# Patient Record
Sex: FEMALE | Race: WHITE | NOT HISPANIC OR LATINO | Employment: OTHER | URBAN - METROPOLITAN AREA
[De-identification: names, ages, dates, MRNs, and addresses within clinical notes are randomized per-mention and may not be internally consistent; named-entity substitution may affect disease eponyms.]

---

## 2017-07-29 ENCOUNTER — APPOINTMENT (OUTPATIENT)
Dept: RADIOLOGY | Facility: MEDICAL CENTER | Age: 77
DRG: 544 | End: 2017-07-29
Attending: EMERGENCY MEDICINE
Payer: MEDICARE

## 2017-07-29 ENCOUNTER — HOSPITAL ENCOUNTER (INPATIENT)
Facility: MEDICAL CENTER | Age: 77
LOS: 2 days | DRG: 544 | End: 2017-08-01
Attending: EMERGENCY MEDICINE | Admitting: HOSPITALIST
Payer: MEDICARE

## 2017-07-29 ENCOUNTER — RESOLUTE PROFESSIONAL BILLING HOSPITAL PROF FEE (OUTPATIENT)
Dept: HOSPITALIST | Facility: MEDICAL CENTER | Age: 77
End: 2017-07-29
Payer: MEDICARE

## 2017-07-29 DIAGNOSIS — Z86.73 HISTORY OF CVA (CEREBROVASCULAR ACCIDENT): ICD-10-CM

## 2017-07-29 DIAGNOSIS — M54.9 INTRACTABLE BACK PAIN: ICD-10-CM

## 2017-07-29 PROBLEM — R19.5 HEME POSITIVE STOOL: Status: ACTIVE | Noted: 2017-07-29

## 2017-07-29 PROBLEM — F03.90 DEMENTIA (HCC): Status: ACTIVE | Noted: 2017-07-29

## 2017-07-29 PROBLEM — D50.9 MICROCYTIC ANEMIA: Status: ACTIVE | Noted: 2017-07-29

## 2017-07-29 LAB
ALBUMIN SERPL BCP-MCNC: 3.7 G/DL (ref 3.2–4.9)
ALBUMIN/GLOB SERPL: 1.4 G/DL
ALP SERPL-CCNC: 85 U/L (ref 30–99)
ALT SERPL-CCNC: 12 U/L (ref 2–50)
ANION GAP SERPL CALC-SCNC: 10 MMOL/L (ref 0–11.9)
ANISOCYTOSIS BLD QL SMEAR: ABNORMAL
ANISOCYTOSIS BLD QL SMEAR: NORMAL
APPEARANCE UR: CLEAR
APTT PPP: 24 SEC (ref 24.7–36)
AST SERPL-CCNC: 15 U/L (ref 12–45)
BASOPHILS # BLD AUTO: 0.9 % (ref 0–1.8)
BASOPHILS # BLD: 0.05 K/UL (ref 0–0.12)
BILIRUB SERPL-MCNC: 0.4 MG/DL (ref 0.1–1.5)
BILIRUB UR QL STRIP.AUTO: NEGATIVE
BUN SERPL-MCNC: 17 MG/DL (ref 8–22)
CALCIUM SERPL-MCNC: 9 MG/DL (ref 8.5–10.5)
CHLORIDE SERPL-SCNC: 112 MMOL/L (ref 96–112)
CO2 SERPL-SCNC: 21 MMOL/L (ref 20–33)
COLOR UR: YELLOW
CREAT SERPL-MCNC: 0.72 MG/DL (ref 0.5–1.4)
CULTURE IF INDICATED INDCX: NO UA CULTURE
DACRYOCYTES BLD QL SMEAR: NORMAL
EOSINOPHIL # BLD AUTO: 0.19 K/UL (ref 0–0.51)
EOSINOPHIL NFR BLD: 3.5 % (ref 0–6.9)
ERYTHROCYTE [DISTWIDTH] IN BLOOD BY AUTOMATED COUNT: 53 FL (ref 35.9–50)
ERYTHROCYTE [DISTWIDTH] IN BLOOD BY AUTOMATED COUNT: 53.1 FL (ref 35.9–50)
GFR SERPL CREATININE-BSD FRML MDRD: >60 ML/MIN/1.73 M 2
GIANT PLATELETS BLD QL SMEAR: NORMAL
GLOBULIN SER CALC-MCNC: 2.6 G/DL (ref 1.9–3.5)
GLUCOSE SERPL-MCNC: 94 MG/DL (ref 65–99)
GLUCOSE UR STRIP.AUTO-MCNC: NEGATIVE MG/DL
HCT VFR BLD AUTO: 29.8 % (ref 37–47)
HCT VFR BLD AUTO: 31.3 % (ref 37–47)
HGB BLD-MCNC: 8.2 G/DL (ref 12–16)
HGB BLD-MCNC: 8.4 G/DL (ref 12–16)
HGB RETIC QN AUTO: 17 PG/CELL (ref 29–35)
IMM RETICS NFR: 22.2 % (ref 9.3–17.4)
INR PPP: 1.01 (ref 0.87–1.13)
IRON SATN MFR SERPL: 3 % (ref 15–55)
IRON SERPL-MCNC: 12 UG/DL (ref 40–170)
KETONES UR STRIP.AUTO-MCNC: NEGATIVE MG/DL
LEUKOCYTE ESTERASE UR QL STRIP.AUTO: NEGATIVE
LIPASE SERPL-CCNC: 6 U/L (ref 11–82)
LYMPHOCYTES # BLD AUTO: 0.78 K/UL (ref 1–4.8)
LYMPHOCYTES NFR BLD: 14.8 % (ref 22–41)
MAGNESIUM SERPL-MCNC: 2 MG/DL (ref 1.5–2.5)
MANUAL DIFF BLD: NORMAL
MCH RBC QN AUTO: 17.7 PG (ref 27–33)
MCH RBC QN AUTO: 18.2 PG (ref 27–33)
MCHC RBC AUTO-ENTMCNC: 26.8 G/DL (ref 33.6–35)
MCHC RBC AUTO-ENTMCNC: 27.5 G/DL (ref 33.6–35)
MCV RBC AUTO: 65.9 FL (ref 81.4–97.8)
MCV RBC AUTO: 66.1 FL (ref 81.4–97.8)
MICRO URNS: NORMAL
MICROCYTES BLD QL SMEAR: ABNORMAL
MICROCYTES BLD QL SMEAR: NORMAL
MONOCYTES # BLD AUTO: 0.23 K/UL (ref 0–0.85)
MONOCYTES NFR BLD AUTO: 4.3 % (ref 0–13.4)
MORPHOLOGY BLD-IMP: NORMAL
MORPHOLOGY BLD-IMP: NORMAL
NEUTROPHILS # BLD AUTO: 4.01 K/UL (ref 2–7.15)
NEUTROPHILS NFR BLD: 75.6 % (ref 44–72)
NITRITE UR QL STRIP.AUTO: NEGATIVE
NRBC # BLD AUTO: 0 K/UL
NRBC BLD AUTO-RTO: 0 /100 WBC
OVALOCYTES BLD QL SMEAR: NORMAL
OVALOCYTES BLD QL SMEAR: NORMAL
PH UR STRIP.AUTO: 5.5 [PH]
PLATELET # BLD AUTO: 314 K/UL (ref 164–446)
PLATELET # BLD AUTO: 342 K/UL (ref 164–446)
PLATELET BLD QL SMEAR: NORMAL
PLATELET BLD QL SMEAR: NORMAL
PMV BLD AUTO: 10.3 FL (ref 9–12.9)
POIKILOCYTOSIS BLD QL SMEAR: NORMAL
POIKILOCYTOSIS BLD QL SMEAR: NORMAL
POLYCHROMASIA BLD QL SMEAR: NORMAL
POLYCHROMASIA BLD QL SMEAR: NORMAL
POTASSIUM SERPL-SCNC: 3.5 MMOL/L (ref 3.6–5.5)
PROMYELOCYTES NFR BLD MANUAL: 0.9 %
PROT SERPL-MCNC: 6.3 G/DL (ref 6–8.2)
PROT UR QL STRIP: NEGATIVE MG/DL
PROTHROMBIN TIME: 13.6 SEC (ref 12–14.6)
RBC # BLD AUTO: 4.51 M/UL (ref 4.2–5.4)
RBC # BLD AUTO: 4.75 M/UL (ref 4.2–5.4)
RBC BLD AUTO: PRESENT
RBC BLD AUTO: PRESENT
RBC UR QL AUTO: NEGATIVE
RETICS # AUTO: 0.09 M/UL (ref 0.04–0.06)
RETICS/RBC NFR: 2.1 % (ref 0.8–2.1)
SODIUM SERPL-SCNC: 143 MMOL/L (ref 135–145)
SP GR UR STRIP.AUTO: 1.02
SPHEROCYTES BLD QL SMEAR: NORMAL
TIBC SERPL-MCNC: 458 UG/DL (ref 250–450)
TSH SERPL DL<=0.005 MIU/L-ACNC: 3.14 UIU/ML (ref 0.3–3.7)
UROBILINOGEN UR STRIP.AUTO-MCNC: 1 MG/DL
WBC # BLD AUTO: 5.3 K/UL (ref 4.8–10.8)
WBC # BLD AUTO: 5.7 K/UL (ref 4.8–10.8)

## 2017-07-29 PROCEDURE — 90471 IMMUNIZATION ADMIN: CPT

## 2017-07-29 PROCEDURE — 99220 PR INITIAL OBSERVATION CARE,LEVL III: CPT | Performed by: HOSPITALIST

## 2017-07-29 PROCEDURE — 71010 DX-CHEST-PORTABLE (1 VIEW): CPT

## 2017-07-29 PROCEDURE — 96375 TX/PRO/DX INJ NEW DRUG ADDON: CPT

## 2017-07-29 PROCEDURE — 72192 CT PELVIS W/O DYE: CPT

## 2017-07-29 PROCEDURE — G0378 HOSPITAL OBSERVATION PER HR: HCPCS

## 2017-07-29 PROCEDURE — 81003 URINALYSIS AUTO W/O SCOPE: CPT

## 2017-07-29 PROCEDURE — 85027 COMPLETE CBC AUTOMATED: CPT

## 2017-07-29 PROCEDURE — 85007 BL SMEAR W/DIFF WBC COUNT: CPT

## 2017-07-29 PROCEDURE — 83540 ASSAY OF IRON: CPT

## 2017-07-29 PROCEDURE — 700105 HCHG RX REV CODE 258: Performed by: EMERGENCY MEDICINE

## 2017-07-29 PROCEDURE — 85610 PROTHROMBIN TIME: CPT

## 2017-07-29 PROCEDURE — 3E0234Z INTRODUCTION OF SERUM, TOXOID AND VACCINE INTO MUSCLE, PERCUTANEOUS APPROACH: ICD-10-PCS | Performed by: HOSPITALIST

## 2017-07-29 PROCEDURE — 36415 COLL VENOUS BLD VENIPUNCTURE: CPT

## 2017-07-29 PROCEDURE — 93005 ELECTROCARDIOGRAM TRACING: CPT | Performed by: EMERGENCY MEDICINE

## 2017-07-29 PROCEDURE — 85730 THROMBOPLASTIN TIME PARTIAL: CPT

## 2017-07-29 PROCEDURE — 72131 CT LUMBAR SPINE W/O DYE: CPT

## 2017-07-29 PROCEDURE — 700111 HCHG RX REV CODE 636 W/ 250 OVERRIDE (IP): Performed by: HOSPITALIST

## 2017-07-29 PROCEDURE — 84443 ASSAY THYROID STIM HORMONE: CPT

## 2017-07-29 PROCEDURE — 80053 COMPREHEN METABOLIC PANEL: CPT

## 2017-07-29 PROCEDURE — 83690 ASSAY OF LIPASE: CPT

## 2017-07-29 PROCEDURE — 85046 RETICYTE/HGB CONCENTRATE: CPT

## 2017-07-29 PROCEDURE — 700102 HCHG RX REV CODE 250 W/ 637 OVERRIDE(OP): Performed by: HOSPITALIST

## 2017-07-29 PROCEDURE — 700111 HCHG RX REV CODE 636 W/ 250 OVERRIDE (IP): Performed by: EMERGENCY MEDICINE

## 2017-07-29 PROCEDURE — 83550 IRON BINDING TEST: CPT

## 2017-07-29 PROCEDURE — 51701 INSERT BLADDER CATHETER: CPT

## 2017-07-29 PROCEDURE — A9270 NON-COVERED ITEM OR SERVICE: HCPCS | Performed by: HOSPITALIST

## 2017-07-29 PROCEDURE — 99285 EMERGENCY DEPT VISIT HI MDM: CPT

## 2017-07-29 PROCEDURE — 83735 ASSAY OF MAGNESIUM: CPT

## 2017-07-29 PROCEDURE — 90670 PCV13 VACCINE IM: CPT | Performed by: HOSPITALIST

## 2017-07-29 PROCEDURE — 96374 THER/PROPH/DIAG INJ IV PUSH: CPT

## 2017-07-29 RX ORDER — AMOXICILLIN 250 MG
2 CAPSULE ORAL 2 TIMES DAILY
Status: DISCONTINUED | OUTPATIENT
Start: 2017-07-29 | End: 2017-08-01

## 2017-07-29 RX ORDER — MORPHINE SULFATE 4 MG/ML
4 INJECTION, SOLUTION INTRAMUSCULAR; INTRAVENOUS ONCE
Status: COMPLETED | OUTPATIENT
Start: 2017-07-29 | End: 2017-07-29

## 2017-07-29 RX ORDER — DULOXETIN HYDROCHLORIDE 20 MG/1
20 CAPSULE, DELAYED RELEASE ORAL DAILY
Status: ON HOLD | COMMUNITY
End: 2017-08-15

## 2017-07-29 RX ORDER — FUROSEMIDE 40 MG/1
40 TABLET ORAL DAILY
Status: ON HOLD | COMMUNITY
End: 2017-08-15

## 2017-07-29 RX ORDER — OXYCODONE HYDROCHLORIDE 5 MG/1
5 TABLET ORAL
Status: DISCONTINUED | OUTPATIENT
Start: 2017-07-29 | End: 2017-08-01 | Stop reason: HOSPADM

## 2017-07-29 RX ORDER — SODIUM CHLORIDE 9 MG/ML
INJECTION, SOLUTION INTRAVENOUS CONTINUOUS
Status: DISCONTINUED | OUTPATIENT
Start: 2017-07-29 | End: 2017-07-29

## 2017-07-29 RX ORDER — BUSPIRONE HYDROCHLORIDE 10 MG/1
5-10 TABLET ORAL 2 TIMES DAILY PRN
Status: ON HOLD | COMMUNITY
End: 2017-08-15

## 2017-07-29 RX ORDER — POLYETHYLENE GLYCOL 3350 17 G/17G
1 POWDER, FOR SOLUTION ORAL
Status: DISCONTINUED | OUTPATIENT
Start: 2017-07-29 | End: 2017-08-01

## 2017-07-29 RX ORDER — ASPIRIN 325 MG
975 TABLET ORAL EVERY 6 HOURS PRN
Status: ON HOLD | COMMUNITY
End: 2017-08-15

## 2017-07-29 RX ORDER — NICOTINE 21 MG/24HR
14 PATCH, TRANSDERMAL 24 HOURS TRANSDERMAL
Status: DISCONTINUED | OUTPATIENT
Start: 2017-07-29 | End: 2017-08-01

## 2017-07-29 RX ORDER — ATORVASTATIN CALCIUM 80 MG/1
80 TABLET, FILM COATED ORAL
Status: ON HOLD | COMMUNITY
End: 2017-08-15

## 2017-07-29 RX ORDER — OXYCODONE HYDROCHLORIDE 5 MG/1
2.5 TABLET ORAL
Status: DISCONTINUED | OUTPATIENT
Start: 2017-07-29 | End: 2017-08-01 | Stop reason: HOSPADM

## 2017-07-29 RX ORDER — BISACODYL 10 MG
10 SUPPOSITORY, RECTAL RECTAL
Status: DISCONTINUED | OUTPATIENT
Start: 2017-07-29 | End: 2017-08-01

## 2017-07-29 RX ORDER — ONDANSETRON 2 MG/ML
4 INJECTION INTRAMUSCULAR; INTRAVENOUS EVERY 4 HOURS PRN
Status: DISCONTINUED | OUTPATIENT
Start: 2017-07-29 | End: 2017-08-01 | Stop reason: HOSPADM

## 2017-07-29 RX ORDER — ONDANSETRON 2 MG/ML
4 INJECTION INTRAMUSCULAR; INTRAVENOUS ONCE
Status: COMPLETED | OUTPATIENT
Start: 2017-07-29 | End: 2017-07-29

## 2017-07-29 RX ORDER — BENAZEPRIL HYDROCHLORIDE 40 MG/1
40 TABLET ORAL DAILY
Status: ON HOLD | COMMUNITY
End: 2017-08-15

## 2017-07-29 RX ORDER — ONDANSETRON 4 MG/1
4 TABLET, ORALLY DISINTEGRATING ORAL EVERY 4 HOURS PRN
Status: DISCONTINUED | OUTPATIENT
Start: 2017-07-29 | End: 2017-08-01

## 2017-07-29 RX ADMIN — OXYCODONE HYDROCHLORIDE 5 MG: 5 TABLET ORAL at 21:09

## 2017-07-29 RX ADMIN — SODIUM CHLORIDE: 9 INJECTION, SOLUTION INTRAVENOUS at 12:06

## 2017-07-29 RX ADMIN — NICOTINE 14 MG: 14 PATCH, EXTENDED RELEASE TRANSDERMAL at 18:37

## 2017-07-29 RX ADMIN — OXYCODONE HYDROCHLORIDE 5 MG: 5 TABLET ORAL at 17:32

## 2017-07-29 RX ADMIN — PNEUMOCOCCAL 13-VALENT CONJUGATE VACCINE 0.5 ML: 2.2; 2.2; 2.2; 2.2; 2.2; 4.4; 2.2; 2.2; 2.2; 2.2; 2.2; 2.2; 2.2 INJECTION, SUSPENSION INTRAMUSCULAR at 21:11

## 2017-07-29 RX ADMIN — ONDANSETRON 4 MG: 2 INJECTION INTRAMUSCULAR; INTRAVENOUS at 12:28

## 2017-07-29 RX ADMIN — STANDARDIZED SENNA CONCENTRATE AND DOCUSATE SODIUM 2 TABLET: 8.6; 5 TABLET, FILM COATED ORAL at 21:06

## 2017-07-29 RX ADMIN — MORPHINE SULFATE 4 MG: 4 INJECTION INTRAVENOUS at 12:28

## 2017-07-29 ASSESSMENT — PATIENT HEALTH QUESTIONNAIRE - PHQ9
SUM OF ALL RESPONSES TO PHQ QUESTIONS 1-9: 0
1. LITTLE INTEREST OR PLEASURE IN DOING THINGS: NOT AT ALL
2. FEELING DOWN, DEPRESSED, IRRITABLE, OR HOPELESS: NOT AT ALL
SUM OF ALL RESPONSES TO PHQ9 QUESTIONS 1 AND 2: 0

## 2017-07-29 ASSESSMENT — PAIN SCALES - GENERAL
PAINLEVEL_OUTOF10: 0
PAINLEVEL_OUTOF10: 0
PAINLEVEL_OUTOF10: 10

## 2017-07-29 ASSESSMENT — LIFESTYLE VARIABLES
EVER_SMOKED: YES
ALCOHOL_USE: NO

## 2017-07-29 NOTE — ED NOTES
Chief Complaint   Patient presents with   • Leg Pain     Pt reports chronic bilateral leg pain that has been increasing over the past week to the point where she cannot tolerate walking.    Pt bib [facility] for above chief complaint.  CMS intact, +2 pedal pulses on BLE, no edema.

## 2017-07-29 NOTE — IP AVS SNAPSHOT
" Home Care Instructions                                                                                                                  Name:Maria Esther Valencia  Medical Record Number:[id number]  CSN: [id number]    YOB: 1940   Age: 76 y.o.  Sex: female  HT:1.575 m (5' 2\") WT: 86.183 kg (190 lb)          Admit Date: 7/29/2017     Discharge Date:   Today's Date: 8/1/2017  Attending Doctor:  Alyssa Samayoa M.D.                  Allergies:  Pcn            Discharge Instructions       Discharge Instructions    Discharged to other by [facility] with escort. Discharged via wheelchair, hospital escort: Yes.  Special equipment needed: Oxygen    Be sure to schedule a follow-up appointment with your primary care doctor or any specialists as instructed.     Discharge Plan:   Smoking Cessation Offered: Patient Refused  Pneumococcal Vaccine Given - only chart on this line when given: Given (See MAR)  Influenza Vaccine Indication: Indicated: Not available from distributor/    I understand that a diet low in cholesterol, fat, and sodium is recommended for good health. Unless I have been given specific instructions below for another diet, I accept this instruction as my diet prescription.   Other diet: Regular texture/ cardiac diet    Special Instructions: None      · Is patient discharged on Warfarin / Coumadin?   No     · Is patient Post Blood Transfusion?  No    Depression / Suicide Risk    As you are discharged from this [facility] facility, it is important to learn how to keep safe from harming yourself.    Recognize the warning signs:  · Abrupt changes in personality, positive or negative- including increase in energy   · Giving away possessions  · Change in eating patterns- significant weight changes-  positive or negative  · Change in sleeping patterns- unable to sleep or sleeping all the time   · Unwillingness or inability to communicate  · Depression  · Unusual sadness, discouragement and " loneliness  · Talk of wanting to die  · Neglect of personal appearance   · Rebelliousness- reckless behavior  · Withdrawal from people/activities they love  · Confusion- inability to concentrate     If you or a loved one observes any of these behaviors or has concerns about self-harm, here's what you can do:  · Talk about it- your feelings and reasons for harming yourself  · Remove any means that you might use to hurt yourself (examples: pills, rope, extension cords, firearm)  · Get professional help from the community (Mental Health, Substance Abuse, psychological counseling)  · Do not be alone:Call your Safe Contact- someone whom you trust who will be there for you.  · Call your local CRISIS HOTLINE 030-3938 or 960-289-6877  · Call your local Children's Mobile Crisis Response Team [place] (252) 919-2713 or www.[employer]  · Call the toll free National Suicide Prevention Hotlines   · National Suicide Prevention Lifeline 474-547-HGQV (7796)  · [place] [facility] Line Network 800-SUICIDE (910-5970)           Discharge Medication Instructions:    Below are the medications your physician expects you to take upon discharge:    Review all your home medications and newly ordered medications with your doctor and/or pharmacist. Follow medication instructions as directed by your doctor and/or pharmacist.    Please keep your medication list with you and share with your physician.               Medication List      START taking these medications        Instructions    Morning Afternoon Evening Bedtime    bisacodyl 10 MG Supp   Commonly known as:  DULCOLAX        Insert 1 Suppository in rectum 1 time daily as needed (if magnesium hydroxide ineffective after 24 hours).   Dose:  10 mg                        nicotine 14 MG/24HR Pt24   Last time this was given:  14 mg on 8/1/2017  4:56 AM   Commonly known as:  NICODERM        Apply 1 Patch to skin as directed every 24 hours.   Dose:  1 Patch                        oxycodone  immediate-release 5 MG Tabs   Last time this was given:  5 mg on 7/31/2017  7:07 PM   Commonly known as:  ROXICODONE        Take 0.5 Tabs by mouth every 3 hours as needed (Moderate Pain (NRS Pain Scale 4-6; CPOT Pain Scale 3-5)).   Dose:  2.5 mg                        polyethylene glycol/lytes Pack   Last time this was given:  1 Packet on 8/1/2017  8:53 AM   Commonly known as:  MIRALAX        Take 1 Packet by mouth 1 time daily as needed (if sennosides and docusate ineffective after 24 hours).   Dose:  17 g                          CONTINUE taking these medications        Instructions    Morning Afternoon Evening Bedtime    * aspirin 81 MG tablet        Take 81 mg by mouth every day.   Dose:  81 mg                        * aspirin 325 MG Tabs   Commonly known as:  ASA        Take 975 mg by mouth every 6 hours as needed for Mild Pain.   Dose:  975 mg                        benazepril 40 MG tablet   Commonly known as:  LOTENSIN        Take 40 mg by mouth every day.   Dose:  40 mg                        busPIRone 10 MG Tabs   Commonly known as:  BUSPAR        Take 5-10 mg by mouth 2 times a day as needed (anxiety).   Dose:  5-10 mg                        duloxetine 20 MG Cpep   Commonly known as:  CYMBALTA        Take 20 mg by mouth every day. Indications: mood/nerve pain   Dose:  20 mg                        furosemide 40 MG Tabs   Commonly known as:  LASIX        Take 40 mg by mouth every day.   Dose:  40 mg                        LIPITOR 80 MG tablet   Generic drug:  atorvastatin        Take 80 mg by mouth every bedtime.   Dose:  80 mg                        * Notice:  This list has 2 medication(s) that are the same as other medications prescribed for you. Read the directions carefully, and ask your doctor or other care provider to review them with you.         Where to Get Your Medications      Information about where to get these medications is not yet available     ! Ask your nurse or doctor about these  medications    - bisacodyl 10 MG Supp  - nicotine 14 MG/24HR Pt24  - oxycodone immediate-release 5 MG Tabs  - polyethylene glycol/lytes Pack            Orders for after discharge     REFERRAL TO PHYSIATRY (PMR)    Complete by:  As directed    Rehab eval       REFERRAL TO SKILLED NURSING FACILITY    Complete by:  As directed              Instructions           Diet / Nutrition:    Follow any diet instructions given to you by your doctor or the dietician, including how much salt (sodium) you are allowed each day.    If you are overweight, talk to your doctor about a weight reduction plan.    Activity:    Remain physically active following your doctor's instructions about exercise and activity.    Rest often.     Any time you become even a little tired or short of breath, SIT DOWN and rest.    Worsening Symptoms:    Report any of the following signs and symptoms to the doctor's office immediately:    *Pain of jaw, arm, or neck  *Chest pain not relieved by medication                               *Dizziness or loss of consciousness  *Difficulty breathing even when at rest   *More tired than usual                                       *Bleeding drainage or swelling of surgical site  *Swelling of feet, ankles, legs or stomach                 *Fever (>100ºF)  *Pink or blood tinged sputum  *Weight gain (3lbs/day or 5lbs /week)           *Shock from internal defibrillator (if applicable)  *Palpitations or irregular heartbeats                *Cool and/or numb extremities    Stroke Awareness    Common Risk Factors for Stroke include:    Age  Atrial Fibrillation  Carotid Artery Stenosis  Diabetes Mellitus  Excessive alcohol consumption  High blood pressure  Overweight   Physical inactivity  Smoking    Warning signs and symptoms of a stroke include:    *Sudden numbness or weakness of the face, arm or leg (especially on one side of the body).  *Sudden confusion, trouble speaking or understanding.  *Sudden trouble seeing in one or  both eyes.  *Sudden trouble walking, dizziness, loss of balance or coordination.Sudden severe headache with no known cause.    It is very important to get treatment quickly when a stroke occurs. If you experience any of the above warning signs, call 911 immediately.                   Disclaimer         Quit Smoking / Tobacco Use:    I understand the use of any tobacco products increases my chance of suffering from future heart disease or stroke and could cause other illnesses which may shorten my life. Quitting the use of tobacco products is the single most important thing I can do to improve my health. For further information on smoking / tobacco cessation call a Toll Free Quit Line at 1-415.660.1852 (*National Cancer [place]) or 1-871.286.5097 (American Lung Association) or you can access the web based program at www.lungEPIOMED THERAPEUTICS.org.    [place] Tobacco Users Help Line:  (460) 777-8399       Toll Free: 1-475.496.5079  Quit Tobacco Program [facility] Management Services (365)914-5225    Crisis Hotline:    [place] Crisis Hotline:  3-263-EHLCSYY or 1-954.795.8705    [place] Crisis Hotline:    1-238.631.2801 or 371-488-6618    Discharge Survey:   Thank you for choosing [facility]. We hope we did everything we could to make your hospital stay a pleasant one. You may be receiving a phone survey and we would appreciate your time and participation in answering the questions. Your input is very valuable to us in our efforts to improve our service to our patients and their families.        My signature on this form indicates that:    1. I have reviewed and understand the above information.  2. My questions regarding this information have been answered to my satisfaction.  3. I have formulated a plan with my discharge nurse to obtain my prescribed medications for home.                  Disclaimer         __________________________________                     __________       ________                       Patient Signature                                                  Date                    Time

## 2017-07-29 NOTE — IP AVS SNAPSHOT
" <p align=\"LEFT\"><IMG SRC=\"//[email or web address]/blob$/[email or web address]\" alt=\"Image\" WIDTH=\"50%\" HEIGHT=\"200\" BORDER=\"\"></p>                   Name:aMria Esther Valencia  Medical Record Number:[id number]  CSN: [id number]    YOB: 1940   Age: 76 y.o.  Sex: female  HT:1.575 m (5' 2\") WT: 86.183 kg (190 lb)          Admit Date: 7/29/2017     Discharge Date:   Today's Date: 8/1/2017  Attending Doctor:  Alyssa Samayoa M.D.                  Allergies:  n             Medication List      Take these Medications        Instructions    * aspirin 81 MG tablet    Take 81 mg by mouth every day.   Dose:  81 mg       * aspirin 325 MG Tabs   Commonly known as:  ASA    Take 975 mg by mouth every 6 hours as needed for Mild Pain.   Dose:  975 mg       benazepril 40 MG tablet   Commonly known as:  LOTENSIN    Take 40 mg by mouth every day.   Dose:  40 mg       bisacodyl 10 MG Supp   Commonly known as:  DULCOLAX    Insert 1 Suppository in rectum 1 time daily as needed (if magnesium hydroxide ineffective after 24 hours).   Dose:  10 mg       busPIRone 10 MG Tabs   Commonly known as:  BUSPAR    Take 5-10 mg by mouth 2 times a day as needed (anxiety).   Dose:  5-10 mg       duloxetine 20 MG Cpep   Commonly known as:  CYMBALTA    Take 20 mg by mouth every day. Indications: mood/nerve pain   Dose:  20 mg       furosemide 40 MG Tabs   Commonly known as:  LASIX    Take 40 mg by mouth every day.   Dose:  40 mg       LIPITOR 80 MG tablet   Generic drug:  atorvastatin    Take 80 mg by mouth every bedtime.   Dose:  80 mg       nicotine 14 MG/24HR Pt24   Commonly known as:  NICODERM    Apply 1 Patch to skin as directed every 24 hours.   Dose:  1 Patch       oxycodone immediate-release 5 MG Tabs   Commonly known as:  ROXICODONE    Take 0.5 Tabs by mouth every 3 hours as needed (Moderate Pain (NRS Pain Scale 4-6; CPOT Pain Scale 3-5)).   Dose:  2.5 mg       polyethylene glycol/lytes Pack   Commonly known as:  MIRALAX    Take 1 Packet by mouth 1 time daily as " needed (if sennosides and docusate ineffective after 24 hours).   Dose:  17 g       * Notice:  This list has 2 medication(s) that are the same as other medications prescribed for you. Read the directions carefully, and ask your doctor or other care provider to review them with you.

## 2017-07-29 NOTE — IP AVS SNAPSHOT
8/1/2017    Maria Esther Valencia  [address]  [place] [address]    Dear Maria Esther:    [facility] wants to ensure your discharge home is safe and you or your loved ones have had all of your questions answered regarding your care after you leave the hospital.    Below is a list of resources and contact information should you have any questions regarding your hospital stay, follow-up instructions, or active medical symptoms.    Questions or Concerns Regarding… Contact   Medical Questions Related to Your Discharge  (7 days a week, 8am-5pm) Contact a Nurse Care Coordinator   [phone]   Medical Questions Not Related to Your Discharge  (24 hours a day / 7 days a week)  Contact the Nurse Health Line   [phone]    Medications or Discharge Instructions Refer to your discharge packet   or contact your [facility] Primary Care Provider   [phone]   Follow-up Appointment(s) Schedule your appointment via [facility]   or contact Scheduling [phone]   Billing Review your statement via [facility]  or contact Billing [phone]   Medical Records Review your records via [facility]   or contact Medical Records [phone]     You may receive a telephone call within two days of discharge. This call is to make certain you understand your discharge instructions and have the opportunity to have any questions answered. You can also easily access your medical information, test results and upcoming appointments via the [facility] free online health management tool. You can learn more and sign up at [email or web address]. For assistance setting up your [facility] account, please call [phone].    Once again, we want to ensure your discharge home is safe and that you have a clear understanding of any next steps in your care. If you have any questions or concerns, please do not hesitate to contact us, we are here for you. Thank you for choosing [facility] for your healthcare needs.    Sincerely,    Your [facility] Healthcare Team

## 2017-07-29 NOTE — IP AVS SNAPSHOT
Ceon Access Code: [email or web address]  Expires: 8/31/2017  2:49 PM    Your email address is not on file at [facility].  Email Addresses are required for you to sign up for Ceon, please contact [phone] to verify your personal information and to provide your email address prior to attempting to register for Ceon.    Maria Esther [place] [place] [address]    Ceon  A secure, online tool to manage your health information     [facility]’s Ceon® is a secure, online tool that connects you to your personalized health information from the privacy of your home -- day or night - making it very easy for you to manage your healthcare. Once the activation process is completed, you can even access your medical information using the Ceon vivian, which is available for free in the Apple Vivian store or Google Play store.     To learn more about Ceon, visit [email or web address]    There are two levels of access available (as shown below):   My Chart Features  [facility] Primary Care Doctor [facility]  Specialists [facility]  Urgent  Care Non-[facility] Primary Care Doctor   Email your healthcare team securely and privately 24/7 X X X    Manage appointments: schedule your next appointment; view details of past/upcoming appointments X      Request prescription refills. X      View recent personal medical records, including lab and immunizations X X X X   View health record, including health history, allergies, medications X X X X   Read reports about your outpatient visits, procedures, consult and ER notes X X X X   See your discharge summary, which is a recap of your hospital and/or ER visit that includes your diagnosis, lab results, and care plan X X  X     How to register for Ceon:  Once your e-mail address has been verified, follow the following steps to sign up for Ceon.     1. Go to  [email or web address]  2. Click on the Sign Up Now box, which takes you to the New Member Sign Up page. You will  need to provide the following information:  a. Enter your Berg Access Code exactly as it appears at the top of this page. (You will not need to use this code after you’ve completed the sign-up process. If you do not sign up before the expiration date, you must request a new code.)   b. Enter your date of birth.   c. Enter your home email address.   d. Click Submit, and follow the next screen’s instructions.  3. Create a Fanaticallt ID. This will be your Berg login ID and cannot be changed, so think of one that is secure and easy to remember.  4. Create a Berg password. You can change your password at any time.  5. Enter your Password Reset Question and Answer. This can be used at a later time if you forget your password.   6. Enter your e-mail address. This allows you to receive e-mail notifications when new information is available in Berg.  7. Click Sign Up. You can now view your health information.    For assistance activating your Berg account, call [phone]

## 2017-07-29 NOTE — ED NOTES
"Med rec complete per pt and [facility] pharmacy   Pt was not able to provide any information on what medications  She normally takes, or what pharmacy she fills at  Pt states she has not had her medications in >week, due  To \"there not being enough\"  Per pharmacy, pt's RX's read that she needs to make an appointment  For more refills  Pt has only been taking Aspirin PRN pain recently  Allergies reviewed  No ABX in last month  "

## 2017-07-29 NOTE — ED PROVIDER NOTES
ED Provider Note    Scribed for Segundo Valencia M.D. by Katheryn Malik. 7/29/2017  11:31 AM    Primary Care Provider: Bernabe Hopkins D.O.  Means of arrival: Ambulance  History obtained from: Patient  History limited by: None    CHIEF COMPLAINT  Chief Complaint   Patient presents with   • Leg Pain       HPI  Maria Esther Valencia is a 76 y.o. female who presents to the ED by ambulance for leg pain with an onset of 7 days. History of chronic bilateral lower extremity pain. She began to experience exacerbated pain seven days ago while laying down. No injury, trauma or falls.  She localizes her pain to her inner thighs with no pain below the knees. She was unable to get out of bed this morning. She reports bladder incontinence this morning. Negative back pain, leg swelling, chest pain, palpitations, focal numbness or weakness.      REVIEW OF SYSTEMS  CONSTITUTIONAL:  Denies fever, chills, weight gain/loss or weakness.  EYES:  Denies photophobia or discharge.   ENT:  Denies sore throat, nose or ear pain.  CARDIOVASCULAR:  Denies chest pain, palpitations or swelling.  RESPIRATORY:  Denies cough, shortness of breath, difficulty breathing.  GI:  Denies abdominal pain, nausea, vomiting or diarrhea.  : Positive bladder incontinence.   MUSCULOSKELETAL:  Positive for chronic bilateral lower extremity pain and low back pain. But this is much worse..  Denies injury, trauma, falls, weakness, leg swelling or back pain.  SKIN:  No rash or bruising.  NEUROLOGIC:  Denies headache, focal weakness or numbness.  PSYCHIATRIC:  Denies depression.    See HPI for further details. C.      PAST MEDICAL HISTORY  Past Medical History   Diagnosis Date   • Congestive heart failure (CMS-HCC)    • CAD (coronary artery disease)    • Stroke (CMS-[facility])    • Hypertension    • Psychiatric disorder        FAMILY HISTORY  No one else is sick at this time  Patient states she has no living family    SOCIAL HISTORY  Patient reports that she has been smoking.   "She reports that she drinks alcohol. She reports that she does not use illicit drugs.  She lives alone. Has no family she states    SURGICAL HISTORY  Past Surgical History   Procedure Laterality Date   • Other cardiac surgery         CURRENT MEDICATIONS  Home Medications     Reviewed by Alena Alvarez R.N. (Registered Nurse) on 07/29/17 at 1117  Med List Status: Partial    Medication Last Dose Status    ASPIRIN 81 MG PO TBEC Taking Active    LASIX 40 MG PO TABS 7/29/2017 Active    LOPRESSOR 50 MG PO TABS Taking Active    NORVASC 5 MG PO TABS Taking Active    PAXIL 20 MG PO TABS Taking Active    PLAVIX 75 MG PO TABS Taking Active    PRILOSEC 20 MG PO CPDR Unknown Active    SOMA 350 MG PO TABS Not Taking Active    XANAX PO Not Taking Active                ALLERGIES  Allergies   Allergen Reactions   • Pcn [Penicillins]        PHYSICAL EXAM  VITAL SIGNS: /76 mmHg  Pulse 77  Temp(Src) 36.5 °C (97.7 °F)  Resp 16  Ht 1.575 m (5' 2\")  Wt 86.183 kg (190 lb)  BMI 34.74 kg/m2  SpO2 94%      Constitutional: Patient is awake and alert. Elderly female looks ill. Clinically looks dehydrated.   HENT: Normocephalic, Atraumatic, Bilateral external ears normal, Oropharynx dry and pink with no exudates, Nose patent.  Eyes: PERRLA, EOMI, Sclera and conjunctiva clear, Under right eye, she has dry yellow discharge.  Neck:  Supple no nuchal rigidity, no thyromegaly or mass. Non-tender  Lymphatic: No supraclavicular lymph nodes.   Cardiovascular: Heart is regular rate and rhythm no murmur, rub or thrill.   Thorax & Lungs: Chest is symmetrical, with good breath sounds. No wheezing or crackles. No respiratory distress, No chest tenderness.   Abdomen: Soft, No tenderness no hepatosplenomegaly there is no guarding or rebound, No masses, No pulsatile masses. Bowel sounds are present.  Rectal: Brown guaiac heme positive stool.  Skin: Warm, Dry, no petechia, purpura, or rash. No rashes in the groin or medial thigh areas.  Back: " No CVA tenderness. Tender to sciatic notches bilaterally.  No midline tenderness.  Extremities: No edema. Non tender.   Musculoskeletal: Good range of motion to wrists, elbows, shoulders knees, and ankles. Pulses 2+ radially and femorally. Movement of her lower extremities and her hips is or pain in the pelvis. She has bilateral sciatic notch pain with palpation reproducing her pain.  Neurologic: Alert & oriented to person, time, and place.  Answering questions appropriately. Strength is 4 over 5 and symmetric in bilateral upper and lower extremities.  Sensory is intact to light touch to face, arms, and legs.  DTRs are symmetrical in biceps  patella   Psychiatric: Flat affect. Denies being suicidal.      EKG  12:12- 13 Lead EKG interpreted by me shows normal sinus rhythm at 68. . Axis QRS 27, P waves biphasic in V1, Q waves in II, III and aVF. No ST elevations. No change from EKG on 10/19/10.      LABS  Results for orders placed or performed during the hospital encounter of 07/29/17   CBC WITH DIFFERENTIAL   Result Value Ref Range    WBC 5.3 4.8 - 10.8 K/uL    RBC 4.75 4.20 - 5.40 M/uL    Hemoglobin 8.4 (L) 12.0 - 16.0 g/dL    Hematocrit 31.3 (L) 37.0 - 47.0 %    MCV 65.9 (L) 81.4 - 97.8 fL    MCH 17.7 (L) 27.0 - 33.0 pg    MCHC 26.8 (L) 33.6 - 35.0 g/dL    RDW 53.0 (H) 35.9 - 50.0 fL    Platelet Count 342 164 - 446 K/uL    MPV 10.3 9.0 - 12.9 fL    Nucleated RBC 0.00 /100 WBC    NRBC (Absolute) 0.00 K/uL    Neutrophils-Polys 75.60 (H) 44.00 - 72.00 %    Lymphocytes 14.80 (L) 22.00 - 41.00 %    Monocytes 4.30 0.00 - 13.40 %    Eosinophils 3.50 0.00 - 6.90 %    Basophils 0.90 0.00 - 1.80 %    Neutrophils (Absolute) 4.01 2.00 - 7.15 K/uL    Lymphs (Absolute) 0.78 (L) 1.00 - 4.80 K/uL    Monos (Absolute) 0.23 0.00 - 0.85 K/uL    Eos (Absolute) 0.19 0.00 - 0.51 K/uL    Baso (Absolute) 0.05 0.00 - 0.12 K/uL    Anisocytosis 2+     Microcytosis 3+    COMP METABOLIC PANEL   Result Value Ref Range    Sodium 143 135 -  145 mmol/L    Potassium 3.5 (L) 3.6 - 5.5 mmol/L    Chloride 112 96 - 112 mmol/L    Co2 21 20 - 33 mmol/L    Anion Gap 10.0 0.0 - 11.9    Glucose 94 65 - 99 mg/dL    Bun 17 8 - 22 mg/dL    Creatinine 0.72 0.50 - 1.40 mg/dL    Calcium 9.0 8.5 - 10.5 mg/dL    AST(SGOT) 15 12 - 45 U/L    ALT(SGPT) 12 2 - 50 U/L    Alkaline Phosphatase 85 30 - 99 U/L    Total Bilirubin 0.4 0.1 - 1.5 mg/dL    Albumin 3.7 3.2 - 4.9 g/dL    Total Protein 6.3 6.0 - 8.2 g/dL    Globulin 2.6 1.9 - 3.5 g/dL    A-G Ratio 1.4 g/dL   LIPASE   Result Value Ref Range    Lipase 6 (L) 11 - 82 U/L   PROTHROMBIN TIME   Result Value Ref Range    PT 13.6 12.0 - 14.6 sec    INR 1.01 0.87 - 1.13   APTT   Result Value Ref Range    APTT 24.0 (L) 24.7 - 36.0 sec   URINALYSIS CULTURE, IF INDICATED   Result Value Ref Range    Color Yellow     Character Clear     Specific Gravity 1.023 <1.035    Ph 5.5 5.0-8.0    Glucose Negative Negative mg/dL    Ketones Negative Negative mg/dL    Protein Negative Negative mg/dL    Bilirubin Negative Negative    Urobilinogen, Urine 1.0 Negative    Nitrite Negative Negative    Leukocyte Esterase Negative Negative    Occult Blood Negative Negative    Micro Urine Req see below     Culture Indicated No UA Culture   ESTIMATED GFR   Result Value Ref Range    GFR If African American >60 >60 mL/min/1.73 m 2    GFR If Non African American >60 >60 mL/min/1.73 m 2   DIFFERENTIAL MANUAL   Result Value Ref Range    Progranulocytes 0.90 %    Manual Diff Status PERFORMED    PERIPHERAL SMEAR REVIEW   Result Value Ref Range    Peripheral Smear Review see below    PLATELET ESTIMATE   Result Value Ref Range    Plt Estimation Normal    MORPHOLOGY   Result Value Ref Range    RBC Morphology Present     Polychromia 1+     Poikilocytosis 1+     Ovalocytes 1+     Spherocytes 1+       All labs reviewed by me.      RADIOLOGY/PROCEDURES  CT-LSPINE W/O PLUS RECONS   Final Result      Approximately 35 % loss of height of the superior endplate of T12 with  mild retropulsion which may be subacute.      Approximately 25% loss of height of the superior endplate of L1 centrally. This appears chronic but new compared to 2009.      Degenerative changes as above described.      Atherosclerotic plaque.      Nonobstructing left renal calculus.      CT-PELVIS W/O PLUS RECONS   Final Result      1.  There is no acute fracture involving the pelvis or either proximal femur.   2.  Hips are normally located with moderate degenerative change.   3.  There is no evidence of joint effusion.   4.  Simple appearing cystic structure left hemipelvis possibly related to the left ovary. Uterus is unremarkable.   5.  There is extensive atherosclerosis.      DX-CHEST-PORTABLE (1 VIEW)   Final Result      1.  There is a mildly enlarged cardiac silhouette but there is no acute failure.      MR-LUMBAR SPINE-W/O    (Results Pending)   MR-THORACIC SPINE-W/O    (Results Pending)     The radiologist's interpretations of all radiological studies have been reviewed by me.       COURSE & MEDICAL DECISION MAKING  Pertinent Labs & Imaging studies reviewed. (See chart for details)        11:35 AM Patient seen and examined at bedside. Patient presents for bilateral lower extremity pain.      Initial orders in the Emergency Department included CT pelvis, CT lumbar spine, XR chest, EKG and laboratory testing: lipase, prothrombin time, APTT, UA, CBC with differential, CMP and estimated GFR.  Initial treatment in the Emergency Department included 4 mg of Morphine IV for pain and 4 mg of Zofran IV.  Patient will be treated with 1 L of NS IV as the patient is clinically dehydrated and has not eaten or drank anything over the past two days.  Patient verbalized their understanding and agreement to this plan.    1:58 PM On repeat evaluation, patient is dong well. Lab results were discussed which indicate anemia.  Rectal exam was completed with the assistance of a female nurse.    1:59 PM Spoke with Dr. Fenton,  Hospitalist, regarding the patient's presenting symptoms and chronic pain.  Diagnostic results were discussed.  He will consult and admit the patient for further evaluation and care.        Decision Making:  Patient has chronic low back pain getting much worse. The last couple of days has not been able to get out of bed or walk. She did state she crawled to the bathroom today to have a bowel movement. The pain is in a saddle distribution of her inner thighs down to her knees and in her lower back. I can reproduce pain by pushing sciatic notches. X-rays show multilevel degenerative changes in her spine and compression fractures. Age is somewhat unclear. We've titrated with IV pain medications and IV fluids for her dehydration since she has not eaten or drank anything for 2 days. We will admit her under the care of around hospitals further care and evaluation and workup of her low back pain and weakness.  She is anemic with heme positive stools although looking back at her old labs she is always been anemic. Hard to tell how acute this anemia is although she does have brown stools guaiac positive. This will need to be worked up as well    DISPOSITION  Patient will be admitted to Dr. Fenton, Hospitalist, in stable condition.      DIAGNOSIS  Bilateral lower back pain  Compression fractures and degenerative joint changes to her spine   Weakness  Inability to care for self  Clinical dehydration  Anemia  GI bleed    PLAN  1. Admission [facility] Hospitalist  2. Continue workup and evaluation for low back pain anemia GI bleed        The note accurately reflects work and decisions made by me.  Segundo Valencia  7/29/2017  5:28 PM     IKatheryn (Dm), am scribing for, and in the presence of, Segundo Valencia M.D.    Electronically signed by: Katheryn Malik (Dm), 7/29/2017    Segundo JHAVERI M.D. personally performed the services described in this documentation, as scribed by Katheryn Malik in my presence, and it  is both accurate and complete.

## 2017-07-30 ENCOUNTER — APPOINTMENT (OUTPATIENT)
Dept: RADIOLOGY | Facility: MEDICAL CENTER | Age: 77
DRG: 544 | End: 2017-07-30
Attending: HOSPITALIST
Payer: MEDICARE

## 2017-07-30 LAB
ALBUMIN SERPL BCP-MCNC: 3.5 G/DL (ref 3.2–4.9)
ALBUMIN/GLOB SERPL: 1.3 G/DL
ALP SERPL-CCNC: 86 U/L (ref 30–99)
ALT SERPL-CCNC: 13 U/L (ref 2–50)
ANION GAP SERPL CALC-SCNC: 6 MMOL/L (ref 0–11.9)
AST SERPL-CCNC: 13 U/L (ref 12–45)
BASOPHILS # BLD AUTO: 0.5 % (ref 0–1.8)
BASOPHILS # BLD: 0.05 K/UL (ref 0–0.12)
BILIRUB SERPL-MCNC: 0.4 MG/DL (ref 0.1–1.5)
BUN SERPL-MCNC: 20 MG/DL (ref 8–22)
CALCIUM SERPL-MCNC: 9 MG/DL (ref 8.5–10.5)
CHLORIDE SERPL-SCNC: 110 MMOL/L (ref 96–112)
CO2 SERPL-SCNC: 24 MMOL/L (ref 20–33)
CREAT SERPL-MCNC: 1.08 MG/DL (ref 0.5–1.4)
CRP SERPL HS-MCNC: 0.12 MG/DL (ref 0–0.75)
EOSINOPHIL # BLD AUTO: 0.21 K/UL (ref 0–0.51)
EOSINOPHIL NFR BLD: 2.1 % (ref 0–6.9)
ERYTHROCYTE [DISTWIDTH] IN BLOOD BY AUTOMATED COUNT: 52.8 FL (ref 35.9–50)
ERYTHROCYTE [SEDIMENTATION RATE] IN BLOOD BY WESTERGREN METHOD: 22 MM/HOUR (ref 0–30)
FOLATE SERPL-MCNC: 13.7 NG/ML
GFR SERPL CREATININE-BSD FRML MDRD: 49 ML/MIN/1.73 M 2
GLOBULIN SER CALC-MCNC: 2.6 G/DL (ref 1.9–3.5)
GLUCOSE SERPL-MCNC: 100 MG/DL (ref 65–99)
HCT VFR BLD AUTO: 31.1 % (ref 37–47)
HGB BLD-MCNC: 8.5 G/DL (ref 12–16)
IMM GRANULOCYTES # BLD AUTO: 0.06 K/UL (ref 0–0.11)
IMM GRANULOCYTES NFR BLD AUTO: 0.6 % (ref 0–0.9)
LYMPHOCYTES # BLD AUTO: 1.47 K/UL (ref 1–4.8)
LYMPHOCYTES NFR BLD: 14.9 % (ref 22–41)
MCH RBC QN AUTO: 17.9 PG (ref 27–33)
MCHC RBC AUTO-ENTMCNC: 27.3 G/DL (ref 33.6–35)
MCV RBC AUTO: 65.6 FL (ref 81.4–97.8)
MONOCYTES # BLD AUTO: 1 K/UL (ref 0–0.85)
MONOCYTES NFR BLD AUTO: 10.1 % (ref 0–13.4)
NEUTROPHILS # BLD AUTO: 7.09 K/UL (ref 2–7.15)
NEUTROPHILS NFR BLD: 71.8 % (ref 44–72)
NRBC # BLD AUTO: 0 K/UL
NRBC BLD AUTO-RTO: 0 /100 WBC
PLATELET # BLD AUTO: 317 K/UL (ref 164–446)
PMV BLD AUTO: 10.1 FL (ref 9–12.9)
POTASSIUM SERPL-SCNC: 4.1 MMOL/L (ref 3.6–5.5)
PROT SERPL-MCNC: 6.1 G/DL (ref 6–8.2)
RBC # BLD AUTO: 4.74 M/UL (ref 4.2–5.4)
SODIUM SERPL-SCNC: 140 MMOL/L (ref 135–145)
VIT B12 SERPL-MCNC: 371 PG/ML (ref 211–911)
WBC # BLD AUTO: 9.9 K/UL (ref 4.8–10.8)

## 2017-07-30 PROCEDURE — 85025 COMPLETE CBC W/AUTO DIFF WBC: CPT

## 2017-07-30 PROCEDURE — 700102 HCHG RX REV CODE 250 W/ 637 OVERRIDE(OP): Performed by: HOSPITALIST

## 2017-07-30 PROCEDURE — 84207 ASSAY OF VITAMIN B-6: CPT

## 2017-07-30 PROCEDURE — 700102 HCHG RX REV CODE 250 W/ 637 OVERRIDE(OP)

## 2017-07-30 PROCEDURE — A9270 NON-COVERED ITEM OR SERVICE: HCPCS | Performed by: HOSPITALIST

## 2017-07-30 PROCEDURE — 72148 MRI LUMBAR SPINE W/O DYE: CPT

## 2017-07-30 PROCEDURE — 700111 HCHG RX REV CODE 636 W/ 250 OVERRIDE (IP): Performed by: HOSPITALIST

## 2017-07-30 PROCEDURE — 80053 COMPREHEN METABOLIC PANEL: CPT

## 2017-07-30 PROCEDURE — 82607 VITAMIN B-12: CPT

## 2017-07-30 PROCEDURE — A9270 NON-COVERED ITEM OR SERVICE: HCPCS

## 2017-07-30 PROCEDURE — 72146 MRI CHEST SPINE W/O DYE: CPT

## 2017-07-30 PROCEDURE — 700111 HCHG RX REV CODE 636 W/ 250 OVERRIDE (IP)

## 2017-07-30 PROCEDURE — 36415 COLL VENOUS BLD VENIPUNCTURE: CPT

## 2017-07-30 PROCEDURE — 82746 ASSAY OF FOLIC ACID SERUM: CPT

## 2017-07-30 PROCEDURE — 99232 SBSQ HOSP IP/OBS MODERATE 35: CPT | Performed by: HOSPITALIST

## 2017-07-30 PROCEDURE — 700105 HCHG RX REV CODE 258

## 2017-07-30 PROCEDURE — 86140 C-REACTIVE PROTEIN: CPT

## 2017-07-30 PROCEDURE — 85652 RBC SED RATE AUTOMATED: CPT

## 2017-07-30 PROCEDURE — 770006 HCHG ROOM/CARE - MED/SURG/GYN SEMI*

## 2017-07-30 RX ORDER — ACETAMINOPHEN 325 MG/1
650 TABLET ORAL ONCE
Status: COMPLETED | OUTPATIENT
Start: 2017-07-30 | End: 2017-07-30

## 2017-07-30 RX ORDER — DIPHENHYDRAMINE HCL 25 MG
25 TABLET ORAL ONCE
Status: COMPLETED | OUTPATIENT
Start: 2017-07-30 | End: 2017-07-30

## 2017-07-30 RX ADMIN — STANDARDIZED SENNA CONCENTRATE AND DOCUSATE SODIUM 2 TABLET: 8.6; 5 TABLET, FILM COATED ORAL at 07:55

## 2017-07-30 RX ADMIN — IRON DEXTRAN 1350 MG: 50 INJECTION INTRAMUSCULAR; INTRAVENOUS at 14:53

## 2017-07-30 RX ADMIN — NICOTINE 14 MG: 14 PATCH, EXTENDED RELEASE TRANSDERMAL at 14:53

## 2017-07-30 RX ADMIN — ONDANSETRON 4 MG: 4 TABLET, ORALLY DISINTEGRATING ORAL at 16:40

## 2017-07-30 RX ADMIN — STANDARDIZED SENNA CONCENTRATE AND DOCUSATE SODIUM 2 TABLET: 8.6; 5 TABLET, FILM COATED ORAL at 20:13

## 2017-07-30 RX ADMIN — OXYCODONE HYDROCHLORIDE 5 MG: 5 TABLET ORAL at 20:13

## 2017-07-30 RX ADMIN — DIPHENHYDRAMINE HCL 25 MG: 25 TABLET ORAL at 11:20

## 2017-07-30 RX ADMIN — ACETAMINOPHEN 650 MG: 325 TABLET, FILM COATED ORAL at 11:20

## 2017-07-30 ASSESSMENT — PAIN SCALES - GENERAL
PAINLEVEL_OUTOF10: 0
PAINLEVEL_OUTOF10: 0
PAINLEVEL_OUTOF10: 8
PAINLEVEL_OUTOF10: 0

## 2017-07-30 ASSESSMENT — ENCOUNTER SYMPTOMS
GASTROINTESTINAL NEGATIVE: 1
CONSTITUTIONAL NEGATIVE: 1
SENSORY CHANGE: 0
PSYCHIATRIC NEGATIVE: 1
CARDIOVASCULAR NEGATIVE: 1
RESPIRATORY NEGATIVE: 1
FOCAL WEAKNESS: 1
BACK PAIN: 1
EYES NEGATIVE: 1

## 2017-07-30 ASSESSMENT — PATIENT HEALTH QUESTIONNAIRE - PHQ9
SUM OF ALL RESPONSES TO PHQ QUESTIONS 1-9: 0
2. FEELING DOWN, DEPRESSED, IRRITABLE, OR HOPELESS: NOT AT ALL
SUM OF ALL RESPONSES TO PHQ9 QUESTIONS 1 AND 2: 0
1. LITTLE INTEREST OR PLEASURE IN DOING THINGS: NOT AT ALL

## 2017-07-30 ASSESSMENT — COGNITIVE AND FUNCTIONAL STATUS - GENERAL
MOVING TO AND FROM BED TO CHAIR: A LITTLE
TURNING FROM BACK TO SIDE WHILE IN FLAT BAD: A LITTLE

## 2017-07-30 NOTE — PROGRESS NOTES
Simona Knight Fall Risk Assessment:     Last Known Fall: No falls (no recent falls and pt is not impulsive)  Mobility: Use of assistive device/requires assist of two people  Medications: No meds  Mental Status/LOC/Awareness: Awake, alert, and oriented to date, place, and person  Toileting Needs: Use of assistive device (Bedside commode, bedpan, urinal)  Volume/Electrolyte Status: No problems  Communication/Sensory: Visual (Glasses)/hearing deficit  Behavior: Appropriate behavior  Simona Knight Fall Risk Total: 9  Fall Risk Level: LOW RISK    Universal Fall Precautions:  call light/belongings in reach, bed in low position and locked, wheelchairs and assistive devices out of sight, siderails up x 2    Fall Risk Level Interventions:   TRIAL (TELE 8, NEURO, MED JENNIE 5) Low Fall Risk Interventions  Place yellow fall risk ID band on patient: verified  Provide patient/family education based on risk assessment: verified  Educate patient/family to call staff for assistance when getting out of bed: verified  Place fall precaution signage outside patient door: verified      Patient Specific Interventions:     Medication: assess for medications that can be discontinued or dosage decreased and limit combination of prn medications  Mental Status/LOC/Awareness: reorient patient, reinforce falls education and check on patient hourly  Toileting: provide frquent toileting  Volume/Electrolyte Status: ensure patient remains hydrated  Communication/Sensory: update plan of care on whiteboard  Behavioral: encourage patient to voice feelings and engage patient in daily activities  Mobility: dangle prior to standing, utilize bed/chair fall alarm, ensure bed is locked and in lowest position and provide appropriate assistive device

## 2017-07-30 NOTE — PROGRESS NOTES
Pt denied pain at 2000, was disoriented to time and place. Pt was compliant during assessment and pleasant. She requested for the staff to allow her to sleep rather than administer pain medication during the night. Pt ate about 60% of dinner, watched TV, and fell asleep around 2100. Bed was in low position, locked, upper side rails up, HOB elevated 30 degrees.

## 2017-07-30 NOTE — CARE PLAN
Problem: Infection  Goal: Will remain free from infection  Outcome: MET Date Met:  07/30/17    Problem: Venous Thromboembolism (VTW)/Deep Vein Thrombosis (DVT) Prevention:  Goal: Patient will participate in Venous Thrombosis (VTE)/Deep Vein Thrombosis (DVT)Prevention Measures  Outcome: PROGRESSING AS EXPECTED

## 2017-07-30 NOTE — ASSESSMENT & PLAN NOTE
MRI with significant degenerative changes  The patient is not interested in surgical repair  Placed the patient on Depo-Medrol  Rehab consult was placed  Patient will likely requires skilled nursing facility

## 2017-07-30 NOTE — PROGRESS NOTES
"Pt's glasses are at home but, she says she can't see with them anyway.  They are new glasses but, the Rx does not work she says.  Pt also states, \"I have a lot of moles and they have grown a lot since I've been in the hospital.\"  She is scared because her sister was \"moley\" and  of skin cancer.  Tried to do skin assessment but, patient seized in pain so will do later.  Pt is AOx4 answers name/, city/[place], place/hospital, and / correctly but does not know month or name of hospital.  "

## 2017-07-30 NOTE — CARE PLAN
Problem: Safety  Goal: Will remain free from falls  Bed locked in lowest position. Call light within reach. Bed alarm activated.      Problem: Knowledge Deficit  Goal: Knowledge of disease process/condition, treatment plan, diagnostic tests, and medications will improve  Educated on call light and how to use

## 2017-07-30 NOTE — ASSESSMENT & PLAN NOTE
Will need outpatient follow-up with gastroenterology.  She states a colonoscopy within the last 2 years was negative

## 2017-07-30 NOTE — PROGRESS NOTES
IV Iron Per Pharmacy Note    Patient Lean Body Weight:  50 kg  Reason for Iron Replacement: Iron Deficiency Anemia      Lab Results   Component Value Date/Time    WBC 9.9 07/30/2017 03:30 AM    RBC 4.74 07/30/2017 03:30 AM    HEMOGLOBIN 8.5* 07/30/2017 03:30 AM    HEMATOCRIT 31.1* 07/30/2017 03:30 AM    MCV 65.6* 07/30/2017 03:30 AM    MCH 17.9* 07/30/2017 03:30 AM    MCHC 27.3* 07/30/2017 03:30 AM    MPV 10.1 07/30/2017 03:30 AM    Retic Count = 2.1   Retic Absolute = 0.09    Recent Labs      07/29/17   1840   IRON  12*    TIBC = 458    Fe Sat = 3%     Recent Labs      07/30/17   0330   CREATININE  1.08     Assessment/Plan:  1. IV Iron Indicated.   2. Give Iron Dextran 25 mg IV test dose following diphenhydramine/acetaminophen premeds over 30 minutes per protocol.  3. If no reaction (Anaphylaxis, Hypotension/Hypertension, N/V/D, Chest pain/Back Pain, Urticaria/Pruritis) in the next hour, proceed to full dose. Nursing to call the pharmacy IV room at ext. [phone] for full dose.  4. Full dose: Iron Dextran 1350 mg IV over 4 hours. Continue to monitor for delayed ADR including: Arthralgia/myalgia, Headache/backache, chills/dizziness/malaise, moderate to high fever and n/v.      Michael Lu, PharmD

## 2017-07-30 NOTE — H&P
"HOSPITAL MEDICINE HISTORY/ PHYSICAL    Date & Time note created:    7/29/2017   6:49 PM       Patient ID:   Name: Maria Esther Valencia  .  YOB: 1940. Age: 76 y.o. female.  MRN: [id number]    PCP : Bernabe Hopkins D.O.    CC:  bilateral leg pain    HPI:    Erik is a very pleasant 76 y.o. female with a past medical history of  coronary artery disease/hypertension/psychiatric disorder/history of CVA in the past, presented to the ER complaining of bilateral leg pain which are about 7 days ago she said she has had these discomforts on and off but says has been worsened over the past several days especially when lying down she is unable to localize her discomfort she denies having any back pain chest pain shortness of breath or palpitations. On admission CT of her L-spine and pelvis were done which did not show any evidence of acute fractures. Given that the patient is having difficulty with ambulation and with pain, we will admit the patient for pain control and further diagnostics with an MRI of her spine in addition to diagnostics for her heme positive occult blood and anemia.    Review of Systems:    Please see HPI, all other systems were reviewed and are negative (AMA/CMS criteria)              Allergies to Medications  Pcn      Past Medical History  1. Coronary artery disease   2. History of CVA in the past  3. Depression        Family History:  Reviewed and Non Contributory    Social History:  Patient denies using Alcohol, tobacco or Illicit drugs.    Outpatient Medications:   * aspirin 325 MG Tabs       benazepril 40 MG tablet       busPIRone 10 MG Tabs       duloxetine 20 MG Cpep       furosemide 40 MG Tabs       LIPITOR 80 MG tablet            Physical Exam:   Blood pressure 169/94, pulse 74, temperature 36.3 °C (97.3 °F), resp. rate 16, height 1.575 m (5' 2\"), weight 86.183 kg (190 lb), SpO2 98 %, not currently breastfeeding.  Constitutional:  well developed, well nourished, non-toxic, no acute " distress nontoxic appearance.  HENMT: Normocephalic, atraumatic, b/l ears normal, nose normal  Eyes:  EOMI, conjunctiva normal, no discharge  Neck: no tracheal deviation, supple, no stridor appreciated   Cardiovascular: normal heart rate, normal rhythm, no murmurs, no rubs or gallops; no cyanosis, clubbing or edema  Lungs: Respiratory effort is normal,  breath sounds clear to auscultation bilaterally, no wheezes,No rales no rhonchi appreciated  Abdomen: soft, non-tender, non-distended, no guarding or rebound tenderness, no pulsatile masses noted.   Skin: warm, dry, no erythema and no rash  Extremities:  Distal pulses intact +2.  No cyanosis or clubbing. No edema Noted  No joint deformities, Range of motion appears optimal   Neurologic:  Alert and oriented x3.  Cranial nerves II-XII grossly intact.  No   focal deficits.  Psychiatric: No anxiety or depression      Lab Data Review:    Recent Labs      07/29/17   1205   WBC  5.3   RBC  4.75   HEMOGLOBIN  8.4*   HEMATOCRIT  31.3*   MCV  65.9*   MCH  17.7*   RDW  53.0*   PLATELETCT  342   MPV  10.3   NEUTSPOLYS  75.60*   LYMPHOCYTES  14.80*   MONOCYTES  4.30   EOSINOPHILS  3.50   BASOPHILS  0.90   RBCMORPHOLO  Present         Recent Labs      07/29/17   1205   APTT  24.0*   INR  1.01     Recent Labs      07/29/17   1205   SODIUM  143   POTASSIUM  3.5*   CHLORIDE  112   CO2  21   BUN  17   CREATININE  0.72   CALCIUM  9.0   ALBUMIN  3.7     Recent Labs      07/29/17   1205   ASTSGOT  15   ALTSGPT  12   TBILIRUBIN  0.4   ALKPHOSPHAT  85   GLOBULIN  2.6   INR  1.01         Imaging/Procedures Review:    CT-LSPINE W/O PLUS RECONS   Final Result      Approximately 35 % loss of height of the superior endplate of T12 with mild retropulsion which may be subacute.      Approximately 25% loss of height of the superior endplate of L1 centrally. This appears chronic but new compared to 2009.      Degenerative changes as above described.      Atherosclerotic plaque.      Nonobstructing  left renal calculus.      CT-PELVIS W/O PLUS RECONS   Final Result      1.  There is no acute fracture involving the pelvis or either proximal femur.   2.  Hips are normally located with moderate degenerative change.   3.  There is no evidence of joint effusion.   4.  Simple appearing cystic structure left hemipelvis possibly related to the left ovary. Uterus is unremarkable.   5.  There is extensive atherosclerosis.      DX-CHEST-PORTABLE (1 VIEW)   Final Result      1.  There is a mildly enlarged cardiac silhouette but there is no acute failure.      MR-LUMBAR SPINE-W/O    (Results Pending)   MR-THORACIC SPINE-W/O    (Results Pending)     EKG:    Intervals                                Axis   Rate:       68                           P:          54   MN:         164                          QRS:        -27   QRSD:       92                           T:          31   QT:         404   QTc:        430     Interpretive Statements   SINUS RHYTHM   BORDERLINE LEFT AXIS DEVIATION     Assessment and Plan:      1. Bilateral leg pain  - CT L spine and hip negative for acute fractures  - MRI thoracic & lumbar pending.  - IV/PO pain medications, titrate     2. Microcytic anemia  - patient reports having had a normal colonoscopy 1 year ago, unable to determine GI physician. We will try to find out in the am, and consider reconsulting that group for possible EGD? Given anemia.  - Occult blood + positive.  - Iron panel ordered. May need IV iron in addition to PO if severe.    3. History of coronary artery disease  - We will continue Benzapril, Lipitor we will hold aspirin  - Patient denies having any chest pain    4. Mood disorder  - Continue Cymbalta and BuSpar    5. CVA in the past  - noted to have Old lacunar infarction in the left internal capsule and left corona radiata from previous imaging.   - no issues, will continue aspirin once anemia is worked up.        Prophylaxis: SCDs  Code: Full code  Dispo: I anticipate  hospital length of stay for medical management to be expected to take less than than 2 midnights    This dictation was created using voice recognition software. The accuracy of the dictation is limited to the abilities of the software. I expect there may be some errors of grammar and possibly content.

## 2017-07-30 NOTE — PROGRESS NOTES
[facility]ist Progress Note    Date of Service: 2017    Chief Complaint  76 y.o. female admitted 2017 with complaints of bilateral leg pain and weakness, the patient has a history of coronary artery disease, hypertension, depression, history of reported CVA. The patient is found to have significant anemia with iron deficiency. The CT of the L-spine show some compression fractures but no other acute findings.    Interval Problem Update  Patient seen and examined today.    Patient tolerating treatment and therapies.  All Data, Medication data reviewed.  Case discussed with nursing as available.  Plan of Care reviewed with patient and notified of changes.   the patient appears to be improved, still is unable to walk per her report. She has severe pain into her upper legs and back. She denies a fall or significant injury. An MRI is currently ordered but pending    Consultants/Specialty  Currently none    Disposition  To be determined        Review of Systems   Constitutional: Negative.    HENT: Negative.    Eyes: Negative.    Respiratory: Negative.    Cardiovascular: Negative.    Gastrointestinal: Negative.    Genitourinary: Negative.    Musculoskeletal: Positive for back pain.   Skin: Negative.    Neurological: Positive for focal weakness. Negative for sensory change.   Endo/Heme/Allergies: Negative.    Psychiatric/Behavioral: Negative.    All other systems reviewed and are negative.     Physical Exam  Laboratory/Imaging   Hemodynamics  Temp (24hrs), Av.2 °C (97.2 °F), Min:36.1 °C (97 °F), Max:36.4 °C (97.5 °F)   Temperature: 36.4 °C (97.5 °F)  Pulse  Av.9  Min: 72  Max: 85 Heart Rate (Monitored): 72  Blood Pressure : 123/75 mmHg, NIBP: (!) 165/54 mmHg      Respiratory      Respiration: 19, Pulse Oximetry: 99 %     Work Of Breathing / Effort: Mild  RUL Breath Sounds: Clear, RML Breath Sounds: Clear, RLL Breath Sounds: Clear, ARGELIA Breath Sounds: Clear, LLL Breath Sounds:  Clear    Fluids    Intake/Output Summary (Last 24 hours) at 07/30/17 1333  Last data filed at 07/29/17 1345   Gross per 24 hour   Intake      0 ml   Output     10 ml   Net    -10 ml       Nutrition  Orders Placed This Encounter   Procedures   • Diet Order     Standing Status: Standing      Number of Occurrences: 1      Standing Expiration Date:      Order Specific Question:  Diet:     Answer:  Cardiac [6]     Physical Exam   Musculoskeletal: She exhibits tenderness.   Neurological: She displays abnormal reflex. She exhibits abnormal muscle tone.   Skin: There is pallor.       Recent Labs      07/29/17   1205  07/29/17   1841  07/30/17   0330   WBC  5.3  5.7  9.9   RBC  4.75  4.51  4.74   HEMOGLOBIN  8.4*  8.2*  8.5*   HEMATOCRIT  31.3*  29.8*  31.1*   MCV  65.9*  66.1*  65.6*   MCH  17.7*  18.2*  17.9*   MCHC  26.8*  27.5*  27.3*   RDW  53.0*  53.1*  52.8*   PLATELETCT  342  314  317   MPV  10.3   --   10.1     Recent Labs      07/29/17   1205  07/30/17   0330   SODIUM  143  140   POTASSIUM  3.5*  4.1   CHLORIDE  112  110   CO2  21  24   GLUCOSE  94  100*   BUN  17  20   CREATININE  0.72  1.08   CALCIUM  9.0  9.0     Recent Labs      07/29/17   1205   APTT  24.0*   INR  1.01                  Assessment/Plan     Microcytic anemia  Assessment & Plan  Significantly low iron levels  We'll check vitamin B12 and folate as well as B6 level    Heme positive stool  Assessment & Plan  Will need outpatient follow-up with gastroenterology.  She states a colonoscopy within the last 2 years was negative    Dementia  Assessment & Plan  Unclear baseline  The patient seems improved today the    History of CVA (cerebrovascular accident)  Assessment & Plan  Per imaging  No acute findings at this time    Intractable back pain  Assessment & Plan  The patient continues to complain of pain primarily in her lower back as well as her lower extremities  She denies sensory loss  Awaiting MRI    Radiology images reviewed, Labs reviewed and  Medications reviewed  Pink catheter: No Pink      DVT Prophylaxis: Enoxaparin (Lovenox)        Assessed for rehab: Patient was assess for and/or received rehabilitation services during this hospitalization      Plan  Await MRI  PT OT eval  Replace iron IV, she has severe iron deficiency  Check on vitamin levels  Consider neurosurgical evaluation versus IR evaluation for compression fractures  Patient might need rehab  Pain control  See orders  Supportive care

## 2017-07-31 LAB
ANION GAP SERPL CALC-SCNC: 6 MMOL/L (ref 0–11.9)
BUN SERPL-MCNC: 17 MG/DL (ref 8–22)
CALCIUM SERPL-MCNC: 9 MG/DL (ref 8.5–10.5)
CHLORIDE SERPL-SCNC: 110 MMOL/L (ref 96–112)
CO2 SERPL-SCNC: 24 MMOL/L (ref 20–33)
CREAT SERPL-MCNC: 0.85 MG/DL (ref 0.5–1.4)
ERYTHROCYTE [DISTWIDTH] IN BLOOD BY AUTOMATED COUNT: 51.7 FL (ref 35.9–50)
GFR SERPL CREATININE-BSD FRML MDRD: >60 ML/MIN/1.73 M 2
GLUCOSE SERPL-MCNC: 86 MG/DL (ref 65–99)
HCT VFR BLD AUTO: 28 % (ref 37–47)
HGB BLD-MCNC: 7.8 G/DL (ref 12–16)
MAGNESIUM SERPL-MCNC: 2.1 MG/DL (ref 1.5–2.5)
MCH RBC QN AUTO: 18.4 PG (ref 27–33)
MCHC RBC AUTO-ENTMCNC: 27.9 G/DL (ref 33.6–35)
MCV RBC AUTO: 66.2 FL (ref 81.4–97.8)
PHOSPHATE SERPL-MCNC: 3.1 MG/DL (ref 2.5–4.5)
PLATELET # BLD AUTO: 289 K/UL (ref 164–446)
PMV BLD AUTO: 10.1 FL (ref 9–12.9)
POTASSIUM SERPL-SCNC: 3.6 MMOL/L (ref 3.6–5.5)
RBC # BLD AUTO: 4.23 M/UL (ref 4.2–5.4)
SODIUM SERPL-SCNC: 140 MMOL/L (ref 135–145)
WBC # BLD AUTO: 5.6 K/UL (ref 4.8–10.8)

## 2017-07-31 PROCEDURE — 99232 SBSQ HOSP IP/OBS MODERATE 35: CPT | Performed by: HOSPITALIST

## 2017-07-31 PROCEDURE — G8988 SELF CARE GOAL STATUS: HCPCS | Mod: CJ

## 2017-07-31 PROCEDURE — G8987 SELF CARE CURRENT STATUS: HCPCS | Mod: CK

## 2017-07-31 PROCEDURE — 84100 ASSAY OF PHOSPHORUS: CPT

## 2017-07-31 PROCEDURE — 700111 HCHG RX REV CODE 636 W/ 250 OVERRIDE (IP): Performed by: HOSPITALIST

## 2017-07-31 PROCEDURE — 700101 HCHG RX REV CODE 250: Performed by: NURSE PRACTITIONER

## 2017-07-31 PROCEDURE — 97166 OT EVAL MOD COMPLEX 45 MIN: CPT

## 2017-07-31 PROCEDURE — G8979 MOBILITY GOAL STATUS: HCPCS | Mod: CI

## 2017-07-31 PROCEDURE — G8978 MOBILITY CURRENT STATUS: HCPCS | Mod: CK

## 2017-07-31 PROCEDURE — 85027 COMPLETE CBC AUTOMATED: CPT

## 2017-07-31 PROCEDURE — 80048 BASIC METABOLIC PNL TOTAL CA: CPT

## 2017-07-31 PROCEDURE — 83735 ASSAY OF MAGNESIUM: CPT

## 2017-07-31 PROCEDURE — 700102 HCHG RX REV CODE 250 W/ 637 OVERRIDE(OP): Performed by: HOSPITALIST

## 2017-07-31 PROCEDURE — 770006 HCHG ROOM/CARE - MED/SURG/GYN SEMI*

## 2017-07-31 PROCEDURE — 36415 COLL VENOUS BLD VENIPUNCTURE: CPT

## 2017-07-31 PROCEDURE — A9270 NON-COVERED ITEM OR SERVICE: HCPCS | Performed by: HOSPITALIST

## 2017-07-31 PROCEDURE — 97162 PT EVAL MOD COMPLEX 30 MIN: CPT

## 2017-07-31 RX ORDER — METHYLPREDNISOLONE ACETATE 80 MG/ML
80 INJECTION, SUSPENSION INTRA-ARTICULAR; INTRALESIONAL; INTRAMUSCULAR; SOFT TISSUE ONCE
Status: COMPLETED | OUTPATIENT
Start: 2017-07-31 | End: 2017-07-31

## 2017-07-31 RX ORDER — METHYLPREDNISOLONE ACETATE 80 MG/ML
80 INJECTION, SUSPENSION INTRA-ARTICULAR; INTRALESIONAL; INTRAMUSCULAR; SOFT TISSUE ONCE
Status: DISCONTINUED | OUTPATIENT
Start: 2017-07-31 | End: 2017-07-31

## 2017-07-31 RX ORDER — LABETALOL HYDROCHLORIDE 5 MG/ML
10 INJECTION, SOLUTION INTRAVENOUS EVERY 4 HOURS PRN
Status: DISCONTINUED | OUTPATIENT
Start: 2017-07-31 | End: 2017-08-01 | Stop reason: HOSPADM

## 2017-07-31 RX ADMIN — OXYCODONE HYDROCHLORIDE 5 MG: 5 TABLET ORAL at 09:47

## 2017-07-31 RX ADMIN — OXYCODONE HYDROCHLORIDE 5 MG: 5 TABLET ORAL at 04:43

## 2017-07-31 RX ADMIN — METHYLPREDNISOLONE ACETATE 80 MG: 80 INJECTION, SUSPENSION INTRA-ARTICULAR; INTRALESIONAL; INTRAMUSCULAR; SOFT TISSUE at 11:51

## 2017-07-31 RX ADMIN — OXYCODONE HYDROCHLORIDE 5 MG: 5 TABLET ORAL at 19:07

## 2017-07-31 RX ADMIN — LABETALOL HYDROCHLORIDE 10 MG: 5 INJECTION, SOLUTION INTRAVENOUS at 20:08

## 2017-07-31 RX ADMIN — NICOTINE 14 MG: 14 PATCH, EXTENDED RELEASE TRANSDERMAL at 04:43

## 2017-07-31 RX ADMIN — STANDARDIZED SENNA CONCENTRATE AND DOCUSATE SODIUM 2 TABLET: 8.6; 5 TABLET, FILM COATED ORAL at 19:07

## 2017-07-31 RX ADMIN — STANDARDIZED SENNA CONCENTRATE AND DOCUSATE SODIUM 2 TABLET: 8.6; 5 TABLET, FILM COATED ORAL at 09:39

## 2017-07-31 ASSESSMENT — COGNITIVE AND FUNCTIONAL STATUS - GENERAL
DAILY ACTIVITIY SCORE: 18
HELP NEEDED FOR BATHING: A LITTLE
WALKING IN HOSPITAL ROOM: A LOT
DAILY ACTIVITIY SCORE: 19
CLIMB 3 TO 5 STEPS WITH RAILING: A LOT
TOILETING: A LITTLE
MOVING FROM LYING ON BACK TO SITTING ON SIDE OF FLAT BED: A LITTLE
TOILETING: A LITTLE
HELP NEEDED FOR BATHING: A LOT
MOBILITY SCORE: 16
SUGGESTED CMS G CODE MODIFIER DAILY ACTIVITY: CK
DRESSING REGULAR LOWER BODY CLOTHING: A LITTLE
PERSONAL GROOMING: A LITTLE
DRESSING REGULAR UPPER BODY CLOTHING: A LITTLE
SUGGESTED CMS G CODE MODIFIER DAILY ACTIVITY: CK
TURNING FROM BACK TO SIDE WHILE IN FLAT BAD: A LITTLE
DRESSING REGULAR UPPER BODY CLOTHING: A LITTLE
MOVING TO AND FROM BED TO CHAIR: A LITTLE
STANDING UP FROM CHAIR USING ARMS: A LITTLE
SUGGESTED CMS G CODE MODIFIER MOBILITY: CK
DRESSING REGULAR LOWER BODY CLOTHING: A LOT

## 2017-07-31 ASSESSMENT — ENCOUNTER SYMPTOMS
PSYCHIATRIC NEGATIVE: 1
RESPIRATORY NEGATIVE: 1
CARDIOVASCULAR NEGATIVE: 1
FOCAL WEAKNESS: 1
GASTROINTESTINAL NEGATIVE: 1
EYES NEGATIVE: 1
CONSTITUTIONAL NEGATIVE: 1
BACK PAIN: 1
SENSORY CHANGE: 0

## 2017-07-31 ASSESSMENT — PAIN SCALES - GENERAL
PAINLEVEL_OUTOF10: 1
PAINLEVEL_OUTOF10: 7
PAINLEVEL_OUTOF10: 10
PAINLEVEL_OUTOF10: 7

## 2017-07-31 ASSESSMENT — GAIT ASSESSMENTS
ASSISTIVE DEVICE: FRONT WHEEL WALKER
DISTANCE (FEET): 5
GAIT LEVEL OF ASSIST: MODERATE ASSIST
DEVIATION: BRADYKINETIC;SHUFFLED GAIT

## 2017-07-31 ASSESSMENT — ACTIVITIES OF DAILY LIVING (ADL): TOILETING: INDEPENDENT

## 2017-07-31 NOTE — PROGRESS NOTES
Received report from night nurse at the bedside. Assume care of Pt at 0700. Assessment completed Pt A&O X 3 disoriented to time. Pt denies numbness and tingling, Pt complains of pain in back, medicated per mar with oxycodone,  Assist of 1-2 for weakeness. Pt in bed, bed in lowest position, call light in place, bed alarm is on, SCDs are on,  is on, treaded slipper socks on.

## 2017-07-31 NOTE — PROGRESS NOTES
Simona Knight Fall Risk Assessment:     Last Known Fall: Within the last six months  Mobility: Use of assistive device/requires assist of two people  Medications: Cardiovascular or central nervous system meds  Mental Status/LOC/Awareness: Oriented to person and place  Toileting Needs: Use of assistive device (Bedside commode, bedpan, urinal)  Volume/Electrolyte Status: No problems  Communication/Sensory: Visual (Glasses)/hearing deficit  Behavior: Appropriate behavior  Simona Knight Fall Risk Total: 13  Fall Risk Level: MODERATE RISK    Universal Fall Precautions:  call light/belongings in reach, siderails up x 2, wheelchairs and assistive devices out of sight, bed in low position and locked, use non-slip footwear, adequate lighting, clutter free and spill free environment, educate on level of risk, educate to call for assistance    Fall Risk Level Interventions:   TRIAL (TELE 8, NEURO, MED JENNIE 5) Low Fall Risk Interventions  Place yellow fall risk ID band on patient: completed  Provide patient/family education based on risk assessment: completed  Educate patient/family to call staff for assistance when getting out of bed: completed  Place fall precaution signage outside patient door: completedTRIAL ([employer] 8, NEURO, MED JENNIE 5) Moderate Fall Risk Interventions  Place yellow fall risk ID band on patient: verified  Provide patient/family education based on risk assessment : completed  Educate patient/family to call staff for assistance when getting out of bed: completed  Place fall precaution signage outside patient door: verified  Utilize bed/chair fall alarm: verified     Patient Specific Interventions:     Medication: review medications with patient and family  Mental Status/LOC/Awareness: reorient patient, reinforce falls education, check on patient hourly, utilize bed/chair fall alarm and reinforce the use of call light  Toileting: provide frquent toileting, monitor intake and output/use of appropriate  interventions and instruct patient/family on the need to call for assistance when toileting  Volume/Electrolyte Status: ensure patient remains hydrated  Communication/Sensory: update plan of care on whiteboard and ensure patient has glasses/contacts and hearing aids/dentures  Behavioral: not applicable  Mobility: utilize bed/chair fall alarm and ensure bed is locked and in lowest position

## 2017-07-31 NOTE — DISCHARGE PLANNING
PMR referral from Dr. Morales    Admit from home with increased back pain x 7 days. Ongoing medical work up and anticipate therapy evaluations post MRI.   No physiatry consult forwarded at this time per protocol. I will follow for update on medical work up and therapy recommendation.

## 2017-07-31 NOTE — PROGRESS NOTES
[facility]    Date of Service: 2017    Chief Complaint  76 y.o. female admitted 2017 with complaints of bilateral leg pain and weakness, the patient has a history of coronary artery disease, hypertension, depression, history of reported CVA. The patient is found to have significant anemia with iron deficiency. The CT of the L-spine show some compression fractures but no other acute findings.    Interval Problem Update  Patient seen and examined today.    Patient tolerating treatment and therapies.  All Data, Medication data reviewed.  Case discussed with nursing as available.  Plan of Care reviewed with patient and notified of changes.   the patient appears to be improved, still is unable to walk per her report. She has severe pain into her upper legs and back. She denies a fall or significant injury. An MRI is currently ordered but pending   patient still significant amount of pain, when I related the MRI findings, the patient states that she was not interested in any type of back surgery. She does live alone. She attempted to get assistance through the [employer] services but was unable to do so. Doubt that the patient has functional at this time on her own. Placed rehab consult.  Consultants/Specialty  Currently none    Disposition  To be determined        Review of Systems   Constitutional: Negative.    HENT: Negative.    Eyes: Negative.    Respiratory: Negative.    Cardiovascular: Negative.    Gastrointestinal: Negative.    Genitourinary: Negative.    Musculoskeletal: Positive for back pain.   Skin: Negative.    Neurological: Positive for focal weakness. Negative for sensory change.   Endo/Heme/Allergies: Negative.    Psychiatric/Behavioral: Negative.    All other systems reviewed and are negative.     Physical Exam  Laboratory/Imaging   Hemodynamics  Temp (24hrs), Av.6 °C (97.9 °F), Min:36.3 °C (97.4 °F), Max:37 °C (98.6 °F)   Temperature: 37 °C (98.6 °F)  Pulse  Avg:  75.5  Min: 70  Max: 85    Blood Pressure : (!) 165/77 mmHg (R.N. notified)      Respiratory      Respiration: 14, Pulse Oximetry: 97 %     Work Of Breathing / Effort: Mild  RUL Breath Sounds: Clear, RML Breath Sounds: Clear, RLL Breath Sounds: Clear, ARGELIA Breath Sounds: Clear, LLL Breath Sounds: Clear    Fluids    Intake/Output Summary (Last 24 hours) at 07/31/17 1626  Last data filed at 07/31/17 0500   Gross per 24 hour   Intake    240 ml   Output      0 ml   Net    240 ml       Nutrition  Orders Placed This Encounter   Procedures   • Diet Order     Standing Status: Standing      Number of Occurrences: 1      Standing Expiration Date:      Order Specific Question:  Diet:     Answer:  Cardiac [6]     Physical Exam   Musculoskeletal: She exhibits tenderness.   Neurological: She displays abnormal reflex. She exhibits abnormal muscle tone.   Skin: There is pallor.       Recent Labs      07/29/17   1205  07/29/17   1841  07/30/17   0330  07/31/17   0329   WBC  5.3  5.7  9.9  5.6   RBC  4.75  4.51  4.74  4.23   HEMOGLOBIN  8.4*  8.2*  8.5*  7.8*   HEMATOCRIT  31.3*  29.8*  31.1*  28.0*   MCV  65.9*  66.1*  65.6*  66.2*   MCH  17.7*  18.2*  17.9*  18.4*   MCHC  26.8*  27.5*  27.3*  27.9*   RDW  53.0*  53.1*  52.8*  51.7*   PLATELETCT  342  314  317  289   MPV  10.3   --   10.1  10.1     Recent Labs      07/29/17   1205  07/30/17   0330  07/31/17   0329   SODIUM  143  140  140   POTASSIUM  3.5*  4.1  3.6   CHLORIDE  112  110  110   CO2  21  24  24   GLUCOSE  94  100*  86   BUN  17  20  17   CREATININE  0.72  1.08  0.85   CALCIUM  9.0  9.0  9.0     Recent Labs      07/29/17   1205   APTT  24.0*   INR  1.01                  Assessment/Plan     Microcytic anemia  Assessment & Plan  Significantly low iron levels  We'll check vitamin B12 and folate as well as B6 level    Heme positive stool  Assessment & Plan  Will need outpatient follow-up with gastroenterology.  She states a colonoscopy within the last 2 years was  negative    Dementia  Assessment & Plan  Unclear baseline  The patient seems improved today the    History of CVA (cerebrovascular accident)  Assessment & Plan  Per imaging  No acute findings at this time    Intractable back pain  Assessment & Plan  The patient continues to complain of pain primarily in her lower back as well as her lower extremities  She denies sensory loss  Awaiting MRI      Radiology images reviewed, Labs reviewed and Medications reviewed  Pink catheter: No Pink      DVT Prophylaxis: Enoxaparin (Lovenox)        Assessed for rehab: Patient was assess for and/or received rehabilitation services during this hospitalization      Plan  MRI results noted  PT OT eval completed  Iron is replaced  The patient declined surgical intervention  Given Depo-Medrol  Will need skilled nursing facility, the patient is unable to function on her own at this time  Pain control  See orders  Supportive care

## 2017-07-31 NOTE — CARE PLAN
Problem: Communication  Goal: The ability to communicate needs accurately and effectively will improve  Outcome: PROGRESSING AS EXPECTED  Pt using call light    Problem: Pain Management  Goal: Pain level will decrease to patient’s comfort goal  Outcome: PROGRESSING AS EXPECTED

## 2017-07-31 NOTE — CARE PLAN
Problem: Venous Thromboembolism (VTW)/Deep Vein Thrombosis (DVT) Prevention:  Goal: Patient will participate in Venous Thrombosis (VTE)/Deep Vein Thrombosis (DVT)Prevention Measures  Intervention: Ensure patient wears graduated elastic stockings (GRANT hose) and/or SCDs, if ordered, when in bed or chair (Remove at least once per shift for skin check)  SCDs in place and on.       Problem: Mobility  Goal: Risk for activity intolerance will decrease  Outcome: PROGRESSING AS EXPECTED  Pt is up 1 to 2 assist to bedside commode. Pt has intermittent back spasms that cause weakness in legs.     Problem: Pain Management  Goal: Pain level will decrease to patient’s comfort goal  Pt complains of pain in back and legs. Medicated per mar with oxycodone.   Intervention: Educate and implement non-pharmacologic comfort measures. Examples: relaxation, distration, play therapy, activity therapy, massage, etc.  Rest, repositioning.

## 2017-07-31 NOTE — PROGRESS NOTES
Simona Knight Fall Risk Assessment:     Last Known Fall: Within the last six months  Mobility: Use of assistive device/requires assist of two people  Medications: Cardiovascular or central nervous system meds  Mental Status/LOC/Awareness: Oriented to person and place  Toileting Needs: Use of assistive device (Bedside commode, bedpan, urinal)  Volume/Electrolyte Status: No problems  Communication/Sensory: Visual (Glasses)/hearing deficit  Behavior: Appropriate behavior  Simona Knight Fall Risk Total: 13  Fall Risk Level: MODERATE RISK    Universal Fall Precautions:  call light/belongings in reach, bed in low position and locked, wheelchairs and assistive devices out of sight, siderails up x 2, use non-slip footwear, adequate lighting, clutter free and spill free environment, educate on level of risk, educate to call for assistance    Fall Risk Level Interventions:   TRIAL ([employer] 8, NEURO, MED JENNIE 5) Low Fall Risk Interventions  Place yellow fall risk ID band on patient: completed  Provide patient/family education based on risk assessment: completed  Educate patient/family to call staff for assistance when getting out of bed: completed  Place fall precaution signage outside patient door: completedTRIAL ([employer] 8, NEURO, MED JENNIE 5) Moderate Fall Risk Interventions  Place yellow fall risk ID band on patient: verified  Provide patient/family education based on risk assessment : verified  Educate patient/family to call staff for assistance when getting out of bed: verified  Place fall precaution signage outside patient door: verified  Utilize bed/chair fall alarm: verified     Patient Specific Interventions:     Medication: review medications with patient and family, assess for medications that can be discontinued or dosage decreased and limit combination of prn medications  Mental Status/LOC/Awareness: reorient patient, reinforce falls education, check on patient hourly, utilize bed/chair fall alarm and reinforce the  use of call light  Toileting: consider obtaining elevated toilet seat or bedside commode (BSC)  Volume/Electrolyte Status: ensure patient remains hydrated and monitor abnormal lab values  Communication/Sensory: update plan of care on whiteboard  Behavioral: not applicable  Mobility: schedule physical activity throughout the day, provide comfort measures during transport, utilize bed/chair fall alarm, ensure bed is locked and in lowest position and provide appropriate assistive device

## 2017-07-31 NOTE — THERAPY
"Physical Therapy Evaluation completed.   Bed Mobility:  Supine to Sit: Stand by Assist  Transfers: Sit to Stand: Minimal Assist (requires UE support at FWW)  Gait: Level Of Assist: Moderate Assist with Front-Wheel Walker       Plan of Care: Will benefit from Physical Therapy 3 times per week  Discharge Recommendations: Equipment: Front-Wheel Walker. Post-acute therapy Discharge to a transitional care facility for continued skilled therapy services.    See \"Rehab Therapy-Acute\" Patient Summary Report for complete documentation.     77 y/o female admitted 7/29 with progressive LE pain/ weakness and declining function at home. Pt with PMH significnant for CAD, HTN, depression, anemia with iron deficiency and chronic L spine compression fx's. Pt currently presents with signifincant debility, impaired activity tolerance, impaired mobility/ transfers and mod A for gait with FWW x 5 feet only. Pt has mild LE weakness and high fear of falling. Pt should benefit from skilled PT services to maximize functional strength/ endurance and mobility, as well as assist with DME needs and safe d/c destination.   "

## 2017-07-31 NOTE — PROGRESS NOTES
Pt AOx4 all day but, unable to recall history of certain events like last fall (but knows it has been more than 3 months since her last fall).  Estranged from her family.  Doesn't know where her only son is.  Pleasant, not impulsive, in intermittent pain but, declines pain medication.  Started iron infusion trial without any issues.  [place] through her second iron IVPB she c/o feeling hot and dizzy.  Slowed rate per pharmacist recommendation to 45 ml/hr and then her only IV infiltrated.  New one placed after 3rd RN tried.  Still awaiting occult stool sample.  I let patient know we need a stool sample.  Pt on 2L sat'ing at 97%.

## 2017-07-31 NOTE — THERAPY
"Occupational Therapy Evaluation completed.   Functional Status:  Mod A with ADLs and txfs  Plan of Care: Will benefit from Occupational Therapy 3 times per week  Discharge Recommendations:  Equipment: Will Continue to Assess for Equipment Needs. Post-acute therapy Discharge to a transitional care facility for continued skilled therapy services.     See \"Rehab Therapy-Acute\" Patient Summary Report for complete documentation.    "

## 2017-07-31 NOTE — PROGRESS NOTES
PT's IV infiltrated in right hand and we stuck her 4 times without success.  Her veins blow as soon as you flush the new IV.  Charge RN is paging another floor to try with ultrasound.

## 2017-07-31 NOTE — DISCHARGE PLANNING
Care Transition Team Assessment    SW met with pt at bedside and completed assessment. Pt reports frequent falls at home and requested assistance. Per pt, she has a rep payee ([employer] [phone]), and previously received Meals on Wheels, but cancelled that service. Pt was unable to verify her monthly income but gave SW permission to contact [employer]. SW spoke with pt regarding [facility] and [facility], but pt advised she would rather live alone.     SW spoke with Carlo at [employer] who advised her monthly income is $1536. Carlo also states she has spoken with pt about Group Homes before and pt is not agreeable to a group home.     SW to make referrals for the Home and Community Based Waiver Program and the [facility] Geriatric Assessment Request if pt's dc plan is home.     Information Source  Orientation : Disoriented to Time  Information Given By: Patient  Who is responsible for making decisions for patient? : Patient    Elopement Risk  Legal Hold: No  Ambulatory or Self Mobile in Wheelchair: No-Not an Elopement Risk  Disoriented: Time-At Risk for Elopement  Psychiatric Symptoms: None  History of Wandering: No  Elopement this Admit: No  Vocalizing Wanting to Leave: No  Displays Behaviors, Body Language Wanting to Leave: No-Not at Risk for Elopement  Elopement Risk: Not at Risk for Elopement    Interdisciplinary Discharge Planning  Does Admitting Nurse Feel This Could be a Complex Discharge?: No  Primary Care Physician: [facility]  Lives with - Patient's Self Care Capacity: Alone and Able to Care For Self  Patient or legal guardian wants to designate a caregiver (see row info): No  Support Systems: Friends / Neighbors  Housing / Facility: [facility] And [facility]  Do You Take your Prescribed Medications Regularly: No  Reasons Why Not Taking Medications : Didn't Refill Prescriptions , Financial Reasons  Able to Return to Previous ADL's: Future Time w/Therapy  Mobility Issues:  Yes  Prior Services: Unable To Determine At This Time  Assistance Needed: Yes  Durable Medical Equipment: Other - Specify (Cane)    Discharge Preparedness  What is your plan after discharge?: Uncertain - pending medical team collaboration  What are your discharge supports?: Other (comment) (Upstairs Neighbor-Arsenio)  Prior Functional Level: Ambulatory, Needs Assist with Activities of Daily Living, Uses Cane  Difficulity with ADLs: Walking  Difficulty with ADLs Comment: Pt reports frequent falls  Difficulity with IADLs: Keeping track of finances  Difficulity with IADL Comments:  (Pt uses [employer] ([phone]))    Functional Assesment  Prior Functional Level: Ambulatory, Needs Assist with Activities of Daily Living, Uses Cane    Finances  Financial Barriers to Discharge: No  Prescription Coverage:  (Fills at [facility])    Vision / Hearing Impairment  Vision Impairment : No  Right Eye Vision: Impaired  Left Eye Vision: Impaired  Hearing Impairment : No    Values / Beliefs / Concerns  Values / Beliefs Concerns : No         Domestic Abuse  Have you ever been the victim of abuse or violence?: No  Physical Abuse or Sexual Abuse: No  Verbal Abuse or Emotional Abuse: No  Possible Abuse Reported to:: Not Applicable    Psychological Assessment  History of Substance Abuse: None  History of Psychiatric Problems: No    Discharge Risks or Barriers  Discharge risks or barriers?: Lives alone, no community support  Patient risk factors: Lack of outside supports, Lives alone and no community support, Vulnerable adult

## 2017-08-01 ENCOUNTER — HOSPITAL ENCOUNTER (INPATIENT)
Facility: REHABILITATION | Age: 77
LOS: 14 days | DRG: 560 | End: 2017-08-15
Attending: PHYSICAL MEDICINE & REHABILITATION | Admitting: PHYSICAL MEDICINE & REHABILITATION
Payer: MEDICARE

## 2017-08-01 ENCOUNTER — RESOLUTE PROFESSIONAL BILLING HOSPITAL PROF FEE (OUTPATIENT)
Dept: PHYSICAL MEDICINE AND REHAB | Facility: REHABILITATION | Age: 77
End: 2017-08-01
Payer: MEDICARE

## 2017-08-01 VITALS
WEIGHT: 190 LBS | OXYGEN SATURATION: 94 % | RESPIRATION RATE: 16 BRPM | DIASTOLIC BLOOD PRESSURE: 66 MMHG | BODY MASS INDEX: 34.96 KG/M2 | HEART RATE: 67 BPM | TEMPERATURE: 97.2 F | SYSTOLIC BLOOD PRESSURE: 188 MMHG | HEIGHT: 62 IN

## 2017-08-01 PROBLEM — Z86.73 HISTORY OF STROKE: Status: ACTIVE | Noted: 2017-08-01

## 2017-08-01 PROBLEM — S22.080A T12 COMPRESSION FRACTURE (HCC): Status: ACTIVE | Noted: 2017-08-01

## 2017-08-01 PROBLEM — I10 HTN (HYPERTENSION): Status: ACTIVE | Noted: 2017-08-01

## 2017-08-01 PROBLEM — E78.5 DYSLIPIDEMIA: Status: ACTIVE | Noted: 2017-08-01

## 2017-08-01 PROBLEM — F32.A DEPRESSION: Status: ACTIVE | Noted: 2017-08-01

## 2017-08-01 PROBLEM — M47.814 THORACIC SPONDYLOSIS: Status: ACTIVE | Noted: 2017-08-01

## 2017-08-01 PROBLEM — M54.9 BACK PAIN: Status: ACTIVE | Noted: 2017-08-01

## 2017-08-01 PROCEDURE — 700102 HCHG RX REV CODE 250 W/ 637 OVERRIDE(OP): Performed by: HOSPITALIST

## 2017-08-01 PROCEDURE — 94760 N-INVAS EAR/PLS OXIMETRY 1: CPT

## 2017-08-01 PROCEDURE — 700111 HCHG RX REV CODE 636 W/ 250 OVERRIDE (IP): Performed by: HOSPITALIST

## 2017-08-01 PROCEDURE — 99223 1ST HOSP IP/OBS HIGH 75: CPT | Mod: AI | Performed by: PHYSICAL MEDICINE & REHABILITATION

## 2017-08-01 PROCEDURE — 700111 HCHG RX REV CODE 636 W/ 250 OVERRIDE (IP): Performed by: PHYSICAL MEDICINE & REHABILITATION

## 2017-08-01 PROCEDURE — A9270 NON-COVERED ITEM OR SERVICE: HCPCS | Performed by: HOSPITALIST

## 2017-08-01 PROCEDURE — 770010 HCHG ROOM/CARE - REHAB SEMI PRIVAT*

## 2017-08-01 PROCEDURE — A9270 NON-COVERED ITEM OR SERVICE: HCPCS | Performed by: PHYSICAL MEDICINE & REHABILITATION

## 2017-08-01 PROCEDURE — 700102 HCHG RX REV CODE 250 W/ 637 OVERRIDE(OP): Performed by: PHYSICAL MEDICINE & REHABILITATION

## 2017-08-01 PROCEDURE — 99239 HOSP IP/OBS DSCHRG MGMT >30: CPT | Performed by: HOSPITALIST

## 2017-08-01 RX ORDER — POLYETHYLENE GLYCOL 3350 17 G/17G
1 POWDER, FOR SOLUTION ORAL
Status: CANCELLED | OUTPATIENT
Start: 2017-08-01

## 2017-08-01 RX ORDER — ONDANSETRON 4 MG/1
4 TABLET, ORALLY DISINTEGRATING ORAL EVERY 4 HOURS PRN
Status: CANCELLED | OUTPATIENT
Start: 2017-08-01

## 2017-08-01 RX ORDER — CLONIDINE HYDROCHLORIDE 0.1 MG/1
0.1 TABLET ORAL ONCE
Status: COMPLETED | OUTPATIENT
Start: 2017-08-01 | End: 2017-08-01

## 2017-08-01 RX ORDER — ONDANSETRON 4 MG/1
4 TABLET, ORALLY DISINTEGRATING ORAL EVERY 4 HOURS PRN
Status: DISCONTINUED | OUTPATIENT
Start: 2017-08-01 | End: 2017-08-15 | Stop reason: HOSPADM

## 2017-08-01 RX ORDER — OXYCODONE HYDROCHLORIDE 5 MG/1
2.5 TABLET ORAL
Qty: 30 TAB | Refills: 0 | Status: ON HOLD
Start: 2017-08-01 | End: 2017-08-15

## 2017-08-01 RX ORDER — POLYVINYL ALCOHOL 14 MG/ML
1 SOLUTION/ DROPS OPHTHALMIC PRN
Status: DISCONTINUED | OUTPATIENT
Start: 2017-08-01 | End: 2017-08-15 | Stop reason: HOSPADM

## 2017-08-01 RX ORDER — BISACODYL 10 MG
10 SUPPOSITORY, RECTAL RECTAL
Status: DISCONTINUED | OUTPATIENT
Start: 2017-08-01 | End: 2017-08-15 | Stop reason: HOSPADM

## 2017-08-01 RX ORDER — BUSPIRONE HYDROCHLORIDE 5 MG/1
5 TABLET ORAL 2 TIMES DAILY PRN
Status: DISCONTINUED | OUTPATIENT
Start: 2017-08-01 | End: 2017-08-07

## 2017-08-01 RX ORDER — NICOTINE 21 MG/24HR
14 PATCH, TRANSDERMAL 24 HOURS TRANSDERMAL
Status: CANCELLED | OUTPATIENT
Start: 2017-08-02

## 2017-08-01 RX ORDER — POLYETHYLENE GLYCOL 3350 17 G/17G
17 POWDER, FOR SOLUTION ORAL
Refills: 3 | Status: ON HOLD
Start: 2017-08-01 | End: 2017-08-15

## 2017-08-01 RX ORDER — ALUMINA, MAGNESIA, AND SIMETHICONE 2400; 2400; 240 MG/30ML; MG/30ML; MG/30ML
20 SUSPENSION ORAL
Status: DISCONTINUED | OUTPATIENT
Start: 2017-08-01 | End: 2017-08-15 | Stop reason: HOSPADM

## 2017-08-01 RX ORDER — AMOXICILLIN 250 MG
2 CAPSULE ORAL 2 TIMES DAILY
Status: CANCELLED | OUTPATIENT
Start: 2017-08-01

## 2017-08-01 RX ORDER — OXYCODONE HYDROCHLORIDE 5 MG/1
5 TABLET ORAL
Status: DISCONTINUED | OUTPATIENT
Start: 2017-08-01 | End: 2017-08-15 | Stop reason: HOSPADM

## 2017-08-01 RX ORDER — BISACODYL 10 MG
10 SUPPOSITORY, RECTAL RECTAL
Status: CANCELLED | OUTPATIENT
Start: 2017-08-01

## 2017-08-01 RX ORDER — LIDOCAINE 50 MG/G
1 PATCH TOPICAL EVERY 24 HOURS
Status: DISCONTINUED | OUTPATIENT
Start: 2017-08-02 | End: 2017-08-15 | Stop reason: HOSPADM

## 2017-08-01 RX ORDER — OXYCODONE HYDROCHLORIDE 5 MG/1
2.5 TABLET ORAL
Status: DISCONTINUED | OUTPATIENT
Start: 2017-08-01 | End: 2017-08-15 | Stop reason: HOSPADM

## 2017-08-01 RX ORDER — ACETAMINOPHEN 325 MG/1
650 TABLET ORAL EVERY 4 HOURS PRN
Status: DISCONTINUED | OUTPATIENT
Start: 2017-08-01 | End: 2017-08-03

## 2017-08-01 RX ORDER — FERROUS SULFATE 325(65) MG
325 TABLET ORAL 2 TIMES DAILY WITH MEALS
Status: DISCONTINUED | OUTPATIENT
Start: 2017-08-01 | End: 2017-08-02

## 2017-08-01 RX ORDER — FUROSEMIDE 40 MG/1
40 TABLET ORAL
Status: DISCONTINUED | OUTPATIENT
Start: 2017-08-02 | End: 2017-08-12

## 2017-08-01 RX ORDER — NICOTINE 21 MG/24HR
14 PATCH, TRANSDERMAL 24 HOURS TRANSDERMAL
Status: DISCONTINUED | OUTPATIENT
Start: 2017-08-02 | End: 2017-08-15 | Stop reason: HOSPADM

## 2017-08-01 RX ORDER — DULOXETIN HYDROCHLORIDE 20 MG/1
20 CAPSULE, DELAYED RELEASE ORAL DAILY
Status: DISCONTINUED | OUTPATIENT
Start: 2017-08-02 | End: 2017-08-15 | Stop reason: HOSPADM

## 2017-08-01 RX ORDER — OMEPRAZOLE 20 MG/1
20 CAPSULE, DELAYED RELEASE ORAL 2 TIMES DAILY
Status: DISCONTINUED | OUTPATIENT
Start: 2017-08-02 | End: 2017-08-15 | Stop reason: HOSPADM

## 2017-08-01 RX ORDER — TRAZODONE HYDROCHLORIDE 50 MG/1
50 TABLET ORAL
Status: DISCONTINUED | OUTPATIENT
Start: 2017-08-01 | End: 2017-08-15 | Stop reason: HOSPADM

## 2017-08-01 RX ORDER — BENAZEPRIL HYDROCHLORIDE 10 MG/1
40 TABLET ORAL
Status: DISCONTINUED | OUTPATIENT
Start: 2017-08-02 | End: 2017-08-15 | Stop reason: HOSPADM

## 2017-08-01 RX ORDER — HYDRALAZINE HYDROCHLORIDE 25 MG/1
25 TABLET, FILM COATED ORAL EVERY 8 HOURS PRN
Status: DISCONTINUED | OUTPATIENT
Start: 2017-08-01 | End: 2017-08-07

## 2017-08-01 RX ORDER — AMOXICILLIN 250 MG
2 CAPSULE ORAL 2 TIMES DAILY
Status: DISCONTINUED | OUTPATIENT
Start: 2017-08-01 | End: 2017-08-15 | Stop reason: HOSPADM

## 2017-08-01 RX ORDER — ECHINACEA PURPUREA EXTRACT 125 MG
2 TABLET ORAL PRN
Status: DISCONTINUED | OUTPATIENT
Start: 2017-08-01 | End: 2017-08-15 | Stop reason: HOSPADM

## 2017-08-01 RX ORDER — POLYETHYLENE GLYCOL 3350 17 G/17G
1 POWDER, FOR SOLUTION ORAL
Status: DISCONTINUED | OUTPATIENT
Start: 2017-08-01 | End: 2017-08-15 | Stop reason: HOSPADM

## 2017-08-01 RX ORDER — BISACODYL 10 MG
10 SUPPOSITORY, RECTAL RECTAL
Refills: 0 | Status: ON HOLD
Start: 2017-08-01 | End: 2017-08-15

## 2017-08-01 RX ORDER — NICOTINE 21 MG/24HR
1 PATCH, TRANSDERMAL 24 HOURS TRANSDERMAL EVERY 24 HOURS
Qty: 30 PATCH | Status: ON HOLD
Start: 2017-08-01 | End: 2017-08-15

## 2017-08-01 RX ORDER — ATORVASTATIN CALCIUM 40 MG/1
80 TABLET, FILM COATED ORAL
Status: DISCONTINUED | OUTPATIENT
Start: 2017-08-01 | End: 2017-08-15 | Stop reason: HOSPADM

## 2017-08-01 RX ADMIN — NICOTINE 14 MG: 14 PATCH, EXTENDED RELEASE TRANSDERMAL at 04:56

## 2017-08-01 RX ADMIN — ENOXAPARIN SODIUM 40 MG: 100 INJECTION SUBCUTANEOUS at 20:19

## 2017-08-01 RX ADMIN — TRAZODONE HYDROCHLORIDE 50 MG: 50 TABLET ORAL at 20:17

## 2017-08-01 RX ADMIN — HYDROMORPHONE HYDROCHLORIDE 0.25 MG: 1 INJECTION, SOLUTION INTRAMUSCULAR; INTRAVENOUS; SUBCUTANEOUS at 06:12

## 2017-08-01 RX ADMIN — CLONIDINE HYDROCHLORIDE 0.1 MG: 0.1 TABLET ORAL at 15:16

## 2017-08-01 RX ADMIN — HYDRALAZINE HYDROCHLORIDE 25 MG: 25 TABLET, FILM COATED ORAL at 16:30

## 2017-08-01 RX ADMIN — METOPROLOL TARTRATE 25 MG: 25 TABLET ORAL at 20:24

## 2017-08-01 RX ADMIN — OXYCODONE HYDROCHLORIDE 5 MG: 5 TABLET ORAL at 20:17

## 2017-08-01 RX ADMIN — Medication 2 TABLET: at 20:17

## 2017-08-01 RX ADMIN — POLYETHYLENE GLYCOL 3350 1 PACKET: 17 POWDER, FOR SOLUTION ORAL at 08:53

## 2017-08-01 RX ADMIN — FERROUS SULFATE TAB 325 MG (65 MG ELEMENTAL FE) 325 MG: 325 (65 FE) TAB at 18:41

## 2017-08-01 RX ADMIN — STANDARDIZED SENNA CONCENTRATE AND DOCUSATE SODIUM 2 TABLET: 8.6; 5 TABLET, FILM COATED ORAL at 08:53

## 2017-08-01 RX ADMIN — ATORVASTATIN CALCIUM 80 MG: 40 TABLET, FILM COATED ORAL at 20:17

## 2017-08-01 ASSESSMENT — PAIN SCALES - GENERAL
PAINLEVEL_OUTOF10: 5
PAINLEVEL_OUTOF10: 4
PAINLEVEL_OUTOF10: 0

## 2017-08-01 ASSESSMENT — ENCOUNTER SYMPTOMS
DIARRHEA: 0
NAUSEA: 0
BRUISES/BLEEDS EASILY: 0
CONSTIPATION: 0
FOCAL WEAKNESS: 1
BLURRED VISION: 1
DIZZINESS: 0
DEPRESSION: 0
PALPITATIONS: 0
HEADACHES: 0
NERVOUS/ANXIOUS: 0
VOMITING: 0
FEVER: 0
TINGLING: 0
HEARTBURN: 0
BACK PAIN: 1
COUGH: 0
SENSORY CHANGE: 1
SHORTNESS OF BREATH: 0
CHILLS: 0

## 2017-08-01 ASSESSMENT — LIFESTYLE VARIABLES
EVER_SMOKED: YES
ALCOHOL_USE: NO
EVER_SMOKED: YES

## 2017-08-01 ASSESSMENT — COPD QUESTIONNAIRES
DURING THE PAST 4 WEEKS HOW MUCH DID YOU FEEL SHORT OF BREATH: SOME OF THE TIME
COPD SCREENING SCORE: 6
DO YOU EVER COUGH UP ANY MUCUS OR PHLEGM?: NO/ONLY WITH OCCASIONAL COLDS OR INFECTIONS
HAVE YOU SMOKED AT LEAST 100 CIGARETTES IN YOUR ENTIRE LIFE: YES

## 2017-08-01 NOTE — DISCHARGE PLANNING
Maria Esther has been accepted to [facility].  Transport has been arranged for 1530.  Ms left for GLENYS Villarreal cell.

## 2017-08-01 NOTE — DISCHARGE SUMMARY
CHIEF COMPLAINT ON ADMISSION  Chief Complaint   Patient presents with   • Leg Pain     Pt reports chronic bilateral leg pain that has been increasing over the past week to the point where she cannot tolerate walking.        CODE STATUS  Full Code    HPI & HOSPITAL COURSE  This is a 76 y.o. female here with bilateral lower extremity pain. Patient was found to have lumbar spine compression fractures. Patient was evaluated by physical therapy and occupational therapy. She was placed on pain management. Patient at this point has been found to be appropriate for rehab. Rehab has evaluated her and finds her appropriate for admission. Patient this point will be able to transfer to rehab this afternoon for ongoing physical therapy occupational therapy. Patient was also found to have significant iron deficiency anemia she has been placed on iron supplementation. Patient does have chronic dementia which at this point is at her baseline. She has a previous stroke which is at her baseline. Patient was offered surgical correction however decline.    Therefore, she is discharged in good and stable condition with close outpatient follow-up.    SPECIFIC OUTPATIENT FOLLOW-UP  Physical therapy, occupational therapy, strengthening of her lower extremities. Continued pain management.    DISCHARGE PROBLEM LIST  Active Problems:    Intractable back pain POA: Unknown    Microcytic anemia POA: Unknown    Heme positive stool POA: Unknown    Dementia POA: Unknown    History of CVA (cerebrovascular accident) POA: Unknown  Resolved Problems:    * No resolved hospital problems. *      FOLLOW UP  No future appointments.  No follow-up provider specified.    MEDICATIONS ON DISCHARGE   Erik Maria Esther   Home Medication Instructions NOE:[id number]    Printed on:08/01/17 1435   Medication Information                      aspirin (ASA) 325 MG Tab  Take 975 mg by mouth every 6 hours as needed for Mild Pain.             aspirin 81 MG tablet  Take 81 mg by  mouth every day.             atorvastatin (LIPITOR) 80 MG tablet  Take 80 mg by mouth every bedtime.             benazepril (LOTENSIN) 40 MG tablet  Take 40 mg by mouth every day.             bisacodyl (DULCOLAX) 10 MG Suppos  Insert 1 Suppository in rectum 1 time daily as needed (if magnesium hydroxide ineffective after 24 hours).             busPIRone (BUSPAR) 10 MG Tab  Take 5-10 mg by mouth 2 times a day as needed (anxiety).             duloxetine (CYMBALTA) 20 MG Cap DR Particles  Take 20 mg by mouth every day. Indications: mood/nerve pain             furosemide (LASIX) 40 MG Tab  Take 40 mg by mouth every day.             nicotine (NICODERM) 14 MG/24HR PATCH 24 HR  Apply 1 Patch to skin as directed every 24 hours.             oxycodone immediate-release (ROXICODONE) 5 MG Tab  Take 0.5 Tabs by mouth every 3 hours as needed (Moderate Pain (NRS Pain Scale 4-6; CPOT Pain Scale 3-5)).             polyethylene glycol/lytes (MIRALAX) Pack  Take 1 Packet by mouth 1 time daily as needed (if sennosides and docusate ineffective after 24 hours).                 DIET  Orders Placed This Encounter   Procedures   • Diet Order     Standing Status: Standing      Number of Occurrences: 1      Standing Expiration Date:      Order Specific Question:  Diet:     Answer:  Cardiac [6]       ACTIVITY  As tolerated.  Weight bearing as tolerated      CONSULTATIONS  Physiatry    PROCEDURES  None    LABORATORY  Lab Results   Component Value Date/Time    SODIUM 140 07/31/2017 03:29 AM    POTASSIUM 3.6 07/31/2017 03:29 AM    CHLORIDE 110 07/31/2017 03:29 AM    CO2 24 07/31/2017 03:29 AM    GLUCOSE 86 07/31/2017 03:29 AM    BUN 17 07/31/2017 03:29 AM    CREATININE 0.85 07/31/2017 03:29 AM        Lab Results   Component Value Date/Time    WBC 5.6 07/31/2017 03:29 AM    HEMOGLOBIN 7.8* 07/31/2017 03:29 AM    HEMATOCRIT 28.0* 07/31/2017 03:29 AM    PLATELET COUNT 289 07/31/2017 03:29 AM        Total time of the discharge process exceeds 36  minutes

## 2017-08-01 NOTE — PROGRESS NOTES
Pt /86, HR 81. Pt verbalizing being anxious about transfer. Dr. Samayoa notified Clonidine ordered and given per mar.

## 2017-08-01 NOTE — PROGRESS NOTES
Patient admitted to facility at 1425 hours via wheelchair, accompanied by hospital transport.  Patient assisted to room positioned in bed for comfort and safety, call light within reach.  Patient assisted with stowing belongings and oriented to room and facility.  Admission assessment performed and documented in computer.  Admission paperwork completed, signed copies placed in chart.  Will continue to monitor.  /76 hydralazine administered per MAR; MD notified and charge nurse notified.

## 2017-08-01 NOTE — PREADMISSION SCREENING NOTE
Pre-Admission Screening Form    Patient Information:   Name: Maria Esther Valencia     MRN: [id number]       : 1940      Age: 76 y.o.   Gender: female      Race: White [7]       Marital Status:  [4]  Family Contact: Arsenio Valencia        Relationship: Friend [5]  Home Phone: [phone]           Cell Phone:   Advanced Directives: None  Code Status:  FULL  Current Attending Provider: Alyssa Samayoa M.D.  Referring Physician: Dr. Morales      Physiatrist Consult: Dr. Ravinder Valencia       Referral Date: 17  Primary Payor Source:  MEDICARE  Secondary Payor Source:      Medical Information:   Date of Admission to Acute Care [place]2017  Room Number: S186/02  Rehabilitation Diagnosis: 16 Debility (Non Cardiac, Non Pulmonary)  @IMM@  Allergies   Allergen Reactions   • Pcn [Penicillins] Unspecified     Per historical. Pt cannot verify what reaction she had     Past Medical History   Diagnosis Date   • Congestive heart failure (CMS-HCC)    • CAD (coronary artery disease)    • Stroke (CMS-[facility])    • Hypertension    • Psychiatric disorder      Past Surgical History   Procedure Laterality Date   • Other cardiac surgery         History Leading to Admission, Conditions that Caused the Need for Rehab (CMS):     Denia Fenton M.D. Physician Addendum [facility] Medicine H&P 2017  6:48 PM      Expand All Collapse All    HOSPITAL MEDICINE HISTORY/ PHYSICAL    Date & Time note created:    2017   6:49 PM       Patient ID:   Name: Maria Esther Valencia  .  YOB: 1940. Age: 76 y.o. female.  MRN: [id number]    PCP : Bernabe Hopkins D.O.    CC:  bilateral leg pain    HPI:    Erik is a very pleasant 76 y.o. female with a past medical history of  coronary artery disease/hypertension/psychiatric disorder/history of CVA in the past, presented to the ER complaining of bilateral leg pain which are about 7 days ago she said she has had these discomforts on and off but says has been worsened over the past  "several days especially when lying down she is unable to localize her discomfort she denies having any back pain chest pain shortness of breath or palpitations. On admission CT of her L-spine and pelvis were done which did not show any evidence of acute fractures. Given that the patient is having difficulty with ambulation and with pain, we will admit the patient for pain control and further diagnostics with an MRI of her spine in addition to diagnostics for her heme positive occult blood and anemia.    Review of Systems:     Please see HPI, all other systems were reviewed and are negative (AMA/CMS criteria)               Allergies to Medications  Pcn      Past Medical History  1. Coronary artery disease    2. History of CVA in the past  3. Depression        Family History:  Reviewed and Non Contributory    Social History:  Patient denies using Alcohol, tobacco or Illicit drugs.    Outpatient Medications:    * aspirin 325 MG Tabs          benazepril 40 MG tablet          busPIRone 10 MG Tabs          duloxetine 20 MG Cpep          furosemide 40 MG Tabs          LIPITOR 80 MG tablet              Physical Exam:    Blood pressure 169/94, pulse 74, temperature 36.3 °C (97.3 °F), resp. rate 16, height 1.575 m (5' 2\"), weight 86.183 kg (190 lb), SpO2 98 %, not currently breastfeeding.  Constitutional:  well developed, well nourished, non-toxic, no acute distress nontoxic appearance.  HENMT: Normocephalic, atraumatic, b/l ears normal, nose normal  Eyes:  EOMI, conjunctiva normal, no discharge  Neck: no tracheal deviation, supple, no stridor appreciated    Cardiovascular: normal heart rate, normal rhythm, no murmurs, no rubs or gallops; no cyanosis, clubbing or edema  Lungs: Respiratory effort is normal,  breath sounds clear to auscultation bilaterally, no wheezes,No rales no rhonchi appreciated  Abdomen: soft, non-tender, non-distended, no guarding or rebound tenderness, no pulsatile masses noted.    Skin: warm, dry, no " erythema and no rash  Extremities:  Distal pulses intact +2.  No cyanosis or clubbing. No edema Noted  No joint deformities, Range of motion appears optimal    Neurologic:  Alert and oriented x3.  Cranial nerves II-XII grossly intact.  No   focal deficits.  Psychiatric: No anxiety or depression      Lab Data Review:     Recent Labs       07/29/17   1205    WBC   5.3    RBC   4.75    HEMOGLOBIN   8.4*    HEMATOCRIT   31.3*    MCV   65.9*    MCH   17.7*    RDW   53.0*    PLATELETCT   342    MPV   10.3    NEUTSPOLYS   75.60*    LYMPHOCYTES   14.80*    MONOCYTES   4.30    EOSINOPHILS   3.50    BASOPHILS   0.90    RBCMORPHOLO   Present          Recent Labs       07/29/17   1205    APTT   24.0*    INR   1.01      Recent Labs       07/29/17   1205    SODIUM   143    POTASSIUM   3.5*    CHLORIDE   112    CO2   21    BUN   17    CREATININE   0.72    CALCIUM   9.0    ALBUMIN   3.7      Recent Labs       07/29/17   1205    ASTSGOT   15    ALTSGPT   12    TBILIRUBIN   0.4    ALKPHOSPHAT   85    GLOBULIN   2.6    INR   1.01          Imaging/Procedures Review:     CT-LSPINE W/O PLUS RECONS    Final Result        Approximately 35 % loss of height of the superior endplate of T12 with mild retropulsion which may be subacute.        Approximately 25% loss of height of the superior endplate of L1 centrally. This appears chronic but new compared to 2009.        Degenerative changes as above described.        Atherosclerotic plaque.        Nonobstructing left renal calculus.        CT-PELVIS W/O PLUS RECONS    Final Result        1.  There is no acute fracture involving the pelvis or either proximal femur.    2.  Hips are normally located with moderate degenerative change.    3.  There is no evidence of joint effusion.    4.  Simple appearing cystic structure left hemipelvis possibly related to the left ovary. Uterus is unremarkable.    5.  There is extensive atherosclerosis.        DX-CHEST-PORTABLE (1 VIEW)    Final Result        1.   There is a mildly enlarged cardiac silhouette but there is no acute failure.        MR-LUMBAR SPINE-W/O    (Results Pending)    MR-THORACIC SPINE-W/O    (Results Pending)      EKG:    Intervals                                Axis   Rate:       68                           P:          54   WV:         164                          QRS:        -27   QRSD:       92                           T:          31   QT:         404   QTc:        430     Interpretive Statements   SINUS RHYTHM   BORDERLINE LEFT AXIS DEVIATION     Assessment and Plan:      1. Bilateral leg pain  - CT L spine and hip negative for acute fractures  - MRI thoracic & lumbar pending.  - IV/PO pain medications, titrate     2. Microcytic anemia  - patient reports having had a normal colonoscopy 1 year ago, unable to determine GI physician. We will try to find out in the am, and consider reconsulting that group for possible EGD? Given anemia.  - Occult blood + positive.  - Iron panel ordered. May need IV iron in addition to PO if severe.    3. History of coronary artery disease  - We will continue Benzapril, Lipitor we will hold aspirin  - Patient denies having any chest pain    4. Mood disorder  - Continue Cymbalta and BuSpar    5. CVA in the past  - noted to have Old lacunar infarction in the left internal capsule and left corona radiata from previous imaging.    - no issues, will continue aspirin once anemia is worked up.        Prophylaxis: SCDs  Code: Full code  Dispo: I anticipate hospital length of stay for medical management to be expected to take less than than 2 midnights    This dictation was created using voice recognition software. The accuracy of the dictation is limited to the abilities of the software. I expect there may be some errors of grammar and possibly content.        Albaro Valencia M.D. Physician Unsigned Transcription Physical Medicine & Rehab Consults 8/1/2017 11:53 AM      Expand All Collapse All    DATE OF SERVICE:   08/01/2017     REASON FOR CONSULTATION:  To address functional deficits secondary to lower    extremity weakness and loss of ambulation.     HISTORY OF PRESENT ILLNESS:  This is a 76-year-old female initially admitted    to [facility] on 07/29/2017 complaining of progressive    lower extremity weakness.     The patient stated she had progressive bilateral weakness for approximately a    week prior to admission, causing discomfort and pain and loss of mobility.     She was then able to localize the pain, but states that this seemingly was    worse when she was lying down.     The patient underwent a neurological and orthopedic workup, CT of the lumbar    spine and pelvis were unremarkable.  There was no evidence of acute fracture.     Followup MRI confirmed a chronic pathology with spinal stenosis at multiple    levels, but no acute findings.     Therapies were initiated, at time of this consult, the patient was requiring    mod assist with her ADLs with occupational therapy.  With physical therapy,    she was tolerating bed mobility with standby assist.  Transfers were minimal    assistance.  Her gait was initiated with moderate assist with front wheel    walker.     Other medical issues addressed on acute include ongoing management for her    pain management, her bowel and bladder management, DVT prophylaxis, ongoing    medical stability for her microcytic anemia, heme positive stool, and her pain   management.     Rehab consulted to address her rehab needs, rehab options, and coordination    with further discharge planning.     ALLERGIES:  THE PATIENT IS ALLERGIC TO PENICILLIN.     CODE STATUS:  The patient is full code.     CURRENT MEDICATIONS:  As per MARs on acute, reviewed.     PAST MEDICAL HISTORY:  She is followed through the [facility] for medical    care.  She does have a history of hypertension, coronary artery disease,    history of previous cerebrovascular accident with reported  "minimal residual    deficits and history of depression.     SOCIAL HISTORY:  She lives in a boarding house on [address].  She states    she rents a room and has got own bath.  She was getting around previously with   a cane and a walker at home.  She does not have any oxygen at home.  She has    got a couple of steps to get up into the boarding house.  She does not drive    and plans are for her to return back to the boarding house \"if okay.\"     FAMILY HISTORY:  Noncontributory.     REVIEW OF SYSTEMS:  Without change from acute documentation reviewed.     PHYSICAL EXAMINATION:  GENERAL:  This is a 76-year-old female.  She is lying in bed.  She is alert,    responsive, in no apparent distress.  Her cognition is intact.  Her speech is    fluent.  VITAL SIGNS:  Temperature is 36.6, blood pressure 165/77, pulse of 76,    respiratory rate around 18.  HEENT:  She is normocephalic, atraumatic.  Pupils were equal, reactive to    light.  Extraocular muscles intact.  Visual fields full, visual acuity fair.     She has got nasal prongs on.  NECK:  Supple.  No JVD, thyromegaly or adenopathy appreciated.  HEART:  Regular rate and rhythm without murmur or gallop.  LUNGS:  Decreased, but no wheezing or crackles.  ABDOMEN:  Soft, nontender, nondistended.  EXTREMITIES:  Without any contractures.  She does have compression wraps on.     No appreciable calf tenderness or contractures appreciated.  NEUROLOGIC:  Her cognition is intact.  Her cranial nerves are intact.  She has   got good symmetrical motor tone, power and bulk.     LABORATORY DATA:  Her labs include a CBC on 07/31/2017 showed a white count of   5.6, hemoglobin of 7.8, hematocrit of 28.0, platelet count of 289.     Chemistries on 07/31/2017 showed sodium 140, potassium 3.6, chloride of 110,    CO2 of 24, glucose of 86, BUN of 17, creatinine 0.85.  INR on 07/29/2017 was    1.01.     DIAGNOSTIC DATA:  MRI of the lumbar spine on 017/30/2017 showed multilevel "    multifactorial degenerative changes, prominent posterior epidural fat from L2    through L5 contributing to the thecal sac narrowing at these levels, and also    chronic T12 vertebral compression fracture was noted.     IMPRESSION:  1.  Lower extremity weakness, unclear etiology, possibly secondary to    exacerbation of anemia.  The anemia is being treated for iron deficiency.  She   does have good antigravity strength of all muscle groups tested of the lower    extremities, anticipate continued improvement in her lower extremity weakness    as her anemia improves.  We will continue progressing therapies on acute.  2.  Pain management, currently stable.  Continue to address with increased    mobility.  3.  Heme positive stool, anticipate outpatient followup with gastroenterology.    Last colonoscopy was approximately 2 years ago and reported negative.  4.  Cognitive deficits, questionable dementia, continue to be monitored as far   as a safe discharge plan.  5.  Disposition, we will continue progressing therapies on acute.  She has    been given some Depo-Medrol for spinal stenosis and back pain.  Anticipate    continued progress.  We will follow up for appropriate level of rehab, i.e.,    acute versus skilled rehabilitation.  There is a question regarding the    patient is able to return back to his boarding home.  I spoke with case    management.  We will have them continue to follow up regarding a safe    discharge plan.  Will continue to be followed by rehabilitation services to    move her to the appropriate level of rehab care.        Pj Morales M.D. Physician Signed Hospital Medicine Progress Notes 7/31/2017  4:26 PM      Expand All Collapse All    [facility] Hospital[facility] Progress Note      Date of Service: 7/31/2017    Chief Complaint  76 y.o. female admitted 7/29/2017 with complaints of bilateral leg pain and weakness, the patient has a history of coronary artery disease, hypertension, depression,  history of reported CVA. The patient is found to have significant anemia with iron deficiency. The CT of the L-spine show some compression fractures but no other acute findings.    Interval Problem Update  Patient seen and examined today.    Patient tolerating treatment and therapies.  All Data, Medication data reviewed.  Case discussed with nursing as available.  Plan of Care reviewed with patient and notified of changes.   the patient appears to be improved, still is unable to walk per her report. She has severe pain into her upper legs and back. She denies a fall or significant injury. An MRI is currently ordered but pending   patient still significant amount of pain, when I related the MRI findings, the patient states that she was not interested in any type of back surgery. She does live alone. She attempted to get assistance through the [employer] services but was unable to do so. Doubt that the patient has functional at this time on her own. Placed rehab consult.  Consultants/Specialty  Currently none    Disposition  To be determined        Review of Systems   Constitutional: Negative.    HENT: Negative.    Eyes: Negative.    Respiratory: Negative.    Cardiovascular: Negative.    Gastrointestinal: Negative.    Genitourinary: Negative.    Musculoskeletal: Positive for back pain.   Skin: Negative.    Neurological: Positive for focal weakness. Negative for sensory change.   Endo/Heme/Allergies: Negative.    Psychiatric/Behavioral: Negative.    All other systems reviewed and are negative.      Physical Exam    Laboratory/Imaging    Hemodynamics  Temp (24hrs), Av.6 °C (97.9 °F), Min:36.3 °C (97.4 °F), Max:37 °C (98.6 °F)   Temperature: 37 °C (98.6 °F)  Pulse  Av.5  Min: 70  Max: 85    Blood Pressure : (!) 165/77 mmHg (R.N. notified)      Respiratory      Respiration: 14, Pulse Oximetry: 97 %     Work Of Breathing / Effort: Mild  RUL Breath Sounds: Clear, RML Breath Sounds: Clear, RLL Breath Sounds:  Clear, ARGELIA Breath Sounds: Clear, LLL Breath Sounds: Clear    Fluids    Intake/Output Summary (Last 24 hours) at 07/31/17 1626  Last data filed at 07/31/17 0500    Gross per 24 hour    Intake     240 ml    Output       0 ml    Net     240 ml        Nutrition  Orders Placed This Encounter    Procedures    •  Diet Order        Standing Status:  Standing          Number of Occurrences:  1          Standing Expiration Date:          Order Specific Question:   Diet:        Answer:   Cardiac [6]      Physical Exam   Musculoskeletal: She exhibits tenderness.   Neurological: She displays abnormal reflex. She exhibits abnormal muscle tone.   Skin: There is pallor.         Recent Labs       07/29/17   1205   07/29/17   1841   07/30/17   0330   07/31/17   0329    WBC   5.3   5.7   9.9   5.6    RBC   4.75   4.51   4.74   4.23    HEMOGLOBIN   8.4*   8.2*   8.5*   7.8*    HEMATOCRIT   31.3*   29.8*   31.1*   28.0*    MCV   65.9*   66.1*   65.6*   66.2*    MCH   17.7*   18.2*   17.9*   18.4*    MCHC   26.8*   27.5*   27.3*   27.9*    RDW   53.0*   53.1*   52.8*   51.7*    PLATELETCT   342   314   317   289    MPV   10.3    --    10.1   10.1      Recent Labs       07/29/17   1205   07/30/17   0330   07/31/17   0329    SODIUM   143   140   140    POTASSIUM   3.5*   4.1   3.6    CHLORIDE   112   110   110    CO2   21   24   24    GLUCOSE   94   100*   86    BUN   17   20   17    CREATININE   0.72   1.08   0.85    CALCIUM   9.0   9.0   9.0      Recent Labs       07/29/17   1205    APTT   24.0*    INR   1.01                    Assessment/Plan        Microcytic anemia  Assessment & Plan  Significantly low iron levels  We'll check vitamin B12 and folate as well as B6 level    Heme positive stool  Assessment & Plan  Will need outpatient follow-up with gastroenterology.  She states a colonoscopy within the last 2 years was negative    Dementia  Assessment & Plan  Unclear baseline  The patient seems improved today the    History of CVA  "(cerebrovascular accident)  Assessment & Plan  Per imaging  No acute findings at this time    Intractable back pain  Assessment & Plan  The patient continues to complain of pain primarily in her lower back as well as her lower extremities  She denies sensory loss  Awaiting MRI        Radiology images reviewed, Labs reviewed and Medications reviewed  Pink catheter: No Pink  DVT Prophylaxis: Enoxaparin (Lovenox)  Assessed for rehab: Patient was assess for and/or received rehabilitation services during this hospitalization       Plan  MRI results noted  PT OT eval completed  Iron is replaced  The patient declined surgical intervention  Given Depo-Medrol  Will need skilled nursing facility, the patient is unable to function on her own at this time  Pain control  See orders  Supportive care        Co-morbidities: See PMH  Potential Risk - Complications: Contractures, Deep Vein Thrombosis, Incontinence, Malnutrition, Pain, Pneumonia, Pressure Ulcer and Urinary Tract Infection  Level of Risk: High    Ongoing Medical Management Needed (Medical/Nursing Needs):   Patient Active Problem List    Diagnosis Date Noted   • Intractable back pain 07/29/2017     Priority: High   • Microcytic anemia 07/29/2017   • Heme positive stool 07/29/2017   • Dementia 07/29/2017   • History of CVA (cerebrovascular accident) 07/29/2017     A &O    Current Vital Signs:   Temperature: 36.2 °C (97.2 °F) Pulse: 67 Respiration: 16 Blood Pressure : (!) 188/66 mmHg (RN notified)  Weight: 86.183 kg (190 lb) Height: 157.5 cm (5' 2\")  Pulse Oximetry: 94 % O2 (LPM): 1.5      Completed Laboratory Reports:  Recent Labs      07/29/17   1841  07/30/17   0330  07/31/17   0329   WBC  5.7  9.9  5.6   HEMOGLOBIN  8.2*  8.5*  7.8*   HEMATOCRIT  29.8*  31.1*  28.0*   PLATELETCT  314  317  289   SODIUM   --   140  140   POTASSIUM   --   4.1  3.6   BUN   --   20  17   CREATININE   --   1.08  0.85   ALBUMIN   --   3.5   --    GLUCOSE   --   100*  86     Additional " Labs: Not Applicable    Prior Living Situation:   Housing / Facility: Board And Care  Steps Into Home: 0  Steps In Home: 0  Lives with - Patient's Self Care Capacity: Alone and Able to Care For Self  Equipment Owned: Single Point Cane    Prior Level of Function / Living Situation:   Physical Therapy: Prior Services: Home-Independent, Other (Comments) (intermittent assist from friends/ neighbors)  Housing / Facility: Board [facility] Care  Steps Into Home: 0  Steps In Home: 0  Equipment Owned: Single Point Cane  Lives with - Patient's Self Care Capacity: Alone and Able to Care For Self  Bed Mobility: Independent  Transfer Status: Independent  Ambulation: Independent  Distance Ambulation (Feet):  (limited household - declining function)  Assistive Devices Used: Single Point Cane  Current Level of Function:   Level Of Assist: Moderate Assist  Assistive Device: Front Wheel Walker  Distance (Feet): 5  Deviation: Bradykinetic, Shuffled Gait  Supine to Sit: Stand by Assist  Sit to Supine: Stand by Assist  Scooting: Stand by Assist  Sit to Stand: Minimal Assist (requires UE support at FWW)  Bed, Chair, Wheelchair Transfer: Minimal Assist (requires UE support at FWW)  Toilet Transfers: Moderate Assist  Sitting in Chair: 5min on BSC  Sitting Edge of Bed: 5min  Standinsec x2  Occupational Therapy:   Self Feeding: Independent  Grooming / Hygiene: Independent  Bathing: Independent  Dressing: Independent  Toileting: Independent  Medication Management: Independent  Laundry: Independent  Kitchen Mobility: Independent  Finances: Independent  Home Management: Independent  Shopping: Independent  Prior Services: Home-Independent, Other (Comments) (intermittent assist from friends/ neighbors)  Housing / Facility: Board [facility] Care  Current Level of Function:   Upper Body Dressing: Supervision  Lower Body Dressing: Moderate Assist  Speech Language Pathology:      Rehabilitation Prognosis/Potential: Good  Estimated Length of Stay: 10-14  days    Nursing:   Orientation : Disoriented to Time  Continent    Scope/Intensity of Services Recommended:  Physical Therapy: 1.5 hr / day  5 days / week. Therapeutic Interventions Required: Maximize Endurance, Mobility, Strength and Safety  Occupational Therapy: 1.5 hr / day 5 days / week. Therapeutic Interventions Required: Maximize Self Care, ADLs, IADLs and Energy Conservation  Speech & Language Pathology: . 5 days / week. Therapeutic Interventions Required: Maximize Cognition and Safety  Rehabilitation Nursin/7. Therapeutic Interventions Required: Monitor Pain, Skin, Vital Signs, Intake and Output, Labs and Safety  Rehabilitation Physician: 3 - 5 days / week. Therapeutic Interventions Required: Medical Management  Respiratory Care: Pulmonary Toileting. Therapeutic Interventions Required: Pulmonary Toileting and O2 Weaning    Rehabilitation Goals and Plan (Expected frequency & duration of treatment in the IRF):   Return to the Community, Modified Independent Level of Care and Outpatient Support  Anticipated Date of Rehabilitation Admission: 17  Patient/Family oriented IRF level of care/facility/plan: Yes  Patient/Family willing to participate in IRF care/facility/plan: Yes  Patient able to tolerate IRF level of care proposed: Yes  Patient has potential to benefit IRF level of care proposed: Yes  Comments: Not Applicable    Special Needs or Precautions - Medical Necessity:  Safety Concerns/Precautions:  Fall Risk / High Risk for Falls, Balance and Bed / Chair Alarm  Pain Management  IV Site: Peripheral  Requires Oxygen  Current Medications:    Current Facility-Administered Medications Ordered in Epic   Medication Dose Route Frequency Provider Last Rate Last Dose   • labetalol (NORMODYNE,TRANDATE) injection 10 mg  10 mg Intravenous Q4HRS PRN Brijesh Kim, A.P.R.N.   10 mg at 17   • senna-docusate (PERICOLACE or SENOKOT S) 8.6-50 MG per tablet 2 Tab  2 Tab Oral BID Denia Fenton M.D.    2 Tab at 08/01/17 0853    And   • polyethylene glycol/lytes (MIRALAX) PACKET 1 Packet  1 Packet Oral QDAY PRN Denia Fenton M.D.   1 Packet at 08/01/17 0853    And   • magnesium hydroxide (MILK OF MAGNESIA) suspension 30 mL  30 mL Oral QDAY PRN Denia Fenton M.D.        And   • bisacodyl (DULCOLAX) suppository 10 mg  10 mg Rectal QDAY PRN Denia Fenton M.D.       • ondansetron (ZOFRAN) syringe/vial injection 4 mg  4 mg Intravenous Q4HRS PRN Denia Fenton M.D.       • ondansetron (ZOFRAN ODT) dispertab 4 mg  4 mg Oral Q4HRS PRN Denia Fenton M.D.   4 mg at 07/30/17 1640   • Pharmacy Consult Request ...Pain Management Review   Other SARAH Fenton M.D.        And   • oxycodone immediate-release (ROXICODONE) tablet 2.5 mg  2.5 mg Oral Q3HRS PRLINNETTE Fenton M.D.   Stopped at 07/31/17 0440    And   • oxycodone immediate-release (ROXICODONE) tablet 5 mg  5 mg Oral Q3HRS PRN Denia Fenton M.D.   5 mg at 07/31/17 1907    And   • HYDROmorphone (DILAUDID) injection 0.25 mg  0.25 mg Intravenous Q3HRS PRLINNETTE Fenton M.D.   0.25 mg at 08/01/17 0612   • nicotine (NICODERM) 14 MG/24HR 14 mg  14 mg Transdermal Daily-0600 Denia Fenton M.D.   14 mg at 08/01/17 0456     No current [facility]-ordered outpatient prescriptions on file.     Diet:   DIET ORDERS (Through next 24h)    Start     Ordered    07/29/17 1408  Diet Order   ALL MEALS     Question:  Diet:  Answer:  Cardiac    07/29/17 1408          Anticipated Discharge Destination / Patient/Family Goal:  Destination: Home Alone Support System: Friends  Anticipated home health services: PT  Previously used HH service/ provider: Not Applicable  Anticipated DME Needs: Oxygen, Walker, Wheelchair and Life Line  Outpatient Services: PT  Alternative resources to address additional identified needs:     Pre-Screen Completed: 8/1/2017 2:24 PM Camilo Canales L.P.N.

## 2017-08-01 NOTE — CONSULTS
DATE OF SERVICE:  08/01/2017    REASON FOR CONSULTATION:  To address functional deficits secondary to lower   extremity weakness and loss of ambulation.    HISTORY OF PRESENT ILLNESS:  This is a 76-year-old female initially admitted   to [facility] on 07/29/2017 complaining of progressive   lower extremity weakness.    The patient stated she had progressive bilateral weakness for approximately a   week prior to admission, causing discomfort and pain and loss of mobility.    She was then able to localize the pain, but states that this seemingly was   worse when she was lying down.    The patient underwent a neurological and orthopedic workup, CT of the lumbar   spine and pelvis were unremarkable.  There was no evidence of acute fracture.    Followup MRI confirmed a chronic pathology with spinal stenosis at multiple   levels, but no acute findings.    Therapies were initiated, at time of this consult, the patient was requiring   mod assist with her ADLs with occupational therapy.  With physical therapy,   she was tolerating bed mobility with standby assist.  Transfers were minimal   assistance.  Her gait was initiated with moderate assist with front wheel   walker.    Other medical issues addressed on acute include ongoing management for her   pain management, her bowel and bladder management, DVT prophylaxis, ongoing   medical stability for her microcytic anemia, heme positive stool, and her pain   management.    Rehab consulted to address her rehab needs, rehab options, and coordination   with further discharge planning.    ALLERGIES:  THE PATIENT IS ALLERGIC TO PENICILLIN.    CODE STATUS:  The patient is full code.    CURRENT MEDICATIONS:  As per MARs on acute, reviewed.    PAST MEDICAL HISTORY:  She is followed through the [facility] for medical   care.  She does have a history of hypertension, coronary artery disease,   history of previous cerebrovascular accident with reported minimal  "residual   deficits and history of depression.    SOCIAL HISTORY:  She lives in a boarding house on [address].  She states   she rents a room and has got own bath.  She was getting around previously with   a cane and a walker at home.  She does not have any oxygen at home.  She has   got a couple of steps to get up into the boarding house.  She does not drive   and plans are for her to return back to the boarding house \"if okay.\"    FAMILY HISTORY:  Noncontributory.    REVIEW OF SYSTEMS:  Without change from acute documentation reviewed.    PHYSICAL EXAMINATION:  GENERAL:  This is a 76-year-old female.  She is lying in bed.  She is alert,   responsive, in no apparent distress.  Her cognition is intact.  Her speech is   fluent.  VITAL SIGNS:  Temperature is 36.6, blood pressure 165/77, pulse of 76,   respiratory rate around 18.  HEENT:  She is normocephalic, atraumatic.  Pupils were equal, reactive to   light.  Extraocular muscles intact.  Visual fields full, visual acuity fair.    She has got nasal prongs on.  NECK:  Supple.  No JVD, thyromegaly or adenopathy appreciated.  HEART:  Regular rate and rhythm without murmur or gallop.  LUNGS:  Decreased, but no wheezing or crackles.  ABDOMEN:  Soft, nontender, nondistended.  EXTREMITIES:  Without any contractures.  She does have compression wraps on.    No appreciable calf tenderness or contractures appreciated.  NEUROLOGIC:  Her cognition is intact.  Her cranial nerves are intact.  She has   got good symmetrical motor tone, power and bulk.    LABORATORY DATA:  Her labs include a CBC on 07/31/2017 showed a white count of   5.6, hemoglobin of 7.8, hematocrit of 28.0, platelet count of 289.    Chemistries on 07/31/2017 showed sodium 140, potassium 3.6, chloride of 110,   CO2 of 24, glucose of 86, BUN of 17, creatinine 0.85.  INR on 07/29/2017 was   1.01.    DIAGNOSTIC DATA:  MRI of the lumbar spine on 017/30/2017 showed multilevel   multifactorial degenerative changes, " prominent posterior epidural fat from L2   through L5 contributing to the thecal sac narrowing at these levels, and also   chronic T12 vertebral compression fracture was noted.    IMPRESSION:  1.  Lower extremity weakness, unclear etiology, possibly secondary to   exacerbation of anemia.  The anemia is being treated for iron deficiency.  She   does have good antigravity strength of all muscle groups tested of the lower   extremities, anticipate continued improvement in her lower extremity weakness   as her anemia improves.  We will continue progressing therapies on acute.  2.  Pain management, currently stable.  Continue to address with increased   mobility.  3.  Heme positive stool, anticipate outpatient followup with gastroenterology.    Last colonoscopy was approximately 2 years ago and reported negative.  4.  Cognitive deficits, questionable dementia, continue to be monitored as far   as a safe discharge plan.  5.  Disposition, we will continue progressing therapies on acute.  She has   been given some Depo-Medrol for spinal stenosis and back pain.  Anticipate   continued progress.  We will follow up for appropriate level of rehab, i.e.,   acute versus skilled rehabilitation.  There is a question regarding the   patient is able to return back to his boarding home.  I spoke with case   management.  We will have them continue to follow up regarding a safe   discharge plan.  Will continue to be followed by rehabilitation services to   move her to the appropriate level of rehab care.    Thank you for allowing us to participate in this patient's care.       ____________________________________     MD CABRERA MELENDEZ / YESSICA    DD:  08/01/2017 11:53:52  DT:  08/01/2017 13:14:44    D#:  [id number]  Job#:  [id number]

## 2017-08-01 NOTE — PROGRESS NOTES
Received report from night nurse at the bedside. Assume care of Pt at 0700. Assessment completed Pt A&O X 3 disoriented to time. Pt complains of numbness and tingling in bilateral feet, Pt denies complaints of pain,  Assist of one with FWW, pt on 2 L NC. Pt in bed, bed in lowest position, call light in place, bed alarm is on, treaded slipper socks on.

## 2017-08-01 NOTE — DISCHARGE PLANNING
Transitional Care Navigator:    Aware of SNF referral.  Per Camilo BAUTISTA from [facility], physiatry will consult for potential admission.  TCN will continue to follow.

## 2017-08-01 NOTE — PROGRESS NOTES
Simona Knight Fall Risk Assessment:     Last Known Fall: Within the last six months  Mobility: Immobilized/requires assist of one person  Medications: Cardiovascular or central nervous system meds  Mental Status/LOC/Awareness: Oriented to person and place  Toileting Needs: No needs  Volume/Electrolyte Status: No problems  Communication/Sensory: Visual (Glasses)/hearing deficit  Behavior: Appropriate behavior  Simona Knight Fall Risk Total: 10  Fall Risk Level: LOW RISK    Universal Fall Precautions:  call light/belongings in reach, bed in low position and locked, wheelchairs and assistive devices out of sight, siderails up x 2, use non-slip footwear, adequate lighting, clutter free and spill free environment, educate to call for assistance, educate on level of risk    Fall Risk Level Interventions:   TRIAL ([employer], NEURO, MED JENNIE 5) Low Fall Risk Interventions  Place yellow fall risk ID band on patient: verified  Provide patient/family education based on risk assessment: verified  Educate patient/family to call staff for assistance when getting out of bed: completed  Place fall precaution signage outside patient door: verifiedTRIAL ([employer] 8, NEURO, MED JENNIE 5) Moderate Fall Risk Interventions  Place yellow fall risk ID band on patient: verified  Provide patient/family education based on risk assessment : verified  Educate patient/family to call staff for assistance when getting out of bed: verified  Place fall precaution signage outside patient door: verified  Utilize bed/chair fall alarm: verified     Patient Specific Interventions:     Medication: review medications with patient and family  Mental Status/LOC/Awareness: reorient patient, reinforce falls education, utilize bed/chair fall alarm and reinforce the use of call light  Toileting: provide frquent toileting, monitor intake and output/use of appropriate interventions, instruct patient/family on the use of grab bars, instruct patient/family on the need to  call for assistance when toileting and do not leave patient unattended in bathroom/refer to toileting scripting  Volume/Electrolyte Status: ensure patient remains hydrated and advance diet as tolerated  Communication/Sensory: update plan of care on whiteboard  Behavioral: not applicable  Mobility: dangle prior to standing, utilize bed/chair fall alarm, ensure bed is locked and in lowest position and provide appropriate assistive device

## 2017-08-01 NOTE — DISCHARGE INSTRUCTIONS
Discharge Instructions    Discharged to other by [facility] with escort. Discharged via wheelchair, hospital escort: Yes.  Special equipment needed: Oxygen    Be sure to schedule a follow-up appointment with your primary care doctor or any specialists as instructed.     Discharge Plan:   Smoking Cessation Offered: Patient Refused  Pneumococcal Vaccine Given - only chart on this line when given: Given (See MAR)  Influenza Vaccine Indication: Indicated: Not available from distributor/    I understand that a diet low in cholesterol, fat, and sodium is recommended for good health. Unless I have been given specific instructions below for another diet, I accept this instruction as my diet prescription.   Other diet: Regular texture/ cardiac diet    Special Instructions: None      · Is patient discharged on Warfarin / Coumadin?   No     · Is patient Post Blood Transfusion?  No    Depression / Suicide Risk    As you are discharged from this [facility] facility, it is important to learn how to keep safe from harming yourself.    Recognize the warning signs:  · Abrupt changes in personality, positive or negative- including increase in energy   · Giving away possessions  · Change in eating patterns- significant weight changes-  positive or negative  · Change in sleeping patterns- unable to sleep or sleeping all the time   · Unwillingness or inability to communicate  · Depression  · Unusual sadness, discouragement and loneliness  · Talk of wanting to die  · Neglect of personal appearance   · Rebelliousness- reckless behavior  · Withdrawal from people/activities they love  · Confusion- inability to concentrate     If you or a loved one observes any of these behaviors or has concerns about self-harm, here's what you can do:  · Talk about it- your feelings and reasons for harming yourself  · Remove any means that you might use to hurt yourself (examples: pills, rope, extension cords, firearm)  · Get professional  help from the community (Mental Health, Substance Abuse, psychological counseling)  · Do not be alone:Call your Safe Contact- someone whom you trust who will be there for you.  · Call your local CRISIS HOTLINE 776-8881 or 971-778-1455  · Call your local Children's Mobile Crisis Response Team [place] (513) 238-8858 or www.[employer]  · Call the toll free National Suicide Prevention Hotlines   · National Suicide Prevention Lifeline 462-324-CEQE (5214)  · National Hope Line Network 800-SUICIDE (256-7438)

## 2017-08-01 NOTE — DISCHARGE PLANNING
Pt accepted to [facility]. MD updated, Dc summary up in [facility]. Transport arranged with [facility] for 15:30. Bedside RN Betty and Charge RN Ann updated on plan for DC. Cobra completed and handed to Betty FLORES.

## 2017-08-01 NOTE — IP AVS SNAPSHOT
" Home Care Instructions                                                                                                                  Name:Maria Esther Valencia  Medical Record Number:[id number]  CSN: [id number]    YOB: 1940   Age: 76 y.o.  Sex: female  HT:1.575 m (5' 2\") WT: 85 kg (187 lb 6.3 oz)          Admit Date: 8/1/2017     Discharge Date:   Today's Date: 8/15/2017  Attending Doctor:  Christina Miller M.D.                  Allergies:  Pcn            Discharge Instructions        [facility] NURSING DISCHARGE INSTRUCTIONS    Blood Pressure : 157/69 mmHg  Weight: 85 kg (187 lb 6.3 oz)  Nursing recommendations for Maria Esther Valencia at time of discharge are as follows:  Client verbalized understanding of all discharge instructions and prescriptions.     Review all your home medications and newly ordered medications with your doctor and/or pharmacist. Follow medication instructions as directed by your doctor and/or pharmacist.    Pain Management:   Discharge Pain Medication Instructions:  Comfort Goal: 0, Comfort at Rest  Notify your primary care provider if pain is unrelieved with these measures, if the pain is new, or increased in intensity.    Discharge Skin Characteristics: Warm, Dry  Discharge Skin Exam: Other (Comments)     Skin / Wound Care Instructions: Please contact your primary care physician for any change in skin integrity.    If You Have Surgical Incisions / Wounds:  Monitor surgical site(s) for signs of increased swelling, redness or symptoms of drainage from the site or fever as this could indicate signs and symptoms of infection. If these symptoms are noted, notifiy your primary care provider.      Discharge Safety Instructions: Should Have ADULT SUPERVISION     Discharge Safety Concerns: Impaired Judgement, Unsteady Gait, Weakness  The interdisciplinary team has made recommendation that you should have adult supervision in the house due to impaired judgment  Anti-embolic " stockings are required during the day and off at night to increase circulation to the lower extremities.    Discharge Diet: cardiac      Discharge Liquids: thin  Discharge Bowel Function: Continent  Please contact your primary care physician for any changes in bowel habits.  Discharge Bowel Program:    Discharge Bladder Function: Incontinent  Discharge Urinary Devices: Other (Comments) (diaper)      Nursing Discharge Plan:   Smoking Cessation Offered: Patient Refused  Influenza Vaccine Indication: Not indicated: Previously immunized this influenza season and > 8 years of age    Case Management Discharge Instructions:   Discharge Location: Skilled Nursing Facility  Agency Name/Address/Phone: [facility]  Home Health:    Outpatient Services:    DME Provider/Phone:    Medical Equipment Ordered:    Prescription Faxed to:        Discharge Medication Instructions:  Below are the medications your physician expects you to take upon discharge:    Hypertension  Hypertension is another name for high blood pressure. High blood pressure forces your heart to work harder to pump blood. A blood pressure reading has two numbers, which includes a higher number over a lower number (example: 110/72).  HOME CARE   · Have your blood pressure rechecked by your doctor.  · Only take medicine as told by your doctor. Follow the directions carefully. The medicine does not work as well if you skip doses. Skipping doses also puts you at risk for problems.  · Do not smoke.  · Monitor your blood pressure at home as told by your doctor.  GET HELP IF:  · You think you are having a reaction to the medicine you are taking.  · You have repeat headaches or feel dizzy.  · You have puffiness (swelling) in your ankles.  · You have trouble with your vision.  GET HELP RIGHT AWAY IF:   · You get a very bad headache and are confused.  · You feel weak, numb, or faint.  · You get chest or belly (abdominal) pain.  · You throw up (vomit).  · You cannot breathe very  well.  MAKE SURE YOU:   · Understand these instructions.  · Will watch your condition.  · Will get help right away if you are not doing well or get worse.     This information is not intended to replace advice given to you by your health care provider. Make sure you discuss any questions you have with your health care provider.       Fall Prevention in the Home   Falls can cause injuries and can affect people from all age groups. There are many simple things that you can do to make your home safe and to help prevent falls.  WHAT CAN I DO ON THE OUTSIDE OF MY HOME?  · Regularly repair the edges of walkways and driveways and fix any cracks.  · Remove high doorway thresholds.  · Trim any shrubbery on the main path into your home.  · Use bright outdoor lighting.  · Clear walkways of debris and clutter, including tools and rocks.  · Regularly check that handrails are securely fastened and in good repair. Both sides of any steps should have handrails.  · Install guardrails along the edges of any raised decks or porches.  · Have leaves, snow, and ice cleared regularly.  · Use sand or salt on walkways during winter months.  · In the garage, clean up any spills right away, including grease or oil spills.  WHAT CAN I DO IN THE BATHROOM?  · Use night lights.  · Install grab bars by the toilet and in the tub and shower. Do not use towel bars as grab bars.  · Use non-skid mats or decals on the floor of the tub or shower.  · If you need to sit down while you are in the shower, use a plastic, non-slip stool..  · Keep the floor dry. Immediately clean up any water that spills on the floor.  · Remove soap buildup in the tub or shower on a regular basis.  · Attach bath mats securely with double-sided non-slip rug tape.  · Remove throw rugs and other tripping hazards from the floor.  WHAT CAN I DO IN THE BEDROOM?  · Use night lights.  · Make sure that a bedside light is easy to reach.  · Do not use oversized bedding that drapes onto  the floor.  · Have a firm chair that has side arms to use for getting dressed.  · Remove throw rugs and other tripping hazards from the floor.  WHAT CAN I DO IN THE KITCHEN?   · Clean up any spills right away.  · Avoid walking on wet floors.  · Place frequently used items in easy-to-reach places.  · If you need to reach for something above you, use a sturdy step stool that has a grab bar.  · Keep electrical cables out of the way.  · Do not use floor polish or wax that makes floors slippery. If you have to use wax, make sure that it is non-skid floor wax.  · Remove throw rugs and other tripping hazards from the floor.  WHAT CAN I DO IN THE STAIRWAYS?  · Do not leave any items on the stairs.  · Make sure that there are handrails on both sides of the stairs. Fix handrails that are broken or loose. Make sure that handrails are as long as the stairways.  · Check any carpeting to make sure that it is firmly attached to the stairs. Fix any carpet that is loose or worn.  · Avoid having throw rugs at the top or bottom of stairways, or secure the rugs with carpet tape to prevent them from moving.  · Make sure that you have a light switch at the top of the stairs and the bottom of the stairs. If you do not have them, have them installed.  WHAT ARE SOME OTHER FALL PREVENTION TIPS?  · Wear closed-toe shoes that fit well and support your feet. Wear shoes that have rubber soles or low heels.  · When you use a stepladder, make sure that it is completely opened and that the sides are firmly locked. Have someone hold the ladder while you are using it. Do not climb a closed stepladder.  · Add color or contrast paint or tape to grab bars and handrails in your home. Place contrasting color strips on the first and last steps.  · Use mobility aids as needed, such as canes, walkers, scooters, and crutches.  · Turn on lights if it is dark. Replace any light bulbs that burn out.  · Set up furniture so that there are clear paths. Keep the  furniture in the same spot.  · Fix any uneven floor surfaces.  · Choose a carpet design that does not hide the edge of steps of a stairway.  · Be aware of any and all pets.  · Review your medicines with your healthcare provider. Some medicines can cause dizziness or changes in blood pressure, which increase your risk of falling.  Talk with your health care provider about other ways that you can decrease your risk of falls. This may include working with a physical therapist or  to improve your strength, balance, and endurance.     This information is not intended to replace advice given to you by your health care provider. Make sure you discuss any questions you have with your health care provider.     Document Released: 12/08/2003 Document Revised: 05/03/2016 Document Reviewed: 01/22/2016  [employer] Interactive Patient Education ©2016 [employer]  How to Take a Pulse  Your pulse is the increase in pressure inside the blood vessels that carry blood from your heart to the rest of your body (arteries). Every time your heart beats, you can feel your pulse in an artery near the surface of your skin.  You can easily feel your pulse in the artery in your wrist (radial artery) and in the artery in your neck (carotid artery). Learning to take your pulse can tell you how fast your heart is beating and whether it has a normal rhythm. You can also tell whether your heart is beating strongly or weakly.  WHAT YOU NEED TO KNOW ABOUT PULSE RATES   Your pulse is the same as your heart rate. Both are measured in beats per minute (bpm). A normal resting heart rate varies depending on a person's age.   Infants under 1 year of age: Normal heart rate of 100-160 bpm.  Children 1-2 years of age: Normal heart rate of  bpm.  Children 2-5 years of age: Normal heart rate of  bpm.  Children 6-12 years of age: Normal heart rate of  bpm.  Everyone over 12 years of age: Normal heart rate of  bpm.  There can be a lot  of variation in your pulse. It can be different depending on the time of day or the amount of exercise you get. It changes with your fitness level. Many things can change the speed and regularity of your pulse. These include:  Exercise.  Fever.  Stress.  Heart problems.  Poor circulation.  Medicines.  HOW TO TAKE YOUR PULSE  To take your pulse, all you need is a digital stopwatch or a clock or watch with a second hand. The best time to measure your resting pulse is in the morning before you start moving around. Take it as soon as you wake up or after resting for about 10 minutes. There are no firm rules about how often to check your pulse. In general, it is a good idea to check your pulse at least once a month. Measuring your pulse is a good way to check your heart health.  Checking your pulse before and after exercise can tell you if you are getting the right amount of exercise. This is called finding your target heart rate. Your target heart rate depends on your age, fitness, and health. Ask your health care provider what is a safe target heart rate for you during exercise.  Radial Pulse  To check the pulse in your radial artery:  Turn one hand palm up and relax your arm.  Place the first two fingers of your opposite hand gently over your wrist, just below the base of your thumb.  Place your fingertips just inside the bone that runs along the outside of your arm.  Slowly increase pressure until you feel a pulsing beneath your fingers. You may need to move your fingers slightly.  Do not press too hard. Too much pressure may cut off blood supply.  Count how many pulse beats you feel in 1 minute. You can also count for 30 seconds and double the number.  Pay attention to the rhythm of the pulse. It should be steady and even.  Carotid Pulse  To check the pulse in your carotid artery:  Place two fingers just to one side of your Albin's apple so that you feel a pulsing beneath your fingers.  Do not press too hard. Too  much pressure may cut off blood supply and can make you dizzy.  Count how many pulse beats you feel in 1 minute. You can also count for 30 seconds and double the number.  Pay attention to the rhythm of the pulse. It should be steady and even.  SEEK MEDICAL CARE IF:   Your pulse is too slow or too fast.  Your pulse is weak or hard to find.  You have skipped beats or extra beats.  Your pulse has an irregular rhythm.  You have an abnormal pulse along with dizziness, fatigue, or shortness of breath.     This information is not intended to replace advice given to you by your health care provider. Make sure you discuss any questions you have with your health care provider.     Document Released: 06/23/2004 Document Revised: 01/08/2016 Document Reviewed: 11/21/2014  [employer] Interactive Patient Education ©2016 [employer]  How to Take Your Blood Pressure  HOW DO I GET A BLOOD PRESSURE MACHINE?  · You can buy an electronic home blood pressure machine at your local pharmacy. Insurance will sometimes cover the cost if you have a prescription.  · Ask your doctor what type of machine is best for you. There are different machines for your arm and your wrist.  · If you decide to buy a machine to check your blood pressure on your arm, first check the size of your arm so you can buy the right size cuff. To check the size of your arm:    · Use a measuring tape that shows both inches and centimeters.    · Wrap the measuring tape around the upper-middle part of your arm. You may need someone to help you measure.    · Write down your arm measurement in both inches and centimeters.    · To measure your blood pressure correctly, it is important to have the right size cuff.    · If your arm is up to 13 inches (up to 34 centimeters), get an adult cuff size.  · If your arm is 13 to 17 inches (35 to 44 centimeters), get a large adult cuff size.    ·  If your arm is 17 to 20 inches (45 to 52 centimeters), get an adult thigh cuff.    WHAT DO  "THE NUMBERS MEAN?   · There are two numbers that make up your blood pressure. For example: 120/80.  · The first number (120 in our example) is called the \"systolic pressure.\" It is a measure of the pressure in your blood vessels when your heart is pumping blood.  · The second number (80 in our example) is called the \"diastolic pressure.\" It is a measure of the pressure in your blood vessels when your heart is resting between beats.  · Your doctor will tell you what your blood pressure should be.  WHAT SHOULD I DO BEFORE I CHECK MY BLOOD PRESSURE?   · Try to rest or relax for at least 30 minutes before you check your blood pressure.  · Do not smoke.  · Do not have any drinks with caffeine, such as:  · Soda.  · Coffee.  · Tea.  · Check your blood pressure in a quiet room.  · Sit down and stretch out your arm on a table. Keep your arm at about the level of your heart. Let your arm relax.  · Make sure that your legs are not crossed.  HOW DO I CHECK MY BLOOD PRESSURE?  · Follow the directions that came with your machine.  · Make sure you remove any tight-fitting clothing from your arm or wrist. Wrap the cuff around your upper arm or wrist. You should be able to fit a finger between the cuff and your arm. If you cannot fit a finger between the cuff and your arm, it is too tight and should be removed and rewrapped.  · Some units require you to manually pump up the arm cuff.  · Automatic units inflate the cuff when you press a button.  · Cuff deflation is automatic in both models.  · After the cuff is inflated, the unit measures your blood pressure and pulse. The readings are shown on a monitor. Hold still and breathe normally while the cuff is inflated.  · Getting a reading takes less than a minute.  · Some models store readings in a memory. Some provide a printout of readings. If your machine does not store your readings, keep a written record.  · Take readings with you to your next visit with your doctor.     This " information is not intended to replace advice given to you by your health care provider. Make sure you discuss any questions you have with your health care provider.     Document Released: 11/30/2009 Document Revised: 01/08/2016 Document Reviewed: 02/12/2015  [employer] Interactive Patient Education ©2016 [employer]    Stroke Prevention  Some health problems and behaviors may make it more likely for you to have a stroke. Below are ways to lessen your risk of having a stroke.   · Be active for at least 30 minutes on most or all days.  · Do not smoke. Try not to be around others who smoke.  · Do not drink too much alcohol.  · Do not have more than 2 drinks a day if you are a man.  · Do not have more than 1 drink a day if you are a woman and are not pregnant.  · Eat healthy foods, such as fruits and vegetables. If you were put on a specific diet, follow the diet as told.  · Keep your cholesterol levels under control through diet and medicines. Look for foods that are low in saturated fat, trans fat, cholesterol, and are high in fiber.  · If you have diabetes, follow all diet plans and take your medicine as told.  · Ask your doctor if you need treatment to lower your blood pressure. If you have high blood pressure (hypertension), follow all diet plans and take your medicine as told by your doctor.  · If you are 18-39 years old, have your blood pressure checked every 3-5 years. If you are age 40 or older, have your blood pressure checked every year.  · Keep a healthy weight. Eat foods that are low in calories, salt, saturated fat, trans fat, and cholesterol.  · Do not take drugs.  · Avoid birth control pills, if this applies. Talk to your doctor about the risks of taking birth control pills.  · Talk to your doctor if you have sleep problems (sleep apnea).  · Take all medicine as told by your doctor.  · You may be told to take aspirin or blood thinner medicine. Take this medicine as told by your doctor.  · Understand  your medicine instructions.  · Make sure any other conditions you have are being taken care of.  GET HELP RIGHT AWAY IF:  · You suddenly lose feeling (you feel numb) or have weakness in your face, arm, or leg.  · Your face or eyelid hangs down to one side.  · You suddenly feel confused.  · You have trouble talking (aphasia) or understanding what people are saying.  · You suddenly have trouble seeing in one or both eyes.  · You suddenly have trouble walking.  · You are dizzy.  · You lose your balance or your movements are clumsy (uncoordinated).  · You suddenly have a very bad headache and you do not know the cause.  · You have new chest pain.  · Your heart feels like it is fluttering or skipping a beat (irregular heartbeat).  Do not wait to see if the symptoms above go away. Get help right away. Call your local emergency services (911 in [place] Do not drive yourself to the hospital.     This information is not intended to replace advice given to you by your health care provider. Make sure you discuss any questions you have with your health care provider.     Smoking Cessation, Tips for Success  If you are ready to quit smoking, congratulations! You have chosen to help yourself be healthier. Cigarettes bring nicotine, tar, carbon monoxide, and other irritants into your body. Your lungs, heart, and blood vessels will be able to work better without these poisons. There are many different ways to quit smoking. Nicotine gum, nicotine patches, a nicotine inhaler, or nicotine nasal spray can help with physical craving. Hypnosis, support groups, and medicines help break the habit of smoking.  WHAT THINGS CAN I DO TO MAKE QUITTING EASIER?   Here are some tips to help you quit for good:  · Pick a date when you will quit smoking completely. Tell all of your friends and family about your plan to quit on that date.  · Do not try to slowly cut down on the number of cigarettes you are smoking. Pick a quit date and quit smoking  "completely starting on that day.  · Throw away all cigarettes.    · Clean and remove all ashtrays from your home, work, and car.  · On a card, write down your reasons for quitting. Carry the card with you and read it when you get the urge to smoke.  · Cleanse your body of nicotine. Drink enough water and fluids to keep your urine clear or pale yellow. Do this after quitting to flush the nicotine from your body.  · Learn to predict your moods. Do not let a bad situation be your excuse to have a cigarette. Some situations in your life might tempt you into wanting a cigarette.  · Never have \"just one\" cigarette. It leads to wanting another and another. Remind yourself of your decision to quit.  · Change habits associated with smoking. If you smoked while driving or when feeling stressed, try other activities to replace smoking. Stand up when drinking your coffee. Brush your teeth after eating. Sit in a different chair when you read the paper. Avoid alcohol while trying to quit, and try to drink fewer caffeinated beverages. Alcohol and caffeine may urge you to smoke.  · Avoid foods and drinks that can trigger a desire to smoke, such as sugary or spicy foods and alcohol.  · Ask people who smoke not to smoke around you.  · Have something planned to do right after eating or having a cup of coffee. For example, plan to take a walk or exercise.  · Try a relaxation exercise to calm you down and decrease your stress. Remember, you may be tense and nervous for the first 2 weeks after you quit, but this will pass.  · Find new activities to keep your hands busy. Play with a pen, coin, or rubber band. Doodle or draw things on paper.  · Brush your teeth right after eating. This will help cut down on the craving for the taste of tobacco after meals. You can also try mouthwash.    · Use oral substitutes in place of cigarettes. Try using lemon drops, carrots, cinnamon sticks, or chewing gum. Keep them handy so they are available when " "you have the urge to smoke.  · When you have the urge to smoke, try deep breathing.  · Designate your home as a nonsmoking area.  · If you are a heavy smoker, ask your health care provider about a prescription for nicotine chewing gum. It can ease your withdrawal from nicotine.  · Reward yourself. Set aside the cigarette money you save and buy yourself something nice.  · Look for support from others. Join a support group or smoking cessation program. Ask someone at home or at work to help you with your plan to quit smoking.  · Always ask yourself, \"Do I need this cigarette or is this just a reflex?\" Tell yourself, \"Today, I choose not to smoke,\" or \"I do not want to smoke.\" You are reminding yourself of your decision to quit.  · Do not replace cigarette smoking with electronic cigarettes (commonly called e-cigarettes). The safety of e-cigarettes is unknown, and some may contain harmful chemicals.  · If you relapse, do not give up! Plan ahead and think about what you will do the next time you get the urge to smoke.  HOW WILL I FEEL WHEN I QUIT SMOKING?  You may have symptoms of withdrawal because your body is used to nicotine (the addictive substance in cigarettes). You may crave cigarettes, be irritable, feel very hungry, cough often, get headaches, or have difficulty concentrating. The withdrawal symptoms are only temporary. They are strongest when you first quit but will go away within 10-14 days. When withdrawal symptoms occur, stay in control. Think about your reasons for quitting. Remind yourself that these are signs that your body is healing and getting used to being without cigarettes. Remember that withdrawal symptoms are easier to treat than the major diseases that smoking can cause.   Even after the withdrawal is over, expect periodic urges to smoke. However, these cravings are generally short lived and will go away whether you smoke or not. Do not smoke!  WHAT RESOURCES ARE AVAILABLE TO HELP ME QUIT " SMOKING?  Your health care provider can direct you to community resources or hospitals for support, which may include:  · Group support.  · Education.  · Hypnosis.  · Therapy.     This information is not intended to replace advice given to you by your health care provider. Make sure you discuss any questions you have with your health care provider.    Helping Someone Who is Suicidal  Suicide is when someone takes his or her own life.  Someone who is thinking about suicide needs immediate help. Although you might not know what to say or do to help, start by letting that person know you care. Listen to him or her. Then talk about how to get help. Help is available through therapy, medicine, and other treatments.  WHAT ARE SIGNS THAT SOMEONE IS SUICIDAL?  Common signs include:   · Signs of depression, such as:  · Rage.  · Irritability.  · Shame.  · Excessive worry.  · Loss of interest in things the person once enjoyed.  · Changes in social behaviors and relationships, including:  · Isolating oneself.  · Withdrawing from friends and family.  · Giving away possessions.  · Saying good-bye.  · Acting aggressively.  · Sleeping more or less than usual.  · Having trouble managing school or work.    · Talking about feeling hopeless or being a burden.  · Engaging in risky behaviors, such as drinking more alcohol or using more drugs.  WHAT ARE THE RISK FACTORS FOR SUICIDE?  Risk factors for suicide include:   · Other suicides in the family.  · A history of suicide attempts.  · Depression or other mental health issues.  · Being in [facility] or facing [facility] time.  · Having had close friends who have committed suicide.  · Alcohol or drug abuse, especially combined with a mental illness.    WHAT SHOULD I DO IF SOMEONE IS SUICIDAL?  If you believe a person is in immediate danger of committing suicide, call your local emergency services (911 in the [place] for help.  If a person says he or she wants to commit suicide, take the threat  seriously. Help the person get help right away by:   · Calling your local emergency services.  · Calling a suicide prevention hotline.  · Contacting a crisis center or a local suicide prevention center. These are often located at hospitals, clinics, community service organizations, social service providers, or health departments.  If a person confides in you that he or she is considering suicide:   · Listen to the person's thoughts and concerns with compassion.  · Let the person know you will stay with him or her.    · Ask if the person is having thoughts of hurting himself or herself.    · Offer to help the person get to a doctor or mental health professional.    · Remove all weapons and medicines from the person's living space.  · Do not promise to keep his or her thoughts of suicide a secret.     This information is not intended to replace advice given to you by your health care provider. Make sure you discuss any questions you have with your health care provider.     Spinal Compression Fracture  A spinal compression fracture is a collapse of the bones that form the spine (vertebrae). With this type of fracture, the vertebrae become squashed (compressed) into a wedge shape. Most compression fractures happen in the middle or lower part of the spine.  CAUSES  This condition may be caused by:  · Thinning and loss of density in the bones (osteoporosis). This is the most common cause.  · A fall.  · A car or motorcycle accident.  · Cancer.  · Trauma, such as a heavy, direct hit to the head.  RISK FACTORS  You may be at greater risk for a spinal compression fracture if you:  · Are 50 years old or older.  · Have osteoporosis.  · Have certain types of cancer, including:  · Multiple myeloma.  · Lymphoma.  · Prostate cancer.  · Lung cancer.  · Breast cancer.  SYMPTOMS  Symptoms of this condition include:  · Severe pain.  · Pain that gets worse over time.  · Pain that is worse when you stand, walk, sit, or bend.  · Sudden  pain that is so bad that it is hard for you to move.  · Bending or humping of the spine.  · Gradual loss of height.  · Numbness, tingling, or weakness in the back and legs.  · Trouble walking.  Your symptoms will depend on the cause of the fracture and how quickly it develops. For example, fractures that are caused by osteoporosis can cause few symptoms, no symptoms, or symptoms that develop slowly over time.  DIAGNOSIS  This condition may be diagnosed based on symptoms, medical history, and a physical exam. During the physical exam, your health care provider may tap along the length of your spine to check for tenderness. Tests may be done to confirm the diagnosis. They may include:  · A bone density test to check for osteoporosis.  · Imaging tests, such as a spine X-ray, a CT scan, or MRI.  TREATMENT  Treatment for this condition depends on the cause and severity of the condition. Some fractures, such as those that are caused by osteoporosis, may heal on their own with supportive care. This may include:  · Pain medicine.  · Rest.  · A back brace.  · Physical therapy exercises.  · Medicine that reduces bone pain.  · Calcium and vitamin D supplements.  Fractures that cause the back to become misshapen, cause nerve pain or weakness, or do not respond to other treatment may be treated with a surgical procedure, such as:  · Vertebroplasty. In this procedure, bone cement is injected into the collapsed vertebrae to stabilize them.  · Balloon kyphoplasty. In this procedure, the collapsed vertebrae are expanded with a balloon and then bone cement is injected into them.  · Spinal fusion. In this procedure, the collapsed vertebrae are connected (fused) to normal vertebrae.  HOME CARE INSTRUCTIONS  General Instructions  · Take medicines only as directed by your health care provider.  · Do not drive or operate heavy machinery while taking pain medicine.  · If directed, apply ice to the injured area:  ¨ Put ice in a plastic  bag.  ¨ Place a towel between your skin and the bag.  ¨ Leave the ice on for 30 minutes every two hours at first. Then apply the ice as needed.  · Wear your neck brace or back brace as directed by your health care provider.  · Do not drink alcohol. Alcohol can interfere with your treatment.  · Keep all follow-up visits as directed by your health care provider. This is important. It can help to prevent permanent injury, disability, and long-lasting (chronic) pain.  Activity  · Stay in bed (on bed rest) only as directed by your health care provider. Being on bed rest for too long can make your condition worse.  · Return to your normal activities as directed by your health care provider. Ask what activities are safe for you.  · Do exercises to improve motion and strength in your back (physical therapy), as recommended by your health care provider.  · Exercise regularly as directed by your health care provider.  SEEK MEDICAL CARE IF:  · You have a fever.  · You develop a cough that makes your pain worse.  · Your pain medicine is not helping.  · Your pain does not get better over time.  · You cannot return to your normal activities as planned or expected.  SEEK IMMEDIATE MEDICAL CARE IF:  · Your pain is very bad and it suddenly gets worse.  · You are unable to move any body part (paralysis) that is below the level of your injury.  · You have numbness, tingling, or weakness in any body part that is below the level of your injury.  · You cannot control your bladder or bowels.     This information is not intended to replace advice given to you by your health care provider. Make sure you discuss any questions you have with your health care provider.     Occupational Therapy Discharge Instructions for Maria Esther Valencia    8/13/2017    Level of Assist Required for Eating: Able to Complete Eating without Assist  Level of Assist Required for Grooming: Requires Supervision with Grooming  Level of Assist Required for Dressing: Requires  Physical Assist with Dressing  Equipment for Dressing: Sock Aid, Reacher, Long Handled Shoe Horn, Dressing Stick  Level of Assist Required for Toileting: Requires Supervision with Toileting  Level of Assist Required for Toilet Transfer: Requires Supervision with Toilet Transfer  Equipment for Toilet Transfer: Grab Bars by Toilet  Level of Assist Required for Bathing: Requires Physical Assist with Bathing  Equipment for Bathing: Shower Chair, Grab Bars in Tub / Shower, Hand Held Shower Head  Level of Assist Required for Shower Transfer: Requires Supervision with Shower Transfer  Equipment for Shower Transfer: Grab Bars in Tub / Shower, Tub Transfer Bench  Level of Assist Required for Home Mgmt: Requires Physical Assist with Home Management  Level of Assist Required for Meal Prep: Requires Physical Assist with Meal Preparation  Driving: May not Drive, Please Contact Physician for Further Information  Home Exercise Program: None Issued    It was a pleasure working with Maria Esther. Please continue to follow your spinal precautions by avoiding BENDING, LIFTING, and TWISTING.  Take care!  Yusra Carmen OTR/L    Physical Therapy Discharge Instructions for Maria Esther Valencia    8/14/2017    Weight Bearing Status - Patient Should:  (no weight bearing restrictions, Spinal precautions)  Level of Assist Required for Ambulation: Assist for Balance on Flat Surfaces, Physical Assist on Stairs, Should Not Attempt Curbs at This Time  Distance Patient May Ambulate:  (up to 400 ft as tolerated with FWW and CGA/SBA)  Device Recommended for Ambulation: Front-Wheeled Walker  Level of Assist Required to Propel Wheelchair: Supervision  Level of Assist Required for Transfers: Supervision (SBA to CGA)  Device Recommended for Transfers: Front-Wheeled Walker  Home Exercise Program: None Issued  Prosthesis / Orthosis Recommendation / Location: No Prosthesis  or Orthosis Recommended    Take care Maria Esther! Keep up the hard work.  Iliana, PT    Speech  Therapy Discharge Instructions for Maria Esther Valencia    8/15/2017    Diet: Regular - all foods allowed, Thin Liquids - any liquid like water, coffee, tea, juices, or clear fluids like Gatorade, etc.  Swallow Strategies: small bites and small sips. Wait until you have finished chewing and swallowing your food before taking a drink. Clear your mouth with you tongue or finger between bites and alternate food and liquid to clear any residue.   Other Diet Instructions: meds whole with water, juice, coffee etc.   Supervision: 24 hour supervision is recommended at this time.   Cognition / Communication: Maria Esther, it has been a pleasure working with you! Please have Arsenio or another friend bring your glasses to the skilled nursing facility so you are better able to see. Use a calendar to help orient you to day, date, time etc. and use your memory notebook to log daily events to help aid in recall of new training and education. Best of luck in your continued recovery! ~ Maty Bowen MS, CCC-SLP      Follow-up Information     1. Call Bernabe Hopkins D.O..    Specialty:  Family Medicine    Why:  for appointment after skilled rehab (records will be sent)    Contact information    [facility]  [address]  [facility] [address]  [phone]           Discharge Medication Instructions:    Below are the medications your physician expects you to take upon discharge:    Review all your home medications and newly ordered medications with your doctor and/or pharmacist. Follow medication instructions as directed by your doctor and/or pharmacist.    Please keep your medication list with you and share with your physician.               Medication List      START taking these medications        Instructions    Morning Afternoon Evening Bedtime    acetaminophen 325 MG Tabs   Last time this was given:  650 mg on 8/15/2017  4:55 AM   Commonly known as:  TYLENOL   Next Dose Due:  Up to every 4 hours as needed for pain        Take 2 Tabs by mouth  every four hours as needed.   Dose:  650 mg                        aspirin 81 MG EC tablet   Last time this was given:  81 mg on 8/15/2017  8:22 AM   Next Dose Due:  Tomorrow morning        Take 1 Tab by mouth every day.   Dose:  81 mg                        atorvastatin 80 MG tablet   Last time this was given:  80 mg on 8/14/2017  7:47 PM   Commonly known as:  LIPITOR   Next Dose Due:  Tonight at bedtime        Take 1 Tab by mouth every bedtime.   Dose:  80 mg                        benazepril 40 MG tablet   Last time this was given:  40 mg on 8/15/2017  4:56 AM   Commonly known as:  LOTENSIN   Next Dose Due:  Tomorrow morning   Notes to Patient:  Monitor your blood pressure and heart rate before taking this medication, if your systolic (top number) is less than 110 or if pulse is less than 60, call your physician before taking this medication        Take 1 Tab by mouth every day.   Dose:  40 mg                        duloxetine 20 MG Cpep   Last time this was given:  20 mg on 8/15/2017  8:22 AM   Commonly known as:  CYMBALTA   Next Dose Due:  Tomorrow morning        Take 1 Cap by mouth every day.   Dose:  20 mg                        furosemide 20 MG Tabs   Last time this was given:  20 mg on 8/15/2017  4:56 AM   Commonly known as:  LASIX   Next Dose Due:  Tomorrow morning        Take 1 Tab by mouth every day.   Dose:  20 mg                        gabapentin 300 MG Caps   Last time this was given:  300 mg on 8/14/2017  7:48 PM   Commonly known as:  NEURONTIN   Next Dose Due:  Tonight at bedtime        Take 1 Cap by mouth every evening.   Dose:  300 mg                        hydrALAZINE 50 MG Tabs   Last time this was given:  50 mg on 8/15/2017  4:55 AM   Commonly known as:  APRESOLINE   Next Dose Due:  Today at 2:00 and at 10:00   Notes to Patient:  Monitor your blood pressure and heart rate before taking this medication, if your systolic (top number) is less than 110 or if pulse is less than 60, call your  physician before taking this medication        Take 1 Tab by mouth every 8 hours.   Dose:  50 mg                        lidocaine 5 % Ptch   Last time this was given:  1 Patch on 8/15/2017  8:22 AM   Commonly known as:  LIDODERM   Next Dose Due:  Tomorrow morning   Notes to Patient:  Remove before bed        Apply 1 Patch to skin as directed every 24 hours.   Dose:  1 Patch                        mag hydrox-al hydrox-simeth 400-400-40 MG/5ML Susp   Commonly known as:  MAALOX PLUS ES or MYLANTA DS   Next Dose Due:  Up to every 2 hours as needed        Take 20 mL by mouth every 2 hours as needed (dyspepsia).   Dose:  20 mL                        metoprolol 25 MG Tabs   Last time this was given:  12.5 mg on 8/15/2017  4:56 AM   Commonly known as:  LOPRESSOR   Next Dose Due:  Tonight at dinner time   Notes to Patient:  Monitor your blood pressure and heart rate before taking this medication, if your systolic (top number) is less than 110 or if pulse is less than 60, call your physician before taking this medication        Take 0.5 Tabs by mouth 2 Times a Day.   Dose:  12.5 mg                        nicotine 14 MG/24HR Pt24   Last time this was given:  14 mg on 8/15/2017  4:57 AM   Commonly known as:  NICODERM   Next Dose Due:  Tomorrow morning   Notes to Patient:  Remove old patch before applying new one, and rotate sites        Apply 1 Patch to skin as directed every 24 hours.   Dose:  1 Patch                        omeprazole 20 MG delayed-release capsule   Last time this was given:  20 mg on 8/15/2017  8:22 AM   Commonly known as:  PRILOSEC   Next Dose Due:  Tonight at dinner time        Take 1 Cap by mouth 2 Times a Day.   Dose:  20 mg                        oxybutynin SR 5 MG Tb24   Last time this was given:  5 mg on 8/14/2017  7:47 PM   Commonly known as:  DITROPAN-XL   Next Dose Due:  Tonight at bedtime        Take 1 Tab by mouth every evening.   Dose:  5 mg                        potassium chloride SA 10 MEQ  Tbcr   Last time this was given:  10 mEq on 8/15/2017  8:20 AM   Commonly known as:  K-DUR   Next Dose Due:  Tomorrow morning        Take 1 Tab by mouth every day.   Dose:  10 mEq                        senna-docusate 8.6-50 MG Tabs   Last time this was given:  2 Tabs on 8/15/2017  8:19 AM   Commonly known as:  PERICOLACE or SENOKOT S   Next Dose Due:  Tonight at bedtime        Take 2 Tabs by mouth 2 Times a Day.   Dose:  2 Tab                        trazodone 50 MG Tabs   Last time this was given:  50 mg on 8/14/2017  7:48 PM   Commonly known as:  DESYREL   Next Dose Due:  Every bedtime as needed for insomnia        Take 1 Tab by mouth at bedtime as needed.   Dose:  50 mg                        vitamin D (Ergocalciferol) 51555 UNITS Caps capsule   Last time this was given:  50,000 Units on 8/9/2017  8:25 AM   Commonly known as:  DRISDOL   Next Dose Due:  Tomorrow morning   Notes to Patient:  Every 7 days         Take 1 Cap by mouth every 7 days.   Dose:  14146 Units                             Where to Get Your Medications      Information about where to get these medications is not yet available     ! Ask your nurse or doctor about these medications    - acetaminophen 325 MG Tabs  - aspirin 81 MG EC tablet  - atorvastatin 80 MG tablet  - benazepril 40 MG tablet  - duloxetine 20 MG Cpep  - furosemide 20 MG Tabs  - gabapentin 300 MG Caps  - hydrALAZINE 50 MG Tabs  - lidocaine 5 % Ptch  - mag hydrox-al hydrox-simeth 400-400-40 MG/5ML Susp  - metoprolol 25 MG Tabs  - nicotine 14 MG/24HR Pt24  - omeprazole 20 MG delayed-release capsule  - oxybutynin SR 5 MG Tb24  - potassium chloride SA 10 MEQ Tbcr  - senna-docusate 8.6-50 MG Tabs  - trazodone 50 MG Tabs  - vitamin D (Ergocalciferol) 44607 UNITS Caps capsule            Instructions           Diet / Nutrition:    Follow any diet instructions given to you by your doctor or the dietician, including how much salt (sodium) you are allowed each day.    If you are overweight,  talk to your doctor about a weight reduction plan.    Activity:    Remain physically active following your doctor's instructions about exercise and activity.    Rest often.     Any time you become even a little tired or short of breath, SIT DOWN and rest.    Worsening Symptoms:    Report any of the following signs and symptoms to the doctor's office immediately:    *Pain of jaw, arm, or neck  *Chest pain not relieved by medication                               *Dizziness or loss of consciousness  *Difficulty breathing even when at rest   *More tired than usual                                       *Bleeding drainage or swelling of surgical site  *Swelling of feet, ankles, legs or stomach                 *Fever (>100ºF)  *Pink or blood tinged sputum  *Weight gain (3lbs/day or 5lbs /week)           *Shock from internal defibrillator (if applicable)  *Palpitations or irregular heartbeats                *Cool and/or numb extremities    Stroke Awareness    Common Risk Factors for Stroke include:    Age  Atrial Fibrillation  Carotid Artery Stenosis  Diabetes Mellitus  Excessive alcohol consumption  High blood pressure  Overweight   Physical inactivity  Smoking    Warning signs and symptoms of a stroke include:    *Sudden numbness or weakness of the face, arm or leg (especially on one side of the body).  *Sudden confusion, trouble speaking or understanding.  *Sudden trouble seeing in one or both eyes.  *Sudden trouble walking, dizziness, loss of balance or coordination.Sudden severe headache with no known cause.    It is very important to get treatment quickly when a stroke occurs. If you experience any of the above warning signs, call 911 immediately.                   Disclaimer         Quit Smoking / Tobacco Use:    I understand the use of any tobacco products increases my chance of suffering from future heart disease or stroke and could cause other illnesses which may shorten my life. Quitting the use of tobacco  products is the single most important thing I can do to improve my health. For further information on smoking / tobacco cessation call a Toll Free Quit Line at 1-531.753.5330 (*[employer] [place]) or 1-181.318.6342 (American Lung Association) or you can access the web based program at www.lungusa.org.    [place] Tobacco Users Help Line:  (875) 625-3495       Toll Free: 1-947.763.8044  Quit Tobacco Program [facility] (832)967-8957    Crisis Hotline:    [place] Crisis Hotline:  6-328-EWBGBEQ or 1-578.114.6701    [place] Crisis Hotline:    1-963.625.2455 or 862-632-0971    Discharge Survey:   Thank you for choosing [facility]. We hope we did everything we could to make your hospital stay a pleasant one. You may be receiving a phone survey and we would appreciate your time and participation in answering the questions. Your input is very valuable to us in our efforts to improve our service to our patients and their families.        My signature on this form indicates that:    1. I have reviewed and understand the above information.  2. My questions regarding this information have been answered to my satisfaction.  3. I have formulated a plan with my discharge nurse to obtain my prescribed medications for home.                  Disclaimer         __________________________________                     __________       ________                       Patient Signature                                                 Date                    Time

## 2017-08-01 NOTE — CARE PLAN
Problem: Fluid Volume:  Goal: Will maintain balanced intake and output  Outcome: PROGRESSING AS EXPECTED    Problem: Pain Management  Goal: Pain level will decrease to patient’s comfort goal  Outcome: PROGRESSING AS EXPECTED

## 2017-08-01 NOTE — PROGRESS NOTES
Report given to Cole FLORES at [facility]. Update him on pt current condition. Updated about current BP being 186/86 and that she was given PO clonidine.

## 2017-08-01 NOTE — CARE PLAN
Problem: Venous Thromboembolism (VTW)/Deep Vein Thrombosis (DVT) Prevention:  Goal: Patient will participate in Venous Thrombosis (VTE)/Deep Vein Thrombosis (DVT)Prevention Measures  Outcome: PROGRESSING AS EXPECTED  Intervention: Ensure patient wears graduated elastic stockings (GRANT hose) and/or SCDs, if ordered, when in bed or chair (Remove at least once per shift for skin check)  SCDs      Problem: Mobility  Goal: Risk for activity intolerance will decrease  Intervention: Assess and monitor signs of activity intolerance  Pt is up with a fww and a steady gait.

## 2017-08-01 NOTE — PROGRESS NOTES
Simona Knight Fall Risk Assessment:     Last Known Fall: Within the last six months  Mobility: Immobilized/requires assist of one person  Medications: Cardiovascular or central nervous system meds  Mental Status/LOC/Awareness: Oriented to person and place  Toileting Needs: No needs  Volume/Electrolyte Status: No problems  Communication/Sensory: Visual (Glasses)/hearing deficit  Behavior: Appropriate behavior  Simona Knight Fall Risk Total: 10  Fall Risk Level: LOW RISK    Universal Fall Precautions:  call light/belongings in reach, siderails up x 2, bed in low position and locked, wheelchairs and assistive devices out of sight, use non-slip footwear, adequate lighting, educate on level of risk, educate to call for assistance, clutter free and spill free environment    Fall Risk Level Interventions:   TRIAL ([employer], NEURO, MED JENNIE 5) Low Fall Risk Interventions  Place yellow fall risk ID band on patient: verified  Provide patient/family education based on risk assessment: verified  Educate patient/family to call staff for assistance when getting out of bed: verified  Place fall precaution signage outside patient door: verifiedTRIAL ([employer] 8, NEURO, MED JENNIE 5) Moderate Fall Risk Interventions  Place yellow fall risk ID band on patient: verified  Provide patient/family education based on risk assessment : verified  Educate patient/family to call staff for assistance when getting out of bed: verified  Place fall precaution signage outside patient door: verified  Utilize bed/chair fall alarm: verified     Patient Specific Interventions:     Medication: review medications with patient and family, limit combination of prn medications and provide patients who received diuretics/laxatives frequent toileting  Mental Status/LOC/Awareness: reorient patient, reinforce falls education, encourage family to stay with patient, check on patient hourly, utilize bed/chair fall alarm, reinforce the use of call light and provide  activity  Toileting: provide frquent toileting  Volume/Electrolyte Status: ensure patient remains hydrated and monitor abnormal lab values  Communication/Sensory: update plan of care on whiteboard  Behavioral: collaborate with doctor for possible psych consult, encourage patient to voice feelings, initiate plan of care for alcohol withdrawal and instruct/reinforce fall program rationale  Mobility: schedule physical activity throughout the day, provide comfort measures during transport, utilize bed/chair fall alarm, ensure bed is locked and in lowest position and provide appropriate assistive device

## 2017-08-02 LAB
25(OH)D3 SERPL-MCNC: 10 NG/ML (ref 30–100)
ALBUMIN SERPL BCP-MCNC: 3.5 G/DL (ref 3.2–4.9)
ALBUMIN/GLOB SERPL: 1.4 G/DL
ALP SERPL-CCNC: 90 U/L (ref 30–99)
ALT SERPL-CCNC: 20 U/L (ref 2–50)
ANION GAP SERPL CALC-SCNC: 7 MMOL/L (ref 0–11.9)
APPEARANCE UR: CLEAR
AST SERPL-CCNC: 20 U/L (ref 12–45)
BASOPHILS # BLD AUTO: 0.4 % (ref 0–1.8)
BASOPHILS # BLD: 0.04 K/UL (ref 0–0.12)
BILIRUB SERPL-MCNC: 0.3 MG/DL (ref 0.1–1.5)
BILIRUB UR QL STRIP.AUTO: NEGATIVE
BNP SERPL-MCNC: 101 PG/ML (ref 0–100)
BUN SERPL-MCNC: 13 MG/DL (ref 8–22)
CALCIUM SERPL-MCNC: 9.4 MG/DL (ref 8.5–10.5)
CHLORIDE SERPL-SCNC: 110 MMOL/L (ref 96–112)
CHOLEST SERPL-MCNC: 109 MG/DL (ref 100–199)
CO2 SERPL-SCNC: 27 MMOL/L (ref 20–33)
COLOR UR: YELLOW
CREAT SERPL-MCNC: 0.78 MG/DL (ref 0.5–1.4)
EOSINOPHIL # BLD AUTO: 0.14 K/UL (ref 0–0.51)
EOSINOPHIL NFR BLD: 1.5 % (ref 0–6.9)
ERYTHROCYTE [DISTWIDTH] IN BLOOD BY AUTOMATED COUNT: 51.4 FL (ref 35.9–50)
GFR SERPL CREATININE-BSD FRML MDRD: >60 ML/MIN/1.73 M 2
GLOBULIN SER CALC-MCNC: 2.5 G/DL (ref 1.9–3.5)
GLUCOSE SERPL-MCNC: 99 MG/DL (ref 65–99)
GLUCOSE UR STRIP.AUTO-MCNC: NEGATIVE MG/DL
HCT VFR BLD AUTO: 29.9 % (ref 37–47)
HDLC SERPL-MCNC: 41 MG/DL
HGB BLD-MCNC: 8.1 G/DL (ref 12–16)
IMM GRANULOCYTES # BLD AUTO: 0.39 K/UL (ref 0–0.11)
IMM GRANULOCYTES NFR BLD AUTO: 4.2 % (ref 0–0.9)
KETONES UR STRIP.AUTO-MCNC: NEGATIVE MG/DL
LDLC SERPL CALC-MCNC: 52 MG/DL
LEUKOCYTE ESTERASE UR QL STRIP.AUTO: NEGATIVE
LYMPHOCYTES # BLD AUTO: 1.04 K/UL (ref 1–4.8)
LYMPHOCYTES NFR BLD: 11.2 % (ref 22–41)
MAGNESIUM SERPL-MCNC: 2.2 MG/DL (ref 1.5–2.5)
MCH RBC QN AUTO: 18 PG (ref 27–33)
MCHC RBC AUTO-ENTMCNC: 27.1 G/DL (ref 33.6–35)
MCV RBC AUTO: 66.6 FL (ref 81.4–97.8)
MICRO URNS: NORMAL
MONOCYTES # BLD AUTO: 0.79 K/UL (ref 0–0.85)
MONOCYTES NFR BLD AUTO: 8.5 % (ref 0–13.4)
NEUTROPHILS # BLD AUTO: 6.88 K/UL (ref 2–7.15)
NEUTROPHILS NFR BLD: 74.2 % (ref 44–72)
NITRITE UR QL STRIP.AUTO: NEGATIVE
NRBC # BLD AUTO: 0.12 K/UL
NRBC BLD AUTO-RTO: 1.3 /100 WBC
PH UR STRIP.AUTO: 6 [PH]
PLATELET # BLD AUTO: 315 K/UL (ref 164–446)
PMV BLD AUTO: 10.9 FL (ref 9–12.9)
POTASSIUM SERPL-SCNC: 4.1 MMOL/L (ref 3.6–5.5)
PROT SERPL-MCNC: 6 G/DL (ref 6–8.2)
PROT UR QL STRIP: NEGATIVE MG/DL
RBC # BLD AUTO: 4.49 M/UL (ref 4.2–5.4)
RBC UR QL AUTO: NEGATIVE
SODIUM SERPL-SCNC: 144 MMOL/L (ref 135–145)
SP GR UR STRIP.AUTO: 1
TRIGL SERPL-MCNC: 81 MG/DL (ref 0–149)
WBC # BLD AUTO: 9.3 K/UL (ref 4.8–10.8)

## 2017-08-02 PROCEDURE — A9270 NON-COVERED ITEM OR SERVICE: HCPCS | Performed by: PHYSICAL MEDICINE & REHABILITATION

## 2017-08-02 PROCEDURE — 700111 HCHG RX REV CODE 636 W/ 250 OVERRIDE (IP): Performed by: HOSPITALIST

## 2017-08-02 PROCEDURE — 85025 COMPLETE CBC W/AUTO DIFF WBC: CPT

## 2017-08-02 PROCEDURE — 99233 SBSQ HOSP IP/OBS HIGH 50: CPT | Performed by: PHYSICAL MEDICINE & REHABILITATION

## 2017-08-02 PROCEDURE — 81003 URINALYSIS AUTO W/O SCOPE: CPT

## 2017-08-02 PROCEDURE — 92610 EVALUATE SWALLOWING FUNCTION: CPT

## 2017-08-02 PROCEDURE — 700102 HCHG RX REV CODE 250 W/ 637 OVERRIDE(OP): Performed by: PHYSICAL MEDICINE & REHABILITATION

## 2017-08-02 PROCEDURE — 80061 LIPID PANEL: CPT

## 2017-08-02 PROCEDURE — 80053 COMPREHEN METABOLIC PANEL: CPT

## 2017-08-02 PROCEDURE — 700101 HCHG RX REV CODE 250: Performed by: PHYSICAL MEDICINE & REHABILITATION

## 2017-08-02 PROCEDURE — 700111 HCHG RX REV CODE 636 W/ 250 OVERRIDE (IP): Performed by: PHYSICAL MEDICINE & REHABILITATION

## 2017-08-02 PROCEDURE — 700105 HCHG RX REV CODE 258: Performed by: HOSPITALIST

## 2017-08-02 PROCEDURE — 97162 PT EVAL MOD COMPLEX 30 MIN: CPT

## 2017-08-02 PROCEDURE — 94760 N-INVAS EAR/PLS OXIMETRY 1: CPT

## 2017-08-02 PROCEDURE — 97530 THERAPEUTIC ACTIVITIES: CPT

## 2017-08-02 PROCEDURE — 36415 COLL VENOUS BLD VENIPUNCTURE: CPT

## 2017-08-02 PROCEDURE — 97535 SELF CARE MNGMENT TRAINING: CPT

## 2017-08-02 PROCEDURE — 82306 VITAMIN D 25 HYDROXY: CPT

## 2017-08-02 PROCEDURE — 99223 1ST HOSP IP/OBS HIGH 75: CPT | Performed by: HOSPITALIST

## 2017-08-02 PROCEDURE — 770010 HCHG ROOM/CARE - REHAB SEMI PRIVAT*

## 2017-08-02 PROCEDURE — 92523 SPEECH SOUND LANG COMPREHEN: CPT

## 2017-08-02 PROCEDURE — 700102 HCHG RX REV CODE 250 W/ 637 OVERRIDE(OP): Performed by: HOSPITALIST

## 2017-08-02 PROCEDURE — A9270 NON-COVERED ITEM OR SERVICE: HCPCS | Performed by: HOSPITALIST

## 2017-08-02 PROCEDURE — 83880 ASSAY OF NATRIURETIC PEPTIDE: CPT

## 2017-08-02 PROCEDURE — 83735 ASSAY OF MAGNESIUM: CPT

## 2017-08-02 PROCEDURE — 97166 OT EVAL MOD COMPLEX 45 MIN: CPT

## 2017-08-02 RX ORDER — POTASSIUM CHLORIDE 20 MEQ/1
40 TABLET, EXTENDED RELEASE ORAL ONCE
Status: COMPLETED | OUTPATIENT
Start: 2017-08-02 | End: 2017-08-02

## 2017-08-02 RX ORDER — ERGOCALCIFEROL 1.25 MG/1
50000 CAPSULE ORAL
Status: DISCONTINUED | OUTPATIENT
Start: 2017-08-02 | End: 2017-08-15 | Stop reason: HOSPADM

## 2017-08-02 RX ORDER — GABAPENTIN 100 MG/1
100 CAPSULE ORAL EVERY EVENING
Status: DISCONTINUED | OUTPATIENT
Start: 2017-08-02 | End: 2017-08-03

## 2017-08-02 RX ADMIN — METOPROLOL TARTRATE 25 MG: 25 TABLET ORAL at 17:19

## 2017-08-02 RX ADMIN — IRON SUCROSE 200 MG: 20 INJECTION, SOLUTION INTRAVENOUS at 16:12

## 2017-08-02 RX ADMIN — ASPIRIN 81 MG: 81 TABLET, COATED ORAL at 08:38

## 2017-08-02 RX ADMIN — OMEPRAZOLE 20 MG: 20 CAPSULE, DELAYED RELEASE ORAL at 21:10

## 2017-08-02 RX ADMIN — OXYCODONE HYDROCHLORIDE 5 MG: 5 TABLET ORAL at 12:12

## 2017-08-02 RX ADMIN — METOPROLOL TARTRATE 25 MG: 25 TABLET ORAL at 05:09

## 2017-08-02 RX ADMIN — ENOXAPARIN SODIUM 40 MG: 100 INJECTION SUBCUTANEOUS at 21:09

## 2017-08-02 RX ADMIN — Medication 2 TABLET: at 21:10

## 2017-08-02 RX ADMIN — BENAZEPRIL HYDROCHLORIDE 40 MG: 10 TABLET, FILM COATED ORAL at 05:09

## 2017-08-02 RX ADMIN — LIDOCAINE 1 PATCH: 50 PATCH TOPICAL at 08:38

## 2017-08-02 RX ADMIN — Medication 2 TABLET: at 08:39

## 2017-08-02 RX ADMIN — POTASSIUM CHLORIDE 40 MEQ: 1500 TABLET, EXTENDED RELEASE ORAL at 12:12

## 2017-08-02 RX ADMIN — IRON SUCROSE 200 MG: 20 INJECTION, SOLUTION INTRAVENOUS at 16:42

## 2017-08-02 RX ADMIN — FERROUS SULFATE TAB 325 MG (65 MG ELEMENTAL FE) 325 MG: 325 (65 FE) TAB at 08:38

## 2017-08-02 RX ADMIN — ATORVASTATIN CALCIUM 80 MG: 40 TABLET, FILM COATED ORAL at 21:09

## 2017-08-02 RX ADMIN — GABAPENTIN 100 MG: 100 CAPSULE ORAL at 21:10

## 2017-08-02 RX ADMIN — OMEPRAZOLE 20 MG: 20 CAPSULE, DELAYED RELEASE ORAL at 08:39

## 2017-08-02 RX ADMIN — FUROSEMIDE 40 MG: 40 TABLET ORAL at 05:09

## 2017-08-02 RX ADMIN — NICOTINE 14 MG: 14 PATCH, EXTENDED RELEASE TRANSDERMAL at 05:15

## 2017-08-02 RX ADMIN — ERGOCALCIFEROL 50000 UNITS: 1.25 CAPSULE ORAL at 10:56

## 2017-08-02 RX ADMIN — DULOXETINE HYDROCHLORIDE 20 MG: 20 CAPSULE, DELAYED RELEASE ORAL at 08:38

## 2017-08-02 ASSESSMENT — PAIN SCALES - GENERAL
PAINLEVEL_OUTOF10: 0

## 2017-08-02 ASSESSMENT — GAIT ASSESSMENTS
GAIT LEVEL OF ASSIST: MINIMAL ASSIST
DEVIATION: STEP TO;DECREASED BASE OF SUPPORT;BRADYKINETIC;SHUFFLED GAIT
ASSISTIVE DEVICE: PARALLEL BARS
DISTANCE (FEET): 10

## 2017-08-02 ASSESSMENT — BRIEF INTERVIEW FOR MENTAL STATUS (BIMS)
BIMS SUMMARY SCORE: 8
ASKED TO RECALL SOCK: NO, COULD NOT RECALL
WHAT MONTH IS IT: MISSED BY MORE THAN 1 MONTH
INITIAL REPETITION OF BED BLUE SOCK - FIRST ATTEMPT: 3
ASKED TO RECALL BED: NO, COULD NOT RECALL
WHAT DAY OF THE WEEK IS IT: INCORRECT
WHAT YEAR IS IT: CORRECT
ASKED TO RECALL BLUE: YES, NO CUE REQUIRED

## 2017-08-02 ASSESSMENT — ACTIVITIES OF DAILY LIVING (ADL): TOILETING: INDEPENDENT

## 2017-08-02 NOTE — FLOWSHEET NOTE
08/01/17 7732   Type of Assessment   Assessment Yes   Patient History   Pulmonary Diagnosis CHF, CAD, Hypertension   Surgical Procedures no   Home O2 No   Home Treatments/Frequency No   COPD Risk Screening   Do you have a history of COPD? No   COPD Population Screener   During the past 4 weeks, how much did you feel short of breath? 1   Do you ever cough up any mucus or phlegm? 0   In the past 12 months, you do less than you used to because of your breathing problems 1   Have you smoked at least 100 cigarettes in your entire life? 2   How old are you? 1   COPD Screening Score 6   Smoking History   Have you Ever Smoked Yes   Have you Smoked in the Last 12 Mos Yes   Have you Quit Smoking No    Smoking Cessation Offered Patient Refused   Level Of Consciousness   Level of Consciousness Alert   Respiratory WDL   Respiratory (WDL) X   Chest Exam   Work Of Breathing / Effort Mild   Respiration 18   Pulse 76   Breath Sounds   RUL Breath Sounds Clear   RML Breath Sounds Clear;Diminished   RLL Breath Sounds Diminished   ARGELIA Breath Sounds Clear   LLL Breath Sounds Diminished   Secretions   Cough Non Productive   Oximetry   #Pulse Oximetry (Single Determination) Yes   Oxygen   Home O2 Use Prior To Admission? No   Pulse Oximetry 91 %   O2 (LPM) 2   O2 Daily Delivery Respiratory  Silicone Nasal Cannula   Protocol Pathways   Protocol Pathways Yes   O2 Protocol   O2 Protocol Indications Room Air SpO2 Less Than 90%   O2 Protocol Goals/Outcome Normoxemia on Oxygen, SpO2 greater than 90%

## 2017-08-02 NOTE — PROGRESS NOTES
Received report from RN, assumed patient care.  Patient resting comfortably in bed.  Discussed plan of care with patient.  Call light within reach, bed in low position.  VSS.  Pain reported 0/10.

## 2017-08-02 NOTE — H&P
REHABILITATION HISTORY AND PHYSICAL/POST ADMISSION EVALUATION    8/1/2017  7:08 PM  Maria Esther Valencia  RH05/02    Reason for admission: debility, T12/L1 compression fractures, thoracic spondylosis and dextroconvex scoliosis    HPI:  The patient is a 76 y.o. female with a past medical history of coronary artery disease, hypertension, psychiatric disorder, stroke, and chronic back pain; now admitted for acute inpatient rehabilitation with severe functional debility due to her history of stroke, and chronic compression fractures/scoliosis.     Patient initially admitted on July 29, 2017 with complaints of chronic back and bilateral leg pain that had been increasing over the past week to the point where she was unable to tolerate walking. In addition she had bladder incontinence. MRI with T12/L1 chronic compression fractures, as well as thoracic spondylosis and dextroconvex scoliosis, and bilateral lumbar neural foraminal narrowing most severe at left L5-S1. Patient apparently was offered surgical correction which she declined. Acute stay complications include significant hypertension.    Patient reports that she has had chronic back pain for quite some time but has not had any surgical procedures. She reports her previous  had multiple back surgeries and she is very clear she never wants even 1 back surgery. She denies any numbness and tingling in her distal extremities, but does complain of some abnormal sensation in the saddle area. She also reports a history of a GI bleed for which she was scoped several years ago. She has a history of a myocardial infarction as well as a stroke about 10 years ago when she lived in [place]. Reports she had stents placed for the coronary artery disease and did not have any residual deficits from her stroke. MRI from 2014 show old lacunar infarctions in the left internal capsule and left corona radiata, pontine gliosis, old lacunar infarcts in the cerebellum, and chronic small  "vessel ischemic disease. Patient has a chart doses of dementia, and does report difficulty with her thinking stating she is a \"nut.\"    Patient was evaluated by Rehab Medicine physician and Physical Therapy and Occupational Therapy and determined to be appropriate for acute inpatient rehab and was transferred to [facility] on 8/1/2017.    Pre-mobidly, the patient lived in a single level home in [place] alone and able to care for herself. With this acute therapeutic intervention, this patient hopes to improve her functional status, and return to independent living with the supportive care of community resources.    REVIEW OF SYSTEMS:     Review of Systems   Constitutional: Negative for fever and chills.   Eyes: Positive for blurred vision.        Just got brand-new glasses for nearsightedness but they are now lost   Respiratory: Negative for cough and shortness of breath.    Cardiovascular: Negative for chest pain and palpitations.   Gastrointestinal: Negative for heartburn, nausea, vomiting, diarrhea and constipation.   Genitourinary: Positive for urgency and frequency. Negative for dysuria.   Musculoskeletal: Positive for back pain.   Skin: Negative for rash.   Neurological: Positive for sensory change and focal weakness. Negative for dizziness, tingling and headaches.   Endo/Heme/Allergies: Does not bruise/bleed easily.   Psychiatric/Behavioral: Negative for depression. The patient is not nervous/anxious.        PMH:  Past Medical History   Diagnosis Date   • Congestive heart failure (CMS-[facility])    • CAD (coronary artery disease)    • Stroke (CMS-Former[facility])    • Hypertension    • Psychiatric disorder    • GI bleed        PSH:  Past Surgical History   Procedure Laterality Date   • Other cardiac surgery       stents       FAMILY HISTORY:  Family History   Problem Relation Age of Onset   • Heart Attack Mother    • Heart Attack Father          MEDICATIONS:  Current Facility-Administered Medications "   Medication Dose   • Respiratory Care per Protocol     • Pharmacy Consult Request ...Pain Management Review 1 Each  1 Each   • oxycodone immediate-release (ROXICODONE) tablet 2.5 mg  2.5 mg   • oxycodone immediate-release (ROXICODONE) tablet 5 mg  5 mg   • [START ON 8/2/2017] lidocaine (LIDODERM) 5 % 1 Patch  1 Patch   • enoxaparin (LOVENOX) inj 40 mg  40 mg   • [START ON 8/2/2017] aspirin EC (ECOTRIN) tablet 81 mg  81 mg   • acetaminophen (TYLENOL) tablet 650 mg  650 mg   • artificial tears 1.4 % ophthalmic solution 1 Drop  1 Drop   • hydrALAZINE (APRESOLINE) tablet 25 mg  25 mg   • mag hydrox-al hydrox-simeth (MAALOX PLUS ES or MYLANTA DS) suspension 20 mL  20 mL   • trazodone (DESYREL) tablet 50 mg  50 mg   • sodium chloride (OCEAN) 0.65 % nasal spray 2 Spray  2 Spray   • ferrous sulfate tablet 325 mg  325 mg   • atorvastatin (LIPITOR) tablet 80 mg  80 mg   • [START ON 8/2/2017] benazepril (LOTENSIN) tablet 40 mg  40 mg   • busPIRone (BUSPAR) tablet 5 mg  5 mg   • [START ON 8/2/2017] duloxetine (CYMBALTA) capsule 20 mg  20 mg   • [START ON 8/2/2017] furosemide (LASIX) tablet 40 mg  40 mg   • ondansetron (ZOFRAN ODT) dispertab 4 mg  4 mg   • senna-docusate (PERICOLACE or SENOKOT S) 8.6-50 MG per tablet 2 Tab  2 Tab    And   • polyethylene glycol/lytes (MIRALAX) PACKET 1 Packet  1 Packet    And   • magnesium hydroxide (MILK OF MAGNESIA) suspension 30 mL  30 mL    And   • bisacodyl (DULCOLAX) suppository 10 mg  10 mg   • [START ON 8/2/2017] nicotine (NICODERM) 14 MG/24HR 14 mg  14 mg       ALLERGIES:  Pcn    PSYCHOSOCIAL HISTORY:  Living Site:  Board and care  Living With:  self  Caregiver's availability:  friend Arsenio may be able to assist, though she has been helping him  Number of stairs:  no steps to enter single level home  High school education. Has worked as a  as well as has worked at [employer]. 10 years ago moved to the [place] area as her son was living here and he was concerned about her given her  "stroke and her heart attack. Unfortunately then her son was arrested for drugs was jailed for about a year and she has not had any contact with him since he got out of [facility] however she reports he went back to [place]. She was  for 12 years is now . Has no other family including no siblings, no other children, no cousins or anyone else in her life. She reports that she is \"alone\" but then reports she does have a friend in her boarding house name Arsenio who lives upstairs that she \"tolerates\".       LEVEL OF FUNCTION PRIOR TO DISABILTY:  Independent, walked with cane, took cab to get groceries, didn't cook - ate salads and pre-cooked chicken.    LEVEL OF FUNCTION PRIOR TO ADMISSION to [facility]:    Prior Living Situation:    Housing / Facility: [facility]  Steps Into Home: 0  Steps In Home: 0  Lives with - Patient's Self Care Capacity: Alone and Able to Care For Self  Equipment Owned: Single Point Cane    Prior Level of Function / Living Situation:    Physical Therapy: Prior Services: Home-Independent, Other (Comments) (intermittent assist from friends/ neighbors)  Housing / Facility: [facility] Care  Steps Into Home: 0  Steps In Home: 0  Equipment Owned: Single Point Cane  Lives with - Patient's Self Care Capacity: Alone and Able to Care For Self  Bed Mobility: Independent  Transfer Status: Independent  Ambulation: Independent  Distance Ambulation (Feet):  (limited household - declining function)  Assistive Devices Used: Single Point Cane  Current Level of Function:   Level Of Assist: Moderate Assist  Assistive Device: Front Wheel Walker  Distance (Feet): 5  Deviation: Bradykinetic, Shuffled Gait  Supine to Sit: Stand by Assist  Sit to Supine: Stand by Assist  Scooting: Stand by Assist  Sit to Stand: Minimal Assist (requires UE support at FWW)  Bed, Chair, Wheelchair Transfer: Minimal Assist (requires UE support at FWW)  Toilet Transfers: Moderate Assist  Sitting in Chair: 5min " "on BS  Sitting Edge of Bed: 5min  Standinsec x2  Occupational Therapy:    Self Feeding: Independent  Grooming / Hygiene: Independent  Bathing: Independent  Dressing: Independent  Toileting: Independent  Medication Management: Independent  Laundry: Independent  Kitchen Mobility: Independent  Finances: Independent  Home Management: Independent  Shopping: Independent  Prior Services: Home-Independent, Other (Comments) (intermittent assist from friends/ neighbors)  Housing / Facility: [facility]  Current Level of Function:   Upper Body Dressing: Supervision  Lower Body Dressing: Moderate Assist    CURRENT LEVEL OF FUNCTION:   Same as level of function prior to admission to [facility]    PHYSICAL EXAM:     VITAL SIGNS:   height is 1.575 m (5' 2\") and weight is 88.451 kg (195 lb). Her temperature is 36.6 °C (97.8 °F). Her blood pressure is 147/82 and her pulse is 76. Her respiration is 18 and oxygen saturation is 91%.     GENERAL: No apparent distress  HEENT: Normocephalic/atraumatic and PERRL  CARDIAC: Regular rate and rhythm, normal S1, S2   LUNGS: Clear to auscultation with normal respiratory effort, on 2L O2  ABDOMINAL: bowel sounds present, soft, nontender and nondistended    EXTREMITIES: no spasticity, no edema or no calf tenderness bilaterally    NEURO:    Mental status:  A&Ox4 (person, place, date, situation) answers questions appropriately follows commands  Speech: fluent, no aphasia or dysarthria    CRANIAL NERVES: grossly intact    Motor:  5/5 MMT throughout, except for bilateral 4/5 EHL    Sensory: intact to light touch through out, decreased to pinprick in bilateral L4-S1 dermatomes, decreased vibration in bilateral great toes    DTRs: 2+ in bilateral biceps, triceps, brachioradialis, 3+ in bilateral patella, 2+ at bilateral achilles  Negative babinski b/l  Negative Albarran b/l     RADIOLOGY:    2017 10:37 AM    HISTORY/REASON FOR EXAM:  Pain/Paresis Following " Trauma.      TECHNIQUE/EXAM DESCRIPTION:  MRI of the thoracic spine without contrast.    The study was performed on a Moleculin 1.5 Radha MRI scanner. T1 coronal, T1 sagittal, T2 sagittal, and T2 axial images were obtained of the thoracic spine.    COMPARISON: CT of the lumbar spine 7/29/2017    FINDINGS:    There is approximately 10 degrees of dextroconvex scoliosis as measured from the superior endplate of T4 through the inferior endplate of T11 on these supine nonweightbearing images.    There is no acute fracture or gross malalignment.  There is a moderate chronic-appearing vertebral compression fracture at T12 there is no associated axially oriented edema to suggest recent injury.  No fracture or suspicious osseous lesion is seen.  There is loss of intervertebral disc height throughout the thoracic spine, most pronounced in the mid thoracic spine.  There are Modic type II degenerative endplate changes adjacent to the T7-T8 intervertebral disc.  The thoracic cord has a normal caliber course and appearance.  There is no evidence of disk extrusion causing central canal narrowing or cord compression, or neural foraminal stenosis.    There is an completely imaged lower cervical spondylosis causing at least mild central canal narrowing at C6-C7.           Impression        1.  Chronic T12 vertebral compression fracture  2.  Thoracic spondylosis and dextroconvex scoliosis without evidence of impingement on the neural axis         Additional  impression: Cyst or cystic lesion in the LEFT pelvis, incompletely imaged but unusual for age. Recommend further assessment with pelvic ultrasound.      Signed by Anurag Orozco M.D. on 7/30/2017 12:52 PM       Narrative        7/30/2017 10:37 AM    HISTORY/REASON FOR EXAM:  76-year-old woman with worsening BILATERAL lower extremity pain and difficulty walking.      TECHNIQUE/EXAM DESCRIPTION:  MRI of the lumbar spine without contrast.    The study was performed on a [employer]  Signa 1.5 Radha MRI scanner.  T1 sagittal, T2 sagittal, T2 sagittal fat suppressed and T2 axial images were obtained of the lumbar spine.    COMPARISON: None.    FINDINGS:The lowest formed intervertebral disc will be designated L5-S1 for the purposes of this report and vertebral levels numbered accordingly.    There is 2 mm of degenerative anterolisthesis of L2 on L3.  There is 2 mm of degenerative retrolisthesis of L5 on S1.  There is prominent posterior epidural fat extending from L2 through L5.  There is mild to moderate chronic compression fracture of T12.  The lumbar vertebral bodies have unremarkable height and no gross malalignment.  No acute or suspicious osseous lesion is seen.  There is mild loss of intervertebral disc height at L2-L3, L3-L4 and L4-L5 with adjacent Modic type I endplate changes.  There is moderate loss of intervertebral disc at L5-S1 with adjacent Modic type I/II endplate changes.  There are Schmorl nodes in the superior endplates of L1, inferior endplate of L2, superior of L4 and superior endplate of L5.  The conus medullaris has a normal caliber course and signal intensity.    Level specific findings as follows:    L1-2: Unremarkable  L2-3: Diffuse disc bulge. Thickening of the ligamentum flavum. Prominent posterior epidural fat. Moderate narrowing of the thecal sac. Moderate RIGHT and mild LEFT neural foraminal narrowing.  L3-4: Diffuse disc bulge. Thickening of the ligamentum flavum. Mild BILATERAL facet arthropathy. Prominent posterior epidural fat. Moderate narrowing of the thecal sac. Moderate RIGHT and mild LEFT neural foraminal narrowing.  L4-5: Diffuse disc bulge extending asymmetrically into the LEFT lateral recess and foraminal zone. Mild RIGHT and severe LEFT facet arthropathy. Thickening of the ligamentum flavum. Prominent posterior epidural fat. Mild to moderate narrowing of the   thecal sac. Mild RIGHT and moderate LEFT neural foraminal narrowing.  L5-S1: Posterior disc  protrusion extending to the LEFT more than the RIGHT foraminal zone. Mild RIGHT and moderate LEFT facet arthropathy. Mild RIGHT and severe LEFT neural foraminal narrowing.    Soft tissues: There is an incompletely imaged T2 hyperintense RIGHT lesion in the LEFT pelvis, likely in the adnexa which measures up to 2.9 cm. There is no abdominal aortic aneurysm.         Impression        1.  Multilevel multifactorial degenerative changes  2.  Prominent posterior epidural fat from L2 through L5 contributes to thecal sac narrowing at these levels  3.  Thecal sac narrowing worst at L2-L3 and L3-L4  4.  Areas of neural foraminal narrowing as described above  5.  Chronic T12 vertebral compression fracture     2/20/2014 9:01 AM    HISTORY/REASON FOR EXAM:  Dementia.  Dementia    TECHNIQUE/EXAM DESCRIPTION:  MRI of the brain without contrast with attention to the temporal lobes.    T1 sagittal, T2 fast spin-echo axial, T1 coronal, FLAIR coronal, Diffusion weighted and Apparent Diffusion Coefficient (ADC map) axial images were obtained of the whole brain. Additional T2 thin section oblique coronal images were obtained of the temporal lobes and hippocampal formations. .    The study was performed on a [employer] Signa 1.5 Radha MRI scanner.    COMPARISON:  None.    FINDINGS:  The calvariae are unremarkable.  There are no extra-axial fluid collections.    There is pattern of moderate cerebral atrophy manifest as enlargement of sulcal markings and subarachnoid spaces as well as moderate ventriculomegaly without temporal horn enlargement. Therefore, the ventriculomegaly is consistent with long loss and not thought to represent hydrocephalus. There is no disproportionate hippocampal or temporal lobe atrophy.    There is a pattern of advanced supratentorial white matter disease with extensive patchy, confluent, and focal areas of bright T2 and FLAIR signal in the subcortical and deep white matter of both hemispheres consistent with small  vessel ischemic change versus demyelination or gliosis.    There are no mass effects or shift of midline structures.  There are no hemorrhagic lesions.  The diffusion weighted axial images show no evidence of acute cerebral infarction. There is also advanced microvascular ischemic change in the thalami with hazy and streaky T2 hyperintensity.    There is an old lacunar infarct in the left genu internal capsule and corona radiata (T2 axial images 22-24).    The brainstem shows advanced pontine ischemic gliosis. There are a few tiny punctate foci of T2 hyperintensity in the cerebellar hemispheres consistent with old lacunar infarcts.    Vascular flow voids in the vertebrobasilar and carotid arteries, [place], and dural venous sinuses are intact. There is a normal variant dominant left anterior cervical artery A1 segment.    The paranasal sinuses show a tiny focus of polypoid mucosal thickening in the anterior aspect of the left maxillary sinus. Paranasal sinuses are otherwise clear. There are diffuse reticular opacities in the left mastoid which may represent active inflammatory mastoid disease.         Impression        1.  Moderate cerebral atrophy associated with prominent ventriculomegaly without hydrocephalus. No evidence of disproportionate atrophy of the temporal lobes or hippocampi.  2.  Old lacunar infarction in the left internal capsule and left corona radiata.  3.  Advanced supratentorial white matter disease consistent with microvascular ischemic change. There is also advanced microvascular ischemic change in the thalami.  4.  Advanced pontine ischemic gliosis.  5.  Several tiny old lacunar infarcts in the cerebellar hemispheres.  6.  Left mastoid opacities which may represent active inflammatory mastoid disease.  7.  No evidence of acute cerebral infarction, acute hemorrhage, or mass lesion.             LABS:  Recent Labs      07/30/17   0330  07/31/17   0329   SODIUM  140  140   POTASSIUM  4.1   3.6   CHLORIDE  110  110   CO2  24  24   GLUCOSE  100*  86   BUN  20  17   CREATININE  1.08  0.85   CALCIUM  9.0  9.0     Recent Labs      07/30/17   0330  07/31/17   0329   WBC  9.9  5.6   RBC  4.74  4.23   HEMOGLOBIN  8.5*  7.8*   HEMATOCRIT  31.1*  28.0*   MCV  65.6*  66.2*   MCH  17.9*  18.4*   MCHC  27.3*  27.9*   RDW  52.8*  51.7*   PLATELETCT  317  289   MPV  10.1  10.1             PRIMARY REHAB DIAGNOSIS:    This patient is a 76 y.o. female admitted for acute inpatient rehabilitation with debility secondary to chronic T 12 and L1 compression fractures, thoracic spondylosis, scoliosis, cognitive deficits, and iron deficiency anemia of unknown etiology.    IMPAIRMENTS:   Cognitive  ADLs/IADLs  Mobility  Swallow    SECONDARY DIAGNOSIS/MEDICAL CO-MORBIDITIES AFFECTING FUNCTION:    Hypertension  Coronary artery disease status post stenting  Dyslipidemia  Tobacco use  Acute on chronic pain  Bladder urgency  Dysphagia?  History of GI bleed  Depression/anxiety    RELEVANT CHANGES SINCE PREADMISSION EVALUATION:    Status unchanged    The patient's rehabilitation potential is fair  The patient's medical prognosis is good    PLAN:   Discussion and Recommendations:   1. The patient requires an acute inpatient rehabilitation program with a coordinated program of care at an intensity and frequency not available at a lower level of care. This recommendation is substantiated by the patient's medical physicians who recommend that the patient's intervention and assessment of medical issues needs to be done at an acute level of care for patient's safety and maximum outcome.   2. A coordinated program of care will be supplied by an interdisciplinary team of physical therapy, occupational therapy, rehab physician, rehab nursing, and, if needed, speech therapy and rehab psychology. Rehab team presents a patient-specific rehabilitation and education program concentrating on prevention of future problems related to accessibility,  mobility, skin, bowel, bladder, sexuality, and psychosocial and medical/surgical problems.   3. Need for Rehabilitation Physician: The rehab physician will be evaluating the patient on a multi-weekly basis to help coordinate the program of care. The rehab physician communicates between medical physicians, therapists, and nurses to maximize the patient's potential outcome. Specific areas in which the rehab physician will be providing daily assessment include the following:   A. Assessing the patient's heart rate and blood pressure response (vitals monitoring) to activity and making adjustments in medications or conservative measures as needed.   B. The rehab physician will be assessing the frequency at which the program can be increased to allow the patient to reach optimal functional outcome.   C. The rehab physician will also provide assessments in daily skin care, especially in light of patient's impairments in mobility.   D. The rehab physician will provide special expertise in understanding how to work with functional impairment and recommend appropriate interventions, compensatory techniques, and education that will facilitate the patient's outcome.   4. Rehab R.N.   The rehab RN will be working with patient to carry over in room mobility and activities of daily living when the patient is not in 3 hours of skilled therapy. Rehab nursing will be working in conjunction with rehab physician to address all the medical issues above and continue to assess laboratory work and discuss abnormalities with the treating physicians, assess vitals, and response to activity, and discuss and report abnormalities with the rehab physician. Rehab RN will also continue daily skin care, supervise bladder/bowel program, instruct in medication administration, and ensure patient safety.       REHABILITATION ISSUES/ADVERSE POTENTIAL:  1.  Debility, history of CVA (Cerebrovascular Accident): Cont aspirin for secondary prophylaxis as  well as lipid and blood pressure management. Patient demonstrates functional deficits in strength, balance, coordination, and ADL's. Patient is admitted to [facility] for comprehensive rehabilitation therapy as described below.   Rehabilitation nursing monitors bowel and bladder control, educates on medication administration, co-morbidities and monitors patient safety.    Therapies to treat at intensity and frequency of (may change after completion of evaluation by all therapeutic disciplines):       PT:  Physical therapy to address mobility, transfer, gait training and evaluation for adaptive equipment needs 1hour/day at least 5 days/week for the duration of the ELOS (see below)       OT:  Occupational therapy to address ADLs, self-care, home management training, functional mobility/transfers and assistive device evaluation, and community re-integration 1hour/day at least 5 days/week for the duration of the ELOS (see below).        ST/Dysphagia:  Speech therapy to address speech, language, and cognitive deficits as well as swallowing difficulties with retraining/dysphagia management and community re-integration with comprehension, expression, cognitive training 1hour/day at least 5 days/week for the duration of the ELOS (see below).     2.  Neurostimulants: None at this time but continue to assess daily for need to initiate should status change.    3.  DVT prophylaxis:  Patient is on Lovenox for anticoagulation upon transfer. Encourage OOB. Monitor daily for signs and symptoms of DVT including but not limited to swelling and pain to prevent the development of DVT that may interfere with therapies.    4.  GI prophylaxis:  On prilosec to prevent gastritis/dyspepsia which may interfere with therapies.    5.  Pain: Controlled with as needed oral analgesics.    6.  Nutrition/Dysphagia: Dietician monitors nutrient intake, recommend supplements prn and provide nutrition education to pt/family to  promote optimal nutrition for wound healing/recovery.     7.  Bladder/bowel:  Start bowel and bladder program as described below, to prevent constipation, urinary retention (which may lead to UTI), and urinary incontinence (which will impact upon pt's functional independence).   - TV Q3h while awake with post void bladder scans, I&O cath for PVRs >400  - up to commode after meal     8.  Skin/dermal ulcer prophylaxis: Monitor for new skin conditions with q.2 h. turns as required to prevent the development of skin breakdown.     9.  Cognition/Behavior:  Psychologist Dr. Arceo provides adjustment counseling to illness and psychosocial barriers that may be potential barriers to rehabilitation.     10. Respiratory therapy: RT performs O2 management prn, breathing retraining, pulmonary hygiene and bronchospasm management prn to optimize participation in therapies.     MEDICAL CO-MORBIDITIES/ADVERSE POTENTIAL AFFECTING FUNCTION:    Hypertension - uncontrolled. Continue Benzapril, furosemide. Start metoprolol. Continue hydralazine PRN. Consult hospitalist.    Coronary artery disease status post stenting - continue aspirin and statin.    Dyslipidemia - continue statin,    Tobacco use - nicotine patch.    Acute on chronic pain - pain meds as needed. lidoderm patch for compression fractures.    Bladder urgency - check UA.    Dysphagia? - choked on food tonight during dinner. Diet downgraded. Speech consult.    History of GI bleed - positive stool guaiac. Ferrous sulfate and prilosec BID.    History of depression/anxiety - continue Cymbalta and as needed Buspar.    Note: Patient's code status changed to DNR/DNI. Had long discussion with the patient and her wishes. I feel she has the competance to make this decision as she understands the risks/benefits of DNR/DNI.    Pt was seen today for >70 min, and entire time spent in face-to-face contact was >50% in counseling and coordination of care as detailed in A/P above.         GOALS/EXPECTED LEVEL OF FUNCTION BASED ON CURRENT MEDICAL AND FUNCTIONAL STATUS (may change based on patient's medical status and rate of impairment recovery):  Transfers:   Modified Independent  Mobility/Gait:   Modified Independent  ADL's:   Modified Independent  Cognition:  Modified Independent  Swallowing:  Regular with thins    DISPOSITION: Discharge to pre-morbid independent living setting with the supportive care of patient's community resources.      ELOS: 2 weeks

## 2017-08-02 NOTE — CARE PLAN
Problem: Safety  Goal: Will remain free from injury  Outcome: PROGRESSING AS EXPECTED  Pt. Encouraged to use call light within reach, 3 side rails up and bed alarm on for safety. Continue hourly rounding.    Problem: Venous Thromboembolism (VTW)/Deep Vein Thrombosis (DVT) Prevention:  Goal: Patient will participate in Venous Thrombosis (VTE)/Deep Vein Thrombosis (DVT)Prevention Measures  Outcome: PROGRESSING AS EXPECTED  Pt. Is on Lovenox 40 mg. Injection at HS, educate pt. Regarding prevention of DVT and the prophylaxis, pt. Verbalizes understanding.     Problem: Pain Management  Goal: Pain level will decrease to patient’s comfort goal  Outcome: PROGRESSING AS EXPECTED  Pt. Resting comfortably, had pain medication with good relief, repositioned in bed for comfort. Continue monitor condition. Pt. Is calm and cooperative/pleasant.

## 2017-08-02 NOTE — CONSULTS
DATE OF SERVICE:  08/02/2017    REQUESTING PHYSICIAN:  Christina Miller MD    INDICATIONS:  Hypertension.    HISTORY OF PRESENT ILLNESS:  This is a very pleasant 76-year-old white female   with past medical history significant for severe tobacco abuse along with   chronic back discomfort.  The patient presented to [facility]   initially on 07/29/2017 with bilateral leg pain, difficulty ambulating.  CT of   her lumbar spine and pelvis were done in the ER, which did not show any   evidence of acute fracture.  She was also short of breath upon admission.  The   patient smoked about a pack and a half a day for 50 years.  Apparently, she   does have a history of coronary disease and stroke in the past, though I do   not have direct records of this available to me.  Looks like she is originally   from [place] though she has been in [place] for several years.  It appears   that they did a MRI of the brain back in 2014, which did show evidence of old   stroke, but no obvious acute insult to my eye.  There was some prominent   ventriculomegaly without hydrocephalus, old lacunar infarcts in the left   internal capsule and left corona radiata, advanced white matter ischemic   disease.  Once again, no acute infarct that was back in 2014.  She did have an   imaging of her lumbar spine.  On my exam, she was unable to dorsiflex both   Great toes,  which suggests that L5 nerve root issue.  The lumbar spine imaging did   demonstrate diffuse disk bulge extending symmetrically into the left lateral   recess and foraminal zone, mild right and severe left facet arthropathy, so   basically, she had multilevel disease, prominent posterior epidural fat from   L2 through L5 contributing to the thecal narrowing at these levels.  Thecal   sac narrowing was worse at L2-L3 and L3-L4.  Once again, severe left-sided and   right-sided facet arthropathy in the L5 region.  Patient really was not   interested in any surgical procedures.   The patient was, therefore, released   to the [facility] on 08/01/2017 for further physical therapy.  She   has been having trouble walking, which has been a progressive problem, perhaps   it is related to the back problem itself versus deconditioning.  Medicine was   consulted due to the difficulty dealing with her blood pressure.  The patient   had pressures that were modestly elevated yesterday well over 140, though I   believe it was significantly higher than this.  I believe this was post   medication pressures.  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    PAST MEDICAL HISTORY:  Tobacco abuse, COPD, hypertension, probable old CVA,   preserved left ventricular systolic function on echo.  They were not able to   estimate pulmonary arterial pressure from that echo.  Apparently history of   depression is in the past and coronary artery disease, but I cannot confirm   any of this data as we have very little information to go on and once again   the patient is a very poor historian.    OUTPATIENT MEDICATIONS:  Lipitor 80, Lasix 40, duloxetine 20 mg daily, BuSpar   10 mg, benazepril 40 and aspirin 325.    INPATIENT MEDICATIONS:  At this time include aspirin 81, Lipitor 80,   benazepril 40, Cymbalta 20, Lovenox 40, iron 325 b.i.d., Lasix 40 daily,   lidocaine patch to affected area daily, Lopressor 25 b.i.d., nicotine patch 14   mg an hour, omeprazole 20 mg p.o. b.i.d., vitamin D 50,000 units weekly.    SOCIAL HISTORY:  The patient once again states that she smokes a pack and a   half a day for 50 years.  No significant alcohol use.  The patient is   .  Apparently, she has one son in his late 30s.  The patient is   originally from [facility].  The patient lives alone.  She does seem to   be somewhat cognitively impaired at times.  She worked as a  for   many years.    PAST SURGICAL  HISTORY:  Only significant for tonsillectomy.    FAMILY HISTORY:  Mother  in her 50s of MI.  Father  in his 60s as well   as coronary artery disease.  One sister had a history of alcoholism, I   believe she is .    REVIEW OF SYSTEMS:  Complete 14-point review of systems was checked and found   to be negative per AMA/CMS guidelines.    PHYSICAL EXAMINATION:  VITAL SIGNS:  Currently, the blood pressure is 143/80, respiratory rate was   18, pulse 61, temperature 98.1.  GENERAL:  This is a pleasant elderly white female, currently in no apparent   distress.  HEENT:  Pupils were equal, round and reactive.  Extraocular movements were   intact.  Oral mucosa was somewhat dry.  NECK:  Supple, no obvious JVD was present.  Carotid upstrokes were little bit   diminished.  No thyromegaly of any significant note.  HEART:  Regular rate and rhythm.  No obvious rubs, gallops or murmurs.  LUNGS:  Poor bilateral air movement, bibasilar crackles, which partially   cleared with cough.  ABDOMEN:  Obese, soft, nontender, nondistended.  EXTREMITIES:  Trace edema.  No cyanosis or clubbing.  Good capillary refill.  NEUROLOGIC:  Patient was awake and alert.  She was not able to tell me the   year, though the occupational therapy student says she was able to give the   year earlier.  She had just got here from [place] though it looks like she   has been in [place] for several years.  The patient was able to hold on a   reasonable conversation; however, despite her disorientation, she was unable   to dorsiflex either toe, brisk patellar reflexes bilaterally.  Gait was not   tested.    ASSESSMENT:  1.  Hypertension with systolic pressures as high as 191, question coronary   artery disease.  2.  Severe chronic obstructive pulmonary disease and tobacco abuse.  3.  Question cerebrovascular disease with advanced white matter disease noted   on MRI of the brain 3 years ago.  4.  Preserved left ventricular systolic function on echo.  5.   Probable lumbar myelopathy with the patient refusing surgery.    PLAN:  I think we have the blood pressure under significantly better control   with beta blockade added to the patient's outpatient regimen, which included   just Lasix and an ACE inhibitor.  The patient does have bibasilar rhonchi.    Iron studies done in the hospital did show significant iron deficiency   component.  She is probably overdue for a colonoscopy.  Her CBC is diminished   and the MCV is very low at 66, which leaves me to believe that either she has   been chronically losing blood or has some type of occult malignancy versus a   hemoglobinopathy; 66 is pretty low, so I may do a hemoglobin electrophoresis as the MCV has been  Low for 8 years with no indication of colon cancer thus far.    She does have some teardrop cells on her peripheral smear.  I am sure whether she is   overdue for colonoscopy.  We will go ahead and get some stool guaiacs.  We   will continue the Lopressor 25 b.i.d.  She seems to be quite sensitive to that   as her heart rate drops into the 60s.  Back in 2011, patient also had a MCV   of 63.5, so this is probably a hereditary condition rather than an iron   deficiency.  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  I will actually try to set up a pharmacy to give iv iron  For total body iron replacement as well and get rid of the po iron.       ____________________________________     MD KISHAN HURD / YESSICA    DD:  08/02/2017 10:53:57  DT:  08/02/2017 11:56:13    D#:  [id number]  Job#:  [id number]

## 2017-08-02 NOTE — REHAB-PHARMACY IDT TEAM NOTE
Pharmacy  Pharmacy  Antibiotics/Antifungals/Antivirals:  Medication:      Active Orders     None        Route:         None  Stop Date:  N/A  Reason:   Antihypertensives/Cardiac:  Medication:    Orders (72h ago through future)    Start     Ordered    08/02/17 0600  benazepril (LOTENSIN) tablet 40 mg   Q DAY      08/01/17 1535    08/02/17 0600  furosemide (LASIX) tablet 40 mg   Q DAY     Comments:  Hold for SBP <100    08/01/17 1535    08/02/17 0424  METOPROLOL TARTRATE 25 MG PO TABS     Comments:  JAVAN SCANLON: cabinet override    08/02/17 0424    08/01/17 2100  atorvastatin (LIPITOR) tablet 80 mg   EVERY BEDTIME      08/01/17 1535    08/01/17 2030  metoprolol (LOPRESSOR) tablet 25 mg   TWICE DAILY     Comments:  Hold for SBP <100 or pulse <55    08/01/17 2014 08/01/17 2023  METOPROLOL TARTRATE 25 MG PO TABS     Comments:  JAVAN SCANLON: cabinet override    08/01/17 2023    08/01/17 1531  hydrALAZINE (APRESOLINE) tablet 25 mg   EVERY 8 HOURS PRN      08/01/17 1535        Patient Vitals for the past 24 hrs:   BP Pulse   08/02/17 1510 153/93 mmHg 71   08/02/17 0931 128/68 mmHg -   08/02/17 0855 - 67   08/02/17 0715 143/80 mmHg 61   08/02/17 0509 (!) 170/94 mmHg -   08/01/17 1815 144/74 mmHg 84   08/01/17 1745 - 76   08/01/17 1656 147/82 mmHg 71       Anticoagulation:  Medication:  lovenox  INR:      Other key medications:      A review of the medication list reveals no issues at this time. Patient is currently on antihypertensive(s). Recommend home blood pressure monitoring/recording if antihypertensive(s) regimen(s) continue.    Section completed by:  Erik Kennedy [facility]

## 2017-08-02 NOTE — THERAPY
Physical Therapy Initial Plan of Care Note    1) Assessment: Patient is 76 y.o. female with a diagnosis of debility d/t progressive LBP/ LE weakness, multi-level degenerative changes L2-S1 and T12-L1 compression fxs (subacute).  Additional factors influencing patient status / progress: include recent decline in function, questionable memory/ cognition and limited support system.  Long term and short term goals have been discussed with patient and they are in agreement.  2) Plan: Recommend Physical Therapy  minutes per day 5-6 days per week for 2  weeks for the following treatments:  PT Gait Training, PT Self Care/Home Eval, PT Therapeutic Exercises, PT TENS Application, PT Therapeutic Activity, PT Manual Therapy and PT Evaluation.  3) Goals:  Please refer to care plan for goals.

## 2017-08-02 NOTE — CARE PLAN
Problem: Bathing  Goal: STG-Within one week, patient will bathe  1) Individualized Goal: with supervision and AE/DME prn  2) Interventions:  OT Orthotics Training, OT E Stim Attended, OT Self Care/ADL, OT Cognitive Skill Dev, OT Community Reintegration, OT Manual Ther Technique, OT Neuro Re-Ed/Balance, OT Sensory Int Techniques, OT Therapeutic Activity, OT Evaluation and OT Therapeutic Exercise    Problem: Dressing  Goal: STG-Within one week, patient will dress LB  1) Individualized Goal: with supervision and AE/DME prn  2) Interventions:  OT Orthotics Training, OT E Stim Attended, OT Self Care/ADL, OT Cognitive Skill Dev, OT Community Reintegration, OT Manual Ther Technique, OT Neuro Re-Ed/Balance, OT Sensory Int Techniques, OT Therapeutic Activity, OT Evaluation and OT Therapeutic Exercise    Problem: Toileting  Goal: STG-Within one week, patient will complete toileting tasks  1) Individualized Goal: with supervision and AE/DME prn  2) Interventions:  OT Orthotics Training, OT E Stim Attended, OT Self Care/ADL, OT Cognitive Skill Dev, OT Community Reintegration, OT Manual Ther Technique, OT Neuro Re-Ed/Balance, OT Sensory Int Techniques, OT Therapeutic Activity, OT Evaluation and OT Therapeutic Exercise    Problem: IADL’s  Goal: STG-Within one week, patient will utilize washer and dryer  1) Individualized Goal: with min A and v/c prn  2) Interventions:  OT Orthotics Training, OT E Stim Attended, OT Self Care/ADL, OT Cognitive Skill Dev, OT Community Reintegration, OT Manual Ther Technique, OT Neuro Re-Ed/Balance, OT Sensory Int Techniques, OT Therapeutic Activity, OT Evaluation and OT Therapeutic Exercise    Problem: Functional Cognition  Goal: STG-Within one week, patient will demonstrate safety awareness  1) Individualized Goal: during completion of ADLs as demonstrated by proper use of DME   2) Interventions:  OT Orthotics Training, OT E Stim Attended, OT Self Care/ADL, OT Cognitive Skill Dev, OT Community  Reintegration, OT Manual Ther Technique, OT Neuro Re-Ed/Balance, OT Sensory Int Techniques, OT Therapeutic Activity, OT Evaluation and OT Therapeutic Exercise    Problem: OT Long Term Goals  Goal: LTG-By discharge, patient will complete basic self care tasks  1) Individualized Goal: with mod I  2) Interventions:  OT Orthotics Training, OT E Stim Attended, OT Self Care/ADL, OT Cognitive Skill Dev, OT Community Reintegration, OT Manual Ther Technique, OT Neuro Re-Ed/Balance, OT Sensory Int Techniques, OT Therapeutic Activity, OT Evaluation and OT Therapeutic Exercise  Goal: LTG-By discharge, patient will complete basic home management  1) Individualized Goal: with supervision and min v/c  2) Interventions:  OT Orthotics Training, OT E Stim Attended, OT Self Care/ADL, OT Cognitive Skill Dev, OT Community Reintegration, OT Manual Ther Technique, OT Neuro Re-Ed/Balance, OT Sensory Int Techniques, OT Therapeutic Activity, OT Evaluation and OT Therapeutic Exercise

## 2017-08-02 NOTE — PROGRESS NOTES
"Dr Miller notified of follow up blood pressure after administration of PRN hydralazine. Also notified of HGB and patient claims of \"walking around over 200 over there\".   "

## 2017-08-02 NOTE — PROGRESS NOTES
"Rehab Progress Note     Chief complaint: poor sleep, follow up thoracic spondylosis  Date of Service: 8/2/2017    Interval Events (Subjective)  Patient seen and examined. No acute events overnight. She tells me that she didn't sleep very well however last night due to worrying about where she is going to go after discharge. No further episodes of choking although her diet did get downgraded. She is complaining of numbness and a burning sensation on the right middle anterior thigh.    Objective:  VITAL SIGNS: /93 mmHg  Pulse 71  Temp(Src) 36.4 °C (97.6 °F)  Resp 20  Ht 1.575 m (5' 2\")  Wt 88.451 kg (195 lb)  BMI 35.66 kg/m2  SpO2 93%  Breastfeeding? No  Gen: alert, no apparent distress  CV: regular rate and rhythm, no murmurs, no peripheral edema  Resp: clear to ascultation bilaterally, normal respiratory effort  GI: soft, non-tender abdomen, bowel sounds present  Neuro: Motor: 5/5 MMT throughout, except for bilateral 4/5 EHL                   Sensory:decreased to light touch in right L1/L2 dermatomes, decreased to pinprick in bilateral L4-S1 dermatomes, decreased vibration in bilateral great toes  DTRs: 2+ in bilateral biceps, triceps, brachioradialis, 3+ in bilateral patella, 2+ at bilateral achilles    Recent Results (from the past 72 hour(s))   CBC WITHOUT DIFFERENTIAL    Collection Time: 07/31/17  3:29 AM   Result Value Ref Range    WBC 5.6 4.8 - 10.8 K/uL    RBC 4.23 4.20 - 5.40 M/uL    Hemoglobin 7.8 (L) 12.0 - 16.0 g/dL    Hematocrit 28.0 (L) 37.0 - 47.0 %    MCV 66.2 (L) 81.4 - 97.8 fL    MCH 18.4 (L) 27.0 - 33.0 pg    MCHC 27.9 (L) 33.6 - 35.0 g/dL    RDW 51.7 (H) 35.9 - 50.0 fL    Platelet Count 289 164 - 446 K/uL    MPV 10.1 9.0 - 12.9 fL   BASIC METABOLIC PANEL    Collection Time: 07/31/17  3:29 AM   Result Value Ref Range    Sodium 140 135 - 145 mmol/L    Potassium 3.6 3.6 - 5.5 mmol/L    Chloride 110 96 - 112 mmol/L    Co2 24 20 - 33 mmol/L    Glucose 86 65 - 99 mg/dL    Bun 17 8 - 22 " mg/dL    Creatinine 0.85 0.50 - 1.40 mg/dL    Calcium 9.0 8.5 - 10.5 mg/dL    Anion Gap 6.0 0.0 - 11.9   MAGNESIUM    Collection Time: 07/31/17  3:29 AM   Result Value Ref Range    Magnesium 2.1 1.5 - 2.5 mg/dL   PHOSPHORUS    Collection Time: 07/31/17  3:29 AM   Result Value Ref Range    Phosphorus 3.1 2.5 - 4.5 mg/dL   ESTIMATED GFR    Collection Time: 07/31/17  3:29 AM   Result Value Ref Range    GFR If African American >60 >60 mL/min/1.73 m 2    GFR If Non African American >60 >60 mL/min/1.73 m 2   Comp Metabolic Panel (CMP)    Collection Time: 08/02/17  5:20 AM   Result Value Ref Range    Sodium 144 135 - 145 mmol/L    Potassium 4.1 3.6 - 5.5 mmol/L    Chloride 110 96 - 112 mmol/L    Co2 27 20 - 33 mmol/L    Anion Gap 7.0 0.0 - 11.9    Glucose 99 65 - 99 mg/dL    Bun 13 8 - 22 mg/dL    Creatinine 0.78 0.50 - 1.40 mg/dL    Calcium 9.4 8.5 - 10.5 mg/dL    AST(SGOT) 20 12 - 45 U/L    ALT(SGPT) 20 2 - 50 U/L    Alkaline Phosphatase 90 30 - 99 U/L    Total Bilirubin 0.3 0.1 - 1.5 mg/dL    Albumin 3.5 3.2 - 4.9 g/dL    Total Protein 6.0 6.0 - 8.2 g/dL    Globulin 2.5 1.9 - 3.5 g/dL    A-G Ratio 1.4 g/dL   CBC with Differential    Collection Time: 08/02/17  5:20 AM   Result Value Ref Range    WBC 9.3 4.8 - 10.8 K/uL    RBC 4.49 4.20 - 5.40 M/uL    Hemoglobin 8.1 (L) 12.0 - 16.0 g/dL    Hematocrit 29.9 (L) 37.0 - 47.0 %    MCV 66.6 (L) 81.4 - 97.8 fL    MCH 18.0 (L) 27.0 - 33.0 pg    MCHC 27.1 (L) 33.6 - 35.0 g/dL    RDW 51.4 (H) 35.9 - 50.0 fL    Platelet Count 315 164 - 446 K/uL    MPV 10.9 9.0 - 12.9 fL    Neutrophils-Polys 74.20 (H) 44.00 - 72.00 %    Lymphocytes 11.20 (L) 22.00 - 41.00 %    Monocytes 8.50 0.00 - 13.40 %    Eosinophils 1.50 0.00 - 6.90 %    Basophils 0.40 0.00 - 1.80 %    Immature Granulocytes 4.20 (H) 0.00 - 0.90 %    Nucleated RBC 1.30 /100 WBC    Neutrophils (Absolute) 6.88 2.00 - 7.15 K/uL    Lymphs (Absolute) 1.04 1.00 - 4.80 K/uL    Monos (Absolute) 0.79 0.00 - 0.85 K/uL    Eos (Absolute)  0.14 0.00 - 0.51 K/uL    Baso (Absolute) 0.04 0.00 - 0.12 K/uL    Immature Granulocytes (abs) 0.39 (H) 0.00 - 0.11 K/uL    NRBC (Absolute) 0.12 K/uL   BType Natriuretic Peptide (BNP)    Collection Time: 08/02/17  5:20 AM   Result Value Ref Range    B Natriuretic Peptide 101 (H) 0 - 100 pg/mL   Magnesium    Collection Time: 08/02/17  5:20 AM   Result Value Ref Range    Magnesium 2.2 1.5 - 2.5 mg/dL   Vitamin D, 25-hydroxy (blood)    Collection Time: 08/02/17  5:20 AM   Result Value Ref Range    25-Hydroxy   Vitamin D 25 10 (L) 30 - 100 ng/mL   Lipid Profile (Lipid Panel)    Collection Time: 08/02/17  5:20 AM   Result Value Ref Range    Cholesterol,Tot 109 100 - 199 mg/dL    Triglycerides 81 0 - 149 mg/dL    HDL 41 >=40 mg/dL    LDL 52 <100 mg/dL   ESTIMATED GFR    Collection Time: 08/02/17  5:20 AM   Result Value Ref Range    GFR If African American >60 >60 mL/min/1.73 m 2    GFR If Non African American >60 >60 mL/min/1.73 m 2   URINALYSIS    Collection Time: 08/02/17 12:00 PM   Result Value Ref Range    Micro Urine Req see below     Color Yellow     Character Clear     Specific Gravity 1.000 <1.035    Ph 6.0 5.0-8.0    Glucose Negative Negative mg/dL    Ketones Negative Negative mg/dL    Protein Negative Negative mg/dL    Bilirubin Negative Negative    Nitrite Negative Negative    Leukocyte Esterase Negative Negative    Occult Blood Negative Negative       Current Facility-Administered Medications   Medication Frequency   • vitamin D (Ergocalciferol) (DRISDOL) capsule 50,000 Units Q7 DAYS   • IRON REPLACEMENT per pharmacy protocol PRN   • iron sucrose (VENOFER) 200 mg in  mL IVPB DAILY   • Respiratory Care per Protocol Continuous RT   • Pharmacy Consult Request ...Pain Management Review 1 Each PRN   • oxycodone immediate-release (ROXICODONE) tablet 2.5 mg Q3HRS PRN   • oxycodone immediate-release (ROXICODONE) tablet 5 mg Q3HRS PRN   • lidocaine (LIDODERM) 5 % 1 Patch Q24HR   • enoxaparin (LOVENOX) inj 40 mg  QHS   • aspirin EC (ECOTRIN) tablet 81 mg DAILY   • acetaminophen (TYLENOL) tablet 650 mg Q4HRS PRN   • artificial tears 1.4 % ophthalmic solution 1 Drop PRN   • hydrALAZINE (APRESOLINE) tablet 25 mg Q8HRS PRN   • mag hydrox-al hydrox-simeth (MAALOX PLUS ES or MYLANTA DS) suspension 20 mL Q2HRS PRN   • trazodone (DESYREL) tablet 50 mg QHS PRN   • sodium chloride (OCEAN) 0.65 % nasal spray 2 Spray PRN   • atorvastatin (LIPITOR) tablet 80 mg QHS   • benazepril (LOTENSIN) tablet 40 mg Q DAY   • busPIRone (BUSPAR) tablet 5 mg BID PRN   • duloxetine (CYMBALTA) capsule 20 mg DAILY   • furosemide (LASIX) tablet 40 mg Q DAY   • ondansetron (ZOFRAN ODT) dispertab 4 mg Q4HRS PRN   • senna-docusate (PERICOLACE or SENOKOT S) 8.6-50 MG per tablet 2 Tab BID    And   • polyethylene glycol/lytes (MIRALAX) PACKET 1 Packet QDAY PRN    And   • magnesium hydroxide (MILK OF MAGNESIA) suspension 30 mL QDAY PRN    And   • bisacodyl (DULCOLAX) suppository 10 mg QDAY PRN   • nicotine (NICODERM) 14 MG/24HR 14 mg Daily-0600   • metoprolol (LOPRESSOR) tablet 25 mg TWICE DAILY   • omeprazole (PRILOSEC) capsule 20 mg BID       Orders Placed This Encounter   Procedures   • DIET ORDER     Standing Status: Standing      Number of Occurrences: 1      Standing Expiration Date:      Order Specific Question:  Diet:     Answer:  Cardiac [6]     Order Specific Question:  Texture/Fiber modifications:     Answer:  Dysphagia 2(Pureed/Chopped)specify fluid consistency(question 6) [2]     Order Specific Question:  Consistency/Fluid modifications:     Answer:  Thin Liquids [3]       Assessment:  Active Hospital Problems    Diagnosis   • Back pain   • Depression   • Thoracic spondylosis   • HTN (hypertension)   • Dyslipidemia   • History of stroke   • T12 compression fracture (CMS-HCC)   • Microcytic anemia   • Heme positive stool   • Dementia       Medical Decision Making and Plan:    Rehab diagnosis  - other neuro.    Labs reviewed. Medications reviewed.  Appreciate the assistance of the hospitalist.    T12/L1 chronic compression fractures - new pain management with Lidoderm patch and oral pain meds as needed.    Thoracic spondylosis with scoliosis, thecal sac narrowing from L2-L5 worst at L2-L3 and L3-L4, L2-L5 multilevel bilateral neural foraminal narrowing - with evidence of radiculopathy on exam with weakness at the bilateral L4 myotomes as well as abnormal sensation on the right L1-L2 dermatome, and L4-S1 dermatomes. Patient does not want surgery. Continue conservative treatment with therapy. Gabapentin for complaints of nerve pain in the right thigh.    Hypertension - uncontrolled. Continue Benzapril, furosemide, metoprolol,  hydralazine PRN. Hospitalist to make adjustments.    Coronary artery disease status post stenting - continue aspirin and statin.    Dyslipidemia - continue statin.    Tobacco use - nicotine patch.    Acute on chronic pain - pain meds as needed. lidoderm patch for compression fractures. Start Gabapentin for nerve pain.    Bladder urgency - check UA. Unimpressive UA so we'll check culture.    Dysphagia? - choked on food during dinner. Diet downgraded. Speech consult.    History of GI bleed -with microcytic anemia and positive stool guaiac on acute. IV iron.    History of depression/anxiety - continue Cymbalta and as needed Buspar.    Dementia? - likely vascular dementia based on previous imaging from years ago. Speech consult.    Low Vit D - 10, start high dose repletion.    DVT prophylaxis - Lovenox.    Total time:  >35 minutes.  I spent greater than 50% of the time for patient care and coordination on this date, including unit/floor time, and face-to-face time with the patient as per assessment and plan above.    Christina Miller M.D.

## 2017-08-02 NOTE — CARE PLAN
Problem: Communication  Goal: The ability to communicate needs accurately and effectively will improve  Outcome: PROGRESSING AS EXPECTED  CLIP.  Patient oriented to call light system and is calling appropriately.  Patient oriented to bathroom location, TV, bed controls, clock.  POC discussed with patient and patient oriented to white board.  Schedule for therapy delivered to patient.  Patient verbalized understanding.      Problem: Safety  Goal: Will remain free from falls  Outcome: PROGRESSING AS EXPECTED  CLIP.  Fall risk assessed.  Walkways clear.  Area well lit.  Non-slip foot wear in place.  Patient using assistive devices.  Cueing to grab bars and railings.  Pain assessed, medication administered per MAR if applicable, and interventions in place.  Patient oriented to call light system and is calling appropriately.  Bed in low position.  Bed rails up times three.  Personal items within reach.

## 2017-08-02 NOTE — FLOWSHEET NOTE
08/02/17 0855   Events/Summary/Plan   Events/Summary/Plan Pt is on 2Lpm NC wean decreased to 1Lpm sats 97% will monitor.   Non-Invasive Resp Device Site Inspection Completed Intact   Location NC b/l ears   Interdisciplinary Plan of Care-Goals (Indications)   Obstructive Ventilatory Defect or Pulmonary Disease without Obvious Obstruction Strong Subjective / Objective Improvement;History / Diagnosis   Interdisciplinary Plan of Care-Outcomes    Bronchodilator Outcome Patient at Stable Baseline   Education   Education Yes - Pt. / Family has been Instructed in use of Respiratory Equipment   Respiratory WDL   Respiratory (WDL) X   Chest Exam   Work Of Breathing / Effort Mild   Respiration 17   Pulse 67   Breath Sounds   RUL Breath Sounds Clear   RML Breath Sounds Clear;Diminished   RLL Breath Sounds Diminished   ARGELIA Breath Sounds Clear   LLL Breath Sounds Diminished   Secretions   Cough Non Productive   Oximetry   #Pulse Oximetry (Single Determination) Yes   Oxygen   Home O2 Use Prior To Admission? No   Pulse Oximetry 97 %   O2 (LPM) 1   O2 Daily Delivery Respiratory  Silicone Nasal Cannula

## 2017-08-02 NOTE — THERAPY
Occupational Therapy Initial Plan of Care Note    1) Assessment:  Patient is 76 y.o. female with a diagnosis of debility, T12/L1 compression fractures, thoracic spondylosis and dextroconvex scoliosis.  Additional factors influencing patient status / progress (ie: cognitive factors, co-morbidities, social support, etc): Impulsivity, impaired safety awareness, lack of social/family support, disoriented to time/place/reason for hospitalization, independent PLOF.  Long term and short term goals have been discussed with patient and they are in agreement.  2) Plan:  Recommend Occupational Therapy  minutes per day 5-7 days per week for 2-3 weeks for the following treatments:  OT Orthotics Training, OT E Stim Attended, OT Self Care/ADL, OT Cognitive Skill Dev, OT Community Reintegration, OT Manual Ther Technique, OT Neuro Re-Ed/Balance, OT Sensory Int Techniques, OT Therapeutic Activity, OT Evaluation and OT Therapeutic Exercise.  3) Goals:  Please refer to care plan for goals.

## 2017-08-02 NOTE — PROGRESS NOTES
22 gauge PIV catheter inserted on left hand using sterile technique and patient tolerated poorly.  IV flushed and saline locked.  Transparent dressing secured over catheter and secured with tape.   IV insertion was successful on second try; first try was on right hand.  PIV inserted for orders for IV iron.

## 2017-08-02 NOTE — PROGRESS NOTES
Rapid response called for patient choking on meat.  Heimlich maneuver initiated and patient spit out meat.  Vital signs taken: /88, HR 88, O2 sat 94 % 2 L NC.  Lung fields at baseline.  Patient's diet will be downgraded to dysphagia 2.

## 2017-08-02 NOTE — PROGRESS NOTES
Pt. Took metoprolol which is ordered by Dr. Miller at 1900, BP checked manually and went down to 140/72, continue monitor condition. Resting comfortably at this time.

## 2017-08-02 NOTE — PROGRESS NOTES
Patient examined.  Full medical consult dictated.  SOME IMPROVEMENT IN BP WITH BETA BLOCKERS.  MCV IS IN THE LOW 60'S BACK AS FAR AS 2011.   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  CHECKING AN LDH AND HAPTOGLOBIN.  HEMOGLOBIN ELECTROPHORESIS AS WELL.  MAY HAVE SOME DEGREE OF IRON DEFICIENCY.  STOOL HEME +.  PROBABLY OVERDUE FOR COLONOSCOPY.

## 2017-08-02 NOTE — DISCHARGE PLANNING
CASE MANAGEMENT INITIAL ASSESSMENT    Admit Date:  8/1/2017      Patient is a  76 y.o. female transferred from [facility].  She has been unavailable in her room.  I have reviewed medical records and will follow to confirm demographic information.  Patient's admitting physician for rehab is Dr. Miller.    Diagnosis: Debility (Non cardiac, non pulmonary)  History of stroke  Back pain  Microcytic anemia  Heme positive stool  Dementia  Dyslipidemia  HTN (hypertension)  Depression  Thoracic spondylosis  T12 compression fracture (CMS-HCC)    Co-morbidities:   Patient Active Problem List    Diagnosis Date Noted   • Intractable back pain 07/29/2017     Priority: High   • Back pain 08/01/2017   • Depression 08/01/2017   • Thoracic spondylosis 08/01/2017   • HTN (hypertension) 08/01/2017   • Dyslipidemia 08/01/2017   • History of stroke 08/01/2017   • T12 compression fracture (CMS-HCC) 08/01/2017   • Microcytic anemia 07/29/2017   • Heme positive stool 07/29/2017   • Dementia 07/29/2017   • History of CVA (cerebrovascular accident) 07/29/2017     Prior Living Situation:  Housing / Facility: Unable To Determine At This Time (pt states she lives in a shed)  Lives with - Patient's Self Care Capacity: Alone and Able to Care For Self    Prior Level of Function:  Medication Management: Requires Assist (pt reports assistance and missing meds frequently)  Finances: Independent  Home Management: Independent  Shopping: Independent  Prior Level Of Mobility: Independent Without Device in Community  Driving / Transportation: Utilizes Taxi Service for Transportation    Support Systems:  Primary : Arsenio Valencia at [phone] Friend  Other support systems:      Previous Services Utilized:   Equipment Owned: Tub / Shower Seat  Prior Services: Unable To Determine At This Time    Other Information:  Occupation (Pre-Hospital Vocational): Retired Due To Age  Pharmacy: [facility]  Primary Payor Source: Medicare A, Medicare  B  Primary Care Practitioner : Nilda Hopkins    Additional Case Management Questions:  Have you ever received case management services for yourself or a family member? Unable to determine    Do you feel you have an an understanding of of what services  provide? Unable to determine    Do you have any additional questions regarding case management? Unable to determine        Patient / Family Goal:  Patient / Family Goal: Patient lives alone and has no family support.  She has a neighbor who may be able to assist.  She will likely need a referral to division of aging for some home services and certainly home health.  Patient does have a payee for assistance with her bill pay.  I will follow to assist.    Plan:  1. Continue to follow patient through hospitalization and provide discharge planning in collaboration with patient, family, physicians and ancillary services.     2. Utilize community resources to ensure a safe discharge.

## 2017-08-03 LAB
ALBUMIN SERPL BCP-MCNC: 3.5 G/DL (ref 3.2–4.9)
ALBUMIN/GLOB SERPL: 1.3 G/DL
ALP SERPL-CCNC: 88 U/L (ref 30–99)
ALT SERPL-CCNC: 22 U/L (ref 2–50)
ANION GAP SERPL CALC-SCNC: 9 MMOL/L (ref 0–11.9)
AST SERPL-CCNC: 23 U/L (ref 12–45)
BASOPHILS # BLD AUTO: 0.6 % (ref 0–1.8)
BASOPHILS # BLD: 0.05 K/UL (ref 0–0.12)
BILIRUB SERPL-MCNC: 0.5 MG/DL (ref 0.1–1.5)
BUN SERPL-MCNC: 12 MG/DL (ref 8–22)
CALCIUM SERPL-MCNC: 9.5 MG/DL (ref 8.5–10.5)
CHLORIDE SERPL-SCNC: 107 MMOL/L (ref 96–112)
CO2 SERPL-SCNC: 25 MMOL/L (ref 20–33)
CREAT SERPL-MCNC: 0.85 MG/DL (ref 0.5–1.4)
EOSINOPHIL # BLD AUTO: 0.19 K/UL (ref 0–0.51)
EOSINOPHIL NFR BLD: 2.2 % (ref 0–6.9)
ERYTHROCYTE [DISTWIDTH] IN BLOOD BY AUTOMATED COUNT: 51.8 FL (ref 35.9–50)
GFR SERPL CREATININE-BSD FRML MDRD: >60 ML/MIN/1.73 M 2
GLOBULIN SER CALC-MCNC: 2.7 G/DL (ref 1.9–3.5)
GLUCOSE SERPL-MCNC: 94 MG/DL (ref 65–99)
HCT VFR BLD AUTO: 31.4 % (ref 37–47)
HGB BLD-MCNC: 8.7 G/DL (ref 12–16)
IMM GRANULOCYTES # BLD AUTO: 0.26 K/UL (ref 0–0.11)
IMM GRANULOCYTES NFR BLD AUTO: 3.1 % (ref 0–0.9)
LDH SERPL-CCNC: 216 U/L (ref 107–266)
LYMPHOCYTES # BLD AUTO: 1.11 K/UL (ref 1–4.8)
LYMPHOCYTES NFR BLD: 13.1 % (ref 22–41)
MCH RBC QN AUTO: 18.7 PG (ref 27–33)
MCHC RBC AUTO-ENTMCNC: 27.7 G/DL (ref 33.6–35)
MCV RBC AUTO: 67.5 FL (ref 81.4–97.8)
MONOCYTES # BLD AUTO: 0.63 K/UL (ref 0–0.85)
MONOCYTES NFR BLD AUTO: 7.4 % (ref 0–13.4)
NEUTROPHILS # BLD AUTO: 6.23 K/UL (ref 2–7.15)
NEUTROPHILS NFR BLD: 73.6 % (ref 44–72)
NRBC # BLD AUTO: 0.12 K/UL
NRBC BLD AUTO-RTO: 1.4 /100 WBC
PLATELET # BLD AUTO: 315 K/UL (ref 164–446)
PMV BLD AUTO: 10.4 FL (ref 9–12.9)
POTASSIUM SERPL-SCNC: 3.8 MMOL/L (ref 3.6–5.5)
PROT SERPL-MCNC: 6.2 G/DL (ref 6–8.2)
RBC # BLD AUTO: 4.65 M/UL (ref 4.2–5.4)
SODIUM SERPL-SCNC: 141 MMOL/L (ref 135–145)
WBC # BLD AUTO: 8.5 K/UL (ref 4.8–10.8)

## 2017-08-03 PROCEDURE — 93970 EXTREMITY STUDY: CPT | Mod: 26 | Performed by: SURGERY

## 2017-08-03 PROCEDURE — 700102 HCHG RX REV CODE 250 W/ 637 OVERRIDE(OP): Performed by: PHYSICAL MEDICINE & REHABILITATION

## 2017-08-03 PROCEDURE — 97530 THERAPEUTIC ACTIVITIES: CPT

## 2017-08-03 PROCEDURE — 93880 EXTRACRANIAL BILAT STUDY: CPT

## 2017-08-03 PROCEDURE — 93926 LOWER EXTREMITY STUDY: CPT

## 2017-08-03 PROCEDURE — 97535 SELF CARE MNGMENT TRAINING: CPT

## 2017-08-03 PROCEDURE — 99233 SBSQ HOSP IP/OBS HIGH 50: CPT | Performed by: PHYSICAL MEDICINE & REHABILITATION

## 2017-08-03 PROCEDURE — 700105 HCHG RX REV CODE 258: Performed by: HOSPITALIST

## 2017-08-03 PROCEDURE — 93970 EXTREMITY STUDY: CPT

## 2017-08-03 PROCEDURE — 700111 HCHG RX REV CODE 636 W/ 250 OVERRIDE (IP): Performed by: PHYSICAL MEDICINE & REHABILITATION

## 2017-08-03 PROCEDURE — 700111 HCHG RX REV CODE 636 W/ 250 OVERRIDE (IP): Performed by: HOSPITALIST

## 2017-08-03 PROCEDURE — 770010 HCHG ROOM/CARE - REHAB SEMI PRIVAT*

## 2017-08-03 PROCEDURE — 83021 HEMOGLOBIN CHROMOTOGRAPHY: CPT

## 2017-08-03 PROCEDURE — 83010 ASSAY OF HAPTOGLOBIN QUANT: CPT

## 2017-08-03 PROCEDURE — 36415 COLL VENOUS BLD VENIPUNCTURE: CPT

## 2017-08-03 PROCEDURE — 83615 LACTATE (LD) (LDH) ENZYME: CPT

## 2017-08-03 PROCEDURE — 99233 SBSQ HOSP IP/OBS HIGH 50: CPT | Performed by: HOSPITALIST

## 2017-08-03 PROCEDURE — 93880 EXTRACRANIAL BILAT STUDY: CPT | Mod: 26 | Performed by: SURGERY

## 2017-08-03 PROCEDURE — 97110 THERAPEUTIC EXERCISES: CPT

## 2017-08-03 PROCEDURE — 80053 COMPREHEN METABOLIC PANEL: CPT

## 2017-08-03 PROCEDURE — 94760 N-INVAS EAR/PLS OXIMETRY 1: CPT

## 2017-08-03 PROCEDURE — A9270 NON-COVERED ITEM OR SERVICE: HCPCS | Performed by: PHYSICAL MEDICINE & REHABILITATION

## 2017-08-03 PROCEDURE — 85025 COMPLETE CBC W/AUTO DIFF WBC: CPT

## 2017-08-03 PROCEDURE — 700101 HCHG RX REV CODE 250: Performed by: PHYSICAL MEDICINE & REHABILITATION

## 2017-08-03 RX ORDER — ACETAMINOPHEN 325 MG/1
650 TABLET ORAL EVERY 4 HOURS PRN
Status: DISCONTINUED | OUTPATIENT
Start: 2017-08-03 | End: 2017-08-15 | Stop reason: HOSPADM

## 2017-08-03 RX ORDER — GABAPENTIN 300 MG/1
300 CAPSULE ORAL EVERY EVENING
Status: DISCONTINUED | OUTPATIENT
Start: 2017-08-03 | End: 2017-08-15 | Stop reason: HOSPADM

## 2017-08-03 RX ADMIN — OXYCODONE HYDROCHLORIDE 2.5 MG: 5 TABLET ORAL at 13:53

## 2017-08-03 RX ADMIN — ACETAMINOPHEN 650 MG: 325 TABLET, FILM COATED ORAL at 13:55

## 2017-08-03 RX ADMIN — METOPROLOL TARTRATE 25 MG: 25 TABLET ORAL at 18:28

## 2017-08-03 RX ADMIN — ATORVASTATIN CALCIUM 80 MG: 40 TABLET, FILM COATED ORAL at 19:50

## 2017-08-03 RX ADMIN — GABAPENTIN 300 MG: 300 CAPSULE ORAL at 19:51

## 2017-08-03 RX ADMIN — HYDRALAZINE HYDROCHLORIDE 25 MG: 25 TABLET, FILM COATED ORAL at 19:51

## 2017-08-03 RX ADMIN — ACETAMINOPHEN 650 MG: 325 TABLET, FILM COATED ORAL at 19:51

## 2017-08-03 RX ADMIN — OMEPRAZOLE 20 MG: 20 CAPSULE, DELAYED RELEASE ORAL at 10:09

## 2017-08-03 RX ADMIN — ENOXAPARIN SODIUM 40 MG: 100 INJECTION SUBCUTANEOUS at 19:50

## 2017-08-03 RX ADMIN — NICOTINE 14 MG: 14 PATCH, EXTENDED RELEASE TRANSDERMAL at 05:21

## 2017-08-03 RX ADMIN — LIDOCAINE 1 PATCH: 50 PATCH TOPICAL at 10:09

## 2017-08-03 RX ADMIN — OMEPRAZOLE 20 MG: 20 CAPSULE, DELAYED RELEASE ORAL at 19:50

## 2017-08-03 RX ADMIN — BENAZEPRIL HYDROCHLORIDE 40 MG: 10 TABLET, FILM COATED ORAL at 05:20

## 2017-08-03 RX ADMIN — ACETAMINOPHEN 650 MG: 325 TABLET, FILM COATED ORAL at 06:48

## 2017-08-03 RX ADMIN — ASPIRIN 81 MG: 81 TABLET, COATED ORAL at 10:10

## 2017-08-03 RX ADMIN — DULOXETINE HYDROCHLORIDE 20 MG: 20 CAPSULE, DELAYED RELEASE ORAL at 10:09

## 2017-08-03 RX ADMIN — METOPROLOL TARTRATE 25 MG: 25 TABLET ORAL at 05:21

## 2017-08-03 RX ADMIN — IRON SUCROSE 200 MG: 20 INJECTION, SOLUTION INTRAVENOUS at 15:50

## 2017-08-03 RX ADMIN — FUROSEMIDE 40 MG: 40 TABLET ORAL at 05:21

## 2017-08-03 ASSESSMENT — GAIT ASSESSMENTS
DISTANCE (FEET): 20
DEVIATION: BRADYKINETIC
ASSISTIVE DEVICE: FRONT WHEEL WALKER
GAIT LEVEL OF ASSIST: CONTACT GUARD ASSIST

## 2017-08-03 ASSESSMENT — ENCOUNTER SYMPTOMS
VOMITING: 0
NAUSEA: 0
DIZZINESS: 0
CHILLS: 0
FEVER: 0
NECK PAIN: 0
WEIGHT LOSS: 0
MYALGIAS: 0
HEADACHES: 0
DEPRESSION: 0
DOUBLE VISION: 0
HEARTBURN: 0

## 2017-08-03 ASSESSMENT — PAIN SCALES - GENERAL
PAINLEVEL_OUTOF10: 5
PAINLEVEL_OUTOF10: 5

## 2017-08-03 ASSESSMENT — PAIN SCALES - WONG BAKER: WONGBAKER_NUMERICALRESPONSE: HURTS JUST A LITTLE BIT

## 2017-08-03 NOTE — REHAB-PT IDT TEAM NOTE
"Physical Therapy  Mobility  Bed mobility:   Min A to Mod A  Bed /Chair/Wheelchair Transfer Initial:  4 - Minimal Assistance  Bed /Chair/Wheelchair Transfer Current:  3 - Moderate Assistance   Bed/Chair/Wheelchair Transfer Description:  Requires lift  Walk Initial:  1 - Total Assistance  Walk Current:  1 - Total Assistance   Walk Description:   (wc follow for safety x 10 ft with // bars x 2 trials)  Wheelchair Initial:  2 - Max Assistance  Wheelchair Current:  2 - Max Assistance   Wheelchair Description:     Stairs Initial:  0 - Not tested,unsafe activity  Stairs Current: 0 - Not tested,unsafe activity   Stairs Description:    Patient/Family Training/Education:  PT POC/goals, schedule  DME/DC Recommendations:  TBD. Eval completed 8/2.  Strengths:  Other: unknown  Barriers:   Confused, Decreased endurance, Generalized weakness and Poor activity tolerance  # of short term goals set= eval 8/2  # of short term goals met=eval 8/2        Physical Therapy Problems           Problem: Mobility     Dates: Start: 08/02/17       Goal: STG-Within one week, patient will propel wheelchair household distances     Dates: Start: 08/02/17      Description: SBA 50 ft x 2        Goal: STG-Within one week, patient will ambulate household distance     Dates: Start: 08/02/17      Description: Min A with FWW 25 ft x 2        Goal: STG-Within one week, patient will ambulate up/down a curb     Dates: Start: 08/02/17      Description: Min A with // bars for 6\" rise x 3          Problem: Mobility Transfers     Dates: Start: 08/02/17       Goal: STG-Within one week, patient will sit to stand     Dates: Start: 08/02/17      Description: SBA with FWW        Goal: STG-Within one week, patient will transfer bed to chair     Dates: Start: 08/02/17      Description: SBA with FWW          Problem: PT-Long Term Goals     Dates: Start: 08/02/17       Goal: LTG-By discharge, patient will propel wheelchair     Dates: Start: 08/02/17      Description: " Independent on unit x 150 ft        Goal: LTG-By discharge, patient will ambulate     Dates: Start: 08/02/17      Description: Modified independent with FWW 50 ft x 2         Goal: LTG-By discharge, patient will transfer one surface to another     Dates: Start: 08/02/17      Description: Modified independent with FWW        Goal: LTG-By discharge, patient will ambulate up/down 4-6 stairs     Dates: Start: 08/02/17      Description: CGA with hand rails        Goal: LTG-By discharge, patient will transfer in/out of a car     Dates: Start: 08/02/17      Description: SPV with FWW for safety from ambulatory level                Section completed by:  ANNE RandallT

## 2017-08-03 NOTE — REHAB-DIETARY IDT TEAM NOTE
Dietary  Nutrition       Dietary Problems           Problem: Other Problem (see comments)     Description: Diagnosis: No nutrition diagnosis.           Goal: Other Goal (Resolved)     Description: Monitor/Evaluation: Monitor PO intake, weight, labs, medication adjustments, skin integrity, GI function, vitals, I/Os, and overall hydration status.  Adjust nutritional POC pending clinical outcomes.      RD following PRN. PO adequate.     Goal: Maintain adequate oral nutrient/fluid intake to promote nutrition optimization/healing.             Nutrition Care/ Consult For Supplements     Assessment:    Admitting Diagnosis: T12/L1 compression fractures, thoracic spondylosis and dextroconvex scoliosis  Pertinent PMH: coronary artery disease, hypertension, psychiatric disorder, stroke, and chronic back pain, CHF, GI Bleed, COPD, tobacco use    Additional Information: Patient seen in room today after lunch.  She was pretending to be sleeping and ignored me despite multiple times of me calling her name.  She did open her eyes and ask me to come back later.  She appears adequately nourished.     Her living situation prior to admission is noted.  Meals on wheels would be beneficial along with application for Care Chest.     I discussed her with SLP- they note no PO issues.  BSE done 8/2.  Apparently patient had choking episode at [facility] on piece of highly seasoned meat. D3 trials done today.     Dentition is poor but tolerates regular textured foods at home.  No GI issues per review of chart aside for heme + stool to which Dr. Gutiérrez is working patient up.  BM today.  Patient has no self feeding issues.    Appetite: Good    Diet: D2/ Cardiac/ Thin Liquids    Average PO intake x 3 days: 50-95% of meals since admission (adequate)     Labs: Hgb 8.7/Hct 31.4, Vitamin D 10    Medications: reviewed and noted    PRN Medications: reviewed and noted  IVF: n/a     Height: 157.5cm  Weight:  88.451kg    BMI: 35.67 (obese  Class 2)        Skin:  intact  GI: BM today    Vitals: /66., HR 54 , 1 L NC PRN    I/Os: +1620mL no output recorded (note Lasix)       Diagnosis: No nutrition diagnosis.     Intervention/ Recommendations/POC:  1. Continue current diet.  Upgrades per SLP. Snacks per patient request.    2.Encourage adequate PO/fluid intake.  3. Nutrition rep to see regarding food prefs/ honor within dietary restrictions (if indicated).        Monitor/Evaluation: Monitor PO intake, weight, labs, medication adjustments, skin integrity, GI function, vitals, I/Os, and overall hydration status.  Adjust nutritional POC pending clinical outcomes.     RD following PRN. PO adequate.    Goal: Maintain adequate oral nutrient/fluid intake to promote nutrition optimization/healing.                                 Section completed by:  Harini Kim RD

## 2017-08-03 NOTE — DIETARY
Nutrition Care/ Consult For Supplements     Assessment:    Admitting Diagnosis: T12/L1 compression fractures, thoracic spondylosis and dextroconvex scoliosis  Pertinent PMH: coronary artery disease, hypertension, psychiatric disorder, stroke, and chronic back pain, CHF, GI Bleed, COPD, tobacco use    Additional Information: Patient seen in room today after lunch.  She was pretending to be sleeping and ignored me despite multiple times of me calling her name.  She did open her eyes and ask me to come back later.  She appears adequately nourished.     Her living situation prior to admission is noted.  Meals on wheels would be beneficial along with application for [facility].     I discussed her with SLP- they note no PO issues.  BSE done 8/2.  Apparently patient had choking episode at [facility] on piece of highly seasoned meat. D3 trials done today.     Dentition is poor but tolerates regular textured foods at home.  No GI issues per review of chart aside for heme + stool to which Dr. Gutiérrez is working patient up.  BM today.  Patient has no self feeding issues.    Appetite: Good   Diet: D2/ Cardiac/ Thin Liquids   Average PO intake x 3 days: 50-95% of meals since admission (adequate)     Labs: Hgb 8.7/Hct 31.4, Vitamin D 10   Medications: reviewed and noted   PRN Medications: reviewed and noted  IVF: n/a     Height: 157.5cm  Weight:  88.451kg   BMI: 35.67 (obese  Class 2)      Skin: intact  GI: BM today   Vitals: /66., HR 54 , 1 L NC PRN   I/Os: +1620mL no output recorded (note Lasix)       Diagnosis: No nutrition diagnosis.     Intervention/ Recommendations/POC:  1. Continue current diet.  Upgrades per SLP. Snacks per patient request.   2.Encourage adequate PO/fluid intake.  3. Nutrition rep to see regarding food prefs/ honor within dietary restrictions (if indicated).      Monitor/Evaluation: Monitor PO intake, weight, labs, medication adjustments, skin integrity, GI function, vitals, I/Os, and overall hydration  status.  Adjust nutritional POC pending clinical outcomes.    RD following PRN. PO adequate.   Goal: Maintain adequate oral nutrient/fluid intake to promote nutrition optimization/healing.

## 2017-08-03 NOTE — PROGRESS NOTES
Received bedside shift report from Cole DO RN regarding patient and assumed care. Patient awake, calm and stable, currently positioned in bed for comfort and safety; call light within reach. Denies pain or discomfort at this time. Will continue to monitor.

## 2017-08-03 NOTE — THERAPY
Speech Therapy Initial Plan of Care Note    1) Assessment:  Patient is 76 y.o. female with a diagnosis of debility.  Additional factors influencing patient status / progress (ie: cognitive factors, co-morbidities, social support, etc): hx of CVA, TIAs, no family support, lives alone. Possible hx of dementia, impaired cognition.  Long term and short term goals have been discussed with patient and they are in agreement.  2) Plan:  Recommend Speech Therapy  minutes per day 5-7 days per week for 2  weeks for the following treatments:  SLP Swallowing Dysfunction Treatment, SLP Oral Pharyngeal Evaluation, SLP Self Care / ADL Training  and SLP Cognitive Skill Development.  3) Goals:  Please refer to care plan for goals.

## 2017-08-03 NOTE — REHAB-COLLABORATIVE ONGOING IDT TEAM NOTE
Weekly Interdisciplinary Team Conference Note    Weekly Interdisciplinary Team Conference # 1  Date:  8/3/2017    Clinicians present and reporting at team conference include the following:   MD: Christina Miller MD   RN:  Maral Newberry RN  PT:   Iliana Calle, PT, DPT  OT:  Meredith Early BS, OTR/L   ST:  Jyotsna Smith MS, CCC-SLP  CM:  Cydney Pascual RN, CCM  REC:  None  RT:  None  RPh:  Jaden Hercules [facility]  Other:   None  All reporting clinicians have a working knowledge of this patient's plan of care.    Targeted DC Date:  8/24/17     Medical    Patient Active Problem List    Diagnosis Date Noted   • Intractable back pain 07/29/2017     Priority: High   • Back pain 08/01/2017   • Depression 08/01/2017   • Thoracic spondylosis 08/01/2017   • HTN (hypertension) 08/01/2017   • Dyslipidemia 08/01/2017   • History of stroke 08/01/2017   • T12 compression fracture (CMS-HCC) 08/01/2017   • Microcytic anemia 07/29/2017   • Heme positive stool 07/29/2017   • Dementia 07/29/2017   • History of CVA (cerebrovascular accident) 07/29/2017     Results     Procedure Component Value Ref Range Date/Time    CBC WITH DIFFERENTIAL [[id number]]  (Abnormal) Collected:  08/03/17 0524    Order Status:  Completed Lab Status:  Final result Updated:  08/03/17 0652    Specimen Information:  Blood      WBC 8.5 4.8 - 10.8 K/uL      RBC 4.65 4.20 - 5.40 M/uL      Hemoglobin 8.7 (L) 12.0 - 16.0 g/dL      Hematocrit 31.4 (L) 37.0 - 47.0 %      MCV 67.5 (L) 81.4 - 97.8 fL      MCH 18.7 (L) 27.0 - 33.0 pg      MCHC 27.7 (L) 33.6 - 35.0 g/dL      RDW 51.8 (H) 35.9 - 50.0 fL      Platelet Count 315 164 - 446 K/uL      MPV 10.4 9.0 - 12.9 fL      Neutrophils-Polys 73.60 (H) 44.00 - 72.00 %      Lymphocytes 13.10 (L) 22.00 - 41.00 %      Monocytes 7.40 0.00 - 13.40 %      Eosinophils 2.20 0.00 - 6.90 %      Basophils 0.60 0.00 - 1.80 %      Immature Granulocytes 3.10 (H) 0.00 - 0.90 %      Nucleated RBC 1.40 /100 WBC      Neutrophils (Absolute) 6.23 2.00 -  7.15 K/uL      Comment: Includes immature neutrophils, if present.        Lymphs (Absolute) 1.11 1.00 - 4.80 K/uL      Monos (Absolute) 0.63 0.00 - 0.85 K/uL      Eos (Absolute) 0.19 0.00 - 0.51 K/uL      Baso (Absolute) 0.05 0.00 - 0.12 K/uL      Immature Granulocytes (abs) 0.26 (H) 0.00 - 0.11 K/uL      NRBC (Absolute) 0.12 K/uL     LDH [[id number]] Collected:  08/03/17 0524    Order Status:  Completed Lab Status:  Final result Updated:  08/03/17 0624    Specimen Information:  Blood      LDH Total 216 107 - 266 U/L     COMP METABOLIC PANEL [[id number]] Collected:  08/03/17 0524    Order Status:  Completed Lab Status:  Final result Updated:  08/03/17 0624    Specimen Information:  Blood      Sodium 141 135 - 145 mmol/L      Potassium 3.8 3.6 - 5.5 mmol/L      Chloride 107 96 - 112 mmol/L      Co2 25 20 - 33 mmol/L      Anion Gap 9.0 0.0 - 11.9       Glucose 94 65 - 99 mg/dL      Bun 12 8 - 22 mg/dL      Creatinine 0.85 0.50 - 1.40 mg/dL      Calcium 9.5 8.5 - 10.5 mg/dL      AST(SGOT) 23 12 - 45 U/L      ALT(SGPT) 22 2 - 50 U/L      Alkaline Phosphatase 88 30 - 99 U/L      Total Bilirubin 0.5 0.1 - 1.5 mg/dL      Albumin 3.5 3.2 - 4.9 g/dL      Total Protein 6.2 6.0 - 8.2 g/dL      Globulin 2.7 1.9 - 3.5 g/dL      A-G Ratio 1.3 g/dL     ESTIMATED GFR [[id number]] Collected:  08/03/17 0524    Order Status:  Completed Lab Status:  Final result Updated:  08/03/17 0624     GFR If African American >60 >60 mL/min/1.73 m 2      GFR If Non African American >60 >60 mL/min/1.73 m 2     HAPTOGLOBIN [[id number]] Collected:  08/03/17 0524    Order Status:  Completed Lab Status:  In process Updated:  08/03/17 0553    Specimen Information:  Blood     HEMOGLOBINOPATHY PROFILE [[id number]] Collected:  08/03/17 0524    Order Status:  Completed Lab Status:  In process Updated:  08/03/17 0552        Nursing  Diet/Nutrition:  Cardiac, Dysphagia II and Thin Liquids  Medication Administration:  Whole with Liquid Wash  % consumed at meals in  last 24 hours:  Consumed 75-95% of meals per documentation.  Breakfast:  75%   Lunch:  95%  Dinner:   (meal ticket not found )%   Snack schedule:  None  Supplement:  N/A  Appetite:  Good  Fluid Intake/Output in past 24 hours: In: 960 [P.O.:960]  Out: 450   Admit Weight:  Weight: 88.451 kg (195 lb)  Weight Last 7 Days   [88.451 kg (195 lb)] 88.451 kg (195 lb) (08/01 1630)    Pain Issues:    Location:  Groin (08/02 1301)  Right;Left (08/02 1301)         Severity:  Moderate   Scheduled pain medications:  gabapentin (NEURONTIN)  Lidoderm Patch    PRN pain medications used in last 24 hours:  oxycodone immediate release (ROXICODONE)    Non Pharmacologic Interventions:  rest  Effectiveness of pain management plan:  good=patient states acceptable comfort after interventions    Bowel:    Bowel Assist Initial Score:  3 - Moderate Assistance  Bowel Assist Current Score:  2 - Max Assistance  Bowl Accidents in last 7 days:     Last bowel movement: 08/02/17  Stool: Medium     Usual bowel pattern:  daily  Scheduled bowel medications:  senna-docusate (PERICOLACE or SENOKOT S)   PRN bowel medications used in last 24 hours:  None  Nursing Interventions:  Increased time, Positioning on commode/toilet, Scheduled medication  Effectiveness of bowel program:   good=regular, predictable, controlled emptying of bowel  Bladder:    Bladder Assist Initial Score:  1 - Total Assistance  Bladder Assist Current Score:  4 - Minimal Assistance  Bladder Accidents in last 7 days:     PVR range for past 24-48 hours:  [12-94]   Intermittent Catheterization:  N/A  Medications affecting bladder:  None    Time void schedule/voiding pattern:  Voiding every 2-3 hours  Interventions:  Increased time, Brief  Effectiveness of bladder training:  Good=regular, predictable, emptying of bladder, patient initiates time voiding    Sleep/wake cycle:   Average hours slept:  Sleeps 2-3 hours without waking.  Sleep medication usage:  None    Patient/Family  Training/Education:  Bladder Management/Training, General Self Care and Safe Transfers  Discharge Supply Recommendations:  Blood Pressure Monitor  Strengths: Alert and oriented, Pleasant and cooperative and Effective communication skills   Barriers:   Bladder incontinence, Generalized weakness, Hypertension and Limited mobility            Nursing Problems           Problem: Bowel/Gastric:     Goal: Normal bowel function is maintained or improved         Goal: Will not experience complications related to bowel motility           Problem: Communication     Goal: The ability to communicate needs accurately and effectively will improve           Problem: Discharge Barriers/Planning     Goal: Patient's continuum of care needs will be met           Problem: Infection     Goal: Will remain free from infection           Problem: Knowledge Deficit     Goal: Knowledge of disease process/condition, treatment plan, diagnostic tests, and medications will improve         Goal: Knowledge of the prescribed therapeutic regimen will improve           Problem: Mobility     Goal: Risk for activity intolerance will decrease           Problem: Pain Management     Goal: Pain level will decrease to patient's comfort goal           Problem: Respiratory:     Goal: Respiratory status will improve           Problem: Safety     Goal: Will remain free from injury         Goal: Will remain free from falls           Problem: Venous Thromboembolism (VTW)/Deep Vein Thrombosis (DVT) Prevention:     Goal: Patient will participate in Venous Thrombosis (VTE)/Deep Vein Thrombosis (DVT)Prevention Measures                Long Term Goals:   At discharge patient will be able to function safely at home and in the community with support.    Section completed by:  Jessica Ariza L.P.N.2           Mobility  Bed mobility:   Min A to Mod A  Bed /Chair/Wheelchair Transfer Initial:  4 - Minimal Assistance  Bed /Chair/Wheelchair Transfer Current:  3 - Moderate  "Assistance   Bed/Chair/Wheelchair Transfer Description:  Requires lift  Walk Initial:  1 - Total Assistance  Walk Current:  1 - Total Assistance   Walk Description:   (wc follow for safety x 10 ft with // bars x 2 trials)  Wheelchair Initial:  2 - Max Assistance  Wheelchair Current:  2 - Max Assistance   Wheelchair Description:     Stairs Initial:  0 - Not tested,unsafe activity  Stairs Current: 0 - Not tested,unsafe activity   Stairs Description:    Patient/Family Training/Education:  PT POC/goals, schedule  DME/DC Recommendations:  ROSANNA Reis completed 8/2.  Strengths:  Other: unknown  Barriers:   Confused, Decreased endurance, Generalized weakness and Poor activity tolerance  # of short term goals set= eval 8/2  # of short term goals met=eval 8/2        Physical Therapy Problems           Problem: Mobility     Dates: Start: 08/02/17       Goal: STG-Within one week, patient will propel wheelchair household distances     Dates: Start: 08/02/17      Description: SBA 50 ft x 2        Goal: STG-Within one week, patient will ambulate household distance     Dates: Start: 08/02/17      Description: Min A with FWW 25 ft x 2        Goal: STG-Within one week, patient will ambulate up/down a curb     Dates: Start: 08/02/17      Description: Min A with // bars for 6\" rise x 3          Problem: Mobility Transfers     Dates: Start: 08/02/17       Goal: STG-Within one week, patient will sit to stand     Dates: Start: 08/02/17      Description: SBA with FWW        Goal: STG-Within one week, patient will transfer bed to chair     Dates: Start: 08/02/17      Description: SBA with FWW          Problem: PT-Long Term Goals     Dates: Start: 08/02/17       Goal: LTG-By discharge, patient will propel wheelchair     Dates: Start: 08/02/17      Description: Independent on unit x 150 ft        Goal: LTG-By discharge, patient will ambulate     Dates: Start: 08/02/17      Description: Modified independent with FWW 50 ft x 2         Goal: " LTG-By discharge, patient will transfer one surface to another     Dates: Start: 08/02/17      Description: Modified independent with FWW        Goal: LTG-By discharge, patient will ambulate up/down 4-6 stairs     Dates: Start: 08/02/17      Description: CGA with hand rails        Goal: LTG-By discharge, patient will transfer in/out of a car     Dates: Start: 08/02/17      Description: SPV with FWW for safety from ambulatory level                Section completed by:  Iliana Calle DPT    Activities of Daily Living  Eating Initial:  7 - Independent  Eating Current:  5 - Standby Prompting/Supervision or Set-up   Eating Description:  Increased time, Modified diet, Supervision for safety, Verbal cueing  Grooming Initial:  5 - Standby Prompting/Supervision or Set-up  Grooming Current:  5 - Standby Prompting/Supervision or Set-up   Grooming Description:     Bathing Initial:  4 - Minimal Assistance  Bathing Current:  4 - Minimal Assistance   Bathing Description:  Grab bar, Tub bench, Increased time, Supervision for safety, Verbal cueing  Upper Body Dressing Initial:  5 - Standby Prompting/Supervision or Set-up  Upper Body Dressing Current:  5 - Standby Prompting/Supervision or Set-up   Upper Body Dressing Description:  Supervision for safety, Verbal cueing  Lower Body Dressing Initial:  4 - Minimal Assistance  Lower Body Dressing Current:  3 - Moderate Assistance   Lower Body Dressing Description:  3 - Moderate Assistance  Toileting Initial:  2 - Max Assistance  Toileting Current:  3 - Moderate Assistance   Toileting Description:  Grab bar (assisted with clothing management)  Toilet Transfer Initial:  5 - Standby Prompting/Supervision or Set-up  Toilet Transfer Current:  3 - Moderate Assistance   Toilet Transfer Description:  3 - Moderate Assistance  Tub / Shower Transfer Initial:  5 - Standby Prompting/Supervision or Set-up  Tub / Shower Transfer Current:  5 - Standby Prompting/Supervision or Set-up   Tub / Shower  Transfer Description:  Increased time, Supervision for safety, Verbal cueing, Set-up of equipment  IADL:  TBA   Family Training/Education:  Ongoing with patient   DME/DC Recommendations:  TBD     Strengths:  Able to follow instructions and Motivated for self care and independence  Barriers:  Decreased endurance, Generalized weakness, Impulsive, Limited mobility and Pain poorly managed, fall risk, pain rt LE, decrease motivation, decrease knowledge of spine precautions.     # of short term goals set= 5    # of short term goals met= 0, due to recent admit          Occupational Therapy Goals           Problem: Bathing     Dates: Start: 08/02/17       Goal: STG-Within one week, patient will bathe     Dates: Start: 08/02/17      Description: Goal: STG-Within one week, patient will bathe    1) Individualized Goal: with supervision and AE/DME prn    2) Interventions:  OT Orthotics Training, OT E Stim Attended, OT Self Care/ADL, OT Cognitive Skill Dev, OT Community Reintegration, OT Manual Ther Technique, OT Neuro Re-Ed/Balance, OT Sensory Int Techniques, OT Therapeutic Activity, OT Evaluation and OT Therapeutic Exercise        Note: Goal Note filed on 08/02/17 1209 by Verena Addison    Goal: STG-Within one week, patient will bathe  1) Individualized Goal: with supervision and AE/DME prn  2) Interventions:  OT Orthotics Training, OT E Stim Attended, OT Self   Care/ADL, OT Cognitive Skill Dev, OT Community Reintegration, OT Manual   Ther Technique, OT Neuro Re-Ed/Balance, OT Sensory Int Techniques, OT   Therapeutic Activity, OT Evaluation and OT Therapeutic Exercise            Problem: Dressing     Dates: Start: 08/02/17       Goal: STG-Within one week, patient will dress LB     Dates: Start: 08/02/17      Description: Goal: STG-Within one week, patient will dress LB    1) Individualized Goal: with supervision and AE/DME prn    2) Interventions:  OT Orthotics Training, OT E Stim Attended, OT Self Care/ADL, OT Cognitive  Skill Dev, OT Community Reintegration, OT Manual Ther Technique, OT Neuro Re-Ed/Balance, OT Sensory Int Techniques, OT Therapeutic Activity, OT Evaluation and OT Therapeutic Exercise        Note: Goal Note filed on 08/02/17 1209 by Verena Addison    Goal: STG-Within one week, patient will dress LB  1) Individualized Goal: with supervision and AE/DME prn  2) Interventions:  OT Orthotics Training, OT E Stim Attended, OT Self   Care/ADL, OT Cognitive Skill Dev, OT Community Reintegration, OT Manual   Ther Technique, OT Neuro Re-Ed/Balance, OT Sensory Int Techniques, OT   Therapeutic Activity, OT Evaluation and OT Therapeutic Exercise            Problem: Functional Cognition     Dates: Start: 08/02/17       Goal: STG-Within one week, patient will demonstrate safety awareness     Dates: Start: 08/02/17      Description: Goal: STG-Within one week, patient will demonstrate safety awareness    1) Individualized Goal: with fair knowledge by demonstrating locking w/c consistently during therapy session.    2) Interventions:  OT Orthotics Training, OT E Stim Attended, OT Self Care/ADL, OT Cognitive Skill Dev, OT Community Reintegration, OT Manual Ther Technique, OT Neuro Re-Ed/Balance, OT Sensory Int Techniques, OT Therapeutic Activity, OT Evaluation and OT Therapeutic Exercise         Note: Goal Note filed on 08/02/17 1209 by Verena Addison    Goal: STG-Within one week, patient will demonstrate safety awareness  1) Individualized Goal: during completion of ADLs as demonstrated by   proper use of DME   2) Interventions:  OT Orthotics Training, OT E Stim Attended, OT Self   Care/ADL, OT Cognitive Skill Dev, OT Community Reintegration, OT Manual   Ther Technique, OT Neuro Re-Ed/Balance, OT Sensory Int Techniques, OT   Therapeutic Activity, OT Evaluation and OT Therapeutic Exercise            Problem: IADL's     Dates: Start: 08/02/17       Goal: STG-Within one week, patient will utilize washer and dryer     Dates: Start:  08/02/17      Description: Goal: STG-Within one week, patient will utilize washer and dryer    1) Individualized Goal: with min A and v/c prn    2) Interventions:  OT Orthotics Training, OT E Stim Attended, OT Self Care/ADL, OT Cognitive Skill Dev, OT Community Reintegration, OT Manual Ther Technique, OT Neuro Re-Ed/Balance, OT Sensory Int Techniques, OT Therapeutic Activity, OT Evaluation and OT Therapeutic Exercise    Note: Goal Note filed on 08/02/17 1209 by Verena Addison    Goal: STG-Within one week, patient will utilize washer and dryer  1) Individualized Goal: with min A and v/c prn  2) Interventions:  OT Orthotics Training, OT E Stim Attended, OT Self   Care/ADL, OT Cognitive Skill Dev, OT Community Reintegration, OT Manual   Ther Technique, OT Neuro Re-Ed/Balance, OT Sensory Int Techniques, OT   Therapeutic Activity, OT Evaluation and OT Therapeutic Exercise            Problem: OT Long Term Goals     Dates: Start: 08/02/17       Goal: LTG-By discharge, patient will complete basic self care tasks     Dates: Start: 08/02/17      Description: Goal: LTG-By discharge, patient will complete basic self care tasks    1) Individualized Goal: with mod I    2) Interventions:  OT Orthotics Training, OT E Stim Attended, OT Self Care/ADL, OT Cognitive Skill Dev, OT Community Reintegration, OT Manual Ther Technique, OT Neuro Re-Ed/Balance, OT Sensory Int Techniques, OT Therapeutic Activity, OT Evaluation and OT Therapeutic Exercise    Note: Goal Note filed on 08/02/17 1209 by Verena Addison    Goal: LTG-By discharge, patient will complete basic self care tasks  1) Individualized Goal: with mod I  2) Interventions:  OT Orthotics Training, OT E Stim Attended, OT Self   Care/ADL, OT Cognitive Skill Dev, OT Community Reintegration, OT Manual   Ther Technique, OT Neuro Re-Ed/Balance, OT Sensory Int Techniques, OT   Therapeutic Activity, OT Evaluation and OT Therapeutic Exercise          Goal: LTG-By discharge, patient will  complete basic home management     Dates: Start: 08/02/17      Description: Goal: LTG-By discharge, patient will complete basic home management    1) Individualized Goal: with supervision and min v/c    2) Interventions:  OT Orthotics Training, OT E Stim Attended, OT Self Care/ADL, OT Cognitive Skill Dev, OT Community Reintegration, OT Manual Ther Technique, OT Neuro Re-Ed/Balance, OT Sensory Int Techniques, OT Therapeutic Activity, OT Evaluation and OT Therapeutic Exercise        Note: Goal Note filed on 08/02/17 1209 by Verena Addison    Goal: LTG-By discharge, patient will complete basic home management  1) Individualized Goal: with supervision and min v/c  2) Interventions:  OT Orthotics Training, OT E Stim Attended, OT Self   Care/ADL, OT Cognitive Skill Dev, OT Community Reintegration, OT Manual   Ther Technique, OT Neuro Re-Ed/Balance, OT Sensory Int Techniques, OT   Therapeutic Activity, OT Evaluation and OT Therapeutic Exercise                Problem: Toileting     Dates: Start: 08/02/17       Goal: STG-Within one week, patient will complete toileting tasks     Dates: Start: 08/02/17      Description: Goal: STG-Within one week, patient will complete toileting tasks    1) Individualized Goal: with supervision and AE/DME prn    2) Interventions:  OT Orthotics Training, OT E Stim Attended, OT Self Care/ADL, OT Cognitive Skill Dev, OT Community Reintegration, OT Manual Ther Technique, OT Neuro Re-Ed/Balance, OT Sensory Int Techniques, OT Therapeutic Activity, OT Evaluation and OT Therapeutic Exercise        Note: Goal Note filed on 08/02/17 1209 by Verena Addison    Goal: STG-Within one week, patient will complete toileting tasks  1) Individualized Goal: with supervision and AE/DME prn  2) Interventions:  OT Orthotics Training, OT E Stim Attended, OT Self   Care/ADL, OT Cognitive Skill Dev, OT Community Reintegration, OT Manual   Ther Technique, OT Neuro Re-Ed/Balance, OT Sensory Int Techniques, OT    Therapeutic Activity, OT Evaluation and OT Therapeutic Exercise                Section completed by:  Meredith Early, MS,OTR/L    Cognitive Linquistic Functions  Comprehension Initial:  5 - Stand-by Prompting/Supervision or Set-up  Comprehension Current:  5 - Stand-by Prompting/Supervision or Set-up   Comprehension Description:  Verbal cues  Expression Initial:  5 - Stand-by Prompting/Supervision or Set-up  Expression Current:  5 - Stand-by Prompting/Supervision or Set-up   Expression Description:  Verbal cueing  Social Interaction Initial:  6 - Modified Independent  Social Interaction Current:  4 - Minimal Assistance   Social Interaction Description:  Increased time, Verbal cues  Problem Solving Initial:  5 - Standby Prompting/Supervision or Set-up  Problem Solving Current:  2 - Max Assistance   Problem Solving Description:  Verbal cueing, Bed/chair alarm, Therapy schedule  Memory Initial:  5 - Standby Prompting/Supervision or Set-up  Memory Current:  1 - Total Assistance   Memory Description:  Verbal cueing, Therapy schedule, Bed/chair alarm  Executive Functioning / Organization Initial:  [facility] (1)  Executive Functioning / Organization Current:  [facility] (1)   Executive Functioning Desciption:  Verbal cues   Swallowing  Swallowing Status:  Bedside swallow evaluation completed 8/2/17.  See report for full details.   Orders Placed This Encounter   Procedures   • DIET ORDER     Standing Status: Standing      Number of Occurrences: 1      Standing Expiration Date:      Order Specific Question:  Diet:     Answer:  Cardiac [6]     Order Specific Question:  Texture/Fiber modifications:     Answer:  Dysphagia 2(Pureed/Chopped)specify fluid consistency(question 6) [2]     Order Specific Question:  Consistency/Fluid modifications:     Answer:  Thin Liquids [3]     Behavior Modification Plan  Keep instructions simple/concrete, Give clear feedback, Set clear goals and Analyze tasks (break down into smaller  steps)  Assistive Technology  Low tech: Calendar, planner, schedule, alarms/timers, pill organizer, post-it notes, lists  Family Training/Education:  To be scheduled.  DC Recommendations:   Anticipate patient will benefit from additional ST services upon discharge from this facility.  Strengths:  Pleasant and cooperative and Willingly participates in therapeutic activities  Barriers:  Poor carryover of learning and Poor insight/denial of deficits  # of short term goals set= 3  # of short term goals met= 0        Speech Therapy Problems           Problem: Memory STGs     Dates: Start: 08/02/17       Goal: STG-Within one week, patient will     Dates: Start: 08/02/17      Description: 1) Individualized goal:  use written memory strategies and simple memory book following educ and with supervision/ cues from staff, therapists with MOD A.    2) Interventions:  SLP Cognitive Skill Development        Note: Goal Note filed on 08/03/17 1148 by Jyotsna Smith MS,CCC-SLP    Goal: STG-Within one week, patient will  Outcome: NOT MET  Patient was evaluated 8/2/17 with first day of tx 8/3/17            Problem: Problem Solving STGs     Dates: Start: 08/02/17       Goal: STG-Within one week, patient will     Dates: Start: 08/02/17      Description: 1) Individualized goal:  Answer temporal orientation questions with use of environmental aids with 70% accuracy provided MOD A.     2) Interventions:  SLP Cognitive Skill Development        Note: Goal Note filed on 08/03/17 1148 by Jyotsna Smith MS,CCC-SLP    Goal: STG-Within one week, patient will  Outcome: NOT MET  Patient was evaluated 8/2/17 with first day of tx today 8/3/17          Goal: STG-Within one week, patient will     Dates: Start: 08/02/17      Description: 1) Individualized goal:  Complete standardized assessment using SVEN to further assess language related functional activities.     2) Interventions:  SLP Cognitive Skill Development        Note: Goal Note filed on  08/03/17 1148 by Jyotsna Smith MS,CCC-SLP    Goal: STG-Within one week, patient will  Outcome: NOT MET  Patient was evaluated 8/2/17 with first day of tx 8/3/17.              Problem: Speech/Swallowing LTGs     Dates: Start: 08/02/17       Goal: LTG-By discharge, patient will safely swallow     Dates: Start: 08/02/17      Description: 1) Individualized goal:  Regular textures/thin liquids to return to PLOF.     2) Interventions:  SLP Swallowing Dysfunction Treatment            Goal: LTG-By discharge, patient will solve basic problems     Dates: Start: 08/02/17      Description: 1) Individualized goal:  To safely complete ADL related tasks with SPV.     2) Interventions:  SLP Self Care / ADL Training  and SLP Cognitive Skill Development              Problem: Swallowing STGs     Dates: Start: 08/02/17       Goal: STG-Within one week, patient will     Dates: Start: 08/02/17      Description: 1) Individualized goal:  Tolerate trials of Dys 3 textures with advancement as appropriate. MIN cues for use of compensatory strategies.     2) Interventions:  SLP Swallowing Dysfunction Treatment        Note: Goal Note filed on 08/03/17 1148 by Jyotsna Smith MS,CCC-SLP    Goal: STG-Within one week, patient will  Outcome: NOT MET  Patient was evaluated yesterday 8/2/17 with first day of tx today 8/3/17                 Section completed by:  Jyotsna Smith MS,CCC-SLP       Nutrition       Dietary Problems           Problem: Other Problem (see comments)     Description: Diagnosis: No nutrition diagnosis.           Goal: Other Goal (Resolved)     Description: Monitor/Evaluation: Monitor PO intake, weight, labs, medication adjustments, skin integrity, GI function, vitals, I/Os, and overall hydration status.  Adjust nutritional POC pending clinical outcomes.      RD following PRN. PO adequate.     Goal: Maintain adequate oral nutrient/fluid intake to promote nutrition optimization/healing.             Nutrition Care/ Consult For  Supplements     Assessment:    Admitting Diagnosis: T12/L1 compression fractures, thoracic spondylosis and dextroconvex scoliosis  Pertinent PMH: coronary artery disease, hypertension, psychiatric disorder, stroke, and chronic back pain, CHF, GI Bleed, COPD, tobacco use    Additional Information: Patient seen in room today after lunch.  She was pretending to be sleeping and ignored me despite multiple times of me calling her name.  She did open her eyes and ask me to come back later.  She appears adequately nourished.     Her living situation prior to admission is noted.  Meals on wheels would be beneficial along with application for Care Chest.     I discussed her with SLP- they note no PO issues.  BSE done 8/2.  Apparently patient had choking episode at [facility] on piece of highly seasoned meat. D3 trials done today.     Dentition is poor but tolerates regular textured foods at home.  No GI issues per review of chart aside for heme + stool to which Dr. Gutiérrez is working patient up.  BM today.  Patient has no self feeding issues.    Appetite: Good    Diet: D2/ Cardiac/ Thin Liquids    Average PO intake x 3 days: 50-95% of meals since admission (adequate)     Labs: Hgb 8.7/Hct 31.4, Vitamin D 10    Medications: reviewed and noted    PRN Medications: reviewed and noted  IVF: n/a     Height: 157.5cm  Weight:  88.451kg    BMI: 35.67 (obese  Class 2)        Skin: intact  GI: BM today    Vitals: /66., HR 54 , 1 L NC PRN    I/Os: +1620mL no output recorded (note Lasix)       Diagnosis: No nutrition diagnosis.     Intervention/ Recommendations/POC:  1. Continue current diet.  Upgrades per SLP. Snacks per patient request.    2.Encourage adequate PO/fluid intake.  3. Nutrition rep to see regarding food prefs/ honor within dietary restrictions (if indicated).        Monitor/Evaluation: Monitor PO intake, weight, labs, medication adjustments, skin integrity, GI function, vitals, I/Os, and overall hydration status.  Adjust  nutritional POC pending clinical outcomes.     RD following PRN. PO adequate.    Goal: Maintain adequate oral nutrient/fluid intake to promote nutrition optimization/healing.                                 Section completed by:  Harini Kim RD    REHAB-Pharmacy IDT Team Note by Erik Kennedy RPH at 8/2/2017  4:36 PM  Version 1 of 1    Author:  Eirk Kennedy RPH Service:  (none) Author Type:  Pharmacist    Filed:  8/2/2017  4:37 PM Note Time:  8/2/2017  4:36 PM Status:  Signed    :  Erik Kennedy RPH (Pharmacist)           Pharmacy  Pharmacy  Antibiotics/Antifungals/Antivirals:  Medication:      Active Orders     None        Route:         None  Stop Date:  N/A  Reason:   Antihypertensives/Cardiac:  Medication:    Orders (72h ago through future)    Start     Ordered    08/02/17 0600  benazepril (LOTENSIN) tablet 40 mg   Q DAY      08/01/17 1535    08/02/17 0600  furosemide (LASIX) tablet 40 mg   Q DAY     Comments:  Hold for SBP <100    08/01/17 1535    08/02/17 0424  METOPROLOL TARTRATE 25 MG PO TABS     Comments:  JAVAN SCANLON: cabinet override    08/02/17 0424    08/01/17 2100  atorvastatin (LIPITOR) tablet 80 mg   EVERY BEDTIME      08/01/17 1535    08/01/17 2030  metoprolol (LOPRESSOR) tablet 25 mg   TWICE DAILY     Comments:  Hold for SBP <100 or pulse <55    08/01/17 2014 08/01/17 2023  METOPROLOL TARTRATE 25 MG PO TABS     Comments:  JAVAN SCANLON: cabinet override    08/01/17 2023    08/01/17 1531  hydrALAZINE (APRESOLINE) tablet 25 mg   EVERY 8 HOURS PRN      08/01/17 1535        Patient Vitals for the past 24 hrs:   BP Pulse   08/02/17 1510 153/93 mmHg 71   08/02/17 0931 128/68 mmHg -   08/02/17 0855 - 67   08/02/17 0715 143/80 mmHg 61   08/02/17 0509 (!) 170/94 mmHg -   08/01/17 1815 144/74 mmHg 84   08/01/17 1745 - 76   08/01/17 1656 147/82 mmHg 71       Anticoagulation:  Medication:  lovenox  INR:      Other key medications:      A review of the medication list reveals no  issues at this time. Patient is currently on antihypertensive(s). Recommend home blood pressure monitoring/recording if antihypertensive(s) regimen(s) continue.    Section completed by:  Erik Kennedy [facility]           DC Planning  DC destination/dispostion:  Lives in Board and Care    Referrals: Meals on Wheels, Chips    DC Needs: She will need home health if she is agreeable with this.  She has a 4ww, w/c and fww.  I will follow for additional dme needs.    Barriers to discharge: lives alone      Strengths:     Section completed by:  Cydney Pascual R.N.      Physician Summary  Christina Miller MD participated and led team conference discussion.

## 2017-08-03 NOTE — PROGRESS NOTES
Received bedside report @0700 and assumed care of pt.  Pt A/O x 2-3 and appropriate, calm, and stable. Pt denies  pain or discomfort. Call light in reach.

## 2017-08-03 NOTE — FLOWSHEET NOTE
08/03/17 0840   Events/Summary/Plan   Events/Summary/Plan Pt is on 1Lpm NC sats 95% pt doing well can start RA trials with activity to see how pt does, wean as tolerated will monitor.   Non-Invasive Resp Device Site Inspection Completed Intact   Location NC b/l ears   Interdisciplinary Plan of Care-Goals (Indications)   Obstructive Ventilatory Defect or Pulmonary Disease without Obvious Obstruction Strong Subjective / Objective Improvement;History / Diagnosis   Interdisciplinary Plan of Care-Outcomes    Bronchodilator Outcome Patient at Stable Baseline   Education   Education Yes - Pt. / Family has been Instructed in use of Respiratory Equipment   Respiratory WDL   Respiratory (WDL) X   Chest Exam   Work Of Breathing / Effort Mild   Respiration 17   Pulse 60   Breath Sounds   RUL Breath Sounds Clear   RML Breath Sounds Clear   RLL Breath Sounds Clear;Diminished   ARGELIA Breath Sounds Clear   LLL Breath Sounds Clear;Diminished   Secretions   Cough Non Productive   Oximetry   #Pulse Oximetry (Single Determination) Yes   Oxygen   Home O2 Use Prior To Admission? No   Pulse Oximetry 95 %   O2 (LPM) 1   O2 Daily Delivery Respiratory  Silicone Nasal Cannula

## 2017-08-03 NOTE — CARE PLAN
Problem: Communication  Goal: The ability to communicate needs accurately and effectively will improve  Outcome: PROGRESSING AS EXPECTED  CLIP.  Patient oriented to call light systema dn is calling appropriately.  Patient oriented to bathroom location, TV, bed controls, clock.  POC discussed with patient and patient oriented to white board.  Schedule for therapy delivered to patient.  Patient verbalized understanding.      Problem: Safety  Goal: Will remain free from falls  Outcome: PROGRESSING AS EXPECTED  CLIP.  Fall risk assessed.  Walkways clear.  Area well lit.  Non-slip foot wear in place.  Patient using assistive devices.  Cueing to grab bars and railings.  Pain assessed, medication administered per MAR if applicable, and interventions in place.  Patient oriented to call light system and is calling appropriately.  Bed in low position.  Bed rails up times three.  Personal items within reach.

## 2017-08-03 NOTE — CARE PLAN
Problem: Swallowing STGs  Goal: STG-Within one week, patient will  1) Individualized goal: Tolerate trials of Dys 3 textures with advancement as appropriate. MIN cues for use of compensatory strategies.   2) Interventions: SLP Swallowing Dysfunction Treatment    Outcome: NOT MET  Patient was evaluated yesterday 8/2/17 with first day of tx today 8/3/17    Problem: Problem Solving STGs  Goal: STG-Within one week, patient will  1) Individualized goal: Answer temporal orientation questions with use of environmental aids with 70% accuracy provided MOD A.   2) Interventions: SLP Cognitive Skill Development    Outcome: NOT MET  Patient was evaluated 8/2/17 with first day of tx today 8/3/17  Goal: STG-Within one week, patient will  1) Individualized goal: Complete standardized assessment using SVEN to further assess language related functional activities.   2) Interventions: SLP Cognitive Skill Development    Outcome: NOT MET  Patient was evaluated 8/2/17 with first day of tx 8/3/17.      Problem: Memory STGs  Goal: STG-Within one week, patient will  1) Individualized goal: use written memory strategies and simple memory book following educ and with supervision/ cues from staff, therapists with MOD A.  2) Interventions: SLP Cognitive Skill Development    Outcome: NOT MET  Patient was evaluated 8/2/17 with first day of tx 8/3/17

## 2017-08-03 NOTE — REHAB-CM IDT TEAM NOTE
Case Management   DC Planning  DC destination/dispostion:  Lives in Board and Care    Referrals: Meals on Wheels, Chips    DC Needs: She will need home health if she is agreeable with this.  She has a 4ww, w/c and fww.  I will follow for additional dme needs.    Barriers to discharge: lives alone      Strengths:     Section completed by:  Cydney Pascual R.N.

## 2017-08-03 NOTE — PROGRESS NOTES
[facility]    Date of Service: 8/3/2017    Chief Complaint  This is a very pleasant 76-year-old white female    with past medical history significant for severe tobacco abuse along with    chronic back discomfort.  The patient presented to [facility]    initially on 07/29/2017 with bilateral leg pain, difficulty ambulating.  CT of   her lumbar spine and pelvis were done in the ER, which did not show any    evidence of acute fracture.  She was also short of breath upon admission.  The   patient smoked about a pack and a half a day for 50 years.  Apparently, she    does have a history of coronary disease and stroke in the past, though I do    not have direct records of this available to me.  Looks like she is originally   from [place] though she has been in [place] for several years.  It appears    that they did a MRI of the brain back in 2014, which did show evidence of old    stroke, but no obvious acute insult to my eye.  There was some prominent    ventriculomegaly without hydrocephalus, old lacunar infarcts in the left    internal capsule and left corona radiata, advanced white matter ischemic    disease.  Once again, no acute infarct that was back in 2014.  She did have an   imaging of her lumbar spine.  On my exam, she was unable to dorsiflex both    Great toes,  which suggests that L5 nerve root issue.  The lumbar spine imaging did    demonstrate diffuse disk bulge extending symmetrically into the left lateral    recess and foraminal zone, mild right and severe left facet arthropathy, so    basically, she had multilevel disease, prominent posterior epidural fat from    L2 through L5 contributing to the thecal narrowing at these levels.  Thecal    sac narrowing was worse at L2-L3 and L3-L4.  Once again, severe left-sided and   right-sided facet arthropathy in the L5 region.  Patient really was not    interested in any surgical procedures.  The patient was, therefore, released  "   to the [facility] on 08/01/2017 for further physical therapy.  She    has been having trouble walking, which has been a progressive problem, perhaps   it is related to the back problem itself versus deconditioning.  Medicine was   consulted due to the difficulty dealing with her blood pressure.  The patient   had pressures that were modestly elevated yesterday well over 140, though I    believe it was significantly higher than this.  I believe this was post    medication pressures.  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    Interval Problem Update  NONE    Consultants/Specialty  INTERNAL MEDICINE    Disposition  PER PRIMARY REHAB TEAM.        Review of Systems   Constitutional: Negative for fever, chills, weight loss and malaise/fatigue.   Eyes: Negative for double vision.   Cardiovascular: Negative for chest pain.   Gastrointestinal: Negative for heartburn, nausea and vomiting.   Genitourinary: Negative for dysuria.   Musculoskeletal: Negative for myalgias and neck pain.   Skin: Negative for rash.   Neurological: Negative for dizziness and headaches.   Psychiatric/Behavioral: Negative for depression.   8/3/17--PATIENT AWAKE AND ALERT.  THERE IS SOME MODERATE B/L ILIAC DISEASE NOTED IN 2009.   SHE HAD AT LEAST 60-70% DISEASE BACK IN 2009,  8 YEARS AGO.  SHE ALSO HAS HAD A SEVERE MICROCYTIC ANEMIA SINCE 2009,  THOUGH THE MCV AT THAT TIME WAS 79 SUGGESTING THIS IS CHRONIC BLOOD LOSS AND IRON DEFICIENCY.  I AM REPLETING IRON STORES IV AND CHECKING WITH THE 2 GI GROUPS TO SEE IF SHE HAS HAD ENDOSCOPY OF ANY SORT.  PLAN:  CHECK FOR COLONOSCOPY IF DONE BY GI CONSULTANTS OR DIGESTIVE HEALTH.  TOTAL BODY IRON REPLACEMENT.  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  RE-CHECK LE ARTERIAL STUDIES.  THAT MAY BE CONTRIBUTING.  PRIOR STUDIES FROM NEARLY A DECADE AGO SHOWED, \"CT ANGIOGRAM " "PELVIS WITH AND WITHOUT CONTRAST AND POST PROCESSING   DEMONSTRATES EXTENSIVE CALCIFIED AND NONCALCIFIED PLAQUE IN THE COMMON   ILIAC ARTERIES BILATERALLY.  THERE IS AT LEAST 70% DIAMETER REDUCTION   STENOSIS AT THE ORIGIN OF THE RIGHT COMMON ILIAC ARTERY.  THERE IS 60%   DIAMETER REDUCTION STENOSIS AT THE UPPER PORTION OF THE LEFT COMMON ILIAC   ARTERY.  INTERNAL AND EXTERNAL ILIAC ARTERIES AS WELL AS THE COMMON   FEMORAL ARTERIES APPEAR TO BE PATENT BILATERALLY.\"  CHECKING LE ARTERIAL DOPPLERS.    ADDENDUM:  TECH CAME IN TO TELL ME DISTALLY THERE IS BIPHASIC FLOW.  I TOLD HIM THAT IS WHAT I EXPECTED.  CERTAINLY NO WORSE THAN WHAT I EXPECTED.  FURTHER INVESTIGATION FORMALLY WOULD BE REDUNDANT.   Physical Exam  Laboratory/Imaging   Hemodynamics  Temp (24hrs), Av.4 °C (97.5 °F), Min:36.4 °C (97.5 °F), Max:36.4 °C (97.6 °F)   Temperature: 36.4 °C (97.5 °F)  Pulse  Av.5  Min: 54  Max: 84    Blood Pressure : 160/66 mmHg      Respiratory      Respiration: 17, Pulse Oximetry: 95 %, O2 Daily Delivery Respiratory : Silicone Nasal Cannula     Work Of Breathing / Effort: Mild  RUL Breath Sounds: Clear, RML Breath Sounds: Clear, RLL Breath Sounds: Clear;Diminished, ARGELIA Breath Sounds: Clear, LLL Breath Sounds: Clear;Diminished    Fluids    Intake/Output Summary (Last 24 hours) at 17 1309  Last data filed at 17 0900   Gross per 24 hour   Intake   1260 ml   Output      0 ml   Net   1260 ml       Nutrition  Orders Placed This Encounter   Procedures   • DIET ORDER     Standing Status: Standing      Number of Occurrences: 1      Standing Expiration Date:      Order Specific Question:  Diet:     Answer:  Cardiac [6]     Order Specific Question:  Texture/Fiber modifications:     Answer:  Dysphagia 2(Pureed/Chopped)specify fluid consistency(question 6) [2]     Order Specific Question:  Consistency/Fluid modifications:     Answer:  Thin Liquids [3]     Physical Exam   Constitutional: She is oriented to person, " place, and time. She appears well-nourished. No distress.   HENT:   Head: Atraumatic.   Eyes: No scleral icterus.   Neck: No JVD present. No tracheal deviation present.   Cardiovascular: Normal rate, regular rhythm and normal heart sounds.    No murmur heard.  Pulmonary/Chest: No respiratory distress. She has no wheezes.   Abdominal: Soft. Bowel sounds are normal. She exhibits no distension.   Musculoskeletal: She exhibits no edema.   Lymphadenopathy:     She has no cervical adenopathy.   Neurological: She is alert and oriented to person, place, and time. No cranial nerve deficit.   Skin: Skin is warm and dry.   Psychiatric: She has a normal mood and affect.       Recent Labs      08/02/17 0520 08/03/17   0524   WBC  9.3  8.5   RBC  4.49  4.65   HEMOGLOBIN  8.1*  8.7*   HEMATOCRIT  29.9*  31.4*   MCV  66.6*  67.5*   MCH  18.0*  18.7*   MCHC  27.1*  27.7*   RDW  51.4*  51.8*   PLATELETCT  315  315   MPV  10.9  10.4     Recent Labs      08/02/17   0520  08/03/17   0524   SODIUM  144  141   POTASSIUM  4.1  3.8   CHLORIDE  110  107   CO2  27  25   GLUCOSE  99  94   BUN  13  12   CREATININE  0.78  0.85   CALCIUM  9.4  9.5         Recent Labs      08/02/17   0520   BNPBTYPENAT  101*     Recent Labs      08/02/17   0520   TRIGLYCERIDE  81   HDL  41   LDL  52          Assessment/Plan     No new assessment & plan notes have been filed under this hospital service since the last note was generated.  Service: Hospital Medicine  ASSESSMENT:  1.  Hypertension with systolic pressures as high as 191, question coronary    artery disease.  2.  Severe chronic obstructive pulmonary disease and tobacco abuse.  3.  Question cerebrovascular disease with advanced white matter disease noted    on MRI of the brain 3 years ago.  4.  Preserved left ventricular systolic function on echo.  5.  Probable lumbar myelopathy with the patient refusing surgery.  6.  KNOWN MODERATE PVD S/P MODERATE STENOSIS ILIACS IN 2009  PLAN:  I think we have the  blood pressure under significantly better control    with beta blockade added to the patient's outpatient regimen, which included    just Lasix and an ACE inhibitor.  The patient does have bibasilar rhonchi.     Iron studies done in the hospital did show significant iron deficiency    component.  She is probably overdue for a colonoscopy.  Her CBC is diminished    and the MCV is very low at 66, which leaves me to believe that either she has    been chronically losing blood or has some type of occult malignancy versus a    hemoglobinopathy; 66 is pretty low, so I may do a hemoglobin electrophoresis as the MCV has been  Low for 8 years with no indication of colon cancer thus far.     She does have some teardrop cells on her peripheral smear.  I am sure whether she is    overdue for colonoscopy.  We will go ahead and get some stool guaiacs.  We    will continue the Lopressor 25 b.i.d.  She seems to be quite sensitive to that   as her heart rate drops into the 60s.  Back in 2011, patient also had a MCV    of 63.5, so this is probably a hereditary condition rather than an iron    deficiency.  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  I will actually try to set up a pharmacy to give iv iron  For total body iron replacement as well and get rid of the po iron.  Labs reviewed, Medications reviewed and Radiology images reviewed  Pink catheter: No Pink

## 2017-08-03 NOTE — CARE PLAN
Problem: Other Problem (see comments)  Goal: Other Goal  Monitor/Evaluation: Monitor PO intake, weight, labs, medication adjustments, skin integrity, GI function, vitals, I/Os, and overall hydration status. Adjust nutritional POC pending clinical outcomes.   RD following PRN. PO adequate.   Goal: Maintain adequate oral nutrient/fluid intake to promote nutrition optimization/healing.   Outcome: MET Date Met:  08/03/17

## 2017-08-03 NOTE — PROGRESS NOTES
"Rehab Progress Note     Chief complaint: none, follow up thoracic spondylosis  Date of Service: 8/3/2017    Interval Events (Subjective)  Patient seen and examined. No acute events overnight. She's complaining of left shoulder pain, new since admission. Cannot describe the pain or tell me what aggravates it. Still has painful paresthesias in the saddle area.     Objective:  VITAL SIGNS: /66 mmHg  Pulse 60  Temp(Src) 36.4 °C (97.5 °F)  Resp 17  Ht 1.575 m (5' 2\")  Wt 88.451 kg (195 lb)  BMI 35.66 kg/m2  SpO2 95%  Breastfeeding? No  Gen: alert, no apparent distress  CV: regular rate and rhythm, no murmurs, no peripheral edema  Resp: clear to ascultation bilaterally, normal respiratory effort  GI: soft, non-tender abdomen, bowel sounds present  Neuro: Motor: 5/5 MMT throughout, except for bilateral 4/5 EHL                   Sensory:decreased to light touch in right L1/L2 dermatomes, decreased to pinprick in bilateral L4-S1 dermatomes, decreased vibration in bilateral great toes  DTRs: 2+ in bilateral biceps, triceps, brachioradialis, 3+ in bilateral patella, 2+ at bilateral achilles    Recent Results (from the past 72 hour(s))   Comp Metabolic Panel (CMP)    Collection Time: 08/02/17  5:20 AM   Result Value Ref Range    Sodium 144 135 - 145 mmol/L    Potassium 4.1 3.6 - 5.5 mmol/L    Chloride 110 96 - 112 mmol/L    Co2 27 20 - 33 mmol/L    Anion Gap 7.0 0.0 - 11.9    Glucose 99 65 - 99 mg/dL    Bun 13 8 - 22 mg/dL    Creatinine 0.78 0.50 - 1.40 mg/dL    Calcium 9.4 8.5 - 10.5 mg/dL    AST(SGOT) 20 12 - 45 U/L    ALT(SGPT) 20 2 - 50 U/L    Alkaline Phosphatase 90 30 - 99 U/L    Total Bilirubin 0.3 0.1 - 1.5 mg/dL    Albumin 3.5 3.2 - 4.9 g/dL    Total Protein 6.0 6.0 - 8.2 g/dL    Globulin 2.5 1.9 - 3.5 g/dL    A-G Ratio 1.4 g/dL   CBC with Differential    Collection Time: 08/02/17  5:20 AM   Result Value Ref Range    WBC 9.3 4.8 - 10.8 K/uL    RBC 4.49 4.20 - 5.40 M/uL    Hemoglobin 8.1 (L) 12.0 - 16.0 " g/dL    Hematocrit 29.9 (L) 37.0 - 47.0 %    MCV 66.6 (L) 81.4 - 97.8 fL    MCH 18.0 (L) 27.0 - 33.0 pg    MCHC 27.1 (L) 33.6 - 35.0 g/dL    RDW 51.4 (H) 35.9 - 50.0 fL    Platelet Count 315 164 - 446 K/uL    MPV 10.9 9.0 - 12.9 fL    Neutrophils-Polys 74.20 (H) 44.00 - 72.00 %    Lymphocytes 11.20 (L) 22.00 - 41.00 %    Monocytes 8.50 0.00 - 13.40 %    Eosinophils 1.50 0.00 - 6.90 %    Basophils 0.40 0.00 - 1.80 %    Immature Granulocytes 4.20 (H) 0.00 - 0.90 %    Nucleated RBC 1.30 /100 WBC    Neutrophils (Absolute) 6.88 2.00 - 7.15 K/uL    Lymphs (Absolute) 1.04 1.00 - 4.80 K/uL    Monos (Absolute) 0.79 0.00 - 0.85 K/uL    Eos (Absolute) 0.14 0.00 - 0.51 K/uL    Baso (Absolute) 0.04 0.00 - 0.12 K/uL    Immature Granulocytes (abs) 0.39 (H) 0.00 - 0.11 K/uL    NRBC (Absolute) 0.12 K/uL   BType Natriuretic Peptide (BNP)    Collection Time: 08/02/17  5:20 AM   Result Value Ref Range    B Natriuretic Peptide 101 (H) 0 - 100 pg/mL   Magnesium    Collection Time: 08/02/17  5:20 AM   Result Value Ref Range    Magnesium 2.2 1.5 - 2.5 mg/dL   Vitamin D, 25-hydroxy (blood)    Collection Time: 08/02/17  5:20 AM   Result Value Ref Range    25-Hydroxy   Vitamin D 25 10 (L) 30 - 100 ng/mL   Lipid Profile (Lipid Panel)    Collection Time: 08/02/17  5:20 AM   Result Value Ref Range    Cholesterol,Tot 109 100 - 199 mg/dL    Triglycerides 81 0 - 149 mg/dL    HDL 41 >=40 mg/dL    LDL 52 <100 mg/dL   ESTIMATED GFR    Collection Time: 08/02/17  5:20 AM   Result Value Ref Range    GFR If African American >60 >60 mL/min/1.73 m 2    GFR If Non African American >60 >60 mL/min/1.73 m 2   URINALYSIS    Collection Time: 08/02/17 12:00 PM   Result Value Ref Range    Micro Urine Req see below     Color Yellow     Character Clear     Specific Gravity 1.000 <1.035    Ph 6.0 5.0-8.0    Glucose Negative Negative mg/dL    Ketones Negative Negative mg/dL    Protein Negative Negative mg/dL    Bilirubin Negative Negative    Nitrite Negative Negative     Leukocyte Esterase Negative Negative    Occult Blood Negative Negative   LDH    Collection Time: 08/03/17  5:24 AM   Result Value Ref Range    LDH Total 216 107 - 266 U/L   COMP METABOLIC PANEL    Collection Time: 08/03/17  5:24 AM   Result Value Ref Range    Sodium 141 135 - 145 mmol/L    Potassium 3.8 3.6 - 5.5 mmol/L    Chloride 107 96 - 112 mmol/L    Co2 25 20 - 33 mmol/L    Anion Gap 9.0 0.0 - 11.9    Glucose 94 65 - 99 mg/dL    Bun 12 8 - 22 mg/dL    Creatinine 0.85 0.50 - 1.40 mg/dL    Calcium 9.5 8.5 - 10.5 mg/dL    AST(SGOT) 23 12 - 45 U/L    ALT(SGPT) 22 2 - 50 U/L    Alkaline Phosphatase 88 30 - 99 U/L    Total Bilirubin 0.5 0.1 - 1.5 mg/dL    Albumin 3.5 3.2 - 4.9 g/dL    Total Protein 6.2 6.0 - 8.2 g/dL    Globulin 2.7 1.9 - 3.5 g/dL    A-G Ratio 1.3 g/dL   CBC WITH DIFFERENTIAL    Collection Time: 08/03/17  5:24 AM   Result Value Ref Range    WBC 8.5 4.8 - 10.8 K/uL    RBC 4.65 4.20 - 5.40 M/uL    Hemoglobin 8.7 (L) 12.0 - 16.0 g/dL    Hematocrit 31.4 (L) 37.0 - 47.0 %    MCV 67.5 (L) 81.4 - 97.8 fL    MCH 18.7 (L) 27.0 - 33.0 pg    MCHC 27.7 (L) 33.6 - 35.0 g/dL    RDW 51.8 (H) 35.9 - 50.0 fL    Platelet Count 315 164 - 446 K/uL    MPV 10.4 9.0 - 12.9 fL    Neutrophils-Polys 73.60 (H) 44.00 - 72.00 %    Lymphocytes 13.10 (L) 22.00 - 41.00 %    Monocytes 7.40 0.00 - 13.40 %    Eosinophils 2.20 0.00 - 6.90 %    Basophils 0.60 0.00 - 1.80 %    Immature Granulocytes 3.10 (H) 0.00 - 0.90 %    Nucleated RBC 1.40 /100 WBC    Neutrophils (Absolute) 6.23 2.00 - 7.15 K/uL    Lymphs (Absolute) 1.11 1.00 - 4.80 K/uL    Monos (Absolute) 0.63 0.00 - 0.85 K/uL    Eos (Absolute) 0.19 0.00 - 0.51 K/uL    Baso (Absolute) 0.05 0.00 - 0.12 K/uL    Immature Granulocytes (abs) 0.26 (H) 0.00 - 0.11 K/uL    NRBC (Absolute) 0.12 K/uL   ESTIMATED GFR    Collection Time: 08/03/17  5:24 AM   Result Value Ref Range    GFR If African American >60 >60 mL/min/1.73 m 2    GFR If Non African American >60 >60 mL/min/1.73 m 2        Current Facility-Administered Medications   Medication Frequency   • vitamin D (Ergocalciferol) (DRISDOL) capsule 50,000 Units Q7 DAYS   • IRON REPLACEMENT per pharmacy protocol PRN   • iron sucrose (VENOFER) 200 mg in  mL IVPB DAILY   • gabapentin (NEURONTIN) capsule 100 mg Q EVENING   • Respiratory Care per Protocol Continuous RT   • Pharmacy Consult Request ...Pain Management Review 1 Each PRN   • oxycodone immediate-release (ROXICODONE) tablet 2.5 mg Q3HRS PRN   • oxycodone immediate-release (ROXICODONE) tablet 5 mg Q3HRS PRN   • lidocaine (LIDODERM) 5 % 1 Patch Q24HR   • enoxaparin (LOVENOX) inj 40 mg QHS   • aspirin EC (ECOTRIN) tablet 81 mg DAILY   • acetaminophen (TYLENOL) tablet 650 mg Q4HRS PRN   • artificial tears 1.4 % ophthalmic solution 1 Drop PRN   • hydrALAZINE (APRESOLINE) tablet 25 mg Q8HRS PRN   • mag hydrox-al hydrox-simeth (MAALOX PLUS ES or MYLANTA DS) suspension 20 mL Q2HRS PRN   • trazodone (DESYREL) tablet 50 mg QHS PRN   • sodium chloride (OCEAN) 0.65 % nasal spray 2 Spray PRN   • atorvastatin (LIPITOR) tablet 80 mg QHS   • benazepril (LOTENSIN) tablet 40 mg Q DAY   • busPIRone (BUSPAR) tablet 5 mg BID PRN   • duloxetine (CYMBALTA) capsule 20 mg DAILY   • furosemide (LASIX) tablet 40 mg Q DAY   • ondansetron (ZOFRAN ODT) dispertab 4 mg Q4HRS PRN   • senna-docusate (PERICOLACE or SENOKOT S) 8.6-50 MG per tablet 2 Tab BID    And   • polyethylene glycol/lytes (MIRALAX) PACKET 1 Packet QDAY PRN    And   • magnesium hydroxide (MILK OF MAGNESIA) suspension 30 mL QDAY PRN    And   • bisacodyl (DULCOLAX) suppository 10 mg QDAY PRN   • nicotine (NICODERM) 14 MG/24HR 14 mg Daily-0600   • metoprolol (LOPRESSOR) tablet 25 mg TWICE DAILY   • omeprazole (PRILOSEC) capsule 20 mg BID       Orders Placed This Encounter   Procedures   • DIET ORDER     Standing Status: Standing      Number of Occurrences: 1      Standing Expiration Date:      Order Specific Question:  Diet:     Answer:  Cardiac  [6]     Order Specific Question:  Texture/Fiber modifications:     Answer:  Dysphagia 2(Pureed/Chopped)specify fluid consistency(question 6) [2]     Order Specific Question:  Consistency/Fluid modifications:     Answer:  Thin Liquids [3]       Assessment:  Active Hospital Problems    Diagnosis   • Back pain   • Depression   • Thoracic spondylosis   • HTN (hypertension)   • Dyslipidemia   • History of stroke   • T12 compression fracture (CMS-HCC)   • Microcytic anemia   • Heme positive stool   • Dementia     I led and attended the weekly conference today, and agree with the IDT conference documentation and plan of care as noted below.    Date of conference: 8/3/2017    RN issues: Eating 75-95% of meals, on dysphagia 2 diet, moderate pain in the bilateral groins, max assist for bowel, min assist for bladder, Strengths: Alert and oriented, Pleasant and cooperative and Effective communication skills   Barriers:   Bladder incontinence, Generalized weakness, Hypertension and Limited mobility     PT: Bed mobility:   Min A to Mod A  Bed /Chair/Wheelchair Transfer Initial:  4 - Minimal Assistance  Bed /Chair/Wheelchair Transfer Current:  3 - Moderate Assistance              Bed/Chair/Wheelchair Transfer Description:  Requires lift  Walk Initial:  1 - Total Assistance  Walk Current:  1 - Total Assistance              Walk Description:   (wc follow for safety x 10 ft with // bars x 2 trials)  Wheelchair Initial:  2 - Max Assistance  Wheelchair Current:  2 - Max Assistance              Wheelchair Description:     Stairs Initial:  0 - Not tested,unsafe activity  Stairs Current: 0 - Not tested,unsafe activity              Stairs Description:    Patient/Family Training/Education:  PT POC/goals, schedule  DME/DC Recommendations:  TBD. Eval completed 8/2.  Strengths:  Other: unknown  Barriers:   Confused, Decreased endurance, Generalized weakness and Poor activity tolerance  # of short term goals set= eval 8/2  # of short term goals  met=tanvi 8/2     OT: Eating Initial:  7 - Independent  Eating Current:  5 - Standby Prompting/Supervision or Set-up              Eating Description:  Increased time, Modified diet, Supervision for safety, Verbal cueing  Grooming Initial:  5 - Standby Prompting/Supervision or Set-up  Grooming Current:  5 - Standby Prompting/Supervision or Set-up              Grooming Description:     Bathing Initial:  4 - Minimal Assistance  Bathing Current:  4 - Minimal Assistance              Bathing Description:  Grab bar, Tub bench, Increased time, Supervision for safety, Verbal cueing  Upper Body Dressing Initial:  5 - Standby Prompting/Supervision or Set-up  Upper Body Dressing Current:  5 - Standby Prompting/Supervision or Set-up              Upper Body Dressing Description:  Supervision for safety, Verbal cueing  Lower Body Dressing Initial:  4 - Minimal Assistance  Lower Body Dressing Current:  3 - Moderate Assistance              Lower Body Dressing Description:  3 - Moderate Assistance  Toileting Initial:  2 - Max Assistance  Toileting Current:  3 - Moderate Assistance              Toileting Description:  Grab bar (assisted with clothing management)  Toilet Transfer Initial:  5 - Standby Prompting/Supervision or Set-up  Toilet Transfer Current:  3 - Moderate Assistance              Toilet Transfer Description:  3 - Moderate Assistance  Tub / Shower Transfer Initial:  5 - Standby Prompting/Supervision or Set-up  Tub / Shower Transfer Current:  5 - Standby Prompting/Supervision or Set-up              Tub / Shower Transfer Description:  Increased time, Supervision for safety, Verbal cueing, Set-up of equipment  IADL:  TBA   Family Training/Education:  Ongoing with patient   DME/DC Recommendations:  TBD     Strengths:  Able to follow instructions and Motivated for self care and independence  Barriers:  Decreased endurance, Generalized weakness, Impulsive, Limited mobility and Pain poorly managed, fall risk, pain rt LE,  decrease motivation, decrease knowledge of spine precautions.     # of short term goals set= 5    # of short term goals met= 0, due to recent admit       SLP: Comprehension Initial:  5 - Stand-by Prompting/Supervision or Set-up  Comprehension Current:  5 - Stand-by Prompting/Supervision or Set-up              Comprehension Description:  Verbal cues  Expression Initial:  5 - Stand-by Prompting/Supervision or Set-up  Expression Current:  5 - Stand-by Prompting/Supervision or Set-up              Expression Description:  Verbal cueing  Social Interaction Initial:  6 - Modified Independent  Social Interaction Current:  4 - Minimal Assistance              Social Interaction Description:  Increased time, Verbal cues  Problem Solving Initial:  5 - Standby Prompting/Supervision or Set-up  Problem Solving Current:  2 - Max Assistance              Problem Solving Description:  Verbal cueing, Bed/chair alarm, Therapy schedule  Memory Initial:  5 - Standby Prompting/Supervision or Set-up  Memory Current:  1 - Total Assistance              Memory Description:  Verbal cueing, Therapy schedule, Bed/chair alarm  Executive Functioning / Organization Initial:  [facility] (1)  Executive Functioning / Organization Current:  [facility] (1)              Executive Functioning Desciption:  Verbal cues   Swallowing  Swallowing Status:  Bedside swallow evaluation completed 8/2/17.  See report for full details.  DC Recommendations:   Anticipate patient will benefit from additional ST services upon discharge from this facility.  Strengths:  Pleasant and cooperative and Willingly participates in therapeutic activities  Barriers:  Poor carryover of learning and Poor insight/denial of deficits  # of short term goals set=   3  # of short term goals met=  0     Admission FIM - 63    CM/social support: lives alone     Anticipated DC date: 8/24/2017    Home health: TBD    Equip: TBD    Follow up: TBD    Medical Decision Making and Plan:    Labs reviewed.  Medications reviewed. Appreciate the assistance of the hospitalist.    T12/L1 chronic compression fractures - new pain management with Lidoderm patch and oral pain meds as needed.    Thoracic spondylosis with scoliosis, thecal sac narrowing from L2-L5 worst at L2-L3 and L3-L4, L2-L5 multilevel bilateral neural foraminal narrowing - with evidence of radiculopathy on exam with weakness at the bilateral L4 myotomes as well as abnormal sensation on the right L1-L2 dermatome, and L4-S1 dermatomes. Patient does not want surgery. Continue conservative treatment with therapy. Gabapentin for complaints of nerve pain in the right thigh. Increase dose.    Hypertension - Continue Benzapril, furosemide, metoprolol, hydralazine PRN. Hospitalist to make adjustments. Increase metoprolol. Monitor for bradycardia. May need clonidine.    Coronary artery disease status post stenting - continue aspirin and statin.    Dyslipidemia - continue statin.    Tobacco use - nicotine patch.    Acute on chronic pain - pain meds as needed. lidoderm patch for compression fractures. Gabapentin for nerve pain.    Bladder urgency - UA negative. May consider trial of ditropan.    Dysphagia? - choked on food during dinner. Diet downgraded. Speech consult.    History of GI bleed - with microcytic anemia and positive stool guaiac on acute. IV iron. Needs outpatient colonoscopy.    History of depression/anxiety - continue Cymbalta and as needed Buspar.    Dementia? - likely vascular dementia based on previous imaging from years ago. Speech consult.    Low Vit D - 10, start high dose repletion.    DVT prophylaxis - Lovenox.    Total time:  >35 minutes.  I spent greater than 50% of the time for patient care and coordination on this date, including unit/floor time, and face-to-face time with the patient as per assessment and plan above.    Christina Miller M.D.

## 2017-08-03 NOTE — REHAB-OT IDT TEAM NOTE
Occupational Therapy  Activities of Daily Living  Eating Initial:  7 - Independent  Eating Current:  5 - Standby Prompting/Supervision or Set-up   Eating Description:  Increased time, Modified diet, Supervision for safety, Verbal cueing  Grooming Initial:  5 - Standby Prompting/Supervision or Set-up  Grooming Current:  5 - Standby Prompting/Supervision or Set-up   Grooming Description:     Bathing Initial:  4 - Minimal Assistance  Bathing Current:  4 - Minimal Assistance   Bathing Description:  Grab bar, Tub bench, Increased time, Supervision for safety, Verbal cueing  Upper Body Dressing Initial:  5 - Standby Prompting/Supervision or Set-up  Upper Body Dressing Current:  5 - Standby Prompting/Supervision or Set-up   Upper Body Dressing Description:  Supervision for safety, Verbal cueing  Lower Body Dressing Initial:  4 - Minimal Assistance  Lower Body Dressing Current:  3 - Moderate Assistance   Lower Body Dressing Description:  3 - Moderate Assistance  Toileting Initial:  2 - Max Assistance  Toileting Current:  3 - Moderate Assistance   Toileting Description:  Grab bar (assisted with clothing management)  Toilet Transfer Initial:  5 - Standby Prompting/Supervision or Set-up  Toilet Transfer Current:  3 - Moderate Assistance   Toilet Transfer Description:  3 - Moderate Assistance  Tub / Shower Transfer Initial:  5 - Standby Prompting/Supervision or Set-up  Tub / Shower Transfer Current:  5 - Standby Prompting/Supervision or Set-up   Tub / Shower Transfer Description:  Increased time, Supervision for safety, Verbal cueing, Set-up of equipment  IADL:  TBA   Family Training/Education:  Ongoing with patient   DME/DC Recommendations:  TBD     Strengths:  Able to follow instructions and Motivated for self care and independence  Barriers:  Decreased endurance, Generalized weakness, Impulsive, Limited mobility and Pain poorly managed, fall risk, pain rt LE, decrease motivation, decrease knowledge of spine precautions.      # of short term goals set= 5    # of short term goals met= 0, due to recent admit          Occupational Therapy Goals           Problem: Bathing     Dates: Start: 08/02/17       Goal: STG-Within one week, patient will bathe     Dates: Start: 08/02/17      Description: Goal: STG-Within one week, patient will bathe    1) Individualized Goal: with supervision and AE/DME prn    2) Interventions:  OT Orthotics Training, OT E Stim Attended, OT Self Care/ADL, OT Cognitive Skill Dev, OT Community Reintegration, OT Manual Ther Technique, OT Neuro Re-Ed/Balance, OT Sensory Int Techniques, OT Therapeutic Activity, OT Evaluation and OT Therapeutic Exercise        Note: Goal Note filed on 08/02/17 1209 by Verena Addison    Goal: STG-Within one week, patient will bathe  1) Individualized Goal: with supervision and AE/DME prn  2) Interventions:  OT Orthotics Training, OT E Stim Attended, OT Self   Care/ADL, OT Cognitive Skill Dev, OT Community Reintegration, OT Manual   Ther Technique, OT Neuro Re-Ed/Balance, OT Sensory Int Techniques, OT   Therapeutic Activity, OT Evaluation and OT Therapeutic Exercise            Problem: Dressing     Dates: Start: 08/02/17       Goal: STG-Within one week, patient will dress LB     Dates: Start: 08/02/17      Description: Goal: STG-Within one week, patient will dress LB    1) Individualized Goal: with supervision and AE/DME prn    2) Interventions:  OT Orthotics Training, OT E Stim Attended, OT Self Care/ADL, OT Cognitive Skill Dev, OT Community Reintegration, OT Manual Ther Technique, OT Neuro Re-Ed/Balance, OT Sensory Int Techniques, OT Therapeutic Activity, OT Evaluation and OT Therapeutic Exercise        Note: Goal Note filed on 08/02/17 1209 by Verena Addison    Goal: STG-Within one week, patient will dress LB  1) Individualized Goal: with supervision and AE/DME prn  2) Interventions:  OT Orthotics Training, OT E Stim Attended, OT Self   Care/ADL, OT Cognitive Skill Dev, OT Community  Reintegration, OT Manual   Ther Technique, OT Neuro Re-Ed/Balance, OT Sensory Int Techniques, OT   Therapeutic Activity, OT Evaluation and OT Therapeutic Exercise            Problem: Functional Cognition     Dates: Start: 08/02/17       Goal: STG-Within one week, patient will demonstrate safety awareness     Dates: Start: 08/02/17      Description: Goal: STG-Within one week, patient will demonstrate safety awareness    1) Individualized Goal: with fair knowledge by demonstrating locking w/c consistently during therapy session.    2) Interventions:  OT Orthotics Training, OT E Stim Attended, OT Self Care/ADL, OT Cognitive Skill Dev, OT Community Reintegration, OT Manual Ther Technique, OT Neuro Re-Ed/Balance, OT Sensory Int Techniques, OT Therapeutic Activity, OT Evaluation and OT Therapeutic Exercise         Note: Goal Note filed on 08/02/17 1206 by Verena Addison    Goal: STG-Within one week, patient will demonstrate safety awareness  1) Individualized Goal: during completion of ADLs as demonstrated by   proper use of DME   2) Interventions:  OT Orthotics Training, OT E Stim Attended, OT Self   Care/ADL, OT Cognitive Skill Dev, OT Community Reintegration, OT Manual   Ther Technique, OT Neuro Re-Ed/Balance, OT Sensory Int Techniques, OT   Therapeutic Activity, OT Evaluation and OT Therapeutic Exercise            Problem: IADL's     Dates: Start: 08/02/17       Goal: STG-Within one week, patient will utilize washer and dryer     Dates: Start: 08/02/17      Description: Goal: STG-Within one week, patient will utilize washer and dryer    1) Individualized Goal: with min A and v/c prn    2) Interventions:  OT Orthotics Training, OT E Stim Attended, OT Self Care/ADL, OT Cognitive Skill Dev, OT Community Reintegration, OT Manual Ther Technique, OT Neuro Re-Ed/Balance, OT Sensory Int Techniques, OT Therapeutic Activity, OT Evaluation and OT Therapeutic Exercise    Note: Goal Note filed on 08/02/17 120 by Verena  Cyn    Goal: STG-Within one week, patient will utilize washer and dryer  1) Individualized Goal: with min A and v/c prn  2) Interventions:  OT Orthotics Training, OT E Stim Attended, OT Self   Care/ADL, OT Cognitive Skill Dev, OT Community Reintegration, OT Manual   Ther Technique, OT Neuro Re-Ed/Balance, OT Sensory Int Techniques, OT   Therapeutic Activity, OT Evaluation and OT Therapeutic Exercise            Problem: OT Long Term Goals     Dates: Start: 08/02/17       Goal: LTG-By discharge, patient will complete basic self care tasks     Dates: Start: 08/02/17      Description: Goal: LTG-By discharge, patient will complete basic self care tasks    1) Individualized Goal: with mod I    2) Interventions:  OT Orthotics Training, OT E Stim Attended, OT Self Care/ADL, OT Cognitive Skill Dev, OT Community Reintegration, OT Manual Ther Technique, OT Neuro Re-Ed/Balance, OT Sensory Int Techniques, OT Therapeutic Activity, OT Evaluation and OT Therapeutic Exercise    Note: Goal Note filed on 08/02/17 1209 by Verena Addison    Goal: LTG-By discharge, patient will complete basic self care tasks  1) Individualized Goal: with mod I  2) Interventions:  OT Orthotics Training, OT E Stim Attended, OT Self   Care/ADL, OT Cognitive Skill Dev, OT Community Reintegration, OT Manual   Ther Technique, OT Neuro Re-Ed/Balance, OT Sensory Int Techniques, OT   Therapeutic Activity, OT Evaluation and OT Therapeutic Exercise          Goal: LTG-By discharge, patient will complete basic home management     Dates: Start: 08/02/17      Description: Goal: LTG-By discharge, patient will complete basic home management    1) Individualized Goal: with supervision and min v/c    2) Interventions:  OT Orthotics Training, OT E Stim Attended, OT Self Care/ADL, OT Cognitive Skill Dev, OT Community Reintegration, OT Manual Ther Technique, OT Neuro Re-Ed/Balance, OT Sensory Int Techniques, OT Therapeutic Activity, OT Evaluation and OT Therapeutic  Exercise        Note: Goal Note filed on 08/02/17 1209 by Verena Addison    Goal: LTG-By discharge, patient will complete basic home management  1) Individualized Goal: with supervision and min v/c  2) Interventions:  OT Orthotics Training, OT E Stim Attended, OT Self   Care/ADL, OT Cognitive Skill Dev, OT Community Reintegration, OT Manual   Ther Technique, OT Neuro Re-Ed/Balance, OT Sensory Int Techniques, OT   Therapeutic Activity, OT Evaluation and OT Therapeutic Exercise                Problem: Toileting     Dates: Start: 08/02/17       Goal: STG-Within one week, patient will complete toileting tasks     Dates: Start: 08/02/17      Description: Goal: STG-Within one week, patient will complete toileting tasks    1) Individualized Goal: with supervision and AE/DME prn    2) Interventions:  OT Orthotics Training, OT E Stim Attended, OT Self Care/ADL, OT Cognitive Skill Dev, OT Community Reintegration, OT Manual Ther Technique, OT Neuro Re-Ed/Balance, OT Sensory Int Techniques, OT Therapeutic Activity, OT Evaluation and OT Therapeutic Exercise        Note: Goal Note filed on 08/02/17 1209 by Verena Addison    Goal: STG-Within one week, patient will complete toileting tasks  1) Individualized Goal: with supervision and AE/DME prn  2) Interventions:  OT Orthotics Training, OT E Stim Attended, OT Self   Care/ADL, OT Cognitive Skill Dev, OT Community Reintegration, OT Manual   Ther Technique, OT Neuro Re-Ed/Balance, OT Sensory Int Techniques, OT   Therapeutic Activity, OT Evaluation and OT Therapeutic Exercise                Section completed by:  Meredith Early MS,OTR/L

## 2017-08-03 NOTE — CARE PLAN
Problem: Bathing  Goal: STG-Within one week, patient will bathe  Goal: STG-Within one week, patient will bathe  1) Individualized Goal: with supervision and AE/DME prn  2) Interventions: OT Orthotics Training, OT E Stim Attended, OT Self Care/ADL, OT Cognitive Skill Dev, OT Community Reintegration, OT Manual Ther Technique, OT Neuro Re-Ed/Balance, OT Sensory Int Techniques, OT Therapeutic Activity, OT Evaluation and OT Therapeutic Exercise  Outcome: NOT MET    Problem: Dressing  Goal: STG-Within one week, patient will dress LB  Goal: STG-Within one week, patient will dress LB  1) Individualized Goal: with supervision and AE/DME prn  2) Interventions: OT Orthotics Training, OT E Stim Attended, OT Self Care/ADL, OT Cognitive Skill Dev, OT Community Reintegration, OT Manual Ther Technique, OT Neuro Re-Ed/Balance, OT Sensory Int Techniques, OT Therapeutic Activity, OT Evaluation and OT Therapeutic Exercise  Outcome: NOT MET    Problem: Toileting  Goal: STG-Within one week, patient will complete toileting tasks  Goal: STG-Within one week, patient will complete toileting tasks  1) Individualized Goal: with supervision and AE/DME prn  2) Interventions: OT Orthotics Training, OT E Stim Attended, OT Self Care/ADL, OT Cognitive Skill Dev, OT Community Reintegration, OT Manual Ther Technique, OT Neuro Re-Ed/Balance, OT Sensory Int Techniques, OT Therapeutic Activity, OT Evaluation and OT Therapeutic Exercise  Outcome: NOT MET    Problem: IADL’s  Goal: STG-Within one week, patient will utilize washer and dryer  Goal: STG-Within one week, patient will utilize washer and dryer  1) Individualized Goal: with min A and v/c prn  2) Interventions: OT Orthotics Training, OT E Stim Attended, OT Self Care/ADL, OT Cognitive Skill Dev, OT Community Reintegration, OT Manual Ther Technique, OT Neuro Re-Ed/Balance, OT Sensory Int Techniques, OT Therapeutic Activity, OT Evaluation and OT Therapeutic Exercise   Outcome: NOT MET

## 2017-08-03 NOTE — REHAB-NURSING IDT TEAM NOTE
Nursing  Nursing  Diet/Nutrition:  Cardiac, Dysphagia II and Thin Liquids  Medication Administration:  Whole with Liquid Wash  % consumed at meals in last 24 hours:  Consumed 75-95% of meals per documentation.  Breakfast:  75%   Lunch:  95%  Dinner:   (meal ticket not found )%   Snack schedule:  None  Supplement:  N/A  Appetite:  Good  Fluid Intake/Output in past 24 hours: In: 960 [P.O.:960]  Out: 450   Admit Weight:  Weight: 88.451 kg (195 lb)  Weight Last 7 Days   [88.451 kg (195 lb)] 88.451 kg (195 lb) (08/01 1630)    Pain Issues:    Location:  Groin (08/02 1301)  Right;Left (08/02 1301)         Severity:  Moderate   Scheduled pain medications:  gabapentin (NEURONTIN)  Lidoderm Patch    PRN pain medications used in last 24 hours:  oxycodone immediate release (ROXICODONE)    Non Pharmacologic Interventions:  rest  Effectiveness of pain management plan:  good=patient states acceptable comfort after interventions    Bowel:    Bowel Assist Initial Score:  3 - Moderate Assistance  Bowel Assist Current Score:  2 - Max Assistance  Bowl Accidents in last 7 days:     Last bowel movement: 08/02/17  Stool: Medium     Usual bowel pattern:  daily  Scheduled bowel medications:  senna-docusate (PERICOLACE or SENOKOT S)   PRN bowel medications used in last 24 hours:  None  Nursing Interventions:  Increased time, Positioning on commode/toilet, Scheduled medication  Effectiveness of bowel program:   good=regular, predictable, controlled emptying of bowel  Bladder:    Bladder Assist Initial Score:  1 - Total Assistance  Bladder Assist Current Score:  4 - Minimal Assistance  Bladder Accidents in last 7 days:     PVR range for past 24-48 hours:  [12-94]   Intermittent Catheterization:  N/A  Medications affecting bladder:  None    Time void schedule/voiding pattern:  Voiding every 2-3 hours  Interventions:  Increased time, Brief  Effectiveness of bladder training:  Good=regular, predictable, emptying of bladder, patient initiates time  voiding    Sleep/wake cycle:   Average hours slept:  Sleeps 2-3 hours without waking.  Sleep medication usage:  None    Patient/Family Training/Education:  Bladder Management/Training, General Self Care and Safe Transfers  Discharge Supply Recommendations:  Blood Pressure Monitor  Strengths: Alert and oriented, Pleasant and cooperative and Effective communication skills   Barriers:   Bladder incontinence, Generalized weakness, Hypertension and Limited mobility            Nursing Problems           Problem: Bowel/Gastric:     Goal: Normal bowel function is maintained or improved         Goal: Will not experience complications related to bowel motility           Problem: Communication     Goal: The ability to communicate needs accurately and effectively will improve           Problem: Discharge Barriers/Planning     Goal: Patient's continuum of care needs will be met           Problem: Infection     Goal: Will remain free from infection           Problem: Knowledge Deficit     Goal: Knowledge of disease process/condition, treatment plan, diagnostic tests, and medications will improve         Goal: Knowledge of the prescribed therapeutic regimen will improve           Problem: Mobility     Goal: Risk for activity intolerance will decrease           Problem: Pain Management     Goal: Pain level will decrease to patient's comfort goal           Problem: Respiratory:     Goal: Respiratory status will improve           Problem: Safety     Goal: Will remain free from injury         Goal: Will remain free from falls           Problem: Venous Thromboembolism (VTW)/Deep Vein Thrombosis (DVT) Prevention:     Goal: Patient will participate in Venous Thrombosis (VTE)/Deep Vein Thrombosis (DVT)Prevention Measures                Long Term Goals:   At discharge patient will be able to function safely at home and in the community with support.    Section completed by:  Jessica Ariza L.P.N.2

## 2017-08-03 NOTE — REHAB-SLP IDT TEAM NOTE
Speech Therapy  Cognitive Linquistic Functions  Comprehension Initial:  5 - Stand-by Prompting/Supervision or Set-up  Comprehension Current:  5 - Stand-by Prompting/Supervision or Set-up   Comprehension Description:  Verbal cues  Expression Initial:  5 - Stand-by Prompting/Supervision or Set-up  Expression Current:  5 - Stand-by Prompting/Supervision or Set-up   Expression Description:  Verbal cueing  Social Interaction Initial:  6 - Modified Independent  Social Interaction Current:  4 - Minimal Assistance   Social Interaction Description:  Increased time, Verbal cues  Problem Solving Initial:  5 - Standby Prompting/Supervision or Set-up  Problem Solving Current:  2 - Max Assistance   Problem Solving Description:  Verbal cueing, Bed/chair alarm, Therapy schedule  Memory Initial:  5 - Standby Prompting/Supervision or Set-up  Memory Current:  1 - Total Assistance   Memory Description:  Verbal cueing, Therapy schedule, Bed/chair alarm  Executive Functioning / Organization Initial:  Profound (1)  Executive Functioning / Organization Current:  Profound (1)   Executive Functioning Desciption:  Verbal cues   Swallowing  Swallowing Status:  Bedside swallow evaluation completed 8/2/17.  See report for full details.   Orders Placed This Encounter   Procedures   • DIET ORDER     Standing Status: Standing      Number of Occurrences: 1      Standing Expiration Date:      Order Specific Question:  Diet:     Answer:  Cardiac [6]     Order Specific Question:  Texture/Fiber modifications:     Answer:  Dysphagia 2(Pureed/Chopped)specify fluid consistency(question 6) [2]     Order Specific Question:  Consistency/Fluid modifications:     Answer:  Thin Liquids [3]     Behavior Modification Plan  Keep instructions simple/concrete, Give clear feedback, Set clear goals and Analyze tasks (break down into smaller steps)  Assistive Technology  Low tech: Calendar, planner, schedule, alarms/timers, pill organizer, post-it notes, lists  Family  Training/Education:  To be scheduled.  DC Recommendations:   Anticipate patient will benefit from additional ST services upon discharge from this facility.  Strengths:  Pleasant and cooperative and Willingly participates in therapeutic activities  Barriers:  Poor carryover of learning and Poor insight/denial of deficits  # of short term goals set= 3  # of short term goals met= 0        Speech Therapy Problems           Problem: Memory STGs     Dates: Start: 08/02/17       Goal: STG-Within one week, patient will     Dates: Start: 08/02/17      Description: 1) Individualized goal:  use written memory strategies and simple memory book following educ and with supervision/ cues from staff, therapists with MOD A.    2) Interventions:  SLP Cognitive Skill Development        Note: Goal Note filed on 08/03/17 1148 by Jyotsna Smith MS,CCC-SLP    Goal: STG-Within one week, patient will  Outcome: NOT MET  Patient was evaluated 8/2/17 with first day of tx 8/3/17            Problem: Problem Solving STGs     Dates: Start: 08/02/17       Goal: STG-Within one week, patient will     Dates: Start: 08/02/17      Description: 1) Individualized goal:  Answer temporal orientation questions with use of environmental aids with 70% accuracy provided MOD A.     2) Interventions:  SLP Cognitive Skill Development        Note: Goal Note filed on 08/03/17 1148 by Jyotsna Smith MS,CCC-SLP    Goal: STG-Within one week, patient will  Outcome: NOT MET  Patient was evaluated 8/2/17 with first day of tx today 8/3/17          Goal: STG-Within one week, patient will     Dates: Start: 08/02/17      Description: 1) Individualized goal:  Complete standardized assessment using SVEN to further assess language related functional activities.     2) Interventions:  SLP Cognitive Skill Development        Note: Goal Note filed on 08/03/17 1148 by Jyotsna Smith MS,CCC-SLP    Goal: STG-Within one week, patient will  Outcome: NOT MET  Patient was evaluated  8/2/17 with first day of tx 8/3/17.              Problem: Speech/Swallowing LTGs     Dates: Start: 08/02/17       Goal: LTG-By discharge, patient will safely swallow     Dates: Start: 08/02/17      Description: 1) Individualized goal:  Regular textures/thin liquids to return to PLOF.     2) Interventions:  SLP Swallowing Dysfunction Treatment            Goal: LTG-By discharge, patient will solve basic problems     Dates: Start: 08/02/17      Description: 1) Individualized goal:  To safely complete ADL related tasks with SPV.     2) Interventions:  SLP Self Care / ADL Training  and SLP Cognitive Skill Development              Problem: Swallowing STGs     Dates: Start: 08/02/17       Goal: STG-Within one week, patient will     Dates: Start: 08/02/17      Description: 1) Individualized goal:  Tolerate trials of Dys 3 textures with advancement as appropriate. MIN cues for use of compensatory strategies.     2) Interventions:  SLP Swallowing Dysfunction Treatment        Note: Goal Note filed on 08/03/17 1148 by Jyotsna Smith MS,CCC-SLP    Goal: STG-Within one week, patient will  Outcome: NOT MET  Patient was evaluated yesterday 8/2/17 with first day of tx today 8/3/17                 Section completed by:  Jyotsna Smith MS,CCC-SLP

## 2017-08-04 LAB
DEPRECATED HGB OTHER BLD-IMP: 0 % (ref 0–0)
EST. AVERAGE GLUCOSE BLD GHB EST-MCNC: 91 MG/DL
HAPTOGLOB SERPL-MCNC: 217 MG/DL (ref 30–200)
HBA1C MFR BLD: 4.8 % (ref 0–5.6)
HGB A1 MFR BLD: 97.2 % (ref 95–97.9)
HGB A2 MFR BLD: 2.6 % (ref 2–3.5)
HGB C MFR BLD: 0 % (ref 0–0)
HGB E MFR BLD: 0 % (ref 0–0)
HGB F MFR BLD: 0.2 % (ref 0–2.1)
HGB FRACT BLD ELPH-IMP: NORMAL
HGB S BLD QL SOLY: NORMAL
HGB S MFR BLD: 0 % (ref 0–0)
PATH INTERP BLD-IMP: NORMAL

## 2017-08-04 PROCEDURE — 92526 ORAL FUNCTION THERAPY: CPT

## 2017-08-04 PROCEDURE — A9270 NON-COVERED ITEM OR SERVICE: HCPCS | Performed by: HOSPITALIST

## 2017-08-04 PROCEDURE — 97532 HCHG COGNITIVE SKILL DEV. 15 MIN: CPT

## 2017-08-04 PROCEDURE — 36415 COLL VENOUS BLD VENIPUNCTURE: CPT

## 2017-08-04 PROCEDURE — 700102 HCHG RX REV CODE 250 W/ 637 OVERRIDE(OP): Performed by: HOSPITALIST

## 2017-08-04 PROCEDURE — A9270 NON-COVERED ITEM OR SERVICE: HCPCS | Performed by: PHYSICAL MEDICINE & REHABILITATION

## 2017-08-04 PROCEDURE — 94760 N-INVAS EAR/PLS OXIMETRY 1: CPT

## 2017-08-04 PROCEDURE — 700111 HCHG RX REV CODE 636 W/ 250 OVERRIDE (IP): Performed by: HOSPITALIST

## 2017-08-04 PROCEDURE — 99233 SBSQ HOSP IP/OBS HIGH 50: CPT | Performed by: HOSPITALIST

## 2017-08-04 PROCEDURE — 700101 HCHG RX REV CODE 250: Performed by: PHYSICAL MEDICINE & REHABILITATION

## 2017-08-04 PROCEDURE — 97116 GAIT TRAINING THERAPY: CPT

## 2017-08-04 PROCEDURE — 700111 HCHG RX REV CODE 636 W/ 250 OVERRIDE (IP): Performed by: PHYSICAL MEDICINE & REHABILITATION

## 2017-08-04 PROCEDURE — 770010 HCHG ROOM/CARE - REHAB SEMI PRIVAT*

## 2017-08-04 PROCEDURE — 700102 HCHG RX REV CODE 250 W/ 637 OVERRIDE(OP): Performed by: PHYSICAL MEDICINE & REHABILITATION

## 2017-08-04 PROCEDURE — 97110 THERAPEUTIC EXERCISES: CPT

## 2017-08-04 PROCEDURE — 97530 THERAPEUTIC ACTIVITIES: CPT

## 2017-08-04 PROCEDURE — 700105 HCHG RX REV CODE 258: Performed by: HOSPITALIST

## 2017-08-04 PROCEDURE — 83036 HEMOGLOBIN GLYCOSYLATED A1C: CPT

## 2017-08-04 PROCEDURE — 97535 SELF CARE MNGMENT TRAINING: CPT

## 2017-08-04 PROCEDURE — 99232 SBSQ HOSP IP/OBS MODERATE 35: CPT | Performed by: PHYSICAL MEDICINE & REHABILITATION

## 2017-08-04 RX ORDER — HYDRALAZINE HYDROCHLORIDE 25 MG/1
25 TABLET, FILM COATED ORAL EVERY 8 HOURS
Status: DISCONTINUED | OUTPATIENT
Start: 2017-08-04 | End: 2017-08-09

## 2017-08-04 RX ADMIN — FUROSEMIDE 40 MG: 40 TABLET ORAL at 04:36

## 2017-08-04 RX ADMIN — DULOXETINE HYDROCHLORIDE 20 MG: 20 CAPSULE, DELAYED RELEASE ORAL at 08:13

## 2017-08-04 RX ADMIN — OMEPRAZOLE 20 MG: 20 CAPSULE, DELAYED RELEASE ORAL at 20:39

## 2017-08-04 RX ADMIN — NICOTINE 14 MG: 14 PATCH, EXTENDED RELEASE TRANSDERMAL at 04:43

## 2017-08-04 RX ADMIN — Medication 2 TABLET: at 20:39

## 2017-08-04 RX ADMIN — METOPROLOL TARTRATE 25 MG: 25 TABLET ORAL at 17:49

## 2017-08-04 RX ADMIN — GABAPENTIN 300 MG: 300 CAPSULE ORAL at 20:42

## 2017-08-04 RX ADMIN — ATORVASTATIN CALCIUM 80 MG: 40 TABLET, FILM COATED ORAL at 20:39

## 2017-08-04 RX ADMIN — OXYCODONE HYDROCHLORIDE 2.5 MG: 5 TABLET ORAL at 12:22

## 2017-08-04 RX ADMIN — ACETAMINOPHEN 650 MG: 325 TABLET, FILM COATED ORAL at 20:42

## 2017-08-04 RX ADMIN — BENAZEPRIL HYDROCHLORIDE 40 MG: 10 TABLET, FILM COATED ORAL at 04:36

## 2017-08-04 RX ADMIN — HYDRALAZINE HYDROCHLORIDE 25 MG: 25 TABLET, FILM COATED ORAL at 20:52

## 2017-08-04 RX ADMIN — ACETAMINOPHEN 650 MG: 325 TABLET, FILM COATED ORAL at 04:35

## 2017-08-04 RX ADMIN — IRON SUCROSE 200 MG: 20 INJECTION, SOLUTION INTRAVENOUS at 15:48

## 2017-08-04 RX ADMIN — METOPROLOL TARTRATE 50 MG: 25 TABLET ORAL at 04:36

## 2017-08-04 RX ADMIN — OMEPRAZOLE 20 MG: 20 CAPSULE, DELAYED RELEASE ORAL at 08:13

## 2017-08-04 RX ADMIN — ASPIRIN 81 MG: 81 TABLET, COATED ORAL at 08:13

## 2017-08-04 RX ADMIN — HYDRALAZINE HYDROCHLORIDE 25 MG: 25 TABLET, FILM COATED ORAL at 14:15

## 2017-08-04 RX ADMIN — ACETAMINOPHEN 650 MG: 325 TABLET, FILM COATED ORAL at 09:56

## 2017-08-04 RX ADMIN — ENOXAPARIN SODIUM 40 MG: 100 INJECTION SUBCUTANEOUS at 20:42

## 2017-08-04 ASSESSMENT — ENCOUNTER SYMPTOMS
FEVER: 0
NECK PAIN: 0
DOUBLE VISION: 0
VOMITING: 0
CHILLS: 0
DIZZINESS: 0
MYALGIAS: 0
DEPRESSION: 0
HEADACHES: 0
NAUSEA: 0
HEARTBURN: 0
WEIGHT LOSS: 0

## 2017-08-04 ASSESSMENT — PAIN SCALES - GENERAL
PAINLEVEL_OUTOF10: 5
PAINLEVEL_OUTOF10: 0
PAINLEVEL_OUTOF10: 5

## 2017-08-04 ASSESSMENT — PAIN SCALES - WONG BAKER
WONGBAKER_NUMERICALRESPONSE: HURTS JUST A LITTLE BIT
WONGBAKER_NUMERICALRESPONSE: HURTS JUST A LITTLE BIT

## 2017-08-04 ASSESSMENT — GAIT ASSESSMENTS
DISTANCE (FEET): 100
DEVIATION: BRADYKINETIC
GAIT LEVEL OF ASSIST: CONTACT GUARD ASSIST
ASSISTIVE DEVICE: FRONT WHEEL WALKER

## 2017-08-04 NOTE — PROGRESS NOTES
Received bedside shift report from Rabia JI/Maral DO RN regarding patient and assumed care. Patient awake, calm and stable, currently positioned in bed for comfort and safety; call light within reach. Denies pain or discomfort at this time. Will continue to monitor.

## 2017-08-04 NOTE — THERAPY
Recreational Therapy Initial Plan of Care Note   1) Assessment: Patient is 76 y.o. female with a diagnosis of T12/L1 compression fxs, thoracic spondylosis and dextroconvex scoliosis. Additional factors influencing patient status / progress (ie: cognitive factors, co-morbidities, social support, etc): CAD, stroke, and chronic back pain, pyschiatric disorder. Long term and short term goals have been discussed with patient and they are in agreement.   2) Plan: Recommend Recreational Therapy 30-60 minutes per day 3 to 4x per week for 3 weeks for the following treatments: Fine and Gross motor leisure functioning, Cognitive leisure functioning, Communication/Social skills, Leisure Education, Community Re-integration, Emotional and Behavioral functioning   3) Goals: Please refer to care plan for goals.

## 2017-08-04 NOTE — DISCHARGE PLANNING
Case Management;  Attempted to call patient's friend Arsenio who lives in the building per patient request.  His phone is temporarily out of order.  Will continue to try.

## 2017-08-04 NOTE — CARE PLAN
Problem: Safety  Goal: Will remain free from injury  Outcome: PROGRESSING AS EXPECTED  Call within reach, bed at lowest position, side rails up x2. Will continue with hourly rounding.     Problem: Pain Management  Goal: Pain level will decrease to patient’s comfort goal  Pt c/o right upper leg pain. Administered prn tylenol 650mg at 1951 with good effect. Will continue to monitor.

## 2017-08-04 NOTE — PROGRESS NOTES
Patient AOx4 complains of dizziness that started when she got out of bed this am. Patient states her eyes are also blurry. Patient states she does wear glasses but lost them since she has been at rehab.   Patient denies other complaints such as headache, nausea. Patient BP checked 160/70, HR 54, O2 sat 94% on x 1 L, RR 18.  Patient states she still wants to eat breakfast.  Patient back in her room at 0908 and states the dizziness has went away and she has no complaints at this time. Will continue to monitor and assess.

## 2017-08-04 NOTE — PROGRESS NOTES
[facility]    Date of Service: 8/4/2017    Chief Complaint  This is a very pleasant 76-year-old white female    with past medical history significant for severe tobacco abuse along with    chronic back discomfort.  The patient presented to [facility] [place]    initially on 07/29/2017 with bilateral leg pain, difficulty ambulating.  CT of   her lumbar spine and pelvis were done in the ER, which did not show any    evidence of acute fracture.  She was also short of breath upon admission.  The   patient smoked about a pack and a half a day for 50 years.  Apparently, she    does have a history of coronary disease and stroke in the past, though I do    not have direct records of this available to me.  Looks like she is originally   from [place] though she has been in [place] for several years.  It appears    that they did a MRI of the brain back in 2014, which did show evidence of old    stroke, but no obvious acute insult to my eye.  There was some prominent    ventriculomegaly without hydrocephalus, old lacunar infarcts in the left    internal capsule and left corona radiata, advanced white matter ischemic    disease.  Once again, no acute infarct that was back in 2014.  She did have an   imaging of her lumbar spine.  On my exam, she was unable to dorsiflex both    Great toes,  which suggests that L5 nerve root issue.  The lumbar spine imaging did    demonstrate diffuse disk bulge extending symmetrically into the left lateral    recess and foraminal zone, mild right and severe left facet arthropathy, so    basically, she had multilevel disease, prominent posterior epidural fat from    L2 through L5 contributing to the thecal narrowing at these levels.  Thecal    sac narrowing was worse at L2-L3 and L3-L4.  Once again, severe left-sided and   right-sided facet arthropathy in the L5 region.  Patient really was not    interested in any surgical procedures.  The patient was, therefore, released  "   to the [facility] on 08/01/2017 for further physical therapy.  She    has been having trouble walking, which has been a progressive problem, perhaps   it is related to the back problem itself versus deconditioning.  Medicine was   consulted due to the difficulty dealing with her blood pressure.  The patient   had pressures that were modestly elevated yesterday well over 140, though I    believe it was significantly higher than this.  I believe this was post    medication pressures.  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    Interval Problem Update  NONE    Consultants/Specialty  INTERNAL MEDICINE    Disposition  PER PRIMARY REHAB TEAM.        Review of Systems   Constitutional: Negative for fever, chills, weight loss and malaise/fatigue.   Eyes: Negative for double vision.   Cardiovascular: Negative for chest pain.   Gastrointestinal: Negative for heartburn, nausea and vomiting.   Genitourinary: Negative for dysuria.   Musculoskeletal: Negative for myalgias and neck pain.   Skin: Negative for rash.   Neurological: Negative for dizziness and headaches.   Psychiatric/Behavioral: Negative for depression.   8/3/17--PATIENT AWAKE AND ALERT.  THERE IS SOME MODERATE B/L ILIAC DISEASE NOTED IN 2009.   SHE HAD AT LEAST 60-70% DISEASE BACK IN 2009,  8 YEARS AGO.  SHE ALSO HAS HAD A SEVERE MICROCYTIC ANEMIA SINCE 2009,  THOUGH THE MCV AT THAT TIME WAS 79 SUGGESTING THIS IS CHRONIC BLOOD LOSS AND IRON DEFICIENCY.  I AM REPLETING IRON STORES IV AND CHECKING WITH THE 2 GI GROUPS TO SEE IF SHE HAS HAD ENDOSCOPY OF ANY SORT.  PLAN:  CHECK FOR COLONOSCOPY IF DONE BY GI CONSULTANTS OR [facility].  TOTAL BODY IRON REPLACEMENT.  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  RE-CHECK LE ARTERIAL STUDIES.  THAT MAY BE CONTRIBUTING.  PRIOR STUDIES FROM NEARLY A DECADE AGO SHOWED, \"CT ANGIOGRAM " "PELVIS WITH AND WITHOUT CONTRAST AND POST PROCESSING   DEMONSTRATES EXTENSIVE CALCIFIED AND NONCALCIFIED PLAQUE IN THE COMMON   ILIAC ARTERIES BILATERALLY.  THERE IS AT LEAST 70% DIAMETER REDUCTION   STENOSIS AT THE ORIGIN OF THE RIGHT COMMON ILIAC ARTERY.  THERE IS 60%   DIAMETER REDUCTION STENOSIS AT THE UPPER PORTION OF THE LEFT COMMON ILIAC   ARTERY.  INTERNAL AND EXTERNAL ILIAC ARTERIES AS WELL AS THE COMMON   FEMORAL ARTERIES APPEAR TO BE PATENT BILATERALLY.\"  CHECKING LE ARTERIAL DOPPLERS.    ADDENDUM:  TECH CAME IN TO TELL ME DISTALLY THERE IS BIPHASIC FLOW.  I TOLD HIM THAT IS WHAT I EXPECTED.  CERTAINLY NO WORSE THAN WHAT I EXPECTED.  FURTHER INVESTIGATION FORMALLY WOULD BE REDUNDANT.    8/4/17--PATIENT'S PRESSURE CREEPING UP AGAIN.  I THINK WE HAVE REACHED OUR LIMIT ON BETA BLOCKADE AS HR IS IN LOW TO MID 50'S.   GOING BACK TO ORIGINAL 25 BID.   ADDING SOME STANDING DOSE HYDRALAZINE.    MENTATION SEEMED A BIT BETTER.   PUZZLING THAT SHE IS ALERT AND ORIENTED WITH THERAPY YET STRUGGLES WITH SIMPLE QUESTIONS WITH THE DOCTORS.   NO CHANGE IN HER PHYSICAL EXAM.   PATIENT SAW DR MER GAN WITH GI CONSULTANTS IN 2011.   GETTING RECORDS.   OF NOTE THE PATIENT KNEW SHE HAD HER CARD IN HER PURSE.  SHE SHOULD START RETICULOCYTOSIS SOON NOW THAT WE HAVE LOADED HER UP ON IRON.    ARTERIAL STUDIES:  NO CHANGE.  BIPHASIC FLOW,  VENOUS DOPPLERS NEGATIVE,  CAROTID DOPPLERS NEGATIVE.     PLAN:  GO BACK DOWN TO 25 BID ON LOPRESSOR DUE TO BRADYCARDIA.  ADDING A STANDING DOSE OF HYDRALAZINE 25 MG TID.   LABS IN AM.    ADDENDUM:  RECORDS FROM COLONOSCOPY AND EGD SHOW ESSENTIALLY NO SIGNIFICANT FINDINGS.   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  WE HAVE HAD HER ON BID PROTONIX.    SHE MOST LIKELY PERIODICALLY BLEEDS FROM THIS AVM OR DIVERTICULAR OCCULT BLEEDS.    THE EGD X2 WERE IN 2010 AND 2011, (removal of benign tubular " adenoma in upper gi tract).   FIRST EGD/ COLONOSCOPY 11.   I DONT THINK SHE NEEDS FURTHER W/U AT THIS POINT ON THE COLON.  THE PATIENT SHOULD HAVE EGD AS OUTPATIENT AS BARRETS ESOPHAGUS SUGGESTED.   Physical Exam  Laboratory/Imaging   Hemodynamics  Temp (24hrs), Av.6 °C (97.8 °F), Min:36.3 °C (97.4 °F), Max:36.9 °C (98.5 °F)   Temperature: 36.3 °C (97.4 °F)  Pulse  Av.7  Min: 52  Max: 84    Blood Pressure : 156/68 mmHg      Respiratory      Respiration: 18, Pulse Oximetry: 94 %, O2 Daily Delivery Respiratory : Silicone Nasal Cannula     Work Of Breathing / Effort: Mild  RUL Breath Sounds: Clear, RML Breath Sounds: Clear, RLL Breath Sounds: Clear;Diminished, ARGELIA Breath Sounds: Clear, LLL Breath Sounds: Clear;Diminished    Fluids    Intake/Output Summary (Last 24 hours) at 17 1108  Last data filed at 17 0400   Gross per 24 hour   Intake    540 ml   Output      0 ml   Net    540 ml       Nutrition  Orders Placed This Encounter   Procedures   • DIET ORDER     Standing Status: Standing      Number of Occurrences: 1      Standing Expiration Date:      Order Specific Question:  Diet:     Answer:  Cardiac [6]     Order Specific Question:  Texture/Fiber modifications:     Answer:  Dysphagia 2(Pureed/Chopped)specify fluid consistency(question 6) [2]     Order Specific Question:  Consistency/Fluid modifications:     Answer:  Thin Liquids [3]     Physical Exam   Constitutional: She is oriented to person, place, and time. She appears well-nourished. No distress.   HENT:   Head: Atraumatic.   Eyes: No scleral icterus.   Neck: No JVD present. No tracheal deviation present.   Cardiovascular: Normal rate, regular rhythm and normal heart sounds.    No murmur heard.  Pulmonary/Chest: No respiratory distress. She has no wheezes.   Abdominal: Soft. Bowel sounds are normal. She exhibits no distension.   Musculoskeletal: She exhibits no edema.   Lymphadenopathy:     She has no cervical adenopathy.   Neurological:  She is alert and oriented to person, place, and time. No cranial nerve deficit.   Skin: Skin is warm and dry.   Psychiatric: She has a normal mood and affect.       Recent Labs      08/02/17 0520 08/03/17   0524   WBC  9.3  8.5   RBC  4.49  4.65   HEMOGLOBIN  8.1*  8.7*   HEMATOCRIT  29.9*  31.4*   MCV  66.6*  67.5*   MCH  18.0*  18.7*   MCHC  27.1*  27.7*   RDW  51.4*  51.8*   PLATELETCT  315  315   MPV  10.9  10.4     Recent Labs      08/02/17 0520 08/03/17   0524   SODIUM  144  141   POTASSIUM  4.1  3.8   CHLORIDE  110  107   CO2  27  25   GLUCOSE  99  94   BUN  13  12   CREATININE  0.78  0.85   CALCIUM  9.4  9.5         Recent Labs      08/02/17   0520   BNPBTYPENAT  101*     Recent Labs      08/02/17   0520   TRIGLYCERIDE  81   HDL  41   LDL  52          Assessment/Plan     No new assessment & plan notes have been filed under this hospital service since the last note was generated.  Service: Hospital Medicine  ASSESSMENT:  1.  Hypertension with systolic pressures as high as 191, question coronary    artery disease.  2.  Severe chronic obstructive pulmonary disease and tobacco abuse.  3.  Question cerebrovascular disease with advanced white matter disease noted    on MRI of the brain 3 years ago.  4.  Preserved left ventricular systolic function on echo.  5.  Probable lumbar myelopathy with the patient refusing surgery.  6.  KNOWN MODERATE PVD S/P MODERATE STENOSIS ILIACS IN 2009  PLAN:  I think we have the blood pressure under significantly better control    with beta blockade added to the patient's outpatient regimen, which included    just Lasix and an ACE inhibitor.  The patient does have bibasilar rhonchi.     Iron studies done in the hospital did show significant iron deficiency    component.  She is probably overdue for a colonoscopy.  Her CBC is diminished    and the MCV is very low at 66, which leaves me to believe that either she has    been chronically losing blood or has some type of occult  malignancy versus a    hemoglobinopathy; 66 is pretty low, so I may do a hemoglobin electrophoresis as the MCV has been  Low for 8 years with no indication of colon cancer thus far.     She does have some teardrop cells on her peripheral smear.  I am sure whether she is    overdue for colonoscopy.  We will go ahead and get some stool guaiacs.  We    will continue the Lopressor 25 b.i.d.  She seems to be quite sensitive to that   as her heart rate drops into the 60s.  Back in 2011, patient also had a MCV    of 63.5, so this is probably a hereditary condition rather than an iron    deficiency.  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  I will actually try to set up a pharmacy to give iv iron  For total body iron replacement as well and get rid of the po iron.  Labs reviewed, Medications reviewed and Radiology images reviewed  Pink catheter: No Pink

## 2017-08-04 NOTE — FLOWSHEET NOTE
Patient in [place] at this time. Complained of dizziness and blurred vision. Remains on O2 at 1L, sat=94%. HR-52. Has HTN. RN to check BP. Also patient has lost her glasses. No confusion good cough on request.      08/04/17 0805   Events/Summary/Plan   Non-Invasive Resp Device Site Inspection Completed Intact   Location nasal cannula   Respiratory WDL   Respiratory (WDL) X   Chest Exam   Respiration 18   Pulse (!) 52   Breath Sounds   RUL Breath Sounds Clear   RML Breath Sounds Clear   RLL Breath Sounds Clear;Diminished   ARGELIA Breath Sounds Clear   LLL Breath Sounds Clear;Diminished   Secretions   Cough Non Productive   How Sputum Obtained Cough on Request   Oximetry   #Pulse Oximetry (Single Determination) Yes   Oxygen   Home O2 Use Prior To Admission? No   Pulse Oximetry 94 %   O2 (LPM) 1   O2 Daily Delivery Respiratory  Silicone Nasal Cannula

## 2017-08-04 NOTE — CARE PLAN
Problem: Safety  Goal: Will remain free from falls  Intervention: Assess risk factors for falls  Patient AOx3 can be confused at times. Patient does well with frequent reminders to use the call light when assistance is needed. Patient has call light close by, bed in low position, shoes on when out of bed, alarms are set on the bed and wheel chair.      Problem: Pain Management  Goal: Pain level will decrease to patient’s comfort goal  Intervention: Follow pain managment plan developed in collaboration with patient and Interdisciplinary Team  Patient given tylenol this am for back pain with good results after patient refused Lidocaine patch. This RN educated patient regarding the benefits for the patch and back pain relief along with additional measures if necessary.

## 2017-08-04 NOTE — PROGRESS NOTES
Administered prn hyraalazine 25mg at 1951 for /90, pulse 70. Rechecked at 2120, /85, pulse 63. Will continue to monitor.

## 2017-08-04 NOTE — PROGRESS NOTES
Patient's  IV iron infiltrated, was running approximately 15 minutes.  stopped immediately when patient complained of pain at the IV site. Patient IV was discontinued and ice applied to site along with elevation. Dr Miller was notified and Agustín pharmacist and Charge Nurse Lisa. Area marked with a sharpie and picture taken. Will continue to monitor and assess.   Per Dr Angela lerner DC'd until Dr Gutiérrez evaluates tomorrow.

## 2017-08-05 LAB
ALBUMIN SERPL BCP-MCNC: 3.3 G/DL (ref 3.2–4.9)
ALBUMIN/GLOB SERPL: 1.4 G/DL
ALP SERPL-CCNC: 80 U/L (ref 30–99)
ALT SERPL-CCNC: 36 U/L (ref 2–50)
ANION GAP SERPL CALC-SCNC: 9 MMOL/L (ref 0–11.9)
ANISOCYTOSIS BLD QL SMEAR: ABNORMAL
AST SERPL-CCNC: 35 U/L (ref 12–45)
BASOPHILS # BLD AUTO: 0 % (ref 0–1.8)
BASOPHILS # BLD: 0 K/UL (ref 0–0.12)
BILIRUB SERPL-MCNC: 0.4 MG/DL (ref 0.1–1.5)
BUN SERPL-MCNC: 21 MG/DL (ref 8–22)
CALCIUM SERPL-MCNC: 9.1 MG/DL (ref 8.5–10.5)
CHLORIDE SERPL-SCNC: 110 MMOL/L (ref 96–112)
CO2 SERPL-SCNC: 24 MMOL/L (ref 20–33)
CREAT SERPL-MCNC: 0.74 MG/DL (ref 0.5–1.4)
EOSINOPHIL # BLD AUTO: 0.2 K/UL (ref 0–0.51)
EOSINOPHIL NFR BLD: 2.8 % (ref 0–6.9)
ERYTHROCYTE [DISTWIDTH] IN BLOOD BY AUTOMATED COUNT: 51.3 FL (ref 35.9–50)
GFR SERPL CREATININE-BSD FRML MDRD: >60 ML/MIN/1.73 M 2
GIANT PLATELETS BLD QL SMEAR: NORMAL
GLOBULIN SER CALC-MCNC: 2.4 G/DL (ref 1.9–3.5)
GLUCOSE SERPL-MCNC: 84 MG/DL (ref 65–99)
HCT VFR BLD AUTO: 31.5 % (ref 37–47)
HGB BLD-MCNC: 8.6 G/DL (ref 12–16)
HYPOCHROMIA BLD QL SMEAR: ABNORMAL
LYMPHOCYTES # BLD AUTO: 0.53 K/UL (ref 1–4.8)
LYMPHOCYTES NFR BLD: 7.5 % (ref 22–41)
MANUAL DIFF BLD: NORMAL
MCH RBC QN AUTO: 19 PG (ref 27–33)
MCHC RBC AUTO-ENTMCNC: 27.3 G/DL (ref 33.6–35)
MCV RBC AUTO: 69.7 FL (ref 81.4–97.8)
MICROCYTES BLD QL SMEAR: ABNORMAL
MONOCYTES # BLD AUTO: 0.33 K/UL (ref 0–0.85)
MONOCYTES NFR BLD AUTO: 4.7 % (ref 0–13.4)
MORPHOLOGY BLD-IMP: NORMAL
MYELOCYTES NFR BLD MANUAL: 0.9 %
NEUTROPHILS # BLD AUTO: 5.89 K/UL (ref 2–7.15)
NEUTROPHILS NFR BLD: 84.1 % (ref 44–72)
NRBC # BLD AUTO: 0.05 K/UL
NRBC BLD AUTO-RTO: 0.7 /100 WBC
OVALOCYTES BLD QL SMEAR: NORMAL
PLATELET # BLD AUTO: 223 K/UL (ref 164–446)
POIKILOCYTOSIS BLD QL SMEAR: NORMAL
POLYCHROMASIA BLD QL SMEAR: NORMAL
POTASSIUM SERPL-SCNC: 3.8 MMOL/L (ref 3.6–5.5)
PROT SERPL-MCNC: 5.7 G/DL (ref 6–8.2)
RBC # BLD AUTO: 4.52 M/UL (ref 4.2–5.4)
RBC BLD AUTO: PRESENT
SCHISTOCYTES BLD QL SMEAR: NORMAL
SODIUM SERPL-SCNC: 143 MMOL/L (ref 135–145)
TSH SERPL DL<=0.005 MIU/L-ACNC: 1.71 UIU/ML (ref 0.3–3.7)
WBC # BLD AUTO: 7 K/UL (ref 4.8–10.8)

## 2017-08-05 PROCEDURE — 97530 THERAPEUTIC ACTIVITIES: CPT

## 2017-08-05 PROCEDURE — A9270 NON-COVERED ITEM OR SERVICE: HCPCS | Performed by: PHYSICAL MEDICINE & REHABILITATION

## 2017-08-05 PROCEDURE — 700101 HCHG RX REV CODE 250: Performed by: PHYSICAL MEDICINE & REHABILITATION

## 2017-08-05 PROCEDURE — 94760 N-INVAS EAR/PLS OXIMETRY 1: CPT

## 2017-08-05 PROCEDURE — 36415 COLL VENOUS BLD VENIPUNCTURE: CPT

## 2017-08-05 PROCEDURE — 99233 SBSQ HOSP IP/OBS HIGH 50: CPT | Performed by: HOSPITALIST

## 2017-08-05 PROCEDURE — A9270 NON-COVERED ITEM OR SERVICE: HCPCS | Performed by: HOSPITALIST

## 2017-08-05 PROCEDURE — 700111 HCHG RX REV CODE 636 W/ 250 OVERRIDE (IP): Performed by: PHYSICAL MEDICINE & REHABILITATION

## 2017-08-05 PROCEDURE — 99232 SBSQ HOSP IP/OBS MODERATE 35: CPT | Performed by: PHYSICAL MEDICINE & REHABILITATION

## 2017-08-05 PROCEDURE — 80053 COMPREHEN METABOLIC PANEL: CPT

## 2017-08-05 PROCEDURE — 700102 HCHG RX REV CODE 250 W/ 637 OVERRIDE(OP): Performed by: HOSPITALIST

## 2017-08-05 PROCEDURE — 92526 ORAL FUNCTION THERAPY: CPT

## 2017-08-05 PROCEDURE — 700102 HCHG RX REV CODE 250 W/ 637 OVERRIDE(OP): Performed by: PHYSICAL MEDICINE & REHABILITATION

## 2017-08-05 PROCEDURE — 97535 SELF CARE MNGMENT TRAINING: CPT

## 2017-08-05 PROCEDURE — 97532 HCHG COGNITIVE SKILL DEV. 15 MIN: CPT

## 2017-08-05 PROCEDURE — 85027 COMPLETE CBC AUTOMATED: CPT

## 2017-08-05 PROCEDURE — 770010 HCHG ROOM/CARE - REHAB SEMI PRIVAT*

## 2017-08-05 PROCEDURE — 85007 BL SMEAR W/DIFF WBC COUNT: CPT

## 2017-08-05 PROCEDURE — 97110 THERAPEUTIC EXERCISES: CPT

## 2017-08-05 PROCEDURE — 97116 GAIT TRAINING THERAPY: CPT

## 2017-08-05 PROCEDURE — 84443 ASSAY THYROID STIM HORMONE: CPT

## 2017-08-05 RX ADMIN — ATORVASTATIN CALCIUM 80 MG: 40 TABLET, FILM COATED ORAL at 21:03

## 2017-08-05 RX ADMIN — Medication 2 TABLET: at 21:03

## 2017-08-05 RX ADMIN — LIDOCAINE 1 PATCH: 50 PATCH TOPICAL at 08:50

## 2017-08-05 RX ADMIN — FUROSEMIDE 40 MG: 40 TABLET ORAL at 05:59

## 2017-08-05 RX ADMIN — OXYCODONE HYDROCHLORIDE 2.5 MG: 5 TABLET ORAL at 14:24

## 2017-08-05 RX ADMIN — ACETAMINOPHEN 650 MG: 325 TABLET, FILM COATED ORAL at 00:42

## 2017-08-05 RX ADMIN — ENOXAPARIN SODIUM 40 MG: 100 INJECTION SUBCUTANEOUS at 21:03

## 2017-08-05 RX ADMIN — BENAZEPRIL HYDROCHLORIDE 40 MG: 10 TABLET, FILM COATED ORAL at 05:59

## 2017-08-05 RX ADMIN — TRAZODONE HYDROCHLORIDE 50 MG: 50 TABLET ORAL at 21:03

## 2017-08-05 RX ADMIN — HYDRALAZINE HYDROCHLORIDE 25 MG: 25 TABLET, FILM COATED ORAL at 21:04

## 2017-08-05 RX ADMIN — HYDRALAZINE HYDROCHLORIDE 25 MG: 25 TABLET, FILM COATED ORAL at 14:24

## 2017-08-05 RX ADMIN — DULOXETINE HYDROCHLORIDE 20 MG: 20 CAPSULE, DELAYED RELEASE ORAL at 08:13

## 2017-08-05 RX ADMIN — METOPROLOL TARTRATE 25 MG: 25 TABLET ORAL at 05:59

## 2017-08-05 RX ADMIN — ASPIRIN 81 MG: 81 TABLET, COATED ORAL at 08:13

## 2017-08-05 RX ADMIN — HYDRALAZINE HYDROCHLORIDE 25 MG: 25 TABLET, FILM COATED ORAL at 05:59

## 2017-08-05 RX ADMIN — GABAPENTIN 300 MG: 300 CAPSULE ORAL at 21:03

## 2017-08-05 RX ADMIN — NICOTINE 14 MG: 14 PATCH, EXTENDED RELEASE TRANSDERMAL at 06:00

## 2017-08-05 RX ADMIN — OMEPRAZOLE 20 MG: 20 CAPSULE, DELAYED RELEASE ORAL at 21:03

## 2017-08-05 RX ADMIN — OXYCODONE HYDROCHLORIDE 2.5 MG: 5 TABLET ORAL at 09:28

## 2017-08-05 RX ADMIN — METOPROLOL TARTRATE 25 MG: 25 TABLET ORAL at 17:22

## 2017-08-05 RX ADMIN — ACETAMINOPHEN 650 MG: 325 TABLET, FILM COATED ORAL at 21:03

## 2017-08-05 RX ADMIN — TRAZODONE HYDROCHLORIDE 50 MG: 50 TABLET ORAL at 00:42

## 2017-08-05 RX ADMIN — ACETAMINOPHEN 650 MG: 325 TABLET, FILM COATED ORAL at 06:09

## 2017-08-05 RX ADMIN — OMEPRAZOLE 20 MG: 20 CAPSULE, DELAYED RELEASE ORAL at 08:13

## 2017-08-05 ASSESSMENT — ENCOUNTER SYMPTOMS
DEPRESSION: 0
HEARTBURN: 0
MYALGIAS: 0
NECK PAIN: 0
WEIGHT LOSS: 0
DIZZINESS: 0
HEADACHES: 0
DOUBLE VISION: 0
CHILLS: 0
VOMITING: 0
NAUSEA: 0
FEVER: 0

## 2017-08-05 ASSESSMENT — PAIN SCALES - GENERAL
PAINLEVEL_OUTOF10: 4
PAINLEVEL_OUTOF10: 1
PAINLEVEL_OUTOF10: 4
PAINLEVEL_OUTOF10: 6
PAINLEVEL_OUTOF10: 0
PAINLEVEL_OUTOF10: 5
PAINLEVEL_OUTOF10: 0
PAINLEVEL_OUTOF10: 3
PAINLEVEL_OUTOF10: 1

## 2017-08-05 ASSESSMENT — GAIT ASSESSMENTS
DISTANCE (FEET): 125
DEVIATION: BRADYKINETIC
GAIT LEVEL OF ASSIST: CONTACT GUARD ASSIST
ASSISTIVE DEVICE: FRONT WHEEL WALKER

## 2017-08-05 NOTE — CARE PLAN
Problem: Safety  Goal: Will remain free from falls  Intervention: Assess risk factors for falls  Patient AOx3 can be confused at times. Patient can be impulsive, alarms are set on the bed and wheel chair. Frequent rounding in place to ensure patient safety. Call light is close by, shoes on when out of bed, bed in low positon.      Problem: Infection  Goal: Will remain free from infection  Intervention: Assess signs and symptoms of infection  Patient IV iron infiltrated yesterday. Patient has swelling and bruising to lateral right wrist. Ice pack and elevation when patient not in therapy sessions. Patient denies complaints of pain. Area is cool to the touch and swelling/bruising remains inside area marked with sharpie.

## 2017-08-05 NOTE — PROGRESS NOTES
"Rehab Progress Note     Chief complaint: none, follow up thoracic spondylosis  Date of Service: 8/4/2017    Interval Events (Subjective)  Patient seen and examined. No acute events overnight. Patient seen while doing a fine motor task with occupational therapy in the gym. When I told patient she be going home in a couple weeks she responded \"I don't have a place to live\"  Later in the day there is an infiltration of her IV iron site.    Objective:  VITAL SIGNS: /61 mmHg  Pulse 57  Temp(Src) 36.9 °C (98.5 °F)  Resp 16  Ht 1.575 m (5' 2\")  Wt 88.451 kg (195 lb)  BMI 35.66 kg/m2  SpO2 95%  Breastfeeding? No  Gen: alert, no apparent distress  CV: regular rate and rhythm, no murmurs, no peripheral edema  Resp: clear to ascultation bilaterally, normal respiratory effort  GI: soft, non-tender abdomen, bowel sounds present  Neuro: Motor: 5/5 MMT throughout, except for bilateral 4/5 EHL                   Sensory:decreased to light touch in right L1/L2 dermatomes, decreased to pinprick in bilateral L4-S1 dermatomes, decreased vibration in bilateral great toes  DTRs: 2+ in bilateral biceps, triceps, brachioradialis, 3+ in bilateral patella, 2+ at bilateral achilles    Recent Results (from the past 72 hour(s))   URINALYSIS    Collection Time: 08/02/17 12:00 PM   Result Value Ref Range    Micro Urine Req see below     Color Yellow     Character Clear     Specific Gravity 1.000 <1.035    Ph 6.0 5.0-8.0    Glucose Negative Negative mg/dL    Ketones Negative Negative mg/dL    Protein Negative Negative mg/dL    Bilirubin Negative Negative    Nitrite Negative Negative    Leukocyte Esterase Negative Negative    Occult Blood Negative Negative   HEMOGLOBINOPATHY PROFILE    Collection Time: 08/03/17  5:24 AM   Result Value Ref Range    Hemoglobin A1 97.2 95.0 - 97.9 %    Hemoglobin A2 2.6 2.0 - 3.5 %    Hemoglobin F 0.2 0.0 - 2.1 %    Hemoglobin S 0.0 0.0 - 0.0 %    Hemaglobin C 0.0 0.0 - 0.0 %    Hemoglobin E 0.0 0.0 - 0.0 " %    Hemoglobin Other 0.0 0.0 - 0.0 %    Hemoglobin Eval See Note     Hemoglobin,Capillary Electrophoresis Not Performed     Hgb Solubility Not Performed    HAPTOGLOBIN    Collection Time: 08/03/17  5:24 AM   Result Value Ref Range    Haptoglobin 217 (H) 30 - 200 mg/dL   LDH    Collection Time: 08/03/17  5:24 AM   Result Value Ref Range    LDH Total 216 107 - 266 U/L   COMP METABOLIC PANEL    Collection Time: 08/03/17  5:24 AM   Result Value Ref Range    Sodium 141 135 - 145 mmol/L    Potassium 3.8 3.6 - 5.5 mmol/L    Chloride 107 96 - 112 mmol/L    Co2 25 20 - 33 mmol/L    Anion Gap 9.0 0.0 - 11.9    Glucose 94 65 - 99 mg/dL    Bun 12 8 - 22 mg/dL    Creatinine 0.85 0.50 - 1.40 mg/dL    Calcium 9.5 8.5 - 10.5 mg/dL    AST(SGOT) 23 12 - 45 U/L    ALT(SGPT) 22 2 - 50 U/L    Alkaline Phosphatase 88 30 - 99 U/L    Total Bilirubin 0.5 0.1 - 1.5 mg/dL    Albumin 3.5 3.2 - 4.9 g/dL    Total Protein 6.2 6.0 - 8.2 g/dL    Globulin 2.7 1.9 - 3.5 g/dL    A-G Ratio 1.3 g/dL   CBC WITH DIFFERENTIAL    Collection Time: 08/03/17  5:24 AM   Result Value Ref Range    WBC 8.5 4.8 - 10.8 K/uL    RBC 4.65 4.20 - 5.40 M/uL    Hemoglobin 8.7 (L) 12.0 - 16.0 g/dL    Hematocrit 31.4 (L) 37.0 - 47.0 %    MCV 67.5 (L) 81.4 - 97.8 fL    MCH 18.7 (L) 27.0 - 33.0 pg    MCHC 27.7 (L) 33.6 - 35.0 g/dL    RDW 51.8 (H) 35.9 - 50.0 fL    Platelet Count 315 164 - 446 K/uL    MPV 10.4 9.0 - 12.9 fL    Neutrophils-Polys 73.60 (H) 44.00 - 72.00 %    Lymphocytes 13.10 (L) 22.00 - 41.00 %    Monocytes 7.40 0.00 - 13.40 %    Eosinophils 2.20 0.00 - 6.90 %    Basophils 0.60 0.00 - 1.80 %    Immature Granulocytes 3.10 (H) 0.00 - 0.90 %    Nucleated RBC 1.40 /100 WBC    Neutrophils (Absolute) 6.23 2.00 - 7.15 K/uL    Lymphs (Absolute) 1.11 1.00 - 4.80 K/uL    Monos (Absolute) 0.63 0.00 - 0.85 K/uL    Eos (Absolute) 0.19 0.00 - 0.51 K/uL    Baso (Absolute) 0.05 0.00 - 0.12 K/uL    Immature Granulocytes (abs) 0.26 (H) 0.00 - 0.11 K/uL    NRBC (Absolute) 0.12  K/uL   ESTIMATED GFR    Collection Time: 08/03/17  5:24 AM   Result Value Ref Range    GFR If African American >60 >60 mL/min/1.73 m 2    GFR If Non African American >60 >60 mL/min/1.73 m 2   HEMOGLOBIN A1C    Collection Time: 08/04/17  5:30 AM   Result Value Ref Range    Glycohemoglobin 4.8 0.0 - 5.6 %    Est Avg Glucose 91 mg/dL   TSH    Collection Time: 08/05/17  5:37 AM   Result Value Ref Range    TSH 1.710 0.300 - 3.700 uIU/mL   COMP METABOLIC PANEL    Collection Time: 08/05/17  5:37 AM   Result Value Ref Range    Sodium 143 135 - 145 mmol/L    Potassium 3.8 3.6 - 5.5 mmol/L    Chloride 110 96 - 112 mmol/L    Co2 24 20 - 33 mmol/L    Anion Gap 9.0 0.0 - 11.9    Glucose 84 65 - 99 mg/dL    Bun 21 8 - 22 mg/dL    Creatinine 0.74 0.50 - 1.40 mg/dL    Calcium 9.1 8.5 - 10.5 mg/dL    AST(SGOT) 35 12 - 45 U/L    ALT(SGPT) 36 2 - 50 U/L    Alkaline Phosphatase 80 30 - 99 U/L    Total Bilirubin 0.4 0.1 - 1.5 mg/dL    Albumin 3.3 3.2 - 4.9 g/dL    Total Protein 5.7 (L) 6.0 - 8.2 g/dL    Globulin 2.4 1.9 - 3.5 g/dL    A-G Ratio 1.4 g/dL   CBC WITH DIFFERENTIAL    Collection Time: 08/05/17  5:37 AM   Result Value Ref Range    WBC 7.0 4.8 - 10.8 K/uL    RBC 4.52 4.20 - 5.40 M/uL    Hemoglobin 8.6 (L) 12.0 - 16.0 g/dL    Hematocrit 31.5 (L) 37.0 - 47.0 %    MCV 69.7 (L) 81.4 - 97.8 fL    MCH 19.0 (L) 27.0 - 33.0 pg    MCHC 27.3 (L) 33.6 - 35.0 g/dL    RDW 51.3 (H) 35.9 - 50.0 fL    Platelet Count 223 164 - 446 K/uL    Nucleated RBC 0.70 /100 WBC    NRBC (Absolute) 0.05 K/uL    Neutrophils-Polys 84.10 (H) 44.00 - 72.00 %    Lymphocytes 7.50 (L) 22.00 - 41.00 %    Monocytes 4.70 0.00 - 13.40 %    Eosinophils 2.80 0.00 - 6.90 %    Basophils 0.00 0.00 - 1.80 %    Neutrophils (Absolute) 5.89 2.00 - 7.15 K/uL    Lymphs (Absolute) 0.53 (L) 1.00 - 4.80 K/uL    Monos (Absolute) 0.33 0.00 - 0.85 K/uL    Eos (Absolute) 0.20 0.00 - 0.51 K/uL    Baso (Absolute) 0.00 0.00 - 0.12 K/uL    Hypochromia 1+     Anisocytosis 2+     Microcytosis  2+    ESTIMATED GFR    Collection Time: 08/05/17  5:37 AM   Result Value Ref Range    GFR If African American >60 >60 mL/min/1.73 m 2    GFR If Non African American >60 >60 mL/min/1.73 m 2   DIFFERENTIAL MANUAL    Collection Time: 08/05/17  5:37 AM   Result Value Ref Range    Myelocytes 0.90 %    Manual Diff Status PERFORMED    PERIPHERAL SMEAR REVIEW    Collection Time: 08/05/17  5:37 AM   Result Value Ref Range    Peripheral Smear Review see below    MORPHOLOGY    Collection Time: 08/05/17  5:37 AM   Result Value Ref Range    RBC Morphology Present     Giant Platelets 2+     Polychromia 1+     Poikilocytosis 1+     Ovalocytes 1+     Schistocytes 1+        Current Facility-Administered Medications   Medication Frequency   • metoprolol (LOPRESSOR) tablet 25 mg TWICE DAILY   • hydrALAZINE (APRESOLINE) tablet 25 mg Q8HRS   • acetaminophen (TYLENOL) tablet 650 mg Q4HRS PRN   • gabapentin (NEURONTIN) capsule 300 mg Q EVENING   • vitamin D (Ergocalciferol) (DRISDOL) capsule 50,000 Units Q7 DAYS   • IRON REPLACEMENT per pharmacy protocol PRN   • Respiratory Care per Protocol Continuous RT   • Pharmacy Consult Request ...Pain Management Review 1 Each PRN   • oxycodone immediate-release (ROXICODONE) tablet 2.5 mg Q3HRS PRN   • oxycodone immediate-release (ROXICODONE) tablet 5 mg Q3HRS PRN   • lidocaine (LIDODERM) 5 % 1 Patch Q24HR   • enoxaparin (LOVENOX) inj 40 mg QHS   • aspirin EC (ECOTRIN) tablet 81 mg DAILY   • artificial tears 1.4 % ophthalmic solution 1 Drop PRN   • hydrALAZINE (APRESOLINE) tablet 25 mg Q8HRS PRN   • mag hydrox-al hydrox-simeth (MAALOX PLUS ES or MYLANTA DS) suspension 20 mL Q2HRS PRN   • trazodone (DESYREL) tablet 50 mg QHS PRN   • sodium chloride (OCEAN) 0.65 % nasal spray 2 Spray PRN   • atorvastatin (LIPITOR) tablet 80 mg QHS   • benazepril (LOTENSIN) tablet 40 mg Q DAY   • busPIRone (BUSPAR) tablet 5 mg BID PRN   • duloxetine (CYMBALTA) capsule 20 mg DAILY   • furosemide (LASIX) tablet 40 mg Q  DAY   • ondansetron (ZOFRAN ODT) dispertab 4 mg Q4HRS PRN   • senna-docusate (PERICOLACE or SENOKOT S) 8.6-50 MG per tablet 2 Tab BID    And   • polyethylene glycol/lytes (MIRALAX) PACKET 1 Packet QDAY PRN    And   • magnesium hydroxide (MILK OF MAGNESIA) suspension 30 mL QDAY PRN    And   • bisacodyl (DULCOLAX) suppository 10 mg QDAY PRN   • nicotine (NICODERM) 14 MG/24HR 14 mg Daily-0600   • omeprazole (PRILOSEC) capsule 20 mg BID       Orders Placed This Encounter   Procedures   • DIET ORDER     Standing Status: Standing      Number of Occurrences: 1      Standing Expiration Date:      Order Specific Question:  Diet:     Answer:  Cardiac [6]     Order Specific Question:  Texture/Fiber modifications:     Answer:  Dysphagia 2(Pureed/Chopped)specify fluid consistency(question 6) [2]     Order Specific Question:  Consistency/Fluid modifications:     Answer:  Thin Liquids [3]       Assessment:  Active Hospital Problems    Diagnosis   • Back pain   • Depression   • Thoracic spondylosis   • HTN (hypertension)   • Dyslipidemia   • History of stroke   • T12 compression fracture (CMS-HCC)   • Microcytic anemia   • Heme positive stool   • Dementia     I led and attended the weekly conference, and agree with the IDT conference documentation and plan of care as noted below.    Date of conference: 8/3/2017    Admission FIM - 62    CM/social support: lives alone     Anticipated DC date: 8/24/2017    Home health: TBD    Equip: TBD    Follow up: TBD    Medical Decision Making and Plan:    Labs reviewed. Medications reviewed. Appreciate the assistance of the hospitalist.    T12/L1 chronic compression fractures - Continue pain management with Lidoderm patch and oral pain meds as needed.    Thoracic spondylosis with scoliosis, thecal sac narrowing from L2-L5 worst at L2-L3 and L3-L4, L2-L5 multilevel bilateral neural foraminal narrowing - with evidence of radiculopathy on exam with weakness at the bilateral L4 myotomes as well as  abnormal sensation on the right L1-L2 dermatome, and L4-S1 dermatomes. Patient does not want surgery. Continue conservative treatment with therapy. Gabapentin for complaints of nerve pain in the right thigh. Increase dose.    Hypertension - Continue Benzapril, furosemide, metoprolol, hydralazine PRN. Hospitalist to make adjustments. Increase metoprolol. Monitor for bradycardia. May need clonidine.    Coronary artery disease status post stenting - continue aspirin and statin.    Dyslipidemia - continue statin.    Tobacco use - nicotine patch.    Acute on chronic pain - pain meds as needed. lidoderm patch for compression fractures. Gabapentin for nerve pain.    Bladder urgency - UA negative. May consider trial of ditropan.    Dysphagia? - choked on food during dinner. Diet downgraded. Speech consult.    History of GI bleed - with microcytic anemia and positive stool guaiac on acute. IV iron. Apparently last colonoscopy normal. Per hospitalist, needs EGD due to history of Haywood's esophagus.    IV iron infiltration - 8/4. Hold off IV iron for now. Monitor wound. Ice and elevate.    History of depression/anxiety - continue Cymbalta and as needed Buspar.    Dementia? - likely vascular dementia based on previous imaging from years ago. Speech consult.    Low Vit D - 10, start high dose repletion.    DVT prophylaxis - Lovenox.    Total time:  >25 minutes.  I spent greater than 50% of the time for patient care and coordination on this date, including unit/floor time, and face-to-face time with the patient as per assessment and plan above.    Christina Miller M.D.

## 2017-08-05 NOTE — PROGRESS NOTES
Reassessed right hand infiltration this am, no swelling noted, non tender to touch, bruising discoloration. Pt with no complaints of pain to right hand. Will continue to monitor.

## 2017-08-05 NOTE — PROGRESS NOTES
Patient AOx3 can be confused at times. Patient does well with frequent reminders to use the call light when assistance is needed. Patient has right upper leg pain and medicated with liliana (see MAR) with good results. Patient is up participating in therapy sessions, will continue to monitor and assess throughout the day.

## 2017-08-05 NOTE — CARE PLAN
Problem: Safety  Goal: Will remain free from injury  Outcome: PROGRESSING AS EXPECTED  Pt 1x out of bed without calling for assistance. Reminded pt to use call light for help, verbalized understanding. Pt back in bed with 1 person assist, side rails up, bed at lowest position, and call light within reach. Bed alarms activated. Will continue to monitor.     Problem: Pain Management  Goal: Pain level will decrease to patient’s comfort goal  Outcome: PROGRESSING AS EXPECTED  Pt c/o lower back pain 5/10 on pain scale. Administered prn tylenol with good effect. Pt is resting in bed with no complaints of pain. Will continue to monitor.

## 2017-08-05 NOTE — PROGRESS NOTES
Bedside report received by Maryann FLORES at 1852. Pt is in bed with no signs of distress. Call light within reach. Will continue to monitor.

## 2017-08-05 NOTE — PROGRESS NOTES
"Rehab Progress Note     Chief complaint: none, follow up thoracic spondylosis  Date of Service: 8/5/2017    Interval Events (Subjective)  Patient seen and examined. No acute events overnight. She has some mild tenderness to her IV infiltration site but is better today. She reports that she did much better in therapy today and was able to walk on the treadmill a longer distance than usual. Tells me she thinks she walked 2 miles. Denies any body weight support system. Denies any pain. Is currently in her room resting and watching TV.    Objective:  VITAL SIGNS: /70 mmHg  Pulse 60  Temp(Src) 36.9 °C (98.5 °F)  Resp 16  Ht 1.575 m (5' 2\")  Wt 88.451 kg (195 lb)  BMI 35.66 kg/m2  SpO2 93%  Breastfeeding? No  Gen: alert, no apparent distress  CV: regular rate and rhythm, no murmurs, no peripheral edema  Resp: clear to ascultation bilaterally, normal respiratory effort  GI: soft, non-tender abdomen, bowel sounds present  Neuro: Motor: 5/5 MMT throughout, except for bilateral 4/5 EHL                   Sensory:decreased to light touch in right L1/L2 dermatomes, decreased to pinprick in bilateral L4-S1 dermatomes, decreased vibration in bilateral great toes  DTRs: 2+ in bilateral biceps, triceps, brachioradialis, 3+ in bilateral patella, 2+ at bilateral achilles    Recent Results (from the past 72 hour(s))   HEMOGLOBINOPATHY PROFILE    Collection Time: 08/03/17  5:24 AM   Result Value Ref Range    Hemoglobin A1 97.2 95.0 - 97.9 %    Hemoglobin A2 2.6 2.0 - 3.5 %    Hemoglobin F 0.2 0.0 - 2.1 %    Hemoglobin S 0.0 0.0 - 0.0 %    Hemaglobin C 0.0 0.0 - 0.0 %    Hemoglobin E 0.0 0.0 - 0.0 %    Hemoglobin Other 0.0 0.0 - 0.0 %    Hemoglobin Eval See Note     Hemoglobin,Capillary Electrophoresis Not Performed     Hgb Solubility Not Performed    HAPTOGLOBIN    Collection Time: 08/03/17  5:24 AM   Result Value Ref Range    Haptoglobin 217 (H) 30 - 200 mg/dL   LDH    Collection Time: 08/03/17  5:24 AM   Result Value Ref " Range    LDH Total 216 107 - 266 U/L   COMP METABOLIC PANEL    Collection Time: 08/03/17  5:24 AM   Result Value Ref Range    Sodium 141 135 - 145 mmol/L    Potassium 3.8 3.6 - 5.5 mmol/L    Chloride 107 96 - 112 mmol/L    Co2 25 20 - 33 mmol/L    Anion Gap 9.0 0.0 - 11.9    Glucose 94 65 - 99 mg/dL    Bun 12 8 - 22 mg/dL    Creatinine 0.85 0.50 - 1.40 mg/dL    Calcium 9.5 8.5 - 10.5 mg/dL    AST(SGOT) 23 12 - 45 U/L    ALT(SGPT) 22 2 - 50 U/L    Alkaline Phosphatase 88 30 - 99 U/L    Total Bilirubin 0.5 0.1 - 1.5 mg/dL    Albumin 3.5 3.2 - 4.9 g/dL    Total Protein 6.2 6.0 - 8.2 g/dL    Globulin 2.7 1.9 - 3.5 g/dL    A-G Ratio 1.3 g/dL   CBC WITH DIFFERENTIAL    Collection Time: 08/03/17  5:24 AM   Result Value Ref Range    WBC 8.5 4.8 - 10.8 K/uL    RBC 4.65 4.20 - 5.40 M/uL    Hemoglobin 8.7 (L) 12.0 - 16.0 g/dL    Hematocrit 31.4 (L) 37.0 - 47.0 %    MCV 67.5 (L) 81.4 - 97.8 fL    MCH 18.7 (L) 27.0 - 33.0 pg    MCHC 27.7 (L) 33.6 - 35.0 g/dL    RDW 51.8 (H) 35.9 - 50.0 fL    Platelet Count 315 164 - 446 K/uL    MPV 10.4 9.0 - 12.9 fL    Neutrophils-Polys 73.60 (H) 44.00 - 72.00 %    Lymphocytes 13.10 (L) 22.00 - 41.00 %    Monocytes 7.40 0.00 - 13.40 %    Eosinophils 2.20 0.00 - 6.90 %    Basophils 0.60 0.00 - 1.80 %    Immature Granulocytes 3.10 (H) 0.00 - 0.90 %    Nucleated RBC 1.40 /100 WBC    Neutrophils (Absolute) 6.23 2.00 - 7.15 K/uL    Lymphs (Absolute) 1.11 1.00 - 4.80 K/uL    Monos (Absolute) 0.63 0.00 - 0.85 K/uL    Eos (Absolute) 0.19 0.00 - 0.51 K/uL    Baso (Absolute) 0.05 0.00 - 0.12 K/uL    Immature Granulocytes (abs) 0.26 (H) 0.00 - 0.11 K/uL    NRBC (Absolute) 0.12 K/uL   ESTIMATED GFR    Collection Time: 08/03/17  5:24 AM   Result Value Ref Range    GFR If African American >60 >60 mL/min/1.73 m 2    GFR If Non African American >60 >60 mL/min/1.73 m 2   HEMOGLOBIN A1C    Collection Time: 08/04/17  5:30 AM   Result Value Ref Range    Glycohemoglobin 4.8 0.0 - 5.6 %    Est Avg Glucose 91 mg/dL    TSH    Collection Time: 08/05/17  5:37 AM   Result Value Ref Range    TSH 1.710 0.300 - 3.700 uIU/mL   COMP METABOLIC PANEL    Collection Time: 08/05/17  5:37 AM   Result Value Ref Range    Sodium 143 135 - 145 mmol/L    Potassium 3.8 3.6 - 5.5 mmol/L    Chloride 110 96 - 112 mmol/L    Co2 24 20 - 33 mmol/L    Anion Gap 9.0 0.0 - 11.9    Glucose 84 65 - 99 mg/dL    Bun 21 8 - 22 mg/dL    Creatinine 0.74 0.50 - 1.40 mg/dL    Calcium 9.1 8.5 - 10.5 mg/dL    AST(SGOT) 35 12 - 45 U/L    ALT(SGPT) 36 2 - 50 U/L    Alkaline Phosphatase 80 30 - 99 U/L    Total Bilirubin 0.4 0.1 - 1.5 mg/dL    Albumin 3.3 3.2 - 4.9 g/dL    Total Protein 5.7 (L) 6.0 - 8.2 g/dL    Globulin 2.4 1.9 - 3.5 g/dL    A-G Ratio 1.4 g/dL   CBC WITH DIFFERENTIAL    Collection Time: 08/05/17  5:37 AM   Result Value Ref Range    WBC 7.0 4.8 - 10.8 K/uL    RBC 4.52 4.20 - 5.40 M/uL    Hemoglobin 8.6 (L) 12.0 - 16.0 g/dL    Hematocrit 31.5 (L) 37.0 - 47.0 %    MCV 69.7 (L) 81.4 - 97.8 fL    MCH 19.0 (L) 27.0 - 33.0 pg    MCHC 27.3 (L) 33.6 - 35.0 g/dL    RDW 51.3 (H) 35.9 - 50.0 fL    Platelet Count 223 164 - 446 K/uL    Nucleated RBC 0.70 /100 WBC    NRBC (Absolute) 0.05 K/uL    Neutrophils-Polys 84.10 (H) 44.00 - 72.00 %    Lymphocytes 7.50 (L) 22.00 - 41.00 %    Monocytes 4.70 0.00 - 13.40 %    Eosinophils 2.80 0.00 - 6.90 %    Basophils 0.00 0.00 - 1.80 %    Neutrophils (Absolute) 5.89 2.00 - 7.15 K/uL    Lymphs (Absolute) 0.53 (L) 1.00 - 4.80 K/uL    Monos (Absolute) 0.33 0.00 - 0.85 K/uL    Eos (Absolute) 0.20 0.00 - 0.51 K/uL    Baso (Absolute) 0.00 0.00 - 0.12 K/uL    Hypochromia 1+     Anisocytosis 2+     Microcytosis 2+    ESTIMATED GFR    Collection Time: 08/05/17  5:37 AM   Result Value Ref Range    GFR If African American >60 >60 mL/min/1.73 m 2    GFR If Non African American >60 >60 mL/min/1.73 m 2   DIFFERENTIAL MANUAL    Collection Time: 08/05/17  5:37 AM   Result Value Ref Range    Myelocytes 0.90 %    Manual Diff Status PERFORMED     PERIPHERAL SMEAR REVIEW    Collection Time: 08/05/17  5:37 AM   Result Value Ref Range    Peripheral Smear Review see below    MORPHOLOGY    Collection Time: 08/05/17  5:37 AM   Result Value Ref Range    RBC Morphology Present     Giant Platelets 2+     Polychromia 1+     Poikilocytosis 1+     Ovalocytes 1+     Schistocytes 1+        Current Facility-Administered Medications   Medication Frequency   • metoprolol (LOPRESSOR) tablet 25 mg TWICE DAILY   • hydrALAZINE (APRESOLINE) tablet 25 mg Q8HRS   • acetaminophen (TYLENOL) tablet 650 mg Q4HRS PRN   • gabapentin (NEURONTIN) capsule 300 mg Q EVENING   • vitamin D (Ergocalciferol) (DRISDOL) capsule 50,000 Units Q7 DAYS   • IRON REPLACEMENT per pharmacy protocol PRN   • Respiratory Care per Protocol Continuous RT   • Pharmacy Consult Request ...Pain Management Review 1 Each PRN   • oxycodone immediate-release (ROXICODONE) tablet 2.5 mg Q3HRS PRN   • oxycodone immediate-release (ROXICODONE) tablet 5 mg Q3HRS PRN   • lidocaine (LIDODERM) 5 % 1 Patch Q24HR   • enoxaparin (LOVENOX) inj 40 mg QHS   • aspirin EC (ECOTRIN) tablet 81 mg DAILY   • artificial tears 1.4 % ophthalmic solution 1 Drop PRN   • hydrALAZINE (APRESOLINE) tablet 25 mg Q8HRS PRN   • mag hydrox-al hydrox-simeth (MAALOX PLUS ES or MYLANTA DS) suspension 20 mL Q2HRS PRN   • trazodone (DESYREL) tablet 50 mg QHS PRN   • sodium chloride (OCEAN) 0.65 % nasal spray 2 Spray PRN   • atorvastatin (LIPITOR) tablet 80 mg QHS   • benazepril (LOTENSIN) tablet 40 mg Q DAY   • busPIRone (BUSPAR) tablet 5 mg BID PRN   • duloxetine (CYMBALTA) capsule 20 mg DAILY   • furosemide (LASIX) tablet 40 mg Q DAY   • ondansetron (ZOFRAN ODT) dispertab 4 mg Q4HRS PRN   • senna-docusate (PERICOLACE or SENOKOT S) 8.6-50 MG per tablet 2 Tab BID    And   • polyethylene glycol/lytes (MIRALAX) PACKET 1 Packet QDAY PRN    And   • magnesium hydroxide (MILK OF MAGNESIA) suspension 30 mL QDAY PRN    And   • bisacodyl (DULCOLAX) suppository 10  mg QDAY PRN   • nicotine (NICODERM) 14 MG/24HR 14 mg Daily-0600   • omeprazole (PRILOSEC) capsule 20 mg BID       Orders Placed This Encounter   Procedures   • DIET ORDER     Standing Status: Standing      Number of Occurrences: 1      Standing Expiration Date:      Order Specific Question:  Diet:     Answer:  Cardiac [6]     Order Specific Question:  Texture/Fiber modifications:     Answer:  Dysphagia 2(Pureed/Chopped)specify fluid consistency(question 6) [2]     Order Specific Question:  Consistency/Fluid modifications:     Answer:  Thin Liquids [3]       Assessment:  Active Hospital Problems    Diagnosis   • Back pain   • Depression   • Thoracic spondylosis   • HTN (hypertension)   • Dyslipidemia   • History of stroke   • T12 compression fracture (CMS-HCC)   • Microcytic anemia   • Heme positive stool   • Dementia     I led and attended the weekly conference, and agree with the IDT conference documentation and plan of care as noted below.    Date of conference: 8/3/2017    Admission FIM - 63    CM/social support: lives alone     Anticipated DC date: 8/24/2017    Home health: TBD    Equip: TBD    Follow up: TBD    Medical Decision Making and Plan:    Labs reviewed. Medications reviewed. Appreciate the assistance of the hospitalist.    T12/L1 chronic compression fractures - Continue pain management with Lidoderm patch and oral pain meds as needed.    Thoracic spondylosis with scoliosis, thecal sac narrowing from L2-L5 worst at L2-L3 and L3-L4, L2-L5 multilevel bilateral neural foraminal narrowing - with evidence of radiculopathy on exam with weakness at the bilateral L4 myotomes as well as abnormal sensation on the right L1-L2 dermatome, and L4-S1 dermatomes. Patient does not want surgery. Continue conservative treatment with therapy. Gabapentin for complaints of nerve pain in the right thigh. Increased dose. Monitor.    Hypertension - Continue Benzapril, furosemide, metoprolol, hydralazine PRN. Hospitalist to make  adjustments. Increase metoprolol. Monitor for bradycardia. May need clonidine.    Coronary artery disease status post stenting - continue aspirin and statin.    Dyslipidemia - continue statin.    Tobacco use - nicotine patch.    Acute on chronic pain - pain meds as needed. lidoderm patch for compression fractures. Gabapentin for nerve pain.    Bladder urgency - UA negative. May consider trial of ditropan.    Dysphagia? - choked on food during dinner. Diet downgraded. Speech consult.    History of GI bleed - with microcytic anemia and positive stool guaiac on acute. IV iron. Apparently last colonoscopy normal. Per hospitalist, needs EGD due to history of Haywood's esophagus.    IV iron infiltration - 8/4. Discontinue infusions - got 2/5. Monitor wound. Ice and elevate.    History of depression/anxiety - continue Cymbalta and as needed Buspar.    Dementia? - likely vascular dementia based on previous imaging from years ago. Speech consult.    Low Vit D - 10, started high dose repletion.    DVT prophylaxis - Lovenox.    Total time:  >25 minutes.  I spent greater than 50% of the time for patient care and coordination on this date, including unit/floor time, and face-to-face time with the patient as per assessment and plan above.    Christina Miller M.D.

## 2017-08-06 LAB — EKG IMPRESSION: NORMAL

## 2017-08-06 PROCEDURE — 700111 HCHG RX REV CODE 636 W/ 250 OVERRIDE (IP): Performed by: PHYSICAL MEDICINE & REHABILITATION

## 2017-08-06 PROCEDURE — 700102 HCHG RX REV CODE 250 W/ 637 OVERRIDE(OP): Performed by: PHYSICAL MEDICINE & REHABILITATION

## 2017-08-06 PROCEDURE — 700101 HCHG RX REV CODE 250: Performed by: PHYSICAL MEDICINE & REHABILITATION

## 2017-08-06 PROCEDURE — A9270 NON-COVERED ITEM OR SERVICE: HCPCS | Performed by: HOSPITALIST

## 2017-08-06 PROCEDURE — 94760 N-INVAS EAR/PLS OXIMETRY 1: CPT

## 2017-08-06 PROCEDURE — 700102 HCHG RX REV CODE 250 W/ 637 OVERRIDE(OP): Performed by: HOSPITALIST

## 2017-08-06 PROCEDURE — 770010 HCHG ROOM/CARE - REHAB SEMI PRIVAT*

## 2017-08-06 PROCEDURE — A9270 NON-COVERED ITEM OR SERVICE: HCPCS | Performed by: PHYSICAL MEDICINE & REHABILITATION

## 2017-08-06 PROCEDURE — 99233 SBSQ HOSP IP/OBS HIGH 50: CPT | Performed by: HOSPITALIST

## 2017-08-06 PROCEDURE — 82272 OCCULT BLD FECES 1-3 TESTS: CPT

## 2017-08-06 RX ADMIN — ENOXAPARIN SODIUM 40 MG: 100 INJECTION SUBCUTANEOUS at 20:46

## 2017-08-06 RX ADMIN — Medication 2 TABLET: at 08:34

## 2017-08-06 RX ADMIN — Medication 2 TABLET: at 20:44

## 2017-08-06 RX ADMIN — DULOXETINE HYDROCHLORIDE 20 MG: 20 CAPSULE, DELAYED RELEASE ORAL at 08:34

## 2017-08-06 RX ADMIN — FUROSEMIDE 40 MG: 40 TABLET ORAL at 05:16

## 2017-08-06 RX ADMIN — BENAZEPRIL HYDROCHLORIDE 40 MG: 10 TABLET, FILM COATED ORAL at 05:15

## 2017-08-06 RX ADMIN — ATORVASTATIN CALCIUM 80 MG: 40 TABLET, FILM COATED ORAL at 20:43

## 2017-08-06 RX ADMIN — HYDRALAZINE HYDROCHLORIDE 25 MG: 25 TABLET, FILM COATED ORAL at 20:44

## 2017-08-06 RX ADMIN — HYDRALAZINE HYDROCHLORIDE 25 MG: 25 TABLET, FILM COATED ORAL at 05:15

## 2017-08-06 RX ADMIN — NICOTINE 14 MG: 14 PATCH, EXTENDED RELEASE TRANSDERMAL at 05:24

## 2017-08-06 RX ADMIN — METOPROLOL TARTRATE 25 MG: 25 TABLET ORAL at 05:15

## 2017-08-06 RX ADMIN — ACETAMINOPHEN 650 MG: 325 TABLET, FILM COATED ORAL at 05:15

## 2017-08-06 RX ADMIN — ASPIRIN 81 MG: 81 TABLET, COATED ORAL at 08:34

## 2017-08-06 RX ADMIN — TRAZODONE HYDROCHLORIDE 50 MG: 50 TABLET ORAL at 20:45

## 2017-08-06 RX ADMIN — LIDOCAINE 1 PATCH: 50 PATCH TOPICAL at 08:35

## 2017-08-06 RX ADMIN — OXYCODONE HYDROCHLORIDE 5 MG: 5 TABLET ORAL at 11:30

## 2017-08-06 RX ADMIN — HYDRALAZINE HYDROCHLORIDE 25 MG: 25 TABLET, FILM COATED ORAL at 14:22

## 2017-08-06 RX ADMIN — OMEPRAZOLE 20 MG: 20 CAPSULE, DELAYED RELEASE ORAL at 08:34

## 2017-08-06 RX ADMIN — GABAPENTIN 300 MG: 300 CAPSULE ORAL at 20:44

## 2017-08-06 RX ADMIN — OMEPRAZOLE 20 MG: 20 CAPSULE, DELAYED RELEASE ORAL at 20:44

## 2017-08-06 ASSESSMENT — PAIN SCALES - GENERAL
PAINLEVEL_OUTOF10: 0
PAINLEVEL_OUTOF10: 1
PAINLEVEL_OUTOF10: 6
PAINLEVEL_OUTOF10: 5

## 2017-08-06 ASSESSMENT — ENCOUNTER SYMPTOMS
DEPRESSION: 0
WEIGHT LOSS: 0
NAUSEA: 0
VOMITING: 0
CHILLS: 0
HEADACHES: 0
MYALGIAS: 0
FEVER: 0
DOUBLE VISION: 0
HEARTBURN: 0
NECK PAIN: 0
DIZZINESS: 0

## 2017-08-06 NOTE — CARE PLAN
Problem: Safety  Goal: Will remain free from injury  Outcome: PROGRESSING AS EXPECTED  Call within reach, bed at lowest position, side rails up x2. Bed alarm is in place.  Will continue with hourly rounding.     Problem: Pain Management  Goal: Pain level will decrease to patient’s comfort goal  Medicated with Tylenol tonight for complains of right leg pain.  See doc flow sheet.  Will continue to monitor.

## 2017-08-06 NOTE — PROGRESS NOTES
Received bedside shift report from Maryann LUKE RN regarding patient and assumed care. Patient awake, calm and stable, currently positioned in bed for comfort and safety; call light within reach. Denies pain or discomfort at this time. Will continue to monitor.

## 2017-08-06 NOTE — CARE PLAN
Problem: Safety  Goal: Will remain free from injury  Intervention: Provide assistance with mobility  Patient weak & unsteady, assisted with transfers to prevent falls.      Problem: Pain Management  Goal: Pain level will decrease to patient’s comfort goal  Intervention: Follow pain managment plan developed in collaboration with patient and Interdisciplinary Team  Patient complained of left upper tooth pain, medicated with Roxicodone 5 mg po with good relief.

## 2017-08-06 NOTE — PROGRESS NOTES
[facility]    Date of Service: 8/5/2017    Chief Complaint  This is a very pleasant 76-year-old white female    with past medical history significant for severe tobacco abuse along with    chronic back discomfort.  The patient presented to [facility]    initially on 07/29/2017 with bilateral leg pain, difficulty ambulating.  CT of   her lumbar spine and pelvis were done in the ER, which did not show any    evidence of acute fracture.  She was also short of breath upon admission.  The   patient smoked about a pack and a half a day for 50 years.  Apparently, she    does have a history of coronary disease and stroke in the past, though I do    not have direct records of this available to me.  Looks like she is originally   from [place] though she has been in [place] for several years.  It appears    that they did a MRI of the brain back in 2014, which did show evidence of old    stroke, but no obvious acute insult to my eye.  There was some prominent    ventriculomegaly without hydrocephalus, old lacunar infarcts in the left    internal capsule and left corona radiata, advanced white matter ischemic    disease.  Once again, no acute infarct that was back in 2014.  She did have an   imaging of her lumbar spine.  On my exam, she was unable to dorsiflex both    Great toes,  which suggests that L5 nerve root issue.  The lumbar spine imaging did    demonstrate diffuse disk bulge extending symmetrically into the left lateral    recess and foraminal zone, mild right and severe left facet arthropathy, so    basically, she had multilevel disease, prominent posterior epidural fat from    L2 through L5 contributing to the thecal narrowing at these levels.  Thecal    sac narrowing was worse at L2-L3 and L3-L4.  Once again, severe left-sided and   right-sided facet arthropathy in the L5 region.  Patient really was not    interested in any surgical procedures.  The patient was, therefore, released  "   to the [facility] on 08/01/2017 for further physical therapy.  She    has been having trouble walking, which has been a progressive problem, perhaps   it is related to the back problem itself versus deconditioning.  Medicine was   consulted due to the difficulty dealing with her blood pressure.  The patient   had pressures that were modestly elevated yesterday well over 140, though I    believe it was significantly higher than this.  I believe this was post    medication pressures.  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    Interval Problem Update  NONE    Consultants/Specialty  INTERNAL MEDICINE    Disposition  PER PRIMARY REHAB TEAM.        Review of Systems   Constitutional: Negative for fever, chills, weight loss and malaise/fatigue.   Eyes: Negative for double vision.   Cardiovascular: Negative for chest pain.   Gastrointestinal: Negative for heartburn, nausea and vomiting.   Genitourinary: Negative for dysuria.   Musculoskeletal: Negative for myalgias and neck pain.   Skin: Negative for rash.   Neurological: Negative for dizziness and headaches.   Psychiatric/Behavioral: Negative for depression.   8/3/17--PATIENT AWAKE AND ALERT.  THERE IS SOME MODERATE B/L ILIAC DISEASE NOTED IN 2009.   SHE HAD AT LEAST 60-70% DISEASE BACK IN 2009,  8 YEARS AGO.  SHE ALSO HAS HAD A SEVERE MICROCYTIC ANEMIA SINCE 2009,  THOUGH THE MCV AT THAT TIME WAS 79 SUGGESTING THIS IS CHRONIC BLOOD LOSS AND IRON DEFICIENCY.  I AM REPLETING IRON STORES IV AND CHECKING WITH THE 2 GI GROUPS TO SEE IF SHE HAS HAD ENDOSCOPY OF ANY SORT.  PLAN:  CHECK FOR COLONOSCOPY IF DONE BY GI CONSULTANTS OR [facility].  TOTAL BODY IRON REPLACEMENT.  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  RE-CHECK LE ARTERIAL STUDIES.  THAT MAY BE CONTRIBUTING.  PRIOR STUDIES FROM NEARLY A DECADE AGO SHOWED, \"CT ANGIOGRAM " "PELVIS WITH AND WITHOUT CONTRAST AND POST PROCESSING   DEMONSTRATES EXTENSIVE CALCIFIED AND NONCALCIFIED PLAQUE IN THE COMMON   ILIAC ARTERIES BILATERALLY.  THERE IS AT LEAST 70% DIAMETER REDUCTION   STENOSIS AT THE ORIGIN OF THE RIGHT COMMON ILIAC ARTERY.  THERE IS 60%   DIAMETER REDUCTION STENOSIS AT THE UPPER PORTION OF THE LEFT COMMON ILIAC   ARTERY.  INTERNAL AND EXTERNAL ILIAC ARTERIES AS WELL AS THE COMMON   FEMORAL ARTERIES APPEAR TO BE PATENT BILATERALLY.\"  CHECKING LE ARTERIAL DOPPLERS.    ADDENDUM:  TECH CAME IN TO TELL ME DISTALLY THERE IS BIPHASIC FLOW.  I TOLD HIM THAT IS WHAT I EXPECTED.  CERTAINLY NO WORSE THAN WHAT I EXPECTED.  FURTHER INVESTIGATION FORMALLY WOULD BE REDUNDANT.    8/4/17--PATIENT'S PRESSURE CREEPING UP AGAIN.  I THINK WE HAVE REACHED OUR LIMIT ON BETA BLOCKADE AS HR IS IN LOW TO MID 50'S.   GOING BACK TO ORIGINAL 25 BID.   ADDING SOME STANDING DOSE HYDRALAZINE.    MENTATION SEEMED A BIT BETTER.   PUZZLING THAT SHE IS ALERT AND ORIENTED WITH THERAPY YET STRUGGLES WITH SIMPLE QUESTIONS WITH THE DOCTORS.   NO CHANGE IN HER PHYSICAL EXAM.   PATIENT SAW DR MER GAN WITH GI CONSULTANTS IN 2011.   GETTING RECORDS.   OF NOTE THE PATIENT KNEW SHE HAD HER CARD IN HER PURSE.  SHE SHOULD START RETICULOCYTOSIS SOON NOW THAT WE HAVE LOADED HER UP ON IRON.    ARTERIAL STUDIES:  NO CHANGE.  BIPHASIC FLOW,  VENOUS DOPPLERS NEGATIVE,  CAROTID DOPPLERS NEGATIVE.     PLAN:  GO BACK DOWN TO 25 BID ON LOPRESSOR DUE TO BRADYCARDIA.  ADDING A STANDING DOSE OF HYDRALAZINE 25 MG TID.   LABS IN AM.    ADDENDUM:  RECORDS FROM COLONOSCOPY AND EGD SHOW ESSENTIALLY NO SIGNIFICANT FINDINGS.   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  WE HAVE HAD HER ON BID PROTONIX.    SHE MOST LIKELY PERIODICALLY BLEEDS FROM THIS AVM OR DIVERTICULAR OCCULT BLEEDS.    THE EGD X2 WERE IN 2010 AND 2011, (removal of benign tubular " adenoma in upper gi tract).   FIRST EGD/ COLONOSCOPY 11.   I DONT THINK SHE NEEDS FURTHER W/U AT THIS POINT ON THE COLON.  THE PATIENT SHOULD HAVE EGD AS OUTPATIENT AS AUGUST ESOPHAGUS SUGGESTED.      17--DOING A LITTLE BIT BETTER IN MY OPINION.  I AM NOT SURE IF SHE HAS VASCULAR DEMENTIA OR PSEUDODEMENTIA.  I FAVOR PSEUDODEMENTIA.   SHE IS COMPLETELY LUCID AT TIMES.   OTHER TIMES SHE DOES NOT KNOW THE YEAR.   SHE IS DOING BETTER WITH THE IV IRON.   CHECK RETIC COUNT.  HG ELECTROPHORESIS WAS NORMAL.  I SHOULD HAVE LOOKED BACK [id number].  SHE SHOULD SEE DR JOHNSON  FOR SURVAILANCE OF POSIBLE BARRETS ESOPHAGUS.  VITALS AND LABS OK.    PLAN:  OUTPATIENT F/U WITH DR JOHNSON IN ORDER WITH GI CONSULTANTS.  LAST COLONOSCOPY WAS CLEAN EXCEPT FOR TINY AVM.   SHE DOES HAVE HEME + STOOL.  IT IS PROBABLY FROM UPPER GI TRACT.  I DONT KNOW IF THIS PATIENT IS FIT TO LIVE ALONE.   SHE SEEMS LIKE A GROUP HOME TYPE OF PATIENT.   CHECK CBC AND RETIC COUNT IN A COUPLE OF DAYS.       Physical Exam  Laboratory/Imaging   Hemodynamics  Temp (24hrs), Av.8 °C (98.3 °F), Min:36.7 °C (98 °F), Max:36.9 °C (98.5 °F)   Temperature: 36.7 °C (98 °F)  Pulse  Av.3  Min: 52  Max: 84    Blood Pressure : 124/70 mmHg      Respiratory      Respiration: 16, Pulse Oximetry: 92 %, O2 Daily Delivery Respiratory : Nasal Cannula     Work Of Breathing / Effort: Mild  RUL Breath Sounds: Clear, RML Breath Sounds: Clear, RLL Breath Sounds: Clear;Diminished, ARGELIA Breath Sounds: Clear, LLL Breath Sounds: Clear;Diminished    Fluids    Intake/Output Summary (Last 24 hours) at 17 1858  Last data filed at 17 1224   Gross per 24 hour   Intake    900 ml   Output      0 ml   Net    900 ml       Nutrition  Orders Placed This Encounter   Procedures   • DIET ORDER     Standing Status: Standing      Number of Occurrences: 1      Standing Expiration Date:      Order Specific Question:  Diet:     Answer:  Cardiac [6]     Order Specific Question:   Texture/Fiber modifications:     Answer:  Dysphagia 2(Pureed/Chopped)specify fluid consistency(question 6) [2]     Order Specific Question:  Consistency/Fluid modifications:     Answer:  Thin Liquids [3]     Physical Exam   Constitutional: She is oriented to person, place, and time. She appears well-nourished. No distress.   HENT:   Head: Atraumatic.   Eyes: No scleral icterus.   Neck: No JVD present. No tracheal deviation present.   Cardiovascular: Normal rate, regular rhythm and normal heart sounds.    No murmur heard.  Pulmonary/Chest: No respiratory distress. She has no wheezes.   Abdominal: Soft. Bowel sounds are normal. She exhibits no distension.   Musculoskeletal: She exhibits no edema.   Lymphadenopathy:     She has no cervical adenopathy.   Neurological: She is alert and oriented to person, place, and time. No cranial nerve deficit.   Skin: Skin is warm and dry.   Psychiatric: She has a normal mood and affect.       Recent Labs      08/03/17   0524  08/05/17   0537   WBC  8.5  7.0   RBC  4.65  4.52   HEMOGLOBIN  8.7*  8.6*   HEMATOCRIT  31.4*  31.5*   MCV  67.5*  69.7*   MCH  18.7*  19.0*   MCHC  27.7*  27.3*   RDW  51.8*  51.3*   PLATELETCT  315  223   MPV  10.4   --      Recent Labs      08/03/17 0524  08/05/17   0537   SODIUM  141  143   POTASSIUM  3.8  3.8   CHLORIDE  107  110   CO2  25  24   GLUCOSE  94  84   BUN  12  21   CREATININE  0.85  0.74   CALCIUM  9.5  9.1                      Assessment/Plan     No new assessment & plan notes have been filed under this hospital service since the last note was generated.  Service: Hospital Medicine  ASSESSMENT:  1.  Hypertension with systolic pressures as high as 191, question coronary    artery disease.  2.  Severe chronic obstructive pulmonary disease and tobacco abuse.  3.  Question cerebrovascular disease with advanced white matter disease noted    on MRI of the brain 3 years ago.  4.  Preserved left ventricular systolic function on echo.  5.  Probable  lumbar myelopathy with the patient refusing surgery.  6.  KNOWN MODERATE PVD S/P MODERATE STENOSIS ILIACS IN 2009  PLAN:  I think we have the blood pressure under significantly better control    with beta blockade added to the patient's outpatient regimen, which included    just Lasix and an ACE inhibitor.  The patient does have bibasilar rhonchi.     Iron studies done in the hospital did show significant iron deficiency    component.  She is probably overdue for a colonoscopy.  Her CBC is diminished    and the MCV is very low at 66, which leaves me to believe that either she has    been chronically losing blood or has some type of occult malignancy versus a    hemoglobinopathy; 66 is pretty low, so I may do a hemoglobin electrophoresis as the MCV has been  Low for 8 years with no indication of colon cancer thus far.     She does have some teardrop cells on her peripheral smear.  I am sure whether she is    overdue for colonoscopy.  We will go ahead and get some stool guaiacs.  We    will continue the Lopressor 25 b.i.d.  She seems to be quite sensitive to that   as her heart rate drops into the 60s.  Back in 2011, patient also had a MCV    of 63.5, so this is probably a hereditary condition rather than an iron    deficiency.  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  I will actually try to set up a pharmacy to give iv iron  For total body iron replacement as well and get rid of the po iron.  Labs reviewed, Medications reviewed and Radiology images reviewed  Pink catheter: No Pink

## 2017-08-06 NOTE — FLOWSHEET NOTE
08/05/17 1445   Events/Summary/Plan   Non-Invasive Resp Device Site Inspection Completed Intact   Location nasalcannula   Respiratory WDL   Respiratory (WDL) X   Chest Exam   Respiration 16   Pulse (!) 53   Oximetry   #Pulse Oximetry (Single Determination) Yes   Oxygen   Home O2 Use Prior To Admission? No   Pulse Oximetry 92 %   O2 (LPM) 1   O2 Daily Delivery Respiratory  Nasal Cannula

## 2017-08-06 NOTE — FLOWSHEET NOTE
Stable on 1L. Begin R/A trials during therapy and document. Encourage cough.     08/06/17 0910   Events/Summary/Plan   Non-Invasive Resp Device Site Inspection Completed Intact   Location Nasal cannula   Respiratory WDL   Respiratory (WDL) X   Chest Exam   Respiration 18   Pulse (!) 53   Breath Sounds   RUL Breath Sounds Clear   RML Breath Sounds Clear   RLL Breath Sounds Clear;Diminished   ARGELIA Breath Sounds Clear   LLL Breath Sounds Clear;Diminished   Secretions   Cough Non Productive   How Sputum Obtained Cough on Request   Oximetry   #Pulse Oximetry (Single Determination) Yes   Oxygen   Home O2 Use Prior To Admission? No   Pulse Oximetry 95 %   O2 (LPM) 1   O2 Daily Delivery Respiratory  Silicone Nasal Cannula

## 2017-08-06 NOTE — PROGRESS NOTES
[facility]    Date of Service: 8/6/2017    Chief Complaint  This is a very pleasant 76-year-old white female    with past medical history significant for severe tobacco abuse along with    chronic back discomfort.  The patient presented to [facility]    initially on 07/29/2017 with bilateral leg pain, difficulty ambulating.  CT of   her lumbar spine and pelvis were done in the ER, which did not show any    evidence of acute fracture.  She was also short of breath upon admission.  The   patient smoked about a pack and a half a day for 50 years.  Apparently, she    does have a history of coronary disease and stroke in the past, though I do    not have direct records of this available to me.  Looks like she is originally   from [place] though she has been in [place] for several years.  It appears    that they did a MRI of the brain back in 2014, which did show evidence of old    stroke, but no obvious acute insult to my eye.  There was some prominent    ventriculomegaly without hydrocephalus, old lacunar infarcts in the left    internal capsule and left corona radiata, advanced white matter ischemic    disease.  Once again, no acute infarct that was back in 2014.  She did have an   imaging of her lumbar spine.  On my exam, she was unable to dorsiflex both    Great toes,  which suggests that L5 nerve root issue.  The lumbar spine imaging did    demonstrate diffuse disk bulge extending symmetrically into the left lateral    recess and foraminal zone, mild right and severe left facet arthropathy, so    basically, she had multilevel disease, prominent posterior epidural fat from    L2 through L5 contributing to the thecal narrowing at these levels.  Thecal    sac narrowing was worse at L2-L3 and L3-L4.  Once again, severe left-sided and   right-sided facet arthropathy in the L5 region.  Patient really was not    interested in any surgical procedures.  The patient was, therefore, released  "   to the [facility] on 08/01/2017 for further physical therapy.  She    has been having trouble walking, which has been a progressive problem, perhaps   it is related to the back problem itself versus deconditioning.  Medicine was   consulted due to the difficulty dealing with her blood pressure.  The patient   had pressures that were modestly elevated yesterday well over 140, though I    believe it was significantly higher than this.  I believe this was post    medication pressures.  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    Interval Problem Update  NONE    Consultants/Specialty  INTERNAL MEDICINE    Disposition  PER PRIMARY REHAB TEAM.        Review of Systems   Constitutional: Negative for fever, chills, weight loss and malaise/fatigue.   Eyes: Negative for double vision.   Cardiovascular: Negative for chest pain.   Gastrointestinal: Negative for heartburn, nausea and vomiting.   Genitourinary: Negative for dysuria.   Musculoskeletal: Negative for myalgias and neck pain.   Skin: Negative for rash.   Neurological: Negative for dizziness and headaches.   Psychiatric/Behavioral: Negative for depression.   8/3/17--PATIENT AWAKE AND ALERT.  THERE IS SOME MODERATE B/L ILIAC DISEASE NOTED IN 2009.   SHE HAD AT LEAST 60-70% DISEASE BACK IN 2009,  8 YEARS AGO.  SHE ALSO HAS HAD A SEVERE MICROCYTIC ANEMIA SINCE 2009,  THOUGH THE MCV AT THAT TIME WAS 79 SUGGESTING THIS IS CHRONIC BLOOD LOSS AND IRON DEFICIENCY.  I AM REPLETING IRON STORES IV AND CHECKING WITH THE 2 GI GROUPS TO SEE IF SHE HAS HAD ENDOSCOPY OF ANY SORT.  PLAN:  CHECK FOR COLONOSCOPY IF DONE BY GI CONSULTANTS OR [facility].  TOTAL BODY IRON REPLACEMENT.  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  RE-CHECK LE ARTERIAL STUDIES.  THAT MAY BE CONTRIBUTING.  PRIOR STUDIES FROM NEARLY A DECADE AGO SHOWED, \"CT ANGIOGRAM " "PELVIS WITH AND WITHOUT CONTRAST AND POST PROCESSING   DEMONSTRATES EXTENSIVE CALCIFIED AND NONCALCIFIED PLAQUE IN THE COMMON   ILIAC ARTERIES BILATERALLY.  THERE IS AT LEAST 70% DIAMETER REDUCTION   STENOSIS AT THE ORIGIN OF THE RIGHT COMMON ILIAC ARTERY.  THERE IS 60%   DIAMETER REDUCTION STENOSIS AT THE UPPER PORTION OF THE LEFT COMMON ILIAC   ARTERY.  INTERNAL AND EXTERNAL ILIAC ARTERIES AS WELL AS THE COMMON   FEMORAL ARTERIES APPEAR TO BE PATENT BILATERALLY.\"  CHECKING LE ARTERIAL DOPPLERS.    ADDENDUM:  TECH CAME IN TO TELL ME DISTALLY THERE IS BIPHASIC FLOW.  I TOLD HIM THAT IS WHAT I EXPECTED.  CERTAINLY NO WORSE THAN WHAT I EXPECTED.  FURTHER INVESTIGATION FORMALLY WOULD BE REDUNDANT.    8/4/17--PATIENT'S PRESSURE CREEPING UP AGAIN.  I THINK WE HAVE REACHED OUR LIMIT ON BETA BLOCKADE AS HR IS IN LOW TO MID 50'S.   GOING BACK TO ORIGINAL 25 BID.   ADDING SOME STANDING DOSE HYDRALAZINE.    MENTATION SEEMED A BIT BETTER.   PUZZLING THAT SHE IS ALERT AND ORIENTED WITH THERAPY YET STRUGGLES WITH SIMPLE QUESTIONS WITH THE DOCTORS.   NO CHANGE IN HER PHYSICAL EXAM.   PATIENT SAW DR MER GAN WITH GI CONSULTANTS IN 2011.   GETTING RECORDS.   OF NOTE THE PATIENT KNEW SHE HAD HER CARD IN HER PURSE.  SHE SHOULD START RETICULOCYTOSIS SOON NOW THAT WE HAVE LOADED HER UP ON IRON.    ARTERIAL STUDIES:  NO CHANGE.  BIPHASIC FLOW,  VENOUS DOPPLERS NEGATIVE,  CAROTID DOPPLERS NEGATIVE.     PLAN:  GO BACK DOWN TO 25 BID ON LOPRESSOR DUE TO BRADYCARDIA.  ADDING A STANDING DOSE OF HYDRALAZINE 25 MG TID.   LABS IN AM.    ADDENDUM:  RECORDS FROM COLONOSCOPY AND EGD SHOW ESSENTIALLY NO SIGNIFICANT FINDINGS.   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  WE HAVE HAD HER ON BID PROTONIX.    SHE MOST LIKELY PERIODICALLY BLEEDS FROM THIS AVM OR DIVERTICULAR OCCULT BLEEDS.    THE EGD X2 WERE IN 2010 AND 2011, (removal of benign tubular " adenoma in upper gi tract).   FIRST EGD/ COLONOSCOPY 2/22/11.   I DONT THINK SHE NEEDS FURTHER W/U AT THIS POINT ON THE COLON.  THE PATIENT SHOULD HAVE EGD AS OUTPATIENT AS AUGUST ESOPHAGUS SUGGESTED.      8/5/17--DOING A LITTLE BIT BETTER IN MY OPINION.  I AM NOT SURE IF SHE HAS VASCULAR DEMENTIA OR PSEUDODEMENTIA.  I FAVOR PSEUDODEMENTIA.   SHE IS COMPLETELY LUCID AT TIMES.   OTHER TIMES SHE DOES NOT KNOW THE YEAR.   SHE IS DOING BETTER WITH THE IV IRON.   CHECK RETIC COUNT.  HG ELECTROPHORESIS WAS NORMAL.  I SHOULD HAVE LOOKED BACK [id number].  SHE SHOULD SEE DR JOHNSON  FOR SURVAILANCE OF POSIBLE BARRETS ESOPHAGUS.  VITALS AND LABS OK.    PLAN:  OUTPATIENT F/U WITH DR JOHNSON IN ORDER WITH GI CONSULTANTS.  LAST COLONOSCOPY WAS CLEAN EXCEPT FOR TINY AVM.   SHE DOES HAVE HEME + STOOL.  IT IS PROBABLY FROM UPPER GI TRACT.  I DONT KNOW IF THIS PATIENT IS FIT TO LIVE ALONE.   SHE SEEMS LIKE A GROUP HOME TYPE OF PATIENT.   CHECK CBC AND RETIC COUNT IN A COUPLE OF DAYS.    8/6/17--NOT QUITE SURE IF PATIENT IS DEMENTED OR JUST DEPRESSED.  SHE IS ABLE TO ANSWER MOST MENTAL STATUS QUESTIONS.  SPELLED A WORD BACKWARD.  ?  NOT QUITE SURE?  PROBABLY A LOW LEVEL CHRONIC DEMENTIA.  I CHECKED SOME COMMON SENSE JUDGEMENT QUESTIONS AND SHE DID WELL.  WE DID GIVE IV IRON TO PATIENT AND HOPEFULLY THAT WILL RESOLVE ANEMIA.  SHE HAD COLONOSCOPY WITHIN THIS DECADE.  NO OBVIOUS SOURCE OF BLEEDING AT THAT TIME WITH EXACT SAME PRESENTATION.  BACK PAIN IS TOLERABLE..  SHE IS UNABLE TO DORSIFLEX TOES.  THAT IS LIKELY L5 NERVE  ROOT OUTLET OBSTRUCTION.  PATIENT DOES NOT WANT SURGERY.   I THINK THIS MAY VERY WELL BE A PASTOR DECISION.    PLAN: CONTINUE PT/OT.   INCHING FORWARD.   I DO NOT WANT TO PUSH BP ANY LOWER.  WE OVER TREAT  HYPERTENSION IN THE ELDERLY WHICH LEADS TO FALLS.   THERE IS ONE THING PATIENT TOLD ME THAT WAS TROUBLING.  I AM NOT SURE WHAT HER LIVING SITUATION IS.  APPARENTLY SHE LIVES IN AN APARTMENT AND ONE OR 2, 40 CATHERINE YEAR  OLDS ARE TAKING HER SOCIAL  SECURITY CHECKS.   KEVIN MOONEY, AND ? FOUZIA LOPEZ?  WE NEED CASE COORDINATION TO LOOK INTO THIS.   SOUNDS SHADY.          Physical Exam  Laboratory/Imaging   Hemodynamics  Temp (24hrs), Av.4 °C (97.6 °F), Min:36.4 °C (97.5 °F), Max:36.4 °C (97.6 °F)   Temperature: 36.4 °C (97.6 °F)  Pulse  Av.3  Min: 52  Max: 84    Blood Pressure : 160/80 mmHg      Respiratory      Respiration: 18, Pulse Oximetry: 95 %, O2 Daily Delivery Respiratory : Silicone Nasal Cannula     Work Of Breathing / Effort: Mild  RUL Breath Sounds: Clear, RML Breath Sounds: Clear, RLL Breath Sounds: Clear;Diminished, ARGELIA Breath Sounds: Clear, LLL Breath Sounds: Clear;Diminished    Fluids    Intake/Output Summary (Last 24 hours) at 17 1636  Last data filed at 17 0900   Gross per 24 hour   Intake    480 ml   Output      0 ml   Net    480 ml       Nutrition  Orders Placed This Encounter   Procedures   • DIET ORDER     Standing Status: Standing      Number of Occurrences: 1      Standing Expiration Date:      Order Specific Question:  Diet:     Answer:  Cardiac [6]     Order Specific Question:  Texture/Fiber modifications:     Answer:  Dysphagia 2(Pureed/Chopped)specify fluid consistency(question 6) [2]     Order Specific Question:  Consistency/Fluid modifications:     Answer:  Thin Liquids [3]     Physical Exam   Constitutional: She is oriented to person, place, and time. She appears well-nourished. No distress.   HENT:   Head: Atraumatic.   Eyes: No scleral icterus.   Neck: No JVD present. No tracheal deviation present.   Cardiovascular: Normal rate, regular rhythm and normal heart sounds.    No murmur heard.  Pulmonary/Chest: No respiratory distress. She has no wheezes.   Abdominal: Soft. Bowel sounds are normal. She exhibits no distension.   Musculoskeletal: She exhibits no edema.   Lymphadenopathy:     She has no cervical adenopathy.   Neurological: She is alert and oriented to person, place, and  time. No cranial nerve deficit.   Skin: Skin is warm and dry.   Psychiatric: She has a normal mood and affect.       Recent Labs      08/05/17   0537   WBC  7.0   RBC  4.52   HEMOGLOBIN  8.6*   HEMATOCRIT  31.5*   MCV  69.7*   MCH  19.0*   MCHC  27.3*   RDW  51.3*   PLATELETCT  223     Recent Labs      08/05/17   0537   SODIUM  143   POTASSIUM  3.8   CHLORIDE  110   CO2  24   GLUCOSE  84   BUN  21   CREATININE  0.74   CALCIUM  9.1                      Assessment/Plan     No new assessment & plan notes have been filed under this hospital service since the last note was generated.  Service: Hospital Medicine  ASSESSMENT:  1.  Hypertension with systolic pressures as high as 191, question coronary    artery disease.  2.  Severe chronic obstructive pulmonary disease and tobacco abuse.  3.  Question cerebrovascular disease with advanced white matter disease noted    on MRI of the brain 3 years ago.  4.  Preserved left ventricular systolic function on echo.  5.  Probable lumbar myelopathy with the patient refusing surgery.  6.  KNOWN MODERATE PVD S/P MODERATE STENOSIS ILIACS IN 2009  PLAN:  I think we have the blood pressure under significantly better control    with beta blockade added to the patient's outpatient regimen, which included    just Lasix and an ACE inhibitor.  The patient does have bibasilar rhonchi.     Iron studies done in the hospital did show significant iron deficiency    component.  She is probably overdue for a colonoscopy.  Her CBC is diminished    and the MCV is very low at 66, which leaves me to believe that either she has    been chronically losing blood or has some type of occult malignancy versus a    hemoglobinopathy; 66 is pretty low, so I may do a hemoglobin electrophoresis as the MCV has been  Low for 8 years with no indication of colon cancer thus far.     She does have some teardrop cells on her peripheral smear.  I am sure whether she is    overdue for colonoscopy.  We will go ahead and  get some stool guaiacs.  We    will continue the Lopressor 25 b.i.d.  She seems to be quite sensitive to that   as her heart rate drops into the 60s.  Back in 2011, patient also had a MCV    of 63.5, so this is probably a hereditary condition rather than an iron    deficiency.  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  I will actually try to set up a pharmacy to give iv iron  For total body iron replacement as well and get rid of the po iron.  Labs reviewed, Medications reviewed and Radiology images reviewed  Pink catheter: No Pink

## 2017-08-07 LAB
ALBUMIN SERPL BCP-MCNC: 3.4 G/DL (ref 3.2–4.9)
ALBUMIN/GLOB SERPL: 1.3 G/DL
ALP SERPL-CCNC: 83 U/L (ref 30–99)
ALT SERPL-CCNC: 47 U/L (ref 2–50)
ANION GAP SERPL CALC-SCNC: 8 MMOL/L (ref 0–11.9)
ANISOCYTOSIS BLD QL SMEAR: ABNORMAL
AST SERPL-CCNC: 36 U/L (ref 12–45)
BASOPHILS # BLD AUTO: 0 % (ref 0–1.8)
BASOPHILS # BLD: 0 K/UL (ref 0–0.12)
BILIRUB SERPL-MCNC: 0.5 MG/DL (ref 0.1–1.5)
BUN SERPL-MCNC: 20 MG/DL (ref 8–22)
CALCIUM SERPL-MCNC: 9.3 MG/DL (ref 8.5–10.5)
CHLORIDE SERPL-SCNC: 107 MMOL/L (ref 96–112)
CO2 SERPL-SCNC: 25 MMOL/L (ref 20–33)
CREAT SERPL-MCNC: 0.78 MG/DL (ref 0.5–1.4)
EOSINOPHIL # BLD AUTO: 0.07 K/UL (ref 0–0.51)
EOSINOPHIL NFR BLD: 0.9 % (ref 0–6.9)
ERYTHROCYTE [DISTWIDTH] IN BLOOD BY AUTOMATED COUNT: 52.8 FL (ref 35.9–50)
GFR SERPL CREATININE-BSD FRML MDRD: >60 ML/MIN/1.73 M 2
GLOBULIN SER CALC-MCNC: 2.6 G/DL (ref 1.9–3.5)
GLUCOSE SERPL-MCNC: 93 MG/DL (ref 65–99)
HCT VFR BLD AUTO: 34.7 % (ref 37–47)
HEMOCCULT STL QL: NEGATIVE
HGB BLD-MCNC: 9.5 G/DL (ref 12–16)
HGB RETIC QN AUTO: 32.4 PG/CELL (ref 29–35)
HYPOCHROMIA BLD QL SMEAR: ABNORMAL
IMM RETICS NFR: 28.5 % (ref 9.3–17.4)
IRON SATN MFR SERPL: 21 % (ref 15–55)
IRON SERPL-MCNC: 73 UG/DL (ref 40–170)
LYMPHOCYTES # BLD AUTO: 0.51 K/UL (ref 1–4.8)
LYMPHOCYTES NFR BLD: 7 % (ref 22–41)
MACROCYTES BLD QL SMEAR: ABNORMAL
MAGNESIUM SERPL-MCNC: 2.1 MG/DL (ref 1.5–2.5)
MANUAL DIFF BLD: NORMAL
MCH RBC QN AUTO: 19.8 PG (ref 27–33)
MCHC RBC AUTO-ENTMCNC: 27.4 G/DL (ref 33.6–35)
MCV RBC AUTO: 72.3 FL (ref 81.4–97.8)
MICROCYTES BLD QL SMEAR: ABNORMAL
MONOCYTES # BLD AUTO: 0.26 K/UL (ref 0–0.85)
MONOCYTES NFR BLD AUTO: 3.5 % (ref 0–13.4)
MORPHOLOGY BLD-IMP: NORMAL
NEUTROPHILS # BLD AUTO: 6.47 K/UL (ref 2–7.15)
NEUTROPHILS NFR BLD: 88.6 % (ref 44–72)
NRBC # BLD AUTO: 0 K/UL
NRBC BLD AUTO-RTO: 0 /100 WBC
OVALOCYTES BLD QL SMEAR: NORMAL
PLATELET # BLD AUTO: 230 K/UL (ref 164–446)
PLATELET BLD QL SMEAR: NORMAL
POIKILOCYTOSIS BLD QL SMEAR: NORMAL
POLYCHROMASIA BLD QL SMEAR: NORMAL
POTASSIUM SERPL-SCNC: 3.5 MMOL/L (ref 3.6–5.5)
PROT SERPL-MCNC: 6 G/DL (ref 6–8.2)
RBC # BLD AUTO: 4.8 M/UL (ref 4.2–5.4)
RBC BLD AUTO: PRESENT
RETICS # AUTO: 0.33 M/UL (ref 0.04–0.06)
RETICS/RBC NFR: 6.8 % (ref 0.8–2.1)
SCHISTOCYTES BLD QL SMEAR: NORMAL
SODIUM SERPL-SCNC: 140 MMOL/L (ref 135–145)
STOMATOCYTES BLD QL SMEAR: NORMAL
TIBC SERPL-MCNC: 343 UG/DL (ref 250–450)
WBC # BLD AUTO: 7.3 K/UL (ref 4.8–10.8)

## 2017-08-07 PROCEDURE — 36415 COLL VENOUS BLD VENIPUNCTURE: CPT

## 2017-08-07 PROCEDURE — 83540 ASSAY OF IRON: CPT

## 2017-08-07 PROCEDURE — 92526 ORAL FUNCTION THERAPY: CPT

## 2017-08-07 PROCEDURE — 97530 THERAPEUTIC ACTIVITIES: CPT

## 2017-08-07 PROCEDURE — 97116 GAIT TRAINING THERAPY: CPT

## 2017-08-07 PROCEDURE — 97110 THERAPEUTIC EXERCISES: CPT

## 2017-08-07 PROCEDURE — 83735 ASSAY OF MAGNESIUM: CPT

## 2017-08-07 PROCEDURE — 99232 SBSQ HOSP IP/OBS MODERATE 35: CPT | Performed by: PHYSICAL MEDICINE & REHABILITATION

## 2017-08-07 PROCEDURE — 700101 HCHG RX REV CODE 250: Performed by: PHYSICAL MEDICINE & REHABILITATION

## 2017-08-07 PROCEDURE — 97532 HCHG COGNITIVE SKILL DEV. 15 MIN: CPT

## 2017-08-07 PROCEDURE — 85046 RETICYTE/HGB CONCENTRATE: CPT

## 2017-08-07 PROCEDURE — 85027 COMPLETE CBC AUTOMATED: CPT

## 2017-08-07 PROCEDURE — 700102 HCHG RX REV CODE 250 W/ 637 OVERRIDE(OP): Performed by: PHYSICAL MEDICINE & REHABILITATION

## 2017-08-07 PROCEDURE — 85007 BL SMEAR W/DIFF WBC COUNT: CPT

## 2017-08-07 PROCEDURE — 80053 COMPREHEN METABOLIC PANEL: CPT

## 2017-08-07 PROCEDURE — 99233 SBSQ HOSP IP/OBS HIGH 50: CPT | Performed by: HOSPITALIST

## 2017-08-07 PROCEDURE — 97535 SELF CARE MNGMENT TRAINING: CPT

## 2017-08-07 PROCEDURE — 700102 HCHG RX REV CODE 250 W/ 637 OVERRIDE(OP): Performed by: HOSPITALIST

## 2017-08-07 PROCEDURE — 83550 IRON BINDING TEST: CPT

## 2017-08-07 PROCEDURE — A9270 NON-COVERED ITEM OR SERVICE: HCPCS | Performed by: PHYSICAL MEDICINE & REHABILITATION

## 2017-08-07 PROCEDURE — 94760 N-INVAS EAR/PLS OXIMETRY 1: CPT

## 2017-08-07 PROCEDURE — A9270 NON-COVERED ITEM OR SERVICE: HCPCS | Performed by: HOSPITALIST

## 2017-08-07 PROCEDURE — 770010 HCHG ROOM/CARE - REHAB SEMI PRIVAT*

## 2017-08-07 RX ORDER — POTASSIUM CHLORIDE 20 MEQ/1
20 TABLET, EXTENDED RELEASE ORAL DAILY
Status: DISCONTINUED | OUTPATIENT
Start: 2017-08-07 | End: 2017-08-12

## 2017-08-07 RX ADMIN — POTASSIUM CHLORIDE 20 MEQ: 1500 TABLET, EXTENDED RELEASE ORAL at 13:38

## 2017-08-07 RX ADMIN — HYDRALAZINE HYDROCHLORIDE 25 MG: 25 TABLET, FILM COATED ORAL at 20:05

## 2017-08-07 RX ADMIN — Medication 2 TABLET: at 20:05

## 2017-08-07 RX ADMIN — LIDOCAINE 1 PATCH: 50 PATCH TOPICAL at 08:21

## 2017-08-07 RX ADMIN — ASPIRIN 81 MG: 81 TABLET, COATED ORAL at 08:20

## 2017-08-07 RX ADMIN — Medication 2 TABLET: at 08:20

## 2017-08-07 RX ADMIN — HYDRALAZINE HYDROCHLORIDE 25 MG: 25 TABLET, FILM COATED ORAL at 05:31

## 2017-08-07 RX ADMIN — FUROSEMIDE 40 MG: 40 TABLET ORAL at 05:31

## 2017-08-07 RX ADMIN — OMEPRAZOLE 20 MG: 20 CAPSULE, DELAYED RELEASE ORAL at 20:05

## 2017-08-07 RX ADMIN — DULOXETINE HYDROCHLORIDE 20 MG: 20 CAPSULE, DELAYED RELEASE ORAL at 08:21

## 2017-08-07 RX ADMIN — NICOTINE 14 MG: 14 PATCH, EXTENDED RELEASE TRANSDERMAL at 05:32

## 2017-08-07 RX ADMIN — GABAPENTIN 300 MG: 300 CAPSULE ORAL at 20:05

## 2017-08-07 RX ADMIN — METOPROLOL TARTRATE 25 MG: 25 TABLET ORAL at 05:31

## 2017-08-07 RX ADMIN — OMEPRAZOLE 20 MG: 20 CAPSULE, DELAYED RELEASE ORAL at 08:20

## 2017-08-07 RX ADMIN — HYDRALAZINE HYDROCHLORIDE 25 MG: 25 TABLET, FILM COATED ORAL at 13:38

## 2017-08-07 RX ADMIN — METOPROLOL TARTRATE 25 MG: 25 TABLET ORAL at 17:13

## 2017-08-07 RX ADMIN — ATORVASTATIN CALCIUM 80 MG: 40 TABLET, FILM COATED ORAL at 20:04

## 2017-08-07 RX ADMIN — BENAZEPRIL HYDROCHLORIDE 40 MG: 10 TABLET, FILM COATED ORAL at 05:31

## 2017-08-07 ASSESSMENT — ENCOUNTER SYMPTOMS
FEVER: 0
WEIGHT LOSS: 0
HEARTBURN: 0
NECK PAIN: 0
HEADACHES: 0
NAUSEA: 0
MYALGIAS: 0
DEPRESSION: 0
DOUBLE VISION: 0
VOMITING: 0
DIZZINESS: 0
CHILLS: 0

## 2017-08-07 ASSESSMENT — GAIT ASSESSMENTS
DISTANCE (FEET): 250
ASSISTIVE DEVICE: FRONT WHEEL WALKER
GAIT LEVEL OF ASSIST: CONTACT GUARD ASSIST
DEVIATION: BRADYKINETIC;SHUFFLED GAIT

## 2017-08-07 ASSESSMENT — PAIN SCALES - WONG BAKER: WONGBAKER_NUMERICALRESPONSE: HURTS JUST A LITTLE BIT

## 2017-08-07 ASSESSMENT — PAIN SCALES - GENERAL
PAINLEVEL_OUTOF10: 1
PAINLEVEL_OUTOF10: 0

## 2017-08-07 NOTE — CARE PLAN
Problem: Safety  Goal: Will remain free from falls  Intervention: Assess risk factors for falls  Patient is AOx3 can be confused at times. Patient does well with frequent reminders to use the call light when assistance is needed. Patient has call light close by, non-skid socks on , bed in low position, alarms are set on bed and wheel chair.      Problem: Bowel/Gastric:  Goal: Will not experience complications related to bowel motility  Intervention: Assess baseline bowel pattern  Patient encouraged to drink plenty of fluids to promote regular bowel movements. Patient educated that movement and exercise also help promote gastric motility.

## 2017-08-07 NOTE — PROGRESS NOTES
"Rehab Progress Note     Chief complaint: none, follow up thoracic spondylosis  Date of Service: 8/7/2017    Interval Events (Subjective)  Patient seen and examined. No acute events overnight. Patient reports therapy is going quite well. She tells me she didn't sleep well last night, however per review of eye really rounding of looks like she slept 8 hours. Patient reports her pain has improved in her legs and she tells me she is walking more with therapy. No issues with her bladder. Last bowel movement today.    Objective:  VITAL SIGNS: /58 mmHg  Pulse 65  Temp(Src) 36.6 °C (97.9 °F)  Resp 18  Ht 1.575 m (5' 2\")  Wt 86.1 kg (189 lb 13.1 oz)  BMI 34.71 kg/m2  SpO2 90%  Breastfeeding? No  Gen: alert, no apparent distress  CV: regular rate and rhythm, no murmurs, no peripheral edema  Resp: clear to ascultation bilaterally, normal respiratory effort  GI: soft, non-tender abdomen, bowel sounds present  Neuro: Motor: 5/5 MMT throughout, except for bilateral 4/5 EHL                   Sensory:decreased to light touch in right L1/L2 dermatomes, decreased to pinprick in bilateral L4-S1 dermatomes, decreased vibration in bilateral great toes  DTRs: 2+ in bilateral biceps, triceps, brachioradialis, 3+ in bilateral patella, 2+ at bilateral achilles    Recent Results (from the past 72 hour(s))   TSH    Collection Time: 08/05/17  5:37 AM   Result Value Ref Range    TSH 1.710 0.300 - 3.700 uIU/mL   COMP METABOLIC PANEL    Collection Time: 08/05/17  5:37 AM   Result Value Ref Range    Sodium 143 135 - 145 mmol/L    Potassium 3.8 3.6 - 5.5 mmol/L    Chloride 110 96 - 112 mmol/L    Co2 24 20 - 33 mmol/L    Anion Gap 9.0 0.0 - 11.9    Glucose 84 65 - 99 mg/dL    Bun 21 8 - 22 mg/dL    Creatinine 0.74 0.50 - 1.40 mg/dL    Calcium 9.1 8.5 - 10.5 mg/dL    AST(SGOT) 35 12 - 45 U/L    ALT(SGPT) 36 2 - 50 U/L    Alkaline Phosphatase 80 30 - 99 U/L    Total Bilirubin 0.4 0.1 - 1.5 mg/dL    Albumin 3.3 3.2 - 4.9 g/dL    Total " Protein 5.7 (L) 6.0 - 8.2 g/dL    Globulin 2.4 1.9 - 3.5 g/dL    A-G Ratio 1.4 g/dL   CBC WITH DIFFERENTIAL    Collection Time: 08/05/17  5:37 AM   Result Value Ref Range    WBC 7.0 4.8 - 10.8 K/uL    RBC 4.52 4.20 - 5.40 M/uL    Hemoglobin 8.6 (L) 12.0 - 16.0 g/dL    Hematocrit 31.5 (L) 37.0 - 47.0 %    MCV 69.7 (L) 81.4 - 97.8 fL    MCH 19.0 (L) 27.0 - 33.0 pg    MCHC 27.3 (L) 33.6 - 35.0 g/dL    RDW 51.3 (H) 35.9 - 50.0 fL    Platelet Count 223 164 - 446 K/uL    Nucleated RBC 0.70 /100 WBC    NRBC (Absolute) 0.05 K/uL    Neutrophils-Polys 84.10 (H) 44.00 - 72.00 %    Lymphocytes 7.50 (L) 22.00 - 41.00 %    Monocytes 4.70 0.00 - 13.40 %    Eosinophils 2.80 0.00 - 6.90 %    Basophils 0.00 0.00 - 1.80 %    Neutrophils (Absolute) 5.89 2.00 - 7.15 K/uL    Lymphs (Absolute) 0.53 (L) 1.00 - 4.80 K/uL    Monos (Absolute) 0.33 0.00 - 0.85 K/uL    Eos (Absolute) 0.20 0.00 - 0.51 K/uL    Baso (Absolute) 0.00 0.00 - 0.12 K/uL    Hypochromia 1+     Anisocytosis 2+     Microcytosis 2+    ESTIMATED GFR    Collection Time: 08/05/17  5:37 AM   Result Value Ref Range    GFR If African American >60 >60 mL/min/1.73 m 2    GFR If Non African American >60 >60 mL/min/1.73 m 2   DIFFERENTIAL MANUAL    Collection Time: 08/05/17  5:37 AM   Result Value Ref Range    Myelocytes 0.90 %    Manual Diff Status PERFORMED    PERIPHERAL SMEAR REVIEW    Collection Time: 08/05/17  5:37 AM   Result Value Ref Range    Peripheral Smear Review see below    MORPHOLOGY    Collection Time: 08/05/17  5:37 AM   Result Value Ref Range    RBC Morphology Present     Giant Platelets 2+     Polychromia 1+     Poikilocytosis 1+     Ovalocytes 1+     Schistocytes 1+    OCCULT BLOOD STOOL    Collection Time: 08/06/17  9:10 PM   Result Value Ref Range    Occult Blood Feces Negative Negative   COMP METABOLIC PANEL    Collection Time: 08/07/17  6:22 AM   Result Value Ref Range    Sodium 140 135 - 145 mmol/L    Potassium 3.5 (L) 3.6 - 5.5 mmol/L    Chloride 107 96 - 112  mmol/L    Co2 25 20 - 33 mmol/L    Anion Gap 8.0 0.0 - 11.9    Glucose 93 65 - 99 mg/dL    Bun 20 8 - 22 mg/dL    Creatinine 0.78 0.50 - 1.40 mg/dL    Calcium 9.3 8.5 - 10.5 mg/dL    AST(SGOT) 36 12 - 45 U/L    ALT(SGPT) 47 2 - 50 U/L    Alkaline Phosphatase 83 30 - 99 U/L    Total Bilirubin 0.5 0.1 - 1.5 mg/dL    Albumin 3.4 3.2 - 4.9 g/dL    Total Protein 6.0 6.0 - 8.2 g/dL    Globulin 2.6 1.9 - 3.5 g/dL    A-G Ratio 1.3 g/dL   CBC WITH DIFFERENTIAL    Collection Time: 08/07/17  6:22 AM   Result Value Ref Range    Nucleated RBC 0.00 /100 WBC    NRBC (Absolute) 0.00 K/uL    WBC 7.3 4.8 - 10.8 K/uL    RBC 4.80 4.20 - 5.40 M/uL    Hemoglobin 9.5 (L) 12.0 - 16.0 g/dL    Hematocrit 34.7 (L) 37.0 - 47.0 %    MCV 72.3 (L) 81.4 - 97.8 fL    MCH 19.8 (L) 27.0 - 33.0 pg    MCHC 27.4 (L) 33.6 - 35.0 g/dL    RDW 52.8 (H) 35.9 - 50.0 fL    Platelet Count 230 164 - 446 K/uL    Neutrophils-Polys 88.60 (H) 44.00 - 72.00 %    Lymphocytes 7.00 (L) 22.00 - 41.00 %    Monocytes 3.50 0.00 - 13.40 %    Eosinophils 0.90 0.00 - 6.90 %    Basophils 0.00 0.00 - 1.80 %    Neutrophils (Absolute) 6.47 2.00 - 7.15 K/uL    Lymphs (Absolute) 0.51 (L) 1.00 - 4.80 K/uL    Monos (Absolute) 0.26 0.00 - 0.85 K/uL    Eos (Absolute) 0.07 0.00 - 0.51 K/uL    Baso (Absolute) 0.00 0.00 - 0.12 K/uL    Hypochromia 1+     Anisocytosis 2+     Macrocytosis 1+     Microcytosis 2+    MAGNESIUM    Collection Time: 08/07/17  6:22 AM   Result Value Ref Range    Magnesium 2.1 1.5 - 2.5 mg/dL   IRON/TOTAL IRON BIND    Collection Time: 08/07/17  6:22 AM   Result Value Ref Range    Iron 73 40 - 170 ug/dL    Total Iron Binding 343 250 - 450 ug/dL    % Saturation 21 15 - 55 %   RETICULOCYTES COUNT    Collection Time: 08/07/17  6:22 AM   Result Value Ref Range    Reticulocyte Count 6.8 (H) 0.8 - 2.1 %    Retic, Absolute 0.33 (H) 0.04 - 0.06 M/uL    Imm. Reticulocyte Fraction 28.5 (H) 9.3 - 17.4 %    Retic Hgb Equivalent 32.4 29.0 - 35.0 pg/cell   ESTIMATED GFR     Collection Time: 08/07/17  6:22 AM   Result Value Ref Range    GFR If African American >60 >60 mL/min/1.73 m 2    GFR If Non African American >60 >60 mL/min/1.73 m 2   DIFFERENTIAL MANUAL    Collection Time: 08/07/17  6:22 AM   Result Value Ref Range    Manual Diff Status PERFORMED    PERIPHERAL SMEAR REVIEW    Collection Time: 08/07/17  6:22 AM   Result Value Ref Range    Peripheral Smear Review see below    PLATELET ESTIMATE    Collection Time: 08/07/17  6:22 AM   Result Value Ref Range    Plt Estimation Normal    MORPHOLOGY    Collection Time: 08/07/17  6:22 AM   Result Value Ref Range    RBC Morphology Present     Polychromia 1+     Poikilocytosis 2+     Ovalocytes 1+     Schistocytes 1+     Stomatocytes 1+        Current Facility-Administered Medications   Medication Frequency   • potassium chloride SA (Kdur) tablet 20 mEq DAILY   • metoprolol (LOPRESSOR) tablet 25 mg TWICE DAILY   • hydrALAZINE (APRESOLINE) tablet 25 mg Q8HRS   • acetaminophen (TYLENOL) tablet 650 mg Q4HRS PRN   • gabapentin (NEURONTIN) capsule 300 mg Q EVENING   • vitamin D (Ergocalciferol) (DRISDOL) capsule 50,000 Units Q7 DAYS   • IRON REPLACEMENT per pharmacy protocol PRN   • Respiratory Care per Protocol Continuous RT   • Pharmacy Consult Request ...Pain Management Review 1 Each PRN   • oxycodone immediate-release (ROXICODONE) tablet 2.5 mg Q3HRS PRN   • oxycodone immediate-release (ROXICODONE) tablet 5 mg Q3HRS PRN   • lidocaine (LIDODERM) 5 % 1 Patch Q24HR   • enoxaparin (LOVENOX) inj 40 mg QHS   • aspirin EC (ECOTRIN) tablet 81 mg DAILY   • artificial tears 1.4 % ophthalmic solution 1 Drop PRN   • hydrALAZINE (APRESOLINE) tablet 25 mg Q8HRS PRN   • mag hydrox-al hydrox-simeth (MAALOX PLUS ES or MYLANTA DS) suspension 20 mL Q2HRS PRN   • trazodone (DESYREL) tablet 50 mg QHS PRN   • sodium chloride (OCEAN) 0.65 % nasal spray 2 Spray PRN   • atorvastatin (LIPITOR) tablet 80 mg QHS   • benazepril (LOTENSIN) tablet 40 mg Q DAY   • busPIRone  (BUSPAR) tablet 5 mg BID PRN   • duloxetine (CYMBALTA) capsule 20 mg DAILY   • furosemide (LASIX) tablet 40 mg Q DAY   • ondansetron (ZOFRAN ODT) dispertab 4 mg Q4HRS PRN   • senna-docusate (PERICOLACE or SENOKOT S) 8.6-50 MG per tablet 2 Tab BID    And   • polyethylene glycol/lytes (MIRALAX) PACKET 1 Packet QDAY PRN    And   • magnesium hydroxide (MILK OF MAGNESIA) suspension 30 mL QDAY PRN    And   • bisacodyl (DULCOLAX) suppository 10 mg QDAY PRN   • nicotine (NICODERM) 14 MG/24HR 14 mg Daily-0600   • omeprazole (PRILOSEC) capsule 20 mg BID       Orders Placed This Encounter   Procedures   • DIET ORDER     Standing Status: Standing      Number of Occurrences: 1      Standing Expiration Date:      Order Specific Question:  Diet:     Answer:  Cardiac [6]     Order Specific Question:  Texture/Fiber modifications:     Answer:  Dysphagia 2(Pureed/Chopped)specify fluid consistency(question 6) [2]     Order Specific Question:  Consistency/Fluid modifications:     Answer:  Thin Liquids [3]       Assessment:  Active Hospital Problems    Diagnosis   • Back pain   • Depression   • Thoracic spondylosis   • HTN (hypertension)   • Dyslipidemia   • History of stroke   • T12 compression fracture (CMS-HCC)   • Microcytic anemia   • Heme positive stool   • Dementia     I led and attended the weekly conference, and agree with the IDT conference documentation and plan of care as noted below.    Date of conference: 8/3/2017    Admission FIM - 63    CM/social support: lives alone     Anticipated DC date: 8/24/2017    Home health: TBD    Equip: TBD    Follow up: TBD    Therapy update 8/7/2017  OT - Pt required CGA while standing at EOB during LB dressing and washing perineal area. Pt required VC's to maintain spinal precautions but consistently was  bending. Pt declined to use reacher for LB dressing.  PT - ambulating 250 ft at contact guard assist with the FWW. Contact guard assist for sit to stand transfers.  SLP - Pt oriented to  day, month and year with use of external aid (calendar). Pt  required min-mod cues to orient to date and time. Memory book introduced this  day. Pt required mod-maxA to recall breakfast items, and therapy tasks. Pt  verbalized understanding of memory book, and use of daily schedule to recall  date and day.     Medical Decision Making and Plan:    Labs reviewed. Medications reviewed. Appreciate the assistance of the hospitalist.    T12/L1 chronic compression fractures - Continue pain management with Lidoderm patch and oral pain meds as needed. Pain currently controlled.    Thoracic spondylosis with scoliosis, thecal sac narrowing from L2-L5 worst at L2-L3 and L3-L4, L2-L5 multilevel bilateral neural foraminal narrowing - with evidence of radiculopathy on exam with weakness at the bilateral L4 myotomes as well as abnormal sensation on the right L1-L2 dermatome, and L4-S1 dermatomes. Patient does not want surgery. Continue conservative treatment with therapy. Gabapentin for complaints of nerve pain in the right thigh. Increased dose with improved pain control.    Hypertension - Continue Benzapril, furosemide, metoprolol, hydralazine.    Coronary artery disease status post stenting - continue aspirin and statin.    Dyslipidemia - continue statin.    Tobacco use - nicotine patch.    Acute on chronic pain - pain meds as needed. lidoderm patch for compression fractures. Gabapentin for nerve pain.    Bladder urgency - UA negative. May consider trial of ditropan.    Dysphagia? - choked on food during dinner. Diet downgraded. Speech consult.    History of GI bleed - with microcytic anemia and positive stool guaiac on acute. IV iron. Apparently last colonoscopy normal. Per hospitalist, needs EGD due to history of Haywood's esophagus.    IV iron infiltration - 8/4. Discontinue infusions - got 2/5. Monitor wound. Ice and elevate. Improved.    History of depression/anxiety - continue Cymbalta and as needed Buspar.    Dementia? -  likely vascular dementia based on previous imaging from years ago. Speech consult.    Low Vit D - 10, started high dose repletion.    DVT prophylaxis - Discontinued Lovenox as she's ambulating longer distances.    Total time:  >25 minutes.  I spent greater than 50% of the time for patient care and coordination on this date, including unit/floor time, and face-to-face time with the patient as per assessment and plan above.    Christina Miller M.D.

## 2017-08-07 NOTE — FLOWSHEET NOTE
08/07/17 0915   Events/Summary/Plan   Events/Summary/Plan Pt is on 1Lpm NC sats 97%, removed oxygen will do RA trials and monitor.   Non-Invasive Resp Device Site Inspection Completed Intact   Location NC b/l ears   Interdisciplinary Plan of Care-Goals (Indications)   Obstructive Ventilatory Defect or Pulmonary Disease without Obvious Obstruction Strong Subjective / Objective Improvement   Education   Education Yes - Pt. / Family has been Instructed in use of Respiratory Equipment   Respiratory WDL   Respiratory (WDL) X   Chest Exam   Work Of Breathing / Effort Mild   Respiration 17   Pulse 60   Breath Sounds   RUL Breath Sounds Clear   RML Breath Sounds Clear   RLL Breath Sounds Clear;Diminished   ARGELIA Breath Sounds Clear   LLL Breath Sounds Clear;Diminished   Secretions   Cough Non Productive   Oximetry   #Pulse Oximetry (Single Determination) Yes   Oxygen   Home O2 Use Prior To Admission? No   Pulse Oximetry 97 %   O2 (LPM) 0   O2 (FiO2) 21   O2 Daily Delivery Respiratory  Room Air with O2 Available

## 2017-08-07 NOTE — PROGRESS NOTES
Patient AOx3 has confusion at times. Patient does well with frequent reminders to use the call light when assistance is needed. Patient has declined pain management so far today. Will continue to assess, patient has been up participating in therapies.

## 2017-08-07 NOTE — CARE PLAN
Problem: Safety  Goal: Will remain free from injury  Patient uses call light  appropriately this shift.  Waits for assistance when needed and does not attempt self transfer.  Able to verbalize needs.  Will continue to monitor.    Problem: Pain Management  Goal: Pain level will decrease to patient’s comfort goal  Denies pain or discomfort tonight. Sleeps good. Will continue to monitor.

## 2017-08-08 LAB
BASOPHILS # BLD AUTO: 0.4 % (ref 0–1.8)
BASOPHILS # BLD: 0.02 K/UL (ref 0–0.12)
EOSINOPHIL # BLD AUTO: 0.09 K/UL (ref 0–0.51)
EOSINOPHIL NFR BLD: 1.6 % (ref 0–6.9)
ERYTHROCYTE [DISTWIDTH] IN BLOOD BY AUTOMATED COUNT: 53.4 FL (ref 35.9–50)
HCT VFR BLD AUTO: 35.8 % (ref 37–47)
HGB BLD-MCNC: 9.9 G/DL (ref 12–16)
IMM GRANULOCYTES # BLD AUTO: 0.03 K/UL (ref 0–0.11)
IMM GRANULOCYTES NFR BLD AUTO: 0.5 % (ref 0–0.9)
LYMPHOCYTES # BLD AUTO: 0.98 K/UL (ref 1–4.8)
LYMPHOCYTES NFR BLD: 17.9 % (ref 22–41)
MCH RBC QN AUTO: 19.8 PG (ref 27–33)
MCHC RBC AUTO-ENTMCNC: 27.7 G/DL (ref 33.6–35)
MCV RBC AUTO: 71.7 FL (ref 81.4–97.8)
MONOCYTES # BLD AUTO: 0.59 K/UL (ref 0–0.85)
MONOCYTES NFR BLD AUTO: 10.8 % (ref 0–13.4)
NEUTROPHILS # BLD AUTO: 3.77 K/UL (ref 2–7.15)
NEUTROPHILS NFR BLD: 68.8 % (ref 44–72)
NRBC # BLD AUTO: 0 K/UL
NRBC BLD AUTO-RTO: 0 /100 WBC
PLATELET # BLD AUTO: 236 K/UL (ref 164–446)
RBC # BLD AUTO: 4.99 M/UL (ref 4.2–5.4)
WBC # BLD AUTO: 5.5 K/UL (ref 4.8–10.8)

## 2017-08-08 PROCEDURE — 85025 COMPLETE CBC W/AUTO DIFF WBC: CPT

## 2017-08-08 PROCEDURE — 97532 HCHG COGNITIVE SKILL DEV. 15 MIN: CPT

## 2017-08-08 PROCEDURE — 97110 THERAPEUTIC EXERCISES: CPT

## 2017-08-08 PROCEDURE — 770010 HCHG ROOM/CARE - REHAB SEMI PRIVAT*

## 2017-08-08 PROCEDURE — 700102 HCHG RX REV CODE 250 W/ 637 OVERRIDE(OP): Performed by: HOSPITALIST

## 2017-08-08 PROCEDURE — 94760 N-INVAS EAR/PLS OXIMETRY 1: CPT

## 2017-08-08 PROCEDURE — 99232 SBSQ HOSP IP/OBS MODERATE 35: CPT | Performed by: HOSPITALIST

## 2017-08-08 PROCEDURE — 97530 THERAPEUTIC ACTIVITIES: CPT

## 2017-08-08 PROCEDURE — A9270 NON-COVERED ITEM OR SERVICE: HCPCS | Performed by: HOSPITALIST

## 2017-08-08 PROCEDURE — 36415 COLL VENOUS BLD VENIPUNCTURE: CPT

## 2017-08-08 PROCEDURE — 92526 ORAL FUNCTION THERAPY: CPT

## 2017-08-08 PROCEDURE — 700101 HCHG RX REV CODE 250: Performed by: PHYSICAL MEDICINE & REHABILITATION

## 2017-08-08 PROCEDURE — 97535 SELF CARE MNGMENT TRAINING: CPT

## 2017-08-08 PROCEDURE — A9270 NON-COVERED ITEM OR SERVICE: HCPCS | Performed by: PHYSICAL MEDICINE & REHABILITATION

## 2017-08-08 PROCEDURE — 700102 HCHG RX REV CODE 250 W/ 637 OVERRIDE(OP): Performed by: PHYSICAL MEDICINE & REHABILITATION

## 2017-08-08 PROCEDURE — 97116 GAIT TRAINING THERAPY: CPT

## 2017-08-08 PROCEDURE — 99232 SBSQ HOSP IP/OBS MODERATE 35: CPT | Performed by: PHYSICAL MEDICINE & REHABILITATION

## 2017-08-08 RX ORDER — CLONIDINE HYDROCHLORIDE 0.1 MG/1
0.1 TABLET ORAL EVERY 6 HOURS PRN
Status: DISCONTINUED | OUTPATIENT
Start: 2017-08-08 | End: 2017-08-15 | Stop reason: HOSPADM

## 2017-08-08 RX ORDER — POTASSIUM CHLORIDE 20 MEQ/1
40 TABLET, EXTENDED RELEASE ORAL ONCE
Status: COMPLETED | OUTPATIENT
Start: 2017-08-08 | End: 2017-08-08

## 2017-08-08 RX ADMIN — METOPROLOL TARTRATE 25 MG: 25 TABLET ORAL at 06:02

## 2017-08-08 RX ADMIN — OXYCODONE HYDROCHLORIDE 5 MG: 5 TABLET ORAL at 20:21

## 2017-08-08 RX ADMIN — METOPROLOL TARTRATE 25 MG: 25 TABLET ORAL at 17:12

## 2017-08-08 RX ADMIN — BENAZEPRIL HYDROCHLORIDE 40 MG: 10 TABLET, FILM COATED ORAL at 06:01

## 2017-08-08 RX ADMIN — LIDOCAINE 1 PATCH: 50 PATCH TOPICAL at 08:31

## 2017-08-08 RX ADMIN — GABAPENTIN 300 MG: 300 CAPSULE ORAL at 20:21

## 2017-08-08 RX ADMIN — ATORVASTATIN CALCIUM 80 MG: 40 TABLET, FILM COATED ORAL at 20:22

## 2017-08-08 RX ADMIN — OMEPRAZOLE 20 MG: 20 CAPSULE, DELAYED RELEASE ORAL at 20:21

## 2017-08-08 RX ADMIN — DULOXETINE HYDROCHLORIDE 20 MG: 20 CAPSULE, DELAYED RELEASE ORAL at 08:32

## 2017-08-08 RX ADMIN — Medication 2 TABLET: at 20:21

## 2017-08-08 RX ADMIN — HYDRALAZINE HYDROCHLORIDE 25 MG: 25 TABLET, FILM COATED ORAL at 14:14

## 2017-08-08 RX ADMIN — NICOTINE 14 MG: 14 PATCH, EXTENDED RELEASE TRANSDERMAL at 06:08

## 2017-08-08 RX ADMIN — Medication 2 TABLET: at 08:32

## 2017-08-08 RX ADMIN — POTASSIUM CHLORIDE 20 MEQ: 1500 TABLET, EXTENDED RELEASE ORAL at 08:32

## 2017-08-08 RX ADMIN — TRAZODONE HYDROCHLORIDE 50 MG: 50 TABLET ORAL at 00:47

## 2017-08-08 RX ADMIN — POTASSIUM CHLORIDE 40 MEQ: 1500 TABLET, EXTENDED RELEASE ORAL at 14:14

## 2017-08-08 RX ADMIN — ACETAMINOPHEN 650 MG: 325 TABLET, FILM COATED ORAL at 00:47

## 2017-08-08 RX ADMIN — FUROSEMIDE 40 MG: 40 TABLET ORAL at 06:01

## 2017-08-08 RX ADMIN — ASPIRIN 81 MG: 81 TABLET, COATED ORAL at 08:32

## 2017-08-08 RX ADMIN — CLONIDINE HYDROCHLORIDE 0.1 MG: 0.1 TABLET ORAL at 18:55

## 2017-08-08 RX ADMIN — OMEPRAZOLE 20 MG: 20 CAPSULE, DELAYED RELEASE ORAL at 08:32

## 2017-08-08 RX ADMIN — HYDRALAZINE HYDROCHLORIDE 25 MG: 25 TABLET, FILM COATED ORAL at 20:21

## 2017-08-08 RX ADMIN — HYDRALAZINE HYDROCHLORIDE 25 MG: 25 TABLET, FILM COATED ORAL at 06:07

## 2017-08-08 ASSESSMENT — ENCOUNTER SYMPTOMS
SHORTNESS OF BREATH: 0
FEVER: 0
VOMITING: 0
ABDOMINAL PAIN: 0
NAUSEA: 0
CHILLS: 0
DIARRHEA: 0
NERVOUS/ANXIOUS: 0

## 2017-08-08 ASSESSMENT — PAIN SCALES - GENERAL
PAINLEVEL_OUTOF10: 0
PAINLEVEL_OUTOF10: 4
PAINLEVEL_OUTOF10: 1

## 2017-08-08 ASSESSMENT — GAIT ASSESSMENTS
GAIT LEVEL OF ASSIST: STAND BY ASSIST
DISTANCE (FEET): 250
ASSISTIVE DEVICE: FRONT WHEEL WALKER
DEVIATION: BRADYKINETIC;SHUFFLED GAIT

## 2017-08-08 NOTE — DIETARY
Received consult per [facility].  Note RD already following patient with assessment completed and care plan met.  RD screening per protocol.

## 2017-08-08 NOTE — PROGRESS NOTES
Pt hypertensive this AM. 1st /74 with HR 57. Pt received scheduled cardiac medications: Lotensin 40mg, Lasix 40mg, Hydralazine 25mg, and Metoprolol 25mg. BP rechecked q30min later manually at 228/82 with HR 56. On call MD (Dr. Galindo) paged and updated regarding current hypertensive state. Dr. Galindo asked for BP to be checked now. BP and HR rechecked. Pt is 198/68 with HR 54 and is asymptomatic otherwise. MD gave orders to keep watch with more frequent vitals and to report event and any changes to hospitalist Dr. Gutiérrez when he arrives. Pt updated with doctors orders. Will endorse to day shift RN.

## 2017-08-08 NOTE — PROGRESS NOTES
[facility] Hospitalist Progress Note    Date of Service: 8/7/2017    Chief Complaint  This is a very pleasant 76-year-old white female    with past medical history significant for severe tobacco abuse along with    chronic back discomfort.  The patient presented to [facility]    initially on 07/29/2017 with bilateral leg pain, difficulty ambulating.  CT of   her lumbar spine and pelvis were done in the ER, which did not show any    evidence of acute fracture.  She was also short of breath upon admission.  The   patient smoked about a pack and a half a day for 50 years.  Apparently, she    does have a history of coronary disease and stroke in the past, though I do    not have direct records of this available to me.  Looks like she is originally   from [place] though she has been in [place] for several years.  It appears    that they did a MRI of the brain back in 2014, which did show evidence of old    stroke, but no obvious acute insult to my eye.  There was some prominent    ventriculomegaly without hydrocephalus, old lacunar infarcts in the left    internal capsule and left corona radiata, advanced white matter ischemic    disease.  Once again, no acute infarct that was back in 2014.  She did have an   imaging of her lumbar spine.  On my exam, she was unable to dorsiflex both    Great toes,  which suggests that L5 nerve root issue.  The lumbar spine imaging did    demonstrate diffuse disk bulge extending symmetrically into the left lateral    recess and foraminal zone, mild right and severe left facet arthropathy, so    basically, she had multilevel disease, prominent posterior epidural fat from    L2 through L5 contributing to the thecal narrowing at these levels.  Thecal    sac narrowing was worse at L2-L3 and L3-L4.  Once again, severe left-sided and   right-sided facet arthropathy in the L5 region.  Patient really was not    interested in any surgical procedures.  The patient was, therefore, released  "   to the [facility] on 08/01/2017 for further physical therapy.  She    has been having trouble walking, which has been a progressive problem, perhaps   it is related to the back problem itself versus deconditioning.  Medicine was   consulted due to the difficulty dealing with her blood pressure.  The patient   had pressures that were modestly elevated yesterday well over 140, though I    believe it was significantly higher than this.  I believe this was post    medication pressures.  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    Interval Problem Update  NONE    Consultants/Specialty  INTERNAL MEDICINE    Disposition  PER PRIMARY REHAB TEAM.        Review of Systems   Constitutional: Negative for fever, chills, weight loss and malaise/fatigue.   Eyes: Negative for double vision.   Cardiovascular: Negative for chest pain.   Gastrointestinal: Negative for heartburn, nausea and vomiting.   Genitourinary: Negative for dysuria.   Musculoskeletal: Negative for myalgias and neck pain.   Skin: Negative for rash.   Neurological: Negative for dizziness and headaches.   Psychiatric/Behavioral: Negative for depression.   8/3/17--PATIENT AWAKE AND ALERT.  THERE IS SOME MODERATE B/L ILIAC DISEASE NOTED IN 2009.   SHE HAD AT LEAST 60-70% DISEASE BACK IN 2009,  8 YEARS AGO.  SHE ALSO HAS HAD A SEVERE MICROCYTIC ANEMIA SINCE 2009,  THOUGH THE MCV AT THAT TIME WAS 79 SUGGESTING THIS IS CHRONIC BLOOD LOSS AND IRON DEFICIENCY.  I AM REPLETING IRON STORES IV AND CHECKING WITH THE 2 GI GROUPS TO SEE IF SHE HAS HAD ENDOSCOPY OF ANY SORT.  PLAN:  CHECK FOR COLONOSCOPY IF DONE BY GI CONSULTANTS OR [facility].  TOTAL BODY IRON REPLACEMENT.  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  RE-CHECK LE ARTERIAL STUDIES.  THAT MAY BE CONTRIBUTING.  PRIOR STUDIES FROM NEARLY A DECADE AGO SHOWED, \"CT ANGIOGRAM " "PELVIS WITH AND WITHOUT CONTRAST AND POST PROCESSING   DEMONSTRATES EXTENSIVE CALCIFIED AND NONCALCIFIED PLAQUE IN THE COMMON   ILIAC ARTERIES BILATERALLY.  THERE IS AT LEAST 70% DIAMETER REDUCTION   STENOSIS AT THE ORIGIN OF THE RIGHT COMMON ILIAC ARTERY.  THERE IS 60%   DIAMETER REDUCTION STENOSIS AT THE UPPER PORTION OF THE LEFT COMMON ILIAC   ARTERY.  INTERNAL AND EXTERNAL ILIAC ARTERIES AS WELL AS THE COMMON   FEMORAL ARTERIES APPEAR TO BE PATENT BILATERALLY.\"  CHECKING LE ARTERIAL DOPPLERS.    ADDENDUM:  TECH CAME IN TO TELL ME DISTALLY THERE IS BIPHASIC FLOW.  I TOLD HIM THAT IS WHAT I EXPECTED.  CERTAINLY NO WORSE THAN WHAT I EXPECTED.  FURTHER INVESTIGATION FORMALLY WOULD BE REDUNDANT.    8/4/17--PATIENT'S PRESSURE CREEPING UP AGAIN.  I THINK WE HAVE REACHED OUR LIMIT ON BETA BLOCKADE AS HR IS IN LOW TO MID 50'S.   GOING BACK TO ORIGINAL 25 BID.   ADDING SOME STANDING DOSE HYDRALAZINE.    MENTATION SEEMED A BIT BETTER.   PUZZLING THAT SHE IS ALERT AND ORIENTED WITH THERAPY YET STRUGGLES WITH SIMPLE QUESTIONS WITH THE DOCTORS.   NO CHANGE IN HER PHYSICAL EXAM.   PATIENT SAW DR MER GAN WITH GI CONSULTANTS IN 2011.   GETTING RECORDS.   OF NOTE THE PATIENT KNEW SHE HAD HER CARD IN HER PURSE.  SHE SHOULD START RETICULOCYTOSIS SOON NOW THAT WE HAVE LOADED HER UP ON IRON.    ARTERIAL STUDIES:  NO CHANGE.  BIPHASIC FLOW,  VENOUS DOPPLERS NEGATIVE,  CAROTID DOPPLERS NEGATIVE.     PLAN:  GO BACK DOWN TO 25 BID ON LOPRESSOR DUE TO BRADYCARDIA.  ADDING A STANDING DOSE OF HYDRALAZINE 25 MG TID.   LABS IN AM.    ADDENDUM:  RECORDS FROM COLONOSCOPY AND EGD SHOW ESSENTIALLY NO SIGNIFICANT FINDINGS.   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  WE HAVE HAD HER ON BID PROTONIX.    SHE MOST LIKELY PERIODICALLY BLEEDS FROM THIS AVM OR DIVERTICULAR OCCULT BLEEDS.    THE EGD X2 WERE IN 2010 AND 2011, (removal of benign tubular " adenoma in upper gi tract).   FIRST EGD/ COLONOSCOPY 2/22/11.   I DONT THINK SHE NEEDS FURTHER W/U AT THIS POINT ON THE COLON.  THE PATIENT SHOULD HAVE EGD AS OUTPATIENT AS AUGUST ESOPHAGUS SUGGESTED.      8/5/17--DOING A LITTLE BIT BETTER IN MY OPINION.  I AM NOT SURE IF SHE HAS VASCULAR DEMENTIA OR PSEUDODEMENTIA.  I FAVOR PSEUDODEMENTIA.   SHE IS COMPLETELY LUCID AT TIMES.   OTHER TIMES SHE DOES NOT KNOW THE YEAR.   SHE IS DOING BETTER WITH THE IV IRON.   CHECK RETIC COUNT.  HG ELECTROPHORESIS WAS NORMAL.  I SHOULD HAVE LOOKED BACK [id number].  SHE SHOULD SEE DR JOHNSON  FOR SURVAILANCE OF POSIBLE BARRETS ESOPHAGUS.  VITALS AND LABS OK.    PLAN:  OUTPATIENT F/U WITH DR JOHNSON IN ORDER WITH GI CONSULTANTS.  LAST COLONOSCOPY WAS CLEAN EXCEPT FOR TINY AVM.   SHE DOES HAVE HEME + STOOL.  IT IS PROBABLY FROM UPPER GI TRACT.  I DONT KNOW IF THIS PATIENT IS FIT TO LIVE ALONE.   SHE SEEMS LIKE A GROUP HOME TYPE OF PATIENT.   CHECK CBC AND RETIC COUNT IN A COUPLE OF DAYS.    8/6/17--NOT QUITE SURE IF PATIENT IS DEMENTED OR JUST DEPRESSED.  SHE IS ABLE TO ANSWER MOST MENTAL STATUS QUESTIONS.  SPELLED A WORD BACKWARD.  ?  NOT QUITE SURE?  PROBABLY A LOW LEVEL CHRONIC DEMENTIA.  I CHECKED SOME COMMON SENSE JUDGEMENT QUESTIONS AND SHE DID WELL.  WE DID GIVE IV IRON TO PATIENT AND HOPEFULLY THAT WILL RESOLVE ANEMIA.  SHE HAD COLONOSCOPY WITHIN THIS DECADE.  NO OBVIOUS SOURCE OF BLEEDING AT THAT TIME WITH EXACT SAME PRESENTATION.  BACK PAIN IS TOLERABLE..  SHE IS UNABLE TO DORSIFLEX TOES.  THAT IS LIKELY L5 NERVE  ROOT OUTLET OBSTRUCTION.  PATIENT DOES NOT WANT SURGERY.   I THINK THIS MAY VERY WELL BE A PASTOR DECISION.    PLAN: CONTINUE PT/OT.   INCHING FORWARD.   I DO NOT WANT TO PUSH BP ANY LOWER.  WE OVER TREAT  HYPERTENSION IN THE ELDERLY WHICH LEADS TO FALLS.   THERE IS ONE THING PATIENT TOLD ME THAT WAS TROUBLING.  I AM NOT SURE WHAT HER LIVING SITUATION IS.  APPARENTLY SHE LIVES IN AN APARTMENT AND ONE OR 2, 40 CATHERINE YEAR  OLDS ARE TAKING HER SOCIAL  SECURITY CHECKS.   KEVIN MOONEY, AND ? FOUZIA LOPEZ?  WE NEED CASE COORDINATION TO LOOK INTO THIS.   SOUNDS SHADY.     17- PATIENT IS BASICALLY UNCHANGED.  SOME DAYS SHE GIVES CORRECT MENTAL STATUS QUESTIONS.   SOME DAYS NOT.   IMPROVING AMBULATION WITH THERAPY.   ANEMIA IS IMPROVING RAPIDLY S/P IV IRON.    THE PATIENT IS RETICING NICELY.   SHOULD HAVE NORMAL HEMOGLOBIN IN A FEW WEEKS.  NOT SURE WHERE THE SOURCE IS BUT THIS HAS BEEN LOOKED INTO EXTENSIVELY IN 2O11 WITHOUT SERIOUS PATHOLOGY NOTED.       PLAN:   NO CHANGED TO CURRENT REGIMEN.   HAVE CASE COORDINATION LOOK INTO WHO IS MANAGING HER FINANCES.   SHE MAY NEED ASSISTED LIVING OR GROUP HOME.   I JUST AM UNCLEAR WHETHER SHE IS TRULY DEMENTED OR NOT.   SHE IS NOT DELIRIOUS.  SHE NEEDS F/U EGD GIVEN THE POSSIBLE BARRETS ESOPHAGUS.  CASE COORDINATION SHOULD SET UP  APPOINT MENT WITH BERRY PARKS.  CHRONIC GI BLOOD LOSS HAS A BROAD DIFFERENTIAL BUT IS NOT AN EMERGENCY UNLESS BLEEDING BRISKLY OR WE SUSPECT COLON CA  WHICH IS DO NOT.     Physical Exam  Laboratory/Imaging   Hemodynamics  Temp (24hrs), Av.6 °C (97.8 °F), Min:36.4 °C (97.5 °F), Max:36.6 °C (97.9 °F)   Temperature: 36.4 °C (97.5 °F)  Pulse  Av.2  Min: 52  Max: 84    Blood Pressure : 140/70 mmHg (manually)      Respiratory      Respiration: 16, Pulse Oximetry: 95 %, O2 Daily Delivery Respiratory : Room Air with O2 Available     Work Of Breathing / Effort: Mild  RUL Breath Sounds: Clear, RML Breath Sounds: Clear, RLL Breath Sounds: Clear;Diminished, ARGELIA Breath Sounds: Clear, LLL Breath Sounds: Clear;Diminished    Fluids    Intake/Output Summary (Last 24 hours) at 17  Last data filed at 17 1700   Gross per 24 hour   Intake   1300 ml   Output      0 ml   Net   1300 ml       Nutrition  Orders Placed This Encounter   Procedures   • DIET ORDER     Standing Status: Standing      Number of Occurrences: 1      Standing Expiration Date:       Order Specific Question:  Diet:     Answer:  Cardiac [6]     Order Specific Question:  Texture/Fiber modifications:     Answer:  Dysphagia 2(Pureed/Chopped)specify fluid consistency(question 6) [2]     Order Specific Question:  Consistency/Fluid modifications:     Answer:  Thin Liquids [3]     Physical Exam   Constitutional: She is oriented to person, place, and time. She appears well-nourished. No distress.   HENT:   Head: Atraumatic.   Eyes: No scleral icterus.   Neck: No JVD present. No tracheal deviation present.   Cardiovascular: Normal rate, regular rhythm and normal heart sounds.    No murmur heard.  Pulmonary/Chest: No respiratory distress. She has no wheezes.   Abdominal: Soft. Bowel sounds are normal. She exhibits no distension.   Musculoskeletal: She exhibits no edema.   Lymphadenopathy:     She has no cervical adenopathy.   Neurological: She is alert and oriented to person, place, and time. No cranial nerve deficit.   Skin: Skin is warm and dry.   Psychiatric: She has a normal mood and affect.       Recent Labs      08/05/17   0537  08/07/17   0622   WBC  7.0  7.3   RBC  4.52  4.80   HEMOGLOBIN  8.6*  9.5*   HEMATOCRIT  31.5*  34.7*   MCV  69.7*  72.3*   MCH  19.0*  19.8*   MCHC  27.3*  27.4*   RDW  51.3*  52.8*   PLATELETCT  223  230     Recent Labs      08/05/17   0537  08/07/17   0622   SODIUM  143  140   POTASSIUM  3.8  3.5*   CHLORIDE  110  107   CO2  24  25   GLUCOSE  84  93   BUN  21  20   CREATININE  0.74  0.78   CALCIUM  9.1  9.3                      Assessment/Plan     No new assessment & plan notes have been filed under this hospital service since the last note was generated.  Service: Hospital Medicine  ASSESSMENT:  1.  Hypertension with systolic pressures as high as 191, question coronary    artery disease.  2.  Severe chronic obstructive pulmonary disease and tobacco abuse.  3.  Question cerebrovascular disease with advanced white matter disease noted    on MRI of the brain 3 years  ago.  4.  Preserved left ventricular systolic function on echo.  5.  Probable lumbar myelopathy with the patient refusing surgery.  6.  KNOWN MODERATE PVD S/P MODERATE STENOSIS ILIACS IN 2009  PLAN:  I think we have the blood pressure under significantly better control    with beta blockade added to the patient's outpatient regimen, which included    just Lasix and an ACE inhibitor.  The patient does have bibasilar rhonchi.     Iron studies done in the hospital did show significant iron deficiency    component.  She is probably overdue for a colonoscopy.  Her CBC is diminished    and the MCV is very low at 66, which leaves me to believe that either she has    been chronically losing blood or has some type of occult malignancy versus a    hemoglobinopathy; 66 is pretty low, so I may do a hemoglobin electrophoresis as the MCV has been  Low for 8 years with no indication of colon cancer thus far.     She does have some teardrop cells on her peripheral smear.  I am sure whether she is    overdue for colonoscopy.  We will go ahead and get some stool guaiacs.  We    will continue the Lopressor 25 b.i.d.  She seems to be quite sensitive to that   as her heart rate drops into the 60s.  Back in 2011, patient also had a MCV    of 63.5, so this is probably a hereditary condition rather than an iron    deficiency.  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  I will actually try to set up a pharmacy to give iv iron  For total body iron replacement as well and get rid of the po iron.  Labs reviewed, Medications reviewed and Radiology images reviewed  Pink catheter: No Pink

## 2017-08-08 NOTE — PROGRESS NOTES
Hospital Medicine Progress Note, Adult, Complex               Author: Luc Osei Date & Time created: 8/8/2017  1:01 PM     Interval History:  No significant events or changes since last visit  Patient denies SOB, cough, or diarrhea  Patient slept ok last night    Chief Complaint:  Hypertension  Bradycardia      Review of Systems:  Review of Systems   Constitutional: Negative for fever and chills.   Respiratory: Negative for shortness of breath.    Cardiovascular: Negative for chest pain.   Gastrointestinal: Negative for nausea, vomiting, abdominal pain and diarrhea.   Psychiatric/Behavioral: The patient is not nervous/anxious.        Physical Exam:  Physical Exam   Constitutional: She is oriented to person, place, and time. She appears well-nourished.   HENT:   Head: Atraumatic.   Eyes: Conjunctivae are normal. Pupils are equal, round, and reactive to light.   Neck: Normal range of motion. Neck supple.   Cardiovascular: Normal rate, regular rhythm, S1 normal and S2 normal.    No murmur heard.  Pulmonary/Chest: Effort normal. She has no wheezes. She has no rales.   Abdominal: Soft. She exhibits no distension. There is no tenderness. Hernia confirmed negative in the right inguinal area and confirmed negative in the left inguinal area.   Musculoskeletal: She exhibits no edema.   Neurological: She is alert and oriented to person, place, and time. No sensory deficit.   Skin: Skin is warm and dry. No rash noted. No cyanosis.   Psychiatric: She has a normal mood and affect. Her behavior is normal.   Nursing note and vitals reviewed.      Labs:        Invalid input(s): [id number]      Recent Labs      08/07/17   0622   SODIUM  140   POTASSIUM  3.5*   CHLORIDE  107   CO2  25   BUN  20   CREATININE  0.78   MAGNESIUM  2.1   CALCIUM  9.3     Recent Labs      08/07/17   0622   ALTSGPT  47   ASTSGOT  36   ALKPHOSPHAT  83   TBILIRUBIN  0.5   GLUCOSE  93     Recent Labs      08/07/17   0622  08/08/17   0608   RBC  4.80  4.99    HEMOGLOBIN  9.5*  9.9*   HEMATOCRIT  34.7*  35.8*   PLATELETCT  230  236   IRON  73   --    TOTIRONBC  343   --      Recent Labs      17   0622  17   0608   WBC  7.3  5.5   NEUTSPOLYS  88.60*  68.80   LYMPHOCYTES  7.00*  17.90*   MONOCYTES  3.50  10.80   EOSINOPHILS  0.90  1.60   BASOPHILS  0.00  0.40   ASTSGOT  36   --    ALTSGPT  47   --    ALKPHOSPHAT  83   --    TBILIRUBIN  0.5   --            Hemodynamics:  Temp (24hrs), Av.6 °C (97.8 °F), Min:36.4 °C (97.5 °F), Max:36.7 °C (98 °F)  Temperature: 36.7 °C (98 °F)  Pulse  Av.7  Min: 52  Max: 84   Blood Pressure : 157/80 mmHg (manual)     Respiratory:    Respiration: 16, Pulse Oximetry: 95 %     Work Of Breathing / Effort: Mild  RUL Breath Sounds: Clear, RML Breath Sounds: Clear, RLL Breath Sounds: Clear;Diminished, ARGELIA Breath Sounds: Clear, LLL Breath Sounds: Clear;Diminished  Fluids:    Intake/Output Summary (Last 24 hours) at 17 1301  Last data filed at 17 0851   Gross per 24 hour   Intake   1190 ml   Output      0 ml   Net   1190 ml        GI/Nutrition:  Orders Placed This Encounter   Procedures   • DIET ORDER     Standing Status: Standing      Number of Occurrences: 1      Standing Expiration Date:      Order Specific Question:  Diet:     Answer:  Cardiac [6]     Order Specific Question:  Texture/Fiber modifications:     Answer:  Dysphagia 2(Pureed/Chopped)specify fluid consistency(question 6) [2]     Order Specific Question:  Consistency/Fluid modifications:     Answer:  Thin Liquids [3]     Medical Decision Making, by Problem:  Active Hospital Problems    Diagnosis   • Back pain [M54.9]   • Depression [F32.9]   • Thoracic spondylosis [M47.814]   • HTN (hypertension) [I10]   • Dyslipidemia [E78.5]   • History of stroke [Z86.73]   • T12 compression fracture (CMS-HCC) [M48.54XA]   • Microcytic anemia [D50.9]   • Heme positive stool [R19.5]   • Dementia [F03.90]     *  B/L LE Pain  MRI thoracic & lumbar: see below  LE arterial  dopplers: ok  Pt not interested in any surgeries at this time    *  Hypertension  BP has been ok but maria luisa up significantly this am with -228 --> came back down to 157 -- ? etiology (8/8)  On Lotensin: 40 mg daily  On Lopressor: 25 mg bid  On Hydralazine: 25 mg tid  Note: also on Lasix  Cont to monitor closely for now    *  Bradycardia  HR generally ok but occ dips lower: 54-65  On Lopressor: 50 mg bid --> 25 mg bid  Monitor for now    *  CHF  On Lasix: 40 mg daily  On KCL: 20 meq daily  Will give KCL 40 meq x 1 extra dose today (8/8)  Monitor K+: 3.8 --> 3.5 (8/7)    *  COPD: hx tobacco abuse  *  Hx CVA  *  Chronic Back Pain: has refused surgery in the past  *  PVD: with hx moderate iliac stenosis in 2009       MRI Thoracic Spine:  1.  Multilevel multifactorial degenerative changes  2.  Prominent posterior epidural fat from L2 through L5 contributes to thecal sac narrowing at these levels  3.  Thecal sac narrowing worst at L2-L3 and L3-L4  4.  Areas of neural foraminal narrowing as described above  5.  Chronic T12 vertebral compression fracture      Labs reviewed and Medications reviewed

## 2017-08-08 NOTE — CARE PLAN
Problem: Safety  Goal: Will remain free from injury  MD made aware of hypertensive episode this AM, instructions to continue monitoring for now.  Nursing and staff to only take manual BPs on this patient.   Goal: Will remain free from falls  Call light within reach, needs met.  Bed in low and locked position.  Patient educated on fall risk and verbalized agreement, patient does call appropriately for toileting, nutrition, and other needs.  Patient A&O x 3 but not impulsive.    Alarms on the bed and wheelchair.

## 2017-08-08 NOTE — CARE PLAN
Problem: Pain Management  Goal: Pain level will decrease to patient’s comfort goal  Intervention: Follow pain managment plan developed in collaboration with patient and Interdisciplinary Team  Pt complaints of tooth ache. Tylenol given per pt request.       Problem: Mobility Transfers  Goal: STG-Within one week, patient will sit to stand  SBA with FWW   Pt able to sit and stand with supervision.

## 2017-08-08 NOTE — PROGRESS NOTES
0715: Bedside report received, assumed care for this patient.  Patient is A&O x 3 - date.  VSS except BP still a little high (157 systolic) and HR 50s..  Communication board updated, call light and belongings are within reach.  Bed is in low position. Patient appears in no distress, and has no complaints of SOB and 0/10 of pain.  Will be medicated per MAR.  Plan of care discussed and agreed upon with patient.

## 2017-08-08 NOTE — FLOWSHEET NOTE
08/08/17 0920   Events/Summary/Plan   Events/Summary/Plan Pt is on RA stable pt using Oxygen at night to keep sats above 90% pt stable will monitor.   Non-Invasive Resp Device Site Inspection Completed Intact   Location NC b/l ears   Interdisciplinary Plan of Care-Goals (Indications)   Obstructive Ventilatory Defect or Pulmonary Disease without Obvious Obstruction Strong Subjective / Objective Improvement   Education   Education Yes - Pt. / Family has been Instructed in use of Respiratory Equipment   Respiratory WDL   Respiratory (WDL) X   Chest Exam   Work Of Breathing / Effort Mild   Respiration 17   Pulse (!) 57   Breath Sounds   RUL Breath Sounds Clear   RML Breath Sounds Clear   RLL Breath Sounds Clear;Diminished   ARGELIA Breath Sounds Clear   LLL Breath Sounds Clear;Diminished   Secretions   Cough Non Productive   Oximetry   #Pulse Oximetry (Single Determination) Yes   Oxygen   Home O2 Use Prior To Admission? No   Pulse Oximetry 94 %   O2 (LPM) 0   O2 (FiO2) 21   O2 Daily Delivery Respiratory  Room Air with O2 Available

## 2017-08-09 PROCEDURE — 99232 SBSQ HOSP IP/OBS MODERATE 35: CPT | Performed by: HOSPITALIST

## 2017-08-09 PROCEDURE — 97530 THERAPEUTIC ACTIVITIES: CPT

## 2017-08-09 PROCEDURE — 97112 NEUROMUSCULAR REEDUCATION: CPT

## 2017-08-09 PROCEDURE — 92526 ORAL FUNCTION THERAPY: CPT

## 2017-08-09 PROCEDURE — 97532 HCHG COGNITIVE SKILL DEV. 15 MIN: CPT

## 2017-08-09 PROCEDURE — 99232 SBSQ HOSP IP/OBS MODERATE 35: CPT | Performed by: PHYSICAL MEDICINE & REHABILITATION

## 2017-08-09 PROCEDURE — 700102 HCHG RX REV CODE 250 W/ 637 OVERRIDE(OP): Performed by: HOSPITALIST

## 2017-08-09 PROCEDURE — 97535 SELF CARE MNGMENT TRAINING: CPT

## 2017-08-09 PROCEDURE — 97116 GAIT TRAINING THERAPY: CPT

## 2017-08-09 PROCEDURE — A9270 NON-COVERED ITEM OR SERVICE: HCPCS | Performed by: PHYSICAL MEDICINE & REHABILITATION

## 2017-08-09 PROCEDURE — A9270 NON-COVERED ITEM OR SERVICE: HCPCS | Performed by: HOSPITALIST

## 2017-08-09 PROCEDURE — 700101 HCHG RX REV CODE 250: Performed by: PHYSICAL MEDICINE & REHABILITATION

## 2017-08-09 PROCEDURE — 770010 HCHG ROOM/CARE - REHAB SEMI PRIVAT*

## 2017-08-09 PROCEDURE — 700102 HCHG RX REV CODE 250 W/ 637 OVERRIDE(OP): Performed by: PHYSICAL MEDICINE & REHABILITATION

## 2017-08-09 PROCEDURE — 97110 THERAPEUTIC EXERCISES: CPT

## 2017-08-09 PROCEDURE — 94760 N-INVAS EAR/PLS OXIMETRY 1: CPT

## 2017-08-09 RX ORDER — HYDRALAZINE HYDROCHLORIDE 25 MG/1
50 TABLET, FILM COATED ORAL EVERY 8 HOURS
Status: DISCONTINUED | OUTPATIENT
Start: 2017-08-09 | End: 2017-08-15 | Stop reason: HOSPADM

## 2017-08-09 RX ADMIN — ASPIRIN 81 MG: 81 TABLET, COATED ORAL at 08:21

## 2017-08-09 RX ADMIN — FUROSEMIDE 40 MG: 40 TABLET ORAL at 05:15

## 2017-08-09 RX ADMIN — NICOTINE 14 MG: 14 PATCH, EXTENDED RELEASE TRANSDERMAL at 05:17

## 2017-08-09 RX ADMIN — HYDRALAZINE HYDROCHLORIDE 50 MG: 25 TABLET, FILM COATED ORAL at 20:19

## 2017-08-09 RX ADMIN — METOPROLOL TARTRATE 12.5 MG: 25 TABLET ORAL at 17:19

## 2017-08-09 RX ADMIN — Medication 2 TABLET: at 08:21

## 2017-08-09 RX ADMIN — METOPROLOL TARTRATE 25 MG: 25 TABLET ORAL at 05:15

## 2017-08-09 RX ADMIN — LIDOCAINE 1 PATCH: 50 PATCH TOPICAL at 08:21

## 2017-08-09 RX ADMIN — ATORVASTATIN CALCIUM 80 MG: 40 TABLET, FILM COATED ORAL at 20:19

## 2017-08-09 RX ADMIN — OMEPRAZOLE 20 MG: 20 CAPSULE, DELAYED RELEASE ORAL at 08:21

## 2017-08-09 RX ADMIN — POTASSIUM CHLORIDE 20 MEQ: 1500 TABLET, EXTENDED RELEASE ORAL at 08:21

## 2017-08-09 RX ADMIN — ERGOCALCIFEROL 50000 UNITS: 1.25 CAPSULE ORAL at 08:25

## 2017-08-09 RX ADMIN — HYDRALAZINE HYDROCHLORIDE 50 MG: 25 TABLET, FILM COATED ORAL at 14:25

## 2017-08-09 RX ADMIN — Medication 2 TABLET: at 20:19

## 2017-08-09 RX ADMIN — BENAZEPRIL HYDROCHLORIDE 40 MG: 10 TABLET, FILM COATED ORAL at 05:15

## 2017-08-09 RX ADMIN — OMEPRAZOLE 20 MG: 20 CAPSULE, DELAYED RELEASE ORAL at 20:19

## 2017-08-09 RX ADMIN — GABAPENTIN 300 MG: 300 CAPSULE ORAL at 20:19

## 2017-08-09 RX ADMIN — DULOXETINE HYDROCHLORIDE 20 MG: 20 CAPSULE, DELAYED RELEASE ORAL at 08:21

## 2017-08-09 RX ADMIN — HYDRALAZINE HYDROCHLORIDE 25 MG: 25 TABLET, FILM COATED ORAL at 05:15

## 2017-08-09 ASSESSMENT — PAIN SCALES - GENERAL
PAINLEVEL_OUTOF10: 0
PAINLEVEL_OUTOF10: 0

## 2017-08-09 ASSESSMENT — GAIT ASSESSMENTS
ASSISTIVE DEVICE: FRONT WHEEL WALKER
GAIT LEVEL OF ASSIST: MINIMAL ASSIST
DEVIATION: DECREASED HEEL STRIKE;DECREASED TOE OFF
DISTANCE (FEET): 50

## 2017-08-09 NOTE — PROGRESS NOTES
"Rehab Progress Note     Chief complaint: none, follow up thoracic spondylosis  Date of Service: 8/8/2017    Interval Events (Subjective)  Patient seen and examined. No acute events overnight. Patient continues to make gains with therapy. She tells me that her friend Arsenio won't be able to assist much at discharge. She's OK with discharge to SNF as a transition to group home. Currently denies any pain. Had very high blood pressure this am, but denies any headache/pain or other symptoms at the time. After her medications, pressure normalized.    Objective:  VITAL SIGNS: /80 mmHg  Pulse 56  Temp(Src) 36.8 °C (98.2 °F)  Resp 19  Ht 1.575 m (5' 2\")  Wt 86.1 kg (189 lb 13.1 oz)  BMI 34.71 kg/m2  SpO2 93%  Breastfeeding? No  Gen: alert, no apparent distress  CV: regular rate and rhythm, no murmurs, no peripheral edema  Resp: clear to ascultation bilaterally, normal respiratory effort  GI: soft, non-tender abdomen, bowel sounds present  Neuro: Motor: 5/5 MMT throughout, except for bilateral 4/5 EHL                   Sensory:decreased to light touch in right L1/L2 dermatomes, decreased to pinprick in bilateral L4-S1 dermatomes, decreased vibration in bilateral great toes  DTRs: 2+ in bilateral biceps, triceps, brachioradialis, 3+ in bilateral patella, 2+ at bilateral achilles    Recent Results (from the past 72 hour(s))   OCCULT BLOOD STOOL    Collection Time: 08/06/17  9:10 PM   Result Value Ref Range    Occult Blood Feces Negative Negative   COMP METABOLIC PANEL    Collection Time: 08/07/17  6:22 AM   Result Value Ref Range    Sodium 140 135 - 145 mmol/L    Potassium 3.5 (L) 3.6 - 5.5 mmol/L    Chloride 107 96 - 112 mmol/L    Co2 25 20 - 33 mmol/L    Anion Gap 8.0 0.0 - 11.9    Glucose 93 65 - 99 mg/dL    Bun 20 8 - 22 mg/dL    Creatinine 0.78 0.50 - 1.40 mg/dL    Calcium 9.3 8.5 - 10.5 mg/dL    AST(SGOT) 36 12 - 45 U/L    ALT(SGPT) 47 2 - 50 U/L    Alkaline Phosphatase 83 30 - 99 U/L    Total Bilirubin 0.5 " 0.1 - 1.5 mg/dL    Albumin 3.4 3.2 - 4.9 g/dL    Total Protein 6.0 6.0 - 8.2 g/dL    Globulin 2.6 1.9 - 3.5 g/dL    A-G Ratio 1.3 g/dL   CBC WITH DIFFERENTIAL    Collection Time: 08/07/17  6:22 AM   Result Value Ref Range    Nucleated RBC 0.00 /100 WBC    NRBC (Absolute) 0.00 K/uL    WBC 7.3 4.8 - 10.8 K/uL    RBC 4.80 4.20 - 5.40 M/uL    Hemoglobin 9.5 (L) 12.0 - 16.0 g/dL    Hematocrit 34.7 (L) 37.0 - 47.0 %    MCV 72.3 (L) 81.4 - 97.8 fL    MCH 19.8 (L) 27.0 - 33.0 pg    MCHC 27.4 (L) 33.6 - 35.0 g/dL    RDW 52.8 (H) 35.9 - 50.0 fL    Platelet Count 230 164 - 446 K/uL    Neutrophils-Polys 88.60 (H) 44.00 - 72.00 %    Lymphocytes 7.00 (L) 22.00 - 41.00 %    Monocytes 3.50 0.00 - 13.40 %    Eosinophils 0.90 0.00 - 6.90 %    Basophils 0.00 0.00 - 1.80 %    Neutrophils (Absolute) 6.47 2.00 - 7.15 K/uL    Lymphs (Absolute) 0.51 (L) 1.00 - 4.80 K/uL    Monos (Absolute) 0.26 0.00 - 0.85 K/uL    Eos (Absolute) 0.07 0.00 - 0.51 K/uL    Baso (Absolute) 0.00 0.00 - 0.12 K/uL    Hypochromia 1+     Anisocytosis 2+     Macrocytosis 1+     Microcytosis 2+    MAGNESIUM    Collection Time: 08/07/17  6:22 AM   Result Value Ref Range    Magnesium 2.1 1.5 - 2.5 mg/dL   IRON/TOTAL IRON BIND    Collection Time: 08/07/17  6:22 AM   Result Value Ref Range    Iron 73 40 - 170 ug/dL    Total Iron Binding 343 250 - 450 ug/dL    % Saturation 21 15 - 55 %   RETICULOCYTES COUNT    Collection Time: 08/07/17  6:22 AM   Result Value Ref Range    Reticulocyte Count 6.8 (H) 0.8 - 2.1 %    Retic, Absolute 0.33 (H) 0.04 - 0.06 M/uL    Imm. Reticulocyte Fraction 28.5 (H) 9.3 - 17.4 %    Retic Hgb Equivalent 32.4 29.0 - 35.0 pg/cell   ESTIMATED GFR    Collection Time: 08/07/17  6:22 AM   Result Value Ref Range    GFR If African American >60 >60 mL/min/1.73 m 2    GFR If Non African American >60 >60 mL/min/1.73 m 2   DIFFERENTIAL MANUAL    Collection Time: 08/07/17  6:22 AM   Result Value Ref Range    Manual Diff Status PERFORMED    PERIPHERAL SMEAR  REVIEW    Collection Time: 08/07/17  6:22 AM   Result Value Ref Range    Peripheral Smear Review see below    PLATELET ESTIMATE    Collection Time: 08/07/17  6:22 AM   Result Value Ref Range    Plt Estimation Normal    MORPHOLOGY    Collection Time: 08/07/17  6:22 AM   Result Value Ref Range    RBC Morphology Present     Polychromia 1+     Poikilocytosis 2+     Ovalocytes 1+     Schistocytes 1+     Stomatocytes 1+    CBC WITH DIFFERENTIAL    Collection Time: 08/08/17  6:08 AM   Result Value Ref Range    WBC 5.5 4.8 - 10.8 K/uL    RBC 4.99 4.20 - 5.40 M/uL    Hemoglobin 9.9 (L) 12.0 - 16.0 g/dL    Hematocrit 35.8 (L) 37.0 - 47.0 %    MCV 71.7 (L) 81.4 - 97.8 fL    MCH 19.8 (L) 27.0 - 33.0 pg    MCHC 27.7 (L) 33.6 - 35.0 g/dL    RDW 53.4 (H) 35.9 - 50.0 fL    Platelet Count 236 164 - 446 K/uL    Neutrophils-Polys 68.80 44.00 - 72.00 %    Lymphocytes 17.90 (L) 22.00 - 41.00 %    Monocytes 10.80 0.00 - 13.40 %    Eosinophils 1.60 0.00 - 6.90 %    Basophils 0.40 0.00 - 1.80 %    Immature Granulocytes 0.50 0.00 - 0.90 %    Nucleated RBC 0.00 /100 WBC    Neutrophils (Absolute) 3.77 2.00 - 7.15 K/uL    Lymphs (Absolute) 0.98 (L) 1.00 - 4.80 K/uL    Monos (Absolute) 0.59 0.00 - 0.85 K/uL    Eos (Absolute) 0.09 0.00 - 0.51 K/uL    Baso (Absolute) 0.02 0.00 - 0.12 K/uL    Immature Granulocytes (abs) 0.03 0.00 - 0.11 K/uL    NRBC (Absolute) 0.00 K/uL       Current Facility-Administered Medications   Medication Frequency   • potassium chloride SA (Kdur) tablet 20 mEq DAILY   • metoprolol (LOPRESSOR) tablet 25 mg TWICE DAILY   • hydrALAZINE (APRESOLINE) tablet 25 mg Q8HRS   • acetaminophen (TYLENOL) tablet 650 mg Q4HRS PRN   • gabapentin (NEURONTIN) capsule 300 mg Q EVENING   • vitamin D (Ergocalciferol) (DRISDOL) capsule 50,000 Units Q7 DAYS   • Respiratory Care per Protocol Continuous RT   • Pharmacy Consult Request ...Pain Management Review 1 Each PRN   • oxycodone immediate-release (ROXICODONE) tablet 2.5 mg Q3HRS PRN   •  oxycodone immediate-release (ROXICODONE) tablet 5 mg Q3HRS PRN   • lidocaine (LIDODERM) 5 % 1 Patch Q24HR   • aspirin EC (ECOTRIN) tablet 81 mg DAILY   • artificial tears 1.4 % ophthalmic solution 1 Drop PRN   • mag hydrox-al hydrox-simeth (MAALOX PLUS ES or MYLANTA DS) suspension 20 mL Q2HRS PRN   • trazodone (DESYREL) tablet 50 mg QHS PRN   • sodium chloride (OCEAN) 0.65 % nasal spray 2 Spray PRN   • atorvastatin (LIPITOR) tablet 80 mg QHS   • benazepril (LOTENSIN) tablet 40 mg Q DAY   • duloxetine (CYMBALTA) capsule 20 mg DAILY   • furosemide (LASIX) tablet 40 mg Q DAY   • ondansetron (ZOFRAN ODT) dispertab 4 mg Q4HRS PRN   • senna-docusate (PERICOLACE or SENOKOT S) 8.6-50 MG per tablet 2 Tab BID    And   • polyethylene glycol/lytes (MIRALAX) PACKET 1 Packet QDAY PRN    And   • magnesium hydroxide (MILK OF MAGNESIA) suspension 30 mL QDAY PRN    And   • bisacodyl (DULCOLAX) suppository 10 mg QDAY PRN   • nicotine (NICODERM) 14 MG/24HR 14 mg Daily-0600   • omeprazole (PRILOSEC) capsule 20 mg BID       Orders Placed This Encounter   Procedures   • DIET ORDER     Standing Status: Standing      Number of Occurrences: 1      Standing Expiration Date:      Order Specific Question:  Diet:     Answer:  Cardiac [6]     Order Specific Question:  Texture/Fiber modifications:     Answer:  Dysphagia 2(Pureed/Chopped)specify fluid consistency(question 6) [2]     Order Specific Question:  Consistency/Fluid modifications:     Answer:  Thin Liquids [3]       Assessment:  Active Hospital Problems    Diagnosis   • Back pain   • Depression   • Thoracic spondylosis   • HTN (hypertension)   • Dyslipidemia   • History of stroke   • T12 compression fracture (CMS-HCC)   • Microcytic anemia   • Heme positive stool   • Dementia     I led and attended the weekly conference, and agree with the IDT conference documentation and plan of care as noted below.    Date of conference: 8/3/2017    Admission FIM - 63    CM/social support: lives alone      Anticipated DC date: 8/24/2017    Home health: TBD    Equip: TBD    Follow up: TBD    Medical Decision Making and Plan:    Labs reviewed. Medications reviewed. Appreciate the assistance of the hospitalist.    T12/L1 chronic compression fractures - Continue pain management with Lidoderm patch and oral pain meds as needed. Pain currently controlled.    Thoracic spondylosis with scoliosis, thecal sac narrowing from L2-L5 worst at L2-L3 and L3-L4, L2-L5 multilevel bilateral neural foraminal narrowing - with evidence of radiculopathy on exam with weakness at the bilateral L4 myotomes as well as abnormal sensation on the right L1-L2 dermatome, and L4-S1 dermatomes. Patient does not want surgery. Continue conservative treatment with therapy. Gabapentin for complaints of nerve pain in the right thigh. Increased dose with improved pain control.    Hypertension - Continue Benzapril, furosemide, metoprolol, hydralazine. Very high BP today, then normalized. Monitor.    Coronary artery disease status post stenting - continue aspirin and statin.    Dyslipidemia - continue statin.    Tobacco use - nicotine patch.    Acute on chronic pain - pain meds as needed. lidoderm patch for compression fractures. Gabapentin for nerve pain.    Bladder urgency - UA negative. May consider trial of ditropan.    Dysphagia? - choked on food during dinner. Diet downgraded. Speech consult.    History of GI bleed - with microcytic anemia and positive stool guaiac on acute. IV iron. Apparently last colonoscopy normal. Per hospitalist, needs EGD due to history of Haywood's esophagus.    IV iron infiltration - 8/4. Discontinue infusions - got 2/5. Monitor wound. Ice and elevate. Improved.    History of depression/anxiety - continue Cymbalta and as needed Buspar.    Dementia? - likely vascular dementia based on previous imaging from years ago. Speech consult.    Low Vit D - 10, started high dose repletion.    DVT prophylaxis - Discontinued Lovenox as  she's ambulating longer distances.    Total time:  >25 minutes.  I spent greater than 50% of the time for patient care and coordination on this date, including unit/floor time, and face-to-face time with the patient as per assessment and plan above.    Christina Miller M.D.

## 2017-08-09 NOTE — REHAB-PHARMACY IDT TEAM NOTE
Pharmacy  Pharmacy  Antibiotics/Antifungals/Antivirals:  Medication:      Active Orders     None        Route:         None  Stop Date:  N/a  Reason:   Antihypertensives/Cardiac:  Medication:    Orders (72h ago through future)    Start     Ordered    08/09/17 1800  metoprolol (LOPRESSOR) tablet 12.5 mg   TWICE DAILY     Comments:  Hold for SBP <100 or pulse <55    08/09/17 0932    08/09/17 1400  hydrALAZINE (APRESOLINE) tablet 50 mg   EVERY 8 HOURS      08/09/17 0932    08/08/17 1848  clonidine (CATAPRES) tablet 0.1 mg   EVERY 6 HOURS PRN      08/08/17 1848    08/04/17 1800  metoprolol (LOPRESSOR) tablet 25 mg   TWICE DAILY,   Status:  Discontinued     Comments:  Hold for SBP <100 or pulse <55    08/04/17 1106    08/04/17 1400  hydrALAZINE (APRESOLINE) tablet 25 mg   EVERY 8 HOURS,   Status:  Discontinued      08/04/17 1106    08/02/17 0600  benazepril (LOTENSIN) tablet 40 mg   Q DAY      08/01/17 1535    08/02/17 0600  furosemide (LASIX) tablet 40 mg   Q DAY     Comments:  Hold for SBP <100    08/01/17 1535    08/01/17 2100  atorvastatin (LIPITOR) tablet 80 mg   EVERY BEDTIME      08/01/17 1535    08/01/17 1531  hydrALAZINE (APRESOLINE) tablet 25 mg   EVERY 8 HOURS PRN,   Status:  Discontinued      08/01/17 1535        Patient Vitals for the past 24 hrs:   BP Pulse   08/09/17 1450 116/63 mmHg (!) 57   08/09/17 1423 116/63 mmHg (!) 57   08/09/17 0901 - 62   08/09/17 0632 159/76 mmHg (!) 55   08/09/17 0450 148/82 mmHg -   08/09/17 0300 160/86 mmHg -   08/08/17 1837 (!) 186/94 mmHg 62   08/08/17 1708 (!) 165/80 mmHg (!) 56       Anticoagulation:  Medication:  Not applicable  INR:      Other key medications:        A review of the medication list reveals no issues at this time. Patient is currently on antihypertensive(s). Recommend home blood pressure monitoring/recording if antihypertensive(s) regimen(s) continue.    Section completed by:  Erik Kennedy, [facility]

## 2017-08-09 NOTE — CARE PLAN
Problem: Safety  Goal: Will remain free from injury  Outcome: PROGRESSING AS EXPECTED  Pt. Encouraged to use call light within reach, pt. With good safety awareness no impulsiveness noted so far. Hourly rounding continue for safety.    Problem: Pain Management  Goal: Pain level will decrease to patient’s comfort goal  Outcome: PROGRESSING AS EXPECTED  Pt. Resting comfortably at this time pain is controlled, repositioned self in bed for comfort. Continue monitor condition. No other complaints made. Monitor BP due to high result today.

## 2017-08-09 NOTE — FLOWSHEET NOTE
08/09/17 0901   Events/Summary/Plan   Events/Summary/Plan Pt is on RA stable using O2 at night to keep sats above 90% will need NOC study before discharge to determine pt oxygen needs at night.   Non-Invasive Resp Device Site Inspection Completed Intact   Location NC b/l ears   Interdisciplinary Plan of Care-Goals (Indications)   Obstructive Ventilatory Defect or Pulmonary Disease without Obvious Obstruction Strong Subjective / Objective Improvement   Education   Education Yes - Pt. / Family has been Instructed in use of Respiratory Equipment   Respiratory WDL   Respiratory (WDL) X   Chest Exam   Work Of Breathing / Effort Mild   Respiration 17   Pulse 62   Breath Sounds   RUL Breath Sounds Clear   RML Breath Sounds Clear   RLL Breath Sounds Clear;Diminished   ARGELIA Breath Sounds Clear   LLL Breath Sounds Clear;Diminished   Secretions   Cough Non Productive   Oximetry   #Pulse Oximetry (Single Determination) Yes   Oxygen   Home O2 Use Prior To Admission? No   Pulse Oximetry 93 %   O2 (LPM) 0   O2 (FiO2) 21   O2 Daily Delivery Respiratory  Room Air with O2 Available

## 2017-08-09 NOTE — PROGRESS NOTES
"Rehab Progress Note     Chief complaint: none, follow up thoracic spondylosis  Date of Service: 8/9/2017    Interval Events (Subjective)  Patient seen and examined. No acute events overnight. Patient unaware that she was incontinent of both bowel and bladder overnight. Was hypertensive this am, but less than yesterday. Seen during her OT session, working on using assistive devices for LE dressing. Has a hard time keeping spinal precautions.     Objective:  VITAL SIGNS: /63 mmHg  Pulse 57  Temp(Src) 36.6 °C (97.8 °F)  Resp 18  Ht 1.575 m (5' 2\")  Wt 86.1 kg (189 lb 13.1 oz)  BMI 34.71 kg/m2  SpO2 92%  Breastfeeding? No  Gen: alert, no apparent distress  CV: regular rate and rhythm, no murmurs, no peripheral edema  Resp: clear to ascultation bilaterally, normal respiratory effort  GI: soft, non-tender abdomen, bowel sounds present  Neuro: Motor: 5/5 MMT throughout, except for bilateral 4/5 EHL                   Sensory:decreased to light touch in right L1/L2 dermatomes, decreased to pinprick in bilateral L4-S1 dermatomes, decreased vibration in bilateral great toes  DTRs: 2+ in bilateral biceps, triceps, brachioradialis, 3+ in bilateral patella, 2+ at bilateral achilles    Recent Results (from the past 72 hour(s))   OCCULT BLOOD STOOL    Collection Time: 08/06/17  9:10 PM   Result Value Ref Range    Occult Blood Feces Negative Negative   COMP METABOLIC PANEL    Collection Time: 08/07/17  6:22 AM   Result Value Ref Range    Sodium 140 135 - 145 mmol/L    Potassium 3.5 (L) 3.6 - 5.5 mmol/L    Chloride 107 96 - 112 mmol/L    Co2 25 20 - 33 mmol/L    Anion Gap 8.0 0.0 - 11.9    Glucose 93 65 - 99 mg/dL    Bun 20 8 - 22 mg/dL    Creatinine 0.78 0.50 - 1.40 mg/dL    Calcium 9.3 8.5 - 10.5 mg/dL    AST(SGOT) 36 12 - 45 U/L    ALT(SGPT) 47 2 - 50 U/L    Alkaline Phosphatase 83 30 - 99 U/L    Total Bilirubin 0.5 0.1 - 1.5 mg/dL    Albumin 3.4 3.2 - 4.9 g/dL    Total Protein 6.0 6.0 - 8.2 g/dL    Globulin 2.6 1.9 " - 3.5 g/dL    A-G Ratio 1.3 g/dL   CBC WITH DIFFERENTIAL    Collection Time: 08/07/17  6:22 AM   Result Value Ref Range    Nucleated RBC 0.00 /100 WBC    NRBC (Absolute) 0.00 K/uL    WBC 7.3 4.8 - 10.8 K/uL    RBC 4.80 4.20 - 5.40 M/uL    Hemoglobin 9.5 (L) 12.0 - 16.0 g/dL    Hematocrit 34.7 (L) 37.0 - 47.0 %    MCV 72.3 (L) 81.4 - 97.8 fL    MCH 19.8 (L) 27.0 - 33.0 pg    MCHC 27.4 (L) 33.6 - 35.0 g/dL    RDW 52.8 (H) 35.9 - 50.0 fL    Platelet Count 230 164 - 446 K/uL    Neutrophils-Polys 88.60 (H) 44.00 - 72.00 %    Lymphocytes 7.00 (L) 22.00 - 41.00 %    Monocytes 3.50 0.00 - 13.40 %    Eosinophils 0.90 0.00 - 6.90 %    Basophils 0.00 0.00 - 1.80 %    Neutrophils (Absolute) 6.47 2.00 - 7.15 K/uL    Lymphs (Absolute) 0.51 (L) 1.00 - 4.80 K/uL    Monos (Absolute) 0.26 0.00 - 0.85 K/uL    Eos (Absolute) 0.07 0.00 - 0.51 K/uL    Baso (Absolute) 0.00 0.00 - 0.12 K/uL    Hypochromia 1+     Anisocytosis 2+     Macrocytosis 1+     Microcytosis 2+    MAGNESIUM    Collection Time: 08/07/17  6:22 AM   Result Value Ref Range    Magnesium 2.1 1.5 - 2.5 mg/dL   IRON/TOTAL IRON BIND    Collection Time: 08/07/17  6:22 AM   Result Value Ref Range    Iron 73 40 - 170 ug/dL    Total Iron Binding 343 250 - 450 ug/dL    % Saturation 21 15 - 55 %   RETICULOCYTES COUNT    Collection Time: 08/07/17  6:22 AM   Result Value Ref Range    Reticulocyte Count 6.8 (H) 0.8 - 2.1 %    Retic, Absolute 0.33 (H) 0.04 - 0.06 M/uL    Imm. Reticulocyte Fraction 28.5 (H) 9.3 - 17.4 %    Retic Hgb Equivalent 32.4 29.0 - 35.0 pg/cell   ESTIMATED GFR    Collection Time: 08/07/17  6:22 AM   Result Value Ref Range    GFR If African American >60 >60 mL/min/1.73 m 2    GFR If Non African American >60 >60 mL/min/1.73 m 2   DIFFERENTIAL MANUAL    Collection Time: 08/07/17  6:22 AM   Result Value Ref Range    Manual Diff Status PERFORMED    PERIPHERAL SMEAR REVIEW    Collection Time: 08/07/17  6:22 AM   Result Value Ref Range    Peripheral Smear Review see  below    PLATELET ESTIMATE    Collection Time: 08/07/17  6:22 AM   Result Value Ref Range    Plt Estimation Normal    MORPHOLOGY    Collection Time: 08/07/17  6:22 AM   Result Value Ref Range    RBC Morphology Present     Polychromia 1+     Poikilocytosis 2+     Ovalocytes 1+     Schistocytes 1+     Stomatocytes 1+    CBC WITH DIFFERENTIAL    Collection Time: 08/08/17  6:08 AM   Result Value Ref Range    WBC 5.5 4.8 - 10.8 K/uL    RBC 4.99 4.20 - 5.40 M/uL    Hemoglobin 9.9 (L) 12.0 - 16.0 g/dL    Hematocrit 35.8 (L) 37.0 - 47.0 %    MCV 71.7 (L) 81.4 - 97.8 fL    MCH 19.8 (L) 27.0 - 33.0 pg    MCHC 27.7 (L) 33.6 - 35.0 g/dL    RDW 53.4 (H) 35.9 - 50.0 fL    Platelet Count 236 164 - 446 K/uL    Neutrophils-Polys 68.80 44.00 - 72.00 %    Lymphocytes 17.90 (L) 22.00 - 41.00 %    Monocytes 10.80 0.00 - 13.40 %    Eosinophils 1.60 0.00 - 6.90 %    Basophils 0.40 0.00 - 1.80 %    Immature Granulocytes 0.50 0.00 - 0.90 %    Nucleated RBC 0.00 /100 WBC    Neutrophils (Absolute) 3.77 2.00 - 7.15 K/uL    Lymphs (Absolute) 0.98 (L) 1.00 - 4.80 K/uL    Monos (Absolute) 0.59 0.00 - 0.85 K/uL    Eos (Absolute) 0.09 0.00 - 0.51 K/uL    Baso (Absolute) 0.02 0.00 - 0.12 K/uL    Immature Granulocytes (abs) 0.03 0.00 - 0.11 K/uL    NRBC (Absolute) 0.00 K/uL       Current Facility-Administered Medications   Medication Frequency   • hydrALAZINE (APRESOLINE) tablet 50 mg Q8HRS   • metoprolol (LOPRESSOR) tablet 12.5 mg TWICE DAILY   • clonidine (CATAPRES) tablet 0.1 mg Q6HRS PRN   • potassium chloride SA (Kdur) tablet 20 mEq DAILY   • acetaminophen (TYLENOL) tablet 650 mg Q4HRS PRN   • gabapentin (NEURONTIN) capsule 300 mg Q EVENING   • vitamin D (Ergocalciferol) (DRISDOL) capsule 50,000 Units Q7 DAYS   • Respiratory Care per Protocol Continuous RT   • Pharmacy Consult Request ...Pain Management Review 1 Each PRN   • oxycodone immediate-release (ROXICODONE) tablet 2.5 mg Q3HRS PRN   • oxycodone immediate-release (ROXICODONE) tablet 5 mg  Q3HRS PRN   • lidocaine (LIDODERM) 5 % 1 Patch Q24HR   • aspirin EC (ECOTRIN) tablet 81 mg DAILY   • artificial tears 1.4 % ophthalmic solution 1 Drop PRN   • mag hydrox-al hydrox-simeth (MAALOX PLUS ES or MYLANTA DS) suspension 20 mL Q2HRS PRN   • trazodone (DESYREL) tablet 50 mg QHS PRN   • sodium chloride (OCEAN) 0.65 % nasal spray 2 Spray PRN   • atorvastatin (LIPITOR) tablet 80 mg QHS   • benazepril (LOTENSIN) tablet 40 mg Q DAY   • duloxetine (CYMBALTA) capsule 20 mg DAILY   • furosemide (LASIX) tablet 40 mg Q DAY   • ondansetron (ZOFRAN ODT) dispertab 4 mg Q4HRS PRN   • senna-docusate (PERICOLACE or SENOKOT S) 8.6-50 MG per tablet 2 Tab BID    And   • polyethylene glycol/lytes (MIRALAX) PACKET 1 Packet QDAY PRN    And   • magnesium hydroxide (MILK OF MAGNESIA) suspension 30 mL QDAY PRN    And   • bisacodyl (DULCOLAX) suppository 10 mg QDAY PRN   • nicotine (NICODERM) 14 MG/24HR 14 mg Daily-0600   • omeprazole (PRILOSEC) capsule 20 mg BID       Orders Placed This Encounter   Procedures   • DIET ORDER     Standing Status: Standing      Number of Occurrences: 1      Standing Expiration Date:      Order Specific Question:  Diet:     Answer:  Cardiac [6]     Order Specific Question:  Texture/Fiber modifications:     Answer:  Dysphagia 2(Pureed/Chopped)specify fluid consistency(question 6) [2]     Order Specific Question:  Consistency/Fluid modifications:     Answer:  Thin Liquids [3]       Assessment:  Active Hospital Problems    Diagnosis   • Back pain   • Depression   • Thoracic spondylosis   • HTN (hypertension)   • Dyslipidemia   • History of stroke   • T12 compression fracture (CMS-HCC)   • Microcytic anemia   • Heme positive stool   • Dementia     I led and attended the weekly conference, and agree with the IDT conference documentation and plan of care as noted below.    Date of conference: 8/3/2017    Admission FIM - 63    CM/social support: lives alone     Anticipated DC date: 8/24/2017    Home health:  "TBD    Equip: TBD    Follow up: TBD    Therapy update 8/9/2017  PT - Ambulating 250 ft with FWW at standby assist  Contact guard assist for sit to stand transfer  SLP - 22/30 on Orientation Log, continued daily log for fxl recall of daily events/  training. Pt able to recall items eaten for breakfast, swallow strategies with  100% accuracy. Vague details of therapy activities for PT (\"I pushed the  wheelchair twice and walked\"). Continued education on RWAVS, memory strategies  with pt able to recall 2/5 after 5 minute delay. Trials of regular textures   assessed during lunch as pt reports she only eats salads at home. Pt  demonstrated no overt s/sx of pen/asp, however required MIN-MOD cues to clear  mouth, alternate solids and liquids. Pt benefitted from lettuce cut into  smaller pieces for each of mastication. Recommend continue trials with Dys 3  textures prior to advancement.     Medical Decision Making and Plan:    Labs reviewed. Medications reviewed. Appreciate the assistance of the hospitalist.    T12/L1 chronic compression fractures - Continue pain management with Lidoderm patch and oral pain meds as needed. Pain currently controlled. Spinal precautions.    Thoracic spondylosis with scoliosis, thecal sac narrowing from L2-L5 worst at L2-L3 and L3-L4, L2-L5 multilevel bilateral neural foraminal narrowing - with evidence of radiculopathy on exam with weakness at the bilateral L4 myotomes as well as abnormal sensation on the right L1-L2 dermatome, and L4-S1 dermatomes. Patient does not want surgery. Continue conservative treatment with therapy. Gabapentin for complaints of nerve pain in the right thigh. Increased dose with improved pain control.    Hypertension - Continue Benzapril, furosemide, metoprolol, hydralazine, and clonidine. Hospitalist managing. Monitor.    Coronary artery disease status post stenting - continue aspirin and statin.    Dyslipidemia - continue statin.    Tobacco use - nicotine " patch.    Acute on chronic pain - pain meds as needed. lidoderm patch for compression fractures. Gabapentin for nerve pain.    Bladder urgency - UA negative. May consider trial of ditropan.    Dysphagia? - choked on food during dinner. Diet downgraded. Speech consult.    History of GI bleed - with microcytic anemia and positive stool guaiac on acute. IV iron. Apparently last colonoscopy normal. Per hospitalist, needs EGD due to history of Haywood's esophagus. Anemia slightly improved after IV iron.    IV iron infiltration - 8/4. Discontinue infusions - got 2/5. Monitor wound. Ice and elevate. Improved.    History of depression/anxiety - continue Cymbalta and as needed Buspar.    Dementia? - likely vascular dementia based on previous imaging from years ago. Speech consult.    Low Vit D - 10, started high dose repletion.    DVT prophylaxis - Discontinued Lovenox as she's ambulating longer distances.    Total time:  >25 minutes.  I spent greater than 50% of the time for patient care and coordination on this date, including unit/floor time, and face-to-face time with the patient as per assessment and plan above.    Christina Miller M.D.

## 2017-08-09 NOTE — PROGRESS NOTES
Hospital Medicine Progress Note, Adult, Complex               Author: Luc Osei Date & Time created: 8/9/2017  9:21 AM     Interval History:  No significant events or changes since last visit  Patient denies SOB, cough, or diarrhea  Patient slept ok last night    Chief Complaint:  Hypertension  Bradycardia      Review of Systems:  Review of Systems   Constitutional: Negative for fever.   Eyes: Negative for blurred vision.   Respiratory: Negative for shortness of breath.    Cardiovascular: Negative for palpitations.   Gastrointestinal: Negative for nausea and vomiting.   Neurological: Negative for dizziness and headaches.   Psychiatric/Behavioral: Negative for hallucinations.       Physical Exam:  Physical Exam   Constitutional: She is oriented to person, place, and time.   HENT:   Mouth/Throat: Oropharynx is clear and moist.   Eyes: No scleral icterus.   Cardiovascular: Normal rate, regular rhythm, S1 normal and S2 normal.    No murmur heard.  Pulmonary/Chest: Effort normal and breath sounds normal. No stridor.   Abdominal: Soft. She exhibits no distension. There is no tenderness. Hernia confirmed negative in the right inguinal area and confirmed negative in the left inguinal area.   Musculoskeletal: She exhibits no edema.   Neurological: She is alert and oriented to person, place, and time. No sensory deficit.   Skin: Skin is warm and dry. No rash noted. She is not diaphoretic. No cyanosis.   Psychiatric: She has a normal mood and affect. Her behavior is normal.   Nursing note and vitals reviewed.      Labs:        Invalid input(s): [id number]      Recent Labs      08/07/17 0622   SODIUM  140   POTASSIUM  3.5*   CHLORIDE  107   CO2  25   BUN  20   CREATININE  0.78   MAGNESIUM  2.1   CALCIUM  9.3     Recent Labs      08/07/17 0622   ALTSGPT  47   ASTSGOT  36   ALKPHOSPHAT  83   TBILIRUBIN  0.5   GLUCOSE  93     Recent Labs      08/07/17 0622 08/08/17   0608   RBC  4.80  4.99   HEMOGLOBIN  9.5*  9.9*    HEMATOCRIT  34.7*  35.8*   PLATELETCT  230  236   IRON  73   --    TOTIRONBC  343   --      Recent Labs      17   0622  17   0608   WBC  7.3  5.5   NEUTSPOLYS  88.60*  68.80   LYMPHOCYTES  7.00*  17.90*   MONOCYTES  3.50  10.80   EOSINOPHILS  0.90  1.60   BASOPHILS  0.00  0.40   ASTSGOT  36   --    ALTSGPT  47   --    ALKPHOSPHAT  83   --    TBILIRUBIN  0.5   --            Hemodynamics:  Temp (24hrs), Av.7 °C (98.1 °F), Min:36.7 °C (98 °F), Max:36.8 °C (98.2 °F)  Temperature: 36.7 °C (98 °F)  Pulse  Av.5  Min: 52  Max: 84   Blood Pressure : 148/82 mmHg     Respiratory:    Respiration: 18, Pulse Oximetry: 93 %     Work Of Breathing / Effort: Mild  RUL Breath Sounds: Clear, RML Breath Sounds: Clear, RLL Breath Sounds: Clear;Diminished, ARGELIA Breath Sounds: Clear;Diminished, LLL Breath Sounds: Clear;Diminished  Fluids:    Intake/Output Summary (Last 24 hours) at 17 0921  Last data filed at 17 0517   Gross per 24 hour   Intake    720 ml   Output      0 ml   Net    720 ml        GI/Nutrition:  Orders Placed This Encounter   Procedures   • DIET ORDER     Standing Status: Standing      Number of Occurrences: 1      Standing Expiration Date:      Order Specific Question:  Diet:     Answer:  Cardiac [6]     Order Specific Question:  Texture/Fiber modifications:     Answer:  Dysphagia 2(Pureed/Chopped)specify fluid consistency(question 6) [2]     Order Specific Question:  Consistency/Fluid modifications:     Answer:  Thin Liquids [3]     Medical Decision Making, by Problem:  Active Hospital Problems    Diagnosis   • Back pain [M54.9]   • Depression [F32.9]   • Thoracic spondylosis [M47.814]   • HTN (hypertension) [I10]   • Dyslipidemia [E78.5]   • History of stroke [Z86.73]   • T12 compression fracture (CMS-HCC) [M48.54XA]   • Microcytic anemia [D50.9]   • Heme positive stool [R19.5]   • Dementia [F03.90]     *  B/L LE Pain  MRI thoracic & lumbar: see below  LE arterial dopplers: ok  Pt not  interested in any surgeries at this time    *  Hypertension  BP elevated with -186  On Lotensin: 40 mg daily  On Lopressor: 50 mg bid --> 25 mg bid --> will decrease to 12.5 mg bid (8/9 at evening)  On Hydralazine: 25 mg tid --> will increase to 50 mg tid (8/9)  On Clonidine: 0.1 mg q6 hours prn SBP > 170  Note: also on Lasix  Cont to monitor    *  Bradycardia  HR generally ok but occ dips lower: 54-62  On Lopressor: 50 mg bid --> 25 mg bid --> will decrease to 12.5 mg bid (8/8 at evening)  Monitor for now    *  CHF  On Lasix: 40 mg daily  On KCL: 20 meq daily  S/P KCL 40 meq x 1 extra dose (8/8)  Note: was on Lasix at home  Monitor K+: 3.8 --> 3.5 (8/7)    *  COPD: hx tobacco abuse  *  Hx CVA  *  Chronic Back Pain: has refused surgery in the past  *  PVD: with hx moderate iliac stenosis in 2009       MRI Thoracic Spine:  1.  Multilevel multifactorial degenerative changes  2.  Prominent posterior epidural fat from L2 through L5 contributes to thecal sac narrowing at these levels  3.  Thecal sac narrowing worst at L2-L3 and L3-L4  4.  Areas of neural foraminal narrowing as described above  5.  Chronic T12 vertebral compression fracture      Labs reviewed and Medications reviewed

## 2017-08-09 NOTE — CONSULTS
DATE OF SERVICE:  08/02/2017    BRIEF HISTORY OF PRESENTING COMPLAINTS:  Patient is a 76-year-old white    female who is referred for a behavioral medicine evaluation by Dr. Miller.  Patient was transferred to rehab from acute where she was initially   admitted with complaints of chronic back and bilateral leg pain that had been   increasing significantly over previous week causing her to be unable to   tolerate walking.  Patient was offered surgical correction, which she   declined.  Patient was stabilized acutely and sent to rehab to address her   general progress.    PAST MEDICAL HISTORY:  Significant for congestive heart failure, coronary   artery disease, stroke, hypertension, and GI bleed.    PSYCHOLOGICAL STATUS:  MENTAL STATUS EXAMINATION:  The patient is a well-nourished, obese female of   short stature who appeared her stated age of 76.  At presentation, the patient   was alert.  She was sitting upright in her bed when approached.  The patient   oriented well to my presence.  The patient was kempt in appearance.  She was   dressed casually in street attire that was appropriate to her age and setting.    The patient's appearance was remarkable for poor dentition.  Patient's   manner of presentation was cooperative and friendly.    The patient showed significant cognitive problems.  She was unable to   correctly identify the year or month including the day of the month.  She also   did not know the day of the week.  She incorrectly stated the time of the   day.  Her speech was somewhat dysarthric and hesitant.  Her language was   logical for the most part.  The patient's concentration and memory functioning   appeared diminished.    Patient's affect was constricted, stable, and mildly intense.  She related   marginally well.  Her mood appeared somewhat dysphoric, but appropriate to the   context.    There was no evidence of delusional or perceptual disturbance.  Also, the   patient showed no  "unusual pain ___ during the interview.    SPECIFIC BEHAVIORAL COMPLAINTS:  Patient admitted to feelings of dysphoria and   a loss of interest for wanting to socialize and engage in certain activities.    She denied experiencing any signs of nervousness.    Patient admitted to significant back pain.  She rated a 7/10 in average   intensity.  She reports she will ask for medications to help control her pain.    Other problems mentioned by the patient included a diminished energy despite   \"my craving ___.\"  She also reported a slight diminishment of her energy level   and restless sleep.    The patient also report some social isolation.  She denied any family   disharmony.  She reported no problems managing her day-to-day stressors.    The patient also reported no ETOH or other drug abuse.  She denies any tobacco   dependence.    PSYCHIATRIC HISTORY:  The patient denied any history significant for   psychiatric               treatment including in or outpatient.    PSYCHOMETRIC TESTING:  Patient was administered 2 psychometric tests and 2   screening instruments.  PS/PC-R revealed no clinically significant symptoms of   a negative mood.  She did report some feelings of sadness, however.  Her CDR   survey showed no problems with level of consciousness.  However, she did show   significant deficits in attention, her thinking seemed impaired for reasoning.                   concrete and livable, impoverished, and she showed   difficulties with problem solving and making decisions.  Her insight was poor.    The patient was poorly oriented to place and date.  Her speech was   noticeable for a slight dysarthria and difficulty with word finding.    Moreover, she showed deficits in her memory functioning and behavioral   activation and basic self-care.  She also reported low levels of energy, sleep   disturbance, feelings of sadness, diminished appetite and severe back pain.    The patient was screened for any elder abuse or " risk of suicide.  The patient   showed no strong evidence for either problem.    SOCIAL HISTORY:  Patient is retired.  She is .  She last worked as a    and .  She lives alone in [place], [place].    IMPRESSION:  1.  Cognitive disorder, mild, not otherwise specified; adjustment disorder   with dysphoric mood.  2.  Tobacco dependence.    RECOMMENDATIONS:  Patient will be followed for status and supportive care.       ____________________________________     ARNAUD CARCAMO, PHD    BRANDI / YESSICA    DD:  08/08/2017 12:35:47  DT:  08/08/2017 19:13:59    D#:  [id number]  Job#:  [id number]

## 2017-08-09 NOTE — PROGRESS NOTES
0715: Bedside report received, assumed care for this patient.  Patient is A&O x 3.  VSS except BP which is high again, 150s systolic.  Will retake when sitting up in a bit, just given BP medications.  Communication board updated, call light and belongings are within reach.  Bed is in low position. Patient appears in no distress, and has no complaints of SOB and 0/10 of pain.  Will be medicated per MAR.  Plan of care discussed and agreed upon with patient.  Incontinent of bowel and bladder last night.  Discussed this with patient .  She is telling me she does not know she has to urinate until she is already urinating on herself.  Agreement between CNA and I that she will be TV today frequently, patient expresses skepticism in this regard but will try.

## 2017-08-09 NOTE — CARE PLAN
Problem: Safety  Goal: Will remain free from injury  BP was high at beginning of shift.  Has been given BP medications (scheduled) at beginning of shift and has been at safe BPs all day.  BPs monitored frequently.    Problem: IP BLADDER/VOIDING  Goal: LTG - patient will complete bladder elimination  Patient incontinent.  Initiated TVs today.  Some success, did urinate on commode.  1 episode of incontinence today.

## 2017-08-10 LAB — VIT B6 SERPL-MCNC: 6.8 NMOL/L (ref 20–125)

## 2017-08-10 PROCEDURE — A9270 NON-COVERED ITEM OR SERVICE: HCPCS | Performed by: HOSPITALIST

## 2017-08-10 PROCEDURE — 99232 SBSQ HOSP IP/OBS MODERATE 35: CPT | Performed by: HOSPITALIST

## 2017-08-10 PROCEDURE — 97110 THERAPEUTIC EXERCISES: CPT

## 2017-08-10 PROCEDURE — 97535 SELF CARE MNGMENT TRAINING: CPT

## 2017-08-10 PROCEDURE — 99233 SBSQ HOSP IP/OBS HIGH 50: CPT | Performed by: PHYSICAL MEDICINE & REHABILITATION

## 2017-08-10 PROCEDURE — 97116 GAIT TRAINING THERAPY: CPT

## 2017-08-10 PROCEDURE — 94760 N-INVAS EAR/PLS OXIMETRY 1: CPT

## 2017-08-10 PROCEDURE — 770010 HCHG ROOM/CARE - REHAB SEMI PRIVAT*

## 2017-08-10 PROCEDURE — 97530 THERAPEUTIC ACTIVITIES: CPT

## 2017-08-10 PROCEDURE — 92526 ORAL FUNCTION THERAPY: CPT

## 2017-08-10 PROCEDURE — A9270 NON-COVERED ITEM OR SERVICE: HCPCS | Performed by: PHYSICAL MEDICINE & REHABILITATION

## 2017-08-10 PROCEDURE — 700102 HCHG RX REV CODE 250 W/ 637 OVERRIDE(OP): Performed by: PHYSICAL MEDICINE & REHABILITATION

## 2017-08-10 PROCEDURE — 700101 HCHG RX REV CODE 250: Performed by: PHYSICAL MEDICINE & REHABILITATION

## 2017-08-10 PROCEDURE — 97532 HCHG COGNITIVE SKILL DEV. 15 MIN: CPT

## 2017-08-10 PROCEDURE — 700102 HCHG RX REV CODE 250 W/ 637 OVERRIDE(OP): Performed by: HOSPITALIST

## 2017-08-10 RX ORDER — OXYBUTYNIN CHLORIDE 5 MG/1
5 TABLET, EXTENDED RELEASE ORAL EVERY EVENING
Status: DISCONTINUED | OUTPATIENT
Start: 2017-08-10 | End: 2017-08-15 | Stop reason: HOSPADM

## 2017-08-10 RX ADMIN — DULOXETINE HYDROCHLORIDE 20 MG: 20 CAPSULE, DELAYED RELEASE ORAL at 08:11

## 2017-08-10 RX ADMIN — HYDRALAZINE HYDROCHLORIDE 50 MG: 25 TABLET, FILM COATED ORAL at 20:29

## 2017-08-10 RX ADMIN — ACETAMINOPHEN 650 MG: 325 TABLET, FILM COATED ORAL at 20:31

## 2017-08-10 RX ADMIN — METOPROLOL TARTRATE 12.5 MG: 25 TABLET ORAL at 17:23

## 2017-08-10 RX ADMIN — LIDOCAINE 1 PATCH: 50 PATCH TOPICAL at 08:11

## 2017-08-10 RX ADMIN — OXYBUTYNIN CHLORIDE 5 MG: 5 TABLET, EXTENDED RELEASE ORAL at 20:30

## 2017-08-10 RX ADMIN — POTASSIUM CHLORIDE 20 MEQ: 1500 TABLET, EXTENDED RELEASE ORAL at 08:11

## 2017-08-10 RX ADMIN — ATORVASTATIN CALCIUM 80 MG: 40 TABLET, FILM COATED ORAL at 20:31

## 2017-08-10 RX ADMIN — Medication 2 TABLET: at 08:11

## 2017-08-10 RX ADMIN — OMEPRAZOLE 20 MG: 20 CAPSULE, DELAYED RELEASE ORAL at 20:30

## 2017-08-10 RX ADMIN — ASPIRIN 81 MG: 81 TABLET, COATED ORAL at 08:11

## 2017-08-10 RX ADMIN — HYDRALAZINE HYDROCHLORIDE 50 MG: 25 TABLET, FILM COATED ORAL at 14:17

## 2017-08-10 RX ADMIN — HYDRALAZINE HYDROCHLORIDE 50 MG: 25 TABLET, FILM COATED ORAL at 05:42

## 2017-08-10 RX ADMIN — NICOTINE 14 MG: 14 PATCH, EXTENDED RELEASE TRANSDERMAL at 05:42

## 2017-08-10 RX ADMIN — METOPROLOL TARTRATE 12.5 MG: 25 TABLET ORAL at 05:42

## 2017-08-10 RX ADMIN — BENAZEPRIL HYDROCHLORIDE 40 MG: 10 TABLET, FILM COATED ORAL at 05:41

## 2017-08-10 RX ADMIN — OMEPRAZOLE 20 MG: 20 CAPSULE, DELAYED RELEASE ORAL at 08:11

## 2017-08-10 RX ADMIN — GABAPENTIN 300 MG: 300 CAPSULE ORAL at 20:30

## 2017-08-10 RX ADMIN — Medication 2 TABLET: at 20:30

## 2017-08-10 RX ADMIN — FUROSEMIDE 40 MG: 40 TABLET ORAL at 05:42

## 2017-08-10 ASSESSMENT — ENCOUNTER SYMPTOMS
VOMITING: 0
FEVER: 0
HEADACHES: 0
DIARRHEA: 0
SHORTNESS OF BREATH: 0
NAUSEA: 0
DIZZINESS: 0
BLURRED VISION: 0
NERVOUS/ANXIOUS: 0
COUGH: 0
HALLUCINATIONS: 0
PALPITATIONS: 0

## 2017-08-10 ASSESSMENT — GAIT ASSESSMENTS
ASSISTIVE DEVICE: FRONT WHEEL WALKER
DEVIATION: BRADYKINETIC;SHUFFLED GAIT
DISTANCE (FEET): 175
GAIT LEVEL OF ASSIST: CONTACT GUARD ASSIST

## 2017-08-10 ASSESSMENT — PAIN SCALES - GENERAL: PAINLEVEL_OUTOF10: 0

## 2017-08-10 ASSESSMENT — PAIN SCALES - WONG BAKER: WONGBAKER_NUMERICALRESPONSE: HURTS A LITTLE MORE

## 2017-08-10 NOTE — CARE PLAN
Problem: Bathing  Goal: STG-Within one week, patient will bathe  Goal: STG-Within one week, patient will bathe  1) Individualized Goal: with supervision and AE/DME prn  2) Interventions: OT Orthotics Training, OT E Stim Attended, OT Self Care/ADL, OT Cognitive Skill Dev, OT Community Reintegration, OT Manual Ther Technique, OT Neuro Re-Ed/Balance, OT Sensory Int Techniques, OT Therapeutic Activity, OT Evaluation and OT Therapeutic Exercise  Outcome: NOT MET    Problem: Dressing  Goal: STG-Within one week, patient will dress LB  Goal: STG-Within one week, patient will dress LB  1) Individualized Goal: with supervision and AE/DME prn  2) Interventions: OT Orthotics Training, OT E Stim Attended, OT Self Care/ADL, OT Cognitive Skill Dev, OT Community Reintegration, OT Manual Ther Technique, OT Neuro Re-Ed/Balance, OT Sensory Int Techniques, OT Therapeutic Activity, OT Evaluation and OT Therapeutic Exercise  Outcome: PROGRESSING AS EXPECTED    Problem: Toileting  Goal: STG-Within one week, patient will complete toileting tasks  Goal: STG-Within one week, patient will complete toileting tasks  1) Individualized Goal: with supervision and AE/DME prn  2) Interventions: OT Orthotics Training, OT E Stim Attended, OT Self Care/ADL, OT Cognitive Skill Dev, OT Community Reintegration, OT Manual Ther Technique, OT Neuro Re-Ed/Balance, OT Sensory Int Techniques, OT Therapeutic Activity, OT Evaluation and OT Therapeutic Exercise  Outcome: NOT MET    Problem: Functional Cognition  Goal: STG-Within one week, patient will demonstrate safety awareness  Goal: STG-Within one week, patient will demonstrate safety awareness  1) Individualized Goal: with fair knowledge by demonstrating locking w/c consistently during therapy session.  2) Interventions: OT Orthotics Training, OT E Stim Attended, OT Self Care/ADL, OT Cognitive Skill Dev, OT Community Reintegration, OT Manual Ther Technique, OT Neuro Re-Ed/Balance, OT Sensory Int Techniques,  OT Therapeutic Activity, OT Evaluation and OT Therapeutic Exercise  Outcome: PROGRESSING AS EXPECTED

## 2017-08-10 NOTE — REHAB-ACTIVITY IDT TEAM NOTE
ACT  Recreation/Community     Leisure Competence Measure  Leisure Awareness: Minimal Assist  Leisure Attitude: Supervision  Leisure Skills: Moderate Assist  Cultural / Social Behaviors: Minimal Assist  Interpersonal Skills: Minimal Assist  Community Integration Skills:  (not yet assessed)  Social Contact: Supervision         Recreation Therapy Problems           Problem: Recreation Therapy     Dates: Start: 08/04/17       Goal: STG-Within one week, patient will utilize     Dates: Start: 08/04/17      Description: bilateral UE's for leisure for 10 minutes with minimal verbal cuing        Goal: STG-Within one week, patient will attend to     Dates: Start: 08/04/17      Description: Leisure task for 15 minutes with moderatel verbal cuing            Goal: LTG-By discharge, patient will utilize     Dates: Start: 08/04/17      Description: bilateral UE's for leisure for 15 minutes with minimal verbal cuing            Goal: LTG-By discharge, patient will attend to     Dates: Start: 08/04/17      Description: Leisure task for 15 minutes with minimal verbal cuing              Strengths:   Cooperative with encouragement  Barriers:   Activity tolerance, pain, attention and motivation, memory    Section completed by:  Destiny Abreu, CTRS

## 2017-08-10 NOTE — REHAB-NURSING IDT TEAM NOTE
Nursing  Nursing  Diet/Nutrition:  Cardiac, Dysphagia II and Thin Liquids  Medication Administration:  Whole with Liquid Wash  % consumed at meals in last 24 hours:  Consumed % of meals per documentation.  Breakfast:  95%   Lunch:  100%  Dinner:  30%   Snack schedule:  None  Appetite:  Fair  Admit Weight:  Weight: 88.451 kg (195 lb)  Weight Last 7 Days   [86.1 kg (189 lb 13.1 oz)] 86.1 kg (189 lb 13.1 oz) (08/06 1000)    Pain Issues:    Location:  --  --         Severity:  Denies   Scheduled pain medications:  gabapentin (NEURONTIN)      PRN pain medications used in last 24 hours:  None   Non Pharmacologic Interventions:  distraction, emotional support, relaxation, repositioned and rest  Effectiveness of pain management plan:  good=patient states acceptable comfort after interventions    Bowel:    Bowel Assist Initial Score:  3 - Moderate Assistance  Bowel Assist Current Score:  5 - Standby Prompting/Supervision or Set-up  Bowl Accidents in last 7 days:  0  Last bowel movement: 08/09/17  Stool: Medium, Soft     Usual bowel pattern:  daily  Scheduled bowel medications:  senna-docusate (PERICOLACE or SENOKOT S)   PRN bowel medications used in last 24 hours:  None  Nursing Interventions:  Increased time, Scheduled medication, Verbal cueing, Set-up, Positioning on commode/toilet  Effectiveness of bowel program:   good=regular, predictable, controlled emptying of bowel     Bladder:    Bladder Assist Initial Score:  1 - Total Assistance  Bladder Assist Current Score:  4 - Minimal Assistance  Bladder Accidents in last 7 days:  4  PVR range for past 24-48 hours:  [64]   Medications affecting bladder:  None    Time void schedule/voiding pattern:  q4h during day  Interventions:  Increased time, Supervision, Verbal cueing, Time void staff initiated, Time void patient initiated  Effectiveness of bladder training:  Poor=Continues to have bladder accidents and Fair=occasional unpredictable, incontinent emptying of bladder  (< 1 time/day)    Sleep/wake cycle:   Average hours slept:  Sleeps 4-6 hours without waking  Sleep medication usage:  None    Patient/Family Training/Education:  Aspiration Precautions, Bladder Management/Training, Diet/Nutrition, Fall Prevention, Medication Management, Safe Transfers and Safety  Discharge Supply Recommendations:  Blood Pressure Monitor  Strengths: Alert and oriented, Able to follow instructions, Effective communication skills and Manages pain appropriately   Barriers:   Aspiration risk, Bladder incontinence, Generalized weakness and Hypertension            Nursing Problems           Problem: Bowel/Gastric:     Goal: Normal bowel function is maintained or improved         Goal: Will not experience complications related to bowel motility           Problem: Communication     Goal: The ability to communicate needs accurately and effectively will improve           Problem: Discharge Barriers/Planning     Goal: Patient's continuum of care needs will be met           Problem: IP BLADDER/VOIDING     Goal: LTG - patient will complete bladder elimination         Goal: LTG - Patient will utilize adaptive techniques/equipment to complete bladder elimination         Goal: STG - Patient demonstrates no accidents         Goal: STG - PATIENT WILL REMAIN CONTINENT.         Goal: STG - Patient will state signs and symptoms of UTI         Goal: STG - patient will be able to empty bladder         Goal: STG - patient participates in bladder program by expressing need to void         Goal: STG - Patient verbalizes understanding of catheter care indwelling/intermittent         Goal: STG - patient participates in bladder program by adhering to implemented toileting schedule           Problem: Infection     Goal: Will remain free from infection           Problem: Knowledge Deficit     Goal: Knowledge of disease process/condition, treatment plan, diagnostic tests, and medications will improve         Goal: Knowledge of the  prescribed therapeutic regimen will improve           Problem: Mobility     Goal: Risk for activity intolerance will decrease           Problem: Pain Management     Goal: Pain level will decrease to patient's comfort goal     Flowsheet: Taken at 08/04/17 2307    Nurse Pain Scale 0 - 10  5 by Elle Shoemaker R.N.    Forte-Orozco Scale   Hurts Just a Little Bit by Elle Shoemaker R.N.    Comfort Goal Comfort at Rest by Elle Shoemaker R.N.    Taken at 08/03/17 2153    Nurse Pain Scale 0 - 10  5 by CHARLIE ManjrarezPMARK    Comfort Goal Comfort at Rest by CHARLIE ManjarrezPMARK         Problem: Respiratory:     Goal: Respiratory status will improve           Problem: Safety     Goal: Will remain free from injury         Goal: Will remain free from falls           Problem: Urinary Elimination:     Goal: Ability to reestablish a normal urinary elimination pattern will improve           Problem: Venous Thromboembolism (VTW)/Deep Vein Thrombosis (DVT) Prevention:     Goal: Patient will participate in Venous Thrombosis (VTE)/Deep Vein Thrombosis (DVT)Prevention Measures                Long Term Goals:   At discharge patient will be able to function safely at home and in the community with support.    Section completed by:  Damian Cole R.N.

## 2017-08-10 NOTE — CARE PLAN
Problem: IADL’s  Goal: STG-Within one week, patient will utilize washer and dryer  Goal: STG-Within one week, patient will utilize washer and dryer  1) Individualized Goal: with min A and v/c prn  2) Interventions: OT Orthotics Training, OT E Stim Attended, OT Self Care/ADL, OT Cognitive Skill Dev, OT Community Reintegration, OT Manual Ther Technique, OT Neuro Re-Ed/Balance, OT Sensory Int Techniques, OT Therapeutic Activity, OT Evaluation and OT Therapeutic Exercise   Outcome: NOT MET

## 2017-08-10 NOTE — REHAB-RESPIRATORY IDT TEAM NOTE
Respiratory Therapy  Respiratory Therapy   Patient's respiratory plan of care for oxygen therapy is:  O2 Protocol Indications: Room Air SpO2 Less Than 90%  O2 Protocol Goals/Outcome: Normoxemia on Oxygen, SpO2 greater than 90%, pt on RA during the day using O2 at night to keep sats above 90%, pt should have NOC study to determine O2 needs during sleep.    Section completed by:  Tootie Westbrook, RRT

## 2017-08-10 NOTE — CARE PLAN
"Problem: Mobility  Goal: STG-Within one week, patient will propel wheelchair household distances  SBA 50 ft x 2   Outcome: MET Date Met:  08/10/17  Goal: STG-Within one week, patient will ambulate household distance  Min A with FWW 25 ft x 2   Outcome: MET Date Met:  08/10/17  Goal: STG-Within one week, patient will ambulate up/down a curb  Min A with // bars for 6\" rise x 3   Outcome: NOT MET  In progress    Problem: Mobility Transfers  Goal: STG-Within one week, patient will sit to stand  SBA with FWW   Outcome: NOT MET  CGA, VCs for safety  Goal: STG-Within one week, patient will transfer bed to chair  SBA with FWW   Outcome: NOT MET  CGA with FWW        "

## 2017-08-10 NOTE — DISCHARGE PLANNING
Case Management;  Attempted again to reach patient's friend Arsenio.  The phone message states that the phone is temporarily out of order.  I will check with patient again on this.

## 2017-08-10 NOTE — CARE PLAN
Problem: Safety  Goal: Will remain free from falls  Call light within reach, needs met.  Bed in low and locked position.  Patient educated on fall risk and verbalized agreement, patient does call appropriately for toileting, nutrition, and other needs.  Patient A&O x 3 and not impulsive.    Alarms on the bed and wheelchair.    Problem: IP BLADDER/VOIDING  Goal: LTG - patient will complete bladder elimination  New order for ditropan starting tonight.  PVRs will resume tonight.  Notified CNA to pass this on, will notify NOC shift nurse.

## 2017-08-10 NOTE — CARE PLAN
Problem: Communication  Goal: The ability to communicate needs accurately and effectively will improve  Outcome: PROGRESSING AS EXPECTED  Pt is able to express her needs to staff effectively    Problem: Safety  Goal: Will remain free from injury  Outcome: PROGRESSING AS EXPECTED  Pt uses call light consistently for staff assistance. Pt has good safety awareness, no impulsivity observed.    Problem: Infection  Goal: Will remain free from infection  Outcome: PROGRESSING AS EXPECTED  No s/s of infection present    Problem: Bowel/Gastric:  Goal: Normal bowel function is maintained or improved  Outcome: PROGRESSING AS EXPECTED  Pt is continent of bowel with last BM 8/9/17    Problem: Pain Management  Goal: Pain level will decrease to patient’s comfort goal  Outcome: PROGRESSING AS EXPECTED  No reports of pain at this time. Will continue to monitor through shift with hourly rounds.

## 2017-08-10 NOTE — REHAB-SLP IDT TEAM NOTE
Speech Therapy  Cognitive Linquistic Functions  Comprehension Initial:  5 - Stand-by Prompting/Supervision or Set-up  Comprehension Current:  5 - Stand-by Prompting/Supervision or Set-up   Comprehension Description:  Verbal cues, Glasses  Expression Initial:  5 - Stand-by Prompting/Supervision or Set-up  Expression Current:  5 - Stand-by Prompting/Supervision or Set-up   Expression Description:  Verbal cueing  Social Interaction Initial:  6 - Modified Independent  Social Interaction Current:  5 - Stand-by Prompting/Supervision or Set-up   Social Interaction Description:  Verbal cues, Schedule  Problem Solving Initial:  5 - Standby Prompting/Supervision or Set-up  Problem Solving Current:  2 - Max Assistance   Problem Solving Description:  Verbal cueing, Therapy schedule, Bed/chair alarm  Memory Initial:  5 - Standby Prompting/Supervision or Set-up  Memory Current:  2 - Max Assistance   Memory Description:  Verbal cueing, Therapy schedule, Bed/chair alarm  Executive Functioning / Organization Initial:  Profound (1)  Executive Functioning / Organization Current:  2 - Max Assistance    Executive Functioning Desciption: information broken into smaller units, max A for IADLs related to medication mngt.   Swallowing:  Swallowing Status: Advanced to Dys 3 as of 8/10/17, trials of regular initiated with advancement anticipated this week.   Orders Placed This Encounter   Procedures   • DIET ORDER     Standing Status: Standing      Number of Occurrences: 1      Standing Expiration Date:      Order Specific Question:  Diet:     Answer:  Cardiac [6]     Order Specific Question:  Texture/Fiber modifications:     Answer:  Dysphagia 3(Mechanical Soft)specify fluid consistency(question 6) [3]     Order Specific Question:  Consistency/Fluid modifications:     Answer:  Thin Liquids [3]      Comments:  meds whole with liquid wash     Behavior Modification Plan  Give clear feedback, Set clear goals, Redirect to task/topic, Provide  reasonable choices, Decrease the chance of failure by offering activities that are within the patient's abilities and Analyze tasks (break down into smaller steps)  Assistive Technology  Low/Impaired vision equipment and Low tech: Calendar, planner, schedule, alarms/timers, pill organizer, post-it notes, lists  Family Training/Education:  Pt does not have family support in the area.   DC Recommendations: Pt will require assistance with medication management upon d/c. Pt reports she has a payee to handle financial mngt tasks. Continued SLP services on a home health basis recommended.   Strengths:  Making steady progress towards goals, Motivated for self care and independence, Pleasant and cooperative and Willingly participates in therapeutic activities  Barriers:  Dementia, Impulsive and Poor family support  # of short term goals set=4  # of short term goals met=2       Speech Therapy Problems           Problem: Memory STGs     Dates: Start: 08/02/17       Goal: STG-Within one week, patient will     Dates: Start: 08/02/17      Description: 1) Individualized goal:  use written memory strategies and simple memory book following educ and with supervision/ cues from staff, therapists with MOD A.    2) Interventions:  SLP Cognitive Skill Development        Note: Goal Note filed on 08/10/17 1136 by Maty Bowen MS,CCC-SLP    Goal: STG-Within one week, patient will  Outcome: NOT MET  Will continue to target in ST.             Problem: Speech/Swallowing LTGs     Dates: Start: 08/02/17       Goal: LTG-By discharge, patient will safely swallow     Dates: Start: 08/02/17      Description: 1) Individualized goal:  Regular textures/thin liquids to return to PLOF.     2) Interventions:  SLP Swallowing Dysfunction Treatment            Goal: LTG-By discharge, patient will solve basic problems     Dates: Start: 08/02/17      Description: 1) Individualized goal:  To safely complete ADL related tasks with SPV.     2) Interventions:   SLP Self Care / ADL Training  and SLP Cognitive Skill Development              Problem: Swallowing STGs     Dates: Start: 08/02/17       Goal: STG-Within one week, patient will     Dates: Start: 08/10/17      Description: 1) Individualized goal:  Tolerate regular textures with no overt s/sx of pen/asp with MIN cues for use of compensatory strategies.      2) Interventions:  SLP Swallowing Dysfunction Treatment and SLP Cognitive Skill Development                   Section completed by:  Maty Bowen MS,CCC-SLP

## 2017-08-10 NOTE — PROGRESS NOTES
"Rehab Progress Note     Chief complaint: none, follow up thoracic spondylosis  Date of Service: 8/10/2017    Interval Events (Subjective)  Patient seen and examined. No acute events overnight. She's frustrated that she can't \"wipe my own butt\", but is otherwise progressing with therapy. She agrees it's time to live in a group home as she will need more support. Denies any pain.     Objective:  VITAL SIGNS: /71 mmHg  Pulse 61  Temp(Src) 36.6 °C (97.9 °F)  Resp 17  Ht 1.575 m (5' 2\")  Wt 86.1 kg (189 lb 13.1 oz)  BMI 34.71 kg/m2  SpO2 94%  Breastfeeding? No  Gen: alert, no apparent distress  CV: regular rate and rhythm, no murmurs, no peripheral edema  Resp: clear to ascultation bilaterally, normal respiratory effort  GI: soft, non-tender abdomen, bowel sounds present  Neuro: Motor: 5/5 MMT throughout, except for bilateral 4/5 EHL                   Sensory:decreased to light touch in right L1/L2 dermatomes, decreased to pinprick in bilateral L4-S1 dermatomes, decreased vibration in bilateral great toes  DTRs: 2+ in bilateral biceps, triceps, brachioradialis, 3+ in bilateral patella, 2+ at bilateral achilles    Recent Results (from the past 72 hour(s))   CBC WITH DIFFERENTIAL    Collection Time: 08/08/17  6:08 AM   Result Value Ref Range    WBC 5.5 4.8 - 10.8 K/uL    RBC 4.99 4.20 - 5.40 M/uL    Hemoglobin 9.9 (L) 12.0 - 16.0 g/dL    Hematocrit 35.8 (L) 37.0 - 47.0 %    MCV 71.7 (L) 81.4 - 97.8 fL    MCH 19.8 (L) 27.0 - 33.0 pg    MCHC 27.7 (L) 33.6 - 35.0 g/dL    RDW 53.4 (H) 35.9 - 50.0 fL    Platelet Count 236 164 - 446 K/uL    Neutrophils-Polys 68.80 44.00 - 72.00 %    Lymphocytes 17.90 (L) 22.00 - 41.00 %    Monocytes 10.80 0.00 - 13.40 %    Eosinophils 1.60 0.00 - 6.90 %    Basophils 0.40 0.00 - 1.80 %    Immature Granulocytes 0.50 0.00 - 0.90 %    Nucleated RBC 0.00 /100 WBC    Neutrophils (Absolute) 3.77 2.00 - 7.15 K/uL    Lymphs (Absolute) 0.98 (L) 1.00 - 4.80 K/uL    Monos (Absolute) 0.59 0.00 " - 0.85 K/uL    Eos (Absolute) 0.09 0.00 - 0.51 K/uL    Baso (Absolute) 0.02 0.00 - 0.12 K/uL    Immature Granulocytes (abs) 0.03 0.00 - 0.11 K/uL    NRBC (Absolute) 0.00 K/uL       Current Facility-Administered Medications   Medication Frequency   • hydrALAZINE (APRESOLINE) tablet 50 mg Q8HRS   • metoprolol (LOPRESSOR) tablet 12.5 mg TWICE DAILY   • clonidine (CATAPRES) tablet 0.1 mg Q6HRS PRN   • potassium chloride SA (Kdur) tablet 20 mEq DAILY   • acetaminophen (TYLENOL) tablet 650 mg Q4HRS PRN   • gabapentin (NEURONTIN) capsule 300 mg Q EVENING   • vitamin D (Ergocalciferol) (DRISDOL) capsule 50,000 Units Q7 DAYS   • Respiratory Care per Protocol Continuous RT   • Pharmacy Consult Request ...Pain Management Review 1 Each PRN   • oxycodone immediate-release (ROXICODONE) tablet 2.5 mg Q3HRS PRN   • oxycodone immediate-release (ROXICODONE) tablet 5 mg Q3HRS PRN   • lidocaine (LIDODERM) 5 % 1 Patch Q24HR   • aspirin EC (ECOTRIN) tablet 81 mg DAILY   • artificial tears 1.4 % ophthalmic solution 1 Drop PRN   • mag hydrox-al hydrox-simeth (MAALOX PLUS ES or MYLANTA DS) suspension 20 mL Q2HRS PRN   • trazodone (DESYREL) tablet 50 mg QHS PRN   • sodium chloride (OCEAN) 0.65 % nasal spray 2 Spray PRN   • atorvastatin (LIPITOR) tablet 80 mg QHS   • benazepril (LOTENSIN) tablet 40 mg Q DAY   • duloxetine (CYMBALTA) capsule 20 mg DAILY   • furosemide (LASIX) tablet 40 mg Q DAY   • ondansetron (ZOFRAN ODT) dispertab 4 mg Q4HRS PRN   • senna-docusate (PERICOLACE or SENOKOT S) 8.6-50 MG per tablet 2 Tab BID    And   • polyethylene glycol/lytes (MIRALAX) PACKET 1 Packet QDAY PRN    And   • magnesium hydroxide (MILK OF MAGNESIA) suspension 30 mL QDAY PRN    And   • bisacodyl (DULCOLAX) suppository 10 mg QDAY PRN   • nicotine (NICODERM) 14 MG/24HR 14 mg Daily-0600   • omeprazole (PRILOSEC) capsule 20 mg BID       Orders Placed This Encounter   Procedures   • DIET ORDER     Standing Status: Standing      Number of Occurrences: 1       Standing Expiration Date:      Order Specific Question:  Diet:     Answer:  Cardiac [6]     Order Specific Question:  Texture/Fiber modifications:     Answer:  Dysphagia 3(Mechanical Soft)specify fluid consistency(question 6) [3]     Order Specific Question:  Consistency/Fluid modifications:     Answer:  Thin Liquids [3]      Comments:  meds whole with liquid wash       Assessment:  Active Hospital Problems    Diagnosis   • Back pain   • Depression   • Thoracic spondylosis   • HTN (hypertension)   • Dyslipidemia   • History of stroke   • T12 compression fracture (CMS-HCC)   • Microcytic anemia   • Heme positive stool   • Dementia     I led and attended the weekly conference today, and agree with the IDT conference documentation and plan of care as noted below.    Date of conference: 8/10/2017    RN issues: Eating % of meals, on dysphagia 2 diet with thins, no pain issues, standby assist for bowel, min assist bladder, Strengths: Alert and oriented, Able to follow instructions, Effective communication skills and Manages pain appropriately   Barriers: Aspiration risk, Bladder incontinence, Generalized weakness and Hypertension       PT: min assist for transfers, max assist for ambulation - goes 65 ft at min assist with FWW, max assist for WC propulsion - goes 50 ft at min assist, total assist for stairs - goes less then 4 steps at min assist, DME/DC Recommendations:  HH PT. FWW. 24 hr supervision ?, previously had gone 220 ft at standby assist  Strengths:  Making steady progress towards goals and Willingly participates in therapeutic activities  Barriers:   Confused, Decreased endurance, Poor activity tolerance, Poor balance, Poor carryover of learning and Other: inconsistent with spinal prec  # of short term goals set= 2 new goals added  # of short term goals met=2     OT: standby assist for eating/grooming, min assist for bathing, standby assist UB dressing, min assist for LB dressing, mod assist for  toileting, standby assist for toilet transfer, min assist for shower transfer, DME/DC Recommendations:  Shower tub bench, hand held shower, grab bars in shower, grab bars by toilet, long handled sponge, sock aid, dressing stick, reacher     Strengths:  Motivated for self care and independence, Pleasant and cooperative and Willingly participates in therapeutic activities  Barriers:  Decreased endurance, Generalized weakness, Impulsive, Limited mobility, Poor activity tolerance and Poor insight/denial of deficits, poor memory, difficulty implementing spinal precautions     # of short term goals set= 5    # of short term goals met= 0       SLP: standby assist for comprehension/expression/social interaction, max assist for problem solving/memory/executive function, Swallowing Status: Advanced to Dys 3 as of 8/10/17, trials of regular initiated with advancement anticipated this week. DC Recommendations: Pt will require assistance with medication management upon d/c. Pt reports she has a payee to handle financial mngt tasks. Continued SLP services on a home health basis recommended.   Strengths:  Making steady progress towards goals, Motivated for self care and independence, Pleasant and cooperative and Willingly participates in therapeutic activities  Barriers:  Dementia, Impulsive and Poor family support  # of short term goals set=4  # of short term goals met=2    Recreational therapy: mod assist for leisure skills    Respiratory therapy: wears O2 at night.    Admission [facility]    CM/social support: lives alone     Anticipated DC date: 8/14/2017, SNF as transition to group home    Follow up: PCP    Medical Decision Making and Plan:    Labs reviewed. Medications reviewed. Appreciate the assistance of the hospitalist.    T12/L1 chronic compression fractures - Continue pain management with Lidoderm patch and oral pain meds as needed. Pain currently controlled. Spinal precautions.    Thoracic spondylosis with scoliosis,  thecal sac narrowing from L2-L5 worst at L2-L3 and L3-L4, L2-L5 multilevel bilateral neural foraminal narrowing - with evidence of radiculopathy on exam with weakness at the bilateral L4 myotomes as well as abnormal sensation on the right L1-L2 dermatome, and L4-S1 dermatomes. Patient does not want surgery. Continue conservative treatment with therapy. Gabapentin for complaints of nerve pain in the right thigh. Increased dose with improved pain control.    Hypertension - Continue Benzapril, furosemide, metoprolol, hydralazine, and clonidine. Hospitalist managing. Monitor.    Coronary artery disease status post stenting - continue aspirin and statin.    Dyslipidemia - continue statin.    Tobacco use - nicotine patch.    Acute on chronic pain - pain meds as needed. lidoderm patch for compression fractures. Gabapentin for nerve pain.    Bladder urgency - UA negative. Trial of ditropan XL. Monitor for retention.    Dysphagia? - choked on food during dinner. Diet downgraded. Speech consult.    History of GI bleed - with microcytic anemia and positive stool guaiac on acute. IV iron. Apparently last colonoscopy normal. Per hospitalist, needs EGD due to history of Haywood's esophagus. Anemia slightly improved after IV iron.    IV iron infiltration - 8/4. Discontinue infusions - got 2/5. Monitor wound. Ice and elevate. Improved.    History of depression/anxiety - continue Cymbalta and as needed Buspar.    Dementia? - likely vascular dementia based on previous imaging from years ago. Speech consult.    Low Vit D - 10, started high dose repletion.    DVT prophylaxis - Discontinued Lovenox as she's ambulating longer distances.    Total time:  >35 minutes.  I spent greater than 50% of the time for patient care and coordination on this date, including unit/floor time, and face-to-face time with the patient as per assessment and plan above.    Christina Miller M.D.

## 2017-08-10 NOTE — FLOWSHEET NOTE
08/10/17 0915   Events/Summary/Plan   Events/Summary/Plan Pt on RA stable using O2 at night to keep sats above 90% will need NOC study to determine pt O2 needs.   Non-Invasive Resp Device Site Inspection Completed Intact   Location NC b/l ears   Interdisciplinary Plan of Care-Goals (Indications)   Obstructive Ventilatory Defect or Pulmonary Disease without Obvious Obstruction Strong Subjective / Objective Improvement   Education   Education Yes - Pt. / Family has been Instructed in use of Respiratory Equipment   Respiratory WDL   Respiratory (WDL) X   Chest Exam   Work Of Breathing / Effort Mild   Respiration 17   Pulse 61   Breath Sounds   RUL Breath Sounds Clear   RML Breath Sounds Clear   RLL Breath Sounds Clear;Diminished   ARGELIA Breath Sounds Clear   LLL Breath Sounds Clear;Diminished   Secretions   Cough Non Productive   Oximetry   #Pulse Oximetry (Single Determination) Yes   Oxygen   Home O2 Use Prior To Admission? No   Pulse Oximetry 94 %   O2 (LPM) 0   O2 (FiO2) 21   O2 Daily Delivery Respiratory  Room Air with O2 Available

## 2017-08-10 NOTE — REHAB-COLLABORATIVE ONGOING IDT TEAM NOTE
Weekly Interdisciplinary Team Conference Note    Weekly Interdisciplinary Team Conference # 2  Date:  8/10/2017    Clinicians present and reporting at team conference include the following:   MD: Christina Miller MD   RN:  Alena Alvarez RN   PT:   Iliana Calle, PT, DPT  OT:  Meredith Early, BS, OTR/L   ST:  Maty Bowen, MS, CCC-SLP  CM:  Cydney Pascual RN, [facility]  REC:  Destiny Abreu, CTRS  RT:  Tootie Westbrook RRT  RPh:  Jaden Hercules [facility]  Other:   None  All reporting clinicians have a working knowledge of this patient's plan of care.    Targeted DC Date:  8/14/17     Medical    Patient Active Problem List    Diagnosis Date Noted   • Intractable back pain 07/29/2017     Priority: High   • Back pain 08/01/2017   • Depression 08/01/2017   • Thoracic spondylosis 08/01/2017   • HTN (hypertension) 08/01/2017   • Dyslipidemia 08/01/2017   • History of stroke 08/01/2017   • T12 compression fracture (CMS-HCC) 08/01/2017   • Microcytic anemia 07/29/2017   • Heme positive stool 07/29/2017   • Dementia 07/29/2017   • History of CVA (cerebrovascular accident) 07/29/2017     Results     ** No results found for the last 24 hours. **          NursingDiet/Nutrition:  Cardiac, Dysphagia II and Thin Liquids  Medication Administration:  Whole with Liquid Wash  % consumed at meals in last 24 hours:  Consumed % of meals per documentation.  Breakfast:  95%   Lunch:  100%  Dinner:  30%   Snack schedule:  None  Appetite:  Fair  Admit Weight:  Weight: 88.451 kg (195 lb)  Weight Last 7 Days   [86.1 kg (189 lb 13.1 oz)] 86.1 kg (189 lb 13.1 oz) (08/06 1000)    Pain Issues:    Location:  --  --         Severity:  Denies   Scheduled pain medications:  gabapentin (NEURONTIN)      PRN pain medications used in last 24 hours:  None   Non Pharmacologic Interventions:  distraction, emotional support, relaxation, repositioned and rest  Effectiveness of pain management plan:  good=patient states acceptable comfort after  interventions    Bowel:    Bowel Assist Initial Score:  3 - Moderate Assistance  Bowel Assist Current Score:  5 - Standby Prompting/Supervision or Set-up  Bowl Accidents in last 7 days:  0  Last bowel movement: 08/09/17  Stool: Medium, Soft     Usual bowel pattern:  daily  Scheduled bowel medications:  senna-docusate (PERICOLACE or SENOKOT S)   PRN bowel medications used in last 24 hours:  None  Nursing Interventions:  Increased time, Scheduled medication, Verbal cueing, Set-up, Positioning on commode/toilet  Effectiveness of bowel program:   good=regular, predictable, controlled emptying of bowel     Bladder:    Bladder Assist Initial Score:  1 - Total Assistance  Bladder Assist Current Score:  4 - Minimal Assistance  Bladder Accidents in last 7 days:  4  PVR range for past 24-48 hours:  [64]   Medications affecting bladder:  None    Time void schedule/voiding pattern:  q4h during day  Interventions:  Increased time, Supervision, Verbal cueing, Time void staff initiated, Time void patient initiated  Effectiveness of bladder training:  Poor=Continues to have bladder accidents and Fair=occasional unpredictable, incontinent emptying of bladder (< 1 time/day)    Sleep/wake cycle:   Average hours slept:  Sleeps 4-6 hours without waking  Sleep medication usage:  None    Patient/Family Training/Education:  Aspiration Precautions, Bladder Management/Training, Diet/Nutrition, Fall Prevention, Medication Management, Safe Transfers and Safety  Discharge Supply Recommendations:  Blood Pressure Monitor  Strengths: Alert and oriented, Able to follow instructions, Effective communication skills and Manages pain appropriately   Barriers:   Aspiration risk, Bladder incontinence, Generalized weakness and Hypertension            Nursing Problems           Problem: Bowel/Gastric:     Goal: Normal bowel function is maintained or improved         Goal: Will not experience complications related to bowel motility           Problem:  Communication     Goal: The ability to communicate needs accurately and effectively will improve           Problem: Discharge Barriers/Planning     Goal: Patient's continuum of care needs will be met           Problem: IP BLADDER/VOIDING     Goal: LTG - patient will complete bladder elimination         Goal: LTG - Patient will utilize adaptive techniques/equipment to complete bladder elimination         Goal: STG - Patient demonstrates no accidents         Goal: STG - PATIENT WILL REMAIN CONTINENT.         Goal: STG - Patient will state signs and symptoms of UTI         Goal: STG - patient will be able to empty bladder         Goal: STG - patient participates in bladder program by expressing need to void         Goal: STG - Patient verbalizes understanding of catheter care indwelling/intermittent         Goal: STG - patient participates in bladder program by adhering to implemented toileting schedule           Problem: Infection     Goal: Will remain free from infection           Problem: Knowledge Deficit     Goal: Knowledge of disease process/condition, treatment plan, diagnostic tests, and medications will improve         Goal: Knowledge of the prescribed therapeutic regimen will improve           Problem: Mobility     Goal: Risk for activity intolerance will decrease           Problem: Pain Management     Goal: Pain level will decrease to patient's comfort goal     Flowsheet: Taken at 08/04/17 2307    Nurse Pain Scale 0 - 10  5 by Elle Shoemaker R.N.    Forte-Orozco Scale   Hurts Just a Little Bit by Elle Shoemaker R.N.    Comfort Goal Comfort at Rest by Elle Shoemaker R.N.    Taken at 08/03/17 2153    Nurse Pain Scale 0 - 10  5 by ROMULO Manjarrez.P.N.    Comfort Goal Comfort at Rest by CHARLIE ManjarrezPEugeniaNEugenia         Problem: Respiratory:     Goal: Respiratory status will improve           Problem: Safety     Goal: Will remain free from injury         Goal: Will remain free from falls           Problem:  "Urinary Elimination:     Goal: Ability to reestablish a normal urinary elimination pattern will improve           Problem: Venous Thromboembolism (VTW)/Deep Vein Thrombosis (DVT) Prevention:     Goal: Patient will participate in Venous Thrombosis (VTE)/Deep Vein Thrombosis (DVT)Prevention Measures                Long Term Goals:   At discharge patient will be able to function safely at home and in the community with support.    Section completed by:  Damian Cole R.N.        Respiratory Therapy   Patient's respiratory plan of care for oxygen therapy is:  O2 Protocol Indications: Room Air SpO2 Less Than 90%  O2 Protocol Goals/Outcome: Normoxemia on Oxygen, SpO2 greater than 90%, pt on RA during the day using O2 at night to keep sats above 90%, pt should have NOC study to determine O2 needs during sleep.    Section completed by:  Tootie Westbrook, RRT    Mobility  Bed mobility:   SPV, inconsistent with maintaining spinal prec  Bed /Chair/Wheelchair Transfer Initial:  4 - Minimal Assistance  Bed /Chair/Wheelchair Transfer Current:  4 - Minimal Assistance   Bed/Chair/Wheelchair Transfer Description:  Adaptive equipment (CGA-SBA SPT with FWW, SBA log roll/ bed mob)  Walk Initial:  1 - Total Assistance  Walk Current:  2 - Max Assistance   Walk Description:  Walker, Requires incidental assist (Min A 65ft + 50 ft with FWW)  Wheelchair Initial:  2 - Max Assistance  Wheelchair Current:  2 - Max Assistance   Wheelchair Description:  Adaptive equipment, Requires incidental assist (Min A-SPV wc mobility 50 ft x 2 indoors)  Stairs Initial:  0 - Not tested,unsafe activity  Stairs Current: 1 - Total Assistance   Stairs Description: Ascends/descends less than 4 steps (Up/down 2 x 2\" step in // bars min A)  Patient/Family Training/Education:  Ongoing to include spinal prec, safety with transfers, posture.  DME/DC Recommendations:  HH PT. FWW. 24 hr supervision ?  Strengths:  Making steady progress towards goals and Willingly " "participates in therapeutic activities  Barriers:   Confused, Decreased endurance, Poor activity tolerance, Poor balance, Poor carryover of learning and Other: inconsistent with spinal prec  # of short term goals set= 2 new goals added  # of short term goals met=2        Physical Therapy Problems           Problem: Balance     Dates: Start: 08/10/17       Goal: STG-Within one week, patient will     Dates: Start: 08/10/17   Expected End: 08/17/17      Description: 1) Individualized goal:  Score >40/56 on the [place]    2) Interventions:   PT E Stim Attended, PT Orthotics Training, PT Gait Training, PT Self Care/Home Eval, PT Therapeutic Exercises, PT Neuro Re-Ed/Balance, PT Therapeutic Activity, PT Manual Therapy and PT Evaluation              Problem: Mobility     Dates: Start: 08/02/17       Goal: STG-Within one week, patient will ambulate up/down a curb     Dates: Start: 08/02/17      Description: Min A with // bars for 6\" rise x 3    Note: Goal Note filed on 08/10/17 0819 by Iliana Calle DPT    Goal: STG-Within one week, patient will ambulate up/down a curb  Outcome: NOT MET  In progress          Goal: STG-Within one week, patient will ambulate household distance     Dates: Start: 08/10/17   Expected End: 08/17/17      Description: 1) Individualized goal:  >150 ft with FWW and SBA consistently on inside surfaces    2) Interventions:  PT E Stim Attended, PT Orthotics Training, PT Gait Training, PT Self Care/Home Eval, PT Therapeutic Exercises, PT Neuro Re-Ed/Balance, PT Therapeutic Activity, PT Manual Therapy and PT Evaluation              Problem: Mobility Transfers     Dates: Start: 08/02/17       Goal: STG-Within one week, patient will sit to stand     Dates: Start: 08/02/17      Description: SBA with FWW    Note: Goal Note filed on 08/10/17 0819 by Iliana Calle DPT    Goal: STG-Within one week, patient will sit to stand  Outcome: NOT MET  CGA, VCs for safety          Goal: STG-Within one week, patient " will transfer bed to chair     Dates: Start: 08/02/17      Description: SBA with FWW    Note: Goal Note filed on 08/10/17 0819 by Iliana Calle DPT    Goal: STG-Within one week, patient will transfer bed to chair  Outcome: NOT MET  CGA with FWW            Problem: PT-Long Term Goals     Dates: Start: 08/02/17       Goal: LTG-By discharge, patient will propel wheelchair     Dates: Start: 08/02/17      Description: Independent on unit x 150 ft        Goal: LTG-By discharge, patient will ambulate     Dates: Start: 08/02/17      Description: Modified independent with FWW 50 ft x 2         Goal: LTG-By discharge, patient will transfer one surface to another     Dates: Start: 08/02/17      Description: Modified independent with FWW        Goal: LTG-By discharge, patient will ambulate up/down 4-6 stairs     Dates: Start: 08/02/17      Description: CGA with hand rails        Goal: LTG-By discharge, patient will transfer in/out of a car     Dates: Start: 08/02/17      Description: SPV with FWW for safety from ambulatory level                Section completed by:  Iliana Calle DPT    Activities of Daily Living  Eating Initial:  7 - Independent  Eating Current:  5 - Standby Prompting/Supervision or Set-up   Eating Description:  Modified diet, Supervision for safety, Verbal cueing, Checking for pocketing of food, Set-up of equipment or meal/tube feeding  Grooming Initial:  5 - Standby Prompting/Supervision or Set-up  Grooming Current:  5 - Standby Prompting/Supervision or Set-up   Grooming Description:   (wheelchair level)  Bathing Initial:  4 - Minimal Assistance  Bathing Current:  4 - Minimal Assistance   Bathing Description:  Grab bar, Tub bench, Hand held shower, Verbal cueing, assistance washing/drying LEs due to spinal precautions  Upper Body Dressing Initial:  5 - Standby Prompting/Supervision or Set-up  Upper Body Dressing Current:  5 - Standby Prompting/Supervision or Set-up   Upper Body Dressing Description:    (setup)  Lower Body Dressing Initial:  4 - Minimal Assistance  Lower Body Dressing Current:  4 - Minimal Assistance   Lower Body Dressing Description:  4 - Minimal Assistance with use of dressing stick and sock aid  Toileting Initial:  2 - Max Assistance  Toileting Current:  3 - Moderate Assistance   Toileting Description:  Grab bar, pt difficulty with wiping, pulling up brief/pants  Toilet Transfer Initial:  5 - Standby Prompting/Supervision or Set-up  Toilet Transfer Current:  5 - Standby Prompting/Supervision or Set-up   Toilet Transfer Description:  5 - Standby Prompting/Supervision or Set-up  Tub / Shower Transfer Initial:  5 - Standby Prompting/Supervision or Set-up  Tub / Shower Transfer Current:  4 - Minimal Assistance   Tub / Shower Transfer Description:  Grab bar (cga)  IADL:  ongoing   Family Training/Education:  ongoing   DME/DC Recommendations:  Shower tub bench, hand held shower, grab bars in shower, grab bars by toilet, long handled sponge, sock aid, dressing stick, reacher     Strengths:  Motivated for self care and independence, Pleasant and cooperative and Willingly participates in therapeutic activities  Barriers:  Decreased endurance, Generalized weakness, Impulsive, Limited mobility, Poor activity tolerance and Poor insight/denial of deficits, poor memory, difficulty implementing spinal precautions     # of short term goals set= 5    # of short term goals met= 0          Occupational Therapy Goals           Problem: Bathing     Dates: Start: 08/02/17       Goal: STG-Within one week, patient will bathe     Dates: Start: 08/02/17      Description: Goal: STG-Within one week, patient will bathe    1) Individualized Goal: with supervision and AE/DME prn    2) Interventions:  OT Orthotics Training, OT E Stim Attended, OT Self Care/ADL, OT Cognitive Skill Dev, OT Community Reintegration, OT Manual Ther Technique, OT Neuro Re-Ed/Balance, OT Sensory Int Techniques, OT Therapeutic Activity, OT Evaluation  and OT Therapeutic Exercise        Note: Goal Note filed on 08/02/17 1209 by Verena Addison    Goal: STG-Within one week, patient will bathe  1) Individualized Goal: with supervision and AE/DME prn  2) Interventions:  OT Orthotics Training, OT E Stim Attended, OT Self   Care/ADL, OT Cognitive Skill Dev, OT Community Reintegration, OT Manual   Ther Technique, OT Neuro Re-Ed/Balance, OT Sensory Int Techniques, OT   Therapeutic Activity, OT Evaluation and OT Therapeutic Exercise            Problem: Dressing     Dates: Start: 08/02/17       Goal: STG-Within one week, patient will dress LB     Dates: Start: 08/02/17      Description: Goal: STG-Within one week, patient will dress LB    1) Individualized Goal: with supervision and AE/DME prn    2) Interventions:  OT Orthotics Training, OT E Stim Attended, OT Self Care/ADL, OT Cognitive Skill Dev, OT Community Reintegration, OT Manual Ther Technique, OT Neuro Re-Ed/Balance, OT Sensory Int Techniques, OT Therapeutic Activity, OT Evaluation and OT Therapeutic Exercise        Note: Goal Note filed on 08/02/17 1209 by Verena Addison    Goal: STG-Within one week, patient will dress LB  1) Individualized Goal: with supervision and AE/DME prn  2) Interventions:  OT Orthotics Training, OT E Stim Attended, OT Self   Care/ADL, OT Cognitive Skill Dev, OT Community Reintegration, OT Manual   Ther Technique, OT Neuro Re-Ed/Balance, OT Sensory Int Techniques, OT   Therapeutic Activity, OT Evaluation and OT Therapeutic Exercise            Problem: Functional Cognition     Dates: Start: 08/02/17       Goal: STG-Within one week, patient will demonstrate safety awareness     Dates: Start: 08/02/17      Description: Goal: STG-Within one week, patient will demonstrate safety awareness    1) Individualized Goal: with fair knowledge by demonstrating locking w/c consistently during therapy session.    2) Interventions:  OT Orthotics Training, OT E Stim Attended, OT Self Care/ADL, OT Cognitive  Skill Dev, OT Community Reintegration, OT Manual Ther Technique, OT Neuro Re-Ed/Balance, OT Sensory Int Techniques, OT Therapeutic Activity, OT Evaluation and OT Therapeutic Exercise         Note: Goal Note filed on 08/02/17 1209 by Verena Addison    Goal: STG-Within one week, patient will demonstrate safety awareness  1) Individualized Goal: during completion of ADLs as demonstrated by   proper use of DME   2) Interventions:  OT Orthotics Training, OT E Stim Attended, OT Self   Care/ADL, OT Cognitive Skill Dev, OT Community Reintegration, OT Manual   Ther Technique, OT Neuro Re-Ed/Balance, OT Sensory Int Techniques, OT   Therapeutic Activity, OT Evaluation and OT Therapeutic Exercise            Problem: IADL's     Dates: Start: 08/02/17       Goal: STG-Within one week, patient will utilize washer and dryer     Dates: Start: 08/02/17      Description: Goal: STG-Within one week, patient will utilize washer and dryer    1) Individualized Goal: with min A and v/c prn    2) Interventions:  OT Orthotics Training, OT E Stim Attended, OT Self Care/ADL, OT Cognitive Skill Dev, OT Community Reintegration, OT Manual Ther Technique, OT Neuro Re-Ed/Balance, OT Sensory Int Techniques, OT Therapeutic Activity, OT Evaluation and OT Therapeutic Exercise    Note: Goal Note filed on 08/02/17 1209 by Verena Addison    Goal: STG-Within one week, patient will utilize washer and dryer  1) Individualized Goal: with min A and v/c prn  2) Interventions:  OT Orthotics Training, OT E Stim Attended, OT Self   Care/ADL, OT Cognitive Skill Dev, OT Community Reintegration, OT Manual   Ther Technique, OT Neuro Re-Ed/Balance, OT Sensory Int Techniques, OT   Therapeutic Activity, OT Evaluation and OT Therapeutic Exercise            Problem: OT Long Term Goals     Dates: Start: 08/02/17       Goal: LTG-By discharge, patient will complete basic self care tasks     Dates: Start: 08/02/17      Description: Goal: LTG-By discharge, patient will  complete basic self care tasks    1) Individualized Goal: with mod I    2) Interventions:  OT Orthotics Training, OT E Stim Attended, OT Self Care/ADL, OT Cognitive Skill Dev, OT Community Reintegration, OT Manual Ther Technique, OT Neuro Re-Ed/Balance, OT Sensory Int Techniques, OT Therapeutic Activity, OT Evaluation and OT Therapeutic Exercise    Note: Goal Note filed on 08/02/17 1209 by Verena Addison    Goal: LTG-By discharge, patient will complete basic self care tasks  1) Individualized Goal: with mod I  2) Interventions:  OT Orthotics Training, OT E Stim Attended, OT Self   Care/ADL, OT Cognitive Skill Dev, OT Community Reintegration, OT Manual   Ther Technique, OT Neuro Re-Ed/Balance, OT Sensory Int Techniques, OT   Therapeutic Activity, OT Evaluation and OT Therapeutic Exercise          Goal: LTG-By discharge, patient will complete basic home management     Dates: Start: 08/02/17      Description: Goal: LTG-By discharge, patient will complete basic home management    1) Individualized Goal: with supervision and min v/c    2) Interventions:  OT Orthotics Training, OT E Stim Attended, OT Self Care/ADL, OT Cognitive Skill Dev, OT Community Reintegration, OT Manual Ther Technique, OT Neuro Re-Ed/Balance, OT Sensory Int Techniques, OT Therapeutic Activity, OT Evaluation and OT Therapeutic Exercise        Note: Goal Note filed on 08/02/17 1209 by Verena Addison    Goal: LTG-By discharge, patient will complete basic home management  1) Individualized Goal: with supervision and min v/c  2) Interventions:  OT Orthotics Training, OT E Stim Attended, OT Self   Care/ADL, OT Cognitive Skill Dev, OT Community Reintegration, OT Manual   Ther Technique, OT Neuro Re-Ed/Balance, OT Sensory Int Techniques, OT   Therapeutic Activity, OT Evaluation and OT Therapeutic Exercise                Problem: Toileting     Dates: Start: 08/02/17       Goal: STG-Within one week, patient will complete toileting tasks     Dates: Start:  08/02/17      Description: Goal: STG-Within one week, patient will complete toileting tasks    1) Individualized Goal: with supervision and AE/DME prn    2) Interventions:  OT Orthotics Training, OT E Stim Attended, OT Self Care/ADL, OT Cognitive Skill Dev, OT Community Reintegration, OT Manual Ther Technique, OT Neuro Re-Ed/Balance, OT Sensory Int Techniques, OT Therapeutic Activity, OT Evaluation and OT Therapeutic Exercise        Note: Goal Note filed on 08/02/17 1209 by Verena Addison    Goal: STG-Within one week, patient will complete toileting tasks  1) Individualized Goal: with supervision and AE/DME prn  2) Interventions:  OT Orthotics Training, OT E Stim Attended, OT Self   Care/ADL, OT Cognitive Skill Dev, OT Community Reintegration, OT Manual   Ther Technique, OT Neuro Re-Ed/Balance, OT Sensory Int Techniques, OT   Therapeutic Activity, OT Evaluation and OT Therapeutic Exercise                Section completed by:  Elijah Ramsay, HALEY/Meredith Everett, OTR/L    Cognitive Linquistic Functions  Comprehension Initial:  5 - Stand-by Prompting/Supervision or Set-up  Comprehension Current:  5 - Stand-by Prompting/Supervision or Set-up   Comprehension Description:  Verbal cues, Glasses  Expression Initial:  5 - Stand-by Prompting/Supervision or Set-up  Expression Current:  5 - Stand-by Prompting/Supervision or Set-up   Expression Description:  Verbal cueing  Social Interaction Initial:  6 - Modified Independent  Social Interaction Current:  5 - Stand-by Prompting/Supervision or Set-up   Social Interaction Description:  Verbal cues, Schedule  Problem Solving Initial:  5 - Standby Prompting/Supervision or Set-up  Problem Solving Current:  2 - Max Assistance   Problem Solving Description:  Verbal cueing, Therapy schedule, Bed/chair alarm  Memory Initial:  5 - Standby Prompting/Supervision or Set-up  Memory Current:  2 - Max Assistance   Memory Description:  Verbal cueing, Therapy schedule, Bed/chair  alarm  Executive Functioning / Organization Initial:  Profound (1)  Executive Functioning / Organization Current:  2 - Max Assistance    Executive Functioning Desciption: information broken into smaller units, max A for IADLs related to medication mngt.   Swallowing:  Swallowing Status: Advanced to Dys 3 as of 8/10/17, trials of regular initiated with advancement anticipated this week.   Orders Placed This Encounter   Procedures   • DIET ORDER     Standing Status: Standing      Number of Occurrences: 1      Standing Expiration Date:      Order Specific Question:  Diet:     Answer:  Cardiac [6]     Order Specific Question:  Texture/Fiber modifications:     Answer:  Dysphagia 3(Mechanical Soft)specify fluid consistency(question 6) [3]     Order Specific Question:  Consistency/Fluid modifications:     Answer:  Thin Liquids [3]      Comments:  meds whole with liquid wash     Behavior Modification Plan  Give clear feedback, Set clear goals, Redirect to task/topic, Provide reasonable choices, Decrease the chance of failure by offering activities that are within the patient's abilities and Analyze tasks (break down into smaller steps)  Assistive Technology  Low/Impaired vision equipment and Low tech: Calendar, planner, schedule, alarms/timers, pill organizer, post-it notes, lists  Family Training/Education:  Pt does not have family support in the area.   DC Recommendations: Pt will require assistance with medication management upon d/c. Pt reports she has a payee to handle financial mngt tasks. Continued SLP services on a home health basis recommended.   Strengths:  Making steady progress towards goals, Motivated for self care and independence, Pleasant and cooperative and Willingly participates in therapeutic activities  Barriers:  Dementia, Impulsive and Poor family support  # of short term goals set=4  # of short term goals met=2       Speech Therapy Problems           Problem: Memory STGs     Dates: Start: 08/02/17        Goal: STG-Within one week, patient will     Dates: Start: 08/02/17      Description: 1) Individualized goal:  use written memory strategies and simple memory book following educ and with supervision/ cues from staff, therapists with MOD A.    2) Interventions:  SLP Cognitive Skill Development        Note: Goal Note filed on 08/10/17 1136 by Maty Bowen MS,CCC-SLP    Goal: STG-Within one week, patient will  Outcome: NOT MET  Will continue to target in ST.             Problem: Speech/Swallowing LTGs     Dates: Start: 08/02/17       Goal: LTG-By discharge, patient will safely swallow     Dates: Start: 08/02/17      Description: 1) Individualized goal:  Regular textures/thin liquids to return to PLOF.     2) Interventions:  SLP Swallowing Dysfunction Treatment            Goal: LTG-By discharge, patient will solve basic problems     Dates: Start: 08/02/17      Description: 1) Individualized goal:  To safely complete ADL related tasks with SPV.     2) Interventions:  SLP Self Care / ADL Training  and SLP Cognitive Skill Development              Problem: Swallowing STGs     Dates: Start: 08/02/17       Goal: STG-Within one week, patient will     Dates: Start: 08/10/17      Description: 1) Individualized goal:  Tolerate regular textures with no overt s/sx of pen/asp with MIN cues for use of compensatory strategies.      2) Interventions:  SLP Swallowing Dysfunction Treatment and SLP Cognitive Skill Development                   Section completed by:  Maty Bowen MS,[facility]-SLP    Recreation/Community     Leisure Competence Measure  Leisure Awareness: Minimal Assist  Leisure Attitude: Supervision  Leisure Skills: Moderate Assist  Cultural / Social Behaviors: Minimal Assist  Interpersonal Skills: Minimal Assist  Community Integration Skills:  (not yet assessed)  Social Contact: Supervision         Recreation Therapy Problems           Problem: Recreation Therapy     Dates: Start: 08/04/17       Goal: STG-Within one  week, patient will utilize     Dates: Start: 08/04/17      Description: bilateral UE's for leisure for 10 minutes with minimal verbal cuing        Goal: STG-Within one week, patient will attend to     Dates: Start: 08/04/17      Description: Leisure task for 15 minutes with moderatel verbal cuing            Goal: LTG-By discharge, patient will utilize     Dates: Start: 08/04/17      Description: bilateral UE's for leisure for 15 minutes with minimal verbal cuing            Goal: LTG-By discharge, patient will attend to     Dates: Start: 08/04/17      Description: Leisure task for 15 minutes with minimal verbal cuing              Strengths:   Cooperative with encouragement  Barriers:   Activity tolerance, pain, attention and motivation, memory    Section completed by:  ANTHONY Villegas    Nutrition       Dietary Problems           Problem: Other Problem (see comments)     Description: Diagnosis: No nutrition diagnosis.           Goal: Other Goal (Resolved)     Description: Monitor/Evaluation: Monitor PO intake, weight, labs, medication adjustments, skin integrity, GI function, vitals, I/Os, and overall hydration status.  Adjust nutritional POC pending clinical outcomes.      RD following PRN. PO adequate.     Goal: Maintain adequate oral nutrient/fluid intake to promote nutrition optimization/healing.             Nutrition Care/ Consult For Supplements     Assessment:    Admitting Diagnosis: T12/L1 compression fractures, thoracic spondylosis and dextroconvex scoliosis  Pertinent PMH: coronary artery disease, hypertension, psychiatric disorder, stroke, and chronic back pain, CHF, GI Bleed, COPD, tobacco use    Additional Information: Patient seen in room today after lunch.  She was pretending to be sleeping and ignored me despite multiple times of me calling her name.  She did open her eyes and ask me to come back later.  She appears adequately nourished.     Her living situation prior to admission is noted.   Meals on wheels would be beneficial along with application for [facility].     I discussed her with SLP- they note no PO issues.  BSE done 8/2.  Apparently patient had choking episode at [facility] on piece of highly seasoned meat. D3 trials done today.     Dentition is poor but tolerates regular textured foods at home.  No GI issues per review of chart aside for heme + stool to which Dr. Gutiérrez is working patient up.  BM today.  Patient has no self feeding issues.    Appetite: Good    Diet: D2/ Cardiac/ Thin Liquids    Average PO intake x 3 days: 50-95% of meals since admission (adequate)     Labs: Hgb 8.7/Hct 31.4, Vitamin D 10    Medications: reviewed and noted    PRN Medications: reviewed and noted  IVF: n/a     Height: 157.5cm  Weight:  88.451kg    BMI: 35.67 (obese  Class 2)        Skin: intact  GI: BM today    Vitals: /66., HR 54 , 1 L NC PRN    I/Os: +1620mL no output recorded (note Lasix)       Diagnosis: No nutrition diagnosis.     Intervention/ Recommendations/POC:  1. Continue current diet.  Upgrades per SLP. Snacks per patient request.    2.Encourage adequate PO/fluid intake.  3. Nutrition rep to see regarding food prefs/ honor within dietary restrictions (if indicated).        Monitor/Evaluation: Monitor PO intake, weight, labs, medication adjustments, skin integrity, GI function, vitals, I/Os, and overall hydration status.  Adjust nutritional POC pending clinical outcomes.     RD following PRN. PO adequate.    Goal: Maintain adequate oral nutrient/fluid intake to promote nutrition optimization/healing.                                 Section completed by:  Harini Kim RD    REHAB-Pharmacy IDT Team Note by Erik Kennedy RPH at 8/9/2017  4:29 PM  Version 1 of 1    Author:  Erik Kennedy RPH Service:  (none) Author Type:  Pharmacist    Filed:  8/9/2017  4:30 PM Note Time:  8/9/2017  4:29 PM Status:  Signed    :  Erik Kennedy RPH (Pharmacist)            Pharmacy  Pharmacy  Antibiotics/Antifungals/Antivirals:  Medication:      Active Orders     None        Route:         None  Stop Date:  N/a  Reason:   Antihypertensives/Cardiac:  Medication:    Orders (72h ago through future)    Start     Ordered    08/09/17 1800  metoprolol (LOPRESSOR) tablet 12.5 mg   TWICE DAILY     Comments:  Hold for SBP <100 or pulse <55    08/09/17 0932    08/09/17 1400  hydrALAZINE (APRESOLINE) tablet 50 mg   EVERY 8 HOURS      08/09/17 0932    08/08/17 1848  clonidine (CATAPRES) tablet 0.1 mg   EVERY 6 HOURS PRN      08/08/17 1848    08/04/17 1800  metoprolol (LOPRESSOR) tablet 25 mg   TWICE DAILY,   Status:  Discontinued     Comments:  Hold for SBP <100 or pulse <55    08/04/17 1106    08/04/17 1400  hydrALAZINE (APRESOLINE) tablet 25 mg   EVERY 8 HOURS,   Status:  Discontinued      08/04/17 1106    08/02/17 0600  benazepril (LOTENSIN) tablet 40 mg   Q DAY      08/01/17 1535    08/02/17 0600  furosemide (LASIX) tablet 40 mg   Q DAY     Comments:  Hold for SBP <100    08/01/17 1535    08/01/17 2100  atorvastatin (LIPITOR) tablet 80 mg   EVERY BEDTIME      08/01/17 1535    08/01/17 1531  hydrALAZINE (APRESOLINE) tablet 25 mg   EVERY 8 HOURS PRN,   Status:  Discontinued      08/01/17 1535        Patient Vitals for the past 24 hrs:   BP Pulse   08/09/17 1450 116/63 mmHg (!) 57   08/09/17 1423 116/63 mmHg (!) 57   08/09/17 0901 - 62   08/09/17 0632 159/76 mmHg (!) 55   08/09/17 0450 148/82 mmHg -   08/09/17 0300 160/86 mmHg -   08/08/17 1837 (!) 186/94 mmHg 62   08/08/17 1708 (!) 165/80 mmHg (!) 56       Anticoagulation:  Medication:  Not applicable  INR:      Other key medications:        A review of the medication list reveals no issues at this time. Patient is currently on antihypertensive(s). Recommend home blood pressure monitoring/recording if antihypertensive(s) regimen(s) continue.    Section completed by:  Erik Kennedy RP           [facility] Planning  DC destination/dispostion:   Lives in [facility] and Care    Referrals: Meals on Wheels, [facility]    DC Needs: She will need home health if she is agreeable with this.  She has a 4ww, w/c and fww.  I will follow for additional dme needs.    Barriers to discharge: lives alone      Strengths:     Section completed by:  Cydney Pascual R.N.      Physician Summary  Christina Miller MD participated and led team conference discussion.

## 2017-08-10 NOTE — REHAB-PT IDT TEAM NOTE
"Physical Therapy  Mobility  Bed mobility:   SPV, inconsistent with maintaining spinal prec  Bed /Chair/Wheelchair Transfer Initial:  4 - Minimal Assistance  Bed /Chair/Wheelchair Transfer Current:  4 - Minimal Assistance   Bed/Chair/Wheelchair Transfer Description:  Adaptive equipment (CGA-SBA SPT with FWW, SBA log roll/ bed mob)  Walk Initial:  1 - Total Assistance  Walk Current:  2 - Max Assistance   Walk Description:  Walker, Requires incidental assist (Min A 65ft + 50 ft with FWW)  Wheelchair Initial:  2 - Max Assistance  Wheelchair Current:  2 - Max Assistance   Wheelchair Description:  Adaptive equipment, Requires incidental assist (Min A-SPV wc mobility 50 ft x 2 indoors)  Stairs Initial:  0 - Not tested,unsafe activity  Stairs Current: 1 - Total Assistance   Stairs Description: Ascends/descends less than 4 steps (Up/down 2 x 2\" step in // bars min A)  Patient/Family Training/Education:  Ongoing to include spinal prec, safety with transfers, posture.  DME/DC Recommendations:  HH PT. FWW. 24 hr supervision ?  Strengths:  Making steady progress towards goals and Willingly participates in therapeutic activities  Barriers:   Confused, Decreased endurance, Poor activity tolerance, Poor balance, Poor carryover of learning and Other: inconsistent with spinal prec  # of short term goals set= 2 new goals added  # of short term goals met=2        Physical Therapy Problems           Problem: Balance     Dates: Start: 08/10/17       Goal: STG-Within one week, patient will     Dates: Start: 08/10/17   Expected End: 08/17/17      Description: 1) Individualized goal:  Score >40/56 on the [place]    2) Interventions:   PT E Stim Attended, PT Orthotics Training, PT Gait Training, PT Self Care/Home Eval, PT Therapeutic Exercises, PT Neuro Re-Ed/Balance, PT Therapeutic Activity, PT Manual Therapy and PT Evaluation              Problem: Mobility     Dates: Start: 08/02/17       Goal: STG-Within one week, patient will ambulate " "up/down a curb     Dates: Start: 08/02/17      Description: Min A with // bars for 6\" rise x 3    Note: Goal Note filed on 08/10/17 0819 by Iliana Calle DPT    Goal: STG-Within one week, patient will ambulate up/down a curb  Outcome: NOT MET  In progress          Goal: STG-Within one week, patient will ambulate household distance     Dates: Start: 08/10/17   Expected End: 08/17/17      Description: 1) Individualized goal:  >150 ft with FWW and SBA consistently on inside surfaces    2) Interventions:  PT E Stim Attended, PT Orthotics Training, PT Gait Training, PT Self Care/Home Eval, PT Therapeutic Exercises, PT Neuro Re-Ed/Balance, PT Therapeutic Activity, PT Manual Therapy and PT Evaluation              Problem: Mobility Transfers     Dates: Start: 08/02/17       Goal: STG-Within one week, patient will sit to stand     Dates: Start: 08/02/17      Description: SBA with FWW    Note: Goal Note filed on 08/10/17 0819 by Iliana Calle DPT    Goal: STG-Within one week, patient will sit to stand  Outcome: NOT MET  CGA, VCs for safety          Goal: STG-Within one week, patient will transfer bed to chair     Dates: Start: 08/02/17      Description: SBA with FWW    Note: Goal Note filed on 08/10/17 0819 by Iliana Calle DPT    Goal: STG-Within one week, patient will transfer bed to chair  Outcome: NOT MET  CGA with FWW            Problem: PT-Long Term Goals     Dates: Start: 08/02/17       Goal: LTG-By discharge, patient will propel wheelchair     Dates: Start: 08/02/17      Description: Independent on unit x 150 ft        Goal: LTG-By discharge, patient will ambulate     Dates: Start: 08/02/17      Description: Modified independent with FWW 50 ft x 2         Goal: LTG-By discharge, patient will transfer one surface to another     Dates: Start: 08/02/17      Description: Modified independent with FWW        Goal: LTG-By discharge, patient will ambulate up/down 4-6 stairs     Dates: Start: 08/02/17    "   Description: CGA with hand rails        Goal: LTG-By discharge, patient will transfer in/out of a car     Dates: Start: 08/02/17      Description: SPV with FWW for safety from ambulatory level                Section completed by:  Iliana Calle DPT

## 2017-08-10 NOTE — PROGRESS NOTES
Hospital Medicine Progress Note, Adult, Complex               Author: Luc Osei Date & Time created: 8/10/2017  8:16 AM     Interval History:  No significant events or changes since last visit  Patient denies SOB, cough, or diarrhea  Patient slept ok last night    Chief Complaint:  Hypertension  Bradycardia      Review of Systems:  Review of Systems   Constitutional: Negative for fever.   Eyes: Negative for blurred vision.   Respiratory: Negative for cough.    Cardiovascular: Negative for chest pain.   Gastrointestinal: Negative for diarrhea.   Musculoskeletal: Negative for joint pain.   Neurological: Negative for dizziness.   Psychiatric/Behavioral: The patient is not nervous/anxious.        Physical Exam:  Physical Exam   Constitutional: She is oriented to person, place, and time. No distress.   HENT:   Mouth/Throat: No oropharyngeal exudate.   Eyes: EOM are normal.   Neck: No JVD present. No tracheal deviation present.   Cardiovascular: Normal rate, regular rhythm, S1 normal and S2 normal.    No murmur heard.  Pulmonary/Chest: Effort normal and breath sounds normal.   Abdominal: Soft. Bowel sounds are normal. Hernia confirmed negative in the right inguinal area and confirmed negative in the left inguinal area.   Musculoskeletal: She exhibits no edema.   Neurological: She is alert and oriented to person, place, and time. No sensory deficit.   Skin: Skin is warm and dry. No rash noted. No cyanosis.   Psychiatric: She has a normal mood and affect. Her behavior is normal.   Nursing note and vitals reviewed.      Labs:        Invalid input(s): [id number]      No results for input(s): SODIUM, POTASSIUM, CHLORIDE, CO2, BUN, CREATININE, MAGNESIUM, PHOSPHORUS, CALCIUM in the last 72 hours.  No results for input(s): ALTSGPT, ASTSGOT, ALKPHOSPHAT, TBILIRUBIN, DBILIRUBIN, GAMMAGT, AMYLASE, LIPASE, ALB, PREALBUMIN, GLUCOSE in the last 72 hours.  Recent Labs      08/08/17   0608   RBC  4.99   HEMOGLOBIN  9.9*    HEMATOCRIT  35.8*   PLATELETCT  236     Recent Labs      17   0608   WBC  5.5   NEUTSPOLYS  68.80   LYMPHOCYTES  17.90*   MONOCYTES  10.80   EOSINOPHILS  1.60   BASOPHILS  0.40           Hemodynamics:  Temp (24hrs), Av.7 °C (98 °F), Min:36.6 °C (97.8 °F), Max:36.8 °C (98.2 °F)  Temperature: 36.6 °C (97.9 °F)  Pulse  Av  Min: 52  Max: 84   Blood Pressure : 147/70 mmHg     Respiratory:    Respiration: 17, Pulse Oximetry: 90 %, O2 Daily Delivery Respiratory : Room Air with O2 Available     Work Of Breathing / Effort: Mild  RUL Breath Sounds: Clear, RML Breath Sounds: Clear, RLL Breath Sounds: Diminished, ARGELIA Breath Sounds: Clear, LLL Breath Sounds: Diminished  Fluids:    Intake/Output Summary (Last 24 hours) at 08/10/17 0816  Last data filed at 08/10/17 0541   Gross per 24 hour   Intake    660 ml   Output      0 ml   Net    660 ml        GI/Nutrition:  Orders Placed This Encounter   Procedures   • DIET ORDER     Standing Status: Standing      Number of Occurrences: 1      Standing Expiration Date:      Order Specific Question:  Diet:     Answer:  Cardiac [6]     Order Specific Question:  Texture/Fiber modifications:     Answer:  Dysphagia 2(Pureed/Chopped)specify fluid consistency(question 6) [2]     Order Specific Question:  Consistency/Fluid modifications:     Answer:  Thin Liquids [3]     Medical Decision Making, by Problem:  Active Hospital Problems    Diagnosis   • Back pain [M54.9]   • Depression [F32.9]   • Thoracic spondylosis [M47.814]   • HTN (hypertension) [I10]   • Dyslipidemia [E78.5]   • History of stroke [Z86.73]   • T12 compression fracture (CMS-HCC) [M48.54XA]   • Microcytic anemia [D50.9]   • Heme positive stool [R19.5]   • Dementia [F03.90]     *  B/L LE Pain  MRI thoracic & lumbar: see below  LE arterial dopplers: ok  Pt not interested in any surgeries at this time    *  Hypertension  BP a little labile but ok (8/10)  On Lotensin: 40 mg daily  On Lopressor: 50 mg bid --> 25 mg bid  --> 12.5 mg bid (8/9 at evening)  On Hydralazine: 25 mg tid --> 50 mg tid (8/9)  On Clonidine: 0.1 mg q6 hours prn SBP > 170  Note: also on Lasix  Cont to monitor    *  Bradycardia  HR in the 50's  On Lopressor: 50 mg bid --> 25 mg bid --> 12.5 mg bid (8/9 at evening)  Monitor another day since meds were recently adjusted (8/10)    *  CHF  On Lasix: 40 mg daily  On KCL: 20 meq daily  S/P KCL 40 meq x 1 extra dose (8/8)  Note: was on Lasix at home  Monitor K+: 3.8 --> 3.5 (8/7)    *  COPD: hx tobacco abuse  *  Hx CVA  *  Chronic Back Pain: has refused surgery in the past  *  PVD: with hx moderate iliac stenosis in 2009       MRI Thoracic Spine:  1.  Multilevel multifactorial degenerative changes  2.  Prominent posterior epidural fat from L2 through L5 contributes to thecal sac narrowing at these levels  3.  Thecal sac narrowing worst at L2-L3 and L3-L4  4.  Areas of neural foraminal narrowing as described above  5.  Chronic T12 vertebral compression fracture      Labs reviewed and Medications reviewed

## 2017-08-10 NOTE — REHAB-OT IDT TEAM NOTE
Occupational Therapy  Activities of Daily Living  Eating Initial:  7 - Independent  Eating Current:  5 - Standby Prompting/Supervision or Set-up   Eating Description:  Modified diet, Supervision for safety, Verbal cueing, Checking for pocketing of food, Set-up of equipment or meal/tube feeding  Grooming Initial:  5 - Standby Prompting/Supervision or Set-up  Grooming Current:  5 - Standby Prompting/Supervision or Set-up   Grooming Description:   (wheelchair level)  Bathing Initial:  4 - Minimal Assistance  Bathing Current:  4 - Minimal Assistance   Bathing Description:  Grab bar, Tub bench, Hand held shower, Verbal cueing, assistance washing/drying LEs due to spinal precautions  Upper Body Dressing Initial:  5 - Standby Prompting/Supervision or Set-up  Upper Body Dressing Current:  5 - Standby Prompting/Supervision or Set-up   Upper Body Dressing Description:   (setup)  Lower Body Dressing Initial:  4 - Minimal Assistance  Lower Body Dressing Current:  4 - Minimal Assistance   Lower Body Dressing Description:  4 - Minimal Assistance with use of dressing stick and sock aid  Toileting Initial:  2 - Max Assistance  Toileting Current:  3 - Moderate Assistance   Toileting Description:  Grab bar, pt difficulty with wiping, pulling up brief/pants  Toilet Transfer Initial:  5 - Standby Prompting/Supervision or Set-up  Toilet Transfer Current:  5 - Standby Prompting/Supervision or Set-up   Toilet Transfer Description:  5 - Standby Prompting/Supervision or Set-up  Tub / Shower Transfer Initial:  5 - Standby Prompting/Supervision or Set-up  Tub / Shower Transfer Current:  4 - Minimal Assistance   Tub / Shower Transfer Description:  Grab bar (cga)  IADL:  ongoing   Family Training/Education:  ongoing   DME/DC Recommendations:  Shower tub bench, hand held shower, grab bars in shower, grab bars by toilet, long handled sponge, sock aid, dressing stick, reacher     Strengths:  Motivated for self care and independence, Pleasant and  cooperative and Willingly participates in therapeutic activities  Barriers:  Decreased endurance, Generalized weakness, Impulsive, Limited mobility, Poor activity tolerance and Poor insight/denial of deficits, poor memory, difficulty implementing spinal precautions     # of short term goals set= 5    # of short term goals met= 0          Occupational Therapy Goals           Problem: Bathing     Dates: Start: 08/02/17       Goal: STG-Within one week, patient will bathe     Dates: Start: 08/02/17      Description: Goal: STG-Within one week, patient will bathe    1) Individualized Goal: with supervision and AE/DME prn    2) Interventions:  OT Orthotics Training, OT E Stim Attended, OT Self Care/ADL, OT Cognitive Skill Dev, OT Community Reintegration, OT Manual Ther Technique, OT Neuro Re-Ed/Balance, OT Sensory Int Techniques, OT Therapeutic Activity, OT Evaluation and OT Therapeutic Exercise        Note: Goal Note filed on 08/02/17 1209 by Verena Addison    Goal: STG-Within one week, patient will bathe  1) Individualized Goal: with supervision and AE/DME prn  2) Interventions:  OT Orthotics Training, OT E Stim Attended, OT Self   Care/ADL, OT Cognitive Skill Dev, OT Community Reintegration, OT Manual   Ther Technique, OT Neuro Re-Ed/Balance, OT Sensory Int Techniques, OT   Therapeutic Activity, OT Evaluation and OT Therapeutic Exercise            Problem: Dressing     Dates: Start: 08/02/17       Goal: STG-Within one week, patient will dress LB     Dates: Start: 08/02/17      Description: Goal: STG-Within one week, patient will dress LB    1) Individualized Goal: with supervision and AE/DME prn    2) Interventions:  OT Orthotics Training, OT E Stim Attended, OT Self Care/ADL, OT Cognitive Skill Dev, OT Community Reintegration, OT Manual Ther Technique, OT Neuro Re-Ed/Balance, OT Sensory Int Techniques, OT Therapeutic Activity, OT Evaluation and OT Therapeutic Exercise        Note: Goal Note filed on 08/02/17 1209 by  Verena Addison    Goal: STG-Within one week, patient will dress LB  1) Individualized Goal: with supervision and AE/DME prn  2) Interventions:  OT Orthotics Training, OT E Stim Attended, OT Self   Care/ADL, OT Cognitive Skill Dev, OT Community Reintegration, OT Manual   Ther Technique, OT Neuro Re-Ed/Balance, OT Sensory Int Techniques, OT   Therapeutic Activity, OT Evaluation and OT Therapeutic Exercise            Problem: Functional Cognition     Dates: Start: 08/02/17       Goal: STG-Within one week, patient will demonstrate safety awareness     Dates: Start: 08/02/17      Description: Goal: STG-Within one week, patient will demonstrate safety awareness    1) Individualized Goal: with fair knowledge by demonstrating locking w/c consistently during therapy session.    2) Interventions:  OT Orthotics Training, OT E Stim Attended, OT Self Care/ADL, OT Cognitive Skill Dev, OT Community Reintegration, OT Manual Ther Technique, OT Neuro Re-Ed/Balance, OT Sensory Int Techniques, OT Therapeutic Activity, OT Evaluation and OT Therapeutic Exercise         Note: Goal Note filed on 08/02/17 1209 by Verena Addison    Goal: STG-Within one week, patient will demonstrate safety awareness  1) Individualized Goal: during completion of ADLs as demonstrated by   proper use of DME   2) Interventions:  OT Orthotics Training, OT E Stim Attended, OT Self   Care/ADL, OT Cognitive Skill Dev, OT Community Reintegration, OT Manual   Ther Technique, OT Neuro Re-Ed/Balance, OT Sensory Int Techniques, OT   Therapeutic Activity, OT Evaluation and OT Therapeutic Exercise            Problem: IADL's     Dates: Start: 08/02/17       Goal: STG-Within one week, patient will utilize washer and dryer     Dates: Start: 08/02/17      Description: Goal: STG-Within one week, patient will utilize washer and dryer    1) Individualized Goal: with min A and v/c prn    2) Interventions:  OT Orthotics Training, OT E Stim Attended, OT Self Care/ADL, OT  Cognitive Skill Dev, OT Community Reintegration, OT Manual Ther Technique, OT Neuro Re-Ed/Balance, OT Sensory Int Techniques, OT Therapeutic Activity, OT Evaluation and OT Therapeutic Exercise    Note: Goal Note filed on 08/02/17 1209 by Verena Addison    Goal: STG-Within one week, patient will utilize washer and dryer  1) Individualized Goal: with min A and v/c prn  2) Interventions:  OT Orthotics Training, OT E Stim Attended, OT Self   Care/ADL, OT Cognitive Skill Dev, OT Community Reintegration, OT Manual   Ther Technique, OT Neuro Re-Ed/Balance, OT Sensory Int Techniques, OT   Therapeutic Activity, OT Evaluation and OT Therapeutic Exercise            Problem: OT Long Term Goals     Dates: Start: 08/02/17       Goal: LTG-By discharge, patient will complete basic self care tasks     Dates: Start: 08/02/17      Description: Goal: LTG-By discharge, patient will complete basic self care tasks    1) Individualized Goal: with mod I    2) Interventions:  OT Orthotics Training, OT E Stim Attended, OT Self Care/ADL, OT Cognitive Skill Dev, OT Community Reintegration, OT Manual Ther Technique, OT Neuro Re-Ed/Balance, OT Sensory Int Techniques, OT Therapeutic Activity, OT Evaluation and OT Therapeutic Exercise    Note: Goal Note filed on 08/02/17 1209 by Verena Addison    Goal: LTG-By discharge, patient will complete basic self care tasks  1) Individualized Goal: with mod I  2) Interventions:  OT Orthotics Training, OT E Stim Attended, OT Self   Care/ADL, OT Cognitive Skill Dev, OT Community Reintegration, OT Manual   Ther Technique, OT Neuro Re-Ed/Balance, OT Sensory Int Techniques, OT   Therapeutic Activity, OT Evaluation and OT Therapeutic Exercise          Goal: LTG-By discharge, patient will complete basic home management     Dates: Start: 08/02/17      Description: Goal: LTG-By discharge, patient will complete basic home management    1) Individualized Goal: with supervision and min v/c    2) Interventions:  OT  Orthotics Training, OT E Stim Attended, OT Self Care/ADL, OT Cognitive Skill Dev, OT Community Reintegration, OT Manual Ther Technique, OT Neuro Re-Ed/Balance, OT Sensory Int Techniques, OT Therapeutic Activity, OT Evaluation and OT Therapeutic Exercise        Note: Goal Note filed on 08/02/17 1209 by Verena Addison    Goal: LTG-By discharge, patient will complete basic home management  1) Individualized Goal: with supervision and min v/c  2) Interventions:  OT Orthotics Training, OT E Stim Attended, OT Self   Care/ADL, OT Cognitive Skill Dev, OT Community Reintegration, OT Manual   Ther Technique, OT Neuro Re-Ed/Balance, OT Sensory Int Techniques, OT   Therapeutic Activity, OT Evaluation and OT Therapeutic Exercise                Problem: Toileting     Dates: Start: 08/02/17       Goal: STG-Within one week, patient will complete toileting tasks     Dates: Start: 08/02/17      Description: Goal: STG-Within one week, patient will complete toileting tasks    1) Individualized Goal: with supervision and AE/DME prn    2) Interventions:  OT Orthotics Training, OT E Stim Attended, OT Self Care/ADL, OT Cognitive Skill Dev, OT Community Reintegration, OT Manual Ther Technique, OT Neuro Re-Ed/Balance, OT Sensory Int Techniques, OT Therapeutic Activity, OT Evaluation and OT Therapeutic Exercise        Note: Goal Note filed on 08/02/17 1209 by Verena Addison    Goal: STG-Within one week, patient will complete toileting tasks  1) Individualized Goal: with supervision and AE/DME prn  2) Interventions:  OT Orthotics Training, OT E Stim Attended, OT Self   Care/ADL, OT Cognitive Skill Dev, OT Community Reintegration, OT Manual   Ther Technique, OT Neuro Re-Ed/Balance, OT Sensory Int Techniques, OT   Therapeutic Activity, OT Evaluation and OT Therapeutic Exercise                Section completed by:  HALEY Beltran/SUSY Simeon/ROMULO

## 2017-08-10 NOTE — CARE PLAN
Problem: Swallowing STGs  Goal: STG-Within one week, patient will  1) Individualized goal: Tolerate trials of Dys 3 textures with advancement as appropriate. MIN cues for use of compensatory strategies.   2) Interventions: SLP Swallowing Dysfunction Treatment    Outcome: MET Date Met:  08/10/17  Pt advanced to Dys 3 textures as of today 8/10/17. MIN cues for use of strategies. No overt s/sx of pen/asp.     Problem: Problem Solving STGs  Goal: STG-Within one week, patient will  1) Individualized goal: Answer temporal orientation questions with use of environmental aids with 70% accuracy provided MOD A.   2) Interventions: SLP Cognitive Skill Development    Outcome: MET Date Met:  08/10/17  MIN A, 73%   Goal: STG-Within one week, patient will  1) Individualized goal: Complete standardized assessment using SVEN to further assess language related functional activities.   2) Interventions: SLP Cognitive Skill Development    Outcome: MET Date Met:  08/10/17  Appropriate subtests given as it relates to medication management. Pt is MAX A at this time.     Problem: Memory STGs  Goal: STG-Within one week, patient will  1) Individualized goal: use written memory strategies and simple memory book following educ and with supervision/ cues from staff, therapists with MOD A.  2) Interventions: SLP Cognitive Skill Development    Outcome: NOT MET  Will continue to target in ST.

## 2017-08-11 PROCEDURE — A9270 NON-COVERED ITEM OR SERVICE: HCPCS | Performed by: HOSPITALIST

## 2017-08-11 PROCEDURE — 99232 SBSQ HOSP IP/OBS MODERATE 35: CPT | Performed by: PHYSICAL MEDICINE & REHABILITATION

## 2017-08-11 PROCEDURE — 97530 THERAPEUTIC ACTIVITIES: CPT

## 2017-08-11 PROCEDURE — 94760 N-INVAS EAR/PLS OXIMETRY 1: CPT

## 2017-08-11 PROCEDURE — 700102 HCHG RX REV CODE 250 W/ 637 OVERRIDE(OP): Performed by: HOSPITALIST

## 2017-08-11 PROCEDURE — 99232 SBSQ HOSP IP/OBS MODERATE 35: CPT | Performed by: HOSPITALIST

## 2017-08-11 PROCEDURE — 92526 ORAL FUNCTION THERAPY: CPT

## 2017-08-11 PROCEDURE — 97532 HCHG COGNITIVE SKILL DEV. 15 MIN: CPT

## 2017-08-11 PROCEDURE — 97110 THERAPEUTIC EXERCISES: CPT

## 2017-08-11 PROCEDURE — 97535 SELF CARE MNGMENT TRAINING: CPT

## 2017-08-11 PROCEDURE — 97116 GAIT TRAINING THERAPY: CPT

## 2017-08-11 PROCEDURE — A9270 NON-COVERED ITEM OR SERVICE: HCPCS | Performed by: PHYSICAL MEDICINE & REHABILITATION

## 2017-08-11 PROCEDURE — 700102 HCHG RX REV CODE 250 W/ 637 OVERRIDE(OP): Performed by: PHYSICAL MEDICINE & REHABILITATION

## 2017-08-11 PROCEDURE — 770010 HCHG ROOM/CARE - REHAB SEMI PRIVAT*

## 2017-08-11 RX ADMIN — FUROSEMIDE 40 MG: 40 TABLET ORAL at 05:00

## 2017-08-11 RX ADMIN — HYDRALAZINE HYDROCHLORIDE 50 MG: 25 TABLET, FILM COATED ORAL at 05:00

## 2017-08-11 RX ADMIN — ATORVASTATIN CALCIUM 80 MG: 40 TABLET, FILM COATED ORAL at 20:18

## 2017-08-11 RX ADMIN — DULOXETINE HYDROCHLORIDE 20 MG: 20 CAPSULE, DELAYED RELEASE ORAL at 09:48

## 2017-08-11 RX ADMIN — Medication 2 TABLET: at 20:17

## 2017-08-11 RX ADMIN — GABAPENTIN 300 MG: 300 CAPSULE ORAL at 20:18

## 2017-08-11 RX ADMIN — NICOTINE 14 MG: 14 PATCH, EXTENDED RELEASE TRANSDERMAL at 05:00

## 2017-08-11 RX ADMIN — METOPROLOL TARTRATE 12.5 MG: 25 TABLET ORAL at 18:00

## 2017-08-11 RX ADMIN — OMEPRAZOLE 20 MG: 20 CAPSULE, DELAYED RELEASE ORAL at 20:17

## 2017-08-11 RX ADMIN — ASPIRIN 81 MG: 81 TABLET, COATED ORAL at 09:49

## 2017-08-11 RX ADMIN — OMEPRAZOLE 20 MG: 20 CAPSULE, DELAYED RELEASE ORAL at 09:48

## 2017-08-11 RX ADMIN — HYDRALAZINE HYDROCHLORIDE 50 MG: 25 TABLET, FILM COATED ORAL at 14:04

## 2017-08-11 RX ADMIN — HYDRALAZINE HYDROCHLORIDE 50 MG: 25 TABLET, FILM COATED ORAL at 21:30

## 2017-08-11 RX ADMIN — OXYBUTYNIN CHLORIDE 5 MG: 5 TABLET, EXTENDED RELEASE ORAL at 20:18

## 2017-08-11 RX ADMIN — BENAZEPRIL HYDROCHLORIDE 40 MG: 10 TABLET, FILM COATED ORAL at 05:00

## 2017-08-11 RX ADMIN — POTASSIUM CHLORIDE 20 MEQ: 1500 TABLET, EXTENDED RELEASE ORAL at 09:48

## 2017-08-11 RX ADMIN — METOPROLOL TARTRATE 12.5 MG: 25 TABLET ORAL at 05:01

## 2017-08-11 ASSESSMENT — ENCOUNTER SYMPTOMS
FEVER: 0
CHILLS: 0
NERVOUS/ANXIOUS: 0
VOMITING: 0
DIARRHEA: 0
SHORTNESS OF BREATH: 0
ABDOMINAL PAIN: 0
NAUSEA: 0

## 2017-08-11 ASSESSMENT — GAIT ASSESSMENTS
GAIT LEVEL OF ASSIST: CONTACT GUARD ASSIST
ASSISTIVE DEVICE: FRONT WHEEL WALKER
DEVIATION: BRADYKINETIC;SHUFFLED GAIT
DISTANCE (FEET): 125

## 2017-08-11 ASSESSMENT — PAIN SCALES - GENERAL
PAINLEVEL_OUTOF10: 0

## 2017-08-11 NOTE — PROGRESS NOTES
Received shift report and assumed care of patient.  Patient awake, calm and stable, currently positioned in wheelchair for comfort and safety; call light within reach.  Denies pain or discomfort at this time.  Will continue to monitor with hourly and PRN rounding.

## 2017-08-11 NOTE — DISCHARGE PLANNING
Received word that patient has been accepted at [facility] for Monday, 8/14/2017 pending bed availability on Monday. PASRR screen completed. CM/SW met with patient to discuss transfer and patient is in agreement with plan.

## 2017-08-11 NOTE — CARE PLAN
Problem: Venous Thromboembolism (VTW)/Deep Vein Thrombosis (DVT) Prevention:  Goal: Patient will participate in Venous Thrombosis (VTE)/Deep Vein Thrombosis (DVT)Prevention Measures  Outcome: PROGRESSING AS EXPECTED  Pt has been out of bed for meals and participating in therapies, pt wearing knee high GRANT hose, Bilat calves are soft, non tender, no redness or swelling present, 1+ bilat pedal pulses present, taking ASA daily, see MAR, will assess Q shift and PRN and monitor for changes.    Problem: Bowel/Gastric:  Goal: Will not experience complications related to bowel motility  Outcome: PROGRESSING AS EXPECTED  Pt has had 2 large bowel movements today, pt refused bowel meds this am, abdomen is soft, non tender with normoactive bowel sounds x 4 quads, pt eating well at meals, will monitor.

## 2017-08-11 NOTE — CARE PLAN
Problem: Recreation Therapy  Goal: STG-Within one week, patient will attend to  Leisure task for 15 minutes with moderatel verbal cuing  Outcome: PROGRESSING AS EXPECTED

## 2017-08-11 NOTE — WEEKLY
Recreational Therapy Weekly Plan of Care Note   1) Assessment: Patient has attended all Recreational Therapy sessions in the past week and is making slow progress toward goals.  2) Plan: Recommend Recreational Therapy 30-60 minutes per day 3 to 4x per week for 1 weeks for the following treatments: Fine and Gross motor leisure functioning, Cognitive leisure functioning, Communication/Social skills, Leisure Education, Community Re-integration, Emotional and Behavioral functioning.   3) Goals: Please refer to care plan for goals.

## 2017-08-11 NOTE — CARE PLAN
Problem: Communication  Goal: The ability to communicate needs accurately and effectively will improve  Makes needs known to staff.  Encouraged to use call light for staff assist.    Problem: Safety  Goal: Will remain free from falls  Call light kept within reach and encouraged to use for assistance and with toileting needs. Encouraged to call staff and to wait for staff before transfers.  Bed alarm is in place.    Problem: Pain Management  Goal: Pain level will decrease to patient’s comfort goal  Complains of generalized pain, medicated with Tylenol tonight.  See doc flow sheet.  Will continue to monitor patient.

## 2017-08-11 NOTE — CARE PLAN
Problem: Recreation Therapy  Goal: STG-Within one week, patient will utilize  bilateral UE’s for leisure for 10 minutes with minimal verbal cuing   Outcome: NOT MET

## 2017-08-11 NOTE — CARE PLAN
Problem: Recreation Therapy  Goal: STG-Within one week, patient will utilize  bilateral UE’s for leisure for 10 minutes with minimal verbal cuing   Outcome: PROGRESSING AS EXPECTED

## 2017-08-11 NOTE — FLOWSHEET NOTE
R/A during the day, using 1L with sleep. No home O2. Spot checks in R/A with sleep prior to discharge.      08/11/17 1210   Events/Summary/Plan   Non-Invasive Resp Device Site Inspection Completed Intact   Location Nasal cannula   Respiratory WDL   Respiratory (WDL) X   Chest Exam   Respiration 18   Pulse 67   Breath Sounds   RUL Breath Sounds Clear   RML Breath Sounds Clear;Diminished   RLL Breath Sounds Diminished   ARGELIA Breath Sounds Clear   LLL Breath Sounds Diminished   Secretions   Cough Non Productive   How Sputum Obtained Cough on Request   Oximetry   #Pulse Oximetry (Single Determination) Yes   Oxygen   Home O2 Use Prior To Admission? No   Pulse Oximetry 92 %   O2 (LPM) 0  (Wearing 1L with sleep)   O2 Daily Delivery Respiratory  Room Air with O2 Available

## 2017-08-11 NOTE — PROGRESS NOTES
Hospital Medicine Progress Note, Adult, Complex               Author: Luc Osei Date & Time created: 8/11/2017  9:36 AM     Interval History:  No significant events or changes since last visit  Patient denies SOB, cough, or diarrhea  Patient slept ok last night    Chief Complaint:  Hypertension  Bradycardia      Review of Systems:  Review of Systems   Constitutional: Negative for fever and chills.   Respiratory: Negative for shortness of breath.    Cardiovascular: Negative for chest pain.   Gastrointestinal: Negative for nausea, vomiting, abdominal pain and diarrhea.   Psychiatric/Behavioral: The patient is not nervous/anxious.        Physical Exam:  Physical Exam   Constitutional: She is oriented to person, place, and time. She appears well-nourished.   HENT:   Head: Atraumatic.   Eyes: Conjunctivae are normal. Pupils are equal, round, and reactive to light.   Neck: Normal range of motion. Neck supple.   Cardiovascular: Normal rate, regular rhythm, S1 normal and S2 normal.    Pulmonary/Chest: Effort normal and breath sounds normal.   Abdominal: Soft. Bowel sounds are normal. Hernia confirmed negative in the right inguinal area and confirmed negative in the left inguinal area.   Neurological: She is alert and oriented to person, place, and time. No sensory deficit.   Skin: Skin is warm and dry. No cyanosis.   Psychiatric: She has a normal mood and affect. Her behavior is normal.   Nursing note and vitals reviewed.      Labs:        Invalid input(s): [id number]      No results for input(s): SODIUM, POTASSIUM, CHLORIDE, CO2, BUN, CREATININE, MAGNESIUM, PHOSPHORUS, CALCIUM in the last 72 hours.  No results for input(s): ALTSGPT, ASTSGOT, ALKPHOSPHAT, TBILIRUBIN, DBILIRUBIN, GAMMAGT, AMYLASE, LIPASE, ALB, PREALBUMIN, GLUCOSE in the last 72 hours.  No results for input(s): RBC, HEMOGLOBIN, HEMATOCRIT, PLATELETCT, PROTHROMBTM, APTT, INR, IRON, FERRITIN, TOTIRONBC in the last 72 hours.            Hemodynamics:  Temp  (24hrs), Av.3 °C (97.3 °F), Min:36.1 °C (97 °F), Max:36.4 °C (97.5 °F)  Temperature: 36.1 °C (97 °F)  Pulse  Av.1  Min: 52  Max: 84   Blood Pressure : 133/73 mmHg     Respiratory:    Respiration: 18, Pulse Oximetry: 93 %        RUL Breath Sounds: Clear, RML Breath Sounds: Clear, RLL Breath Sounds: Clear;Diminished, ARGELIA Breath Sounds: Clear, LLL Breath Sounds: Clear;Diminished  Fluids:    Intake/Output Summary (Last 24 hours) at 17 0936  Last data filed at 17 0900   Gross per 24 hour   Intake    740 ml   Output      0 ml   Net    740 ml        GI/Nutrition:  Orders Placed This Encounter   Procedures   • DIET ORDER     Standing Status: Standing      Number of Occurrences: 1      Standing Expiration Date:      Order Specific Question:  Diet:     Answer:  Cardiac [6]     Order Specific Question:  Texture/Fiber modifications:     Answer:  Dysphagia 3(Mechanical Soft)specify fluid consistency(question 6) [3]     Order Specific Question:  Consistency/Fluid modifications:     Answer:  Thin Liquids [3]      Comments:  meds whole with liquid wash     Medical Decision Making, by Problem:  Active Hospital Problems    Diagnosis   • Back pain [M54.9]   • Depression [F32.9]   • Thoracic spondylosis [M47.814]   • HTN (hypertension) [I10]   • Dyslipidemia [E78.5]   • History of stroke [Z86.73]   • T12 compression fracture (CMS-HCC) [M48.54XA]   • Microcytic anemia [D50.9]   • Heme positive stool [R19.5]   • Dementia [F03.90]     Note ():  Will get am labs for further evaluation    *  B/L LE Pain  MRI thoracic & lumbar: see below  LE arterial dopplers: ok  Pt not interested in any surgeries at this time    *  Hypertension  BP ok ()  On Lotensin: 40 mg daily  On Lopressor: 50 mg bid --> 25 mg bid --> 12.5 mg bid ( at evening)  On Hydralazine: 25 mg tid --> 50 mg tid ()  On Clonidine: 0.1 mg q6 hours prn SBP > 170  Note: also on Lasix  Cont to monitor    *  Bradycardia  HR recently ok in the 60's  On  Lopressor: 50 mg bid --> 25 mg bid --> 12.5 mg bid (8/9 at evening)  Cont to monitor    *  CHF  On Lasix: 40 mg daily  On KCL: 20 meq daily  S/P KCL 40 meq x 1 extra dose (8/8)  Note: was on Lasix at home  Monitor K+: 3.8 --> 3.5 (8/7)    *  COPD: hx tobacco abuse  *  Hx CVA  *  Chronic Back Pain: has refused surgery in the past  *  PVD: with hx moderate iliac stenosis in 2009       MRI Thoracic Spine:  1.  Multilevel multifactorial degenerative changes  2.  Prominent posterior epidural fat from L2 through L5 contributes to thecal sac narrowing at these levels  3.  Thecal sac narrowing worst at L2-L3 and L3-L4  4.  Areas of neural foraminal narrowing as described above  5.  Chronic T12 vertebral compression fracture      Labs reviewed and Medications reviewed

## 2017-08-11 NOTE — DISCHARGE PLANNING
Case Management;  Met with patient.  She is not yet able to be by herself and manage her care at home.  Skilled transition is recommended.  I have provided a list of facilities and she is agreeable to a referral to [facility].  I will proceed with this referral and have asked her for a second choice also.  Will follow.

## 2017-08-11 NOTE — PROGRESS NOTES
Received bedside shift report from Alena [facility] RN regarding patient and assumed care. Patient awake, calm and stable, currently positioned in bed for comfort and safety; call light within reach. Denies pain or discomfort at this time. Will continue to monitor.

## 2017-08-11 NOTE — PROGRESS NOTES
"Rehab Progress Note     Chief complaint: none, follow up thoracic spondylosis/compression fractures  Date of Service: 8/10/2017    Interval Events (Subjective)  Patient seen and examined. No acute events overnight. Initially can't remember who I am. Not too happy about planned discharge next week to SNF, but understands she cannot live safely by her own any longer. Participating well with therapy.     Objective:  VITAL SIGNS: /66 mmHg  Pulse 60  Temp(Src) 36.3 °C (97.4 °F)  Resp 18  Ht 1.575 m (5' 2\")  Wt 86.1 kg (189 lb 13.1 oz)  BMI 34.71 kg/m2  SpO2 95%  Breastfeeding? No  Gen: alert, no apparent distress  CV: regular rate and rhythm, no murmurs, no peripheral edema  Resp: clear to ascultation bilaterally, normal respiratory effort  GI: soft, non-tender abdomen, bowel sounds present  Neuro: Motor: 5/5 MMT throughout, except for bilateral 4/5 EHL                   Sensory:decreased to light touch in right L1/L2 dermatomes, decreased to pinprick in bilateral L4-S1 dermatomes, decreased vibration in bilateral great toes  DTRs: 2+ in bilateral biceps, triceps, brachioradialis, 3+ in bilateral patella, 2+ at bilateral achilles    No results found for this or any previous visit (from the past 72 hour(s)).    Current Facility-Administered Medications   Medication Frequency   • oxybutynin SR (DITROPAN-XL) tablet 5 mg Q EVENING   • hydrALAZINE (APRESOLINE) tablet 50 mg Q8HRS   • metoprolol (LOPRESSOR) tablet 12.5 mg TWICE DAILY   • clonidine (CATAPRES) tablet 0.1 mg Q6HRS PRN   • potassium chloride SA (Kdur) tablet 20 mEq DAILY   • acetaminophen (TYLENOL) tablet 650 mg Q4HRS PRN   • gabapentin (NEURONTIN) capsule 300 mg Q EVENING   • vitamin D (Ergocalciferol) (DRISDOL) capsule 50,000 Units Q7 DAYS   • Respiratory Care per Protocol Continuous RT   • Pharmacy Consult Request ...Pain Management Review 1 Each PRN   • oxycodone immediate-release (ROXICODONE) tablet 2.5 mg Q3HRS PRN   • oxycodone " immediate-release (ROXICODONE) tablet 5 mg Q3HRS PRN   • lidocaine (LIDODERM) 5 % 1 Patch Q24HR   • aspirin EC (ECOTRIN) tablet 81 mg DAILY   • artificial tears 1.4 % ophthalmic solution 1 Drop PRN   • mag hydrox-al hydrox-simeth (MAALOX PLUS ES or MYLANTA DS) suspension 20 mL Q2HRS PRN   • trazodone (DESYREL) tablet 50 mg QHS PRN   • sodium chloride (OCEAN) 0.65 % nasal spray 2 Spray PRN   • atorvastatin (LIPITOR) tablet 80 mg QHS   • benazepril (LOTENSIN) tablet 40 mg Q DAY   • duloxetine (CYMBALTA) capsule 20 mg DAILY   • furosemide (LASIX) tablet 40 mg Q DAY   • ondansetron (ZOFRAN ODT) dispertab 4 mg Q4HRS PRN   • senna-docusate (PERICOLACE or SENOKOT S) 8.6-50 MG per tablet 2 Tab BID    And   • polyethylene glycol/lytes (MIRALAX) PACKET 1 Packet QDAY PRN    And   • magnesium hydroxide (MILK OF MAGNESIA) suspension 30 mL QDAY PRN    And   • bisacodyl (DULCOLAX) suppository 10 mg QDAY PRN   • nicotine (NICODERM) 14 MG/24HR 14 mg Daily-0600   • omeprazole (PRILOSEC) capsule 20 mg BID       Orders Placed This Encounter   Procedures   • DIET ORDER     Standing Status: Standing      Number of Occurrences: 1      Standing Expiration Date:      Order Specific Question:  Diet:     Answer:  Cardiac [6]     Order Specific Question:  Consistency/Fluid modifications:     Answer:  Thin Liquids [3]      Comments:  meds whole with liquid wash       Assessment:  Active Hospital Problems    Diagnosis   • Back pain   • Depression   • Thoracic spondylosis   • HTN (hypertension)   • Dyslipidemia   • History of stroke   • T12 compression fracture (CMS-HCC)   • Microcytic anemia   • Heme positive stool   • Dementia     I led and attended the weekly conference, and agree with the IDT conference documentation and plan of care as noted below.    Date of conference: 8/10/2017    CM/social support: lives alone     Anticipated DC date: 8/14/2017, SNF as transition to group home    Follow up: PCP    Medical Decision Making and  Plan:    Labs reviewed. Medications reviewed. Appreciate the assistance of the hospitalist.    T12/L1 chronic compression fractures - Continue pain management with Lidoderm patch and oral pain meds as needed. Pain currently controlled. Spinal precautions.    Thoracic spondylosis with scoliosis, thecal sac narrowing from L2-L5 worst at L2-L3 and L3-L4, L2-L5 multilevel bilateral neural foraminal narrowing - with evidence of radiculopathy on exam with weakness at the bilateral L4 myotomes as well as abnormal sensation on the right L1-L2 dermatome, and L4-S1 dermatomes. Patient does not want surgery. Continue conservative treatment with therapy. Gabapentin for complaints of nerve pain in the right thigh. Increased dose with improved pain control.    Hypertension - Continue Benzapril, furosemide, metoprolol, hydralazine, and clonidine. Hospitalist managing. Monitor.    Coronary artery disease status post stenting - continue aspirin and statin.    Dyslipidemia - continue statin.    Tobacco use - nicotine patch.    Acute on chronic pain - pain meds as needed. lidoderm patch for compression fractures. Gabapentin for nerve pain.    Bladder urgency - UA negative. Trial of ditropan XL 8/10. Monitor for retention.    Dysphagia? - choked on food during dinner. Diet downgraded. Speech consult.    History of GI bleed - with microcytic anemia and positive stool guaiac on acute. IV iron. Apparently last colonoscopy normal. Per hospitalist, needs EGD due to history of Haywood's esophagus. Anemia slightly improved after IV iron.    IV iron infiltration - 8/4. Discontinue infusions - got 2/5. Monitor wound. Ice and elevate. Improved.    History of depression/anxiety - continue Cymbalta and as needed Buspar.    Dementia? - likely vascular dementia based on previous imaging from years ago. Speech consult.    Low Vit D - 10, started high dose repletion.    DVT prophylaxis - Discontinued Lovenox as she's ambulating longer  distances.    Total time:  >25 minutes.  I spent greater than 50% of the time for patient care and coordination on this date, including unit/floor time, and face-to-face time with the patient as per assessment and plan above.    Christina Miller M.D.

## 2017-08-12 LAB
ANION GAP SERPL CALC-SCNC: 9 MMOL/L (ref 0–11.9)
BUN SERPL-MCNC: 30 MG/DL (ref 8–22)
CALCIUM SERPL-MCNC: 9.3 MG/DL (ref 8.5–10.5)
CHLORIDE SERPL-SCNC: 109 MMOL/L (ref 96–112)
CO2 SERPL-SCNC: 24 MMOL/L (ref 20–33)
CREAT SERPL-MCNC: 1 MG/DL (ref 0.5–1.4)
GFR SERPL CREATININE-BSD FRML MDRD: 54 ML/MIN/1.73 M 2
GLUCOSE SERPL-MCNC: 95 MG/DL (ref 65–99)
MAGNESIUM SERPL-MCNC: 2.1 MG/DL (ref 1.5–2.5)
PHOSPHATE SERPL-MCNC: 3.1 MG/DL (ref 2.5–4.5)
POTASSIUM SERPL-SCNC: 4 MMOL/L (ref 3.6–5.5)
SODIUM SERPL-SCNC: 142 MMOL/L (ref 135–145)

## 2017-08-12 PROCEDURE — 700102 HCHG RX REV CODE 250 W/ 637 OVERRIDE(OP): Performed by: HOSPITALIST

## 2017-08-12 PROCEDURE — 84100 ASSAY OF PHOSPHORUS: CPT

## 2017-08-12 PROCEDURE — 94760 N-INVAS EAR/PLS OXIMETRY 1: CPT

## 2017-08-12 PROCEDURE — 80048 BASIC METABOLIC PNL TOTAL CA: CPT

## 2017-08-12 PROCEDURE — 83735 ASSAY OF MAGNESIUM: CPT

## 2017-08-12 PROCEDURE — 36415 COLL VENOUS BLD VENIPUNCTURE: CPT

## 2017-08-12 PROCEDURE — A9270 NON-COVERED ITEM OR SERVICE: HCPCS | Performed by: HOSPITALIST

## 2017-08-12 PROCEDURE — 99232 SBSQ HOSP IP/OBS MODERATE 35: CPT | Performed by: HOSPITALIST

## 2017-08-12 PROCEDURE — 92526 ORAL FUNCTION THERAPY: CPT

## 2017-08-12 PROCEDURE — 700102 HCHG RX REV CODE 250 W/ 637 OVERRIDE(OP): Performed by: PHYSICAL MEDICINE & REHABILITATION

## 2017-08-12 PROCEDURE — 770010 HCHG ROOM/CARE - REHAB SEMI PRIVAT*

## 2017-08-12 PROCEDURE — A9270 NON-COVERED ITEM OR SERVICE: HCPCS | Performed by: PHYSICAL MEDICINE & REHABILITATION

## 2017-08-12 PROCEDURE — 700101 HCHG RX REV CODE 250: Performed by: PHYSICAL MEDICINE & REHABILITATION

## 2017-08-12 RX ORDER — FUROSEMIDE 20 MG/1
20 TABLET ORAL
Status: DISCONTINUED | OUTPATIENT
Start: 2017-08-13 | End: 2017-08-15 | Stop reason: HOSPADM

## 2017-08-12 RX ORDER — POTASSIUM CHLORIDE 20 MEQ/1
10 TABLET, EXTENDED RELEASE ORAL DAILY
Status: DISCONTINUED | OUTPATIENT
Start: 2017-08-13 | End: 2017-08-15 | Stop reason: HOSPADM

## 2017-08-12 RX ADMIN — OMEPRAZOLE 20 MG: 20 CAPSULE, DELAYED RELEASE ORAL at 08:07

## 2017-08-12 RX ADMIN — OXYBUTYNIN CHLORIDE 5 MG: 5 TABLET, EXTENDED RELEASE ORAL at 20:24

## 2017-08-12 RX ADMIN — HYDRALAZINE HYDROCHLORIDE 50 MG: 25 TABLET, FILM COATED ORAL at 20:24

## 2017-08-12 RX ADMIN — ATORVASTATIN CALCIUM 80 MG: 40 TABLET, FILM COATED ORAL at 20:24

## 2017-08-12 RX ADMIN — LIDOCAINE 1 PATCH: 50 PATCH TOPICAL at 08:54

## 2017-08-12 RX ADMIN — HYDRALAZINE HYDROCHLORIDE 50 MG: 25 TABLET, FILM COATED ORAL at 05:15

## 2017-08-12 RX ADMIN — HYDRALAZINE HYDROCHLORIDE 50 MG: 25 TABLET, FILM COATED ORAL at 14:18

## 2017-08-12 RX ADMIN — Medication 2 TABLET: at 20:24

## 2017-08-12 RX ADMIN — DULOXETINE HYDROCHLORIDE 20 MG: 20 CAPSULE, DELAYED RELEASE ORAL at 08:07

## 2017-08-12 RX ADMIN — FUROSEMIDE 40 MG: 40 TABLET ORAL at 05:16

## 2017-08-12 RX ADMIN — POTASSIUM CHLORIDE 20 MEQ: 1500 TABLET, EXTENDED RELEASE ORAL at 08:07

## 2017-08-12 RX ADMIN — NICOTINE 14 MG: 14 PATCH, EXTENDED RELEASE TRANSDERMAL at 05:16

## 2017-08-12 RX ADMIN — OMEPRAZOLE 20 MG: 20 CAPSULE, DELAYED RELEASE ORAL at 20:24

## 2017-08-12 RX ADMIN — BENAZEPRIL HYDROCHLORIDE 40 MG: 10 TABLET, FILM COATED ORAL at 05:16

## 2017-08-12 RX ADMIN — METOPROLOL TARTRATE 12.5 MG: 25 TABLET ORAL at 17:47

## 2017-08-12 RX ADMIN — ASPIRIN 81 MG: 81 TABLET, COATED ORAL at 08:07

## 2017-08-12 RX ADMIN — METOPROLOL TARTRATE 12.5 MG: 25 TABLET ORAL at 05:16

## 2017-08-12 RX ADMIN — GABAPENTIN 300 MG: 300 CAPSULE ORAL at 20:25

## 2017-08-12 ASSESSMENT — PAIN SCALES - GENERAL
PAINLEVEL_OUTOF10: 0
PAINLEVEL_OUTOF10: 0

## 2017-08-12 ASSESSMENT — ENCOUNTER SYMPTOMS
HALLUCINATIONS: 0
HEADACHES: 0
FEVER: 0
SHORTNESS OF BREATH: 0
VOMITING: 0
PALPITATIONS: 0
DIZZINESS: 0
NAUSEA: 0
BLURRED VISION: 0

## 2017-08-12 ASSESSMENT — PAIN SCALES - WONG BAKER
WONGBAKER_NUMERICALRESPONSE: DOESN'T HURT AT ALL
WONGBAKER_NUMERICALRESPONSE: DOESN'T HURT AT ALL

## 2017-08-12 NOTE — PROGRESS NOTES
Hospital Medicine Progress Note, Adult, Complex               Author: Luc Osei Date & Time created: 2017  9:41 AM     Interval History:  No significant events or changes since last visit  Patient denies SOB, cough, or diarrhea  Patient slept ok last night    Chief Complaint:  Hypertension  Bradycardia      Review of Systems:  Review of Systems   Constitutional: Negative for fever.   Eyes: Negative for blurred vision.   Respiratory: Negative for shortness of breath.    Cardiovascular: Negative for palpitations.   Gastrointestinal: Negative for nausea and vomiting.   Neurological: Negative for dizziness and headaches.   Psychiatric/Behavioral: Negative for hallucinations.       Physical Exam:  Physical Exam   Constitutional: She is oriented to person, place, and time.   HENT:   Mouth/Throat: Oropharynx is clear and moist.   Eyes: No scleral icterus.   Cardiovascular: Normal rate, regular rhythm, S1 normal and S2 normal.    Pulmonary/Chest: Effort normal. No stridor. She has no wheezes. She has no rales.   Abdominal: Soft. Bowel sounds are normal. Hernia confirmed negative in the right inguinal area and confirmed negative in the left inguinal area.   Neurological: She is alert and oriented to person, place, and time. No sensory deficit.   Skin: Skin is warm and dry. She is not diaphoretic. No cyanosis.   Psychiatric: She has a normal mood and affect. Her behavior is normal.   Nursing note and vitals reviewed.      Labs:        Invalid input(s): [id number]      Recent Labs      17   0508   SODIUM  142   POTASSIUM  4.0   CHLORIDE  109   CO2  24   BUN  30*   CREATININE  1.00   MAGNESIUM  2.1   PHOSPHORUS  3.1   CALCIUM  9.3     Recent Labs      17   0508   GLUCOSE  95     No results for input(s): RBC, HEMOGLOBIN, HEMATOCRIT, PLATELETCT, PROTHROMBTM, APTT, INR, IRON, FERRITIN, TOTIRONBC in the last 72 hours.            Hemodynamics:  Temp (24hrs), Av.6 °C (97.8 °F), Min:36.3 °C (97.4 °F),  Max:36.8 °C (98.2 °F)  Temperature: 36.8 °C (98.2 °F)  Pulse  Av.4  Min: 52  Max: 84   Blood Pressure : 131/72 mmHg     Respiratory:    Respiration: 18, Pulse Oximetry: 96 %, O2 Daily Delivery Respiratory : Room Air with O2 Available     Work Of Breathing / Effort: Mild  RUL Breath Sounds: Clear, RML Breath Sounds: Clear;Diminished, RLL Breath Sounds: Clear;Diminished, ARGELIA Breath Sounds: Clear, LLL Breath Sounds: Diminished  Fluids:    Intake/Output Summary (Last 24 hours) at 17 0941  Last data filed at 17 0518   Gross per 24 hour   Intake    840 ml   Output      0 ml   Net    840 ml        GI/Nutrition:  Orders Placed This Encounter   Procedures   • DIET ORDER     Standing Status: Standing      Number of Occurrences: 1      Standing Expiration Date:      Order Specific Question:  Diet:     Answer:  Cardiac [6]     Order Specific Question:  Consistency/Fluid modifications:     Answer:  Thin Liquids [3]      Comments:  meds whole with liquid wash     Medical Decision Making, by Problem:  Active Hospital Problems    Diagnosis   • Back pain [M54.9]   • Depression [F32.9]   • Thoracic spondylosis [M47.814]   • HTN (hypertension) [I10]   • Dyslipidemia [E78.5]   • History of stroke [Z86.73]   • T12 compression fracture (CMS-HCC) [M48.54XA]   • Microcytic anemia [D50.9]   • Heme positive stool [R19.5]   • Dementia [F03.90]     *  B/L LE Pain  MRI thoracic & lumbar: see below  LE arterial dopplers: ok  Pt not interested in any surgeries at this time    *  Hypertension  BP ok  On Lotensin: 40 mg daily  On Lopressor: 50 mg bid --> 25 mg bid --> 12.5 mg bid ( at evening)  On Hydralazine: 25 mg tid --> 50 mg tid ()  On Clonidine: 0.1 mg SBP > 170  Note: also on Lasix  Cont to monitor    *  S/P Bradycardia  HR recently good ()   On Lopressor: 50 mg bid --> 25 mg bid --> 12.5 mg bid ( at evening)  Cont to monitor    *  CHF  BNP: 101 ()  On Lasix: 40 mg daily --> will decrease to 20 mg daily  (starting 8/13)  On KCL: 20 meq daily --> will decrease to 10 meq daily (starting 8/13)  S/P KCL 40 meq x 1 extra dose (8/8)  Note: was on Lasix at home  Monitor K+: 3.8 --> 3.5 (8/7) --> 4.0 (8/12)    *  Azotemia  Bun: 20 --> 30 (8/12)  Likely 2nd to diuretics --> will decrease dose (8/12)  Will encourage fluid (water/juice) intake  Monitor    *  COPD: hx tobacco abuse  *  Hx CVA  *  Chronic Back Pain: has refused surgery in the past  *  PVD: with hx moderate iliac stenosis in 2009       MRI Thoracic Spine:  1.  Multilevel multifactorial degenerative changes  2.  Prominent posterior epidural fat from L2 through L5 contributes to thecal sac narrowing at these levels  3.  Thecal sac narrowing worst at L2-L3 and L3-L4  4.  Areas of neural foraminal narrowing as described above  5.  Chronic T12 vertebral compression fracture      Labs reviewed and Medications reviewed

## 2017-08-12 NOTE — PROGRESS NOTES
Patient is AOx3 can be confused at times. Patient has good safety awareness and uses the call light when assistance is needed. Patient denies complaints of pain and has been up participating in therapies, will continue to monitor and assess throughout the day.

## 2017-08-12 NOTE — CARE PLAN
Problem: Safety  Goal: Will remain free from injury  Outcome: PROGRESSING AS EXPECTED  Pt. Encouraged to use call light within reach, waits for assistance. 3 side rails up and hourly rounding continue for safety. No impulsiveness noted so far refused bed alarm.    Problem: Pain Management  Goal: Pain level will decrease to patient’s comfort goal  Outcome: PROGRESSING AS EXPECTED  Pt. Resting comfortably and sleeping well, denies pain when assessed earlier, on scheduled gabapentin and Lidocaine patch. No complaints made and pt. Verbalizes about being discharge on Monday.    Problem: Urinary Elimination:  Goal: Ability to reestablish a normal urinary elimination pattern will improve  Outcome: PROGRESSING AS EXPECTED  Pt. Continue on Ditropan, time void when awake, bladder scan low. Monitor for incontinence.

## 2017-08-12 NOTE — FLOWSHEET NOTE
08/12/17 1410   Events/Summary/Plan   Non-Invasive Resp Device Site Inspection Completed Intact   Location NC   Respiratory WDL   Respiratory (WDL) X   Chest Exam   Respiration 16   Pulse 68   Oximetry   #Pulse Oximetry (Single Determination) Yes   Oxygen   Home O2 Use Prior To Admission? No   Pulse Oximetry 95 %   O2 (LPM) 2   O2 Daily Delivery Respiratory  Nasal Cannula

## 2017-08-12 NOTE — FLOWSHEET NOTE
08/12/17 1410   Events/Summary/Plan   Non-Invasive Resp Device Site Inspection Completed Intact   Location NC   Respiratory WDL   Respiratory (WDL) X   Chest Exam   Respiration 16   Pulse 68   Oximetry   #Pulse Oximetry (Single Determination) Yes   Oxygen   Home O2 Use Prior To Admission? No   Pulse Oximetry 91 %   O2 Daily Delivery Respiratory  Room Air with O2 Available

## 2017-08-12 NOTE — PROGRESS NOTES
DATE OF SERVICE:  08/04/2017    The patient reports she is participating actively in her rehabilitation.  She   was unable to identify any specifics.  The patient continues to demonstrate   significant cognitive problems.  She said she believes she is improving in her   strength and endurance.  The patient said her pain complaints have not been   overly severe in the past few days.    In this session, talked in general terms about what the patient hopes to   accomplish to help her return home and resume her normal activities.       ____________________________________     ARNAUD CARCAMO, PHD    BRANDI / YESSICA    DD:  08/11/2017 11:43:33  DT:  08/11/2017 17:22:16    D#:  [id number]  Job#:  [id number]

## 2017-08-12 NOTE — CARE PLAN
Problem: Safety  Goal: Will remain free from falls  Intervention: Assess risk factors for falls  Patient is AOx3 can be confused at times. Patient has good safety awareness and uses the call light when assistance is needed. Patient has call light close by, bed in low position, non-skid socks on, alarms are set on the bed and wheel chair.      Problem: Mobility  Goal: Risk for activity intolerance will decrease  Intervention: Assess and monitor signs of activity intolerance  Patient has been up participating in therapies, educated to take rest periods in between to avoid fatigue.

## 2017-08-12 NOTE — PROGRESS NOTES
DATE OF SERVICE:  08/08/2017    The patient was doing fairly well on followup.  She reported no significant   symptoms of depression or anxiety, but she did admit to some feelings of   frustration and tension.  The patient continues to show significant cognitive   problems.  Her memory is very poor.  She seemed nondistressed.    The session was devoted to the patient's understanding of her various   therapies and what the team is planning to do upon her discharge from the   hospital.       ____________________________________     ARNAUD CARCAMO, PHD    BRANDI / YESSICA    DD:  08/12/2017 14:10:26  DT:  08/12/2017 16:10:41    D#:  [id number]  Job#:  [id number]

## 2017-08-13 PROCEDURE — 94760 N-INVAS EAR/PLS OXIMETRY 1: CPT

## 2017-08-13 PROCEDURE — 700102 HCHG RX REV CODE 250 W/ 637 OVERRIDE(OP): Performed by: HOSPITALIST

## 2017-08-13 PROCEDURE — 770010 HCHG ROOM/CARE - REHAB SEMI PRIVAT*

## 2017-08-13 PROCEDURE — 92526 ORAL FUNCTION THERAPY: CPT

## 2017-08-13 PROCEDURE — 97535 SELF CARE MNGMENT TRAINING: CPT

## 2017-08-13 PROCEDURE — 99232 SBSQ HOSP IP/OBS MODERATE 35: CPT | Performed by: HOSPITALIST

## 2017-08-13 PROCEDURE — A9270 NON-COVERED ITEM OR SERVICE: HCPCS | Performed by: HOSPITALIST

## 2017-08-13 PROCEDURE — 97110 THERAPEUTIC EXERCISES: CPT

## 2017-08-13 PROCEDURE — A9270 NON-COVERED ITEM OR SERVICE: HCPCS | Performed by: PHYSICAL MEDICINE & REHABILITATION

## 2017-08-13 PROCEDURE — 97532 HCHG COGNITIVE SKILL DEV. 15 MIN: CPT

## 2017-08-13 PROCEDURE — 700102 HCHG RX REV CODE 250 W/ 637 OVERRIDE(OP): Performed by: PHYSICAL MEDICINE & REHABILITATION

## 2017-08-13 PROCEDURE — 97530 THERAPEUTIC ACTIVITIES: CPT

## 2017-08-13 PROCEDURE — 700101 HCHG RX REV CODE 250: Performed by: PHYSICAL MEDICINE & REHABILITATION

## 2017-08-13 RX ADMIN — GABAPENTIN 300 MG: 300 CAPSULE ORAL at 20:15

## 2017-08-13 RX ADMIN — OMEPRAZOLE 20 MG: 20 CAPSULE, DELAYED RELEASE ORAL at 20:15

## 2017-08-13 RX ADMIN — POTASSIUM CHLORIDE 10 MEQ: 1500 TABLET, EXTENDED RELEASE ORAL at 08:57

## 2017-08-13 RX ADMIN — DULOXETINE HYDROCHLORIDE 20 MG: 20 CAPSULE, DELAYED RELEASE ORAL at 08:56

## 2017-08-13 RX ADMIN — HYDRALAZINE HYDROCHLORIDE 50 MG: 25 TABLET, FILM COATED ORAL at 20:15

## 2017-08-13 RX ADMIN — OMEPRAZOLE 20 MG: 20 CAPSULE, DELAYED RELEASE ORAL at 08:56

## 2017-08-13 RX ADMIN — Medication 2 TABLET: at 20:15

## 2017-08-13 RX ADMIN — Medication 2 TABLET: at 08:57

## 2017-08-13 RX ADMIN — METOPROLOL TARTRATE 12.5 MG: 25 TABLET ORAL at 17:32

## 2017-08-13 RX ADMIN — NICOTINE 14 MG: 14 PATCH, EXTENDED RELEASE TRANSDERMAL at 05:27

## 2017-08-13 RX ADMIN — BENAZEPRIL HYDROCHLORIDE 40 MG: 10 TABLET, FILM COATED ORAL at 05:25

## 2017-08-13 RX ADMIN — HYDRALAZINE HYDROCHLORIDE 50 MG: 25 TABLET, FILM COATED ORAL at 05:27

## 2017-08-13 RX ADMIN — HYDRALAZINE HYDROCHLORIDE 50 MG: 25 TABLET, FILM COATED ORAL at 14:04

## 2017-08-13 RX ADMIN — OXYBUTYNIN CHLORIDE 5 MG: 5 TABLET, EXTENDED RELEASE ORAL at 20:15

## 2017-08-13 RX ADMIN — ATORVASTATIN CALCIUM 80 MG: 40 TABLET, FILM COATED ORAL at 20:15

## 2017-08-13 RX ADMIN — FUROSEMIDE 20 MG: 20 TABLET ORAL at 05:26

## 2017-08-13 RX ADMIN — ASPIRIN 81 MG: 81 TABLET, COATED ORAL at 08:56

## 2017-08-13 RX ADMIN — LIDOCAINE 1 PATCH: 50 PATCH TOPICAL at 08:57

## 2017-08-13 ASSESSMENT — ENCOUNTER SYMPTOMS
BLURRED VISION: 0
DIARRHEA: 0
COUGH: 0
FEVER: 0
DIZZINESS: 0
NERVOUS/ANXIOUS: 0

## 2017-08-13 ASSESSMENT — PAIN SCALES - WONG BAKER
WONGBAKER_NUMERICALRESPONSE: DOESN'T HURT AT ALL
WONGBAKER_NUMERICALRESPONSE: DOESN'T HURT AT ALL

## 2017-08-13 ASSESSMENT — ACTIVITIES OF DAILY LIVING (ADL)
TOILET_TRANSFER_LEVEL_OF_ASSIST: REQUIRES SUPERVISION WITH TOILET TRANSFER
TOILETING_LEVEL_OF_ASSIST: REQUIRES SUPERVISION WITH TOILETING
SHOWER_TRANSFER_LEVEL_OF_ASSIST: REQUIRES SUPERVISION WITH SHOWER TRANSFER

## 2017-08-13 ASSESSMENT — PAIN SCALES - GENERAL
PAINLEVEL_OUTOF10: 0
PAINLEVEL_OUTOF10: 2

## 2017-08-13 NOTE — PROGRESS NOTES
Received shift change report from Maryann FLORES. Will resume pt's plan of care. Pt is resting in bed, calm, and cooperative. Pt denies any pain or discomfort at this time. Call light within reach. Will continue to monitor.

## 2017-08-13 NOTE — PROGRESS NOTES
Patient is AOx3 is confused at times. Patient has good safety awareness and uses the call light when assistance is needed. Patient denies complaints of pain and has been up participating in therapies. Will continue to monitor and assess throughout the day.

## 2017-08-13 NOTE — FLOWSHEET NOTE
R/A during the day. Using 1L with sleep. Recommend spot checks in R/A during sleep prior to discharge.      08/13/17 0864   Events/Summary/Plan   Non-Invasive Resp Device Site Inspection Completed Intact   Location Nasal cannula   Respiratory WDL   Respiratory (WDL) X   Chest Exam   Respiration 18   Pulse 67   Breath Sounds   RUL Breath Sounds Clear   RML Breath Sounds Clear   RLL Breath Sounds Clear;Diminished   ARGELIA Breath Sounds Clear   LLL Breath Sounds Clear;Diminished   Secretions   Cough Non Productive   How Sputum Obtained Cough on Request   Oximetry   #Pulse Oximetry (Single Determination) Yes   Oxygen   Home O2 Use Prior To Admission? No   Pulse Oximetry 95 %   O2 (LPM) 0  (wearing 1L with sleep)   O2 Daily Delivery Respiratory  Room Air with O2 Available

## 2017-08-13 NOTE — CARE PLAN
Problem: Speech/Swallowing LTGs  Goal: LTG-By discharge, patient will solve basic problems  1) Individualized goal: To safely complete ADL related tasks with SPV.   2) Interventions: SLP Self Care / ADL Training and SLP Cognitive Skill Development    Outcome: NOT MET    Problem: Memory STGs  Goal: STG-Within one week, patient will  1) Individualized goal: use written memory strategies and simple memory book following educ and with supervision/ cues from staff, therapists with MOD A.  2) Interventions: SLP Cognitive Skill Development    Outcome: NOT MET

## 2017-08-13 NOTE — CARE PLAN
Problem: Speech/Swallowing LTGs  Goal: LTG-By discharge, patient will safely swallow  1) Individualized goal: Regular textures/thin liquids to return to PLOF.   2) Interventions: SLP Swallowing Dysfunction Treatment    Outcome: MET Date Met:  08/13/17    Problem: Swallowing STGs  Goal: STG-Within one week, patient will  1) Individualized goal: Tolerate regular textures with no overt s/sx of pen/asp with MIN cues for use of compensatory strategies.   2) Interventions: SLP Swallowing Dysfunction Treatment and SLP Cognitive Skill Development    Outcome: MET Date Met:  08/13/17

## 2017-08-13 NOTE — CARE PLAN
Problem: Bathing  Goal: STG-Within one week, patient will bathe  Goal: STG-Within one week, patient will bathe  1) Individualized Goal: with supervision and AE/DME prn  2) Interventions: OT Orthotics Training, OT E Stim Attended, OT Self Care/ADL, OT Cognitive Skill Dev, OT Community Reintegration, OT Manual Ther Technique, OT Neuro Re-Ed/Balance, OT Sensory Int Techniques, OT Therapeutic Activity, OT Evaluation and OT Therapeutic Exercise  Outcome: DISCHARGED-GOAL NOT MET Date Met:  08/13/17  Pt requires Mod A for bathing of BLE knee to toe and buttocks to maintain precautions    Problem: Dressing  Goal: STG-Within one week, patient will dress LB  Goal: STG-Within one week, patient will dress LB  1) Individualized Goal: with supervision and AE/DME prn  2) Interventions: OT Orthotics Training, OT E Stim Attended, OT Self Care/ADL, OT Cognitive Skill Dev, OT Community Reintegration, OT Manual Ther Technique, OT Neuro Re-Ed/Balance, OT Sensory Int Techniques, OT Therapeutic Activity, OT Evaluation and OT Therapeutic Exercise  Outcome: DISCHARGED-GOAL NOT MET Date Met:  08/13/17  Pt required Min A with LB dressing using AE PRN with cues for precautions    Problem: Toileting  Goal: STG-Within one week, patient will complete toileting tasks  Goal: STG-Within one week, patient will complete toileting tasks  1) Individualized Goal: with supervision and AE/DME prn  2) Interventions: OT Orthotics Training, OT E Stim Attended, OT Self Care/ADL, OT Cognitive Skill Dev, OT Community Reintegration, OT Manual Ther Technique, OT Neuro Re-Ed/Balance, OT Sensory Int Techniques, OT Therapeutic Activity, OT Evaluation and OT Therapeutic Exercise  Outcome: MET Date Met:  08/13/17    Problem: IADL’s  Goal: STG-Within one week, patient will utilize washer and dryer  Goal: STG-Within one week, patient will utilize washer and dryer  1) Individualized Goal: with min A and v/c prn  2) Interventions: OT Orthotics Training, OT E Stim Attended,  OT Self Care/ADL, OT Cognitive Skill Dev, OT Community Reintegration, OT Manual Ther Technique, OT Neuro Re-Ed/Balance, OT Sensory Int Techniques, OT Therapeutic Activity, OT Evaluation and OT Therapeutic Exercise   Outcome: MET Date Met:  08/13/17    Problem: Functional Cognition  Goal: STG-Within one week, patient will demonstrate safety awareness  Goal: STG-Within one week, patient will demonstrate safety awareness  1) Individualized Goal: with fair knowledge by demonstrating locking w/c consistently during therapy session.  2) Interventions: OT Orthotics Training, OT E Stim Attended, OT Self Care/ADL, OT Cognitive Skill Dev, OT Community Reintegration, OT Manual Ther Technique, OT Neuro Re-Ed/Balance, OT Sensory Int Techniques, OT Therapeutic Activity, OT Evaluation and OT Therapeutic Exercise  Outcome: DISCHARGED-GOAL NOT MET Date Met:  08/13/17  Pt continues to require cues for locking w/c breaks    Problem: OT Long Term Goals  Goal: LTG-By discharge, patient will complete basic self care tasks  Goal: LTG-By discharge, patient will complete basic self care tasks  1) Individualized Goal: with mod I  2) Interventions: OT Orthotics Training, OT E Stim Attended, OT Self Care/ADL, OT Cognitive Skill Dev, OT Community Reintegration, OT Manual Ther Technique, OT Neuro Re-Ed/Balance, OT Sensory Int Techniques, OT Therapeutic Activity, OT Evaluation and OT Therapeutic Exercise   Outcome: DISCHARGED-GOAL NOT MET Date Met:  08/13/17  Pt to d/c to [facility] for continued OT therapy to increase ind w/ADLs  Goal: LTG-By discharge, patient will complete basic home management  Goal: LTG-By discharge, patient will complete basic home management  1) Individualized Goal: with supervision and min v/c  2) Interventions: OT Orthotics Training, OT E Stim Attended, OT Self Care/ADL, OT Cognitive Skill Dev, OT Community Reintegration, OT Manual Ther Technique, OT Neuro Re-Ed/Balance, OT Sensory Int Techniques, OT Therapeutic  Activity, OT Evaluation and OT Therapeutic Exercise  Outcome: DISCHARGED-GOAL NOT MET Date Met:  08/13/17  Pt to d/c to [facility] for continued OT therapy to increase ind w/ADLs

## 2017-08-13 NOTE — CARE PLAN
Problem: Speech/Swallowing LTGs  Goal: LTG-By discharge, patient will solve basic problems  1) Individualized goal: To safely complete ADL related tasks with SPV.   2) Interventions: SLP Self Care / ADL Training and SLP Cognitive Skill Development    Outcome: DISCHARGED-GOAL NOT MET Date Met:  08/13/17    Problem: Memory STGs  Goal: STG-Within one week, patient will  1) Individualized goal: use written memory strategies and simple memory book following educ and with supervision/ cues from staff, therapists with MOD A.  2) Interventions: SLP Cognitive Skill Development    Outcome: DISCHARGED-GOAL NOT MET Date Met:  08/13/17

## 2017-08-13 NOTE — CARE PLAN
Problem: Communication  Goal: The ability to communicate needs accurately and effectively will improve  Pt is alert and oriented x4. Pleasant and cooperative with care. Pt able to make needs known, uses call light for assistance.     Problem: Safety  Goal: Will remain free from injury  Educate and reminded pt to use call light for assistance when getting out of bed, pt verbalized understanding. Bed at lowest position, 2x side rails up, bed alarm in place and activated, call light within reach. Will continue to monitor.

## 2017-08-13 NOTE — PROGRESS NOTES
Hospital Medicine Progress Note, Adult, Complex               Author: Luc Osei Date & Time created: 2017  9:18 AM     Interval History:  No significant events or changes since last visit  Patient denies SOB, cough, or diarrhea  Patient slept ok last night    Chief Complaint:  Hypertension  Bradycardia      Review of Systems:  Review of Systems   Constitutional: Negative for fever.   Eyes: Negative for blurred vision.   Respiratory: Negative for cough.    Cardiovascular: Negative for chest pain.   Gastrointestinal: Negative for diarrhea.   Musculoskeletal: Negative for joint pain.   Neurological: Negative for dizziness.   Psychiatric/Behavioral: The patient is not nervous/anxious.        Physical Exam:  Physical Exam   Constitutional: She is oriented to person, place, and time. No distress.   HENT:   Mouth/Throat: No oropharyngeal exudate.   Eyes: EOM are normal.   Neck: No JVD present.   Cardiovascular: Normal rate, regular rhythm, S1 normal and S2 normal.    Pulmonary/Chest: Effort normal. She has no wheezes. She has no rales.   Abdominal: Soft. She exhibits no distension. There is no tenderness. Hernia confirmed negative in the right inguinal area and confirmed negative in the left inguinal area.   Neurological: She is alert and oriented to person, place, and time. No sensory deficit.   Skin: Skin is warm and dry. No cyanosis.   Psychiatric: She has a normal mood and affect. Her behavior is normal.   Nursing note and vitals reviewed.      Labs:        Invalid input(s): [id number]      Recent Labs      17   0508   SODIUM  142   POTASSIUM  4.0   CHLORIDE  109   CO2  24   BUN  30*   CREATININE  1.00   MAGNESIUM  2.1   PHOSPHORUS  3.1   CALCIUM  9.3     Recent Labs      17   0508   GLUCOSE  95     No results for input(s): RBC, HEMOGLOBIN, HEMATOCRIT, PLATELETCT, PROTHROMBTM, APTT, INR, IRON, FERRITIN, TOTIRONBC in the last 72 hours.            Hemodynamics:  Temp (24hrs), Av.5 °C (97.7  °F), Min:36.3 °C (97.3 °F), Max:36.7 °C (98.1 °F)  Temperature: 36.3 °C (97.3 °F)  Pulse  Av.3  Min: 52  Max: 84   Blood Pressure : 144/77 mmHg     Respiratory:    Respiration: 18, Pulse Oximetry: 95 %, O2 Daily Delivery Respiratory : Room Air with O2 Available     Work Of Breathing / Effort: Mild  RUL Breath Sounds: Clear, RML Breath Sounds: Clear, RLL Breath Sounds: Clear;Diminished, ARGELIA Breath Sounds: Clear, LLL Breath Sounds: Clear;Diminished  Fluids:    Intake/Output Summary (Last 24 hours) at 17 0918  Last data filed at 17 0900   Gross per 24 hour   Intake   1780 ml   Output      1 ml   Net   1779 ml     Weight: 85 kg (187 lb 6.3 oz)  GI/Nutrition:  Orders Placed This Encounter   Procedures   • DIET ORDER     Standing Status: Standing      Number of Occurrences: 1      Standing Expiration Date:      Order Specific Question:  Diet:     Answer:  Cardiac [6]     Order Specific Question:  Consistency/Fluid modifications:     Answer:  Thin Liquids [3]      Comments:  meds whole with liquid wash     Medical Decision Making, by Problem:  Active Hospital Problems    Diagnosis   • Back pain [M54.9]   • Depression [F32.9]   • Thoracic spondylosis [M47.814]   • HTN (hypertension) [I10]   • Dyslipidemia [E78.5]   • History of stroke [Z86.73]   • T12 compression fracture (CMS-HCC) [M48.54XA]   • Microcytic anemia [D50.9]   • Heme positive stool [R19.5]   • Dementia [F03.90]     *  B/L LE Pain  MRI thoracic & lumbar: see below  LE arterial dopplers: ok  Pt not interested in any surgeries at this time    *  Hypertension  BP ok  On Lotensin: 40 mg daily  On Lopressor: 50 mg bid --> 25 mg bid --> 12.5 mg bid ()  On Hydralazine: 25 mg tid --> 50 mg tid ()  On Clonidine prn  Note: also on Lasix  Cont to monitor    *  Bradycardia  HR 54-68   Asymptomatic  On Lopressor: 50 mg bid --> 25 mg bid --> 12.5 mg bid ()  Cont to monitor    *  CHF  BNP: 101 (8)  On Lasix: 40 mg daily --> 20 mg daily ()  On  KCL: 20 meq daily --> 10 meq daily (8/13)  S/P KCL 40 meq x 1 extra dose (8/8)  Note: was on Lasix at home  Monitor K+: 3.8 --> 3.5 (8/7) --> 4.0 (8/12)    *  Azotemia  Bun: 20 --> 30 (8/12)  Likely 2nd to diuretics --> decreased dose (8/12)  Encouraging fluid (water/juice) intake  Monitor    *  COPD: hx tobacco abuse  *  Hx CVA  *  Chronic Back Pain: has refused surgery in the past  *  PVD: with hx moderate iliac stenosis in 2009       MRI Thoracic Spine:  1.  Multilevel multifactorial degenerative changes  2.  Prominent posterior epidural fat from L2 through L5 contributes to thecal sac narrowing at these levels  3.  Thecal sac narrowing worst at L2-L3 and L3-L4  4.  Areas of neural foraminal narrowing as described above  5.  Chronic T12 vertebral compression fracture      Labs reviewed and Medications reviewed

## 2017-08-13 NOTE — CARE PLAN
Problem: Safety  Goal: Will remain free from falls  Intervention: Assess risk factors for falls  Patient is AOx3 is confused at times, has good safety awareness and uses the call light when assistance is needed. Patient has call light close by, bed in low position, non-skid socks on.      Problem: Bowel/Gastric:  Goal: Will not experience complications related to bowel motility  Intervention: Assess baseline bowel pattern  Patient encouraged to drink plenty of fluids throughout the day to promote regular bowel movements. Patient educated that movement and exercise also help promote gastric motility.

## 2017-08-13 NOTE — PROGRESS NOTES
DATE OF SERVICE:  08/12/2017    The patient was seen for followup visit.  The patient reported no change since   she was last seen.  Patient has resigned herself to skilled nursing.  She   said she understands the status of continued rehabilitation.  The patient   continues to show significant memory deficits.    This session was devoted again to talking about skilled nursing and the   patient needs to consider she transitions to a less structured program.       ____________________________________     ARNAUD CARCAMO, PHD    BRANDI / YESSICA    DD:  08/12/2017 14:41:24  DT:  08/12/2017 18:38:10    D#:  [id number]  Job#:  [id number]

## 2017-08-14 PROCEDURE — 97110 THERAPEUTIC EXERCISES: CPT

## 2017-08-14 PROCEDURE — 99232 SBSQ HOSP IP/OBS MODERATE 35: CPT | Performed by: HOSPITALIST

## 2017-08-14 PROCEDURE — 700102 HCHG RX REV CODE 250 W/ 637 OVERRIDE(OP): Performed by: PHYSICAL MEDICINE & REHABILITATION

## 2017-08-14 PROCEDURE — 97116 GAIT TRAINING THERAPY: CPT

## 2017-08-14 PROCEDURE — A9270 NON-COVERED ITEM OR SERVICE: HCPCS | Performed by: HOSPITALIST

## 2017-08-14 PROCEDURE — 700101 HCHG RX REV CODE 250: Performed by: PHYSICAL MEDICINE & REHABILITATION

## 2017-08-14 PROCEDURE — A9270 NON-COVERED ITEM OR SERVICE: HCPCS | Performed by: PHYSICAL MEDICINE & REHABILITATION

## 2017-08-14 PROCEDURE — 700102 HCHG RX REV CODE 250 W/ 637 OVERRIDE(OP): Performed by: HOSPITALIST

## 2017-08-14 PROCEDURE — 97535 SELF CARE MNGMENT TRAINING: CPT

## 2017-08-14 PROCEDURE — 94760 N-INVAS EAR/PLS OXIMETRY 1: CPT

## 2017-08-14 PROCEDURE — 99232 SBSQ HOSP IP/OBS MODERATE 35: CPT | Performed by: PHYSICAL MEDICINE & REHABILITATION

## 2017-08-14 PROCEDURE — 770010 HCHG ROOM/CARE - REHAB SEMI PRIVAT*

## 2017-08-14 RX ADMIN — ATORVASTATIN CALCIUM 80 MG: 40 TABLET, FILM COATED ORAL at 19:47

## 2017-08-14 RX ADMIN — FUROSEMIDE 20 MG: 20 TABLET ORAL at 05:22

## 2017-08-14 RX ADMIN — POTASSIUM CHLORIDE 10 MEQ: 1500 TABLET, EXTENDED RELEASE ORAL at 08:18

## 2017-08-14 RX ADMIN — DULOXETINE HYDROCHLORIDE 20 MG: 20 CAPSULE, DELAYED RELEASE ORAL at 08:18

## 2017-08-14 RX ADMIN — GABAPENTIN 300 MG: 300 CAPSULE ORAL at 19:48

## 2017-08-14 RX ADMIN — Medication 2 TABLET: at 19:48

## 2017-08-14 RX ADMIN — METOPROLOL TARTRATE 12.5 MG: 25 TABLET ORAL at 05:22

## 2017-08-14 RX ADMIN — OMEPRAZOLE 20 MG: 20 CAPSULE, DELAYED RELEASE ORAL at 19:48

## 2017-08-14 RX ADMIN — OXYBUTYNIN CHLORIDE 5 MG: 5 TABLET, EXTENDED RELEASE ORAL at 19:47

## 2017-08-14 RX ADMIN — HYDRALAZINE HYDROCHLORIDE 50 MG: 25 TABLET, FILM COATED ORAL at 20:02

## 2017-08-14 RX ADMIN — TRAZODONE HYDROCHLORIDE 50 MG: 50 TABLET ORAL at 19:48

## 2017-08-14 RX ADMIN — NICOTINE 14 MG: 14 PATCH, EXTENDED RELEASE TRANSDERMAL at 05:22

## 2017-08-14 RX ADMIN — OMEPRAZOLE 20 MG: 20 CAPSULE, DELAYED RELEASE ORAL at 08:18

## 2017-08-14 RX ADMIN — LIDOCAINE 1 PATCH: 50 PATCH TOPICAL at 08:18

## 2017-08-14 RX ADMIN — METOPROLOL TARTRATE 12.5 MG: 25 TABLET ORAL at 17:42

## 2017-08-14 RX ADMIN — HYDRALAZINE HYDROCHLORIDE 50 MG: 25 TABLET, FILM COATED ORAL at 14:04

## 2017-08-14 RX ADMIN — ASPIRIN 81 MG: 81 TABLET, COATED ORAL at 08:18

## 2017-08-14 RX ADMIN — HYDRALAZINE HYDROCHLORIDE 50 MG: 25 TABLET, FILM COATED ORAL at 05:22

## 2017-08-14 RX ADMIN — BENAZEPRIL HYDROCHLORIDE 40 MG: 10 TABLET, FILM COATED ORAL at 05:23

## 2017-08-14 ASSESSMENT — ENCOUNTER SYMPTOMS
NAUSEA: 0
FEVER: 0
CHILLS: 0
VOMITING: 0
SHORTNESS OF BREATH: 0
DIARRHEA: 0
ABDOMINAL PAIN: 0
NERVOUS/ANXIOUS: 0

## 2017-08-14 ASSESSMENT — GAIT ASSESSMENTS
DISTANCE (FEET): 125
GAIT LEVEL OF ASSIST: CONTACT GUARD ASSIST
ASSISTIVE DEVICE: FRONT WHEEL WALKER
DEVIATION: BRADYKINETIC;ANTALGIC;SHUFFLED GAIT

## 2017-08-14 ASSESSMENT — PAIN SCALES - GENERAL: PAINLEVEL_OUTOF10: 2

## 2017-08-14 NOTE — CARE PLAN
Problem: Recreation Therapy  Goal: LTG-By discharge, patient will utilize  bilateral UE’s for leisure for 15 minutes with minimal verbal cuing  Outcome: NOT MET

## 2017-08-14 NOTE — CARE PLAN
Problem: PT-Long Term Goals  Goal: LTG-By discharge, patient will propel wheelchair  Independent on unit x 150 ft   Outcome: NOT MET  Distance met, however pt requires SPV for pathfinding and safety  Goal: LTG-By discharge, patient will ambulate  Modified independent with FWW 50 ft x 2   Outcome: NOT MET  CGA to SBA with FWW  Goal: LTG-By discharge, patient will transfer one surface to another  Modified independent with FWW   Outcome: NOT MET  SBA for safety  Goal: LTG-By discharge, patient will transfer in/out of a car  SPV with FWW for safety from ambulatory level   Outcome: MET Date Met:  08/14/17

## 2017-08-14 NOTE — PROGRESS NOTES
"Rehab Progress Note     Chief complaint: none, follow up thoracic spondylosis/compression fractures  Date of Service: 8/14/2017    Interval Events (Subjective)  Patient seen and examined. No acute events overnight. Patient was to be discharged today but [facility] didn't have a bed available. No new complaints  Objective:  VITAL SIGNS: /50 mmHg  Pulse 60  Temp(Src) 36.4 °C (97.5 °F)  Resp 18  Ht 1.575 m (5' 2\")  Wt 85 kg (187 lb 6.3 oz)  BMI 34.27 kg/m2  SpO2 96%  Breastfeeding? No  Gen: alert, no apparent distress  CV: regular rate and rhythm, no murmurs, no peripheral edema  Resp: clear to ascultation bilaterally, normal respiratory effort  GI: soft, non-tender abdomen, bowel sounds present  Neuro: Unchanged  Recent Results (from the past 72 hour(s))   BASIC METABOLIC PANEL    Collection Time: 08/12/17  5:08 AM   Result Value Ref Range    Sodium 142 135 - 145 mmol/L    Potassium 4.0 3.6 - 5.5 mmol/L    Chloride 109 96 - 112 mmol/L    Co2 24 20 - 33 mmol/L    Glucose 95 65 - 99 mg/dL    Bun 30 (H) 8 - 22 mg/dL    Creatinine 1.00 0.50 - 1.40 mg/dL    Calcium 9.3 8.5 - 10.5 mg/dL    Anion Gap 9.0 0.0 - 11.9   MAGNESIUM    Collection Time: 08/12/17  5:08 AM   Result Value Ref Range    Magnesium 2.1 1.5 - 2.5 mg/dL   PHOSPHORUS    Collection Time: 08/12/17  5:08 AM   Result Value Ref Range    Phosphorus 3.1 2.5 - 4.5 mg/dL   ESTIMATED GFR    Collection Time: 08/12/17  5:08 AM   Result Value Ref Range    GFR If African American >60 >60 mL/min/1.73 m 2    GFR If Non African American 54 (A) >60 mL/min/1.73 m 2       Current Facility-Administered Medications   Medication Frequency   • furosemide (LASIX) tablet 20 mg Q DAY   • potassium chloride SA (Kdur) tablet 10 mEq DAILY   • oxybutynin SR (DITROPAN-XL) tablet 5 mg Q EVENING   • hydrALAZINE (APRESOLINE) tablet 50 mg Q8HRS   • metoprolol (LOPRESSOR) tablet 12.5 mg TWICE DAILY   • clonidine (CATAPRES) tablet 0.1 mg Q6HRS PRN   • acetaminophen (TYLENOL) " tablet 650 mg Q4HRS PRN   • gabapentin (NEURONTIN) capsule 300 mg Q EVENING   • vitamin D (Ergocalciferol) (DRISDOL) capsule 50,000 Units Q7 DAYS   • Respiratory Care per Protocol Continuous RT   • Pharmacy Consult Request ...Pain Management Review 1 Each PRN   • oxycodone immediate-release (ROXICODONE) tablet 2.5 mg Q3HRS PRN   • oxycodone immediate-release (ROXICODONE) tablet 5 mg Q3HRS PRN   • lidocaine (LIDODERM) 5 % 1 Patch Q24HR   • aspirin EC (ECOTRIN) tablet 81 mg DAILY   • artificial tears 1.4 % ophthalmic solution 1 Drop PRN   • mag hydrox-al hydrox-simeth (MAALOX PLUS ES or MYLANTA DS) suspension 20 mL Q2HRS PRN   • trazodone (DESYREL) tablet 50 mg QHS PRN   • sodium chloride (OCEAN) 0.65 % nasal spray 2 Spray PRN   • atorvastatin (LIPITOR) tablet 80 mg QHS   • benazepril (LOTENSIN) tablet 40 mg Q DAY   • duloxetine (CYMBALTA) capsule 20 mg DAILY   • ondansetron (ZOFRAN ODT) dispertab 4 mg Q4HRS PRN   • senna-docusate (PERICOLACE or SENOKOT S) 8.6-50 MG per tablet 2 Tab BID    And   • polyethylene glycol/lytes (MIRALAX) PACKET 1 Packet QDAY PRN    And   • magnesium hydroxide (MILK OF MAGNESIA) suspension 30 mL QDAY PRN    And   • bisacodyl (DULCOLAX) suppository 10 mg QDAY PRN   • nicotine (NICODERM) 14 MG/24HR 14 mg Daily-0600   • omeprazole (PRILOSEC) capsule 20 mg BID       Orders Placed This Encounter   Procedures   • DIET ORDER     Standing Status: Standing      Number of Occurrences: 1      Standing Expiration Date:      Order Specific Question:  Diet:     Answer:  Cardiac [6]     Order Specific Question:  Consistency/Fluid modifications:     Answer:  Thin Liquids [3]      Comments:  meds whole with liquid wash       Assessment:  Active Hospital Problems    Diagnosis   • Back pain   • Depression   • Thoracic spondylosis   • HTN (hypertension)   • Dyslipidemia   • History of stroke   • T12 compression fracture (CMS-HCC)   • Microcytic anemia   • Heme positive stool   • Dementia     Dr Miller led and  attended the weekly conference, and agree with the IDT conference documentation and plan of care as noted below.    Date of conference: 8/10/2017    CM/social support: lives alone     Anticipated DC date: 8/14/2017, SNF as transition to group home    Follow up: PCP    Medical Decision Making and Plan:    Labs reviewed. Medications reviewed. Appreciate the assistance of the hospitalist.    T12/L1 chronic compression fractures - Continue pain management with Lidoderm patch and oral pain meds as needed. Pain currently controlled. Spinal precautions.    Thoracic spondylosis with scoliosis, thecal sac narrowing from L2-L5 worst at L2-L3 and L3-L4, L2-L5 multilevel bilateral neural foraminal narrowing - with evidence of radiculopathy on exam with weakness at the bilateral L4 myotomes as well as abnormal sensation on the right L1-L2 dermatome, and L4-S1 dermatomes. Patient does not want surgery. Continue conservative treatment with therapy. Gabapentin for complaints of nerve pain in the right thigh. Increased dose with improved pain control.    Hypertension - Continue Benzapril, furosemide, metoprolol, hydralazine, and clonidine. Hospitalist managing. Monitor.    Coronary artery disease status post stenting - continue aspirin and statin.    Dyslipidemia - continue statin.    Tobacco use - nicotine patch.    Acute on chronic pain - pain meds as needed. lidoderm patch for compression fractures. Gabapentin for nerve pain.    Bladder urgency - UA negative. Trial of ditropan XL 8/10. Monitor for retention.    Dysphagia? - choked on food during dinner. Diet downgraded. Speech consult.    History of GI bleed - with microcytic anemia and positive stool guaiac on acute. IV iron. Apparently last colonoscopy normal. Per hospitalist, needs EGD due to history of Haywood's esophagus. Anemia slightly improved after IV iron.    IV iron infiltration - 8/4. Discontinue infusions - got 2/5. Monitor wound. Ice and elevate.  "Improved.    History of depression/anxiety - continue Cymbalta and as needed Buspar.    Dementia? - likely vascular dementia based on previous imaging from years ago. Speech consult.    Low Vit D - 10, started high dose repletion.    DVT prophylaxis - Discontinued Lovenox as she's ambulating longer distances.    For probable discharge to skilled facility tomorrow    Total time:  >25 minutes.  I spent greater than 50% of the time for patient care and coordination on this date, including unit/floor time, and face-to-face time with the patient as per assessment and plan above.    Jesus Staton M.D.                Rehab Progress Note     Chief complaint: none, follow up thoracic spondylosis/compression fractures  Date of Service: 8/10/2017    Interval Events (Subjective)  Patient seen and examined. No acute events overnight. Initially can't remember who I am. Not too happy about planned discharge next week to SNF, but understands she cannot live safely by her own any longer. Participating well with therapy.     Objective:  VITAL SIGNS: /50 mmHg  Pulse 60  Temp(Src) 36.4 °C (97.5 °F)  Resp 18  Ht 1.575 m (5' 2\")  Wt 85 kg (187 lb 6.3 oz)  BMI 34.27 kg/m2  SpO2 96%  Breastfeeding? No  Gen: alert, no apparent distress  CV: regular rate and rhythm, no murmurs, no peripheral edema  Resp: clear to ascultation bilaterally, normal respiratory effort  GI: soft, non-tender abdomen, bowel sounds present  Neuro: Motor: 5/5 MMT throughout, except for bilateral 4/5 EHL                   Sensory:decreased to light touch in right L1/L2 dermatomes, decreased to pinprick in bilateral L4-S1 dermatomes, decreased vibration in bilateral great toes  DTRs: 2+ in bilateral biceps, triceps, brachioradialis, 3+ in bilateral patella, 2+ at bilateral achilles    Recent Results (from the past 72 hour(s))   BASIC METABOLIC PANEL    Collection Time: 08/12/17  5:08 AM   Result Value Ref Range    Sodium 142 135 - 145 mmol/L    Potassium " 4.0 3.6 - 5.5 mmol/L    Chloride 109 96 - 112 mmol/L    Co2 24 20 - 33 mmol/L    Glucose 95 65 - 99 mg/dL    Bun 30 (H) 8 - 22 mg/dL    Creatinine 1.00 0.50 - 1.40 mg/dL    Calcium 9.3 8.5 - 10.5 mg/dL    Anion Gap 9.0 0.0 - 11.9   MAGNESIUM    Collection Time: 08/12/17  5:08 AM   Result Value Ref Range    Magnesium 2.1 1.5 - 2.5 mg/dL   PHOSPHORUS    Collection Time: 08/12/17  5:08 AM   Result Value Ref Range    Phosphorus 3.1 2.5 - 4.5 mg/dL   ESTIMATED GFR    Collection Time: 08/12/17  5:08 AM   Result Value Ref Range    GFR If African American >60 >60 mL/min/1.73 m 2    GFR If Non African American 54 (A) >60 mL/min/1.73 m 2       Current Facility-Administered Medications   Medication Frequency   • furosemide (LASIX) tablet 20 mg Q DAY   • potassium chloride SA (Kdur) tablet 10 mEq DAILY   • oxybutynin SR (DITROPAN-XL) tablet 5 mg Q EVENING   • hydrALAZINE (APRESOLINE) tablet 50 mg Q8HRS   • metoprolol (LOPRESSOR) tablet 12.5 mg TWICE DAILY   • clonidine (CATAPRES) tablet 0.1 mg Q6HRS PRN   • acetaminophen (TYLENOL) tablet 650 mg Q4HRS PRN   • gabapentin (NEURONTIN) capsule 300 mg Q EVENING   • vitamin D (Ergocalciferol) (DRISDOL) capsule 50,000 Units Q7 DAYS   • Respiratory Care per Protocol Continuous RT   • Pharmacy Consult Request ...Pain Management Review 1 Each PRN   • oxycodone immediate-release (ROXICODONE) tablet 2.5 mg Q3HRS PRN   • oxycodone immediate-release (ROXICODONE) tablet 5 mg Q3HRS PRN   • lidocaine (LIDODERM) 5 % 1 Patch Q24HR   • aspirin EC (ECOTRIN) tablet 81 mg DAILY   • artificial tears 1.4 % ophthalmic solution 1 Drop PRN   • mag hydrox-al hydrox-simeth (MAALOX PLUS ES or MYLANTA DS) suspension 20 mL Q2HRS PRN   • trazodone (DESYREL) tablet 50 mg QHS PRN   • sodium chloride (OCEAN) 0.65 % nasal spray 2 Spray PRN   • atorvastatin (LIPITOR) tablet 80 mg QHS   • benazepril (LOTENSIN) tablet 40 mg Q DAY   • duloxetine (CYMBALTA) capsule 20 mg DAILY   • ondansetron (ZOFRAN ODT) dispertab 4 mg  Q4HRS PRN   • senna-docusate (PERICOLACE or SENOKOT S) 8.6-50 MG per tablet 2 Tab BID    And   • polyethylene glycol/lytes (MIRALAX) PACKET 1 Packet QDAY PRN    And   • magnesium hydroxide (MILK OF MAGNESIA) suspension 30 mL QDAY PRN    And   • bisacodyl (DULCOLAX) suppository 10 mg QDAY PRN   • nicotine (NICODERM) 14 MG/24HR 14 mg Daily-0600   • omeprazole (PRILOSEC) capsule 20 mg BID       Orders Placed This Encounter   Procedures   • DIET ORDER     Standing Status: Standing      Number of Occurrences: 1      Standing Expiration Date:      Order Specific Question:  Diet:     Answer:  Cardiac [6]     Order Specific Question:  Consistency/Fluid modifications:     Answer:  Thin Liquids [3]      Comments:  meds whole with liquid wash       Assessment:  Active Hospital Problems    Diagnosis   • Back pain   • Depression   • Thoracic spondylosis   • HTN (hypertension)   • Dyslipidemia   • History of stroke   • T12 compression fracture (CMS-HCC)   • Microcytic anemia   • Heme positive stool   • Dementia     I led and attended the weekly conference, and agree with the IDT conference documentation and plan of care as noted below.    Date of conference: 8/10/2017    CM/social support: lives alone     Anticipated DC date: 8/14/2017, SNF as transition to group home    Follow up: PCP    Medical Decision Making and Plan:    Labs reviewed. Medications reviewed. Appreciate the assistance of the hospitalist.    T12/L1 chronic compression fractures - Continue pain management with Lidoderm patch and oral pain meds as needed. Pain currently controlled. Spinal precautions.    Thoracic spondylosis with scoliosis, thecal sac narrowing from L2-L5 worst at L2-L3 and L3-L4, L2-L5 multilevel bilateral neural foraminal narrowing - with evidence of radiculopathy on exam with weakness at the bilateral L4 myotomes as well as abnormal sensation on the right L1-L2 dermatome, and L4-S1 dermatomes. Patient does not want surgery. Continue  conservative treatment with therapy. Gabapentin for complaints of nerve pain in the right thigh. Increased dose with improved pain control.    Hypertension - Continue Benzapril, furosemide, metoprolol, hydralazine, and clonidine. Hospitalist managing. Monitor.    Coronary artery disease status post stenting - continue aspirin and statin.    Dyslipidemia - continue statin.    Tobacco use - nicotine patch.    Acute on chronic pain - pain meds as needed. lidoderm patch for compression fractures. Gabapentin for nerve pain.    Bladder urgency - UA negative. Trial of ditropan XL 8/10. Monitor for retention.    Dysphagia? - choked on food during dinner. Diet downgraded. Speech consult.    History of GI bleed - with microcytic anemia and positive stool guaiac on acute. IV iron. Apparently last colonoscopy normal. Per hospitalist, needs EGD due to history of Haywood's esophagus. Anemia slightly improved after IV iron.    IV iron infiltration - 8/4. Discontinue infusions - got 2/5. Monitor wound. Ice and elevate. Improved.    History of depression/anxiety - continue Cymbalta and as needed Buspar.    Dementia? - likely vascular dementia based on previous imaging from years ago. Speech consult.    Low Vit D - 10, started high dose repletion.    DVT prophylaxis - Discontinued Lovenox as she's ambulating longer distances.    Total time:  >25 minutes.  I spent greater than 50% of the time for patient care and coordination on this date, including unit/floor time, and face-to-face time with the patient as per assessment and plan above.    Jesus Staton M.D.

## 2017-08-14 NOTE — CARE PLAN
Problem: PT-Long Term Goals  Goal: LTG-By discharge, patient will ambulate up/down 4-6 stairs  CGA with hand rails   Outcome: MET Date Met:  08/14/17  2x6 adaptive steps with B HRs, CGA, step-to pattern

## 2017-08-14 NOTE — CARE PLAN
Problem: Safety  Goal: Will remain free from falls  Intervention: Assess risk factors for falls  Patient is AOx3 can be confused at times. Patient has good safety awareness and uses the call light when assistance is needed. Patient has the call light close by, bed in low position, alarms are set on bed and wheel chair. Patient has on non-skid socks.      Problem: Bowel/Gastric:  Goal: Will not experience complications related to bowel motility  Intervention: Assess baseline bowel pattern  Patient encouraged to drink plenty of fluids to promote regular bowel movements. Patient educated that movement and exercise also help promote gastric motility

## 2017-08-14 NOTE — DISCHARGE PLANNING
Per Mami at [facility], they have no bed available today.  Will possibly have one tomorrow.  CM notified.

## 2017-08-14 NOTE — PROGRESS NOTES
Hospital Medicine Progress Note, Adult, Complex               Author: Luc Osei Date & Time created: 8/14/2017  8:40 AM     Interval History:  No significant events or changes since last visit  Patient denies SOB, cough, or diarrhea  Patient slept ok last night    Chief Complaint:  Hypertension  Bradycardia      Review of Systems:  Review of Systems   Constitutional: Negative for fever and chills.   Respiratory: Negative for shortness of breath.    Cardiovascular: Negative for chest pain.   Gastrointestinal: Negative for nausea, vomiting, abdominal pain and diarrhea.   Psychiatric/Behavioral: The patient is not nervous/anxious.        Physical Exam:  Physical Exam   Constitutional: She is oriented to person, place, and time. She appears well-nourished.   HENT:   Head: Atraumatic.   Eyes: Conjunctivae are normal. Pupils are equal, round, and reactive to light.   Neck: Normal range of motion. Neck supple.   Cardiovascular: Normal rate, regular rhythm, S1 normal and S2 normal.    No murmur heard.  Pulmonary/Chest: Effort normal. She has no wheezes. She has no rales.   Abdominal: Soft. She exhibits no distension. There is no tenderness. Hernia confirmed negative in the right inguinal area and confirmed negative in the left inguinal area.   Musculoskeletal: She exhibits no edema.   Neurological: She is alert and oriented to person, place, and time. No sensory deficit.   Skin: Skin is warm and dry. No rash noted. No cyanosis.   Psychiatric: She has a normal mood and affect. Her behavior is normal.   Nursing note and vitals reviewed.      Labs:        Invalid input(s): [id number]      Recent Labs      08/12/17   0508   SODIUM  142   POTASSIUM  4.0   CHLORIDE  109   CO2  24   BUN  30*   CREATININE  1.00   MAGNESIUM  2.1   PHOSPHORUS  3.1   CALCIUM  9.3     Recent Labs      08/12/17   0508   GLUCOSE  95     No results for input(s): RBC, HEMOGLOBIN, HEMATOCRIT, PLATELETCT, PROTHROMBTM, APTT, INR, IRON, FERRITIN,  [facility] in the last 72 hours.            Hemodynamics:  Temp (24hrs), Av.4 °C (97.5 °F), Min:36.3 °C (97.4 °F), Max:36.5 °C (97.7 °F)  Temperature: 36.4 °C (97.5 °F)  Pulse  Av.4  Min: 52  Max: 84   Blood Pressure : 153/65 mmHg     Respiratory:    Respiration: 17, Pulse Oximetry: 94 %, O2 Daily Delivery Respiratory : Room Air with O2 Available     Work Of Breathing / Effort: Mild  RUL Breath Sounds: Clear, RML Breath Sounds: Clear;Diminished, RLL Breath Sounds: Clear;Diminished, ARGELIA Breath Sounds: Clear;Diminished, LLL Breath Sounds: Diminished;Clear  Fluids:    Intake/Output Summary (Last 24 hours) at 17 0840  Last data filed at 17 0500   Gross per 24 hour   Intake   1440 ml   Output      0 ml   Net   1440 ml        GI/Nutrition:  Orders Placed This Encounter   Procedures   • DIET ORDER     Standing Status: Standing      Number of Occurrences: 1      Standing Expiration Date:      Order Specific Question:  Diet:     Answer:  Cardiac [6]     Order Specific Question:  Consistency/Fluid modifications:     Answer:  Thin Liquids [3]      Comments:  meds whole with liquid wash     Medical Decision Making, by Problem:  Active Hospital Problems    Diagnosis   • Back pain [M54.9]   • Depression [F32.9]   • Thoracic spondylosis [M47.814]   • HTN (hypertension) [I10]   • Dyslipidemia [E78.5]   • History of stroke [Z86.73]   • T12 compression fracture (CMS-HCC) [M48.54XA]   • Microcytic anemia [D50.9]   • Heme positive stool [R19.5]   • Dementia [F03.90]     *  B/L LE Pain  MRI thoracic & lumbar: see below  LE arterial dopplers: ok  Pt not interested in any surgeries at this time    *  Hypertension  BP ok  On Lotensin: 40 mg daily  On Lopressor: 50 mg bid --> 25 mg bid --> 12.5 mg bid ()  On Hydralazine: 25 mg tid --> 50 mg tid ()  On Clonidine prn  Note: also on Lasix  Cont to monitor    *  Bradycardia  HR better recently: 58-70   Asymptomatic  On Lopressor: 50 mg bid --> 25 mg bid --> 12.5 mg bid  (8/9)  Cont to monitor    *  CHF  BNP: 101 (8/2)  On Lasix: 40 mg daily --> 20 mg daily (8/13)  On KCL: 20 meq daily --> 10 meq daily (8/13)  S/P KCL 40 meq x 1 extra dose (8/8)  Note: was on Lasix at home  Monitor K+: 3.8 --> 3.5 (8/7) --> 4.0 (8/12)    *  Azotemia  Bun: 20 --> 30 (8/12)  Likely 2nd to diuretics --> decreased dose (8/12)  Encouraging fluid (water/juice) intake  Monitor    *  COPD: hx tobacco abuse  *  Hx CVA  *  Chronic Back Pain: has refused surgery in the past  *  PVD: with hx moderate iliac stenosis in 2009       MRI Thoracic Spine:  1.  Multilevel multifactorial degenerative changes  2.  Prominent posterior epidural fat from L2 through L5 contributes to thecal sac narrowing at these levels  3.  Thecal sac narrowing worst at L2-L3 and L3-L4  4.  Areas of neural foraminal narrowing as described above  5.  Chronic T12 vertebral compression fracture      Labs reviewed and Medications reviewed

## 2017-08-14 NOTE — CARE PLAN
Problem: Safety  Goal: Will remain free from injury  Outcome: PROGRESSING AS EXPECTED  Pt. Uses call light within reach and waits for assistance, no impulsiveness noted so far. 3 side rails up and hourly rounding continue for safety.    Problem: Pain Management  Goal: Pain level will decrease to patient’s comfort goal  Outcome: PROGRESSING AS EXPECTED  Pt. Resting comfortably at this time, denies pain when assessed but request sleeping aid tonight. Pt. Is kind anxious due to being discharge julianna but nurse assured pt. Repositioned self in bed for comfort.

## 2017-08-14 NOTE — CARE PLAN
Problem: Recreation Therapy  Goal: LTG-By discharge, patient will attend to  Leisure task for 15 minutes with minimal verbal cuing   Outcome: NOT MET

## 2017-08-15 VITALS
HEIGHT: 62 IN | WEIGHT: 187.39 LBS | BODY MASS INDEX: 34.48 KG/M2 | RESPIRATION RATE: 18 BRPM | SYSTOLIC BLOOD PRESSURE: 118 MMHG | TEMPERATURE: 98.4 F | DIASTOLIC BLOOD PRESSURE: 76 MMHG | HEART RATE: 62 BPM | OXYGEN SATURATION: 99 %

## 2017-08-15 PROCEDURE — 700102 HCHG RX REV CODE 250 W/ 637 OVERRIDE(OP): Performed by: HOSPITALIST

## 2017-08-15 PROCEDURE — 94760 N-INVAS EAR/PLS OXIMETRY 1: CPT

## 2017-08-15 PROCEDURE — 97530 THERAPEUTIC ACTIVITIES: CPT

## 2017-08-15 PROCEDURE — 99231 SBSQ HOSP IP/OBS SF/LOW 25: CPT | Performed by: HOSPITALIST

## 2017-08-15 PROCEDURE — 97116 GAIT TRAINING THERAPY: CPT

## 2017-08-15 PROCEDURE — A9270 NON-COVERED ITEM OR SERVICE: HCPCS | Performed by: PHYSICAL MEDICINE & REHABILITATION

## 2017-08-15 PROCEDURE — 97110 THERAPEUTIC EXERCISES: CPT

## 2017-08-15 PROCEDURE — 700102 HCHG RX REV CODE 250 W/ 637 OVERRIDE(OP): Performed by: PHYSICAL MEDICINE & REHABILITATION

## 2017-08-15 PROCEDURE — 99239 HOSP IP/OBS DSCHRG MGMT >30: CPT | Performed by: PHYSICAL MEDICINE & REHABILITATION

## 2017-08-15 PROCEDURE — A9270 NON-COVERED ITEM OR SERVICE: HCPCS | Performed by: HOSPITALIST

## 2017-08-15 PROCEDURE — 700101 HCHG RX REV CODE 250: Performed by: PHYSICAL MEDICINE & REHABILITATION

## 2017-08-15 RX ORDER — DULOXETIN HYDROCHLORIDE 20 MG/1
20 CAPSULE, DELAYED RELEASE ORAL DAILY
Qty: 30 CAP | Status: ON HOLD
Start: 2017-08-15 | End: 2018-01-02

## 2017-08-15 RX ORDER — OXYBUTYNIN CHLORIDE 5 MG/1
5 TABLET, EXTENDED RELEASE ORAL EVERY EVENING
Qty: 30 TAB | Status: ON HOLD
Start: 2017-08-15 | End: 2018-01-02

## 2017-08-15 RX ORDER — AMOXICILLIN 250 MG
2 CAPSULE ORAL 2 TIMES DAILY
Qty: 30 TAB | Refills: 0 | Status: ON HOLD
Start: 2017-08-15 | End: 2018-01-02

## 2017-08-15 RX ORDER — LIDOCAINE 50 MG/G
1 PATCH TOPICAL EVERY 24 HOURS
Qty: 10 PATCH | Status: ON HOLD
Start: 2017-08-15 | End: 2018-01-02

## 2017-08-15 RX ORDER — ACETAMINOPHEN 325 MG/1
650 TABLET ORAL EVERY 4 HOURS PRN
Qty: 30 TAB | Refills: 0 | Status: ON HOLD
Start: 2017-08-15 | End: 2018-01-02

## 2017-08-15 RX ORDER — TRAZODONE HYDROCHLORIDE 50 MG/1
50 TABLET ORAL
Qty: 30 TAB | Refills: 0 | Status: ON HOLD
Start: 2017-08-15 | End: 2018-01-02

## 2017-08-15 RX ORDER — GABAPENTIN 300 MG/1
300 CAPSULE ORAL EVERY EVENING
Qty: 90 CAP | Status: ON HOLD
Start: 2017-08-15 | End: 2018-01-02

## 2017-08-15 RX ORDER — POTASSIUM CHLORIDE 750 MG/1
10 TABLET, EXTENDED RELEASE ORAL DAILY
Qty: 60 TAB | Refills: 0 | Status: ON HOLD
Start: 2017-08-15 | End: 2018-01-02

## 2017-08-15 RX ORDER — BENAZEPRIL HYDROCHLORIDE 40 MG/1
40 TABLET ORAL DAILY
Qty: 30 TAB | Status: ON HOLD
Start: 2017-08-15 | End: 2018-01-02

## 2017-08-15 RX ORDER — ASPIRIN 81 MG/1
81 TABLET ORAL DAILY
Qty: 30 TAB | Status: ON HOLD
Start: 2017-08-15 | End: 2018-01-02

## 2017-08-15 RX ORDER — FUROSEMIDE 20 MG/1
20 TABLET ORAL DAILY
Qty: 60 TAB | Status: ON HOLD
Start: 2017-08-15 | End: 2018-01-02

## 2017-08-15 RX ORDER — NICOTINE 21 MG/24HR
1 PATCH, TRANSDERMAL 24 HOURS TRANSDERMAL EVERY 24 HOURS
Qty: 30 PATCH | Status: ON HOLD
Start: 2017-08-15 | End: 2018-01-02

## 2017-08-15 RX ORDER — ATORVASTATIN CALCIUM 80 MG/1
80 TABLET, FILM COATED ORAL
Qty: 30 TAB | Status: ON HOLD
Start: 2017-08-15 | End: 2018-01-02

## 2017-08-15 RX ORDER — HYDRALAZINE HYDROCHLORIDE 50 MG/1
50 TABLET, FILM COATED ORAL EVERY 8 HOURS
Qty: 90 TAB | Status: ON HOLD
Start: 2017-08-15 | End: 2018-01-02

## 2017-08-15 RX ORDER — ALUMINA, MAGNESIA, AND SIMETHICONE 2400; 2400; 240 MG/30ML; MG/30ML; MG/30ML
20 SUSPENSION ORAL
Qty: 560 ML | Status: ON HOLD
Start: 2017-08-15 | End: 2018-01-02

## 2017-08-15 RX ORDER — OMEPRAZOLE 20 MG/1
20 CAPSULE, DELAYED RELEASE ORAL 2 TIMES DAILY
Qty: 30 CAP | Status: ON HOLD
Start: 2017-08-15 | End: 2018-01-02

## 2017-08-15 RX ORDER — ERGOCALCIFEROL 1.25 MG/1
50000 CAPSULE ORAL
Qty: 30 CAP | Status: ON HOLD
Start: 2017-08-15 | End: 2018-01-02

## 2017-08-15 RX ADMIN — POTASSIUM CHLORIDE 10 MEQ: 1500 TABLET, EXTENDED RELEASE ORAL at 08:20

## 2017-08-15 RX ADMIN — ACETAMINOPHEN 650 MG: 325 TABLET, FILM COATED ORAL at 04:55

## 2017-08-15 RX ADMIN — DULOXETINE HYDROCHLORIDE 20 MG: 20 CAPSULE, DELAYED RELEASE ORAL at 08:22

## 2017-08-15 RX ADMIN — OMEPRAZOLE 20 MG: 20 CAPSULE, DELAYED RELEASE ORAL at 08:22

## 2017-08-15 RX ADMIN — LIDOCAINE 1 PATCH: 50 PATCH TOPICAL at 08:22

## 2017-08-15 RX ADMIN — Medication 2 TABLET: at 08:19

## 2017-08-15 RX ADMIN — FUROSEMIDE 20 MG: 20 TABLET ORAL at 04:56

## 2017-08-15 RX ADMIN — METOPROLOL TARTRATE 12.5 MG: 25 TABLET ORAL at 04:56

## 2017-08-15 RX ADMIN — BENAZEPRIL HYDROCHLORIDE 40 MG: 10 TABLET, FILM COATED ORAL at 04:56

## 2017-08-15 RX ADMIN — HYDRALAZINE HYDROCHLORIDE 50 MG: 25 TABLET, FILM COATED ORAL at 14:18

## 2017-08-15 RX ADMIN — ASPIRIN 81 MG: 81 TABLET, COATED ORAL at 08:22

## 2017-08-15 RX ADMIN — HYDRALAZINE HYDROCHLORIDE 50 MG: 25 TABLET, FILM COATED ORAL at 04:55

## 2017-08-15 RX ADMIN — NICOTINE 14 MG: 14 PATCH, EXTENDED RELEASE TRANSDERMAL at 04:57

## 2017-08-15 ASSESSMENT — ENCOUNTER SYMPTOMS
VOMITING: 0
WEIGHT LOSS: 0
HEARTBURN: 0
MYALGIAS: 0
DEPRESSION: 0
NECK PAIN: 0
HEADACHES: 0
NAUSEA: 0
FEVER: 0
DOUBLE VISION: 0
CHILLS: 0
DIZZINESS: 0

## 2017-08-15 ASSESSMENT — PAIN SCALES - GENERAL
PAINLEVEL_OUTOF10: 0
PAINLEVEL_OUTOF10: 5

## 2017-08-15 NOTE — DISCHARGE PLANNING
08/13/17  1600   Discharge Instructions - Completed by Case Mgmt     Discharge Location   Skilled Nursing Facility     Agency Name / Address / Phone   [facility] [place] at [phone], [address] [place] [address]       Medical Provider / Phone   No additional equipment ordered.         Follow-up With Details Why Contact Info   Bernabe Hopkins D.O. (Primary Care) Call for appointment after skilled rehab (records will be sent) [address]  [address]  [place] [address]  [phone]

## 2017-08-15 NOTE — DISCHARGE INSTRUCTIONS
[facility] NURSING DISCHARGE INSTRUCTIONS    Blood Pressure : 157/69 mmHg  Weight: 85 kg (187 lb 6.3 oz)  Nursing recommendations for Maria Esther Valencia at time of discharge are as follows:  Client verbalized understanding of all discharge instructions and prescriptions.     Review all your home medications and newly ordered medications with your doctor and/or pharmacist. Follow medication instructions as directed by your doctor and/or pharmacist.    Pain Management:   Discharge Pain Medication Instructions:  Comfort Goal: 0, Comfort at Rest  Notify your primary care provider if pain is unrelieved with these measures, if the pain is new, or increased in intensity.    Discharge Skin Characteristics: Warm, Dry  Discharge Skin Exam: Other (Comments)     Skin / Wound Care Instructions: Please contact your primary care physician for any change in skin integrity.    If You Have Surgical Incisions / Wounds:  Monitor surgical site(s) for signs of increased swelling, redness or symptoms of drainage from the site or fever as this could indicate signs and symptoms of infection. If these symptoms are noted, notifiy your primary care provider.      Discharge Safety Instructions: Should Have ADULT SUPERVISION     Discharge Safety Concerns: Impaired Judgement, Unsteady Gait, Weakness  The interdisciplinary team has made recommendation that you should have adult supervision in the house due to impaired judgment  Anti-embolic stockings are required during the day and off at night to increase circulation to the lower extremities.    Discharge Diet: cardiac      Discharge Liquids: thin  Discharge Bowel Function: Continent  Please contact your primary care physician for any changes in bowel habits.  Discharge Bowel Program:    Discharge Bladder Function: Incontinent  Discharge Urinary Devices: Other (Comments) (diaper)      Nursing Discharge Plan:   Smoking Cessation Offered: Patient Refused  Influenza Vaccine  Indication: Not indicated: Previously immunized this influenza season and > 8 years of age    Case Management Discharge Instructions:   Discharge Location: Skilled Nursing Facility  Agency Name/Address/Phone: [facility]  Home Health:    Outpatient Services:    DME Provider/Phone:    Medical Equipment Ordered:    Prescription Faxed to:        Discharge Medication Instructions:  Below are the medications your physician expects you to take upon discharge:    Hypertension  Hypertension is another name for high blood pressure. High blood pressure forces your heart to work harder to pump blood. A blood pressure reading has two numbers, which includes a higher number over a lower number (example: 110/72).  HOME CARE   · Have your blood pressure rechecked by your doctor.  · Only take medicine as told by your doctor. Follow the directions carefully. The medicine does not work as well if you skip doses. Skipping doses also puts you at risk for problems.  · Do not smoke.  · Monitor your blood pressure at home as told by your doctor.  GET HELP IF:  · You think you are having a reaction to the medicine you are taking.  · You have repeat headaches or feel dizzy.  · You have puffiness (swelling) in your ankles.  · You have trouble with your vision.  GET HELP RIGHT AWAY IF:   · You get a very bad headache and are confused.  · You feel weak, numb, or faint.  · You get chest or belly (abdominal) pain.  · You throw up (vomit).  · You cannot breathe very well.  MAKE SURE YOU:   · Understand these instructions.  · Will watch your condition.  · Will get help right away if you are not doing well or get worse.     This information is not intended to replace advice given to you by your health care provider. Make sure you discuss any questions you have with your health care provider.       Fall Prevention in the Home   Falls can cause injuries and can affect people from all age groups. There are many simple things that you can do to make  your home safe and to help prevent falls.  WHAT CAN I DO ON THE OUTSIDE OF MY HOME?  · Regularly repair the edges of walkways and driveways and fix any cracks.  · Remove high doorway thresholds.  · Trim any shrubbery on the main path into your home.  · Use bright outdoor lighting.  · Clear walkways of debris and clutter, including tools and rocks.  · Regularly check that handrails are securely fastened and in good repair. Both sides of any steps should have handrails.  · Install guardrails along the edges of any raised decks or porches.  · Have leaves, snow, and ice cleared regularly.  · Use sand or salt on walkways during winter months.  · In the garage, clean up any spills right away, including grease or oil spills.  WHAT CAN I DO IN THE BATHROOM?  · Use night lights.  · Install grab bars by the toilet and in the tub and shower. Do not use towel bars as grab bars.  · Use non-skid mats or decals on the floor of the tub or shower.  · If you need to sit down while you are in the shower, use a plastic, non-slip stool..  · Keep the floor dry. Immediately clean up any water that spills on the floor.  · Remove soap buildup in the tub or shower on a regular basis.  · Attach bath mats securely with double-sided non-slip rug tape.  · Remove throw rugs and other tripping hazards from the floor.  WHAT CAN I DO IN THE BEDROOM?  · Use night lights.  · Make sure that a bedside light is easy to reach.  · Do not use oversized bedding that drapes onto the floor.  · Have a firm chair that has side arms to use for getting dressed.  · Remove throw rugs and other tripping hazards from the floor.  WHAT CAN I DO IN THE KITCHEN?   · Clean up any spills right away.  · Avoid walking on wet floors.  · Place frequently used items in easy-to-reach places.  · If you need to reach for something above you, use a sturdy step stool that has a grab bar.  · Keep electrical cables out of the way.  · Do not use floor polish or wax that makes floors  slippery. If you have to use wax, make sure that it is non-skid floor wax.  · Remove throw rugs and other tripping hazards from the floor.  WHAT CAN I DO IN THE STAIRWAYS?  · Do not leave any items on the stairs.  · Make sure that there are handrails on both sides of the stairs. Fix handrails that are broken or loose. Make sure that handrails are as long as the stairways.  · Check any carpeting to make sure that it is firmly attached to the stairs. Fix any carpet that is loose or worn.  · Avoid having throw rugs at the top or bottom of stairways, or secure the rugs with carpet tape to prevent them from moving.  · Make sure that you have a light switch at the top of the stairs and the bottom of the stairs. If you do not have them, have them installed.  WHAT ARE SOME OTHER FALL PREVENTION TIPS?  · Wear closed-toe shoes that fit well and support your feet. Wear shoes that have rubber soles or low heels.  · When you use a stepladder, make sure that it is completely opened and that the sides are firmly locked. Have someone hold the ladder while you are using it. Do not climb a closed stepladder.  · Add color or contrast paint or tape to grab bars and handrails in your home. Place contrasting color strips on the first and last steps.  · Use mobility aids as needed, such as canes, walkers, scooters, and crutches.  · Turn on lights if it is dark. Replace any light bulbs that burn out.  · Set up furniture so that there are clear paths. Keep the furniture in the same spot.  · Fix any uneven floor surfaces.  · Choose a carpet design that does not hide the edge of steps of a stairway.  · Be aware of any and all pets.  · Review your medicines with your healthcare provider. Some medicines can cause dizziness or changes in blood pressure, which increase your risk of falling.  Talk with your health care provider about other ways that you can decrease your risk of falls. This may include working with a physical therapist or   to improve your strength, balance, and endurance.     This information is not intended to replace advice given to you by your health care provider. Make sure you discuss any questions you have with your health care provider.     Document Released: 12/08/2003 Document Revised: 05/03/2016 Document Reviewed: 01/22/2016  [employer] Interactive Patient Education ©2016 [employer]  How to Take a Pulse  Your pulse is the increase in pressure inside the blood vessels that carry blood from your heart to the rest of your body (arteries). Every time your heart beats, you can feel your pulse in an artery near the surface of your skin.  You can easily feel your pulse in the artery in your wrist (radial artery) and in the artery in your neck (carotid artery). Learning to take your pulse can tell you how fast your heart is beating and whether it has a normal rhythm. You can also tell whether your heart is beating strongly or weakly.  WHAT YOU NEED TO KNOW ABOUT PULSE RATES   Your pulse is the same as your heart rate. Both are measured in beats per minute (bpm). A normal resting heart rate varies depending on a person's age.   Infants under 1 year of age: Normal heart rate of 100-160 bpm.  Children 1-2 years of age: Normal heart rate of  bpm.  Children 2-5 years of age: Normal heart rate of  bpm.  Children 6-12 years of age: Normal heart rate of  bpm.  Everyone over 12 years of age: Normal heart rate of  bpm.  There can be a lot of variation in your pulse. It can be different depending on the time of day or the amount of exercise you get. It changes with your fitness level. Many things can change the speed and regularity of your pulse. These include:  Exercise.  Fever.  Stress.  Heart problems.  Poor circulation.  Medicines.  HOW TO TAKE YOUR PULSE  To take your pulse, all you need is a digital stopwatch or a clock or watch with a second hand. The best time to measure your resting pulse is in the morning  before you start moving around. Take it as soon as you wake up or after resting for about 10 minutes. There are no firm rules about how often to check your pulse. In general, it is a good idea to check your pulse at least once a month. Measuring your pulse is a good way to check your heart health.  Checking your pulse before and after exercise can tell you if you are getting the right amount of exercise. This is called finding your target heart rate. Your target heart rate depends on your age, fitness, and health. Ask your health care provider what is a safe target heart rate for you during exercise.  Radial Pulse  To check the pulse in your radial artery:  Turn one hand palm up and relax your arm.  Place the first two fingers of your opposite hand gently over your wrist, just below the base of your thumb.  Place your fingertips just inside the bone that runs along the outside of your arm.  Slowly increase pressure until you feel a pulsing beneath your fingers. You may need to move your fingers slightly.  Do not press too hard. Too much pressure may cut off blood supply.  Count how many pulse beats you feel in 1 minute. You can also count for 30 seconds and double the number.  Pay attention to the rhythm of the pulse. It should be steady and even.  Carotid Pulse  To check the pulse in your carotid artery:  Place two fingers just to one side of your Albin's apple so that you feel a pulsing beneath your fingers.  Do not press too hard. Too much pressure may cut off blood supply and can make you dizzy.  Count how many pulse beats you feel in 1 minute. You can also count for 30 seconds and double the number.  Pay attention to the rhythm of the pulse. It should be steady and even.  SEEK MEDICAL CARE IF:   Your pulse is too slow or too fast.  Your pulse is weak or hard to find.  You have skipped beats or extra beats.  Your pulse has an irregular rhythm.  You have an abnormal pulse along with dizziness, fatigue, or  "shortness of breath.     This information is not intended to replace advice given to you by your health care provider. Make sure you discuss any questions you have with your health care provider.     Document Released: 06/23/2004 Document Revised: 01/08/2016 Document Reviewed: 11/21/2014  [employer] Interactive Patient Education ©2016 [employer]  How to Take Your Blood Pressure  HOW DO I GET A BLOOD PRESSURE MACHINE?  · You can buy an electronic home blood pressure machine at your local pharmacy. Insurance will sometimes cover the cost if you have a prescription.  · Ask your doctor what type of machine is best for you. There are different machines for your arm and your wrist.  · If you decide to buy a machine to check your blood pressure on your arm, first check the size of your arm so you can buy the right size cuff. To check the size of your arm:    · Use a measuring tape that shows both inches and centimeters.    · Wrap the measuring tape around the upper-middle part of your arm. You may need someone to help you measure.    · Write down your arm measurement in both inches and centimeters.    · To measure your blood pressure correctly, it is important to have the right size cuff.    · If your arm is up to 13 inches (up to 34 centimeters), get an adult cuff size.  · If your arm is 13 to 17 inches (35 to 44 centimeters), get a large adult cuff size.    ·  If your arm is 17 to 20 inches (45 to 52 centimeters), get an adult thigh cuff.    WHAT DO THE NUMBERS MEAN?   · There are two numbers that make up your blood pressure. For example: 120/80.  · The first number (120 in our example) is called the \"systolic pressure.\" It is a measure of the pressure in your blood vessels when your heart is pumping blood.  · The second number (80 in our example) is called the \"diastolic pressure.\" It is a measure of the pressure in your blood vessels when your heart is resting between beats.  · Your doctor will tell you what your " blood pressure should be.  WHAT SHOULD I DO BEFORE I CHECK MY BLOOD PRESSURE?   · Try to rest or relax for at least 30 minutes before you check your blood pressure.  · Do not smoke.  · Do not have any drinks with caffeine, such as:  · Soda.  · Coffee.  · Tea.  · Check your blood pressure in a quiet room.  · Sit down and stretch out your arm on a table. Keep your arm at about the level of your heart. Let your arm relax.  · Make sure that your legs are not crossed.  HOW DO I CHECK MY BLOOD PRESSURE?  · Follow the directions that came with your machine.  · Make sure you remove any tight-fitting clothing from your arm or wrist. Wrap the cuff around your upper arm or wrist. You should be able to fit a finger between the cuff and your arm. If you cannot fit a finger between the cuff and your arm, it is too tight and should be removed and rewrapped.  · Some units require you to manually pump up the arm cuff.  · Automatic units inflate the cuff when you press a button.  · Cuff deflation is automatic in both models.  · After the cuff is inflated, the unit measures your blood pressure and pulse. The readings are shown on a monitor. Hold still and breathe normally while the cuff is inflated.  · Getting a reading takes less than a minute.  · Some models store readings in a memory. Some provide a printout of readings. If your machine does not store your readings, keep a written record.  · Take readings with you to your next visit with your doctor.     This information is not intended to replace advice given to you by your health care provider. Make sure you discuss any questions you have with your health care provider.     Document Released: 11/30/2009 Document Revised: 01/08/2016 Document Reviewed: 02/12/2015  [employer] Interactive Patient Education ©2016 [employer]    Stroke Prevention  Some health problems and behaviors may make it more likely for you to have a stroke. Below are ways to lessen your risk of having a stroke.    · Be active for at least 30 minutes on most or all days.  · Do not smoke. Try not to be around others who smoke.  · Do not drink too much alcohol.  · Do not have more than 2 drinks a day if you are a man.  · Do not have more than 1 drink a day if you are a woman and are not pregnant.  · Eat healthy foods, such as fruits and vegetables. If you were put on a specific diet, follow the diet as told.  · Keep your cholesterol levels under control through diet and medicines. Look for foods that are low in saturated fat, trans fat, cholesterol, and are high in fiber.  · If you have diabetes, follow all diet plans and take your medicine as told.  · Ask your doctor if you need treatment to lower your blood pressure. If you have high blood pressure (hypertension), follow all diet plans and take your medicine as told by your doctor.  · If you are 18-39 years old, have your blood pressure checked every 3-5 years. If you are age 40 or older, have your blood pressure checked every year.  · Keep a healthy weight. Eat foods that are low in calories, salt, saturated fat, trans fat, and cholesterol.  · Do not take drugs.  · Avoid birth control pills, if this applies. Talk to your doctor about the risks of taking birth control pills.  · Talk to your doctor if you have sleep problems (sleep apnea).  · Take all medicine as told by your doctor.  · You may be told to take aspirin or blood thinner medicine. Take this medicine as told by your doctor.  · Understand your medicine instructions.  · Make sure any other conditions you have are being taken care of.  GET HELP RIGHT AWAY IF:  · You suddenly lose feeling (you feel numb) or have weakness in your face, arm, or leg.  · Your face or eyelid hangs down to one side.  · You suddenly feel confused.  · You have trouble talking (aphasia) or understanding what people are saying.  · You suddenly have trouble seeing in one or both eyes.  · You suddenly have trouble walking.  · You are  dizzy.  · You lose your balance or your movements are clumsy (uncoordinated).  · You suddenly have a very bad headache and you do not know the cause.  · You have new chest pain.  · Your heart feels like it is fluttering or skipping a beat (irregular heartbeat).  Do not wait to see if the symptoms above go away. Get help right away. Call your local emergency services (911 in [place] Do not drive yourself to the hospital.     This information is not intended to replace advice given to you by your health care provider. Make sure you discuss any questions you have with your health care provider.     Smoking Cessation, Tips for Success  If you are ready to quit smoking, congratulations! You have chosen to help yourself be healthier. Cigarettes bring nicotine, tar, carbon monoxide, and other irritants into your body. Your lungs, heart, and blood vessels will be able to work better without these poisons. There are many different ways to quit smoking. Nicotine gum, nicotine patches, a nicotine inhaler, or nicotine nasal spray can help with physical craving. Hypnosis, support groups, and medicines help break the habit of smoking.  WHAT THINGS CAN I DO TO MAKE QUITTING EASIER?   Here are some tips to help you quit for good:  · Pick a date when you will quit smoking completely. Tell all of your friends and family about your plan to quit on that date.  · Do not try to slowly cut down on the number of cigarettes you are smoking. Pick a quit date and quit smoking completely starting on that day.  · Throw away all cigarettes.    · Clean and remove all ashtrays from your home, work, and car.  · On a card, write down your reasons for quitting. Carry the card with you and read it when you get the urge to smoke.  · Cleanse your body of nicotine. Drink enough water and fluids to keep your urine clear or pale yellow. Do this after quitting to flush the nicotine from your body.  · Learn to predict your moods. Do not let a bad situation be  "your excuse to have a cigarette. Some situations in your life might tempt you into wanting a cigarette.  · Never have \"just one\" cigarette. It leads to wanting another and another. Remind yourself of your decision to quit.  · Change habits associated with smoking. If you smoked while driving or when feeling stressed, try other activities to replace smoking. Stand up when drinking your coffee. Brush your teeth after eating. Sit in a different chair when you read the paper. Avoid alcohol while trying to quit, and try to drink fewer caffeinated beverages. Alcohol and caffeine may urge you to smoke.  · Avoid foods and drinks that can trigger a desire to smoke, such as sugary or spicy foods and alcohol.  · Ask people who smoke not to smoke around you.  · Have something planned to do right after eating or having a cup of coffee. For example, plan to take a walk or exercise.  · Try a relaxation exercise to calm you down and decrease your stress. Remember, you may be tense and nervous for the first 2 weeks after you quit, but this will pass.  · Find new activities to keep your hands busy. Play with a pen, coin, or rubber band. Doodle or draw things on paper.  · Brush your teeth right after eating. This will help cut down on the craving for the taste of tobacco after meals. You can also try mouthwash.    · Use oral substitutes in place of cigarettes. Try using lemon drops, carrots, cinnamon sticks, or chewing gum. Keep them handy so they are available when you have the urge to smoke.  · When you have the urge to smoke, try deep breathing.  · Designate your home as a nonsmoking area.  · If you are a heavy smoker, ask your health care provider about a prescription for nicotine chewing gum. It can ease your withdrawal from nicotine.  · Reward yourself. Set aside the cigarette money you save and buy yourself something nice.  · Look for support from others. Join a support group or smoking cessation program. Ask someone at home or " "at work to help you with your plan to quit smoking.  · Always ask yourself, \"Do I need this cigarette or is this just a reflex?\" Tell yourself, \"Today, I choose not to smoke,\" or \"I do not want to smoke.\" You are reminding yourself of your decision to quit.  · Do not replace cigarette smoking with electronic cigarettes (commonly called e-cigarettes). The safety of e-cigarettes is unknown, and some may contain harmful chemicals.  · If you relapse, do not give up! Plan ahead and think about what you will do the next time you get the urge to smoke.  HOW WILL I FEEL WHEN I QUIT SMOKING?  You may have symptoms of withdrawal because your body is used to nicotine (the addictive substance in cigarettes). You may crave cigarettes, be irritable, feel very hungry, cough often, get headaches, or have difficulty concentrating. The withdrawal symptoms are only temporary. They are strongest when you first quit but will go away within 10-14 days. When withdrawal symptoms occur, stay in control. Think about your reasons for quitting. Remind yourself that these are signs that your body is healing and getting used to being without cigarettes. Remember that withdrawal symptoms are easier to treat than the major diseases that smoking can cause.   Even after the withdrawal is over, expect periodic urges to smoke. However, these cravings are generally short lived and will go away whether you smoke or not. Do not smoke!  WHAT RESOURCES ARE AVAILABLE TO HELP ME QUIT SMOKING?  Your health care provider can direct you to community resources or hospitals for support, which may include:  · Group support.  · Education.  · Hypnosis.  · Therapy.     This information is not intended to replace advice given to you by your health care provider. Make sure you discuss any questions you have with your health care provider.    Helping Someone Who is Suicidal  Suicide is when someone takes his or her own life.  Someone who is thinking about suicide needs " immediate help. Although you might not know what to say or do to help, start by letting that person know you care. Listen to him or her. Then talk about how to get help. Help is available through therapy, medicine, and other treatments.  WHAT ARE SIGNS THAT SOMEONE IS SUICIDAL?  Common signs include:   · Signs of depression, such as:  · Rage.  · Irritability.  · Shame.  · Excessive worry.  · Loss of interest in things the person once enjoyed.  · Changes in social behaviors and relationships, including:  · Isolating oneself.  · Withdrawing from friends and family.  · Giving away possessions.  · Saying good-bye.  · Acting aggressively.  · Sleeping more or less than usual.  · Having trouble managing school or work.    · Talking about feeling hopeless or being a burden.  · Engaging in risky behaviors, such as drinking more alcohol or using more drugs.  WHAT ARE THE RISK FACTORS FOR SUICIDE?  Risk factors for suicide include:   · Other suicides in the family.  · A history of suicide attempts.  · Depression or other mental health issues.  · Being in care home or facing care home time.  · Having had close friends who have committed suicide.  · Alcohol or drug abuse, especially combined with a mental illness.    WHAT SHOULD I DO IF SOMEONE IS SUICIDAL?  If you believe a person is in immediate danger of committing suicide, call your local emergency services (911 in the [place] for help.  If a person says he or she wants to commit suicide, take the threat seriously. Help the person get help right away by:   · Calling your local emergency services.  · Calling a suicide prevention hotline.  · Contacting a crisis center or a local suicide prevention center. These are often located at hospitals, clinics, community service organizations, social service providers, or health departments.  If a person confides in you that he or she is considering suicide:   · Listen to the person's thoughts and concerns with compassion.  · Let the person know  you will stay with him or her.    · Ask if the person is having thoughts of hurting himself or herself.    · Offer to help the person get to a doctor or mental health professional.    · Remove all weapons and medicines from the person's living space.  · Do not promise to keep his or her thoughts of suicide a secret.     This information is not intended to replace advice given to you by your health care provider. Make sure you discuss any questions you have with your health care provider.     Spinal Compression Fracture  A spinal compression fracture is a collapse of the bones that form the spine (vertebrae). With this type of fracture, the vertebrae become squashed (compressed) into a wedge shape. Most compression fractures happen in the middle or lower part of the spine.  CAUSES  This condition may be caused by:  · Thinning and loss of density in the bones (osteoporosis). This is the most common cause.  · A fall.  · A car or motorcycle accident.  · Cancer.  · Trauma, such as a heavy, direct hit to the head.  RISK FACTORS  You may be at greater risk for a spinal compression fracture if you:  · Are 50 years old or older.  · Have osteoporosis.  · Have certain types of cancer, including:  · Multiple myeloma.  · Lymphoma.  · Prostate cancer.  · Lung cancer.  · Breast cancer.  SYMPTOMS  Symptoms of this condition include:  · Severe pain.  · Pain that gets worse over time.  · Pain that is worse when you stand, walk, sit, or bend.  · Sudden pain that is so bad that it is hard for you to move.  · Bending or humping of the spine.  · Gradual loss of height.  · Numbness, tingling, or weakness in the back and legs.  · Trouble walking.  Your symptoms will depend on the cause of the fracture and how quickly it develops. For example, fractures that are caused by osteoporosis can cause few symptoms, no symptoms, or symptoms that develop slowly over time.  DIAGNOSIS  This condition may be diagnosed based on symptoms, medical  history, and a physical exam. During the physical exam, your health care provider may tap along the length of your spine to check for tenderness. Tests may be done to confirm the diagnosis. They may include:  · A bone density test to check for osteoporosis.  · Imaging tests, such as a spine X-ray, a CT scan, or MRI.  TREATMENT  Treatment for this condition depends on the cause and severity of the condition. Some fractures, such as those that are caused by osteoporosis, may heal on their own with supportive care. This may include:  · Pain medicine.  · Rest.  · A back brace.  · Physical therapy exercises.  · Medicine that reduces bone pain.  · Calcium and vitamin D supplements.  Fractures that cause the back to become misshapen, cause nerve pain or weakness, or do not respond to other treatment may be treated with a surgical procedure, such as:  · Vertebroplasty. In this procedure, bone cement is injected into the collapsed vertebrae to stabilize them.  · Balloon kyphoplasty. In this procedure, the collapsed vertebrae are expanded with a balloon and then bone cement is injected into them.  · Spinal fusion. In this procedure, the collapsed vertebrae are connected (fused) to normal vertebrae.  HOME CARE INSTRUCTIONS  General Instructions  · Take medicines only as directed by your health care provider.  · Do not drive or operate heavy machinery while taking pain medicine.  · If directed, apply ice to the injured area:  ¨ Put ice in a plastic bag.  ¨ Place a towel between your skin and the bag.  ¨ Leave the ice on for 30 minutes every two hours at first. Then apply the ice as needed.  · Wear your neck brace or back brace as directed by your health care provider.  · Do not drink alcohol. Alcohol can interfere with your treatment.  · Keep all follow-up visits as directed by your health care provider. This is important. It can help to prevent permanent injury, disability, and long-lasting (chronic) pain.  Activity  · Stay in  bed (on bed rest) only as directed by your health care provider. Being on bed rest for too long can make your condition worse.  · Return to your normal activities as directed by your health care provider. Ask what activities are safe for you.  · Do exercises to improve motion and strength in your back (physical therapy), as recommended by your health care provider.  · Exercise regularly as directed by your health care provider.  SEEK MEDICAL CARE IF:  · You have a fever.  · You develop a cough that makes your pain worse.  · Your pain medicine is not helping.  · Your pain does not get better over time.  · You cannot return to your normal activities as planned or expected.  SEEK IMMEDIATE MEDICAL CARE IF:  · Your pain is very bad and it suddenly gets worse.  · You are unable to move any body part (paralysis) that is below the level of your injury.  · You have numbness, tingling, or weakness in any body part that is below the level of your injury.  · You cannot control your bladder or bowels.     This information is not intended to replace advice given to you by your health care provider. Make sure you discuss any questions you have with your health care provider.     Occupational Therapy Discharge Instructions for Maria Esther Valencia    8/13/2017    Level of Assist Required for Eating: Able to Complete Eating without Assist  Level of Assist Required for Grooming: Requires Supervision with Grooming  Level of Assist Required for Dressing: Requires Physical Assist with Dressing  Equipment for Dressing: Sock Aid, Reacher, Long Handled Shoe Horn, Dressing Stick  Level of Assist Required for Toileting: Requires Supervision with Toileting  Level of Assist Required for Toilet Transfer: Requires Supervision with Toilet Transfer  Equipment for Toilet Transfer: Grab Bars by Toilet  Level of Assist Required for Bathing: Requires Physical Assist with Bathing  Equipment for Bathing: Shower Chair, Grab Bars in Tub / Shower, Hand Held  Shower Head  Level of Assist Required for Shower Transfer: Requires Supervision with Shower Transfer  Equipment for Shower Transfer: Grab Bars in Tub / Shower, Tub Transfer Bench  Level of Assist Required for Home Mgmt: Requires Physical Assist with Home Management  Level of Assist Required for Meal Prep: Requires Physical Assist with Meal Preparation  Driving: May not Drive, Please Contact Physician for Further Information  Home Exercise Program: None Issued    It was a pleasure working with Maria Esther. Please continue to follow your spinal precautions by avoiding BENDING, LIFTING, and TWISTING.  Take care!  Yusra Carmen OTR/L    Physical Therapy Discharge Instructions for Maria Esther Valencia    8/14/2017    Weight Bearing Status - Patient Should:  (no weight bearing restrictions, Spinal precautions)  Level of Assist Required for Ambulation: Assist for Balance on Flat Surfaces, Physical Assist on Stairs, Should Not Attempt Curbs at This Time  Distance Patient May Ambulate:  (up to 400 ft as tolerated with FWW and CGA/SBA)  Device Recommended for Ambulation: Front-Wheeled Walker  Level of Assist Required to Propel Wheelchair: Supervision  Level of Assist Required for Transfers: Supervision (SBA to CGA)  Device Recommended for Transfers: Front-Wheeled Walker  Home Exercise Program: None Issued  Prosthesis / Orthosis Recommendation / Location: No Prosthesis  or Orthosis Recommended    Take care Maria Esther! Keep up the hard work.  NAM Barrientos    Speech Therapy Discharge Instructions for Maria Esther Valencia    8/15/2017    Diet: Regular - all foods allowed, Thin Liquids - any liquid like water, coffee, tea, juices, or clear fluids like Gatorade, etc.  Swallow Strategies: small bites and small sips. Wait until you have finished chewing and swallowing your food before taking a drink. Clear your mouth with you tongue or finger between bites and alternate food and liquid to clear any residue.   Other Diet Instructions: meds whole with water,  juice, coffee etc.   Supervision: 24 hour supervision is recommended at this time.   Cognition / Communication: Maria Esther, it has been a pleasure working with you! Please have Arsenio or another friend bring your glasses to the skilled nursing facility so you are better able to see. Use a calendar to help orient you to day, date, time etc. and use your memory notebook to log daily events to help aid in recall of new training and education. Best of luck in your continued recovery! ~ Maty Bowen MS, CCC-SLP

## 2017-08-15 NOTE — CARE PLAN
Problem: Speech/Swallowing LTGs  Goal: LTG-By discharge, patient will safely swallow  1) Individualized goal: Regular textures/thin liquids to return to PLOF.   2) Interventions: SLP Swallowing Dysfunction Treatment    Outcome: MET Date Met:  08/13/17

## 2017-08-15 NOTE — PROGRESS NOTES
Patient discharged to [facility]. VSS, no reports of pain, or appearance of shortness of breath or distress. Discharge instructions about medication schedule, risk factors, and hospital diagnoses given, including those on how to take a blood pressure, how to take a pulse, hypertension, stroke prevention, smoking cessation, fall prevention, suicide prevention, and spinal compression fracture given. Escorted out by [facility] staff using a wheelchair.

## 2017-08-15 NOTE — FLOWSHEET NOTE
08/15/17 0901   Events/Summary/Plan   Events/Summary/Plan Pt is on RA stable using O2 at night to keep sats above 90% will continue to monitor.   Non-Invasive Resp Device Site Inspection Completed Intact   Location Nc b/l ears   Interdisciplinary Plan of Care-Goals (Indications)   Obstructive Ventilatory Defect or Pulmonary Disease without Obvious Obstruction Strong Subjective / Objective Improvement   Education   Education Yes - Pt. / Family has been Instructed in use of Respiratory Equipment   Respiratory WDL   Respiratory (WDL) X   Chest Exam   Work Of Breathing / Effort Mild   Respiration 18   Pulse 86   Breath Sounds   RUL Breath Sounds Clear   RML Breath Sounds Clear;Diminished   RLL Breath Sounds Clear;Diminished   ARGELIA Breath Sounds Clear   LLL Breath Sounds Clear;Diminished   Secretions   Cough Non Productive   Oximetry   #Pulse Oximetry (Single Determination) Yes   Oxygen   Home O2 Use Prior To Admission? No   Pulse Oximetry 95 %   O2 (LPM) 0   O2 (FiO2) 21   O2 Daily Delivery Respiratory  Room Air with O2 Available

## 2017-08-15 NOTE — CARE PLAN
Problem: Safety  Goal: Will remain free from injury  Intervention: Provide assistance with mobility  Transfers with 1 person SBA to the BR. Encouraged pt to use call light before getting OOB. Verbalized understanding. Will continue to monitor.      Problem: Bowel/Gastric:  Goal: Normal bowel function is maintained or improved  Patient continent of bowel. On bowel meds. LBM 8/14/17.

## 2017-08-15 NOTE — CARE PLAN
Problem: Speech/Swallowing LTGs  Goal: LTG-By discharge, patient will solve basic problems  1) Individualized goal: To safely complete ADL related tasks with SPV.   2) Interventions: SLP Self Care / ADL Training and SLP Cognitive Skill Development    Outcome: DISCHARGED-GOAL NOT MET Date Met:  08/15/17    Problem: Memory STGs  Goal: STG-Within one week, patient will  1) Individualized goal: use written memory strategies and simple memory book following educ and with supervision/ cues from staff, therapists with MOD A.  2) Interventions: SLP Cognitive Skill Development    Outcome: DISCHARGED-GOAL NOT MET Date Met:  08/15/17

## 2017-08-15 NOTE — DISCHARGE PLANNING
Case Management;  No bed available at [facility] and no firm forecast of next bed.  I have discussed thoroughly with patient and we have referred patient to [facility] and [facility] which are near her home.  This will enable her friend to walk there.  [facility] has accepted and they will pick patient up at 3:00 pm.  Patient is agreeable with all plans.  I will let her payee Carlo know of plans.  Her friend Arsenio's phone is not working so I am unable to notify him.  Patient confirms that he has visited her since she has been in rehab.  I will see if I can make a Home Based Care appointment for homemaker services.

## 2017-08-15 NOTE — PROGRESS NOTES
"Patient's neighbors, Destiny Maid and Brittanyzuly Luis Alfredo, arrived to visit the patient. Neighbor's notified nurse that normal point of contact, Ishaan \"Arsenio\" Lindsey is not available due to illness. The patient's permission was obtained verbally by the RN to communicate with them about her care.  They were informed of the scheduled discharge to [facility] today.  They expressed concerns about the patient's financial and physical needs and safety when at home and  was notified.  "

## 2017-08-15 NOTE — DISCHARGE PLANNING
Referrals sent to [facility] and [facility] per 's request.  Per Maranda at [facility], referral accepted.  Maranda arranged for [facility] van to pick patient up today at 1500.  Dr. Salazar is accepting physician.

## 2017-08-15 NOTE — DISCHARGE PLANNING
Case Management;  Received call back from staff at [facility].  I completed referral per phone.  They have had patient on service in 2009.  They will send a  out to do an assessment.  I have provided contact information that we have and patient's current address.

## 2017-08-15 NOTE — DISCHARGE SUMMARY
Rehab Discharge Note    Admission Diagnosis:   Active Hospital Problems    Diagnosis   • Back pain   • Depression   • Thoracic spondylosis   • HTN (hypertension)   • Dyslipidemia   • History of stroke   • T12 compression fracture (CMS-HCC)   • Microcytic anemia   • Heme positive stool   • Dementia       Discharge Diagnosis:  Active Hospital Problems    Diagnosis   • Back pain   • Depression   • Thoracic spondylosis   • HTN (hypertension)   • Dyslipidemia   • History of stroke   • T12 compression fracture (CMS-HCC)   • Microcytic anemia   • Heme positive stool   • Dementia       Hospital Course    The patient is a 76 y.o. female with a past medical history of coronary artery disease, hypertension, psychiatric disorder, stroke, and chronic back pain; now admitted for acute inpatient rehabilitation with severe functional debility due to her history of stroke, and chronic compression fractures/scoliosis.     Patient initially admitted on July 29, 2017 with complaints of chronic back and bilateral leg pain that had been increasing over the past week to the point where she was unable to tolerate walking. In addition she had bladder incontinence. MRI with T12/L1 chronic compression fractures, as well as thoracic spondylosis and dextroconvex scoliosis, and bilateral lumbar neural foraminal narrowing most severe at left L5-S1. Patient apparently was offered surgical correction which she declined. Acute stay complications include significant hypertension.    Patient reports that she has had chronic back pain for quite some time but has not had any surgical procedures. She reports her previous  had multiple back surgeries and she is very clear she never wants even 1 back surgery. She denies any numbness and tingling in her distal extremities, but does complain of some abnormal sensation in the saddle area. She also reports a history of a GI bleed for which she was scoped several years ago. She has a history of a  "myocardial infarction as well as a stroke about 10 years ago when she lived in [place]. Reports she had stents placed for the coronary artery disease and did not have any residual deficits from her stroke. MRI from 2014 show old lacunar infarctions in the left internal capsule and left corona radiata, pontine gliosis, old lacunar infarcts in the cerebellum, and chronic small vessel ischemic disease. Patient has a chart doses of dementia, and does report difficulty with her thinking stating she is a \"nut.\"    Patient was evaluated by Rehab Medicine physician and Physical Therapy and Occupational Therapy and determined to be appropriate for acute inpatient rehab and was transferred to [facility] on 8/1/2017.      Patient received PT, OT and SLP while at our facility  The patient made significant progress she still had significant functional deficits and therefore cannot return home regardless of bowel she became continent of bowels and bladder she still required assistance having at least 1 accident bladder every day    Regards to mobility the patient required minimal assistance to ambulate 65 feet with a front wheel walker max assist for wheelchair mobility and total assistance for stairs    Regards to ADLs she still required minimum assistance for bathing and supervision for upper body dressing minimum assistance for lower body dressing and toileting required moderate assistance as well and clearly the patient cannot go home and decision was made to transfer the patient to a skilled facility for further her rehabilitative efforts    Regards to medical issues they were managed effectively with the assistance of a hospitalist's service    Hypertension blood pressure was maintained on Lotensin 40 mg daily Lopressor 50 twice a day which was low at 12.5 mg twice a day at time of discharge hydralazine discharge and 50 mg 3 times a day and clonidine 0.1 mg when necessary systolic blood pressure greater " than 170 patient was also on Lasix    Bradycardia heart rate is good recently Lopressor from 50 twice a day to 12.5 twice a day    CHF on Lasix 40 mg daily admission now on 20 mg daily patient's also potassium chloride was on 20 mg daily as been decreased to 10 mg daily last potassium on  was 4.0    Azotemia declined and therefore Lasix was lowered from 50 mg 20 mg    T12-L1 chronic compression fractures with continuous pain management with Lidoderm patch as well as oral pain meds with pain controlled maintaining spinal precautions    Coronary artery disease status post stenting continue aspirin and statin    Dyslipidemia continue statin    Tobacco use maintain a nicotine patch    Bladder urgency improved on Ditropan XL no retention noted      History of depression/anxiety continue Cymbalta 20 mg    Dementia patient was monitored and was trying to her baseline    Low vitamin D level attended was patient was placed on 50,000 units of vitamin D every week vitamin D level should be checked again in approximately 2 months            Functional Status at Discharge  Eatin - Independent  Eating Description:  Verbal cueing, Supervision for safety  Groomin - Standby Prompting/Supervision or Set-up  Grooming Description:  Increased time  Bathin - Not tested, patient refused  Bathing Description:   (Mod A for 3/10 parts during sit/stand bathing using GB for balance in stance. Assisted with BLE knee to toe and buttocks to maintain precautions with min v/c for spinal precautions)  Upper Body Dressin - Standby Prompting/Supervision or Set-up  Upper Body Dressing Description:   (SPV to don/doff pullover shirt in seated)  Lower Body Dressin - Minimal Assistance  Lower Body Dressing Description:  4 - Minimal Assistance  Discharge Location : Skilled Nursing Facility  Patient Discharging with Assist of: Other (See Comments) ([facility])  Level of Supervision Required: 24 Hour Supervision  Recommended  Equipment for Discharge: Front-Wheeled Walker;Manual Wheelchair;Grab Bars by Toilet;Grab Bars in Tub / Shower;Shower Chair;Sock Aid;Reacher;Long Handled Shoe Horn;Dressing Stick;Hand Held Shower Head  Recommended Services Upon Discharge: Other (See Comments) (Pt d/c to [facility])  Long Term Goals Met: 0  Long Term Goals Not Met: 2  Reason(s) for Goals Not Met: Pt continues to require assist with ADLs and 24/7 care  Criteria for Termination of Services: Maximum Function Achieved for Inpatient Rehabilitation  Walk:  5 - Standby Prompting/Supervision or Set-up  Distance Walked:  Walks a minimum of 150 feet  Walk Description:  Walker, Supervision for safety  Wheelchair:  5 - Standby Prompting/Supervision or Set-up  Distance Propelled:  Propels a minimum of 150 feet   Wheelchair Description:  Extra time, Safety concerns, Verbal cueing (very slow speed, requires brief rest breaks, VCs for pathfinding and brake management)  Stairs 4 - Minimal Assistance  Stairs Description (pt ascended/descended 2x6 adpative steps continuously with step-to pattern, CGA and B HRs.)  Discharge Location: Skilled Nursing Facility  Level of Supervision Required Upon Discharge: Intermittent Supervision  Recommended Equipment for Discharge: Front-Wheeled Walker  Recommeded Services Upon Discharge: Other (See Comments) (continue skilled therapy at SNF)  Long Term Goals Met: 2  Long Term Goals Not Met: 4  Reason(s) for Goals Not Met:  (pt unsafe, requires incidental touching and VCs, not MOd I)  Criteria for Termination of Services: Maximum Function Achieved for Inpatient Rehabilitation  Comments: transfer to skilled to continue therapy  Comprehension Mode:  Both  Comprehension:  5 - Stand-by Prompting/Supervision or Set-up  Comprehension Description:  Glasses, Verbal cues  Expression Mode:  Vocal  Expression:  5 - Stand-by Prompting/Supervision or Set-up  Expression Description:  Verbal cueing  Social Interaction:  5 - Stand-by  Prompting/Supervision or Set-up  Social Interaction Description:  Verbal cues  Problem Solving:  3 - Moderate Assistance  Problem Solving Description:  Verbal cueing, Increased time, Therapy schedule  Memory:  2 - Max Assistance  Memory Description:  Verbal cueing  Progress since Admit: Pt has made fair progress since admission. Skilled SLP services included training and education in compensatory strategies for dysphagia, pt was initially admitted on Dys 1/NTL, is currently tolerating regular textures/thin liquids, meds whole with liquid wash (this is her baseline diet). Pt is independent for use of compensatory strategies and remains in general supervised dining. No further dysphagia intervention is warranted at this time. Pt continues to demonstrate severe impairments in orientation and memory. Pt benefits from information broken into smaller units, repetitions as well as written cues in larger, bold print. Pt does not have her prescription glasses at our facility and would refuse reading tasks if print/text was deemed too small. Recommend pt d/c to SNF for continued intervention and support, continued SLP services recommended to target attention, memory and orientation. It is anticipated pt will require 24-7 supervision upon d/c and may be suited for a group home setting.   Discharge Location : Skilled Nursing Facility  Patient Discharging with Assist of: No one, Patient will be Alone  Level of Supervision Required: 24 Hour Supervision  Recommended Services Upon Discharge: Other (See Comments) (continued SLP services with SNF)  Long Term Goals Met: 1/2  Long Term Goals Not Met: 1/2  Reason(s) for Goals Not Met: Pt is currently MOD A for problem solving  Criteria for Termination of Services: Maximum Function Achieved for Inpatient Rehabilitation    Discharge Medication:     Medication List      START taking these medications       Instructions    acetaminophen 325 MG Tabs   Last time this was given:  650 mg on  8/15/2017  4:55 AM   Commonly known as:  TYLENOL   Next Dose Due:  Up to every 4 hours as needed for pain    Take 2 Tabs by mouth every four hours as needed.   Dose:  650 mg       aspirin 81 MG EC tablet   Last time this was given:  81 mg on 8/15/2017  8:22 AM   Next Dose Due:  Tomorrow morning    Take 1 Tab by mouth every day.   Dose:  81 mg       atorvastatin 80 MG tablet   Last time this was given:  80 mg on 8/14/2017  7:47 PM   Commonly known as:  LIPITOR   Next Dose Due:  Tonight at bedtime    Take 1 Tab by mouth every bedtime.   Dose:  80 mg       benazepril 40 MG tablet   Last time this was given:  40 mg on 8/15/2017  4:56 AM   Commonly known as:  LOTENSIN   Next Dose Due:  Tomorrow morning   Notes to Patient:  Monitor your blood pressure and heart rate before taking this medication, if your systolic (top number) is less than 110 or if pulse is less than 60, call your physician before taking this medication    Take 1 Tab by mouth every day.   Dose:  40 mg       duloxetine 20 MG Cpep   Last time this was given:  20 mg on 8/15/2017  8:22 AM   Commonly known as:  CYMBALTA   Next Dose Due:  Tomorrow morning    Take 1 Cap by mouth every day.   Dose:  20 mg       furosemide 20 MG Tabs   Last time this was given:  20 mg on 8/15/2017  4:56 AM   Commonly known as:  LASIX   Next Dose Due:  Tomorrow morning    Take 1 Tab by mouth every day.   Dose:  20 mg       gabapentin 300 MG Caps   Last time this was given:  300 mg on 8/14/2017  7:48 PM   Commonly known as:  NEURONTIN   Next Dose Due:  Tonight at bedtime    Take 1 Cap by mouth every evening.   Dose:  300 mg       hydrALAZINE 50 MG Tabs   Last time this was given:  50 mg on 8/15/2017  4:55 AM   Commonly known as:  APRESOLINE   Next Dose Due:  Today at 2:00 and at 10:00   Notes to Patient:  Monitor your blood pressure and heart rate before taking this medication, if your systolic (top number) is less than 110 or if pulse is less than 60, call your physician before  taking this medication    Take 1 Tab by mouth every 8 hours.   Dose:  50 mg       lidocaine 5 % Ptch   Last time this was given:  1 Patch on 8/15/2017  8:22 AM   Commonly known as:  LIDODERM   Next Dose Due:  Tomorrow morning   Notes to Patient:  Remove before bed    Apply 1 Patch to skin as directed every 24 hours.   Dose:  1 Patch       mag hydrox-al hydrox-simeth 400-400-40 MG/5ML Susp   Commonly known as:  MAALOX PLUS ES or MYLANTA DS   Next Dose Due:  Up to every 2 hours as needed    Take 20 mL by mouth every 2 hours as needed (dyspepsia).   Dose:  20 mL       metoprolol 25 MG Tabs   Last time this was given:  12.5 mg on 8/15/2017  4:56 AM   Commonly known as:  LOPRESSOR   Next Dose Due:  Tonight at dinner time   Notes to Patient:  Monitor your blood pressure and heart rate before taking this medication, if your systolic (top number) is less than 110 or if pulse is less than 60, call your physician before taking this medication    Take 0.5 Tabs by mouth 2 Times a Day.   Dose:  12.5 mg       nicotine 14 MG/24HR Pt24   Last time this was given:  14 mg on 8/15/2017  4:57 AM   Commonly known as:  NICODERM   Next Dose Due:  Tomorrow morning   Notes to Patient:  Remove old patch before applying new one, and rotate sites    Apply 1 Patch to skin as directed every 24 hours.   Dose:  1 Patch       omeprazole 20 MG delayed-release capsule   Last time this was given:  20 mg on 8/15/2017  8:22 AM   Commonly known as:  PRILOSEC   Next Dose Due:  Tonight at dinner time    Take 1 Cap by mouth 2 Times a Day.   Dose:  20 mg       oxybutynin SR 5 MG Tb24   Last time this was given:  5 mg on 8/14/2017  7:47 PM   Commonly known as:  DITROPAN-XL   Next Dose Due:  Tonight at bedtime    Take 1 Tab by mouth every evening.   Dose:  5 mg       potassium chloride SA 10 MEQ Tbcr   Last time this was given:  10 mEq on 8/15/2017  8:20 AM   Commonly known as:  K-DUR   Next Dose Due:  Tomorrow morning    Take 1 Tab by mouth every day.   Dose:  " 10 mEq       senna-docusate 8.6-50 MG Tabs   Last time this was given:  2 Tabs on 8/15/2017  8:19 AM   Commonly known as:  PERICOLACE or SENOKOT S   Next Dose Due:  Tonight at bedtime    Take 2 Tabs by mouth 2 Times a Day.   Dose:  2 Tab       trazodone 50 MG Tabs   Last time this was given:  50 mg on 8/14/2017  7:48 PM   Commonly known as:  DESYREL   Next Dose Due:  Every bedtime as needed for insomnia    Take 1 Tab by mouth at bedtime as needed.   Dose:  50 mg       vitamin D (Ergocalciferol) 71264 UNITS Caps capsule   Last time this was given:  50,000 Units on 8/9/2017  8:25 AM   Commonly known as:  DRISDOL   Next Dose Due:  Tomorrow morning   Notes to Patient:  Every 7 days     Take 1 Cap by mouth every 7 days.   Dose:  87419 Units             Equipment:  None    Follow-up:  Discharge Location    Skilled Nursing Facility      Agency Name / Address / Phone    [facility] at [phone], [address], [place]        Medical Provider / Phone    No additional equipment ordered.            Follow-up With  Details  Why  Contact Info    Bernabe Hopkins D.O. (Primary Care)  Call  for appointment after skilled rehab (records will be sent)  [address]  [address]  [facility] [id number] [phone]                          Condition on Discharge:  Good Condition    /69 mmHg  Pulse 86  Temp(Src) 36.6 °C (97.9 °F)  Resp 18  Ht 1.575 m (5' 2\")  Wt 85 kg (187 lb 6.3 oz)  BMI 34.27 kg/m2  SpO2 95%  Breastfeeding? No     Gen: alert, no apparent distress  CV: regular rate and rhythm, no murmurs, no peripheral edema  Resp: clear to ascultation bilaterally, normal respiratory effort  GI: soft, non-tender abdomen, bowel sounds present  Neuro: Unchanged    Greater than 40 minutes were spent in total in the preparation of the patient's discharge. This included clinical coordination. reconciliation of medications and preparation of this report      Jesus Staton M.D."

## 2017-08-15 NOTE — PROGRESS NOTES
Assumed care. VSS, except for BP of 157/69. MD aware of HTN. A&Ox4. Labs checked, BUN high and GFR low. MD aware, will encourage fluids. Pain level 0/10. Bed in low and locked position. Call light and belongings within reach.

## 2017-08-15 NOTE — DISCHARGE PLANNING
Per Mami at [facility], they have no bed today.  Possibly tomorrow or Thursday.  CM notified.

## 2017-08-15 NOTE — DISCHARGE PLANNING
Case Management;  Received update from patient's nurse that 2 of her neighbors visited this afternoon with information about patient's home status.  Nurse has provided contact information.  I received permission from patient to contact her.  I have called to Destiny Lino at [phone].  Her daughter is Kerri Lobato at [phone].   I let Destiny know that I had been trying to reach patient's neighbor Arsenio.  She confirms that he is currently in the hospital at [facility] for an illness.  She describes the property that they live in as a main house and 6 units in the building behind.  They each have their own room and bathroom and share a common kitchen in the main house.  Arsenio lives upstairs from the patient and Destiny lives around the corner from her.  She describes that patient has had increasing difficulty getting to the store and providing her self care.  She states that they all try to help getting things for her when they are going out and Arsenio looks out after her.  Destiny states that she has talked with patient about trying to find a contact for her son and plans to work on this.  I have contacted patient's payee with [employer], [place], at [phone] to let her know where patient will be.  Destiny is on her way to visit Arsenio and will let him know where patient will be also.  Patient's landlord is Bishnu Simmons who Destiny describes as very supportive and who brought them to visit patient today.  Will proceed with transfer to [facility] at 3:00 pm and I have placed all contact information above in the transfer packet.    With patient's permission, I have left a message with Division of Aging for home and community based services at [phone].  I will see if I can refer patient for an assessment by case management for possible homemaker and personal care services.  Patient has not been agreeable to group home placement in the past but this may be her best option.

## 2017-08-16 NOTE — PROGRESS NOTES
[facility]    Date of Service: 8/15/2017    Chief Complaint  This is a very pleasant 76-year-old white female    with past medical history significant for severe tobacco abuse along with    chronic back discomfort.  The patient presented to [facility]    initially on 07/29/2017 with bilateral leg pain, difficulty ambulating.  CT of   her lumbar spine and pelvis were done in the ER, which did not show any    evidence of acute fracture.  She was also short of breath upon admission.  The   patient smoked about a pack and a half a day for 50 years.  Apparently, she    does have a history of coronary disease and stroke in the past, though I do    not have direct records of this available to me.  Looks like she is originally   from [place] though she has been in [place] for several years.  It appears    that they did a MRI of the brain back in 2014, which did show evidence of old    stroke, but no obvious acute insult to my eye.  There was some prominent    ventriculomegaly without hydrocephalus, old lacunar infarcts in the left    internal capsule and left corona radiata, advanced white matter ischemic    disease.  Once again, no acute infarct that was back in 2014.  She did have an   imaging of her lumbar spine.  On my exam, she was unable to dorsiflex both    Great toes,  which suggests that L5 nerve root issue.  The lumbar spine imaging did    demonstrate diffuse disk bulge extending symmetrically into the left lateral    recess and foraminal zone, mild right and severe left facet arthropathy, so    basically, she had multilevel disease, prominent posterior epidural fat from    L2 through L5 contributing to the thecal narrowing at these levels.  Thecal    sac narrowing was worse at L2-L3 and L3-L4.  Once again, severe left-sided and   right-sided facet arthropathy in the L5 region.  Patient really was not    interested in any surgical procedures.  The patient was, therefore, released  "   to the [facility] on 08/01/2017 for further physical therapy.  She    has been having trouble walking, which has been a progressive problem, perhaps   it is related to the back problem itself versus deconditioning.  Medicine was   consulted due to the difficulty dealing with her blood pressure.  The patient   had pressures that were modestly elevated yesterday well over 140, though I    believe it was significantly higher than this.  I believe this was post    medication pressures.  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    Interval Problem Update  NONE    Consultants/Specialty  INTERNAL MEDICINE    Disposition  PER PRIMARY REHAB TEAM.        Review of Systems   Constitutional: Negative for fever, chills, weight loss and malaise/fatigue.   Eyes: Negative for double vision.   Cardiovascular: Negative for chest pain.   Gastrointestinal: Negative for heartburn, nausea and vomiting.   Genitourinary: Negative for dysuria.   Musculoskeletal: Negative for myalgias and neck pain.   Skin: Negative for rash.   Neurological: Negative for dizziness and headaches.   Psychiatric/Behavioral: Negative for depression.   8/3/17--PATIENT AWAKE AND ALERT.  THERE IS SOME MODERATE B/L ILIAC DISEASE NOTED IN 2009.   SHE HAD AT LEAST 60-70% DISEASE BACK IN 2009,  8 YEARS AGO.  SHE ALSO HAS HAD A SEVERE MICROCYTIC ANEMIA SINCE 2009,  THOUGH THE MCV AT THAT TIME WAS 79 SUGGESTING THIS IS CHRONIC BLOOD LOSS AND IRON DEFICIENCY.  I AM REPLETING IRON STORES IV AND CHECKING WITH THE 2 GI GROUPS TO SEE IF SHE HAS HAD ENDOSCOPY OF ANY SORT.  PLAN:  CHECK FOR COLONOSCOPY IF DONE BY GI CONSULTANTS OR [facility].  TOTAL BODY IRON REPLACEMENT.  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  RE-CHECK LE ARTERIAL STUDIES.  THAT MAY BE CONTRIBUTING.  PRIOR STUDIES FROM NEARLY A DECADE AGO SHOWED, \"CT ANGIOGRAM " "PELVIS WITH AND WITHOUT CONTRAST AND POST PROCESSING   DEMONSTRATES EXTENSIVE CALCIFIED AND NONCALCIFIED PLAQUE IN THE COMMON   ILIAC ARTERIES BILATERALLY.  THERE IS AT LEAST 70% DIAMETER REDUCTION   STENOSIS AT THE ORIGIN OF THE RIGHT COMMON ILIAC ARTERY.  THERE IS 60%   DIAMETER REDUCTION STENOSIS AT THE UPPER PORTION OF THE LEFT COMMON ILIAC   ARTERY.  INTERNAL AND EXTERNAL ILIAC ARTERIES AS WELL AS THE COMMON   FEMORAL ARTERIES APPEAR TO BE PATENT BILATERALLY.\"  CHECKING LE ARTERIAL DOPPLERS.    ADDENDUM:  TECH CAME IN TO TELL ME DISTALLY THERE IS BIPHASIC FLOW.  I TOLD HIM THAT IS WHAT I EXPECTED.  CERTAINLY NO WORSE THAN WHAT I EXPECTED.  FURTHER INVESTIGATION FORMALLY WOULD BE REDUNDANT.    8/4/17--PATIENT'S PRESSURE CREEPING UP AGAIN.  I THINK WE HAVE REACHED OUR LIMIT ON BETA BLOCKADE AS HR IS IN LOW TO MID 50'S.   GOING BACK TO ORIGINAL 25 BID.   ADDING SOME STANDING DOSE HYDRALAZINE.    MENTATION SEEMED A BIT BETTER.   PUZZLING THAT SHE IS ALERT AND ORIENTED WITH THERAPY YET STRUGGLES WITH SIMPLE QUESTIONS WITH THE DOCTORS.   NO CHANGE IN HER PHYSICAL EXAM.   PATIENT SAW DR MER GAN WITH GI CONSULTANTS IN 2011.   GETTING RECORDS.   OF NOTE THE PATIENT KNEW SHE HAD HER CARD IN HER PURSE.  SHE SHOULD START RETICULOCYTOSIS SOON NOW THAT WE HAVE LOADED HER UP ON IRON.    ARTERIAL STUDIES:  NO CHANGE.  BIPHASIC FLOW,  VENOUS DOPPLERS NEGATIVE,  CAROTID DOPPLERS NEGATIVE.     PLAN:  GO BACK DOWN TO 25 BID ON LOPRESSOR DUE TO BRADYCARDIA.  ADDING A STANDING DOSE OF HYDRALAZINE 25 MG TID.   LABS IN AM.    ADDENDUM:  RECORDS FROM COLONOSCOPY AND EGD SHOW ESSENTIALLY NO SIGNIFICANT FINDINGS.   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  WE HAVE HAD HER ON BID PROTONIX.    SHE MOST LIKELY PERIODICALLY BLEEDS FROM THIS AVM OR DIVERTICULAR OCCULT BLEEDS.    THE EGD X2 WERE IN 2010 AND 2011, (removal of benign tubular " adenoma in upper gi tract).   FIRST EGD/ COLONOSCOPY 2/22/11.   I DONT THINK SHE NEEDS FURTHER W/U AT THIS POINT ON THE COLON.  THE PATIENT SHOULD HAVE EGD AS OUTPATIENT AS AUGUST ESOPHAGUS SUGGESTED.      8/5/17--DOING A LITTLE BIT BETTER IN MY OPINION.  I AM NOT SURE IF SHE HAS VASCULAR DEMENTIA OR PSEUDODEMENTIA.  I FAVOR PSEUDODEMENTIA.   SHE IS COMPLETELY LUCID AT TIMES.   OTHER TIMES SHE DOES NOT KNOW THE YEAR.   SHE IS DOING BETTER WITH THE IV IRON.   CHECK RETIC COUNT.  HG ELECTROPHORESIS WAS NORMAL.  I SHOULD HAVE LOOKED BACK [id number].  SHE SHOULD SEE DR JOHNSON  FOR SURVAILANCE OF POSIBLE BARRETS ESOPHAGUS.  VITALS AND LABS OK.    PLAN:  OUTPATIENT F/U WITH DR JOHNSON IN ORDER WITH GI CONSULTANTS.  LAST COLONOSCOPY WAS CLEAN EXCEPT FOR TINY AVM.   SHE DOES HAVE HEME + STOOL.  IT IS PROBABLY FROM UPPER GI TRACT.  I DONT KNOW IF THIS PATIENT IS FIT TO LIVE ALONE.   SHE SEEMS LIKE A GROUP HOME TYPE OF PATIENT.   CHECK CBC AND RETIC COUNT IN A COUPLE OF DAYS.    8/6/17--NOT QUITE SURE IF PATIENT IS DEMENTED OR JUST DEPRESSED.  SHE IS ABLE TO ANSWER MOST MENTAL STATUS QUESTIONS.  SPELLED A WORD BACKWARD.  ?  NOT QUITE SURE?  PROBABLY A LOW LEVEL CHRONIC DEMENTIA.  I CHECKED SOME COMMON SENSE JUDGEMENT QUESTIONS AND SHE DID WELL.  WE DID GIVE IV IRON TO PATIENT AND HOPEFULLY THAT WILL RESOLVE ANEMIA.  SHE HAD COLONOSCOPY WITHIN THIS DECADE.  NO OBVIOUS SOURCE OF BLEEDING AT THAT TIME WITH EXACT SAME PRESENTATION.  BACK PAIN IS TOLERABLE..  SHE IS UNABLE TO DORSIFLEX TOES.  THAT IS LIKELY L5 NERVE  ROOT OUTLET OBSTRUCTION.  PATIENT DOES NOT WANT SURGERY.   I THINK THIS MAY VERY WELL BE A PASTOR DECISION.    PLAN: CONTINUE PT/OT.   INCHING FORWARD.   I DO NOT WANT TO PUSH BP ANY LOWER.  WE OVER TREAT  HYPERTENSION IN THE ELDERLY WHICH LEADS TO FALLS.   THERE IS ONE THING PATIENT TOLD ME THAT WAS TROUBLING.  I AM NOT SURE WHAT HER LIVING SITUATION IS.  APPARENTLY SHE LIVES IN AN APARTMENT AND ONE OR 2, 40 CATHERINE YEAR  OLDS ARE TAKING HER SOCIAL  SECURITY CHECKS.   KEVIN MOONEY, AND ? FOUZIA LOPEZ?  WE NEED CASE COORDINATION TO LOOK INTO THIS.   SOUNDS SHADY.     17- PATIENT IS BASICALLY UNCHANGED.  SOME DAYS SHE GIVES CORRECT MENTAL STATUS QUESTIONS.   SOME DAYS NOT.   IMPROVING AMBULATION WITH THERAPY.   ANEMIA IS IMPROVING RAPIDLY S/P IV IRON.    THE PATIENT IS RETICING NICELY.   SHOULD HAVE NORMAL HEMOGLOBIN IN A FEW WEEKS.  NOT SURE WHERE THE SOURCE IS BUT THIS HAS BEEN LOOKED INTO EXTENSIVELY IN 2O11 WITHOUT SERIOUS PATHOLOGY NOTED.       PLAN:   NO CHANGED TO CURRENT REGIMEN.   HAVE CASE COORDINATION LOOK INTO WHO IS MANAGING HER FINANCES.   SHE MAY NEED ASSISTED LIVING OR GROUP HOME.   I JUST AM UNCLEAR WHETHER SHE IS TRULY DEMENTED OR NOT.   SHE IS NOT DELIRIOUS.  SHE NEEDS F/U EGD GIVEN THE POSSIBLE BARRETS ESOPHAGUS.  CASE COORDINATION SHOULD SET UP  APPOINT MENT WITH BERRY PARKS.  CHRONIC GI BLOOD LOSS HAS A BROAD DIFFERENTIAL BUT IS NOT AN EMERGENCY UNLESS BLEEDING BRISKLY OR WE SUSPECT COLON CA  WHICH IS DO NOT.    8/15/17--PATIENT DOING ABOUT THE SAME.  LABS AND VITALS OK.  FIT FOR D/C TO HOME WITH SUPERVISION.  REMAINS SOMEWHAT DEMENTED.  IT FLUCTUATES.  I THINK SHE IS GOING TO [facility].     Physical Exam  Laboratory/Imaging   Hemodynamics  Temp (24hrs), Av.8 °C (98.2 °F), Min:36.6 °C (97.9 °F), Max:36.9 °C (98.4 °F)   Temperature: 36.9 °C (98.4 °F)  Pulse  Av.4  Min: 52  Max: 86    Blood Pressure : 118/76 mmHg      Respiratory      Respiration: 18, Pulse Oximetry: 99 %, O2 Daily Delivery Respiratory : Room Air with O2 Available     Work Of Breathing / Effort: Mild  RUL Breath Sounds: Clear, RML Breath Sounds: Clear;Diminished, RLL Breath Sounds: Clear;Diminished, ARGELIA Breath Sounds: Clear, LLL Breath Sounds: Clear;Diminished    Fluids    Intake/Output Summary (Last 24 hours) at 08/15/17 2108  Last data filed at 08/15/17 1313   Gross per 24 hour   Intake    360 ml   Output      0 ml   Net     360 ml       Nutrition  No orders of the defined types were placed in this encounter.     Physical Exam   Constitutional: She is oriented to person, place, and time. She appears well-nourished. No distress.   HENT:   Head: Atraumatic.   Eyes: No scleral icterus.   Neck: No JVD present. No tracheal deviation present.   Cardiovascular: Normal rate, regular rhythm and normal heart sounds.    No murmur heard.  Pulmonary/Chest: No respiratory distress. She has no wheezes.   Abdominal: Soft. Bowel sounds are normal. She exhibits no distension.   Musculoskeletal: She exhibits no edema.   Lymphadenopathy:     She has no cervical adenopathy.   Neurological: She is alert and oriented to person, place, and time. No cranial nerve deficit.   Skin: Skin is warm and dry.   Psychiatric: She has a normal mood and affect.                                Assessment/Plan     No new assessment & plan notes have been filed under this hospital service since the last note was generated.  Service: Hospital Medicine  ASSESSMENT:  1.  Hypertension with systolic pressures as high as 191, question coronary    artery disease.  2.  Severe chronic obstructive pulmonary disease and tobacco abuse.  3.  Question cerebrovascular disease with advanced white matter disease noted    on MRI of the brain 3 years ago.  4.  Preserved left ventricular systolic function on echo.  5.  Probable lumbar myelopathy with the patient refusing surgery.  6.  KNOWN MODERATE PVD S/P MODERATE STENOSIS ILIACS IN 2009  PLAN:  I think we have the blood pressure under significantly better control    with beta blockade added to the patient's outpatient regimen, which included    just Lasix and an ACE inhibitor.  The patient does have bibasilar rhonchi.     Iron studies done in the hospital did show significant iron deficiency    component.  She is probably overdue for a colonoscopy.  Her CBC is diminished    and the MCV is very low at 66, which leaves me to believe that either  she has    been chronically losing blood or has some type of occult malignancy versus a    hemoglobinopathy; 66 is pretty low, so I may do a hemoglobin electrophoresis as the MCV has been  Low for 8 years with no indication of colon cancer thus far.     She does have some teardrop cells on her peripheral smear.  I am sure whether she is    overdue for colonoscopy.  We will go ahead and get some stool guaiacs.  We    will continue the Lopressor 25 b.i.d.  She seems to be quite sensitive to that   as her heart rate drops into the 60s.  Back in 2011, patient also had a MCV    of 63.5, so this is probably a hereditary condition rather than an iron    deficiency.  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  I will actually try to set up a pharmacy to give iv iron  For total body iron replacement as well and get rid of the po iron.  Labs reviewed, Medications reviewed and Radiology images reviewed  Pink catheter: No Pink

## 2017-09-21 ENCOUNTER — RESOLUTE PROFESSIONAL BILLING HOSPITAL PROF FEE (OUTPATIENT)
Dept: HOSPITALIST | Facility: MEDICAL CENTER | Age: 77
End: 2017-09-21
Payer: MEDICARE

## 2017-09-21 ENCOUNTER — APPOINTMENT (OUTPATIENT)
Dept: RADIOLOGY | Facility: MEDICAL CENTER | Age: 77
DRG: 682 | End: 2017-09-21
Attending: FAMILY MEDICINE
Payer: MEDICARE

## 2017-09-21 ENCOUNTER — APPOINTMENT (OUTPATIENT)
Dept: RADIOLOGY | Facility: MEDICAL CENTER | Age: 77
DRG: 682 | End: 2017-09-21
Attending: EMERGENCY MEDICINE
Payer: MEDICARE

## 2017-09-21 ENCOUNTER — HOSPITAL ENCOUNTER (INPATIENT)
Facility: MEDICAL CENTER | Age: 77
LOS: 116 days | DRG: 682 | End: 2018-01-15
Attending: EMERGENCY MEDICINE | Admitting: FAMILY MEDICINE
Payer: MEDICARE

## 2017-09-21 DIAGNOSIS — E86.0 DEHYDRATION: ICD-10-CM

## 2017-09-21 DIAGNOSIS — I10 ESSENTIAL HYPERTENSION: ICD-10-CM

## 2017-09-21 DIAGNOSIS — R29.6 MULTIPLE FALLS: ICD-10-CM

## 2017-09-21 DIAGNOSIS — Z86.73 HISTORY OF CVA (CEREBROVASCULAR ACCIDENT): ICD-10-CM

## 2017-09-21 DIAGNOSIS — F03.90 DEMENTIA WITHOUT BEHAVIORAL DISTURBANCE, UNSPECIFIED DEMENTIA TYPE: ICD-10-CM

## 2017-09-21 PROBLEM — N28.9 ACUTE RENAL INSUFFICIENCY: Status: ACTIVE | Noted: 2017-09-21

## 2017-09-21 LAB
ABO GROUP BLD: NORMAL
ALBUMIN SERPL BCP-MCNC: 3.7 G/DL (ref 3.2–4.9)
ALBUMIN/GLOB SERPL: 1.5 G/DL
ALP SERPL-CCNC: 67 U/L (ref 30–99)
ALT SERPL-CCNC: 22 U/L (ref 2–50)
ANION GAP SERPL CALC-SCNC: 6 MMOL/L (ref 0–11.9)
ANISOCYTOSIS BLD QL SMEAR: ABNORMAL
APPEARANCE UR: ABNORMAL
APTT PPP: 30.3 SEC (ref 24.7–36)
AST SERPL-CCNC: 21 U/L (ref 12–45)
BACTERIA #/AREA URNS HPF: ABNORMAL /HPF
BASOPHILS # BLD AUTO: 0 % (ref 0–1.8)
BASOPHILS # BLD: 0 K/UL (ref 0–0.12)
BILIRUB SERPL-MCNC: 0.3 MG/DL (ref 0.1–1.5)
BILIRUB UR QL STRIP.AUTO: NEGATIVE
BLD GP AB SCN SERPL QL: NORMAL
BNP SERPL-MCNC: 114 PG/ML (ref 0–100)
BUN SERPL-MCNC: 21 MG/DL (ref 8–22)
CALCIUM SERPL-MCNC: 9.4 MG/DL (ref 8.5–10.5)
CHLORIDE SERPL-SCNC: 110 MMOL/L (ref 96–112)
CO2 SERPL-SCNC: 26 MMOL/L (ref 20–33)
COLOR UR: YELLOW
CREAT SERPL-MCNC: 0.97 MG/DL (ref 0.5–1.4)
CULTURE IF INDICATED INDCX: YES UA CULTURE
EKG IMPRESSION: NORMAL
EOSINOPHIL # BLD AUTO: 0.1 K/UL (ref 0–0.51)
EOSINOPHIL NFR BLD: 1.7 % (ref 0–6.9)
EPI CELLS #/AREA URNS HPF: ABNORMAL /HPF
ERYTHROCYTE [DISTWIDTH] IN BLOOD BY AUTOMATED COUNT: 77.6 FL (ref 35.9–50)
GFR SERPL CREATININE-BSD FRML MDRD: 56 ML/MIN/1.73 M 2
GLOBULIN SER CALC-MCNC: 2.4 G/DL (ref 1.9–3.5)
GLUCOSE SERPL-MCNC: 92 MG/DL (ref 65–99)
GLUCOSE UR STRIP.AUTO-MCNC: NEGATIVE MG/DL
HCT VFR BLD AUTO: 40.6 % (ref 37–47)
HGB BLD-MCNC: 12.7 G/DL (ref 12–16)
HYALINE CASTS #/AREA URNS LPF: ABNORMAL /LPF
INR PPP: 1.07 (ref 0.87–1.13)
KETONES UR STRIP.AUTO-MCNC: ABNORMAL MG/DL
LEUKOCYTE ESTERASE UR QL STRIP.AUTO: ABNORMAL
LYMPHOCYTES # BLD AUTO: 1.27 K/UL (ref 1–4.8)
LYMPHOCYTES NFR BLD: 20.9 % (ref 22–41)
MANUAL DIFF BLD: NORMAL
MCH RBC QN AUTO: 25.2 PG (ref 27–33)
MCHC RBC AUTO-ENTMCNC: 31.3 G/DL (ref 33.6–35)
MCV RBC AUTO: 80.6 FL (ref 81.4–97.8)
MICRO URNS: ABNORMAL
MICROCYTES BLD QL SMEAR: ABNORMAL
MONOCYTES # BLD AUTO: 0.32 K/UL (ref 0–0.85)
MONOCYTES NFR BLD AUTO: 5.2 % (ref 0–13.4)
MORPHOLOGY BLD-IMP: NORMAL
NEUTROPHILS # BLD AUTO: 4.4 K/UL (ref 2–7.15)
NEUTROPHILS NFR BLD: 72.2 % (ref 44–72)
NITRITE UR QL STRIP.AUTO: NEGATIVE
NRBC # BLD AUTO: 0 K/UL
NRBC BLD AUTO-RTO: 0 /100 WBC
OVALOCYTES BLD QL SMEAR: NORMAL
PH UR STRIP.AUTO: 5 [PH]
PLATELET # BLD AUTO: 184 K/UL (ref 164–446)
PLATELET BLD QL SMEAR: NORMAL
POIKILOCYTOSIS BLD QL SMEAR: NORMAL
POTASSIUM SERPL-SCNC: 3.8 MMOL/L (ref 3.6–5.5)
PROT SERPL-MCNC: 6.1 G/DL (ref 6–8.2)
PROT UR QL STRIP: NEGATIVE MG/DL
PROTHROMBIN TIME: 14.2 SEC (ref 12–14.6)
RBC # BLD AUTO: 5.04 M/UL (ref 4.2–5.4)
RBC # URNS HPF: ABNORMAL /HPF
RBC BLD AUTO: PRESENT
RBC UR QL AUTO: NEGATIVE
RH BLD: NORMAL
SODIUM SERPL-SCNC: 142 MMOL/L (ref 135–145)
SP GR UR STRIP.AUTO: 1.02
SPHEROCYTES BLD QL SMEAR: NORMAL
TROPONIN I SERPL-MCNC: 0.04 NG/ML (ref 0–0.04)
UROBILINOGEN UR STRIP.AUTO-MCNC: 1 MG/DL
WBC # BLD AUTO: 6.1 K/UL (ref 4.8–10.8)
WBC #/AREA URNS HPF: ABNORMAL /HPF

## 2017-09-21 PROCEDURE — 86900 BLOOD TYPING SEROLOGIC ABO: CPT

## 2017-09-21 PROCEDURE — 770006 HCHG ROOM/CARE - MED/SURG/GYN SEMI*

## 2017-09-21 PROCEDURE — 86850 RBC ANTIBODY SCREEN: CPT

## 2017-09-21 PROCEDURE — 84484 ASSAY OF TROPONIN QUANT: CPT

## 2017-09-21 PROCEDURE — 93005 ELECTROCARDIOGRAM TRACING: CPT | Performed by: EMERGENCY MEDICINE

## 2017-09-21 PROCEDURE — 85027 COMPLETE CBC AUTOMATED: CPT

## 2017-09-21 PROCEDURE — 80053 COMPREHEN METABOLIC PANEL: CPT

## 2017-09-21 PROCEDURE — 71010 DX-CHEST-PORTABLE (1 VIEW): CPT

## 2017-09-21 PROCEDURE — 83880 ASSAY OF NATRIURETIC PEPTIDE: CPT

## 2017-09-21 PROCEDURE — 85007 BL SMEAR W/DIFF WBC COUNT: CPT

## 2017-09-21 PROCEDURE — 85730 THROMBOPLASTIN TIME PARTIAL: CPT

## 2017-09-21 PROCEDURE — 85610 PROTHROMBIN TIME: CPT

## 2017-09-21 PROCEDURE — 87086 URINE CULTURE/COLONY COUNT: CPT

## 2017-09-21 PROCEDURE — 99223 1ST HOSP IP/OBS HIGH 75: CPT | Mod: AI | Performed by: FAMILY MEDICINE

## 2017-09-21 PROCEDURE — 99285 EMERGENCY DEPT VISIT HI MDM: CPT

## 2017-09-21 PROCEDURE — 86901 BLOOD TYPING SEROLOGIC RH(D): CPT

## 2017-09-21 PROCEDURE — 72131 CT LUMBAR SPINE W/O DYE: CPT

## 2017-09-21 PROCEDURE — 70450 CT HEAD/BRAIN W/O DYE: CPT

## 2017-09-21 PROCEDURE — 81001 URINALYSIS AUTO W/SCOPE: CPT

## 2017-09-21 PROCEDURE — 36415 COLL VENOUS BLD VENIPUNCTURE: CPT

## 2017-09-21 PROCEDURE — 72128 CT CHEST SPINE W/O DYE: CPT

## 2017-09-21 RX ORDER — LABETALOL HYDROCHLORIDE 5 MG/ML
10 INJECTION, SOLUTION INTRAVENOUS EVERY 4 HOURS PRN
Status: DISCONTINUED | OUTPATIENT
Start: 2017-09-21 | End: 2017-10-23

## 2017-09-21 RX ORDER — OXYBUTYNIN CHLORIDE 5 MG/1
5 TABLET, EXTENDED RELEASE ORAL EVERY EVENING
Status: DISCONTINUED | OUTPATIENT
Start: 2017-09-21 | End: 2018-01-15 | Stop reason: HOSPADM

## 2017-09-21 RX ORDER — ONDANSETRON 4 MG/1
4 TABLET, ORALLY DISINTEGRATING ORAL EVERY 4 HOURS PRN
Status: DISCONTINUED | OUTPATIENT
Start: 2017-09-21 | End: 2018-01-15 | Stop reason: HOSPADM

## 2017-09-21 RX ORDER — OXYCODONE HYDROCHLORIDE 5 MG/1
2.5 TABLET ORAL
Status: DISCONTINUED | OUTPATIENT
Start: 2017-09-21 | End: 2017-12-26

## 2017-09-21 RX ORDER — GABAPENTIN 300 MG/1
300 CAPSULE ORAL EVERY EVENING
Status: DISCONTINUED | OUTPATIENT
Start: 2017-09-21 | End: 2017-12-26

## 2017-09-21 RX ORDER — HYDRALAZINE HYDROCHLORIDE 20 MG/ML
10 INJECTION INTRAMUSCULAR; INTRAVENOUS EVERY 6 HOURS PRN
Status: DISCONTINUED | OUTPATIENT
Start: 2017-09-21 | End: 2017-10-23

## 2017-09-21 RX ORDER — LIDOCAINE 50 MG/G
1 PATCH TOPICAL EVERY 24 HOURS
Status: DISCONTINUED | OUTPATIENT
Start: 2017-09-22 | End: 2018-01-15 | Stop reason: HOSPADM

## 2017-09-21 RX ORDER — OMEPRAZOLE 20 MG/1
20 CAPSULE, DELAYED RELEASE ORAL 2 TIMES DAILY
Status: DISCONTINUED | OUTPATIENT
Start: 2017-09-21 | End: 2017-09-28

## 2017-09-21 RX ORDER — HYDRALAZINE HYDROCHLORIDE 50 MG/1
50 TABLET, FILM COATED ORAL EVERY 8 HOURS
Status: DISCONTINUED | OUTPATIENT
Start: 2017-09-21 | End: 2017-09-24

## 2017-09-21 RX ORDER — MORPHINE SULFATE 4 MG/ML
2 INJECTION, SOLUTION INTRAMUSCULAR; INTRAVENOUS
Status: DISCONTINUED | OUTPATIENT
Start: 2017-09-21 | End: 2017-10-23

## 2017-09-21 RX ORDER — OXYCODONE HYDROCHLORIDE 5 MG/1
5 TABLET ORAL
Status: DISCONTINUED | OUTPATIENT
Start: 2017-09-21 | End: 2017-12-26

## 2017-09-21 RX ORDER — ATORVASTATIN CALCIUM 40 MG/1
80 TABLET, FILM COATED ORAL
Status: DISCONTINUED | OUTPATIENT
Start: 2017-09-21 | End: 2018-01-15 | Stop reason: HOSPADM

## 2017-09-21 RX ORDER — HEPARIN SODIUM 5000 [USP'U]/ML
5000 INJECTION, SOLUTION INTRAVENOUS; SUBCUTANEOUS EVERY 8 HOURS
Status: DISCONTINUED | OUTPATIENT
Start: 2017-09-21 | End: 2017-10-25

## 2017-09-21 RX ORDER — SODIUM CHLORIDE 9 MG/ML
INJECTION, SOLUTION INTRAVENOUS CONTINUOUS
Status: DISCONTINUED | OUTPATIENT
Start: 2017-09-21 | End: 2017-09-25

## 2017-09-21 RX ORDER — ERGOCALCIFEROL 1.25 MG/1
50000 CAPSULE ORAL
Status: DISCONTINUED | OUTPATIENT
Start: 2017-09-27 | End: 2017-12-26

## 2017-09-21 RX ORDER — ACETAMINOPHEN 325 MG/1
650 TABLET ORAL EVERY 6 HOURS PRN
Status: DISCONTINUED | OUTPATIENT
Start: 2017-09-21 | End: 2017-11-21

## 2017-09-21 RX ORDER — AMOXICILLIN 250 MG
2 CAPSULE ORAL 2 TIMES DAILY
Status: DISCONTINUED | OUTPATIENT
Start: 2017-09-21 | End: 2017-11-23

## 2017-09-21 RX ORDER — POLYETHYLENE GLYCOL 3350 17 G/17G
1 POWDER, FOR SOLUTION ORAL
Status: DISCONTINUED | OUTPATIENT
Start: 2017-09-21 | End: 2018-01-15 | Stop reason: HOSPADM

## 2017-09-21 RX ORDER — BISACODYL 10 MG
10 SUPPOSITORY, RECTAL RECTAL
Status: DISCONTINUED | OUTPATIENT
Start: 2017-09-21 | End: 2018-01-15 | Stop reason: HOSPADM

## 2017-09-21 RX ORDER — ONDANSETRON 2 MG/ML
4 INJECTION INTRAMUSCULAR; INTRAVENOUS EVERY 4 HOURS PRN
Status: DISCONTINUED | OUTPATIENT
Start: 2017-09-21 | End: 2018-01-15 | Stop reason: HOSPADM

## 2017-09-21 RX ORDER — TRAZODONE HYDROCHLORIDE 50 MG/1
50 TABLET ORAL
Status: DISCONTINUED | OUTPATIENT
Start: 2017-09-21 | End: 2017-10-10

## 2017-09-21 RX ORDER — DULOXETIN HYDROCHLORIDE 20 MG/1
20 CAPSULE, DELAYED RELEASE ORAL DAILY
Status: DISCONTINUED | OUTPATIENT
Start: 2017-09-22 | End: 2018-01-15 | Stop reason: HOSPADM

## 2017-09-21 ASSESSMENT — ENCOUNTER SYMPTOMS
MEMORY LOSS: 1
SHORTNESS OF BREATH: 0
DIZZINESS: 1
VOMITING: 0
ABDOMINAL PAIN: 0
CHILLS: 0
HEADACHES: 0
FEVER: 0
FALLS: 1
NAUSEA: 0

## 2017-09-21 ASSESSMENT — LIFESTYLE VARIABLES
PACK_YEARS: 20
EVER_SMOKED: YES
ALCOHOL_USE: NO

## 2017-09-21 NOTE — ED PROVIDER NOTES
ED Provider Note    Scribed for Augie Daniel M.D. by Estella Hamilton. 9/21/2017, 2:32 PM.    Primary care provider: Bernabe Hopkins D.O.  Means of arrival: EMS  History obtained from: Patient  History limited by: None    CHIEF COMPLAINT  Chief Complaint   Patient presents with   • Failure to Thrive     HPI  Maria Esther Valencia is a 76 y.o. female who presents to the Emergency Department for evaluation of altered level of consciousness. Per nurse, patient's CNA reported she hit the left side of her head on her nighstand approximately 8 hours ago. Patient has history of dementia. CNA reported patient was altered this morning from her normal baseline behavior. Patient reports ground level fall was secondary to feeling light headed. She states she feels light headed at this time. Patient reports she does not remember the details of ground level fall this morning but does remember falling. She states chronic weakness to lower extremities and multiple ground level falls. Denies headache, shortness of breath, chest pain, heart palpitations, nausea, vomiting, dysuria, fever, or chills. Patient reports history of cigarette smoking, she quit smoking two years ago. Denies alcohol use. Patient reports history of dementia. Patient reports she was just discharged from [facility] in the past week. She does not feel like she can take care of herself at home.    REVIEW OF SYSTEMS  Review of Systems   Constitutional: Negative for chills and fever.   Respiratory: Negative for shortness of breath.    Cardiovascular: Negative for chest pain.   Gastrointestinal: Negative for abdominal pain, nausea and vomiting.   Genitourinary: Negative for dysuria.   Musculoskeletal: Positive for falls.   Neurological: Positive for dizziness. Negative for headaches.   Psychiatric/Behavioral: Positive for memory loss.   All other systems reviewed and are negative.  C    PAST MEDICAL HISTORY   has a past medical history of CAD (coronary artery  disease); Compression fracture of spine (CMS-HCC); Congestive heart failure (CMS-HCC); Dementia; GI bleed; Hypertension; Psychiatric disorder; and Stroke (CMS-HCC).    SURGICAL HISTORY   has a past surgical history that includes other cardiac surgery.    SOCIAL HISTORY  Social History   Substance Use Topics   • Smoking status: Current Every Day Smoker   • Smokeless tobacco: Never Used   • Alcohol use Yes      History   Drug Use No     FAMILY HISTORY  Family History   Problem Relation Age of Onset   • Heart Attack Mother    • Heart Attack Father        CURRENT MEDICATIONS  No current facility-administered medications on file prior to encounter.      Current Outpatient Prescriptions on File Prior to Encounter   Medication Sig Dispense Refill   • acetaminophen (TYLENOL) 325 MG Tab Take 2 Tabs by mouth every four hours as needed. 30 Tab 0   • aspirin EC 81 MG EC tablet Take 1 Tab by mouth every day. 30 Tab    • vitamin D, Ergocalciferol, (DRISDOL) 68738 UNITS Cap capsule Take 1 Cap by mouth every 7 days. 30 Cap    • trazodone (DESYREL) 50 MG Tab Take 1 Tab by mouth at bedtime as needed. 30 Tab 0   • senna-docusate (PERICOLACE OR SENOKOT S) 8.6-50 MG Tab Take 2 Tabs by mouth 2 Times a Day. 30 Tab 0   • atorvastatin (LIPITOR) 80 MG tablet Take 1 Tab by mouth every bedtime. 30 Tab    • benazepril (LOTENSIN) 40 MG tablet Take 1 Tab by mouth every day. 30 Tab    • duloxetine (CYMBALTA) 20 MG Cap DR Particles Take 1 Cap by mouth every day. 30 Cap    • furosemide (LASIX) 20 MG Tab Take 1 Tab by mouth every day. 60 Tab    • gabapentin (NEURONTIN) 300 MG Cap Take 1 Cap by mouth every evening. 90 Cap    • hydrALAZINE (APRESOLINE) 50 MG Tab Take 1 Tab by mouth every 8 hours. 90 Tab    • lidocaine (LIDODERM) 5 % Patch Apply 1 Patch to skin as directed every 24 hours. 10 Patch    • potassium chloride SA (K-DUR) 10 MEQ Tab CR Take 1 Tab by mouth every day. 60 Tab 0   • oxybutynin SR (DITROPAN-XL) 5 MG TABLET SR 24 HR Take 1 Tab by  "mouth every evening. 30 Tab    • omeprazole (PRILOSEC) 20 MG delayed-release capsule Take 1 Cap by mouth 2 Times a Day. 30 Cap    • mag hydrox-al hydrox-simeth (MAALOX PLUS ES OR MYLANTA DS) 400-400-40 MG/5ML Suspension Take 20 mL by mouth every 2 hours as needed (dyspepsia). 560 mL    • metoprolol (LOPRESSOR) 25 MG Tab Take 0.5 Tabs by mouth 2 Times a Day. 60 Tab    • nicotine (NICODERM) 14 MG/24HR PATCH 24 HR Apply 1 Patch to skin as directed every 24 hours. 30 Patch      ALLERGIES  Allergies   Allergen Reactions   • Pcn [Penicillins] Unspecified     Per historical. Pt cannot verify what reaction she had     PHYSICAL EXAM  VITAL SIGNS: BP (!) 169/69   Pulse 66   Temp 36 °C (96.8 °F)   Resp (!) 25   Ht 1.6 m (5' 3\")   Wt 86.2 kg (190 lb)   SpO2 93%   BMI 33.66 kg/m²     Constitutional: Well developed, Well nourished, No acute distress, Non-toxic appearance.   HENT: Normocephalic, Atraumatic, Bilateral external ears normal, oropharynx moist, No oral exudates, Nose normal.   Eyes: Pupils are equal round and react to light, extraocular motions are intact, conjunctiva is normal, there are no signs of exudate.   Neck: Supple, no meningeal signs.  Lymphatic: No lymphadenopathy noted.   Cardiovascular: Regular rate and rhythm without murmurs gallops or rubs.   Thorax & Lungs: No respiratory distress. Breathing comfortably. Lungs are clear to auscultation bilaterally, there are no wheezes no rales. Chest wall is nontender.  Abdomen: Soft, nontender, nondistended. Bowel sounds are present.   Skin: Warm, Dry, No erythema,   Back: No tenderness, No CVA tenderness.  Musculoskeletal: Good range of motion in all major joints. No tenderness to palpation or major deformities noted. Intact distal pulses, no clubbing, no cyanosis, no edema,   Neurologic: Alert & oriented person, place but not time or events, Moving all extremities. No gross abnormalities.    Psychiatric: Affect normal, Judgment normal, Mood normal. "     LABS  Results for orders placed or performed during the hospital encounter of 09/21/17   CBC WITH DIFFERENTIAL   Result Value Ref Range    WBC 6.1 4.8 - 10.8 K/uL    RBC 5.04 4.20 - 5.40 M/uL    Hemoglobin 12.7 12.0 - 16.0 g/dL    Hematocrit 40.6 37.0 - 47.0 %    MCV 80.6 (L) 81.4 - 97.8 fL    MCH 25.2 (L) 27.0 - 33.0 pg    MCHC 31.3 (L) 33.6 - 35.0 g/dL    RDW 77.6 (H) 35.9 - 50.0 fL    Platelet Count 184 164 - 446 K/uL    Neutrophils-Polys 72.20 (H) 44.00 - 72.00 %    Lymphocytes 20.90 (L) 22.00 - 41.00 %    Monocytes 5.20 0.00 - 13.40 %    Eosinophils 1.70 0.00 - 6.90 %    Basophils 0.00 0.00 - 1.80 %    Nucleated RBC 0.00 /100 WBC    Neutrophils (Absolute) 4.40 2.00 - 7.15 K/uL    Lymphs (Absolute) 1.27 1.00 - 4.80 K/uL    Monos (Absolute) 0.32 0.00 - 0.85 K/uL    Eos (Absolute) 0.10 0.00 - 0.51 K/uL    Baso (Absolute) 0.00 0.00 - 0.12 K/uL    NRBC (Absolute) 0.00 K/uL    Anisocytosis 1+     Microcytosis 1+    COMP METABOLIC PANEL   Result Value Ref Range    Sodium 142 135 - 145 mmol/L    Potassium 3.8 3.6 - 5.5 mmol/L    Chloride 110 96 - 112 mmol/L    Co2 26 20 - 33 mmol/L    Anion Gap 6.0 0.0 - 11.9    Glucose 92 65 - 99 mg/dL    Bun 21 8 - 22 mg/dL    Creatinine 0.97 0.50 - 1.40 mg/dL    Calcium 9.4 8.5 - 10.5 mg/dL    AST(SGOT) 21 12 - 45 U/L    ALT(SGPT) 22 2 - 50 U/L    Alkaline Phosphatase 67 30 - 99 U/L    Total Bilirubin 0.3 0.1 - 1.5 mg/dL    Albumin 3.7 3.2 - 4.9 g/dL    Total Protein 6.1 6.0 - 8.2 g/dL    Globulin 2.4 1.9 - 3.5 g/dL    A-G Ratio 1.5 g/dL   PROTHROMBIN TIME   Result Value Ref Range    PT 14.2 12.0 - 14.6 sec    INR 1.07 0.87 - 1.13   APTT   Result Value Ref Range    APTT 30.3 24.7 - 36.0 sec   COD (ADULT)   Result Value Ref Range    ABO Grouping Only B     Rh Grouping Only POS     Antibody Screen-Cod NEG    TROPONIN   Result Value Ref Range    Troponin I 0.04 0.00 - 0.04 ng/mL   URINALYSIS CULTURE, IF INDICATED   Result Value Ref Range    Color Yellow     Character Cloudy (A)      Specific Gravity 1.024 <1.035    Ph 5.0 5.0 - 8.0    Glucose Negative Negative mg/dL    Ketones Trace (A) Negative mg/dL    Protein Negative Negative mg/dL    Bilirubin Negative Negative    Urobilinogen, Urine 1.0 Negative    Nitrite Negative Negative    Leukocyte Esterase Trace (A) Negative    Occult Blood Negative Negative    Micro Urine Req Microscopic     Culture Indicated Yes UA Culture   BNP   Result Value Ref Range    B Natriuretic Peptide 114 (H) 0 - 100 pg/mL   DIFFERENTIAL MANUAL   Result Value Ref Range    Manual Diff Status PERFORMED    PERIPHERAL SMEAR REVIEW   Result Value Ref Range    Peripheral Smear Review see below    PLATELET ESTIMATE   Result Value Ref Range    Plt Estimation Normal    MORPHOLOGY   Result Value Ref Range    RBC Morphology Present     Poikilocytosis 1+     Ovalocytes 1+     Spherocytes 1+    ESTIMATED GFR   Result Value Ref Range    GFR If African American >60 >60 mL/min/1.73 m 2    GFR If Non  56 (A) >60 mL/min/1.73 m 2   URINE MICROSCOPIC (W/UA)   Result Value Ref Range    WBC 10-20 (A) /hpf    RBC 2-5 (A) /hpf    Bacteria Many (A) None /hpf    Epithelial Cells Moderate (A) /hpf    Hyaline Cast 0-2 /lpf   EKG (NOW)   Result Value Ref Range    Report       [facility] Emergency Dept.    Test Date:  2017  Pt Name:    KRUNAL LOPEZ                 Department: ER  MRN:        [id number]                      [place]       [facility] [place]  Gender:     F                            Technician: [id number]  :        [id number]                   Requested By:OBINNA WARD  Order #:    [id number]                    Reading MD: OBINNA WARD MD    Measurements  Intervals                                Axis  Rate:       63                           P:          54  OR:         176                          QRS:        -25  QRSD:       96                           T:          94  QT:         424  QTc:        435    Interpretive Statements  SINUS RHYTHM  INFERIOR  INFARCT, AGE INDETERMINATE  LATERAL INFARCT, AGE INDETERMINATE  CONSIDER POSTERIOR WALL INVOLVEMENT  BASELINE WANDER IN LEAD(S) V1,V3  Compared to ECG 07/29/2017 12:12:30  non specific twave flattening diffusly    Electronically Signed On 9- 20:54:44 PDT by OBINNA WARD MD       All labs reviewed by me.    EKG  Interpreted by me as above.     RADIOLOGY  CT-LSPINE W/O PLUS RECONS   Final Result      1.  No interval change in chronic appearing T12 and L1 compression deformities.      2.  No acute fractures identified.      3.  Levoconvex scoliosis with multilevel degenerative change.      CT-TSPINE W/O PLUS RECONS   Final Result         No acute thoracic compression fracture or subluxation.      CT-HEAD W/O   Final Result      1.  No evidence of acute intracranial process.      2.  Cerebral atrophy as well as periventricular chronic small vessel ischemic change.      DX-CHEST-PORTABLE (1 VIEW)   Final Result      Stable cardiomegaly.      Atherosclerotic plaque.      Minimal left midlung atelectasis versus scarring.        The radiologist's interpretation of all radiological studies have been reviewed by me.    COURSE & MEDICAL DECISION MAKING  Pertinent Labs & Imaging studies reviewed. (See chart for details)    2:32 PM - Patient seen and examined at bedside. Patient is here with altered level of consciousness. She is alert to person, place, but not time or events. No signs of head trauma. I discussed with patient her at home care, she feels uncomfortable with taking care of herself. Discussed admit to hospital and she feels comfortable with admit. Ordered DX-Chest Portable, CT-Head w/o, CBC with differential, CMP, PTT, APTT, COD, Troponin, Urinalysis, BNP, EKG to evaluate her symptoms. The differential diagnoses include but are not limited to: recurrent failure to thrive, orthostatic hypertension, altered mental state due to trauma or infection, cerebrovascular accident. Paged hospitalist.     6:02 PM I  discussed the patient's case and the above findings with Dr. Metcalf (hospitalist) who agrees to admit patient.     Decision Making:  Patient presents to ED for evaluation. Clinically, she is alert and oriented to person, place but not time or event. According to care providers, she is at baseline mental status is no signs of intracerebral bleed from CT scan, laboratory studies are essentially normal. The patient does have orthostatic changes, but is hypertensive here. At this point, I do feel she has mild dehydrated. She has a history of hypertension with dementia. We will admit the patient because at this point, the patient is living at home alone and with her multiple falls. I do filled, so risk for her. I spoke to hospitalist for this admission.    DISPOSITION:  Patient will be admitted to Dr. Metcalf in guarded condition.    FINAL IMPRESSION  1. Multiple falls    2. Essential hypertension    3. Dementia without behavioral disturbance, unspecified dementia type    4. Dehydration        Estella JHAVERI (Scribe), am scribing for, and in the presence of, Augie Daniel M.D..    Electronically signed by: Estella Hamilton (Scribe), 9/21/2017    IAugie M.D. personally performed the services described in this documentation, as scribed by Estella Hamilton in my presence, and it is both accurate and complete.    The note accurately reflects work and decisions made by me.  Augie Daniel  9/21/2017  8:59 PM

## 2017-09-21 NOTE — ED NOTES
PT BIB EMS. Pt was at home and per CNA with home health pt hit left head on nightstand at 6:30 this AM. Pt has a hx of dementia. Home health nurse arrived and called EMS at 1400 stating that pt was altered in comparison to baseline. When EMS arrived a friend of the pt told EMS that this was her baseline. Home health RN believes this pt to be failure to thrive. Pt denies and pain or tenderness on palpation of head. No would visualized.

## 2017-09-22 PROBLEM — N39.0 UTI (URINARY TRACT INFECTION): Status: ACTIVE | Noted: 2017-09-22

## 2017-09-22 LAB
ANION GAP SERPL CALC-SCNC: 8 MMOL/L (ref 0–11.9)
ANISOCYTOSIS BLD QL SMEAR: ABNORMAL
BASOPHILS # BLD AUTO: 0.9 % (ref 0–1.8)
BASOPHILS # BLD: 0.05 K/UL (ref 0–0.12)
BUN SERPL-MCNC: 17 MG/DL (ref 8–22)
CALCIUM SERPL-MCNC: 9.3 MG/DL (ref 8.5–10.5)
CHLORIDE SERPL-SCNC: 110 MMOL/L (ref 96–112)
CO2 SERPL-SCNC: 24 MMOL/L (ref 20–33)
CREAT SERPL-MCNC: 0.72 MG/DL (ref 0.5–1.4)
EOSINOPHIL # BLD AUTO: 0.37 K/UL (ref 0–0.51)
EOSINOPHIL NFR BLD: 7.2 % (ref 0–6.9)
ERYTHROCYTE [DISTWIDTH] IN BLOOD BY AUTOMATED COUNT: 75.6 FL (ref 35.9–50)
GFR SERPL CREATININE-BSD FRML MDRD: >60 ML/MIN/1.73 M 2
GLUCOSE SERPL-MCNC: 89 MG/DL (ref 65–99)
HCT VFR BLD AUTO: 38.2 % (ref 37–47)
HGB BLD-MCNC: 12.1 G/DL (ref 12–16)
LYMPHOCYTES # BLD AUTO: 1.07 K/UL (ref 1–4.8)
LYMPHOCYTES NFR BLD: 20.5 % (ref 22–41)
MANUAL DIFF BLD: NORMAL
MCH RBC QN AUTO: 25.4 PG (ref 27–33)
MCHC RBC AUTO-ENTMCNC: 31.7 G/DL (ref 33.6–35)
MCV RBC AUTO: 80.1 FL (ref 81.4–97.8)
MICROCYTES BLD QL SMEAR: ABNORMAL
MONOCYTES # BLD AUTO: 0.56 K/UL (ref 0–0.85)
MONOCYTES NFR BLD AUTO: 10.7 % (ref 0–13.4)
MORPHOLOGY BLD-IMP: NORMAL
NEUTROPHILS # BLD AUTO: 3.16 K/UL (ref 2–7.15)
NEUTROPHILS NFR BLD: 60.7 % (ref 44–72)
NRBC # BLD AUTO: 0 K/UL
NRBC BLD AUTO-RTO: 0 /100 WBC
OVALOCYTES BLD QL SMEAR: NORMAL
PLATELET # BLD AUTO: 174 K/UL (ref 164–446)
PLATELET BLD QL SMEAR: NORMAL
POIKILOCYTOSIS BLD QL SMEAR: NORMAL
POTASSIUM SERPL-SCNC: 3.4 MMOL/L (ref 3.6–5.5)
POTASSIUM SERPL-SCNC: 3.7 MMOL/L (ref 3.6–5.5)
RBC # BLD AUTO: 4.77 M/UL (ref 4.2–5.4)
RBC BLD AUTO: PRESENT
SODIUM SERPL-SCNC: 142 MMOL/L (ref 135–145)
WBC # BLD AUTO: 5.2 K/UL (ref 4.8–10.8)

## 2017-09-22 PROCEDURE — 700111 HCHG RX REV CODE 636 W/ 250 OVERRIDE (IP): Performed by: FAMILY MEDICINE

## 2017-09-22 PROCEDURE — 700111 HCHG RX REV CODE 636 W/ 250 OVERRIDE (IP): Performed by: HOSPITALIST

## 2017-09-22 PROCEDURE — 80048 BASIC METABOLIC PNL TOTAL CA: CPT

## 2017-09-22 PROCEDURE — 700105 HCHG RX REV CODE 258: Performed by: HOSPITALIST

## 2017-09-22 PROCEDURE — 700102 HCHG RX REV CODE 250 W/ 637 OVERRIDE(OP): Performed by: FAMILY MEDICINE

## 2017-09-22 PROCEDURE — A9270 NON-COVERED ITEM OR SERVICE: HCPCS | Performed by: FAMILY MEDICINE

## 2017-09-22 PROCEDURE — 84132 ASSAY OF SERUM POTASSIUM: CPT

## 2017-09-22 PROCEDURE — 770006 HCHG ROOM/CARE - MED/SURG/GYN SEMI*

## 2017-09-22 PROCEDURE — 36415 COLL VENOUS BLD VENIPUNCTURE: CPT

## 2017-09-22 PROCEDURE — 700102 HCHG RX REV CODE 250 W/ 637 OVERRIDE(OP): Performed by: HOSPITALIST

## 2017-09-22 PROCEDURE — A9270 NON-COVERED ITEM OR SERVICE: HCPCS | Performed by: HOSPITALIST

## 2017-09-22 PROCEDURE — 85007 BL SMEAR W/DIFF WBC COUNT: CPT

## 2017-09-22 PROCEDURE — 99232 SBSQ HOSP IP/OBS MODERATE 35: CPT | Performed by: HOSPITALIST

## 2017-09-22 PROCEDURE — 85027 COMPLETE CBC AUTOMATED: CPT

## 2017-09-22 PROCEDURE — 700105 HCHG RX REV CODE 258: Performed by: FAMILY MEDICINE

## 2017-09-22 PROCEDURE — 700101 HCHG RX REV CODE 250: Performed by: FAMILY MEDICINE

## 2017-09-22 RX ORDER — POTASSIUM CHLORIDE 20 MEQ/1
20 TABLET, EXTENDED RELEASE ORAL ONCE
Status: COMPLETED | OUTPATIENT
Start: 2017-09-22 | End: 2017-09-22

## 2017-09-22 RX ADMIN — HEPARIN SODIUM 5000 UNITS: 5000 INJECTION, SOLUTION INTRAVENOUS; SUBCUTANEOUS at 14:21

## 2017-09-22 RX ADMIN — METOPROLOL TARTRATE 12.5 MG: 25 TABLET, FILM COATED ORAL at 08:36

## 2017-09-22 RX ADMIN — HYDRALAZINE HYDROCHLORIDE 50 MG: 50 TABLET, FILM COATED ORAL at 14:21

## 2017-09-22 RX ADMIN — CEFTRIAXONE SODIUM 1 G: 1 INJECTION, POWDER, FOR SOLUTION INTRAMUSCULAR; INTRAVENOUS at 14:21

## 2017-09-22 RX ADMIN — OMEPRAZOLE 20 MG: 20 CAPSULE, DELAYED RELEASE ORAL at 20:38

## 2017-09-22 RX ADMIN — POTASSIUM CHLORIDE 20 MEQ: 1500 TABLET, EXTENDED RELEASE ORAL at 08:41

## 2017-09-22 RX ADMIN — HEPARIN SODIUM 5000 UNITS: 5000 INJECTION, SOLUTION INTRAVENOUS; SUBCUTANEOUS at 21:00

## 2017-09-22 RX ADMIN — GABAPENTIN 300 MG: 300 CAPSULE ORAL at 20:38

## 2017-09-22 RX ADMIN — HEPARIN SODIUM 5000 UNITS: 5000 INJECTION, SOLUTION INTRAVENOUS; SUBCUTANEOUS at 06:29

## 2017-09-22 RX ADMIN — STANDARDIZED SENNA CONCENTRATE AND DOCUSATE SODIUM 2 TABLET: 8.6; 5 TABLET, FILM COATED ORAL at 20:40

## 2017-09-22 RX ADMIN — ONDANSETRON 4 MG: 2 INJECTION INTRAMUSCULAR; INTRAVENOUS at 08:40

## 2017-09-22 RX ADMIN — DULOXETINE HYDROCHLORIDE 20 MG: 20 CAPSULE, DELAYED RELEASE ORAL at 08:36

## 2017-09-22 RX ADMIN — ONDANSETRON 4 MG: 2 INJECTION INTRAMUSCULAR; INTRAVENOUS at 17:01

## 2017-09-22 RX ADMIN — OMEPRAZOLE 20 MG: 20 CAPSULE, DELAYED RELEASE ORAL at 08:35

## 2017-09-22 RX ADMIN — METOPROLOL TARTRATE 12.5 MG: 25 TABLET, FILM COATED ORAL at 20:39

## 2017-09-22 RX ADMIN — ASPIRIN 81 MG: 81 TABLET, COATED ORAL at 08:35

## 2017-09-22 RX ADMIN — SODIUM CHLORIDE: 9 INJECTION, SOLUTION INTRAVENOUS at 20:41

## 2017-09-22 RX ADMIN — OXYBUTYNIN CHLORIDE 5 MG: 5 TABLET, FILM COATED, EXTENDED RELEASE ORAL at 20:46

## 2017-09-22 RX ADMIN — HYDRALAZINE HYDROCHLORIDE 50 MG: 50 TABLET, FILM COATED ORAL at 06:29

## 2017-09-22 RX ADMIN — HYDRALAZINE HYDROCHLORIDE 50 MG: 50 TABLET, FILM COATED ORAL at 20:39

## 2017-09-22 RX ADMIN — ATORVASTATIN CALCIUM 80 MG: 40 TABLET, FILM COATED ORAL at 20:39

## 2017-09-22 RX ADMIN — SODIUM CHLORIDE: 9 INJECTION, SOLUTION INTRAVENOUS at 08:35

## 2017-09-22 ASSESSMENT — PAIN SCALES - GENERAL
PAINLEVEL_OUTOF10: 0
PAINLEVEL_OUTOF10: 0

## 2017-09-22 NOTE — H&P
DOS: 9/21/2017    PATIENT ID:  NAME:  Maria Esther Valencia  MRN:               [id number]  YOB: 1940    PMD: Bernabe Hopkins D.O.    CC: Fall    HPI: Maria Esther Valencia is a 76 y.o. female who presents with fall, she is a poor historian secondary to dementia, prior the home health was visiting her and found her on the floor. It is uncertain how long she was there. Of note patient was admitted here about 6 weeks ago for compression fracture of the spine. She was discharged to the rehab and skilled nursing facility, it appears that she may have been discharged about a week ago. Patient states she has difficulty moving secondary to the pain, she is supposed to use a walker but not sure if she is using it. She denies any fever or chills, cough or congestion, chest pain or shortness of breath, abdominal pain nausea vomiting or diarrhea. She does admit that she is not drinking or eating well. She does appear dehydrated.                REVIEW OF SYSTEMS  A full review of systems was completed and all pertinent positives and negatives are included in the HPI above by AMA/CMS criteria.    PAST MEDICAL HISTORY  Past Medical History:   Diagnosis Date   • CAD (coronary artery disease)    • Compression fracture of spine (CMS-HCC)    • Congestive heart failure (CMS-HCC)    • Dementia    • GI bleed    • Hypertension    • Psychiatric disorder    • Stroke (CMS-HCC)        PAST SURGICAL HISTORY  Past Surgical History:   Procedure Laterality Date   • OTHER CARDIAC SURGERY      stents       FAMILY HISTORY  Family History   Problem Relation Age of Onset   • Heart Attack Mother    • Heart Attack Father        SOCIAL HISTORY  Social History   Substance Use Topics   • Smoking status: Current Every Day Smoker   • Smokeless tobacco: Never Used   • Alcohol use Yes       ALLERGIES  Allergies as of 09/21/2017 - Reviewed 09/21/2017   Allergen Reaction Noted   • Pcn [penicillins] Unspecified 07/29/2017       OUTPATIENT MEDICATIONS      Medication List      ASK your doctor about these medications      Instructions   acetaminophen 325 MG Tabs  Commonly known as:  TYLENOL   Take 2 Tabs by mouth every four hours as needed.  Dose:  650 mg     aspirin 81 MG EC tablet   Take 1 Tab by mouth every day.  Dose:  81 mg     atorvastatin 80 MG tablet  Commonly known as:  LIPITOR   Take 1 Tab by mouth every bedtime.  Dose:  80 mg     benazepril 40 MG tablet  Commonly known as:  LOTENSIN   Take 1 Tab by mouth every day.  Dose:  40 mg     duloxetine 20 MG Cpep  Commonly known as:  CYMBALTA   Take 1 Cap by mouth every day.  Dose:  20 mg     furosemide 20 MG Tabs  Commonly known as:  LASIX   Take 1 Tab by mouth every day.  Dose:  20 mg     gabapentin 300 MG Caps  Commonly known as:  NEURONTIN   Take 1 Cap by mouth every evening.  Dose:  300 mg     hydrALAZINE 50 MG Tabs  Commonly known as:  APRESOLINE   Take 1 Tab by mouth every 8 hours.  Dose:  50 mg     lidocaine 5 % Ptch  Commonly known as:  LIDODERM   Apply 1 Patch to skin as directed every 24 hours.  Dose:  1 Patch     mag hydrox-al hydrox-simeth 400-400-40 MG/5ML Susp  Commonly known as:  MAALOX PLUS ES or MYLANTA DS   Take 20 mL by mouth every 2 hours as needed (dyspepsia).  Dose:  20 mL     metoprolol 25 MG Tabs  Commonly known as:  LOPRESSOR   Take 0.5 Tabs by mouth 2 Times a Day.  Dose:  12.5 mg     nicotine 14 MG/24HR Pt24  Commonly known as:  NICODERM   Apply 1 Patch to skin as directed every 24 hours.  Dose:  1 Patch     omeprazole 20 MG delayed-release capsule  Commonly known as:  PRILOSEC   Take 1 Cap by mouth 2 Times a Day.  Dose:  20 mg     oxybutynin SR 5 MG Tb24  Commonly known as:  DITROPAN-XL   Take 1 Tab by mouth every evening.  Dose:  5 mg     potassium chloride SA 10 MEQ Tbcr  Commonly known as:  K-DUR   Take 1 Tab by mouth every day.  Dose:  10 mEq     senna-docusate 8.6-50 MG Tabs  Commonly known as:  PERICOLACE or SENOKOT S   Take 2 Tabs by mouth 2 Times a Day.  Dose:  2 Tab    "  trazodone 50 MG Tabs  Commonly known as:  DESYREL   Take 1 Tab by mouth at bedtime as needed.  Dose:  50 mg     vitamin D (Ergocalciferol) 99724 units Caps capsule  Commonly known as:  DRISDOL   Take 1 Cap by mouth every 7 days.  Dose:  03644 Units            PHYSICAL EXAM:  Blood pressure (!) 169/69, pulse 64, temperature 36 °C (96.8 °F), resp. rate 18, height 1.6 m (5' 3\"), weight 86.2 kg (190 lb), SpO2 93 %.  Oxygen: Pulse Oximetry: 93 %    Gen: NAD, comfortable  HEENT: NCAT, PERRL, EOMI, Oropharynx moist and clear, no LAD, no JVD, neck supple  Chest: no accessory muscle use, CTAB  CV: RRR, S1 and S2 distinct, no murmur  GI: +BS, soft, NT, ND, no rebound/guarding, no hepatosplenomegaly  Extremities: Warm, well-perfused, no cyanosis/clubbing, + edema, distal pulses are intact  Neuro: AO x 1, CN II-XII grossly intact, MMT 5/5, no sensory deficits    LAB TESTS and IMAGES:   Recent Labs      09/21/17   1512   WBC  6.1   RBC  5.04   HEMOGLOBIN  12.7   HEMATOCRIT  40.6   MCV  80.6*   MCH  25.2*   RDW  77.6*   PLATELETCT  184   NEUTSPOLYS  72.20*   LYMPHOCYTES  20.90*   MONOCYTES  5.20   EOSINOPHILS  1.70   BASOPHILS  0.00   RBCMORPHOLO  Present     Recent Labs      09/21/17   1512   TROPONINI  0.04   BNPBTYPENAT  114*     Recent Labs      09/21/17   1512   APTT  30.3   INR  1.07     Recent Labs      09/21/17   1512   SODIUM  142   POTASSIUM  3.8   CHLORIDE  110   CO2  26   BUN  21   CREATININE  0.97   CALCIUM  9.4   ALBUMIN  3.7     Estimated GFR/CRCL = Estimated Creatinine Clearance: 50.5 mL/min (by C-G formula based on SCr of 0.97 mg/dL).  Recent Labs      09/21/17   1512   GLUCOSE  92     Glycohemoglobin   Date/Time Value Ref Range Status   08/04/2017 05:30 AM 4.8 0.0 - 5.6 % Final     Comment:     Increased risk for diabetes:  5.7 -6.4%  Diabetes:  >6.4%  Glycemic control for adults with diabetes:  <7.0%  The above interpretations are per ADA guidelines.  Diagnosis  of diabetes mellitus on the basis of elevated " Hemoglobin A1c  should be confirmed by repeating the Hb A1c test.       Recent Labs      09/21/17   1512   ASTSGOT  21   ALTSGPT  22   TBILIRUBIN  0.3   ALKPHOSPHAT  67   GLOBULIN  2.4   INR  1.07           Invalid input(s): [id number]  Vitamin B12 -True Cobalamin   Date/Time Value Ref Range Status   07/30/2017 09:44  211 - 911 pg/mL Final     Folate -Folic Acid   Date/Time Value Ref Range Status   07/30/2017 09:44 AM 13.7 >4.0 ng/mL Final     Iron   Date/Time Value Ref Range Status   08/07/2017 06:22 AM 73 40 - 170 ug/dL Final   07/29/2017 06:40 PM 12 (L) 40 - 170 ug/dL Final     Total Iron Binding   Date/Time Value Ref Range Status   08/07/2017 06:22  250 - 450 ug/dL Final   07/29/2017 06:40  (H) 250 - 450 ug/dL Final     Ct-head W/o    Result Date: 9/21/2017 9/21/2017 3:48 PM HISTORY/REASON FOR EXAM: Altered mental status. Head injury. TECHNIQUE/EXAM DESCRIPTION AND NUMBER OF VIEWS: CT of the head without contrast. Up to date radiation dose reduction adjustments have been utilized to meet ALARA standards for radiation dose reduction. COMPARISON: Brain MRI 2/20/2014 FINDINGS: There is no evidence of mass or mass effect. CSF spaces are prominent. There is prominent periventricular chronic small vessel ischemic change present. There is no intracranial hemorrhage seen. The calvarium is intact. There is no scalp injury.  There is no evidence of sinus disease.     1.  No evidence of acute intracranial process. 2.  Cerebral atrophy as well as periventricular chronic small vessel ischemic change.    Ct-lspine W/o Plus Recons    Result Date: 9/21/2017 9/21/2017 6:56 PM HISTORY/REASON FOR EXAM:  Pain Following Trauma. TECHNIQUE/EXAM DESCRIPTION AND NUMBER OF VIEWS: CT lumbar spine without contrast, with reconstructions. The study was performed on a helical multidetector CT scanner. Thin-section helical scanning was performed from T12-L1 to the sacrum. Sagittal and coronal multiplanar reconstructions  were generated from the axial images. Low dose optimization technique was utilized for this CT exam including automated exposure control and adjustment of the mA and/or kV according to patient size. COMPARISON: July 29, 2017 FINDINGS: Chronic appearing fractures of T12 and L1 are once again noted. No acute fractures identified. There is again multilevel disc space narrowing with vacuum disc phenomena most significant at L4-5 and L5-S1. There is again slight retrolisthesis of L5 on S1. The thoracolumbar junction appears intact. The prevertebral and paraspinous soft tissues are unremarkable. Calcified gallstones identified. There is calcified plaque in the aorta. The sacrum and sacroiliac joints show no particular abnormality.     1.  No interval change in chronic appearing T12 and L1 compression deformities. 2.  No acute fractures identified. 3.  Levoconvex scoliosis with multilevel degenerative change.    Ct-tspine W/o Plus Recons    Result Date: 9/21/2017 9/21/2017 6:56 PM HISTORY/REASON FOR EXAM:  Back pain after falling out of bed onto bed stand. TECHNIQUE/EXAM DESCRIPTION AND NUMBER OF VIEWS:  CT thoracic spine without contrast, with reconstructions. Thin-section helical images were obtained of the thoracic spine in the axial plane.  Sagittal reconstructions were generated from the axial data. Low dose optimization technique was utilized for this CT exam including automated exposure control and adjustment of the mA and/or kV according to patient size. FINDINGS: The alignment of the vertebral bodies on the reconstructed sagittal images is normal. There are no fractures identified. Mild degenerative change is present in the mid and distal thoracic spine. The visualized lung parenchyma and mediastinum are normal in appearance.     No acute thoracic compression fracture or subluxation.    Dx-chest-portable (1 View)    Result Date: 9/21/2017 9/21/2017 3:00 PM HISTORY/REASON FOR EXAM:  Altered mental status.  TECHNIQUE/EXAM DESCRIPTION AND NUMBER OF VIEWS: Single portable view of the chest. COMPARISON: 7/29/2017 FINDINGS: The cardiomediastinal silhouette is stable. No focal consolidation, pleural effusion or pneumothorax is identified.  Costophrenic angles are sharp. There is minimal linear atelectasis versus scarring in the left midlung. Loose bodies are seen projecting over the left humeral head.     Stable cardiomegaly. Atherosclerotic plaque. Minimal left midlung atelectasis versus scarring.      ASSESSMENT/PLAN:     1.  Acute renal insufficiency. IV fluid hydration, hold her Lasix  2.  Fall. Physical and occupational therapy evaluations, check orthostatics  3.  Compression fracture of the spine. Pain control, continue vitamin D  4.  Coronary artery disease. Continue aspirin  5.  Hypertension Continue metoprolol and hydralazine, hold benazepril  6.  Lower extremity edema. hold Lasix  7.  History of stroke. Continue aspirin, Lipitor  8.  Dementia. Frequent orientation, avoid benzodiazepine  9.  Pyuria. Follow cultures        PPX: Heparin    CODE STATUS: Full    Anticipate that patient will need more than 2 midnights for management of the above discussed medical issues.    This dictation was created using voice recognition software. The accuracy of the dictation is limited to the abilities of the software. I expect there may be some errors of grammar and possibly content.

## 2017-09-22 NOTE — PROGRESS NOTES
Simona Knight Fall Risk Assessment:     Last Known Fall: Within the last month  Mobility: Use of assistive device/requires assist of two people  Medications: Cardiovascular or central nervous system meds  Mental Status/LOC/Awareness: Awake, alert, and oriented to date, place, and person  Toileting Needs: No needs  Volume/Electrolyte Status: Use of IV fluids/tube feeds  Communication/Sensory: No deficits  Behavior: Appropriate behavior  Simona Knight Fall Risk Total: 12  Fall Risk Level: MODERATE RISK    Universal Fall Precautions:  call light/belongings in reach, bed in low position and locked, wheelchairs and assistive devices out of sight, siderails up x 2, use non-slip footwear, adequate lighting, clutter free and spill free environment, educate to call for assistance, educate on level of risk    Fall Risk Level Interventions:    TRIAL (TELE 8, NEURO, MED JENNIE 5) Moderate Fall Risk Interventions  Place yellow fall risk ID band on patient: completed  Provide patient/family education based on risk assessment : completed  Educate patient/family to call staff for assistance when getting out of bed: completed  Place fall precaution signage outside patient door: completed  Utilize bed/chair fall alarm: completed     Patient Specific Interventions:     Medication: limit combination of prn medications  Mental Status/LOC/Awareness: reorient patient, reinforce falls education, check on patient hourly, utilize bed/chair fall alarm, reinforce the use of call light and provide activity  Toileting: provide frquent toileting, monitor intake and output/use of appropriate interventions, instruct patient/family on the use of grab bars, do not leave patient unattended in bathroom/refer to toileting scripting and toilet prior to giving pain medications  Volume/Electrolyte Status: ensure patient remains hydrated and administer medications as ordered for nausea and vomiting  Communication/Sensory: update plan of care on whiteboard,  provide communication alternatives/, ensure proper positioning when transferrng/ambulating, ensure patient has glasses/contacts and hearing aids/dentures and for visually impaired patients orient to their room surrounding and do not change their surroundings  Behavioral: encourage patient to voice feelings, engage patient in daily activities, administer medication as ordered and instruct/reinforce fall program rationale  Mobility: schedule physical activity throughout the day, provide comfort measures during transport, dangle prior to standing, utilize bed/chair fall alarm, ensure bed is locked and in lowest position, provide appropriate assistive device, instruct patient to exit bed on their strongest side and collaborate with doctor for possible PT/OT consult

## 2017-09-23 ENCOUNTER — APPOINTMENT (OUTPATIENT)
Dept: RADIOLOGY | Facility: MEDICAL CENTER | Age: 77
DRG: 682 | End: 2017-09-23
Attending: HOSPITALIST
Payer: MEDICARE

## 2017-09-23 LAB
BACTERIA UR CULT: NORMAL
SIGNIFICANT IND 70042: NORMAL
SITE SITE: NORMAL
SOURCE SOURCE: NORMAL

## 2017-09-23 PROCEDURE — A9270 NON-COVERED ITEM OR SERVICE: HCPCS | Performed by: FAMILY MEDICINE

## 2017-09-23 PROCEDURE — 770006 HCHG ROOM/CARE - MED/SURG/GYN SEMI*

## 2017-09-23 PROCEDURE — 700101 HCHG RX REV CODE 250: Performed by: FAMILY MEDICINE

## 2017-09-23 PROCEDURE — 70553 MRI BRAIN STEM W/O & W/DYE: CPT

## 2017-09-23 PROCEDURE — 700117 HCHG RX CONTRAST REV CODE 255: Performed by: HOSPITALIST

## 2017-09-23 PROCEDURE — 700102 HCHG RX REV CODE 250 W/ 637 OVERRIDE(OP): Performed by: FAMILY MEDICINE

## 2017-09-23 PROCEDURE — 700105 HCHG RX REV CODE 258: Performed by: FAMILY MEDICINE

## 2017-09-23 PROCEDURE — A9579 GAD-BASE MR CONTRAST NOS,1ML: HCPCS | Performed by: HOSPITALIST

## 2017-09-23 PROCEDURE — 99232 SBSQ HOSP IP/OBS MODERATE 35: CPT | Performed by: HOSPITALIST

## 2017-09-23 PROCEDURE — 700111 HCHG RX REV CODE 636 W/ 250 OVERRIDE (IP): Performed by: FAMILY MEDICINE

## 2017-09-23 PROCEDURE — 700105 HCHG RX REV CODE 258: Performed by: HOSPITALIST

## 2017-09-23 PROCEDURE — 700111 HCHG RX REV CODE 636 W/ 250 OVERRIDE (IP): Performed by: HOSPITALIST

## 2017-09-23 RX ADMIN — DULOXETINE HYDROCHLORIDE 20 MG: 20 CAPSULE, DELAYED RELEASE ORAL at 09:35

## 2017-09-23 RX ADMIN — HEPARIN SODIUM 5000 UNITS: 5000 INJECTION, SOLUTION INTRAVENOUS; SUBCUTANEOUS at 21:41

## 2017-09-23 RX ADMIN — HYDRALAZINE HYDROCHLORIDE 50 MG: 50 TABLET, FILM COATED ORAL at 05:56

## 2017-09-23 RX ADMIN — STANDARDIZED SENNA CONCENTRATE AND DOCUSATE SODIUM 2 TABLET: 8.6; 5 TABLET, FILM COATED ORAL at 20:40

## 2017-09-23 RX ADMIN — OMEPRAZOLE 20 MG: 20 CAPSULE, DELAYED RELEASE ORAL at 20:40

## 2017-09-23 RX ADMIN — GABAPENTIN 300 MG: 300 CAPSULE ORAL at 20:41

## 2017-09-23 RX ADMIN — STANDARDIZED SENNA CONCENTRATE AND DOCUSATE SODIUM 2 TABLET: 8.6; 5 TABLET, FILM COATED ORAL at 09:35

## 2017-09-23 RX ADMIN — OXYBUTYNIN CHLORIDE 5 MG: 5 TABLET, FILM COATED, EXTENDED RELEASE ORAL at 20:45

## 2017-09-23 RX ADMIN — METOPROLOL TARTRATE 12.5 MG: 25 TABLET, FILM COATED ORAL at 20:40

## 2017-09-23 RX ADMIN — ASPIRIN 81 MG: 81 TABLET, COATED ORAL at 09:35

## 2017-09-23 RX ADMIN — METOPROLOL TARTRATE 12.5 MG: 25 TABLET, FILM COATED ORAL at 09:35

## 2017-09-23 RX ADMIN — ATORVASTATIN CALCIUM 80 MG: 40 TABLET, FILM COATED ORAL at 20:40

## 2017-09-23 RX ADMIN — SODIUM CHLORIDE: 9 INJECTION, SOLUTION INTRAVENOUS at 05:56

## 2017-09-23 RX ADMIN — HYDRALAZINE HYDROCHLORIDE 50 MG: 50 TABLET, FILM COATED ORAL at 15:38

## 2017-09-23 RX ADMIN — OMEPRAZOLE 20 MG: 20 CAPSULE, DELAYED RELEASE ORAL at 09:34

## 2017-09-23 RX ADMIN — HEPARIN SODIUM 5000 UNITS: 5000 INJECTION, SOLUTION INTRAVENOUS; SUBCUTANEOUS at 05:53

## 2017-09-23 RX ADMIN — GADODIAMIDE 20 ML: 287 INJECTION INTRAVENOUS at 15:00

## 2017-09-23 RX ADMIN — CEFTRIAXONE SODIUM 1 G: 1 INJECTION, POWDER, FOR SOLUTION INTRAMUSCULAR; INTRAVENOUS at 09:36

## 2017-09-23 RX ADMIN — HEPARIN SODIUM 5000 UNITS: 5000 INJECTION, SOLUTION INTRAVENOUS; SUBCUTANEOUS at 15:37

## 2017-09-23 RX ADMIN — HYDRALAZINE HYDROCHLORIDE 50 MG: 50 TABLET, FILM COATED ORAL at 21:40

## 2017-09-23 RX ADMIN — LIDOCAINE 1 PATCH: 50 PATCH CUTANEOUS at 09:35

## 2017-09-23 RX ADMIN — SODIUM CHLORIDE: 9 INJECTION, SOLUTION INTRAVENOUS at 20:46

## 2017-09-23 ASSESSMENT — ENCOUNTER SYMPTOMS
DEPRESSION: 0
DOUBLE VISION: 0
COUGH: 0
HEADACHES: 0
ABDOMINAL PAIN: 0
FEVER: 0

## 2017-09-23 ASSESSMENT — PAIN SCALES - GENERAL
PAINLEVEL_OUTOF10: 0
PAINLEVEL_OUTOF10: 0

## 2017-09-23 NOTE — ASSESSMENT & PLAN NOTE
MRI on 9/23 showed no evidence of acute stroke. Prominent ventricles, as noted above.  - Continue ASA and statin  - neuro evaluated and signed off  - to chair with meals  -Continue PT OT and encourage ambulation patient

## 2017-09-23 NOTE — PROGRESS NOTES
Simona Knight Fall Risk Assessment:     Last Known Fall: Within the last six months  Mobility: Immobilized/requires assist of one person  Medications: Cardiovascular or central nervous system meds  Mental Status/LOC/Awareness: Memory loss/confusion and requires reorienting  Toileting Needs: No needs  Volume/Electrolyte Status: Use of IV fluids/tube feeds, Nausea/vomiting  Communication/Sensory: Visual (Glasses)/hearing deficit  Behavior: Appropriate behavior  Simona Knight Fall Risk Total: 17  Fall Risk Level: HIGH RISK    Universal Fall Precautions:  call light/belongings in reach, bed in low position and locked, wheelchairs and assistive devices out of sight, siderails up x 2, use non-slip footwear, adequate lighting, clutter free and spill free environment, educate on level of risk, educate to call for assistance    Fall Risk Level Interventions:    TRIAL ([employer] 8, NEURO, MED JENNIE 5) Moderate Fall Risk Interventions  Place yellow fall risk ID band on patient: completed  Provide patient/family education based on risk assessment : completed  Educate patient/family to call staff for assistance when getting out of bed: completed  Place fall precaution signage outside patient door: completed  Utilize bed/chair fall alarm: completedTRIAL ([employer] 8, NEURO, MED JENNIE 5) High Fall Risk Interventions  Place yellow fall risk ID band on patient: verified  Provide patient/family education based on risk assessment: verified  Educate patient/family to call staff for assistance when getting out of bed: verified  Place fall precaution signage outside patient door: verified  Place patient in room close to nursing station: currently not available/charge notified  Utilize bed/chair fall alarm: verified    Patient Specific Interventions:     Medication: provide patients who received diuretics/laxatives frequent toileting  Mental Status/LOC/Awareness: reorient patient, reinforce falls education, check on patient hourly, utilize  bed/chair fall alarm and reinforce the use of call light  Toileting: provide frquent toileting, instruct male patients prone to dizziness to void while sitting, instruct patient/family on the need to call for assistance when toileting and do not leave patient unattended in bathroom/refer to toileting scripting  Volume/Electrolyte Status: ensure patient remains hydrated, administer medications as ordered for nausea and vomiting, monitor abnormal lab values and ensure IV fluids are appropriate  Communication/Sensory: update plan of care on whiteboard, ensure proper positioning when transferrng/ambulating and for visually impaired patients orient to their room surrounding and do not change their surroundings  Behavioral: administer medication as ordered and instruct/reinforce fall program rationale  Mobility: utilize bed/chair fall alarm, ensure bed is locked and in lowest position, provide appropriate assistive device and instruct patient to exit bed on their strongest side

## 2017-09-23 NOTE — PROGRESS NOTES
Administered pt's morning meds, Pt resting in bed, call light within reach, bed in lowest position, no further needs at this time.

## 2017-09-23 NOTE — PROGRESS NOTES
Pt resting in bed with eyes closed, respirations even and unlabored, bed in lowest position, call light within reach, will continue to monitor.

## 2017-09-23 NOTE — PROGRESS NOTES
[facility] Hospitalist Progress Note    Date of Service: 2017    Chief Complaint  76 y.o. female admitted 2017 with AMS, UTI.     Interval Problem Update  Patient seems to be stable, not in acute distress, denies any pain, she does not know how she got to the hospital, alert and oriented x 1, no focal deficit. No fever or chills, no N/V/D/C    Consultants/Specialty  none    Disposition  Continue monitoring.         Review of Systems   All other systems reviewed and are negative.     Physical Exam  Laboratory/Imaging   Hemodynamics  Temp (24hrs), Av.6 °C (97.8 °F), Min:36.2 °C (97.1 °F), Max:36.8 °C (98.3 °F)   Temperature: 36.8 °C (98.3 °F)  Pulse  Av.5  Min: 60  Max: 72 Heart Rate (Monitored): 65  Blood Pressure : 125/55, NIBP: 134/74      Respiratory      Respiration: 17, Pulse Oximetry: 90 %             Fluids    Intake/Output Summary (Last 24 hours) at 17 1712  Last data filed at 17 1300   Gross per 24 hour   Intake              480 ml   Output                0 ml   Net              480 ml       Nutrition  Orders Placed This Encounter   Procedures   • Diet Order     Standing Status:   Standing     Number of Occurrences:   1     Order Specific Question:   Diet:     Answer:   Regular [1]     Physical Exam   Constitutional: She appears well-developed.   HENT:   Head: Normocephalic.   Mouth/Throat: No oropharyngeal exudate.   Eyes: Pupils are equal, round, and reactive to light. No scleral icterus.   Neck: Normal range of motion. Neck supple. No JVD present.   Cardiovascular: Normal rate, regular rhythm and normal heart sounds.    Pulmonary/Chest: Effort normal and breath sounds normal. No respiratory distress. She has no wheezes.   Abdominal: Soft. Bowel sounds are normal. She exhibits no distension. There is no tenderness. There is no rebound.   Musculoskeletal: Normal range of motion. She exhibits no tenderness.   Lymphadenopathy:     She has no cervical adenopathy.   Neurological: She  is alert. She has normal strength and normal reflexes. She is disoriented. No cranial nerve deficit or sensory deficit. GCS eye subscore is 4. GCS verbal subscore is 5. GCS motor subscore is 6.       Recent Labs      09/21/17 1512  09/22/17 0224   WBC  6.1  5.2   RBC  5.04  4.77   HEMOGLOBIN  12.7  12.1   HEMATOCRIT  40.6  38.2   MCV  80.6*  80.1*   MCH  25.2*  25.4*   MCHC  31.3*  31.7*   RDW  77.6*  75.6*   PLATELETCT  184  174     Recent Labs      09/21/17   1512  09/22/17 0224   SODIUM  142  142   POTASSIUM  3.8  3.4*   CHLORIDE  110  110   CO2  26  24   GLUCOSE  92  89   BUN  21 17   CREATININE  0.97  0.72   CALCIUM  9.4  9.3     Recent Labs      09/21/17 1512   APTT  30.3   INR  1.07     Recent Labs      09/21/17 1512   BNPBTYPENAT  114*              Assessment/Plan     UTI (urinary tract infection)- (present on admission)   Assessment & Plan    Started on ceftriaxone, patient is allergic to PCN,she does not remember what type of allergic she has since it was long time ago. Will keep close eye on her for any reaction.         Acute renal insufficiency- (present on admission)   Assessment & Plan    Mild probably due to dehydration. Resolved now.         T12 compression fracture (CMS-HCC)- (present on admission)   Assessment & Plan    Stable, ct spine did not show acute changes.         HTN (hypertension)- (present on admission)   Assessment & Plan    Stable on metoprolol, hydralazine.         History of CVA (cerebrovascular accident)- (present on admission)   Assessment & Plan    Continue ASA and statin.         Dementia- (present on admission)   Assessment & Plan    Unknown baseline, had h/o cva, ct head negative, will get MRI brain. PT/OT to see her.             Reviewed items::  EKG reviewed, Labs reviewed, Medications reviewed and Radiology images reviewed  Pink catheter::  No Pink  DVT prophylaxis pharmacological::  Heparin  Antibiotics:  Treating active infection/contamination beyond 24 hours  perioperative coverage

## 2017-09-23 NOTE — PROGRESS NOTES
Received alert and oriented x 2, c/o nausea and both eyes burning sensation and discharge noticed, cleansed with saline, feels better, ivf continued as ordered, due med given, encouraged to turn side to side, call light within reach, bed alarm in placed, needs attended, will continue to monitor.

## 2017-09-24 PROCEDURE — 700102 HCHG RX REV CODE 250 W/ 637 OVERRIDE(OP): Performed by: HOSPITALIST

## 2017-09-24 PROCEDURE — 700101 HCHG RX REV CODE 250: Performed by: FAMILY MEDICINE

## 2017-09-24 PROCEDURE — 700102 HCHG RX REV CODE 250 W/ 637 OVERRIDE(OP): Performed by: FAMILY MEDICINE

## 2017-09-24 PROCEDURE — A9270 NON-COVERED ITEM OR SERVICE: HCPCS | Performed by: FAMILY MEDICINE

## 2017-09-24 PROCEDURE — 700105 HCHG RX REV CODE 258: Performed by: FAMILY MEDICINE

## 2017-09-24 PROCEDURE — 700111 HCHG RX REV CODE 636 W/ 250 OVERRIDE (IP): Performed by: HOSPITALIST

## 2017-09-24 PROCEDURE — 700111 HCHG RX REV CODE 636 W/ 250 OVERRIDE (IP): Performed by: FAMILY MEDICINE

## 2017-09-24 PROCEDURE — 700105 HCHG RX REV CODE 258: Performed by: HOSPITALIST

## 2017-09-24 PROCEDURE — 770006 HCHG ROOM/CARE - MED/SURG/GYN SEMI*

## 2017-09-24 PROCEDURE — A9270 NON-COVERED ITEM OR SERVICE: HCPCS | Performed by: HOSPITALIST

## 2017-09-24 PROCEDURE — 99232 SBSQ HOSP IP/OBS MODERATE 35: CPT | Performed by: HOSPITALIST

## 2017-09-24 RX ORDER — HYDRALAZINE HYDROCHLORIDE 25 MG/1
75 TABLET, FILM COATED ORAL EVERY 8 HOURS
Status: DISCONTINUED | OUTPATIENT
Start: 2017-09-24 | End: 2017-09-28

## 2017-09-24 RX ADMIN — HYDRALAZINE HYDROCHLORIDE 75 MG: 25 TABLET, FILM COATED ORAL at 20:55

## 2017-09-24 RX ADMIN — GABAPENTIN 300 MG: 300 CAPSULE ORAL at 20:54

## 2017-09-24 RX ADMIN — OMEPRAZOLE 20 MG: 20 CAPSULE, DELAYED RELEASE ORAL at 08:59

## 2017-09-24 RX ADMIN — SODIUM CHLORIDE: 9 INJECTION, SOLUTION INTRAVENOUS at 21:05

## 2017-09-24 RX ADMIN — METOPROLOL TARTRATE 12.5 MG: 25 TABLET, FILM COATED ORAL at 20:54

## 2017-09-24 RX ADMIN — ACETAMINOPHEN 650 MG: 325 TABLET, FILM COATED ORAL at 21:04

## 2017-09-24 RX ADMIN — STANDARDIZED SENNA CONCENTRATE AND DOCUSATE SODIUM 2 TABLET: 8.6; 5 TABLET, FILM COATED ORAL at 08:59

## 2017-09-24 RX ADMIN — HYDRALAZINE HYDROCHLORIDE 10 MG: 20 INJECTION INTRAMUSCULAR; INTRAVENOUS at 16:39

## 2017-09-24 RX ADMIN — DULOXETINE HYDROCHLORIDE 20 MG: 20 CAPSULE, DELAYED RELEASE ORAL at 08:58

## 2017-09-24 RX ADMIN — HEPARIN SODIUM 5000 UNITS: 5000 INJECTION, SOLUTION INTRAVENOUS; SUBCUTANEOUS at 05:00

## 2017-09-24 RX ADMIN — METOPROLOL TARTRATE 12.5 MG: 25 TABLET, FILM COATED ORAL at 08:59

## 2017-09-24 RX ADMIN — SODIUM CHLORIDE: 9 INJECTION, SOLUTION INTRAVENOUS at 10:20

## 2017-09-24 RX ADMIN — HYDRALAZINE HYDROCHLORIDE 50 MG: 50 TABLET, FILM COATED ORAL at 14:02

## 2017-09-24 RX ADMIN — HYDRALAZINE HYDROCHLORIDE 50 MG: 50 TABLET, FILM COATED ORAL at 05:00

## 2017-09-24 RX ADMIN — OMEPRAZOLE 20 MG: 20 CAPSULE, DELAYED RELEASE ORAL at 20:56

## 2017-09-24 RX ADMIN — HEPARIN SODIUM 5000 UNITS: 5000 INJECTION, SOLUTION INTRAVENOUS; SUBCUTANEOUS at 14:02

## 2017-09-24 RX ADMIN — ASPIRIN 81 MG: 81 TABLET, COATED ORAL at 08:59

## 2017-09-24 RX ADMIN — CEFTRIAXONE SODIUM 1 G: 1 INJECTION, POWDER, FOR SOLUTION INTRAMUSCULAR; INTRAVENOUS at 10:20

## 2017-09-24 RX ADMIN — ATORVASTATIN CALCIUM 80 MG: 40 TABLET, FILM COATED ORAL at 20:54

## 2017-09-24 RX ADMIN — OXYBUTYNIN CHLORIDE 5 MG: 5 TABLET, FILM COATED, EXTENDED RELEASE ORAL at 20:58

## 2017-09-24 RX ADMIN — ONDANSETRON 4 MG: 2 INJECTION INTRAMUSCULAR; INTRAVENOUS at 20:53

## 2017-09-24 RX ADMIN — LIDOCAINE 1 PATCH: 50 PATCH CUTANEOUS at 08:58

## 2017-09-24 ASSESSMENT — ENCOUNTER SYMPTOMS
FEVER: 0
COUGH: 0
ABDOMINAL PAIN: 0
DEPRESSION: 0
HEADACHES: 0

## 2017-09-24 ASSESSMENT — PATIENT HEALTH QUESTIONNAIRE - PHQ9
1. LITTLE INTEREST OR PLEASURE IN DOING THINGS: NOT AT ALL
2. FEELING DOWN, DEPRESSED, IRRITABLE, OR HOPELESS: NOT AT ALL
SUM OF ALL RESPONSES TO PHQ9 QUESTIONS 1 AND 2: 0
SUM OF ALL RESPONSES TO PHQ QUESTIONS 1-9: 0

## 2017-09-24 ASSESSMENT — PAIN SCALES - GENERAL
PAINLEVEL_OUTOF10: 8
PAINLEVEL_OUTOF10: 0
PAINLEVEL_OUTOF10: 4
PAINLEVEL_OUTOF10: 0

## 2017-09-24 NOTE — PROGRESS NOTES
Simona Knight Fall Risk Assessment:     Last Known Fall: Within the last month  Mobility: Immobilized/requires assist of one person  Medications: Cardiovascular and central nervous system meds  Mental Status/LOC/Awareness: Memory loss/confusion and requires reorienting  Toileting Needs: Incontinence  Volume/Electrolyte Status: Use of IV fluids/tube feeds  Communication/Sensory: Visual (Glasses)/hearing deficit  Behavior: Appropriate behavior  Simona Knight Fall Risk Total: 19  Fall Risk Level: HIGH RISK    Universal Fall Precautions:  call light/belongings in reach, bed in low position and locked, wheelchairs and assistive devices out of sight, siderails up x 2, use non-slip footwear, adequate lighting, clutter free and spill free environment, educate on level of risk, educate to call for assistance    Fall Risk Level Interventions:    TRIAL ([employer], NEURO, MED JENNIE 5) Moderate Fall Risk Interventions  Place yellow fall risk ID band on patient: verified  Provide patient/family education based on risk assessment : verified  Educate patient/family to call staff for assistance when getting out of bed: verified  Place fall precaution signage outside patient door: verified  Utilize bed/chair fall alarm: verifiedTRIAL ([employer], NEURO, MED JENNIE 5) High Fall Risk Interventions  Place yellow fall risk ID band on patient: verified  Provide patient/family education based on risk assessment: completed  Educate patient/family to call staff for assistance when getting out of bed: completed  Place fall precaution signage outside patient door: verified  Place patient in room close to nursing station: completed  Utilize bed/chair fall alarm: completed  Notify charge of high risk for huddle: completed    Patient Specific Interventions:     Medication: review medications with patient and family  Mental Status/LOC/Awareness: reorient patient, reinforce falls education, check on patient hourly and utilize bed/chair fall  alarm  Toileting: provide frquent toileting and instruct patient/family on the need to call for assistance when toileting  Volume/Electrolyte Status: ensure patient remains hydrated and ensure IV fluids are appropriate  Communication/Sensory: update plan of care on whiteboard  Behavioral: engage patient in daily activities and administer medication as ordered  Mobility: utilize bed/chair fall alarm and ensure bed is locked and in lowest position

## 2017-09-24 NOTE — PROGRESS NOTES
Assumed pt care after report received from night Rn. Pt is resting comfortably at this time. No c/o pain or distress noted. Call light and belongings in reach. Bed in low position. IV not in place.  Will attempt to place new IV. Will continue to monitor.

## 2017-09-24 NOTE — PROGRESS NOTES
Assumed pt care after report received from night Rn. Pt is resting comfortably at this time. No c/o pain or distress noted. Call light and belongings in reach. Bed in low position. IVF infusing without difficulty. Will continue to monitor.

## 2017-09-24 NOTE — PROGRESS NOTES
Administered pt medications.  Bed in lowest position, call light within reach, no patient concerns at this time.

## 2017-09-24 NOTE — PROGRESS NOTES
[facility] Hospitalist Progress Note    Date of Service: 2017    Chief Complaint  76 y.o. female admitted 2017 with AMS, UTI.     Interval Problem Update  Feeling ok but still c/o memory impairment, no new focal weakness, no fever or neck pain.   bp is elevated will increase her hydralazine.   Consultants/Specialty  none    Disposition  Continue monitoring, probably will need snf.          Review of Systems   Constitutional: Negative for fever.   Respiratory: Negative for cough.    Cardiovascular: Negative for chest pain.   Gastrointestinal: Negative for abdominal pain.   Neurological: Negative for headaches.   Psychiatric/Behavioral: Negative for depression.      Physical Exam  Laboratory/Imaging   Hemodynamics  Temp (24hrs), Av.6 °C (97.9 °F), Min:36.4 °C (97.5 °F), Max:36.9 °C (98.4 °F)   Temperature: 36.4 °C (97.5 °F)  Pulse  Av.1  Min: 58  Max: 72    Blood Pressure : (!) 167/73 (RN notified)      Respiratory      Respiration: 16, Pulse Oximetry: 90 %        RUL Breath Sounds: Diminished, RML Breath Sounds: Diminished, RLL Breath Sounds: Fine Crackles, ARGELIA Breath Sounds: Diminished, LLL Breath Sounds: Fine Crackles    Fluids    Intake/Output Summary (Last 24 hours) at 17 1601  Last data filed at 17 0900   Gross per 24 hour   Intake             2254 ml   Output                0 ml   Net             2254 ml       Nutrition  Orders Placed This Encounter   Procedures   • Diet Order     Standing Status:   Standing     Number of Occurrences:   1     Order Specific Question:   Diet:     Answer:   Regular [1]     Physical Exam   Constitutional: She appears well-developed.   HENT:   Head: Normocephalic.   Mouth/Throat: No oropharyngeal exudate.   Eyes: Pupils are equal, round, and reactive to light.   Neck: Normal range of motion. Neck supple.   Cardiovascular: Normal rate, regular rhythm and normal heart sounds.    Pulmonary/Chest: Effort normal and breath sounds normal. No respiratory  distress. She has no wheezes.   Abdominal: Soft. Bowel sounds are normal. She exhibits no distension. There is no tenderness.   Musculoskeletal: Normal range of motion. She exhibits no tenderness.   Lymphadenopathy:     She has no cervical adenopathy.   Neurological: She is alert. She has normal strength and normal reflexes. She is not disoriented. No cranial nerve deficit or sensory deficit. She exhibits normal muscle tone. GCS eye subscore is 4. GCS verbal subscore is 5. GCS motor subscore is 6.   Nursing note and vitals reviewed.      Recent Labs      09/22/17 0224   WBC  5.2   RBC  4.77   HEMOGLOBIN  12.1   HEMATOCRIT  38.2   MCV  80.1*   MCH  25.4*   MCHC  31.7*   RDW  75.6*   PLATELETCT  174     Recent Labs      09/22/17 0224 09/22/17   1723   SODIUM  142   --    POTASSIUM  3.4*  3.7   CHLORIDE  110   --    CO2  24   --    GLUCOSE  89   --    BUN  17   --    CREATININE  0.72   --    CALCIUM  9.3   --                       Assessment/Plan     UTI (urinary tract infection)- (present on admission)   Assessment & Plan    Started on ceftriaxone, patient is allergic to PCN,she does not remember what type of allergic she has since it was long time ago. Will keep close eye on her for any reaction.   9/23 tolerating atb. F/u cx.   9/24 cx negative, will give 3 days of atb.         Acute renal insufficiency- (present on admission)   Assessment & Plan    Mild probably due to dehydration. Resolved now.         T12 compression fracture (CMS-HCC)- (present on admission)   Assessment & Plan    Stable, ct spine did not show acute changes.         HTN (hypertension)- (present on admission)   Assessment & Plan    Stable on metoprolol, hydralazine.   9/23 stable.   9/24 will increase her hydralazine from 50 tid to 75 tid.         History of CVA (cerebrovascular accident)- (present on admission)   Assessment & Plan    Continue ASA and statin.   9/23 MRI today show no new stroke, showed Prominent lateral and third ventricles  without visualized obstructing lesion which could indicate normal pressure hydrocephalus, patient does not have urinary incontinence neither balance problem, PT/OT to see today.   9/24 MRI no new stroke, pt/ot to see.         Dementia- (present on admission)   Assessment & Plan    Unknown baseline, had h/o cva, ct head negative, will get MRI brain. PT/OT to see her.   9/23 MRI to rule out stroke today. She is more alert and oriented today.   9/24 MRI showed enlarged ventricles, patient has memory problems, balance problems and urinary incontinence, called neuro for possible normal pressure hydrocephalus.             Reviewed items::  Labs reviewed, Medications reviewed and Radiology images reviewed  Pink catheter::  No Pink  DVT prophylaxis pharmacological::  Heparin  Antibiotics:  Treating active infection/contamination beyond 24 hours perioperative coverage

## 2017-09-24 NOTE — CARE PLAN
Problem: Infection  Goal: Will remain free from infection  Outcome: PROGRESSING AS EXPECTED  Pt is on iv antibiotics. WBC within normal limits.

## 2017-09-24 NOTE — PROGRESS NOTES
[facility]    Date of Service: 2017    Chief Complaint  76 y.o. female admitted 2017 with AMS, UTI.     Interval Problem Update  Feeling much better alert and oriented x 3, non focal neuro exam, no fever or chills  Consultants/Specialty  none    Disposition  Continue monitoring.         Review of Systems   Constitutional: Negative for fever.   Eyes: Negative for double vision.   Respiratory: Negative for cough.    Cardiovascular: Negative for chest pain.   Gastrointestinal: Negative for abdominal pain.   Neurological: Negative for headaches.   Psychiatric/Behavioral: Negative for depression.      Physical Exam  Laboratory/Imaging   Hemodynamics  Temp (24hrs), Av.6 °C (97.8 °F), Min:36.3 °C (97.4 °F), Max:36.9 °C (98.5 °F)   Temperature: 36.4 °C (97.6 °F)  Pulse  Av  Min: 60  Max: 72    Blood Pressure : 148/56      Respiratory      Respiration: 17, Pulse Oximetry: 91 %             Fluids    Intake/Output Summary (Last 24 hours) at 17 1737  Last data filed at 17 1300   Gross per 24 hour   Intake             1340 ml   Output                1 ml   Net             1339 ml       Nutrition  Orders Placed This Encounter   Procedures   • Diet Order     Standing Status:   Standing     Number of Occurrences:   1     Order Specific Question:   Diet:     Answer:   Regular [1]     Physical Exam   Constitutional: She is oriented to person, place, and time. She appears well-developed.   HENT:   Head: Normocephalic.   Mouth/Throat: No oropharyngeal exudate.   Eyes: Pupils are equal, round, and reactive to light. No scleral icterus.   Neck: Normal range of motion. Neck supple. No JVD present.   Cardiovascular: Normal rate, regular rhythm and normal heart sounds.    Pulmonary/Chest: Effort normal and breath sounds normal. No respiratory distress. She has no wheezes.   Abdominal: Soft. Bowel sounds are normal. She exhibits no distension. There is no tenderness. There is no rebound.    Musculoskeletal: Normal range of motion. She exhibits no tenderness.   Lymphadenopathy:     She has no cervical adenopathy.   Neurological: She is alert and oriented to person, place, and time. She has normal strength and normal reflexes. She is not disoriented. No cranial nerve deficit or sensory deficit. GCS eye subscore is 4. GCS verbal subscore is 5. GCS motor subscore is 6.   Nursing note and vitals reviewed.      Recent Labs      09/21/17 1512 09/22/17 0224   WBC  6.1  5.2   RBC  5.04  4.77   HEMOGLOBIN  12.7  12.1   HEMATOCRIT  40.6  38.2   MCV  80.6*  80.1*   MCH  25.2*  25.4*   MCHC  31.3*  31.7*   RDW  77.6*  75.6*   PLATELETCT  184  174     Recent Labs      09/21/17   1512  09/22/17 0224  09/22/17   1723   SODIUM  142  142   --    POTASSIUM  3.8  3.4*  3.7   CHLORIDE  110  110   --    CO2  26  24   --    GLUCOSE  92  89   --    BUN  21 17   --    CREATININE  0.97  0.72   --    CALCIUM  9.4  9.3   --      Recent Labs      09/21/17 1512   APTT  30.3   INR  1.07     Recent Labs      09/21/17   1512   BNPBTYPENAT  114*              Assessment/Plan     UTI (urinary tract infection)- (present on admission)   Assessment & Plan    Started on ceftriaxone, patient is allergic to PCN,she does not remember what type of allergic she has since it was long time ago. Will keep close eye on her for any reaction.   9/23 tolerating atb. F/u cx.         Acute renal insufficiency- (present on admission)   Assessment & Plan    Mild probably due to dehydration. Resolved now.         T12 compression fracture (CMS-HCC)- (present on admission)   Assessment & Plan    Stable, ct spine did not show acute changes.         HTN (hypertension)- (present on admission)   Assessment & Plan    Stable on metoprolol, hydralazine.   9/23 stable.         History of CVA (cerebrovascular accident)- (present on admission)   Assessment & Plan    Continue ASA and statin.   9/23 MRI today show no new stroke, showed Prominent lateral and  third ventricles without visualized obstructing lesion which could indicate normal pressure hydrocephalus, patient does not have urinary incontinence neither balance problem, PT/OT to see today.         Dementia- (present on admission)   Assessment & Plan    Unknown baseline, had h/o cva, ct head negative, will get MRI brain. PT/OT to see her.   9/23 MRI to rule out stroke today. She is more alert and oriented today.             Reviewed items::  Labs reviewed, Medications reviewed and Radiology images reviewed  Pink catheter::  No Pink  DVT prophylaxis pharmacological::  Heparin  Antibiotics:  Treating active infection/contamination beyond 24 hours perioperative coverage

## 2017-09-25 PROCEDURE — G8979 MOBILITY GOAL STATUS: HCPCS | Mod: CI

## 2017-09-25 PROCEDURE — 700105 HCHG RX REV CODE 258: Performed by: FAMILY MEDICINE

## 2017-09-25 PROCEDURE — 770006 HCHG ROOM/CARE - MED/SURG/GYN SEMI*

## 2017-09-25 PROCEDURE — 700101 HCHG RX REV CODE 250: Performed by: FAMILY MEDICINE

## 2017-09-25 PROCEDURE — G8988 SELF CARE GOAL STATUS: HCPCS | Mod: CI

## 2017-09-25 PROCEDURE — 700102 HCHG RX REV CODE 250 W/ 637 OVERRIDE(OP): Performed by: FAMILY MEDICINE

## 2017-09-25 PROCEDURE — 97166 OT EVAL MOD COMPLEX 45 MIN: CPT

## 2017-09-25 PROCEDURE — A9270 NON-COVERED ITEM OR SERVICE: HCPCS | Performed by: FAMILY MEDICINE

## 2017-09-25 PROCEDURE — G8978 MOBILITY CURRENT STATUS: HCPCS | Mod: CK

## 2017-09-25 PROCEDURE — 700105 HCHG RX REV CODE 258: Performed by: HOSPITALIST

## 2017-09-25 PROCEDURE — G8987 SELF CARE CURRENT STATUS: HCPCS | Mod: CK

## 2017-09-25 PROCEDURE — 97161 PT EVAL LOW COMPLEX 20 MIN: CPT

## 2017-09-25 PROCEDURE — 99232 SBSQ HOSP IP/OBS MODERATE 35: CPT | Performed by: HOSPITALIST

## 2017-09-25 PROCEDURE — A9270 NON-COVERED ITEM OR SERVICE: HCPCS | Performed by: HOSPITALIST

## 2017-09-25 PROCEDURE — 700102 HCHG RX REV CODE 250 W/ 637 OVERRIDE(OP): Performed by: HOSPITALIST

## 2017-09-25 PROCEDURE — 700111 HCHG RX REV CODE 636 W/ 250 OVERRIDE (IP): Performed by: HOSPITALIST

## 2017-09-25 PROCEDURE — 51798 US URINE CAPACITY MEASURE: CPT

## 2017-09-25 RX ADMIN — STANDARDIZED SENNA CONCENTRATE AND DOCUSATE SODIUM 2 TABLET: 8.6; 5 TABLET, FILM COATED ORAL at 07:48

## 2017-09-25 RX ADMIN — ACETAMINOPHEN 650 MG: 325 TABLET, FILM COATED ORAL at 22:17

## 2017-09-25 RX ADMIN — METOPROLOL TARTRATE 12.5 MG: 25 TABLET, FILM COATED ORAL at 19:18

## 2017-09-25 RX ADMIN — CEFTRIAXONE SODIUM 1 G: 1 INJECTION, POWDER, FOR SOLUTION INTRAMUSCULAR; INTRAVENOUS at 09:00

## 2017-09-25 RX ADMIN — ASPIRIN 81 MG: 81 TABLET, COATED ORAL at 07:48

## 2017-09-25 RX ADMIN — OMEPRAZOLE 20 MG: 20 CAPSULE, DELAYED RELEASE ORAL at 07:48

## 2017-09-25 RX ADMIN — DULOXETINE HYDROCHLORIDE 20 MG: 20 CAPSULE, DELAYED RELEASE ORAL at 07:48

## 2017-09-25 RX ADMIN — HYDRALAZINE HYDROCHLORIDE 75 MG: 25 TABLET, FILM COATED ORAL at 13:26

## 2017-09-25 RX ADMIN — LIDOCAINE 1 PATCH: 50 PATCH CUTANEOUS at 07:48

## 2017-09-25 RX ADMIN — HYDRALAZINE HYDROCHLORIDE 75 MG: 25 TABLET, FILM COATED ORAL at 20:18

## 2017-09-25 RX ADMIN — OMEPRAZOLE 20 MG: 20 CAPSULE, DELAYED RELEASE ORAL at 19:18

## 2017-09-25 RX ADMIN — OXYBUTYNIN CHLORIDE 5 MG: 5 TABLET, FILM COATED, EXTENDED RELEASE ORAL at 22:17

## 2017-09-25 RX ADMIN — GABAPENTIN 300 MG: 300 CAPSULE ORAL at 19:17

## 2017-09-25 RX ADMIN — ACETAMINOPHEN 650 MG: 325 TABLET, FILM COATED ORAL at 15:42

## 2017-09-25 RX ADMIN — ATORVASTATIN CALCIUM 80 MG: 40 TABLET, FILM COATED ORAL at 19:17

## 2017-09-25 RX ADMIN — HYDRALAZINE HYDROCHLORIDE 75 MG: 25 TABLET, FILM COATED ORAL at 05:43

## 2017-09-25 RX ADMIN — SODIUM CHLORIDE: 9 INJECTION, SOLUTION INTRAVENOUS at 05:45

## 2017-09-25 ASSESSMENT — COGNITIVE AND FUNCTIONAL STATUS - GENERAL
DRESSING REGULAR UPPER BODY CLOTHING: A LITTLE
DAILY ACTIVITIY SCORE: 19
PERSONAL GROOMING: A LITTLE
SUGGESTED CMS G CODE MODIFIER DAILY ACTIVITY: CK
STANDING UP FROM CHAIR USING ARMS: A LITTLE
TOILETING: A LITTLE
MOBILITY SCORE: 17
WALKING IN HOSPITAL ROOM: A LITTLE
HELP NEEDED FOR BATHING: A LITTLE
MOVING TO AND FROM BED TO CHAIR: A LITTLE
SUGGESTED CMS G CODE MODIFIER MOBILITY: CK
DRESSING REGULAR LOWER BODY CLOTHING: A LITTLE
CLIMB 3 TO 5 STEPS WITH RAILING: A LOT
TURNING FROM BACK TO SIDE WHILE IN FLAT BAD: A LITTLE
MOVING FROM LYING ON BACK TO SITTING ON SIDE OF FLAT BED: A LITTLE

## 2017-09-25 ASSESSMENT — ENCOUNTER SYMPTOMS
ABDOMINAL PAIN: 0
FEVER: 0
HEADACHES: 0
COUGH: 0
DEPRESSION: 0

## 2017-09-25 ASSESSMENT — GAIT ASSESSMENTS
DISTANCE (FEET): 200
GAIT LEVEL OF ASSIST: STAND BY ASSIST
DEVIATION: BRADYKINETIC;SHUFFLED GAIT
ASSISTIVE DEVICE: FRONT WHEEL WALKER

## 2017-09-25 ASSESSMENT — ACTIVITIES OF DAILY LIVING (ADL): TOILETING: INDEPENDENT

## 2017-09-25 ASSESSMENT — PAIN SCALES - GENERAL
PAINLEVEL_OUTOF10: 1
PAINLEVEL_OUTOF10: 4
PAINLEVEL_OUTOF10: 5
PAINLEVEL_OUTOF10: 0
PAINLEVEL_OUTOF10: 6
PAINLEVEL_OUTOF10: 0

## 2017-09-25 NOTE — PROGRESS NOTES
Pt a&o x3-4. Forgetful, reorients. Up to bathroom with one person sba. Complaints of back pain. Tylenol given with good result. Fair appetite. No n/v. Bed alarm active.

## 2017-09-25 NOTE — PROGRESS NOTES
Simona Knight Fall Risk Assessment:     Last Known Fall: Within the last month  Mobility: Immobilized/requires assist of one person  Medications: Cardiovascular and central nervous system meds  Mental Status/LOC/Awareness: Memory loss/confusion and requires reorienting  Toileting Needs: Use of assistive device (Bedside commode, bedpan, urinal)  Volume/Electrolyte Status: Use of IV fluids/tube feeds  Communication/Sensory: Visual (Glasses)/hearing deficit  Behavior: Appropriate behavior  Simona Knight Fall Risk Total: 18  Fall Risk Level: HIGH RISK    Universal Fall Precautions:  call light/belongings in reach, bed in low position and locked, use non-slip footwear, adequate lighting, clutter free and spill free environment, educate on level of risk, educate to call for assistance    Fall Risk Level Interventions:    TRIAL ([employer], NEURO, MED JENNIE 5) Moderate Fall Risk Interventions  Place yellow fall risk ID band on patient: verified  Provide patient/family education based on risk assessment : verified  Educate patient/family to call staff for assistance when getting out of bed: verified  Place fall precaution signage outside patient door: verified  Utilize bed/chair fall alarm: verifiedTRIAL ([employer], NEURO, MED JENNIE 5) High Fall Risk Interventions  Place yellow fall risk ID band on patient: verified  Provide patient/family education based on risk assessment: completed  Educate patient/family to call staff for assistance when getting out of bed: completed  Place fall precaution signage outside patient door: completed  Place patient in room close to nursing station: verified  Utilize bed/chair fall alarm: completed  Notify charge of high risk for huddle: completed    Patient Specific Interventions:     Medication: limit combination of prn medications  Mental Status/LOC/Awareness: reinforce falls education  Toileting: provide frquent toileting  Volume/Electrolyte Status: ensure patient remains  hydrated  Communication/Sensory: update plan of care on whiteboard  Behavioral: administer medication as ordered  Mobility: dangle prior to standing

## 2017-09-25 NOTE — THERAPY
"Physical Therapy Evaluation completed.   Bed Mobility:  Supine to Sit: Minimal Assist  Transfers: Sit to Stand: Contact Guard Assist  Gait: Level Of Assist: Stand by Assist with Front-Wheel Walker       Plan of Care: Will benefit from Physical Therapy 2 times per week  Discharge Recommendations: Equipment: Front-Wheel Walker. Post-acute therapy Discharge to a transitional care facility for continued skilled therapy services or Discharge to home with outpatient or home health for additional skilled therapy services, pending progress.     Patient is 76/F admitted s/p GLF on 9/21. Pt h/o GLF w/T12 comp fx ~6 wks ago, went to SNF, was home for one day prior to fall. Patient lives in 1st floor apt. Patient is confused with details of PLOF/living situation, will need to verify. Per pt, she was MOD INDEP with transfers and amb with FWW, upstairs neighbor assisted with IADLs as needed. Patient presents today at [facility] for transfers and SBA for gait with FWW x200 ft. Acute PT indicated to progress mobility and reinforce spinal precautions. D/c pending progress, unclear if pt is cognitively safe to live alone.     See \"Rehab Therapy-Acute\" Patient Summary Report for complete documentation.     "

## 2017-09-25 NOTE — CONSULTS
DATE OF SERVICE:  09/24/2017    REQUESTING PHYSICIAN:  Shemar Salamanca MD    REASON FOR CONSULT:  Possible NPH.    HISTORY OF PRESENT ILLNESS:  The patient is a 76-year-old right-handed female   with a past medical history significant for coronary artery disease, history   of congestive heart failure, history of dementia, hypertension, history of   previous stroke with no significant residual symptoms and history of recent   compression fracture of the spine for which she was recently discharged from a   skilled nursing facility, presented to hospital again for evaluation of   frequent falls.  The patient underwent a brain MRI yesterday, which revealed   moderate-to-severe atrophy.  There is prominent lateral and third ventricle   without visualized obstructing lesion, which could indicate normal pressure   hydrocephalus and there is advanced white matter changes.  I was asked by Dr. Zhou to see her with regard to possible NPH.  The patient states she has   been forgetful for many months, but recently has recurrent falls.  She is also   incontinent and as long as she remembers, her incontinence started also a   couple of months ago.    PAST MEDICAL HISTORY:  Significant for coronary artery disease, history of   recent compression fracture of the spine, history of congestive heart failure,   history of dementia, history of hypertension, history of GI bleed, history of   psychiatric disorder, history of stroke with no significant residual symptom.    SOCIAL HISTORY:  She is a current smoker.  She has no history of drug abuse.    She drinks socially.    MEDICATIONS:  Reviewed as per MAR.    ALLERGIES:  PENICILLIN.    REVIEW OF SYSTEMS:  As above.  All other systems are normal.  Please also see   Dr. Edward Metcalf's review of systems in H and P dated 09/21/2017.    PHYSICAL EXAMINATION:  Today,  VITAL SIGNS:  Heart rate 68, blood pressure 167/73, respirations 16, O2 sat   90% on room air, temperature  36.4.  NECK:  Supple.  No carotid bruit.  No neck rigidity or stiffness.  CARDIOVASCULAR:  Regular rate and rhythm.  LUNGS:  Clear.  ABDOMEN:  Soft.  EXTREMITIES:  No cyanosis or clubbing.  NEUROLOGIC:  She is awake, alert.  She knows it is September, but she did not   know the year.  I asked the name of current president and although she said   she knows who he is, but she cannot remember his name.  Pupils are equal,   round, reactive to light.  Extraocular movements are full.  Visual fields are   full to confrontation.  Funduscopic exam was difficult to perform.  Hearing is   intact to finger rub bilaterally.  Tongue midline and protrudes   symmetrically.  Palate elevates symmetrical.  Shoulder shrugs are normal.    Motor examination revealed normal strength to direct testing of both upper and   lower extremities, proximal and distal, although she has some degree of   generalized weakness.  Sensation is intact to light touch, temperature, and   pinprick.  Coordination is intact to finger-to-nose testing.  She was not able   to perform heel-to-shin test.  Deep tendon reflexes are diffusely diminished.    Plantar reflexes are equivocal.  I assessed her gait with the help of her   nurse.  She was able to walk with a walker.  She has very slow shuffling gait   and some degree of magnetic gait.    ASSESSMENT AND PLAN:  A 76-year-old female with frequent falls, urinary and   bowel incontinence and memory impairment with evidence of ventriculomegaly on   brain MRI, which I believe is slightly out of proportion to cortical atrophy,   which is highly concerning for a diagnosis of NPH (normal pressure   hydrocephalus).  I have offered her to do a large volume spinal tap and if she   has improvement, to proceed with a  shunt placement; however, she tells me   she is a 76-year-old and does not want anyone to operate on her brain.  I   discussed that with Dr. Zhou as well, so although she very well could have   NPH,  however, if she does not want to have any procedure done, I think it is   futile to pursue diagnostic measure to confirm diagnosis.  Another tool for   diagnosis is CT cisternogram.  She will continue with physical therapy.  I   have no further recommendations.  Please call me if there are any questions.       ____________________________________     MD SHELLI Torres / YESSICA    DD:  09/24/2017 17:52:00  DT:  09/24/2017 18:24:17    D#:  [id number]  Job#:  [id number]

## 2017-09-25 NOTE — CARE PLAN
Problem: Safety  Goal: Will remain free from injury    Intervention: Provide assistance with mobility  Call light within reach, treaded socks in place, bed in lowest position and locked.  Hourly rounding in progress.       Problem: Pain Management  Goal: Pain level will decrease to patient's comfort goal  Pt medicated PRN per MAR.

## 2017-09-25 NOTE — CARE PLAN
Problem: Respiratory:  Goal: Respiratory status will improve  Pt on ra. Respirations equal, unlabored, shallow. Denies sob.    Problem: Pain Management  Goal: Pain level will decrease to patient's comfort goal  Complaints of back and generalized pain. Tylenol given with good relief. Resting and calm now.

## 2017-09-25 NOTE — PROGRESS NOTES
Assumed pt care at 0715.  Received report from night RN.  Assessment completed.  Pt disoriented to time and situation.  Pt complains of back pain.  Medicated per MAR.  No other s/s of discomfort or distress. Pt ambulates with FWW and 1 assist.  Bed in lowest position, locked, and bed alarm is on.  Pt calls appropriately.  Treaded socks in place, call light within reach and staff numbers provided.  Pt needs met at this time.

## 2017-09-26 PROCEDURE — 700102 HCHG RX REV CODE 250 W/ 637 OVERRIDE(OP): Performed by: FAMILY MEDICINE

## 2017-09-26 PROCEDURE — 99232 SBSQ HOSP IP/OBS MODERATE 35: CPT | Performed by: HOSPITALIST

## 2017-09-26 PROCEDURE — 700111 HCHG RX REV CODE 636 W/ 250 OVERRIDE (IP): Performed by: FAMILY MEDICINE

## 2017-09-26 PROCEDURE — 700102 HCHG RX REV CODE 250 W/ 637 OVERRIDE(OP): Performed by: HOSPITALIST

## 2017-09-26 PROCEDURE — A9270 NON-COVERED ITEM OR SERVICE: HCPCS | Performed by: HOSPITALIST

## 2017-09-26 PROCEDURE — 770006 HCHG ROOM/CARE - MED/SURG/GYN SEMI*

## 2017-09-26 PROCEDURE — 700101 HCHG RX REV CODE 250: Performed by: FAMILY MEDICINE

## 2017-09-26 PROCEDURE — A9270 NON-COVERED ITEM OR SERVICE: HCPCS | Performed by: FAMILY MEDICINE

## 2017-09-26 RX ADMIN — METOPROLOL TARTRATE 12.5 MG: 25 TABLET, FILM COATED ORAL at 20:47

## 2017-09-26 RX ADMIN — LIDOCAINE 1 PATCH: 50 PATCH CUTANEOUS at 09:24

## 2017-09-26 RX ADMIN — OMEPRAZOLE 20 MG: 20 CAPSULE, DELAYED RELEASE ORAL at 09:24

## 2017-09-26 RX ADMIN — HYDRALAZINE HYDROCHLORIDE 75 MG: 25 TABLET, FILM COATED ORAL at 20:47

## 2017-09-26 RX ADMIN — GABAPENTIN 300 MG: 300 CAPSULE ORAL at 20:47

## 2017-09-26 RX ADMIN — OMEPRAZOLE 20 MG: 20 CAPSULE, DELAYED RELEASE ORAL at 20:47

## 2017-09-26 RX ADMIN — OXYBUTYNIN CHLORIDE 5 MG: 5 TABLET, FILM COATED, EXTENDED RELEASE ORAL at 20:47

## 2017-09-26 RX ADMIN — ATORVASTATIN CALCIUM 80 MG: 40 TABLET, FILM COATED ORAL at 20:47

## 2017-09-26 RX ADMIN — HEPARIN SODIUM 5000 UNITS: 5000 INJECTION, SOLUTION INTRAVENOUS; SUBCUTANEOUS at 20:50

## 2017-09-26 RX ADMIN — ACETAMINOPHEN 650 MG: 325 TABLET, FILM COATED ORAL at 20:47

## 2017-09-26 RX ADMIN — HYDRALAZINE HYDROCHLORIDE 75 MG: 25 TABLET, FILM COATED ORAL at 14:23

## 2017-09-26 RX ADMIN — ASPIRIN 81 MG: 81 TABLET, COATED ORAL at 09:24

## 2017-09-26 RX ADMIN — HYDRALAZINE HYDROCHLORIDE 75 MG: 25 TABLET, FILM COATED ORAL at 05:07

## 2017-09-26 RX ADMIN — DULOXETINE HYDROCHLORIDE 20 MG: 20 CAPSULE, DELAYED RELEASE ORAL at 09:24

## 2017-09-26 RX ADMIN — ONDANSETRON 4 MG: 2 INJECTION INTRAMUSCULAR; INTRAVENOUS at 20:47

## 2017-09-26 RX ADMIN — STANDARDIZED SENNA CONCENTRATE AND DOCUSATE SODIUM 2 TABLET: 8.6; 5 TABLET, FILM COATED ORAL at 09:24

## 2017-09-26 RX ADMIN — HEPARIN SODIUM 5000 UNITS: 5000 INJECTION, SOLUTION INTRAVENOUS; SUBCUTANEOUS at 14:24

## 2017-09-26 RX ADMIN — STANDARDIZED SENNA CONCENTRATE AND DOCUSATE SODIUM 2 TABLET: 8.6; 5 TABLET, FILM COATED ORAL at 20:47

## 2017-09-26 RX ADMIN — METOPROLOL TARTRATE 12.5 MG: 25 TABLET, FILM COATED ORAL at 09:25

## 2017-09-26 ASSESSMENT — ENCOUNTER SYMPTOMS
COUGH: 0
DEPRESSION: 0
FEVER: 0
HEADACHES: 0
ABDOMINAL PAIN: 0

## 2017-09-26 ASSESSMENT — PAIN SCALES - GENERAL
PAINLEVEL_OUTOF10: 0

## 2017-09-26 NOTE — PROGRESS NOTES
Assumed pt care after report received from night Rn. Pt is resting comfortably at this time. No c/o pain or distress noted. Call light and belongings in reach. Bed in low position.Will continue to monitor.

## 2017-09-26 NOTE — CARE PLAN
Problem: Communication  Goal: The ability to communicate needs accurately and effectively will improve  Outcome: PROGRESSING AS EXPECTED  Pt calls appropriately for assistance with call light, able to verbalize her needs.

## 2017-09-26 NOTE — PROGRESS NOTES
[facility] Hospitalist Progress Note    Date of Service: 2017    Chief Complaint  76 y.o. female admitted 2017 with AMS, UTI.     Interval Problem Update  Doing better, refused further workup for NPH, no new complains, pt/ot eval today.      Consultants/Specialty  Neuro.     Disposition  probably will need snf.          Review of Systems   Constitutional: Negative for fever.   Respiratory: Negative for cough.    Gastrointestinal: Negative for abdominal pain.   Neurological: Negative for headaches.   Psychiatric/Behavioral: Negative for depression.      Physical Exam  Laboratory/Imaging   Hemodynamics  Temp (24hrs), Av.4 °C (97.6 °F), Min:36.1 °C (97 °F), Max:36.7 °C (98 °F)   Temperature: 36.1 °C (97 °F)  Pulse  Av  Min: 52  Max: 88    Blood Pressure : 154/61      Respiratory      Respiration: 17, Pulse Oximetry: 91 %        RUL Breath Sounds: Clear, RML Breath Sounds: Diminished, RLL Breath Sounds: Diminished, ARGELIA Breath Sounds: Clear, LLL Breath Sounds: Diminished    Fluids    Intake/Output Summary (Last 24 hours) at 17 1743  Last data filed at 17 1300   Gross per 24 hour   Intake             2960 ml   Output                0 ml   Net             2960 ml       Nutrition  Orders Placed This Encounter   Procedures   • Diet Order     Standing Status:   Standing     Number of Occurrences:   1     Order Specific Question:   Diet:     Answer:   Regular [1]     Physical Exam   Constitutional: She appears well-developed.   HENT:   Head: Normocephalic.   Mouth/Throat: No oropharyngeal exudate.   Eyes: Pupils are equal, round, and reactive to light.   Neck: Normal range of motion.   Cardiovascular: Normal rate, regular rhythm and normal heart sounds.    Pulmonary/Chest: Effort normal and breath sounds normal. No respiratory distress. She has no wheezes.   Abdominal: Soft. Bowel sounds are normal. She exhibits no distension. There is no tenderness.   Musculoskeletal: Normal range of motion. She  exhibits no tenderness.   Lymphadenopathy:     She has no cervical adenopathy.   Neurological: She is alert. She has normal strength and normal reflexes. She is not disoriented. No cranial nerve deficit or sensory deficit. She exhibits normal muscle tone. GCS eye subscore is 4. GCS verbal subscore is 5. GCS motor subscore is 6.   Psychiatric: She has a normal mood and affect. Her behavior is normal.   Nursing note and vitals reviewed.                               Assessment/Plan     UTI (urinary tract infection)- (present on admission)   Assessment & Plan    Started on ceftriaxone, patient is allergic to PCN,she does not remember what type of allergic she has since it was long time ago. Will keep close eye on her for any reaction.   9/23 tolerating atb. F/u cx.   9/24 cx negative, will give 3 days of atb.   9/25 stable.        Acute renal insufficiency- (present on admission)   Assessment & Plan    Mild probably due to dehydration. Resolved now.         T12 compression fracture (CMS-HCC)- (present on admission)   Assessment & Plan    Stable, ct spine did not show acute changes.         HTN (hypertension)- (present on admission)   Assessment & Plan    Stable on metoprolol, hydralazine.   9/23 stable.   9/24 will increase her hydralazine from 50 tid to 75 tid.   9/25 pb stable will continue monitoring might need to increase bp meds.         History of CVA (cerebrovascular accident)- (present on admission)   Assessment & Plan    Continue ASA and statin.   9/23 MRI today show no new stroke, showed Prominent lateral and third ventricles without visualized obstructing lesion which could indicate normal pressure hydrocephalus, patient does not have urinary incontinence neither balance problem, PT/OT to see today.   9/24 MRI no new stroke, pt/ot to see.   9/25 pt/ot today.         Dementia- (present on admission)   Assessment & Plan    Unknown baseline, had h/o cva, ct head negative, will get MRI brain. PT/OT to see her.    9/23 MRI to rule out stroke today. She is more alert and oriented today.   9/24 MRI showed enlarged ventricles, patient has memory problems, balance problems and urinary incontinence, called neuro for possible normal pressure hydrocephalus.   9/25 patient refused workup for NPH she said it is too much risk for her age, told her that she can think about it and f/u with neuro as outpatient.             Reviewed items::  Labs reviewed and Medications reviewed  Pink catheter::  No Pink  DVT prophylaxis pharmacological::  Heparin  Antibiotics:  Treating active infection/contamination beyond 24 hours perioperative coverage

## 2017-09-26 NOTE — DISCHARGE PLANNING
"Met with pt at bedside, pt is quite confused but very pleasant. Pt reports she \"has lost her mind,\" reports she is confused but her confusion has increased recently. SW attempted full assessment, pt unable to answer majority of questions.    Per previous notes pt has a Rep Payee ([employer] - Carlo [phone]).    Chart review revealed pt had an admission to [facility] 7/29/17, pt was d/c to [facility] 8/1/17, pts was d/c to [facility] 8/15/17. Pt has a history of dementia, refused AL or group home placement last admission. Per PT pt was home from SNF one day before falling.     Pt receives $1536, will no qualify for Medicaid. However, income may cover AL or group home at pts low physical needs.     Discussed pts with Dr that pt is not likely competent to make medical decisions. Appears pt likely in need of state guardian.    Performed [employer] search, pt was able to identify the relatives found as her son Dom Vaelncia ([place] address no phone #, address Jun 2008), ex-dtr-in-law Geena Robles, and ex  Camilo Noguera. Pt states she has not spoke with her son in 20-yrs. Pt asked that we not contact her ex , states they have been  for several years, unsure # of years. Pt identified a past neighbor as Andrez Bond ([place]).     Pt has a neighbor here Arsenio Valencia ([phone]), listed on facesheet, states he sometimes shuts off his phone.     Per previous admission notes 8/15/17 pt had neighbors visit at [facility]. Neighbor, Destiny Lino ([phone]).  Destiny's daughter is Kerri Lobato ([phone]). Destiny described the property that they live in as a main house and 6 units in the building behind.  They each have their own room and bathroom and share a common kitchen in the main house. Pts friend Arsenio lives upstairs from the patient and Destiny lives around the corner from her.  Destiny describes that patient has had increasing difficulty " getting to the store and providing her self care.  Destiny states that they all try to help getting things for her when they are going out and Arsenio looks out after her.  Destiny states that she has talked with patient about trying to find a contact for her son and plans to work on this. Patient's landlord is Bishnu Simmons.    Below is last  assessment 7/31/17:    Laurie Orozco  Signed   Discharge Planning Date of Service: 7/31/2017 12:04 PM         Care Transition Team Assessment     SW met with pt at bedside and completed assessment. Pt reports frequent falls at home and requested assistance. Per pt, she has a rep payee ([employer] [phone]), and previously received Meals on Wheels, but cancelled that service. Pt was unable to verify her monthly income but gave SW permission to contact [employer]. SW spoke with pt regarding NATALIE and GH, but pt advised she would rather live alone.      SW spoke with Carlo at [employer] who advised her monthly income is $1536. Carlo also states she has spoken with pt about Group Homes before and pt is not agreeable to a group home.      SW to make referrals for the Home and Community Based Waiver Program and the [facility] Geriatric Assessment Request if pt's dc plan is home.      Information Source  Orientation : Disoriented to Time  Information Given By: Patient  Who is responsible for making decisions for patient? : Patient     Elopement Risk  Legal Hold: No  Ambulatory or Self Mobile in Wheelchair: No-Not an Elopement Risk  Disoriented: Time-At Risk for Elopement  Psychiatric Symptoms: None  History of Wandering: No  Elopement this Admit: No  Vocalizing Wanting to Leave: No  Displays Behaviors, Body Language Wanting to Leave: No-Not at Risk for Elopement  Elopement Risk: Not at Risk for Elopement     Interdisciplinary Discharge Planning  Does Admitting Nurse Feel This Could be a Complex Discharge?:  No  Primary Care Physician: [facility]  Lives with - Patient's Self Care Capacity: Alone and Able to Care For Self  Patient or legal guardian wants to designate a caregiver (see row info): No  Support Systems: Friends / Neighbors  Housing / Facility: [facility]  Do You Take your Prescribed Medications Regularly: No  Reasons Why Not Taking Medications : Didn't Refill Prescriptions , Financial Reasons  Able to Return to Previous ADL's: Future Time w/Therapy  Mobility Issues: Yes  Prior Services: Unable To Determine At This Time  Assistance Needed: Yes  Durable Medical Equipment: Other - Specify (Cane)     Discharge Preparedness  What is your plan after discharge?: Uncertain - pending medical team collaboration  What are your discharge supports?: Other (comment) (Upstairs Neighbor-Arsenio)  Prior Functional Level: Ambulatory, Needs Assist with Activities of Daily Living, Uses Cane  Difficulity with ADLs: Walking  Difficulty with ADLs Comment: Pt reports frequent falls  Difficulity with IADLs: Keeping track of finances  Difficulity with IADL Comments:  (Pt uses [employer] ([phone]))     Functional Assesment  Prior Functional Level: Ambulatory, Needs Assist with Activities of Daily Living, Uses Cane     Finances  Financial Barriers to Discharge: No  Prescription Coverage:  (Fills at [facility])     Vision / Hearing Impairment  Vision Impairment : No  Right Eye Vision: Impaired  Left Eye Vision: Impaired  Hearing Impairment : No     Values / Beliefs / Concerns  Values / Beliefs Concerns : No           Domestic Abuse  Have you ever been the victim of abuse or violence?: No  Physical Abuse or Sexual Abuse: No  Verbal Abuse or Emotional Abuse: No  Possible Abuse Reported to:: Not Applicable     Psychological Assessment  History of Substance Abuse: None  History of Psychiatric Problems: No     Discharge Risks or Barriers  Discharge risks or barriers?: Lives alone, no community support  Patient risk factors: Lack of  outside supports, Lives alone and no community support, Vulnerable adult

## 2017-09-26 NOTE — PROGRESS NOTES
Simona Knight Fall Risk Assessment:     Last Known Fall: Within the last month  Mobility: Immobilized/requires assist of one person  Medications: Cardiovascular and central nervous system meds  Mental Status/LOC/Awareness: Memory loss/confusion and requires reorienting  Toileting Needs: No needs  Volume/Electrolyte Status: No problems  Communication/Sensory: Visual (Glasses)/hearing deficit  Behavior: Appropriate behavior  Simona Knight Fall Risk Total: 14  Fall Risk Level: MODERATE RISK    Universal Fall Precautions:  call light/belongings in reach, bed in low position and locked, siderails up x 2, use non-slip footwear, adequate lighting, clutter free and spill free environment, educate on level of risk, educate to call for assistance    Fall Risk Level Interventions:    TRIAL (TELE 8, NEURO, MED JENNIE 5) Moderate Fall Risk Interventions  Place yellow fall risk ID band on patient: verified  Provide patient/family education based on risk assessment : completed  Educate patient/family to call staff for assistance when getting out of bed: completed  Place fall precaution signage outside patient door: completed  Utilize bed/chair fall alarm: verifiedTRIAL (TELE 8, NEURO, AFFiRiS JENNIE 5) High Fall Risk Interventions  Place yellow fall risk ID band on patient: verified  Provide patient/family education based on risk assessment: completed  Educate patient/family to call staff for assistance when getting out of bed: completed  Place fall precaution signage outside patient door: completed  Place patient in room close to nursing station: verified  Utilize bed/chair fall alarm: completed  Notify charge of high risk for huddle: completed    Patient Specific Interventions:     Medication: review medications with patient and family and limit combination of prn medications  Mental Status/LOC/Awareness: reorient patient, reinforce falls education, check on patient hourly and utilize bed/chair fall alarm  Toileting: provide frquent  toileting and monitor intake and output/use of appropriate interventions  Volume/Electrolyte Status: ensure patient remains hydrated and monitor abnormal lab values  Communication/Sensory: update plan of care on whiteboard  Behavioral: administer medication as ordered and instruct/reinforce fall program rationale  Mobility: utilize bed/chair fall alarm and ensure bed is locked and in lowest position

## 2017-09-26 NOTE — DISCHARGE PLANNING
SW met with pt at bedside regarding d/c planning. Pt is unable to provide basic information about her living situation and does not recall being at a SNF. Pt appears to have been d/c'ed on 8/18  And therefore would have been at the facility for about 1 month. Pt states she has no family. Pt states she has a close friend Arsenio.  At this point, a decision maker is unclear for this pt. Pt is disoriented and unable to make medical decisions. There does not appear to be an advanced directive on file. SW will do a [employer] search to determine NOK for this pt.     SW will discuss these barriers during rounds.

## 2017-09-26 NOTE — DISCHARGE PLANNING
Care Transition Team Assessment    Pt very pleasantly confused. Pt has dementia with reported increased confusion. Pt will likely need decision maker (guardian) and placement (AL, group home). Appears to have some financial means for placement. Pt lacks outside supports and is severely impaired mentally. Pt came from Rehab and SNF, d/c home one day prior to fall. Pt cannot recall any of the above. Pt gave SW permission to speak to pts neighbors.    Information Source  Orientation : Disoriented to Event, Disoriented to Place, Disoriented to Time  Information Given By:  (Records and pt)  Informant's Name:  (Primarily past records)  Who is responsible for making decisions for patient? :  (Pt has estranged son Dom Valencia unknown address and #)    Readmission Evaluation  Is this a readmission?: Yes - unplanned readmission  Why do you think you were readmitted?:  (decreasing mental status, living alone, lacks outside support)  Was an appointment arranged for you prior to discharge?: Yes, attended appointment  Were there new prescriptions you were supposed to fill after you were discharged?: Yes, prescriptions filled  Did you understand your discharge instructions?: Yes  Did you have enough support after your last discharge?: No  Please explain:  (limited resources)    Elopement Risk  Legal Hold: No  Ambulatory or Self Mobile in Wheelchair: Yes  Disoriented: No  Psychiatric Symptoms: None  History of Wandering: No  Elopement this Admit: No  Vocalizing Wanting to Leave: No  Displays Behaviors, Body Language Wanting to Leave: No-Not at Risk for Elopement  Elopement Risk: Not at Risk for Elopement  Wanderguard On: Unavailable    Interdisciplinary Discharge Planning  Does Admitting Nurse Feel This Could be a Complex Discharge?: Yes  Primary Care Physician:  (Dr. Bernabe Hopkins)  Lives with - Patient's Self Care Capacity: Alone and Unable to Care For Self, Unrelated Adult, Friends  Support Systems: Friends /  Neighbors  Housing / Facility: 1 Story Apartment / Condo  Do You Take your Prescribed Medications Regularly:  (Not likely)  Able to Return to Previous ADL's: No  Mobility Issues: No  Prior Services: Meals on Wheels, Aging Services, Other (Comments) (Rep Payee - Carlo [phone])  Patient Expects to be Discharged to::  (Unknown - likely need guardian and placement)  Assistance Needed: Yes    Discharge Preparedness  What is your plan after discharge?: Uncertain - pending medical team collaboration    Finances  Financial Barriers to Discharge:  ($1536 monthly reportedly, pt unsure, has rep payee)  Prescription Coverage: Yes    Vision / Hearing Impairment  Vision Impairment : Yes  Right Eye Vision: Wears Glasses  Left Eye Vision: Wears Glasses  Hearing Impairment : No    Values / Beliefs / Concerns  Values / Beliefs Concerns : No    Domestic Abuse  Have you ever been the victim of abuse or violence?: No  Physical Abuse or Sexual Abuse: No  Verbal Abuse or Emotional Abuse: No  Possible Abuse Reported to:: Not Applicable    Psychological Assessment  Newly Diagnosed Illness: No    Discharge Risks or Barriers  Discharge risks or barriers?: Lives alone, no community support  Patient risk factors: Cognitive / sensory / physical deficit, Lack of outside supports, Lives alone and no community support, Readmission, Vulnerable adult

## 2017-09-26 NOTE — PROGRESS NOTES
[facility] Hospitalist Progress Note    Date of Service: 2017    Chief Complaint  76 y.o. female admitted 2017 with AMS, UTI.     Interval Problem Update  No issues overnight  No new complaints  Remains confused     Consultants/Specialty  Neuro.     Disposition  We'll need to locate family that at that point discuss possible guardianship versus further plan; will benefit from skilled nursing facility        Review of Systems   Unable to perform ROS: Dementia   Constitutional: Negative for fever.   Respiratory: Negative for cough.    Gastrointestinal: Negative for abdominal pain.   Neurological: Negative for headaches.   Psychiatric/Behavioral: Negative for depression.      Physical Exam  Laboratory/Imaging   Hemodynamics  Temp (24hrs), Av.3 °C (97.3 °F), Min:36.1 °C (96.9 °F), Max:36.6 °C (97.8 °F)   Temperature: 36.6 °C (97.8 °F)  Pulse  Av.9  Min: 52  Max: 88    Blood Pressure : (!) 181/65      Respiratory      Respiration: 16, Pulse Oximetry: 90 %        RUL Breath Sounds: Clear, RML Breath Sounds: Diminished, RLL Breath Sounds: Diminished, ARGELIA Breath Sounds: Clear, LLL Breath Sounds: Diminished    Fluids    Intake/Output Summary (Last 24 hours) at 17 1406  Last data filed at 17 0920   Gross per 24 hour   Intake             1518 ml   Output                0 ml   Net             1518 ml       Nutrition  Orders Placed This Encounter   Procedures   • Diet Order     Standing Status:   Standing     Number of Occurrences:   1     Order Specific Question:   Diet:     Answer:   Regular [1]     Physical Exam   Constitutional: No distress.   HENT:   Head: Normocephalic and atraumatic.   Eyes: Conjunctivae are normal. Pupils are equal, round, and reactive to light.   Neck: Normal range of motion. Neck supple.   Cardiovascular: Normal rate and regular rhythm.    Pulmonary/Chest: Effort normal and breath sounds normal. No respiratory distress.   Abdominal: Soft. Bowel sounds are normal. She exhibits  no distension.   Musculoskeletal: Normal range of motion. She exhibits no edema or tenderness.   Neurological: She is alert. She has normal strength and normal reflexes. She is not disoriented. No cranial nerve deficit or sensory deficit. GCS eye subscore is 4. GCS verbal subscore is 5. GCS motor subscore is 6.   Skin: Skin is warm and dry. She is not diaphoretic.   Psychiatric: She has a normal mood and affect. Her behavior is normal.   Nursing note and vitals reviewed.                               Assessment/Plan     UTI (urinary tract infection)- (present on admission)   Assessment & Plan    Cultures negative  Completed antibiotics with IV Rocephin        Acute renal insufficiency- (present on admission)   Assessment & Plan    Mild probably due to dehydration. Resolved now.         T12 compression fracture (CMS-HCC)- (present on admission)   Assessment & Plan    Stable, ct spine did not show acute changes.         HTN (hypertension)- (present on admission)   Assessment & Plan    Stable on metoprolol, hydralazine.   9/23 stable.   9/24 will increase her hydralazine from 50 tid to 75 tid.   9/25 pb stable will continue monitoring might need to increase bp meds.         History of CVA (cerebrovascular accident)- (present on admission)   Assessment & Plan    Continue ASA and statin.   9/23 MRI today show no new stroke, showed Prominent lateral and third ventricles without visualized obstructing lesion which could indicate normal pressure hydrocephalus, patient does not have urinary incontinence neither balance problem, PT/OT to see today.   9/24 MRI no new stroke, pt/ot to see.   9/25 pt/ot today.         Dementia- (present on admission)   Assessment & Plan    MRI of her brain showed enlarged ventricles but no other acute abnormalities  Neurology has evaluated the patient  She may have normal pressure hydrocephalus in addition to her dementia  We'll need to have a 2 M.D. consent but before that we will attempt to  discuss this with family prior to doing any procedures            Reviewed items::  Labs reviewed, Medications reviewed and Radiology images reviewed  Pink catheter::  No Pink  DVT prophylaxis pharmacological::  Heparin  Antibiotics:  Treating active infection/contamination beyond 24 hours perioperative coverage

## 2017-09-26 NOTE — DOCUMENTATION QUERY
"DOCUMENTATION QUERY    PROVIDERS: Please select “Cosign w/ note”to reply to query.    To better represent the severity of illness of your patient, please review the following information and exercise your independent professional judgment in responding to this query.     \"AMS\" is documented in the Progress Notes from 9/22/17-9/24/17. Based upon the clinical findings, risk factors, and treatment, can this diagnosis be further specified?    • Acute encephalopathy (if so, please specify type [metabolic, hepatic, toxic, alcoholic, etc.])  • Psychosis (if so, please specify type [acute hysterical, alcoholic, etc.])  • Delirium (if so, please specify underlying cause [alcohol intoxication, alcohol withdrawal, multiple etiologies, unknown etiology])   • Other explanation of clinical findings  • Unable to determine    The medical record reflects the following:   Clinical Findings - documented \"Admitted with AMS and UTI\"  - documented \"Alert and Oriented x1\"  - documented \"She does not know how she got to the hospital\"   Treatment - CT Head  - MRI Brain  - PT/OT  - Rocephin  - CBC  - BMP   Risk Factors - Age  - dx UTI  - dx Dementia  - dx Acute Renal Insufficiency  - dx Dehydration   Location within medical record  History and Physical, Progress Notes and MAR.     Thank you,     Cody Tavarez  Clinical   P: [phone]        "

## 2017-09-26 NOTE — PROGRESS NOTES
Simona Knight Fall Risk Assessment:     Last Known Fall: Within the last month  Mobility: Immobilized/requires assist of one person  Medications: Cardiovascular or central nervous system meds  Mental Status/LOC/Awareness: Memory loss/confusion and requires reorienting  Toileting Needs: No needs  Volume/Electrolyte Status: No problems  Communication/Sensory: Visual (Glasses)/hearing deficit  Behavior: Appropriate behavior  Simona Knight Fall Risk Total: 13  Fall Risk Level: MODERATE RISK    Universal Fall Precautions:  call light/belongings in reach, bed in low position and locked, use non-slip footwear, adequate lighting, clutter free and spill free environment, educate on level of risk, educate to call for assistance    Fall Risk Level Interventions:    TRIAL (TELE 8, NEURO, MED JENNIE 5) Moderate Fall Risk Interventions  Place yellow fall risk ID band on patient: verified  Provide patient/family education based on risk assessment : verified  Educate patient/family to call staff for assistance when getting out of bed: verified  Place fall precaution signage outside patient door: verified  Utilize bed/chair fall alarm: verifiedTRIAL (TELE 8, NEURO, MED JENNIE 5) High Fall Risk Interventions  Place yellow fall risk ID band on patient: verified  Provide patient/family education based on risk assessment: completed  Educate patient/family to call staff for assistance when getting out of bed: completed  Place fall precaution signage outside patient door: completed  Place patient in room close to nursing station: verified  Utilize bed/chair fall alarm: completed  Notify charge of high risk for huddle: completed    Patient Specific Interventions:     Medication: review medications with patient and family  Mental Status/LOC/Awareness: reorient patient  Toileting: provide frquent toileting  Volume/Electrolyte Status: ensure patient remains hydrated  Communication/Sensory: update plan of care on whiteboard  Behavioral:  encourage patient to voice feelings  Mobility: dangle prior to standing

## 2017-09-27 PROCEDURE — 700102 HCHG RX REV CODE 250 W/ 637 OVERRIDE(OP): Performed by: HOSPITALIST

## 2017-09-27 PROCEDURE — 700102 HCHG RX REV CODE 250 W/ 637 OVERRIDE(OP): Performed by: FAMILY MEDICINE

## 2017-09-27 PROCEDURE — 770006 HCHG ROOM/CARE - MED/SURG/GYN SEMI*

## 2017-09-27 PROCEDURE — A9270 NON-COVERED ITEM OR SERVICE: HCPCS | Performed by: FAMILY MEDICINE

## 2017-09-27 PROCEDURE — 700111 HCHG RX REV CODE 636 W/ 250 OVERRIDE (IP): Performed by: FAMILY MEDICINE

## 2017-09-27 PROCEDURE — A9270 NON-COVERED ITEM OR SERVICE: HCPCS | Performed by: HOSPITALIST

## 2017-09-27 PROCEDURE — 700101 HCHG RX REV CODE 250: Performed by: FAMILY MEDICINE

## 2017-09-27 PROCEDURE — 99231 SBSQ HOSP IP/OBS SF/LOW 25: CPT | Performed by: HOSPITALIST

## 2017-09-27 RX ADMIN — OMEPRAZOLE 20 MG: 20 CAPSULE, DELAYED RELEASE ORAL at 09:00

## 2017-09-27 RX ADMIN — METOPROLOL TARTRATE 12.5 MG: 25 TABLET, FILM COATED ORAL at 09:00

## 2017-09-27 RX ADMIN — OMEPRAZOLE 20 MG: 20 CAPSULE, DELAYED RELEASE ORAL at 20:42

## 2017-09-27 RX ADMIN — HEPARIN SODIUM 5000 UNITS: 5000 INJECTION, SOLUTION INTRAVENOUS; SUBCUTANEOUS at 06:03

## 2017-09-27 RX ADMIN — ACETAMINOPHEN 650 MG: 325 TABLET, FILM COATED ORAL at 20:39

## 2017-09-27 RX ADMIN — HYDRALAZINE HYDROCHLORIDE 75 MG: 25 TABLET, FILM COATED ORAL at 06:03

## 2017-09-27 RX ADMIN — HYDRALAZINE HYDROCHLORIDE 75 MG: 25 TABLET, FILM COATED ORAL at 15:34

## 2017-09-27 RX ADMIN — ATORVASTATIN CALCIUM 80 MG: 40 TABLET, FILM COATED ORAL at 20:39

## 2017-09-27 RX ADMIN — HEPARIN SODIUM 5000 UNITS: 5000 INJECTION, SOLUTION INTRAVENOUS; SUBCUTANEOUS at 14:25

## 2017-09-27 RX ADMIN — OXYBUTYNIN CHLORIDE 5 MG: 5 TABLET, FILM COATED, EXTENDED RELEASE ORAL at 20:42

## 2017-09-27 RX ADMIN — GABAPENTIN 300 MG: 300 CAPSULE ORAL at 20:40

## 2017-09-27 RX ADMIN — METOPROLOL TARTRATE 12.5 MG: 25 TABLET, FILM COATED ORAL at 20:40

## 2017-09-27 RX ADMIN — HEPARIN SODIUM 5000 UNITS: 5000 INJECTION, SOLUTION INTRAVENOUS; SUBCUTANEOUS at 20:44

## 2017-09-27 RX ADMIN — ERGOCALCIFEROL 50000 UNITS: 1.25 CAPSULE, LIQUID FILLED ORAL at 09:46

## 2017-09-27 RX ADMIN — LIDOCAINE 1 PATCH: 50 PATCH CUTANEOUS at 09:00

## 2017-09-27 RX ADMIN — HYDRALAZINE HYDROCHLORIDE 75 MG: 25 TABLET, FILM COATED ORAL at 22:00

## 2017-09-27 RX ADMIN — DULOXETINE HYDROCHLORIDE 20 MG: 20 CAPSULE, DELAYED RELEASE ORAL at 08:39

## 2017-09-27 RX ADMIN — ONDANSETRON 4 MG: 4 TABLET, ORALLY DISINTEGRATING ORAL at 15:55

## 2017-09-27 RX ADMIN — ASPIRIN 81 MG: 81 TABLET, COATED ORAL at 08:39

## 2017-09-27 ASSESSMENT — ENCOUNTER SYMPTOMS
DEPRESSION: 0
HEADACHES: 0
COUGH: 0
FEVER: 0
ABDOMINAL PAIN: 0

## 2017-09-27 ASSESSMENT — PAIN SCALES - GENERAL
PAINLEVEL_OUTOF10: 0
PAINLEVEL_OUTOF10: 0
PAINLEVEL_OUTOF10: 4

## 2017-09-27 ASSESSMENT — LIFESTYLE VARIABLES: DO YOU DRINK ALCOHOL: NO

## 2017-09-27 NOTE — PROGRESS NOTES
"Assumed care of patient at 0700 . Received report from RN. Patient is AOX3 . Assessment complete. Labs reviewed.Patient and RN discussed plan of care. Patient questions answered. Patient needs are met at this time. Bed in lowest and locked position. Upper bed rails up.  Call light is within reach. Hourly rounding in place.  /53   Pulse 64   Temp 36.3 °C (97.4 °F)   Resp 17   Ht 1.6 m (5' 2.99\")   Wt 83.3 kg (183 lb 10.3 oz)   SpO2 92%   Breastfeeding? No   BMI 32.54 kg/m²     "

## 2017-09-27 NOTE — PROGRESS NOTES
Simona Knight Fall Risk Assessment:     Last Known Fall: Within the last month  Mobility: Immobilized/requires assist of one person  Medications: Cardiovascular and central nervous system meds  Mental Status/LOC/Awareness: Memory loss/confusion and requires reorienting  Toileting Needs: No needs  Volume/Electrolyte Status: No problems  Communication/Sensory: Visual (Glasses)/hearing deficit  Behavior: Appropriate behavior  Simona Knight Fall Risk Total: 14  Fall Risk Level: MODERATE RISK    Universal Fall Precautions:  call light/belongings in reach, bed in low position and locked, siderails up x 2, use non-slip footwear, adequate lighting, clutter free and spill free environment, educate on level of risk, educate to call for assistance    Fall Risk Level Interventions:    TRIAL (TELE 8, NEURO, MED JENNIE 5) Moderate Fall Risk Interventions  Place yellow fall risk ID band on patient: verified  Provide patient/family education based on risk assessment : completed  Educate patient/family to call staff for assistance when getting out of bed: completed  Place fall precaution signage outside patient door: completed  Utilize bed/chair fall alarm: verifiedTRIAL (TELE 8, NEURO, [employer] JENNIE 5) High Fall Risk Interventions  Place yellow fall risk ID band on patient: verified  Provide patient/family education based on risk assessment: completed  Educate patient/family to call staff for assistance when getting out of bed: completed  Place fall precaution signage outside patient door: completed  Place patient in room close to nursing station: verified  Utilize bed/chair fall alarm: completed  Notify charge of high risk for huddle: completed    Patient Specific Interventions:     Medication: review medications with patient and family  Mental Status/LOC/Awareness: reorient patient, reinforce falls education, utilize bed/chair fall alarm and reinforce the use of call light  Toileting: provide frquent toileting and toilet prior to  giving pain medications  Volume/Electrolyte Status: ensure patient remains hydrated and ensure IV fluids are appropriate  Communication/Sensory: update plan of care on whiteboard  Behavioral: engage patient in daily activities  Mobility: utilize bed/chair fall alarm

## 2017-09-27 NOTE — CARE PLAN
Problem: Communication  Goal: The ability to communicate needs accurately and effectively will improve    Intervention: Reorient patient to environment as needed  Pt educated on how to use call light and orient to where she is.

## 2017-09-27 NOTE — PROGRESS NOTES
Pt is alert to self and place, otherwise intermittent confusion present. Pain managed but some nausea no vomiting. Voiding fine, walking to rest room/

## 2017-09-27 NOTE — CARE PLAN
Problem: Safety  Goal: Will remain free from injury    Intervention: Provide assistance with mobility  Pt educated on using call light when need to use the restroom, always wearing non-skid socks. Using walker when ambulating to bathroom. Sit at the edge of the bed before getting up to gather self.

## 2017-09-27 NOTE — CARE PLAN
Problem: Communication  Goal: The ability to communicate needs accurately and effectively will improve    Intervention: [place] patient and significant other/support system to call light to alert staff of needs  Discussed POC, pt communicates questions and poc well. Pt and family understand poc.      Problem: Safety  Goal: Will remain free from injury  Educated on safety measures, using call light ect    Problem: Pain Management  Goal: Pain level will decrease to patient's comfort goal    Intervention: Follow pain managment plan developed in collaboration with patient and Interdisciplinary Team  Pt states pain management plan working for them, pain controlled

## 2017-09-27 NOTE — PROGRESS NOTES
Simona Knight Fall Risk Assessment:     Last Known Fall: Within the last month  Mobility: Dizziness/generalized weakness  Medications: Cardiovascular and central nervous system meds  Mental Status/LOC/Awareness: Oriented to person and place  Toileting Needs: Diarrhea/frequency/urgency  Volume/Electrolyte Status: No problems  Communication/Sensory: Visual (Glasses)/hearing deficit  Behavior: Appropriate behavior  Simona Knight Fall Risk Total: 15  Fall Risk Level: HIGH RISK    Universal Fall Precautions:  call light/belongings in reach, bed in low position and locked, wheelchairs and assistive devices out of sight, siderails up x 2, use non-slip footwear, adequate lighting, clutter free and spill free environment, educate to call for assistance    Fall Risk Level Interventions:    TRIAL (TELE 8, NEURO, MED JENNIE 5) Moderate Fall Risk Interventions  Place yellow fall risk ID band on patient: verified  Provide patient/family education based on risk assessment : completed  Educate patient/family to call staff for assistance when getting out of bed: completed  Place fall precaution signage outside patient door: completed  Utilize bed/chair fall alarm: verifiedTRIAL (TELE 8, NEURO, [employer] JENNIE 5) High Fall Risk Interventions  Place yellow fall risk ID band on patient: completed  Provide patient/family education based on risk assessment: completed  Educate patient/family to call staff for assistance when getting out of bed: completed  Place fall precaution signage outside patient door: completed  Place patient in room close to nursing station: currently not available/charge notified  Utilize bed/chair fall alarm: completed  Notify charge of high risk for huddle: verified    Patient Specific Interventions:     Medication: review medications with patient and family, assess for medications that can be discontinued or dosage decreased and limit combination of prn medications  Mental Status/LOC/Awareness: reorient patient,  reinforce falls education, check on patient hourly, utilize bed/chair fall alarm, reinforce the use of call light and provide activity  Toileting: consider obtaining elevated toilet seat or bedside commode (BSC), provide frquent toileting, instruct patient/family on the use of grab bars, instruct patient/family on the need to call for assistance when toileting, do not leave patient unattended in bathroom/refer to toileting scripting and toilet prior to giving pain medications  Volume/Electrolyte Status: ensure patient remains hydrated, monitor abnormal lab values and teach patients to dangle before rising if hypotensive  Communication/Sensory: update plan of care on whiteboard, ensure proper positioning when transferrng/ambulating and ensure patient has glasses/contacts and hearing aids/dentures  Behavioral: encourage patient to voice feelings, engage patient in daily activities and administer medication as ordered  Mobility: provide comfort measures during transport, dangle prior to standing, utilize bed/chair fall alarm, ensure bed is locked and in lowest position, provide appropriate assistive device and instruct patient to exit bed on their strongest side

## 2017-09-27 NOTE — PROGRESS NOTES
[facility] Hospitalist Progress Note    Date of Service: 2017    Chief Complaint  76 y.o. female admitted 2017 with AMS, UTI.     Interval Problem Update  No issues overnight  No new complaints  Remains confused     Consultants/Specialty  Neuro.   We'll consult psychiatry for capacity    Disposition  We'll need to locate family that at that point discuss possible guardianship versus further plan; will benefit from skilled nursing facility;        Review of Systems   Unable to perform ROS: Dementia   Constitutional: Negative for fever.   Respiratory: Negative for cough.    Gastrointestinal: Negative for abdominal pain.   Neurological: Negative for headaches.   Psychiatric/Behavioral: Negative for depression.      Physical Exam  Laboratory/Imaging   Hemodynamics  Temp (24hrs), Av.4 °C (97.6 °F), Min:36.2 °C (97.1 °F), Max:36.7 °C (98 °F)   Temperature: 36.3 °C (97.4 °F)  Pulse  Av.5  Min: 52  Max: 88    Blood Pressure : 146/53      Respiratory      Respiration: 17, Pulse Oximetry: 92 %        RUL Breath Sounds: Clear, RML Breath Sounds: Diminished, RLL Breath Sounds: Diminished, ARGELIA Breath Sounds: Clear, LLL Breath Sounds: Diminished    Fluids    Intake/Output Summary (Last 24 hours) at 17 1202  Last data filed at 17 1100   Gross per 24 hour   Intake              880 ml   Output                0 ml   Net              880 ml       Nutrition  Orders Placed This Encounter   Procedures   • Diet Order     Standing Status:   Standing     Number of Occurrences:   1     Order Specific Question:   Diet:     Answer:   Regular [1]     Physical Exam   Constitutional: She appears well-developed and well-nourished.   HENT:   Head: Normocephalic and atraumatic.   Mouth/Throat: No oropharyngeal exudate.   Eyes: EOM are normal. Pupils are equal, round, and reactive to light.   Neck: Normal range of motion. Neck supple.   Cardiovascular: Normal rate and regular rhythm.    Pulmonary/Chest: Effort normal and  breath sounds normal. No respiratory distress.   Abdominal: Soft. Bowel sounds are normal. She exhibits no distension.   Musculoskeletal: She exhibits no edema or tenderness.   Neurological: She is alert. She has normal strength. She is not disoriented. No cranial nerve deficit or sensory deficit. GCS eye subscore is 4. GCS verbal subscore is 5. GCS motor subscore is 6.   Skin: Skin is warm and dry. No erythema.   Psychiatric: She has a normal mood and affect. Her behavior is normal.   Nursing note and vitals reviewed.                               Assessment/Plan     UTI (urinary tract infection)- (present on admission)   Assessment & Plan    Cultures negative  Completed antibiotics with IV Rocephin        Acute renal insufficiency- (present on admission)   Assessment & Plan    Mild probably due to dehydration. Resolved now.         T12 compression fracture (CMS-HCC)- (present on admission)   Assessment & Plan    Stable, ct spine did not show acute changes.         HTN (hypertension)- (present on admission)   Assessment & Plan    Well-controlled  Continue metoprolol 12.5 mg by mouth twice a day and hydralazine 75 mg by mouth 3 times a day        History of CVA (cerebrovascular accident)- (present on admission)   Assessment & Plan    Continue ASA and statin.   9/23 MRI today show no new stroke, showed Prominent lateral and third ventricles without visualized obstructing lesion which could indicate normal pressure hydrocephalus, patient does not have urinary incontinence neither balance problem, PT/OT to see today.   9/24 MRI no new stroke, pt/ot to see.   9/25 pt/ot today.         Dementia- (present on admission)   Assessment & Plan    MRI of her brain showed enlarged ventricles but no other acute abnormalities  Neurology has evaluated the patient  Social work to assist with guardianship            Reviewed items::  Labs reviewed, Medications reviewed and Radiology images reviewed  Pink catheter::  No Pink  DVT  prophylaxis pharmacological::  Heparin  Antibiotics:  Treating active infection/contamination beyond 24 hours perioperative coverage

## 2017-09-28 PROCEDURE — 700111 HCHG RX REV CODE 636 W/ 250 OVERRIDE (IP): Performed by: FAMILY MEDICINE

## 2017-09-28 PROCEDURE — 700102 HCHG RX REV CODE 250 W/ 637 OVERRIDE(OP): Performed by: HOSPITALIST

## 2017-09-28 PROCEDURE — 700102 HCHG RX REV CODE 250 W/ 637 OVERRIDE(OP): Performed by: FAMILY MEDICINE

## 2017-09-28 PROCEDURE — A9270 NON-COVERED ITEM OR SERVICE: HCPCS | Performed by: FAMILY MEDICINE

## 2017-09-28 PROCEDURE — 700101 HCHG RX REV CODE 250: Performed by: FAMILY MEDICINE

## 2017-09-28 PROCEDURE — 770006 HCHG ROOM/CARE - MED/SURG/GYN SEMI*

## 2017-09-28 PROCEDURE — A9270 NON-COVERED ITEM OR SERVICE: HCPCS | Performed by: HOSPITALIST

## 2017-09-28 PROCEDURE — 99232 SBSQ HOSP IP/OBS MODERATE 35: CPT | Performed by: HOSPITALIST

## 2017-09-28 RX ORDER — OMEPRAZOLE 20 MG/1
20 CAPSULE, DELAYED RELEASE ORAL DAILY
Status: DISCONTINUED | OUTPATIENT
Start: 2017-09-29 | End: 2017-12-27

## 2017-09-28 RX ORDER — HYDRALAZINE HYDROCHLORIDE 50 MG/1
100 TABLET, FILM COATED ORAL EVERY 8 HOURS
Status: DISCONTINUED | OUTPATIENT
Start: 2017-09-28 | End: 2017-12-22

## 2017-09-28 RX ADMIN — HEPARIN SODIUM 5000 UNITS: 5000 INJECTION, SOLUTION INTRAVENOUS; SUBCUTANEOUS at 06:04

## 2017-09-28 RX ADMIN — METOPROLOL TARTRATE 12.5 MG: 25 TABLET, FILM COATED ORAL at 20:40

## 2017-09-28 RX ADMIN — GABAPENTIN 300 MG: 300 CAPSULE ORAL at 20:40

## 2017-09-28 RX ADMIN — HYDRALAZINE HYDROCHLORIDE 75 MG: 25 TABLET, FILM COATED ORAL at 06:05

## 2017-09-28 RX ADMIN — HEPARIN SODIUM 5000 UNITS: 5000 INJECTION, SOLUTION INTRAVENOUS; SUBCUTANEOUS at 20:41

## 2017-09-28 RX ADMIN — DULOXETINE HYDROCHLORIDE 20 MG: 20 CAPSULE, DELAYED RELEASE ORAL at 09:03

## 2017-09-28 RX ADMIN — ATORVASTATIN CALCIUM 80 MG: 40 TABLET, FILM COATED ORAL at 20:40

## 2017-09-28 RX ADMIN — LIDOCAINE 1 PATCH: 50 PATCH CUTANEOUS at 09:05

## 2017-09-28 RX ADMIN — HYDRALAZINE HYDROCHLORIDE 100 MG: 50 TABLET ORAL at 14:23

## 2017-09-28 RX ADMIN — ASPIRIN 81 MG: 81 TABLET, COATED ORAL at 09:04

## 2017-09-28 RX ADMIN — HEPARIN SODIUM 5000 UNITS: 5000 INJECTION, SOLUTION INTRAVENOUS; SUBCUTANEOUS at 14:23

## 2017-09-28 RX ADMIN — METOPROLOL TARTRATE 12.5 MG: 25 TABLET, FILM COATED ORAL at 09:03

## 2017-09-28 RX ADMIN — STANDARDIZED SENNA CONCENTRATE AND DOCUSATE SODIUM 1 TABLET: 8.6; 5 TABLET, FILM COATED ORAL at 09:04

## 2017-09-28 RX ADMIN — STANDARDIZED SENNA CONCENTRATE AND DOCUSATE SODIUM 2 TABLET: 8.6; 5 TABLET, FILM COATED ORAL at 20:41

## 2017-09-28 RX ADMIN — OMEPRAZOLE 20 MG: 20 CAPSULE, DELAYED RELEASE ORAL at 09:03

## 2017-09-28 RX ADMIN — ACETAMINOPHEN 650 MG: 325 TABLET, FILM COATED ORAL at 21:28

## 2017-09-28 RX ADMIN — HYDRALAZINE HYDROCHLORIDE 100 MG: 50 TABLET ORAL at 20:40

## 2017-09-28 RX ADMIN — OXYBUTYNIN CHLORIDE 5 MG: 5 TABLET, FILM COATED, EXTENDED RELEASE ORAL at 20:42

## 2017-09-28 ASSESSMENT — ENCOUNTER SYMPTOMS
ABDOMINAL PAIN: 0
FEVER: 0
DEPRESSION: 0
COUGH: 0
HEADACHES: 0

## 2017-09-28 ASSESSMENT — PAIN SCALES - GENERAL
PAINLEVEL_OUTOF10: 0
PAINLEVEL_OUTOF10: 0
PAINLEVEL_OUTOF10: 7

## 2017-09-28 ASSESSMENT — LIFESTYLE VARIABLES: DO YOU DRINK ALCOHOL: NO

## 2017-09-28 NOTE — CARE PLAN
Problem: Safety  Goal: Will remain free from injury    Intervention: Provide assistance with mobility  Bed in lowest position, call light within reach. Bed alarm on. Patient educated regarding safety precautions. Room free of clutter.       Problem: Mobility  Goal: Risk for activity intolerance will decrease  Patient encouraged to ambulate in halls. Patient encouraged to perform dorsi and plantar flexion every hour he or she is in bed. Frequent rest periods provided during ambulation.

## 2017-09-28 NOTE — PROGRESS NOTES
"Assumed care of patient at 0700 . Received report from RN. Patient is AOX3 . Assessment complete. Labs reviewed.Patient and RN discussed plan of care. Patient questions answered. Patient needs are met at this time. Bed in lowest and locked position. Upper bed rails up.  Call light is within reach. Hourly rounding in place.  BP (!) 167/68 Comment: RN notified  Pulse 61   Temp 36.5 °C (97.7 °F)   Resp 18   Ht 1.6 m (5' 2.99\")   Wt 83.3 kg (183 lb 10.3 oz)   SpO2 90%   Breastfeeding? No   BMI 32.54 kg/m²     "

## 2017-09-29 PROCEDURE — 99232 SBSQ HOSP IP/OBS MODERATE 35: CPT | Performed by: HOSPITALIST

## 2017-09-29 PROCEDURE — 700101 HCHG RX REV CODE 250: Performed by: FAMILY MEDICINE

## 2017-09-29 PROCEDURE — 770006 HCHG ROOM/CARE - MED/SURG/GYN SEMI*

## 2017-09-29 PROCEDURE — 700111 HCHG RX REV CODE 636 W/ 250 OVERRIDE (IP): Performed by: FAMILY MEDICINE

## 2017-09-29 PROCEDURE — 700102 HCHG RX REV CODE 250 W/ 637 OVERRIDE(OP): Performed by: FAMILY MEDICINE

## 2017-09-29 PROCEDURE — 700102 HCHG RX REV CODE 250 W/ 637 OVERRIDE(OP): Performed by: HOSPITALIST

## 2017-09-29 PROCEDURE — A9270 NON-COVERED ITEM OR SERVICE: HCPCS | Performed by: FAMILY MEDICINE

## 2017-09-29 PROCEDURE — A9270 NON-COVERED ITEM OR SERVICE: HCPCS | Performed by: HOSPITALIST

## 2017-09-29 RX ORDER — BENAZEPRIL HYDROCHLORIDE 20 MG/1
40 TABLET ORAL DAILY
Status: DISCONTINUED | OUTPATIENT
Start: 2017-09-29 | End: 2017-12-22

## 2017-09-29 RX ADMIN — METOPROLOL TARTRATE 12.5 MG: 25 TABLET, FILM COATED ORAL at 09:38

## 2017-09-29 RX ADMIN — OXYCODONE HYDROCHLORIDE 2.5 MG: 5 TABLET ORAL at 06:10

## 2017-09-29 RX ADMIN — HYDRALAZINE HYDROCHLORIDE 100 MG: 50 TABLET ORAL at 14:42

## 2017-09-29 RX ADMIN — ASPIRIN 81 MG: 81 TABLET, COATED ORAL at 09:37

## 2017-09-29 RX ADMIN — OXYCODONE HYDROCHLORIDE 5 MG: 5 TABLET ORAL at 09:37

## 2017-09-29 RX ADMIN — OMEPRAZOLE 20 MG: 20 CAPSULE, DELAYED RELEASE ORAL at 09:37

## 2017-09-29 RX ADMIN — HYDRALAZINE HYDROCHLORIDE 100 MG: 50 TABLET ORAL at 21:03

## 2017-09-29 RX ADMIN — ONDANSETRON 4 MG: 4 TABLET, ORALLY DISINTEGRATING ORAL at 17:58

## 2017-09-29 RX ADMIN — STANDARDIZED SENNA CONCENTRATE AND DOCUSATE SODIUM 2 TABLET: 8.6; 5 TABLET, FILM COATED ORAL at 21:03

## 2017-09-29 RX ADMIN — HYDRALAZINE HYDROCHLORIDE 10 MG: 20 INJECTION INTRAMUSCULAR; INTRAVENOUS at 06:38

## 2017-09-29 RX ADMIN — ATORVASTATIN CALCIUM 80 MG: 40 TABLET, FILM COATED ORAL at 21:03

## 2017-09-29 RX ADMIN — DULOXETINE HYDROCHLORIDE 20 MG: 20 CAPSULE, DELAYED RELEASE ORAL at 09:38

## 2017-09-29 RX ADMIN — METOPROLOL TARTRATE 12.5 MG: 25 TABLET, FILM COATED ORAL at 21:03

## 2017-09-29 RX ADMIN — HEPARIN SODIUM 5000 UNITS: 5000 INJECTION, SOLUTION INTRAVENOUS; SUBCUTANEOUS at 05:41

## 2017-09-29 RX ADMIN — HYDRALAZINE HYDROCHLORIDE 100 MG: 50 TABLET ORAL at 05:41

## 2017-09-29 RX ADMIN — GABAPENTIN 300 MG: 300 CAPSULE ORAL at 21:03

## 2017-09-29 RX ADMIN — HEPARIN SODIUM 5000 UNITS: 5000 INJECTION, SOLUTION INTRAVENOUS; SUBCUTANEOUS at 14:42

## 2017-09-29 RX ADMIN — BENAZEPRIL HYDROCHLORIDE 40 MG: 20 TABLET ORAL at 14:42

## 2017-09-29 RX ADMIN — LIDOCAINE 1 PATCH: 50 PATCH CUTANEOUS at 09:38

## 2017-09-29 RX ADMIN — OXYBUTYNIN CHLORIDE 5 MG: 5 TABLET, FILM COATED, EXTENDED RELEASE ORAL at 21:03

## 2017-09-29 RX ADMIN — HEPARIN SODIUM 5000 UNITS: 5000 INJECTION, SOLUTION INTRAVENOUS; SUBCUTANEOUS at 21:03

## 2017-09-29 RX ADMIN — STANDARDIZED SENNA CONCENTRATE AND DOCUSATE SODIUM 2 TABLET: 8.6; 5 TABLET, FILM COATED ORAL at 09:37

## 2017-09-29 ASSESSMENT — PAIN SCALES - GENERAL
PAINLEVEL_OUTOF10: 0
PAINLEVEL_OUTOF10: 7

## 2017-09-29 ASSESSMENT — ENCOUNTER SYMPTOMS
ABDOMINAL PAIN: 0
COUGH: 0
DEPRESSION: 0
HEADACHES: 0
FEVER: 0

## 2017-09-29 ASSESSMENT — LIFESTYLE VARIABLES: DO YOU DRINK ALCOHOL: NO

## 2017-09-29 NOTE — CARE PLAN
Problem: Safety  Goal: Will remain free from injury    Intervention: Collaborate with Interdisciplinary Team for safe transfer and mobilization techniques  Bed alarm, call light within reach

## 2017-09-29 NOTE — PROGRESS NOTES
Report received. Assumed care, assessment complete. Pt is A & O x 3.  Pt denies having any pain at this time. Fall precautions and appropriate signs in place. Pt oriented to unit routine, call light/phone system and RN extension number provided. Pt educated regarding fall precautions. Bed alarm in use. Pt denies any additional needs at this time. Call light within reach.

## 2017-09-29 NOTE — CARE PLAN
Problem: Pain Management  Goal: Pain level will decrease to patient's comfort goal    Intervention: Follow pain managment plan developed in collaboration with patient and Interdisciplinary Team  Back pain managed with scheduled lidocaine patch and prn tylenol       Problem: Mobility  Goal: Risk for activity intolerance will decrease    Intervention: Assess and monitor signs of activity intolerance  PT ambulates with 1 person assist and FWW

## 2017-09-29 NOTE — PROGRESS NOTES
[facility]    Date of Service: 2017    Chief Complaint  76 y.o. female admitted 2017 with AMS, UTI.     Interval Problem Update  No issues overnight  No new complaints  Remains confused  No changes     Consultants/Specialty  Neuro.     Disposition  Needs a guardian        Review of Systems   Unable to perform ROS: Dementia   Constitutional: Negative for fever.   Respiratory: Negative for cough.    Gastrointestinal: Negative for abdominal pain.   Neurological: Negative for headaches.   Psychiatric/Behavioral: Negative for depression.      Physical Exam  Laboratory/Imaging   Hemodynamics  Temp (24hrs), Av.3 °C (97.3 °F), Min:36.1 °C (96.9 °F), Max:36.5 °C (97.7 °F)   Temperature: 36.4 °C (97.6 °F)  Pulse  Av.3  Min: 52  Max: 88    Blood Pressure : (!) 163/68      Respiratory      Respiration: 17, Pulse Oximetry: 92 %        RUL Breath Sounds: Clear, RML Breath Sounds: Diminished, RLL Breath Sounds: Diminished, ARGELIA Breath Sounds: Clear, LLL Breath Sounds: Diminished    Fluids    Intake/Output Summary (Last 24 hours) at 17 1251  Last data filed at 17 1800   Gross per 24 hour   Intake              400 ml   Output                0 ml   Net              400 ml       Nutrition  Orders Placed This Encounter   Procedures   • Diet Order     Standing Status:   Standing     Number of Occurrences:   1     Order Specific Question:   Diet:     Answer:   Regular [1]     Physical Exam   Constitutional: No distress.   HENT:   Head: Normocephalic and atraumatic.   Mouth/Throat: No oropharyngeal exudate.   Eyes: EOM are normal. Pupils are equal, round, and reactive to light.   Neck: Normal range of motion. Neck supple.   Cardiovascular: Normal rate and regular rhythm.    No murmur heard.  Pulmonary/Chest: Effort normal and breath sounds normal. No respiratory distress. She has no wheezes.   Abdominal: Soft. Bowel sounds are normal. She exhibits no distension. There is no tenderness.    Musculoskeletal: She exhibits no edema or tenderness.   Neurological: She is alert. She has normal strength. She is not disoriented. No cranial nerve deficit or sensory deficit. GCS eye subscore is 4. GCS verbal subscore is 5. GCS motor subscore is 6.   Skin: Skin is warm and dry. She is not diaphoretic. No erythema.   Psychiatric: She has a normal mood and affect. Her behavior is normal.   Nursing note and vitals reviewed.                               Assessment/Plan     HTN (hypertension)- (present on admission)   Assessment & Plan    Has a quite hypertensive over the past couple of days  Continue hydralazine to 100 mg 3 times a day  Continue metoprolol 12.5 mg twice daily  I restart her home dose of Lotensin        UTI (urinary tract infection)- (present on admission)   Assessment & Plan    Cultures negative  Completed antibiotics with IV Rocephin        Acute renal insufficiency- (present on admission)   Assessment & Plan    Mild probably due to dehydration. Resolved now.         T12 compression fracture (CMS-HCC)- (present on admission)   Assessment & Plan    Stable, ct spine did not show acute changes.         History of CVA (cerebrovascular accident)- (present on admission)   Assessment & Plan    Continue ASA and statin.   9/23 MRI today show no new stroke, showed Prominent lateral and third ventricles without visualized obstructing lesion which could indicate normal pressure hydrocephalus, patient does not have urinary incontinence neither balance problem, PT/OT to see today.   9/24 MRI no new stroke, pt/ot to see.   9/25 pt/ot today.         Dementia- (present on admission)   Assessment & Plan    MRI of her brain showed enlarged ventricles but no other acute abnormalities  Neurology has evaluated the patient  Patient is incapacitated. I do think that guardianship is appropriate. She is unlikely to be restored to full capacity. She does appear to have moderate to severe dementia. Family is unavailable             Reviewed items::  Labs reviewed and Medications reviewed  Pink catheter::  No Pink  DVT prophylaxis pharmacological::  Heparin  Antibiotics:  Treating active infection/contamination beyond 24 hours perioperative coverage

## 2017-09-29 NOTE — CARE PLAN
Problem: Pain Management  Goal: Pain level will decrease to patient's comfort goal    Intervention: Follow pain managment plan developed in collaboration with patient and Interdisciplinary Team  Prn meds per MAR

## 2017-09-29 NOTE — PROGRESS NOTES
Simona Knight Fall Risk Assessment:     Last Known Fall: Within the last month  Mobility: Immobilized/requires assist of one person  Medications: Cardiovascular and central nervous system meds  Mental Status/LOC/Awareness: Oriented to person and place  Toileting Needs: Incontinence  Volume/Electrolyte Status: No problems  Communication/Sensory: Visual (Glasses)/hearing deficit  Behavior: Depression/anxiety  Simona Knight Fall Risk Total: 16  Fall Risk Level: HIGH RISK    Universal Fall Precautions:  call light/belongings in reach, bed in low position and locked, siderails up x 2, use non-slip footwear, adequate lighting, educate on level of risk, clutter free and spill free environment, educate to call for assistance    Fall Risk Level Interventions:    TRIAL (TELE 8, NEURO, MED JENNIE 5) Moderate Fall Risk Interventions  Place yellow fall risk ID band on patient: verified  Provide patient/family education based on risk assessment : completed  Educate patient/family to call staff for assistance when getting out of bed: completed  Place fall precaution signage outside patient door: completed  Utilize bed/chair fall alarm: verifiedTRIAL (TELE 8, NEURO, [employer] JENNIE 5) High Fall Risk Interventions  Place yellow fall risk ID band on patient: verified  Provide patient/family education based on risk assessment: completed  Educate patient/family to call staff for assistance when getting out of bed: completed  Place fall precaution signage outside patient door: verified  Place patient in room close to nursing station: currently not available/charge notified  Utilize bed/chair fall alarm: verified  Notify charge of high risk for huddle: verified    Patient Specific Interventions:     Medication: review medications with patient and family and limit combination of prn medications  Mental Status/LOC/Awareness: reorient patient, reinforce falls education, check on patient hourly, utilize bed/chair fall alarm, reinforce the use of  call light and provide activity  Toileting: provide frquent toileting  Volume/Electrolyte Status: ensure patient remains hydrated  Communication/Sensory: update plan of care on whiteboard  Behavioral: encourage patient to voice feelings  Mobility: utilize bed/chair fall alarm and ensure bed is locked and in lowest position

## 2017-09-29 NOTE — PROGRESS NOTES
[id number] this am, c/o back/hip pain, prn meds per MAR, up for meals, POC discussed, BP elevated, will monitor.

## 2017-09-30 PROCEDURE — 99231 SBSQ HOSP IP/OBS SF/LOW 25: CPT | Performed by: HOSPITALIST

## 2017-09-30 PROCEDURE — A9270 NON-COVERED ITEM OR SERVICE: HCPCS | Performed by: FAMILY MEDICINE

## 2017-09-30 PROCEDURE — 700102 HCHG RX REV CODE 250 W/ 637 OVERRIDE(OP): Performed by: HOSPITALIST

## 2017-09-30 PROCEDURE — 700102 HCHG RX REV CODE 250 W/ 637 OVERRIDE(OP): Performed by: FAMILY MEDICINE

## 2017-09-30 PROCEDURE — 770006 HCHG ROOM/CARE - MED/SURG/GYN SEMI*

## 2017-09-30 PROCEDURE — 700101 HCHG RX REV CODE 250: Performed by: FAMILY MEDICINE

## 2017-09-30 PROCEDURE — A9270 NON-COVERED ITEM OR SERVICE: HCPCS | Performed by: HOSPITALIST

## 2017-09-30 PROCEDURE — 700111 HCHG RX REV CODE 636 W/ 250 OVERRIDE (IP): Performed by: FAMILY MEDICINE

## 2017-09-30 RX ADMIN — HEPARIN SODIUM 5000 UNITS: 5000 INJECTION, SOLUTION INTRAVENOUS; SUBCUTANEOUS at 20:14

## 2017-09-30 RX ADMIN — HYDRALAZINE HYDROCHLORIDE 100 MG: 50 TABLET ORAL at 05:31

## 2017-09-30 RX ADMIN — HEPARIN SODIUM 5000 UNITS: 5000 INJECTION, SOLUTION INTRAVENOUS; SUBCUTANEOUS at 05:31

## 2017-09-30 RX ADMIN — METOPROLOL TARTRATE 12.5 MG: 25 TABLET, FILM COATED ORAL at 20:14

## 2017-09-30 RX ADMIN — OXYBUTYNIN CHLORIDE 5 MG: 5 TABLET, FILM COATED, EXTENDED RELEASE ORAL at 20:14

## 2017-09-30 RX ADMIN — OMEPRAZOLE 20 MG: 20 CAPSULE, DELAYED RELEASE ORAL at 10:06

## 2017-09-30 RX ADMIN — HYDRALAZINE HYDROCHLORIDE 100 MG: 50 TABLET ORAL at 14:41

## 2017-09-30 RX ADMIN — HEPARIN SODIUM 5000 UNITS: 5000 INJECTION, SOLUTION INTRAVENOUS; SUBCUTANEOUS at 14:41

## 2017-09-30 RX ADMIN — METOPROLOL TARTRATE 12.5 MG: 25 TABLET, FILM COATED ORAL at 10:06

## 2017-09-30 RX ADMIN — STANDARDIZED SENNA CONCENTRATE AND DOCUSATE SODIUM 2 TABLET: 8.6; 5 TABLET, FILM COATED ORAL at 20:14

## 2017-09-30 RX ADMIN — DULOXETINE HYDROCHLORIDE 20 MG: 20 CAPSULE, DELAYED RELEASE ORAL at 10:06

## 2017-09-30 RX ADMIN — LIDOCAINE 1 PATCH: 50 PATCH CUTANEOUS at 10:06

## 2017-09-30 RX ADMIN — HYDRALAZINE HYDROCHLORIDE 100 MG: 50 TABLET ORAL at 20:15

## 2017-09-30 RX ADMIN — BENAZEPRIL HYDROCHLORIDE 40 MG: 20 TABLET ORAL at 10:12

## 2017-09-30 RX ADMIN — ASPIRIN 81 MG: 81 TABLET, COATED ORAL at 10:06

## 2017-09-30 RX ADMIN — GABAPENTIN 300 MG: 300 CAPSULE ORAL at 20:14

## 2017-09-30 RX ADMIN — ATORVASTATIN CALCIUM 80 MG: 40 TABLET, FILM COATED ORAL at 20:14

## 2017-09-30 ASSESSMENT — LIFESTYLE VARIABLES: DO YOU DRINK ALCOHOL: NO

## 2017-09-30 ASSESSMENT — PAIN SCALES - GENERAL
PAINLEVEL_OUTOF10: 3
PAINLEVEL_OUTOF10: 0

## 2017-09-30 NOTE — CARE PLAN
Problem: Safety  Goal: Will remain free from injury  Outcome: PROGRESSING AS EXPECTED  Bed in lowest position, call light within reach. Bed alarm on. Patient educated regarding safety precautions. Room free of clutter.     Problem: Psychosocial Needs:  Goal: Level of anxiety will decrease  Outcome: PROGRESSING AS EXPECTED  Established therapeutic relationship with patient (validation, silence). Deep breathing used to decrease anxiety.

## 2017-09-30 NOTE — CARE PLAN
Problem: Discharge Barriers/Planning  Goal: Patient's continuum of care needs will be met    Intervention: Collaborate with Transitional Care Team and Interdisciplinary Team to meet discharge needs  Dc pending guardianship/placement       Problem: Pain Management  Goal: Pain level will decrease to patient's comfort goal    Intervention: Follow pain managment plan developed in collaboration with patient and Interdisciplinary Team  Back pain managed with prn oxycodone and scheduled lidocaine patch

## 2017-09-30 NOTE — PROGRESS NOTES
[facility]    Date of Service: 2017    Chief Complaint  76 y.o. female admitted 2017 with AMS, UTI.     Interval Problem Update  No issues overnight  No new complaints  Remains confused  No changes     Consultants/Specialty  Neuro.     Disposition  Needs a guardian        Review of Systems   Unable to perform ROS: Dementia      Physical Exam  Laboratory/Imaging   Hemodynamics  Temp (24hrs), Av.4 °C (97.6 °F), Min:36.2 °C (97.2 °F), Max:36.8 °C (98.3 °F)   Temperature: 36.4 °C (97.5 °F)  Pulse  Av.9  Min: 52  Max: 88    Blood Pressure : 158/58      Respiratory      Respiration: 18, Pulse Oximetry: 91 %             Fluids    Intake/Output Summary (Last 24 hours) at 17 1047  Last data filed at 17 0900   Gross per 24 hour   Intake              480 ml   Output                0 ml   Net              480 ml       Nutrition  Orders Placed This Encounter   Procedures   • Diet Order     Standing Status:   Standing     Number of Occurrences:   1     Order Specific Question:   Diet:     Answer:   Regular [1]     Physical Exam   Constitutional: She appears well-developed and well-nourished.   HENT:   Head: Normocephalic and atraumatic.   Mouth/Throat: No oropharyngeal exudate.   Eyes: Conjunctivae are normal. Pupils are equal, round, and reactive to light.   Neck: Normal range of motion. Neck supple.   Cardiovascular: Normal rate and regular rhythm.    No murmur heard.  Pulmonary/Chest: No respiratory distress. She has no wheezes. She has no rales. She exhibits no tenderness.   Abdominal: Soft. Bowel sounds are normal. She exhibits no distension.   Musculoskeletal: Normal range of motion. She exhibits no edema.   Neurological: She is alert. She has normal strength. She is not disoriented. No cranial nerve deficit or sensory deficit. GCS eye subscore is 4. GCS verbal subscore is 5. GCS motor subscore is 6.   Skin: Skin is warm and dry. No erythema.   Psychiatric: She has a  normal mood and affect. Her behavior is normal.   Nursing note and vitals reviewed.                               Assessment/Plan     HTN (hypertension)- (present on admission)   Assessment & Plan    Has a quite hypertensive over the past couple of days  Continue hydralazine to 100 mg 3 times a day  Increase metoprolol to 25 mg by mouth twice a day  Continue her home dose of Lotensin        UTI (urinary tract infection)- (present on admission)   Assessment & Plan    Cultures negative  Completed antibiotics with IV Rocephin        Acute renal insufficiency- (present on admission)   Assessment & Plan    Mild probably due to dehydration. Resolved now.         T12 compression fracture (CMS-HCC)- (present on admission)   Assessment & Plan    Stable, ct spine did not show acute changes.         History of CVA (cerebrovascular accident)- (present on admission)   Assessment & Plan    Continue ASA and statin.   9/23 MRI today show no new stroke, showed Prominent lateral and third ventricles without visualized obstructing lesion which could indicate normal pressure hydrocephalus, patient does not have urinary incontinence neither balance problem, PT/OT to see today.   9/24 MRI no new stroke, pt/ot to see.   9/25 pt/ot today.         Dementia- (present on admission)   Assessment & Plan    MRI of her brain showed enlarged ventricles but no other acute abnormalities  Neurology has evaluated the patient  Patient is incapacitated. I do think that guardianship is appropriate. She is unlikely to be restored to full capacity. She does appear to have moderate to severe dementia. Family is unavailable. I do not feel psychiatry as an appropriate consultation at this point as the patient is obviously incapacitated. But case management is insisting on psychiatric consultation. At this point I will fill out all the paperwork for patient deeming her incapacitated. We'll consult psychiatry if case management insists            Reviewed  items::  Labs reviewed and Medications reviewed  Pink catheter::  No Pink  DVT prophylaxis pharmacological::  Heparin  Antibiotics:  Treating active infection/contamination beyond 24 hours perioperative coverage

## 2017-09-30 NOTE — PROGRESS NOTES
Simona Knight Fall Risk Assessment:     Last Known Fall: Within the last month  Mobility: Immobilized/requires assist of one person  Medications: Cardiovascular and central nervous system meds  Mental Status/LOC/Awareness: Oriented to person and place  Toileting Needs: Incontinence  Volume/Electrolyte Status: No problems  Communication/Sensory: Visual (Glasses)/hearing deficit  Behavior: Depression/anxiety  Simona Knight Fall Risk Total: 16  Fall Risk Level: HIGH RISK     Universal Fall Precautions:  call light/belongings in reach, bed in low position and locked, siderails up x 2, use non-slip footwear, adequate lighting, educate on level of risk, clutter free and spill free environment, educate to call for assistance     Fall Risk Level Interventions:    TRIAL (TELE 8, NEURO, MED JENNIE 5) Moderate Fall Risk Interventions  Place yellow fall risk ID band on patient: verified  Provide patient/family education based on risk assessment : completed  Educate patient/family to call staff for assistance when getting out of bed: completed  Place fall precaution signage outside patient door: completed  Utilize bed/chair fall alarm: verifiedTRIAL (TELE 8, NEURO, [employer] JENNIE 5) High Fall Risk Interventions  Place yellow fall risk ID band on patient: verified  Provide patient/family education based on risk assessment: completed  Educate patient/family to call staff for assistance when getting out of bed: completed  Place fall precaution signage outside patient door: verified  Place patient in room close to nursing station: currently not available/charge notified  Utilize bed/chair fall alarm: verified  Notify charge of high risk for huddle: verified     Patient Specific Interventions:      Medication: review medications with patient and family and limit combination of prn medications  Mental Status/LOC/Awareness: reorient patient, reinforce falls education, check on patient hourly, utilize bed/chair fall alarm, reinforce the use  of call light and provide activity  Toileting: provide frquent toileting  Volume/Electrolyte Status: ensure patient remains hydrated  Communication/Sensory: update plan of care on whiteboard  Behavioral: encourage patient to voice feelings  Mobility: utilize bed/chair fall alarm and ensure bed is locked and in lowest position

## 2017-09-30 NOTE — PROGRESS NOTES
Report received. Assumed care, assessment complete. Pt is A & O x 3.  Pt denies having any pain at this time. Pt reports mild nausea.  Fall precautions and appropriate signs in place. Pt oriented to unit routine, call light/phone system and RN extension number provided. Pt educated regarding fall precautions. Bed alarm in use. Pt denies any additional needs at this time. Call light within reach.

## 2017-09-30 NOTE — PROGRESS NOTES
"Assumed care of patient at 0700 . Received report from RN. Patient is AOX3 . Assessment complete. Labs reviewed.Patient and RN discussed plan of care. Patient questions answered. Patient needs are met at this time. Bed in lowest and locked position. Upper bed rails up.  Call light is within reach. Hourly rounding in place.  /58   Pulse 60   Temp 36.4 °C (97.5 °F)   Resp 18   Ht 1.6 m (5' 2.99\")   Wt 90 kg (198 lb 6.6 oz)   SpO2 91%   Breastfeeding? No   BMI 35.16 kg/m²     "

## 2017-10-01 PROCEDURE — 770006 HCHG ROOM/CARE - MED/SURG/GYN SEMI*

## 2017-10-01 PROCEDURE — A9270 NON-COVERED ITEM OR SERVICE: HCPCS | Performed by: FAMILY MEDICINE

## 2017-10-01 PROCEDURE — 700111 HCHG RX REV CODE 636 W/ 250 OVERRIDE (IP): Performed by: FAMILY MEDICINE

## 2017-10-01 PROCEDURE — 700102 HCHG RX REV CODE 250 W/ 637 OVERRIDE(OP): Performed by: HOSPITALIST

## 2017-10-01 PROCEDURE — A9270 NON-COVERED ITEM OR SERVICE: HCPCS | Performed by: HOSPITALIST

## 2017-10-01 PROCEDURE — 700102 HCHG RX REV CODE 250 W/ 637 OVERRIDE(OP): Performed by: FAMILY MEDICINE

## 2017-10-01 PROCEDURE — 700101 HCHG RX REV CODE 250: Performed by: FAMILY MEDICINE

## 2017-10-01 PROCEDURE — 99231 SBSQ HOSP IP/OBS SF/LOW 25: CPT | Performed by: HOSPITALIST

## 2017-10-01 RX ADMIN — OMEPRAZOLE 20 MG: 20 CAPSULE, DELAYED RELEASE ORAL at 08:47

## 2017-10-01 RX ADMIN — HYDRALAZINE HYDROCHLORIDE 100 MG: 50 TABLET ORAL at 05:53

## 2017-10-01 RX ADMIN — HYDRALAZINE HYDROCHLORIDE 100 MG: 50 TABLET ORAL at 20:55

## 2017-10-01 RX ADMIN — METOPROLOL TARTRATE 12.5 MG: 25 TABLET, FILM COATED ORAL at 08:48

## 2017-10-01 RX ADMIN — HEPARIN SODIUM 5000 UNITS: 5000 INJECTION, SOLUTION INTRAVENOUS; SUBCUTANEOUS at 14:07

## 2017-10-01 RX ADMIN — LABETALOL HYDROCHLORIDE 10 MG: 5 INJECTION, SOLUTION INTRAVENOUS at 08:48

## 2017-10-01 RX ADMIN — LIDOCAINE 1 PATCH: 50 PATCH CUTANEOUS at 08:48

## 2017-10-01 RX ADMIN — OXYBUTYNIN CHLORIDE 5 MG: 5 TABLET, FILM COATED, EXTENDED RELEASE ORAL at 20:55

## 2017-10-01 RX ADMIN — GABAPENTIN 300 MG: 300 CAPSULE ORAL at 20:55

## 2017-10-01 RX ADMIN — HYDRALAZINE HYDROCHLORIDE 100 MG: 50 TABLET ORAL at 14:07

## 2017-10-01 RX ADMIN — BENAZEPRIL HYDROCHLORIDE 40 MG: 20 TABLET ORAL at 08:57

## 2017-10-01 RX ADMIN — HEPARIN SODIUM 5000 UNITS: 5000 INJECTION, SOLUTION INTRAVENOUS; SUBCUTANEOUS at 05:53

## 2017-10-01 RX ADMIN — ASPIRIN 81 MG: 81 TABLET, COATED ORAL at 08:47

## 2017-10-01 RX ADMIN — HEPARIN SODIUM 5000 UNITS: 5000 INJECTION, SOLUTION INTRAVENOUS; SUBCUTANEOUS at 20:55

## 2017-10-01 RX ADMIN — ATORVASTATIN CALCIUM 80 MG: 40 TABLET, FILM COATED ORAL at 20:55

## 2017-10-01 RX ADMIN — DULOXETINE HYDROCHLORIDE 20 MG: 20 CAPSULE, DELAYED RELEASE ORAL at 08:47

## 2017-10-01 ASSESSMENT — PAIN SCALES - GENERAL
PAINLEVEL_OUTOF10: 0
PAINLEVEL_OUTOF10: 2

## 2017-10-01 ASSESSMENT — LIFESTYLE VARIABLES: DO YOU DRINK ALCOHOL: NO

## 2017-10-01 NOTE — CARE PLAN
Problem: Mobility  Goal: Risk for activity intolerance will decrease    Intervention: Assess and monitor signs of activity intolerance  Pt ambulating in halls with walker and standby assist

## 2017-10-01 NOTE — PROGRESS NOTES
Report received. Assumed care, assessment complete. Pt is A & O x 3.  Pt denies having any pain at this time. Fall precautions and appropriate signs in place.  RN extension number provided. Pt educated regarding fall precautions. Bed alarm in use. Pt denies any additional needs at this time. Call light within reach.

## 2017-10-01 NOTE — PROGRESS NOTES
Simona Knight Fall Risk Assessment:     Last Known Fall: Within the last month  Mobility: Dizziness/generalized weakness  Medications: Cardiovascular or central nervous system meds  Mental Status/LOC/Awareness: Oriented to person and place  Toileting Needs: Incontinence  Volume/Electrolyte Status: No problems  Communication/Sensory: Visual (Glasses)/hearing deficit  Behavior: Appropriate behavior  [place] Knight Fall Risk Total: 13  Fall Risk Level: MODERATE RISK    Universal Fall Precautions:  call light/belongings in reach, bed in low position and locked, siderails up x 2    Fall Risk Level Interventions:    TRIAL (TELE 8, NEURO, MED JENNIE 5) Moderate Fall Risk Interventions  Place yellow fall risk ID band on patient: verified  Provide patient/family education based on risk assessment : completed  Educate patient/family to call staff for assistance when getting out of bed: completed  Place fall precaution signage outside patient door: completed  Utilize bed/chair fall alarm: verifiedTRIAL (TELE 8, NEURO, [facility] JENNIE 5) High Fall Risk Interventions  Place yellow fall risk ID band on patient: verified  Provide patient/family education based on risk assessment: completed  Educate patient/family to call staff for assistance when getting out of bed: completed  Place fall precaution signage outside patient door: verified  Place patient in room close to nursing station: currently not available/charge notified  Utilize bed/chair fall alarm: verified  Notify charge of high risk for huddle: verified    Patient Specific Interventions:     Medication: assess need for orthostatic hypotension evaluation  Mental Status/LOC/Awareness: check on patient hourly, utilize bed/chair fall alarm and reinforce the use of call light  Toileting: provide frquent toileting and toilet prior to giving pain medications  Volume/Electrolyte Status: monitor abnormal lab values and teach patients to dangle before rising if  hypotensive  Communication/Sensory: update plan of care on whiteboard and ensure patient has glasses/contacts and hearing aids/dentures  Behavioral: encourage patient to voice feelings, engage patient in daily activities and administer medication as ordered  Mobility: ensure bed is locked and in lowest position

## 2017-10-01 NOTE — PROGRESS NOTES
Simona Knight Fall Risk Assessment:     Last Known Fall: Within the last month  Mobility: Immobilized/requires assist of one person  Medications: Cardiovascular and central nervous system meds  Mental Status/LOC/Awareness: Oriented to person and place  Toileting Needs: Incontinence  Volume/Electrolyte Status: No problems  Communication/Sensory: Visual (Glasses)/hearing deficit  Behavior: Depression/anxiety  Simona Knight Fall Risk Total: 16  Fall Risk Level: HIGH RISK     Universal Fall Precautions:  call light/belongings in reach, bed in low position and locked, siderails up x 2, use non-slip footwear, adequate lighting, educate on level of risk, clutter free and spill free environment, educate to call for assistance     Fall Risk Level Interventions:    TRIAL (TELE 8, NEURO, MED JENNIE 5) Moderate Fall Risk Interventions  Place yellow fall risk ID band on patient: verified  Provide patient/family education based on risk assessment : completed  Educate patient/family to call staff for assistance when getting out of bed: completed  Place fall precaution signage outside patient door: completed  Utilize bed/chair fall alarm: verifiedTRIAL (TELE 8, NEURO, [employer] JENNIE 5) High Fall Risk Interventions  Place yellow fall risk ID band on patient: verified  Provide patient/family education based on risk assessment: completed  Educate patient/family to call staff for assistance when getting out of bed: completed  Place fall precaution signage outside patient door: verified  Place patient in room close to nursing station: currently not available/charge notified  Utilize bed/chair fall alarm: verified  Notify charge of high risk for huddle: verified     Patient Specific Interventions:      Medication: review medications with patient and family and limit combination of prn medications  Mental Status/LOC/Awareness: reorient patient, reinforce falls education, check on patient hourly, utilize bed/chair fall alarm, reinforce the use  of call light and provide activity  Toileting: provide frquent toileting  Volume/Electrolyte Status: ensure patient remains hydrated  Communication/Sensory: update plan of care on whiteboard  Behavioral: encourage patient to voice feelings  Mobility: utilize bed/chair fall alarm and ensure bed is locked and in lowest position

## 2017-10-01 NOTE — PROGRESS NOTES
[facility] Progress Note    Date of Service: 10/1/2017    Chief Complaint  76 y.o. female admitted 2017 with AMS, UTI.     Interval Problem Update  No issues overnight  No new complaints  Remains confused  No changes     Consultants/Specialty  Neuro.     Disposition  Needs a guardian        Review of Systems   Unable to perform ROS: Dementia      Physical Exam  Laboratory/Imaging   Hemodynamics  Temp (24hrs), Av.9 °C (98.4 °F), Min:36.4 °C (97.6 °F), Max:37.3 °C (99.2 °F)   Temperature: 36.6 °C (97.8 °F)  Pulse  Av.9  Min: 52  Max: 88    Blood Pressure : (!) 165/64 (RN notified)      Respiratory      Respiration: 18, Pulse Oximetry: 91 %        RUL Breath Sounds: Clear, RML Breath Sounds: Diminished, RLL Breath Sounds: Diminished, ARGELIA Breath Sounds: Clear, LLL Breath Sounds: Diminished    Fluids    Intake/Output Summary (Last 24 hours) at 10/01/17 1121  Last data filed at 10/01/17 0837   Gross per 24 hour   Intake              240 ml   Output                0 ml   Net              240 ml       Nutrition  Orders Placed This Encounter   Procedures   • Diet Order     Standing Status:   Standing     Number of Occurrences:   1     Order Specific Question:   Diet:     Answer:   Regular [1]     Physical Exam   Constitutional: No distress.   HENT:   Head: Normocephalic and atraumatic.   Mouth/Throat: No oropharyngeal exudate.   Eyes: EOM are normal. Pupils are equal, round, and reactive to light.   Neck: Normal range of motion. Neck supple. No JVD present.   Cardiovascular: Normal rate and regular rhythm.    No murmur heard.  Pulmonary/Chest: Effort normal and breath sounds normal. No respiratory distress.   Abdominal: Soft. Bowel sounds are normal. She exhibits no distension.   Musculoskeletal: Normal range of motion. She exhibits no edema.   Neurological: She is alert. She has normal strength. She is not disoriented. No cranial nerve deficit or sensory deficit. GCS eye subscore is 4. GCS verbal  subscore is 5. GCS motor subscore is 6.   Skin: Skin is warm and dry. She is not diaphoretic. No erythema.   Psychiatric: She has a normal mood and affect. Her behavior is normal.   Nursing note and vitals reviewed.                               Assessment/Plan     HTN (hypertension)- (present on admission)   Assessment & Plan    Has a quite hypertensive over the past couple of days  Continue hydralazine to 100 mg 3 times a day  Increase metoprolol to 25 mg by mouth twice a day  Continue her home dose of Lotensin        UTI (urinary tract infection)- (present on admission)   Assessment & Plan    Cultures negative  Completed antibiotics with IV Rocephin        Acute renal insufficiency- (present on admission)   Assessment & Plan    Mild probably due to dehydration. Resolved now.         T12 compression fracture (CMS-HCC)- (present on admission)   Assessment & Plan    Stable, ct spine did not show acute changes.         History of CVA (cerebrovascular accident)- (present on admission)   Assessment & Plan    Continue ASA and statin.   9/23 MRI today show no new stroke, showed Prominent lateral and third ventricles without visualized obstructing lesion which could indicate normal pressure hydrocephalus, patient does not have urinary incontinence neither balance problem, PT/OT to see today.   9/24 MRI no new stroke, pt/ot to see.   9/25 pt/ot today.         Dementia- (present on admission)   Assessment & Plan    MRI of her brain showed enlarged ventricles but no other acute abnormalities  Neurology has evaluated the patient  Patient is incapacitated. I do think that guardianship is appropriate. She is unlikely to be restored to full capacity. She does appear to have moderate to severe dementia. Family is unavailable. I do not feel psychiatry as an appropriate consultation at this point as the patient is obviously incapacitated. But case management is insisting on psychiatric consultation. At this point I will fill out  all the paperwork for patient deeming her incapacitated. We'll consult psychiatry if case management insists            Reviewed items::  Labs reviewed and Medications reviewed  Pink catheter::  No Pink  DVT prophylaxis pharmacological::  Heparin  Antibiotics:  Treating active infection/contamination beyond 24 hours perioperative coverage

## 2017-10-01 NOTE — PROGRESS NOTES
"Assumed care of patient at 0700 . Received report from RN. Patient is AOX3 . Assessment complete. Labs reviewed.Patient and RN discussed plan of care. Patient questions answered. Patient needs are met at this time. Bed in lowest and locked position. Upper bed rails up.  Call light is within reach. Hourly rounding in place.  BP (!) 165/64 Comment: RN notified  Pulse 60   Temp 36.6 °C (97.8 °F)   Resp 18   Ht 1.6 m (5' 2.99\")   Wt 90 kg (198 lb 6.6 oz)   SpO2 91%   Breastfeeding? No   BMI 35.16 kg/m²     "

## 2017-10-01 NOTE — CARE PLAN
Problem: Safety  Goal: Will remain free from injury    Intervention: Provide assistance with mobility  Bed in lowest position, call light within reach. Bed alarm on. Patient educated regarding safety precautions. Room free of clutter.       Problem: Infection  Goal: Will remain free from infection    Intervention: Assess signs and symptoms of infection  Educated patient on proper handwashing techniques with soap and water. Explained the importance of washing hands after using the restroom, before and after eating. Hand  is available on the walls of the rooms for use when hands are not visibly soiled.

## 2017-10-02 PROCEDURE — 700111 HCHG RX REV CODE 636 W/ 250 OVERRIDE (IP): Performed by: FAMILY MEDICINE

## 2017-10-02 PROCEDURE — A9270 NON-COVERED ITEM OR SERVICE: HCPCS | Performed by: HOSPITALIST

## 2017-10-02 PROCEDURE — A9270 NON-COVERED ITEM OR SERVICE: HCPCS | Performed by: FAMILY MEDICINE

## 2017-10-02 PROCEDURE — 700102 HCHG RX REV CODE 250 W/ 637 OVERRIDE(OP): Performed by: FAMILY MEDICINE

## 2017-10-02 PROCEDURE — 770006 HCHG ROOM/CARE - MED/SURG/GYN SEMI*

## 2017-10-02 PROCEDURE — 700102 HCHG RX REV CODE 250 W/ 637 OVERRIDE(OP): Performed by: HOSPITALIST

## 2017-10-02 PROCEDURE — 99231 SBSQ HOSP IP/OBS SF/LOW 25: CPT | Performed by: HOSPITALIST

## 2017-10-02 PROCEDURE — 700101 HCHG RX REV CODE 250: Performed by: FAMILY MEDICINE

## 2017-10-02 RX ORDER — HYDRALAZINE HYDROCHLORIDE 50 MG/1
50 TABLET, FILM COATED ORAL ONCE
Status: DISPENSED | OUTPATIENT
Start: 2017-10-02 | End: 2017-10-03

## 2017-10-02 RX ORDER — AMLODIPINE BESYLATE 5 MG/1
5 TABLET ORAL
Status: DISCONTINUED | OUTPATIENT
Start: 2017-10-02 | End: 2017-10-03

## 2017-10-02 RX ADMIN — ASPIRIN 81 MG: 81 TABLET, COATED ORAL at 08:27

## 2017-10-02 RX ADMIN — LIDOCAINE 1 PATCH: 50 PATCH CUTANEOUS at 08:28

## 2017-10-02 RX ADMIN — OXYBUTYNIN CHLORIDE 5 MG: 5 TABLET, FILM COATED, EXTENDED RELEASE ORAL at 20:35

## 2017-10-02 RX ADMIN — AMLODIPINE BESYLATE 5 MG: 5 TABLET ORAL at 14:03

## 2017-10-02 RX ADMIN — LABETALOL HYDROCHLORIDE 10 MG: 5 INJECTION, SOLUTION INTRAVENOUS at 04:38

## 2017-10-02 RX ADMIN — HYDRALAZINE HYDROCHLORIDE 100 MG: 50 TABLET ORAL at 05:42

## 2017-10-02 RX ADMIN — HYDRALAZINE HYDROCHLORIDE 100 MG: 50 TABLET ORAL at 20:53

## 2017-10-02 RX ADMIN — METOPROLOL TARTRATE 12.5 MG: 25 TABLET, FILM COATED ORAL at 08:28

## 2017-10-02 RX ADMIN — GABAPENTIN 300 MG: 300 CAPSULE ORAL at 20:35

## 2017-10-02 RX ADMIN — HEPARIN SODIUM 5000 UNITS: 5000 INJECTION, SOLUTION INTRAVENOUS; SUBCUTANEOUS at 20:35

## 2017-10-02 RX ADMIN — ATORVASTATIN CALCIUM 80 MG: 40 TABLET, FILM COATED ORAL at 20:35

## 2017-10-02 RX ADMIN — HEPARIN SODIUM 5000 UNITS: 5000 INJECTION, SOLUTION INTRAVENOUS; SUBCUTANEOUS at 14:03

## 2017-10-02 RX ADMIN — BENAZEPRIL HYDROCHLORIDE 40 MG: 20 TABLET ORAL at 08:28

## 2017-10-02 RX ADMIN — METOPROLOL TARTRATE 12.5 MG: 25 TABLET, FILM COATED ORAL at 20:35

## 2017-10-02 RX ADMIN — HYDRALAZINE HYDROCHLORIDE 100 MG: 50 TABLET ORAL at 14:03

## 2017-10-02 RX ADMIN — OMEPRAZOLE 20 MG: 20 CAPSULE, DELAYED RELEASE ORAL at 08:27

## 2017-10-02 RX ADMIN — DULOXETINE HYDROCHLORIDE 20 MG: 20 CAPSULE, DELAYED RELEASE ORAL at 08:28

## 2017-10-02 RX ADMIN — HEPARIN SODIUM 5000 UNITS: 5000 INJECTION, SOLUTION INTRAVENOUS; SUBCUTANEOUS at 05:42

## 2017-10-02 ASSESSMENT — PAIN SCALES - GENERAL
PAINLEVEL_OUTOF10: 5
PAINLEVEL_OUTOF10: 0
PAINLEVEL_OUTOF10: 7
PAINLEVEL_OUTOF10: 0
PAINLEVEL_OUTOF10: 0
PAINLEVEL_OUTOF10: 2
PAINLEVEL_OUTOF10: 0

## 2017-10-02 NOTE — CARE PLAN
Problem: Safety  Goal: Will remain free from injury  Safety protocol in place per hospital policy    Problem: Infection  Goal: Will remain free from infection  Monitor s/sx of infection

## 2017-10-02 NOTE — CARE PLAN
Problem: Safety  Goal: Will remain free from injury    Intervention: Provide assistance with mobility  Pt instructed to call for help when getting out of bed, verbalized understanding.

## 2017-10-02 NOTE — CARE PLAN
Problem: Infection  Goal: Will remain free from infection    Intervention: Assess signs and symptoms of infection  No s/s of infection noted

## 2017-10-02 NOTE — PROGRESS NOTES
Patient is A0x1-3, is confused pleasantly. Patient is assessed, bed alarm on. Safety protocol in place, conitnue to monitor patient. Awaiting placement, no family present.

## 2017-10-02 NOTE — PROGRESS NOTES
[facility] Progress Note    Date of Service: 10/2/2017    Chief Complaint  76 y.o. female admitted 2017 with AMS, UTI.     Interval Problem Update  No issues overnight  Arrange hypertensive  Confused     Consultants/Specialty  Neuro.     Disposition  Needs a guardian        Review of Systems   Unable to perform ROS: Dementia      Physical Exam  Laboratory/Imaging   Hemodynamics  Temp (24hrs), Av.6 °C (97.8 °F), Min:36.1 °C (96.9 °F), Max:36.9 °C (98.5 °F)   Temperature: 36.9 °C (98.5 °F)  Pulse  Av.8  Min: 52  Max: 88    Blood Pressure : (!) 169/67      Respiratory      Respiration: 17, Pulse Oximetry: 92 %        RUL Breath Sounds: Clear, RML Breath Sounds: Diminished, RLL Breath Sounds: Diminished, ARGELIA Breath Sounds: Clear, LLL Breath Sounds: Diminished    Fluids    Intake/Output Summary (Last 24 hours) at 10/02/17 1052  Last data filed at 10/01/17 1300   Gross per 24 hour   Intake              240 ml   Output                0 ml   Net              240 ml       Nutrition  Orders Placed This Encounter   Procedures   • Diet Order     Standing Status:   Standing     Number of Occurrences:   1     Order Specific Question:   Diet:     Answer:   Regular [1]     Physical Exam   Constitutional: She appears well-developed and well-nourished.   HENT:   Head: Normocephalic and atraumatic.   Mouth/Throat: No oropharyngeal exudate.   Eyes: Conjunctivae are normal. Pupils are equal, round, and reactive to light.   Neck: Normal range of motion. Neck supple. No JVD present.   Cardiovascular: Normal rate and regular rhythm.    No murmur heard.  Pulmonary/Chest: Effort normal and breath sounds normal. No respiratory distress.   Abdominal: Soft. Bowel sounds are normal. She exhibits no distension.   Musculoskeletal: She exhibits no edema or tenderness.   Neurological: She is alert. She has normal strength. She is not disoriented. No cranial nerve deficit or sensory deficit. GCS eye subscore is 4. GCS verbal  subscore is 5. GCS motor subscore is 6.   Skin: Skin is warm and dry. No erythema.   Psychiatric: She has a normal mood and affect. Her behavior is normal.   Nursing note and vitals reviewed.                               Assessment/Plan     HTN (hypertension)- (present on admission)   Assessment & Plan    Has a quite hypertensive over the past couple of days  Continue hydralazine to 100 mg 3 times a day  Continue metoprolol 12.5 mg twice a day  Continue her home dose of Lotensin  Norvasc 5 mg daily added today        UTI (urinary tract infection)- (present on admission)   Assessment & Plan    Cultures negative  Completed antibiotics with IV Rocephin        Acute renal insufficiency- (present on admission)   Assessment & Plan    Mild probably due to dehydration. Resolved now.         T12 compression fracture (CMS-HCC)- (present on admission)   Assessment & Plan    Stable, ct spine did not show acute changes.         History of CVA (cerebrovascular accident)- (present on admission)   Assessment & Plan    Continue ASA and statin.   9/23 MRI today show no new stroke, showed Prominent lateral and third ventricles without visualized obstructing lesion which could indicate normal pressure hydrocephalus, patient does not have urinary incontinence neither balance problem, PT/OT to see today.   9/24 MRI no new stroke, pt/ot to see.   9/25 pt/ot today.         Dementia- (present on admission)   Assessment & Plan    MRI of her brain showed enlarged ventricles but no other acute abnormalities  Neurology has evaluated the patient  Patient is incapacitated. I do think that guardianship is appropriate. She is unlikely to be restored to full capacity. She does appear to have moderate to severe dementia. Family is unavailable. I do not feel psychiatry as an appropriate consultation at this point as the patient is obviously incapacitated. But case management is insisting on psychiatric consultation. At this point I will fill out all  the paperwork for patient deeming her incapacitated. Psychiatry consulted            Reviewed items::  Labs reviewed and Medications reviewed  Pink catheter::  No Pink  DVT prophylaxis pharmacological::  Heparin  Antibiotics:  Treating active infection/contamination beyond 24 hours perioperative coverage

## 2017-10-02 NOTE — PSYCHIATRY
Pt not seen. An evaluation for incapacity does not need to be done by psychiatry, nor does guardianship paperwork.      Dr. Valentine may certainly fill out guardianship papers.     Consult tara.

## 2017-10-03 PROCEDURE — 700101 HCHG RX REV CODE 250: Performed by: FAMILY MEDICINE

## 2017-10-03 PROCEDURE — 700102 HCHG RX REV CODE 250 W/ 637 OVERRIDE(OP): Performed by: HOSPITALIST

## 2017-10-03 PROCEDURE — 99233 SBSQ HOSP IP/OBS HIGH 50: CPT | Performed by: INTERNAL MEDICINE

## 2017-10-03 PROCEDURE — 700102 HCHG RX REV CODE 250 W/ 637 OVERRIDE(OP): Performed by: FAMILY MEDICINE

## 2017-10-03 PROCEDURE — 700111 HCHG RX REV CODE 636 W/ 250 OVERRIDE (IP): Performed by: FAMILY MEDICINE

## 2017-10-03 PROCEDURE — A9270 NON-COVERED ITEM OR SERVICE: HCPCS | Performed by: HOSPITALIST

## 2017-10-03 PROCEDURE — 770006 HCHG ROOM/CARE - MED/SURG/GYN SEMI*

## 2017-10-03 PROCEDURE — A9270 NON-COVERED ITEM OR SERVICE: HCPCS | Performed by: FAMILY MEDICINE

## 2017-10-03 RX ORDER — AMLODIPINE BESYLATE 10 MG/1
10 TABLET ORAL
Status: DISCONTINUED | OUTPATIENT
Start: 2017-10-04 | End: 2017-10-28

## 2017-10-03 RX ADMIN — OMEPRAZOLE 20 MG: 20 CAPSULE, DELAYED RELEASE ORAL at 07:26

## 2017-10-03 RX ADMIN — METOPROLOL TARTRATE 12.5 MG: 25 TABLET, FILM COATED ORAL at 20:38

## 2017-10-03 RX ADMIN — DULOXETINE HYDROCHLORIDE 20 MG: 20 CAPSULE, DELAYED RELEASE ORAL at 07:25

## 2017-10-03 RX ADMIN — HYDRALAZINE HYDROCHLORIDE 100 MG: 50 TABLET ORAL at 20:38

## 2017-10-03 RX ADMIN — BENAZEPRIL HYDROCHLORIDE 40 MG: 20 TABLET ORAL at 07:25

## 2017-10-03 RX ADMIN — OXYBUTYNIN CHLORIDE 5 MG: 5 TABLET, FILM COATED, EXTENDED RELEASE ORAL at 20:38

## 2017-10-03 RX ADMIN — ATORVASTATIN CALCIUM 80 MG: 40 TABLET, FILM COATED ORAL at 20:37

## 2017-10-03 RX ADMIN — HYDRALAZINE HYDROCHLORIDE 100 MG: 50 TABLET ORAL at 06:10

## 2017-10-03 RX ADMIN — LABETALOL HYDROCHLORIDE 10 MG: 5 INJECTION, SOLUTION INTRAVENOUS at 04:13

## 2017-10-03 RX ADMIN — HEPARIN SODIUM 5000 UNITS: 5000 INJECTION, SOLUTION INTRAVENOUS; SUBCUTANEOUS at 20:38

## 2017-10-03 RX ADMIN — AMLODIPINE BESYLATE 5 MG: 5 TABLET ORAL at 07:25

## 2017-10-03 RX ADMIN — GABAPENTIN 300 MG: 300 CAPSULE ORAL at 20:37

## 2017-10-03 RX ADMIN — HYDRALAZINE HYDROCHLORIDE 100 MG: 50 TABLET ORAL at 13:20

## 2017-10-03 RX ADMIN — STANDARDIZED SENNA CONCENTRATE AND DOCUSATE SODIUM 2 TABLET: 8.6; 5 TABLET, FILM COATED ORAL at 20:37

## 2017-10-03 RX ADMIN — LIDOCAINE 1 PATCH: 50 PATCH CUTANEOUS at 07:26

## 2017-10-03 RX ADMIN — METOPROLOL TARTRATE 12.5 MG: 25 TABLET, FILM COATED ORAL at 07:25

## 2017-10-03 RX ADMIN — ASPIRIN 81 MG: 81 TABLET, COATED ORAL at 07:25

## 2017-10-03 RX ADMIN — STANDARDIZED SENNA CONCENTRATE AND DOCUSATE SODIUM 2 TABLET: 8.6; 5 TABLET, FILM COATED ORAL at 07:25

## 2017-10-03 ASSESSMENT — ENCOUNTER SYMPTOMS
MEMORY LOSS: 1
HALLUCINATIONS: 1
WEAKNESS: 1

## 2017-10-03 ASSESSMENT — PAIN SCALES - GENERAL
PAINLEVEL_OUTOF10: 5
PAINLEVEL_OUTOF10: 7
PAINLEVEL_OUTOF10: 2

## 2017-10-03 NOTE — PROGRESS NOTES
Received bedside report from day RN and assumed care of patient at 1900. Labs and orders noted. Assessment performed. Patient is alert and oriented x2 Self/Place with no signs of labored breathing or distress. Safety precautions in place including patient call light within reach, bed alarm on, personal possessions nearby, bed in low position and locked, hourly rounding in practice, and non-skid socks in place.

## 2017-10-03 NOTE — CARE PLAN
Problem: Urinary Elimination:  Goal: Ability to reestablish a normal urinary elimination pattern will improve  Outcome: PROGRESSING SLOWER THAN EXPECTED  Pt incont of urine. Pt offered to ambulate to restroom during 2qhr rounding.     Problem: Skin Integrity  Goal: Risk for impaired skin integrity will decrease  Outcome: PROGRESSING AS EXPECTED  Pt incontx2 and checked during rounding for moisture. Disposable pads used. Pt turns side by side.

## 2017-10-03 NOTE — PROGRESS NOTES
Assumed pt care at 0715.  Received report from night RN.  Assessment completed.  Pt AAOx4 this morning.  Pt has no comlaints of pain at this time.  No other s/s of discomfort or distress. Pt ambulates with standby assist.  Bed in lowest position, locked, and bed alarm is on.  Pt calls appropriately.  Treaded socks in place, call light within reach and staff numbers provided.  Pt needs met at this time.

## 2017-10-03 NOTE — PROGRESS NOTES
Simona Knight Fall Risk Assessment:     Last Known Fall: Within the last month  Mobility: Dizziness/generalized weakness  Medications: Cardiovascular or central nervous system meds  Mental Status/LOC/Awareness: Oriented to person and place  Toileting Needs: Incontinence  Volume/Electrolyte Status: No problems  Communication/Sensory: Visual (Glasses)/hearing deficit  Behavior: Appropriate behavior  Simona Knight Fall Risk Total: 13  Fall Risk Level: MODERATE RISK    Universal Fall Precautions:  call light/belongings in reach, bed in low position and locked, use non-slip footwear, siderails up x 2, wheelchairs and assistive devices out of sight, educate on level of risk, adequate lighting, clutter free and spill free environment, educate to call for assistance    Fall Risk Level Interventions:    TRIAL (TELE 8, NEURO, MED JENNIE 5) Moderate Fall Risk Interventions  Place yellow fall risk ID band on patient: verified  Provide patient/family education based on risk assessment : completed  Educate patient/family to call staff for assistance when getting out of bed: completed  Place fall precaution signage outside patient door: completed  Utilize bed/chair fall alarm: verifiedTRIAL (TELE 8, NEURO, [employer] JENNIE 5) High Fall Risk Interventions  Place yellow fall risk ID band on patient: verified  Provide patient/family education based on risk assessment: completed  Educate patient/family to call staff for assistance when getting out of bed: completed  Place fall precaution signage outside patient door: verified  Place patient in room close to nursing station: currently not available/charge notified  Utilize bed/chair fall alarm: verified  Notify charge of high risk for huddle: verified    Patient Specific Interventions:     Medication: review medications with patient and family, assess for medications that can be discontinued or dosage decreased and assess need for orthostatic hypotension evaluation  Mental  Status/LOC/Awareness: reorient patient, reinforce falls education, check on patient hourly, utilize bed/chair fall alarm and reinforce the use of call light  Toileting: consider obtaining elevated toilet seat or bedside commode (BSC), provide frquent toileting, monitor intake and output/use of appropriate interventions, instruct patient/family on the use of grab bars, instruct patient/family on the need to call for assistance when toileting and do not leave patient unattended in bathroom/refer to toileting scripting  Volume/Electrolyte Status: ensure patient remains hydrated, advance diet as tolerated, monitor abnormal lab values and teach patients to dangle before rising if hypotensive  Communication/Sensory: update plan of care on whiteboard, ensure proper positioning when transferrng/ambulating, ensure patient has glasses/contacts and hearing aids/dentures and for visually impaired patients orient to their room surrounding and do not change their surroundings  Behavioral: encourage patient to voice feelings and engage patient in daily activities  Mobility: provide comfort measures during transport, dangle prior to standing, utilize bed/chair fall alarm and instruct patient to exit bed on their strongest side

## 2017-10-03 NOTE — PROGRESS NOTES
Simona Knight Fall Risk Assessment:     Last Known Fall: Within the last month  Mobility: Dizziness/generalized weakness  Medications: Cardiovascular or central nervous system meds  Mental Status/LOC/Awareness: Oriented to person and place  Toileting Needs: Incontinence  Volume/Electrolyte Status: No problems  Communication/Sensory: Visual (Glasses)/hearing deficit  Behavior: Appropriate behavior  Jarvismark Knight Fall Risk Total: 13  Fall Risk Level: MODERATE RISK    Universal Fall Precautions:  call light/belongings in reach, bed in low position and locked, siderails up x 2, use non-slip footwear, adequate lighting, clutter free and spill free environment, educate on level of risk, educate to call for assistance    Fall Risk Level Interventions:    TRIAL (TELE 8, NEURO, MED JENNIE 5) Moderate Fall Risk Interventions  Place yellow fall risk ID band on patient: verified  Provide patient/family education based on risk assessment : completed  Educate patient/family to call staff for assistance when getting out of bed: completed  Place fall precaution signage outside patient door: verified  Utilize bed/chair fall alarm: verifiedTRIAL (TELE 8, NEURO, [employer] JENNIE 5) High Fall Risk Interventions  Place yellow fall risk ID band on patient: verified  Provide patient/family education based on risk assessment: completed  Educate patient/family to call staff for assistance when getting out of bed: completed  Place fall precaution signage outside patient door: verified  Place patient in room close to nursing station: currently not available/charge notified  Utilize bed/chair fall alarm: verified  Notify charge of high risk for huddle: verified    Patient Specific Interventions:     Medication: review medications with patient and family  Mental Status/LOC/Awareness: reorient patient, reinforce falls education, utilize bed/chair fall alarm and reinforce the use of call light  Toileting: instruct patient/family on the use of grab bars,  instruct patient/family on the need to call for assistance when toileting and do not leave patient unattended in bathroom/refer to toileting scripting  Volume/Electrolyte Status: ensure patient remains hydrated  Communication/Sensory: update plan of care on whiteboard  Behavioral: encourage patient to voice feelings  Mobility: utilize bed/chair fall alarm, ensure bed is locked and in lowest position and instruct patient to exit bed on their strongest side

## 2017-10-03 NOTE — PROGRESS NOTES
Simona Knight Fall Risk Assessment:     Last Known Fall: Within the last month  Mobility: Dizziness/generalized weakness  Medications: Cardiovascular or central nervous system meds  Mental Status/LOC/Awareness: Oriented to person and place  Toileting Needs: Incontinence  Volume/Electrolyte Status: No problems  Communication/Sensory: Visual (Glasses)/hearing deficit  Behavior: Appropriate behavior  Jarvismark Knight Fall Risk Total: 13  Fall Risk Level: MODERATE RISK    Universal Fall Precautions:  call light/belongings in reach, bed in low position and locked, siderails up x 2, use non-slip footwear, adequate lighting, clutter free and spill free environment, educate on level of risk, educate to call for assistance    Fall Risk Level Interventions:    TRIAL (TELE 8, NEURO, MED JENNIE 5) Moderate Fall Risk Interventions  Place yellow fall risk ID band on patient: verified  Provide patient/family education based on risk assessment : completed  Educate patient/family to call staff for assistance when getting out of bed: completed  Place fall precaution signage outside patient door: verified  Utilize bed/chair fall alarm: verifiedTRIAL (TELE 8, NEURO, [employer] JENNIE 5) High Fall Risk Interventions  Place yellow fall risk ID band on patient: verified  Provide patient/family education based on risk assessment: completed  Educate patient/family to call staff for assistance when getting out of bed: completed  Place fall precaution signage outside patient door: verified  Place patient in room close to nursing station: currently not available/charge notified  Utilize bed/chair fall alarm: verified  Notify charge of high risk for huddle: verified    Patient Specific Interventions:     Medication: review medications with patient and family  Mental Status/LOC/Awareness: reorient patient  Toileting: provide frquent toileting  Volume/Electrolyte Status: ensure patient remains hydrated  Communication/Sensory: update plan of care on  whiteboard  Behavioral: not applicable  Mobility: schedule physical activity throughout the day

## 2017-10-03 NOTE — CARE PLAN
Problem: Safety  Goal: Will remain free from injury    Intervention: Provide assistance with mobility  Call light within reach, treaded socks in place, bed in lowest position and locked.  Hourly rounding in progress.       Problem: Pain Management  Goal: Pain level will decrease to patient's comfort goal  Medicated per MAR

## 2017-10-03 NOTE — DISCHARGE PLANNING
SW is pursuing guardianship at this time. Needs Assessment and Competency assessment requested from MD at this time to complete guardianship documents.

## 2017-10-03 NOTE — PROGRESS NOTES
[facility] Hospitalist Progress Note    Date of Service: 10/4/2017    Chief Complaint  76 y.o. female admitted 2017 with AMS, UTI.     Interval Problem Update  Reports minimal nausea  Did not feel like eating breakfast  Denies vomiting  Intermittent hallucination/confusion, easily reoriented     Consultants/Specialty  Neuro.     Disposition  Needs a guardian  Unable to make her own medical decisions, incapacitated   to assist        Review of Systems   Unable to perform ROS: Dementia   Neurological: Positive for weakness.   Psychiatric/Behavioral: Positive for hallucinations and memory loss.      Physical Exam  Laboratory/Imaging   Hemodynamics  Temp (24hrs), Av.6 °C (97.9 °F), Min:36.4 °C (97.6 °F), Max:36.7 °C (98 °F)   Temperature: 36.7 °C (98 °F)  Pulse  Av.2  Min: 52  Max: 88    Blood Pressure : (!) 161/60      Respiratory      Respiration: 16, Pulse Oximetry: 93 %             Fluids    Intake/Output Summary (Last 24 hours) at 10/04/17 0853  Last data filed at 10/03/17 1545   Gross per 24 hour   Intake              360 ml   Output                0 ml   Net              360 ml       Nutrition  Orders Placed This Encounter   Procedures   • Diet Order     Standing Status:   Standing     Number of Occurrences:   1     Order Specific Question:   Diet:     Answer:   Regular [1]     Physical Exam   Constitutional: She appears well-developed.   HENT:   Head: Normocephalic and atraumatic.   Eyes: EOM are normal. Pupils are equal, round, and reactive to light.   Neck: Normal range of motion. Neck supple.   Cardiovascular: Normal rate, regular rhythm and intact distal pulses.    No murmur heard.  Pulmonary/Chest: Effort normal. No respiratory distress.   Abdominal: Soft. Bowel sounds are normal. She exhibits no distension. There is no tenderness.   Musculoskeletal: Normal range of motion. She exhibits no edema or tenderness.   Neurological: She is alert. She has normal strength. She is not  disoriented. No cranial nerve deficit or sensory deficit. Coordination normal. GCS eye subscore is 4. GCS verbal subscore is 5. GCS motor subscore is 6.   Skin: Skin is warm and dry. She is not diaphoretic. No erythema. No pallor.   Psychiatric: She has a normal mood and affect. Her behavior is normal. Judgment normal.   Nursing note and vitals reviewed.                               Assessment/Plan     HTN (hypertension)- (present on admission)   Assessment & Plan    Uncontrolled  Essential  Continue hydralazine to 100 mg 3 times a day  Continue metoprolol 12.5 mg twice a day  Continue her home dose of Lotensin  Increase Norvasc 5 mg daily         UTI (urinary tract infection)- (present on admission)   Assessment & Plan    Cultures negative  Completed antibiotics with IV Rocephin        Acute renal insufficiency- (present on admission)   Assessment & Plan    Mild probably due to dehydration. Resolved now.         T12 compression fracture (CMS-HCC)- (present on admission)   Assessment & Plan    Stable, ct spine did not show acute changes.         History of CVA (cerebrovascular accident)- (present on admission)   Assessment & Plan    Continue ASA and statin.   9/23 MRI today show no new stroke, showed Prominent lateral and third ventricles without visualized obstructing lesion which could indicate normal pressure hydrocephalus, patient does not have urinary incontinence neither balance problem, PT/OT to see today.   9/24 MRI no new stroke, pt/ot to see.   9/25 pt/ot today.         Dementia- (present on admission)   Assessment & Plan    MRI of her brain showed enlarged ventricles but no other acute abnormalities  Neurology has evaluated the patient  Patient is incapacitated. I do think that guardianship is appropriate. She is unlikely to be restored to full capacity. She does appear to have moderate to severe dementia. Family is unavailable. I do not feel psychiatry as an appropriate consultation at this point as the  patient is obviously incapacitated. But case management is insisting on psychiatric consultation. At this point I will fill out all the paperwork for patient deeming her incapacitated. Psychiatry consulted                Reviewed items::  Labs reviewed and Medications reviewed  Pink catheter::  No Pink  DVT prophylaxis pharmacological::  Heparin  Antibiotics:  Treating active infection/contamination beyond 24 hours perioperative coverage

## 2017-10-04 PROCEDURE — A9270 NON-COVERED ITEM OR SERVICE: HCPCS | Performed by: FAMILY MEDICINE

## 2017-10-04 PROCEDURE — 90662 IIV NO PRSV INCREASED AG IM: CPT | Performed by: INTERNAL MEDICINE

## 2017-10-04 PROCEDURE — A9270 NON-COVERED ITEM OR SERVICE: HCPCS | Performed by: INTERNAL MEDICINE

## 2017-10-04 PROCEDURE — 700111 HCHG RX REV CODE 636 W/ 250 OVERRIDE (IP): Performed by: FAMILY MEDICINE

## 2017-10-04 PROCEDURE — 700102 HCHG RX REV CODE 250 W/ 637 OVERRIDE(OP): Performed by: HOSPITALIST

## 2017-10-04 PROCEDURE — 700111 HCHG RX REV CODE 636 W/ 250 OVERRIDE (IP): Performed by: INTERNAL MEDICINE

## 2017-10-04 PROCEDURE — 3E0234Z INTRODUCTION OF SERUM, TOXOID AND VACCINE INTO MUSCLE, PERCUTANEOUS APPROACH: ICD-10-PCS | Performed by: FAMILY MEDICINE

## 2017-10-04 PROCEDURE — 700102 HCHG RX REV CODE 250 W/ 637 OVERRIDE(OP): Performed by: INTERNAL MEDICINE

## 2017-10-04 PROCEDURE — 770006 HCHG ROOM/CARE - MED/SURG/GYN SEMI*

## 2017-10-04 PROCEDURE — 90471 IMMUNIZATION ADMIN: CPT

## 2017-10-04 PROCEDURE — 99231 SBSQ HOSP IP/OBS SF/LOW 25: CPT | Performed by: INTERNAL MEDICINE

## 2017-10-04 PROCEDURE — 700102 HCHG RX REV CODE 250 W/ 637 OVERRIDE(OP): Performed by: FAMILY MEDICINE

## 2017-10-04 PROCEDURE — A9270 NON-COVERED ITEM OR SERVICE: HCPCS | Performed by: HOSPITALIST

## 2017-10-04 RX ADMIN — STANDARDIZED SENNA CONCENTRATE AND DOCUSATE SODIUM 2 TABLET: 8.6; 5 TABLET, FILM COATED ORAL at 08:00

## 2017-10-04 RX ADMIN — ONDANSETRON 4 MG: 4 TABLET, ORALLY DISINTEGRATING ORAL at 09:15

## 2017-10-04 RX ADMIN — METOPROLOL TARTRATE 12.5 MG: 25 TABLET, FILM COATED ORAL at 20:44

## 2017-10-04 RX ADMIN — OXYBUTYNIN CHLORIDE 5 MG: 5 TABLET, FILM COATED, EXTENDED RELEASE ORAL at 20:43

## 2017-10-04 RX ADMIN — METOPROLOL TARTRATE 12.5 MG: 25 TABLET, FILM COATED ORAL at 08:00

## 2017-10-04 RX ADMIN — DULOXETINE HYDROCHLORIDE 20 MG: 20 CAPSULE, DELAYED RELEASE ORAL at 07:59

## 2017-10-04 RX ADMIN — HYDRALAZINE HYDROCHLORIDE 100 MG: 50 TABLET ORAL at 05:50

## 2017-10-04 RX ADMIN — HEPARIN SODIUM 5000 UNITS: 5000 INJECTION, SOLUTION INTRAVENOUS; SUBCUTANEOUS at 20:44

## 2017-10-04 RX ADMIN — BENAZEPRIL HYDROCHLORIDE 40 MG: 20 TABLET ORAL at 08:00

## 2017-10-04 RX ADMIN — HYDRALAZINE HYDROCHLORIDE 100 MG: 50 TABLET ORAL at 20:43

## 2017-10-04 RX ADMIN — GABAPENTIN 300 MG: 300 CAPSULE ORAL at 20:44

## 2017-10-04 RX ADMIN — ERGOCALCIFEROL 50000 UNITS: 1.25 CAPSULE, LIQUID FILLED ORAL at 08:00

## 2017-10-04 RX ADMIN — INFLUENZA A VIRUSA/MICHIGAN/45/2015 X-275 (H1N1) ANTIGEN (FORMALDEHYDE INACTIVATED), INFLUENZA A VIRUS A/HONG KONG/4801/2014 X-263B (H3N2) ANTIGEN (FORMALDEHYDE INACTIVATED), AND INFLUENZA B VIRUS B/BRISBANE/60/2008 ANTIGEN (FORMALDEHYDE INACTIVATED) 0.5 ML: 60; 60; 60 INJECTION, SUSPENSION INTRAMUSCULAR at 08:00

## 2017-10-04 RX ADMIN — HEPARIN SODIUM 5000 UNITS: 5000 INJECTION, SOLUTION INTRAVENOUS; SUBCUTANEOUS at 05:50

## 2017-10-04 RX ADMIN — OMEPRAZOLE 20 MG: 20 CAPSULE, DELAYED RELEASE ORAL at 08:00

## 2017-10-04 RX ADMIN — HEPARIN SODIUM 5000 UNITS: 5000 INJECTION, SOLUTION INTRAVENOUS; SUBCUTANEOUS at 14:30

## 2017-10-04 RX ADMIN — ATORVASTATIN CALCIUM 80 MG: 40 TABLET, FILM COATED ORAL at 20:44

## 2017-10-04 RX ADMIN — AMLODIPINE BESYLATE 10 MG: 10 TABLET ORAL at 08:00

## 2017-10-04 RX ADMIN — ASPIRIN 81 MG: 81 TABLET, COATED ORAL at 08:00

## 2017-10-04 ASSESSMENT — PAIN SCALES - GENERAL
PAINLEVEL_OUTOF10: 4
PAINLEVEL_OUTOF10: 0
PAINLEVEL_OUTOF10: 2
PAINLEVEL_OUTOF10: 3
PAINLEVEL_OUTOF10: 2

## 2017-10-04 ASSESSMENT — ENCOUNTER SYMPTOMS
WEAKNESS: 1
NAUSEA: 1
HALLUCINATIONS: 1
VOMITING: 0
MEMORY LOSS: 1
ABDOMINAL PAIN: 0
FEVER: 0
PALPITATIONS: 0

## 2017-10-04 NOTE — PROGRESS NOTES
Assumed care of pt at change of shift. Pt is A&O x2- pt reoriented. Mild pain reported- declines intervention and refused lidocaine patch. Nausea reported- PRN medication given. Pt requesting PIV to be removed- will ask MD. Provided pt with RN and CNA extension numbers on white board and pt encouraged to call when needed. Bed in lowest & locked position. BA on. Pt is resting comfortably now. Call light within reach.

## 2017-10-04 NOTE — CARE PLAN
Problem: Discharge Barriers/Planning  Goal: Patient's continuum of care needs will be met  Outcome: PROGRESSING SLOWER THAN EXPECTED  Pt awaiting guardianship    Problem: Mobility  Goal: Risk for activity intolerance will decrease  Outcome: PROGRESSING AS EXPECTED  Pt is up SBA w FWW- tolerates well

## 2017-10-04 NOTE — PROGRESS NOTES
Simona Knight Fall Risk Assessment:     Last Known Fall: Within the last month  Mobility: Immobilized/requires assist of one person  Medications: Cardiovascular or central nervous system meds  Mental Status/LOC/Awareness: Oriented to person and place  Toileting Needs: Incontinence  Volume/Electrolyte Status: No problems  Communication/Sensory: Visual (Glasses)/hearing deficit  Behavior: Appropriate behavior  Simona Knight Fall Risk Total: 14  Fall Risk Level: MODERATE RISK    Universal Fall Precautions:  call light/belongings in reach, bed in low position and locked, wheelchairs and assistive devices out of sight, siderails up x 2, use non-slip footwear, adequate lighting, clutter free and spill free environment, educate on level of risk, educate to call for assistance    Fall Risk Level Interventions:    TRIAL (Rapid7 8, NEURO, MED JENNIE 5) Moderate Fall Risk Interventions  Place yellow fall risk ID band on patient: verified  Provide patient/family education based on risk assessment : completed  Educate patient/family to call staff for assistance when getting out of bed: completed  Place fall precaution signage outside patient door: verified  Utilize bed/chair fall alarm: verifiedTRIAL (Rapid7 8, NEURO, [employer] JENNIE 5) High Fall Risk Interventions  Place yellow fall risk ID band on patient: verified  Provide patient/family education based on risk assessment: completed  Educate patient/family to call staff for assistance when getting out of bed: completed  Place fall precaution signage outside patient door: verified  Place patient in room close to nursing station: currently not available/charge notified  Utilize bed/chair fall alarm: verified  Notify charge of high risk for huddle: verified    Patient Specific Interventions:     Medication: review medications with patient and family  Mental Status/LOC/Awareness: reorient patient, reinforce falls education, check on patient hourly, utilize bed/chair fall alarm, reinforce  the use of call light and provide activity  Toileting: provide frquent toileting and monitor intake and output/use of appropriate interventions  Volume/Electrolyte Status: ensure patient remains hydrated  Communication/Sensory: update plan of care on whiteboard, provide communication alternatives/ and ensure proper positioning when transferrng/ambulating  Behavioral: encourage patient to voice feelings  Mobility: schedule physical activity throughout the day, dangle prior to standing and utilize bed/chair fall alarm

## 2017-10-04 NOTE — PROGRESS NOTES
Simona Knight Fall Risk Assessment:     Last Known Fall: Within the last month  Mobility: Immobilized/requires assist of one person  Medications: Cardiovascular and central nervous system meds  Mental Status/LOC/Awareness: Oriented to person and place  Toileting Needs: Incontinence  Volume/Electrolyte Status: No problems  Communication/Sensory: Visual (Glasses)/hearing deficit  Behavior: Depression/anxiety  Simona Knight Fall Risk Total: 16  Fall Risk Level: HIGH RISK     Universal Fall Precautions:  call light/belongings in reach, bed in low position and locked, siderails up x 2, use non-slip footwear, adequate lighting, educate on level of risk, clutter free and spill free environment, educate to call for assistance     Fall Risk Level Interventions:    TRIAL (TELE 8, NEURO, MED JENNIE 5) Moderate Fall Risk Interventions  Place yellow fall risk ID band on patient: verified  Provide patient/family education based on risk assessment : completed  Educate patient/family to call staff for assistance when getting out of bed: completed  Place fall precaution signage outside patient door: completed  Utilize bed/chair fall alarm: verifiedTRIAL (TELE 8, NEURO, [employer] JENNIE 5) High Fall Risk Interventions  Place yellow fall risk ID band on patient: verified  Provide patient/family education based on risk assessment: completed  Educate patient/family to call staff for assistance when getting out of bed: completed  Place fall precaution signage outside patient door: verified  Place patient in room close to nursing station: currently not available/charge notified  Utilize bed/chair fall alarm: verified  Notify charge of high risk for huddle: verified     Patient Specific Interventions:      Medication: review medications with patient and family and limit combination of prn medications  Mental Status/LOC/Awareness: reorient patient, reinforce falls education, check on patient hourly, utilize bed/chair fall alarm, reinforce the use  of call light and provide activity  Toileting: provide frquent toileting  Volume/Electrolyte Status: ensure patient remains hydrated  Communication/Sensory: update plan of care on whiteboard  Behavioral: encourage patient to voice feelings  Mobility: utilize bed/chair fall alarm and ensure bed is locked and in lowest position

## 2017-10-04 NOTE — CARE PLAN
Problem: Discharge Barriers/Planning  Goal: Patient's continuum of care needs will be met    Intervention: Collaborate with Transitional Care Team and Interdisciplinary Team to meet discharge needs  Dc pending guardianship. Awaiting competency evaluation from MD       Problem: Pain Management  Goal: Pain level will decrease to patient's comfort goal    Intervention: Follow pain managment plan developed in collaboration with patient and Interdisciplinary Team  Back pain managed with lidocaine patch

## 2017-10-04 NOTE — PROGRESS NOTES
Report received. Assumed care, assessment complete. Pt is A & O x 3.  Pt reports mild hip and back pain. Fall precautions and appropriate signs in place.RN extension number provided. Pt educated regarding fall precautions. Bed alarm in use. Pt denies any additional needs at this time. Call light within reach.

## 2017-10-04 NOTE — PROGRESS NOTES
[facility] Hospitalist Progress Note    Date of Service: 10/4/2017    Chief Complaint  76 y.o. female admitted 2017 with AMS, UTI.     Interval Problem Update  Minimal nausea, improved with Zofran  No other complaints     Consultants/Specialty  Neuro.     Disposition  Needs a guardian  Unable to make her own medical decisions, incapacitated   to assist        Review of Systems   Unable to perform ROS: Dementia   Constitutional: Negative for fever.   Cardiovascular: Negative for palpitations.   Gastrointestinal: Positive for nausea. Negative for abdominal pain and vomiting.   Neurological: Positive for weakness.   Psychiatric/Behavioral: Positive for hallucinations and memory loss.      Physical Exam  Laboratory/Imaging   Hemodynamics  Temp (24hrs), Av.6 °C (97.9 °F), Min:36.4 °C (97.6 °F), Max:36.7 °C (98 °F)   Temperature: 36.7 °C (98 °F)  Pulse  Av.2  Min: 52  Max: 88    Blood Pressure : (!) 161/60      Respiratory      Respiration: 16, Pulse Oximetry: 93 %             Fluids    Intake/Output Summary (Last 24 hours) at 10/04/17 1439  Last data filed at 10/04/17 1400   Gross per 24 hour   Intake              720 ml   Output                0 ml   Net              720 ml       Nutrition  Orders Placed This Encounter   Procedures   • Diet Order     Standing Status:   Standing     Number of Occurrences:   1     Order Specific Question:   Diet:     Answer:   Regular [1]     Physical Exam   Constitutional: No distress.   HENT:   Head: Normocephalic and atraumatic.   Eyes: EOM are normal. Pupils are equal, round, and reactive to light.   Neck: Normal range of motion.   Cardiovascular: Normal rate, regular rhythm and intact distal pulses.    Pulmonary/Chest: No respiratory distress.   Abdominal: Soft. Bowel sounds are normal. She exhibits no distension.   Musculoskeletal: She exhibits no edema.   Neurological: She is alert. She has normal strength. She is not disoriented. No cranial nerve deficit or  sensory deficit. GCS eye subscore is 4. GCS verbal subscore is 5. GCS motor subscore is 6.   Skin: Skin is warm and dry. No erythema.   Psychiatric: She has a normal mood and affect. Her behavior is normal. Judgment and thought content normal.   Nursing note and vitals reviewed.                               Assessment/Plan     HTN (hypertension)- (present on admission)   Assessment & Plan    Improving, monitor  Essential  Continue hydralazine to 100 mg 3 times a day  Continue metoprolol 12.5 mg twice a day  Continue her home dose of Lotensin  Norvasc 5 mg daily         UTI (urinary tract infection)- (present on admission)   Assessment & Plan    Cultures negative  Completed antibiotics with IV Rocephin        Acute renal insufficiency- (present on admission)   Assessment & Plan    Mild probably due to dehydration. Resolved now.         T12 compression fracture (CMS-HCC)- (present on admission)   Assessment & Plan    Stable, ct spine did not show acute changes.         History of CVA (cerebrovascular accident)- (present on admission)   Assessment & Plan    Continue ASA and statin.   9/23 MRI today show no new stroke, showed Prominent lateral and third ventricles without visualized obstructing lesion which could indicate normal pressure hydrocephalus, patient does not have urinary incontinence neither balance problem, PT/OT to see today.   9/24 MRI no new stroke, pt/ot to see.   9/25 pt/ot today.   Out of bed for meals        Dementia- (present on admission)   Assessment & Plan    MRI of her brain showed enlarged ventricles but no other acute abnormalities  Neurology has evaluated the patient  Patient is incapacitated. I do think that guardianship is appropriate. She is unlikely to be restored to full capacity. She does appear to have moderate to severe dementia. Family is unavailable. I do not feel psychiatry as an appropriate consultation at this point as the patient is obviously incapacitated. But case management  is insisting on psychiatric consultation. At this point I will fill out all the paperwork for patient deeming her incapacitated. Psychiatry consulted                Reviewed items::  Labs reviewed and Medications reviewed  Pink catheter::  No Pink  DVT prophylaxis pharmacological::  Heparin  Antibiotics:  Treating active infection/contamination beyond 24 hours perioperative coverage

## 2017-10-05 PROCEDURE — A9270 NON-COVERED ITEM OR SERVICE: HCPCS | Performed by: FAMILY MEDICINE

## 2017-10-05 PROCEDURE — 700102 HCHG RX REV CODE 250 W/ 637 OVERRIDE(OP): Performed by: INTERNAL MEDICINE

## 2017-10-05 PROCEDURE — 99233 SBSQ HOSP IP/OBS HIGH 50: CPT | Performed by: INTERNAL MEDICINE

## 2017-10-05 PROCEDURE — 700102 HCHG RX REV CODE 250 W/ 637 OVERRIDE(OP): Performed by: FAMILY MEDICINE

## 2017-10-05 PROCEDURE — 97530 THERAPEUTIC ACTIVITIES: CPT

## 2017-10-05 PROCEDURE — 770006 HCHG ROOM/CARE - MED/SURG/GYN SEMI*

## 2017-10-05 PROCEDURE — 700102 HCHG RX REV CODE 250 W/ 637 OVERRIDE(OP): Performed by: HOSPITALIST

## 2017-10-05 PROCEDURE — 700111 HCHG RX REV CODE 636 W/ 250 OVERRIDE (IP): Performed by: FAMILY MEDICINE

## 2017-10-05 PROCEDURE — A9270 NON-COVERED ITEM OR SERVICE: HCPCS | Performed by: HOSPITALIST

## 2017-10-05 PROCEDURE — A9270 NON-COVERED ITEM OR SERVICE: HCPCS | Performed by: INTERNAL MEDICINE

## 2017-10-05 RX ADMIN — ATORVASTATIN CALCIUM 80 MG: 40 TABLET, FILM COATED ORAL at 20:41

## 2017-10-05 RX ADMIN — STANDARDIZED SENNA CONCENTRATE AND DOCUSATE SODIUM 2 TABLET: 8.6; 5 TABLET, FILM COATED ORAL at 20:41

## 2017-10-05 RX ADMIN — METOPROLOL TARTRATE 12.5 MG: 25 TABLET, FILM COATED ORAL at 20:41

## 2017-10-05 RX ADMIN — HEPARIN SODIUM 5000 UNITS: 5000 INJECTION, SOLUTION INTRAVENOUS; SUBCUTANEOUS at 15:23

## 2017-10-05 RX ADMIN — HYDRALAZINE HYDROCHLORIDE 100 MG: 50 TABLET ORAL at 20:41

## 2017-10-05 RX ADMIN — BENAZEPRIL HYDROCHLORIDE 40 MG: 20 TABLET ORAL at 10:29

## 2017-10-05 RX ADMIN — GABAPENTIN 300 MG: 300 CAPSULE ORAL at 20:41

## 2017-10-05 RX ADMIN — HYDRALAZINE HYDROCHLORIDE 100 MG: 50 TABLET ORAL at 06:05

## 2017-10-05 RX ADMIN — ASPIRIN 81 MG: 81 TABLET, COATED ORAL at 10:29

## 2017-10-05 RX ADMIN — OMEPRAZOLE 20 MG: 20 CAPSULE, DELAYED RELEASE ORAL at 10:29

## 2017-10-05 RX ADMIN — HEPARIN SODIUM 5000 UNITS: 5000 INJECTION, SOLUTION INTRAVENOUS; SUBCUTANEOUS at 06:06

## 2017-10-05 RX ADMIN — HEPARIN SODIUM 5000 UNITS: 5000 INJECTION, SOLUTION INTRAVENOUS; SUBCUTANEOUS at 20:41

## 2017-10-05 RX ADMIN — METOPROLOL TARTRATE 12.5 MG: 25 TABLET, FILM COATED ORAL at 10:29

## 2017-10-05 RX ADMIN — HYDRALAZINE HYDROCHLORIDE 100 MG: 50 TABLET ORAL at 15:23

## 2017-10-05 RX ADMIN — AMLODIPINE BESYLATE 10 MG: 10 TABLET ORAL at 10:29

## 2017-10-05 RX ADMIN — DULOXETINE HYDROCHLORIDE 20 MG: 20 CAPSULE, DELAYED RELEASE ORAL at 10:30

## 2017-10-05 RX ADMIN — OXYBUTYNIN CHLORIDE 5 MG: 5 TABLET, FILM COATED, EXTENDED RELEASE ORAL at 20:41

## 2017-10-05 ASSESSMENT — ENCOUNTER SYMPTOMS
MYALGIAS: 1
FOCAL WEAKNESS: 0
ABDOMINAL PAIN: 0
DIZZINESS: 0
DEPRESSION: 0
HALLUCINATIONS: 0
SHORTNESS OF BREATH: 0
PALPITATIONS: 0
VOMITING: 0
NAUSEA: 0
WEAKNESS: 1
MEMORY LOSS: 1

## 2017-10-05 ASSESSMENT — COGNITIVE AND FUNCTIONAL STATUS - GENERAL
CLIMB 3 TO 5 STEPS WITH RAILING: A LITTLE
MOBILITY SCORE: 18
MOVING FROM LYING ON BACK TO SITTING ON SIDE OF FLAT BED: A LITTLE
STANDING UP FROM CHAIR USING ARMS: A LITTLE
SUGGESTED CMS G CODE MODIFIER MOBILITY: CK
MOVING TO AND FROM BED TO CHAIR: A LITTLE
WALKING IN HOSPITAL ROOM: A LITTLE
TURNING FROM BACK TO SIDE WHILE IN FLAT BAD: A LITTLE

## 2017-10-05 ASSESSMENT — PAIN SCALES - GENERAL
PAINLEVEL_OUTOF10: 4
PAINLEVEL_OUTOF10: 2
PAINLEVEL_OUTOF10: 2

## 2017-10-05 ASSESSMENT — GAIT ASSESSMENTS
ASSISTIVE DEVICE: FRONT WHEEL WALKER
DISTANCE (FEET): 120
DEVIATION: OTHER (COMMENT)
GAIT LEVEL OF ASSIST: STAND BY ASSIST

## 2017-10-05 ASSESSMENT — LIFESTYLE VARIABLES: DO YOU DRINK ALCOHOL: NO

## 2017-10-05 NOTE — THERAPY
"Physical Therapy Treatment completed.   Bed Mobility:  Supine to Sit: Modified Independent  Transfers: Sit to Stand: Stand by Assist  Gait: Level Of Assist: Stand by Assist with Front-Wheel Walker       Plan of Care: Will benefit from Physical Therapy 2 times per week  Discharge Recommendations: Equipment: Will Continue to Assess for Equipment Needs. See below    Pt was able to demosntrate hallway ambulation with FWW with SBA. She appears primarily limited 2' to dec LE functional endurance/strength as well as cognitive deficits (though appears improved re: cognition as compared with prior visit). She reports that she no longer has a place to live (?). She will benefit from continued skilled acute PT while here though should be ambulating with staff as able as she appears generally deconditioned in part from recently limited mobility. Currently, appears pt would require SBA for OOB activity (and anticipate with IADLs) given memory/cog ->defer to OT recs as well re: IADLs/ADL management and cog concerns. Will continue to visit.  Final dispo highly dependent on level of assist pt has at d/c environment (needs assist more for cog than functional deficits currently)    See \"Rehab Therapy-Acute\" Patient Summary Report for complete documentation.       "

## 2017-10-05 NOTE — PROGRESS NOTES
Received bedside report from day RN and assumed care of patient at 1900. Patient is alert and oriented x2 Self/Place with no signs of labored breathing or distress. Safety precautions in place including patient call light within reach, bed alarm on, personal possessions nearby, bed in low position and locked, hourly rounding in practice, and non-skid socks in place. Anxious about what facility she will be d/c'd to; emotional support provided.

## 2017-10-05 NOTE — PROGRESS NOTES
Simona Knight Fall Risk Assessment:     Last Known Fall: Within the last month  Mobility: Dizziness/generalized weakness  Medications: Cardiovascular or central nervous system meds  Mental Status/LOC/Awareness: Oriented to person and place  Toileting Needs: Incontinence  Volume/Electrolyte Status: No problems  Communication/Sensory: Visual (Glasses)/hearing deficit  Behavior: Depression/anxiety  [place] Knight Fall Risk Total: 14  Fall Risk Level: MODERATE RISK    Universal Fall Precautions:  call light/belongings in reach, bed in low position and locked, siderails up x 2    Fall Risk Level Interventions:    TRIAL (TELE 8, NEURO, MED JENNIE 5) Moderate Fall Risk Interventions  Place yellow fall risk ID band on patient: verified  Provide patient/family education based on risk assessment : completed  Educate patient/family to call staff for assistance when getting out of bed: completed  Place fall precaution signage outside patient door: verified  Utilize bed/chair fall alarm: verifiedTRIAL (TELE 8, NEURO, [employer] JENNIE 5) High Fall Risk Interventions  Place yellow fall risk ID band on patient: verified  Provide patient/family education based on risk assessment: completed  Educate patient/family to call staff for assistance when getting out of bed: completed  Place fall precaution signage outside patient door: verified  Place patient in room close to nursing station: currently not available/charge notified  Utilize bed/chair fall alarm: verified  Notify charge of high risk for huddle: verified    Patient Specific Interventions:     Medication: review medications with patient and family and assess need for orthostatic hypotension evaluation  Mental Status/LOC/Awareness: reinforce falls education, encourage family to stay with patient, check on patient hourly, utilize bed/chair fall alarm and reinforce the use of call light  Toileting: instruct patient/family on the need to call for assistance when toileting and do not leave  patient unattended in bathroom/refer to toileting scripting  Volume/Electrolyte Status: monitor abnormal lab values and ensure IV fluids are appropriate  Communication/Sensory: update plan of care on whiteboard  Behavioral: engage patient in daily activities and administer medication as ordered  Mobility: dangle prior to standing, utilize bed/chair fall alarm and ensure bed is locked and in lowest position

## 2017-10-05 NOTE — CARE PLAN
Problem: Safety  Goal: Will remain free from falls  Outcome: PROGRESSING AS EXPECTED  Bed alarm on. Bed low and locked position. Call light w/in reach. Pt calls appropriately. Floor cleared of debris.     Problem: Knowledge Deficit  Goal: Knowledge of disease process/condition, treatment plan, diagnostic tests, and medications will improve  Outcome: PROGRESSING AS EXPECTED      Problem: Psychosocial Needs:  Goal: Level of anxiety will decrease  Outcome: PROGRESSING SLOWER THAN EXPECTED  Pt stated feeling anxious about facility she will be d/c to and when that will happen. Pt updated on POC. Emotional support provided about fears.

## 2017-10-05 NOTE — PROGRESS NOTES
"Assumed care of patient at 0700 . Received report from RN. Patient is AOX4 . Assessment complete. Labs reviewed.Patient and RN discussed plan of care. Patient questions answered. Patient needs are met at this time. Bed in lowest and locked position. Upper bed rails up.  Call light is within reach. Hourly rounding in place.  /66   Pulse 70   Temp 36.9 °C (98.5 °F)   Resp 16   Ht 1.6 m (5' 2.99\")   Wt 90 kg (198 lb 6.6 oz)   SpO2 91%   Breastfeeding? No   BMI 35.16 kg/m²     "

## 2017-10-05 NOTE — PROGRESS NOTES
Simona Knight Fall Risk Assessment:     Last Known Fall: Within the last month  Mobility: Immobilized/requires assist of one person, Use of assistive device/requires assist of two people (walker)  Medications: Cardiovascular or central nervous system meds  Mental Status/LOC/Awareness: Oriented to person and place  Toileting Needs: Incontinence  Volume/Electrolyte Status: No problems  Communication/Sensory: Visual (Glasses)/hearing deficit  Behavior: Depression/anxiety  Simona Knight Fall Risk Total: 18  Fall Risk Level: HIGH RISK    Universal Fall Precautions:  call light/belongings in reach, bed in low position and locked, wheelchairs and assistive devices out of sight, siderails up x 2, use non-slip footwear, adequate lighting, clutter free and spill free environment, educate on level of risk, educate to call for assistance    Fall Risk Level Interventions:    TRIAL ([employer] 8, NEURO, MED JENNIE 5) Moderate Fall Risk Interventions  Place yellow fall risk ID band on patient: verified  Provide patient/family education based on risk assessment : completed  Educate patient/family to call staff for assistance when getting out of bed: completed  Place fall precaution signage outside patient door: verified  Utilize bed/chair fall alarm: verifiedTRIAL ([employer] 8, NEURO, [employer] JENNIE 5) High Fall Risk Interventions  Place yellow fall risk ID band on patient: verified  Provide patient/family education based on risk assessment: completed  Educate patient/family to call staff for assistance when getting out of bed: completed  Place fall precaution signage outside patient door: verified  Place patient in room close to nursing station: currently not available/charge notified  Utilize bed/chair fall alarm: verified  Notify charge of high risk for huddle: verified    Patient Specific Interventions:     Medication: review medications with patient and family  Mental Status/LOC/Awareness: reorient patient, reinforce falls education, utilize  bed/chair fall alarm and reinforce the use of call light  Toileting: instruct patient/family on the use of grab bars, instruct patient/family on the need to call for assistance when toileting, do not leave patient unattended in bathroom/refer to toileting scripting and toilet prior to giving pain medications  Volume/Electrolyte Status: ensure patient remains hydrated  Communication/Sensory: update plan of care on whiteboard  Behavioral: encourage patient to voice feelings  Mobility: utilize bed/chair fall alarm, ensure bed is locked and in lowest position, provide appropriate assistive device and instruct patient to exit bed on their strongest side

## 2017-10-05 NOTE — CARE PLAN
Problem: Safety  Goal: Will remain free from injury    Intervention: Educate patient and significant other/support system about adaptive mobility strategies and safe transfers  Bed in lowest position, call light within reach. Bed alarm on. Patient educated regarding safety precautions. Room free of clutter.       Problem: Infection  Goal: Will remain free from infection    Intervention: Implement standard precautions and perform hand washing before and after patient contact  Educated patient on proper handwashing techniques with soap and water. Explained the importance of washing hands after using the restroom, before and after eating. Hand  is available on the walls of the rooms for use when hands are not visibly soiled.

## 2017-10-05 NOTE — PROGRESS NOTES
[facility] Hospitalist Progress Note    Date of Service: 10/5/2017    Chief Complaint  76 y.o. female admitted 2017 with AMS, UTI.     Interval Problem Update  Amputation is alert and oriented ×3 today. Patient does appear to be aware of her current condition. States that she is getting old and forgetful.  Denies any pain. Patient reports finishing over 75% of her meal. Denies any current nausea     Consultants/Specialty  Neuro.     Disposition  May need reevaluation for medical decision-making  Patient does have a friend who made to his service,  to assess for need for power of  versus guardianship        Review of Systems   Unable to perform ROS: Dementia   Constitutional: Negative for malaise/fatigue.   HENT: Negative for hearing loss.    Respiratory: Negative for shortness of breath.    Cardiovascular: Negative for palpitations.   Gastrointestinal: Negative for abdominal pain, nausea and vomiting.   Genitourinary: Negative for dysuria.   Musculoskeletal: Positive for myalgias. Negative for joint pain.   Neurological: Positive for weakness. Negative for dizziness and focal weakness.   Psychiatric/Behavioral: Positive for memory loss. Negative for depression and hallucinations.      Physical Exam  Laboratory/Imaging   Hemodynamics  Temp (24hrs), Av.7 °C (98 °F), Min:36.4 °C (97.6 °F), Max:36.9 °C (98.5 °F)   Temperature: 36.9 °C (98.5 °F)  Pulse  Av.4  Min: 52  Max: 88    Blood Pressure : 126/66      Respiratory      Respiration: 16, Pulse Oximetry: 91 %        RUL Breath Sounds: Clear, RML Breath Sounds: Clear, RLL Breath Sounds: Diminished, ARGELIA Breath Sounds: Clear, LLL Breath Sounds: Diminished    Fluids    Intake/Output Summary (Last 24 hours) at 10/05/17 1516  Last data filed at 10/04/17 2045   Gross per 24 hour   Intake              120 ml   Output                0 ml   Net              120 ml       Nutrition  Orders Placed This Encounter   Procedures   • Diet Order      Standing Status:   Standing     Number of Occurrences:   1     Order Specific Question:   Diet:     Answer:   Regular [1]     Physical Exam   Constitutional: No distress.   HENT:   Head: Normocephalic.   Mouth/Throat: No oropharyngeal exudate.   Eyes: EOM are normal. Pupils are equal, round, and reactive to light. No scleral icterus.   Neck: Normal range of motion.   Cardiovascular: Normal rate and regular rhythm.    Pulmonary/Chest: Effort normal. No respiratory distress.   Abdominal: Soft. She exhibits no distension. There is no tenderness.   Musculoskeletal: She exhibits no edema.   Neurological: She is alert. She has normal strength. She is not disoriented. No cranial nerve deficit or sensory deficit. Coordination normal. GCS eye subscore is 4. GCS verbal subscore is 5. GCS motor subscore is 6.   Skin: Skin is warm and dry. No rash noted. She is not diaphoretic. No erythema.   Psychiatric: She has a normal mood and affect. Her behavior is normal. Judgment and thought content normal.   Nursing note and vitals reviewed.                               Assessment/Plan     HTN (hypertension)- (present on admission)   Assessment & Plan    Improving, monitor  Essential  Continue hydralazine to 100 mg 3 times a day  Continue metoprolol 12.5 mg twice a day  Continue her home dose of Lotensin  Norvasc 10 mg daily         UTI (urinary tract infection)- (present on admission)   Assessment & Plan    Cultures negative  Completed antibiotics with IV Rocephin        Acute renal insufficiency- (present on admission)   Assessment & Plan    Mild probably due to dehydration. Resolved now.         T12 compression fracture (CMS-HCC)- (present on admission)   Assessment & Plan    Stable, ct spine did not show acute changes.         History of CVA (cerebrovascular accident)- (present on admission)   Assessment & Plan    Continue ASA and statin.   9/23 MRI today show no new stroke, showed Prominent lateral and third ventricles without  visualized obstructing lesion which could indicate normal pressure hydrocephalus, patient does not have urinary incontinence neither balance problem, PT/OT to see today.   9/24 MRI no new stroke, pt/ot to see.   9/25 pt/ot today.   Out of bed for meals        Dementia- (present on admission)   Assessment & Plan    MRI of her brain showed enlarged ventricles but no other acute abnormalities  Neurology has evaluated the patient  Upon review of patient's mentation, October 5, patient is alert and oriented ×3, patient states that she has a friend who will come to visit today Shaun as per prior review of records patient does have a close relationship with this friend and is requesting that he be a part of her medical decision-making. Patient does not appear to be significantly incapacitated at this time to make her own medical decisions. If there is warranted for any further evaluation of capacity to make her own medical decisions I will defer this to psychiatry. As for my opinion patient does not appear to be consistently incapacitated to make her own medical decisions.    I have discussed this with our  who will assist in obtaining power of  assistance/guardianship                Reviewed items::  Labs reviewed and Medications reviewed  Pink catheter::  No Pink  DVT prophylaxis pharmacological::  Heparin  Antibiotics:  Treating active infection/contamination beyond 24 hours perioperative coverage

## 2017-10-06 PROCEDURE — 700111 HCHG RX REV CODE 636 W/ 250 OVERRIDE (IP): Performed by: FAMILY MEDICINE

## 2017-10-06 PROCEDURE — 99233 SBSQ HOSP IP/OBS HIGH 50: CPT | Performed by: INTERNAL MEDICINE

## 2017-10-06 PROCEDURE — 700102 HCHG RX REV CODE 250 W/ 637 OVERRIDE(OP): Performed by: INTERNAL MEDICINE

## 2017-10-06 PROCEDURE — A9270 NON-COVERED ITEM OR SERVICE: HCPCS | Performed by: INTERNAL MEDICINE

## 2017-10-06 PROCEDURE — A9270 NON-COVERED ITEM OR SERVICE: HCPCS | Performed by: FAMILY MEDICINE

## 2017-10-06 PROCEDURE — 770006 HCHG ROOM/CARE - MED/SURG/GYN SEMI*

## 2017-10-06 PROCEDURE — 700102 HCHG RX REV CODE 250 W/ 637 OVERRIDE(OP): Performed by: HOSPITALIST

## 2017-10-06 PROCEDURE — A9270 NON-COVERED ITEM OR SERVICE: HCPCS | Performed by: HOSPITALIST

## 2017-10-06 PROCEDURE — 700102 HCHG RX REV CODE 250 W/ 637 OVERRIDE(OP): Performed by: FAMILY MEDICINE

## 2017-10-06 RX ADMIN — HEPARIN SODIUM 5000 UNITS: 5000 INJECTION, SOLUTION INTRAVENOUS; SUBCUTANEOUS at 05:40

## 2017-10-06 RX ADMIN — STANDARDIZED SENNA CONCENTRATE AND DOCUSATE SODIUM 1 TABLET: 8.6; 5 TABLET, FILM COATED ORAL at 21:32

## 2017-10-06 RX ADMIN — HEPARIN SODIUM 5000 UNITS: 5000 INJECTION, SOLUTION INTRAVENOUS; SUBCUTANEOUS at 21:33

## 2017-10-06 RX ADMIN — BENAZEPRIL HYDROCHLORIDE 40 MG: 20 TABLET ORAL at 09:14

## 2017-10-06 RX ADMIN — HYDRALAZINE HYDROCHLORIDE 100 MG: 50 TABLET ORAL at 21:34

## 2017-10-06 RX ADMIN — ACETAMINOPHEN 650 MG: 325 TABLET, FILM COATED ORAL at 09:06

## 2017-10-06 RX ADMIN — ASPIRIN 81 MG: 81 TABLET, COATED ORAL at 09:04

## 2017-10-06 RX ADMIN — METOPROLOL TARTRATE 12.5 MG: 25 TABLET, FILM COATED ORAL at 21:33

## 2017-10-06 RX ADMIN — HEPARIN SODIUM 5000 UNITS: 5000 INJECTION, SOLUTION INTRAVENOUS; SUBCUTANEOUS at 15:15

## 2017-10-06 RX ADMIN — GABAPENTIN 300 MG: 300 CAPSULE ORAL at 21:32

## 2017-10-06 RX ADMIN — ATORVASTATIN CALCIUM 80 MG: 40 TABLET, FILM COATED ORAL at 21:32

## 2017-10-06 RX ADMIN — OMEPRAZOLE 20 MG: 20 CAPSULE, DELAYED RELEASE ORAL at 09:04

## 2017-10-06 RX ADMIN — ONDANSETRON 4 MG: 4 TABLET, ORALLY DISINTEGRATING ORAL at 19:40

## 2017-10-06 RX ADMIN — DULOXETINE HYDROCHLORIDE 20 MG: 20 CAPSULE, DELAYED RELEASE ORAL at 09:05

## 2017-10-06 RX ADMIN — AMLODIPINE BESYLATE 10 MG: 10 TABLET ORAL at 09:05

## 2017-10-06 RX ADMIN — HYDRALAZINE HYDROCHLORIDE 100 MG: 50 TABLET ORAL at 05:40

## 2017-10-06 RX ADMIN — OXYBUTYNIN CHLORIDE 5 MG: 5 TABLET, FILM COATED, EXTENDED RELEASE ORAL at 21:33

## 2017-10-06 RX ADMIN — METOPROLOL TARTRATE 12.5 MG: 25 TABLET, FILM COATED ORAL at 09:04

## 2017-10-06 ASSESSMENT — ENCOUNTER SYMPTOMS
WEAKNESS: 1
BLURRED VISION: 0
PALPITATIONS: 0
HEADACHES: 0
CHILLS: 0
HALLUCINATIONS: 0
DOUBLE VISION: 0
NAUSEA: 0
MEMORY LOSS: 1
FOCAL WEAKNESS: 0
ABDOMINAL PAIN: 0
MYALGIAS: 1
SHORTNESS OF BREATH: 0
FEVER: 0

## 2017-10-06 ASSESSMENT — COPD QUESTIONNAIRES
DO YOU EVER COUGH UP ANY MUCUS OR PHLEGM?: NO/ONLY WITH OCCASIONAL COLDS OR INFECTIONS
COPD SCREENING SCORE: 3
HAVE YOU SMOKED AT LEAST 100 CIGARETTES IN YOUR ENTIRE LIFE: NO/DON'T KNOW
DURING THE PAST 4 WEEKS HOW MUCH DID YOU FEEL SHORT OF BREATH: SOME OF THE TIME

## 2017-10-06 ASSESSMENT — PAIN SCALES - GENERAL
PAINLEVEL_OUTOF10: 0
PAINLEVEL_OUTOF10: 9
PAINLEVEL_OUTOF10: 2

## 2017-10-06 NOTE — PROGRESS NOTES
Simona Knight Fall Risk Assessment:     Last Known Fall: Within the last month  Mobility: Immobilized/requires assist of one person  Medications: Cardiovascular and central nervous system meds  Mental Status/LOC/Awareness: Oriented to person and place  Toileting Needs: Incontinence  Volume/Electrolyte Status: No problems  Communication/Sensory: Visual (Glasses)/hearing deficit  Behavior: Depression/anxiety  Simona Knight Fall Risk Total: 16  Fall Risk Level: HIGH RISK     Universal Fall Precautions:  call light/belongings in reach, bed in low position and locked, siderails up x 2, use non-slip footwear, adequate lighting, educate on level of risk, clutter free and spill free environment, educate to call for assistance     Fall Risk Level Interventions:    TRIAL (TELE 8, NEURO, MED JENNIE 5) Moderate Fall Risk Interventions  Place yellow fall risk ID band on patient: verified  Provide patient/family education based on risk assessment : completed  Educate patient/family to call staff for assistance when getting out of bed: completed  Place fall precaution signage outside patient door: completed  Utilize bed/chair fall alarm: verifiedTRIAL (TELE 8, NEURO, [employer] JENNIE 5) High Fall Risk Interventions  Place yellow fall risk ID band on patient: verified  Provide patient/family education based on risk assessment: completed  Educate patient/family to call staff for assistance when getting out of bed: completed  Place fall precaution signage outside patient door: verified  Place patient in room close to nursing station: currently not available/charge notified  Utilize bed/chair fall alarm: verified  Notify charge of high risk for huddle: verified     Patient Specific Interventions:      Medication: review medications with patient and family and limit combination of prn medications  Mental Status/LOC/Awareness: reorient patient, reinforce falls education, check on patient hourly, utilize bed/chair fall alarm, reinforce the use  of call light and provide activity  Toileting: provide frquent toileting  Volume/Electrolyte Status: ensure patient remains hydrated  Communication/Sensory: update plan of care on whiteboard  Behavioral: encourage patient to voice feelings  Mobility: utilize bed/chair fall alarm and ensure bed is locked and in lowest position

## 2017-10-06 NOTE — PROGRESS NOTES
[facility] Hospitalist Progress Note    Date of Service: 10/6/2017    Chief Complaint  76 y.o. female admitted 2017 with AMS, UTI.     Interval Problem Update  Patient was seen by psychiatry today, for further evaluation  Is ambulatory with assistance with staff  Tolerating diet     Consultants/Specialty  Neuro.     Disposition  Pending  Psychiatry to follow up   need for power of  versus guardianship        Review of Systems   Unable to perform ROS: Dementia   Constitutional: Negative for chills, fever and malaise/fatigue.   HENT: Negative for congestion and hearing loss.    Eyes: Negative for blurred vision and double vision.   Respiratory: Negative for shortness of breath.    Cardiovascular: Negative for palpitations and leg swelling.   Gastrointestinal: Negative for abdominal pain and nausea.   Genitourinary: Negative for dysuria.   Musculoskeletal: Positive for myalgias. Negative for joint pain.   Neurological: Positive for weakness. Negative for focal weakness and headaches.   Psychiatric/Behavioral: Positive for memory loss. Negative for hallucinations.      Physical Exam  Laboratory/Imaging   Hemodynamics  Temp (24hrs), Av.7 °C (98.1 °F), Min:36.3 °C (97.3 °F), Max:37.1 °C (98.8 °F)   Temperature: 36.6 °C (97.8 °F)  Pulse  Av.4  Min: 52  Max: 88    Blood Pressure : 120/53      Respiratory      Respiration: 17, Pulse Oximetry: 92 %        RLL Breath Sounds: Diminished, LLL Breath Sounds: Diminished    Fluids    Intake/Output Summary (Last 24 hours) at 10/06/17 1552  Last data filed at 10/06/17 0900   Gross per 24 hour   Intake              300 ml   Output                0 ml   Net              300 ml       Nutrition  Orders Placed This Encounter   Procedures   • Diet Order     Standing Status:   Standing     Number of Occurrences:   1     Order Specific Question:   Diet:     Answer:   Regular [1]     Physical Exam   Constitutional: She appears well-nourished. No distress.   HENT:   Head:  Normocephalic.   Mouth/Throat: No oropharyngeal exudate.   Eyes: EOM are normal. Pupils are equal, round, and reactive to light.   Neck: Normal range of motion.   Cardiovascular: Normal rate, regular rhythm and intact distal pulses.    Pulmonary/Chest: Effort normal. No respiratory distress.   Abdominal: She exhibits no distension.   Musculoskeletal: She exhibits no edema or tenderness.   Neurological: She is alert. She has normal strength. She is not disoriented. No cranial nerve deficit or sensory deficit. She exhibits normal muscle tone. Coordination normal. GCS eye subscore is 4. GCS verbal subscore is 5. GCS motor subscore is 6.   Skin: Skin is warm and dry.   Psychiatric: She has a normal mood and affect. Her behavior is normal. Judgment and thought content normal.   Nursing note and vitals reviewed.                               Assessment/Plan     HTN (hypertension)- (present on admission)   Assessment & Plan    Improving, monitor  Essential  Continue hydralazine to 100 mg 3 times a day  Continue metoprolol 12.5 mg twice a day  Continue her home dose of Lotensin  Norvasc 10 mg daily         UTI (urinary tract infection)- (present on admission)   Assessment & Plan    Cultures negative  Completed antibiotics with IV Rocephin        Acute renal insufficiency- (present on admission)   Assessment & Plan    Mild probably due to dehydration. Resolved now.         T12 compression fracture (CMS-HCC)- (present on admission)   Assessment & Plan    Stable, ct spine did not show acute changes.         History of CVA (cerebrovascular accident)- (present on admission)   Assessment & Plan    Continue ASA and statin.   9/23 MRI today show no new stroke, showed Prominent lateral and third ventricles without visualized obstructing lesion which could indicate normal pressure hydrocephalus, patient does not have urinary incontinence neither balance problem, PT/OT to see today.   9/24 MRI no new stroke, pt/ot to see.   9/25 pt/ot  today.   Out of bed for meals        Dementia- (present on admission)   Assessment & Plan    MRI of her brain showed enlarged ventricles but no other acute abnormalities  Neurology has evaluated the patient  Upon review of patient's mentation, October 5, patient is alert and oriented ×3, patient states that she has a friend who will come to visit today Shaun as per prior review of records patient does have a close relationship with this friend and is requesting that he be a part of her medical decision-making. Patient does not appear to be significantly incapacitated at this time to make her own medical decisions. If there is warranted for any further evaluation of capacity to make her own medical decisions I will defer this to psychiatry. As for my opinion patient does not appear to be consistently incapacitated to make her own medical decisions.    I have discussed this with our  who will assist in obtaining power of  assistance/guardianship    10/6 patient seen by psychiatry for capacity evaluation, will follow up with psychiatry for further designation  Psychiatry to follow up on Monday, will need POA establishment versus guardianship, discussed with psychiatry                Reviewed items::  Labs reviewed and Medications reviewed  Pink catheter::  No Pink  DVT prophylaxis pharmacological::  Heparin  Antibiotics:  Treating active infection/contamination beyond 24 hours perioperative coverage

## 2017-10-06 NOTE — NON-PROVIDER
"PSYCHIATRIC CONSULTATION:  Reason for admission: Acute renal insufficiency  Reason for consult:   Requesting Physician: Amy Vieyra D.O.   Supervising Physician:  Mikala Sharp MD       Chief Complaint: assessment for capacity     HPI: Maria Esther Valencia is a 76 year old female with history of dementia who initially presented to the ED on 9/21 for evaluation of altered level of consciousness with head trauma. Upon my evaluation, patient states she knows she has had memory loss for a long time, but also thinks that she may get better. Patient is very suggestible during my interview and during subsequent interview with Dr. Sharp. Patient states she is not sure if she can care for herself at this time. She states she has a male friend named \"Arsenio\" who she has known x7 years. He apparently was her upstairs neighbor, and has visited her several times during her current hospitalization.  The remainder of history taking is limited secondary to the patient's clinical condition.    Review of systems: limited     Psychiatric Examination:  Vitals: Blood pressure 148/61, pulse 67, temperature 36.6 °C (97.8 °F), resp. rate 17, height 1.6 m (5' 2.99\"), weight 90 kg (198 lb 6.6 oz), SpO2 92 %, not currently breastfeeding.  Musculoskeletal: normal psychomotor activity, no tics or unusual mannerisms noted  Appearance: WDWN, appropriate dress and grooming.+ patient is agitated during the interview, saying at one point that she wants to \"hit me\"  Thoughts:  Linear thinking but obvious memory difficulties  Speech: Normal rate and dorota, no pressuring of speech noted         Affect: Mood congruent, normal range of affect           Attention/Alertness: Alert  Memory: Recent and remote memory grossly intact     Orientation: alert and oriented to situation and place     Fund of Knowledge: Fair  Insight/Judgement into symptoms: fair   Neurological Testing: Not formally tested    Other system(s) examined: (neurological, skin, GI, " etc)      Past Psychiatric Hx: denies     Family Psychiatric Hx: unknown     Social Hx: Patient is a high school graduate and states she was a  for many years before she became a . She was living on her own in an apartment with a recent stay at [facility] for unclear reasons.     MOCA:   Visuospataial/ executive: 1/5  Naming: 3/3  Attention: 2/2, 1/1, 0/3  Language: 2/2, 0/1  Abstraction: 1/2  Delayed Recall: 2/5  Orientation: 3/6  Total 15/30    Medical Hx: labs, MARS, medications, etc were reviewed. Only those findings of potential interest to psychiatry are noted below:  Past Medical History:   Diagnosis Date   • CAD (coronary artery disease)    • Compression fracture of spine (CMS-[facility])    • Congestive heart failure (CMS-HCC)    • Dementia    • GI bleed    • Hypertension    • Psychiatric disorder    • Stroke (CMS-HCC)      Medical Conditions:     Allergies: Pcn [penicillins]  Medications (currently prescribed at [facility]):    Current Facility-Administered Medications:   •  amlodipine (NORVASC) tablet 10 mg, 10 mg, Oral, Q DAY, Amy Vieyra D.ZULEIKA, 10 mg at 10/06/17 0905  •  benazepril (LOTENSIN) tablet 40 mg, 40 mg, Oral, DAILY, Precious Valentine M.D., 40 mg at 10/06/17 0914  •  hydrALAZINE (APRESOLINE) tablet 100 mg, 100 mg, Oral, Q8HRS, Precious Valentine M.D., 100 mg at 10/06/17 0540  •  omeprazole (PRILOSEC) capsule 20 mg, 20 mg, Oral, DAILY, Precious Valentine M.D., 20 mg at 10/06/17 0904  •  aspirin EC (ECOTRIN) tablet 81 mg, 81 mg, Oral, DAILY, Edward Metcalf M.D., 81 mg at 10/06/17 0904  •  atorvastatin (LIPITOR) tablet 80 mg, 80 mg, Oral, QHS, Edward Metcalf M.D., 80 mg at 10/05/17 2041  •  duloxetine (CYMBALTA) capsule 20 mg, 20 mg, Oral, DAILY, Edward Metcalf M.D., 20 mg at 10/06/17 0905  •  gabapentin (NEURONTIN) capsule 300 mg, 300 mg, Oral, Q EVENING, Edward Metcalf M.D., 300 mg at 10/05/17 2041  •  lidocaine (LIDODERM) 5 % 1 Patch, 1 Patch, Transdermal, Q24HRS, Edward  LORRAINE Metcalf, Stopped at 10/04/17 0804  •  metoprolol (LOPRESSOR) tablet 12.5 mg, 12.5 mg, Oral, BID, Edward Metcalf M.D., 12.5 mg at 10/06/17 0904  •  oxybutynin SR (DITROPAN-XL) tablet 5 mg, 5 mg, Oral, Q EVENING, Edward Metcalf M.D., 5 mg at 10/05/17 2041  •  trazodone (DESYREL) tablet 50 mg, 50 mg, Oral, QHS PRN, Edward Metcalf M.D.  •  vitamin D (Ergocalciferol) (DRISDOL) capsule 50,000 Units, 50,000 Units, Oral, Q7 DAYS, Edward Metcalf M.D., 50,000 Units at 10/04/17 0800  •  senna-docusate (PERICOLACE or SENOKOT S) 8.6-50 MG per tablet 2 Tab, 2 Tab, Oral, BID, 2 Tab at 10/05/17 2041 **AND** polyethylene glycol/lytes (MIRALAX) PACKET 1 Packet, 1 Packet, Oral, QDAY PRN **AND** magnesium hydroxide (MILK OF MAGNESIA) suspension 30 mL, 30 mL, Oral, QDAY PRN **AND** bisacodyl (DULCOLAX) suppository 10 mg, 10 mg, Rectal, QDAY PRN, Edward Metcalf M.D.  •  heparin injection 5,000 Units, 5,000 Units, Subcutaneous, Q8HRS, Edward Metcalf M.D., 5,000 Units at 10/06/17 0540  •  labetalol (NORMODYNE,TRANDATE) injection 10 mg, 10 mg, Intravenous, Q4HRS PRN, Edward Metcalf M.D., 10 mg at 10/03/17 0413  •  ondansetron (ZOFRAN) syringe/vial injection 4 mg, 4 mg, Intravenous, Q4HRS PRN, Edward Metcalf M.D., 4 mg at 09/26/17 2047  •  ondansetron (ZOFRAN ODT) dispertab 4 mg, 4 mg, Oral, Q4HRS PRN, Edward Metcalf M.D., 4 mg at 10/04/17 0915  •  acetaminophen (TYLENOL) tablet 650 mg, 650 mg, Oral, Q6HRS PRN, Edward Metcalf M.D., 650 mg at 10/06/17 0906  •  Notify provider if pain remains uncontrolled, , , CONTINUOUS **AND** Use the numeric rating scale (NRS-11) on regular floors and Critical-Care Pain Observation Tool (CPOT) on ICUs/Trauma to assess pain, , , CONTINUOUS **AND** Pulse Ox (Oximetry), , , CONTINUOUS **AND** [DISCONTINUED] Pharmacy Consult Request ...Pain Management Review, , Other, PRN **AND** If patient difficult to arouse and/or has respiratory depression, stop any opiates that are  "currently infusing and call a Rapid Response., , , CONTINUOUS **AND** oxycodone immediate-release (ROXICODONE) tablet 2.5 mg, 2.5 mg, Oral, Q3HRS PRN, 2.5 mg at 09/29/17 0610 **AND** oxycodone immediate-release (ROXICODONE) tablet 5 mg, 5 mg, Oral, Q3HRS PRN, 5 mg at 09/29/17 0937 **AND** morphine (pf) 4 mg/ml injection 2 mg, 2 mg, Intravenous, Q3HRS PRN, Edward Metcalf M.D.  •  hydrALAZINE (APRESOLINE) injection 10 mg, 10 mg, Intravenous, Q6HRS PRN, Edward Metcalf M.D., 10 mg at 09/29/17 0638  Labs:  No results found for this or any previous visit (from the past 24 hour(s)).   ECG: QTc 435    Cranial Imaging: reviewed  Brain MRI: 1.  Moderate to severe atrophy in a nonspecific pattern  2.  Prominent lateral and third ventricles without visualized obstructing lesion which could indicate normal pressure hydrocephalus in the appropriate clinical setting  3.  Advanced white matter changes  4.  Remote BILATERAL cerebellar cortical infarctions  5.  Remote LEFT internal capsule lacunar infarction  6.  LEFT mastoid effusion    ASSESSMENT:   1. Dementia, r/o NPH  2. Closed head injury        PLAN:  At this time, patient shows signs of severe cognitive impairment, scoring a 15/30 on the MOCA. She exhibits problems with executive functioning, attention, language, abstraction, recall, and orientation. Patient does have some insight into her condition, stating that she knows she has memory issues for a \"long time\" but she also then states that she thinks things may get better.     We were called to consult to see if the patient required guardianship as she does not know where her son is and she has no other family. There is some question whether or not her friend \"Arsenio\" would be able to be her guardian. She states they have been friends for 7 years and that she trusts him. She than states that he is very street smart and is unclear if she thinks he would make decisions that are in her best interest. However, she is very " suggestible on interview, and is unclear on the difference between an appointed guardian, a friend who makes decisions, and a doctor. Our plan is to re-assess on Monday and further explain what guardianship means and what her choices are.   Thank you.

## 2017-10-06 NOTE — CARE PLAN
Problem: Pain Management  Goal: Pain level will decrease to patient's comfort goal    Intervention: Educate and implement non-pharmacologic comfort measures. Examples: relaxation, distration, play therapy, activity therapy, massage, etc.  Back pain managed with positioning and activity       Problem: Mobility  Goal: Risk for activity intolerance will decrease    Intervention: Assess and monitor signs of activity intolerance  Pt sitting in chair for all meals and ambulating halls

## 2017-10-06 NOTE — PROGRESS NOTES
Simona Knight Fall Risk Assessment:     Last Known Fall: Within the last month  Mobility: Immobilized/requires assist of one person  Medications: Cardiovascular and central nervous system meds  Mental Status/LOC/Awareness: Oriented to person and place  Toileting Needs: Incontinence  Volume/Electrolyte Status: No problems  Communication/Sensory: Visual (Glasses)/hearing deficit  Behavior: Depression/anxiety  Smiona Knight Fall Risk Total: 16  Fall Risk Level: HIGH RISK     Universal Fall Precautions:  call light/belongings in reach, bed in low position and locked, siderails up x 2, use non-slip footwear, adequate lighting, educate on level of risk, clutter free and spill free environment, educate to call for assistance     Fall Risk Level Interventions:    TRIAL (TELE 8, NEURO, MED JENNIE 5) Moderate Fall Risk Interventions  Place yellow fall risk ID band on patient: verified  Provide patient/family education based on risk assessment : completed  Educate patient/family to call staff for assistance when getting out of bed: completed  Place fall precaution signage outside patient door: completed  Utilize bed/chair fall alarm: verifiedTRIAL (TELE 8, NEURO, [employer] JENNIE 5) High Fall Risk Interventions  Place yellow fall risk ID band on patient: verified  Provide patient/family education based on risk assessment: completed  Educate patient/family to call staff for assistance when getting out of bed: completed  Place fall precaution signage outside patient door: verified  Place patient in room close to nursing station: currently not available/charge notified  Utilize bed/chair fall alarm: verified  Notify charge of high risk for huddle: verified     Patient Specific Interventions:      Medication: review medications with patient and family and limit combination of prn medications  Mental Status/LOC/Awareness: reorient patient, reinforce falls education, check on patient hourly, utilize bed/chair fall alarm, reinforce the use  of call light and provide activity  Toileting: provide frquent toileting  Volume/Electrolyte Status: ensure patient remains hydrated  Communication/Sensory: update plan of care on whiteboard  Behavioral: encourage patient to voice feelings  Mobility: utilize bed/chair fall alarm and ensure bed is locked and in lowest position

## 2017-10-06 NOTE — CARE PLAN
Problem: Knowledge Deficit  Goal: Knowledge of disease process/condition, treatment plan, diagnostic tests, and medications will improve  Pt educated on poc, rounds, meds. Pt reinforced prn and with any changes.

## 2017-10-06 NOTE — PROGRESS NOTES
Report received. Assumed care, assessment complete. Pt is A & O x 3.  Pt reports mild hip and back pain, declines intervention. Fall precautions and appropriate signs in place.  RN extension number provided. Pt educated regarding fall precautions. Bed alarm in use. Pt denies any additional needs at this time. Call light within reach.

## 2017-10-06 NOTE — PROGRESS NOTES
Rounding completed on pt. Report given at bedside between night shift RN and day shift RN. Assumed care of pt. Pt sleeping quietly, no s/s distress. Call light in reach.

## 2017-10-06 NOTE — DISCHARGE PLANNING
Please have pt up for all meals, pt is pending guardianship, effort to keep pt ambulatory during long length of stay.    Plan: Once guardianship is established pt can go to group home or AL.

## 2017-10-07 PROCEDURE — 770006 HCHG ROOM/CARE - MED/SURG/GYN SEMI*

## 2017-10-07 PROCEDURE — 700102 HCHG RX REV CODE 250 W/ 637 OVERRIDE(OP): Performed by: FAMILY MEDICINE

## 2017-10-07 PROCEDURE — A9270 NON-COVERED ITEM OR SERVICE: HCPCS | Performed by: INTERNAL MEDICINE

## 2017-10-07 PROCEDURE — 700111 HCHG RX REV CODE 636 W/ 250 OVERRIDE (IP): Performed by: FAMILY MEDICINE

## 2017-10-07 PROCEDURE — 700102 HCHG RX REV CODE 250 W/ 637 OVERRIDE(OP): Performed by: INTERNAL MEDICINE

## 2017-10-07 PROCEDURE — A9270 NON-COVERED ITEM OR SERVICE: HCPCS | Performed by: FAMILY MEDICINE

## 2017-10-07 PROCEDURE — 700102 HCHG RX REV CODE 250 W/ 637 OVERRIDE(OP): Performed by: HOSPITALIST

## 2017-10-07 PROCEDURE — 99231 SBSQ HOSP IP/OBS SF/LOW 25: CPT | Performed by: INTERNAL MEDICINE

## 2017-10-07 PROCEDURE — A9270 NON-COVERED ITEM OR SERVICE: HCPCS | Performed by: HOSPITALIST

## 2017-10-07 RX ADMIN — HYDRALAZINE HYDROCHLORIDE 100 MG: 50 TABLET ORAL at 05:24

## 2017-10-07 RX ADMIN — HEPARIN SODIUM 5000 UNITS: 5000 INJECTION, SOLUTION INTRAVENOUS; SUBCUTANEOUS at 21:30

## 2017-10-07 RX ADMIN — HEPARIN SODIUM 5000 UNITS: 5000 INJECTION, SOLUTION INTRAVENOUS; SUBCUTANEOUS at 14:15

## 2017-10-07 RX ADMIN — DULOXETINE HYDROCHLORIDE 20 MG: 20 CAPSULE, DELAYED RELEASE ORAL at 08:13

## 2017-10-07 RX ADMIN — STANDARDIZED SENNA CONCENTRATE AND DOCUSATE SODIUM 2 TABLET: 8.6; 5 TABLET, FILM COATED ORAL at 08:12

## 2017-10-07 RX ADMIN — METOPROLOL TARTRATE 12.5 MG: 25 TABLET, FILM COATED ORAL at 20:27

## 2017-10-07 RX ADMIN — METOPROLOL TARTRATE 12.5 MG: 25 TABLET, FILM COATED ORAL at 08:12

## 2017-10-07 RX ADMIN — ASPIRIN 81 MG: 81 TABLET, COATED ORAL at 08:13

## 2017-10-07 RX ADMIN — OMEPRAZOLE 20 MG: 20 CAPSULE, DELAYED RELEASE ORAL at 08:13

## 2017-10-07 RX ADMIN — HYDRALAZINE HYDROCHLORIDE 100 MG: 50 TABLET ORAL at 21:29

## 2017-10-07 RX ADMIN — HEPARIN SODIUM 5000 UNITS: 5000 INJECTION, SOLUTION INTRAVENOUS; SUBCUTANEOUS at 05:24

## 2017-10-07 RX ADMIN — AMLODIPINE BESYLATE 10 MG: 10 TABLET ORAL at 08:13

## 2017-10-07 RX ADMIN — ATORVASTATIN CALCIUM 80 MG: 40 TABLET, FILM COATED ORAL at 20:26

## 2017-10-07 RX ADMIN — GABAPENTIN 300 MG: 300 CAPSULE ORAL at 20:26

## 2017-10-07 RX ADMIN — OXYBUTYNIN CHLORIDE 5 MG: 5 TABLET, FILM COATED, EXTENDED RELEASE ORAL at 20:26

## 2017-10-07 ASSESSMENT — ENCOUNTER SYMPTOMS
ABDOMINAL PAIN: 0
HALLUCINATIONS: 0
SHORTNESS OF BREATH: 0
PALPITATIONS: 0
MYALGIAS: 1
WEAKNESS: 1
MEMORY LOSS: 1

## 2017-10-07 ASSESSMENT — COPD QUESTIONNAIRES
COPD SCREENING SCORE: 3
DO YOU EVER COUGH UP ANY MUCUS OR PHLEGM?: NO/ONLY WITH OCCASIONAL COLDS OR INFECTIONS
DURING THE PAST 4 WEEKS HOW MUCH DID YOU FEEL SHORT OF BREATH: SOME OF THE TIME
HAVE YOU SMOKED AT LEAST 100 CIGARETTES IN YOUR ENTIRE LIFE: NO/DON'T KNOW

## 2017-10-07 ASSESSMENT — PAIN SCALES - GENERAL
PAINLEVEL_OUTOF10: 0

## 2017-10-07 ASSESSMENT — LIFESTYLE VARIABLES: DO YOU DRINK ALCOHOL: NO

## 2017-10-07 NOTE — PROGRESS NOTES
Simona Knight Fall Risk Assessment:     Last Known Fall: Within the last month  Mobility: Dizziness/generalized weakness  Medications: Cardiovascular or central nervous system meds  Mental Status/LOC/Awareness: Oriented to person and place  Toileting Needs: Incontinence  Volume/Electrolyte Status: No problems  Communication/Sensory: Visual (Glasses)/hearing deficit  Behavior: Appropriate behavior  Jarvis Eugene Fall Risk Total: 13  Fall Risk Level: MODERATE RISK    Universal Fall Precautions:  call light/belongings in reach, bed in low position and locked, siderails up x 2, use non-slip footwear, adequate lighting, clutter free and spill free environment, educate to call for assistance    Fall Risk Level Interventions:    TRIAL (TELE 8, NEURO, MED JENNIE 5) Moderate Fall Risk Interventions  Place yellow fall risk ID band on patient: verified  Provide patient/family education based on risk assessment : completed  Educate patient/family to call staff for assistance when getting out of bed: completed  Place fall precaution signage outside patient door: verified  Utilize bed/chair fall alarm: verifiedTRIAL (TELE 8, NEURO, [employer] JENNIE 5) High Fall Risk Interventions  Place yellow fall risk ID band on patient: verified  Provide patient/family education based on risk assessment: completed  Educate patient/family to call staff for assistance when getting out of bed: completed  Place fall precaution signage outside patient door: verified  Place patient in room close to nursing station: currently not available/charge notified  Utilize bed/chair fall alarm: verified  Notify charge of high risk for huddle: verified    Patient Specific Interventions:     Medication: review medications with patient and family  Mental Status/LOC/Awareness: reorient patient, check on patient hourly, utilize bed/chair fall alarm and reinforce the use of call light  Toileting: provide frquent toileting  Volume/Electrolyte Status: ensure patient remains  hydrated and monitor abnormal lab values  Communication/Sensory: update plan of care on whiteboard  Behavioral: encourage patient to voice feelings  Mobility: utilize bed/chair fall alarm and ensure bed is locked and in lowest position

## 2017-10-07 NOTE — PROGRESS NOTES
"Assumed care of patient at 0700 . Received report from RN. Patient is AOX3-4 . Assessment complete. Labs reviewed.Patient and RN discussed plan of care. Patient questions answered. Patient needs are met at this time. Bed in lowest and locked position. Upper bed rails up.  Call light is within reach. Hourly rounding in place.  /50   Pulse 65   Temp 36.9 °C (98.4 °F)   Resp 18   Ht 1.6 m (5' 2.99\")   Wt 83.4 kg (183 lb 13.8 oz)   SpO2 93%   Breastfeeding? No   BMI 32.58 kg/m²     "

## 2017-10-07 NOTE — PROGRESS NOTES
Report received from night RN. Assumed patient care at 0700. Patient is AAOx3. Labs and orders reviewed. Assessment completed. Patient denies any pain at this time. Patient provided with scheduled medication, refer to MAR. Patient needs have been met at this time. Patient encouraged to call when needing assistance. Call light within reach. Non-skid socks in place. Bed locked and in the lowest position. Hourly rounding in place.

## 2017-10-07 NOTE — PROGRESS NOTES
[facility] Hospitalist Progress Note    Date of Service: 10/7/2017    Chief Complaint  76 y.o. female admitted 2017 with AMS, UTI.     Interval Problem Update  No new complaints  Tolerating all of her     Consultants/Specialty  Neuro.     Disposition  Pending  Psychiatry to follow up on Monday   need for power of  versus guardianship        Review of Systems   Unable to perform ROS: Dementia   Constitutional: Negative for malaise/fatigue.   HENT: Negative for congestion and hearing loss.    Respiratory: Negative for shortness of breath.    Cardiovascular: Negative for palpitations and leg swelling.   Gastrointestinal: Negative for abdominal pain.   Musculoskeletal: Positive for myalgias.   Neurological: Positive for weakness.   Psychiatric/Behavioral: Positive for memory loss. Negative for hallucinations.      Physical Exam  Laboratory/Imaging   Hemodynamics  Temp (24hrs), Av.8 °C (98.2 °F), Min:36.3 °C (97.4 °F), Max:37.2 °C (98.9 °F)   Temperature: 36.3 °C (97.4 °F)  Pulse  Av.2  Min: 52  Max: 88    Blood Pressure : 128/50      Respiratory      Respiration: 16, Pulse Oximetry: 90 %        RUL Breath Sounds: Clear, RML Breath Sounds: Clear, RLL Breath Sounds: Diminished, ARGELIA Breath Sounds: Clear, LLL Breath Sounds: Diminished    Fluids    Intake/Output Summary (Last 24 hours) at 10/07/17 1312  Last data filed at 10/06/17 2000   Gross per 24 hour   Intake              120 ml   Output                0 ml   Net              120 ml       Nutrition  Orders Placed This Encounter   Procedures   • Diet Order     Standing Status:   Standing     Number of Occurrences:   1     Order Specific Question:   Diet:     Answer:   Regular [1]     Physical Exam   Constitutional: No distress.   HENT:   Head: Normocephalic.   Eyes: EOM are normal. Pupils are equal, round, and reactive to light.   Neck: Normal range of motion.   Cardiovascular: Normal rate and regular rhythm.    Pulmonary/Chest: Effort normal. No  respiratory distress.   Abdominal: Soft. She exhibits no distension.   Musculoskeletal: She exhibits no edema or deformity.   Neurological: She is alert. She has normal strength. She is not disoriented. No cranial nerve deficit or sensory deficit. GCS eye subscore is 4. GCS verbal subscore is 5. GCS motor subscore is 6.   Skin: Skin is warm and dry. She is not diaphoretic.   Psychiatric: She has a normal mood and affect. Her behavior is normal. Judgment and thought content normal.   Nursing note and vitals reviewed.                               Assessment/Plan     HTN (hypertension)- (present on admission)   Assessment & Plan    Improving, monitor  Essential  Continue hydralazine to 100 mg 3 times a day  Continue metoprolol 12.5 mg twice a day  Continue her home dose of Lotensin  Norvasc 10 mg daily         UTI (urinary tract infection)- (present on admission)   Assessment & Plan    Cultures negative  Completed antibiotics with IV Rocephin        Acute renal insufficiency- (present on admission)   Assessment & Plan    Mild probably due to dehydration. Resolved now.         T12 compression fracture (CMS-HCC)- (present on admission)   Assessment & Plan    Stable, ct spine did not show acute changes.         History of CVA (cerebrovascular accident)- (present on admission)   Assessment & Plan    Continue ASA and statin.   9/23 MRI today show no new stroke, showed Prominent lateral and third ventricles without visualized obstructing lesion which could indicate normal pressure hydrocephalus, patient does not have urinary incontinence neither balance problem, PT/OT to see today.   9/24 MRI no new stroke, pt/ot to see.   9/25 pt/ot today.   Out of bed for meals        Dementia- (present on admission)   Assessment & Plan    MRI of her brain showed enlarged ventricles but no other acute abnormalities  Neurology has evaluated the patient  Upon review of patient's mentation, October 5, patient is alert and oriented ×3,  patient states that she has a friend who will come to visit today Shaun as per prior review of records patient does have a close relationship with this friend and is requesting that he be a part of her medical decision-making. Patient does not appear to be significantly incapacitated at this time to make her own medical decisions. If there is warranted for any further evaluation of capacity to make her own medical decisions I will defer this to psychiatry. As for my opinion patient does not appear to be consistently incapacitated to make her own medical decisions.    I have discussed this with our  who will assist in obtaining power of  assistance/guardianship    10/6 patient seen by psychiatry for capacity evaluation, will follow up with psychiatry for further designation  Psychiatry to follow up on Monday, will need POA establishment versus guardianship, discussed with psychiatry                Reviewed items::  Labs reviewed and Medications reviewed  Pink catheter::  No Pink  DVT prophylaxis pharmacological::  Heparin  Antibiotics:  Treating active infection/contamination beyond 24 hours perioperative coverage

## 2017-10-07 NOTE — CARE PLAN
Problem: Communication  Goal: The ability to communicate needs accurately and effectively will improve  Outcome: PROGRESSING AS EXPECTED  Reoriented patient to time. Pt understands use of call light for needs and demonstrates effectively.     Problem: Safety  Goal: Will remain free from injury  Outcome: PROGRESSING AS EXPECTED  Bed on lowest position, call light within reach, patient educated about use of call light and safety precautions.

## 2017-10-07 NOTE — PROGRESS NOTES
"Assumed care at 1900. Received report from RN. Patient is AOx3. Assessment complete. Labs reviewed. Patient and RN discussed plan of care. Patient questions answered. Patient needs are met at this time. Bed in lowest and locked position. Call light is within reach. Hourly rounding in place. /53   Pulse 60   Temp 37.2 °C (98.9 °F)   Resp 17   Ht 1.6 m (5' 2.99\")   Wt 90 kg (198 lb 6.6 oz)   SpO2 90%   Breastfeeding? No   BMI 35.16 kg/m²       "

## 2017-10-07 NOTE — PROGRESS NOTES
Simona Knight Fall Risk Assessment:     Last Known Fall: Within the last month  Mobility: Dizziness/generalized weakness  Medications: Cardiovascular or central nervous system meds  Mental Status/LOC/Awareness: Oriented to person and place  Toileting Needs: Incontinence  Volume/Electrolyte Status: No problems  Communication/Sensory: Visual (Glasses)/hearing deficit  Behavior: Appropriate behavior  Simona Knight Fall Risk Total: 13  Fall Risk Level: MODERATE RISK    Universal Fall Precautions:  call light/belongings in reach, bed in low position and locked, siderails up x 2, wheelchairs and assistive devices out of sight, use non-slip footwear, adequate lighting, clutter free and spill free environment, educate to call for assistance    Fall Risk Level Interventions:    TRIAL (TELE 8, NEURO, MED JENNIE 5) Moderate Fall Risk Interventions  Place yellow fall risk ID band on patient: verified  Provide patient/family education based on risk assessment : completed  Educate patient/family to call staff for assistance when getting out of bed: completed  Place fall precaution signage outside patient door: verified  Utilize bed/chair fall alarm: verifiedTRIAL (TELE 8, NEURO, [employer] JENNIE 5) High Fall Risk Interventions  Place yellow fall risk ID band on patient: verified  Provide patient/family education based on risk assessment: completed  Educate patient/family to call staff for assistance when getting out of bed: completed  Place fall precaution signage outside patient door: verified  Place patient in room close to nursing station: currently not available/charge notified  Utilize bed/chair fall alarm: verified  Notify charge of high risk for huddle: verified    Patient Specific Interventions:     Medication: review medications with patient and family and assess for medications that can be discontinued or dosage decreased  Mental Status/LOC/Awareness: reorient patient, reinforce falls education, check on patient hourly, utilize  bed/chair fall alarm and reinforce the use of call light  Toileting: provide frquent toileting and instruct patient/family on the need to call for assistance when toileting  Volume/Electrolyte Status: ensure patient remains hydrated and monitor abnormal lab values  Communication/Sensory: update plan of care on whiteboard and ensure proper positioning when transferrng/ambulating  Behavioral: encourage patient to voice feelings, administer medication as ordered and instruct/reinforce fall program rationale  Mobility: provide comfort measures during transport, utilize bed/chair fall alarm, ensure bed is locked and in lowest position, provide appropriate assistive device and instruct patient to exit bed on their strongest side

## 2017-10-08 PROCEDURE — 700102 HCHG RX REV CODE 250 W/ 637 OVERRIDE(OP): Performed by: INTERNAL MEDICINE

## 2017-10-08 PROCEDURE — A9270 NON-COVERED ITEM OR SERVICE: HCPCS | Performed by: INTERNAL MEDICINE

## 2017-10-08 PROCEDURE — 700102 HCHG RX REV CODE 250 W/ 637 OVERRIDE(OP): Performed by: HOSPITALIST

## 2017-10-08 PROCEDURE — 99231 SBSQ HOSP IP/OBS SF/LOW 25: CPT | Performed by: INTERNAL MEDICINE

## 2017-10-08 PROCEDURE — 700111 HCHG RX REV CODE 636 W/ 250 OVERRIDE (IP): Performed by: FAMILY MEDICINE

## 2017-10-08 PROCEDURE — 700102 HCHG RX REV CODE 250 W/ 637 OVERRIDE(OP): Performed by: FAMILY MEDICINE

## 2017-10-08 PROCEDURE — A9270 NON-COVERED ITEM OR SERVICE: HCPCS | Performed by: HOSPITALIST

## 2017-10-08 PROCEDURE — 770006 HCHG ROOM/CARE - MED/SURG/GYN SEMI*

## 2017-10-08 PROCEDURE — A9270 NON-COVERED ITEM OR SERVICE: HCPCS | Performed by: FAMILY MEDICINE

## 2017-10-08 RX ADMIN — GABAPENTIN 300 MG: 300 CAPSULE ORAL at 21:42

## 2017-10-08 RX ADMIN — HEPARIN SODIUM 5000 UNITS: 5000 INJECTION, SOLUTION INTRAVENOUS; SUBCUTANEOUS at 21:43

## 2017-10-08 RX ADMIN — ATORVASTATIN CALCIUM 80 MG: 40 TABLET, FILM COATED ORAL at 21:40

## 2017-10-08 RX ADMIN — STANDARDIZED SENNA CONCENTRATE AND DOCUSATE SODIUM 2 TABLET: 8.6; 5 TABLET, FILM COATED ORAL at 21:00

## 2017-10-08 RX ADMIN — HYDRALAZINE HYDROCHLORIDE 100 MG: 50 TABLET ORAL at 05:14

## 2017-10-08 RX ADMIN — METOPROLOL TARTRATE 12.5 MG: 25 TABLET, FILM COATED ORAL at 07:59

## 2017-10-08 RX ADMIN — AMLODIPINE BESYLATE 10 MG: 10 TABLET ORAL at 07:59

## 2017-10-08 RX ADMIN — HYDRALAZINE HYDROCHLORIDE 100 MG: 50 TABLET ORAL at 21:41

## 2017-10-08 RX ADMIN — DULOXETINE HYDROCHLORIDE 20 MG: 20 CAPSULE, DELAYED RELEASE ORAL at 07:59

## 2017-10-08 RX ADMIN — OMEPRAZOLE 20 MG: 20 CAPSULE, DELAYED RELEASE ORAL at 07:59

## 2017-10-08 RX ADMIN — METOPROLOL TARTRATE 12.5 MG: 25 TABLET, FILM COATED ORAL at 21:41

## 2017-10-08 RX ADMIN — ASPIRIN 81 MG: 81 TABLET, COATED ORAL at 07:59

## 2017-10-08 RX ADMIN — HEPARIN SODIUM 5000 UNITS: 5000 INJECTION, SOLUTION INTRAVENOUS; SUBCUTANEOUS at 15:10

## 2017-10-08 RX ADMIN — BENAZEPRIL HYDROCHLORIDE 40 MG: 20 TABLET ORAL at 07:59

## 2017-10-08 RX ADMIN — HEPARIN SODIUM 5000 UNITS: 5000 INJECTION, SOLUTION INTRAVENOUS; SUBCUTANEOUS at 05:14

## 2017-10-08 RX ADMIN — OXYBUTYNIN CHLORIDE 5 MG: 5 TABLET, FILM COATED, EXTENDED RELEASE ORAL at 21:42

## 2017-10-08 ASSESSMENT — ENCOUNTER SYMPTOMS
MYALGIAS: 1
DEPRESSION: 0
CHILLS: 0
FEVER: 0
SHORTNESS OF BREATH: 0
WEAKNESS: 1
HALLUCINATIONS: 0
MEMORY LOSS: 1

## 2017-10-08 ASSESSMENT — PAIN SCALES - GENERAL
PAINLEVEL_OUTOF10: 0
PAINLEVEL_OUTOF10: 0

## 2017-10-08 NOTE — CARE PLAN
Problem: Communication  Goal: The ability to communicate needs accurately and effectively will improve  Outcome: PROGRESSING AS EXPECTED  Pt instructed to use call light for needs. Pt demonstrates use effectively.     Problem: Safety  Goal: Will remain free from injury  Outcome: PROGRESSING AS EXPECTED  Bed on lowest position, call light within reach, patient educated about use of call light and safety precautions.

## 2017-10-08 NOTE — PROGRESS NOTES
Simona Knight Fall Risk Assessment:     Last Known Fall: Within the last month  Mobility: Dizziness/generalized weakness  Medications: Cardiovascular or central nervous system meds  Mental Status/LOC/Awareness: Oriented to person and place  Toileting Needs: Incontinence  Volume/Electrolyte Status: No problems  Communication/Sensory: Visual (Glasses)/hearing deficit  Behavior: Appropriate behavior  Jarvismark Knight Fall Risk Total: 13  Fall Risk Level: MODERATE RISK    Universal Fall Precautions:  call light/belongings in reach, bed in low position and locked, wheelchairs and assistive devices out of sight, siderails up x 2, use non-slip footwear, adequate lighting, clutter free and spill free environment, educate to call for assistance    Fall Risk Level Interventions:    TRIAL (TELE 8, NEURO, MED JENNIE 5) Moderate Fall Risk Interventions  Place yellow fall risk ID band on patient: verified  Provide patient/family education based on risk assessment : completed  Educate patient/family to call staff for assistance when getting out of bed: completed  Place fall precaution signage outside patient door: verified  Utilize bed/chair fall alarm: verifiedTRIAL (TELE 8, NEURO, [employer] JENNIE 5) High Fall Risk Interventions  Place yellow fall risk ID band on patient: verified  Provide patient/family education based on risk assessment: completed  Educate patient/family to call staff for assistance when getting out of bed: completed  Place fall precaution signage outside patient door: verified  Place patient in room close to nursing station: currently not available/charge notified  Utilize bed/chair fall alarm: verified  Notify charge of high risk for huddle: verified    Patient Specific Interventions:     Medication: review medications with patient and family  Mental Status/LOC/Awareness: reorient patient, check on patient hourly, utilize bed/chair fall alarm and reinforce the use of call light  Toileting: provide frquent toileting and  instruct patient/family on the need to call for assistance when toileting  Volume/Electrolyte Status: ensure patient remains hydrated and monitor abnormal lab values  Communication/Sensory: update plan of care on whiteboard  Behavioral: encourage patient to voice feelings  Mobility: dangle prior to standing, utilize bed/chair fall alarm and ensure bed is locked and in lowest position

## 2017-10-08 NOTE — PROGRESS NOTES
Simona Knight Fall Risk Assessment:     Last Known Fall: Within the last month  Mobility: Dizziness/generalized weakness  Medications: Cardiovascular or central nervous system meds  Mental Status/LOC/Awareness: Oriented to person and place  Toileting Needs: Incontinence  Volume/Electrolyte Status: No problems  Communication/Sensory: Visual (Glasses)/hearing deficit  Behavior: Appropriate behavior  Jarvismark Knight Fall Risk Total: 13  Fall Risk Level: MODERATE RISK     Universal Fall Precautions:  call light/belongings in reach, bed in low position and locked, wheelchairs and assistive devices out of sight, siderails up x 2, use non-slip footwear, adequate lighting, clutter free and spill free environment, educate to call for assistance     Fall Risk Level Interventions:    TRIAL (TELE 8, NEURO, MED JENNIE 5) Moderate Fall Risk Interventions  Place yellow fall risk ID band on patient: verified  Provide patient/family education based on risk assessment : completed  Educate patient/family to call staff for assistance when getting out of bed: completed  Place fall precaution signage outside patient door: verified  Utilize bed/chair fall alarm: verifiedTRIAL (TELE 8, NEURO, [employer] JENNIE 5) High Fall Risk Interventions  Place yellow fall risk ID band on patient: verified  Provide patient/family education based on risk assessment: completed  Educate patient/family to call staff for assistance when getting out of bed: completed  Place fall precaution signage outside patient door: verified  Place patient in room close to nursing station: currently not available/charge notified  Utilize bed/chair fall alarm: verified  Notify charge of high risk for huddle: verified     Patient Specific Interventions:      Medication: review medications with patient and family  Mental Status/LOC/Awareness: reorient patient, check on patient hourly, utilize bed/chair fall alarm and reinforce the use of call light  Toileting: provide frquent toileting  and instruct patient/family on the need to call for assistance when toileting  Volume/Electrolyte Status: ensure patient remains hydrated and monitor abnormal lab values  Communication/Sensory: update plan of care on whiteboard  Behavioral: encourage patient to voice feelings  Mobility: dangle prior to standing, utilize bed/chair fall alarm and ensure bed is locked and in lowest position

## 2017-10-08 NOTE — PROGRESS NOTES
[facility] Hospitalist Progress Note    Date of Service: 10/8/2017    Chief Complaint  76 y.o. female admitted 2017 with AMS, UTI.     Interval Problem Update  Resting, arousable     Consultants/Specialty  Neuro.     Disposition  Pending  Psychiatry to follow up on Monday   need for power of  versus guardianship        Review of Systems   Unable to perform ROS: Dementia   Constitutional: Negative for chills and fever.   HENT: Negative for congestion and hearing loss.    Respiratory: Negative for shortness of breath.    Cardiovascular: Negative for chest pain and leg swelling.   Musculoskeletal: Positive for myalgias.   Neurological: Positive for weakness.   Psychiatric/Behavioral: Positive for memory loss. Negative for depression and hallucinations.      Physical Exam  Laboratory/Imaging   Hemodynamics  Temp (24hrs), Av.3 °C (97.4 °F), Min:36.1 °C (97 °F), Max:36.8 °C (98.3 °F)   Temperature: 36.3 °C (97.4 °F)  Pulse  Av  Min: 52  Max: 88    Blood Pressure : 140/66      Respiratory      Respiration: 17, Pulse Oximetry: 91 %        RUL Breath Sounds: Clear, RML Breath Sounds: Clear, RLL Breath Sounds: Diminished, ARGELIA Breath Sounds: Clear, LLL Breath Sounds: Diminished    Fluids  No intake or output data in the 24 hours ending 10/08/17 0927    Nutrition  Orders Placed This Encounter   Procedures   • Diet Order     Standing Status:   Standing     Number of Occurrences:   1     Order Specific Question:   Diet:     Answer:   Regular [1]     Physical Exam   Constitutional: No distress.   Obese   HENT:   Head: Normocephalic.   Neck: Normal range of motion.   Cardiovascular: Normal rate and regular rhythm.    Pulmonary/Chest: Effort normal.   Abdominal: She exhibits no distension.   Musculoskeletal: She exhibits no edema or deformity.   Neurological: She is alert. She has normal strength. She is not disoriented. No cranial nerve deficit or sensory deficit. Coordination normal. GCS eye subscore is 4. GCS  verbal subscore is 5. GCS motor subscore is 6.   Skin: Skin is warm and dry. No erythema.   Psychiatric: Judgment and thought content normal.   Nursing note and vitals reviewed.                               Assessment/Plan     HTN (hypertension)- (present on admission)   Assessment & Plan    Improving, monitor  Essential  Continue hydralazine to 100 mg 3 times a day  Continue metoprolol 12.5 mg twice a day  Continue her home dose of Lotensin  Norvasc 10 mg daily         UTI (urinary tract infection)- (present on admission)   Assessment & Plan    Cultures negative  Completed antibiotics with IV Rocephin        Acute renal insufficiency- (present on admission)   Assessment & Plan    Mild probably due to dehydration. Resolved now.         T12 compression fracture (CMS-HCC)- (present on admission)   Assessment & Plan    Stable, ct spine did not show acute changes.         History of CVA (cerebrovascular accident)- (present on admission)   Assessment & Plan    Continue ASA and statin.   9/23 MRI today show no new stroke, showed Prominent lateral and third ventricles without visualized obstructing lesion which could indicate normal pressure hydrocephalus, patient does not have urinary incontinence neither balance problem, PT/OT to see today.   9/24 MRI no new stroke, pt/ot to see.   9/25 pt/ot today.   Out of bed for meals        Dementia- (present on admission)   Assessment & Plan    MRI of her brain showed enlarged ventricles but no other acute abnormalities  Neurology has evaluated the patient  Upon review of patient's mentation, October 5, patient is alert and oriented ×3, patient states that she has a friend who will come to visit today Shaun as per prior review of records patient does have a close relationship with this friend and is requesting that he be a part of her medical decision-making. Patient does not appear to be significantly incapacitated at this time to make her own medical decisions. If there is  warranted for any further evaluation of capacity to make her own medical decisions I will defer this to psychiatry. As for my opinion patient does not appear to be consistently incapacitated to make her own medical decisions.    I have discussed this with our  who will assist in obtaining power of  assistance/guardianship    10/6 patient seen by psychiatry for capacity evaluation, will follow up with psychiatry for further designation  Psychiatry to follow up on Monday, will need POA establishment versus guardianship, discussed with psychiatry                Reviewed items::  Labs reviewed and Medications reviewed  Pink catheter::  No Pink  DVT prophylaxis pharmacological::  Heparin  Antibiotics:  Treating active infection/contamination beyond 24 hours perioperative coverage

## 2017-10-08 NOTE — CARE PLAN
Problem: Safety  Goal: Will remain free from injury    Intervention: Provide assistance with mobility  Patient providing with one-person assist during ambulation through hallway. Patient encouraged to call when needing assistance. Call light placed within reach.       Problem: Skin Integrity  Goal: Risk for impaired skin integrity will decrease    Intervention: Assess risk factors for impaired skin integrity and/or pressure ulcers  Assessing patient skin for any risk factors associated with impaired skin integrity. Providing patient with moisture to protect skin integrity.

## 2017-10-08 NOTE — CARE PLAN
Problem: Mobility  Goal: Risk for activity intolerance will decrease  Pt encouraged to ambulate, increase strength, oob for meals. Mobility reinforced prn.

## 2017-10-09 PROCEDURE — 770006 HCHG ROOM/CARE - MED/SURG/GYN SEMI*

## 2017-10-09 PROCEDURE — 99231 SBSQ HOSP IP/OBS SF/LOW 25: CPT | Performed by: INTERNAL MEDICINE

## 2017-10-09 PROCEDURE — 700111 HCHG RX REV CODE 636 W/ 250 OVERRIDE (IP): Performed by: FAMILY MEDICINE

## 2017-10-09 PROCEDURE — A9270 NON-COVERED ITEM OR SERVICE: HCPCS | Performed by: HOSPITALIST

## 2017-10-09 PROCEDURE — A9270 NON-COVERED ITEM OR SERVICE: HCPCS | Performed by: FAMILY MEDICINE

## 2017-10-09 PROCEDURE — 700102 HCHG RX REV CODE 250 W/ 637 OVERRIDE(OP): Performed by: INTERNAL MEDICINE

## 2017-10-09 PROCEDURE — A9270 NON-COVERED ITEM OR SERVICE: HCPCS | Performed by: INTERNAL MEDICINE

## 2017-10-09 PROCEDURE — 700102 HCHG RX REV CODE 250 W/ 637 OVERRIDE(OP): Performed by: FAMILY MEDICINE

## 2017-10-09 PROCEDURE — 700102 HCHG RX REV CODE 250 W/ 637 OVERRIDE(OP): Performed by: HOSPITALIST

## 2017-10-09 PROCEDURE — 302146: Performed by: INTERNAL MEDICINE

## 2017-10-09 RX ADMIN — BENAZEPRIL HYDROCHLORIDE 40 MG: 20 TABLET ORAL at 11:29

## 2017-10-09 RX ADMIN — OMEPRAZOLE 20 MG: 20 CAPSULE, DELAYED RELEASE ORAL at 11:29

## 2017-10-09 RX ADMIN — ATORVASTATIN CALCIUM 80 MG: 40 TABLET, FILM COATED ORAL at 20:43

## 2017-10-09 RX ADMIN — HYDRALAZINE HYDROCHLORIDE 100 MG: 50 TABLET ORAL at 05:20

## 2017-10-09 RX ADMIN — GABAPENTIN 300 MG: 300 CAPSULE ORAL at 20:44

## 2017-10-09 RX ADMIN — HEPARIN SODIUM 5000 UNITS: 5000 INJECTION, SOLUTION INTRAVENOUS; SUBCUTANEOUS at 05:22

## 2017-10-09 RX ADMIN — HYDRALAZINE HYDROCHLORIDE 100 MG: 50 TABLET ORAL at 15:05

## 2017-10-09 RX ADMIN — METOPROLOL TARTRATE 12.5 MG: 25 TABLET, FILM COATED ORAL at 20:44

## 2017-10-09 RX ADMIN — HEPARIN SODIUM 5000 UNITS: 5000 INJECTION, SOLUTION INTRAVENOUS; SUBCUTANEOUS at 20:44

## 2017-10-09 RX ADMIN — DULOXETINE HYDROCHLORIDE 20 MG: 20 CAPSULE, DELAYED RELEASE ORAL at 11:30

## 2017-10-09 RX ADMIN — HYDRALAZINE HYDROCHLORIDE 100 MG: 50 TABLET ORAL at 20:44

## 2017-10-09 RX ADMIN — AMLODIPINE BESYLATE 10 MG: 10 TABLET ORAL at 11:29

## 2017-10-09 RX ADMIN — STANDARDIZED SENNA CONCENTRATE AND DOCUSATE SODIUM 2 TABLET: 8.6; 5 TABLET, FILM COATED ORAL at 11:31

## 2017-10-09 RX ADMIN — OXYBUTYNIN CHLORIDE 5 MG: 5 TABLET, FILM COATED, EXTENDED RELEASE ORAL at 21:59

## 2017-10-09 RX ADMIN — HEPARIN SODIUM 5000 UNITS: 5000 INJECTION, SOLUTION INTRAVENOUS; SUBCUTANEOUS at 15:06

## 2017-10-09 RX ADMIN — METOPROLOL TARTRATE 12.5 MG: 25 TABLET, FILM COATED ORAL at 11:29

## 2017-10-09 RX ADMIN — ASPIRIN 81 MG: 81 TABLET, COATED ORAL at 11:30

## 2017-10-09 RX ADMIN — TRAZODONE HYDROCHLORIDE 50 MG: 50 TABLET ORAL at 20:43

## 2017-10-09 ASSESSMENT — PAIN SCALES - GENERAL
PAINLEVEL_OUTOF10: 0
PAINLEVEL_OUTOF10: 0

## 2017-10-09 ASSESSMENT — ENCOUNTER SYMPTOMS
MYALGIAS: 1
WEAKNESS: 1
DIAPHORESIS: 0
MEMORY LOSS: 1
SHORTNESS OF BREATH: 0
HALLUCINATIONS: 0

## 2017-10-09 ASSESSMENT — LIFESTYLE VARIABLES: DO YOU DRINK ALCOHOL: NO

## 2017-10-09 NOTE — CARE PLAN
Problem: Venous Thromboembolism (VTW)/Deep Vein Thrombosis (DVT) Prevention:  Goal: Patient will participate in Venous Thrombosis (VTE)/Deep Vein Thrombosis (DVT)Prevention Measures  Outcome: PROGRESSING AS EXPECTED  Pt receiving heparin injections

## 2017-10-09 NOTE — PROGRESS NOTES
Simona Knight Fall Risk Assessment:     Last Known Fall: Within the last month  Mobility: Dizziness/generalized weakness  Medications: Cardiovascular or central nervous system meds  Mental Status/LOC/Awareness: Oriented to person and place  Toileting Needs: Use of assistive device (Bedside commode, bedpan, urinal)  Volume/Electrolyte Status: No problems  Communication/Sensory: Visual (Glasses)/hearing deficit  Behavior: Appropriate behavior  Simona Knight Fall Risk Total: 12  Fall Risk Level: MODERATE RISK    Universal Fall Precautions:  call light/belongings in reach, bed in low position and locked, siderails up x 2    Fall Risk Level Interventions:    TRIAL ([employer] 8, NEURO, MED JENNIE 5) Moderate Fall Risk Interventions  Place yellow fall risk ID band on patient: verified  Provide patient/family education based on risk assessment : completed  Educate patient/family to call staff for assistance when getting out of bed: completed  Place fall precaution signage outside patient door: verified  Utilize bed/chair fall alarm: verifiedTRIAL (TELE 8, NEURO, [employer] JENNIE 5) High Fall Risk Interventions  Place yellow fall risk ID band on patient: verified  Provide patient/family education based on risk assessment: completed  Educate patient/family to call staff for assistance when getting out of bed: completed  Place fall precaution signage outside patient door: verified  Place patient in room close to nursing station: currently not available/charge notified  Utilize bed/chair fall alarm: verified  Notify charge of high risk for huddle: verified    Patient Specific Interventions:     Medication: review medications with patient and family and assess need for orthostatic hypotension evaluation  Mental Status/LOC/Awareness: reinforce falls education, encourage family to stay with patient, check on patient hourly and utilize bed/chair fall alarm  Toileting: provide frquent toileting and do not leave patient unattended in  bathroom/refer to toileting scripting  Volume/Electrolyte Status: administer medications as ordered for nausea and vomiting, monitor abnormal lab values and ensure IV fluids are appropriate  Communication/Sensory: update plan of care on whiteboard and for visually impaired patients orient to their room surrounding and do not change their surroundings  Behavioral: encourage patient to voice feelings, engage patient in daily activities and administer medication as ordered  Mobility: schedule physical activity throughout the day, dangle prior to standing, utilize bed/chair fall alarm and ensure bed is locked and in lowest position

## 2017-10-09 NOTE — PROGRESS NOTES
"Assumed care of patient at 0700 . Received report from RN. Patient is AOX3 . Assessment complete. Labs reviewed.Patient and RN discussed plan of care. Patient questions answered. Patient needs are met at this time. Bed in lowest and locked position. Upper bed rails up.  Call light is within reach. Hourly rounding in place.  /57   Pulse 60   Temp 36.2 °C (97.2 °F)   Resp 17   Ht 1.6 m (5' 2.99\")   Wt 83.4 kg (183 lb 13.8 oz)   SpO2 93%   Breastfeeding? No   BMI 32.58 kg/m²     "

## 2017-10-09 NOTE — CARE PLAN
Problem: Safety  Goal: Will remain free from injury  Outcome: PROGRESSING AS EXPECTED  Pt calls for assistance

## 2017-10-09 NOTE — NON-PROVIDER
"PSYCHIATRIC FOLLOW UP:    Reason for Admission: Acute renal insufficiency  Legal hold status:     Psychiatric Supervising Attending:   MD Cherise Kebede MD      HPI:  76 year old female with history of dementia. Follow up regarding guardianship appointment.          Psychiatric Examination: observed phenomenon:  Vitals: Blood pressure 152/86, pulse 70, temperature 36.1 °C (97 °F), resp. rate 18, height 1.6 m (5' 2.99\"), weight 83.4 kg (183 lb 13.8 oz), SpO2 93 %, not currently breastfeeding.  Musculoskeletal: normal psychomotor activity, no tics or unusual mannerisms noted  Appearance: WDWN, appropriate dress and grooming. Behavior is calm, cooperative,  appropriate eye contact  Thoughts:  Linear, obvious memory deficits  Speech: Normal rate and dorota, no pressuring of speech noted         Affect: Mood congruent, normal range of affect          Attention/Alertness: Alert  Memory: obvious memory deficits    Fund of Knowledge: Fair     Insight/Judgement into symptoms: knows she has memory issues, is unsure of her prognosis   Neurological Testing: Not formally tested    Assessment:  1. Dementia          Plan:  After a long discussion with MARK Lomas, it is unlikely that a person named Arsenio has visited her during her current hospitalization. Patient states that Arsenio visited last week although RN denies this and there are no records of the meeting. She is also unable to remember the exact content of the conversation with Arsenio from last week.   Chart review shows Arsenio as her contact, phone number [phone]    Currently assessing capacity for Maria Esther Valencia.         - Understanding: adequate/marginal. Patient knows that she has memory deficits. She states she can do some things for herself, but needs help with more \"complicated\" things such as shopping. She understands that there will come a point in time where she is unable to make decisions for herself. However, she is unsure of her prognosis and unable " "to state whether she will get better or worse.     - Choice: Marginal. Patient is unsure if she would like a court appointed guardian or would like \"Arsenio\" to take care of her.     - Appreciation of problem: patient knows she has a memory deficit, and can appreciate the fact that one day she may have great difficulty caring for herself.     - Appreciation of the options: adequate. After a discussion of the meaning of a legal guardian she is able to state pros and cons for this option. She also expresses that this may be her only option. She also is able to appreciate pros and cons of appointing Asrenio as a guardian, although she is unsure if he even would want to or be able to help her in this capacity. Again, patient is ambivalent about her choice but also is aware that this is a big decision to make in her life.     - Reliability: After evaluation on 10/6, patient was confused about what a court appointed guardian was. She was confused and thought this person may be a doctor. After a long discussion today about the role of a court appointed guardian, she states that she would be \"more protected\" by having a court appointed guardian. She thinks this type of person would be \"honest and fair\".        "

## 2017-10-09 NOTE — PROGRESS NOTES
[facility] Hospitalist Progress Note    Date of Service: 10/9/2017    Chief Complaint  76 y.o. female admitted 2017 with AMS, UTI.     Interval Problem Update  Drowsy, arousable  No new complaint     Consultants/Specialty  Neuro.     Disposition  Pending  Psychiatry to follow up on Monday   need for power of  versus guardianship        Review of Systems   Unable to perform ROS: Dementia   Constitutional: Negative for diaphoresis.   HENT: Negative for congestion and hearing loss.    Respiratory: Negative for shortness of breath.    Cardiovascular: Negative for leg swelling.   Musculoskeletal: Positive for myalgias.   Neurological: Positive for weakness.   Psychiatric/Behavioral: Positive for memory loss. Negative for hallucinations.      Physical Exam  Laboratory/Imaging   Hemodynamics  Temp (24hrs), Av.4 °C (97.5 °F), Min:36.2 °C (97.2 °F), Max:36.7 °C (98.1 °F)   Temperature: 36.2 °C (97.2 °F)  Pulse  Av.8  Min: 52  Max: 88    Blood Pressure : 149/57      Respiratory      Respiration: 17, Pulse Oximetry: 93 %        RUL Breath Sounds: Clear, RML Breath Sounds: Clear, RLL Breath Sounds: Diminished, ARGELIA Breath Sounds: Clear, LLL Breath Sounds: Diminished    Fluids    Intake/Output Summary (Last 24 hours) at 10/09/17 0801  Last data filed at 10/08/17 1300   Gross per 24 hour   Intake              480 ml   Output                0 ml   Net              480 ml       Nutrition  Orders Placed This Encounter   Procedures   • Diet Order     Standing Status:   Standing     Number of Occurrences:   1     Order Specific Question:   Diet:     Answer:   Regular [1]     Physical Exam   Constitutional: No distress.   Obese  Drowsy, arousable   HENT:   Head: Normocephalic.   Neck: Normal range of motion. Neck supple.   Cardiovascular: Normal rate and regular rhythm.    Pulmonary/Chest: Effort normal.   Abdominal: She exhibits no distension.   Musculoskeletal: She exhibits no edema or deformity.   Neurological: She  is alert. She has normal strength. She is not disoriented. No cranial nerve deficit or sensory deficit. She exhibits normal muscle tone. GCS eye subscore is 4. GCS verbal subscore is 5. GCS motor subscore is 6.   Skin: Skin is warm and dry. She is not diaphoretic.   Psychiatric: Her behavior is normal. Judgment and thought content normal.   Nursing note and vitals reviewed.                               Assessment/Plan     HTN (hypertension)- (present on admission)   Assessment & Plan    Improving, monitor  Essential  Continue hydralazine to 100 mg 3 times a day  Continue metoprolol 12.5 mg twice a day  Continue her home dose of Lotensin  Norvasc 10 mg daily         UTI (urinary tract infection)- (present on admission)   Assessment & Plan    Cultures negative  Completed antibiotics with IV Rocephin        Acute renal insufficiency- (present on admission)   Assessment & Plan    Mild probably due to dehydration. Resolved now.         T12 compression fracture (CMS-HCC)- (present on admission)   Assessment & Plan    Stable, ct spine did not show acute changes.         History of CVA (cerebrovascular accident)- (present on admission)   Assessment & Plan    Continue ASA and statin.   9/23 MRI today show no new stroke, showed Prominent lateral and third ventricles without visualized obstructing lesion which could indicate normal pressure hydrocephalus, patient does not have urinary incontinence neither balance problem, PT/OT to see today.   9/24 MRI no new stroke, pt/ot to see.   9/25 pt/ot today.   Out of bed for meals        Dementia- (present on admission)   Assessment & Plan    MRI of her brain showed enlarged ventricles but no other acute abnormalities  Neurology has evaluated the patient  Upon review of patient's mentation, October 5, patient is alert and oriented ×3, patient states that she has a friend who will come to visit today Shaun as per prior review of records patient does have a close relationship with this  friend and is requesting that he be a part of her medical decision-making. Patient does not appear to be significantly incapacitated at this time to make her own medical decisions. If there is warranted for any further evaluation of capacity to make her own medical decisions I will defer this to psychiatry. As for my opinion patient does not appear to be consistently incapacitated to make her own medical decisions.    I have discussed this with our  who will assist in obtaining power of  assistance/guardianship    10/6 patient seen by psychiatry for capacity evaluation, will follow up with psychiatry for further designation  Psychiatry to follow up on Monday, will need POA establishment versus guardianship, discussed with psychiatry                Reviewed items::  Labs reviewed and Medications reviewed  Pink catheter::  No Pink  DVT prophylaxis pharmacological::  Heparin  Antibiotics:  Treating active infection/contamination beyond 24 hours perioperative coverage

## 2017-10-10 PROBLEM — G47.00 INSOMNIA: Status: ACTIVE | Noted: 2017-10-10

## 2017-10-10 PROCEDURE — A9270 NON-COVERED ITEM OR SERVICE: HCPCS | Performed by: HOSPITALIST

## 2017-10-10 PROCEDURE — 770006 HCHG ROOM/CARE - MED/SURG/GYN SEMI*

## 2017-10-10 PROCEDURE — 99233 SBSQ HOSP IP/OBS HIGH 50: CPT | Performed by: INTERNAL MEDICINE

## 2017-10-10 PROCEDURE — 700102 HCHG RX REV CODE 250 W/ 637 OVERRIDE(OP): Performed by: HOSPITALIST

## 2017-10-10 PROCEDURE — A9270 NON-COVERED ITEM OR SERVICE: HCPCS | Performed by: FAMILY MEDICINE

## 2017-10-10 PROCEDURE — 700102 HCHG RX REV CODE 250 W/ 637 OVERRIDE(OP): Performed by: INTERNAL MEDICINE

## 2017-10-10 PROCEDURE — 700102 HCHG RX REV CODE 250 W/ 637 OVERRIDE(OP): Performed by: FAMILY MEDICINE

## 2017-10-10 PROCEDURE — A9270 NON-COVERED ITEM OR SERVICE: HCPCS | Performed by: INTERNAL MEDICINE

## 2017-10-10 PROCEDURE — 700111 HCHG RX REV CODE 636 W/ 250 OVERRIDE (IP): Performed by: FAMILY MEDICINE

## 2017-10-10 RX ORDER — TRAZODONE HYDROCHLORIDE 50 MG/1
100 TABLET ORAL
Status: DISCONTINUED | OUTPATIENT
Start: 2017-10-10 | End: 2017-12-26

## 2017-10-10 RX ADMIN — METOPROLOL TARTRATE 12.5 MG: 25 TABLET, FILM COATED ORAL at 08:32

## 2017-10-10 RX ADMIN — HYDRALAZINE HYDROCHLORIDE 100 MG: 50 TABLET ORAL at 20:06

## 2017-10-10 RX ADMIN — GABAPENTIN 300 MG: 300 CAPSULE ORAL at 19:59

## 2017-10-10 RX ADMIN — HYDRALAZINE HYDROCHLORIDE 100 MG: 50 TABLET ORAL at 15:11

## 2017-10-10 RX ADMIN — DULOXETINE HYDROCHLORIDE 20 MG: 20 CAPSULE, DELAYED RELEASE ORAL at 08:33

## 2017-10-10 RX ADMIN — OXYBUTYNIN CHLORIDE 5 MG: 5 TABLET, FILM COATED, EXTENDED RELEASE ORAL at 20:05

## 2017-10-10 RX ADMIN — HEPARIN SODIUM 5000 UNITS: 5000 INJECTION, SOLUTION INTRAVENOUS; SUBCUTANEOUS at 05:25

## 2017-10-10 RX ADMIN — METOPROLOL TARTRATE 12.5 MG: 25 TABLET, FILM COATED ORAL at 20:00

## 2017-10-10 RX ADMIN — ATORVASTATIN CALCIUM 80 MG: 40 TABLET, FILM COATED ORAL at 20:00

## 2017-10-10 RX ADMIN — BENAZEPRIL HYDROCHLORIDE 40 MG: 20 TABLET ORAL at 12:22

## 2017-10-10 RX ADMIN — ASPIRIN 81 MG: 81 TABLET, COATED ORAL at 08:33

## 2017-10-10 RX ADMIN — STANDARDIZED SENNA CONCENTRATE AND DOCUSATE SODIUM 1 TABLET: 8.6; 5 TABLET, FILM COATED ORAL at 19:59

## 2017-10-10 RX ADMIN — AMLODIPINE BESYLATE 10 MG: 10 TABLET ORAL at 12:22

## 2017-10-10 RX ADMIN — OMEPRAZOLE 20 MG: 20 CAPSULE, DELAYED RELEASE ORAL at 08:33

## 2017-10-10 RX ADMIN — HYDRALAZINE HYDROCHLORIDE 100 MG: 50 TABLET ORAL at 05:25

## 2017-10-10 RX ADMIN — HEPARIN SODIUM 5000 UNITS: 5000 INJECTION, SOLUTION INTRAVENOUS; SUBCUTANEOUS at 15:10

## 2017-10-10 ASSESSMENT — ENCOUNTER SYMPTOMS
VOMITING: 0
MEMORY LOSS: 1
PALPITATIONS: 0
SENSORY CHANGE: 0
WEAKNESS: 1
FEVER: 0
MYALGIAS: 1
FOCAL WEAKNESS: 0
NAUSEA: 0
CHILLS: 0
HEADACHES: 0
HALLUCINATIONS: 0
SHORTNESS OF BREATH: 0
DIAPHORESIS: 0

## 2017-10-10 ASSESSMENT — PAIN SCALES - GENERAL
PAINLEVEL_OUTOF10: 0

## 2017-10-10 NOTE — PROGRESS NOTES
Simona Knight Fall Risk Assessment:     Last Known Fall: Within the last month  Mobility: Immobilized/requires assist of one person  Medications: Cardiovascular and central nervous system meds  Mental Status/LOC/Awareness: Oriented to person and place  Toileting Needs: Incontinence  Volume/Electrolyte Status: No problems  Communication/Sensory: Visual (Glasses)/hearing deficit  Behavior: Appropriate behavior  Simona Knight Fall Risk Total: 15  Fall Risk Level: HIGH RISK     Universal Fall Precautions:  call light/belongings in reach, bed in low position and locked, wheelchairs and assistive devices out of sight, use non-slip footwear, siderails up x 2, adequate lighting, clutter free and spill free environment, educate on level of risk, educate to call for assistance     Fall Risk Level Interventions:    TRIAL ([employer] 8, NEURO, MED JENNIE 5) Moderate Fall Risk Interventions  Place yellow fall risk ID band on patient: verified  Provide patient/family education based on risk assessment : completed  Educate patient/family to call staff for assistance when getting out of bed: completed  Place fall precaution signage outside patient door: verified  Utilize bed/chair fall alarm: verifiedTRIAL (TELE 8, NEURO, [employer] JENNIE 5) High Fall Risk Interventions  Place yellow fall risk ID band on patient: verified  Provide patient/family education based on risk assessment: verified  Educate patient/family to call staff for assistance when getting out of bed: verified  Place fall precaution signage outside patient door: verified  Place patient in room close to nursing station: verified  Utilize bed/chair fall alarm: verified  Notify charge of high risk for huddle: verified     Patient Specific Interventions:      Medication: review medications with patient and family  Mental Status/LOC/Awareness: reorient patient, utilize bed/chair fall alarm and reinforce the use of call light  Toileting: instruct patient/family on the use of grab bars,  instruct patient/family on the need to call for assistance when toileting and do not leave patient unattended in bathroom/refer to toileting scripting  Volume/Electrolyte Status: ensure patient remains hydrated  Communication/Sensory: update plan of care on whiteboard  Behavioral: encourage patient to voice feelings  Mobility: utilize bed/chair fall alarm, ensure bed is locked and in lowest position, provide appropriate assistive device and instruct patient to exit bed on their strongest side

## 2017-10-10 NOTE — PROGRESS NOTES
Simona Knight Fall Risk Assessment:     Last Known Fall: Within the last month  Mobility: Immobilized/requires assist of one person  Medications: Cardiovascular and central nervous system meds  Mental Status/LOC/Awareness: Oriented to person and place  Toileting Needs: Incontinence  Volume/Electrolyte Status: No problems  Communication/Sensory: Visual (Glasses)/hearing deficit  Behavior: Appropriate behavior  Simona Knight Fall Risk Total: 15  Fall Risk Level: HIGH RISK    Universal Fall Precautions:  call light/belongings in reach, bed in low position and locked, wheelchairs and assistive devices out of sight, use non-slip footwear, siderails up x 2, adequate lighting, clutter free and spill free environment, educate on level of risk, educate to call for assistance    Fall Risk Level Interventions:    TRIAL ([employer] 8, NEURO, MED JENNIE 5) Moderate Fall Risk Interventions  Place yellow fall risk ID band on patient: verified  Provide patient/family education based on risk assessment : completed  Educate patient/family to call staff for assistance when getting out of bed: completed  Place fall precaution signage outside patient door: verified  Utilize bed/chair fall alarm: verifiedTRIAL (TELE 8, NEURO, [employer] JENNIE 5) High Fall Risk Interventions  Place yellow fall risk ID band on patient: verified  Provide patient/family education based on risk assessment: verified  Educate patient/family to call staff for assistance when getting out of bed: verified  Place fall precaution signage outside patient door: verified  Place patient in room close to nursing station: verified  Utilize bed/chair fall alarm: verified  Notify charge of high risk for huddle: verified    Patient Specific Interventions:     Medication: review medications with patient and family  Mental Status/LOC/Awareness: reorient patient, utilize bed/chair fall alarm and reinforce the use of call light  Toileting: instruct patient/family on the use of grab bars,  instruct patient/family on the need to call for assistance when toileting and do not leave patient unattended in bathroom/refer to toileting scripting  Volume/Electrolyte Status: ensure patient remains hydrated  Communication/Sensory: update plan of care on whiteboard  Behavioral: encourage patient to voice feelings  Mobility: utilize bed/chair fall alarm, ensure bed is locked and in lowest position, provide appropriate assistive device and instruct patient to exit bed on their strongest side

## 2017-10-10 NOTE — PROGRESS NOTES
[facility] Hospitalist Progress Note    Date of Service: 10/10/2017    Chief Complaint  76 y.o. female admitted 2017 with AMS, UTI.     Interval Problem Update  Drowsy, arousable  Dates that she did not sleep well last evening  Was given trazodone  Encouraged activity in a.m.     Consultants/Specialty  Neuro.     Disposition  Pending  Psychiatry to follow up on , RN to notify   need for power of  versus guardianship        Review of Systems   Unable to perform ROS: Dementia   Constitutional: Negative for chills, diaphoresis, fever and malaise/fatigue.   HENT: Negative for congestion and hearing loss.    Respiratory: Negative for shortness of breath.    Cardiovascular: Negative for palpitations and leg swelling.   Gastrointestinal: Negative for nausea and vomiting.   Musculoskeletal: Positive for myalgias.   Neurological: Positive for weakness. Negative for sensory change, focal weakness and headaches.   Psychiatric/Behavioral: Positive for memory loss. Negative for hallucinations.      Physical Exam  Laboratory/Imaging   Hemodynamics  Temp (24hrs), Av.4 °C (97.6 °F), Min:36.1 °C (97 °F), Max:37 °C (98.6 °F)   Temperature: 36.1 °C (97 °F)  Pulse  Av.8  Min: 52  Max: 88    Blood Pressure : 130/53      Respiratory      Respiration: 16, Pulse Oximetry: 91 %        RUL Breath Sounds: Clear, RML Breath Sounds: Clear, RLL Breath Sounds: Diminished, ARGELIA Breath Sounds: Clear, LLL Breath Sounds: Diminished    Fluids    Intake/Output Summary (Last 24 hours) at 10/10/17 1155  Last data filed at 10/09/17 2045   Gross per 24 hour   Intake              120 ml   Output                0 ml   Net              120 ml       Nutrition  Orders Placed This Encounter   Procedures   • Diet Order     Standing Status:   Standing     Number of Occurrences:   1     Order Specific Question:   Diet:     Answer:   Regular [1]     Physical Exam   Constitutional: She appears well-developed. No distress.   Obese  Drowsy, arousable    HENT:   Head: Normocephalic.   Mouth/Throat: No oropharyngeal exudate.   Eyes: EOM are normal. Pupils are equal, round, and reactive to light. No scleral icterus.   Neck: Normal range of motion. Neck supple.   Cardiovascular: Normal rate and regular rhythm.    Pulmonary/Chest: Effort normal. No respiratory distress.   Abdominal: Soft. She exhibits no distension.   Musculoskeletal: She exhibits no edema or tenderness.   Neurological: She is alert. She has normal strength. She is not disoriented. No cranial nerve deficit or sensory deficit. Coordination normal. GCS eye subscore is 4. GCS verbal subscore is 5. GCS motor subscore is 6.   Skin: Skin is warm and dry. She is not diaphoretic. No erythema.   Psychiatric: She has a normal mood and affect. Her behavior is normal. Judgment and thought content normal.   Nursing note and vitals reviewed.                               Assessment/Plan     HTN (hypertension)- (present on admission)   Assessment & Plan    Improving, monitor  Essential  Continue hydralazine to 100 mg 3 times a day  Continue metoprolol 12.5 mg twice a day  Continue her home dose of Lotensin  Norvasc 10 mg daily         Insomnia   Assessment & Plan    Increase trazodone nightly  Monitor for oversedation, respiratory distress        UTI (urinary tract infection)- (present on admission)   Assessment & Plan    Cultures negative  Completed antibiotics with IV Rocephin        Acute renal insufficiency- (present on admission)   Assessment & Plan    Mild probably due to dehydration. Resolved now.         T12 compression fracture (CMS-HCC)- (present on admission)   Assessment & Plan    Stable, ct spine did not show acute changes.         History of CVA (cerebrovascular accident)- (present on admission)   Assessment & Plan    Continue ASA and statin.   9/23 MRI today show no new stroke, showed Prominent lateral and third ventricles without visualized obstructing lesion which could indicate normal pressure  hydrocephalus, patient does not have urinary incontinence neither balance problem, PT/OT to see today.   9/24 MRI no new stroke, pt/ot to see.   9/25 pt/ot today.   Out of bed for meals        Dementia- (present on admission)   Assessment & Plan    MRI of her brain showed enlarged ventricles but no other acute abnormalities  Neurology has evaluated the patient  Upon review of patient's mentation, October 5, patient is alert and oriented ×3, patient states that she has a friend who will come to visit today Shaun as per prior review of records patient does have a close relationship with this friend and is requesting that he be a part of her medical decision-making. Patient does not appear to be significantly incapacitated at this time to make her own medical decisions. If there is warranted for any further evaluation of capacity to make her own medical decisions I will defer this to psychiatry. As for my opinion patient does not appear to be consistently incapacitated to make her own medical decisions.    I have discussed this with our  who will assist in obtaining power of  assistance/guardianship    10/6 patient seen by psychiatry for capacity evaluation, will follow up with psychiatry for further designation  Psychiatry to follow up on Monday, will need POA establishment versus guardianship, discussed with psychiatry    10/10-psychiatry to reevaluate this week, patient is oriented however lacks insight into her condition  Appreciate psychiatry input                Reviewed items::  Labs reviewed and Medications reviewed  Pink catheter::  No Pink  DVT prophylaxis pharmacological::  Heparin  Antibiotics:  Treating active infection/contamination beyond 24 hours perioperative coverage

## 2017-10-10 NOTE — CARE PLAN
Problem: Safety  Goal: Will remain free from injury    Intervention: Provide assistance with mobility  Bed in lowest position, call light within reach. Bed alarm on. Patient educated regarding safety precautions. Room free of clutter.       Problem: Psychosocial Needs:  Goal: Level of anxiety will decrease    Intervention: Identify and develop with patient strategies to cope with anxiety triggers  Established therapeutic relationship with patient (validation, silence). Deep breathing used to decrease anxiety.

## 2017-10-10 NOTE — CARE PLAN
"Problem: Bowel/Gastric:  Goal: Normal bowel function is maintained or improved  Outcome: PROGRESSING SLOWER THAN EXPECTED  Pt incontx2 at times. Does not \"feel when I need to go\". Last BM 10/9.     Problem: Knowledge Deficit  Goal: Knowledge of disease process/condition, treatment plan, diagnostic tests, and medications will improve  Outcome: PROGRESSING SLOWER THAN EXPECTED  Pt updated on POC and psych to evaluate POA vs guardianship. Pt requires reinforcement of POC.      "

## 2017-10-10 NOTE — PROGRESS NOTES
Received bedside report from day RN and assumed care of patient at 1900. Patient is alert and oriented x3 with no signs of labored breathing or distress. Safety precautions in place including patient call light within reach, bed alarm on, personal possessions nearby, bed in low position and locked, hourly rounding in practice, and non-skid socks in place.

## 2017-10-10 NOTE — PROGRESS NOTES
Psych paged to follow up on pt regarding POA/guardianship. Spoke with Dr. Mcginnis, requested MD to reassess pt and complete paperwork. RN asked psych if hospitalist needs to complete to please have psych notify hospitalist directly. Dr. Mcginnis states they will most likely have to follow up tomorrow 10/11/17.

## 2017-10-11 PROCEDURE — 770006 HCHG ROOM/CARE - MED/SURG/GYN SEMI*

## 2017-10-11 PROCEDURE — 700102 HCHG RX REV CODE 250 W/ 637 OVERRIDE(OP): Performed by: INTERNAL MEDICINE

## 2017-10-11 PROCEDURE — A9270 NON-COVERED ITEM OR SERVICE: HCPCS | Performed by: FAMILY MEDICINE

## 2017-10-11 PROCEDURE — A9270 NON-COVERED ITEM OR SERVICE: HCPCS | Performed by: HOSPITALIST

## 2017-10-11 PROCEDURE — A9270 NON-COVERED ITEM OR SERVICE: HCPCS | Performed by: INTERNAL MEDICINE

## 2017-10-11 PROCEDURE — 700111 HCHG RX REV CODE 636 W/ 250 OVERRIDE (IP): Performed by: FAMILY MEDICINE

## 2017-10-11 PROCEDURE — 99232 SBSQ HOSP IP/OBS MODERATE 35: CPT | Performed by: INTERNAL MEDICINE

## 2017-10-11 PROCEDURE — 700102 HCHG RX REV CODE 250 W/ 637 OVERRIDE(OP): Performed by: HOSPITALIST

## 2017-10-11 PROCEDURE — 700102 HCHG RX REV CODE 250 W/ 637 OVERRIDE(OP): Performed by: FAMILY MEDICINE

## 2017-10-11 RX ADMIN — OMEPRAZOLE 20 MG: 20 CAPSULE, DELAYED RELEASE ORAL at 09:50

## 2017-10-11 RX ADMIN — DULOXETINE HYDROCHLORIDE 20 MG: 20 CAPSULE, DELAYED RELEASE ORAL at 09:51

## 2017-10-11 RX ADMIN — METOPROLOL TARTRATE 12.5 MG: 25 TABLET, FILM COATED ORAL at 09:50

## 2017-10-11 RX ADMIN — ATORVASTATIN CALCIUM 80 MG: 40 TABLET, FILM COATED ORAL at 21:26

## 2017-10-11 RX ADMIN — METOPROLOL TARTRATE 12.5 MG: 25 TABLET, FILM COATED ORAL at 21:27

## 2017-10-11 RX ADMIN — HEPARIN SODIUM 5000 UNITS: 5000 INJECTION, SOLUTION INTRAVENOUS; SUBCUTANEOUS at 15:12

## 2017-10-11 RX ADMIN — GABAPENTIN 300 MG: 300 CAPSULE ORAL at 21:26

## 2017-10-11 RX ADMIN — HEPARIN SODIUM 5000 UNITS: 5000 INJECTION, SOLUTION INTRAVENOUS; SUBCUTANEOUS at 21:26

## 2017-10-11 RX ADMIN — ERGOCALCIFEROL 50000 UNITS: 1.25 CAPSULE, LIQUID FILLED ORAL at 11:38

## 2017-10-11 RX ADMIN — STANDARDIZED SENNA CONCENTRATE AND DOCUSATE SODIUM 2 TABLET: 8.6; 5 TABLET, FILM COATED ORAL at 09:51

## 2017-10-11 RX ADMIN — HEPARIN SODIUM 5000 UNITS: 5000 INJECTION, SOLUTION INTRAVENOUS; SUBCUTANEOUS at 05:50

## 2017-10-11 RX ADMIN — HYDRALAZINE HYDROCHLORIDE 100 MG: 50 TABLET ORAL at 05:50

## 2017-10-11 RX ADMIN — OXYBUTYNIN CHLORIDE 5 MG: 5 TABLET, FILM COATED, EXTENDED RELEASE ORAL at 21:27

## 2017-10-11 RX ADMIN — ASPIRIN 81 MG: 81 TABLET, COATED ORAL at 09:50

## 2017-10-11 RX ADMIN — AMLODIPINE BESYLATE 10 MG: 10 TABLET ORAL at 09:50

## 2017-10-11 ASSESSMENT — ENCOUNTER SYMPTOMS
FOCAL WEAKNESS: 0
DIAPHORESIS: 0
FEVER: 0
COUGH: 0
MEMORY LOSS: 1
MYALGIAS: 1
SHORTNESS OF BREATH: 0
ABDOMINAL PAIN: 0
VOMITING: 0
SENSORY CHANGE: 0
WEAKNESS: 1
NAUSEA: 0
HALLUCINATIONS: 0
DIZZINESS: 0
PALPITATIONS: 0

## 2017-10-11 ASSESSMENT — PAIN SCALES - GENERAL
PAINLEVEL_OUTOF10: 0

## 2017-10-11 NOTE — CARE PLAN
Problem: Venous Thromboembolism (VTW)/Deep Vein Thrombosis (DVT) Prevention:  Goal: Patient will participate in Venous Thrombosis (VTE)/Deep Vein Thrombosis (DVT)Prevention Measures  Outcome: PROGRESSING SLOWER THAN EXPECTED  Pt refused Heparin subq injection d/t bruising to abdomen despite education.     Problem: Discharge Barriers/Planning  Goal: Patient's continuum of care needs will be met  Outcome: PROGRESSING SLOWER THAN EXPECTED  Awaiting psych to re-evaluate pt for POA vs Guardianship. Per note, psych should be in today 10/11/17.

## 2017-10-11 NOTE — PROGRESS NOTES
Rounding completed on pt. Report given at bedside between night shift RN and day shift RN. Assumed care of pt. Pt sleeping quietly, per night shift, pt slept well last night without sleeping meds. Call light in reach, will continue to monitor.

## 2017-10-11 NOTE — PROGRESS NOTES
Simona Knight Fall Risk Assessment:     Last Known Fall: Within the last month  Mobility: Immobilized/requires assist of one person  Medications: Cardiovascular and central nervous system meds  Mental Status/LOC/Awareness: Oriented to person and place  Toileting Needs: Incontinence  Volume/Electrolyte Status: No problems  Communication/Sensory: Visual (Glasses)/hearing deficit  Behavior: Appropriate behavior  Simona Knight Fall Risk Total: 15  Fall Risk Level: HIGH RISK     Universal Fall Precautions:  call light/belongings in reach, bed in low position and locked, wheelchairs and assistive devices out of sight, use non-slip footwear, siderails up x 2, adequate lighting, clutter free and spill free environment, educate on level of risk, educate to call for assistance     Fall Risk Level Interventions:    TRIAL ([employer] 8, NEURO, MED JENNIE 5) Moderate Fall Risk Interventions  Place yellow fall risk ID band on patient: verified  Provide patient/family education based on risk assessment : completed  Educate patient/family to call staff for assistance when getting out of bed: completed  Place fall precaution signage outside patient door: verified  Utilize bed/chair fall alarm: verifiedTRIAL (TELE 8, NEURO, [employer] JENNIE 5) High Fall Risk Interventions  Place yellow fall risk ID band on patient: verified  Provide patient/family education based on risk assessment: verified  Educate patient/family to call staff for assistance when getting out of bed: verified  Place fall precaution signage outside patient door: verified  Place patient in room close to nursing station: verified  Utilize bed/chair fall alarm: verified  Notify charge of high risk for huddle: verified     Patient Specific Interventions:      Medication: review medications with patient and family  Mental Status/LOC/Awareness: reorient patient, utilize bed/chair fall alarm and reinforce the use of call light  Toileting: instruct patient/family on the use of grab bars,  instruct patient/family on the need to call for assistance when toileting and do not leave patient unattended in bathroom/refer to toileting scripting  Volume/Electrolyte Status: ensure patient remains hydrated  Communication/Sensory: update plan of care on whiteboard  Behavioral: encourage patient to voice feelings  Mobility: utilize bed/chair fall alarm, ensure bed is locked and in lowest position, provide appropriate assistive device and instruct patient to exit bed on their strongest side

## 2017-10-11 NOTE — DISCHARGE PLANNING
Psych has seen pt multiple times now and patient remains incapacitated to make medical decisions. Guardianship packet is completed.   Needs assessment requested from psych so guardianship process can begin.

## 2017-10-11 NOTE — PROGRESS NOTES
[facility] Hospitalist Progress Note    Date of Service: 10/11/2017    Chief Complaint  76 y.o. female admitted 2017 with AMS, UTI.     Interval Problem Update  Improved energy today  Slept well last evening  No new complaints     Consultants/Specialty  Neuro.     Disposition  Pending  Psychiatry to follow up on , RN to notify   need for power of  versus guardianship        Review of Systems   Unable to perform ROS: Dementia   Constitutional: Negative for diaphoresis and fever.   HENT: Negative for hearing loss.    Respiratory: Negative for cough and shortness of breath.    Cardiovascular: Negative for palpitations and leg swelling.   Gastrointestinal: Negative for abdominal pain, nausea and vomiting.   Genitourinary: Negative for dysuria and frequency.   Musculoskeletal: Positive for myalgias.   Neurological: Positive for weakness. Negative for dizziness, sensory change and focal weakness.   Psychiatric/Behavioral: Positive for memory loss. Negative for hallucinations.      Physical Exam  Laboratory/Imaging   Hemodynamics  Temp (24hrs), Av.6 °C (97.9 °F), Min:36.3 °C (97.4 °F), Max:36.7 °C (98.1 °F)   Temperature: 36.6 °C (97.8 °F)  Pulse  Av.8  Min: 52  Max: 88    Blood Pressure : 144/49      Respiratory      Respiration: 16, Pulse Oximetry: 95 %             Fluids    Intake/Output Summary (Last 24 hours) at 10/11/17 0921  Last data filed at 10/10/17 2000   Gross per 24 hour   Intake              120 ml   Output                0 ml   Net              120 ml       Nutrition  Orders Placed This Encounter   Procedures   • Diet Order     Standing Status:   Standing     Number of Occurrences:   1     Order Specific Question:   Diet:     Answer:   Regular [1]     Physical Exam   Constitutional: She appears well-developed. No distress.   Obese  Drowsy, arousable   HENT:   Head: Normocephalic and atraumatic.   Eyes: EOM are normal. Pupils are equal, round, and reactive to light.   Neck: Normal range of  motion. Neck supple. No JVD present.   Cardiovascular: Normal rate and regular rhythm.    Pulmonary/Chest: Effort normal and breath sounds normal. No respiratory distress.   Abdominal: Soft. She exhibits no distension. There is no tenderness.   Musculoskeletal: She exhibits no edema.   Neurological: She is alert. She has normal strength. She is not disoriented. No cranial nerve deficit or sensory deficit. She exhibits normal muscle tone. GCS eye subscore is 4. GCS verbal subscore is 5. GCS motor subscore is 6.   Skin: Skin is warm and dry.   Psychiatric: She has a normal mood and affect. Her behavior is normal. Judgment and thought content normal.   Nursing note and vitals reviewed.                               Assessment/Plan     HTN (hypertension)- (present on admission)   Assessment & Plan    Improving, monitor  Essential  Continue hydralazine to 100 mg 3 times a day  Continue metoprolol 12.5 mg twice a day  Continue her home dose of Lotensin  Norvasc 10 mg daily         Insomnia   Assessment & Plan    Increase trazodone nightly  Monitor for oversedation, respiratory distress        UTI (urinary tract infection)- (present on admission)   Assessment & Plan    Cultures negative  Completed antibiotics with IV Rocephin        Acute renal insufficiency- (present on admission)   Assessment & Plan    Mild probably due to dehydration. Resolved now.         T12 compression fracture (CMS-HCC)- (present on admission)   Assessment & Plan    Stable, ct spine did not show acute changes.         History of CVA (cerebrovascular accident)- (present on admission)   Assessment & Plan    Continue ASA and statin.   9/23 MRI today show no new stroke, showed Prominent lateral and third ventricles without visualized obstructing lesion which could indicate normal pressure hydrocephalus, patient does not have urinary incontinence neither balance problem, PT/OT to see today.   9/24 MRI no new stroke, pt/ot to see.   9/25 pt/ot today.    Out of bed for meals        Dementia- (present on admission)   Assessment & Plan    MRI of her brain showed enlarged ventricles but no other acute abnormalities  Neurology has evaluated the patient  Upon review of patient's mentation, October 5, patient is alert and oriented ×3, patient states that she has a friend who will come to visit today Shaun as per prior review of records patient does have a close relationship with this friend and is requesting that he be a part of her medical decision-making. Patient does not appear to be significantly incapacitated at this time to make her own medical decisions. If there is warranted for any further evaluation of capacity to make her own medical decisions I will defer this to psychiatry. As for my opinion patient does not appear to be consistently incapacitated to make her own medical decisions.    I have discussed this with our  who will assist in obtaining power of  assistance/guardianship    10/6 patient seen by psychiatry for capacity evaluation, will follow up with psychiatry for further designation  Psychiatry to follow up on Monday, will need POA establishment versus guardianship, discussed with psychiatry    10/10-psychiatry to reevaluate this week, patient is oriented however lacks insight into her condition  Appreciate psychiatry input                Reviewed items::  Labs reviewed and Medications reviewed  Pink catheter::  No Pink  DVT prophylaxis pharmacological::  Heparin  Antibiotics:  Treating active infection/contamination beyond 24 hours perioperative coverage

## 2017-10-11 NOTE — PROGRESS NOTES
Simona Knight Fall Risk Assessment:     Last Known Fall: Within the last month  Mobility: Immobilized/requires assist of one person  Medications: Cardiovascular and central nervous system meds  Mental Status/LOC/Awareness: Oriented to person and place  Toileting Needs: Incontinence  Volume/Electrolyte Status: No problems  Communication/Sensory: Visual (Glasses)/hearing deficit  Behavior: Appropriate behavior  Simona Knight Fall Risk Total: 15  Fall Risk Level: HIGH RISK     Universal Fall Precautions:  call light/belongings in reach, bed in low position and locked, wheelchairs and assistive devices out of sight, use non-slip footwear, siderails up x 2, adequate lighting, clutter free and spill free environment, educate on level of risk, educate to call for assistance     Fall Risk Level Interventions:    TRIAL ([employer] 8, NEURO, MED JENNIE 5) Moderate Fall Risk Interventions  Place yellow fall risk ID band on patient: verified  Provide patient/family education based on risk assessment : completed  Educate patient/family to call staff for assistance when getting out of bed: completed  Place fall precaution signage outside patient door: verified  Utilize bed/chair fall alarm: verifiedTRIAL (TELE 8, NEURO, [employer] JENNIE 5) High Fall Risk Interventions  Place yellow fall risk ID band on patient: verified  Provide patient/family education based on risk assessment: verified  Educate patient/family to call staff for assistance when getting out of bed: verified  Place fall precaution signage outside patient door: verified  Place patient in room close to nursing station: verified  Utilize bed/chair fall alarm: verified  Notify charge of high risk for huddle: verified     Patient Specific Interventions:      Medication: review medications with patient and family  Mental Status/LOC/Awareness: reorient patient, utilize bed/chair fall alarm and reinforce the use of call light  Toileting: instruct patient/family on the use of grab bars,  instruct patient/family on the need to call for assistance when toileting and do not leave patient unattended in bathroom/refer to toileting scripting  Volume/Electrolyte Status: ensure patient remains hydrated  Communication/Sensory: update plan of care on whiteboard  Behavioral: encourage patient to voice feelings  Mobility: utilize bed/chair fall alarm, ensure bed is locked and in lowest position, provide appropriate assistive device and instruct patient to exit bed on their strongest side

## 2017-10-11 NOTE — CARE PLAN
Problem: Communication  Goal: The ability to communicate needs accurately and effectively will improve  Pt encouraged to verbalize needs/questions/concerns about poc, treatment, meds, etc. Pt reinforced on poc daily and prn.

## 2017-10-11 NOTE — PSYCHIATRY
"PSYCHIATRIC FOLLOW UP:    Reason for Admission:   Legal hold status:   No hold required   Psychiatric Supervising Attending:     Khalif    HPI:       Pt re-evaluated, and unfortunately she is unable to understand the full ramifications of a guardian being assigned vs her friend helping her. This was attempted on two different dates. She does understand her need for more assistance. She understands that she cannot live alone. If she is willing, she would be able to pick which SNF she would like to go to.  She is not capacitated to make major decisions about her medical care because she is too cognitively impaired. She does understand her need for assistance and has insight into her memory loss so should be able to pick a SNF. If she has trouble picking a SNF on her own, or there are other questions of capacity that come up, please contact psych.     Psychiatric Examination: observed phenomenon:  Vitals:  Vitals:    10/10/17 1200 10/10/17 1509 10/10/17 1930 10/11/17 0500   BP: 159/64 141/83 127/50 149/63   Pulse: 69 68 66 62   Resp: 17 18 16 14   Temp: 36.7 °C (98.1 °F) 36.7 °C (98 °F) 36.7 °C (98 °F) 36.3 °C (97.4 °F)   SpO2: 94% 93% 91% 91%   Weight:       Height:           Musculoskeletal(abnormal movements, gait, etc):psychomotor retardation   Appearance: grooming wnl   Thoughts:Logical and sequential, goal-directed   Speech: Nl tone, rate, and volume. Not pressured. Understandable.   Mood:    \"okay'  Affect:    constricted  SI/HI:   Denies   Attention/Alertness:   Attention decreased  Memory:    Very impaired   Orientation:      Fund of Knowledge:      Insight/Judgement into symptoms decreased   Neurological Testing:( ie clock, cube drawing, MMSE, MOCA,etc.)    Medical systems reviewed: (pain, new complaint, etc)         Denies pain   No n/v  All other systems reviewed and are negative.       Assessment:(acuity level)          Dementia    Plan:    Mr Valencia is not capacitated in general to make medical " "decisions. She is unable to manage on her own at home. She is unable to make decisions with very much weight and has some insight into this.     She realizes she does not have the capability to live alone. She is unable to decide who should make decisions for her if need be. She doesn't truly understand \"POA\" and \"public guardian\" and \"guardian\". She does understand choosing to live in a place with more help, and should be able to consent to a SNF. She may or may not be able to choose between SNFs. This is a much simpler decision than some of the other ones she has been asked to make, and this team feels it is appropriate to allow her the chance to choose between the options available for living environments. Choices should be presented in short, clear, easy English. If presented in a way that is complicated she will not be able to participate.Choices should be presented as neutrally as possible.     Thank you for the consult.          "

## 2017-10-12 LAB
ANION GAP SERPL CALC-SCNC: 9 MMOL/L (ref 0–11.9)
ANISOCYTOSIS BLD QL SMEAR: ABNORMAL
BASOPHILS # BLD AUTO: 0.9 % (ref 0–1.8)
BASOPHILS # BLD: 0.05 K/UL (ref 0–0.12)
BUN SERPL-MCNC: 25 MG/DL (ref 8–22)
CALCIUM SERPL-MCNC: 9.3 MG/DL (ref 8.5–10.5)
CHLORIDE SERPL-SCNC: 111 MMOL/L (ref 96–112)
CO2 SERPL-SCNC: 23 MMOL/L (ref 20–33)
COMMENT 1642: NORMAL
CREAT SERPL-MCNC: 1.07 MG/DL (ref 0.5–1.4)
EOSINOPHIL # BLD AUTO: 0.38 K/UL (ref 0–0.51)
EOSINOPHIL NFR BLD: 6.7 % (ref 0–6.9)
ERYTHROCYTE [DISTWIDTH] IN BLOOD BY AUTOMATED COUNT: 74.3 FL (ref 35.9–50)
GFR SERPL CREATININE-BSD FRML MDRD: 50 ML/MIN/1.73 M 2
GLUCOSE SERPL-MCNC: 86 MG/DL (ref 65–99)
HCT VFR BLD AUTO: 42.4 % (ref 37–47)
HGB BLD-MCNC: 13.2 G/DL (ref 12–16)
IMM GRANULOCYTES # BLD AUTO: 0.03 K/UL (ref 0–0.11)
IMM GRANULOCYTES NFR BLD AUTO: 0.5 % (ref 0–0.9)
LYMPHOCYTES # BLD AUTO: 1.35 K/UL (ref 1–4.8)
LYMPHOCYTES NFR BLD: 23.7 % (ref 22–41)
MCH RBC QN AUTO: 26.6 PG (ref 27–33)
MCHC RBC AUTO-ENTMCNC: 31.1 G/DL (ref 33.6–35)
MCV RBC AUTO: 85.3 FL (ref 81.4–97.8)
MICROCYTES BLD QL SMEAR: ABNORMAL
MONOCYTES # BLD AUTO: 0.6 K/UL (ref 0–0.85)
MONOCYTES NFR BLD AUTO: 10.5 % (ref 0–13.4)
MORPHOLOGY BLD-IMP: NORMAL
NEUTROPHILS # BLD AUTO: 3.28 K/UL (ref 2–7.15)
NEUTROPHILS NFR BLD: 57.7 % (ref 44–72)
NRBC # BLD AUTO: 0 K/UL
NRBC BLD AUTO-RTO: 0 /100 WBC
OVALOCYTES BLD QL SMEAR: NORMAL
PLATELET # BLD AUTO: 189 K/UL (ref 164–446)
PLATELET BLD QL SMEAR: NORMAL
POIKILOCYTOSIS BLD QL SMEAR: NORMAL
POTASSIUM SERPL-SCNC: 3.9 MMOL/L (ref 3.6–5.5)
RBC # BLD AUTO: 4.97 M/UL (ref 4.2–5.4)
RBC BLD AUTO: PRESENT
SODIUM SERPL-SCNC: 143 MMOL/L (ref 135–145)
WBC # BLD AUTO: 5.7 K/UL (ref 4.8–10.8)

## 2017-10-12 PROCEDURE — 700101 HCHG RX REV CODE 250: Performed by: FAMILY MEDICINE

## 2017-10-12 PROCEDURE — 770006 HCHG ROOM/CARE - MED/SURG/GYN SEMI*

## 2017-10-12 PROCEDURE — 85025 COMPLETE CBC W/AUTO DIFF WBC: CPT

## 2017-10-12 PROCEDURE — 700102 HCHG RX REV CODE 250 W/ 637 OVERRIDE(OP): Performed by: INTERNAL MEDICINE

## 2017-10-12 PROCEDURE — 80048 BASIC METABOLIC PNL TOTAL CA: CPT

## 2017-10-12 PROCEDURE — 700102 HCHG RX REV CODE 250 W/ 637 OVERRIDE(OP): Performed by: HOSPITALIST

## 2017-10-12 PROCEDURE — A9270 NON-COVERED ITEM OR SERVICE: HCPCS | Performed by: FAMILY MEDICINE

## 2017-10-12 PROCEDURE — A9270 NON-COVERED ITEM OR SERVICE: HCPCS | Performed by: HOSPITALIST

## 2017-10-12 PROCEDURE — 36415 COLL VENOUS BLD VENIPUNCTURE: CPT

## 2017-10-12 PROCEDURE — 99233 SBSQ HOSP IP/OBS HIGH 50: CPT | Performed by: INTERNAL MEDICINE

## 2017-10-12 PROCEDURE — A9270 NON-COVERED ITEM OR SERVICE: HCPCS | Performed by: INTERNAL MEDICINE

## 2017-10-12 PROCEDURE — 700111 HCHG RX REV CODE 636 W/ 250 OVERRIDE (IP): Performed by: FAMILY MEDICINE

## 2017-10-12 PROCEDURE — 700102 HCHG RX REV CODE 250 W/ 637 OVERRIDE(OP): Performed by: FAMILY MEDICINE

## 2017-10-12 RX ADMIN — ASPIRIN 81 MG: 81 TABLET, COATED ORAL at 08:03

## 2017-10-12 RX ADMIN — METOPROLOL TARTRATE 12.5 MG: 25 TABLET, FILM COATED ORAL at 08:03

## 2017-10-12 RX ADMIN — HEPARIN SODIUM 5000 UNITS: 5000 INJECTION, SOLUTION INTRAVENOUS; SUBCUTANEOUS at 20:53

## 2017-10-12 RX ADMIN — DULOXETINE HYDROCHLORIDE 20 MG: 20 CAPSULE, DELAYED RELEASE ORAL at 08:03

## 2017-10-12 RX ADMIN — HYDRALAZINE HYDROCHLORIDE 100 MG: 50 TABLET ORAL at 20:51

## 2017-10-12 RX ADMIN — ATORVASTATIN CALCIUM 80 MG: 40 TABLET, FILM COATED ORAL at 20:49

## 2017-10-12 RX ADMIN — LIDOCAINE 1 PATCH: 50 PATCH CUTANEOUS at 08:03

## 2017-10-12 RX ADMIN — HYDRALAZINE HYDROCHLORIDE 100 MG: 50 TABLET ORAL at 05:58

## 2017-10-12 RX ADMIN — METOPROLOL TARTRATE 12.5 MG: 25 TABLET, FILM COATED ORAL at 20:52

## 2017-10-12 RX ADMIN — BENAZEPRIL HYDROCHLORIDE 40 MG: 20 TABLET ORAL at 08:03

## 2017-10-12 RX ADMIN — OXYBUTYNIN CHLORIDE 5 MG: 5 TABLET, FILM COATED, EXTENDED RELEASE ORAL at 20:53

## 2017-10-12 RX ADMIN — GABAPENTIN 300 MG: 300 CAPSULE ORAL at 20:50

## 2017-10-12 RX ADMIN — HEPARIN SODIUM 5000 UNITS: 5000 INJECTION, SOLUTION INTRAVENOUS; SUBCUTANEOUS at 05:58

## 2017-10-12 RX ADMIN — AMLODIPINE BESYLATE 10 MG: 10 TABLET ORAL at 08:03

## 2017-10-12 RX ADMIN — OMEPRAZOLE 20 MG: 20 CAPSULE, DELAYED RELEASE ORAL at 08:03

## 2017-10-12 ASSESSMENT — PAIN SCALES - GENERAL
PAINLEVEL_OUTOF10: 0

## 2017-10-12 ASSESSMENT — ENCOUNTER SYMPTOMS
SENSORY CHANGE: 0
NAUSEA: 0
WEAKNESS: 1
HEARTBURN: 0
DEPRESSION: 0
CHILLS: 0
ABDOMINAL PAIN: 0
MYALGIAS: 1
COUGH: 0
FEVER: 0
PALPITATIONS: 0
MEMORY LOSS: 1
TREMORS: 0
HALLUCINATIONS: 0
SHORTNESS OF BREATH: 0
HEADACHES: 0

## 2017-10-12 NOTE — PROGRESS NOTES
[facility] Hospitalist Progress Note    Date of Service: 10/12/2017    Chief Complaint  76 y.o. female admitted 2017 with AMS, UTI.     Interval Problem Update  No new complaints  Feeling tired but stronger than yesterday  Does not recall if she was walking yesterday     Consultants/Specialty  Neuro.     Disposition  Pending  Psychiatry to follow up on , RN to notify   need for power of  versus guardianship  Encourage activity      Review of Systems   Unable to perform ROS: Dementia   Constitutional: Negative for chills and fever.   HENT: Negative for congestion and hearing loss.    Respiratory: Negative for cough and shortness of breath.    Cardiovascular: Negative for chest pain, palpitations and leg swelling.   Gastrointestinal: Negative for abdominal pain, heartburn and nausea.   Genitourinary: Negative for frequency.   Musculoskeletal: Positive for myalgias.   Neurological: Positive for weakness. Negative for tremors, sensory change and headaches.   Psychiatric/Behavioral: Positive for memory loss. Negative for depression and hallucinations.      Physical Exam  Laboratory/Imaging   Hemodynamics  Temp (24hrs), Av.4 °C (97.6 °F), Min:36.1 °C (97 °F), Max:37 °C (98.6 °F)   Temperature: 37 °C (98.6 °F)  Pulse  Av  Min: 52  Max: 88    Blood Pressure : 119/84      Respiratory      Respiration: 18, Pulse Oximetry: 97 %        RLL Breath Sounds: Diminished, LLL Breath Sounds: Diminished    Fluids  No intake or output data in the 24 hours ending 10/12/17 1438    Nutrition  Orders Placed This Encounter   Procedures   • Diet Order     Standing Status:   Standing     Number of Occurrences:   1     Order Specific Question:   Diet:     Answer:   Regular [1]     Physical Exam   Constitutional: No distress.   Obese  Drowsy, arousable   HENT:   Head: Normocephalic and atraumatic.   Mouth/Throat: No oropharyngeal exudate.   Eyes: EOM are normal. Pupils are equal, round, and reactive to light. No scleral  icterus.   Neck: Normal range of motion. Neck supple.   Cardiovascular: Normal rate and regular rhythm.    Pulmonary/Chest: Effort normal. No respiratory distress.   Abdominal: Soft. Bowel sounds are normal. She exhibits no distension. There is no tenderness.   Musculoskeletal: She exhibits no edema.   Neurological: She is alert. She has normal strength. She is not disoriented. No cranial nerve deficit or sensory deficit. Coordination normal. GCS eye subscore is 4. GCS verbal subscore is 5. GCS motor subscore is 6.   Skin: Skin is warm and dry. She is not diaphoretic. No erythema.   Psychiatric: She has a normal mood and affect. Her behavior is normal. Judgment and thought content normal.   Nursing note and vitals reviewed.      Recent Labs      10/12/17   0213   WBC  5.7   RBC  4.97   HEMOGLOBIN  13.2   HEMATOCRIT  42.4   MCV  85.3   MCH  26.6*   MCHC  31.1*   RDW  74.3*   PLATELETCT  189     Recent Labs      10/12/17   0213   SODIUM  143   POTASSIUM  3.9   CHLORIDE  111   CO2  23   GLUCOSE  86   BUN  25*   CREATININE  1.07   CALCIUM  9.3                      Assessment/Plan     HTN (hypertension)- (present on admission)   Assessment & Plan    Improving, monitor  Essential  Continue hydralazine to 100 mg 3 times a day  Continue metoprolol 12.5 mg twice a day  Continue her home dose of Lotensin  Norvasc 10 mg daily         Insomnia   Assessment & Plan    Increase trazodone nightly  Monitor for oversedation, respiratory distress        UTI (urinary tract infection)- (present on admission)   Assessment & Plan    Cultures negative  Completed antibiotics with IV Rocephin        Acute renal insufficiency- (present on admission)   Assessment & Plan    Mild probably due to dehydration. Resolved now.         T12 compression fracture (CMS-HCC)- (present on admission)   Assessment & Plan    Stable, ct spine did not show acute changes.         History of CVA (cerebrovascular accident)- (present on admission)   Assessment &  Plan    Continue ASA and statin.   9/23 MRI today show no new stroke, showed Prominent lateral and third ventricles without visualized obstructing lesion which could indicate normal pressure hydrocephalus, patient does not have urinary incontinence neither balance problem, PT/OT to see today.   9/24 MRI no new stroke, pt/ot to see.   9/25 pt/ot today.   Out of bed for meals        Dementia- (present on admission)   Assessment & Plan    MRI of her brain showed enlarged ventricles but no other acute abnormalities  Neurology has evaluated the patient  Upon review of patient's mentation, October 5, patient is alert and oriented ×3, patient states that she has a friend who will come to visit today Shaun as per prior review of records patient does have a close relationship with this friend and is requesting that he be a part of her medical decision-making. Patient does not appear to be significantly incapacitated at this time to make her own medical decisions. If there is warranted for any further evaluation of capacity to make her own medical decisions I will defer this to psychiatry. As for my opinion patient does not appear to be consistently incapacitated to make her own medical decisions.    I have discussed this with our  who will assist in obtaining power of  assistance/guardianship    10/6 patient seen by psychiatry for capacity evaluation, will follow up with psychiatry for further designation  Psychiatry to follow up on Monday, will need POA establishment versus guardianship, discussed with psychiatry    10/10-psychiatry to reevaluate this week, patient is oriented however lacks insight into her condition  Appreciate psychiatry input                Reviewed items::  Labs reviewed and Medications reviewed  Pink catheter::  No Pink  DVT prophylaxis pharmacological::  Heparin  Antibiotics:  Treating active infection/contamination beyond 24 hours perioperative coverage

## 2017-10-12 NOTE — CARE PLAN
Problem: Discharge Barriers/Planning  Goal: Patient's continuum of care needs will be met  SW involved in patient discharge plans, guardianship pending    Problem: Mobility  Goal: Risk for activity intolerance will decrease  Patient transferred from bed to chair and back to bed with stand by to 1 assist. Turns self in bed.

## 2017-10-12 NOTE — PROGRESS NOTES
Patient is assessed, patient has safety protocol in place, patient is up to chair for lunch, encourage patient to ambulate, Patient is awaiting guardianship. Continue to monitor patient

## 2017-10-12 NOTE — PROGRESS NOTES
Simona Knight Fall Risk Assessment:     Last Known Fall: Within the last month  Mobility: Use of assistive device/requires assist of two people  Medications: Cardiovascular or central nervous system meds  Mental Status/LOC/Awareness: Oriented to person and place  Toileting Needs: Incontinence  Volume/Electrolyte Status: No problems  Communication/Sensory: Visual (Glasses)/hearing deficit  Behavior: Appropriate behavior  Simona Knight Fall Risk Total: 15  Fall Risk Level: HIGH RISK    Universal Fall Precautions:  call light/belongings in reach, bed in low position and locked, siderails up x 2, use non-slip footwear    Fall Risk Level Interventions:    TRIAL (TELE 8, NEURO, MED JENNIE 5) Moderate Fall Risk Interventions  Place yellow fall risk ID band on patient: verified  Provide patient/family education based on risk assessment : completed  Educate patient/family to call staff for assistance when getting out of bed: completed  Place fall precaution signage outside patient door: completed  Utilize bed/chair fall alarm: completedTRIAL (TELE 8, NEURO, [employer] JENNIE 5) High Fall Risk Interventions  Place yellow fall risk ID band on patient: verified  Provide patient/family education based on risk assessment: completed  Educate patient/family to call staff for assistance when getting out of bed: completed  Place fall precaution signage outside patient door: verified  Place patient in room close to nursing station: currently not available/charge notified  Utilize bed/chair fall alarm: verified  Notify charge of high risk for huddle: verified    Patient Specific Interventions:     Medication: assess for medications that can be discontinued or dosage decreased and limit combination of prn medications  Mental Status/LOC/Awareness: reorient patient, reinforce falls education, reinforce the use of call light and provide activity  Toileting: provide frquent toileting and do not leave patient unattended in bathroom/refer to toileting  scripting  Volume/Electrolyte Status: ensure patient remains hydrated, advance diet as tolerated, monitor abnormal lab values and ensure IV fluids are appropriate  Communication/Sensory: update plan of care on whiteboard, ensure proper positioning when transferrng/ambulating and ensure patient has glasses/contacts and hearing aids/dentures  Behavioral: encourage patient to voice feelings, engage patient in daily activities, administer medication as ordered and encourage family to stay with impulsive patients  Mobility: As tolerated

## 2017-10-12 NOTE — PROGRESS NOTES
Received report, assumed care of pt at 1900. Pt in bed, fall precautions in place, bed alarm on, call light within reach. Pt instructed to use call light when assistance is needed. RN and CNA numbers provided. VSS. A&O x3 with intermittent confusion. Hourly rounding in place, will continue to monitor pt closely.

## 2017-10-12 NOTE — PSYCHIATRY
PSYCHIATRIC FOLLOW UP:  Not seen. Have notified Dr MARTINEZ who will be able to see the patient in the next couple of days, to address the need for guardianship be it permanent or temporary for this patient: no family has been able to be located for this person.

## 2017-10-12 NOTE — DISCHARGE PLANNING
RONALDO spoke with Psych Dr. Burr. Dr. Sharp is following this case and will complete the packet by the end of the week.

## 2017-10-12 NOTE — PSYCHIATRY
"PSYCHIATRIC FOLLOW UP:      Legal hold status:   None required   Psychiatric Supervising Attending:     Khalif    HPI:       Pt continues to be unable the full ramifications of picking a decision maker or places to stay. Per SW, there is NOT a SNF placement for her. She would have to choose between different kinds of placements (group home, assisted living, etc). She is not able to understand the differences between these. She is also unable to discuss or understand any of the financial ramifications of these different placements.     She is pleasant and reports feeling relatively happy. She has no complaints.     Psychiatric Examination: observed phenomenon:  Vitals:   Vitals:    10/12/17 0351 10/12/17 0707 10/12/17 0837 10/12/17 1500   BP: (!) 165/42 151/57 119/84 120/47   Pulse: 62 60 84 (!) 56   Resp: 17 16 18 18   Temp: 36.4 °C (97.6 °F) 36.2 °C (97.1 °F) 37 °C (98.6 °F) 36.1 °C (97 °F)   SpO2: 92% 95% 97% 93%   Weight:       Height:         Musculoskeletal(abnormal movements, gait, etc):psychomotor retardation, mild   Appearance: Grooming wnl   Thoughts: confused but able to speak logically and goal-directed sometimes   Speech: Nl tone, rate, and volume. Not pressured. Understandable.   Mood:    \"good\"  Affect:    Constricted and congruent   SI/HI:   None   Attention/Alertness:   Attention poor   Memory:    Poor   Orientation:    Oriented to self and place   Fund of Knowledge:    decreased  Insight/Judgement into symptoms decreased  Neurological Testing:( ie clock, cube drawing, MMSE, MOCA,etc.)    Medical systems reviewed: (pain, new complaint, etc)       No pain   Confused  All other systems reviewed and are negative.       Lab results/tests:        Recent Results (from the past 48 hour(s))   CBC WITH DIFFERENTIAL    Collection Time: 10/12/17  2:13 AM   Result Value Ref Range    WBC 5.7 4.8 - 10.8 K/uL    RBC 4.97 4.20 - 5.40 M/uL    Hemoglobin 13.2 12.0 - 16.0 g/dL    Hematocrit 42.4 37.0 - 47.0 " %    MCV 85.3 81.4 - 97.8 fL    MCH 26.6 (L) 27.0 - 33.0 pg    MCHC 31.1 (L) 33.6 - 35.0 g/dL    RDW 74.3 (H) 35.9 - 50.0 fL    Platelet Count 189 164 - 446 K/uL    Nucleated RBC 0.00 /100 WBC    NRBC (Absolute) 0.00 K/uL    Neutrophils-Polys 57.70 44.00 - 72.00 %    Lymphocytes 23.70 22.00 - 41.00 %    Monocytes 10.50 0.00 - 13.40 %    Eosinophils 6.70 0.00 - 6.90 %    Basophils 0.90 0.00 - 1.80 %    Immature Granulocytes 0.50 0.00 - 0.90 %    Lymphs (Absolute) 1.35 1.00 - 4.80 K/uL    Monos (Absolute) 0.60 0.00 - 0.85 K/uL    Eos (Absolute) 0.38 0.00 - 0.51 K/uL    Baso (Absolute) 0.05 0.00 - 0.12 K/uL    Immature Granulocytes (abs) 0.03 0.00 - 0.11 K/uL    Neutrophils (Absolute) 3.28 2.00 - 7.15 K/uL    Anisocytosis 1+     Microcytosis 1+    BASIC METABOLIC PANEL    Collection Time: 10/12/17  2:13 AM   Result Value Ref Range    Sodium 143 135 - 145 mmol/L    Potassium 3.9 3.6 - 5.5 mmol/L    Chloride 111 96 - 112 mmol/L    Co2 23 20 - 33 mmol/L    Glucose 86 65 - 99 mg/dL    Bun 25 (H) 8 - 22 mg/dL    Creatinine 1.07 0.50 - 1.40 mg/dL    Calcium 9.3 8.5 - 10.5 mg/dL    Anion Gap 9.0 0.0 - 11.9   ESTIMATED GFR    Collection Time: 10/12/17  2:13 AM   Result Value Ref Range    GFR If African American >60 >60 mL/min/1.73 m 2    GFR If Non African American 50 (A) >60 mL/min/1.73 m 2   PERIPHERAL SMEAR REVIEW    Collection Time: 10/12/17  2:13 AM   Result Value Ref Range    Peripheral Smear Review see below    PLATELET ESTIMATE    Collection Time: 10/12/17  2:13 AM   Result Value Ref Range    Plt Estimation Normal    MORPHOLOGY    Collection Time: 10/12/17  2:13 AM   Result Value Ref Range    RBC Morphology Present     Poikilocytosis 1+     Ovalocytes 1+    DIFFERENTIAL COMMENT    Collection Time: 10/12/17  2:13 AM   Result Value Ref Range    Comments-Diff see below        Assessment:(acuity level)        Dementia      Plan:    Ms Valencia does not have the capacity to make medical decisions  Ms Valencia does not have the  capacity to make complicated decisions about placement.   Ms Valencia does not have capacity to manage finances.     Thank you for the consult.

## 2017-10-12 NOTE — PROGRESS NOTES
Simona Knight Fall Risk Assessment:     Last Known Fall: Within the last month  Mobility: Use of assistive device/requires assist of two people  Medications: Cardiovascular or central nervous system meds  Mental Status/LOC/Awareness: Oriented to person and place  Toileting Needs: Incontinence  Volume/Electrolyte Status: No problems  Communication/Sensory: Visual (Glasses)/hearing deficit  Behavior: Appropriate behavior  Simona Knight Fall Risk Total: 15  Fall Risk Level: HIGH RISK    Universal Fall Precautions:  call light/belongings in reach, bed in low position and locked, wheelchairs and assistive devices out of sight, siderails up x 2, use non-slip footwear, adequate lighting, clutter free and spill free environment, educate on level of risk, educate to call for assistance    Fall Risk Level Interventions:    TRIAL ([employer] [facility], NEURO, MED JENNIE 5) Moderate Fall Risk Interventions  Place yellow fall risk ID band on patient: verified  Provide patient/family education based on risk assessment : completed  Educate patient/family to call staff for assistance when getting out of bed: completed  Place fall precaution signage outside patient door: verified  Utilize bed/chair fall alarm: verifiedTRIAL ([employer] 8, NEURO, [place] JENNIE 5) High Fall Risk Interventions  Place yellow fall risk ID band on patient: verified  Provide patient/family education based on risk assessment: completed  Educate patient/family to call staff for assistance when getting out of bed: completed  Place fall precaution signage outside patient door: verified  Place patient in room close to nursing station: currently not available/charge notified  Utilize bed/chair fall alarm: verified  Notify charge of high risk for huddle: verified    Patient Specific Interventions:     Medication: review medications with patient and family  Mental Status/LOC/Awareness: reinforce falls education, check on patient hourly, utilize bed/chair fall alarm, reinforce the use of  call light and provide activity  Toileting: provide frquent toileting and do not leave patient unattended in bathroom/refer to toileting scripting  Volume/Electrolyte Status: ensure patient remains hydrated  Communication/Sensory: update plan of care on whiteboard, ensure proper positioning when transferrng/ambulating and ensure patient has glasses/contacts and hearing aids/dentures  Behavioral: encourage patient to voice feelings, engage patient in daily activities and administer medication as ordered  Mobility: schedule physical activity throughout the day, utilize bed/chair fall alarm, ensure bed is locked and in lowest position and provide appropriate assistive device

## 2017-10-12 NOTE — CARE PLAN
Problem: Safety  Goal: Will remain free from injury  Patient has safety protocol in place, continue to monitor    Problem: Infection  Goal: Will remain free from infection  Monitor for s/sx of infection

## 2017-10-13 PROCEDURE — A9270 NON-COVERED ITEM OR SERVICE: HCPCS | Performed by: FAMILY MEDICINE

## 2017-10-13 PROCEDURE — 700111 HCHG RX REV CODE 636 W/ 250 OVERRIDE (IP): Performed by: FAMILY MEDICINE

## 2017-10-13 PROCEDURE — 97535 SELF CARE MNGMENT TRAINING: CPT

## 2017-10-13 PROCEDURE — A9270 NON-COVERED ITEM OR SERVICE: HCPCS | Performed by: HOSPITALIST

## 2017-10-13 PROCEDURE — 700102 HCHG RX REV CODE 250 W/ 637 OVERRIDE(OP): Performed by: INTERNAL MEDICINE

## 2017-10-13 PROCEDURE — 97530 THERAPEUTIC ACTIVITIES: CPT

## 2017-10-13 PROCEDURE — 97110 THERAPEUTIC EXERCISES: CPT

## 2017-10-13 PROCEDURE — 770006 HCHG ROOM/CARE - MED/SURG/GYN SEMI*

## 2017-10-13 PROCEDURE — 700102 HCHG RX REV CODE 250 W/ 637 OVERRIDE(OP): Performed by: HOSPITALIST

## 2017-10-13 PROCEDURE — 700101 HCHG RX REV CODE 250: Performed by: FAMILY MEDICINE

## 2017-10-13 PROCEDURE — A9270 NON-COVERED ITEM OR SERVICE: HCPCS | Performed by: INTERNAL MEDICINE

## 2017-10-13 PROCEDURE — 700102 HCHG RX REV CODE 250 W/ 637 OVERRIDE(OP): Performed by: FAMILY MEDICINE

## 2017-10-13 PROCEDURE — 99231 SBSQ HOSP IP/OBS SF/LOW 25: CPT | Performed by: INTERNAL MEDICINE

## 2017-10-13 RX ADMIN — STANDARDIZED SENNA CONCENTRATE AND DOCUSATE SODIUM 2 TABLET: 8.6; 5 TABLET, FILM COATED ORAL at 09:27

## 2017-10-13 RX ADMIN — AMLODIPINE BESYLATE 10 MG: 10 TABLET ORAL at 09:26

## 2017-10-13 RX ADMIN — GABAPENTIN 300 MG: 300 CAPSULE ORAL at 21:18

## 2017-10-13 RX ADMIN — HEPARIN SODIUM 5000 UNITS: 5000 INJECTION, SOLUTION INTRAVENOUS; SUBCUTANEOUS at 06:37

## 2017-10-13 RX ADMIN — HEPARIN SODIUM 5000 UNITS: 5000 INJECTION, SOLUTION INTRAVENOUS; SUBCUTANEOUS at 14:34

## 2017-10-13 RX ADMIN — HYDRALAZINE HYDROCHLORIDE 100 MG: 50 TABLET ORAL at 14:34

## 2017-10-13 RX ADMIN — OXYBUTYNIN CHLORIDE 5 MG: 5 TABLET, FILM COATED, EXTENDED RELEASE ORAL at 21:18

## 2017-10-13 RX ADMIN — HYDRALAZINE HYDROCHLORIDE 100 MG: 50 TABLET ORAL at 06:28

## 2017-10-13 RX ADMIN — DULOXETINE HYDROCHLORIDE 20 MG: 20 CAPSULE, DELAYED RELEASE ORAL at 09:24

## 2017-10-13 RX ADMIN — LIDOCAINE 1 PATCH: 50 PATCH CUTANEOUS at 09:27

## 2017-10-13 RX ADMIN — HEPARIN SODIUM 5000 UNITS: 5000 INJECTION, SOLUTION INTRAVENOUS; SUBCUTANEOUS at 21:18

## 2017-10-13 RX ADMIN — OMEPRAZOLE 20 MG: 20 CAPSULE, DELAYED RELEASE ORAL at 09:24

## 2017-10-13 RX ADMIN — BENAZEPRIL HYDROCHLORIDE 40 MG: 20 TABLET ORAL at 09:23

## 2017-10-13 RX ADMIN — METOPROLOL TARTRATE 12.5 MG: 25 TABLET, FILM COATED ORAL at 09:25

## 2017-10-13 RX ADMIN — HYDRALAZINE HYDROCHLORIDE 100 MG: 50 TABLET ORAL at 21:18

## 2017-10-13 RX ADMIN — ATORVASTATIN CALCIUM 80 MG: 40 TABLET, FILM COATED ORAL at 21:18

## 2017-10-13 RX ADMIN — ASPIRIN 81 MG: 81 TABLET, COATED ORAL at 09:24

## 2017-10-13 ASSESSMENT — COGNITIVE AND FUNCTIONAL STATUS - GENERAL
WALKING IN HOSPITAL ROOM: A LITTLE
MOVING TO AND FROM BED TO CHAIR: A LITTLE
DRESSING REGULAR LOWER BODY CLOTHING: A LITTLE
HELP NEEDED FOR BATHING: A LITTLE
PERSONAL GROOMING: A LITTLE
DAILY ACTIVITIY SCORE: 19
SUGGESTED CMS G CODE MODIFIER MOBILITY: CK
TOILETING: A LITTLE
SUGGESTED CMS G CODE MODIFIER DAILY ACTIVITY: CK
DRESSING REGULAR UPPER BODY CLOTHING: A LITTLE
MOVING FROM LYING ON BACK TO SITTING ON SIDE OF FLAT BED: A LITTLE
STANDING UP FROM CHAIR USING ARMS: A LITTLE
CLIMB 3 TO 5 STEPS WITH RAILING: A LITTLE
TURNING FROM BACK TO SIDE WHILE IN FLAT BAD: A LITTLE
MOBILITY SCORE: 18

## 2017-10-13 ASSESSMENT — LIFESTYLE VARIABLES: DO YOU DRINK ALCOHOL: NO

## 2017-10-13 ASSESSMENT — ENCOUNTER SYMPTOMS
MEMORY LOSS: 1
NAUSEA: 0
PALPITATIONS: 0
SHORTNESS OF BREATH: 0
MYALGIAS: 1
DEPRESSION: 0
TREMORS: 0
ABDOMINAL PAIN: 0
DIZZINESS: 0
HALLUCINATIONS: 0
WEAKNESS: 0

## 2017-10-13 ASSESSMENT — GAIT ASSESSMENTS
GAIT LEVEL OF ASSIST: STAND BY ASSIST
DISTANCE (FEET): 125
DEVIATION: SHUFFLED GAIT;BRADYKINETIC
ASSISTIVE DEVICE: FRONT WHEEL WALKER

## 2017-10-13 ASSESSMENT — PAIN SCALES - GENERAL
PAINLEVEL_OUTOF10: 0
PAINLEVEL_OUTOF10: 0

## 2017-10-13 NOTE — PROGRESS NOTES
Simona Knight Fall Risk Assessment:     Last Known Fall: Within the last month  Mobility: Use of assistive device/requires assist of two people  Medications: Cardiovascular or central nervous system meds  Mental Status/LOC/Awareness: Oriented to person and place  Toileting Needs: Incontinence  Volume/Electrolyte Status: No problems  Communication/Sensory: Visual (Glasses)/hearing deficit  Behavior: Appropriate behavior  Simona Knight Fall Risk Total: 15  Fall Risk Level: HIGH RISK    Universal Fall Precautions:  call light/belongings in reach, bed in low position and locked, wheelchairs and assistive devices out of sight, siderails up x 2, use non-slip footwear, adequate lighting, clutter free and spill free environment, educate on level of risk, educate to call for assistance    Fall Risk Level Interventions:    TRIAL (TELE 8, NEURO, MED JENNIE 5) Moderate Fall Risk Interventions  Place yellow fall risk ID band on patient: verified  Provide patient/family education based on risk assessment : completed  Educate patient/family to call staff for assistance when getting out of bed: completed  Place fall precaution signage outside patient door: completed  Utilize bed/chair fall alarm: completedTRIAL (TELE 8, NEURO, MED JENNIE 5) High Fall Risk Interventions  Place yellow fall risk ID band on patient: verified  Provide patient/family education based on risk assessment: completed  Educate patient/family to call staff for assistance when getting out of bed: completed  Place fall precaution signage outside patient door: verified  Place patient in room close to nursing station: currently not available/charge notified  Utilize bed/chair fall alarm: completed  Notify charge of high risk for huddle: verified    Patient Specific Interventions:     Medication: review medications with patient and family, assess for medications that can be discontinued or dosage decreased and limit combination of prn medications  Mental  Status/LOC/Awareness: reorient patient, reinforce falls education, check on patient hourly, utilize bed/chair fall alarm and reinforce the use of call light  Toileting: monitor intake and output/use of appropriate interventions, instruct patient/family on the use of grab bars and instruct patient/family on the need to call for assistance when toileting  Volume/Electrolyte Status: advance diet as tolerated, administer medications as ordered for nausea and vomiting, monitor abnormal lab values and ensure IV fluids are appropriate  Communication/Sensory: update plan of care on whiteboard, ensure proper positioning when transferrng/ambulating, ensure patient has glasses/contacts and hearing aids/dentures and for visually impaired patients orient to their room surrounding and do not change their surroundings  Behavioral: encourage patient to voice feelings, engage patient in daily activities, administer medication as ordered, initiate plan of care for alcohol withdrawal and instruct/reinforce fall program rationale  Mobility: dangle prior to standing, utilize bed/chair fall alarm, ensure bed is locked and in lowest position, provide appropriate assistive device, instruct patient to exit bed on their strongest side and collaborate with doctor for possible PT/OT consult

## 2017-10-13 NOTE — THERAPY
"Physical Therapy Treatment completed.   Bed Mobility:  Supine to Sit: Modified Independent  Transfers: Sit to Stand: Stand by Assist  Gait: Level Of Assist: Stand by Assist with Front-Wheel Walker       Plan of Care: Will benefit from Physical Therapy 2 times per week and Plan to complete next treatment by Wednesday 10/18  Discharge Recommendations: Equipment: Will Continue to Assess for Equipment Needs. Post-acute therapy Discharge to home with outpatient or home health for additional skilled therapy services.    Pt continues to have decreased endurance, minor strength impairments and cognitive deficits. Pt would benefit from ongoing PT intervention to improve endurance. Pt should be up in bedside chair for all meals, ambulate hallways 3x/day and complete bed level therapeutic exercises as able.     See \"Rehab Therapy-Acute\" Patient Summary Report for complete documentation.       "

## 2017-10-13 NOTE — THERAPY
"Occupational Therapy Treatment completed with focus on ADLs, ADL transfers and patient education.  Functional Status:  Pt seen for OT tx. Pt required repeated education of log roll for supine to sit, pt was able to complete bed mobility w/ mod-I after education. Pt at this time is limited d/t cognitive deficits and decreased activity tolerance. Pt unable to complete LB dressing following precautions. Pt given education and physical demonstration of using AE as needed. SBA sit to stand, demonstrated ability to complete ADL transfers using FWW w/ SBA. Pt does require min cueing for balance and safety awareness. Pt pleasant and cooperative.   Plan of Care: Will benefit from Occupational Therapy 3 times per week  Discharge Recommendations:  Equipment Will Continue to Assess for Equipment Needs.     See \"Rehab Therapy-Acute\" Patient Summary Report for complete documentation.   "

## 2017-10-13 NOTE — PROGRESS NOTES
"Assumed care of patient at 0700 . Received report from RN. Patient is AOX3 . Assessment complete. Labs reviewed.Patient and RN discussed plan of care. Patient questions answered. Patient needs are met at this time. Bed in lowest and locked position. Upper bed rails up.  Call light is within reach. Hourly rounding in place.  /67   Pulse 62   Temp 36.4 °C (97.6 °F)   Resp 20   Ht 1.6 m (5' 2.99\")   Wt 83.4 kg (183 lb 13.8 oz)   SpO2 95%   Breastfeeding? No   BMI 32.58 kg/m²     "

## 2017-10-13 NOTE — PROGRESS NOTES
Received report from day RN. Assumed pt care. Discussed call light/phone system, communication board and POC. Pt is aao x 3, disoriented to time. Pt is confused and forgetful at times. Pt mobility assessed. Pt is a one assist with the use of a FWW per day shift. Pt declines mobility before bed. Q2 turns in place. Bed alarm on. Fall precautions in place. Bed is locked and in low position, call light within reach. Treaded slippers in place. Pt needs met at this time. Hourly rounding in place.

## 2017-10-13 NOTE — PROGRESS NOTES
Assumed care of pt at 0700. Received report from RN. Pt A&Ox3 (cant remember birthdate). Assessment complete. Labs reviewed. Pt and RN discussed plan of care. Pt questions answered. Pt needs are met at this time. Pt denies pain at this time. Pt sitting at bedside chair for breakfast. Bed in lowest and locked position. Call light within reach. Upper bed rails up. Hourly rounding in place.

## 2017-10-13 NOTE — CARE PLAN
Problem: Communication  Goal: The ability to communicate needs accurately and effectively will improve    Intervention: [place] patient and significant other/support system to call light to alert staff of needs  Pt educated and oriented to room. Pt verbalized understanding.       Problem: Mobility  Goal: Risk for activity intolerance will decrease    Intervention: Encourage patient to increase activity level in collaboration with Interdisciplinary Team  Pt walked with PT this morning with the use of front wheel walker. Pt sat at bedside chair for breakfast. Pt tolerated activity well.

## 2017-10-13 NOTE — PROGRESS NOTES
[facility] Hospitalist Progress Note    Date of Service: 10/13/2017    Chief Complaint  76 y.o. female admitted 2017 with AMS, UTI.     Interval Problem Update  Patient ambulating with walker and contact-guard assist  No new complaints       Consultants/Specialty  Neuro.     Disposition  Pending  [place] has completed paperwork for guardianship, incapacity    guardianship pending   Encourage activity      Review of Systems   Unable to perform ROS: Dementia   Constitutional: Negative for malaise/fatigue.   HENT: Negative for congestion and hearing loss.    Respiratory: Negative for shortness of breath.    Cardiovascular: Negative for palpitations.   Gastrointestinal: Negative for abdominal pain and nausea.   Genitourinary: Negative for dysuria and frequency.   Musculoskeletal: Positive for myalgias.   Neurological: Negative for dizziness, tremors and weakness.   Psychiatric/Behavioral: Positive for memory loss. Negative for depression and hallucinations.      Physical Exam  Laboratory/Imaging   Hemodynamics  Temp (24hrs), Av.3 °C (97.3 °F), Min:36.1 °C (97 °F), Max:36.4 °C (97.6 °F)   Temperature: 36.4 °C (97.5 °F)  Pulse  Av  Min: 52  Max: 88    Blood Pressure : 144/58      Respiratory      Respiration: 17, Pulse Oximetry: 94 %        RLL Breath Sounds: Diminished, LLL Breath Sounds: Diminished    Fluids  No intake or output data in the 24 hours ending 10/13/17 0903    Nutrition  Orders Placed This Encounter   Procedures   • Diet Order     Standing Status:   Standing     Number of Occurrences:   1     Order Specific Question:   Diet:     Answer:   Regular [1]     Physical Exam   Constitutional: No distress.   Obese  Drowsy, arousable   HENT:   Head: Normocephalic and atraumatic.   Mouth/Throat: No oropharyngeal exudate.   Eyes: EOM are normal. Pupils are equal, round, and reactive to light.   Neck: Normal range of motion. Neck supple. No thyromegaly present.   Cardiovascular: Normal rate, regular rhythm  and intact distal pulses.    Pulmonary/Chest: Effort normal and breath sounds normal. No respiratory distress.   Abdominal: Soft. Bowel sounds are normal. She exhibits no distension. There is no tenderness.   Musculoskeletal: She exhibits no edema or tenderness.   Neurological: She is alert. She has normal strength. She is not disoriented. No cranial nerve deficit or sensory deficit. GCS eye subscore is 4. GCS verbal subscore is 5. GCS motor subscore is 6.   Oriented ×2   Skin: Skin is warm and dry.   Psychiatric: She has a normal mood and affect. Her behavior is normal. Judgment and thought content normal.   Nursing note and vitals reviewed.      Recent Labs      10/12/17   0213   WBC  5.7   RBC  4.97   HEMOGLOBIN  13.2   HEMATOCRIT  42.4   MCV  85.3   MCH  26.6*   MCHC  31.1*   RDW  74.3*   PLATELETCT  189     Recent Labs      10/12/17   0213   SODIUM  143   POTASSIUM  3.9   CHLORIDE  111   CO2  23   GLUCOSE  86   BUN  25*   CREATININE  1.07   CALCIUM  9.3                      Assessment/Plan     HTN (hypertension)- (present on admission)   Assessment & Plan    Improving, monitor  Essential  Continue hydralazine to 100 mg 3 times a day  Continue metoprolol 12.5 mg twice a day  Continue her home dose of Lotensin  Norvasc 10 mg daily         Insomnia   Assessment & Plan    Increase trazodone nightly  Monitor for oversedation, respiratory distress        UTI (urinary tract infection)- (present on admission)   Assessment & Plan    Cultures negative  Completed antibiotics with IV Rocephin        Acute renal insufficiency- (present on admission)   Assessment & Plan    Mild probably due to dehydration. Resolved now.         T12 compression fracture (CMS-HCC)- (present on admission)   Assessment & Plan    Stable, ct spine did not show acute changes.         History of CVA (cerebrovascular accident)- (present on admission)   Assessment & Plan    Continue ASA and statin.   9/23 MRI today show no new stroke, showed Prominent  lateral and third ventricles without visualized obstructing lesion which could indicate normal pressure hydrocephalus, patient does not have urinary incontinence neither balance problem, PT/OT to see today.   9/24 MRI no new stroke, pt/ot to see.   9/25 pt/ot today.   Out of bed for meals        Dementia- (present on admission)   Assessment & Plan    MRI of her brain showed enlarged ventricles but no other acute abnormalities  Neurology has evaluated the patient  Upon review of patient's mentation, October 5, patient is alert and oriented ×3, patient states that she has a friend who will come to visit today Shaun as per prior review of records patient does have a close relationship with this friend and is requesting that he be a part of her medical decision-making. Patient does not appear to be significantly incapacitated at this time to make her own medical decisions. If there is warranted for any further evaluation of capacity to make her own medical decisions I will defer this to psychiatry. As for my opinion patient does not appear to be consistently incapacitated to make her own medical decisions.    I have discussed this with our  who will assist in obtaining power of  assistance/guardianship    10/6 patient seen by psychiatry for capacity evaluation, will follow up with psychiatry for further designation  Psychiatry to follow up on Monday, will need POA establishment versus guardianship, discussed with psychiatry    10/10-psychiatry to reevaluate this week, patient is oriented however lacks insight into her condition  Appreciate psychiatry input    10/13-amputation has been deemed to lack capacity for medical decision making by psychiatry, paperwork completed, guardianship process pending, social work to assist              Reviewed items::  Labs reviewed and Medications reviewed  Pink catheter::  No Pink  DVT prophylaxis pharmacological::  Heparin  Antibiotics:  Treating active  infection/contamination beyond 24 hours perioperative coverage

## 2017-10-13 NOTE — PROGRESS NOTES
Simona Knight Fall Risk Assessment:     Last Known Fall: Within the last month  Mobility: Use of assistive device/requires assist of two people  Medications: Cardiovascular or central nervous system meds  Mental Status/LOC/Awareness: Oriented to person and place  Toileting Needs: Incontinence  Volume/Electrolyte Status: No problems  Communication/Sensory: Visual (Glasses)/hearing deficit  Behavior: Appropriate behavior  Simona Knight Fall Risk Total: 15  Fall Risk Level: HIGH RISK     Universal Fall Precautions:  call light/belongings in reach, bed in low position and locked, siderails up x 2, use non-slip footwear     Fall Risk Level Interventions:    TRIAL (TELE 8, NEURO, MED JENNIE 5) Moderate Fall Risk Interventions  Place yellow fall risk ID band on patient: verified  Provide patient/family education based on risk assessment : completed  Educate patient/family to call staff for assistance when getting out of bed: completed  Place fall precaution signage outside patient door: completed  Utilize bed/chair fall alarm: completedTRIAL (TELE 8, NEURO, [place] JENNIE 5) High Fall Risk Interventions  Place yellow fall risk ID band on patient: verified  Provide patient/family education based on risk assessment: completed  Educate patient/family to call staff for assistance when getting out of bed: completed  Place fall precaution signage outside patient door: verified  Place patient in room close to nursing station: currently not available/charge notified  Utilize bed/chair fall alarm: verified  Notify charge of high risk for huddle: verified     Patient Specific Interventions:      Medication: assess for medications that can be discontinued or dosage decreased and limit combination of prn medications  Mental Status/LOC/Awareness: reorient patient, reinforce falls education, reinforce the use of call light and provide activity  Toileting: provide frquent toileting and do not leave patient unattended in bathroom/refer to  toileting scripting  Volume/Electrolyte Status: ensure patient remains hydrated, advance diet as tolerated, monitor abnormal lab values and ensure IV fluids are appropriate  Communication/Sensory: update plan of care on whiteboard, ensure proper positioning when transferrng/ambulating and ensure patient has glasses/contacts and hearing aids/dentures  Behavioral: encourage patient to voice feelings, engage patient in daily activities, administer medication as ordered and encourage family to stay with impulsive patients  Mobility: As tolerated

## 2017-10-14 PROCEDURE — 700102 HCHG RX REV CODE 250 W/ 637 OVERRIDE(OP): Performed by: FAMILY MEDICINE

## 2017-10-14 PROCEDURE — 700101 HCHG RX REV CODE 250: Performed by: FAMILY MEDICINE

## 2017-10-14 PROCEDURE — 700102 HCHG RX REV CODE 250 W/ 637 OVERRIDE(OP): Performed by: HOSPITALIST

## 2017-10-14 PROCEDURE — 99232 SBSQ HOSP IP/OBS MODERATE 35: CPT | Performed by: HOSPITALIST

## 2017-10-14 PROCEDURE — 770006 HCHG ROOM/CARE - MED/SURG/GYN SEMI*

## 2017-10-14 PROCEDURE — A9270 NON-COVERED ITEM OR SERVICE: HCPCS | Performed by: INTERNAL MEDICINE

## 2017-10-14 PROCEDURE — 700111 HCHG RX REV CODE 636 W/ 250 OVERRIDE (IP): Performed by: FAMILY MEDICINE

## 2017-10-14 PROCEDURE — A9270 NON-COVERED ITEM OR SERVICE: HCPCS | Performed by: HOSPITALIST

## 2017-10-14 PROCEDURE — A9270 NON-COVERED ITEM OR SERVICE: HCPCS | Performed by: FAMILY MEDICINE

## 2017-10-14 PROCEDURE — 700102 HCHG RX REV CODE 250 W/ 637 OVERRIDE(OP): Performed by: INTERNAL MEDICINE

## 2017-10-14 RX ADMIN — ASPIRIN 81 MG: 81 TABLET, COATED ORAL at 11:03

## 2017-10-14 RX ADMIN — AMLODIPINE BESYLATE 10 MG: 10 TABLET ORAL at 11:03

## 2017-10-14 RX ADMIN — HYDRALAZINE HYDROCHLORIDE 100 MG: 50 TABLET ORAL at 21:30

## 2017-10-14 RX ADMIN — DULOXETINE HYDROCHLORIDE 20 MG: 20 CAPSULE, DELAYED RELEASE ORAL at 11:03

## 2017-10-14 RX ADMIN — ATORVASTATIN CALCIUM 80 MG: 40 TABLET, FILM COATED ORAL at 21:30

## 2017-10-14 RX ADMIN — LIDOCAINE 1 PATCH: 50 PATCH CUTANEOUS at 11:04

## 2017-10-14 RX ADMIN — OXYBUTYNIN CHLORIDE 5 MG: 5 TABLET, FILM COATED, EXTENDED RELEASE ORAL at 21:30

## 2017-10-14 RX ADMIN — GABAPENTIN 300 MG: 300 CAPSULE ORAL at 21:30

## 2017-10-14 RX ADMIN — METOPROLOL TARTRATE 12.5 MG: 25 TABLET, FILM COATED ORAL at 11:05

## 2017-10-14 RX ADMIN — METOPROLOL TARTRATE 12.5 MG: 25 TABLET, FILM COATED ORAL at 21:30

## 2017-10-14 RX ADMIN — HEPARIN SODIUM 5000 UNITS: 5000 INJECTION, SOLUTION INTRAVENOUS; SUBCUTANEOUS at 22:48

## 2017-10-14 RX ADMIN — TRAZODONE HYDROCHLORIDE 100 MG: 50 TABLET ORAL at 22:46

## 2017-10-14 RX ADMIN — HYDRALAZINE HYDROCHLORIDE 100 MG: 50 TABLET ORAL at 14:36

## 2017-10-14 RX ADMIN — HYDRALAZINE HYDROCHLORIDE 100 MG: 50 TABLET ORAL at 06:25

## 2017-10-14 RX ADMIN — HEPARIN SODIUM 5000 UNITS: 5000 INJECTION, SOLUTION INTRAVENOUS; SUBCUTANEOUS at 14:37

## 2017-10-14 RX ADMIN — OMEPRAZOLE 20 MG: 20 CAPSULE, DELAYED RELEASE ORAL at 11:03

## 2017-10-14 RX ADMIN — BENAZEPRIL HYDROCHLORIDE 40 MG: 20 TABLET ORAL at 11:04

## 2017-10-14 RX ADMIN — HEPARIN SODIUM 5000 UNITS: 5000 INJECTION, SOLUTION INTRAVENOUS; SUBCUTANEOUS at 06:25

## 2017-10-14 ASSESSMENT — PAIN SCALES - GENERAL
PAINLEVEL_OUTOF10: 0

## 2017-10-14 NOTE — PROGRESS NOTES
[facility] Hospitalist Progress Note    Date of Service: 10/14/2017    Chief Complaint  76 y.o. female admitted 2017 with AMS, UTI.     Interval Problem Update  No acute issues overnight patient comfortable.      Consultants/Specialty  Neurology    Disposition  Pending guardianship due to mental incapacity         Review of Systems   Unable to perform ROS: Dementia      Physical Exam  Laboratory/Imaging   Hemodynamics  Temp (24hrs), Av.5 °C (97.7 °F), Min:36.3 °C (97.3 °F), Max:36.7 °C (98.1 °F)   Temperature: 36.7 °C (98.1 °F)  Pulse  Av.9  Min: 52  Max: 88    Blood Pressure : 148/62      Respiratory      Respiration: 18, Pulse Oximetry: 93 %        RUL Breath Sounds: Clear, RML Breath Sounds: Clear, RLL Breath Sounds: Diminished, ARGELIA Breath Sounds: Clear, LLL Breath Sounds: Diminished    Fluids    Intake/Output Summary (Last 24 hours) at 10/14/17 1130  Last data filed at 10/13/17 1400   Gross per 24 hour   Intake              360 ml   Output                0 ml   Net              360 ml       Nutrition  Orders Placed This Encounter   Procedures   • Diet Order     Standing Status:   Standing     Number of Occurrences:   1     Order Specific Question:   Diet:     Answer:   Regular [1]     Physical Exam   Constitutional: She appears well-developed. No distress.   Obese    HENT:   Head: Normocephalic and atraumatic.   Mouth/Throat: No oropharyngeal exudate.   Eyes: EOM are normal. Pupils are equal, round, and reactive to light.   Neck: Normal range of motion. Neck supple. No thyromegaly present.   Cardiovascular: Normal rate, regular rhythm and intact distal pulses.    Pulmonary/Chest: Effort normal and breath sounds normal. No respiratory distress.   Abdominal: Soft. Bowel sounds are normal. She exhibits no distension and no mass. There is no tenderness. There is no guarding.   Musculoskeletal: She exhibits no edema or tenderness.   Neurological: She is alert. She has normal strength. She is not  disoriented. No cranial nerve deficit or sensory deficit. GCS eye subscore is 4. GCS verbal subscore is 5. GCS motor subscore is 6.   Oriented ×2   Skin: Skin is warm and dry. She is not diaphoretic.   Psychiatric: Her behavior is normal. Thought content normal.   Nursing note and vitals reviewed.      Recent Labs      10/12/17   0213   WBC  5.7   RBC  4.97   HEMOGLOBIN  13.2   HEMATOCRIT  42.4   MCV  85.3   MCH  26.6*   MCHC  31.1*   RDW  74.3*   PLATELETCT  189     Recent Labs      10/12/17   0213   SODIUM  143   POTASSIUM  3.9   CHLORIDE  111   CO2  23   GLUCOSE  86   BUN  25*   CREATININE  1.07   CALCIUM  9.3                      Assessment/Plan     HTN (hypertension)- (present on admission)   Assessment & Plan    Controlled.  Continue Hydralazine,metoprolol, Lotensin and Norvasc.          Insomnia   Assessment & Plan    Continue trazodone nightly          UTI (urinary tract infection)- (present on admission)   Assessment & Plan    Completed antibiotics, no symptoms         Acute renal insufficiency- (present on admission)   Assessment & Plan    2/2 to dehydration  Resolved         T12 compression fracture (CMS-HCC)- (present on admission)   Assessment & Plan    CT spine, no acute fractures        History of CVA (cerebrovascular accident)- (present on admission)   Assessment & Plan    Continue ASA and statin.   9/23 MRI today show no new stroke, showed Prominent lateral and third ventricles without visualized obstructing lesion which could indicate normal pressure hydrocephalus, patient does not have urinary incontinence neither balance problem  Continue PT/OT in house         Dementia- (present on admission)   Assessment & Plan    MRI of her brain showed enlarged ventricles but no acute findings.  Psychiatry deemed patient incapacitated.   Pending Guardianship.               Reviewed items::  Labs reviewed and Medications reviewed  Pink catheter::  No Pink  DVT prophylaxis pharmacological::   Heparin  Antibiotics:  Treating active infection/contamination beyond 24 hours perioperative coverage

## 2017-10-14 NOTE — CARE PLAN
Problem: Skin Integrity  Goal: Risk for impaired skin integrity will decrease    Intervention: Assess and monitor skin integrity, appearance and/or temperature  Pt encouraged to turn frequently in bed.

## 2017-10-14 NOTE — PROGRESS NOTES
PIV appears infiltrated at start of shift. Notified MD at 0730. Attempted PIV placement x 2., charge RN attempted x 2. Orders received for US-guided PIV.

## 2017-10-14 NOTE — CARE PLAN
Problem: Discharge Barriers/Planning  Goal: Patient's continuum of care needs will be met    Intervention: Collaborate with Transitional Care Team and Interdisciplinary Team to meet discharge needs  Working with CM for placement.

## 2017-10-15 PROCEDURE — 700111 HCHG RX REV CODE 636 W/ 250 OVERRIDE (IP): Performed by: FAMILY MEDICINE

## 2017-10-15 PROCEDURE — A9270 NON-COVERED ITEM OR SERVICE: HCPCS | Performed by: INTERNAL MEDICINE

## 2017-10-15 PROCEDURE — 700101 HCHG RX REV CODE 250: Performed by: FAMILY MEDICINE

## 2017-10-15 PROCEDURE — 700102 HCHG RX REV CODE 250 W/ 637 OVERRIDE(OP): Performed by: INTERNAL MEDICINE

## 2017-10-15 PROCEDURE — 770006 HCHG ROOM/CARE - MED/SURG/GYN SEMI*

## 2017-10-15 PROCEDURE — 700102 HCHG RX REV CODE 250 W/ 637 OVERRIDE(OP): Performed by: FAMILY MEDICINE

## 2017-10-15 PROCEDURE — 99231 SBSQ HOSP IP/OBS SF/LOW 25: CPT | Performed by: HOSPITALIST

## 2017-10-15 PROCEDURE — 700102 HCHG RX REV CODE 250 W/ 637 OVERRIDE(OP): Performed by: HOSPITALIST

## 2017-10-15 PROCEDURE — A9270 NON-COVERED ITEM OR SERVICE: HCPCS | Performed by: HOSPITALIST

## 2017-10-15 PROCEDURE — A9270 NON-COVERED ITEM OR SERVICE: HCPCS | Performed by: FAMILY MEDICINE

## 2017-10-15 RX ADMIN — GABAPENTIN 300 MG: 300 CAPSULE ORAL at 20:39

## 2017-10-15 RX ADMIN — LIDOCAINE 1 PATCH: 50 PATCH CUTANEOUS at 09:39

## 2017-10-15 RX ADMIN — ATORVASTATIN CALCIUM 80 MG: 40 TABLET, FILM COATED ORAL at 20:40

## 2017-10-15 RX ADMIN — OXYBUTYNIN CHLORIDE 5 MG: 5 TABLET, FILM COATED, EXTENDED RELEASE ORAL at 20:46

## 2017-10-15 RX ADMIN — METOPROLOL TARTRATE 12.5 MG: 25 TABLET, FILM COATED ORAL at 20:41

## 2017-10-15 RX ADMIN — AMLODIPINE BESYLATE 10 MG: 10 TABLET ORAL at 09:37

## 2017-10-15 RX ADMIN — BENAZEPRIL HYDROCHLORIDE 40 MG: 20 TABLET ORAL at 09:35

## 2017-10-15 RX ADMIN — HYDRALAZINE HYDROCHLORIDE 100 MG: 50 TABLET ORAL at 14:32

## 2017-10-15 RX ADMIN — OMEPRAZOLE 20 MG: 20 CAPSULE, DELAYED RELEASE ORAL at 09:36

## 2017-10-15 RX ADMIN — DULOXETINE HYDROCHLORIDE 20 MG: 20 CAPSULE, DELAYED RELEASE ORAL at 09:37

## 2017-10-15 RX ADMIN — METOPROLOL TARTRATE 12.5 MG: 25 TABLET, FILM COATED ORAL at 09:37

## 2017-10-15 RX ADMIN — STANDARDIZED SENNA CONCENTRATE AND DOCUSATE SODIUM 2 TABLET: 8.6; 5 TABLET, FILM COATED ORAL at 09:36

## 2017-10-15 RX ADMIN — HYDRALAZINE HYDROCHLORIDE 100 MG: 50 TABLET ORAL at 20:39

## 2017-10-15 RX ADMIN — HEPARIN SODIUM 5000 UNITS: 5000 INJECTION, SOLUTION INTRAVENOUS; SUBCUTANEOUS at 06:29

## 2017-10-15 RX ADMIN — HYDRALAZINE HYDROCHLORIDE 100 MG: 50 TABLET ORAL at 06:28

## 2017-10-15 RX ADMIN — ASPIRIN 81 MG: 81 TABLET, COATED ORAL at 09:38

## 2017-10-15 RX ADMIN — HEPARIN SODIUM 5000 UNITS: 5000 INJECTION, SOLUTION INTRAVENOUS; SUBCUTANEOUS at 20:40

## 2017-10-15 RX ADMIN — HEPARIN SODIUM 5000 UNITS: 5000 INJECTION, SOLUTION INTRAVENOUS; SUBCUTANEOUS at 14:33

## 2017-10-15 ASSESSMENT — LIFESTYLE VARIABLES: DO YOU DRINK ALCOHOL: NO

## 2017-10-15 ASSESSMENT — PAIN SCALES - GENERAL
PAINLEVEL_OUTOF10: 1
PAINLEVEL_OUTOF10: 0

## 2017-10-15 NOTE — PROGRESS NOTES
"Assumed care of patient at 0700 . Received report from RN. Patient is AOX3 . Assessment complete. Labs reviewed.Patient and RN discussed plan of care. Patient questions answered. Patient needs are met at this time. Bed in lowest and locked position. Upper bed rails up.  Call light is within reach. Hourly rounding in place.  /45   Pulse 99   Temp 36.2 °C (97.2 °F)   Resp 16   Ht 1.6 m (5' 2.99\")   Wt 84.8 kg (186 lb 15.2 oz)   SpO2 92%   Breastfeeding? No   BMI 33.13 kg/m²     "

## 2017-10-15 NOTE — PROGRESS NOTES
Patients friend Arsenio came to visit. Arsenio states he does not have a phone and does not want to take guardianship of Maria Esther.

## 2017-10-15 NOTE — PROGRESS NOTES
Received report from day RN. Assumed pt care. Discussed call light/phone system, communication board and POC. Pt is aao x 2, disoriented to time and event. Pt is confused and forgetful at times. Pt awaiting guardianship for placement, medically stable. Pt is incontinent, RN and CNA perform bed bath and full bed change. Pt mobility assessed. Pt is a one assist with the use of a FWW OOB. Q2 turns in place. Bed alarm on. Fall precautions in place. Bed is locked and in low position, call light within reach. Treaded slippers in place. Pt needs met at this time. Hourly rounding in place.

## 2017-10-15 NOTE — PROGRESS NOTES
Simona Knight Fall Risk Assessment:     Last Known Fall: Within the last month  Mobility: Use of assistive device/requires assist of two people  Medications: Cardiovascular or central nervous system meds  Mental Status/LOC/Awareness: Oriented to person and place  Toileting Needs: Incontinence  Volume/Electrolyte Status: No problems  Communication/Sensory: Visual (Glasses)/hearing deficit  Behavior: Appropriate behavior  Simona Knight Fall Risk Total: 15  Fall Risk Level: HIGH RISK     Universal Fall Precautions:  call light/belongings in reach, bed in low position and locked, siderails up x 2, use non-slip footwear     Fall Risk Level Interventions:    TRIAL (TELE 8, NEURO, MED JENNIE 5) Moderate Fall Risk Interventions  Place yellow fall risk ID band on patient: verified  Provide patient/family education based on risk assessment : completed  Educate patient/family to call staff for assistance when getting out of bed: completed  Place fall precaution signage outside patient door: completed  Utilize bed/chair fall alarm: completedTRIAL (TELE 8, NEURO, "[employer] JENNIE 5) High Fall Risk Interventions  Place yellow fall risk ID band on patient: verified  Provide patient/family education based on risk assessment: completed  Educate patient/family to call staff for assistance when getting out of bed: completed  Place fall precaution signage outside patient door: verified  Place patient in room close to nursing station: currently not available/charge notified  Utilize bed/chair fall alarm: verified  Notify charge of high risk for huddle: verified     Patient Specific Interventions:      Medication: assess for medications that can be discontinued or dosage decreased and limit combination of prn medications  Mental Status/LOC/Awareness: reorient patient, reinforce falls education, reinforce the use of call light and provide activity  Toileting: provide frquent toileting and do not leave patient unattended in bathroom/refer to  toileting scripting  Volume/Electrolyte Status: ensure patient remains hydrated, advance diet as tolerated, monitor abnormal lab values and ensure IV fluids are appropriate  Communication/Sensory: update plan of care on whiteboard, ensure proper positioning when transferrng/ambulating and ensure patient has glasses/contacts and hearing aids/dentures  Behavioral: encourage patient to voice feelings, engage patient in daily activities, administer medication as ordered and encourage family to stay with impulsive patients  Mobility: As tolerated

## 2017-10-15 NOTE — PROGRESS NOTES
Assumed care of pt at 0700. Received report from RN. Pt A&Ox3. Assessment complete. Labs reviewed. Pt and RN discussed plan of care. Pt questions answered. Pt needs are met at this time. Pt denies pain at this time.  Bed in lowest and locked position. Call light within reach. Upper bed rails up. Hourly rounding in place.

## 2017-10-15 NOTE — CARE PLAN
Problem: Venous Thromboembolism (VTW)/Deep Vein Thrombosis (DVT) Prevention:  Goal: Patient will participate in Venous Thrombosis (VTE)/Deep Vein Thrombosis (DVT)Prevention Measures  Heparin given per MAR. Pt ambulatory, demonstrates range of motion exercises while lying in bed.     Problem: Pain Management  Goal: Pain level will decrease to patient's comfort goal  Outcome: PROGRESSING AS EXPECTED  No c/o pain.     Problem: Skin Integrity  Goal: Risk for impaired skin integrity will decrease  Outcome: PROGRESSING AS EXPECTED  Skin intact. Pt repositions self in bed frequenlty. Up to bathroom with FWW and standby assist. Up to chair for lunch and dinner today.

## 2017-10-15 NOTE — PROGRESS NOTES
[facility]    Date of Service: 10/15/2017    Chief Complaint  76 y.o. female admitted 2017 with AMS, UTI.     Interval Problem Update  No acute issues overnight patient comfortable. No changes.     Consultants/Specialty  Neurology    Disposition  Pending guardianship due to mental incapacity         Review of Systems   Unable to perform ROS: Dementia      Physical Exam  Laboratory/Imaging   Hemodynamics  Temp (24hrs), Av.3 °C (97.3 °F), Min:36.1 °C (97 °F), Max:36.6 °C (97.8 °F)   Temperature: 36.2 °C (97.2 °F)  Pulse  Av.2  Min: 52  Max: 99    Blood Pressure : 122/56      Respiratory      Respiration: 18, Pulse Oximetry: 93 %        RLL Breath Sounds: Diminished, LLL Breath Sounds: Diminished    Fluids  No intake or output data in the 24 hours ending 10/15/17 1040    Nutrition  Orders Placed This Encounter   Procedures   • Diet Order     Standing Status:   Standing     Number of Occurrences:   1     Order Specific Question:   Diet:     Answer:   Regular [1]   grossly unchanged  Physical Exam   Constitutional: She appears well-developed. No distress.   Obese    HENT:   Head: Normocephalic and atraumatic.   Mouth/Throat: No oropharyngeal exudate.   Eyes: EOM are normal. Pupils are equal, round, and reactive to light.   Neck: Normal range of motion. Neck supple. No thyromegaly present.   Cardiovascular: Normal rate, regular rhythm and intact distal pulses.    Pulmonary/Chest: Effort normal and breath sounds normal. No respiratory distress.   Abdominal: Soft. Bowel sounds are normal. She exhibits no distension and no mass. There is no tenderness. There is no guarding.   Musculoskeletal: She exhibits no edema or tenderness.   Neurological: She is alert. She has normal strength. She is not disoriented. No cranial nerve deficit or sensory deficit. GCS eye subscore is 4. GCS verbal subscore is 5. GCS motor subscore is 6.   Oriented ×2   Skin: Skin is warm and dry. She is not diaphoretic.    Psychiatric: Her behavior is normal. Thought content normal.   Nursing note and vitals reviewed.                               Assessment/Plan     HTN (hypertension)- (present on admission)   Assessment & Plan    Controlled.  Continue Hydralazine,metoprolol, Lotensin and Norvasc.          Insomnia   Assessment & Plan    Continue trazodone nightly          UTI (urinary tract infection)- (present on admission)   Assessment & Plan    Completed antibiotics, no symptoms         Acute renal insufficiency- (present on admission)   Assessment & Plan    2/2 to dehydration  Resolved         T12 compression fracture (CMS-HCC)- (present on admission)   Assessment & Plan    CT spine, no acute fractures        History of CVA (cerebrovascular accident)- (present on admission)   Assessment & Plan    Continue ASA and statin.   9/23 MRI today show no new stroke, showed Prominent lateral and third ventricles without visualized obstructing lesion which could indicate normal pressure hydrocephalus, patient does not have urinary incontinence neither balance problem  Continue PT/OT in house         Dementia- (present on admission)   Assessment & Plan    MRI of her brain showed enlarged ventricles but no acute findings.  Psychiatry deemed patient incapacitated.   Pending Guardianship.               Reviewed items::  Labs reviewed and Medications reviewed  Pink catheter::  No Pink  DVT prophylaxis pharmacological::  Heparin  Antibiotics:  Treating active infection/contamination beyond 24 hours perioperative coverage

## 2017-10-15 NOTE — CARE PLAN
Problem: Communication  Goal: The ability to communicate needs accurately and effectively will improve    Intervention: [place] patient and significant other/support system to call light to alert staff of needs  Pt oriented to room. Pt educated on use of call light. Pt verbalizes understanding.       Problem: Skin Integrity  Goal: Risk for impaired skin integrity will decrease    Intervention: Implement precautions to protect skin integrity in collaboration with the interdisciplinary team  Pt on Q2 turns. Pt up to chair for meals. Pt ambulating as necessary. Pt tolerates well.

## 2017-10-16 PROCEDURE — 700102 HCHG RX REV CODE 250 W/ 637 OVERRIDE(OP): Performed by: HOSPITALIST

## 2017-10-16 PROCEDURE — A9270 NON-COVERED ITEM OR SERVICE: HCPCS | Performed by: INTERNAL MEDICINE

## 2017-10-16 PROCEDURE — 770006 HCHG ROOM/CARE - MED/SURG/GYN SEMI*

## 2017-10-16 PROCEDURE — 700102 HCHG RX REV CODE 250 W/ 637 OVERRIDE(OP): Performed by: FAMILY MEDICINE

## 2017-10-16 PROCEDURE — A9270 NON-COVERED ITEM OR SERVICE: HCPCS | Performed by: HOSPITALIST

## 2017-10-16 PROCEDURE — 700101 HCHG RX REV CODE 250: Performed by: FAMILY MEDICINE

## 2017-10-16 PROCEDURE — 700102 HCHG RX REV CODE 250 W/ 637 OVERRIDE(OP): Performed by: INTERNAL MEDICINE

## 2017-10-16 PROCEDURE — 99232 SBSQ HOSP IP/OBS MODERATE 35: CPT | Performed by: HOSPITALIST

## 2017-10-16 PROCEDURE — 700111 HCHG RX REV CODE 636 W/ 250 OVERRIDE (IP): Performed by: FAMILY MEDICINE

## 2017-10-16 PROCEDURE — A9270 NON-COVERED ITEM OR SERVICE: HCPCS | Performed by: FAMILY MEDICINE

## 2017-10-16 RX ADMIN — METOPROLOL TARTRATE 12.5 MG: 25 TABLET, FILM COATED ORAL at 20:38

## 2017-10-16 RX ADMIN — HYDRALAZINE HYDROCHLORIDE 100 MG: 50 TABLET ORAL at 05:33

## 2017-10-16 RX ADMIN — HYDRALAZINE HYDROCHLORIDE 100 MG: 50 TABLET ORAL at 20:37

## 2017-10-16 RX ADMIN — OMEPRAZOLE 20 MG: 20 CAPSULE, DELAYED RELEASE ORAL at 09:10

## 2017-10-16 RX ADMIN — STANDARDIZED SENNA CONCENTRATE AND DOCUSATE SODIUM 2 TABLET: 8.6; 5 TABLET, FILM COATED ORAL at 20:39

## 2017-10-16 RX ADMIN — HYDRALAZINE HYDROCHLORIDE 100 MG: 50 TABLET ORAL at 14:31

## 2017-10-16 RX ADMIN — ATORVASTATIN CALCIUM 80 MG: 40 TABLET, FILM COATED ORAL at 20:37

## 2017-10-16 RX ADMIN — OXYBUTYNIN CHLORIDE 5 MG: 5 TABLET, FILM COATED, EXTENDED RELEASE ORAL at 20:37

## 2017-10-16 RX ADMIN — AMLODIPINE BESYLATE 10 MG: 10 TABLET ORAL at 09:10

## 2017-10-16 RX ADMIN — BENAZEPRIL HYDROCHLORIDE 40 MG: 20 TABLET ORAL at 09:08

## 2017-10-16 RX ADMIN — METOPROLOL TARTRATE 12.5 MG: 25 TABLET, FILM COATED ORAL at 09:09

## 2017-10-16 RX ADMIN — GABAPENTIN 300 MG: 300 CAPSULE ORAL at 20:37

## 2017-10-16 RX ADMIN — DULOXETINE HYDROCHLORIDE 20 MG: 20 CAPSULE, DELAYED RELEASE ORAL at 09:10

## 2017-10-16 RX ADMIN — HEPARIN SODIUM 5000 UNITS: 5000 INJECTION, SOLUTION INTRAVENOUS; SUBCUTANEOUS at 05:33

## 2017-10-16 RX ADMIN — LIDOCAINE 1 PATCH: 50 PATCH CUTANEOUS at 09:13

## 2017-10-16 RX ADMIN — HEPARIN SODIUM 5000 UNITS: 5000 INJECTION, SOLUTION INTRAVENOUS; SUBCUTANEOUS at 14:32

## 2017-10-16 RX ADMIN — ASPIRIN 81 MG: 81 TABLET, COATED ORAL at 09:10

## 2017-10-16 RX ADMIN — HEPARIN SODIUM 5000 UNITS: 5000 INJECTION, SOLUTION INTRAVENOUS; SUBCUTANEOUS at 20:38

## 2017-10-16 ASSESSMENT — PAIN SCALES - GENERAL
PAINLEVEL_OUTOF10: 0
PAINLEVEL_OUTOF10: 0

## 2017-10-16 NOTE — PROGRESS NOTES
Gave patient meds with no difficulty. Pt is sitting up eating breakfast. Pt appears alert, states she is comfortable at this time.

## 2017-10-16 NOTE — PROGRESS NOTES
Simona Knight Fall Risk Assessment:     Last Known Fall: Within the last month  Mobility: Immobilized/requires assist of one person  Medications: Cardiovascular or central nervous system meds  Mental Status/LOC/Awareness: Oriented to person and place  Toileting Needs: Incontinence  Volume/Electrolyte Status: No problems  Communication/Sensory: Visual (Glasses)/hearing deficit  Behavior: Appropriate behavior  Simona Knight Fall Risk Total: 14  Fall Risk Level: MODERATE RISK    Universal Fall Precautions:  call light/belongings in reach, bed in low position and locked    Fall Risk Level Interventions:    TRIAL ([employer] 8, NEURO, MED JENNIE 5) Moderate Fall Risk Interventions  Place yellow fall risk ID band on patient: verified  Provide patient/family education based on risk assessment : completed  Educate patient/family to call staff for assistance when getting out of bed: completed  Place fall precaution signage outside patient door: verified  Utilize bed/chair fall alarm: verifiedTRIAL (TELE 8, NEURO, [employer] JENNIE 5) High Fall Risk Interventions  Place yellow fall risk ID band on patient: verified  Provide patient/family education based on risk assessment: completed  Educate patient/family to call staff for assistance when getting out of bed: completed  Place fall precaution signage outside patient door: verified  Place patient in room close to nursing station: currently not available/charge notified  Utilize bed/chair fall alarm: completed  Notify charge of high risk for huddle: verified    Patient Specific Interventions:     Medication: review medications with patient and family  Mental Status/LOC/Awareness: encourage family to stay with patient  Toileting: toilet prior to giving pain medications  Volume/Electrolyte Status: ensure patient remains hydrated  Communication/Sensory: update plan of care on whiteboard  Behavioral: administer medication as ordered  Mobility: ensure bed is locked and in lowest position

## 2017-10-16 NOTE — PROGRESS NOTES
[facility] Hospitalist Progress Note    Date of Service: 10/16/2017    Chief Complaint  76 y.o. female admitted 2017 with AMS, UTI.     Interval Problem Update  No acute issues overnight patient comfortable.  No clinical changes.     Consultants/Specialty  Neurology    Disposition  Pending guardianship due to mental incapacity         Review of Systems   Unable to perform ROS: Dementia      Physical Exam  Laboratory/Imaging   Hemodynamics  Temp (24hrs), Av.6 °C (97.8 °F), Min:36.3 °C (97.3 °F), Max:36.8 °C (98.2 °F)   Temperature: 36.6 °C (97.8 °F)  Pulse  Av.3  Min: 52  Max: 99    Blood Pressure : 120/74      Respiratory      Respiration: 17, Pulse Oximetry: 94 %        RLL Breath Sounds: Diminished, LLL Breath Sounds: Diminished    Fluids    Intake/Output Summary (Last 24 hours) at 10/16/17 1341  Last data filed at 10/16/17 0900   Gross per 24 hour   Intake              240 ml   Output                0 ml   Net              240 ml       Nutrition  Orders Placed This Encounter   Procedures   • Diet Order     Standing Status:   Standing     Number of Occurrences:   1     Order Specific Question:   Diet:     Answer:   Regular [1]   grossly unchanged  Physical Exam   Constitutional: She appears well-developed. No distress.   Obese    HENT:   Head: Normocephalic and atraumatic.   Mouth/Throat: No oropharyngeal exudate.   Eyes: EOM are normal. Pupils are equal, round, and reactive to light.   Neck: Normal range of motion. Neck supple. No thyromegaly present.   Cardiovascular: Normal rate, regular rhythm and intact distal pulses.    Pulmonary/Chest: Effort normal and breath sounds normal. No respiratory distress.   Abdominal: Soft. Bowel sounds are normal. She exhibits no distension and no mass. There is no tenderness. There is no guarding.   Musculoskeletal: She exhibits no edema or tenderness.   Neurological: She is alert. She has normal strength. She is not disoriented. No cranial nerve deficit or sensory  deficit. GCS eye subscore is 4. GCS verbal subscore is 5. GCS motor subscore is 6.   Oriented ×2   Skin: Skin is warm and dry. She is not diaphoretic.   Psychiatric: Her behavior is normal. Thought content normal.   Nursing note and vitals reviewed.                               Assessment/Plan     HTN (hypertension)- (present on admission)   Assessment & Plan    Controlled.  Continue Hydralazine,metoprolol, Lotensin and Norvasc.          Insomnia   Assessment & Plan    Continue trazodone nightly          UTI (urinary tract infection)- (present on admission)   Assessment & Plan    Completed antibiotics, no symptoms         Acute renal insufficiency- (present on admission)   Assessment & Plan    2/2 to dehydration  Resolved         T12 compression fracture (CMS-HCC)- (present on admission)   Assessment & Plan    CT spine, no acute fractures        History of CVA (cerebrovascular accident)- (present on admission)   Assessment & Plan    Continue ASA and statin.   9/23 MRI today show no new stroke, showed Prominent lateral and third ventricles without visualized obstructing lesion which could indicate normal pressure hydrocephalus, patient does not have urinary incontinence neither balance problem  Continue PT/OT in house         Dementia- (present on admission)   Assessment & Plan    MRI of her brain showed enlarged ventricles but no acute findings.  Psychiatry deemed patient incapacitated.   Pending Guardianship.               Reviewed items::  Labs reviewed and Medications reviewed  Pink catheter::  No Pink  DVT prophylaxis pharmacological::  Heparin  Antibiotics:  Treating active infection/contamination beyond 24 hours perioperative coverage

## 2017-10-16 NOTE — PROGRESS NOTES
Simona Knight Fall Risk Assessment:     Last Known Fall: Within the last month  Mobility: Use of assistive device/requires assist of two people  Medications: Cardiovascular or central nervous system meds  Mental Status/LOC/Awareness: Oriented to person and place  Toileting Needs: Incontinence  Volume/Electrolyte Status: No problems  Communication/Sensory: Visual (Glasses)/hearing deficit  Behavior: Appropriate behavior  Simona Knight Fall Risk Total: 15  Fall Risk Level: HIGH RISK     Universal Fall Precautions:  call light/belongings in reach, bed in low position and locked, siderails up x 2, use non-slip footwear     Fall Risk Level Interventions:    TRIAL (TELE 8, NEURO, MED JENNIE 5) Moderate Fall Risk Interventions  Place yellow fall risk ID band on patient: verified  Provide patient/family education based on risk assessment : completed  Educate patient/family to call staff for assistance when getting out of bed: completed  Place fall precaution signage outside patient door: completed  Utilize bed/chair fall alarm: completedTRIAL (TELE 8, NEURO, [employer] JENNIE 5) High Fall Risk Interventions  Place yellow fall risk ID band on patient: verified  Provide patient/family education based on risk assessment: completed  Educate patient/family to call staff for assistance when getting out of bed: completed  Place fall precaution signage outside patient door: verified  Place patient in room close to nursing station: currently not available/charge notified  Utilize bed/chair fall alarm: verified  Notify charge of high risk for huddle: verified     Patient Specific Interventions:      Medication: assess for medications that can be discontinued or dosage decreased and limit combination of prn medications  Mental Status/LOC/Awareness: reorient patient, reinforce falls education, reinforce the use of call light and provide activity  Toileting: provide frquent toileting and do not leave patient unattended in bathroom/refer to  toileting scripting  Volume/Electrolyte Status: ensure patient remains hydrated, advance diet as tolerated, monitor abnormal lab values and ensure IV fluids are appropriate  Communication/Sensory: update plan of care on whiteboard, ensure proper positioning when transferrng/ambulating and ensure patient has glasses/contacts and hearing aids/dentures  Behavioral: encourage patient to voice feelings, engage patient in daily activities, administer medication as ordered and encourage family to stay with impulsive patients  Mobility: As tolerated

## 2017-10-17 PROCEDURE — 700102 HCHG RX REV CODE 250 W/ 637 OVERRIDE(OP): Performed by: FAMILY MEDICINE

## 2017-10-17 PROCEDURE — A9270 NON-COVERED ITEM OR SERVICE: HCPCS | Performed by: INTERNAL MEDICINE

## 2017-10-17 PROCEDURE — A9270 NON-COVERED ITEM OR SERVICE: HCPCS | Performed by: FAMILY MEDICINE

## 2017-10-17 PROCEDURE — 99231 SBSQ HOSP IP/OBS SF/LOW 25: CPT | Performed by: HOSPITALIST

## 2017-10-17 PROCEDURE — 700101 HCHG RX REV CODE 250: Performed by: FAMILY MEDICINE

## 2017-10-17 PROCEDURE — 700111 HCHG RX REV CODE 636 W/ 250 OVERRIDE (IP): Performed by: FAMILY MEDICINE

## 2017-10-17 PROCEDURE — 700102 HCHG RX REV CODE 250 W/ 637 OVERRIDE(OP): Performed by: INTERNAL MEDICINE

## 2017-10-17 PROCEDURE — 770006 HCHG ROOM/CARE - MED/SURG/GYN SEMI*

## 2017-10-17 PROCEDURE — A9270 NON-COVERED ITEM OR SERVICE: HCPCS | Performed by: HOSPITALIST

## 2017-10-17 PROCEDURE — 700102 HCHG RX REV CODE 250 W/ 637 OVERRIDE(OP): Performed by: HOSPITALIST

## 2017-10-17 RX ADMIN — HYDRALAZINE HYDROCHLORIDE 100 MG: 50 TABLET ORAL at 22:28

## 2017-10-17 RX ADMIN — ATORVASTATIN CALCIUM 80 MG: 40 TABLET, FILM COATED ORAL at 22:28

## 2017-10-17 RX ADMIN — ASPIRIN 81 MG: 81 TABLET, COATED ORAL at 08:13

## 2017-10-17 RX ADMIN — DULOXETINE HYDROCHLORIDE 20 MG: 20 CAPSULE, DELAYED RELEASE ORAL at 08:12

## 2017-10-17 RX ADMIN — TRAZODONE HYDROCHLORIDE 100 MG: 50 TABLET ORAL at 22:28

## 2017-10-17 RX ADMIN — HEPARIN SODIUM 5000 UNITS: 5000 INJECTION, SOLUTION INTRAVENOUS; SUBCUTANEOUS at 14:01

## 2017-10-17 RX ADMIN — HEPARIN SODIUM 5000 UNITS: 5000 INJECTION, SOLUTION INTRAVENOUS; SUBCUTANEOUS at 05:39

## 2017-10-17 RX ADMIN — METOPROLOL TARTRATE 12.5 MG: 25 TABLET, FILM COATED ORAL at 08:12

## 2017-10-17 RX ADMIN — HYDRALAZINE HYDROCHLORIDE 100 MG: 50 TABLET ORAL at 14:01

## 2017-10-17 RX ADMIN — OMEPRAZOLE 20 MG: 20 CAPSULE, DELAYED RELEASE ORAL at 08:12

## 2017-10-17 RX ADMIN — OXYBUTYNIN CHLORIDE 5 MG: 5 TABLET, FILM COATED, EXTENDED RELEASE ORAL at 22:28

## 2017-10-17 RX ADMIN — LIDOCAINE 1 PATCH: 50 PATCH CUTANEOUS at 08:13

## 2017-10-17 RX ADMIN — METOPROLOL TARTRATE 12.5 MG: 25 TABLET, FILM COATED ORAL at 22:27

## 2017-10-17 RX ADMIN — OXYCODONE HYDROCHLORIDE 5 MG: 5 TABLET ORAL at 22:29

## 2017-10-17 RX ADMIN — GABAPENTIN 300 MG: 300 CAPSULE ORAL at 22:28

## 2017-10-17 RX ADMIN — HYDRALAZINE HYDROCHLORIDE 100 MG: 50 TABLET ORAL at 05:39

## 2017-10-17 RX ADMIN — HEPARIN SODIUM 5000 UNITS: 5000 INJECTION, SOLUTION INTRAVENOUS; SUBCUTANEOUS at 22:29

## 2017-10-17 RX ADMIN — AMLODIPINE BESYLATE 10 MG: 10 TABLET ORAL at 08:12

## 2017-10-17 RX ADMIN — STANDARDIZED SENNA CONCENTRATE AND DOCUSATE SODIUM 2 TABLET: 8.6; 5 TABLET, FILM COATED ORAL at 08:12

## 2017-10-17 RX ADMIN — BENAZEPRIL HYDROCHLORIDE 40 MG: 20 TABLET ORAL at 08:13

## 2017-10-17 ASSESSMENT — PAIN SCALES - GENERAL
PAINLEVEL_OUTOF10: 7
PAINLEVEL_OUTOF10: 0
PAINLEVEL_OUTOF10: 0
PAINLEVEL_OUTOF10: 3

## 2017-10-17 NOTE — PROGRESS NOTES
Simona Knight Fall Risk Assessment:     Last Known Fall: Within the last month  Mobility: Immobilized/requires assist of one person  Medications: Cardiovascular or central nervous system meds  Mental Status/LOC/Awareness: Oriented to person and place  Toileting Needs: Incontinence  Volume/Electrolyte Status: No problems  Communication/Sensory: Visual (Glasses)/hearing deficit  Behavior: Appropriate behavior  Simona Knight Fall Risk Total: 14  Fall Risk Level: MODERATE RISK    Universal Fall Precautions:  call light/belongings in reach, bed in low position and locked, adequate lighting, clutter free and spill free environment    Fall Risk Level Interventions:    TRIAL (TELE 8, NEURO, MED JENNIE 5) Moderate Fall Risk Interventions  Place yellow fall risk ID band on patient: verified  Provide patient/family education based on risk assessment : completed  Educate patient/family to call staff for assistance when getting out of bed: completed  Place fall precaution signage outside patient door: verified  Utilize bed/chair fall alarm: verifiedTRIAL (TELE 8, NEURO, [place] JENNIE 5) High Fall Risk Interventions  Place yellow fall risk ID band on patient: verified  Provide patient/family education based on risk assessment: completed  Educate patient/family to call staff for assistance when getting out of bed: completed  Place fall precaution signage outside patient door: verified  Place patient in room close to nursing station: currently not available/charge notified  Utilize bed/chair fall alarm: completed  Notify charge of high risk for huddle: verified    Patient Specific Interventions:     Medication: review medications with patient and family  Mental Status/LOC/Awareness: check on patient hourly and utilize bed/chair fall alarm  Toileting: provide frquent toileting  Volume/Electrolyte Status: ensure patient remains hydrated and monitor abnormal lab values  Communication/Sensory: update plan of care on whiteboard  Behavioral:  administer medication as ordered  Mobility: ensure bed is locked and in lowest position

## 2017-10-17 NOTE — PROGRESS NOTES
[facility]    Date of Service: 10/17/2017    Chief Complaint  76 y.o. female admitted 2017 with AMS, UTI.     Interval Problem Update  No acute issues overnight, patient stable.  No acute needs. Confused.     Consultants/Specialty  Neurology    Disposition  Pending guardianship due to mental incapacity         Review of Systems   Unable to perform ROS: Dementia      Physical Exam  Laboratory/Imaging   Hemodynamics  Temp (24hrs), Av.8 °C (98.2 °F), Min:36.4 °C (97.5 °F), Max:37.1 °C (98.8 °F)   Temperature: 37.1 °C (98.8 °F)  Pulse  Av.4  Min: 52  Max: 99    Blood Pressure : 155/59      Respiratory      Respiration: 18, Pulse Oximetry: 93 %        RLL Breath Sounds: Diminished, LLL Breath Sounds: Diminished    Fluids    Intake/Output Summary (Last 24 hours) at 10/17/17 1457  Last data filed at 10/16/17 2200   Gross per 24 hour   Intake              100 ml   Output                0 ml   Net              100 ml       Nutrition  Orders Placed This Encounter   Procedures   • Diet Order     Standing Status:   Standing     Number of Occurrences:   1     Order Specific Question:   Diet:     Answer:   Regular [1]   grossly unchanged  Physical Exam   Constitutional: She appears well-developed. No distress.   Obese    HENT:   Head: Normocephalic and atraumatic.   Mouth/Throat: No oropharyngeal exudate.   Eyes: EOM are normal. Pupils are equal, round, and reactive to light.   Neck: Normal range of motion. Neck supple. No thyromegaly present.   Cardiovascular: Normal rate, regular rhythm and intact distal pulses.    Pulmonary/Chest: Effort normal and breath sounds normal. No respiratory distress.   Abdominal: Soft. Bowel sounds are normal. She exhibits no distension and no mass. There is no tenderness. There is no guarding.   Musculoskeletal: She exhibits no edema or tenderness.   Neurological: She is alert. She has normal strength. She is not disoriented. No cranial nerve deficit or sensory  deficit. GCS eye subscore is 4. GCS verbal subscore is 5. GCS motor subscore is 6.   Oriented ×2   Skin: Skin is warm and dry. She is not diaphoretic.   Psychiatric: Her behavior is normal. Thought content normal.   Nursing note and vitals reviewed.                               Assessment/Plan     HTN (hypertension)- (present on admission)   Assessment & Plan    Controlled.  Continue Hydralazine,metoprolol, Lotensin and Norvasc.          Insomnia   Assessment & Plan    Continue trazodone nightly          UTI (urinary tract infection)- (present on admission)   Assessment & Plan    Completed antibiotics, no symptoms         Acute renal insufficiency- (present on admission)   Assessment & Plan    2/2 to dehydration  Resolved         T12 compression fracture (CMS-HCC)- (present on admission)   Assessment & Plan    CT spine, no acute fractures        History of CVA (cerebrovascular accident)- (present on admission)   Assessment & Plan    Continue ASA and statin.   9/23 MRI today show no new stroke, showed Prominent lateral and third ventricles without visualized obstructing lesion which could indicate normal pressure hydrocephalus, patient does not have urinary incontinence neither balance problem  Continue PT/OT in house         Dementia- (present on admission)   Assessment & Plan    MRI of her brain showed enlarged ventricles but no acute findings.  Psychiatry deemed patient incapacitated.   Pending Guardianship.             Patient plan of care discussed at multidisplinary team rounds and with patient and R.N at beside.  No gross changes on assessment and plan      Reviewed items::  Labs reviewed and Medications reviewed  Pink catheter::  No Pink  DVT prophylaxis pharmacological::  Heparin  Antibiotics:  Treating active infection/contamination beyond 24 hours perioperative coverage

## 2017-10-17 NOTE — PROGRESS NOTES
Patient alert and oriented at baseline throughout shift. Vital signs remain stable. All safety precautions in place - bed in lowest position, call light within reach, lighting appropriate - .     Extraneous noise kept to a minimum. RN collaborates with patient, family members, MD, and axillary staff to potentialize patient care plan.     Continuing to await guardianship.     All needs met at this time. Will continue to monitor.

## 2017-10-18 PROCEDURE — A9270 NON-COVERED ITEM OR SERVICE: HCPCS | Performed by: FAMILY MEDICINE

## 2017-10-18 PROCEDURE — 700111 HCHG RX REV CODE 636 W/ 250 OVERRIDE (IP): Performed by: FAMILY MEDICINE

## 2017-10-18 PROCEDURE — 97530 THERAPEUTIC ACTIVITIES: CPT

## 2017-10-18 PROCEDURE — 700102 HCHG RX REV CODE 250 W/ 637 OVERRIDE(OP): Performed by: FAMILY MEDICINE

## 2017-10-18 PROCEDURE — 700102 HCHG RX REV CODE 250 W/ 637 OVERRIDE(OP): Performed by: HOSPITALIST

## 2017-10-18 PROCEDURE — 770006 HCHG ROOM/CARE - MED/SURG/GYN SEMI*

## 2017-10-18 PROCEDURE — A9270 NON-COVERED ITEM OR SERVICE: HCPCS | Performed by: HOSPITALIST

## 2017-10-18 PROCEDURE — 700101 HCHG RX REV CODE 250: Performed by: FAMILY MEDICINE

## 2017-10-18 PROCEDURE — 97110 THERAPEUTIC EXERCISES: CPT

## 2017-10-18 PROCEDURE — A9270 NON-COVERED ITEM OR SERVICE: HCPCS | Performed by: INTERNAL MEDICINE

## 2017-10-18 PROCEDURE — 700102 HCHG RX REV CODE 250 W/ 637 OVERRIDE(OP): Performed by: INTERNAL MEDICINE

## 2017-10-18 PROCEDURE — 99231 SBSQ HOSP IP/OBS SF/LOW 25: CPT | Performed by: HOSPITALIST

## 2017-10-18 RX ADMIN — HYDRALAZINE HYDROCHLORIDE 100 MG: 50 TABLET ORAL at 15:36

## 2017-10-18 RX ADMIN — OMEPRAZOLE 20 MG: 20 CAPSULE, DELAYED RELEASE ORAL at 09:52

## 2017-10-18 RX ADMIN — LIDOCAINE 1 PATCH: 50 PATCH CUTANEOUS at 09:52

## 2017-10-18 RX ADMIN — BENAZEPRIL HYDROCHLORIDE 40 MG: 20 TABLET ORAL at 09:52

## 2017-10-18 RX ADMIN — HYDRALAZINE HYDROCHLORIDE 100 MG: 50 TABLET ORAL at 20:17

## 2017-10-18 RX ADMIN — METOPROLOL TARTRATE 12.5 MG: 25 TABLET, FILM COATED ORAL at 09:52

## 2017-10-18 RX ADMIN — OXYBUTYNIN CHLORIDE 5 MG: 5 TABLET, FILM COATED, EXTENDED RELEASE ORAL at 20:17

## 2017-10-18 RX ADMIN — ASPIRIN 81 MG: 81 TABLET, COATED ORAL at 09:52

## 2017-10-18 RX ADMIN — HEPARIN SODIUM 5000 UNITS: 5000 INJECTION, SOLUTION INTRAVENOUS; SUBCUTANEOUS at 20:18

## 2017-10-18 RX ADMIN — ERGOCALCIFEROL 50000 UNITS: 1.25 CAPSULE, LIQUID FILLED ORAL at 09:53

## 2017-10-18 RX ADMIN — TRAZODONE HYDROCHLORIDE 100 MG: 50 TABLET ORAL at 20:18

## 2017-10-18 RX ADMIN — HEPARIN SODIUM 5000 UNITS: 5000 INJECTION, SOLUTION INTRAVENOUS; SUBCUTANEOUS at 15:37

## 2017-10-18 RX ADMIN — HYDRALAZINE HYDROCHLORIDE 100 MG: 50 TABLET ORAL at 06:15

## 2017-10-18 RX ADMIN — ATORVASTATIN CALCIUM 80 MG: 40 TABLET, FILM COATED ORAL at 20:18

## 2017-10-18 RX ADMIN — HEPARIN SODIUM 5000 UNITS: 5000 INJECTION, SOLUTION INTRAVENOUS; SUBCUTANEOUS at 06:15

## 2017-10-18 RX ADMIN — AMLODIPINE BESYLATE 10 MG: 10 TABLET ORAL at 09:53

## 2017-10-18 RX ADMIN — METOPROLOL TARTRATE 12.5 MG: 25 TABLET, FILM COATED ORAL at 20:18

## 2017-10-18 RX ADMIN — DULOXETINE HYDROCHLORIDE 20 MG: 20 CAPSULE, DELAYED RELEASE ORAL at 09:52

## 2017-10-18 RX ADMIN — GABAPENTIN 300 MG: 300 CAPSULE ORAL at 20:18

## 2017-10-18 ASSESSMENT — GAIT ASSESSMENTS
DISTANCE (FEET): 50
DEVIATION: SHUFFLED GAIT;BRADYKINETIC
ASSISTIVE DEVICE: FRONT WHEEL WALKER
GAIT LEVEL OF ASSIST: CONTACT GUARD ASSIST

## 2017-10-18 ASSESSMENT — COGNITIVE AND FUNCTIONAL STATUS - GENERAL
SUGGESTED CMS G CODE MODIFIER MOBILITY: CL
MOBILITY SCORE: 13
CLIMB 3 TO 5 STEPS WITH RAILING: A LOT
TURNING FROM BACK TO SIDE WHILE IN FLAT BAD: A LITTLE
MOVING FROM LYING ON BACK TO SITTING ON SIDE OF FLAT BED: UNABLE
MOVING TO AND FROM BED TO CHAIR: UNABLE
STANDING UP FROM CHAIR USING ARMS: A LITTLE
WALKING IN HOSPITAL ROOM: A LITTLE

## 2017-10-18 ASSESSMENT — PAIN SCALES - GENERAL
PAINLEVEL_OUTOF10: 3
PAINLEVEL_OUTOF10: 0
PAINLEVEL_OUTOF10: 0

## 2017-10-18 NOTE — PROGRESS NOTES
Simona Knight Fall Risk Assessment:     Last Known Fall: Within the last month  Mobility: Immobilized/requires assist of one person  Medications: Cardiovascular or central nervous system meds  Mental Status/LOC/Awareness: Oriented to person and place  Toileting Needs: Incontinence  Volume/Electrolyte Status: No problems  Communication/Sensory: Visual (Glasses)/hearing deficit  Behavior: Appropriate behavior  Simona Knight Fall Risk Total: 14  Fall Risk Level: MODERATE RISK     Universal Fall Precautions:  call light/belongings in reach, bed in low position and locked, wheelchairs and assistive devices out of sight, siderails up x 2, use non-slip footwear, adequate lighting, clutter free and spill free environment, educate on level of risk, educate to call for assistance     Fall Risk Level Interventions:    TRIAL (TELE 8, NEURO, MED JENNIE 5) Moderate Fall Risk Interventions  Place yellow fall risk ID band on patient: verified  Provide patient/family education based on risk assessment : completed  Educate patient/family to call staff for assistance when getting out of bed: completed  Place fall precaution signage outside patient door: completed  Utilize bed/chair fall alarm: completedTRIAL (TELE 8, NEURO, [email or web address] JENNIE 5) High Fall Risk Interventions  Place yellow fall risk ID band on patient: verified  Provide patient/family education based on risk assessment: completed  Educate patient/family to call staff for assistance when getting out of bed: completed  Place fall precaution signage outside patient door: verified  Place patient in room close to nursing station: currently not available/charge notified  Utilize bed/chair fall alarm: completed  Notify charge of high risk for huddle: verified     Patient Specific Interventions:      Medication: review medications with patient and family, assess for medications that can be discontinued or dosage decreased and limit combination of prn medications  Mental  Status/LOC/Awareness: reorient patient, reinforce falls education, check on patient hourly, utilize bed/chair fall alarm and reinforce the use of call light  Toileting: provide frquent toileting, monitor intake and output/use of appropriate interventions, instruct patient/family on the use of grab bars, instruct patient/family on the need to call for assistance when toileting and do not leave patient unattended in bathroom/refer to toileting scripting  Volume/Electrolyte Status: ensure patient remains hydrated, advance diet as tolerated, administer medications as ordered for nausea and vomiting, monitor abnormal lab values and ensure IV fluids are appropriate  Communication/Sensory: update plan of care on whiteboard, ensure proper positioning when transferrng/ambulating, ensure patient has glasses/contacts and hearing aids/dentures and for visually impaired patients orient to their room surrounding and do not change their surroundings  Behavioral: encourage patient to voice feelings, engage patient in daily activities and instruct/reinforce fall program rationale  Mobility: dangle prior to standing, utilize bed/chair fall alarm, ensure bed is locked and in lowest position, provide appropriate assistive device, instruct patient to exit bed on their strongest side and collaborate with doctor for possible PT/OT consult

## 2017-10-18 NOTE — THERAPY
Attempted to see pt for OT tx. Pt declined d/t fatigue from working w/ PT. Will try again later as able.

## 2017-10-18 NOTE — THERAPY
"Physical Therapy Treatment completed.   Bed Mobility:  Supine to Sit: Moderate Assist  Transfers: Sit to Stand: Minimal Assist  Gait: Level Of Assist: Contact Guard Assist with Front-Wheel Walker       Plan of Care: Will benefit from Physical Therapy 2 times per week and Plan to complete next treatment by Monday 10/23  Discharge Recommendations: Equipment: Will Continue to Assess for Equipment Needs. Post-acute therapy Discharge to a transitional care facility for continued skilled therapy services.       Pt has had a decline in mobility since last seen by PT. Today required moderate assist for supine to sit, previously required SBA. Once seated EOB, pt with significant R Lateral trunk lean. Pt was unaware of lean and unable to correct to midline. Minimal assist for sit to stand. Pt only able to ambulate 50 feet with FWW. Pt with increased gait deviation and decreased balance today compared to yesterday. Pt ambulating with step to gait pattern and dragging R LE. Ongoing R lateral trunk lean in standing. Once back to room and seated, assessed B UE/LE strength, presence of facial droop and coordination. No facial droop noted and B UE/LE strength equal and symmetrical. Pt did have mild decreased coordination of the R UE. In addition, ongoing R lateral lean. Will continues to see pt for skilled PT interventoin while in the acute care setting. Pt to be up in bedside chair at least 3x/daily and ambulate with RN in hallway at least 3x/daily    See \"Rehab Therapy-Acute\" Patient Summary Report for complete documentation.       "

## 2017-10-18 NOTE — PROGRESS NOTES
Assumed care of pt at 0730am. Report received and bedside rounding completed with noc RN. Pt in bed resting comfortably denies any pain at this time and is calm. No SOB, or in any acute distress. Fall precautions in place, . - Treaded non slip socks. Call light and pt belongings within reach - pt makes needs known - hourly rounding in place. See flowsheets for further assessment.

## 2017-10-18 NOTE — CARE PLAN
Problem: Venous Thromboembolism (VTW)/Deep Vein Thrombosis (DVT) Prevention:  Goal: Patient will participate in Venous Thrombosis (VTE)/Deep Vein Thrombosis (DVT)Prevention Measures  Pt on heparin for prophylaxis.     Problem: Mobility  Goal: Risk for activity intolerance will decrease    Intervention: Assess and monitor signs of activity intolerance  Pt on q2 turning schedule. OT/PT eval. Getting OOB to the chair.

## 2017-10-18 NOTE — PROGRESS NOTES
[facility]    Date of Service: 10/18/2017    Chief Complaint  76 y.o. female admitted 2017 with AMS, UTI.     Interval Problem Update  Patient pleasantly confused no acute overnight events otherwise     Consultants/Specialty  Neurology    Disposition  Pending guardianship due to mental incapacity         Review of Systems   Unable to perform ROS: Dementia      Physical Exam  Laboratory/Imaging   Hemodynamics  Temp (24hrs), Av.5 °C (97.7 °F), Min:36.1 °C (97 °F), Max:36.8 °C (98.3 °F)   Temperature: 36.8 °C (98.2 °F)  Pulse  Av.3  Min: 51  Max: 99    Blood Pressure : 141/66      Respiratory      Respiration: 17, Pulse Oximetry: 94 %        RLL Breath Sounds: Diminished, LLL Breath Sounds: Diminished    Fluids    Intake/Output Summary (Last 24 hours) at 10/18/17 1656  Last data filed at 10/18/17 1146   Gross per 24 hour   Intake              220 ml   Output                0 ml   Net              220 ml       Nutrition  Orders Placed This Encounter   Procedures   • Diet Order     Standing Status:   Standing     Number of Occurrences:   1     Order Specific Question:   Diet:     Answer:   Regular [1]   grossly unchanged  Physical Exam   Constitutional: She appears well-developed. No distress.   Obese    HENT:   Head: Normocephalic and atraumatic.   Mouth/Throat: No oropharyngeal exudate.   Eyes: EOM are normal. Pupils are equal, round, and reactive to light.   Neck: Normal range of motion. Neck supple. No thyromegaly present.   Cardiovascular: Normal rate, regular rhythm and intact distal pulses.    Pulmonary/Chest: Effort normal and breath sounds normal. No respiratory distress.   Abdominal: Soft. Bowel sounds are normal. She exhibits no distension and no mass. There is no tenderness. There is no guarding.   Musculoskeletal: She exhibits no edema or tenderness.   Neurological: She is alert. She has normal strength. She is not disoriented. No cranial nerve deficit or sensory deficit.  GCS eye subscore is 4. GCS verbal subscore is 5. GCS motor subscore is 6.   Oriented ×2   Skin: Skin is warm and dry. She is not diaphoretic.   Psychiatric: Her behavior is normal. Thought content normal.   Nursing note and vitals reviewed.                               Assessment/Plan     HTN (hypertension)- (present on admission)   Assessment & Plan    Controlled.  Continue Hydralazine,metoprolol, Lotensin and Norvasc.          Insomnia   Assessment & Plan    Continue trazodone nightly          UTI (urinary tract infection)- (present on admission)   Assessment & Plan    Completed antibiotics, no symptoms         Acute renal insufficiency- (present on admission)   Assessment & Plan    2/2 to dehydration  Resolved         T12 compression fracture (CMS-HCC)- (present on admission)   Assessment & Plan    CT spine, no acute fractures        History of CVA (cerebrovascular accident)- (present on admission)   Assessment & Plan    Continue ASA and statin.   9/23 MRI today show no new stroke, showed Prominent lateral and third ventricles without visualized obstructing lesion which could indicate normal pressure hydrocephalus, patient does not have urinary incontinence neither balance problem  Continue PT/OT in house         Dementia- (present on admission)   Assessment & Plan    MRI of her brain showed enlarged ventricles but no acute findings.  Psychiatry deemed patient incapacitated.   Pending Guardianship.             Patient plan of care discussed at multidisplinary team rounds and with patient and R.N at beside.    No  changes on assessment and plan      Reviewed items::  Labs reviewed and Medications reviewed  Pink catheter::  No Pink  DVT prophylaxis pharmacological::  Heparin  Antibiotics:  Treating active infection/contamination beyond 24 hours perioperative coverage

## 2017-10-19 PROCEDURE — 700111 HCHG RX REV CODE 636 W/ 250 OVERRIDE (IP): Performed by: FAMILY MEDICINE

## 2017-10-19 PROCEDURE — 700102 HCHG RX REV CODE 250 W/ 637 OVERRIDE(OP): Performed by: HOSPITALIST

## 2017-10-19 PROCEDURE — 770006 HCHG ROOM/CARE - MED/SURG/GYN SEMI*

## 2017-10-19 PROCEDURE — 700102 HCHG RX REV CODE 250 W/ 637 OVERRIDE(OP): Performed by: INTERNAL MEDICINE

## 2017-10-19 PROCEDURE — A9270 NON-COVERED ITEM OR SERVICE: HCPCS | Performed by: FAMILY MEDICINE

## 2017-10-19 PROCEDURE — 700101 HCHG RX REV CODE 250: Performed by: FAMILY MEDICINE

## 2017-10-19 PROCEDURE — 700102 HCHG RX REV CODE 250 W/ 637 OVERRIDE(OP): Performed by: FAMILY MEDICINE

## 2017-10-19 PROCEDURE — 99231 SBSQ HOSP IP/OBS SF/LOW 25: CPT | Performed by: HOSPITALIST

## 2017-10-19 PROCEDURE — A9270 NON-COVERED ITEM OR SERVICE: HCPCS | Performed by: HOSPITALIST

## 2017-10-19 PROCEDURE — A9270 NON-COVERED ITEM OR SERVICE: HCPCS | Performed by: INTERNAL MEDICINE

## 2017-10-19 RX ADMIN — ASPIRIN 81 MG: 81 TABLET, COATED ORAL at 09:27

## 2017-10-19 RX ADMIN — TRAZODONE HYDROCHLORIDE 100 MG: 50 TABLET ORAL at 20:24

## 2017-10-19 RX ADMIN — HEPARIN SODIUM 5000 UNITS: 5000 INJECTION, SOLUTION INTRAVENOUS; SUBCUTANEOUS at 20:23

## 2017-10-19 RX ADMIN — HEPARIN SODIUM 5000 UNITS: 5000 INJECTION, SOLUTION INTRAVENOUS; SUBCUTANEOUS at 06:17

## 2017-10-19 RX ADMIN — HYDRALAZINE HYDROCHLORIDE 100 MG: 50 TABLET ORAL at 20:23

## 2017-10-19 RX ADMIN — HYDRALAZINE HYDROCHLORIDE 100 MG: 50 TABLET ORAL at 14:41

## 2017-10-19 RX ADMIN — OXYBUTYNIN CHLORIDE 5 MG: 5 TABLET, FILM COATED, EXTENDED RELEASE ORAL at 20:25

## 2017-10-19 RX ADMIN — OMEPRAZOLE 20 MG: 20 CAPSULE, DELAYED RELEASE ORAL at 09:27

## 2017-10-19 RX ADMIN — METOPROLOL TARTRATE 12.5 MG: 25 TABLET, FILM COATED ORAL at 20:23

## 2017-10-19 RX ADMIN — GABAPENTIN 300 MG: 300 CAPSULE ORAL at 20:23

## 2017-10-19 RX ADMIN — AMLODIPINE BESYLATE 10 MG: 10 TABLET ORAL at 09:27

## 2017-10-19 RX ADMIN — ATORVASTATIN CALCIUM 80 MG: 40 TABLET, FILM COATED ORAL at 20:23

## 2017-10-19 RX ADMIN — DULOXETINE HYDROCHLORIDE 20 MG: 20 CAPSULE, DELAYED RELEASE ORAL at 09:27

## 2017-10-19 RX ADMIN — METOPROLOL TARTRATE 12.5 MG: 25 TABLET, FILM COATED ORAL at 09:27

## 2017-10-19 RX ADMIN — HEPARIN SODIUM 5000 UNITS: 5000 INJECTION, SOLUTION INTRAVENOUS; SUBCUTANEOUS at 14:41

## 2017-10-19 RX ADMIN — OXYCODONE HYDROCHLORIDE 5 MG: 5 TABLET ORAL at 20:24

## 2017-10-19 RX ADMIN — HYDRALAZINE HYDROCHLORIDE 100 MG: 50 TABLET ORAL at 06:17

## 2017-10-19 RX ADMIN — BENAZEPRIL HYDROCHLORIDE 40 MG: 20 TABLET ORAL at 09:27

## 2017-10-19 ASSESSMENT — PAIN SCALES - GENERAL
PAINLEVEL_OUTOF10: 7
PAINLEVEL_OUTOF10: 0
PAINLEVEL_OUTOF10: 0

## 2017-10-19 NOTE — CARE PLAN
Problem: Safety  Goal: Will remain free from injury  Outcome: PROGRESSING AS EXPECTED      Problem: Discharge Barriers/Planning  Goal: Patient's continuum of care needs will be met  Outcome: PROGRESSING SLOWER THAN EXPECTED

## 2017-10-19 NOTE — PROGRESS NOTES
[facility] Progress Note    Date of Service: 10/19/2017    Chief Complaint  76 y.o. female admitted 2017 with AMS, UTI.     Interval Problem Update  Patient pleasantly confused   No acute issues, no needs     Consultants/Specialty  Neurology    Disposition  Pending guardianship due to mental incapacity         Review of Systems   Unable to perform ROS: Dementia      Physical Exam  Laboratory/Imaging   Hemodynamics  Temp (24hrs), Av.5 °C (97.7 °F), Min:36.3 °C (97.3 °F), Max:36.8 °C (98.3 °F)   Temperature: 36.3 °C (97.3 °F)  Pulse  Av.3  Min: 51  Max: 99    Blood Pressure : 124/50      Respiratory      Respiration: 17, Pulse Oximetry: 92 %        RLL Breath Sounds: Diminished, LLL Breath Sounds: Diminished    Fluids    Intake/Output Summary (Last 24 hours) at 10/19/17 1114  Last data filed at 10/19/17 1021   Gross per 24 hour   Intake              560 ml   Output                0 ml   Net              560 ml       Nutrition  Orders Placed This Encounter   Procedures   • Diet Order     Standing Status:   Standing     Number of Occurrences:   1     Order Specific Question:   Diet:     Answer:   Regular [1]   grossly unchanged  Physical Exam   Constitutional: She appears well-developed. No distress.   Obese    HENT:   Head: Normocephalic and atraumatic.   Mouth/Throat: No oropharyngeal exudate.   Eyes: EOM are normal. Pupils are equal, round, and reactive to light.   Neck: Normal range of motion. Neck supple. No thyromegaly present.   Cardiovascular: Normal rate, regular rhythm and intact distal pulses.    Pulmonary/Chest: Effort normal and breath sounds normal. No respiratory distress.   Abdominal: Soft. Bowel sounds are normal. She exhibits no distension and no mass. There is no tenderness. There is no guarding.   Musculoskeletal: She exhibits no edema or tenderness.   Neurological: She is alert. She has normal strength. She is not disoriented. No cranial nerve deficit or sensory deficit. GCS  eye subscore is 4. GCS verbal subscore is 5. GCS motor subscore is 6.   Oriented ×2   Skin: Skin is warm and dry. She is not diaphoretic.   Psychiatric: Her behavior is normal. Thought content normal.   Nursing note and vitals reviewed.                               Assessment/Plan     HTN (hypertension)- (present on admission)   Assessment & Plan    Controlled.  Continue Hydralazine,metoprolol, Lotensin and Norvasc.          Insomnia   Assessment & Plan    Continue trazodone nightly          UTI (urinary tract infection)- (present on admission)   Assessment & Plan    Completed antibiotics, no symptoms         Acute renal insufficiency- (present on admission)   Assessment & Plan    2/2 to dehydration  Resolved         T12 compression fracture (CMS-HCC)- (present on admission)   Assessment & Plan    CT spine, no acute fractures        History of CVA (cerebrovascular accident)- (present on admission)   Assessment & Plan    Continue ASA and statin.   9/23 MRI today show no new stroke, showed Prominent lateral and third ventricles without visualized obstructing lesion which could indicate normal pressure hydrocephalus, patient does not have urinary incontinence neither balance problem  Continue PT/OT in house         Dementia- (present on admission)   Assessment & Plan    MRI of her brain showed enlarged ventricles but no acute findings.  Psychiatry deemed patient incapacitated.   Pending Guardianship.             Patient plan of care discussed at multidisplinary team rounds and with patient and R.N at beside.    Assessment and plan reviewed in great depth patient seen and examined at bedside no gross changes on assessment and plan overall.      Reviewed items::  Labs reviewed and Medications reviewed  Pink catheter::  No Pink  DVT prophylaxis pharmacological::  Heparin

## 2017-10-19 NOTE — PROGRESS NOTES
Simona Knight Fall Risk Assessment:     Last Known Fall: Within the last month  Mobility: Immobilized/requires assist of one person  Medications: Cardiovascular or central nervous system meds  Mental Status/LOC/Awareness: Oriented to person and place  Toileting Needs: Incontinence  Volume/Electrolyte Status: No problems  Communication/Sensory: Visual (Glasses)/hearing deficit  Behavior: Appropriate behavior  Simona Knight Fall Risk Total: 14  Fall Risk Level: MODERATE RISK     Universal Fall Precautions:  call light/belongings in reach, bed in low position and locked, adequate lighting, clutter free and spill free environment     Fall Risk Level Interventions:    TRIAL (TELE 8, NEURO, MED JENNIE 5) Moderate Fall Risk Interventions  Place yellow fall risk ID band on patient: verified  Provide patient/family education based on risk assessment : completed  Educate patient/family to call staff for assistance when getting out of bed: completed  Place fall precaution signage outside patient door: verified  Utilize bed/chair fall alarm: verifiedTRIAL (TELE 8, NEURO, [employer] JENNIE 5) High Fall Risk Interventions  Place yellow fall risk ID band on patient: verified  Provide patient/family education based on risk assessment: completed  Educate patient/family to call staff for assistance when getting out of bed: completed  Place fall precaution signage outside patient door: verified  Place patient in room close to nursing station: currently not available/charge notified  Utilize bed/chair fall alarm: completed  Notify charge of high risk for huddle: verified     Patient Specific Interventions:      Medication: review medications with patient and family  Mental Status/LOC/Awareness: check on patient hourly and utilize bed/chair fall alarm  Toileting: provide frquent toileting  Volume/Electrolyte Status: ensure patient remains hydrated and monitor abnormal lab values  Communication/Sensory: update plan of care on  whiteboard  Behavioral: administer medication as ordered  Mobility: ensure bed is locked and in lowest position

## 2017-10-19 NOTE — CARE PLAN
Problem: Safety  Goal: Will remain free from injury  Educated on safety measures, using call light ect    Problem: Infection  Goal: Will remain free from infection  Assessed for infx risk, no infx signs    Problem: Pain Management  Goal: Pain level will decrease to patient's comfort goal  Pt states pain management plan working for them, pain controlled

## 2017-10-19 NOTE — DIETARY
Nutrition Services: Consult received for poor PO intake.  Patient on day 28 of admit.  Awaiting guardianship.    Patient stated her appetite isn't great, but it's about the same as usual. She agreed to try Boost.  Patient does not eat much breakfast per nursing report but often eats 100% of lunch and then snacks for the rest of the day.  This has been her pattern.    No wt loss noted.  Patient is 2.9 kg above admit weight. BMI: 33.  Patient is on a regular diet  Labs, medications and past medical history reviewed.    Recommend:  Will add Boost to see if she will drink it at breakfast.   Continue to encourage PO intake and follow weight trends.  Nutrition Representative will continue to see her for ongoing meal and snack preferences.  RD following per department policy.

## 2017-10-20 PROCEDURE — 700102 HCHG RX REV CODE 250 W/ 637 OVERRIDE(OP): Performed by: INTERNAL MEDICINE

## 2017-10-20 PROCEDURE — 770006 HCHG ROOM/CARE - MED/SURG/GYN SEMI*

## 2017-10-20 PROCEDURE — 700101 HCHG RX REV CODE 250: Performed by: FAMILY MEDICINE

## 2017-10-20 PROCEDURE — 99231 SBSQ HOSP IP/OBS SF/LOW 25: CPT | Performed by: HOSPITALIST

## 2017-10-20 PROCEDURE — 700102 HCHG RX REV CODE 250 W/ 637 OVERRIDE(OP): Performed by: FAMILY MEDICINE

## 2017-10-20 PROCEDURE — A9270 NON-COVERED ITEM OR SERVICE: HCPCS | Performed by: FAMILY MEDICINE

## 2017-10-20 PROCEDURE — 700102 HCHG RX REV CODE 250 W/ 637 OVERRIDE(OP): Performed by: HOSPITALIST

## 2017-10-20 PROCEDURE — A9270 NON-COVERED ITEM OR SERVICE: HCPCS | Performed by: INTERNAL MEDICINE

## 2017-10-20 PROCEDURE — 700111 HCHG RX REV CODE 636 W/ 250 OVERRIDE (IP): Performed by: FAMILY MEDICINE

## 2017-10-20 PROCEDURE — A9270 NON-COVERED ITEM OR SERVICE: HCPCS | Performed by: HOSPITALIST

## 2017-10-20 RX ADMIN — HYDRALAZINE HYDROCHLORIDE 100 MG: 50 TABLET ORAL at 14:14

## 2017-10-20 RX ADMIN — HEPARIN SODIUM 5000 UNITS: 5000 INJECTION, SOLUTION INTRAVENOUS; SUBCUTANEOUS at 04:39

## 2017-10-20 RX ADMIN — OXYBUTYNIN CHLORIDE 5 MG: 5 TABLET, FILM COATED, EXTENDED RELEASE ORAL at 20:38

## 2017-10-20 RX ADMIN — BENAZEPRIL HYDROCHLORIDE 40 MG: 20 TABLET ORAL at 09:40

## 2017-10-20 RX ADMIN — METOPROLOL TARTRATE 12.5 MG: 25 TABLET, FILM COATED ORAL at 20:36

## 2017-10-20 RX ADMIN — ASPIRIN 81 MG: 81 TABLET, COATED ORAL at 09:36

## 2017-10-20 RX ADMIN — OMEPRAZOLE 20 MG: 20 CAPSULE, DELAYED RELEASE ORAL at 09:36

## 2017-10-20 RX ADMIN — DULOXETINE HYDROCHLORIDE 20 MG: 20 CAPSULE, DELAYED RELEASE ORAL at 09:36

## 2017-10-20 RX ADMIN — HEPARIN SODIUM 5000 UNITS: 5000 INJECTION, SOLUTION INTRAVENOUS; SUBCUTANEOUS at 21:00

## 2017-10-20 RX ADMIN — ATORVASTATIN CALCIUM 80 MG: 40 TABLET, FILM COATED ORAL at 20:35

## 2017-10-20 RX ADMIN — HEPARIN SODIUM 5000 UNITS: 5000 INJECTION, SOLUTION INTRAVENOUS; SUBCUTANEOUS at 14:12

## 2017-10-20 RX ADMIN — HYDRALAZINE HYDROCHLORIDE 100 MG: 50 TABLET ORAL at 04:39

## 2017-10-20 RX ADMIN — LIDOCAINE 1 PATCH: 50 PATCH CUTANEOUS at 09:36

## 2017-10-20 RX ADMIN — HYDRALAZINE HYDROCHLORIDE 100 MG: 50 TABLET ORAL at 20:35

## 2017-10-20 RX ADMIN — AMLODIPINE BESYLATE 10 MG: 10 TABLET ORAL at 09:41

## 2017-10-20 RX ADMIN — GABAPENTIN 300 MG: 300 CAPSULE ORAL at 20:36

## 2017-10-20 ASSESSMENT — PAIN SCALES - GENERAL
PAINLEVEL_OUTOF10: 0
PAINLEVEL_OUTOF10: 3

## 2017-10-20 NOTE — PROGRESS NOTES
Simona Knight Fall Risk Assessment:     Last Known Fall: Within the last month  Mobility: Immobilized/requires assist of one person  Medications: Cardiovascular or central nervous system meds  Mental Status/LOC/Awareness: Oriented to person and place  Toileting Needs: Incontinence  Volume/Electrolyte Status: No problems  Communication/Sensory: Visual (Glasses)/hearing deficit  Behavior: Appropriate behavior  Simona Knight Fall Risk Total: 14  Fall Risk Level: MODERATE RISK     [place] Fall Precautions:  call light/belongings in reach, bed in low position and locked, adequate lighting, clutter free and spill free environment     Fall Risk Level Interventions:    TRIAL (TELE 8, NEURO, MED JENNIE 5) Moderate Fall Risk Interventions  Place yellow fall risk ID band on patient: verified  Provide patient/family education based on risk assessment : completed  Educate patient/family to call staff for assistance when getting out of bed: completed  Place fall precaution signage outside patient door: verified  Utilize bed/chair fall alarm: verifiedTRIAL (TELE 8, NEURO, [employer] JENNIE 5) High Fall Risk Interventions  Place yellow fall risk ID band on patient: verified  Provide patient/family education based on risk assessment: completed  Educate patient/family to call staff for assistance when getting out of bed: completed  Place fall precaution signage outside patient door: verified  Place patient in room close to nursing station: currently not available/charge notified  Utilize bed/chair fall alarm: completed  Notify charge of high risk for huddle: verified     Patient Specific Interventions:      Medication: review medications with patient and family  Mental Status/LOC/Awareness: check on patient hourly and utilize bed/chair fall alarm  Toileting: provide frquent toileting  Volume/Electrolyte Status: ensure patient remains hydrated and monitor abnormal lab values  Communication/Sensory: update plan of care on  whiteboard  Behavioral: administer medication as ordered  Mobility: ensure bed is locked and in lowest position.

## 2017-10-20 NOTE — CARE PLAN
Problem: Safety  Goal: Will remain free from falls    Intervention: Assess risk factors for falls   10/20/17 0900   OTHER   Fall Risk High Risk to Fall - 2 or more points    Risk for Injury-Any positive answers results in the pt being at high risk for fall related injury Not Applicable   Mobility Status Assessment 1-1 Healthcare Provider Required for Assistance with Ambulation & Transfer   History of fall 2   Date of Last Fall 09/21/17   Pt Calls for Assistance Yes   Environment is clutter free. Personal possession and beside table near patient. Treaded socks in place. Bed alarm on. Bed locked and in the lowest position. Patient encouraged to call when needing assistance. Call light within reach.       Problem: Skin Integrity  Goal: Risk for impaired skin integrity will decrease    Intervention: Assess risk factors for impaired skin integrity and/or pressure ulcers  Assessing patient skin integrity. Patient provided with moisturizer and barrier cream.

## 2017-10-20 NOTE — PROGRESS NOTES
[facility]    Date of Service: 10/20/2017    Chief Complaint  76 y.o. female admitted 2017 with AMS, UTI.     Interval Problem Update  Patient pleasasnt, no acute needs,   No acute issues, no needs     Consultants/Specialty  Neurology    Disposition  Pending guardianship due to mental incapacity         Review of Systems   Unable to perform ROS: Dementia      Physical Exam  Laboratory/Imaging   Hemodynamics  Temp (24hrs), Av.7 °C (98 °F), Min:36.5 °C (97.7 °F), Max:36.9 °C (98.4 °F)   Temperature: 36.5 °C (97.7 °F)  Pulse  Av.3  Min: 51  Max: 99    Blood Pressure : 123/54      Respiratory      Respiration: 18, Pulse Oximetry: 94 %        RUL Breath Sounds: Clear, RML Breath Sounds: Clear, RLL Breath Sounds: Diminished, ARGELIA Breath Sounds: Clear, LLL Breath Sounds: Diminished    Fluids  No intake or output data in the 24 hours ending 10/20/17 1626    Nutrition  Orders Placed This Encounter   Procedures   • Diet Order     Standing Status:   Standing     Number of Occurrences:   1     Order Specific Question:   Diet:     Answer:   Regular [1]   grossly unchanged  Physical Exam   Constitutional: She appears well-developed. No distress.   Obese    HENT:   Head: Normocephalic and atraumatic.   Mouth/Throat: No oropharyngeal exudate.   Eyes: EOM are normal. Pupils are equal, round, and reactive to light.   Neck: Normal range of motion. Neck supple. No thyromegaly present.   Cardiovascular: Normal rate, regular rhythm and intact distal pulses.    Pulmonary/Chest: Effort normal and breath sounds normal. No respiratory distress.   Abdominal: Soft. Bowel sounds are normal. She exhibits no distension and no mass. There is no tenderness. There is no guarding.   Musculoskeletal: She exhibits no edema or tenderness.   Neurological: She is alert. She has normal strength. She is not disoriented. No cranial nerve deficit or sensory deficit. GCS eye subscore is 4. GCS verbal subscore is 5. GCS motor  subscore is 6.   Oriented ×2   Skin: Skin is warm and dry. She is not diaphoretic.   Psychiatric: Her behavior is normal. Thought content normal.   Nursing note and vitals reviewed.                               Assessment/Plan     HTN (hypertension)- (present on admission)   Assessment & Plan    Controlled.  Continue Hydralazine,metoprolol, Lotensin and Norvasc.          Insomnia   Assessment & Plan    Continue trazodone nightly          UTI (urinary tract infection)- (present on admission)   Assessment & Plan    Completed antibiotics, no symptoms         Acute renal insufficiency- (present on admission)   Assessment & Plan    2/2 to dehydration  Resolved         T12 compression fracture (CMS-HCC)- (present on admission)   Assessment & Plan    CT spine, no acute fractures        History of CVA (cerebrovascular accident)- (present on admission)   Assessment & Plan    Continue ASA and statin.   9/23 MRI today show no new stroke, showed Prominent lateral and third ventricles without visualized obstructing lesion which could indicate normal pressure hydrocephalus, patient does not have urinary incontinence neither balance problem  Continue PT/OT in house         Dementia- (present on admission)   Assessment & Plan    MRI of her brain showed enlarged ventricles but no acute findings.  Psychiatry deemed patient incapacitated.   Pending Guardianship.             Patient plan of care discussed at multidisplinary team rounds and with patient and R.N at beside.     Assessment and plan reviewed in great depth patient seen and examined at bedside no gross changes on assessment and plan overall. Reviews at bedside 10/20      Reviewed items::  Labs reviewed and Medications reviewed  Pink catheter::  No Pink  DVT prophylaxis pharmacological::  Heparin

## 2017-10-20 NOTE — PROGRESS NOTES
Simona Knight Fall Risk Assessment:     Last Known Fall: Within the last month  Mobility: Immobilized/requires assist of one person  Medications: Cardiovascular or central nervous system meds  Mental Status/LOC/Awareness: Oriented to person and place  Toileting Needs: Incontinence  Volume/Electrolyte Status: No problems  Communication/Sensory: Visual (Glasses)/hearing deficit  Behavior: Appropriate behavior  Simona Knight Fall Risk Total: 14  Fall Risk Level: MODERATE RISK     [place] Fall Precautions:  call light/belongings in reach, bed in low position and locked, adequate lighting, clutter free and spill free environment     Fall Risk Level Interventions:    TRIAL (TELE 8, NEURO, MED JENNIE 5) Moderate Fall Risk Interventions  Place yellow fall risk ID band on patient: verified  Provide patient/family education based on risk assessment : completed  Educate patient/family to call staff for assistance when getting out of bed: completed  Place fall precaution signage outside patient door: verified  Utilize bed/chair fall alarm: verifiedTRIAL (TELE 8, NEURO, [employer] JENNIE 5) High Fall Risk Interventions  Place yellow fall risk ID band on patient: verified  Provide patient/family education based on risk assessment: completed  Educate patient/family to call staff for assistance when getting out of bed: completed  Place fall precaution signage outside patient door: verified  Place patient in room close to nursing station: currently not available/charge notified  Utilize bed/chair fall alarm: completed  Notify charge of high risk for huddle: verified     Patient Specific Interventions:      Medication: review medications with patient and family  Mental Status/LOC/Awareness: check on patient hourly and utilize bed/chair fall alarm  Toileting: provide frquent toileting  Volume/Electrolyte Status: ensure patient remains hydrated and monitor abnormal lab values  Communication/Sensory: update plan of care on  whiteboard  Behavioral: administer medication as ordered  Mobility: ensure bed is locked and in lowest position

## 2017-10-20 NOTE — PROGRESS NOTES
Simona Knight Fall Risk Assessment:     Last Known Fall: Within the last month  Mobility: Immobilized/requires assist of one person  Medications: Cardiovascular or central nervous system meds  Mental Status/LOC/Awareness: Oriented to person and place  Toileting Needs: Incontinence, Use of assistive device (Bedside commode, bedpan, urinal)  Volume/Electrolyte Status: No problems  Communication/Sensory: Visual (Glasses)/hearing deficit  Behavior: Appropriate behavior  Simona Knight Fall Risk Total: 16  Fall Risk Level: HIGH RISK    [place] Fall Precautions:  call light/belongings in reach, bed in low position and locked, siderails up x 2, use non-slip footwear, adequate lighting, clutter free and spill free environment, educate on level of risk, educate to call for assistance, wheelchairs and assistive devices out of sight    Fall Risk Level Interventions:    TRIAL ([employer] 8, NEURO, MED JENNIE 5) Moderate Fall Risk Interventions  Place yellow fall risk ID band on patient: verified  Provide patient/family education based on risk assessment : verified  Educate patient/family to call staff for assistance when getting out of bed: verified  Place fall precaution signage outside patient door: verified  Utilize bed/chair fall alarm: verifiedTRIAL ([employer] 8, NEURO, [employer] JENNIE 5) High Fall Risk Interventions  Place yellow fall risk ID band on patient: verified  Provide patient/family education based on risk assessment: completed  Educate patient/family to call staff for assistance when getting out of bed: completed  Place fall precaution signage outside patient door: verified  Place patient in room close to nursing station: currently not available/charge notified  Utilize bed/chair fall alarm: verified  Notify charge of high risk for huddle: completed    Patient Specific Interventions:     Medication: review medications with patient and family and assess for medications that can be discontinued or dosage decreased  Mental  Status/LOC/Awareness: reorient patient, reinforce falls education, check on patient hourly, utilize bed/chair fall alarm and reinforce the use of call light  Toileting: provide frquent toileting and instruct patient/family on the need to call for assistance when toileting  Volume/Electrolyte Status: ensure patient remains hydrated and monitor abnormal lab values  Communication/Sensory: update plan of care on whiteboard, ensure proper positioning when transferrng/ambulating and ensure patient has glasses/contacts and hearing aids/dentures  Behavioral: encourage patient to voice feelings and instruct/reinforce fall program rationale  Mobility: provide comfort measures during transport, utilize bed/chair fall alarm, ensure bed is locked and in lowest position, provide appropriate assistive device and instruct patient to exit bed on their strongest side

## 2017-10-20 NOTE — PROGRESS NOTES
Report received from night RN. Assumed patient care at 0700. Patient is AAOx3 reoriented to time. Labs and orders reviewed. Assessment completed. Patient states having pain 3 out of 10 on right Knee, patient repositoned in bed, patient declines pain medication. Patient provided with scheduled medication, refer to MAR. Plan of care discussed with patient; questions have been answered at this time. Patient needs have been met at this time. Patient encouraged to call when needing assistance. Call light within reach. Treaded socks in place. Bed locked and in the lowest position. Hourly rounding in place.

## 2017-10-21 PROCEDURE — 700102 HCHG RX REV CODE 250 W/ 637 OVERRIDE(OP): Performed by: INTERNAL MEDICINE

## 2017-10-21 PROCEDURE — 700102 HCHG RX REV CODE 250 W/ 637 OVERRIDE(OP): Performed by: HOSPITALIST

## 2017-10-21 PROCEDURE — A9270 NON-COVERED ITEM OR SERVICE: HCPCS | Performed by: HOSPITALIST

## 2017-10-21 PROCEDURE — 770006 HCHG ROOM/CARE - MED/SURG/GYN SEMI*

## 2017-10-21 PROCEDURE — A9270 NON-COVERED ITEM OR SERVICE: HCPCS | Performed by: INTERNAL MEDICINE

## 2017-10-21 PROCEDURE — 99231 SBSQ HOSP IP/OBS SF/LOW 25: CPT | Performed by: HOSPITALIST

## 2017-10-21 PROCEDURE — 700101 HCHG RX REV CODE 250: Performed by: FAMILY MEDICINE

## 2017-10-21 PROCEDURE — 700111 HCHG RX REV CODE 636 W/ 250 OVERRIDE (IP): Performed by: FAMILY MEDICINE

## 2017-10-21 PROCEDURE — 700102 HCHG RX REV CODE 250 W/ 637 OVERRIDE(OP): Performed by: FAMILY MEDICINE

## 2017-10-21 PROCEDURE — A9270 NON-COVERED ITEM OR SERVICE: HCPCS | Performed by: FAMILY MEDICINE

## 2017-10-21 RX ADMIN — METOPROLOL TARTRATE 12.5 MG: 25 TABLET, FILM COATED ORAL at 08:59

## 2017-10-21 RX ADMIN — HYDRALAZINE HYDROCHLORIDE 100 MG: 50 TABLET ORAL at 21:46

## 2017-10-21 RX ADMIN — METOPROLOL TARTRATE 12.5 MG: 25 TABLET, FILM COATED ORAL at 21:46

## 2017-10-21 RX ADMIN — DULOXETINE HYDROCHLORIDE 20 MG: 20 CAPSULE, DELAYED RELEASE ORAL at 08:59

## 2017-10-21 RX ADMIN — ASPIRIN 81 MG: 81 TABLET, COATED ORAL at 08:59

## 2017-10-21 RX ADMIN — HYDRALAZINE HYDROCHLORIDE 100 MG: 50 TABLET ORAL at 14:49

## 2017-10-21 RX ADMIN — ATORVASTATIN CALCIUM 80 MG: 40 TABLET, FILM COATED ORAL at 21:46

## 2017-10-21 RX ADMIN — OMEPRAZOLE 20 MG: 20 CAPSULE, DELAYED RELEASE ORAL at 08:59

## 2017-10-21 RX ADMIN — HYDRALAZINE HYDROCHLORIDE 100 MG: 50 TABLET ORAL at 05:34

## 2017-10-21 RX ADMIN — GABAPENTIN 300 MG: 300 CAPSULE ORAL at 21:46

## 2017-10-21 RX ADMIN — HEPARIN SODIUM 5000 UNITS: 5000 INJECTION, SOLUTION INTRAVENOUS; SUBCUTANEOUS at 21:46

## 2017-10-21 RX ADMIN — OXYBUTYNIN CHLORIDE 5 MG: 5 TABLET, FILM COATED, EXTENDED RELEASE ORAL at 21:46

## 2017-10-21 RX ADMIN — LIDOCAINE 1 PATCH: 50 PATCH CUTANEOUS at 08:59

## 2017-10-21 RX ADMIN — BENAZEPRIL HYDROCHLORIDE 40 MG: 20 TABLET ORAL at 09:01

## 2017-10-21 RX ADMIN — HEPARIN SODIUM 5000 UNITS: 5000 INJECTION, SOLUTION INTRAVENOUS; SUBCUTANEOUS at 05:33

## 2017-10-21 RX ADMIN — HEPARIN SODIUM 5000 UNITS: 5000 INJECTION, SOLUTION INTRAVENOUS; SUBCUTANEOUS at 14:50

## 2017-10-21 RX ADMIN — AMLODIPINE BESYLATE 10 MG: 10 TABLET ORAL at 08:59

## 2017-10-21 ASSESSMENT — PAIN SCALES - GENERAL
PAINLEVEL_OUTOF10: 0
PAINLEVEL_OUTOF10: 5

## 2017-10-21 NOTE — PROGRESS NOTES
Simona Knight Fall Risk Assessment:     Last Known Fall: Within the last month  Mobility: Immobilized/requires assist of one person  Medications: Cardiovascular or central nervous system meds  Mental Status/LOC/Awareness: Oriented to person and place  Toileting Needs: Incontinence, Use of assistive device (Bedside commode, bedpan, urinal)  Volume/Electrolyte Status: No problems  Communication/Sensory: Visual (Glasses)/hearing deficit  Behavior: Appropriate behavior  Simona Knight Fall Risk Total: 16  Fall Risk Level: HIGH RISK    [place] Fall Precautions:  call light/belongings in reach, bed in low position and locked, siderails up x 2, use non-slip footwear, adequate lighting, clutter free and spill free environment, educate on level of risk, educate to call for assistance, wheelchairs and assistive devices out of sight    Fall Risk Level Interventions:    TRIAL ([place] 8, NEURO, MED JENNIE 5) Moderate Fall Risk Interventions  Place yellow fall risk ID band on patient: verified  Provide patient/family education based on risk assessment : verified  Educate patient/family to call staff for assistance when getting out of bed: verified  Place fall precaution signage outside patient door: verified  Utilize bed/chair fall alarm: verifiedTRIAL (Placed 8, NEURO, [employer] JENNIE 5) High Fall Risk Interventions  Place yellow fall risk ID band on patient: verified  Provide patient/family education based on risk assessment: completed  Educate patient/family to call staff for assistance when getting out of bed: completed  Place fall precaution signage outside patient door: verified  Place patient in room close to nursing station: currently not available/charge notified  Utilize bed/chair fall alarm: verified  Notify charge of high risk for huddle: completed    Patient Specific Interventions:     Medication: review medications with patient and family and assess for medications that can be discontinued or dosage decreased  Mental  Status/LOC/Awareness: reorient patient, reinforce falls education, check on patient hourly, utilize bed/chair fall alarm and reinforce the use of call light  Toileting: provide frquent toileting and instruct patient/family on the need to call for assistance when toileting  Volume/Electrolyte Status: ensure patient remains hydrated and monitor abnormal lab values  Communication/Sensory: update plan of care on whiteboard, ensure proper positioning when transferrng/ambulating and ensure patient has glasses/contacts and hearing aids/dentures  Behavioral: encourage patient to voice feelings and instruct/reinforce fall program rationale  Mobility: provide comfort measures during transport, utilize bed/chair fall alarm, ensure bed is locked and in lowest position, provide appropriate assistive device and instruct patient to exit bed on their strongest side

## 2017-10-21 NOTE — PROGRESS NOTES
[facility] Progress Note    Date of Service: 10/21/2017    Chief Complaint  76 y.o. female admitted 2017 with AMS, UTI.     Interval Problem Update  No acute issues, patient in bed watching TV. No acute needs, comfortable overall.       Consultants/Specialty  Neurology    Disposition  Pending guardianship due to mental incapacity         Review of Systems   Unable to perform ROS: Dementia      Physical Exam  Laboratory/Imaging   Hemodynamics  Temp (24hrs), Av.6 °C (97.9 °F), Min:36.4 °C (97.6 °F), Max:36.7 °C (98.1 °F)   Temperature: 36.4 °C (97.6 °F)  Pulse  Av.3  Min: 51  Max: 99    Blood Pressure : 140/54      Respiratory      Respiration: 17, Pulse Oximetry: 90 %        RUL Breath Sounds: Clear, RML Breath Sounds: Clear, RLL Breath Sounds: Diminished, ARGELIA Breath Sounds: Clear, LLL Breath Sounds: Diminished    Fluids  No intake or output data in the 24 hours ending 10/21/17 0811    Nutrition  Orders Placed This Encounter   Procedures   • Diet Order     Standing Status:   Standing     Number of Occurrences:   1     Order Specific Question:   Diet:     Answer:   Regular [1]   grossly unchanged  Physical Exam   Constitutional: She appears well-developed. No distress.   Obese    HENT:   Head: Normocephalic and atraumatic.   Mouth/Throat: No oropharyngeal exudate.   Eyes: EOM are normal. Pupils are equal, round, and reactive to light.   Neck: Normal range of motion. Neck supple. No thyromegaly present.   Cardiovascular: Normal rate, regular rhythm and intact distal pulses.    Pulmonary/Chest: Effort normal and breath sounds normal. No respiratory distress.   Abdominal: Soft. Bowel sounds are normal. She exhibits no distension and no mass. There is no tenderness. There is no guarding.   Musculoskeletal: She exhibits no edema or tenderness.   Neurological: She is alert. She has normal strength. She is not disoriented. No cranial nerve deficit or sensory deficit. GCS eye subscore is 4. GCS verbal  subscore is 5. GCS motor subscore is 6.   Oriented ×2   Skin: Skin is warm and dry. She is not diaphoretic.   Psychiatric: Her behavior is normal. Thought content normal.   Nursing note and vitals reviewed.                               Assessment/Plan     HTN (hypertension)- (present on admission)   Assessment & Plan    Controlled.  Continue Hydralazine,metoprolol, Lotensin and Norvasc.          Insomnia   Assessment & Plan    Continue trazodone nightly          UTI (urinary tract infection)- (present on admission)   Assessment & Plan    Completed antibiotics, no symptoms         Acute renal insufficiency- (present on admission)   Assessment & Plan    2/2 to dehydration  Resolved         T12 compression fracture (CMS-HCC)- (present on admission)   Assessment & Plan    CT spine, no acute fractures        History of CVA (cerebrovascular accident)- (present on admission)   Assessment & Plan    Continue ASA and statin.   9/23 MRI today show no new stroke, showed Prominent lateral and third ventricles without visualized obstructing lesion which could indicate normal pressure hydrocephalus, patient does not have urinary incontinence neither balance problem  Continue PT in  hospital        Dementia- (present on admission)   Assessment & Plan    MRI of her brain showed enlarged ventricles but no acute findings.  Psychiatry deemed patient incapacitated.   Pending Guardianship.             Patient plan of care discussed at multidisplinary team rounds and with patient and R.N at beside.     Assessment and plan reviewed in great depth patient seen and examined at bedside no gross changes on assessment and plan overall. Reviews at bedside 10/21      Reviewed items::  Labs reviewed and Medications reviewed  Pink catheter::  No Pink  DVT prophylaxis pharmacological::  Heparin

## 2017-10-21 NOTE — PROGRESS NOTES
Received alert and oriented x 3, denies any pain/discomfort, assisted by one person with fww, had bm, due med given as ordered, call light within reach, bed alarm in placed, needs attended, will continue to monitor.

## 2017-10-21 NOTE — PROGRESS NOTES
Simona Knight Fall Risk Assessment:     Last Known Fall: Within the last month  Mobility: Immobilized/requires assist of one person  Medications: Cardiovascular or central nervous system meds  Mental Status/LOC/Awareness: Oriented to person and place  Toileting Needs: Incontinence, Use of assistive device (Bedside commode, bedpan, urinal)  Volume/Electrolyte Status: No problems  Communication/Sensory: Visual (Glasses)/hearing deficit  Behavior: Appropriate behavior  Simona Knight Fall Risk Total: 16  Fall Risk Level: HIGH RISK     [place] Fall Precautions:  call light/belongings in reach, bed in low position and locked, siderails up x 2, use non-slip footwear, adequate lighting, clutter free and spill free environment, educate on level of risk, educate to call for assistance, wheelchairs and assistive devices out of sight     Fall Risk Level Interventions:    TRIAL ([employer] 8, NEURO, MED JENNIE 5) Moderate Fall Risk Interventions  Place yellow fall risk ID band on patient: verified  Provide patient/family education based on risk assessment : verified  Educate patient/family to call staff for assistance when getting out of bed: verified  Place fall precaution signage outside patient door: verified  Utilize bed/chair fall alarm: verifiedTRIAL ([place] 8, NEURO, [employer] JENNIE 5) High Fall Risk Interventions  Place yellow fall risk ID band on patient: verified  Provide patient/family education based on risk assessment: completed  Educate patient/family to call staff for assistance when getting out of bed: completed  Place fall precaution signage outside patient door: verified  Place patient in room close to nursing station: currently not available/charge notified  Utilize bed/chair fall alarm: verified  Notify charge of high risk for huddle: completed     Patient Specific Interventions:      Medication: review medications with patient and family and assess for medications that can be discontinued or dosage decreased  Mental  Status/LOC/Awareness: reorient patient, reinforce falls education, check on patient hourly, utilize bed/chair fall alarm and reinforce the use of call light  Toileting: provide frquent toileting and instruct patient/family on the need to call for assistance when toileting  Volume/Electrolyte Status: ensure patient remains hydrated and monitor abnormal lab values  Communication/Sensory: update plan of care on whiteboard, ensure proper positioning when transferrng/ambulating and ensure patient has glasses/contacts and hearing aids/dentures  Behavioral: encourage patient to voice feelings and instruct/reinforce fall program rationale  Mobility: provide comfort measures during transport, utilize bed/chair fall alarm, ensure bed is locked and in lowest position, provide appropriate assistive device and instruct patient to exit bed on their strongest side

## 2017-10-21 NOTE — CARE PLAN
Problem: Discharge Barriers/Planning  Goal: Patient's continuum of care needs will be met    Intervention: Collaborate with Transitional Care Team and Interdisciplinary Team to meet discharge needs  Awaiting for guardianship prior discharge to group home or assisted living.      Problem: Mobility  Goal: Risk for activity intolerance will decrease    Intervention: Assess and monitor signs of activity intolerance  Assisted one person with fww.

## 2017-10-22 PROBLEM — E87.6 HYPOKALEMIA: Status: ACTIVE | Noted: 2017-10-22

## 2017-10-22 LAB
ALBUMIN SERPL BCP-MCNC: 3.5 G/DL (ref 3.2–4.9)
ALBUMIN/GLOB SERPL: 1.5 G/DL
ALP SERPL-CCNC: 77 U/L (ref 30–99)
ALT SERPL-CCNC: 18 U/L (ref 2–50)
ANION GAP SERPL CALC-SCNC: 8 MMOL/L (ref 0–11.9)
AST SERPL-CCNC: 17 U/L (ref 12–45)
BASOPHILS # BLD AUTO: 1.2 % (ref 0–1.8)
BASOPHILS # BLD: 0.06 K/UL (ref 0–0.12)
BILIRUB SERPL-MCNC: 0.3 MG/DL (ref 0.1–1.5)
BUN SERPL-MCNC: 18 MG/DL (ref 8–22)
CALCIUM SERPL-MCNC: 9.4 MG/DL (ref 8.5–10.5)
CHLORIDE SERPL-SCNC: 109 MMOL/L (ref 96–112)
CO2 SERPL-SCNC: 23 MMOL/L (ref 20–33)
CREAT SERPL-MCNC: 0.85 MG/DL (ref 0.5–1.4)
EOSINOPHIL # BLD AUTO: 0.33 K/UL (ref 0–0.51)
EOSINOPHIL NFR BLD: 6.5 % (ref 0–6.9)
ERYTHROCYTE [DISTWIDTH] IN BLOOD BY AUTOMATED COUNT: 61.3 FL (ref 35.9–50)
GFR SERPL CREATININE-BSD FRML MDRD: >60 ML/MIN/1.73 M 2
GLOBULIN SER CALC-MCNC: 2.3 G/DL (ref 1.9–3.5)
GLUCOSE SERPL-MCNC: 101 MG/DL (ref 65–99)
HCT VFR BLD AUTO: 41.1 % (ref 37–47)
HGB BLD-MCNC: 13.4 G/DL (ref 12–16)
IMM GRANULOCYTES # BLD AUTO: 0.03 K/UL (ref 0–0.11)
IMM GRANULOCYTES NFR BLD AUTO: 0.6 % (ref 0–0.9)
LYMPHOCYTES # BLD AUTO: 1.13 K/UL (ref 1–4.8)
LYMPHOCYTES NFR BLD: 22.1 % (ref 22–41)
MAGNESIUM SERPL-MCNC: 2 MG/DL (ref 1.5–2.5)
MCH RBC QN AUTO: 28.2 PG (ref 27–33)
MCHC RBC AUTO-ENTMCNC: 32.6 G/DL (ref 33.6–35)
MCV RBC AUTO: 86.5 FL (ref 81.4–97.8)
MONOCYTES # BLD AUTO: 0.61 K/UL (ref 0–0.85)
MONOCYTES NFR BLD AUTO: 11.9 % (ref 0–13.4)
NEUTROPHILS # BLD AUTO: 2.95 K/UL (ref 2–7.15)
NEUTROPHILS NFR BLD: 57.7 % (ref 44–72)
NRBC # BLD AUTO: 0 K/UL
NRBC BLD AUTO-RTO: 0 /100 WBC
PLATELET # BLD AUTO: 181 K/UL (ref 164–446)
PMV BLD AUTO: 11.2 FL (ref 9–12.9)
POTASSIUM SERPL-SCNC: 3.5 MMOL/L (ref 3.6–5.5)
PROT SERPL-MCNC: 5.8 G/DL (ref 6–8.2)
RBC # BLD AUTO: 4.75 M/UL (ref 4.2–5.4)
SODIUM SERPL-SCNC: 140 MMOL/L (ref 135–145)
WBC # BLD AUTO: 5.1 K/UL (ref 4.8–10.8)

## 2017-10-22 PROCEDURE — 700101 HCHG RX REV CODE 250: Performed by: FAMILY MEDICINE

## 2017-10-22 PROCEDURE — 700102 HCHG RX REV CODE 250 W/ 637 OVERRIDE(OP): Performed by: FAMILY MEDICINE

## 2017-10-22 PROCEDURE — A9270 NON-COVERED ITEM OR SERVICE: HCPCS | Performed by: HOSPITALIST

## 2017-10-22 PROCEDURE — A9270 NON-COVERED ITEM OR SERVICE: HCPCS | Performed by: INTERNAL MEDICINE

## 2017-10-22 PROCEDURE — 80053 COMPREHEN METABOLIC PANEL: CPT

## 2017-10-22 PROCEDURE — 85025 COMPLETE CBC W/AUTO DIFF WBC: CPT

## 2017-10-22 PROCEDURE — 700102 HCHG RX REV CODE 250 W/ 637 OVERRIDE(OP): Performed by: INTERNAL MEDICINE

## 2017-10-22 PROCEDURE — 700102 HCHG RX REV CODE 250 W/ 637 OVERRIDE(OP): Performed by: HOSPITALIST

## 2017-10-22 PROCEDURE — 36415 COLL VENOUS BLD VENIPUNCTURE: CPT

## 2017-10-22 PROCEDURE — 700111 HCHG RX REV CODE 636 W/ 250 OVERRIDE (IP): Performed by: FAMILY MEDICINE

## 2017-10-22 PROCEDURE — 99231 SBSQ HOSP IP/OBS SF/LOW 25: CPT | Performed by: HOSPITALIST

## 2017-10-22 PROCEDURE — 770006 HCHG ROOM/CARE - MED/SURG/GYN SEMI*

## 2017-10-22 PROCEDURE — 83735 ASSAY OF MAGNESIUM: CPT

## 2017-10-22 PROCEDURE — A9270 NON-COVERED ITEM OR SERVICE: HCPCS | Performed by: FAMILY MEDICINE

## 2017-10-22 RX ADMIN — AMLODIPINE BESYLATE 10 MG: 10 TABLET ORAL at 08:49

## 2017-10-22 RX ADMIN — DULOXETINE HYDROCHLORIDE 20 MG: 20 CAPSULE, DELAYED RELEASE ORAL at 08:49

## 2017-10-22 RX ADMIN — OXYBUTYNIN CHLORIDE 5 MG: 5 TABLET, FILM COATED, EXTENDED RELEASE ORAL at 22:13

## 2017-10-22 RX ADMIN — HYDRALAZINE HYDROCHLORIDE 100 MG: 50 TABLET ORAL at 05:35

## 2017-10-22 RX ADMIN — OMEPRAZOLE 20 MG: 20 CAPSULE, DELAYED RELEASE ORAL at 08:49

## 2017-10-22 RX ADMIN — ATORVASTATIN CALCIUM 80 MG: 40 TABLET, FILM COATED ORAL at 22:13

## 2017-10-22 RX ADMIN — TRAZODONE HYDROCHLORIDE 100 MG: 50 TABLET ORAL at 22:21

## 2017-10-22 RX ADMIN — HEPARIN SODIUM 5000 UNITS: 5000 INJECTION, SOLUTION INTRAVENOUS; SUBCUTANEOUS at 05:34

## 2017-10-22 RX ADMIN — HEPARIN SODIUM 5000 UNITS: 5000 INJECTION, SOLUTION INTRAVENOUS; SUBCUTANEOUS at 14:26

## 2017-10-22 RX ADMIN — LIDOCAINE 1 PATCH: 50 PATCH CUTANEOUS at 08:49

## 2017-10-22 RX ADMIN — GABAPENTIN 300 MG: 300 CAPSULE ORAL at 22:12

## 2017-10-22 RX ADMIN — ASPIRIN 81 MG: 81 TABLET, COATED ORAL at 08:49

## 2017-10-22 RX ADMIN — HYDRALAZINE HYDROCHLORIDE 100 MG: 50 TABLET ORAL at 22:13

## 2017-10-22 RX ADMIN — STANDARDIZED SENNA CONCENTRATE AND DOCUSATE SODIUM 2 TABLET: 8.6; 5 TABLET, FILM COATED ORAL at 22:12

## 2017-10-22 RX ADMIN — METOPROLOL TARTRATE 12.5 MG: 25 TABLET, FILM COATED ORAL at 22:13

## 2017-10-22 RX ADMIN — HEPARIN SODIUM 5000 UNITS: 5000 INJECTION, SOLUTION INTRAVENOUS; SUBCUTANEOUS at 22:13

## 2017-10-22 RX ADMIN — METOPROLOL TARTRATE 12.5 MG: 25 TABLET, FILM COATED ORAL at 08:49

## 2017-10-22 RX ADMIN — HYDRALAZINE HYDROCHLORIDE 100 MG: 50 TABLET ORAL at 14:26

## 2017-10-22 RX ADMIN — BENAZEPRIL HYDROCHLORIDE 40 MG: 20 TABLET ORAL at 08:52

## 2017-10-22 ASSESSMENT — PAIN SCALES - GENERAL
PAINLEVEL_OUTOF10: 3
PAINLEVEL_OUTOF10: 0

## 2017-10-22 NOTE — PROGRESS NOTES
Simona Knight Fall Risk Assessment:     Last Known Fall: Within the last month  Mobility: Immobilized/requires assist of one person  Medications: Cardiovascular or central nervous system meds  Mental Status/LOC/Awareness: Oriented to person and place  Toileting Needs: Incontinence, Use of assistive device (Bedside commode, bedpan, urinal)  Volume/Electrolyte Status: No problems  Communication/Sensory: Visual (Glasses)/hearing deficit  Behavior: Appropriate behavior  Simona Knight Fall Risk Total: 16  Fall Risk Level: HIGH RISK     Universal Fall Precautions:  call light/belongings in reach, bed in low position and locked, siderails up x 2, use non-slip footwear, adequate lighting, clutter free and spill free environment, educate on level of risk, educate to call for assistance, wheelchairs and assistive devices out of sight     Fall Risk Level Interventions:    TRIAL ([employer] 8, NEURO, MED JENNIE 5) Moderate Fall Risk Interventions  Place yellow fall risk ID band on patient: verified  Provide patient/family education based on risk assessment : verified  Educate patient/family to call staff for assistance when getting out of bed: verified  Place fall precaution signage outside patient door: verified  Utilize bed/chair fall alarm: verifiedTRIAL ([employer] 8, NEURO, [employer] JENNIE 5) High Fall Risk Interventions  Place yellow fall risk ID band on patient: verified  Provide patient/family education based on risk assessment: completed  Educate patient/family to call staff for assistance when getting out of bed: completed  Place fall precaution signage outside patient door: verified  Place patient in room close to nursing station: currently not available/charge notified  Utilize bed/chair fall alarm: verified  Notify charge of high risk for huddle: completed     Patient Specific Interventions:      Medication: review medications with patient and family and assess for medications that can be discontinued or dosage decreased  Mental  Status/LOC/Awareness: reorient patient, reinforce falls education, check on patient hourly, utilize bed/chair fall alarm and reinforce the use of call light  Toileting: provide frquent toileting and instruct patient/family on the need to call for assistance when toileting  Volume/Electrolyte Status: ensure patient remains hydrated and monitor abnormal lab values  Communication/Sensory: update plan of care on whiteboard, ensure proper positioning when transferrng/ambulating and ensure patient has glasses/contacts and hearing aids/dentures  Behavioral: encourage patient to voice feelings and instruct/reinforce fall program rationale  Mobility: provide comfort measures during transport, utilize bed/chair fall alarm, ensure bed is locked and in lowest position, provide appropriate assistive device and instruct patient to exit bed on their strongest side

## 2017-10-22 NOTE — PROGRESS NOTES
Bedside report received.  Assessment complete.  A&O x 4. Patient calls appropriately.  Denies numbness and tingling. Moves extremities.   Patient has 5/10 pain. Declines pain medication.  Denies N&V. Tolerating diet.  + void incontinence  Patient denies SOB.  Review plan with of care with patient. Call light and personal belongings with in reach. Hourly rounding in place. All needs met at this time.

## 2017-10-22 NOTE — PROGRESS NOTES
[facility] Progress Note    Date of Service: 10/22/2017    Chief Complaint  76 y.o. female admitted 2017 with AMS, UTI.     Interval Problem Update  Patient comfortable sitting up eating no acute issues.    Consultants/Specialty  Neurology    Disposition  Pending guardianship due to mental incapacity         Review of Systems   Unable to perform ROS: Dementia      Physical Exam  Laboratory/Imaging   Hemodynamics  Temp (24hrs), Av.6 °C (97.9 °F), Min:36 °C (96.8 °F), Max:37 °C (98.6 °F)   Temperature: 36 °C (96.8 °F)  Pulse  Av.4  Min: 51  Max: 99    Blood Pressure : 138/73      Respiratory      Respiration: 16, Pulse Oximetry: 93 %        RUL Breath Sounds: Clear, RML Breath Sounds: Clear, RLL Breath Sounds: Diminished, ARGELIA Breath Sounds: Clear, LLL Breath Sounds: Diminished    Fluids    Intake/Output Summary (Last 24 hours) at 10/22/17 0740  Last data filed at 10/21/17 2153   Gross per 24 hour   Intake              120 ml   Output                0 ml   Net              120 ml       Nutrition  Orders Placed This Encounter   Procedures   • Diet Order     Standing Status:   Standing     Number of Occurrences:   1     Order Specific Question:   Diet:     Answer:   Regular [1]   grossly unchanged  Physical Exam   Constitutional: She appears well-developed. No distress.   Obese    HENT:   Head: Normocephalic and atraumatic.   Mouth/Throat: No oropharyngeal exudate.   Eyes: EOM are normal. Pupils are equal, round, and reactive to light.   Neck: Normal range of motion. Neck supple. No thyromegaly present.   Cardiovascular: Normal rate, regular rhythm and intact distal pulses.    Pulmonary/Chest: Effort normal and breath sounds normal. No respiratory distress.   Abdominal: Soft. Bowel sounds are normal. She exhibits no distension and no mass. There is no tenderness. There is no guarding.   Musculoskeletal: She exhibits no edema or tenderness.   Neurological: She is alert. She has normal strength. She  is not disoriented. No cranial nerve deficit or sensory deficit. GCS eye subscore is 4. GCS verbal subscore is 5. GCS motor subscore is 6.   Oriented ×2   Skin: Skin is warm and dry. She is not diaphoretic.   Psychiatric: Her behavior is normal. Thought content normal.   Nursing note and vitals reviewed.      Recent Labs      10/22/17   0313   WBC  5.1   RBC  4.75   HEMOGLOBIN  13.4   HEMATOCRIT  41.1   MCV  86.5   MCH  28.2   MCHC  32.6*   RDW  61.3*   PLATELETCT  181   MPV  11.2     Recent Labs      10/22/17   0313   SODIUM  140   POTASSIUM  3.5*   CHLORIDE  109   CO2  23   GLUCOSE  101*   BUN  18   CREATININE  0.85   CALCIUM  9.4                      Assessment/Plan     HTN (hypertension)- (present on admission)   Assessment & Plan    Controlled.  Continue Hydralazine,metoprolol, Lotensin and Norvasc.          Insomnia   Assessment & Plan    Continue trazodone nightly          UTI (urinary tract infection)- (present on admission)   Assessment & Plan    Completed antibiotics, no symptoms         Acute renal insufficiency- (present on admission)   Assessment & Plan    2/2 to dehydration  Resolved         T12 compression fracture (CMS-HCC)- (present on admission)   Assessment & Plan    CT spine, no acute fractures        History of CVA (cerebrovascular accident)- (present on admission)   Assessment & Plan    Continue ASA and statin.   9/23 MRI today show no new stroke, showed Prominent lateral and third ventricles without visualized obstructing lesion which could indicate normal pressure hydrocephalus, patient does not have urinary incontinence neither balance problem  Continue PT in  hospital        Dementia- (present on admission)   Assessment & Plan    MRI of her brain showed enlarged ventricles but no acute findings.  Psychiatry deemed patient incapacitated.   Pending Guardianship.             Hypokalemia  Replenish electrolytes recheck BMP in the morning    Patient plan of care discussed at multidisplinary team  rounds and with patient and R.N at beside.     Assessment and plan reviewed in great depth patient seen and examined at bedside no gross changes on assessment and plan overall. Reviews at bedside 10/22      Reviewed items::  Labs reviewed and Medications reviewed  Pink catheter::  No Pink  DVT prophylaxis pharmacological::  Heparin

## 2017-10-23 PROCEDURE — A9270 NON-COVERED ITEM OR SERVICE: HCPCS | Performed by: FAMILY MEDICINE

## 2017-10-23 PROCEDURE — 700101 HCHG RX REV CODE 250: Performed by: FAMILY MEDICINE

## 2017-10-23 PROCEDURE — 700102 HCHG RX REV CODE 250 W/ 637 OVERRIDE(OP): Performed by: INTERNAL MEDICINE

## 2017-10-23 PROCEDURE — 700102 HCHG RX REV CODE 250 W/ 637 OVERRIDE(OP): Performed by: FAMILY MEDICINE

## 2017-10-23 PROCEDURE — 700102 HCHG RX REV CODE 250 W/ 637 OVERRIDE(OP): Performed by: HOSPITALIST

## 2017-10-23 PROCEDURE — 99231 SBSQ HOSP IP/OBS SF/LOW 25: CPT | Performed by: HOSPITALIST

## 2017-10-23 PROCEDURE — 97530 THERAPEUTIC ACTIVITIES: CPT

## 2017-10-23 PROCEDURE — 97535 SELF CARE MNGMENT TRAINING: CPT

## 2017-10-23 PROCEDURE — 700111 HCHG RX REV CODE 636 W/ 250 OVERRIDE (IP): Performed by: FAMILY MEDICINE

## 2017-10-23 PROCEDURE — A9270 NON-COVERED ITEM OR SERVICE: HCPCS | Performed by: INTERNAL MEDICINE

## 2017-10-23 PROCEDURE — A9270 NON-COVERED ITEM OR SERVICE: HCPCS | Performed by: HOSPITALIST

## 2017-10-23 PROCEDURE — 770006 HCHG ROOM/CARE - MED/SURG/GYN SEMI*

## 2017-10-23 RX ADMIN — HEPARIN SODIUM 5000 UNITS: 5000 INJECTION, SOLUTION INTRAVENOUS; SUBCUTANEOUS at 15:19

## 2017-10-23 RX ADMIN — ASPIRIN 81 MG: 81 TABLET, COATED ORAL at 08:49

## 2017-10-23 RX ADMIN — GABAPENTIN 300 MG: 300 CAPSULE ORAL at 21:28

## 2017-10-23 RX ADMIN — OXYBUTYNIN CHLORIDE 5 MG: 5 TABLET, FILM COATED, EXTENDED RELEASE ORAL at 21:27

## 2017-10-23 RX ADMIN — HYDRALAZINE HYDROCHLORIDE 100 MG: 50 TABLET ORAL at 21:28

## 2017-10-23 RX ADMIN — BENAZEPRIL HYDROCHLORIDE 40 MG: 20 TABLET ORAL at 08:49

## 2017-10-23 RX ADMIN — AMLODIPINE BESYLATE 10 MG: 10 TABLET ORAL at 08:49

## 2017-10-23 RX ADMIN — HYDRALAZINE HYDROCHLORIDE 100 MG: 50 TABLET ORAL at 15:19

## 2017-10-23 RX ADMIN — ATORVASTATIN CALCIUM 80 MG: 40 TABLET, FILM COATED ORAL at 21:27

## 2017-10-23 RX ADMIN — METOPROLOL TARTRATE 12.5 MG: 25 TABLET, FILM COATED ORAL at 08:48

## 2017-10-23 RX ADMIN — HEPARIN SODIUM 5000 UNITS: 5000 INJECTION, SOLUTION INTRAVENOUS; SUBCUTANEOUS at 06:09

## 2017-10-23 RX ADMIN — DULOXETINE HYDROCHLORIDE 20 MG: 20 CAPSULE, DELAYED RELEASE ORAL at 08:49

## 2017-10-23 RX ADMIN — HEPARIN SODIUM 5000 UNITS: 5000 INJECTION, SOLUTION INTRAVENOUS; SUBCUTANEOUS at 21:27

## 2017-10-23 RX ADMIN — OMEPRAZOLE 20 MG: 20 CAPSULE, DELAYED RELEASE ORAL at 08:49

## 2017-10-23 RX ADMIN — STANDARDIZED SENNA CONCENTRATE AND DOCUSATE SODIUM 2 TABLET: 8.6; 5 TABLET, FILM COATED ORAL at 21:27

## 2017-10-23 RX ADMIN — HYDRALAZINE HYDROCHLORIDE 100 MG: 50 TABLET ORAL at 06:09

## 2017-10-23 RX ADMIN — STANDARDIZED SENNA CONCENTRATE AND DOCUSATE SODIUM 2 TABLET: 8.6; 5 TABLET, FILM COATED ORAL at 08:54

## 2017-10-23 ASSESSMENT — COGNITIVE AND FUNCTIONAL STATUS - GENERAL
TOILETING: A LITTLE
EATING MEALS: A LITTLE
PERSONAL GROOMING: A LITTLE
DRESSING REGULAR UPPER BODY CLOTHING: A LITTLE
SUGGESTED CMS G CODE MODIFIER DAILY ACTIVITY: CK
DAILY ACTIVITIY SCORE: 16
DRESSING REGULAR LOWER BODY CLOTHING: A LOT
HELP NEEDED FOR BATHING: A LOT

## 2017-10-23 ASSESSMENT — PAIN SCALES - GENERAL
PAINLEVEL_OUTOF10: 0

## 2017-10-23 NOTE — PROGRESS NOTES
Received bedside report from day RN and assumed care of patient at 1900. Patient is alert and oriented x4 with no signs of labored breathing or distress. Safety precautions in place including patient call light within reach, bed alarm on, personal possessions nearby, bed in low position and locked, hourly rounding in practice, and non-skid socks in place.

## 2017-10-23 NOTE — PROGRESS NOTES
Simona Knight Fall Risk Assessment:     Last Known Fall: Within the last month  Mobility: Immobilized/requires assist of one person  Medications: Cardiovascular or central nervous system meds  Mental Status/LOC/Awareness: Awake, alert, and oriented to date, place, and person  Toileting Needs: Incontinence  Volume/Electrolyte Status: No problems  Communication/Sensory: Visual (Glasses)/hearing deficit  Behavior: Appropriate behavior  Simona Knight Fall Risk Total: 13  Fall Risk Level: MODERATE RISK    Universal Fall Precautions:  call light/belongings in reach, bed in low position and locked, wheelchairs and assistive devices out of sight, siderails up x 2, use non-slip footwear, adequate lighting, clutter free and spill free environment, educate on level of risk, educate to call for assistance    Fall Risk Level Interventions:    TRIAL ([employer], NEURO, MED JENNIE 5) Moderate Fall Risk Interventions  Place yellow fall risk ID band on patient: verified  Provide patient/family education based on risk assessment : verified  Educate patient/family to call staff for assistance when getting out of bed: verified  Place fall precaution signage outside patient door: verified  Utilize bed/chair fall alarm: verifiedTRIAL ([employer] 8, NEURO, MED JENNIE 5) High Fall Risk Interventions  Place yellow fall risk ID band on patient: verified  Provide patient/family education based on risk assessment: completed  Educate patient/family to call staff for assistance when getting out of bed: completed  Place fall precaution signage outside patient door: verified  Place patient in room close to nursing station: currently not available/charge notified  Utilize bed/chair fall alarm: verified  Notify charge of high risk for huddle: completed    Patient Specific Interventions:     Medication: review medications with patient and family and limit combination of prn medications  Mental Status/LOC/Awareness: utilize bed/chair fall alarm  Toileting:  provide frquent toileting, instruct patient/family on the use of grab bars, instruct patient/family on the need to call for assistance when toileting and do not leave patient unattended in bathroom/refer to toileting scripting  Volume/Electrolyte Status: ensure patient remains hydrated  Communication/Sensory: update plan of care on whiteboard  Behavioral: encourage patient to voice feelings  Mobility: dangle prior to standing, utilize bed/chair fall alarm, ensure bed is locked and in lowest position, provide appropriate assistive device and instruct patient to exit bed on their strongest side

## 2017-10-23 NOTE — CARE PLAN
Problem: Safety  Goal: Will remain free from injury  Outcome: PROGRESSING AS EXPECTED      Problem: Knowledge Deficit  Goal: Knowledge of disease process/condition, treatment plan, diagnostic tests, and medications will improve  Outcome: PROGRESSING AS EXPECTED

## 2017-10-23 NOTE — PROGRESS NOTES
Simona Knight Fall Risk Assessment:     Last Known Fall: Within the last month  Mobility: Immobilized/requires assist of one person  Medications: Cardiovascular or central nervous system meds  Mental Status/LOC/Awareness: Oriented to person and place  Toileting Needs: Incontinence, Use of assistive device (Bedside commode, bedpan, urinal)  Volume/Electrolyte Status: No problems  Communication/Sensory: Visual (Glasses)/hearing deficit  Behavior: Appropriate behavior  Simona Knight Fall Risk Total: 16  Fall Risk Level: HIGH RISK     Universal Fall Precautions:  call light/belongings in reach, bed in low position and locked, siderails up x 2, use non-slip footwear, adequate lighting, clutter free and spill free environment, educate on level of risk, educate to call for assistance, wheelchairs and assistive devices out of sight     Fall Risk Level Interventions:    TRIAL ([employer] 8, NEURO, MED JENNIE 5) Moderate Fall Risk Interventions  Place yellow fall risk ID band on patient: verified  Provide patient/family education based on risk assessment : verified  Educate patient/family to call staff for assistance when getting out of bed: verified  Place fall precaution signage outside patient door: verified  Utilize bed/chair fall alarm: verifiedTRIAL ([employer] 8, NEURO, [employer] JENNIE 5) High Fall Risk Interventions  Place yellow fall risk ID band on patient: verified  Provide patient/family education based on risk assessment: completed  Educate patient/family to call staff for assistance when getting out of bed: completed  Place fall precaution signage outside patient door: verified  Place patient in room close to nursing station: currently not available/charge notified  Utilize bed/chair fall alarm: verified  Notify charge of high risk for huddle: completed     Patient Specific Interventions:      Medication: review medications with patient and family and assess for medications that can be discontinued or dosage decreased  Mental  Status/LOC/Awareness: reorient patient, reinforce falls education, check on patient hourly, utilize bed/chair fall alarm and reinforce the use of call light  Toileting: provide frquent toileting and instruct patient/family on the need to call for assistance when toileting  Volume/Electrolyte Status: ensure patient remains hydrated and monitor abnormal lab values  Communication/Sensory: update plan of care on whiteboard, ensure proper positioning when transferrng/ambulating and ensure patient has glasses/contacts and hearing aids/dentures  Behavioral: encourage patient to voice feelings and instruct/reinforce fall program rationale  Mobility: provide comfort measures during transport, utilize bed/chair fall alarm, ensure bed is locked and in lowest position, provide appropriate assistive device and instruct patient to exit bed on their strongest side

## 2017-10-23 NOTE — PROGRESS NOTES
[facility]    Date of Service: 10/23/2017    Chief Complaint  76 y.o. female admitted 2017 with AMS, UTI.     Interval Problem Update  Patient comfortable sitting up eating no acute issues.    Consultants/Specialty  Neurology    Disposition  Pending guardianship due to mental incapacity         Review of Systems   Unable to perform ROS: Dementia      Physical Exam  Laboratory/Imaging   Hemodynamics  Temp (24hrs), Av.2 °C (97.2 °F), Min:36 °C (96.8 °F), Max:36.4 °C (97.5 °F)   Temperature: 36.2 °C (97.2 °F)  Pulse  Av.3  Min: 51  Max: 99    Blood Pressure : 125/69      Respiratory      Respiration: 17, Pulse Oximetry: 91 %        RUL Breath Sounds: Clear, RML Breath Sounds: Diminished, RLL Breath Sounds: Diminished, ARGELIA Breath Sounds: Clear, LLL Breath Sounds: Diminished    Fluids    Intake/Output Summary (Last 24 hours) at 10/23/17 0741  Last data filed at 10/22/17 2200   Gross per 24 hour   Intake              238 ml   Output                0 ml   Net              238 ml       Nutrition  Orders Placed This Encounter   Procedures   • Diet Order     Standing Status:   Standing     Number of Occurrences:   1     Order Specific Question:   Diet:     Answer:   Regular [1]   grossly unchanged  Physical Exam   Constitutional: She appears well-developed. No distress.   Obese    HENT:   Head: Normocephalic and atraumatic.   Mouth/Throat: No oropharyngeal exudate.   Eyes: EOM are normal. Pupils are equal, round, and reactive to light.   Neck: Normal range of motion. Neck supple. No thyromegaly present.   Cardiovascular: Normal rate, regular rhythm and intact distal pulses.    Pulmonary/Chest: Effort normal and breath sounds normal. No respiratory distress.   Abdominal: Soft. Bowel sounds are normal. She exhibits no distension and no mass. There is no tenderness. There is no guarding.   Musculoskeletal: She exhibits no edema or tenderness.   Neurological: She is alert. She has normal  strength. She is not disoriented. No cranial nerve deficit or sensory deficit. GCS eye subscore is 4. GCS verbal subscore is 5. GCS motor subscore is 6.   Oriented ×2   Skin: Skin is warm and dry. She is not diaphoretic.   Psychiatric: Her behavior is normal. Thought content normal.   Nursing note and vitals reviewed.      Recent Labs      10/22/17   0313   WBC  5.1   RBC  4.75   HEMOGLOBIN  13.4   HEMATOCRIT  41.1   MCV  86.5   MCH  28.2   MCHC  32.6*   RDW  61.3*   PLATELETCT  181   MPV  11.2     Recent Labs      10/22/17   0313   SODIUM  140   POTASSIUM  3.5*   CHLORIDE  109   CO2  23   GLUCOSE  101*   BUN  18   CREATININE  0.85   CALCIUM  9.4                      Assessment/Plan     Acute renal insufficiency  2/2 to dehydration  Resolved     Dementia  MRI of her brain showed enlarged ventricles but no acute findings.  Psychiatry deemed patient incapacitated.   Pending Guardianship.       History of CVA (cerebrovascular accident)  Continue ASA and statin.   9/23 MRI today show no new stroke, showed Prominent lateral and third ventricles without visualized obstructing lesion which could indicate normal pressure hydrocephalus, patient does not have urinary incontinence neither balance problem  Continue PT/OT in house   No acute changes    HTN (hypertension)  Controlled.  Continue Hydralazine,metoprolol, Lotensin and Norvasc.      UTI (urinary tract infection)  Completed antibiotics, no symptoms     T12 compression fracture (CMS-HCC)  CT spine, no acute fractures    Insomnia  Continue trazodone nightly      Hypokalemia  Replenish electrolytes recheck BMP      Patient plan of care discussed at multidisplinary team rounds and with patient and R.N at beside.     Assessment and plan reviewed in great depth patient seen and examined at bedside no gross changes on assessment and plan overall. Reviews at bedside 10/23      Reviewed items::  Labs reviewed and Medications reviewed  Pink catheter::  No Pink  DVT prophylaxis  pharmacological::  Heparin

## 2017-10-24 PROCEDURE — 700102 HCHG RX REV CODE 250 W/ 637 OVERRIDE(OP): Performed by: HOSPITALIST

## 2017-10-24 PROCEDURE — A9270 NON-COVERED ITEM OR SERVICE: HCPCS | Performed by: HOSPITALIST

## 2017-10-24 PROCEDURE — 99232 SBSQ HOSP IP/OBS MODERATE 35: CPT | Performed by: HOSPITALIST

## 2017-10-24 PROCEDURE — 700111 HCHG RX REV CODE 636 W/ 250 OVERRIDE (IP): Performed by: FAMILY MEDICINE

## 2017-10-24 PROCEDURE — A9270 NON-COVERED ITEM OR SERVICE: HCPCS | Performed by: FAMILY MEDICINE

## 2017-10-24 PROCEDURE — 770006 HCHG ROOM/CARE - MED/SURG/GYN SEMI*

## 2017-10-24 PROCEDURE — 700102 HCHG RX REV CODE 250 W/ 637 OVERRIDE(OP): Performed by: FAMILY MEDICINE

## 2017-10-24 PROCEDURE — 700101 HCHG RX REV CODE 250: Performed by: FAMILY MEDICINE

## 2017-10-24 RX ADMIN — HYDRALAZINE HYDROCHLORIDE 100 MG: 50 TABLET ORAL at 13:45

## 2017-10-24 RX ADMIN — AMLODIPINE BESYLATE 10 MG: 10 TABLET ORAL at 09:19

## 2017-10-24 RX ADMIN — HEPARIN SODIUM 5000 UNITS: 5000 INJECTION, SOLUTION INTRAVENOUS; SUBCUTANEOUS at 13:46

## 2017-10-24 RX ADMIN — HEPARIN SODIUM 5000 UNITS: 5000 INJECTION, SOLUTION INTRAVENOUS; SUBCUTANEOUS at 05:34

## 2017-10-24 RX ADMIN — HYDRALAZINE HYDROCHLORIDE 100 MG: 50 TABLET ORAL at 21:36

## 2017-10-24 RX ADMIN — DULOXETINE HYDROCHLORIDE 20 MG: 20 CAPSULE, DELAYED RELEASE ORAL at 09:19

## 2017-10-24 RX ADMIN — ASPIRIN 81 MG: 81 TABLET, COATED ORAL at 09:19

## 2017-10-24 RX ADMIN — OMEPRAZOLE 20 MG: 20 CAPSULE, DELAYED RELEASE ORAL at 09:19

## 2017-10-24 RX ADMIN — HYDRALAZINE HYDROCHLORIDE 100 MG: 50 TABLET ORAL at 05:34

## 2017-10-24 RX ADMIN — ATORVASTATIN CALCIUM 80 MG: 40 TABLET, FILM COATED ORAL at 21:36

## 2017-10-24 RX ADMIN — HEPARIN SODIUM 5000 UNITS: 5000 INJECTION, SOLUTION INTRAVENOUS; SUBCUTANEOUS at 21:36

## 2017-10-24 RX ADMIN — OXYBUTYNIN CHLORIDE 5 MG: 5 TABLET, FILM COATED, EXTENDED RELEASE ORAL at 21:36

## 2017-10-24 RX ADMIN — LIDOCAINE 1 PATCH: 50 PATCH CUTANEOUS at 09:20

## 2017-10-24 RX ADMIN — GABAPENTIN 300 MG: 300 CAPSULE ORAL at 21:36

## 2017-10-24 RX ADMIN — METOPROLOL TARTRATE 12.5 MG: 25 TABLET, FILM COATED ORAL at 21:36

## 2017-10-24 RX ADMIN — METOPROLOL TARTRATE 12.5 MG: 25 TABLET, FILM COATED ORAL at 09:19

## 2017-10-24 RX ADMIN — BENAZEPRIL HYDROCHLORIDE 40 MG: 20 TABLET ORAL at 09:19

## 2017-10-24 ASSESSMENT — ENCOUNTER SYMPTOMS
ABDOMINAL PAIN: 0
HEADACHES: 0
LOSS OF CONSCIOUSNESS: 0
PALPITATIONS: 0
FALLS: 0
NAUSEA: 0
VOMITING: 0
CHILLS: 0
WEAKNESS: 1
MYALGIAS: 0
SHORTNESS OF BREATH: 0
FEVER: 0
PSYCHIATRIC NEGATIVE: 1

## 2017-10-24 ASSESSMENT — PAIN SCALES - GENERAL
PAINLEVEL_OUTOF10: 0

## 2017-10-24 NOTE — PROGRESS NOTES
Simona Knight Fall Risk Assessment:     Last Known Fall: Within the last six months  Mobility: Immobilized/requires assist of one person  Medications: Cardiovascular or central nervous system meds  Mental Status/LOC/Awareness: Oriented to person and place  Toileting Needs: Incontinence  Volume/Electrolyte Status: No problems  Communication/Sensory: Visual (Glasses)/hearing deficit  Behavior: Appropriate behavior  Simona Knight Fall Risk Total: 13  Fall Risk Level: MODERATE RISK    Universal Fall Precautions:  call light/belongings in reach, bed in low position and locked, wheelchairs and assistive devices out of sight, siderails up x 2, use non-slip footwear, adequate lighting, clutter free and spill free environment, educate on level of risk, educate to call for assistance    Fall Risk Level Interventions:    TRIAL ([employer] 8, NEURO, MED JENNIE 5) Moderate Fall Risk Interventions  Place yellow fall risk ID band on patient: verified  Provide patient/family education based on risk assessment : completed  Educate patient/family to call staff for assistance when getting out of bed: completed  Place fall precaution signage outside patient door: verified  Utilize bed/chair fall alarm: verifiedTRIAL ([employer] 8, NEURO, [employer] JENNIE 5) High Fall Risk Interventions  Place yellow fall risk ID band on patient: verified  Provide patient/family education based on risk assessment: completed  Educate patient/family to call staff for assistance when getting out of bed: completed  Place fall precaution signage outside patient door: verified  Place patient in room close to nursing station: currently not available/charge notified  Utilize bed/chair fall alarm: verified  Notify charge of high risk for huddle: completed    Patient Specific Interventions:     Medication: review medications with patient and family  Mental Status/LOC/Awareness: reorient patient, check on patient hourly, utilize bed/chair fall alarm and reinforce the use of call  light  Toileting: provide frquent toileting, instruct patient/family on the need to call for assistance when toileting and do not leave patient unattended in bathroom/refer to toileting scripting  Volume/Electrolyte Status: ensure patient remains hydrated  Communication/Sensory: update plan of care on whiteboard and ensure proper positioning when transferrng/ambulating  Behavioral: not applicable  Mobility: utilize bed/chair fall alarm and ensure bed is locked and in lowest position

## 2017-10-24 NOTE — PROGRESS NOTES
Assumed patient care at 1900. POC discussed w/ day nurse and pt, pt in agreement w/ goals. Pt AOx3, reoriented to time. Pt denies pain, nausea, SOB. Pt up one assist, FWW. Incontinent of urine. Repositioned for comfort. Patient educated on use of call light, hourly rounding, and pain scale. Personal possession and call light within reach. Bed alarm on.

## 2017-10-24 NOTE — CARE PLAN
Problem: Infection  Goal: Will remain free from infection  Outcome: PROGRESSING AS EXPECTED  WBC are within normal limits, pt is afebrile. No active s/s of infection at this time.

## 2017-10-24 NOTE — THERAPY
"Occupational Therapy Treatment completed with focus on ADLs and ADL transfers.  Functional Status:  CGA for transfers with FWW and close supervision for safe technique to/from toilet and bedside chair ; LE dressing with assist ; toileting and hygiene with supervision   Plan of Care: Will benefit from Occupational Therapy 3 times per week  Discharge Recommendations:  Equipment Will Continue to Assess for Equipment Needs. Post-acute therapy Discharge to a transitional care facility for continued skilled therapy services.    See \"Rehab Therapy-Acute\" Patient Summary Report for complete documentation.   "

## 2017-10-24 NOTE — PROGRESS NOTES
Assumed pt care after report received from night Rn. Pt is resting comfortably at this time. No c/o pain or distress noted. Call light and belongings in reach. Bed in low position. Bed alarm on. Treaded socks on. Will continue to monitor.

## 2017-10-24 NOTE — CARE PLAN
Problem: Safety  Goal: Will remain free from injury  Outcome: PROGRESSING AS EXPECTED  Bed in low position, treaded socks on, bed alarm on, hourly rounds performed. No fall since admission.  Call light at patient bedside, re-educated to call for assistance for her needs.

## 2017-10-24 NOTE — CARE PLAN
Problem: Safety  Goal: Will remain free from injury  Bed locked and in lowest position, call light and personal belongings within reach, pt educated to call for assistance. Bed alarm on. Hourly rounding in effect.       Problem: Pain Management  Goal: Pain level will decrease to patient's comfort goal  Patient denies pain. Repositioned for comfort as needed.

## 2017-10-25 LAB
ANION GAP SERPL CALC-SCNC: 8 MMOL/L (ref 0–11.9)
BUN SERPL-MCNC: 21 MG/DL (ref 8–22)
CALCIUM SERPL-MCNC: 9.6 MG/DL (ref 8.5–10.5)
CHLORIDE SERPL-SCNC: 106 MMOL/L (ref 96–112)
CO2 SERPL-SCNC: 25 MMOL/L (ref 20–33)
CREAT SERPL-MCNC: 1.01 MG/DL (ref 0.5–1.4)
GFR SERPL CREATININE-BSD FRML MDRD: 53 ML/MIN/1.73 M 2
GLUCOSE SERPL-MCNC: 110 MG/DL (ref 65–99)
MAGNESIUM SERPL-MCNC: 2 MG/DL (ref 1.5–2.5)
POTASSIUM SERPL-SCNC: 3.5 MMOL/L (ref 3.6–5.5)
SODIUM SERPL-SCNC: 139 MMOL/L (ref 135–145)

## 2017-10-25 PROCEDURE — A9270 NON-COVERED ITEM OR SERVICE: HCPCS | Performed by: FAMILY MEDICINE

## 2017-10-25 PROCEDURE — 700102 HCHG RX REV CODE 250 W/ 637 OVERRIDE(OP): Performed by: HOSPITALIST

## 2017-10-25 PROCEDURE — 99231 SBSQ HOSP IP/OBS SF/LOW 25: CPT | Performed by: HOSPITALIST

## 2017-10-25 PROCEDURE — A9270 NON-COVERED ITEM OR SERVICE: HCPCS | Performed by: HOSPITALIST

## 2017-10-25 PROCEDURE — 700111 HCHG RX REV CODE 636 W/ 250 OVERRIDE (IP): Performed by: FAMILY MEDICINE

## 2017-10-25 PROCEDURE — 700111 HCHG RX REV CODE 636 W/ 250 OVERRIDE (IP): Performed by: HOSPITALIST

## 2017-10-25 PROCEDURE — 80048 BASIC METABOLIC PNL TOTAL CA: CPT

## 2017-10-25 PROCEDURE — 83735 ASSAY OF MAGNESIUM: CPT

## 2017-10-25 PROCEDURE — 700101 HCHG RX REV CODE 250: Performed by: FAMILY MEDICINE

## 2017-10-25 PROCEDURE — 770006 HCHG ROOM/CARE - MED/SURG/GYN SEMI*

## 2017-10-25 PROCEDURE — 97116 GAIT TRAINING THERAPY: CPT

## 2017-10-25 PROCEDURE — 700102 HCHG RX REV CODE 250 W/ 637 OVERRIDE(OP): Performed by: FAMILY MEDICINE

## 2017-10-25 PROCEDURE — 36415 COLL VENOUS BLD VENIPUNCTURE: CPT

## 2017-10-25 RX ORDER — POTASSIUM CHLORIDE 20 MEQ/1
40 TABLET, EXTENDED RELEASE ORAL ONCE
Status: COMPLETED | OUTPATIENT
Start: 2017-10-25 | End: 2017-10-25

## 2017-10-25 RX ORDER — HEPARIN SODIUM 5000 [USP'U]/ML
5000 INJECTION, SOLUTION INTRAVENOUS; SUBCUTANEOUS EVERY 12 HOURS
Status: DISCONTINUED | OUTPATIENT
Start: 2017-10-25 | End: 2018-01-15 | Stop reason: HOSPADM

## 2017-10-25 RX ADMIN — ATORVASTATIN CALCIUM 80 MG: 40 TABLET, FILM COATED ORAL at 21:13

## 2017-10-25 RX ADMIN — HEPARIN SODIUM 5000 UNITS: 5000 INJECTION, SOLUTION INTRAVENOUS; SUBCUTANEOUS at 06:24

## 2017-10-25 RX ADMIN — DULOXETINE HYDROCHLORIDE 20 MG: 20 CAPSULE, DELAYED RELEASE ORAL at 09:04

## 2017-10-25 RX ADMIN — AMLODIPINE BESYLATE 10 MG: 10 TABLET ORAL at 09:04

## 2017-10-25 RX ADMIN — STANDARDIZED SENNA CONCENTRATE AND DOCUSATE SODIUM 2 TABLET: 8.6; 5 TABLET, FILM COATED ORAL at 21:13

## 2017-10-25 RX ADMIN — METOPROLOL TARTRATE 12.5 MG: 25 TABLET, FILM COATED ORAL at 09:04

## 2017-10-25 RX ADMIN — METOPROLOL TARTRATE 12.5 MG: 25 TABLET, FILM COATED ORAL at 21:13

## 2017-10-25 RX ADMIN — LIDOCAINE 1 PATCH: 50 PATCH CUTANEOUS at 09:03

## 2017-10-25 RX ADMIN — HYDRALAZINE HYDROCHLORIDE 100 MG: 50 TABLET ORAL at 21:13

## 2017-10-25 RX ADMIN — ERGOCALCIFEROL 50000 UNITS: 1.25 CAPSULE, LIQUID FILLED ORAL at 09:05

## 2017-10-25 RX ADMIN — HYDRALAZINE HYDROCHLORIDE 100 MG: 50 TABLET ORAL at 06:24

## 2017-10-25 RX ADMIN — OXYBUTYNIN CHLORIDE 5 MG: 5 TABLET, FILM COATED, EXTENDED RELEASE ORAL at 21:16

## 2017-10-25 RX ADMIN — BENAZEPRIL HYDROCHLORIDE 40 MG: 20 TABLET ORAL at 09:04

## 2017-10-25 RX ADMIN — POTASSIUM CHLORIDE 40 MEQ: 1500 TABLET, EXTENDED RELEASE ORAL at 09:04

## 2017-10-25 RX ADMIN — OMEPRAZOLE 20 MG: 20 CAPSULE, DELAYED RELEASE ORAL at 09:04

## 2017-10-25 RX ADMIN — HYDRALAZINE HYDROCHLORIDE 100 MG: 50 TABLET ORAL at 16:01

## 2017-10-25 RX ADMIN — GABAPENTIN 300 MG: 300 CAPSULE ORAL at 21:13

## 2017-10-25 RX ADMIN — HEPARIN SODIUM 5000 UNITS: 5000 INJECTION, SOLUTION INTRAVENOUS; SUBCUTANEOUS at 21:14

## 2017-10-25 RX ADMIN — ASPIRIN 81 MG: 81 TABLET, COATED ORAL at 09:04

## 2017-10-25 ASSESSMENT — COGNITIVE AND FUNCTIONAL STATUS - GENERAL
MOVING FROM LYING ON BACK TO SITTING ON SIDE OF FLAT BED: A LITTLE
STANDING UP FROM CHAIR USING ARMS: A LITTLE
SUGGESTED CMS G CODE MODIFIER MOBILITY: CK
WALKING IN HOSPITAL ROOM: A LITTLE
TURNING FROM BACK TO SIDE WHILE IN FLAT BAD: A LITTLE
MOBILITY SCORE: 17
CLIMB 3 TO 5 STEPS WITH RAILING: A LOT
MOVING TO AND FROM BED TO CHAIR: A LITTLE

## 2017-10-25 ASSESSMENT — ENCOUNTER SYMPTOMS
HEADACHES: 0
NAUSEA: 0
LOSS OF CONSCIOUSNESS: 0
VOMITING: 0
ABDOMINAL PAIN: 0
WEAKNESS: 1
PSYCHIATRIC NEGATIVE: 1
CONSTIPATION: 0
FALLS: 0
FEVER: 0
MYALGIAS: 0
SHORTNESS OF BREATH: 0
CHILLS: 0
PALPITATIONS: 0

## 2017-10-25 ASSESSMENT — GAIT ASSESSMENTS
ASSISTIVE DEVICE: FRONT WHEEL WALKER
GAIT LEVEL OF ASSIST: CONTACT GUARD ASSIST
DISTANCE (FEET): 125
DEVIATION: BRADYKINETIC;SHUFFLED GAIT

## 2017-10-25 ASSESSMENT — PAIN SCALES - GENERAL
PAINLEVEL_OUTOF10: 0
PAINLEVEL_OUTOF10: 0

## 2017-10-25 NOTE — PROGRESS NOTES
Assumed care of pt, received report from day shift RN, pt assessed.  Pt has no complaints of pain at this time.  Pt is A&O x1, disoriented to time, place, and events.  Pt moderate fall risk, wearing treaded socks, bed locked and in lowest position, bed alarm is on.  Pt instructed to call for assistance prior to getting OOB, pt verbalized understanding.  Call light, phone, and personal belongings within reach.

## 2017-10-25 NOTE — PROGRESS NOTES
[facility] Hospitalist Progress Note    Date of Service: 10/25/2017    Chief Complaint  76 y.o. female admitted 2017 with AMS and UTI.    Interval Problem Update  Didn't sleep well last night because of noise from other patients. Otherwise doing well. Shows me bruising on her abdomen from heparin injections. No acute overnight events.    Consultants/Specialty  Neurology    Disposition  Pending guardianship and placement.        Review of Systems   Constitutional: Positive for malaise/fatigue. Negative for chills and fever.   Respiratory: Negative for shortness of breath.    Cardiovascular: Negative for chest pain, palpitations and leg swelling.   Gastrointestinal: Negative for abdominal pain, constipation, nausea and vomiting.   Genitourinary: Negative for dysuria.   Musculoskeletal: Negative for falls and myalgias.   Neurological: Positive for weakness. Negative for loss of consciousness and headaches.   Psychiatric/Behavioral: Negative.    All other systems reviewed and are negative.     Physical Exam  Laboratory/Imaging   Hemodynamics  Temp (24hrs), Av.4 °C (97.6 °F), Min:36.2 °C (97.2 °F), Max:36.8 °C (98.3 °F)   Temperature: 36.3 °C (97.3 °F)  Pulse  Av.4  Min: 51  Max: 99    Blood Pressure : 140/57      Respiratory      Respiration: 16, Pulse Oximetry: 92 %        RUL Breath Sounds: Clear, RML Breath Sounds: Diminished, RLL Breath Sounds: Diminished, ARGELIA Breath Sounds: Clear, LLL Breath Sounds: Diminished    Fluids    Intake/Output Summary (Last 24 hours) at 10/25/17 1103  Last data filed at 10/24/17 1800   Gross per 24 hour   Intake              450 ml   Output                0 ml   Net              450 ml       Nutrition  Orders Placed This Encounter   Procedures   • Diet Order     Standing Status:   Standing     Number of Occurrences:   1     Order Specific Question:   Diet:     Answer:   Regular [1]     Physical Exam   Constitutional: She appears well-developed and well-nourished. No distress.    HENT:   Head: Normocephalic.   Mouth/Throat: Oropharynx is clear and moist. No oropharyngeal exudate.   Eyes: Pupils are equal, round, and reactive to light. No scleral icterus.   Neck: Neck supple.   Cardiovascular: Normal rate and regular rhythm.    Pulmonary/Chest: Breath sounds normal. No respiratory distress.   Abdominal: Soft. Bowel sounds are normal. She exhibits no distension. There is no tenderness.   Musculoskeletal: She exhibits no edema.   Lymphadenopathy:     She has no cervical adenopathy.   Neurological: She is alert.   AOx2-3   Skin: Skin is warm and dry. She is not diaphoretic.   Psychiatric: She has a normal mood and affect. Her behavior is normal.   Vitals reviewed.          Recent Labs      10/25/17   0035   SODIUM  139   POTASSIUM  3.5*   CHLORIDE  106   CO2  25   GLUCOSE  110*   BUN  21   CREATININE  1.01   CALCIUM  9.6                      Assessment/Plan     * Dementia- (present on admission)   Assessment & Plan    MRI of her brain showed enlarged ventricles but no acute infarct. Significant concern for NPH given her dementia, incontinence, and gait instability. Psychiatry deemed patient incapacitated.   - Neuro evaluated and signed off as she refused any intervention  - Pending Guardianship  - no court date as of yet          HTN (hypertension)- (present on admission)   Assessment & Plan    Currently normotensive on current regimen.  - Continue Hydralazine,metoprolol, Lotensin and Norvasc.        History of CVA (cerebrovascular accident)- (present on admission)   Assessment & Plan    MRI on 9/23 showed no evidence of acute stroke. Prominent ventricles, as noted above.  - Continue ASA and statin  - neuro evaluated and signed off  - PT/OT        Hypokalemia- (present on admission)   Assessment & Plan    Replete and monitor. K 3.5 on 10/25  - recheck BMP  - KDur 40meq x1        Insomnia- (present on admission)   Assessment & Plan    Continue trazodone nightly        UTI (urinary tract  infection)- (present on admission)   Assessment & Plan    Completed antibiotics, no symptoms         T12 compression fracture (CMS-HCC)- (present on admission)   Assessment & Plan    CT spine, no acute fractures            Reviewed items::  Radiology images reviewed, Labs reviewed and Medications reviewed  Pink catheter::  No Pink  DVT prophylaxis pharmacological::  Heparin  Ulcer Prophylaxis::  Yes

## 2017-10-25 NOTE — PROGRESS NOTES
Assumed care of pt at 0730am. Report received and bedside rounding completed with noc RN. Pt in bed resting comfortably denies any pain at this time and is calm. No SOB, or in any acute distress. Fall precautions in place, bed alarm. - Treaded non slip socks. Call light and pt belongings within reach - - hourly rounding in place. See flowsheets for further assessment.

## 2017-10-25 NOTE — CARE PLAN
Problem: Safety  Goal: Will remain free from falls  Outcome: PROGRESSING AS EXPECTED  Pt moderate fall risk, pt wearing treaded socks, bed locked and in lowest position, bed alarm is on.  Pt call light and phone within reach.

## 2017-10-25 NOTE — THERAPY
"Physical Therapy Treatment completed.   Bed Mobility:  Supine to Sit: Contact Guard Assist  Transfers: Sit to Stand: Contact Guard Assist  Gait: Level Of Assist: Contact Guard Assist with Front-Wheel Walker       Plan of Care: Will benefit from Physical Therapy 2 times per week and Plan to complete next treatment by Monday 10/30  Discharge Recommendations: Equipment: Will Continue to Assess for Equipment Needs. Post-acute therapy Discharge to a transitional care facility for continued skilled therapy services.     Pt doing much better today than when last seen by PT. Pt sitting EOB today without LOB. Completed all mobility with CGA. Pt does continue to have gait deviations including decreased step length with R LE, at times dragging R LE. This improved with verbal cues. Once back to room, pt too fatigued to participate in supine therapeutic exercises. Will continue to follow pt 2x/week for skilled PT intervention    See \"Rehab Therapy-Acute\" Patient Summary Report for complete documentation.       "

## 2017-10-25 NOTE — PROGRESS NOTES
Simona Knight Fall Risk Assessment:     Last Known Fall: Within the last six months  Mobility: Immobilized/requires assist of one person  Medications: Cardiovascular or central nervous system meds  Mental Status/LOC/Awareness: Oriented to person and place  Toileting Needs: Incontinence  Volume/Electrolyte Status: No problems  Communication/Sensory: Visual (Glasses)/hearing deficit  Behavior: Appropriate behavior  Simona Knight Fall Risk Total: 13  Fall Risk Level: MODERATE RISK    Universal Fall Precautions:  call light/belongings in reach, bed in low position and locked, wheelchairs and assistive devices out of sight, siderails up x 2, use non-slip footwear, adequate lighting, clutter free and spill free environment, educate on level of risk, educate to call for assistance    Fall Risk Level Interventions:    TRIAL ([employer] 8, NEURO, MED JENNIE 5) Moderate Fall Risk Interventions  Place yellow fall risk ID band on patient: verified  Provide patient/family education based on risk assessment : completed  Educate patient/family to call staff for assistance when getting out of bed: completed  Place fall precaution signage outside patient door: verified  Utilize bed/chair fall alarm: verifiedTRIAL (TELE 8, NEURO, [employer] JENNIE 5) High Fall Risk Interventions  Place yellow fall risk ID band on patient: verified  Provide patient/family education based on risk assessment: completed  Educate patient/family to call staff for assistance when getting out of bed: completed  Place fall precaution signage outside patient door: verified  Place patient in room close to nursing station: currently not available/charge notified  Utilize bed/chair fall alarm: verified  Notify charge of high risk for huddle: completed    Patient Specific Interventions:     Medication: review medications with patient and family  Mental Status/LOC/Awareness: reorient patient, reinforce falls education, check on patient hourly, utilize bed/chair fall alarm and  reinforce the use of call light  Toileting: monitor intake and output/use of appropriate interventions  Volume/Electrolyte Status: ensure patient remains hydrated  Communication/Sensory: update plan of care on whiteboard  Behavioral: administer medication as ordered  Mobility: utilize bed/chair fall alarm and ensure bed is locked and in lowest position

## 2017-10-25 NOTE — PROGRESS NOTES
Simona Knight Fall Risk Assessment:     Last Known Fall: Within the last six months  Mobility: Immobilized/requires assist of one person  Medications: Cardiovascular or central nervous system meds  Mental Status/LOC/Awareness: Oriented to person and place  Toileting Needs: Incontinence  Volume/Electrolyte Status: No problems  Communication/Sensory: Visual (Glasses)/hearing deficit  Behavior: Appropriate behavior  Simona Knight Fall Risk Total: 13  Fall Risk Level: MODERATE RISK    Universal Fall Precautions:  call light/belongings in reach, bed in low position and locked, wheelchairs and assistive devices out of sight, siderails up x 2, use non-slip footwear, adequate lighting, clutter free and spill free environment, educate on level of risk, educate to call for assistance    Fall Risk Level Interventions:    TRIAL ([employer] 8, NEURO, MED JENNIE 5) Moderate Fall Risk Interventions  Place yellow fall risk ID band on patient: verified  Provide patient/family education based on risk assessment : completed  Educate patient/family to call staff for assistance when getting out of bed: completed  Place fall precaution signage outside patient door: verified  Utilize bed/chair fall alarm: verifiedTRIAL (TELE 8, NEURO, [employer] JENNIE 5) High Fall Risk Interventions  Place yellow fall risk ID band on patient: verified  Provide patient/family education based on risk assessment: completed  Educate patient/family to call staff for assistance when getting out of bed: completed  Place fall precaution signage outside patient door: verified  Place patient in room close to nursing station: currently not available/charge notified  Utilize bed/chair fall alarm: verified  Notify charge of high risk for huddle: completed    Patient Specific Interventions:     Medication: review medications with patient and family  Mental Status/LOC/Awareness: reorient patient, reinforce falls education, check on patient hourly, utilize bed/chair fall alarm and  reinforce the use of call light  Toileting: monitor intake and output/use of appropriate interventions  Volume/Electrolyte Status: ensure patient remains hydrated  Communication/Sensory: update plan of care on whiteboard  Behavioral: administer medication as ordered  Mobility: utilize bed/chair fall alarm and ensure bed is locked and in lowest position

## 2017-10-25 NOTE — PROGRESS NOTES
[facility] Hospitalist Progress Note    Date of Service: 10/24/2017    Chief Complaint  76 y.o. female admitted 2017 with AMS and UTI.    Interval Problem Update  No complaints or concerns at this time. No acute overnight events.    Consultants/Specialty  Neurology    Disposition  Pending guardianship and placement.        Review of Systems   Constitutional: Positive for malaise/fatigue. Negative for chills and fever.   Respiratory: Negative for shortness of breath.    Cardiovascular: Negative for chest pain, palpitations and leg swelling.   Gastrointestinal: Negative for abdominal pain, nausea and vomiting.   Genitourinary: Negative for dysuria.   Musculoskeletal: Negative for falls and myalgias.   Neurological: Positive for weakness. Negative for loss of consciousness and headaches.   Psychiatric/Behavioral: Negative.    All other systems reviewed and are negative.     Physical Exam  Laboratory/Imaging   Hemodynamics  Temp (24hrs), Av.3 °C (97.4 °F), Min:36.1 °C (97 °F), Max:36.8 °C (98.3 °F)   Temperature: 36.8 °C (98.3 °F)  Pulse  Av.4  Min: 51  Max: 99    Blood Pressure : 145/63      Respiratory      Respiration: 17, Pulse Oximetry: 92 %        RUL Breath Sounds: Clear, RML Breath Sounds: Diminished, RLL Breath Sounds: Diminished, ARGELIA Breath Sounds: Clear, LLL Breath Sounds: Diminished    Fluids    Intake/Output Summary (Last 24 hours) at 10/24/17 2022  Last data filed at 10/24/17 1800   Gross per 24 hour   Intake              450 ml   Output                0 ml   Net              450 ml       Nutrition  Orders Placed This Encounter   Procedures   • Diet Order     Standing Status:   Standing     Number of Occurrences:   1     Order Specific Question:   Diet:     Answer:   Regular [1]     Physical Exam   Constitutional: She appears well-developed. No distress.   HENT:   Head: Normocephalic.   Mouth/Throat: Oropharynx is clear and moist. No oropharyngeal exudate.   Eyes: Pupils are equal, round, and  reactive to light. No scleral icterus.   Neck: Neck supple.   Cardiovascular: Normal rate and regular rhythm.    Pulmonary/Chest: Breath sounds normal. No respiratory distress.   Abdominal: Soft. Bowel sounds are normal. She exhibits no distension. There is no tenderness.   Musculoskeletal: She exhibits no edema.   Lymphadenopathy:     She has no cervical adenopathy.   Neurological: She is alert.   Skin: Skin is warm and dry.   Psychiatric: She has a normal mood and affect. Her behavior is normal.   Vitals reviewed.      Recent Labs      10/22/17   0313   WBC  5.1   RBC  4.75   HEMOGLOBIN  13.4   HEMATOCRIT  41.1   MCV  86.5   MCH  28.2   MCHC  32.6*   RDW  61.3*   PLATELETCT  181   MPV  11.2     Recent Labs      10/22/17   0313   SODIUM  140   POTASSIUM  3.5*   CHLORIDE  109   CO2  23   GLUCOSE  101*   BUN  18   CREATININE  0.85   CALCIUM  9.4                      Assessment/Plan     HTN (hypertension)- (present on admission)   Assessment & Plan    Currently normotensive on current regimen.  - Continue Hydralazine,metoprolol, Lotensin and Norvasc.          History of CVA (cerebrovascular accident)- (present on admission)   Assessment & Plan    MRI on 9/23 showed no evidence of acute stroke. Prominent ventricles but no incontinence or gait issues.  - Continue ASA and statin  - neuro evaluated and signed off  - PT/OT        Dementia- (present on admission)   Assessment & Plan    MRI of her brain showed enlarged ventricles but no acute findings.nPsychiatry deemed patient incapacitated.   - Pending Guardianship          Hypokalemia- (present on admission)   Assessment & Plan    Replete and monitor  - recheck BMP        Insomnia   Assessment & Plan    Continue trazodone nightly          UTI (urinary tract infection)- (present on admission)   Assessment & Plan    Completed antibiotics, no symptoms         T12 compression fracture (CMS-HCC)- (present on admission)   Assessment & Plan    CT spine, no acute fractures             Reviewed items::  Radiology images reviewed, Labs reviewed and Medications reviewed  Pink catheter::  No Pink  DVT prophylaxis pharmacological::  Heparin  Ulcer Prophylaxis::  Yes

## 2017-10-26 LAB
ANION GAP SERPL CALC-SCNC: 7 MMOL/L (ref 0–11.9)
BUN SERPL-MCNC: 19 MG/DL (ref 8–22)
CALCIUM SERPL-MCNC: 9.8 MG/DL (ref 8.5–10.5)
CHLORIDE SERPL-SCNC: 108 MMOL/L (ref 96–112)
CO2 SERPL-SCNC: 26 MMOL/L (ref 20–33)
CREAT SERPL-MCNC: 1.09 MG/DL (ref 0.5–1.4)
GFR SERPL CREATININE-BSD FRML MDRD: 49 ML/MIN/1.73 M 2
GLUCOSE SERPL-MCNC: 90 MG/DL (ref 65–99)
POTASSIUM SERPL-SCNC: 4.1 MMOL/L (ref 3.6–5.5)
SODIUM SERPL-SCNC: 141 MMOL/L (ref 135–145)

## 2017-10-26 PROCEDURE — A9270 NON-COVERED ITEM OR SERVICE: HCPCS | Performed by: FAMILY MEDICINE

## 2017-10-26 PROCEDURE — A9270 NON-COVERED ITEM OR SERVICE: HCPCS | Performed by: HOSPITALIST

## 2017-10-26 PROCEDURE — 700101 HCHG RX REV CODE 250: Performed by: FAMILY MEDICINE

## 2017-10-26 PROCEDURE — 700111 HCHG RX REV CODE 636 W/ 250 OVERRIDE (IP): Performed by: HOSPITALIST

## 2017-10-26 PROCEDURE — 99231 SBSQ HOSP IP/OBS SF/LOW 25: CPT | Performed by: HOSPITALIST

## 2017-10-26 PROCEDURE — 80048 BASIC METABOLIC PNL TOTAL CA: CPT

## 2017-10-26 PROCEDURE — 36415 COLL VENOUS BLD VENIPUNCTURE: CPT

## 2017-10-26 PROCEDURE — 700102 HCHG RX REV CODE 250 W/ 637 OVERRIDE(OP): Performed by: FAMILY MEDICINE

## 2017-10-26 PROCEDURE — 700102 HCHG RX REV CODE 250 W/ 637 OVERRIDE(OP): Performed by: HOSPITALIST

## 2017-10-26 PROCEDURE — 770006 HCHG ROOM/CARE - MED/SURG/GYN SEMI*

## 2017-10-26 RX ADMIN — BENAZEPRIL HYDROCHLORIDE 40 MG: 20 TABLET ORAL at 08:20

## 2017-10-26 RX ADMIN — HYDRALAZINE HYDROCHLORIDE 100 MG: 50 TABLET ORAL at 21:59

## 2017-10-26 RX ADMIN — HYDRALAZINE HYDROCHLORIDE 100 MG: 50 TABLET ORAL at 06:16

## 2017-10-26 RX ADMIN — HEPARIN SODIUM 5000 UNITS: 5000 INJECTION, SOLUTION INTRAVENOUS; SUBCUTANEOUS at 20:04

## 2017-10-26 RX ADMIN — ASPIRIN 81 MG: 81 TABLET, COATED ORAL at 08:20

## 2017-10-26 RX ADMIN — OMEPRAZOLE 20 MG: 20 CAPSULE, DELAYED RELEASE ORAL at 08:21

## 2017-10-26 RX ADMIN — GABAPENTIN 300 MG: 300 CAPSULE ORAL at 20:04

## 2017-10-26 RX ADMIN — METOPROLOL TARTRATE 12.5 MG: 25 TABLET, FILM COATED ORAL at 20:05

## 2017-10-26 RX ADMIN — DULOXETINE HYDROCHLORIDE 20 MG: 20 CAPSULE, DELAYED RELEASE ORAL at 08:20

## 2017-10-26 RX ADMIN — HYDRALAZINE HYDROCHLORIDE 100 MG: 50 TABLET ORAL at 14:25

## 2017-10-26 RX ADMIN — ATORVASTATIN CALCIUM 80 MG: 40 TABLET, FILM COATED ORAL at 20:05

## 2017-10-26 RX ADMIN — LIDOCAINE 1 PATCH: 50 PATCH CUTANEOUS at 08:20

## 2017-10-26 RX ADMIN — STANDARDIZED SENNA CONCENTRATE AND DOCUSATE SODIUM 2 TABLET: 8.6; 5 TABLET, FILM COATED ORAL at 08:20

## 2017-10-26 RX ADMIN — HEPARIN SODIUM 5000 UNITS: 5000 INJECTION, SOLUTION INTRAVENOUS; SUBCUTANEOUS at 08:20

## 2017-10-26 RX ADMIN — AMLODIPINE BESYLATE 10 MG: 10 TABLET ORAL at 08:20

## 2017-10-26 RX ADMIN — OXYBUTYNIN CHLORIDE 5 MG: 5 TABLET, FILM COATED, EXTENDED RELEASE ORAL at 20:05

## 2017-10-26 RX ADMIN — METOPROLOL TARTRATE 12.5 MG: 25 TABLET, FILM COATED ORAL at 08:20

## 2017-10-26 ASSESSMENT — ENCOUNTER SYMPTOMS
NAUSEA: 0
FALLS: 0
PSYCHIATRIC NEGATIVE: 1
CHILLS: 0
FEVER: 0
ABDOMINAL PAIN: 0
MYALGIAS: 0
WEAKNESS: 0
LOSS OF CONSCIOUSNESS: 0
HEADACHES: 0
PALPITATIONS: 0
CONSTIPATION: 0
VOMITING: 0
SHORTNESS OF BREATH: 0

## 2017-10-26 ASSESSMENT — PAIN SCALES - GENERAL
PAINLEVEL_OUTOF10: 0

## 2017-10-26 NOTE — PROGRESS NOTES
Assumed patient care at 1900. POC discussed w/ day nurse and pt, pt in agreement w/ goals. Pt AOx3, forgetful at times. Pt denies pain or nausea. Pt up one assist, PT working with patient. Incontinent of urine, linens frequently changed and kept dry. Poor po intake. Patient educated on use of call light, hourly rounding, and pain scale. Personal possession and call light within reach. Bed alarm on.

## 2017-10-26 NOTE — CARE PLAN
Problem: Safety  Goal: Will remain free from injury  Bed locked and in lowest position, call light and personal belongings within reach, pt educated to call for assistance. Bed alarm on, treaded slipper socks in place. Hourly rounding in effect.       Problem: Pain Management  Goal: Pain level will decrease to patient's comfort goal  Patient denies pain. Repositioned for comfort as needed.

## 2017-10-26 NOTE — PROGRESS NOTES
[facility]    Date of Service: 10/26/2017    Chief Complaint  76 y.o. female admitted 2017 with AMS and UTI.    Interval Problem Update  Doing well today. Eating breakfast, no acute distress. No questions or concerns at this time. No acute overnight events.    Consultants/Specialty  Neurology    Disposition  Pending guardianship and placement.        Review of Systems   Constitutional: Positive for malaise/fatigue. Negative for chills and fever.   Respiratory: Negative for shortness of breath.    Cardiovascular: Negative for chest pain, palpitations and leg swelling.   Gastrointestinal: Negative for abdominal pain, constipation, nausea and vomiting.   Genitourinary: Negative for dysuria.   Musculoskeletal: Negative for falls and myalgias.   Neurological: Negative for loss of consciousness, weakness and headaches.   Psychiatric/Behavioral: Negative.    All other systems reviewed and are negative.     Physical Exam  Laboratory/Imaging   Hemodynamics  Temp (24hrs), Av.3 °C (97.3 °F), Min:36.1 °C (97 °F), Max:36.4 °C (97.5 °F)   Temperature: 36.1 °C (97 °F)  Pulse  Av.4  Min: 51  Max: 99    Blood Pressure : 110/70      Respiratory      Respiration: 16, Pulse Oximetry: 91 %        RUL Breath Sounds: Clear, RML Breath Sounds: Diminished, RLL Breath Sounds: Diminished, ARGELIA Breath Sounds: Clear, LLL Breath Sounds: Diminished    Fluids  No intake or output data in the 24 hours ending 10/26/17 1212    Nutrition  Orders Placed This Encounter   Procedures   • Diet Order     Standing Status:   Standing     Number of Occurrences:   1     Order Specific Question:   Diet:     Answer:   Regular [1]     Physical Exam   Constitutional: She appears well-developed and well-nourished. No distress.   HENT:   Head: Normocephalic.   Mouth/Throat: Oropharynx is clear and moist. No oropharyngeal exudate.   Eyes: Pupils are equal, round, and reactive to light. No scleral icterus.   Neck: Neck supple.    Cardiovascular: Normal rate and regular rhythm.    Pulmonary/Chest: Breath sounds normal. No respiratory distress.   Abdominal: Soft. Bowel sounds are normal. She exhibits no distension. There is no tenderness.   Musculoskeletal: She exhibits no edema.   Lymphadenopathy:     She has no cervical adenopathy.   Neurological: She is alert.   AOx2-3   Skin: Skin is warm and dry. She is not diaphoretic.   Psychiatric: She has a normal mood and affect. Her behavior is normal.   Pleasant   Vitals reviewed.          Recent Labs      10/25/17   0035  10/26/17   0357   SODIUM  139  141   POTASSIUM  3.5*  4.1   CHLORIDE  106  108   CO2  25  26   GLUCOSE  110*  90   BUN  21  19   CREATININE  1.01  1.09   CALCIUM  9.6  9.8                      Assessment/Plan     * Dementia- (present on admission)   Assessment & Plan    MRI of her brain showed enlarged ventricles but no acute infarct. Significant concern for NPH given her dementia, incontinence, and gait instability. Psychiatry deemed patient incapacitated.   - Neuro evaluated and signed off as she refused any intervention  - Pending Guardianship  - no court date as of yet        HTN (hypertension)- (present on admission)   Assessment & Plan    Currently normotensive on current regimen.  - Continue Hydralazine,metoprolol, Lotensin and Norvasc.        History of CVA (cerebrovascular accident)- (present on admission)   Assessment & Plan    MRI on 9/23 showed no evidence of acute stroke. Prominent ventricles, as noted above.  - Continue ASA and statin  - neuro evaluated and signed off  - PT/OT        Hypokalemia- (present on admission)   Assessment & Plan    K 3.5 on 10/25 responded appropriately to KDur  - monitor and replete         Insomnia- (present on admission)   Assessment & Plan    Continue trazodone nightly        UTI (urinary tract infection)- (present on admission)   Assessment & Plan    Completed antibiotics, no symptoms         T12 compression fracture (CMS-[facility])-  (present on admission)   Assessment & Plan    CT spine, no acute fractures            Reviewed items::  Labs reviewed and Medications reviewed  Pink catheter::  No Pink  DVT prophylaxis pharmacological::  Heparin  Ulcer Prophylaxis::  Yes

## 2017-10-26 NOTE — DIETARY
Nutrition Services: Brief Update  Patient continues on a regular diet with variable intake.  I visited with patient at bedside this morning to discuss appetite and meal/snack/supplement preferences.  Patient reports a poor appetite but that she snacks regularly.  Per chart review intake often < 25% but sometimes as much as % consumed at meal times.  She does not like boost supplements but is agreeable to trying milkshakes and smoothies.  I encouraged PO with patient but she refused all other snack or food/supplement options.      Weight: 81.8 kg - stable with admit weight via bed scale.    Nutrition rep will continue to see patient daily for meal and snack preferences.   Encourage PO with patient.  RD monitoring PRN

## 2017-10-26 NOTE — PROGRESS NOTES
Simona Knight Fall Risk Assessment:     Last Known Fall: Within the last six months  Mobility: Immobilized/requires assist of one person  Medications: Cardiovascular or central nervous system meds  Mental Status/LOC/Awareness: Oriented to person and place  Toileting Needs: Incontinence  Volume/Electrolyte Status: No problems  Communication/Sensory: Visual (Glasses)/hearing deficit  Behavior: Appropriate behavior  Simona Knight Fall Risk Total: 13  Fall Risk Level: MODERATE RISK    Universal Fall Precautions:  call light/belongings in reach, bed in low position and locked, wheelchairs and assistive devices out of sight, siderails up x 2, use non-slip footwear, adequate lighting, educate on level of risk, clutter free and spill free environment, educate to call for assistance    Fall Risk Level Interventions:    TRIAL ([employer], NEURO, MED JENNIE 5) Moderate Fall Risk Interventions  Place yellow fall risk ID band on patient: verified  Provide patient/family education based on risk assessment : completed  Educate patient/family to call staff for assistance when getting out of bed: completed  Place fall precaution signage outside patient door: verified  Utilize bed/chair fall alarm: verifiedTRIAL ([employer] 8, NEURO, [employer] JENNIE 5) High Fall Risk Interventions  Place yellow fall risk ID band on patient: verified  Provide patient/family education based on risk assessment: completed  Educate patient/family to call staff for assistance when getting out of bed: completed  Place fall precaution signage outside patient door: verified  Place patient in room close to nursing station: currently not available/charge notified  Utilize bed/chair fall alarm: verified  Notify charge of high risk for huddle: completed    Patient Specific Interventions:     Medication: review medications with patient and family  Mental Status/LOC/Awareness: check on patient hourly, utilize bed/chair fall alarm, reinforce the use of call light and provide  activity  Toileting: provide frquent toileting  Volume/Electrolyte Status: advance diet as tolerated  Communication/Sensory: update plan of care on whiteboard  Behavioral: not applicable  Mobility: utilize bed/chair fall alarm and ensure bed is locked and in lowest position

## 2017-10-27 PROCEDURE — 700111 HCHG RX REV CODE 636 W/ 250 OVERRIDE (IP): Performed by: HOSPITALIST

## 2017-10-27 PROCEDURE — A9270 NON-COVERED ITEM OR SERVICE: HCPCS | Performed by: HOSPITALIST

## 2017-10-27 PROCEDURE — 700102 HCHG RX REV CODE 250 W/ 637 OVERRIDE(OP): Performed by: HOSPITALIST

## 2017-10-27 PROCEDURE — A9270 NON-COVERED ITEM OR SERVICE: HCPCS | Performed by: FAMILY MEDICINE

## 2017-10-27 PROCEDURE — 99231 SBSQ HOSP IP/OBS SF/LOW 25: CPT | Performed by: HOSPITALIST

## 2017-10-27 PROCEDURE — 770006 HCHG ROOM/CARE - MED/SURG/GYN SEMI*

## 2017-10-27 PROCEDURE — 700101 HCHG RX REV CODE 250: Performed by: FAMILY MEDICINE

## 2017-10-27 PROCEDURE — 700102 HCHG RX REV CODE 250 W/ 637 OVERRIDE(OP): Performed by: FAMILY MEDICINE

## 2017-10-27 RX ADMIN — HEPARIN SODIUM 5000 UNITS: 5000 INJECTION, SOLUTION INTRAVENOUS; SUBCUTANEOUS at 20:42

## 2017-10-27 RX ADMIN — GABAPENTIN 300 MG: 300 CAPSULE ORAL at 20:41

## 2017-10-27 RX ADMIN — DULOXETINE HYDROCHLORIDE 20 MG: 20 CAPSULE, DELAYED RELEASE ORAL at 07:53

## 2017-10-27 RX ADMIN — LIDOCAINE 1 PATCH: 50 PATCH CUTANEOUS at 07:55

## 2017-10-27 RX ADMIN — OMEPRAZOLE 20 MG: 20 CAPSULE, DELAYED RELEASE ORAL at 07:53

## 2017-10-27 RX ADMIN — OXYBUTYNIN CHLORIDE 5 MG: 5 TABLET, FILM COATED, EXTENDED RELEASE ORAL at 21:35

## 2017-10-27 RX ADMIN — ATORVASTATIN CALCIUM 80 MG: 40 TABLET, FILM COATED ORAL at 20:41

## 2017-10-27 RX ADMIN — HYDRALAZINE HYDROCHLORIDE 100 MG: 50 TABLET ORAL at 20:41

## 2017-10-27 RX ADMIN — HYDRALAZINE HYDROCHLORIDE 100 MG: 50 TABLET ORAL at 05:45

## 2017-10-27 RX ADMIN — BENAZEPRIL HYDROCHLORIDE 40 MG: 20 TABLET ORAL at 07:54

## 2017-10-27 RX ADMIN — STANDARDIZED SENNA CONCENTRATE AND DOCUSATE SODIUM 2 TABLET: 8.6; 5 TABLET, FILM COATED ORAL at 07:55

## 2017-10-27 RX ADMIN — METOPROLOL TARTRATE 12.5 MG: 25 TABLET, FILM COATED ORAL at 07:55

## 2017-10-27 RX ADMIN — STANDARDIZED SENNA CONCENTRATE AND DOCUSATE SODIUM 2 TABLET: 8.6; 5 TABLET, FILM COATED ORAL at 20:41

## 2017-10-27 RX ADMIN — AMLODIPINE BESYLATE 10 MG: 10 TABLET ORAL at 07:53

## 2017-10-27 RX ADMIN — METOPROLOL TARTRATE 12.5 MG: 25 TABLET, FILM COATED ORAL at 20:41

## 2017-10-27 RX ADMIN — HEPARIN SODIUM 5000 UNITS: 5000 INJECTION, SOLUTION INTRAVENOUS; SUBCUTANEOUS at 07:55

## 2017-10-27 RX ADMIN — HYDRALAZINE HYDROCHLORIDE 100 MG: 50 TABLET ORAL at 14:41

## 2017-10-27 RX ADMIN — ASPIRIN 81 MG: 81 TABLET, COATED ORAL at 07:54

## 2017-10-27 ASSESSMENT — ENCOUNTER SYMPTOMS
NAUSEA: 0
SHORTNESS OF BREATH: 0
MYALGIAS: 0
PSYCHIATRIC NEGATIVE: 1
PALPITATIONS: 0
LOSS OF CONSCIOUSNESS: 0
CHILLS: 0
VOMITING: 0
WEAKNESS: 0
FALLS: 0
HEADACHES: 0
FEVER: 0

## 2017-10-27 ASSESSMENT — PAIN SCALES - GENERAL
PAINLEVEL_OUTOF10: 0

## 2017-10-27 NOTE — PROGRESS NOTES
[facility]ist Progress Note    Date of Service: 10/27/2017    Chief Complaint  76 y.o. female admitted 2017 with AMS and UTI.    Interval Problem Update  Sleeping on her side, no acute distress. No complaints and asks to sleep more. No acute overnight events.    Consultants/Specialty  Neurology    Disposition  Pending guardianship and placement.        Review of Systems   Constitutional: Positive for malaise/fatigue. Negative for chills and fever.   Respiratory: Negative for shortness of breath.    Cardiovascular: Negative for chest pain, palpitations and leg swelling.   Gastrointestinal: Negative for nausea and vomiting.   Genitourinary: Negative for dysuria.   Musculoskeletal: Negative for falls and myalgias.   Neurological: Negative for loss of consciousness, weakness and headaches.   Psychiatric/Behavioral: Negative.    All other systems reviewed and are negative.     Physical Exam  Laboratory/Imaging   Hemodynamics  Temp (24hrs), Av.2 °C (97.2 °F), Min:36.1 °C (96.9 °F), Max:36.4 °C (97.6 °F)   Temperature: 36.2 °C (97.1 °F)  Pulse  Av.3  Min: 51  Max: 99    Blood Pressure : 109/63      Respiratory      Respiration: 18, Pulse Oximetry: 92 %        RUL Breath Sounds: Clear, RML Breath Sounds: Diminished, RLL Breath Sounds: Diminished, ARGELIA Breath Sounds: Clear, LLL Breath Sounds: Diminished    Fluids  No intake or output data in the 24 hours ending 10/27/17 1513    Nutrition  Orders Placed This Encounter   Procedures   • Diet Order     Standing Status:   Standing     Number of Occurrences:   1     Order Specific Question:   Diet:     Answer:   Regular [1]     Physical Exam   Constitutional: She appears well-developed and well-nourished. No distress.   HENT:   Head: Normocephalic.   Mouth/Throat: Oropharynx is clear and moist. No oropharyngeal exudate.   Eyes: Pupils are equal, round, and reactive to light. No scleral icterus.   Neck: Neck supple.   Cardiovascular: Normal rate and regular  rhythm.    Pulmonary/Chest: Breath sounds normal. No respiratory distress. She has no rales.   Abdominal: Soft. Bowel sounds are normal. She exhibits no distension. There is no tenderness.   Musculoskeletal: She exhibits no edema.   Lymphadenopathy:     She has no cervical adenopathy.   Neurological: She is alert.   AOx2-3   Skin: Skin is warm and dry. She is not diaphoretic.   Psychiatric: She has a normal mood and affect. Her behavior is normal.   Pleasant   Vitals reviewed.          Recent Labs      10/25/17   0035  10/26/17   0357   SODIUM  139  141   POTASSIUM  3.5*  4.1   CHLORIDE  106  108   CO2  25  26   GLUCOSE  110*  90   BUN  21  19   CREATININE  1.01  1.09   CALCIUM  9.6  9.8                      Assessment/Plan     * Dementia- (present on admission)   Assessment & Plan    MRI of her brain showed enlarged ventricles but no acute infarct. Significant concern for NPH given her dementia, incontinence, and gait instability. Psychiatry deemed patient incapacitated.   - Neuro evaluated and signed off as she refused any intervention  - Pending Guardianship  - no court date as of yet        HTN (hypertension)- (present on admission)   Assessment & Plan    Currently normotensive on current regimen.  - Continue Hydralazine,metoprolol, Lotensin and Norvasc        History of CVA (cerebrovascular accident)- (present on admission)   Assessment & Plan    MRI on 9/23 showed no evidence of acute stroke. Prominent ventricles, as noted above.  - Continue ASA and statin  - neuro evaluated and signed off  - PT/OT        Hypokalemia- (present on admission)   Assessment & Plan    K 3.5 on 10/25 responded appropriately to KDur  - monitor and replete         Insomnia- (present on admission)   Assessment & Plan    Continue trazodone nightly        UTI (urinary tract infection)- (present on admission)   Assessment & Plan    Completed antibiotics, no symptoms         T12 compression fracture (CMS-HCC)- (present on admission)    Assessment & Plan    CT spine, no acute fractures            Reviewed items::  Labs reviewed and Medications reviewed  Pink catheter::  No Pink  DVT prophylaxis pharmacological::  Heparin  Ulcer Prophylaxis::  Yes

## 2017-10-27 NOTE — CARE PLAN
Problem: Safety  Goal: Will remain free from injury  Outcome: PROGRESSING AS EXPECTED  Fall precautions in place; pt wearing treaded socks, bed alarm on and locked in lowest position, personal possessions and call light within reach.     Problem: Venous Thromboembolism (VTW)/Deep Vein Thrombosis (DVT) Prevention:  Goal: Patient will participate in Venous Thrombosis (VTE)/Deep Vein Thrombosis (DVT)Prevention Measures    Intervention: Assess and monitor for anticoagulation complications  Subq Heparin administered for prophylaxis. No s/s of bleeding. Will continue to assess.

## 2017-10-27 NOTE — PROGRESS NOTES
Simona Knight Fall Risk Assessment:     Last Known Fall: Within the last six months  Mobility: Immobilized/requires assist of one person  Medications: Cardiovascular or central nervous system meds  Mental Status/LOC/Awareness: Lethargic/oriented to person only  Toileting Needs: Incontinence  Volume/Electrolyte Status: No problems  Communication/Sensory: Visual (Glasses)/hearing deficit  Behavior: Appropriate behavior  Simona Knight Fall Risk Total: 14  Fall Risk Level: MODERATE RISK    Universal Fall Precautions:  call light/belongings in reach, bed in low position and locked, wheelchairs and assistive devices out of sight, siderails up x 2, use non-slip footwear, adequate lighting, clutter free and spill free environment, educate to call for assistance, educate on level of risk    Fall Risk Level Interventions:    TRIAL ([employer] 8, NEURO, MED JENNIE 5) Moderate Fall Risk Interventions  Place yellow fall risk ID band on patient: verified  Provide patient/family education based on risk assessment : completed  Educate patient/family to call staff for assistance when getting out of bed: completed  Place fall precaution signage outside patient door: verified  Utilize bed/chair fall alarm: verifiedTRIAL (TELE 8, NEURO, [employer] JENNIE 5) High Fall Risk Interventions  Place yellow fall risk ID band on patient: verified  Provide patient/family education based on risk assessment: completed  Educate patient/family to call staff for assistance when getting out of bed: completed  Place fall precaution signage outside patient door: verified  Place patient in room close to nursing station: currently not available/charge notified  Utilize bed/chair fall alarm: verified  Notify charge of high risk for huddle: completed    Patient Specific Interventions:     Medication: review medications with patient and family  Mental Status/LOC/Awareness: reorient patient, reinforce falls education and check on patient hourly  Toileting: provide frquent  toileting, instruct patient/family on the use of grab bars and instruct patient/family on the need to call for assistance when toileting  Volume/Electrolyte Status: ensure patient remains hydrated  Communication/Sensory: update plan of care on whiteboard and ensure proper positioning when transferrng/ambulating  Behavioral: collaborate with doctor for possible psych consult, encourage patient to voice feelings and instruct/reinforce fall program rationale  Mobility: schedule physical activity throughout the day, utilize bed/chair fall alarm and ensure bed is locked and in lowest position

## 2017-10-27 NOTE — CARE PLAN
Problem: Safety  Goal: Will remain free from falls    Intervention: Assess risk factors for falls   10/26/17 2000   OTHER   Fall Risk High Risk to Fall - 2 or more points    Risk for Injury-Any positive answers results in the pt being at high risk for fall related injury Not Applicable   Mobility Status Assessment 1-1 Healthcare Provider Required for Assistance with Ambulation & Transfer   History of fall 2   Date of Last Fall 09/21/17   Pt Calls for Assistance Yes   Personal possession and bedside table near patient. Bed locked and in the lowest position. Non-Skid socks in place. Call light within reach. Bed alarm on.       Problem: Skin Integrity  Goal: Risk for impaired skin integrity will decrease    Intervention: Assess risk factors for impaired skin integrity and/or pressure ulcers  Monitoring patient skin integrity. Moisturizer provided to patient. Draw sheets used to reposition patient.

## 2017-10-27 NOTE — PROGRESS NOTES
Report received from day RN. Assumed patient care at 1900. Patient is AAOx2, patient reoriented to time and place. Patient appears calm and in no acute distress. Labs and orders reviewed. Assessment completed. Patient denies any pain at this time. Patient provided with scheduled medication, refer to MAR. Plan of care discussed with patient; questions have been answered at this time. Patient needs have been met at this time. Patient encouraged to call when needing assistance. Call light within reach. Patient resting comfortably in bed at this time. Bed alarm on. Non-Skid socks in place. Bed locked and in the lowest position. Hourly rounding in place.

## 2017-10-27 NOTE — PROGRESS NOTES
Pharmacy Pharmacotherapy Consult for LOS >30 days    Admit Date: 9/21/2017      Medications were reviewed for appropriateness and ongoing need.     Current Facility-Administered Medications   Medication Dose Route Frequency Provider Last Rate Last Dose   • heparin injection 5,000 Units  5,000 Units Subcutaneous Q12HRS Nanette Waller M.D.   5,000 Units at 10/27/17 0755   • trazodone (DESYREL) tablet 100 mg  100 mg Oral QHS PRN Amy Vieyra D.[place]ug[place]   100 mg at 10/22/17 2221   • amlodipine (NORVASC) tablet 10 mg  10 mg Oral Q DAY Denia Fenton M.D.   10 mg at 10/27/17 0753   • benazepril (LOTENSIN) tablet 40 mg  40 mg Oral DAILY Denia Fenton M.D.   40 mg at 10/27/17 0754   • hydrALAZINE (APRESOLINE) tablet 100 mg  100 mg Oral Q8HRS Denia Fenton M.D.   100 mg at 10/27/17 1441   • omeprazole (PRILOSEC) capsule 20 mg  20 mg Oral DAILY Denia Fenton M.D.   20 mg at 10/27/17 0753   • aspirin EC (ECOTRIN) tablet 81 mg  81 mg Oral DAILY Edward Metclaf M.D.   81 mg at 10/27/17 0754   • atorvastatin (LIPITOR) tablet 80 mg  80 mg Oral QHS Edward Metcalf M.D.   80 mg at 10/26/17 2005   • duloxetine (CYMBALTA) capsule 20 mg  20 mg Oral DAILY Edward Metcalf M.D.   20 mg at 10/27/17 0753   • gabapentin (NEURONTIN) capsule 300 mg  300 mg Oral Q EVENING Edward Metcalf M.D.   300 mg at 10/26/17 2004   • lidocaine (LIDODERM) 5 % 1 Patch  1 Patch Transdermal Q24HRS Edward Metcalf M.D.   1 Patch at 10/27/17 0755   • metoprolol (LOPRESSOR) tablet 12.5 mg  12.5 mg Oral BID Edward Metcalf M.D.   12.5 mg at 10/27/17 0755   • oxybutynin SR (DITROPAN-XL) tablet 5 mg  5 mg Oral Q EVENING Edward Metcalf M.D.   5 mg at 10/26/17 2005   • vitamin D (Ergocalciferol) (DRISDOL) capsule 50,000 Units  50,000 Units Oral Q7 DAYS Edward Metcalf M.D.   50,000 Units at 10/25/17 0905   • senna-docusate (PERICOLACE or SENOKOT S) 8.6-50 MG per tablet 2 Tab  2 Tab Oral BID Edward Metcalf M.D.   2 Tab at 10/27/17  0755    And   • polyethylene glycol/lytes (MIRALAX) PACKET 1 Packet  1 Packet Oral QDAY PRLINNETTE Metcalf M.D.        And   • magnesium hydroxide (MILK OF MAGNESIA) suspension 30 mL  30 mL Oral QDAY PRLINNETTE Metcalf M.D.        And   • bisacodyl (DULCOLAX) suppository 10 mg  10 mg Rectal QDAY SARAH Metcalf M.D.       • ondansetron (ZOFRAN) syringe/vial injection 4 mg  4 mg Intravenous Q4HRS PRLINNETTE Metcalf M.D.   4 mg at 09/26/17 2047   • ondansetron (ZOFRAN ODT) dispertab 4 mg  4 mg Oral Q4HRS PRLINNETTE Metcalf M.D.   4 mg at 10/06/17 1940   • acetaminophen (TYLENOL) tablet 650 mg  650 mg Oral Q6HRS SARAH Metcalf M.D.   650 mg at 10/06/17 0906   • oxycodone immediate-release (ROXICODONE) tablet 2.5 mg  2.5 mg Oral Q3HRS PRLINNETTE Metcalf M.D.   2.5 mg at 09/29/17 0610    And   • oxycodone immediate-release (ROXICODONE) tablet 5 mg  5 mg Oral Q3HRS PRLINNETTE Metcalf M.D.   5 mg at 10/19/17 2024       Recommendations:    30 days medication review completed. No changes recommended at this time.     Stephanie Fritz  Pharm D. Candidate

## 2017-10-27 NOTE — PROGRESS NOTES
Assumed patient care at 0700. Received report from night shift. Assessment completed. A&Ox1, self only. Denies pain at this time. No SOB or in any acute distress.  Bed alarm on, pt wearing treaded socks. Personal possessions and call light placed within reach.

## 2017-10-27 NOTE — PROGRESS NOTES
Simona Knight Fall Risk Assessment:     Last Known Fall: Within the last six months  Mobility: Immobilized/requires assist of one person  Medications: Cardiovascular or central nervous system meds  Mental Status/LOC/Awareness: Lethargic/oriented to person only (oriented to person and event only)  Toileting Needs: Incontinence  Volume/Electrolyte Status: No problems  Communication/Sensory: Visual (Glasses)/hearing deficit  Behavior: Appropriate behavior  Simona Knight Fall Risk Total: 14  Fall Risk Level: MODERATE RISK    Universal Fall Precautions:  call light/belongings in reach, bed in low position and locked, siderails up x 2, use non-slip footwear, adequate lighting, clutter free and spill free environment, educate on level of risk, educate to call for assistance    Fall Risk Level Interventions:    TRIAL ([employer] [address], NEURO, MED JENNIE 5) Moderate Fall Risk Interventions  Place yellow fall risk ID band on patient: verified  Provide patient/family education based on risk assessment : completed  Educate patient/family to call staff for assistance when getting out of bed: completed  Place fall precaution signage outside patient door: verified  Utilize bed/chair fall alarm: verifiedTRIAL (TELE 8, NEURO, [employer] JENNIE 5) High Fall Risk Interventions  Place yellow fall risk ID band on patient: verified  Provide patient/family education based on risk assessment: completed  Educate patient/family to call staff for assistance when getting out of bed: completed  Place fall precaution signage outside patient door: verified  Place patient in room close to nursing station: currently not available/charge notified  Utilize bed/chair fall alarm: verified  Notify charge of high risk for huddle: completed    Patient Specific Interventions:     Medication: review medications with patient and family and assess for medications that can be discontinued or dosage decreased  Mental Status/LOC/Awareness: reorient patient, reinforce falls  education, check on patient hourly, utilize bed/chair fall alarm and reinforce the use of call light  Toileting: provide frquent toileting and instruct patient/family on the need to call for assistance when toileting  Volume/Electrolyte Status: ensure patient remains hydrated and monitor abnormal lab values  Communication/Sensory: update plan of care on whiteboard, ensure proper positioning when transferrng/ambulating and ensure patient has glasses/contacts and hearing aids/dentures  Behavioral: encourage patient to voice feelings, administer medication as ordered and instruct/reinforce fall program rationale  Mobility: utilize bed/chair fall alarm, ensure bed is locked and in lowest position, provide appropriate assistive device and instruct patient to exit bed on their strongest side

## 2017-10-28 PROCEDURE — A9270 NON-COVERED ITEM OR SERVICE: HCPCS | Performed by: FAMILY MEDICINE

## 2017-10-28 PROCEDURE — 700102 HCHG RX REV CODE 250 W/ 637 OVERRIDE(OP): Performed by: FAMILY MEDICINE

## 2017-10-28 PROCEDURE — 770006 HCHG ROOM/CARE - MED/SURG/GYN SEMI*

## 2017-10-28 PROCEDURE — 700111 HCHG RX REV CODE 636 W/ 250 OVERRIDE (IP): Performed by: HOSPITALIST

## 2017-10-28 PROCEDURE — 700102 HCHG RX REV CODE 250 W/ 637 OVERRIDE(OP): Performed by: HOSPITALIST

## 2017-10-28 PROCEDURE — A9270 NON-COVERED ITEM OR SERVICE: HCPCS | Performed by: HOSPITALIST

## 2017-10-28 PROCEDURE — 700101 HCHG RX REV CODE 250: Performed by: FAMILY MEDICINE

## 2017-10-28 PROCEDURE — 700102 HCHG RX REV CODE 250 W/ 637 OVERRIDE(OP): Performed by: INTERNAL MEDICINE

## 2017-10-28 PROCEDURE — A9270 NON-COVERED ITEM OR SERVICE: HCPCS | Performed by: INTERNAL MEDICINE

## 2017-10-28 PROCEDURE — 99231 SBSQ HOSP IP/OBS SF/LOW 25: CPT | Performed by: HOSPITALIST

## 2017-10-28 RX ADMIN — GABAPENTIN 300 MG: 300 CAPSULE ORAL at 22:02

## 2017-10-28 RX ADMIN — ATORVASTATIN CALCIUM 80 MG: 40 TABLET, FILM COATED ORAL at 22:02

## 2017-10-28 RX ADMIN — HYDRALAZINE HYDROCHLORIDE 100 MG: 50 TABLET ORAL at 22:02

## 2017-10-28 RX ADMIN — LIDOCAINE 1 PATCH: 50 PATCH CUTANEOUS at 08:04

## 2017-10-28 RX ADMIN — METOPROLOL TARTRATE 12.5 MG: 25 TABLET, FILM COATED ORAL at 22:03

## 2017-10-28 RX ADMIN — HEPARIN SODIUM 5000 UNITS: 5000 INJECTION, SOLUTION INTRAVENOUS; SUBCUTANEOUS at 08:04

## 2017-10-28 RX ADMIN — HYDRALAZINE HYDROCHLORIDE 100 MG: 50 TABLET ORAL at 14:52

## 2017-10-28 RX ADMIN — TRAZODONE HYDROCHLORIDE 100 MG: 50 TABLET ORAL at 22:03

## 2017-10-28 RX ADMIN — AMLODIPINE BESYLATE 10 MG: 10 TABLET ORAL at 08:04

## 2017-10-28 RX ADMIN — DULOXETINE HYDROCHLORIDE 20 MG: 20 CAPSULE, DELAYED RELEASE ORAL at 08:04

## 2017-10-28 RX ADMIN — HEPARIN SODIUM 5000 UNITS: 5000 INJECTION, SOLUTION INTRAVENOUS; SUBCUTANEOUS at 22:03

## 2017-10-28 RX ADMIN — HYDRALAZINE HYDROCHLORIDE 100 MG: 50 TABLET ORAL at 05:40

## 2017-10-28 RX ADMIN — STANDARDIZED SENNA CONCENTRATE AND DOCUSATE SODIUM 2 TABLET: 8.6; 5 TABLET, FILM COATED ORAL at 08:04

## 2017-10-28 RX ADMIN — ASPIRIN 81 MG: 81 TABLET, COATED ORAL at 08:04

## 2017-10-28 RX ADMIN — BENAZEPRIL HYDROCHLORIDE 40 MG: 20 TABLET ORAL at 08:04

## 2017-10-28 RX ADMIN — OXYBUTYNIN CHLORIDE 5 MG: 5 TABLET, FILM COATED, EXTENDED RELEASE ORAL at 22:03

## 2017-10-28 RX ADMIN — OMEPRAZOLE 20 MG: 20 CAPSULE, DELAYED RELEASE ORAL at 08:04

## 2017-10-28 RX ADMIN — METOPROLOL TARTRATE 12.5 MG: 25 TABLET, FILM COATED ORAL at 08:04

## 2017-10-28 ASSESSMENT — ENCOUNTER SYMPTOMS
FALLS: 0
LOSS OF CONSCIOUSNESS: 0
VOMITING: 0
SHORTNESS OF BREATH: 0
WEAKNESS: 1
HEADACHES: 0
FEVER: 0
PSYCHIATRIC NEGATIVE: 1
NAUSEA: 0
MYALGIAS: 0
CHILLS: 0
PALPITATIONS: 0

## 2017-10-28 ASSESSMENT — PAIN SCALES - GENERAL
PAINLEVEL_OUTOF10: 0

## 2017-10-28 NOTE — CARE PLAN
Problem: Safety  Goal: Will remain free from injury  Bed alarm on, bed in the lowest locked position. Non slip socks on. Call light within reach.     Problem: Discharge Barriers/Planning  Goal: Patient's continuum of care needs will be met  Waiting for guardianship

## 2017-10-28 NOTE — PROGRESS NOTES
Assumed care of patient at 1915, received report from day RN at that time. Communication board updated and reviewed with pt. Pt denies pain at this time. Bed alarm on and in good working order. Assessment complete. Pt is A&O to self only at this time. No other needs. Call light within reach. Bed in the lowest locked position.

## 2017-10-28 NOTE — PROGRESS NOTES
[facility]    Date of Service: 10/28/2017    Chief Complaint  76 y.o. female admitted 2017 with AMS and UTI.    Interval Problem Update  Sitting up in the chair, having breakfast. States that she feels well today. No acute overnight events.    Consultants/Specialty  Neurology    Disposition  Pending guardianship and placement.        Review of Systems   Constitutional: Negative for chills, fever and malaise/fatigue.   Respiratory: Negative for shortness of breath.    Cardiovascular: Negative for chest pain, palpitations and leg swelling.   Gastrointestinal: Negative for nausea and vomiting.   Genitourinary: Negative for dysuria.   Musculoskeletal: Negative for falls and myalgias.   Neurological: Positive for weakness. Negative for loss of consciousness and headaches.   Psychiatric/Behavioral: Negative.    All other systems reviewed and are negative.     Physical Exam  Laboratory/Imaging   Hemodynamics  Temp (24hrs), Av.5 °C (97.7 °F), Min:36.1 °C (97 °F), Max:36.9 °C (98.4 °F)   Temperature: 36.1 °C (97 °F)  Pulse  Av.3  Min: 50  Max: 99    Blood Pressure : (!) 97/64      Respiratory      Respiration: 18, Pulse Oximetry: 90 %        RML Breath Sounds: Diminished, RLL Breath Sounds: Diminished, LLL Breath Sounds: Diminished    Fluids  No intake or output data in the 24 hours ending 10/28/17 1037    Nutrition  Orders Placed This Encounter   Procedures   • Diet Order     Standing Status:   Standing     Number of Occurrences:   1     Order Specific Question:   Diet:     Answer:   Regular [1]     Physical Exam   Constitutional: She appears well-developed and well-nourished. No distress.   HENT:   Head: Normocephalic.   Mouth/Throat: Oropharynx is clear and moist. No oropharyngeal exudate.   Eyes: EOM are normal. No scleral icterus.   Neck: Neck supple.   Cardiovascular: Normal rate and regular rhythm.    Pulmonary/Chest: Breath sounds normal. No respiratory distress. She has no rales.    Abdominal: Soft. Bowel sounds are normal. She exhibits no distension. There is no tenderness.   Musculoskeletal: She exhibits no edema.   Lymphadenopathy:     She has no cervical adenopathy.   Neurological: She is alert.   AOx2   Skin: Skin is warm and dry. She is not diaphoretic.   Psychiatric: She has a normal mood and affect. Her behavior is normal. Cognition and memory are impaired.   Pleasant   Vitals reviewed.          Recent Labs      10/26/17   0357   SODIUM  141   POTASSIUM  4.1   CHLORIDE  108   CO2  26   GLUCOSE  90   BUN  19   CREATININE  1.09   CALCIUM  9.8                      Assessment/Plan     * Dementia- (present on admission)   Assessment & Plan    MRI of her brain showed enlarged ventricles but no acute infarct. Significant concern for NPH given her dementia, incontinence, and gait instability. Psychiatry deemed patient incapacitated.   - Neuro evaluated and signed off as she refused any intervention  - Pending Guardianship  - no court date as of yet        HTN (hypertension)- (present on admission)   Assessment & Plan    Previously difficult to control, now normotensive to hypotensive.   - Continue Hydralazine,metoprolol, Lotensin   - d/c Norvas        History of CVA (cerebrovascular accident)- (present on admission)   Assessment & Plan    MRI on 9/23 showed no evidence of acute stroke. Prominent ventricles, as noted above.  - Continue ASA and statin  - neuro evaluated and signed off  - PT/OT        Hypokalemia- (present on admission)   Assessment & Plan    K 3.5 on 10/25 responded appropriately to KDur  - monitor and replete         Insomnia- (present on admission)   Assessment & Plan    Intermittent.  - continue trazodone nightly        T12 compression fracture (CMS-HCC)- (present on admission)   Assessment & Plan    CT spine, no acute fractures            Reviewed items::  Labs reviewed and Medications reviewed  Pink catheter::  No Pink  DVT prophylaxis pharmacological::  Heparin  Ulcer  Prophylaxis::  Yes

## 2017-10-28 NOTE — PROGRESS NOTES
Simona Knight Fall Risk Assessment:     Last Known Fall: Within the last six months  Mobility: Immobilized/requires assist of one person  Medications: Cardiovascular or central nervous system meds  Mental Status/LOC/Awareness: Lethargic/oriented to person only  Toileting Needs: Incontinence  Volume/Electrolyte Status: No problems  Communication/Sensory: Visual (Glasses)/hearing deficit  Behavior: Appropriate behavior  Simona Knight Fall Risk Total: 14  Fall Risk Level: MODERATE RISK    Universal Fall Precautions:  call light/belongings in reach, wheelchairs and assistive devices out of sight, bed in low position and locked, siderails up x 2, use non-slip footwear, clutter free and spill free environment, educate on level of risk, educate to call for assistance, adequate lighting    Fall Risk Level Interventions:    TRIAL ([employer] 8, NEURO, MED JENNIE 5) Moderate Fall Risk Interventions  Place yellow fall risk ID band on patient: verified  Provide patient/family education based on risk assessment : completed  Educate patient/family to call staff for assistance when getting out of bed: completed  Place fall precaution signage outside patient door: verified  Utilize bed/chair fall alarm: verifiedTRIAL ([employer] 8, NEURO, [employer] JNENIE 5) High Fall Risk Interventions  Place yellow fall risk ID band on patient: verified  Provide patient/family education based on risk assessment: completed  Educate patient/family to call staff for assistance when getting out of bed: completed  Place fall precaution signage outside patient door: verified  Place patient in room close to nursing station: currently not available/charge notified  Utilize bed/chair fall alarm: verified  Notify charge of high risk for huddle: completed    Patient Specific Interventions:     Medication: review medications with patient and family, limit combination of prn medications and provide patients who received diuretics/laxatives frequent toileting  Mental  Status/LOC/Awareness: reorient patient, reinforce falls education, check on patient hourly, utilize bed/chair fall alarm and reinforce the use of call light  Toileting: provide frquent toileting, instruct patient/family on the use of grab bars, instruct patient/family on the need to call for assistance when toileting, do not leave patient unattended in bathroom/refer to toileting scripting and toilet prior to giving pain medications  Volume/Electrolyte Status: ensure patient remains hydrated and monitor abnormal lab values  Communication/Sensory: update plan of care on whiteboard, ensure proper positioning when transferrng/ambulating and for visually impaired patients orient to their room surrounding and do not change their surroundings  Behavioral: encourage patient to voice feelings, administer medication as ordered and instruct/reinforce fall program rationale  Mobility: dangle prior to standing, utilize bed/chair fall alarm, ensure bed is locked and in lowest position, provide appropriate assistive device and instruct patient to exit bed on their strongest side

## 2017-10-28 NOTE — PROGRESS NOTES
Simona Knight Fall Risk Assessment:     Last Known Fall: Within the last six months  Mobility: Immobilized/requires assist of one person  Medications: Cardiovascular or central nervous system meds  Mental Status/LOC/Awareness: Lethargic/oriented to person only  Toileting Needs: Incontinence  Volume/Electrolyte Status: No problems  Communication/Sensory: Visual (Glasses)/hearing deficit  Behavior: Appropriate behavior  Simona Knight Fall Risk Total: 14  Fall Risk Level: MODERATE RISK     Universal Fall Precautions:  call light/belongings in reach, bed in low position and locked, wheelchairs and assistive devices out of sight, siderails up x 2, use non-slip footwear, adequate lighting, clutter free and spill free environment, educate to call for assistance, educate on level of risk     Fall Risk Level Interventions:    TRIAL ([employer] 8, NEURO, MED JENNIE 5) Moderate Fall Risk Interventions  Place yellow fall risk ID band on patient: verified  Provide patient/family education based on risk assessment : completed  Educate patient/family to call staff for assistance when getting out of bed: completed  Place fall precaution signage outside patient door: verified  Utilize bed/chair fall alarm: verifiedTRIAL (uBiome 8, NEURO, mymission2 JENNIE 5) High Fall Risk Interventions  Place yellow fall risk ID band on patient: verified  Provide patient/family education based on risk assessment: completed  Educate patient/family to call staff for assistance when getting out of bed: completed  Place fall precaution signage outside patient door: verified  Place patient in room close to nursing station: currently not available/charge notified  Utilize bed/chair fall alarm: verified  Notify charge of high risk for huddle: completed     Patient Specific Interventions:      Medication: review medications with patient and family  Mental Status/LOC/Awareness: reorient patient, reinforce falls education and check on patient hourly  Toileting: provide  frquent toileting, instruct patient/family on the use of grab bars and instruct patient/family on the need to call for assistance when toileting  Volume/Electrolyte Status: ensure patient remains hydrated  Communication/Sensory: update plan of care on whiteboard and ensure proper positioning when transferrng/ambulating  Behavioral: collaborate with doctor for possible psych consult, encourage patient to voice feelings and instruct/reinforce fall program rationale  Mobility: schedule physical activity throughout the day, utilize bed/chair fall alarm and ensure bed is locked and in lowest position

## 2017-10-28 NOTE — CARE PLAN
Problem: Pain Management  Goal: Pain level will decrease to patient's comfort goal  Outcome: PROGRESSING AS EXPECTED  Pt denies pain at this time. At comfort goal. Will continue to assess.     Problem: Mobility  Goal: Risk for activity intolerance will decrease    Intervention: Encourage patient to increase activity level in collaboration with Interdisciplinary Team  Pt up to chair for meals. Tolerating well.

## 2017-10-28 NOTE — PROGRESS NOTES
Assumed patient care at 0700. Received report from night shift. Assessment completed. A&Ox1, self only. Denies pain at this time. No SOB or in any acute distress. Pt incontinent x2. Bed bath and linen change provided. Bed alarm on, pt wearing treaded socks. Personal possessions and call light placed within reach. Pt up to chair for breakfast.

## 2017-10-29 LAB
ANION GAP SERPL CALC-SCNC: 10 MMOL/L (ref 0–11.9)
BUN SERPL-MCNC: 16 MG/DL (ref 8–22)
CALCIUM SERPL-MCNC: 9.7 MG/DL (ref 8.5–10.5)
CHLORIDE SERPL-SCNC: 107 MMOL/L (ref 96–112)
CO2 SERPL-SCNC: 25 MMOL/L (ref 20–33)
CREAT SERPL-MCNC: 0.97 MG/DL (ref 0.5–1.4)
GFR SERPL CREATININE-BSD FRML MDRD: 56 ML/MIN/1.73 M 2
GLUCOSE SERPL-MCNC: 84 MG/DL (ref 65–99)
MAGNESIUM SERPL-MCNC: 1.9 MG/DL (ref 1.5–2.5)
POTASSIUM SERPL-SCNC: 3.5 MMOL/L (ref 3.6–5.5)
SODIUM SERPL-SCNC: 142 MMOL/L (ref 135–145)

## 2017-10-29 PROCEDURE — 770006 HCHG ROOM/CARE - MED/SURG/GYN SEMI*

## 2017-10-29 PROCEDURE — 700102 HCHG RX REV CODE 250 W/ 637 OVERRIDE(OP): Performed by: HOSPITALIST

## 2017-10-29 PROCEDURE — 700101 HCHG RX REV CODE 250: Performed by: FAMILY MEDICINE

## 2017-10-29 PROCEDURE — 700102 HCHG RX REV CODE 250 W/ 637 OVERRIDE(OP): Performed by: FAMILY MEDICINE

## 2017-10-29 PROCEDURE — A9270 NON-COVERED ITEM OR SERVICE: HCPCS | Performed by: HOSPITALIST

## 2017-10-29 PROCEDURE — 83735 ASSAY OF MAGNESIUM: CPT

## 2017-10-29 PROCEDURE — 99231 SBSQ HOSP IP/OBS SF/LOW 25: CPT | Performed by: HOSPITALIST

## 2017-10-29 PROCEDURE — A9270 NON-COVERED ITEM OR SERVICE: HCPCS | Performed by: FAMILY MEDICINE

## 2017-10-29 PROCEDURE — 700111 HCHG RX REV CODE 636 W/ 250 OVERRIDE (IP): Performed by: HOSPITALIST

## 2017-10-29 PROCEDURE — 80048 BASIC METABOLIC PNL TOTAL CA: CPT

## 2017-10-29 PROCEDURE — 36415 COLL VENOUS BLD VENIPUNCTURE: CPT

## 2017-10-29 RX ORDER — POTASSIUM CHLORIDE 20 MEQ/1
40 TABLET, EXTENDED RELEASE ORAL ONCE
Status: COMPLETED | OUTPATIENT
Start: 2017-10-29 | End: 2017-10-29

## 2017-10-29 RX ADMIN — POTASSIUM CHLORIDE 40 MEQ: 1500 TABLET, EXTENDED RELEASE ORAL at 11:22

## 2017-10-29 RX ADMIN — HEPARIN SODIUM 5000 UNITS: 5000 INJECTION, SOLUTION INTRAVENOUS; SUBCUTANEOUS at 21:39

## 2017-10-29 RX ADMIN — HYDRALAZINE HYDROCHLORIDE 100 MG: 50 TABLET ORAL at 21:23

## 2017-10-29 RX ADMIN — LIDOCAINE 1 PATCH: 50 PATCH CUTANEOUS at 09:42

## 2017-10-29 RX ADMIN — METOPROLOL TARTRATE 12.5 MG: 25 TABLET, FILM COATED ORAL at 21:24

## 2017-10-29 RX ADMIN — BENAZEPRIL HYDROCHLORIDE 40 MG: 20 TABLET ORAL at 09:38

## 2017-10-29 RX ADMIN — DULOXETINE HYDROCHLORIDE 20 MG: 20 CAPSULE, DELAYED RELEASE ORAL at 09:38

## 2017-10-29 RX ADMIN — OXYBUTYNIN CHLORIDE 5 MG: 5 TABLET, FILM COATED, EXTENDED RELEASE ORAL at 21:23

## 2017-10-29 RX ADMIN — OMEPRAZOLE 20 MG: 20 CAPSULE, DELAYED RELEASE ORAL at 09:38

## 2017-10-29 RX ADMIN — STANDARDIZED SENNA CONCENTRATE AND DOCUSATE SODIUM 2 TABLET: 8.6; 5 TABLET, FILM COATED ORAL at 21:23

## 2017-10-29 RX ADMIN — HYDRALAZINE HYDROCHLORIDE 100 MG: 50 TABLET ORAL at 06:30

## 2017-10-29 RX ADMIN — ATORVASTATIN CALCIUM 80 MG: 40 TABLET, FILM COATED ORAL at 21:23

## 2017-10-29 RX ADMIN — METOPROLOL TARTRATE 12.5 MG: 25 TABLET, FILM COATED ORAL at 09:39

## 2017-10-29 RX ADMIN — GABAPENTIN 300 MG: 300 CAPSULE ORAL at 21:23

## 2017-10-29 RX ADMIN — HYDRALAZINE HYDROCHLORIDE 100 MG: 50 TABLET ORAL at 15:10

## 2017-10-29 RX ADMIN — HEPARIN SODIUM 5000 UNITS: 5000 INJECTION, SOLUTION INTRAVENOUS; SUBCUTANEOUS at 09:40

## 2017-10-29 RX ADMIN — ASPIRIN 81 MG: 81 TABLET, COATED ORAL at 09:40

## 2017-10-29 ASSESSMENT — ENCOUNTER SYMPTOMS
MYALGIAS: 0
FEVER: 0
FALLS: 0
HEADACHES: 0
CHILLS: 0
PALPITATIONS: 0
NAUSEA: 0
PSYCHIATRIC NEGATIVE: 1
WEAKNESS: 1
SHORTNESS OF BREATH: 0
VOMITING: 0
LOSS OF CONSCIOUSNESS: 0

## 2017-10-29 ASSESSMENT — PAIN SCALES - GENERAL
PAINLEVEL_OUTOF10: 2
PAINLEVEL_OUTOF10: 0
PAINLEVEL_OUTOF10: 5
PAINLEVEL_OUTOF10: 2
PAINLEVEL_OUTOF10: 2

## 2017-10-29 NOTE — PROGRESS NOTES
Pt assessed, VSS, A & O x 2. Pt denies any CP, SOB, dizziness. POC explained, pt verbalizes an understanding. Educated pt regarding falls and fall safety, pt verbalizes an understanding. Socks on, call light in place, bed alarm on, bed lowered and locked, all comfort measures in place and needs met at this time.

## 2017-10-29 NOTE — PROGRESS NOTES
[facility] Hospitalist Progress Note    Date of Service: 10/29/2017    Chief Complaint  76 y.o. female admitted 2017 with AMS and UTI.    Interval Problem Update  Sitting up in the chair, having breakfast. Just had her hair washed. States that she feels well today but a little tired. No questions or concerns at this time.  No acute overnight events.    Consultants/Specialty  Neurology    Disposition  Pending guardianship and placement.        Review of Systems   Constitutional: Negative for chills, fever and malaise/fatigue.   Respiratory: Negative for shortness of breath.    Cardiovascular: Negative for chest pain, palpitations and leg swelling.   Gastrointestinal: Negative for nausea and vomiting.   Genitourinary: Negative for dysuria.   Musculoskeletal: Negative for falls and myalgias.   Neurological: Positive for weakness. Negative for loss of consciousness and headaches.   Psychiatric/Behavioral: Negative.    All other systems reviewed and are negative.     Physical Exam  Laboratory/Imaging   Hemodynamics  Temp (24hrs), Av.3 °C (97.4 °F), Min:36.1 °C (97 °F), Max:36.6 °C (97.9 °F)   Temperature: 36.6 °C (97.9 °F)  Pulse  Av.1  Min: 50  Max: 99    Blood Pressure : 151/59      Respiratory      Respiration: 17, Pulse Oximetry: 95 %        RUL Breath Sounds: Clear, RML Breath Sounds: Diminished, RLL Breath Sounds: Diminished, ARGELIA Breath Sounds: Clear, LLL Breath Sounds: Diminished    Fluids    Intake/Output Summary (Last 24 hours) at 10/29/17 1014  Last data filed at 10/29/17 0900   Gross per 24 hour   Intake              740 ml   Output                0 ml   Net              740 ml       Nutrition  Orders Placed This Encounter   Procedures   • Diet Order     Standing Status:   Standing     Number of Occurrences:   1     Order Specific Question:   Diet:     Answer:   Regular [1]     Physical Exam   Constitutional: She appears well-developed and well-nourished. No distress.   HENT:   Mouth/Throat:  Oropharynx is clear and moist. No oropharyngeal exudate.   Eyes: EOM are normal. No scleral icterus.   Neck: Neck supple.   Cardiovascular: Normal rate, regular rhythm and intact distal pulses.    Pulmonary/Chest: Breath sounds normal. No respiratory distress. She has no rales.   Abdominal: Soft. Bowel sounds are normal. She exhibits no distension. There is no tenderness.   Musculoskeletal: She exhibits no edema.   Neurological: She is alert.   AOx2   Skin: Skin is warm and dry. She is not diaphoretic.   Psychiatric: She has a normal mood and affect. Her behavior is normal. Cognition and memory are impaired.   Pleasant   Vitals reviewed.          Recent Labs      10/29/17   0245   SODIUM  142   POTASSIUM  3.5*   CHLORIDE  107   CO2  25   GLUCOSE  84   BUN  16   CREATININE  0.97   CALCIUM  9.7                      Assessment/Plan     * Dementia- (present on admission)   Assessment & Plan    MRI of her brain showed enlarged ventricles but no acute infarct. Significant concern for NPH given her dementia, incontinence, and gait instability. Psychiatry deemed patient incapacitated.   - Neuro evaluated and signed off as she refused any intervention  - Pending Guardianship  - no court date as of yet        HTN (hypertension)- (present on admission)   Assessment & Plan    Previously difficult to control, now normotensive to hypotensive.   - Continue Hydralazine,metoprolol, Lotensin   - d/c Norvas        History of CVA (cerebrovascular accident)- (present on admission)   Assessment & Plan    MRI on 9/23 showed no evidence of acute stroke. Prominent ventricles, as noted above.  - Continue ASA and statin  - neuro evaluated and signed off  - PT/OT        Hypokalemia- (present on admission)   Assessment & Plan    K 3.5 on 10/28 responds appropriately to KDur  - monitor and replete         Insomnia- (present on admission)   Assessment & Plan    Intermittent.  - continue trazodone nightly        T12 compression fracture  (CMS-HCC)- (present on admission)   Assessment & Plan    CT spine, no acute fractures            Reviewed items::  Labs reviewed and Medications reviewed  Pink catheter::  No Pink  DVT prophylaxis pharmacological::  Heparin  Ulcer Prophylaxis::  Yes

## 2017-10-29 NOTE — CARE PLAN
Problem: Safety  Goal: Will remain free from injury  Educated on safety measures, using call light ect    Problem: Pain Management  Goal: Pain level will decrease to patient's comfort goal  Pt states pain management plan working for them, pain controlled

## 2017-10-29 NOTE — CARE PLAN
Problem: Safety  Goal: Will remain free from falls    Intervention: Assess risk factors for falls  PT high risk for falls.  Fall precautions in place. Pt injury free.

## 2017-10-29 NOTE — CARE PLAN
Problem: Pain Management  Goal: Pain level will decrease to patient's comfort goal    Intervention: Follow pain managment plan developed in collaboration with patient and Interdisciplinary Team  Pain assessed per unit protocol and PRN.  Medication per MAR.

## 2017-10-30 LAB
ANION GAP SERPL CALC-SCNC: 8 MMOL/L (ref 0–11.9)
BUN SERPL-MCNC: 19 MG/DL (ref 8–22)
CALCIUM SERPL-MCNC: 9.4 MG/DL (ref 8.5–10.5)
CHLORIDE SERPL-SCNC: 109 MMOL/L (ref 96–112)
CO2 SERPL-SCNC: 24 MMOL/L (ref 20–33)
CREAT SERPL-MCNC: 0.89 MG/DL (ref 0.5–1.4)
GFR SERPL CREATININE-BSD FRML MDRD: >60 ML/MIN/1.73 M 2
GLUCOSE SERPL-MCNC: 109 MG/DL (ref 65–99)
POTASSIUM SERPL-SCNC: 3.7 MMOL/L (ref 3.6–5.5)
SODIUM SERPL-SCNC: 141 MMOL/L (ref 135–145)

## 2017-10-30 PROCEDURE — 97530 THERAPEUTIC ACTIVITIES: CPT

## 2017-10-30 PROCEDURE — 51798 US URINE CAPACITY MEASURE: CPT

## 2017-10-30 PROCEDURE — 97116 GAIT TRAINING THERAPY: CPT

## 2017-10-30 PROCEDURE — 99231 SBSQ HOSP IP/OBS SF/LOW 25: CPT | Performed by: HOSPITALIST

## 2017-10-30 PROCEDURE — 80048 BASIC METABOLIC PNL TOTAL CA: CPT

## 2017-10-30 PROCEDURE — 770006 HCHG ROOM/CARE - MED/SURG/GYN SEMI*

## 2017-10-30 PROCEDURE — 700111 HCHG RX REV CODE 636 W/ 250 OVERRIDE (IP): Performed by: HOSPITALIST

## 2017-10-30 PROCEDURE — 700102 HCHG RX REV CODE 250 W/ 637 OVERRIDE(OP): Performed by: HOSPITALIST

## 2017-10-30 PROCEDURE — 97535 SELF CARE MNGMENT TRAINING: CPT

## 2017-10-30 PROCEDURE — 700101 HCHG RX REV CODE 250: Performed by: FAMILY MEDICINE

## 2017-10-30 PROCEDURE — A9270 NON-COVERED ITEM OR SERVICE: HCPCS | Performed by: HOSPITALIST

## 2017-10-30 PROCEDURE — 36415 COLL VENOUS BLD VENIPUNCTURE: CPT

## 2017-10-30 PROCEDURE — A9270 NON-COVERED ITEM OR SERVICE: HCPCS | Performed by: FAMILY MEDICINE

## 2017-10-30 PROCEDURE — 700102 HCHG RX REV CODE 250 W/ 637 OVERRIDE(OP): Performed by: FAMILY MEDICINE

## 2017-10-30 RX ADMIN — METOPROLOL TARTRATE 12.5 MG: 25 TABLET, FILM COATED ORAL at 08:24

## 2017-10-30 RX ADMIN — HYDRALAZINE HYDROCHLORIDE 100 MG: 50 TABLET ORAL at 20:57

## 2017-10-30 RX ADMIN — DULOXETINE HYDROCHLORIDE 20 MG: 20 CAPSULE, DELAYED RELEASE ORAL at 08:25

## 2017-10-30 RX ADMIN — STANDARDIZED SENNA CONCENTRATE AND DOCUSATE SODIUM 2 TABLET: 8.6; 5 TABLET, FILM COATED ORAL at 08:26

## 2017-10-30 RX ADMIN — ATORVASTATIN CALCIUM 80 MG: 40 TABLET, FILM COATED ORAL at 20:53

## 2017-10-30 RX ADMIN — STANDARDIZED SENNA CONCENTRATE AND DOCUSATE SODIUM 2 TABLET: 8.6; 5 TABLET, FILM COATED ORAL at 20:54

## 2017-10-30 RX ADMIN — HYDRALAZINE HYDROCHLORIDE 100 MG: 50 TABLET ORAL at 06:19

## 2017-10-30 RX ADMIN — ASPIRIN 81 MG: 81 TABLET, COATED ORAL at 08:26

## 2017-10-30 RX ADMIN — HEPARIN SODIUM 5000 UNITS: 5000 INJECTION, SOLUTION INTRAVENOUS; SUBCUTANEOUS at 20:57

## 2017-10-30 RX ADMIN — OXYBUTYNIN CHLORIDE 5 MG: 5 TABLET, FILM COATED, EXTENDED RELEASE ORAL at 20:55

## 2017-10-30 RX ADMIN — OMEPRAZOLE 20 MG: 20 CAPSULE, DELAYED RELEASE ORAL at 08:25

## 2017-10-30 RX ADMIN — BENAZEPRIL HYDROCHLORIDE 40 MG: 20 TABLET ORAL at 08:26

## 2017-10-30 RX ADMIN — HEPARIN SODIUM 5000 UNITS: 5000 INJECTION, SOLUTION INTRAVENOUS; SUBCUTANEOUS at 08:26

## 2017-10-30 RX ADMIN — HYDRALAZINE HYDROCHLORIDE 100 MG: 50 TABLET ORAL at 14:40

## 2017-10-30 RX ADMIN — METOPROLOL TARTRATE 12.5 MG: 25 TABLET, FILM COATED ORAL at 20:53

## 2017-10-30 RX ADMIN — GABAPENTIN 300 MG: 300 CAPSULE ORAL at 20:53

## 2017-10-30 RX ADMIN — LIDOCAINE 1 PATCH: 50 PATCH CUTANEOUS at 08:26

## 2017-10-30 ASSESSMENT — COGNITIVE AND FUNCTIONAL STATUS - GENERAL
WALKING IN HOSPITAL ROOM: A LITTLE
PERSONAL GROOMING: A LITTLE
DRESSING REGULAR LOWER BODY CLOTHING: A LOT
TOILETING: A LITTLE
DRESSING REGULAR UPPER BODY CLOTHING: A LITTLE
MOBILITY SCORE: 17
STANDING UP FROM CHAIR USING ARMS: A LITTLE
SUGGESTED CMS G CODE MODIFIER DAILY ACTIVITY: CK
HELP NEEDED FOR BATHING: A LOT
DAILY ACTIVITIY SCORE: 16
EATING MEALS: A LITTLE
MOVING FROM LYING ON BACK TO SITTING ON SIDE OF FLAT BED: A LITTLE
TURNING FROM BACK TO SIDE WHILE IN FLAT BAD: A LITTLE
CLIMB 3 TO 5 STEPS WITH RAILING: A LOT
MOVING TO AND FROM BED TO CHAIR: A LITTLE
SUGGESTED CMS G CODE MODIFIER MOBILITY: CK

## 2017-10-30 ASSESSMENT — ENCOUNTER SYMPTOMS
LOSS OF CONSCIOUSNESS: 0
VOMITING: 0
MYALGIAS: 0
PALPITATIONS: 0
WEAKNESS: 1
PSYCHIATRIC NEGATIVE: 1
HEADACHES: 0
NAUSEA: 0
FEVER: 0
CHILLS: 0
FALLS: 0
SHORTNESS OF BREATH: 0

## 2017-10-30 ASSESSMENT — GAIT ASSESSMENTS
GAIT LEVEL OF ASSIST: CONTACT GUARD ASSIST
DISTANCE (FEET): 125
ASSISTIVE DEVICE: FRONT WHEEL WALKER

## 2017-10-30 ASSESSMENT — PAIN SCALES - GENERAL
PAINLEVEL_OUTOF10: 0

## 2017-10-30 NOTE — THERAPY
"Occupational Therapy Treatment completed with focus on ADLs, ADL transfers and patient education.  Functional Status:  Pt seen for OT tx. Min A supine to sit, CGA sit to stand, tolerated amb w/ FWW and CGA. Pt requires cues for safety using FWW. CGA toilet transfer. Pt was able to complete pericare w/o assistance. Pt completed standing grooming tasks w/ CGA for balance and safety. Pt pleasant and cooperative during session.   Plan of Care: Will benefit from Occupational Therapy 3 times per week  Discharge Recommendations:  Equipment Will Continue to Assess for Equipment Needs.     See \"Rehab Therapy-Acute\" Patient Summary Report for complete documentation.   "

## 2017-10-30 NOTE — PROGRESS NOTES
[facility] Hospitalist Progress Note    Date of Service: 10/30/2017    Chief Complaint  76 y.o. female admitted 2017 with AMS and UTI.    Interval Problem Update  Laying in bed, having breakfast. States that she feels well today but tired of her neighbor swearing and yelling all night. No acute overnight events.    Consultants/Specialty  Neurology    Disposition  Pending guardianship and placement.        Review of Systems   Constitutional: Negative for chills, fever and malaise/fatigue.   Respiratory: Negative for shortness of breath.    Cardiovascular: Negative for chest pain, palpitations and leg swelling.   Gastrointestinal: Negative for nausea and vomiting.   Genitourinary: Negative for dysuria.   Musculoskeletal: Negative for falls and myalgias.   Neurological: Positive for weakness. Negative for loss of consciousness and headaches.   Psychiatric/Behavioral: Negative.    All other systems reviewed and are negative.     Physical Exam  Laboratory/Imaging   Hemodynamics  Temp (24hrs), Av.3 °C (97.4 °F), Min:36 °C (96.8 °F), Max:36.6 °C (97.8 °F)   Temperature: 36.6 °C (97.8 °F)  Pulse  Av.1  Min: 50  Max: 99    Blood Pressure : 106/59      Respiratory      Respiration: 16, Pulse Oximetry: 91 %        RUL Breath Sounds: Clear, RML Breath Sounds: Diminished, RLL Breath Sounds: Diminished, ARGELIA Breath Sounds: Clear, LLL Breath Sounds: Diminished    Fluids    Intake/Output Summary (Last 24 hours) at 10/30/17 1028  Last data filed at 10/30/17 0900   Gross per 24 hour   Intake              360 ml   Output                0 ml   Net              360 ml       Nutrition  Orders Placed This Encounter   Procedures   • Diet Order     Standing Status:   Standing     Number of Occurrences:   1     Order Specific Question:   Diet:     Answer:   Regular [1]     Physical Exam   Constitutional: She appears well-developed and well-nourished. No distress.   HENT:   Mouth/Throat: Oropharynx is clear and moist. No  oropharyngeal exudate.   Eyes: EOM are normal. No scleral icterus.   Neck: Neck supple.   Cardiovascular: Normal rate, regular rhythm and intact distal pulses.    Pulmonary/Chest: Breath sounds normal. No respiratory distress. She has no rales.   Abdominal: Soft. Bowel sounds are normal. She exhibits no distension. There is no tenderness.   Musculoskeletal: She exhibits no edema.   Neurological: She is alert.   AOx2   Skin: Skin is warm and dry. She is not diaphoretic.   Psychiatric: She has a normal mood and affect. Her behavior is normal. Cognition and memory are impaired.   Pleasant   Vitals reviewed.          Recent Labs      10/29/17   0245  10/30/17   0351   SODIUM  142  141   POTASSIUM  3.5*  3.7   CHLORIDE  107  109   CO2  25  24   GLUCOSE  84  109*   BUN  16  19   CREATININE  0.97  0.89   CALCIUM  9.7  9.4                      Assessment/Plan     * Dementia- (present on admission)   Assessment & Plan    MRI of her brain showed enlarged ventricles but no acute infarct. Significant concern for NPH given her dementia, incontinence, and gait instability. Psychiatry deemed patient incapacitated.   - Neuro evaluated and signed off as she refused any intervention  - Pending Guardianship  - no court date as of yet        HTN (hypertension)- (present on admission)   Assessment & Plan    Previously difficult to control, now normotensive to hypotensive.   - Continue Hydralazine, metoprolol, Lotensin   - discontinued Norvasc        History of CVA (cerebrovascular accident)- (present on admission)   Assessment & Plan    MRI on 9/23 showed no evidence of acute stroke. Prominent ventricles, as noted above.  - Continue ASA and statin  - neuro evaluated and signed off  - to chair with meals  - PT/OT, last seen 10/25        Hypokalemia- (present on admission)   Assessment & Plan    K 3.5 on 10/28 responds appropriately to KDur  - monitor and replete         T12 compression fracture (CMS-HCC)- (present on admission)    Assessment & Plan    CT spine, no acute fractures. No complaints of pain.            Reviewed items::  Labs reviewed and Medications reviewed  Pink catheter::  No Pink  DVT prophylaxis pharmacological::  Heparin  Ulcer Prophylaxis::  Yes

## 2017-10-30 NOTE — PROGRESS NOTES
Assumed care of pt after receiving report from dayshift RN. Bedside rounding completed w/ dayshift RN. Pt resting in bed A&OX2 w/ no complaints of pain or discomfort. Fall measures in place, call light within reach, personal belongings nearby, bed in lowest position, and hourly rounding in place. Will continue to monitor pt.

## 2017-10-30 NOTE — PROGRESS NOTES
Simona Knight Fall Risk Assessment:     Last Known Fall: Within the last six months  Mobility: Immobilized/requires assist of one person  Medications: Cardiovascular or central nervous system meds  Mental Status/LOC/Awareness: Lethargic/oriented to person only  Toileting Needs: Incontinence  Volume/Electrolyte Status: No problems  Communication/Sensory: Visual (Glasses)/hearing deficit  Behavior: Appropriate behavior  Simona Knight Fall Risk Total: 14  Fall Risk Level: MODERATE RISK     Universal Fall Precautions:  call light/belongings in reach, bed in low position and locked, wheelchairs and assistive devices out of sight, siderails up x 2, use non-slip footwear, adequate lighting, clutter free and spill free environment, educate to call for assistance, educate on level of risk     Fall Risk Level Interventions:    TRIAL ([employer] 8, NEURO, MED JENNIE 5) Moderate Fall Risk Interventions  Place yellow fall risk ID band on patient: verified  Provide patient/family education based on risk assessment : completed  Educate patient/family to call staff for assistance when getting out of bed: completed  Place fall precaution signage outside patient door: verified  Utilize bed/chair fall alarm: verifiedTRIAL ([employer] 8, NEURO, [employer] JENNIE 5) High Fall Risk Interventions  Place yellow fall risk ID band on patient: verified  Provide patient/family education based on risk assessment: completed  Educate patient/family to call staff for assistance when getting out of bed: completed  Place fall precaution signage outside patient door: verified  Place patient in room close to nursing station: currently not available/charge notified  Utilize bed/chair fall alarm: verified  Notify charge of high risk for huddle: completed     Patient Specific Interventions:      Medication: review medications with patient and family  Mental Status/LOC/Awareness: reorient patient, reinforce falls education and check on patient hourly  Toileting: provide  frquent toileting, instruct patient/family on the use of grab bars and instruct patient/family on the need to call for assistance when toileting  Volume/Electrolyte Status: ensure patient remains hydrated  Communication/Sensory: update plan of care on whiteboard and ensure proper positioning when transferrng/ambulating  Behavioral: collaborate with doctor for possible psych consult, encourage patient to voice feelings and instruct/reinforce fall program rationale  Mobility: schedule physical activity throughout the day, utilize bed/chair fall alarm and ensure bed is locked and in lowest position

## 2017-10-30 NOTE — PROGRESS NOTES
Simona Knight Fall Risk Assessment:     Last Known Fall: Within the last six months  Mobility: Immobilized/requires assist of one person  Medications: Cardiovascular or central nervous system meds  Mental Status/LOC/Awareness: Lethargic/oriented to person only  Toileting Needs: Incontinence  Volume/Electrolyte Status: No problems  Communication/Sensory: Visual (Glasses)/hearing deficit  Behavior: Appropriate behavior  Simona Knight Fall Risk Total: 14  Fall Risk Level: MODERATE RISK    Universal Fall Precautions:  call light/belongings in reach, bed in low position and locked, siderails up x 2, use non-slip footwear, adequate lighting, clutter free and spill free environment, educate on level of risk, educate to call for assistance    Fall Risk Level Interventions:    TRIAL (TELE 8, NEURO, MED JENNIE 5) Moderate Fall Risk Interventions  Place yellow fall risk ID band on patient: verified  Provide patient/family education based on risk assessment : verified  Educate patient/family to call staff for assistance when getting out of bed: completed  Place fall precaution signage outside patient door: verified  Utilize bed/chair fall alarm: completedTRIAL (TELE 8, NEURO, [employer] JENNIE 5) High Fall Risk Interventions  Place yellow fall risk ID band on patient: verified  Provide patient/family education based on risk assessment: completed  Educate patient/family to call staff for assistance when getting out of bed: completed  Place fall precaution signage outside patient door: completed  Place patient in room close to nursing station: currently not available/charge notified  Utilize bed/chair fall alarm: verified  Notify charge of high risk for huddle: completed    Patient Specific Interventions:     Medication: review medications with patient and family  Mental Status/LOC/Awareness: check on patient hourly and reinforce the use of call light  Toileting: instruct male patients prone to dizziness to void while  sitting  Volume/Electrolyte Status: ensure patient remains hydrated and monitor abnormal lab values  Communication/Sensory: update plan of care on whiteboard  Behavioral: administer medication as ordered  Mobility: ensure bed is locked and in lowest position

## 2017-10-30 NOTE — THERAPY
"Physical Therapy Treatment completed.   Bed Mobility:  Supine to Sit: Contact Guard Assist  Transfers: Sit to Stand: Contact Guard Assist  Gait: Level Of Assist: Contact Guard Assist with Front-Wheel Walker       Plan of Care: Will benefit from Physical Therapy 2 times per week and Plan to complete next treatment by Thursday 11/2  Discharge Recommendations: Equipment: Will Continue to Assess for Equipment Needs. Post-acute therapy Discharge to a transitional care facility for continued skilled therapy services.    Pt doing fairly with all mobility. Pt continues to require close CGA for all mobility due to strength and balance deficits. Pt required frequent encouragement to continue with activity as she fatigues very quickly. Multiple gait deviations including dragging R LE with occasional step to gait pattern. Pt also maintains FWW too far away from body and R LE gets outside JESSICA of FWW which is a tripping hazard. Pt would benefit from ongoing PT intervention while in the acute care setting to address the listed deficits and improve mobility prior to DC. Pt will require ongoing PT intervention upon DC in a SNF setting given current deficits and cognitive status    See \"Rehab Therapy-Acute\" Patient Summary Report for complete documentation.       "

## 2017-10-30 NOTE — CARE PLAN
Problem: Safety  Goal: Will remain free from injury  Outcome: PROGRESSING AS EXPECTED  Non tread socks on, bed in low position, bed alarm on, hourly rounding done. Pt refuses mobilization. Will continue to monitor.

## 2017-10-30 NOTE — THERAPY
Attempted to see pt for OT tx. Pt currently sleeping and difficult to maintain arousal d/t lethargy/fatigue. Per RN, pt did not sleep last night. Will try again later as able.

## 2017-10-31 PROCEDURE — 700111 HCHG RX REV CODE 636 W/ 250 OVERRIDE (IP): Performed by: HOSPITALIST

## 2017-10-31 PROCEDURE — 99231 SBSQ HOSP IP/OBS SF/LOW 25: CPT | Performed by: INTERNAL MEDICINE

## 2017-10-31 PROCEDURE — 700102 HCHG RX REV CODE 250 W/ 637 OVERRIDE(OP): Performed by: HOSPITALIST

## 2017-10-31 PROCEDURE — 770006 HCHG ROOM/CARE - MED/SURG/GYN SEMI*

## 2017-10-31 PROCEDURE — 700102 HCHG RX REV CODE 250 W/ 637 OVERRIDE(OP): Performed by: FAMILY MEDICINE

## 2017-10-31 PROCEDURE — 700101 HCHG RX REV CODE 250: Performed by: FAMILY MEDICINE

## 2017-10-31 PROCEDURE — A9270 NON-COVERED ITEM OR SERVICE: HCPCS | Performed by: HOSPITALIST

## 2017-10-31 PROCEDURE — A9270 NON-COVERED ITEM OR SERVICE: HCPCS | Performed by: FAMILY MEDICINE

## 2017-10-31 RX ADMIN — STANDARDIZED SENNA CONCENTRATE AND DOCUSATE SODIUM 2 TABLET: 8.6; 5 TABLET, FILM COATED ORAL at 08:47

## 2017-10-31 RX ADMIN — DULOXETINE HYDROCHLORIDE 20 MG: 20 CAPSULE, DELAYED RELEASE ORAL at 08:47

## 2017-10-31 RX ADMIN — BENAZEPRIL HYDROCHLORIDE 40 MG: 20 TABLET ORAL at 08:47

## 2017-10-31 RX ADMIN — ASPIRIN 81 MG: 81 TABLET, COATED ORAL at 08:47

## 2017-10-31 RX ADMIN — ATORVASTATIN CALCIUM 80 MG: 40 TABLET, FILM COATED ORAL at 20:47

## 2017-10-31 RX ADMIN — LIDOCAINE 1 PATCH: 50 PATCH CUTANEOUS at 08:48

## 2017-10-31 RX ADMIN — HYDRALAZINE HYDROCHLORIDE 100 MG: 50 TABLET ORAL at 05:31

## 2017-10-31 RX ADMIN — METOPROLOL TARTRATE 12.5 MG: 25 TABLET, FILM COATED ORAL at 08:47

## 2017-10-31 RX ADMIN — HEPARIN SODIUM 5000 UNITS: 5000 INJECTION, SOLUTION INTRAVENOUS; SUBCUTANEOUS at 20:47

## 2017-10-31 RX ADMIN — GABAPENTIN 300 MG: 300 CAPSULE ORAL at 20:47

## 2017-10-31 RX ADMIN — OXYBUTYNIN CHLORIDE 5 MG: 5 TABLET, FILM COATED, EXTENDED RELEASE ORAL at 20:50

## 2017-10-31 RX ADMIN — HYDRALAZINE HYDROCHLORIDE 100 MG: 50 TABLET ORAL at 20:46

## 2017-10-31 RX ADMIN — HEPARIN SODIUM 5000 UNITS: 5000 INJECTION, SOLUTION INTRAVENOUS; SUBCUTANEOUS at 08:48

## 2017-10-31 RX ADMIN — HYDRALAZINE HYDROCHLORIDE 100 MG: 50 TABLET ORAL at 14:40

## 2017-10-31 RX ADMIN — OMEPRAZOLE 20 MG: 20 CAPSULE, DELAYED RELEASE ORAL at 08:47

## 2017-10-31 RX ADMIN — METOPROLOL TARTRATE 12.5 MG: 25 TABLET, FILM COATED ORAL at 20:44

## 2017-10-31 ASSESSMENT — PAIN SCALES - GENERAL
PAINLEVEL_OUTOF10: 0
PAINLEVEL_OUTOF10: 0

## 2017-10-31 ASSESSMENT — ENCOUNTER SYMPTOMS
WEAKNESS: 1
LOSS OF CONSCIOUSNESS: 0
CHILLS: 0
ABDOMINAL PAIN: 0
PALPITATIONS: 0
MYALGIAS: 0
DIZZINESS: 0
FLANK PAIN: 0
HEARTBURN: 0
FEVER: 0
SHORTNESS OF BREATH: 0
VOMITING: 0

## 2017-10-31 NOTE — PROGRESS NOTES
Simona Knight Fall Risk Assessment:     Last Known Fall: Within the last six months  Mobility: Immobilized/requires assist of one person  Medications: Cardiovascular or central nervous system meds  Mental Status/LOC/Awareness: Lethargic/oriented to person only  Toileting Needs: Incontinence  Volume/Electrolyte Status: No problems  Communication/Sensory: Visual (Glasses)/hearing deficit  Behavior: Appropriate behavior  Simona Knight Fall Risk Total: 14  Fall Risk Level: MODERATE RISK    Universal Fall Precautions:  call light/belongings in reach, siderails up x 2, use non-slip footwear, adequate lighting, clutter free and spill free environment, educate on level of risk, educate to call for assistance, wheelchairs and assistive devices out of sight, bed in low position and locked    Fall Risk Level Interventions:    TRIAL ([employer] 8, NEURO, MED JENNIE 5) Moderate Fall Risk Interventions  Place yellow fall risk ID band on patient: verified  Provide patient/family education based on risk assessment : completed  Educate patient/family to call staff for assistance when getting out of bed: completed  Place fall precaution signage outside patient door: verified  Utilize bed/chair fall alarm: verifiedTRIAL ([employer] 8, NEURO, [employer] JENNIE 5) High Fall Risk Interventions  Place yellow fall risk ID band on patient: verified  Provide patient/family education based on risk assessment: completed  Educate patient/family to call staff for assistance when getting out of bed: completed  Place fall precaution signage outside patient door: completed  Place patient in room close to nursing station: currently not available/charge notified  Utilize bed/chair fall alarm: verified  Notify charge of high risk for huddle: completed    Patient Specific Interventions:     Medication: review medications with patient and family  Mental Status/LOC/Awareness: reinforce falls education, check on patient hourly and utilize bed/chair fall alarm  Toileting:  provide frquent toileting  Volume/Electrolyte Status: ensure patient remains hydrated and monitor abnormal lab values  Communication/Sensory: update plan of care on whiteboard, ensure proper positioning when transferrng/ambulating and ensure patient has glasses/contacts and hearing aids/dentures  Behavioral: encourage patient to voice feelings  Mobility: utilize bed/chair fall alarm, ensure bed is locked and in lowest position and provide appropriate assistive device

## 2017-10-31 NOTE — PROGRESS NOTES
[facility]ist Progress Note    Date of Service: 10/31/2017    Chief Complaint  76 y.o. female admitted 2017 with AMS and UTI.    Interval Problem Update  No new events  Ambulated with PT/OT-contact-guard assist  Pleasant    Consultants/Specialty  Neurology    Disposition  Pending guardianship and placement.   assisting        Review of Systems   Constitutional: Negative for chills and fever.   Respiratory: Negative for shortness of breath.    Cardiovascular: Negative for chest pain, palpitations and leg swelling.   Gastrointestinal: Negative for abdominal pain, heartburn and vomiting.   Genitourinary: Negative for flank pain.   Musculoskeletal: Negative for myalgias.   Neurological: Positive for weakness. Negative for dizziness and loss of consciousness.      Physical Exam  Laboratory/Imaging   Hemodynamics  Temp (24hrs), Av.2 °C (97.1 °F), Min:36 °C (96.8 °F), Max:36.4 °C (97.6 °F)   Temperature: 36.4 °C (97.6 °F)  Pulse  Av.3  Min: 50  Max: 99    Blood Pressure : 127/49      Respiratory      Respiration: 17, Pulse Oximetry: 92 %        RML Breath Sounds: Diminished, RLL Breath Sounds: Diminished, LLL Breath Sounds: Diminished    Fluids    Intake/Output Summary (Last 24 hours) at 10/31/17 1515  Last data filed at 10/31/17 1300   Gross per 24 hour   Intake              450 ml   Output                0 ml   Net              450 ml       Nutrition  Orders Placed This Encounter   Procedures   • Diet Order     Standing Status:   Standing     Number of Occurrences:   1     Order Specific Question:   Diet:     Answer:   Regular [1]     Physical Exam   Constitutional: She appears well-developed and well-nourished. No distress.   HENT:   Head: Normocephalic and atraumatic.   Mouth/Throat: Oropharynx is clear and moist.   Eyes: EOM are normal. Pupils are equal, round, and reactive to light.   Cardiovascular: Normal rate, regular rhythm and intact distal pulses.    Pulmonary/Chest: Effort  normal. No respiratory distress.   Abdominal: Soft. She exhibits no distension.   Musculoskeletal: She exhibits no edema.   Neurological: She is alert. No cranial nerve deficit.   AOx2   Skin: Skin is warm and dry.   Psychiatric: She has a normal mood and affect. Her behavior is normal. Cognition and memory are impaired.   Pleasant   Vitals reviewed.          Recent Labs      10/29/17   0245  10/30/17   0351   SODIUM  142  141   POTASSIUM  3.5*  3.7   CHLORIDE  107  109   CO2  25  24   GLUCOSE  84  109*   BUN  16  19   CREATININE  0.97  0.89   CALCIUM  9.7  9.4                      Assessment/Plan     * Dementia- (present on admission)   Assessment & Plan    MRI of her brain showed enlarged ventricles but no acute infarct. Significant concern for NPH given her dementia, incontinence, and gait instability. Psychiatry deemed patient incapacitated.   - Neuro evaluated and signed off as she refused any intervention  - Pending Guardianship  - no court date as of yet        HTN (hypertension)- (present on admission)   Assessment & Plan    Previously difficult to control, now normotensive to hypotensive.   - Continue Hydralazine, metoprolol, Lotensin   - discontinued Norvasc        History of CVA (cerebrovascular accident)- (present on admission)   Assessment & Plan    MRI on 9/23 showed no evidence of acute stroke. Prominent ventricles, as noted above.  - Continue ASA and statin  - neuro evaluated and signed off  - to chair with meals  - PT/OT, last seen 10/25        Hypokalemia- (present on admission)   Assessment & Plan    K 3.5 on 10/28 responds appropriately to KDur  - monitor and replete         T12 compression fracture (CMS-HCC)- (present on admission)   Assessment & Plan    CT spine, no acute fractures. No complaints of pain.            Reviewed items::  Labs reviewed and Medications reviewed  Pink catheter::  No Pink  DVT prophylaxis pharmacological::  Heparin  Ulcer Prophylaxis::  Yes

## 2017-10-31 NOTE — PROGRESS NOTES
Simona Knight Fall Risk Assessment:     Last Known Fall: Within the last six months  Mobility: Immobilized/requires assist of one person  Medications: Cardiovascular or central nervous system meds  Mental Status/LOC/Awareness: Lethargic/oriented to person only  Toileting Needs: Incontinence  Volume/Electrolyte Status: No problems  Communication/Sensory: Visual (Glasses)/hearing deficit  Behavior: Appropriate behavior  Simona Knight Fall Risk Total: 14  Fall Risk Level: MODERATE RISK    Universal Fall Precautions:  call light/belongings in reach, siderails up x 2, use non-slip footwear, adequate lighting, clutter free and spill free environment, educate on level of risk, educate to call for assistance, wheelchairs and assistive devices out of sight, bed in low position and locked    Fall Risk Level Interventions:    TRIAL ([employer] 8, NEURO, MED JENNIE 5) Moderate Fall Risk Interventions  Place yellow fall risk ID band on patient: verified  Provide patient/family education based on risk assessment : completed  Educate patient/family to call staff for assistance when getting out of bed: completed  Place fall precaution signage outside patient door: verified  Utilize bed/chair fall alarm: verifiedTRIAL ([employer] 8, NEURO, [employer] JENNIE 5) High Fall Risk Interventions  Place yellow fall risk ID band on patient: verified  Provide patient/family education based on risk assessment: completed  Educate patient/family to call staff for assistance when getting out of bed: completed  Place fall precaution signage outside patient door: completed  Place patient in room close to nursing station: currently not available/charge notified  Utilize bed/chair fall alarm: verified  Notify charge of high risk for huddle: completed    Patient Specific Interventions:     Medication: review medications with patient and family  Mental Status/LOC/Awareness: reorient patient, reinforce falls education, check on patient hourly and utilize bed/chair fall  alarm  Toileting: monitor intake and output/use of appropriate interventions  Volume/Electrolyte Status: ensure patient remains hydrated  Communication/Sensory: update plan of care on whiteboard  Behavioral: engage patient in daily activities and administer medication as ordered  Mobility: utilize bed/chair fall alarm, ensure bed is locked and in lowest position and provide appropriate assistive device

## 2017-10-31 NOTE — PSYCHIATRY
"Reason for admission: Acute renal insufficiency   Reason for consult:   Requesting Physician: Amy Vieyra D.O.   Supervising Physician:  Mikala Sharp MD       Chief Complaint: assessment for capacity     HPI: Maria Esther Valencia is a 76 year old female with history of dementia who initially presented to the ED on 9/21 for evaluation of altered level of consciousness with head trauma. Upon my evaluation, patient states she knows she has had memory loss for a long time, but also thinks that she may get better. Patient is very suggestible during my interview and during subsequent interview with Dr. Sharp. Patient states she is not sure if she can care for herself at this time. She states she has a male friend named \"Arsenio\" who she has known x7 years. He apparently was her upstairs neighbor, and has visited her several times during her current hospitalization.  The remainder of history taking is limited secondary to the patient's clinical condition.       At this time, patient shows signs of severe cognitive impairment, scoring a 15/30 on the MOCA. She exhibits problems with executive functioning, attention, language, abstraction, recall, and orientation. Patient does have some insight into her condition, stating that she knows she has memory issues for a \"long time\" but she also then states that she thinks things may get better.     There is some question whether or not her friend \"Arsenio\" would be able to be her guardian. She states they have been friends for 7 years and that she trusts him. She than states that he is very street smart and is unclear if she thinks he would make decisions that are in her best interest. However, she is very suggestible on interview, and is unclear on the difference between an appointed guardian, a friend who makes decisions, and a doctor. Our plan is to re-assess on Monday and further explain what guardianship means and what her choices are.      Review of systems: limited     Psychiatric " "Examination:   Vitals: Blood pressure 148/61, pulse 67, temperature 36.6 °C (97.8 °F), resp. rate 17, height 1.6 m (5' 2.99\"), weight 90 kg (198 lb 6.6 oz), SpO2 92 %, not currently breastfeeding.   Musculoskeletal: normal psychomotor activity, no tics or unusual mannerisms noted   Appearance: WDWN, appropriate dress and grooming.+ patient is agitated during the interview, saying at one point that she wants to \"hit me\"   Thoughts:  Linear thinking but obvious memory difficulties   Speech: Normal rate and dorota, no pressuring of speech noted         Affect: Mood congruent, normal range of affect           Attention/Alertness: Alert   Memory: Recent and remote memory grossly intact     Orientation: alert and oriented to situation and place     Fund of Knowledge: Fair   Insight/Judgement into symptoms: fair   Neurological Testing: Not formally tested     Other system(s) examined: (neurological, skin, GI, etc)       Past Psychiatric Hx: denies     Family Psychiatric Hx: unknown     Social Hx: Patient is a high school graduate and states she was a  for many years before she became a . She was living on her own in an apartment with a recent stay at [facility] for unclear reasons.     MOCA:   Visuospataial/ executive: 1/5   Naming: 3/3   Attention: 2/2, 1/1, 0/3   Language: 2/2, 0/1   Abstraction: 1/2   Delayed Recall: 2/5   Orientation: 3/6   Total 15/30     Medical Hx: labs, MARS, medications, etc were reviewed. Only those findings of potential interest to psychiatry are noted below:   Past Medical History:   Diagnosis Date   • CAD (coronary artery disease)   • Compression fracture of spine (CMS-HCC)   • Congestive heart failure (CMS-HCC)   • Dementia   • GI bleed   • Hypertension   • Psychiatric disorder   • Stroke (CMS-HCC)     Medical Conditions:     Allergies: Pcn [penicillins]   Medications (currently prescribed at [facility]):     Current Facility-Administered Medications:   •  amlodipine " (NORVASC) tablet 10 mg, 10 mg, Oral, Q DAY, Amy Vieyra D.O., 10 mg at 10/06/17 0905   •  benazepril (LOTENSIN) tablet 40 mg, 40 mg, Oral, DAILY, Precious Valentnie M.D., 40 mg at 10/06/17 0914   •  hydrALAZINE (APRESOLINE) tablet 100 mg, 100 mg, Oral, Q8HRS, Precious Valentine M.D., 100 mg at 10/06/17 0540   •  omeprazole (PRILOSEC) capsule 20 mg, 20 mg, Oral, DAILY, Precious Valentine M.D., 20 mg at 10/06/17 0904   •  aspirin EC (ECOTRIN) tablet 81 mg, 81 mg, Oral, DAILY, Edward Metcalf M.D., 81 mg at 10/06/17 0904   •  atorvastatin (LIPITOR) tablet 80 mg, 80 mg, Oral, QHS, Edward Metcalf M.D., 80 mg at 10/05/17 2041   •  duloxetine (CYMBALTA) capsule 20 mg, 20 mg, Oral, DAILY, Edward Metcalf M.D., 20 mg at 10/06/17 0905   •  gabapentin (NEURONTIN) capsule 300 mg, 300 mg, Oral, Q EVENING, Edward Metcalf M.D., 300 mg at 10/05/17 2041   •  lidocaine (LIDODERM) 5 % 1 Patch, 1 Patch, Transdermal, Q24HRS, Edward Metcalf M.D., Stopped at 10/04/17 0804   •  metoprolol (LOPRESSOR) tablet 12.5 mg, 12.5 mg, Oral, BID, Edward Metcalf M.D., 12.5 mg at 10/06/17 0904   •  oxybutynin SR (DITROPAN-XL) tablet 5 mg, 5 mg, Oral, Q EVENING, Edward Metaclf M.D., 5 mg at 10/05/17 2041   •  trazodone (DESYREL) tablet 50 mg, 50 mg, Oral, QHS PRN, Leon Liwanag, M.D.   •  vitamin D (Ergocalciferol) (DRISDOL) capsule 50,000 Units, 50,000 Units, Oral, Q7 DAYS, Edward Metcalf M.D., 50,000 Units at 10/04/17 0800   •  senna-docusate (PERICOLACE or SENOKOT S) 8.6-50 MG per tablet 2 Tab, 2 Tab, Oral, BID, 2 Tab at 10/05/17 2041 **AND** polyethylene glycol/lytes (MIRALAX) PACKET 1 Packet, 1 Packet, Oral, QDAY PRN **AND** magnesium hydroxide (MILK OF MAGNESIA) suspension 30 mL, 30 mL, Oral, QDAY PRN **AND** bisacodyl (DULCOLAX) suppository 10 mg, 10 mg, Rectal, QDAY PRN, Edward Metcalf M.D.   •  heparin injection 5,000 Units, 5,000 Units, Subcutaneous, Q8HRS, Edward Metcalf M.D., 5,000 Units at 10/06/17 0540   •   labetalol (NORMODYNE,TRANDATE) injection 10 mg, 10 mg, Intravenous, Q4HRS PRN, Edward Metcalf M.D., 10 mg at 10/03/17 0413   •  ondansetron (ZOFRAN) syringe/vial injection 4 mg, 4 mg, Intravenous, Q4HRS PRN, ARIES Daniels.DEugenia, 4 mg at 09/26/17 2047   •  ondansetron (ZOFRAN ODT) dispertab 4 mg, 4 mg, Oral, Q4HRS PRN, SERGE DanielsDEugenia, 4 mg at 10/04/17 0915   •  acetaminophen (TYLENOL) tablet 650 mg, 650 mg, Oral, Q6HRS PRN, SERGE DanielsDEugenia, 650 mg at 10/06/17 0906   •  Notify provider if pain remains uncontrolled, , , CONTINUOUS **AND** Use the numeric rating scale (NRS-11) on regular floors and Critical-Care Pain Observation Tool (CPOT) on ICUs/Trauma to assess pain, , , CONTINUOUS **AND** Pulse Ox (Oximetry), , , CONTINUOUS **AND** [DISCONTINUED] Pharmacy Consult Request ...Pain Management Review, , Other, PRN **AND** If patient difficult to arouse and/or has respiratory depression, stop any opiates that are currently infusing and call a Rapid Response., , , CONTINUOUS **AND** oxycodone immediate-release (ROXICODONE) tablet 2.5 mg, 2.5 mg, Oral, Q3HRS PRN, 2.5 mg at 09/29/17 0610 **AND** oxycodone immediate-release (ROXICODONE) tablet 5 mg, 5 mg, Oral, Q3HRS PRN, 5 mg at 09/29/17 0937 **AND** morphine (pf) 4 mg/ml injection 2 mg, 2 mg, Intravenous, Q3HRS PRN, Edward Metcalf M.D.   •  hydrALAZINE (APRESOLINE) injection 10 mg, 10 mg, Intravenous, Q6HRS PRN, Edward Metcalf M.D., 10 mg at 09/29/17 0638   Labs:   No results found for this or any previous visit (from the past 24 hour(s)).   ECG: QTc 435     Cranial Imaging: reviewed   Brain MRI: 1.  Moderate to severe atrophy in a nonspecific pattern   2.  Prominent lateral and third ventricles without visualized obstructing lesion which could indicate normal pressure hydrocephalus in the appropriate clinical setting   3.  Advanced white matter changes   4.  Remote BILATERAL cerebellar cortical infarctions   5.  Remote LEFT internal capsule  lacunar infarction   6.  LEFT mastoid effusion     ASSESSMENT:   1. Dementia, r/o NPH   2. Closed head injury         PLAN:     She seems to have ability to participate in some decision making, but concerns as she is easily led. Will reevaluate on Monday.     Thank you for the consult.

## 2017-10-31 NOTE — CARE PLAN
Problem: Safety  Goal: Will remain free from injury  Outcome: PROGRESSING SLOWER THAN EXPECTED  Bed in low position, treaded socks on, bed alarm on, call bell in reach, hourly rounds done, no fall/injury since admission.

## 2017-10-31 NOTE — PROGRESS NOTES
Received report from day RN. Assumed pt care. Discussed call light/phone system, communication board and POC. Pt is aao x to self only. Pt is able to follow commands. Pt denies pain. Pt is pending placement/guardianship. Pt is incontinent, RN and CNA perform bed bath and full bed change. Pt mobility assessed. Pt is a one assist up to the bathroom. Pt declines mobility despite education regarding risks. Bed alarm on. Fall precautions in place. Bed is locked and in low position, call light within reach. Treaded slippers in place. Pt needs met at this time. Hourly rounding in place.

## 2017-11-01 PROCEDURE — 700111 HCHG RX REV CODE 636 W/ 250 OVERRIDE (IP): Performed by: HOSPITALIST

## 2017-11-01 PROCEDURE — A9270 NON-COVERED ITEM OR SERVICE: HCPCS | Performed by: HOSPITALIST

## 2017-11-01 PROCEDURE — 700102 HCHG RX REV CODE 250 W/ 637 OVERRIDE(OP): Performed by: FAMILY MEDICINE

## 2017-11-01 PROCEDURE — A9270 NON-COVERED ITEM OR SERVICE: HCPCS | Performed by: FAMILY MEDICINE

## 2017-11-01 PROCEDURE — 99231 SBSQ HOSP IP/OBS SF/LOW 25: CPT | Performed by: INTERNAL MEDICINE

## 2017-11-01 PROCEDURE — 700102 HCHG RX REV CODE 250 W/ 637 OVERRIDE(OP): Performed by: HOSPITALIST

## 2017-11-01 PROCEDURE — 700101 HCHG RX REV CODE 250: Performed by: FAMILY MEDICINE

## 2017-11-01 PROCEDURE — 770006 HCHG ROOM/CARE - MED/SURG/GYN SEMI*

## 2017-11-01 RX ADMIN — OMEPRAZOLE 20 MG: 20 CAPSULE, DELAYED RELEASE ORAL at 07:56

## 2017-11-01 RX ADMIN — HEPARIN SODIUM 5000 UNITS: 5000 INJECTION, SOLUTION INTRAVENOUS; SUBCUTANEOUS at 21:12

## 2017-11-01 RX ADMIN — OXYBUTYNIN CHLORIDE 5 MG: 5 TABLET, FILM COATED, EXTENDED RELEASE ORAL at 21:18

## 2017-11-01 RX ADMIN — GABAPENTIN 300 MG: 300 CAPSULE ORAL at 21:12

## 2017-11-01 RX ADMIN — ERGOCALCIFEROL 50000 UNITS: 1.25 CAPSULE, LIQUID FILLED ORAL at 08:00

## 2017-11-01 RX ADMIN — HEPARIN SODIUM 5000 UNITS: 5000 INJECTION, SOLUTION INTRAVENOUS; SUBCUTANEOUS at 07:56

## 2017-11-01 RX ADMIN — HYDRALAZINE HYDROCHLORIDE 100 MG: 50 TABLET ORAL at 06:32

## 2017-11-01 RX ADMIN — STANDARDIZED SENNA CONCENTRATE AND DOCUSATE SODIUM 2 TABLET: 8.6; 5 TABLET, FILM COATED ORAL at 21:12

## 2017-11-01 RX ADMIN — HYDRALAZINE HYDROCHLORIDE 100 MG: 50 TABLET ORAL at 13:48

## 2017-11-01 RX ADMIN — DULOXETINE HYDROCHLORIDE 20 MG: 20 CAPSULE, DELAYED RELEASE ORAL at 07:56

## 2017-11-01 RX ADMIN — LIDOCAINE 1 PATCH: 50 PATCH CUTANEOUS at 07:56

## 2017-11-01 RX ADMIN — ATORVASTATIN CALCIUM 80 MG: 40 TABLET, FILM COATED ORAL at 21:11

## 2017-11-01 RX ADMIN — ASPIRIN 81 MG: 81 TABLET, COATED ORAL at 07:55

## 2017-11-01 RX ADMIN — METOPROLOL TARTRATE 12.5 MG: 25 TABLET, FILM COATED ORAL at 21:12

## 2017-11-01 ASSESSMENT — PAIN SCALES - GENERAL
PAINLEVEL_OUTOF10: 0

## 2017-11-01 ASSESSMENT — ENCOUNTER SYMPTOMS
HEADACHES: 0
FOCAL WEAKNESS: 0
MYALGIAS: 0
WEAKNESS: 1
PALPITATIONS: 0
DEPRESSION: 0
SHORTNESS OF BREATH: 0
NERVOUS/ANXIOUS: 0
LOSS OF CONSCIOUSNESS: 0
ABDOMINAL PAIN: 0

## 2017-11-01 NOTE — CARE PLAN
Problem: Pain Management  Goal: Pain level will decrease to patient's comfort goal  Outcome: PROGRESSING AS EXPECTED  Pt denies any pain at this time, lidocaine patch per MAR with adequate pain control, hourly rounding in progress    Problem: Skin Integrity  Goal: Risk for impaired skin integrity will decrease  Outcome: PROGRESSING AS EXPECTED   savage risk assessment, applied barrier cream, pt turns self from side to side

## 2017-11-01 NOTE — PROGRESS NOTES
Simona Knight Fall Risk Assessment:     Last Known Fall: Within the last six months  Mobility: Immobilized/requires assist of one person  Medications: Cardiovascular or central nervous system meds  Mental Status/LOC/Awareness: Lethargic/oriented to person only  Toileting Needs: Incontinence  Volume/Electrolyte Status: No problems  Communication/Sensory: Visual (Glasses)/hearing deficit  Behavior: Appropriate behavior  Simona Knight Fall Risk Total: 14  Fall Risk Level: MODERATE RISK    Universal Fall Precautions:  call light/belongings in reach, siderails up x 2, use non-slip footwear, adequate lighting, clutter free and spill free environment, educate on level of risk, educate to call for assistance, wheelchairs and assistive devices out of sight, bed in low position and locked    Fall Risk Level Interventions:    TRIAL ([employer] 8, NEURO, MED JENNIE 5) Moderate Fall Risk Interventions  Place yellow fall risk ID band on patient: verified  Provide patient/family education based on risk assessment : completed  Educate patient/family to call staff for assistance when getting out of bed: completed  Place fall precaution signage outside patient door: verified  Utilize bed/chair fall alarm: verifiedTRIAL ([employer] 8, NEURO, [employer] JENNIE 5) High Fall Risk Interventions  Place yellow fall risk ID band on patient: verified  Provide patient/family education based on risk assessment: completed  Educate patient/family to call staff for assistance when getting out of bed: completed  Place fall precaution signage outside patient door: completed  Place patient in room close to nursing station: currently not available/charge notified  Utilize bed/chair fall alarm: verified  Notify charge of high risk for huddle: completed    Patient Specific Interventions:     Medication: review medications with patient and family  Mental Status/LOC/Awareness: reinforce falls education, check on patient hourly and utilize bed/chair fall alarm  Toileting:  provide frquent toileting  Volume/Electrolyte Status: ensure patient remains hydrated and monitor abnormal lab values  Communication/Sensory: update plan of care on whiteboard, ensure proper positioning when transferrng/ambulating and ensure patient has glasses/contacts and hearing aids/dentures  Behavioral: encourage patient to voice feelings  Mobility: utilize bed/chair fall alarm, ensure bed is locked and in lowest position and provide appropriate assistive device

## 2017-11-01 NOTE — CARE PLAN
Problem: Safety  Goal: Will remain free from injury  Outcome: PROGRESSING AS EXPECTED  Treaded socks in place, bed in the lowest position, bed alarm on, call light and belongings within reach, pt call for assistance appropriately    Problem: Venous Thromboembolism (VTW)/Deep Vein Thrombosis (DVT) Prevention:  Goal: Patient will participate in Venous Thrombosis (VTE)/Deep Vein Thrombosis (DVT)Prevention Measures  Outcome: PROGRESSING AS EXPECTED  Pt receiving unfractionated heparin per MAR

## 2017-11-01 NOTE — THERAPY
Attempted PT treatment session this am. Pt reports that she is too fatigued to participate this am. Pt educated on importance of OOB mobility she stays in bed majority of the day. Pt continues to decline treatment at this time

## 2017-11-01 NOTE — PROGRESS NOTES
Received report from day RN. Assumed pt care. Discussed call light/phone system, communication board and POC. Pt is aao x to 2, disoriented to time and event. Pt is able to follow commands. Pt has a better appetite today. Pt denies pain. Pt is pending placement/guardianship. Pt is incontinent, RN and CNA perform bed bath and full bed change. Pt mobility assessed. Pt is a one assist up to the chair. Bed alarm on. Fall precautions in place. Bed is locked and in low position, call light within reach. Treaded slippers in place. Pt needs met at this time. Hourly rounding in place.

## 2017-11-01 NOTE — PROGRESS NOTES
Report received. Assumed care. Pt in bed awake. A/O x3 VSS. Responds appropriately. Denies pain, SOB. Assessment complete. Discussed POC,pain control, mobility, safety, DC planning , pt verbalizes understanding. Explained importance of calling before getting OOB. Call light and belongings within reach. Bed alarm on. Bed in the lowest position. Treaded socks in place. Hourly rounding in progress. Will continue to monitor .

## 2017-11-01 NOTE — PROGRESS NOTES
[facility] Hospitalist Progress Note    Date of Service: 2017    Chief Complaint  76 y.o. female admitted 2017 with AMS and UTI.    Interval Problem Update  Reports being tired today otherwise has no other complaints    Consultants/Specialty  Neurology    Disposition  Pending guardianship and placement.   assisting        Review of Systems   HENT: Negative for hearing loss.    Respiratory: Negative for shortness of breath.    Cardiovascular: Negative for palpitations and leg swelling.   Gastrointestinal: Negative for abdominal pain.   Genitourinary: Negative for dysuria.   Musculoskeletal: Negative for myalgias.   Neurological: Positive for weakness. Negative for focal weakness, loss of consciousness and headaches.   Psychiatric/Behavioral: Negative for depression. The patient is not nervous/anxious.       Physical Exam  Laboratory/Imaging   Hemodynamics  Temp (24hrs), Av.3 °C (97.3 °F), Min:36.1 °C (97 °F), Max:36.3 °C (97.4 °F)   Temperature: 36.3 °C (97.4 °F)  Pulse  Av.1  Min: 50  Max: 99    Blood Pressure : 116/63      Respiratory      Respiration: 18, Pulse Oximetry: 95 %        RUL Breath Sounds: Clear, RML Breath Sounds: Diminished, RLL Breath Sounds: Diminished, ARGELIA Breath Sounds: Clear, LLL Breath Sounds: Diminished    Fluids    Intake/Output Summary (Last 24 hours) at 17 1412  Last data filed at 10/31/17 1800   Gross per 24 hour   Intake              200 ml   Output                0 ml   Net              200 ml       Nutrition  Orders Placed This Encounter   Procedures   • Diet Order     Standing Status:   Standing     Number of Occurrences:   1     Order Specific Question:   Diet:     Answer:   Regular [1]     Physical Exam   Constitutional: She appears well-developed.   HENT:   Head: Normocephalic and atraumatic.   Eyes: EOM are normal. Pupils are equal, round, and reactive to light. No scleral icterus.   Cardiovascular: Normal rate and regular rhythm.     Pulmonary/Chest: Effort normal.   Abdominal: She exhibits no distension.   Musculoskeletal: She exhibits no edema or tenderness.   Neurological: She is alert.   AOx2   Skin: Skin is warm and dry.   Psychiatric: She has a normal mood and affect. Her behavior is normal. Judgment normal. Cognition and memory are impaired.   Pleasant   Vitals reviewed.          Recent Labs      10/30/17   0351   SODIUM  141   POTASSIUM  3.7   CHLORIDE  109   CO2  24   GLUCOSE  109*   BUN  19   CREATININE  0.89   CALCIUM  9.4                      Assessment/Plan     * Dementia- (present on admission)   Assessment & Plan    MRI of her brain showed enlarged ventricles but no acute infarct. Significant concern for NPH given her dementia, incontinence, and gait instability. Psychiatry deemed patient incapacitated.   - Neuro evaluated and signed off as she refused any intervention  - Pending Guardianship  - no court date as of yet        HTN (hypertension)- (present on admission)   Assessment & Plan    Previously difficult to control, now normotensive to hypotensive.   - Continue Hydralazine, metoprolol, Lotensin   - discontinued Norvasc        History of CVA (cerebrovascular accident)- (present on admission)   Assessment & Plan    MRI on 9/23 showed no evidence of acute stroke. Prominent ventricles, as noted above.  - Continue ASA and statin  - neuro evaluated and signed off  - to chair with meals  - PT/OT, last seen 10/25        Hypokalemia- (present on admission)   Assessment & Plan    K 3.5 on 10/28 responds appropriately to KDur  - monitor and replete         T12 compression fracture (CMS-HCC)- (present on admission)   Assessment & Plan    CT spine, no acute fractures. No complaints of pain.            Reviewed items::  Labs reviewed and Medications reviewed  Pink catheter::  No Pink  DVT prophylaxis pharmacological::  Heparin  Ulcer Prophylaxis::  Yes

## 2017-11-02 PROCEDURE — 700101 HCHG RX REV CODE 250: Performed by: FAMILY MEDICINE

## 2017-11-02 PROCEDURE — 99231 SBSQ HOSP IP/OBS SF/LOW 25: CPT | Performed by: INTERNAL MEDICINE

## 2017-11-02 PROCEDURE — 700102 HCHG RX REV CODE 250 W/ 637 OVERRIDE(OP): Performed by: HOSPITALIST

## 2017-11-02 PROCEDURE — 700111 HCHG RX REV CODE 636 W/ 250 OVERRIDE (IP): Performed by: HOSPITALIST

## 2017-11-02 PROCEDURE — 700102 HCHG RX REV CODE 250 W/ 637 OVERRIDE(OP): Performed by: FAMILY MEDICINE

## 2017-11-02 PROCEDURE — A9270 NON-COVERED ITEM OR SERVICE: HCPCS | Performed by: FAMILY MEDICINE

## 2017-11-02 PROCEDURE — 770006 HCHG ROOM/CARE - MED/SURG/GYN SEMI*

## 2017-11-02 PROCEDURE — A9270 NON-COVERED ITEM OR SERVICE: HCPCS | Performed by: HOSPITALIST

## 2017-11-02 RX ADMIN — BENAZEPRIL HYDROCHLORIDE 40 MG: 20 TABLET ORAL at 08:49

## 2017-11-02 RX ADMIN — ATORVASTATIN CALCIUM 80 MG: 40 TABLET, FILM COATED ORAL at 21:00

## 2017-11-02 RX ADMIN — LIDOCAINE 1 PATCH: 50 PATCH CUTANEOUS at 08:50

## 2017-11-02 RX ADMIN — ASPIRIN 81 MG: 81 TABLET, COATED ORAL at 08:50

## 2017-11-02 RX ADMIN — HYDRALAZINE HYDROCHLORIDE 100 MG: 50 TABLET ORAL at 21:00

## 2017-11-02 RX ADMIN — OMEPRAZOLE 20 MG: 20 CAPSULE, DELAYED RELEASE ORAL at 08:49

## 2017-11-02 RX ADMIN — HEPARIN SODIUM 5000 UNITS: 5000 INJECTION, SOLUTION INTRAVENOUS; SUBCUTANEOUS at 21:01

## 2017-11-02 RX ADMIN — STANDARDIZED SENNA CONCENTRATE AND DOCUSATE SODIUM 2 TABLET: 8.6; 5 TABLET, FILM COATED ORAL at 08:49

## 2017-11-02 RX ADMIN — STANDARDIZED SENNA CONCENTRATE AND DOCUSATE SODIUM 2 TABLET: 8.6; 5 TABLET, FILM COATED ORAL at 21:00

## 2017-11-02 RX ADMIN — METOPROLOL TARTRATE 12.5 MG: 25 TABLET, FILM COATED ORAL at 08:50

## 2017-11-02 RX ADMIN — HYDRALAZINE HYDROCHLORIDE 100 MG: 50 TABLET ORAL at 15:03

## 2017-11-02 RX ADMIN — HEPARIN SODIUM 5000 UNITS: 5000 INJECTION, SOLUTION INTRAVENOUS; SUBCUTANEOUS at 08:49

## 2017-11-02 RX ADMIN — OXYBUTYNIN CHLORIDE 5 MG: 5 TABLET, FILM COATED, EXTENDED RELEASE ORAL at 21:00

## 2017-11-02 RX ADMIN — DULOXETINE HYDROCHLORIDE 20 MG: 20 CAPSULE, DELAYED RELEASE ORAL at 08:49

## 2017-11-02 RX ADMIN — GABAPENTIN 300 MG: 300 CAPSULE ORAL at 21:00

## 2017-11-02 RX ADMIN — METOPROLOL TARTRATE 12.5 MG: 25 TABLET, FILM COATED ORAL at 21:00

## 2017-11-02 RX ADMIN — HYDRALAZINE HYDROCHLORIDE 100 MG: 50 TABLET ORAL at 05:40

## 2017-11-02 ASSESSMENT — LIFESTYLE VARIABLES: DO YOU DRINK ALCOHOL: NO

## 2017-11-02 ASSESSMENT — ENCOUNTER SYMPTOMS
ABDOMINAL PAIN: 0
DIZZINESS: 0
MYALGIAS: 0
NERVOUS/ANXIOUS: 0
FEVER: 0
SHORTNESS OF BREATH: 0
PALPITATIONS: 0
COUGH: 0
WEAKNESS: 1
NAUSEA: 0
LOSS OF CONSCIOUSNESS: 0
CHILLS: 0

## 2017-11-02 ASSESSMENT — PAIN SCALES - GENERAL
PAINLEVEL_OUTOF10: 0

## 2017-11-02 NOTE — CARE PLAN
"Problem: Nutritional:  Goal: Achieve adequate nutritional intake  Patient will consume >50% of meals   Outcome: PROGRESSING AS EXPECTED  Patient continues to eat well intermittently and continues to skip some meals.  Weight stable.  Patient not that interested in food.  She had many snacks at bedside which she said she will not eat. She stated she might drink a milkshake occasionally but \"wasn't making any promises.\"  Nutrition Representative will continue to see her for ongoing meal and snack preferences.        "

## 2017-11-02 NOTE — CARE PLAN
"Problem: Mobility  Goal: Risk for activity intolerance will decrease  Outcome: PROGRESSING AS EXPECTED  Encouraged pt to balance rest with activity.  RN stated intention to get pt up at least 3x today to chair or walking.  Pt seemed copasetic but also stated she \"is lazy\"    Problem: Psychosocial Needs:  Goal: Level of anxiety will decrease  Outcome: PROGRESSING AS EXPECTED  Pt states she has s/s of anxiety but could not remember what it was called.   Will provide emotional support and interventions PRN for pt.        "

## 2017-11-02 NOTE — PROGRESS NOTES
Simona Knight Fall Risk Assessment:     Last Known Fall: Within the last six months  Mobility: Immobilized/requires assist of one person  Medications: Cardiovascular or central nervous system meds  Mental Status/LOC/Awareness: Oriented to person and place  Toileting Needs: Incontinence  Volume/Electrolyte Status: No problems  Communication/Sensory: Visual (Glasses)/hearing deficit  Behavior: Appropriate behavior  Simona Knight Fall Risk Total: 13  Fall Risk Level: MODERATE RISK    Universal Fall Precautions:  call light/belongings in reach, bed in low position and locked, wheelchairs and assistive devices out of sight, siderails up x 2, use non-slip footwear, adequate lighting, clutter free and spill free environment, educate on level of risk, educate to call for assistance    Fall Risk Level Interventions:    TRIAL ([facility] [facility] 8, NEURO, MED JENNIE 5) Moderate Fall Risk Interventions  Place yellow fall risk ID band on patient: verified  Provide patient/family education based on risk assessment : completed  Educate patient/family to call staff for assistance when getting out of bed: completed  Place fall precaution signage outside patient door: verified  Utilize bed/chair fall alarm: verifiedTRIAL ([facility] 8, NEURO, [employer] JENNIE 5) High Fall Risk Interventions  Place yellow fall risk ID band on patient: verified  Provide patient/family education based on risk assessment: completed  Educate patient/family to call staff for assistance when getting out of bed: completed  Place fall precaution signage outside patient door: completed  Place patient in room close to nursing station: currently not available/charge notified  Utilize bed/chair fall alarm: verified  Notify charge of high risk for huddle: completed    Patient Specific Interventions:     Medication: review medications with patient and family  Mental Status/LOC/Awareness: reorient patient, reinforce falls education, check on patient hourly, utilize bed/chair fall alarm and  reinforce the use of call light  Toileting: instruct male patients prone to dizziness to void while sitting  Volume/Electrolyte Status: ensure patient remains hydrated  Communication/Sensory: update plan of care on whiteboard and ensure proper positioning when transferrng/ambulating  Behavioral: encourage patient to voice feelings and administer medication as ordered  Mobility: utilize bed/chair fall alarm and ensure bed is locked and in lowest position

## 2017-11-02 NOTE — PROGRESS NOTES
Simona Knight Fall Risk Assessment:     Last Known Fall: Within the last six months  Mobility: Immobilized/requires assist of one person  Medications: Cardiovascular or central nervous system meds  Mental Status/LOC/Awareness: Oriented to person and place  Toileting Needs: Incontinence  Volume/Electrolyte Status: No problems  Communication/Sensory: Visual (Glasses)/hearing deficit  Behavior: Appropriate behavior  Simona Knight Fall Risk Total: 13  Fall Risk Level: MODERATE RISK    Universal Fall Precautions:  call light/belongings in reach, bed in low position and locked, wheelchairs and assistive devices out of sight, siderails up x 2, use non-slip footwear, adequate lighting, clutter free and spill free environment, educate on level of risk, educate to call for assistance    Fall Risk Level Interventions:    TRIAL ([facility] 8, NEURO, MED JENNIE 5) Moderate Fall Risk Interventions  Place yellow fall risk ID band on patient: verified  Provide patient/family education based on risk assessment : completed  Educate patient/family to call staff for assistance when getting out of bed: completed  Place fall precaution signage outside patient door: verified  Utilize bed/chair fall alarm: verifiedTRIAL ([employer] 8, NEURO, [employer] JENNIE 5) High Fall Risk Interventions  Place yellow fall risk ID band on patient: verified  Provide patient/family education based on risk assessment: completed  Educate patient/family to call staff for assistance when getting out of bed: completed  Place fall precaution signage outside patient door: completed  Place patient in room close to nursing station: currently not available/charge notified  Utilize bed/chair fall alarm: verified  Notify charge of high risk for huddle: completed    Patient Specific Interventions:     Medication: review medications with patient and family, assess for medications that can be discontinued or dosage decreased and limit combination of prn medications  Mental  Status/LOC/Awareness: reorient patient, reinforce falls education, check on patient hourly, utilize bed/chair fall alarm, reinforce the use of call light and provide activity  Toileting: consider obtaining elevated toilet seat or bedside commode (BSC), provide frquent toileting, monitor intake and output/use of appropriate interventions, instruct patient/family on the need to call for assistance when toileting and do not leave patient unattended in bathroom/refer to toileting scripting  Volume/Electrolyte Status: ensure patient remains hydrated and monitor abnormal lab values  Communication/Sensory: update plan of care on whiteboard, ensure proper positioning when transferrng/ambulating and ensure patient has glasses/contacts and hearing aids/dentures  Behavioral: encourage patient to voice feelings, engage patient in daily activities, administer medication as ordered and instruct/reinforce fall program rationale  Mobility: schedule physical activity throughout the day, utilize bed/chair fall alarm, ensure bed is locked and in lowest position, provide appropriate assistive device, instruct patient to exit bed on their strongest side and collaborate with doctor for possible PT/OT consult

## 2017-11-02 NOTE — CARE PLAN
Problem: Discharge Barriers/Planning  Goal: Patient's continuum of care needs will be met  Pending guardianship, court date not set.     Problem: Mobility  Goal: Risk for activity intolerance will decrease  Patient declining mobility. Patient educated on importance of getting out of bed and working with PT during day, and of potential risks of remaining immobile.     Problem: Skin Integrity  Goal: Risk for impaired skin integrity will decrease  Patients skin intact, turning self frequently.

## 2017-11-02 NOTE — PROGRESS NOTES
Received report, assumed care of pt at 1900. Pt in bed, fall precautions in place, bed alarm on, call light within reach. Pt instructed to use call light when assistance is needed. RN and CNA numbers provided. VSS. Hourly rounding in place, will continue to monitor pt closely.

## 2017-11-02 NOTE — PROGRESS NOTES
Assumed care of patient @ 0700, report received at bedside,  assessment done, labs and orders noted.  Pt A & Ox2-3, somewhat disoriented to time and situation, No PIV, ok per MD .  Pt is resting comfortably in bed, no signs or symptoms of distress.  Pt reports pain is a 0/10.  Pt has been updated on the plan of care.    Bed is in lowest position, fall risk socks in place, call light within reach. Pt verbalized all needs are met at this time.

## 2017-11-02 NOTE — PROGRESS NOTES
[facility] Progress Note    Date of Service: 2017    Chief Complaint  76 y.o. female admitted 2017 with AMS and UTI.    Interval Problem Update  Pleasant    Encouraged participation with   PEP therapy    Consultants/Specialty  Neurology    Disposition  Pending guardianship and placement.   assisting  No court date yet        Review of Systems   Constitutional: Negative for chills and fever.   HENT: Negative for hearing loss.    Respiratory: Negative for cough and shortness of breath.    Cardiovascular: Negative for chest pain and palpitations.   Gastrointestinal: Negative for abdominal pain and nausea.   Musculoskeletal: Negative for myalgias.   Neurological: Positive for weakness. Negative for dizziness and loss of consciousness.   Psychiatric/Behavioral: The patient is not nervous/anxious.       Physical Exam  Laboratory/Imaging   Hemodynamics  Temp (24hrs), Av.2 °C (97.1 °F), Min:35.9 °C (96.7 °F), Max:36.4 °C (97.5 °F)   Temperature: 36.1 °C (97 °F)  Pulse  Av.2  Min: 50  Max: 99    Blood Pressure : 126/74      Respiratory      Respiration: 16, Pulse Oximetry: 93 %        RUL Breath Sounds: Clear, RML Breath Sounds: Diminished, RLL Breath Sounds: Diminished, ARGELIA Breath Sounds: Clear, LLL Breath Sounds: Diminished    Fluids    Intake/Output Summary (Last 24 hours) at 17 1458  Last data filed at 17 1502   Gross per 24 hour   Intake              140 ml   Output                0 ml   Net              140 ml       Nutrition  Orders Placed This Encounter   Procedures   • Diet Order     Standing Status:   Standing     Number of Occurrences:   1     Order Specific Question:   Diet:     Answer:   Regular [1]     Physical Exam   Constitutional: She appears well-developed. No distress.   HENT:   Head: Normocephalic and atraumatic.   Mouth/Throat: No oropharyngeal exudate.   Eyes: EOM are normal. Pupils are equal, round, and reactive to light.   Neck: Normal range of  motion.   Cardiovascular: Normal rate and regular rhythm.    Pulmonary/Chest: Effort normal.   Abdominal: Soft. She exhibits no distension.   Musculoskeletal: She exhibits no edema.   Neurological: She is alert.   AOx2   Skin: Skin is warm.   Psychiatric: She has a normal mood and affect. Her behavior is normal. Cognition and memory are impaired.   Pleasant   Vitals reviewed.                               Assessment/Plan     * Dementia- (present on admission)   Assessment & Plan    MRI of her brain showed enlarged ventricles but no acute infarct. Significant concern for NPH given her dementia, incontinence, and gait instability. Psychiatry deemed patient incapacitated.   - Neuro evaluated and signed off as she refused any intervention  - Pending Guardianship  - no court date as of yet        HTN (hypertension)- (present on admission)   Assessment & Plan    Previously difficult to control, now normotensive to hypotensive.   - Continue Hydralazine, metoprolol, Lotensin   - discontinued Norvasc        History of CVA (cerebrovascular accident)- (present on admission)   Assessment & Plan    MRI on 9/23 showed no evidence of acute stroke. Prominent ventricles, as noted above.  - Continue ASA and statin  - neuro evaluated and signed off  - to chair with meals  - PT/OT, last seen 10/25        Hypokalemia- (present on admission)   Assessment & Plan    K 3.5 on 10/28 responds appropriately to KDur  cont        T12 compression fracture (CMS-HCC)- (present on admission)   Assessment & Plan    CT spine, no acute fractures. No complaints of pain.            Reviewed items::  Labs reviewed and Medications reviewed  Pink catheter::  No Pink  DVT prophylaxis pharmacological::  Heparin  Ulcer Prophylaxis::  Yes

## 2017-11-02 NOTE — PROGRESS NOTES
Assumed care of patient @ 0700, report received at bedside,  assessment done, labs and orders noted.  Pt A & Ox4, PIV saline locked and patent.  Pt is resting comfortably in bed, no signs or symptoms of distress.  Quite labile and hyperactive this morning.  A lot of singing, conversations tangental and somewhat inappropriate, but not overtly.  Pt reports pain is a 0/10.  Pt has been updated on the plan of care.    Bed is in lowest position, fall risk socks in place, call light within reach. Pt verbalized all needs are met at this time.

## 2017-11-03 PROCEDURE — 770006 HCHG ROOM/CARE - MED/SURG/GYN SEMI*

## 2017-11-03 PROCEDURE — 97110 THERAPEUTIC EXERCISES: CPT

## 2017-11-03 PROCEDURE — 700111 HCHG RX REV CODE 636 W/ 250 OVERRIDE (IP): Performed by: HOSPITALIST

## 2017-11-03 PROCEDURE — A9270 NON-COVERED ITEM OR SERVICE: HCPCS | Performed by: HOSPITALIST

## 2017-11-03 PROCEDURE — A9270 NON-COVERED ITEM OR SERVICE: HCPCS | Performed by: FAMILY MEDICINE

## 2017-11-03 PROCEDURE — 97116 GAIT TRAINING THERAPY: CPT

## 2017-11-03 PROCEDURE — 99231 SBSQ HOSP IP/OBS SF/LOW 25: CPT | Performed by: INTERNAL MEDICINE

## 2017-11-03 PROCEDURE — 700101 HCHG RX REV CODE 250: Performed by: FAMILY MEDICINE

## 2017-11-03 PROCEDURE — 700102 HCHG RX REV CODE 250 W/ 637 OVERRIDE(OP): Performed by: HOSPITALIST

## 2017-11-03 PROCEDURE — 700102 HCHG RX REV CODE 250 W/ 637 OVERRIDE(OP): Performed by: FAMILY MEDICINE

## 2017-11-03 RX ADMIN — HEPARIN SODIUM 5000 UNITS: 5000 INJECTION, SOLUTION INTRAVENOUS; SUBCUTANEOUS at 08:21

## 2017-11-03 RX ADMIN — HEPARIN SODIUM 5000 UNITS: 5000 INJECTION, SOLUTION INTRAVENOUS; SUBCUTANEOUS at 21:11

## 2017-11-03 RX ADMIN — OXYBUTYNIN CHLORIDE 5 MG: 5 TABLET, FILM COATED, EXTENDED RELEASE ORAL at 21:02

## 2017-11-03 RX ADMIN — HYDRALAZINE HYDROCHLORIDE 100 MG: 50 TABLET ORAL at 05:38

## 2017-11-03 RX ADMIN — ATORVASTATIN CALCIUM 80 MG: 40 TABLET, FILM COATED ORAL at 21:01

## 2017-11-03 RX ADMIN — GABAPENTIN 300 MG: 300 CAPSULE ORAL at 21:01

## 2017-11-03 RX ADMIN — DULOXETINE HYDROCHLORIDE 20 MG: 20 CAPSULE, DELAYED RELEASE ORAL at 08:21

## 2017-11-03 RX ADMIN — HYDRALAZINE HYDROCHLORIDE 100 MG: 50 TABLET ORAL at 21:01

## 2017-11-03 RX ADMIN — LIDOCAINE 1 PATCH: 50 PATCH CUTANEOUS at 08:27

## 2017-11-03 RX ADMIN — HYDRALAZINE HYDROCHLORIDE 100 MG: 50 TABLET ORAL at 15:05

## 2017-11-03 RX ADMIN — BENAZEPRIL HYDROCHLORIDE 40 MG: 20 TABLET ORAL at 08:21

## 2017-11-03 RX ADMIN — METOPROLOL TARTRATE 12.5 MG: 25 TABLET, FILM COATED ORAL at 08:21

## 2017-11-03 RX ADMIN — STANDARDIZED SENNA CONCENTRATE AND DOCUSATE SODIUM 2 TABLET: 8.6; 5 TABLET, FILM COATED ORAL at 21:01

## 2017-11-03 RX ADMIN — ASPIRIN 81 MG: 81 TABLET, COATED ORAL at 08:21

## 2017-11-03 RX ADMIN — OMEPRAZOLE 20 MG: 20 CAPSULE, DELAYED RELEASE ORAL at 08:21

## 2017-11-03 RX ADMIN — STANDARDIZED SENNA CONCENTRATE AND DOCUSATE SODIUM 2 TABLET: 8.6; 5 TABLET, FILM COATED ORAL at 08:21

## 2017-11-03 ASSESSMENT — LIFESTYLE VARIABLES: DO YOU DRINK ALCOHOL: NO

## 2017-11-03 ASSESSMENT — GAIT ASSESSMENTS
DEVIATION: BRADYKINETIC;SHUFFLED GAIT
GAIT LEVEL OF ASSIST: MINIMAL ASSIST
DISTANCE (FEET): 125

## 2017-11-03 ASSESSMENT — ENCOUNTER SYMPTOMS
SHORTNESS OF BREATH: 0
COUGH: 0
WEAKNESS: 1
HEADACHES: 0
ABDOMINAL PAIN: 0
MEMORY LOSS: 1
MYALGIAS: 0
PALPITATIONS: 0
LOSS OF CONSCIOUSNESS: 0

## 2017-11-03 ASSESSMENT — COGNITIVE AND FUNCTIONAL STATUS - GENERAL
CLIMB 3 TO 5 STEPS WITH RAILING: A LOT
STANDING UP FROM CHAIR USING ARMS: A LITTLE
TURNING FROM BACK TO SIDE WHILE IN FLAT BAD: A LITTLE
MOVING TO AND FROM BED TO CHAIR: A LITTLE
WALKING IN HOSPITAL ROOM: A LITTLE
SUGGESTED CMS G CODE MODIFIER MOBILITY: CK
MOVING FROM LYING ON BACK TO SITTING ON SIDE OF FLAT BED: A LITTLE
MOBILITY SCORE: 17

## 2017-11-03 ASSESSMENT — PAIN SCALES - GENERAL
PAINLEVEL_OUTOF10: 0

## 2017-11-03 NOTE — THERAPY
"Physical Therapy Treatment completed.   Bed Mobility:  Supine to Sit:  (Up with RN)  Transfers: Sit to Stand: Contact Guard Assist  Gait: Level Of Assist: Minimal Assist with Hand Held Assist       Plan of Care: Will benefit from Physical Therapy 2 times per week and Plan to complete next treatment by Wednesday 11/8  Discharge Recommendations: Equipment: Will Continue to Assess for Equipment Needs. Post-acute therapy Discharge to a transitional care facility for continued skilled therapy services.    Pt's mobility continues to be limited by generalized weakness, fatigue, decreased balance and gait deviations. Pt found up in hallway with RN, ambulating without use of AD. Pt required minimal assist with hand held assist to ambulate in hallway. Pt with increased instability without use of FWW. In addition to relying on HHA, reaching for rails in hallway to stabilize self. PT participated in seated and standing therapeutic exercises. Pt would benefit from ongoing PT intervention while in the acute care setting to address the listed deficits and improve mobility prior to DC. Pt will require 24/7 care/supervision upon DC given deficits with mobility and cognition    See \"Rehab Therapy-Acute\" Patient Summary Report for complete documentation.       "

## 2017-11-03 NOTE — PROGRESS NOTES
Assumed care of patient @ 0700, report received at bedside,  assessment done, labs and orders noted. Nothing new overnight. Pt A & Ox2-3, disoriented to event and disoriented to time, loosely, still knows year. No PIV, ok per MD.  Pt is resting comfortably in bed, no signs or symptoms of distress.  Pt reports pain is a 0/10.  Pt has been updated on the plan of care.    Bed alarm is on, bed is in lowest position, fall risk socks in place, call light within reach. Pt verbalized all needs are met at this time.

## 2017-11-03 NOTE — PROGRESS NOTES
Patient oriented to self and place. Reoriented to time and event by staff. Denies pain, tolerated medications by mouth with water. Patient incontinent of urine this evening, incontinence care provided by staff. Updated on POC, reinforcement as needed. Bed alarm on, call light in reach, rounding implemented.

## 2017-11-03 NOTE — CARE PLAN
Problem: Discharge Barriers/Planning  Goal: Patient's continuum of care needs will be met  Outcome: PROGRESSING AS EXPECTED  Awaiting guardianship. SW involved.    Problem: Skin Integrity  Goal: Risk for impaired skin integrity will decrease  Outcome: PROGRESSING AS EXPECTED  Skin assessed, pressure points intact/blanchanble. Patient reminded to turn and mobilize. Incontinence care by staff as needed to keep skin dry.

## 2017-11-03 NOTE — PROGRESS NOTES
Simona Knight Fall Risk Assessment:     Last Known Fall: Within the last six months  Mobility: Immobilized/requires assist of one person  Medications: Cardiovascular and central nervous system meds  Mental Status/LOC/Awareness: Oriented to person and place  Toileting Needs: Incontinence  Volume/Electrolyte Status: No problems  Communication/Sensory: Visual (Glasses)/hearing deficit  Behavior: Appropriate behavior  Simona Knight Fall Risk Total: 14  Fall Risk Level: MODERATE RISK    Universal Fall Precautions:  call light/belongings in reach, bed in low position and locked, wheelchairs and assistive devices out of sight, siderails up x 2, use non-slip footwear, adequate lighting, clutter free and spill free environment, educate on level of risk, educate to call for assistance    Fall Risk Level Interventions:    TRIAL ([email or web address] 8, NEURO, MED JENNIE 5) Moderate Fall Risk Interventions  Place yellow fall risk ID band on patient: verified  Provide patient/family education based on risk assessment : completed  Educate patient/family to call staff for assistance when getting out of bed: completed  Place fall precaution signage outside patient door: verified  Utilize bed/chair fall alarm: verifiedTRIAL ([email or web address] 8, NEURO, [employer] JENNIE 5) High Fall Risk Interventions  Place yellow fall risk ID band on patient: verified  Provide patient/family education based on risk assessment: completed  Educate patient/family to call staff for assistance when getting out of bed: completed  Place fall precaution signage outside patient door: completed  Place patient in room close to nursing station: currently not available/charge notified  Utilize bed/chair fall alarm: verified  Notify charge of high risk for huddle: completed    Patient Specific Interventions:     Medication: review medications with patient and family and limit combination of prn medications  Mental Status/LOC/Awareness: reorient patient, reinforce falls education, check on patient  hourly, utilize bed/chair fall alarm, reinforce the use of call light and provide activity  Toileting: provide frquent toileting, instruct patient/family on the use of grab bars, instruct patient/family on the need to call for assistance when toileting and do not leave patient unattended in bathroom/refer to toileting scripting  Volume/Electrolyte Status: not applicable  Communication/Sensory: update plan of care on whiteboard, ensure proper positioning when transferrng/ambulating, ensure patient has glasses/contacts and hearing aids/dentures and for visually impaired patients orient to their room surrounding and do not change their surroundings  Behavioral: encourage patient to voice feelings, engage patient in daily activities, administer medication as ordered and instruct/reinforce fall program rationale  Mobility: schedule physical activity throughout the day, utilize bed/chair fall alarm, ensure bed is locked and in lowest position and provide appropriate assistive device

## 2017-11-03 NOTE — DISCHARGE PLANNING
The guardianship papers were faxed on 10/13 and according to what Sarah Beth at [facility] office told me yesterday, the next available court date could be toward the end of December or early January.

## 2017-11-03 NOTE — PROGRESS NOTES
[facility]    Date of Service: 11/3/2017    Chief Complaint  76 y.o. female admitted 2017 with AMS and UTI.    Interval Problem Update  Resting, arousable    Patient not interested in participation with therapy    Consultants/Specialty  Neurology    Disposition  Pending guardianship and placement.    assisting  No court date yet, Not till late December or January        Review of Systems   HENT: Negative for hearing loss.    Respiratory: Negative for cough and shortness of breath.    Cardiovascular: Negative for palpitations.   Gastrointestinal: Negative for abdominal pain.   Musculoskeletal: Negative for myalgias.   Neurological: Positive for weakness. Negative for loss of consciousness and headaches.   Psychiatric/Behavioral: Positive for memory loss.      Physical Exam  Laboratory/Imaging   Hemodynamics  Temp (24hrs), Av.3 °C (97.3 °F), Min:36.1 °C (96.9 °F), Max:36.7 °C (98.1 °F)   Temperature: 36.2 °C (97.1 °F)  Pulse  Av.1  Min: 50  Max: 99    Blood Pressure : 137/70      Respiratory      Respiration: 18, Pulse Oximetry: 91 %        RUL Breath Sounds: Clear, RML Breath Sounds: Diminished, RLL Breath Sounds: Diminished, ARGELIA Breath Sounds: Clear, LLL Breath Sounds: Diminished    Fluids    Intake/Output Summary (Last 24 hours) at 17 1440  Last data filed at 17 0600   Gross per 24 hour   Intake               60 ml   Output                0 ml   Net               60 ml       Nutrition  Orders Placed This Encounter   Procedures   • Diet Order     Standing Status:   Standing     Number of Occurrences:   1     Order Specific Question:   Diet:     Answer:   Regular [1]     Physical Exam   Constitutional: No distress.   HENT:   Head: Normocephalic.   Mouth/Throat: No oropharyngeal exudate.   Eyes: EOM are normal. Pupils are equal, round, and reactive to light. No scleral icterus.   Neck: Normal range of motion. No JVD present.   Cardiovascular: Normal rate,  regular rhythm and intact distal pulses.    Pulmonary/Chest: Effort normal. No respiratory distress.   Abdominal: Soft. She exhibits no distension.   Musculoskeletal: She exhibits no tenderness.   Neurological: She is alert.   AOx2   Skin: Skin is warm. She is not diaphoretic.   Psychiatric: She has a normal mood and affect. Her behavior is normal. Thought content normal. Cognition and memory are impaired.   Pleasant   Vitals reviewed.                               Assessment/Plan     * Dementia- (present on admission)   Assessment & Plan    MRI of her brain showed enlarged ventricles but no acute infarct. Significant concern for NPH given her dementia, incontinence, and gait instability. Psychiatry deemed patient incapacitated.   - Neuro evaluated and signed off as she refused any intervention  - Pending Guardianship  - no court date as of yet, as per  court date will not be scheduled till late December early January        HTN (hypertension)- (present on admission)   Assessment & Plan    Previously difficult to control, now normotensive to hypotensive.   - Continue Hydralazine, metoprolol, Lotensin   - discontinued Norvasc        History of CVA (cerebrovascular accident)- (present on admission)   Assessment & Plan    MRI on 9/23 showed no evidence of acute stroke. Prominent ventricles, as noted above.  - Continue ASA and statin  - neuro evaluated and signed off  - to chair with meals  - PT/OT, encourage activity        Hypokalemia- (present on admission)   Assessment & Plan    K 3.5 on 10/28 responds appropriately to KDur  cont        T12 compression fracture (CMS-HCC)- (present on admission)   Assessment & Plan    CT spine, no acute fractures. No complaints of pain.            Reviewed items::  Labs reviewed and Medications reviewed  Pink catheter::  No Pink  DVT prophylaxis pharmacological::  Heparin  Ulcer Prophylaxis::  Yes

## 2017-11-03 NOTE — CARE PLAN
"Problem: Pain Management  Goal: Pain level will decrease to patient's comfort goal  Outcome: PROGRESSING AS EXPECTED  Pt has no complaints of pain.     Problem: Urinary Elimination:  Goal: Ability to reestablish a normal urinary elimination pattern will improve  Outcome: PROGRESSING AS EXPECTED  Hourly rounding in place, addressing toileting needs Q2, pt still incontinent at times, despite efforts to encourage her to use toilet.  Pt states \"she is lazy\" and sometimes prefers to just void in the bed.       "

## 2017-11-04 PROCEDURE — 700102 HCHG RX REV CODE 250 W/ 637 OVERRIDE(OP): Performed by: FAMILY MEDICINE

## 2017-11-04 PROCEDURE — 700102 HCHG RX REV CODE 250 W/ 637 OVERRIDE(OP): Performed by: HOSPITALIST

## 2017-11-04 PROCEDURE — 700101 HCHG RX REV CODE 250: Performed by: FAMILY MEDICINE

## 2017-11-04 PROCEDURE — A9270 NON-COVERED ITEM OR SERVICE: HCPCS | Performed by: HOSPITALIST

## 2017-11-04 PROCEDURE — 700111 HCHG RX REV CODE 636 W/ 250 OVERRIDE (IP): Performed by: HOSPITALIST

## 2017-11-04 PROCEDURE — A9270 NON-COVERED ITEM OR SERVICE: HCPCS | Performed by: FAMILY MEDICINE

## 2017-11-04 PROCEDURE — 770006 HCHG ROOM/CARE - MED/SURG/GYN SEMI*

## 2017-11-04 PROCEDURE — 99231 SBSQ HOSP IP/OBS SF/LOW 25: CPT | Performed by: INTERNAL MEDICINE

## 2017-11-04 RX ADMIN — OXYBUTYNIN CHLORIDE 5 MG: 5 TABLET, FILM COATED, EXTENDED RELEASE ORAL at 21:42

## 2017-11-04 RX ADMIN — HEPARIN SODIUM 5000 UNITS: 5000 INJECTION, SOLUTION INTRAVENOUS; SUBCUTANEOUS at 21:39

## 2017-11-04 RX ADMIN — ASPIRIN 81 MG: 81 TABLET, COATED ORAL at 10:18

## 2017-11-04 RX ADMIN — GABAPENTIN 300 MG: 300 CAPSULE ORAL at 21:38

## 2017-11-04 RX ADMIN — HYDRALAZINE HYDROCHLORIDE 100 MG: 50 TABLET ORAL at 21:38

## 2017-11-04 RX ADMIN — DULOXETINE HYDROCHLORIDE 20 MG: 20 CAPSULE, DELAYED RELEASE ORAL at 10:18

## 2017-11-04 RX ADMIN — HEPARIN SODIUM 5000 UNITS: 5000 INJECTION, SOLUTION INTRAVENOUS; SUBCUTANEOUS at 10:18

## 2017-11-04 RX ADMIN — STANDARDIZED SENNA CONCENTRATE AND DOCUSATE SODIUM 2 TABLET: 8.6; 5 TABLET, FILM COATED ORAL at 10:18

## 2017-11-04 RX ADMIN — BENAZEPRIL HYDROCHLORIDE 40 MG: 20 TABLET ORAL at 10:18

## 2017-11-04 RX ADMIN — METOPROLOL TARTRATE 12.5 MG: 25 TABLET, FILM COATED ORAL at 21:38

## 2017-11-04 RX ADMIN — HYDRALAZINE HYDROCHLORIDE 100 MG: 50 TABLET ORAL at 15:27

## 2017-11-04 RX ADMIN — ATORVASTATIN CALCIUM 80 MG: 40 TABLET, FILM COATED ORAL at 21:38

## 2017-11-04 RX ADMIN — OMEPRAZOLE 20 MG: 20 CAPSULE, DELAYED RELEASE ORAL at 10:18

## 2017-11-04 RX ADMIN — HYDRALAZINE HYDROCHLORIDE 100 MG: 50 TABLET ORAL at 05:26

## 2017-11-04 RX ADMIN — LIDOCAINE 1 PATCH: 50 PATCH CUTANEOUS at 10:17

## 2017-11-04 RX ADMIN — METOPROLOL TARTRATE 12.5 MG: 25 TABLET, FILM COATED ORAL at 10:18

## 2017-11-04 ASSESSMENT — ENCOUNTER SYMPTOMS
SHORTNESS OF BREATH: 0
MYALGIAS: 0
MEMORY LOSS: 1
NERVOUS/ANXIOUS: 0
DIZZINESS: 0
LOSS OF CONSCIOUSNESS: 0
NECK PAIN: 0
WEAKNESS: 1
PALPITATIONS: 0

## 2017-11-04 ASSESSMENT — LIFESTYLE VARIABLES: DO YOU DRINK ALCOHOL: NO

## 2017-11-04 ASSESSMENT — PAIN SCALES - GENERAL
PAINLEVEL_OUTOF10: 0

## 2017-11-04 NOTE — CARE PLAN
Problem: Pain Management  Goal: Pain level will decrease to patient's comfort goal    Intervention: Follow pain managment plan developed in collaboration with patient and Interdisciplinary Team  Back pain managed with scheduled lidocaine patch       Problem: Psychosocial Needs:  Goal: Level of anxiety will decrease    Intervention: Identify and develop with patient strategies to cope with anxiety triggers  PEP program discussed with patient, pt still refusing

## 2017-11-04 NOTE — PROGRESS NOTES
Assumed care of patient @ 0700, report received at bedside,  assessment done, labs and orders noted.  Pt A & Ox2, disoriented to time and event loosely, No PIV .  Pt is resting comfortably in bed, no signs or symptoms of distress.  Pt reports pain is a 0/10.  Pt has been updated on the plan of care.    Bed alarm is on, bed is in lowest position, fall risk socks in place, call light within reach. Pt verbalized all needs are met at this time.

## 2017-11-04 NOTE — PROGRESS NOTES
[facility] Hospitalist Progress Note    Date of Service: 2017    Chief Complaint  76 y.o. female admitted 2017 with AMS and UTI.    Interval Problem Update  No new events  Tolerating diet    Consultants/Specialty  Neurology    Disposition  Pending guardianship and placement.    assisting  No court date yet, Not till late December or January        Review of Systems   HENT: Negative for hearing loss.    Respiratory: Negative for shortness of breath.    Cardiovascular: Negative for chest pain and palpitations.   Musculoskeletal: Negative for myalgias and neck pain.   Neurological: Positive for weakness. Negative for dizziness and loss of consciousness.   Psychiatric/Behavioral: Positive for memory loss. The patient is not nervous/anxious.       Physical Exam  Laboratory/Imaging   Hemodynamics  Temp (24hrs), Av.4 °C (97.6 °F), Min:36.1 °C (97 °F), Max:36.7 °C (98 °F)   Temperature: 36.1 °C (97 °F)  Pulse  Av.1  Min: 50  Max: 99    Blood Pressure : 144/67      Respiratory      Respiration: 16, Pulse Oximetry: 91 %        RML Breath Sounds: Diminished, RLL Breath Sounds: Diminished, LLL Breath Sounds: Diminished    Fluids  No intake or output data in the 24 hours ending 17 0912    Nutrition  Orders Placed This Encounter   Procedures   • Diet Order     Standing Status:   Standing     Number of Occurrences:   1     Order Specific Question:   Diet:     Answer:   Regular [1]     Physical Exam   Constitutional: No distress.   HENT:   Head: Normocephalic and atraumatic.   Eyes: EOM are normal. Pupils are equal, round, and reactive to light.   Neck: Normal range of motion.   Cardiovascular: Normal rate, regular rhythm and intact distal pulses.    Pulmonary/Chest: Effort normal. No respiratory distress.   Abdominal: Soft. She exhibits no distension.   Musculoskeletal: She exhibits no tenderness.   Neurological: She is alert. No cranial nerve deficit.   AOx2   Skin: Skin is warm.   Psychiatric:  She has a normal mood and affect. Her behavior is normal. Judgment and thought content normal. Cognition and memory are impaired.   Pleasant   Vitals reviewed.                               Assessment/Plan     * Dementia- (present on admission)   Assessment & Plan    MRI of her brain showed enlarged ventricles but no acute infarct. Significant concern for NPH given her dementia, incontinence, and gait instability. Psychiatry deemed patient incapacitated.   - Neuro evaluated and signed off as she refused any intervention  - Pending Guardianship  - no court date as of yet, as per  court date will not be scheduled till late December early January        HTN (hypertension)- (present on admission)   Assessment & Plan    Previously difficult to control, now normotensive to hypotensive.   - Continue Hydralazine, metoprolol, Lotensin   - discontinued Norvasc        History of CVA (cerebrovascular accident)- (present on admission)   Assessment & Plan    MRI on 9/23 showed no evidence of acute stroke. Prominent ventricles, as noted above.  - Continue ASA and statin  - neuro evaluated and signed off  - to chair with meals  - PT/OT, encourage activity, uses frontwheel walker, and unstable without        Hypokalemia- (present on admission)   Assessment & Plan    K 3.5 on 10/28 responds appropriately to KDur  cont        T12 compression fracture (CMS-HCC)- (present on admission)   Assessment & Plan    CT spine, no acute fractures. No complaints of pain.            Reviewed items::  Labs reviewed and Medications reviewed  Pink catheter::  No Pink  DVT prophylaxis pharmacological::  Heparin  Ulcer Prophylaxis::  Yes

## 2017-11-04 NOTE — PROGRESS NOTES
Report received. Assumed care, assessment complete. Pt is A & O x 2.  Pt denies having any pain at this time. Fall precautions and appropriate signs in place. Pt oriented to unit routine, call light/phone system and RN extension number provided. Pt educated regarding fall precautions. Bed alarm in use. Pt denies any additional needs at this time. Call light within reach.

## 2017-11-04 NOTE — PROGRESS NOTES
Simona Knight Fall Risk Assessment:     Last Known Fall: Within the last six months  Mobility: Immobilized/requires assist of one person  Medications: Cardiovascular or central nervous system meds  Mental Status/LOC/Awareness: Oriented to person and place  Toileting Needs: Incontinence  Volume/Electrolyte Status: No problems  Communication/Sensory: Visual (Glasses)/hearing deficit  Behavior: Depression/anxiety  Simona Knight Fall Risk Total: 14  Fall Risk Level: MODERATE RISK    Universal Fall Precautions:  call light/belongings in reach, bed in low position and locked, siderails up x 2, use non-slip footwear, adequate lighting, clutter free and spill free environment, educate on level of risk, educate to call for assistance    Fall Risk Level Interventions:    TRIAL ([facility] 8, NEURO, MED JENNIE 5) Moderate Fall Risk Interventions  Place yellow fall risk ID band on patient: verified  Provide patient/family education based on risk assessment : completed  Educate patient/family to call staff for assistance when getting out of bed: completed  Place fall precaution signage outside patient door: verified  Utilize bed/chair fall alarm: verifiedTRIAL (TELE 8, NEURO, [employer] JENNIE 5) High Fall Risk Interventions  Place yellow fall risk ID band on patient: verified  Provide patient/family education based on risk assessment: completed  Educate patient/family to call staff for assistance when getting out of bed: completed  Place fall precaution signage outside patient door: completed  Place patient in room close to nursing station: currently not available/charge notified  Utilize bed/chair fall alarm: verified  Notify charge of high risk for huddle: completed    Patient Specific Interventions:     Medication: review medications with patient and family, assess for medications that can be discontinued or dosage decreased and limit combination of prn medications  Mental Status/LOC/Awareness: reorient patient, reinforce falls education,  check on patient hourly, utilize bed/chair fall alarm, reinforce the use of call light and provide activity  Toileting: provide frquent toileting, monitor intake and output/use of appropriate interventions, instruct patient/family on the use of grab bars, instruct patient/family on the need to call for assistance when toileting and do not leave patient unattended in bathroom/refer to toileting scripting  Volume/Electrolyte Status: ensure patient remains hydrated and ensure IV fluids are appropriate  Communication/Sensory: update plan of care on whiteboard and ensure proper positioning when transferrng/ambulating  Behavioral: engage patient in daily activities, administer medication as ordered and instruct/reinforce fall program rationale  Mobility: schedule physical activity throughout the day, utilize bed/chair fall alarm, ensure bed is locked and in lowest position, provide appropriate assistive device, instruct patient to exit bed on their strongest side and collaborate with doctor for possible PT/OT consult

## 2017-11-04 NOTE — PROGRESS NOTES
Simona Knight Fall Risk Assessment:     Last Known Fall: Within the last six months  Mobility: Immobilized/requires assist of one person  Medications: Cardiovascular or central nervous system meds  Mental Status/LOC/Awareness: Oriented to person and place  Toileting Needs: Incontinence  Volume/Electrolyte Status: No problems  Communication/Sensory: Visual (Glasses)/hearing deficit  Behavior: Depression/anxiety  Simona Knight Fall Risk Total: 14  Fall Risk Level: MODERATE RISK    Universal Fall Precautions:  call light/belongings in reach, bed in low position and locked, siderails up x 2, use non-slip footwear, adequate lighting, clutter free and spill free environment, educate on level of risk, educate to call for assistance    Fall Risk Level Interventions:    TRIAL ([employer] 8, NEURO, MED JENNIE 5) Moderate Fall Risk Interventions  Place yellow fall risk ID band on patient: verified  Provide patient/family education based on risk assessment : completed  Educate patient/family to call staff for assistance when getting out of bed: completed  Place fall precaution signage outside patient door: verified  Utilize bed/chair fall alarm: verifiedTRIAL (TELE 8, NEURO, [employer] JENNIE 5) High Fall Risk Interventions  Place yellow fall risk ID band on patient: verified  Provide patient/family education based on risk assessment: completed  Educate patient/family to call staff for assistance when getting out of bed: completed  Place fall precaution signage outside patient door: completed  Place patient in room close to nursing station: currently not available/charge notified  Utilize bed/chair fall alarm: verified  Notify charge of high risk for huddle: completed    Patient Specific Interventions:     Medication: review medications with patient and family  Mental Status/LOC/Awareness: reorient patient, reinforce falls education, check on patient hourly, utilize bed/chair fall alarm, reinforce the use of call light and provide  activity  Toileting: provide frquent toileting  Volume/Electrolyte Status: ensure patient remains hydrated  Communication/Sensory: update plan of care on whiteboard  Behavioral: encourage patient to voice feelings and engage patient in daily activities  Mobility: schedule physical activity throughout the day, utilize bed/chair fall alarm and ensure bed is locked and in lowest position

## 2017-11-04 NOTE — CARE PLAN
Problem: Venous Thromboembolism (VTW)/Deep Vein Thrombosis (DVT) Prevention:  Goal: Patient will participate in Venous Thrombosis (VTE)/Deep Vein Thrombosis (DVT)Prevention Measures  Outcome: PROGRESSING AS EXPECTED  Pt anticoagulated with heparin as per MAR    Problem: Bowel/Gastric:  Goal: Normal bowel function is maintained or improved  Outcome: PROGRESSING AS EXPECTED  Pt having loose stools, will pass on to NOC RN to hold stool softener.

## 2017-11-05 PROCEDURE — 700102 HCHG RX REV CODE 250 W/ 637 OVERRIDE(OP): Performed by: FAMILY MEDICINE

## 2017-11-05 PROCEDURE — 770006 HCHG ROOM/CARE - MED/SURG/GYN SEMI*

## 2017-11-05 PROCEDURE — A9270 NON-COVERED ITEM OR SERVICE: HCPCS | Performed by: HOSPITALIST

## 2017-11-05 PROCEDURE — 302146: Performed by: INTERNAL MEDICINE

## 2017-11-05 PROCEDURE — 99231 SBSQ HOSP IP/OBS SF/LOW 25: CPT | Performed by: INTERNAL MEDICINE

## 2017-11-05 PROCEDURE — 700111 HCHG RX REV CODE 636 W/ 250 OVERRIDE (IP): Performed by: HOSPITALIST

## 2017-11-05 PROCEDURE — 700102 HCHG RX REV CODE 250 W/ 637 OVERRIDE(OP): Performed by: HOSPITALIST

## 2017-11-05 PROCEDURE — 700101 HCHG RX REV CODE 250: Performed by: FAMILY MEDICINE

## 2017-11-05 PROCEDURE — A9270 NON-COVERED ITEM OR SERVICE: HCPCS | Performed by: FAMILY MEDICINE

## 2017-11-05 RX ADMIN — LIDOCAINE 1 PATCH: 50 PATCH CUTANEOUS at 08:40

## 2017-11-05 RX ADMIN — ASPIRIN 81 MG: 81 TABLET, COATED ORAL at 08:40

## 2017-11-05 RX ADMIN — ATORVASTATIN CALCIUM 80 MG: 40 TABLET, FILM COATED ORAL at 21:52

## 2017-11-05 RX ADMIN — HEPARIN SODIUM 5000 UNITS: 5000 INJECTION, SOLUTION INTRAVENOUS; SUBCUTANEOUS at 21:53

## 2017-11-05 RX ADMIN — BENAZEPRIL HYDROCHLORIDE 40 MG: 20 TABLET ORAL at 08:39

## 2017-11-05 RX ADMIN — HYDRALAZINE HYDROCHLORIDE 100 MG: 50 TABLET ORAL at 21:52

## 2017-11-05 RX ADMIN — METOPROLOL TARTRATE 12.5 MG: 25 TABLET, FILM COATED ORAL at 08:39

## 2017-11-05 RX ADMIN — OXYBUTYNIN CHLORIDE 5 MG: 5 TABLET, FILM COATED, EXTENDED RELEASE ORAL at 21:52

## 2017-11-05 RX ADMIN — HEPARIN SODIUM 5000 UNITS: 5000 INJECTION, SOLUTION INTRAVENOUS; SUBCUTANEOUS at 08:40

## 2017-11-05 RX ADMIN — OMEPRAZOLE 20 MG: 20 CAPSULE, DELAYED RELEASE ORAL at 08:40

## 2017-11-05 RX ADMIN — DULOXETINE HYDROCHLORIDE 20 MG: 20 CAPSULE, DELAYED RELEASE ORAL at 08:39

## 2017-11-05 RX ADMIN — METOPROLOL TARTRATE 12.5 MG: 25 TABLET, FILM COATED ORAL at 21:52

## 2017-11-05 RX ADMIN — GABAPENTIN 300 MG: 300 CAPSULE ORAL at 21:52

## 2017-11-05 RX ADMIN — HYDRALAZINE HYDROCHLORIDE 100 MG: 50 TABLET ORAL at 05:13

## 2017-11-05 RX ADMIN — HYDRALAZINE HYDROCHLORIDE 100 MG: 50 TABLET ORAL at 14:55

## 2017-11-05 ASSESSMENT — ENCOUNTER SYMPTOMS
HEADACHES: 0
LOSS OF CONSCIOUSNESS: 0
COUGH: 0
PALPITATIONS: 0
ABDOMINAL PAIN: 0
NAUSEA: 0
MEMORY LOSS: 1
WEAKNESS: 1
MYALGIAS: 0
NERVOUS/ANXIOUS: 0
CHILLS: 0
NECK PAIN: 0
SHORTNESS OF BREATH: 0
DEPRESSION: 0
FEVER: 0

## 2017-11-05 ASSESSMENT — PAIN SCALES - GENERAL
PAINLEVEL_OUTOF10: 0
PAINLEVEL_OUTOF10: 4
PAINLEVEL_OUTOF10: 2

## 2017-11-05 NOTE — PROGRESS NOTES
Received report from [facility] nurse. Assessment complete. Patient is A&Ox3, reoriented to time, states lower back pain 5/10, patch applied, no PIV ok per MD. Pt educated on POC and verbalized understanding. Patient is resting now. Call light within reach, bed in lowest position, treaded socks in place, and bed alarm on.

## 2017-11-05 NOTE — PROGRESS NOTES
Simona Knight Fall Risk Assessment:     Last Known Fall: Within the last six months  Mobility: Immobilized/requires assist of one person  Medications: Cardiovascular or central nervous system meds  Mental Status/LOC/Awareness: Oriented to person and place  Toileting Needs: Incontinence  Volume/Electrolyte Status: No problems  Communication/Sensory: Visual (Glasses)/hearing deficit  Behavior: Appropriate behavior  Simona Knight Fall Risk Total: 13  Fall Risk Level: MODERATE RISK    Universal Fall Precautions:  call light/belongings in reach, bed in low position and locked, siderails up x 2, use non-slip footwear, adequate lighting, clutter free and spill free environment, educate on level of risk, educate to call for assistance    Fall Risk Level Interventions:    TRIAL (TELE 8, NEURO, MED JENNIE 5) Moderate Fall Risk Interventions  Place yellow fall risk ID band on patient: verified  Provide patient/family education based on risk assessment : completed  Educate patient/family to call staff for assistance when getting out of bed: completed  Place fall precaution signage outside patient door: verified  Utilize bed/chair fall alarm: verifiedTRIAL (TELE 8, NEURO, [employer] JENNIE 5) High Fall Risk Interventions  Place yellow fall risk ID band on patient: verified  Provide patient/family education based on risk assessment: completed  Educate patient/family to call staff for assistance when getting out of bed: completed  Place fall precaution signage outside patient door: completed  Place patient in room close to nursing station: currently not available/charge notified  Utilize bed/chair fall alarm: verified  Notify charge of high risk for huddle: completed    Patient Specific Interventions:     Medication: review medications with patient and family  Mental Status/LOC/Awareness: reorient patient, reinforce falls education, check on patient hourly and utilize bed/chair fall alarm  Toileting: provide frquent  toileting  Volume/Electrolyte Status: ensure patient remains hydrated  Communication/Sensory: update plan of care on whiteboard and ensure proper positioning when transferrng/ambulating  Behavioral: engage patient in daily activities  Mobility: utilize bed/chair fall alarm and ensure bed is locked and in lowest position

## 2017-11-05 NOTE — PROGRESS NOTES
[facility] Hospitalist Progress Note    Date of Service: 2017    Chief Complaint  76 y.o. female admitted 2017 with AMS and UTI.    Interval Problem Update  No new events  Sleepy, arousable   Encouraged out of bed    Consultants/Specialty  Neurology    Disposition  Pending guardianship and placement.    assisting  No court date yet, Not till late December or January        Review of Systems   Constitutional: Negative for chills, fever and malaise/fatigue.   HENT: Negative for hearing loss.    Respiratory: Negative for cough and shortness of breath.    Cardiovascular: Negative for palpitations and leg swelling.   Gastrointestinal: Negative for abdominal pain and nausea.   Genitourinary: Negative for dysuria and urgency.   Musculoskeletal: Negative for joint pain, myalgias and neck pain.   Neurological: Positive for weakness. Negative for loss of consciousness and headaches.   Psychiatric/Behavioral: Positive for memory loss. Negative for depression. The patient is not nervous/anxious.       Physical Exam  Laboratory/Imaging   Hemodynamics  Temp (24hrs), Av.2 °C (97.2 °F), Min:36.1 °C (97 °F), Max:36.6 °C (97.8 °F)   Temperature: 36.1 °C (97 °F)  Pulse  Av.1  Min: 50  Max: 99    Blood Pressure : 131/72      Respiratory      Respiration: 18, Pulse Oximetry: 93 %        RUL Breath Sounds: Clear, RML Breath Sounds: Diminished, RLL Breath Sounds: Diminished, ARGELIA Breath Sounds: Clear, LLL Breath Sounds: Diminished    Fluids    Intake/Output Summary (Last 24 hours) at 17 1331  Last data filed at 17 0400   Gross per 24 hour   Intake              500 ml   Output                0 ml   Net              500 ml       Nutrition  Orders Placed This Encounter   Procedures   • Diet Order     Standing Status:   Standing     Number of Occurrences:   1     Order Specific Question:   Diet:     Answer:   Regular [1]     Physical Exam   Constitutional: No distress.   HENT:   Head: Normocephalic and  atraumatic.   Mouth/Throat: No oropharyngeal exudate.   Eyes: EOM are normal. Pupils are equal, round, and reactive to light. No scleral icterus.   Neck: Normal range of motion.   Cardiovascular: Normal rate, regular rhythm and intact distal pulses.    Pulmonary/Chest: Effort normal. No respiratory distress. She has no wheezes.   Abdominal: Soft. She exhibits no distension.   Musculoskeletal: She exhibits no edema or tenderness.   Neurological: She is alert. No cranial nerve deficit. Coordination normal.   AOx2   Skin: Skin is warm. She is not diaphoretic.   Psychiatric: She has a normal mood and affect. Her behavior is normal. Judgment and thought content normal. Cognition and memory are impaired.   Pleasant   Vitals reviewed.                               Assessment/Plan     * Dementia- (present on admission)   Assessment & Plan    MRI of her brain showed enlarged ventricles but no acute infarct. Significant concern for NPH given her dementia, incontinence, and gait instability. Psychiatry deemed patient incapacitated.   - Neuro evaluated and signed off as she refused any intervention  - Pending Guardianship  - no court date as of yet, as per  court date will not be scheduled till late December early January        HTN (hypertension)- (present on admission)   Assessment & Plan    Previously difficult to control, now normotensive to hypotensive.   - Continue Hydralazine, metoprolol, Lotensin   - discontinued Norvasc        History of CVA (cerebrovascular accident)- (present on admission)   Assessment & Plan    MRI on 9/23 showed no evidence of acute stroke. Prominent ventricles, as noted above.  - Continue ASA and statin  - neuro evaluated and signed off  - to chair with meals  - PT/OT, encourage activity, uses frontwheel walker, and unstable without  - encourage ambulation, oob        Hypokalemia- (present on admission)   Assessment & Plan    K 3.5 on 10/28 responds appropriately to KDur  cont         T12 compression fracture (CMS-HCC)- (present on admission)   Assessment & Plan    CT spine, no acute fractures. No complaints of pain.            Reviewed items::  Labs reviewed and Medications reviewed  Pink catheter::  No Pink  DVT prophylaxis pharmacological::  Heparin  Ulcer Prophylaxis::  Yes

## 2017-11-05 NOTE — CARE PLAN
Problem: Safety  Goal: Will remain free from injury  Outcome: PROGRESSING AS EXPECTED  Bed on lowest position, bed alarm on, call light within reach, patient educated about use of call light and safety precautions.     Problem: Urinary Elimination:  Goal: Ability to reestablish a normal urinary elimination pattern will improve  Outcome: PROGRESSING AS EXPECTED  Scheduled toileting offered. Pt is incontinent at times.

## 2017-11-05 NOTE — PROGRESS NOTES
"Assumed care at 1900. Received report from RN. Patient is AOx3. Assessment complete. Labs reviewed. Patient and RN discussed plan of care. Patient questions answered. Patient needs are met at this time. Bed in lowest and locked position. Call light is within reach. Hourly rounding in place. /53   Pulse 60   Temp 36.1 °C (97 °F)   Resp 18   Ht 1.6 m (5' 2.99\")   Wt 86.3 kg (190 lb 4.1 oz)   SpO2 93%   Breastfeeding? No   BMI 33.71 kg/m²       "

## 2017-11-05 NOTE — CARE PLAN
Problem: Discharge Barriers/Planning  Goal: Patient's continuum of care needs will be met    Intervention: Assess potential discharge barriers on admission and throughout hospital stay  Pt continues to wait for guardianship.      Problem: Mobility  Goal: Risk for activity intolerance will decrease    Intervention: Encourage patient to increase activity level in collaboration with Interdisciplinary Team  Pt encouraged to ambulate and get into chair throughout shift by staff.

## 2017-11-05 NOTE — PROGRESS NOTES
Simona Knight Fall Risk Assessment:     Last Known Fall: Within the last six months  Mobility: Immobilized/requires assist of one person  Medications: Cardiovascular or central nervous system meds  Mental Status/LOC/Awareness: Oriented to person and place  Toileting Needs: Incontinence  Volume/Electrolyte Status: No problems  Communication/Sensory: Visual (Glasses)/hearing deficit  Behavior: Appropriate behavior  Jarvismark Knight Fall Risk Total: 13  Fall Risk Level: MODERATE RISK    Universal Fall Precautions:  call light/belongings in reach, bed in low position and locked, siderails up x 2, use non-slip footwear, adequate lighting, clutter free and spill free environment, educate on level of risk, educate to call for assistance    Fall Risk Level Interventions:    TRIAL (TELE 8, NEURO, MED JENNIE 5) Moderate Fall Risk Interventions  Place yellow fall risk ID band on patient: verified  Provide patient/family education based on risk assessment : completed  Educate patient/family to call staff for assistance when getting out of bed: completed  Place fall precaution signage outside patient door: verified  Utilize bed/chair fall alarm: verifiedTRIAL (TELE 8, NEURO, [employer] JENNIE 5) High Fall Risk Interventions  Place yellow fall risk ID band on patient: verified  Provide patient/family education based on risk assessment: completed  Educate patient/family to call staff for assistance when getting out of bed: completed  Place fall precaution signage outside patient door: completed  Place patient in room close to nursing station: currently not available/charge notified  Utilize bed/chair fall alarm: verified  Notify charge of high risk for huddle: completed    Patient Specific Interventions:     Medication: review medications with patient and family  Mental Status/LOC/Awareness: reorient patient, reinforce falls education, check on patient hourly, utilize bed/chair fall alarm and reinforce the use of call light  Toileting:  provide frquent toileting  Volume/Electrolyte Status: ensure patient remains hydrated and monitor abnormal lab values  Communication/Sensory: update plan of care on whiteboard  Behavioral: encourage patient to voice feelings  Mobility: dangle prior to standing, utilize bed/chair fall alarm and ensure bed is locked and in lowest position

## 2017-11-06 PROCEDURE — 700102 HCHG RX REV CODE 250 W/ 637 OVERRIDE(OP): Performed by: FAMILY MEDICINE

## 2017-11-06 PROCEDURE — A9270 NON-COVERED ITEM OR SERVICE: HCPCS | Performed by: INTERNAL MEDICINE

## 2017-11-06 PROCEDURE — 700101 HCHG RX REV CODE 250: Performed by: FAMILY MEDICINE

## 2017-11-06 PROCEDURE — 700102 HCHG RX REV CODE 250 W/ 637 OVERRIDE(OP): Performed by: HOSPITALIST

## 2017-11-06 PROCEDURE — 700102 HCHG RX REV CODE 250 W/ 637 OVERRIDE(OP): Performed by: INTERNAL MEDICINE

## 2017-11-06 PROCEDURE — 99231 SBSQ HOSP IP/OBS SF/LOW 25: CPT | Performed by: INTERNAL MEDICINE

## 2017-11-06 PROCEDURE — 700111 HCHG RX REV CODE 636 W/ 250 OVERRIDE (IP): Performed by: HOSPITALIST

## 2017-11-06 PROCEDURE — 770006 HCHG ROOM/CARE - MED/SURG/GYN SEMI*

## 2017-11-06 PROCEDURE — A9270 NON-COVERED ITEM OR SERVICE: HCPCS | Performed by: HOSPITALIST

## 2017-11-06 PROCEDURE — A9270 NON-COVERED ITEM OR SERVICE: HCPCS | Performed by: FAMILY MEDICINE

## 2017-11-06 RX ADMIN — HEPARIN SODIUM 5000 UNITS: 5000 INJECTION, SOLUTION INTRAVENOUS; SUBCUTANEOUS at 20:50

## 2017-11-06 RX ADMIN — METOPROLOL TARTRATE 12.5 MG: 25 TABLET, FILM COATED ORAL at 09:38

## 2017-11-06 RX ADMIN — ATORVASTATIN CALCIUM 80 MG: 40 TABLET, FILM COATED ORAL at 20:48

## 2017-11-06 RX ADMIN — HYDRALAZINE HYDROCHLORIDE 100 MG: 50 TABLET ORAL at 06:29

## 2017-11-06 RX ADMIN — METOPROLOL TARTRATE 12.5 MG: 25 TABLET, FILM COATED ORAL at 20:49

## 2017-11-06 RX ADMIN — LIDOCAINE 1 PATCH: 50 PATCH CUTANEOUS at 09:38

## 2017-11-06 RX ADMIN — TRAZODONE HYDROCHLORIDE 100 MG: 50 TABLET ORAL at 20:49

## 2017-11-06 RX ADMIN — HYDRALAZINE HYDROCHLORIDE 100 MG: 50 TABLET ORAL at 20:49

## 2017-11-06 RX ADMIN — HEPARIN SODIUM 5000 UNITS: 5000 INJECTION, SOLUTION INTRAVENOUS; SUBCUTANEOUS at 09:38

## 2017-11-06 RX ADMIN — HYDRALAZINE HYDROCHLORIDE 100 MG: 50 TABLET ORAL at 14:00

## 2017-11-06 RX ADMIN — ASPIRIN 81 MG: 81 TABLET, COATED ORAL at 09:38

## 2017-11-06 RX ADMIN — DULOXETINE HYDROCHLORIDE 20 MG: 20 CAPSULE, DELAYED RELEASE ORAL at 09:37

## 2017-11-06 RX ADMIN — OMEPRAZOLE 20 MG: 20 CAPSULE, DELAYED RELEASE ORAL at 09:38

## 2017-11-06 RX ADMIN — BENAZEPRIL HYDROCHLORIDE 40 MG: 20 TABLET ORAL at 09:37

## 2017-11-06 RX ADMIN — GABAPENTIN 300 MG: 300 CAPSULE ORAL at 20:48

## 2017-11-06 RX ADMIN — OXYBUTYNIN CHLORIDE 5 MG: 5 TABLET, FILM COATED, EXTENDED RELEASE ORAL at 20:50

## 2017-11-06 ASSESSMENT — ENCOUNTER SYMPTOMS
NECK PAIN: 0
WEAKNESS: 1
NERVOUS/ANXIOUS: 0
ABDOMINAL PAIN: 0
MEMORY LOSS: 1
MYALGIAS: 0
COUGH: 0
SHORTNESS OF BREATH: 0
DIZZINESS: 0

## 2017-11-06 ASSESSMENT — PAIN SCALES - GENERAL
PAINLEVEL_OUTOF10: 4
PAINLEVEL_OUTOF10: 5
PAINLEVEL_OUTOF10: 3
PAINLEVEL_OUTOF10: 0

## 2017-11-06 ASSESSMENT — LIFESTYLE VARIABLES: DO YOU DRINK ALCOHOL: NO

## 2017-11-06 NOTE — CARE PLAN
Problem: Safety  Goal: Will remain free from injury  Bed locked and in lowest position, call light and personal belongings within reach, pt educated to call for assistance. Bed alarm on. Hourly rounding in effect.    Problem: Mobility  Goal: Risk for activity intolerance will decrease  Patient encouraged to increase ambulation. Pt refusing at this time.

## 2017-11-06 NOTE — PROGRESS NOTES
Assumed patient care at 1900. POC discussed w/ day nurse and pt, pt in agreement w/ goals. Pt AOx3, reoriented to time. Pt states pain in BLE, denies intervention. Pt denies nausea. Pt encouraged to get out of bed for the bathroom, refused at this time. Incontinent throughout the night, linen kept clean and dry. Patient educated on use of call light, hourly rounding, and pain scale. Personal possession and call light within reach. Bed alarm on. Patient calls appropriately.

## 2017-11-06 NOTE — PROGRESS NOTES
Simona Knight Fall Risk Assessment:     Last Known Fall: Within the last six months  Mobility: Immobilized/requires assist of one person  Medications: Cardiovascular or central nervous system meds  Mental Status/LOC/Awareness: Oriented to person and place  Toileting Needs: Incontinence  Volume/Electrolyte Status: No problems  Communication/Sensory: Visual (Glasses)/hearing deficit  Behavior: Appropriate behavior  Simona Knight Fall Risk Total: 13  Fall Risk Level: MODERATE RISK    Universal Fall Precautions:  call light/belongings in reach, bed in low position and locked, wheelchairs and assistive devices out of sight, siderails up x 2, use non-slip footwear, adequate lighting, clutter free and spill free environment, educate on level of risk, educate to call for assistance    Fall Risk Level Interventions:    TRIAL ([employer] 8, NEURO, MED JENNIE 5) Moderate Fall Risk Interventions  Place yellow fall risk ID band on patient: verified  Provide patient/family education based on risk assessment : completed  Educate patient/family to call staff for assistance when getting out of bed: completed  Place fall precaution signage outside patient door: verified  Utilize bed/chair fall alarm: verifiedTRIAL ([employer] 8, NEURO, [employer] JENNIE 5) High Fall Risk Interventions  Place yellow fall risk ID band on patient: verified  Provide patient/family education based on risk assessment: completed  Educate patient/family to call staff for assistance when getting out of bed: completed  Place fall precaution signage outside patient door: completed  Place patient in room close to nursing station: currently not available/charge notified  Utilize bed/chair fall alarm: verified  Notify charge of high risk for huddle: completed    Patient Specific Interventions:     Medication: review medications with patient and family  Mental Status/LOC/Awareness: utilize bed/chair fall alarm, reinforce the use of call light and provide activity  Toileting: provide  frquent toileting  Volume/Electrolyte Status: ensure patient remains hydrated and monitor abnormal lab values  Communication/Sensory: update plan of care on whiteboard  Behavioral: not applicable  Mobility: utilize bed/chair fall alarm and ensure bed is locked and in lowest position

## 2017-11-06 NOTE — PROGRESS NOTES
Simona Knight Fall Risk Assessment:     Last Known Fall: Within the last six months  Mobility: Immobilized/requires assist of one person  Medications: Cardiovascular or central nervous system meds  Mental Status/LOC/Awareness: Oriented to person and place  Toileting Needs: Incontinence  Volume/Electrolyte Status: No problems  Communication/Sensory: Visual (Glasses)/hearing deficit  Behavior: Appropriate behavior  Simona Knight Fall Risk Total: 13  Fall Risk Level: MODERATE RISK    Universal Fall Precautions:  call light/belongings in reach, bed in low position and locked, wheelchairs and assistive devices out of sight, use non-slip footwear, siderails up x 2, adequate lighting, clutter free and spill free environment, educate to call for assistance, educate on level of risk    Fall Risk Level Interventions:    TRIAL ([employer] 8, NEURO, MED JENNIE 5) Moderate Fall Risk Interventions  Place yellow fall risk ID band on patient: verified  Provide patient/family education based on risk assessment : completed  Educate patient/family to call staff for assistance when getting out of bed: completed  Place fall precaution signage outside patient door: verified  Utilize bed/chair fall alarm: verifiedTRIAL (TELE 8, NEURO, [employer] JENNIE 5) High Fall Risk Interventions  Place yellow fall risk ID band on patient: verified  Provide patient/family education based on risk assessment: completed  Educate patient/family to call staff for assistance when getting out of bed: completed  Place fall precaution signage outside patient door: completed  Place patient in room close to nursing station: currently not available/charge notified  Utilize bed/chair fall alarm: verified  Notify charge of high risk for huddle: completed    Patient Specific Interventions:     Medication: review medications with patient and family, assess for medications that can be discontinued or dosage decreased and limit combination of prn medications  Mental  Status/LOC/Awareness: reorient patient, reinforce falls education, check on patient hourly, utilize bed/chair fall alarm, reinforce the use of call light and provide activity  Toileting: provide frquent toileting, monitor intake and output/use of appropriate interventions, instruct patient/family on the use of grab bars, instruct patient/family on the need to call for assistance when toileting and do not leave patient unattended in bathroom/refer to toileting scripting  Volume/Electrolyte Status: ensure patient remains hydrated and monitor abnormal lab values  Communication/Sensory: update plan of care on whiteboard and ensure proper positioning when transferrng/ambulating  Behavioral: engage patient in daily activities, administer medication as ordered and instruct/reinforce fall program rationale  Mobility: schedule physical activity throughout the day, dangle prior to standing, utilize bed/chair fall alarm, ensure bed is locked and in lowest position, provide appropriate assistive device, instruct patient to exit bed on their strongest side and collaborate with doctor for possible PT/OT consult

## 2017-11-06 NOTE — PROGRESS NOTES
[facility]ist Progress Note    Date of Service: 2017    Chief Complaint  76 y.o. female admitted 2017 with AMS and UTI.    Interval Problem Update  No new events  Encourage participation  Appears comfortable    Consultants/Specialty  Neurology    Disposition  Pending guardianship and placement   assisting  No court date yet, Not till late December or January        Review of Systems   Constitutional: Negative for malaise/fatigue.   HENT: Negative for hearing loss.    Respiratory: Negative for cough and shortness of breath.    Cardiovascular: Negative for chest pain and leg swelling.   Gastrointestinal: Negative for abdominal pain.   Genitourinary: Negative for urgency.   Musculoskeletal: Negative for joint pain, myalgias and neck pain.   Neurological: Positive for weakness. Negative for dizziness.   Psychiatric/Behavioral: Positive for memory loss. The patient is not nervous/anxious.       Physical Exam  Laboratory/Imaging   Hemodynamics  Temp (24hrs), Av.3 °C (97.4 °F), Min:36.1 °C (96.9 °F), Max:36.8 °C (98.3 °F)   Temperature: 36.2 °C (97.1 °F)  Pulse  Av  Min: 50  Max: 99    Blood Pressure : 136/49      Respiratory      Respiration: 17, Pulse Oximetry: 93 %        RUL Breath Sounds: Clear, RML Breath Sounds: Diminished, RLL Breath Sounds: Diminished, ARGELIA Breath Sounds: Clear, LLL Breath Sounds: Diminished    Fluids    Intake/Output Summary (Last 24 hours) at 17 1538  Last data filed at 17 1434   Gross per 24 hour   Intake              480 ml   Output                0 ml   Net              480 ml       Nutrition  Orders Placed This Encounter   Procedures   • Diet Order     Standing Status:   Standing     Number of Occurrences:   1     Order Specific Question:   Diet:     Answer:   Regular [1]     Physical Exam   Constitutional: No distress.   HENT:   Head: Normocephalic and atraumatic.   Eyes: EOM are normal. Pupils are equal, round, and reactive to light. Right  eye exhibits no discharge. Left eye exhibits no discharge.   Neck: Normal range of motion.   Cardiovascular: Normal rate, regular rhythm and intact distal pulses.    Pulmonary/Chest: Effort normal. No respiratory distress.   Abdominal: Soft. She exhibits no distension.   Musculoskeletal: She exhibits no edema.   Neurological: She is alert. No cranial nerve deficit. She exhibits normal muscle tone.   AOx2   Skin: Skin is warm and dry.   Psychiatric: She has a normal mood and affect. Her behavior is normal. Judgment and thought content normal. Cognition and memory are impaired.   Pleasant   Vitals reviewed.                               Assessment/Plan     * Dementia- (present on admission)   Assessment & Plan    MRI of her brain showed enlarged ventricles but no acute infarct. Significant concern for NPH given her dementia, incontinence, and gait instability. Psychiatry deemed patient incapacitated.   - Neuro evaluated and signed off as she refused any intervention  - Pending Guardianship  - no court date as of yet, as per  court date will not be scheduled till late December early January        HTN (hypertension)- (present on admission)   Assessment & Plan    Previously difficult to control, now normotensive to hypotensive.   - Continue Hydralazine, metoprolol, Lotensin   - discontinued Norvasc        History of CVA (cerebrovascular accident)- (present on admission)   Assessment & Plan    MRI on 9/23 showed no evidence of acute stroke. Prominent ventricles, as noted above.  - Continue ASA and statin  - neuro evaluated and signed off  - to chair with meals  - PT/OT, encourage activity, uses frontwheel walker, and unstable without  - encourage ambulation        Hypokalemia- (present on admission)   Assessment & Plan    K 3.5 on 10/28 responds appropriately to KDur  cont        T12 compression fracture (CMS-HCC)- (present on admission)   Assessment & Plan    CT spine, no acute fractures. No complaints of  pain.            Reviewed items::  Labs reviewed and Medications reviewed  Pink catheter::  No Pink  DVT prophylaxis pharmacological::  Heparin  Ulcer Prophylaxis::  Yes

## 2017-11-06 NOTE — CARE PLAN
Problem: Discharge Barriers/Planning  Goal: Patient's continuum of care needs will be met  Outcome: PROGRESSING AS EXPECTED  STILL no guardianship hearing set.     Problem: Psychosocial Needs:  Goal: Level of anxiety will decrease  Outcome: PROGRESSING AS EXPECTED  Reorient PRN.  Provided emotional support PRN as well.

## 2017-11-06 NOTE — PROGRESS NOTES
Assumed care of patient @ 0700, report received at bedside,  assessment done, labs and orders noted.  Pt A & Ox1, disoriented to time, event and place, only oriented to self, does not know birthday this morning, No PIV ok per MD .  Pt is resting comfortably in bed, no signs or symptoms of distress. No overnight changes.  Pt reports pain is a 4/10, declines intervention.  Pt has been updated on the plan of care.    Bed alarm is on, bed is in lowest position, fall risk socks in place, call light within reach. Pt verbalized all needs are met at this time.

## 2017-11-07 PROCEDURE — 302196 LINEN, HYPOALLERGENIC: Performed by: FAMILY MEDICINE

## 2017-11-07 PROCEDURE — 99233 SBSQ HOSP IP/OBS HIGH 50: CPT | Performed by: INTERNAL MEDICINE

## 2017-11-07 PROCEDURE — A9270 NON-COVERED ITEM OR SERVICE: HCPCS | Performed by: FAMILY MEDICINE

## 2017-11-07 PROCEDURE — 700102 HCHG RX REV CODE 250 W/ 637 OVERRIDE(OP): Performed by: HOSPITALIST

## 2017-11-07 PROCEDURE — 700111 HCHG RX REV CODE 636 W/ 250 OVERRIDE (IP): Performed by: HOSPITALIST

## 2017-11-07 PROCEDURE — 770006 HCHG ROOM/CARE - MED/SURG/GYN SEMI*

## 2017-11-07 PROCEDURE — 700101 HCHG RX REV CODE 250: Performed by: FAMILY MEDICINE

## 2017-11-07 PROCEDURE — A9270 NON-COVERED ITEM OR SERVICE: HCPCS | Performed by: HOSPITALIST

## 2017-11-07 PROCEDURE — 700102 HCHG RX REV CODE 250 W/ 637 OVERRIDE(OP): Performed by: FAMILY MEDICINE

## 2017-11-07 RX ADMIN — OXYBUTYNIN CHLORIDE 5 MG: 5 TABLET, FILM COATED, EXTENDED RELEASE ORAL at 21:35

## 2017-11-07 RX ADMIN — ATORVASTATIN CALCIUM 80 MG: 40 TABLET, FILM COATED ORAL at 21:35

## 2017-11-07 RX ADMIN — GABAPENTIN 300 MG: 300 CAPSULE ORAL at 21:35

## 2017-11-07 RX ADMIN — DULOXETINE HYDROCHLORIDE 20 MG: 20 CAPSULE, DELAYED RELEASE ORAL at 09:51

## 2017-11-07 RX ADMIN — OMEPRAZOLE 20 MG: 20 CAPSULE, DELAYED RELEASE ORAL at 09:50

## 2017-11-07 RX ADMIN — HEPARIN SODIUM 5000 UNITS: 5000 INJECTION, SOLUTION INTRAVENOUS; SUBCUTANEOUS at 09:53

## 2017-11-07 RX ADMIN — LIDOCAINE 1 PATCH: 50 PATCH CUTANEOUS at 09:55

## 2017-11-07 RX ADMIN — BENAZEPRIL HYDROCHLORIDE 40 MG: 20 TABLET ORAL at 09:50

## 2017-11-07 RX ADMIN — ASPIRIN 81 MG: 81 TABLET, COATED ORAL at 09:51

## 2017-11-07 RX ADMIN — HYDRALAZINE HYDROCHLORIDE 100 MG: 50 TABLET ORAL at 13:22

## 2017-11-07 RX ADMIN — METOPROLOL TARTRATE 12.5 MG: 25 TABLET, FILM COATED ORAL at 09:51

## 2017-11-07 RX ADMIN — HYDRALAZINE HYDROCHLORIDE 100 MG: 50 TABLET ORAL at 06:00

## 2017-11-07 RX ADMIN — HEPARIN SODIUM 5000 UNITS: 5000 INJECTION, SOLUTION INTRAVENOUS; SUBCUTANEOUS at 21:35

## 2017-11-07 ASSESSMENT — ENCOUNTER SYMPTOMS
COUGH: 0
MEMORY LOSS: 1
MYALGIAS: 0
ABDOMINAL PAIN: 0
SHORTNESS OF BREATH: 0
WEAKNESS: 1
DIZZINESS: 0
NERVOUS/ANXIOUS: 0
NECK PAIN: 0

## 2017-11-07 ASSESSMENT — PAIN SCALES - GENERAL
PAINLEVEL_OUTOF10: 2

## 2017-11-07 NOTE — CARE PLAN
Problem: Discharge Barriers/Planning  Goal: Patient's continuum of care needs will be met  Outcome: PROGRESSING SLOWER THAN EXPECTED  Still waiting for guardianship to be finalized.     Problem: Pain Management  Goal: Pain level will decrease to patient's comfort goal  Outcome: PROGRESSING AS EXPECTED  Pt medicated per MAR for back pain. Pt repositioned for comfort. Trazodone PRN given d/t pt c/o not being able to sleep. Pt slept majority of night.

## 2017-11-07 NOTE — PROGRESS NOTES
Received report from night RN, assumed care at 0700. Pt oriented to self only. Pt resting in bed, no signs/symptoms of distress noted. Bed alarm in use, bed in lowest and locked position, non-skid socks in place. POC discussed, communication board updated. Call light in reach, hourly rounding in place.

## 2017-11-07 NOTE — PROGRESS NOTES
Simona Knight Fall Risk Assessment:     Last Known Fall: Within the last six months  Mobility: Immobilized/requires assist of one person  Medications: Cardiovascular and central nervous system meds  Mental Status/LOC/Awareness: Memory loss/confusion and requires reorienting  Toileting Needs: Incontinence  Volume/Electrolyte Status: No problems  Communication/Sensory: Visual (Glasses)/hearing deficit  Behavior: Appropriate behavior  Simona Knight Fall Risk Total: 16  Fall Risk Level: HIGH RISK    Universal Fall Precautions:  call light/belongings in reach, bed in low position and locked, wheelchairs and assistive devices out of sight, siderails up x 2, use non-slip footwear, adequate lighting, clutter free and spill free environment, educate on level of risk, educate to call for assistance    Fall Risk Level Interventions:    TRIAL ([employer] 8, NEURO, MED JENNIE 5) Moderate Fall Risk Interventions  Place yellow fall risk ID band on patient: verified  Provide patient/family education based on risk assessment : completed  Educate patient/family to call staff for assistance when getting out of bed: completed  Place fall precaution signage outside patient door: verified  Utilize bed/chair fall alarm: verifiedTRIAL (TELE 8, NEURO, [employer] JENNIE 5) High Fall Risk Interventions  Place yellow fall risk ID band on patient: verified  Provide patient/family education based on risk assessment: verified  Educate patient/family to call staff for assistance when getting out of bed: verified  Place fall precaution signage outside patient door: verified  Place patient in room close to nursing station: verified  Utilize bed/chair fall alarm: verified  Notify charge of high risk for huddle: verified    Patient Specific Interventions:     Medication: review medications with patient and family  Mental Status/LOC/Awareness: utilize bed/chair fall alarm and reinforce the use of call light  Toileting: instruct patient/family on the use of grab bars,  instruct patient/family on the need to call for assistance when toileting and do not leave patient unattended in bathroom/refer to toileting scripting  Volume/Electrolyte Status: ensure patient remains hydrated  Communication/Sensory: update plan of care on whiteboard  Behavioral: instruct/reinforce fall program rationale  Mobility: dangle prior to standing, utilize bed/chair fall alarm, ensure bed is locked and in lowest position and instruct patient to exit bed on their strongest side

## 2017-11-07 NOTE — CARE PLAN
Problem: Mobility  Goal: Risk for activity intolerance will decrease    Intervention: Encourage patient to increase activity level in collaboration with Interdisciplinary Team  Patient up 1 person assist, maintains steady gait.      Problem: Skin Integrity  Goal: Risk for impaired skin integrity will decrease    Intervention: Implement precautions to protect skin integrity in collaboration with the interdisciplinary team  Patient on waffle overlay, pt reminded to change positions at least every 2 hours to prevent skin breakdown.

## 2017-11-07 NOTE — PROGRESS NOTES
Received bedside report from day RN and assumed care of patient at 1900. Patient is alert and oriented xSELF with no signs of labored breathing or distress. Safety precautions in place including patient call light within reach, bed alarm on, personal possessions nearby, bed in low position and locked, hourly rounding in practice, and non-skid socks in place.

## 2017-11-08 PROCEDURE — 770006 HCHG ROOM/CARE - MED/SURG/GYN SEMI*

## 2017-11-08 PROCEDURE — 700102 HCHG RX REV CODE 250 W/ 637 OVERRIDE(OP): Performed by: FAMILY MEDICINE

## 2017-11-08 PROCEDURE — 97530 THERAPEUTIC ACTIVITIES: CPT

## 2017-11-08 PROCEDURE — 700101 HCHG RX REV CODE 250: Performed by: FAMILY MEDICINE

## 2017-11-08 PROCEDURE — 700102 HCHG RX REV CODE 250 W/ 637 OVERRIDE(OP): Performed by: HOSPITALIST

## 2017-11-08 PROCEDURE — 700111 HCHG RX REV CODE 636 W/ 250 OVERRIDE (IP): Performed by: HOSPITALIST

## 2017-11-08 PROCEDURE — A9270 NON-COVERED ITEM OR SERVICE: HCPCS | Performed by: FAMILY MEDICINE

## 2017-11-08 PROCEDURE — 99232 SBSQ HOSP IP/OBS MODERATE 35: CPT | Performed by: INTERNAL MEDICINE

## 2017-11-08 PROCEDURE — A9270 NON-COVERED ITEM OR SERVICE: HCPCS | Performed by: HOSPITALIST

## 2017-11-08 RX ADMIN — ERGOCALCIFEROL 50000 UNITS: 1.25 CAPSULE, LIQUID FILLED ORAL at 08:34

## 2017-11-08 RX ADMIN — GABAPENTIN 300 MG: 300 CAPSULE ORAL at 21:19

## 2017-11-08 RX ADMIN — HEPARIN SODIUM 5000 UNITS: 5000 INJECTION, SOLUTION INTRAVENOUS; SUBCUTANEOUS at 21:19

## 2017-11-08 RX ADMIN — LIDOCAINE 1 PATCH: 50 PATCH CUTANEOUS at 08:15

## 2017-11-08 RX ADMIN — HYDRALAZINE HYDROCHLORIDE 100 MG: 50 TABLET ORAL at 08:15

## 2017-11-08 RX ADMIN — METOPROLOL TARTRATE 12.5 MG: 25 TABLET, FILM COATED ORAL at 21:20

## 2017-11-08 RX ADMIN — OMEPRAZOLE 20 MG: 20 CAPSULE, DELAYED RELEASE ORAL at 08:15

## 2017-11-08 RX ADMIN — OXYBUTYNIN CHLORIDE 5 MG: 5 TABLET, FILM COATED, EXTENDED RELEASE ORAL at 21:20

## 2017-11-08 RX ADMIN — HYDRALAZINE HYDROCHLORIDE 100 MG: 50 TABLET ORAL at 21:21

## 2017-11-08 RX ADMIN — HEPARIN SODIUM 5000 UNITS: 5000 INJECTION, SOLUTION INTRAVENOUS; SUBCUTANEOUS at 08:18

## 2017-11-08 RX ADMIN — ATORVASTATIN CALCIUM 80 MG: 40 TABLET, FILM COATED ORAL at 21:18

## 2017-11-08 RX ADMIN — ASPIRIN 81 MG: 81 TABLET, COATED ORAL at 08:17

## 2017-11-08 RX ADMIN — METOPROLOL TARTRATE 12.5 MG: 25 TABLET, FILM COATED ORAL at 08:17

## 2017-11-08 RX ADMIN — DULOXETINE HYDROCHLORIDE 20 MG: 20 CAPSULE, DELAYED RELEASE ORAL at 08:17

## 2017-11-08 RX ADMIN — STANDARDIZED SENNA CONCENTRATE AND DOCUSATE SODIUM 2 TABLET: 8.6; 5 TABLET, FILM COATED ORAL at 08:19

## 2017-11-08 RX ADMIN — BENAZEPRIL HYDROCHLORIDE 40 MG: 20 TABLET ORAL at 08:16

## 2017-11-08 ASSESSMENT — ENCOUNTER SYMPTOMS
COUGH: 0
MEMORY LOSS: 1
MYALGIAS: 0
WEAKNESS: 1
ABDOMINAL PAIN: 0
NECK PAIN: 0
SHORTNESS OF BREATH: 0
DIZZINESS: 0
NERVOUS/ANXIOUS: 0

## 2017-11-08 ASSESSMENT — COGNITIVE AND FUNCTIONAL STATUS - GENERAL
CLIMB 3 TO 5 STEPS WITH RAILING: A LOT
MOVING FROM LYING ON BACK TO SITTING ON SIDE OF FLAT BED: A LITTLE
MOBILITY SCORE: 17
WALKING IN HOSPITAL ROOM: A LITTLE
STANDING UP FROM CHAIR USING ARMS: A LITTLE
SUGGESTED CMS G CODE MODIFIER MOBILITY: CK
MOVING TO AND FROM BED TO CHAIR: A LITTLE
TURNING FROM BACK TO SIDE WHILE IN FLAT BAD: A LITTLE

## 2017-11-08 ASSESSMENT — PATIENT HEALTH QUESTIONNAIRE - PHQ9
SUM OF ALL RESPONSES TO PHQ9 QUESTIONS 1 AND 2: 0
2. FEELING DOWN, DEPRESSED, IRRITABLE, OR HOPELESS: NOT AT ALL
1. LITTLE INTEREST OR PLEASURE IN DOING THINGS: NOT AT ALL
SUM OF ALL RESPONSES TO PHQ QUESTIONS 1-9: 0

## 2017-11-08 ASSESSMENT — COPD QUESTIONNAIRES: DURING THE PAST 4 WEEKS HOW MUCH DID YOU FEEL SHORT OF BREATH: SOME OF THE TIME

## 2017-11-08 ASSESSMENT — PAIN SCALES - GENERAL
PAINLEVEL_OUTOF10: 2
PAINLEVEL_OUTOF10: 2
PAINLEVEL_OUTOF10: 0
PAINLEVEL_OUTOF10: 2

## 2017-11-08 ASSESSMENT — GAIT ASSESSMENTS
ASSISTIVE DEVICE: FRONT WHEEL WALKER
DISTANCE (FEET): 125
GAIT LEVEL OF ASSIST: CONTACT GUARD ASSIST
DEVIATION: BRADYKINETIC;SHUFFLED GAIT

## 2017-11-08 NOTE — PROGRESS NOTES
Simona Knight Fall Risk Assessment:     Last Known Fall: Within the last six months  Mobility: Immobilized/requires assist of one person  Medications: Cardiovascular and central nervous system meds  Mental Status/LOC/Awareness: Memory loss/confusion and requires reorienting  Toileting Needs: Incontinence  Volume/Electrolyte Status: No problems  Communication/Sensory: Visual (Glasses)/hearing deficit  Behavior: Appropriate behavior  Simona Knight Fall Risk Total: 16  Fall Risk Level: HIGH RISK    Universal Fall Precautions:  call light/belongings in reach, bed in low position and locked, wheelchairs and assistive devices out of sight, siderails up x 2, use non-slip footwear, adequate lighting, clutter free and spill free environment, educate on level of risk, educate to call for assistance    Fall Risk Level Interventions:    TRIAL ([employer] 8, NEURO, MED JENNIE 5) Moderate Fall Risk Interventions  Place yellow fall risk ID band on patient: verified  Provide patient/family education based on risk assessment : completed  Educate patient/family to call staff for assistance when getting out of bed: completed  Place fall precaution signage outside patient door: verified  Utilize bed/chair fall alarm: verifiedTRIAL (TELE 8, NEURO, [employer] JENNIE 5) High Fall Risk Interventions  Place yellow fall risk ID band on patient: verified  Provide patient/family education based on risk assessment: completed  Educate patient/family to call staff for assistance when getting out of bed: completed  Place fall precaution signage outside patient door: verified  Place patient in room close to nursing station: currently not available/charge notified  Utilize bed/chair fall alarm: verified  Notify charge of high risk for huddle: verified    Patient Specific Interventions:     Medication: review medications with patient and family, assess for medications that can be discontinued or dosage decreased, limit combination of prn medications and provide  patients who received diuretics/laxatives frequent toileting  Mental Status/LOC/Awareness: reorient patient, reinforce falls education, check on patient hourly, utilize bed/chair fall alarm, reinforce the use of call light and provide activity  Toileting: provide frquent toileting, monitor intake and output/use of appropriate interventions, instruct patient/family on the use of grab bars, instruct patient/family on the need to call for assistance when toileting, do not leave patient unattended in bathroom/refer to toileting scripting and toilet prior to giving pain medications  Volume/Electrolyte Status: ensure patient remains hydrated, monitor abnormal lab values and teach patients to dangle before rising if hypotensive  Communication/Sensory: update plan of care on whiteboard, ensure proper positioning when transferrng/ambulating and ensure patient has glasses/contacts and hearing aids/dentures  Behavioral: encourage patient to voice feelings, engage patient in daily activities, administer medication as ordered and instruct/reinforce fall program rationale  Mobility: schedule physical activity throughout the day, dangle prior to standing, utilize bed/chair fall alarm, ensure bed is locked and in lowest position, instruct patient to exit bed on their strongest side and collaborate with doctor for possible PT/OT consult

## 2017-11-08 NOTE — PROGRESS NOTES
Assumed care of pt. Introduced and ID verified. Assessment complete and am meds given. Assisted with breakfast. Call light in reach, safety precautions in place. No further needs at this time.

## 2017-11-08 NOTE — PROGRESS NOTES
[facility]    Date of Service: 2017    Chief Complaint  76 y.o. female admitted 2017 with AMS and UTI.    Interval Problem Update  No new issues or concerns. Awaiting guardianship and placement.    Consultants/Specialty  Neurology    Disposition  Pending guardianship and placement   assisting  No court date yet, Not till late December or January        Review of Systems   Constitutional: Negative for malaise/fatigue.   HENT: Negative for hearing loss.    Respiratory: Negative for cough and shortness of breath.    Cardiovascular: Negative for chest pain and leg swelling.   Gastrointestinal: Negative for abdominal pain.   Genitourinary: Negative for urgency.   Musculoskeletal: Negative for joint pain, myalgias and neck pain.   Neurological: Positive for weakness. Negative for dizziness.   Psychiatric/Behavioral: Positive for memory loss. The patient is not nervous/anxious.       Physical Exam  Laboratory/Imaging   Hemodynamics  Temp (24hrs), Av.1 °C (97 °F), Min:35.9 °C (96.7 °F), Max:36.2 °C (97.2 °F)   Temperature: 36.2 °C (97.2 °F)  Pulse  Av  Min: 50  Max: 99    Blood Pressure : (!) 95/47 (notified RN)      Respiratory      Respiration: 16, Pulse Oximetry: 92 %        RUL Breath Sounds: Clear, RML Breath Sounds: Diminished, RLL Breath Sounds: Diminished, ARGELIA Breath Sounds: Clear, LLL Breath Sounds: Diminished    Fluids    Intake/Output Summary (Last 24 hours) at 17 1715  Last data filed at 17 2049   Gross per 24 hour   Intake              120 ml   Output                0 ml   Net              120 ml       Nutrition  Orders Placed This Encounter   Procedures   • Diet Order     Standing Status:   Standing     Number of Occurrences:   1     Order Specific Question:   Diet:     Answer:   Regular [1]     Physical Exam   Constitutional: No distress.   HENT:   Head: Normocephalic and atraumatic.   Eyes: EOM are normal. Pupils are equal, round, and reactive  to light. Right eye exhibits no discharge. Left eye exhibits no discharge.   Neck: Normal range of motion.   Cardiovascular: Normal rate, regular rhythm and intact distal pulses.    Pulmonary/Chest: Effort normal. No respiratory distress.   Abdominal: Soft. She exhibits no distension.   Musculoskeletal: She exhibits no edema.   Neurological: She is alert. No cranial nerve deficit. She exhibits normal muscle tone.   AOx2   Skin: Skin is warm and dry.   Psychiatric: She has a normal mood and affect. Her behavior is normal. Judgment and thought content normal. Cognition and memory are impaired.   Pleasant   Vitals reviewed.                               Assessment/Plan     * Dementia- (present on admission)   Assessment & Plan    Orignally presented with fall at home in the setting of severe dementia and urinary tract infection back in September 2017.  MRI of her brain showed enlarged ventricles but no acute infarct. Significant concern for NPH given her dementia, incontinence, and gait instability. Psychiatry deemed patient incapacitated. Neuro evaluated and signed off as she refused any intervention. Has been pending guardianship, currently no court date yet but planned to be scheduled til late Dec to early Jan        HTN (hypertension)- (present on admission)   Assessment & Plan    Previously difficult to control, now normotensive to hypotensive.   - Continue Hydralazine, metoprolol, Lotensin   - discontinued Norvasc        History of CVA (cerebrovascular accident)- (present on admission)   Assessment & Plan    MRI on 9/23 showed no evidence of acute stroke. Prominent ventricles, as noted above.  - Continue ASA and statin  - neuro evaluated and signed off  - to chair with meals  - PT/OT, encourage activity, uses frontwheel walker, and unstable without  - encourage ambulation        Hypokalemia- (present on admission)   Assessment & Plan    K 3.5 on 10/28 responds appropriately to KDur  cont        T12 compression  fracture (CMS-HCC)- (present on admission)   Assessment & Plan    CT spine, no acute fractures. No complaints of pain.            Reviewed items::  Labs reviewed and Medications reviewed  Pink catheter::  No Pink  DVT prophylaxis pharmacological::  Heparin  Ulcer Prophylaxis::  Yes

## 2017-11-08 NOTE — PROGRESS NOTES
Assumed care of pt after receiving report from dayshift RN. Bedside rounding completed w/ dayshift RN, pt assessed and medicated. Pt resting in bed A&OX2, disoriented to time and place. Fall measures in place, bed alarm is on, call light within reach, personal belongings nearby, bed in lowest position, and hourly rounding in place. Will continue to monitor pt.

## 2017-11-09 PROCEDURE — 99233 SBSQ HOSP IP/OBS HIGH 50: CPT | Performed by: INTERNAL MEDICINE

## 2017-11-09 PROCEDURE — 700101 HCHG RX REV CODE 250: Performed by: FAMILY MEDICINE

## 2017-11-09 PROCEDURE — 770006 HCHG ROOM/CARE - MED/SURG/GYN SEMI*

## 2017-11-09 PROCEDURE — A9270 NON-COVERED ITEM OR SERVICE: HCPCS | Performed by: HOSPITALIST

## 2017-11-09 PROCEDURE — 700102 HCHG RX REV CODE 250 W/ 637 OVERRIDE(OP): Performed by: HOSPITALIST

## 2017-11-09 PROCEDURE — 700111 HCHG RX REV CODE 636 W/ 250 OVERRIDE (IP): Performed by: HOSPITALIST

## 2017-11-09 PROCEDURE — 700102 HCHG RX REV CODE 250 W/ 637 OVERRIDE(OP): Performed by: FAMILY MEDICINE

## 2017-11-09 PROCEDURE — A9270 NON-COVERED ITEM OR SERVICE: HCPCS | Performed by: FAMILY MEDICINE

## 2017-11-09 RX ADMIN — GABAPENTIN 300 MG: 300 CAPSULE ORAL at 22:24

## 2017-11-09 RX ADMIN — HEPARIN SODIUM 5000 UNITS: 5000 INJECTION, SOLUTION INTRAVENOUS; SUBCUTANEOUS at 09:34

## 2017-11-09 RX ADMIN — HYDRALAZINE HYDROCHLORIDE 100 MG: 50 TABLET ORAL at 22:23

## 2017-11-09 RX ADMIN — METOPROLOL TARTRATE 12.5 MG: 25 TABLET, FILM COATED ORAL at 09:34

## 2017-11-09 RX ADMIN — BENAZEPRIL HYDROCHLORIDE 40 MG: 20 TABLET ORAL at 09:34

## 2017-11-09 RX ADMIN — DULOXETINE HYDROCHLORIDE 20 MG: 20 CAPSULE, DELAYED RELEASE ORAL at 09:35

## 2017-11-09 RX ADMIN — HYDRALAZINE HYDROCHLORIDE 100 MG: 50 TABLET ORAL at 14:40

## 2017-11-09 RX ADMIN — OMEPRAZOLE 20 MG: 20 CAPSULE, DELAYED RELEASE ORAL at 09:35

## 2017-11-09 RX ADMIN — OXYBUTYNIN CHLORIDE 5 MG: 5 TABLET, FILM COATED, EXTENDED RELEASE ORAL at 22:23

## 2017-11-09 RX ADMIN — HEPARIN SODIUM 5000 UNITS: 5000 INJECTION, SOLUTION INTRAVENOUS; SUBCUTANEOUS at 22:26

## 2017-11-09 RX ADMIN — STANDARDIZED SENNA CONCENTRATE AND DOCUSATE SODIUM 2 TABLET: 8.6; 5 TABLET, FILM COATED ORAL at 09:35

## 2017-11-09 RX ADMIN — HYDRALAZINE HYDROCHLORIDE 100 MG: 50 TABLET ORAL at 05:21

## 2017-11-09 RX ADMIN — LIDOCAINE 1 PATCH: 50 PATCH CUTANEOUS at 09:36

## 2017-11-09 RX ADMIN — METOPROLOL TARTRATE 12.5 MG: 25 TABLET, FILM COATED ORAL at 22:23

## 2017-11-09 RX ADMIN — ASPIRIN 81 MG: 81 TABLET, COATED ORAL at 09:36

## 2017-11-09 RX ADMIN — ATORVASTATIN CALCIUM 80 MG: 40 TABLET, FILM COATED ORAL at 21:00

## 2017-11-09 ASSESSMENT — PAIN SCALES - GENERAL
PAINLEVEL_OUTOF10: 0

## 2017-11-09 ASSESSMENT — ENCOUNTER SYMPTOMS
DIZZINESS: 0
NERVOUS/ANXIOUS: 0
WEAKNESS: 1
MYALGIAS: 0
ABDOMINAL PAIN: 0
NECK PAIN: 0
COUGH: 0
MEMORY LOSS: 1
SHORTNESS OF BREATH: 0

## 2017-11-09 ASSESSMENT — LIFESTYLE VARIABLES: DO YOU DRINK ALCOHOL: NO

## 2017-11-09 NOTE — PROGRESS NOTES
Simona Knight Fall Risk Assessment:     Last Known Fall: Within the last six months  Mobility: Immobilized/requires assist of one person  Medications: Cardiovascular and central nervous system meds  Mental Status/LOC/Awareness: Memory loss/confusion and requires reorienting  Toileting Needs: Incontinence  Volume/Electrolyte Status: No problems  Communication/Sensory: Visual (Glasses)/hearing deficit  Behavior: Appropriate behavior  Simona Knight Fall Risk Total: 16  Fall Risk Level: HIGH RISK    Universal Fall Precautions:  call light/belongings in reach, bed in low position and locked    Fall Risk Level Interventions:    TRIAL (TELE 8, NEURO, MED JENNIE 5) Moderate Fall Risk Interventions  Place yellow fall risk ID band on patient: verified  Provide patient/family education based on risk assessment : completed  Educate patient/family to call staff for assistance when getting out of bed: completed  Place fall precaution signage outside patient door: verified  Utilize bed/chair fall alarm: verifiedTRIAL (TELE 8, NEURO, MED JENNIE 5) High Fall Risk Interventions  Place yellow fall risk ID band on patient: verified  Provide patient/family education based on risk assessment: completed  Educate patient/family to call staff for assistance when getting out of bed: completed  Place fall precaution signage outside patient door: verified  Place patient in room close to nursing station: currently not available/charge notified  Utilize bed/chair fall alarm: completed  Notify charge of high risk for huddle: verified    Patient Specific Interventions:     Medication: limit combination of prn medications  Mental Status/LOC/Awareness: provide activity  Toileting: provide frquent toileting  Volume/Electrolyte Status: ensure patient remains hydrated  Communication/Sensory: update plan of care on whiteboard  Behavioral: engage patient in daily activities  Mobility: schedule physical activity throughout the day

## 2017-11-09 NOTE — PROGRESS NOTES
[facility]    Date of Service: 2017    Chief Complaint  76 y.o. female admitted 2017 with AMS and UTI.    Interval Problem Update  Awaiting guardianship. No new issues or concerns.    Consultants/Specialty  Neurology    Disposition  Pending guardianship and placement   assisting  No court date yet, Not till late December or January        Review of Systems   Constitutional: Negative for malaise/fatigue.   HENT: Negative for hearing loss.    Respiratory: Negative for cough and shortness of breath.    Cardiovascular: Negative for chest pain and leg swelling.   Gastrointestinal: Negative for abdominal pain.   Genitourinary: Negative for urgency.   Musculoskeletal: Negative for joint pain, myalgias and neck pain.   Neurological: Positive for weakness. Negative for dizziness.   Psychiatric/Behavioral: Positive for memory loss. The patient is not nervous/anxious.       Physical Exam  Laboratory/Imaging   Hemodynamics  Temp (24hrs), Av.3 °C (97.3 °F), Min:36.2 °C (97.2 °F), Max:36.3 °C (97.4 °F)   Temperature: 36.2 °C (97.2 °F)  Pulse  Av  Min: 50  Max: 99    Blood Pressure : 148/65      Respiratory      Respiration: 20, Pulse Oximetry: 94 %        RUL Breath Sounds: Clear, RML Breath Sounds: Diminished, RLL Breath Sounds: Diminished, ARGELIA Breath Sounds: Clear, LLL Breath Sounds: Diminished    Fluids  No intake or output data in the 24 hours ending 17 1645    Nutrition  Orders Placed This Encounter   Procedures   • Diet Order     Standing Status:   Standing     Number of Occurrences:   1     Order Specific Question:   Diet:     Answer:   Regular [1]     Physical Exam   Constitutional: No distress.   HENT:   Head: Normocephalic and atraumatic.   Eyes: EOM are normal. Pupils are equal, round, and reactive to light. Right eye exhibits no discharge. Left eye exhibits no discharge.   Neck: Normal range of motion.   Cardiovascular: Normal rate, regular rhythm and intact  distal pulses.    Pulmonary/Chest: Effort normal. No respiratory distress.   Abdominal: Soft. She exhibits no distension.   Musculoskeletal: She exhibits no edema.   Neurological: She is alert. No cranial nerve deficit. She exhibits normal muscle tone.   AOx2   Skin: Skin is warm and dry.   Psychiatric: She has a normal mood and affect. Her behavior is normal. Judgment and thought content normal. Cognition and memory are impaired.   Pleasant   Vitals reviewed.                               Assessment/Plan     * Dementia- (present on admission)   Assessment & Plan    Orignally presented with fall at home in the setting of severe dementia and urinary tract infection back in September 2017.  MRI of her brain showed enlarged ventricles but no acute infarct. Significant concern for NPH given her dementia, incontinence, and gait instability. Psychiatry deemed patient incapacitated. Neuro evaluated and signed off as she refused any intervention. Has been pending guardianship, currently no court date yet but planned to be scheduled til late Dec to early Jan        HTN (hypertension)- (present on admission)   Assessment & Plan    Previously difficult to control, now normotensive to hypotensive.   - Continue Hydralazine, metoprolol, Lotensin   - discontinued Norvasc        History of CVA (cerebrovascular accident)- (present on admission)   Assessment & Plan    MRI on 9/23 showed no evidence of acute stroke. Prominent ventricles, as noted above.  - Continue ASA and statin  - neuro evaluated and signed off  - to chair with meals  - PT/OT, encourage activity, uses frontwheel walker, and unstable without  - encourage ambulation        Hypokalemia- (present on admission)   Assessment & Plan    K 3.5 on 10/28 responds appropriately to KDur  cont        T12 compression fracture (CMS-HCC)- (present on admission)   Assessment & Plan    CT spine, no acute fractures. No complaints of pain.            Reviewed items::  Labs reviewed and  Medications reviewed  Pink catheter::  No Pink  DVT prophylaxis pharmacological::  Heparin  Ulcer Prophylaxis::  Yes

## 2017-11-09 NOTE — CARE PLAN
Problem: Nutritional:  Goal: Achieve adequate nutritional intake  Patient will consume >50% of meals   Outcome: MET Date Met: 11/09/17  Weight stable and PO intake in the last two days is between % of meals.   RD will continue to monitor per department policy and nutrition rep to see patient daily.

## 2017-11-09 NOTE — RESPIRATORY CARE
COPD EDUCATION by COPD CLINICAL EDUCATOR  11/9/2017 at 6:54 AM by Alena Gaines     Patient reviewed by COPD education team. Patient does not qualify for COPD program.

## 2017-11-09 NOTE — PROGRESS NOTES
Report received from day RN. Assumed patient care at 1900. Patient is AAOx2, patient reoriented to time and event. Patient appears calm and in no acute distress. Labs and orders reviewed. Assessment completed. Patient denies any pain at this time. Patient provided with scheduled medication, refer to MAR. Plan of care discussed with patient; questions have been answered at this time. Patient needs have been met at this time. Patient encouraged to call when needing assistance. Call light within reach. Bed alarm on. Non-Skid socks in place. Bed locked and in the lowest position. Hourly rounding in place.

## 2017-11-09 NOTE — CARE PLAN
Problem: Safety  Goal: Will remain free from falls    Intervention: Implement fall precautions   11/08/17 1835   OTHER   Environmental Precautions Treaded Slipper Socks on Patient;Personal Belongings, Wastebasket, Call Bell etc. in Easy Reach;Bed in Low Position;Transferred to Stronger Side;Communication Sign for Patients & Families;Report Given to Other Health Care Providers Regarding Fall Risk;Mobility Assessed & Appropriate Sign Placed   Bedrails Bedrails Closest to Bathroom Down   Chair/Bed Strip Alarm Yes - Alarm On         Problem: Mobility  Goal: Risk for activity intolerance will decrease    Intervention: Assess and monitor signs of activity intolerance   11/08/17 1835   OTHER   Assistance / Tolerance Assistance of One;Tolerates Well;General Weakness   Ambulated in [place] with walker. Up to chair for meals.

## 2017-11-09 NOTE — CARE PLAN
Problem: Safety  Goal: Will remain free from falls    Intervention: Assess risk factors for falls   11/08/17 2100   OTHER   Fall Risk High Risk to Fall - 2 or more points    Risk for Injury-Any positive answers results in the pt being at high risk for fall related injury Not Applicable   Mobility Status Assessment 1-1 Healthcare Provider Required for Assistance with Ambulation & Transfer   History of fall 2   Date of Last Fall 09/21/17   Pt Calls for Assistance Yes   Environment is clutter free. Bedside table and personal possession near patient. Bed locked and in the lowest position. Bed alarm on. Non-skid socks in place. Patient encouraged to call when needing assistance. Call light within reach.       Problem: Skin Integrity  Goal: Risk for impaired skin integrity will decrease    Intervention: Assess risk factors for impaired skin integrity and/or pressure ulcers  Assessing and monitoring patient skin integrity. Moisturizer provided.

## 2017-11-09 NOTE — CARE PLAN
Problem: Safety  Goal: Will remain free from injury    Intervention: Provide assistance with mobility  Pt is currently a one person assist. She needs to be motivated to get up and walk, but when she is motivated she complies most of the time.       Problem: Mobility  Goal: Risk for activity intolerance will decrease  Outcome: PROGRESSING AS EXPECTED  Pt continues to need encouragement.

## 2017-11-09 NOTE — PROGRESS NOTES
Assumed care of pt at 0700. Received report from RN. Pt A&Ox2 (oriented to time and event). Assessment complete. Labs reviewed. Pt and RN discussed plan of care. Pt questions answered. Pt needs are met at this time. Bed in lowest and locked position. Call light within reach. Upper bed rails up. Hourly rounding in place.

## 2017-11-09 NOTE — PROGRESS NOTES
Simona Knight Fall Risk Assessment:     Last Known Fall: Within the last six months  Mobility: Immobilized/requires assist of one person  Medications: Cardiovascular and central nervous system meds  Mental Status/LOC/Awareness: Memory loss/confusion and requires reorienting  Toileting Needs: Incontinence  Volume/Electrolyte Status: No problems  Communication/Sensory: Visual (Glasses)/hearing deficit  Behavior: Appropriate behavior  Simona Knight Fall Risk Total: 16  Fall Risk Level: HIGH RISK    Universal Fall Precautions:  call light/belongings in reach, bed in low position and locked, siderails up x 2, use non-slip footwear, adequate lighting, clutter free and spill free environment, educate on level of risk, educate to call for assistance    Fall Risk Level Interventions:    TRIAL (TELE 8, NEURO, MED JENNIE 5) Moderate Fall Risk Interventions  Place yellow fall risk ID band on patient: verified  Provide patient/family education based on risk assessment : completed  Educate patient/family to call staff for assistance when getting out of bed: completed  Place fall precaution signage outside patient door: verified  Utilize bed/chair fall alarm: verifiedTRIAL (TELE 8, NEURO, [employer] JENNIE 5) High Fall Risk Interventions  Place yellow fall risk ID band on patient: verified  Provide patient/family education based on risk assessment: completed  Educate patient/family to call staff for assistance when getting out of bed: completed  Place fall precaution signage outside patient door: verified  Place patient in room close to nursing station: currently not available/charge notified  Utilize bed/chair fall alarm: verified  Notify charge of high risk for huddle: verified    Patient Specific Interventions:     Medication: review medications with patient and family and assess for medications that can be discontinued or dosage decreased  Mental Status/LOC/Awareness: reorient patient, reinforce falls education, check on patient  hourly, utilize bed/chair fall alarm and reinforce the use of call light  Toileting: provide frquent toileting and instruct patient/family on the need to call for assistance when toileting  Volume/Electrolyte Status: ensure patient remains hydrated and monitor abnormal lab values  Communication/Sensory: update plan of care on whiteboard, ensure proper positioning when transferrng/ambulating and ensure patient has glasses/contacts and hearing aids/dentures  Behavioral: encourage patient to voice feelings and administer medication as ordered  Mobility: provide comfort measures during transport, utilize bed/chair fall alarm, ensure bed is locked and in lowest position, provide appropriate assistive device and instruct patient to exit bed on their strongest side

## 2017-11-09 NOTE — PROGRESS NOTES
Simona Knight Fall Risk Assessment:     Last Known Fall: Within the last six months  Mobility: Immobilized/requires assist of one person  Medications: Cardiovascular and central nervous system meds  Mental Status/LOC/Awareness: Memory loss/confusion and requires reorienting  Toileting Needs: Incontinence  Volume/Electrolyte Status: No problems  Communication/Sensory: Visual (Glasses)/hearing deficit  Behavior: Appropriate behavior  Simona Knight Fall Risk Total: 16  Fall Risk Level: HIGH RISK    Universal Fall Precautions:  call light/belongings in reach, bed in low position and locked, wheelchairs and assistive devices out of sight, siderails up x 2, use non-slip footwear, adequate lighting, clutter free and spill free environment, educate on level of risk, educate to call for assistance    Fall Risk Level Interventions:    TRIAL ([employer] 8, NEURO, MED JENNIE 5) Moderate Fall Risk Interventions  Place yellow fall risk ID band on patient: verified  Provide patient/family education based on risk assessment : completed  Educate patient/family to call staff for assistance when getting out of bed: completed  Place fall precaution signage outside patient door: verified  Utilize bed/chair fall alarm: verifiedTRIAL (TELE 8, NEURO, [employer] JENNIE 5) High Fall Risk Interventions  Place yellow fall risk ID band on patient: verified  Provide patient/family education based on risk assessment: completed  Educate patient/family to call staff for assistance when getting out of bed: completed  Place fall precaution signage outside patient door: verified  Place patient in room close to nursing station: currently not available/charge notified  Utilize bed/chair fall alarm: verified  Notify charge of high risk for huddle: verified    Patient Specific Interventions:     Medication: review medications with patient and family, assess for medications that can be discontinued or dosage decreased and limit combination of prn medications  Mental  Status/LOC/Awareness: reorient patient, reinforce falls education, encourage family to stay with patient, check on patient hourly, utilize bed/chair fall alarm, reinforce the use of call light and provide activity  Toileting: consider obtaining elevated toilet seat or bedside commode (BSC), provide frquent toileting, monitor intake and output/use of appropriate interventions, instruct patient/family on the use of grab bars and instruct patient/family on the need to call for assistance when toileting  Volume/Electrolyte Status: ensure patient remains hydrated, advance diet as tolerated, administer medications as ordered for nausea and vomiting, monitor abnormal lab values and ensure IV fluids are appropriate  Communication/Sensory: update plan of care on whiteboard, provide communication alternatives/, ensure proper positioning when transferrng/ambulating, ensure patient has glasses/contacts and hearing aids/dentures and for visually impaired patients orient to their room surrounding and do not change their surroundings  Behavioral: encourage patient to voice feelings, engage patient in daily activities, administer medication as ordered and instruct/reinforce fall program rationale  Mobility: schedule physical activity throughout the day, provide comfort measures during transport, dangle prior to standing, utilize bed/chair fall alarm, ensure bed is locked and in lowest position, provide appropriate assistive device, instruct patient to exit bed on their strongest side and collaborate with doctor for possible PT/OT consult

## 2017-11-10 LAB
ANION GAP SERPL CALC-SCNC: 6 MMOL/L (ref 0–11.9)
BUN SERPL-MCNC: 28 MG/DL (ref 8–22)
CALCIUM SERPL-MCNC: 9.7 MG/DL (ref 8.5–10.5)
CHLORIDE SERPL-SCNC: 109 MMOL/L (ref 96–112)
CO2 SERPL-SCNC: 26 MMOL/L (ref 20–33)
CREAT SERPL-MCNC: 1.01 MG/DL (ref 0.5–1.4)
ERYTHROCYTE [DISTWIDTH] IN BLOOD BY AUTOMATED COUNT: 50.1 FL (ref 35.9–50)
GFR SERPL CREATININE-BSD FRML MDRD: 53 ML/MIN/1.73 M 2
GLUCOSE SERPL-MCNC: 107 MG/DL (ref 65–99)
HCT VFR BLD AUTO: 39.1 % (ref 37–47)
HGB BLD-MCNC: 12.8 G/DL (ref 12–16)
MCH RBC QN AUTO: 28.6 PG (ref 27–33)
MCHC RBC AUTO-ENTMCNC: 32.7 G/DL (ref 33.6–35)
MCV RBC AUTO: 87.5 FL (ref 81.4–97.8)
PLATELET # BLD AUTO: 168 K/UL (ref 164–446)
PMV BLD AUTO: 11.3 FL (ref 9–12.9)
POTASSIUM SERPL-SCNC: 3.7 MMOL/L (ref 3.6–5.5)
RBC # BLD AUTO: 4.47 M/UL (ref 4.2–5.4)
SODIUM SERPL-SCNC: 141 MMOL/L (ref 135–145)
WBC # BLD AUTO: 5 K/UL (ref 4.8–10.8)

## 2017-11-10 PROCEDURE — 99231 SBSQ HOSP IP/OBS SF/LOW 25: CPT | Performed by: INTERNAL MEDICINE

## 2017-11-10 PROCEDURE — 700111 HCHG RX REV CODE 636 W/ 250 OVERRIDE (IP): Performed by: HOSPITALIST

## 2017-11-10 PROCEDURE — 85027 COMPLETE CBC AUTOMATED: CPT

## 2017-11-10 PROCEDURE — A9270 NON-COVERED ITEM OR SERVICE: HCPCS | Performed by: HOSPITALIST

## 2017-11-10 PROCEDURE — 36415 COLL VENOUS BLD VENIPUNCTURE: CPT

## 2017-11-10 PROCEDURE — 700102 HCHG RX REV CODE 250 W/ 637 OVERRIDE(OP): Performed by: HOSPITALIST

## 2017-11-10 PROCEDURE — 700102 HCHG RX REV CODE 250 W/ 637 OVERRIDE(OP): Performed by: FAMILY MEDICINE

## 2017-11-10 PROCEDURE — 770006 HCHG ROOM/CARE - MED/SURG/GYN SEMI*

## 2017-11-10 PROCEDURE — A9270 NON-COVERED ITEM OR SERVICE: HCPCS | Performed by: FAMILY MEDICINE

## 2017-11-10 PROCEDURE — 700101 HCHG RX REV CODE 250: Performed by: FAMILY MEDICINE

## 2017-11-10 PROCEDURE — 80048 BASIC METABOLIC PNL TOTAL CA: CPT

## 2017-11-10 RX ADMIN — HEPARIN SODIUM 5000 UNITS: 5000 INJECTION, SOLUTION INTRAVENOUS; SUBCUTANEOUS at 22:36

## 2017-11-10 RX ADMIN — BENAZEPRIL HYDROCHLORIDE 40 MG: 20 TABLET ORAL at 09:32

## 2017-11-10 RX ADMIN — HYDRALAZINE HYDROCHLORIDE 100 MG: 50 TABLET ORAL at 05:48

## 2017-11-10 RX ADMIN — DULOXETINE HYDROCHLORIDE 20 MG: 20 CAPSULE, DELAYED RELEASE ORAL at 09:32

## 2017-11-10 RX ADMIN — METOPROLOL TARTRATE 12.5 MG: 25 TABLET, FILM COATED ORAL at 09:33

## 2017-11-10 RX ADMIN — HYDRALAZINE HYDROCHLORIDE 100 MG: 50 TABLET ORAL at 22:42

## 2017-11-10 RX ADMIN — HEPARIN SODIUM 5000 UNITS: 5000 INJECTION, SOLUTION INTRAVENOUS; SUBCUTANEOUS at 09:34

## 2017-11-10 RX ADMIN — ASPIRIN 81 MG: 81 TABLET, COATED ORAL at 09:34

## 2017-11-10 RX ADMIN — METOPROLOL TARTRATE 12.5 MG: 25 TABLET, FILM COATED ORAL at 22:42

## 2017-11-10 RX ADMIN — OMEPRAZOLE 20 MG: 20 CAPSULE, DELAYED RELEASE ORAL at 09:32

## 2017-11-10 RX ADMIN — OXYBUTYNIN CHLORIDE 5 MG: 5 TABLET, FILM COATED, EXTENDED RELEASE ORAL at 22:37

## 2017-11-10 RX ADMIN — ATORVASTATIN CALCIUM 80 MG: 40 TABLET, FILM COATED ORAL at 23:49

## 2017-11-10 RX ADMIN — LIDOCAINE 1 PATCH: 50 PATCH CUTANEOUS at 09:32

## 2017-11-10 RX ADMIN — GABAPENTIN 300 MG: 300 CAPSULE ORAL at 22:36

## 2017-11-10 ASSESSMENT — ENCOUNTER SYMPTOMS
ABDOMINAL PAIN: 0
MEMORY LOSS: 1
SHORTNESS OF BREATH: 0
NECK PAIN: 0
DIZZINESS: 0
COUGH: 0
MYALGIAS: 0
NERVOUS/ANXIOUS: 0
WEAKNESS: 1

## 2017-11-10 ASSESSMENT — PAIN SCALES - GENERAL
PAINLEVEL_OUTOF10: 0
PAINLEVEL_OUTOF10: 0

## 2017-11-10 NOTE — CARE PLAN
Problem: Safety  Goal: Will remain free from injury  Outcome: PROGRESSING AS EXPECTED  Treaded socks in place, bed in the lowest position, bed alarm on, call light and belongings within reach, pt call for assistance appropriately    Problem: Venous Thromboembolism (VTW)/Deep Vein Thrombosis (DVT) Prevention:  Goal: Patient will participate in Venous Thrombosis (VTE)/Deep Vein Thrombosis (DVT)Prevention Measures  Outcome: PROGRESSING AS EXPECTED  Receiving unfractionated heparin per MAR, pt refusing scds despite education.     Problem: Pain Management  Goal: Pain level will decrease to patient's comfort goal  Outcome: PROGRESSING AS EXPECTED  lidocaine patch per MAR with adequate pain control, hourly rounding in progress    Problem: Skin Integrity  Goal: Risk for impaired skin integrity will decrease  Outcome: PROGRESSING AS EXPECTED  q2 turns savage risk assessment, applied barrier cream

## 2017-11-10 NOTE — PROGRESS NOTES
[facility]ist Progress Note    Date of Service: 11/10/2017    Chief Complaint  76 y.o. female admitted 2017 with AMS and UTI.    Interval Problem Update  Deconditioned. No new issues or concerns    Consultants/Specialty  Neurology    Disposition  Pending guardianship and placement   assisting  No court date yet, Not till late December or January        Review of Systems   Constitutional: Negative for malaise/fatigue.   HENT: Negative for hearing loss.    Respiratory: Negative for cough and shortness of breath.    Cardiovascular: Negative for chest pain and leg swelling.   Gastrointestinal: Negative for abdominal pain.   Genitourinary: Negative for urgency.   Musculoskeletal: Negative for joint pain, myalgias and neck pain.   Neurological: Positive for weakness. Negative for dizziness.   Psychiatric/Behavioral: Positive for memory loss. The patient is not nervous/anxious.       Physical Exam  Laboratory/Imaging   Hemodynamics  Temp (24hrs), Av.2 °C (97.2 °F), Min:36.1 °C (96.9 °F), Max:36.4 °C (97.6 °F)   Temperature: 36.4 °C (97.6 °F)  Pulse  Av.2  Min: 50  Max: 105    Blood Pressure : 117/56      Respiratory      Respiration: 17, Pulse Oximetry: 92 %        RUL Breath Sounds: Clear, RML Breath Sounds: Diminished, RLL Breath Sounds: Diminished, ARGELIA Breath Sounds: Clear, LLL Breath Sounds: Diminished    Fluids    Intake/Output Summary (Last 24 hours) at 11/10/17 1548  Last data filed at 11/10/17 1315   Gross per 24 hour   Intake              240 ml   Output                0 ml   Net              240 ml       Nutrition  Orders Placed This Encounter   Procedures   • Diet Order     Standing Status:   Standing     Number of Occurrences:   1     Order Specific Question:   Diet:     Answer:   Regular [1]     Physical Exam   Constitutional: No distress.   HENT:   Head: Normocephalic and atraumatic.   Eyes: EOM are normal. Pupils are equal, round, and reactive to light. Right eye exhibits no  discharge. Left eye exhibits no discharge.   Neck: Normal range of motion.   Cardiovascular: Normal rate, regular rhythm and intact distal pulses.    Pulmonary/Chest: Effort normal. No respiratory distress.   Abdominal: Soft. She exhibits no distension.   Musculoskeletal: She exhibits no edema.   Neurological: She is alert. No cranial nerve deficit. She exhibits normal muscle tone.   AOx2   Skin: Skin is warm and dry.   Psychiatric: She has a normal mood and affect. Her behavior is normal. Judgment and thought content normal. Cognition and memory are impaired.   Pleasant   Vitals reviewed.      Recent Labs      11/10/17   0315   WBC  5.0   RBC  4.47   HEMOGLOBIN  12.8   HEMATOCRIT  39.1   MCV  87.5   MCH  28.6   MCHC  32.7*   RDW  50.1*   PLATELETCT  168   MPV  11.3     Recent Labs      11/10/17   0315   SODIUM  141   POTASSIUM  3.7   CHLORIDE  109   CO2  26   GLUCOSE  107*   BUN  28*   CREATININE  1.01   CALCIUM  9.7                      Assessment/Plan     * Dementia- (present on admission)   Assessment & Plan    Orignally presented with fall at home in the setting of severe dementia and urinary tract infection back in September 2017.  MRI of her brain showed enlarged ventricles but no acute infarct. Significant concern for NPH given her dementia, incontinence, and gait instability. Psychiatry deemed patient incapacitated. Neuro evaluated and signed off as she refused any intervention. Has been pending guardianship, currently no court date yet but planned to be scheduled til late Dec to early Jan        HTN (hypertension)- (present on admission)   Assessment & Plan    Previously difficult to control, now normotensive to hypotensive.   - Continue Hydralazine, metoprolol, Lotensin   - discontinued Norvasc        History of CVA (cerebrovascular accident)- (present on admission)   Assessment & Plan    MRI on 9/23 showed no evidence of acute stroke. Prominent ventricles, as noted above.  - Continue ASA and statin  -  neuro evaluated and signed off  - to chair with meals  - PT/OT, encourage activity, uses frontwheel walker, and unstable without  - encourage ambulation        Hypokalemia- (present on admission)   Assessment & Plan    K 3.5 on 10/28 responds appropriately to KDur  cont        T12 compression fracture (CMS-HCC)- (present on admission)   Assessment & Plan    CT spine, no acute fractures. No complaints of pain.            Reviewed items::  Labs reviewed and Medications reviewed  Pink catheter::  No Pink  DVT prophylaxis pharmacological::  Heparin  Ulcer Prophylaxis::  Yes

## 2017-11-10 NOTE — PROGRESS NOTES
"Assumed care at 1900. Received report from RN. Patient is AOx3 disoriented to event. Assessment complete. Labs reviewed. Patient and RN discussed plan of care. Patient questions answered. Patient needs are met at this time. Bed in lowest and locked position. Call light is within reach. Hourly rounding in place. /63   Pulse 83   Temp 36.1 °C (97 °F)   Resp 16   Ht 1.6 m (5' 2.99\")   Wt 86.3 kg (190 lb 4.1 oz)   SpO2 95%   Breastfeeding? No   BMI 33.71 kg/m²       "

## 2017-11-10 NOTE — PROGRESS NOTES
Simona Knight Fall Risk Assessment:     Last Known Fall: Within the last six months  Mobility: Immobilized/requires assist of one person  Medications: Cardiovascular and central nervous system meds  Mental Status/LOC/Awareness: Memory loss/confusion and requires reorienting  Toileting Needs: Incontinence  Volume/Electrolyte Status: No problems  Communication/Sensory: Visual (Glasses)/hearing deficit  Behavior: Appropriate behavior  Simona Knight Fall Risk Total: 16  Fall Risk Level: HIGH RISK    Universal Fall Precautions:  call light/belongings in reach, bed in low position and locked, siderails up x 2, use non-slip footwear, adequate lighting, clutter free and spill free environment, educate on level of risk, educate to call for assistance    Fall Risk Level Interventions:    TRIAL (TELE 8, NEURO, MED JENNIE 5) Moderate Fall Risk Interventions  Place yellow fall risk ID band on patient: verified  Provide patient/family education based on risk assessment : completed  Educate patient/family to call staff for assistance when getting out of bed: completed  Place fall precaution signage outside patient door: verified  Utilize bed/chair fall alarm: verifiedTRIAL (TELE 8, NEURO, [employer] JENNIE 5) High Fall Risk Interventions  Place yellow fall risk ID band on patient: verified  Provide patient/family education based on risk assessment: completed  Educate patient/family to call staff for assistance when getting out of bed: completed  Place fall precaution signage outside patient door: verified  Place patient in room close to nursing station: currently not available/charge notified  Utilize bed/chair fall alarm: verified  Notify charge of high risk for huddle: verified    Patient Specific Interventions:     Medication: review medications with patient and family  Mental Status/LOC/Awareness: reorient patient, reinforce falls education, check on patient hourly, utilize bed/chair fall alarm and reinforce the use of call  light  Toileting: provide frquent toileting  Volume/Electrolyte Status: ensure patient remains hydrated and monitor abnormal lab values  Communication/Sensory: update plan of care on whiteboard  Behavioral: encourage patient to voice feelings  Mobility: provide comfort measures during transport, dangle prior to standing, utilize bed/chair fall alarm and ensure bed is locked and in lowest position

## 2017-11-10 NOTE — CARE PLAN
Problem: Safety  Goal: Will remain free from injury  Outcome: PROGRESSING AS EXPECTED  Bed on lowest position, call light within reach, patient educated about use of call light and safety precautions.     Problem: Infection  Goal: Will remain free from infection  Outcome: PROGRESSING AS EXPECTED  No signs or symptoms of infection noted.

## 2017-11-10 NOTE — PROGRESS NOTES
[facility] Hospitalist Progress Note    Date of Service: 2017    Chief Complaint  76 y.o. female admitted 2017 with AMS and UTI.    Interval Problem Update  Not agitated, Good mood. Deconditioned. Awaiting guardianship    Consultants/Specialty  Neurology    Disposition  Pending guardianship and placement   assisting  No court date yet, Not till late December or January        Review of Systems   Constitutional: Negative for malaise/fatigue.   HENT: Negative for hearing loss.    Respiratory: Negative for cough and shortness of breath.    Cardiovascular: Negative for chest pain and leg swelling.   Gastrointestinal: Negative for abdominal pain.   Genitourinary: Negative for urgency.   Musculoskeletal: Negative for joint pain, myalgias and neck pain.   Neurological: Positive for weakness. Negative for dizziness.   Psychiatric/Behavioral: Positive for memory loss. The patient is not nervous/anxious.       Physical Exam  Laboratory/Imaging   Hemodynamics  Temp (24hrs), Av.2 °C (97.1 °F), Min:35.9 °C (96.6 °F), Max:36.5 °C (97.7 °F)   Temperature: 36.1 °C (96.9 °F)  Pulse  Av.2  Min: 50  Max: 105    Blood Pressure : 120/53      Respiratory      Respiration: 18, Pulse Oximetry: 96 %        RUL Breath Sounds: Clear, RML Breath Sounds: Diminished, RLL Breath Sounds: Diminished, ARGELIA Breath Sounds: Clear, LLL Breath Sounds: Diminished    Fluids    Intake/Output Summary (Last 24 hours) at 17 1743  Last data filed at 17 0900   Gross per 24 hour   Intake              400 ml   Output                0 ml   Net              400 ml       Nutrition  Orders Placed This Encounter   Procedures   • Diet Order     Standing Status:   Standing     Number of Occurrences:   1     Order Specific Question:   Diet:     Answer:   Regular [1]     Physical Exam   Constitutional: No distress.   HENT:   Head: Normocephalic and atraumatic.   Eyes: EOM are normal. Pupils are equal, round, and reactive to light.  Right eye exhibits no discharge. Left eye exhibits no discharge.   Neck: Normal range of motion.   Cardiovascular: Normal rate, regular rhythm and intact distal pulses.    Pulmonary/Chest: Effort normal. No respiratory distress.   Abdominal: Soft. She exhibits no distension.   Musculoskeletal: She exhibits no edema.   Neurological: She is alert. No cranial nerve deficit. She exhibits normal muscle tone.   AOx2   Skin: Skin is warm and dry.   Psychiatric: She has a normal mood and affect. Her behavior is normal. Judgment and thought content normal. Cognition and memory are impaired.   Pleasant   Vitals reviewed.                               Assessment/Plan     * Dementia- (present on admission)   Assessment & Plan    Orignally presented with fall at home in the setting of severe dementia and urinary tract infection back in September 2017.  MRI of her brain showed enlarged ventricles but no acute infarct. Significant concern for NPH given her dementia, incontinence, and gait instability. Psychiatry deemed patient incapacitated. Neuro evaluated and signed off as she refused any intervention. Has been pending guardianship, currently no court date yet but planned to be scheduled til late Dec to early Jan        HTN (hypertension)- (present on admission)   Assessment & Plan    Previously difficult to control, now normotensive to hypotensive.   - Continue Hydralazine, metoprolol, Lotensin   - discontinued Norvasc        History of CVA (cerebrovascular accident)- (present on admission)   Assessment & Plan    MRI on 9/23 showed no evidence of acute stroke. Prominent ventricles, as noted above.  - Continue ASA and statin  - neuro evaluated and signed off  - to chair with meals  - PT/OT, encourage activity, uses frontwheel walker, and unstable without  - encourage ambulation        Hypokalemia- (present on admission)   Assessment & Plan    K 3.5 on 10/28 responds appropriately to KDur  cont        T12 compression fracture  (CMS-HCC)- (present on admission)   Assessment & Plan    CT spine, no acute fractures. No complaints of pain.            Reviewed items::  Labs reviewed and Medications reviewed  Pink catheter::  No Pink  DVT prophylaxis pharmacological::  Heparin  Ulcer Prophylaxis::  Yes

## 2017-11-11 LAB
ERYTHROCYTE [DISTWIDTH] IN BLOOD BY AUTOMATED COUNT: 50 FL (ref 35.9–50)
HCT VFR BLD AUTO: 39.2 % (ref 37–47)
HGB BLD-MCNC: 12.8 G/DL (ref 12–16)
MCH RBC QN AUTO: 28.7 PG (ref 27–33)
MCHC RBC AUTO-ENTMCNC: 32.7 G/DL (ref 33.6–35)
MCV RBC AUTO: 87.9 FL (ref 81.4–97.8)
PLATELET # BLD AUTO: 162 K/UL (ref 164–446)
PMV BLD AUTO: 11.6 FL (ref 9–12.9)
RBC # BLD AUTO: 4.46 M/UL (ref 4.2–5.4)
WBC # BLD AUTO: 4.9 K/UL (ref 4.8–10.8)

## 2017-11-11 PROCEDURE — A9270 NON-COVERED ITEM OR SERVICE: HCPCS | Performed by: HOSPITALIST

## 2017-11-11 PROCEDURE — 85027 COMPLETE CBC AUTOMATED: CPT

## 2017-11-11 PROCEDURE — 700102 HCHG RX REV CODE 250 W/ 637 OVERRIDE(OP): Performed by: FAMILY MEDICINE

## 2017-11-11 PROCEDURE — 700102 HCHG RX REV CODE 250 W/ 637 OVERRIDE(OP): Performed by: HOSPITALIST

## 2017-11-11 PROCEDURE — A9270 NON-COVERED ITEM OR SERVICE: HCPCS | Performed by: FAMILY MEDICINE

## 2017-11-11 PROCEDURE — 770006 HCHG ROOM/CARE - MED/SURG/GYN SEMI*

## 2017-11-11 PROCEDURE — 700111 HCHG RX REV CODE 636 W/ 250 OVERRIDE (IP): Performed by: HOSPITALIST

## 2017-11-11 PROCEDURE — 99231 SBSQ HOSP IP/OBS SF/LOW 25: CPT | Performed by: INTERNAL MEDICINE

## 2017-11-11 PROCEDURE — 36415 COLL VENOUS BLD VENIPUNCTURE: CPT

## 2017-11-11 PROCEDURE — 700101 HCHG RX REV CODE 250: Performed by: FAMILY MEDICINE

## 2017-11-11 RX ADMIN — HYDRALAZINE HYDROCHLORIDE 100 MG: 50 TABLET ORAL at 21:38

## 2017-11-11 RX ADMIN — METOPROLOL TARTRATE 12.5 MG: 25 TABLET, FILM COATED ORAL at 21:38

## 2017-11-11 RX ADMIN — STANDARDIZED SENNA CONCENTRATE AND DOCUSATE SODIUM 2 TABLET: 8.6; 5 TABLET, FILM COATED ORAL at 21:38

## 2017-11-11 RX ADMIN — LIDOCAINE 1 PATCH: 50 PATCH CUTANEOUS at 09:02

## 2017-11-11 RX ADMIN — OXYBUTYNIN CHLORIDE 5 MG: 5 TABLET, FILM COATED, EXTENDED RELEASE ORAL at 21:38

## 2017-11-11 RX ADMIN — HEPARIN SODIUM 5000 UNITS: 5000 INJECTION, SOLUTION INTRAVENOUS; SUBCUTANEOUS at 09:01

## 2017-11-11 RX ADMIN — ATORVASTATIN CALCIUM 80 MG: 40 TABLET, FILM COATED ORAL at 21:39

## 2017-11-11 RX ADMIN — HYDRALAZINE HYDROCHLORIDE 100 MG: 50 TABLET ORAL at 06:19

## 2017-11-11 RX ADMIN — METOPROLOL TARTRATE 12.5 MG: 25 TABLET, FILM COATED ORAL at 09:01

## 2017-11-11 RX ADMIN — HEPARIN SODIUM 5000 UNITS: 5000 INJECTION, SOLUTION INTRAVENOUS; SUBCUTANEOUS at 21:39

## 2017-11-11 RX ADMIN — ACETAMINOPHEN 650 MG: 325 TABLET, FILM COATED ORAL at 15:32

## 2017-11-11 RX ADMIN — DULOXETINE HYDROCHLORIDE 20 MG: 20 CAPSULE, DELAYED RELEASE ORAL at 09:02

## 2017-11-11 RX ADMIN — POLYETHYLENE GLYCOL (3350) 1 PACKET: 17 POWDER, FOR SOLUTION ORAL at 09:10

## 2017-11-11 RX ADMIN — BENAZEPRIL HYDROCHLORIDE 40 MG: 20 TABLET ORAL at 09:02

## 2017-11-11 RX ADMIN — GABAPENTIN 300 MG: 300 CAPSULE ORAL at 21:38

## 2017-11-11 RX ADMIN — ASPIRIN 81 MG: 81 TABLET, COATED ORAL at 09:02

## 2017-11-11 RX ADMIN — HYDRALAZINE HYDROCHLORIDE 100 MG: 50 TABLET ORAL at 15:29

## 2017-11-11 RX ADMIN — STANDARDIZED SENNA CONCENTRATE AND DOCUSATE SODIUM 2 TABLET: 8.6; 5 TABLET, FILM COATED ORAL at 09:02

## 2017-11-11 RX ADMIN — OMEPRAZOLE 20 MG: 20 CAPSULE, DELAYED RELEASE ORAL at 09:02

## 2017-11-11 ASSESSMENT — ENCOUNTER SYMPTOMS
COUGH: 0
WEAKNESS: 1
NERVOUS/ANXIOUS: 0
MEMORY LOSS: 1
SHORTNESS OF BREATH: 0
NECK PAIN: 0
ABDOMINAL PAIN: 0
MYALGIAS: 0
DIZZINESS: 0

## 2017-11-11 ASSESSMENT — PAIN SCALES - GENERAL
PAINLEVEL_OUTOF10: 4
PAINLEVEL_OUTOF10: 0
PAINLEVEL_OUTOF10: 0
PAINLEVEL_OUTOF10: 5

## 2017-11-11 NOTE — CARE PLAN
Problem: Infection  Goal: Will remain free from infection    Intervention: Assess signs and symptoms of infection  VSS, labs WDL. No new s/s of infection.       Problem: Mobility  Goal: Risk for activity intolerance will decrease    Intervention: Encourage patient to increase activity level in collaboration with Interdisciplinary Team  Pt encouraged to increase mobility, has been out of bed for meals today. Ambulated elaine with use of FWW.

## 2017-11-11 NOTE — PROGRESS NOTES
Simona Knight Fall Risk Assessment:     Last Known Fall: Within the last six months  Mobility: Immobilized/requires assist of one person  Medications: Cardiovascular and central nervous system meds  Mental Status/LOC/Awareness: Memory loss/confusion and requires reorienting  Toileting Needs: Incontinence  Volume/Electrolyte Status: No problems  Communication/Sensory: Visual (Glasses)/hearing deficit  Behavior: Appropriate behavior  Simona Knight Fall Risk Total: 16  Fall Risk Level: HIGH RISK     Universal Fall Precautions:  call light/belongings in reach, bed in low position and locked, wheelchairs and assistive devices out of sight, siderails up x 2, use non-slip footwear, adequate lighting, clutter free and spill free environment, educate on level of risk, educate to call for assistance     Fall Risk Level Interventions:    TRIAL (Scan 8, NEURO, MED JENNIE 5) Moderate Fall Risk Interventions  Place yellow fall risk ID band on patient: verified  Provide patient/family education based on risk assessment : completed  Educate patient/family to call staff for assistance when getting out of bed: completed  Place fall precaution signage outside patient door: verified  Utilize bed/chair fall alarm: verifiedTRIAL (TELE 8, NEURO, [employer] JENNIE 5) High Fall Risk Interventions  Place yellow fall risk ID band on patient: verified  Provide patient/family education based on risk assessment: completed  Educate patient/family to call staff for assistance when getting out of bed: completed  Place fall precaution signage outside patient door: verified  Place patient in room close to nursing station: currently not available/charge notified  Utilize bed/chair fall alarm: verified  Notify charge of high risk for huddle: verified     Patient Specific Interventions:      Medication: review medications with patient and family, assess for medications that can be discontinued or dosage decreased and limit combination of prn medications  Mental  Status/LOC/Awareness: reorient patient, reinforce falls education, encourage family to stay with patient, check on patient hourly, utilize bed/chair fall alarm, reinforce the use of call light and provide activity  Toileting: consider obtaining elevated toilet seat or bedside commode (BSC), provide frquent toileting, monitor intake and output/use of appropriate interventions, instruct patient/family on the use of grab bars and instruct patient/family on the need to call for assistance when toileting  Volume/Electrolyte Status: ensure patient remains hydrated, advance diet as tolerated, administer medications as ordered for nausea and vomiting, monitor abnormal lab values and ensure IV fluids are appropriate  Communication/Sensory: update plan of care on whiteboard, provide communication alternatives/, ensure proper positioning when transferrng/ambulating, ensure patient has glasses/contacts and hearing aids/dentures and for visually impaired patients orient to their room surrounding and do not change their surroundings  Behavioral: encourage patient to voice feelings, engage patient in daily activities, administer medication as ordered and instruct/reinforce fall program rationale  Mobility: schedule physical activity throughout the day, provide comfort measures during transport, dangle prior to standing, utilize bed/chair fall alarm, ensure bed is locked and in lowest position, provide appropriate assistive device, instruct patient to exit bed on their strongest side and collaborate with doctor for possible PT/OT consult

## 2017-11-11 NOTE — CARE PLAN
Problem: Safety  Goal: Will remain free from falls    Intervention: Assess risk factors for falls   11/10/17 2100   OTHER   Fall Risk High Risk to Fall - 2 or more points    Risk for Injury-Any positive answers results in the pt being at high risk for fall related injury Not Applicable   Mobility Status Assessment 1-1 Healthcare Provider Required for Assistance with Ambulation & Transfer   History of fall 2   Date of Last Fall 09/21/17   Pt Calls for Assistance Yes   Environment is clutter free. Personal possession and bedside table near patient. Non-Skid socks in place. Bed locked and in the lowest position. Call light within reach. Hourly rounding in place.       Problem: Knowledge Deficit  Goal: Knowledge of the prescribed therapeutic regimen will improve    Intervention: Discuss information regarding therpeutic regimen and document in education  Plan of care discussed with patient. Question have been answered at this time. Patient encouraged to voice feelings and concerns.

## 2017-11-11 NOTE — PROGRESS NOTES
Report received from day RN. Assumed patient care at 1900. Patient is AAOx3, patient reoriented to event. Patient appears calm and in no acute distress. Labs and orders reviewed. Assessment completed. Patient denies any pain at this time. Patient provided with scheduled medication, refer to MAR. Plan of care discussed with patient; questions have been answered at this time. Patient needs have been met at this time. Patient encouraged to call when needing assistance. Call light within reach. Bed alarm on. Non-Skid socks in place. Bed locked and in the lowest position. Hourly rounding in place.

## 2017-11-11 NOTE — PROGRESS NOTES
[facility] Hospitalist Progress Note    Date of Service: 2017    Chief Complaint  76 y.o. female admitted 2017 with AMS and UTI.    Interval Problem Update  Sleeping but arousable. Good mood.    Consultants/Specialty  Neurology    Disposition  Pending guardianship and placement   assisting  No court date yet, Not till late December or January        Review of Systems   Constitutional: Negative for malaise/fatigue.   HENT: Negative for hearing loss.    Respiratory: Negative for cough and shortness of breath.    Cardiovascular: Negative for chest pain and leg swelling.   Gastrointestinal: Negative for abdominal pain.   Genitourinary: Negative for urgency.   Musculoskeletal: Negative for joint pain, myalgias and neck pain.   Neurological: Positive for weakness. Negative for dizziness.   Psychiatric/Behavioral: Positive for memory loss. The patient is not nervous/anxious.       Physical Exam  Laboratory/Imaging   Hemodynamics  Temp (24hrs), Av.2 °C (97.2 °F), Min:36 °C (96.8 °F), Max:36.4 °C (97.6 °F)   Temperature: 36 °C (96.8 °F)  Pulse  Av.3  Min: 50  Max: 105    Blood Pressure : 124/56      Respiratory      Respiration: 18, Pulse Oximetry: 94 %        RUL Breath Sounds: Clear, RML Breath Sounds: Diminished, RLL Breath Sounds: Diminished, ARGELIA Breath Sounds: Clear, LLL Breath Sounds: Diminished    Fluids    Intake/Output Summary (Last 24 hours) at 17 1439  Last data filed at 17 1254   Gross per 24 hour   Intake              360 ml   Output                0 ml   Net              360 ml       Nutrition  Orders Placed This Encounter   Procedures   • Diet Order     Standing Status:   Standing     Number of Occurrences:   1     Order Specific Question:   Diet:     Answer:   Regular [1]     Physical Exam   Constitutional: No distress.   HENT:   Head: Normocephalic and atraumatic.   Eyes: EOM are normal. Pupils are equal, round, and reactive to light. Right eye exhibits no discharge.  Left eye exhibits no discharge.   Neck: Normal range of motion.   Cardiovascular: Normal rate, regular rhythm and intact distal pulses.    Pulmonary/Chest: Effort normal. No respiratory distress.   Abdominal: Soft. She exhibits no distension.   Musculoskeletal: She exhibits no edema.   Neurological: She is alert. No cranial nerve deficit. She exhibits normal muscle tone.   AOx2   Skin: Skin is warm and dry.   Psychiatric: She has a normal mood and affect. Her behavior is normal. Judgment and thought content normal. Cognition and memory are impaired.   Pleasant   Vitals reviewed.      Recent Labs      11/10/17   0315  11/11/17   0243   WBC  5.0  4.9   RBC  4.47  4.46   HEMOGLOBIN  12.8  12.8   HEMATOCRIT  39.1  39.2   MCV  87.5  87.9   MCH  28.6  28.7   MCHC  32.7*  32.7*   RDW  50.1*  50.0   PLATELETCT  168  162*   MPV  11.3  11.6     Recent Labs      11/10/17   0315   SODIUM  141   POTASSIUM  3.7   CHLORIDE  109   CO2  26   GLUCOSE  107*   BUN  28*   CREATININE  1.01   CALCIUM  9.7                      Assessment/Plan     * Dementia- (present on admission)   Assessment & Plan    Orignally presented with fall at home in the setting of severe dementia and urinary tract infection back in September 2017.  MRI of her brain showed enlarged ventricles but no acute infarct. Significant concern for NPH given her dementia, incontinence, and gait instability. Psychiatry deemed patient incapacitated. Neuro evaluated and signed off as she refused any intervention. Has been pending guardianship, currently no court date yet but planned to be scheduled til late Dec to early Jan        HTN (hypertension)- (present on admission)   Assessment & Plan    Previously difficult to control, now normotensive to hypotensive.   - Continue Hydralazine, metoprolol, Lotensin   - discontinued Norvasc        History of CVA (cerebrovascular accident)- (present on admission)   Assessment & Plan    MRI on 9/23 showed no evidence of acute stroke.  Prominent ventricles, as noted above.  - Continue ASA and statin  - neuro evaluated and signed off  - to chair with meals  - PT/OT, encourage activity, uses frontwheel walker, and unstable without  - encourage ambulation        Hypokalemia- (present on admission)   Assessment & Plan    K 3.5 on 10/28 responds appropriately to KDur  cont        T12 compression fracture (CMS-HCC)- (present on admission)   Assessment & Plan    CT spine, no acute fractures. No complaints of pain.            Reviewed items::  Labs reviewed and Medications reviewed  Pink catheter::  No Pink  DVT prophylaxis pharmacological::  Heparin  Ulcer Prophylaxis::  Yes

## 2017-11-11 NOTE — PROGRESS NOTES
Assumed patient care at 0700. Received report from night shift. Assessment completed. A&Ox2, disoriented to event and time. Denies pain at this time. No SOB or in any acute distress. Bed alarm on, pt wearing treaded socks. Personal possessions and call light placed within reach. Pt up to chair for breakfast.

## 2017-11-11 NOTE — PROGRESS NOTES
Simona Knight Fall Risk Assessment:     Last Known Fall: Within the last six months  Mobility: Immobilized/requires assist of one person  Medications: Cardiovascular and central nervous system meds  Mental Status/LOC/Awareness: Memory loss/confusion and requires reorienting, Oriented to person and place  Toileting Needs: Incontinence  Volume/Electrolyte Status: No problems  Communication/Sensory: Visual (Glasses)/hearing deficit  Behavior: Appropriate behavior  Simona Knight Fall Risk Total: 17  Fall Risk Level: HIGH RISK    Universal Fall Precautions:  call light/belongings in reach, bed in low position and locked, wheelchairs and assistive devices out of sight, siderails up x 2, use non-slip footwear, adequate lighting, clutter free and spill free environment, educate on level of risk, educate to call for assistance    Fall Risk Level Interventions:    TRIAL ([employer], NEURO, MED JENNIE 5) Moderate Fall Risk Interventions  Place yellow fall risk ID band on patient: verified  Provide patient/family education based on risk assessment : completed  Educate patient/family to call staff for assistance when getting out of bed: completed  Place fall precaution signage outside patient door: verified  Utilize bed/chair fall alarm: verifiedTRIAL ([employer] 8, NEURO, [employer] JENNIE 5) High Fall Risk Interventions  Place yellow fall risk ID band on patient: verified  Provide patient/family education based on risk assessment: completed  Educate patient/family to call staff for assistance when getting out of bed: completed  Place fall precaution signage outside patient door: verified  Place patient in room close to nursing station: currently not available/charge notified  Utilize bed/chair fall alarm: verified  Notify charge of high risk for huddle: verified    Patient Specific Interventions:     Medication: review medications with patient and family  Mental Status/LOC/Awareness: reorient patient, check on patient hourly, utilize bed/chair  fall alarm and reinforce the use of call light  Toileting: provide frquent toileting and instruct patient/family on the need to call for assistance when toileting  Volume/Electrolyte Status: ensure patient remains hydrated and monitor abnormal lab values  Communication/Sensory: update plan of care on whiteboard and ensure patient has glasses/contacts and hearing aids/dentures  Behavioral: encourage patient to voice feelings and administer medication as ordered  Mobility: provide comfort measures during transport, utilize bed/chair fall alarm, ensure bed is locked and in lowest position, provide appropriate assistive device and instruct patient to exit bed on their strongest side

## 2017-11-12 LAB
ERYTHROCYTE [DISTWIDTH] IN BLOOD BY AUTOMATED COUNT: 50 FL (ref 35.9–50)
HCT VFR BLD AUTO: 38.7 % (ref 37–47)
HGB BLD-MCNC: 12.7 G/DL (ref 12–16)
MCH RBC QN AUTO: 28.7 PG (ref 27–33)
MCHC RBC AUTO-ENTMCNC: 32.8 G/DL (ref 33.6–35)
MCV RBC AUTO: 87.6 FL (ref 81.4–97.8)
PLATELET # BLD AUTO: 171 K/UL (ref 164–446)
PMV BLD AUTO: 11.2 FL (ref 9–12.9)
RBC # BLD AUTO: 4.42 M/UL (ref 4.2–5.4)
WBC # BLD AUTO: 4.5 K/UL (ref 4.8–10.8)

## 2017-11-12 PROCEDURE — 700102 HCHG RX REV CODE 250 W/ 637 OVERRIDE(OP): Performed by: FAMILY MEDICINE

## 2017-11-12 PROCEDURE — 700102 HCHG RX REV CODE 250 W/ 637 OVERRIDE(OP): Performed by: HOSPITALIST

## 2017-11-12 PROCEDURE — 85027 COMPLETE CBC AUTOMATED: CPT

## 2017-11-12 PROCEDURE — A9270 NON-COVERED ITEM OR SERVICE: HCPCS | Performed by: FAMILY MEDICINE

## 2017-11-12 PROCEDURE — 36415 COLL VENOUS BLD VENIPUNCTURE: CPT

## 2017-11-12 PROCEDURE — 99231 SBSQ HOSP IP/OBS SF/LOW 25: CPT | Performed by: INTERNAL MEDICINE

## 2017-11-12 PROCEDURE — 700111 HCHG RX REV CODE 636 W/ 250 OVERRIDE (IP): Performed by: HOSPITALIST

## 2017-11-12 PROCEDURE — 700101 HCHG RX REV CODE 250: Performed by: FAMILY MEDICINE

## 2017-11-12 PROCEDURE — A9270 NON-COVERED ITEM OR SERVICE: HCPCS | Performed by: HOSPITALIST

## 2017-11-12 PROCEDURE — 770006 HCHG ROOM/CARE - MED/SURG/GYN SEMI*

## 2017-11-12 RX ADMIN — POLYETHYLENE GLYCOL (3350) 1 PACKET: 17 POWDER, FOR SOLUTION ORAL at 06:36

## 2017-11-12 RX ADMIN — ATORVASTATIN CALCIUM 80 MG: 40 TABLET, FILM COATED ORAL at 22:31

## 2017-11-12 RX ADMIN — HYDRALAZINE HYDROCHLORIDE 100 MG: 50 TABLET ORAL at 14:06

## 2017-11-12 RX ADMIN — OMEPRAZOLE 20 MG: 20 CAPSULE, DELAYED RELEASE ORAL at 07:51

## 2017-11-12 RX ADMIN — STANDARDIZED SENNA CONCENTRATE AND DOCUSATE SODIUM 2 TABLET: 8.6; 5 TABLET, FILM COATED ORAL at 07:51

## 2017-11-12 RX ADMIN — DULOXETINE HYDROCHLORIDE 20 MG: 20 CAPSULE, DELAYED RELEASE ORAL at 07:52

## 2017-11-12 RX ADMIN — STANDARDIZED SENNA CONCENTRATE AND DOCUSATE SODIUM 2 TABLET: 8.6; 5 TABLET, FILM COATED ORAL at 22:31

## 2017-11-12 RX ADMIN — GABAPENTIN 300 MG: 300 CAPSULE ORAL at 22:31

## 2017-11-12 RX ADMIN — ASPIRIN 81 MG: 81 TABLET, COATED ORAL at 07:52

## 2017-11-12 RX ADMIN — OXYBUTYNIN CHLORIDE 5 MG: 5 TABLET, FILM COATED, EXTENDED RELEASE ORAL at 22:31

## 2017-11-12 RX ADMIN — LIDOCAINE 1 PATCH: 50 PATCH CUTANEOUS at 07:52

## 2017-11-12 RX ADMIN — METOPROLOL TARTRATE 12.5 MG: 25 TABLET, FILM COATED ORAL at 07:51

## 2017-11-12 RX ADMIN — BENAZEPRIL HYDROCHLORIDE 40 MG: 20 TABLET ORAL at 07:52

## 2017-11-12 RX ADMIN — HEPARIN SODIUM 5000 UNITS: 5000 INJECTION, SOLUTION INTRAVENOUS; SUBCUTANEOUS at 07:52

## 2017-11-12 RX ADMIN — HYDRALAZINE HYDROCHLORIDE 100 MG: 50 TABLET ORAL at 06:31

## 2017-11-12 RX ADMIN — HEPARIN SODIUM 5000 UNITS: 5000 INJECTION, SOLUTION INTRAVENOUS; SUBCUTANEOUS at 22:31

## 2017-11-12 ASSESSMENT — ENCOUNTER SYMPTOMS
MYALGIAS: 0
DIZZINESS: 0
COUGH: 0
SHORTNESS OF BREATH: 0
NERVOUS/ANXIOUS: 0
WEAKNESS: 1
NECK PAIN: 0
ABDOMINAL PAIN: 0
MEMORY LOSS: 1

## 2017-11-12 ASSESSMENT — PAIN SCALES - GENERAL
PAINLEVEL_OUTOF10: 0
PAINLEVEL_OUTOF10: 0

## 2017-11-12 NOTE — CARE PLAN
Problem: Discharge Barriers/Planning  Goal: Patient's continuum of care needs will be met    Intervention: Assess potential discharge barriers on admission and throughout hospital stay  Pt awaiting guardianship.      Problem: Urinary Elimination:  Goal: Ability to reestablish a normal urinary elimination pattern will improve    Intervention: Encourage scheduled voiding  Pt incontinent of urine, frequent toileting offered.

## 2017-11-12 NOTE — PROGRESS NOTES
Simona Knight Fall Risk Assessment:     Last Known Fall: Within the last six months  Mobility: Immobilized/requires assist of one person  Medications: Cardiovascular and central nervous system meds  Mental Status/LOC/Awareness: Oriented to person and place, Memory loss/confusion and requires reorienting  Toileting Needs: Incontinence  Volume/Electrolyte Status: No problems  Communication/Sensory: Visual (Glasses)/hearing deficit  Behavior: Appropriate behavior  Simona Knight Fall Risk Total: 17  Fall Risk Level: HIGH RISK    Universal Fall Precautions:  call light/belongings in reach, wheelchairs and assistive devices out of sight, bed in low position and locked, siderails up x 2, use non-slip footwear, adequate lighting, clutter free and spill free environment, educate on level of risk, educate to call for assistance    Fall Risk Level Interventions:    TRIAL ([employer], NEURO, MED JENNIE 5) Moderate Fall Risk Interventions  Place yellow fall risk ID band on patient: verified  Provide patient/family education based on risk assessment : completed  Educate patient/family to call staff for assistance when getting out of bed: completed  Place fall precaution signage outside patient door: verified  Utilize bed/chair fall alarm: verifiedTRIAL ([employer] 8, NEURO, [employer] JENNIE 5) High Fall Risk Interventions  Place yellow fall risk ID band on patient: verified  Provide patient/family education based on risk assessment: completed  Educate patient/family to call staff for assistance when getting out of bed: completed  Place fall precaution signage outside patient door: verified  Place patient in room close to nursing station: currently not available/charge notified  Utilize bed/chair fall alarm: verified  Notify charge of high risk for huddle: completed    Patient Specific Interventions:     Medication: assess for medications that can be discontinued or dosage decreased  Mental Status/LOC/Awareness: reorient patient  Toileting: provide  frquent toileting  Volume/Electrolyte Status: ensure patient remains hydrated  Communication/Sensory: update plan of care on whiteboard  Behavioral: not applicable  Mobility: utilize bed/chair fall alarm, ensure bed is locked and in lowest position and provide appropriate assistive device

## 2017-11-12 NOTE — PROGRESS NOTES
Received report from [facility] nurse. Assessment complete. Patient is A&Ox3, reoriented to date, states back pain, lidocaine patch applied, no PIV ok per MD. Pt educated on POC and verbalized understanding. Patient is resting now. Call light within reach, bed in lowest position, treaded socks in place, and bed alarm on.

## 2017-11-12 NOTE — PROGRESS NOTES
Received report from day shift RN. Assumed care of patient. Pt shows no s/sx of distress. Discussed plan of care for day with patient and received verbal understanding. Call light within reach, strip alarm active, bed in low position.

## 2017-11-12 NOTE — PROGRESS NOTES
[facility] Hospitalist Progress Note    Date of Service: 2017    Chief Complaint  76 y.o. female admitted 2017 with AMS and UTI.    Interval Problem Update  Not agitated.    Consultants/Specialty  Neurology    Disposition  Pending guardianship and placement   assisting  No court date yet, Not till late December or January        Review of Systems   Constitutional: Negative for malaise/fatigue.   HENT: Negative for hearing loss.    Respiratory: Negative for cough and shortness of breath.    Cardiovascular: Negative for chest pain and leg swelling.   Gastrointestinal: Negative for abdominal pain.   Genitourinary: Negative for urgency.   Musculoskeletal: Negative for joint pain, myalgias and neck pain.   Neurological: Positive for weakness. Negative for dizziness.   Psychiatric/Behavioral: Positive for memory loss. The patient is not nervous/anxious.       Physical Exam  Laboratory/Imaging   Hemodynamics  Temp (24hrs), Av.4 °C (97.5 °F), Min:35.9 °C (96.7 °F), Max:36.8 °C (98.3 °F)   Temperature: 36.2 °C (97.2 °F)  Pulse  Av.3  Min: 50  Max: 105    Blood Pressure : 137/72      Respiratory      Respiration: 16, Pulse Oximetry: 91 %        RUL Breath Sounds: Clear, RML Breath Sounds: Diminished, RLL Breath Sounds: Diminished, ARGELIA Breath Sounds: Clear, LLL Breath Sounds: Diminished    Fluids    Intake/Output Summary (Last 24 hours) at 17 1529  Last data filed at 17 0400   Gross per 24 hour   Intake              360 ml   Output                0 ml   Net              360 ml       Nutrition  Orders Placed This Encounter   Procedures   • Diet Order     Standing Status:   Standing     Number of Occurrences:   1     Order Specific Question:   Diet:     Answer:   Regular [1]     Physical Exam   Constitutional: No distress.   HENT:   Head: Normocephalic and atraumatic.   Eyes: EOM are normal. Pupils are equal, round, and reactive to light. Right eye exhibits no discharge. Left eye exhibits  no discharge.   Neck: Normal range of motion.   Cardiovascular: Normal rate, regular rhythm and intact distal pulses.    Pulmonary/Chest: Effort normal. No respiratory distress.   Abdominal: Soft. She exhibits no distension.   Musculoskeletal: She exhibits no edema.   Neurological: She is alert. No cranial nerve deficit. She exhibits normal muscle tone.   AOx2   Skin: Skin is warm and dry.   Psychiatric: She has a normal mood and affect. Her behavior is normal. Judgment and thought content normal. Cognition and memory are impaired.   Pleasant   Vitals reviewed.      Recent Labs      11/10/17   0315  11/11/17   0243  11/12/17   0149   WBC  5.0  4.9  4.5*   RBC  4.47  4.46  4.42   HEMOGLOBIN  12.8  12.8  12.7   HEMATOCRIT  39.1  39.2  38.7   MCV  87.5  87.9  87.6   MCH  28.6  28.7  28.7   MCHC  32.7*  32.7*  32.8*   RDW  50.1*  50.0  50.0   PLATELETCT  168  162*  171   MPV  11.3  11.6  11.2     Recent Labs      11/10/17   0315   SODIUM  141   POTASSIUM  3.7   CHLORIDE  109   CO2  26   GLUCOSE  107*   BUN  28*   CREATININE  1.01   CALCIUM  9.7                      Assessment/Plan     * Dementia- (present on admission)   Assessment & Plan    Orignally presented with fall at home in the setting of severe dementia and urinary tract infection back in September 2017.  MRI of her brain showed enlarged ventricles but no acute infarct. Significant concern for NPH given her dementia, incontinence, and gait instability. Psychiatry deemed patient incapacitated. Neuro evaluated and signed off as she refused any intervention. Has been pending guardianship, currently no court date yet but planned to be scheduled til late Dec to early Jan        HTN (hypertension)- (present on admission)   Assessment & Plan    Previously difficult to control, now normotensive to hypotensive.   - Continue Hydralazine, metoprolol, Lotensin   - discontinued Norvasc        History of CVA (cerebrovascular accident)- (present on admission)   Assessment &  Plan    MRI on 9/23 showed no evidence of acute stroke. Prominent ventricles, as noted above.  - Continue ASA and statin  - neuro evaluated and signed off  - to chair with meals  - PT/OT, encourage activity, uses frontwheel walker, and unstable without  - encourage ambulation        Hypokalemia- (present on admission)   Assessment & Plan    K 3.5 on 10/28 responds appropriately to KDur  cont        T12 compression fracture (CMS-HCC)- (present on admission)   Assessment & Plan    CT spine, no acute fractures. No complaints of pain.            Reviewed items::  Labs reviewed and Medications reviewed  Pink catheter::  No Pink  DVT prophylaxis pharmacological::  Heparin  Ulcer Prophylaxis::  Yes

## 2017-11-13 PROCEDURE — 700102 HCHG RX REV CODE 250 W/ 637 OVERRIDE(OP): Performed by: FAMILY MEDICINE

## 2017-11-13 PROCEDURE — A9270 NON-COVERED ITEM OR SERVICE: HCPCS | Performed by: HOSPITALIST

## 2017-11-13 PROCEDURE — 700111 HCHG RX REV CODE 636 W/ 250 OVERRIDE (IP): Performed by: HOSPITALIST

## 2017-11-13 PROCEDURE — 700101 HCHG RX REV CODE 250: Performed by: FAMILY MEDICINE

## 2017-11-13 PROCEDURE — 99231 SBSQ HOSP IP/OBS SF/LOW 25: CPT | Performed by: INTERNAL MEDICINE

## 2017-11-13 PROCEDURE — 700102 HCHG RX REV CODE 250 W/ 637 OVERRIDE(OP): Performed by: HOSPITALIST

## 2017-11-13 PROCEDURE — A9270 NON-COVERED ITEM OR SERVICE: HCPCS | Performed by: FAMILY MEDICINE

## 2017-11-13 PROCEDURE — 770006 HCHG ROOM/CARE - MED/SURG/GYN SEMI*

## 2017-11-13 RX ADMIN — METOPROLOL TARTRATE 12.5 MG: 25 TABLET, FILM COATED ORAL at 21:18

## 2017-11-13 RX ADMIN — ASPIRIN 81 MG: 81 TABLET, COATED ORAL at 09:38

## 2017-11-13 RX ADMIN — BENAZEPRIL HYDROCHLORIDE 40 MG: 20 TABLET ORAL at 09:41

## 2017-11-13 RX ADMIN — METOPROLOL TARTRATE 12.5 MG: 25 TABLET, FILM COATED ORAL at 09:38

## 2017-11-13 RX ADMIN — GABAPENTIN 300 MG: 300 CAPSULE ORAL at 21:19

## 2017-11-13 RX ADMIN — ATORVASTATIN CALCIUM 80 MG: 40 TABLET, FILM COATED ORAL at 21:18

## 2017-11-13 RX ADMIN — HEPARIN SODIUM 5000 UNITS: 5000 INJECTION, SOLUTION INTRAVENOUS; SUBCUTANEOUS at 21:19

## 2017-11-13 RX ADMIN — STANDARDIZED SENNA CONCENTRATE AND DOCUSATE SODIUM 2 TABLET: 8.6; 5 TABLET, FILM COATED ORAL at 21:18

## 2017-11-13 RX ADMIN — DULOXETINE HYDROCHLORIDE 20 MG: 20 CAPSULE, DELAYED RELEASE ORAL at 09:38

## 2017-11-13 RX ADMIN — HYDRALAZINE HYDROCHLORIDE 100 MG: 50 TABLET ORAL at 21:18

## 2017-11-13 RX ADMIN — STANDARDIZED SENNA CONCENTRATE AND DOCUSATE SODIUM 2 TABLET: 8.6; 5 TABLET, FILM COATED ORAL at 09:38

## 2017-11-13 RX ADMIN — OMEPRAZOLE 20 MG: 20 CAPSULE, DELAYED RELEASE ORAL at 09:38

## 2017-11-13 RX ADMIN — HEPARIN SODIUM 5000 UNITS: 5000 INJECTION, SOLUTION INTRAVENOUS; SUBCUTANEOUS at 09:38

## 2017-11-13 RX ADMIN — LIDOCAINE 1 PATCH: 50 PATCH CUTANEOUS at 09:38

## 2017-11-13 RX ADMIN — OXYBUTYNIN CHLORIDE 5 MG: 5 TABLET, FILM COATED, EXTENDED RELEASE ORAL at 21:23

## 2017-11-13 RX ADMIN — HYDRALAZINE HYDROCHLORIDE 100 MG: 50 TABLET ORAL at 14:07

## 2017-11-13 ASSESSMENT — ENCOUNTER SYMPTOMS
NERVOUS/ANXIOUS: 0
MYALGIAS: 0
NECK PAIN: 0
SHORTNESS OF BREATH: 0
COUGH: 0
ABDOMINAL PAIN: 0
DIZZINESS: 0
WEAKNESS: 1
MEMORY LOSS: 1

## 2017-11-13 ASSESSMENT — PAIN SCALES - GENERAL
PAINLEVEL_OUTOF10: 0

## 2017-11-13 NOTE — CARE PLAN
Problem: Safety  Goal: Will remain free from falls  Outcome: PROGRESSING AS EXPECTED  Patient educated about the importance of calling for assistance. Patient verbalizes understanding. However, does not call for assistance but does not attempt to get OOB. Reinforcement teaching being implemented by staff throughout shift. Safety precautions in place including patient call light within reach, bed in low position and locked, personal possessions close by, patient rounding in practice, bed strip alarm on and working properly, and non-skid socks in place. Patient remains free of injury since start of shift.    Problem: Knowledge Deficit  Goal: Knowledge of disease process/condition, treatment plan, diagnostic tests, and medications will improve  Outcome: PROGRESSING AS EXPECTED  Patient informed on plan of care and educated on medications ordered per MAR. Patient verbalizes understanding and states all of her questions/concerns have been answered appropriately.

## 2017-11-13 NOTE — PROGRESS NOTES
Simona Knight Fall Risk Assessment:     Last Known Fall: Within the last six months  Mobility: Immobilized/requires assist of one person  Medications: Cardiovascular and central nervous system meds  Mental Status/LOC/Awareness: Memory loss/confusion and requires reorienting  Toileting Needs: Incontinence  Volume/Electrolyte Status: No problems  Communication/Sensory: Visual (Glasses)/hearing deficit  Behavior: Appropriate behavior  Simona Knight Fall Risk Total: 16  Fall Risk Level: HIGH RISK     Universal Fall Precautions:  call light/belongings in reach, bed in low position and locked, wheelchairs and assistive devices out of sight, siderails up x 2, use non-slip footwear, adequate lighting, clutter free and spill free environment, educate on level of risk, educate to call for assistance     Fall Risk Level Interventions:    TRIAL ([employer] 8, NEURO, MED JENNIE 5) Moderate Fall Risk Interventions  Place yellow fall risk ID band on patient: verified  Provide patient/family education based on risk assessment : completed  Educate patient/family to call staff for assistance when getting out of bed: completed  Place fall precaution signage outside patient door: verified  Utilize bed/chair fall alarm: verifiedTRIAL (TELE 8, NEURO, [employer] JENNIE 5) High Fall Risk Interventions  Place yellow fall risk ID band on patient: verified  Provide patient/family education based on risk assessment: completed  Educate patient/family to call staff for assistance when getting out of bed: completed  Place fall precaution signage outside patient door: verified  Place patient in room close to nursing station: currently not available/charge notified  Utilize bed/chair fall alarm: verified  Notify charge of high risk for huddle: verified     Patient Specific Interventions:      Medication: review medications with patient and family, assess for medications that can be discontinued or dosage decreased and limit combination of prn medications  Mental  Status/LOC/Awareness: reorient patient, reinforce falls education, encourage family to stay with patient, check on patient hourly, utilize bed/chair fall alarm, reinforce the use of call light and provide activity  Toileting: consider obtaining elevated toilet seat or bedside commode (BSC), provide frquent toileting, monitor intake and output/use of appropriate interventions, instruct patient/family on the use of grab bars and instruct patient/family on the need to call for assistance when toileting  Volume/Electrolyte Status: ensure patient remains hydrated, advance diet as tolerated, administer medications as ordered for nausea and vomiting, monitor abnormal lab values and ensure IV fluids are appropriate  Communication/Sensory: update plan of care on whiteboard, provide communication alternatives/, ensure proper positioning when transferrng/ambulating, ensure patient has glasses/contacts and hearing aids/dentures and for visually impaired patients orient to their room surrounding and do not change their surroundings  Behavioral: encourage patient to voice feelings, engage patient in daily activities, administer medication as ordered and instruct/reinforce fall program rationale  Mobility: schedule physical activity throughout the day, provide comfort measures during transport, dangle prior to standing, utilize bed/chair fall alarm, ensure bed is locked and in lowest position, provide appropriate assistive device, instruct patient to exit bed on their strongest side and collaborate with doctor for possible PT/OT consult

## 2017-11-13 NOTE — PROGRESS NOTES
Simona Knight Fall Risk Assessment:     Last Known Fall: Within the last six months  Mobility: Immobilized/requires assist of one person  Medications: Cardiovascular and central nervous system meds  Mental Status/LOC/Awareness: Memory loss/confusion and requires reorienting  Toileting Needs: Incontinence  Volume/Electrolyte Status: No problems  Communication/Sensory: Visual (Glasses)/hearing deficit  Behavior: Appropriate behavior  Simona Knight Fall Risk Total: 16  Fall Risk Level: HIGH RISK    Universal Fall Precautions:  call light/belongings in reach, bed in low position and locked, wheelchairs and assistive devices out of sight, siderails up x 2, use non-slip footwear, adequate lighting, clutter free and spill free environment, educate on level of risk, educate to call for assistance    Fall Risk Level Interventions:    TRIAL ([employer] 8, NEURO, MED JENNIE 5) Moderate Fall Risk Interventions  Place yellow fall risk ID band on patient: verified  Provide patient/family education based on risk assessment : completed  Educate patient/family to call staff for assistance when getting out of bed: completed  Place fall precaution signage outside patient door: verified  Utilize bed/chair fall alarm: verifiedTRIAL (TELE 8, NEURO, [employer] JENNIE 5) High Fall Risk Interventions  Place yellow fall risk ID band on patient: verified  Provide patient/family education based on risk assessment: completed  Educate patient/family to call staff for assistance when getting out of bed: completed  Place fall precaution signage outside patient door: verified  Place patient in room close to nursing station: currently not available/charge notified  Utilize bed/chair fall alarm: verified  Notify charge of high risk for huddle: completed    Patient Specific Interventions:     Medication: review medications with patient and family  Mental Status/LOC/Awareness: reinforce falls education  Toileting: provide frquent toileting  Volume/Electrolyte Status:  ensure patient remains hydrated  Communication/Sensory: update plan of care on whiteboard  Behavioral: encourage patient to voice feelings  Mobility: dangle prior to standing

## 2017-11-13 NOTE — PROGRESS NOTES
Received bedside report from day-shift RN and assumed care of patient. Labs and orders noted. Assessment performed. Patient is alert and oriented x4 with no signs of labored breathing or distress. Patient denies any dizziness, chest pain, nausea, numbness, or tingling at this time. Patient updated on plan of care; verbalizes understanding and states all of her questions/concerns have been addressed and answered appropriately. Patient states all of her needs are being met at this time. Safety precautions in place including patient call light within reach, personal possessions nearby, bed in low position and locked, patient rounding in practice, bed strip alarm on and working properly, and non-skid socks in place.

## 2017-11-13 NOTE — CARE PLAN
Problem: Venous Thromboembolism (VTW)/Deep Vein Thrombosis (DVT) Prevention:  Goal: Patient will participate in Venous Thrombosis (VTE)/Deep Vein Thrombosis (DVT)Prevention Measures    Intervention: Assess and monitor for anticoagulation complications  Heparin administered for DVT prophylaxis.       Problem: Pain Management  Goal: Pain level will decrease to patient's comfort goal    Intervention: Educate and implement non-pharmacologic comfort measures. Examples: relaxation, distration, play therapy, activity therapy, massage, etc.  Pt denies pain at this time. Warm blanket provided for additional comfort.

## 2017-11-14 PROBLEM — E87.6 HYPOKALEMIA: Status: RESOLVED | Noted: 2017-10-22 | Resolved: 2017-11-14

## 2017-11-14 PROCEDURE — A9270 NON-COVERED ITEM OR SERVICE: HCPCS | Performed by: FAMILY MEDICINE

## 2017-11-14 PROCEDURE — A9270 NON-COVERED ITEM OR SERVICE: HCPCS | Performed by: HOSPITALIST

## 2017-11-14 PROCEDURE — 700111 HCHG RX REV CODE 636 W/ 250 OVERRIDE (IP): Performed by: HOSPITALIST

## 2017-11-14 PROCEDURE — 99232 SBSQ HOSP IP/OBS MODERATE 35: CPT | Performed by: HOSPITALIST

## 2017-11-14 PROCEDURE — 700102 HCHG RX REV CODE 250 W/ 637 OVERRIDE(OP): Performed by: HOSPITALIST

## 2017-11-14 PROCEDURE — 302242 IV POLE: Performed by: HOSPITALIST

## 2017-11-14 PROCEDURE — 770006 HCHG ROOM/CARE - MED/SURG/GYN SEMI*

## 2017-11-14 PROCEDURE — 97535 SELF CARE MNGMENT TRAINING: CPT

## 2017-11-14 PROCEDURE — 700102 HCHG RX REV CODE 250 W/ 637 OVERRIDE(OP): Performed by: FAMILY MEDICINE

## 2017-11-14 PROCEDURE — 700101 HCHG RX REV CODE 250: Performed by: FAMILY MEDICINE

## 2017-11-14 RX ADMIN — HEPARIN SODIUM 5000 UNITS: 5000 INJECTION, SOLUTION INTRAVENOUS; SUBCUTANEOUS at 21:01

## 2017-11-14 RX ADMIN — ASPIRIN 81 MG: 81 TABLET, COATED ORAL at 08:13

## 2017-11-14 RX ADMIN — DULOXETINE HYDROCHLORIDE 20 MG: 20 CAPSULE, DELAYED RELEASE ORAL at 08:12

## 2017-11-14 RX ADMIN — STANDARDIZED SENNA CONCENTRATE AND DOCUSATE SODIUM 2 TABLET: 8.6; 5 TABLET, FILM COATED ORAL at 21:01

## 2017-11-14 RX ADMIN — METOPROLOL TARTRATE 12.5 MG: 25 TABLET, FILM COATED ORAL at 21:01

## 2017-11-14 RX ADMIN — HYDRALAZINE HYDROCHLORIDE 100 MG: 50 TABLET ORAL at 06:05

## 2017-11-14 RX ADMIN — OMEPRAZOLE 20 MG: 20 CAPSULE, DELAYED RELEASE ORAL at 08:12

## 2017-11-14 RX ADMIN — METOPROLOL TARTRATE 12.5 MG: 25 TABLET, FILM COATED ORAL at 08:13

## 2017-11-14 RX ADMIN — HYDRALAZINE HYDROCHLORIDE 100 MG: 50 TABLET ORAL at 21:01

## 2017-11-14 RX ADMIN — ATORVASTATIN CALCIUM 80 MG: 40 TABLET, FILM COATED ORAL at 21:00

## 2017-11-14 RX ADMIN — LIDOCAINE 1 PATCH: 50 PATCH CUTANEOUS at 08:13

## 2017-11-14 RX ADMIN — HYDRALAZINE HYDROCHLORIDE 100 MG: 50 TABLET ORAL at 14:37

## 2017-11-14 RX ADMIN — STANDARDIZED SENNA CONCENTRATE AND DOCUSATE SODIUM 2 TABLET: 8.6; 5 TABLET, FILM COATED ORAL at 08:12

## 2017-11-14 RX ADMIN — HEPARIN SODIUM 5000 UNITS: 5000 INJECTION, SOLUTION INTRAVENOUS; SUBCUTANEOUS at 08:12

## 2017-11-14 RX ADMIN — BENAZEPRIL HYDROCHLORIDE 40 MG: 20 TABLET ORAL at 08:12

## 2017-11-14 RX ADMIN — GABAPENTIN 300 MG: 300 CAPSULE ORAL at 21:00

## 2017-11-14 RX ADMIN — OXYBUTYNIN CHLORIDE 5 MG: 5 TABLET, FILM COATED, EXTENDED RELEASE ORAL at 21:01

## 2017-11-14 ASSESSMENT — PAIN SCALES - GENERAL
PAINLEVEL_OUTOF10: 0

## 2017-11-14 ASSESSMENT — ENCOUNTER SYMPTOMS
SHORTNESS OF BREATH: 0
DIZZINESS: 0
WEAKNESS: 1
COUGH: 0
NERVOUS/ANXIOUS: 0
ABDOMINAL PAIN: 0
MYALGIAS: 0
NECK PAIN: 0
MEMORY LOSS: 1

## 2017-11-14 ASSESSMENT — COGNITIVE AND FUNCTIONAL STATUS - GENERAL
HELP NEEDED FOR BATHING: A LOT
DRESSING REGULAR UPPER BODY CLOTHING: A LITTLE
SUGGESTED CMS G CODE MODIFIER DAILY ACTIVITY: CK
TOILETING: A LITTLE
EATING MEALS: A LITTLE
PERSONAL GROOMING: A LITTLE
DAILY ACTIVITIY SCORE: 16
DRESSING REGULAR LOWER BODY CLOTHING: A LOT

## 2017-11-14 NOTE — PROGRESS NOTES
Assumed patient care at 0700. Received report from night shift. Assessment completed. A&Ox3, disoriented to time. Denies pain at this time. No SOB or in any acute distress. Bed alarm on, pt wearing treaded socks. Personal possessions and call light placed within reach. Pt denies any additional needs at this time.

## 2017-11-14 NOTE — PROGRESS NOTES
[facility] Hospitalist Progress Note    Date of Service: 2017    Chief Complaint  76 y.o. female admitted 2017 with AMS and UTI.    Interval Problem Update  Not agitated. Awaiting SNF. Her strength is returning however.    Consultants/Specialty  Neurology    Disposition  Pending guardianship and placement   assisting  No court date yet, Not till late December or January  PT/OT may need to reevaluate        Review of Systems   Constitutional: Negative for malaise/fatigue.   HENT: Negative for hearing loss.    Respiratory: Negative for cough and shortness of breath.    Cardiovascular: Negative for chest pain and leg swelling.   Gastrointestinal: Negative for abdominal pain.   Genitourinary: Negative for urgency.   Musculoskeletal: Negative for joint pain, myalgias and neck pain.   Neurological: Positive for weakness. Negative for dizziness.   Psychiatric/Behavioral: Positive for memory loss. The patient is not nervous/anxious.       Physical Exam  Laboratory/Imaging   Hemodynamics  Temp (24hrs), Av.5 °C (97.7 °F), Min:36.2 °C (97.1 °F), Max:36.9 °C (98.4 °F)   Temperature: 36.4 °C (97.5 °F)  Pulse  Av.3  Min: 50  Max: 105    Blood Pressure : 128/48      Respiratory      Respiration: 18, Pulse Oximetry: 95 %        RUL Breath Sounds: Clear, RML Breath Sounds: Diminished, RLL Breath Sounds: Diminished, ARGELIA Breath Sounds: Clear, LLL Breath Sounds: Diminished    Fluids    Intake/Output Summary (Last 24 hours) at 17 1648  Last data filed at 17 1437   Gross per 24 hour   Intake              420 ml   Output                0 ml   Net              420 ml       Nutrition  Orders Placed This Encounter   Procedures   • Diet Order     Standing Status:   Standing     Number of Occurrences:   1     Order Specific Question:   Diet:     Answer:   Regular [1]     Physical Exam   Constitutional: No distress.   HENT:   Head: Normocephalic and atraumatic.   Eyes: EOM are normal. Pupils are equal,  round, and reactive to light. Right eye exhibits no discharge. Left eye exhibits no discharge.   Neck: Normal range of motion.   Cardiovascular: Normal rate, regular rhythm and intact distal pulses.    Pulmonary/Chest: Effort normal. No respiratory distress.   Abdominal: Soft. She exhibits no distension.   Musculoskeletal: She exhibits no edema.   Neurological: She is alert. No cranial nerve deficit. She exhibits normal muscle tone.   AOx2   Skin: Skin is warm and dry.   Psychiatric: She has a normal mood and affect. Her behavior is normal. Judgment and thought content normal. Cognition and memory are impaired.   Pleasant   Vitals reviewed.      Recent Labs      11/11/17   0243  11/12/17   0149   WBC  4.9  4.5*   RBC  4.46  4.42   HEMOGLOBIN  12.8  12.7   HEMATOCRIT  39.2  38.7   MCV  87.9  87.6   MCH  28.7  28.7   MCHC  32.7*  32.8*   RDW  50.0  50.0   PLATELETCT  162*  171   MPV  11.6  11.2                          Assessment/Plan     * Dementia- (present on admission)   Assessment & Plan    Orignally presented with fall at home in the setting of severe dementia and urinary tract infection back in September 2017.  MRI of her brain showed enlarged ventricles but no acute infarct. Significant concern for NPH given her dementia, incontinence, and gait instability. Psychiatry deemed patient incapacitated. Neuro evaluated and signed off as she refused any intervention. Has been pending guardianship, currently no court date yet but planned to be scheduled til late Dec to early Jan        HTN (hypertension)- (present on admission)   Assessment & Plan    Previously difficult to control, now normotensive to hypotensive.   - Continue Hydralazine, metoprolol, Lotensin   - discontinued Norvasc        History of CVA (cerebrovascular accident)- (present on admission)   Assessment & Plan    MRI on 9/23 showed no evidence of acute stroke. Prominent ventricles, as noted above.  - Continue ASA and statin  - neuro evaluated and  signed off  - to chair with meals  - PT/OT, encourage activity, uses frontwheel walker, and unstable without  - encourage ambulation        Hypokalemia- (present on admission)   Assessment & Plan    K 3.5 on 10/28 responds appropriately to KDur  cont        T12 compression fracture (CMS-HCC)- (present on admission)   Assessment & Plan    CT spine, no acute fractures. No complaints of pain.            Reviewed items::  Labs reviewed and Medications reviewed  Pink catheter::  No Pink  DVT prophylaxis pharmacological::  Heparin  Ulcer Prophylaxis::  Yes

## 2017-11-14 NOTE — CARE PLAN
Problem: Discharge Barriers/Planning  Goal: Patient's continuum of care needs will be met    Intervention: Collaborate with Transitional Care Team and Interdisciplinary Team to meet discharge needs  DC pending guardianship hearing       Problem: Mobility  Goal: Risk for activity intolerance will decrease    Intervention: Assess and monitor signs of activity intolerance  Pt encouraged to ambulate in the halls and sit up in the chair for all meals

## 2017-11-14 NOTE — PROGRESS NOTES
Simona Knight Fall Risk Assessment:     Last Known Fall: Within the last six months  Mobility: Immobilized/requires assist of one person  Medications: Cardiovascular or central nervous system meds  Mental Status/LOC/Awareness: Memory loss/confusion and requires reorienting  Toileting Needs: Incontinence  Volume/Electrolyte Status: No problems  Communication/Sensory: Visual (Glasses)/hearing deficit  Behavior: Appropriate behavior  Simona Knight Fall Risk Total: 15  Fall Risk Level: HIGH RISK    Universal Fall Precautions:  call light/belongings in reach, bed in low position and locked, siderails up x 2, use non-slip footwear, adequate lighting, clutter free and spill free environment, educate on level of risk    Fall Risk Level Interventions:    TRIAL (TELE 8, NEURO, MED JENNIE 5) Moderate Fall Risk Interventions  Place yellow fall risk ID band on patient: verified  Provide patient/family education based on risk assessment : completed  Educate patient/family to call staff for assistance when getting out of bed: completed  Place fall precaution signage outside patient door: verified  Utilize bed/chair fall alarm: verifiedTRIAL (TELE 8, NEURO, [employer] JENNIE 5) High Fall Risk Interventions  Place yellow fall risk ID band on patient: verified  Provide patient/family education based on risk assessment: completed  Educate patient/family to call staff for assistance when getting out of bed: completed  Place fall precaution signage outside patient door: verified  Place patient in room close to nursing station: currently not available/charge notified  Utilize bed/chair fall alarm: verified  Notify charge of high risk for huddle: verified    Patient Specific Interventions:     Medication: review medications with patient and family  Mental Status/LOC/Awareness: reorient patient, reinforce falls education, check on patient hourly, utilize bed/chair fall alarm, reinforce the use of call light and provide activity  Toileting: provide  frquent toileting, instruct patient/family on the use of grab bars and do not leave patient unattended in bathroom/refer to toileting scripting  Volume/Electrolyte Status: ensure patient remains hydrated  Communication/Sensory: update plan of care on whiteboard  Behavioral: engage patient in daily activities  Mobility: schedule physical activity throughout the day, utilize bed/chair fall alarm, ensure bed is locked and in lowest position and collaborate with doctor for possible PT/OT consult

## 2017-11-14 NOTE — CARE PLAN
Problem: Safety  Goal: Will remain free from injury    Intervention: Provide assistance with mobility  Pt ambulates with one assist. Tolerates well.       Problem: Urinary Elimination:  Goal: Ability to reestablish a normal urinary elimination pattern will improve    Intervention: Encourage scheduled voiding  Patient had incontinent episode this am, pt encouraged to get up throughout shift and use restroom.

## 2017-11-14 NOTE — PROGRESS NOTES
[facility] Progress Note    Date of Service: 2017    Chief Complaint  76 y.o. female admitted 2017 with AMS and UTI.    Interval Problem Update  She feels very sleepy and is reluctant to work with physical therapy today. She states she is able to get up and walk to the door but otherwise she feels she does not need physical therapy for improvement of her symptoms. She does feel weak in her extremities noimprovement    Consultants/Specialty  Neurology    Disposition  Pending guardianship and placement   assisting  PT/OT may need to reevaluate        Review of Systems   Constitutional: Negative for malaise/fatigue.   HENT: Negative for hearing loss.    Respiratory: Negative for cough and shortness of breath.    Cardiovascular: Negative for chest pain and leg swelling.   Gastrointestinal: Negative for abdominal pain.   Genitourinary: Negative for urgency.   Musculoskeletal: Negative for joint pain, myalgias and neck pain.   Neurological: Positive for weakness. Negative for dizziness.   Psychiatric/Behavioral: Positive for memory loss. The patient is not nervous/anxious.       Physical Exam  Laboratory/Imaging   Hemodynamics  Temp (24hrs), Av.7 °C (98 °F), Min:36.4 °C (97.5 °F), Max:37.1 °C (98.8 °F)   Temperature: 36.7 °C (98.1 °F)  Pulse  Av.3  Min: 50  Max: 105    Blood Pressure : 159/67      Respiratory      Respiration: 18, Pulse Oximetry: 94 %        RUL Breath Sounds: Clear, RML Breath Sounds: Diminished, RLL Breath Sounds: Diminished, ARGELIA Breath Sounds: Clear, LLL Breath Sounds: Diminished    Fluids    Intake/Output Summary (Last 24 hours) at 17 1110  Last data filed at 17 1437   Gross per 24 hour   Intake              420 ml   Output                0 ml   Net              420 ml       Nutrition  Orders Placed This Encounter   Procedures   • Diet Order     Standing Status:   Standing     Number of Occurrences:   1     Order Specific Question:   Diet:      Answer:   Regular [1]     Physical Exam   Constitutional: No distress.   HENT:   Head: Normocephalic and atraumatic.   Eyes: EOM are normal. Pupils are equal, round, and reactive to light. Right eye exhibits no discharge. Left eye exhibits no discharge.   Neck: Normal range of motion.   Cardiovascular: Normal rate, regular rhythm and intact distal pulses.    Pulmonary/Chest: Effort normal. No respiratory distress.   Abdominal: Soft. She exhibits no distension.   Musculoskeletal: She exhibits no edema.   Neurological: She is alert. No cranial nerve deficit. She exhibits normal muscle tone.   AOx2   Skin: Skin is warm and dry.   Psychiatric: She has a normal mood and affect. Her behavior is normal. Judgment and thought content normal. Cognition and memory are impaired.   Pleasant   Vitals reviewed.      Recent Labs      11/12/17   0149   WBC  4.5*   RBC  4.42   HEMOGLOBIN  12.7   HEMATOCRIT  38.7   MCV  87.6   MCH  28.7   MCHC  32.8*   RDW  50.0   PLATELETCT  171   MPV  11.2                          Assessment/Plan     * Dementia- (present on admission)   Assessment & Plan    Orignally presented with fall at home in the setting of severe dementia and urinary tract infection back in September 2017.  MRI of her brain showed enlarged ventricles but no acute infarct. Significant concern for NPH given her dementia, incontinence, and gait instability. Psychiatry deemed patient incapacitated. Neuro evaluated and signed off as she refused any intervention. Has been pending guardianship, currently no court date yet but planned to be scheduled til late Dec to early Jan        HTN (hypertension)- (present on admission)   Assessment & Plan    Previously difficult to control, now normotensive to hypotensive.   - Continue Hydralazine, metoprolol, Lotensin   - discontinued Norvasc        History of CVA (cerebrovascular accident)- (present on admission)   Assessment & Plan    MRI on 9/23 showed no evidence of acute stroke. Prominent  ventricles, as noted above.  - Continue ASA and statin  - neuro evaluated and signed off  - to chair with meals  - PT/OT, encourage activity, uses frontwheel walker, and unstable without  - encourage ambulation        T12 compression fracture (CMS-HCC)- (present on admission)   Assessment & Plan    CT spine, no acute fractures. No complaints of pain.            Reviewed items::  Labs reviewed and Medications reviewed  Pink catheter::  No Pink  DVT prophylaxis pharmacological::  Heparin  Ulcer Prophylaxis::  Yes

## 2017-11-15 PROCEDURE — 700102 HCHG RX REV CODE 250 W/ 637 OVERRIDE(OP): Performed by: HOSPITALIST

## 2017-11-15 PROCEDURE — A9270 NON-COVERED ITEM OR SERVICE: HCPCS | Performed by: HOSPITALIST

## 2017-11-15 PROCEDURE — 770006 HCHG ROOM/CARE - MED/SURG/GYN SEMI*

## 2017-11-15 PROCEDURE — 700102 HCHG RX REV CODE 250 W/ 637 OVERRIDE(OP): Performed by: FAMILY MEDICINE

## 2017-11-15 PROCEDURE — 700101 HCHG RX REV CODE 250: Performed by: FAMILY MEDICINE

## 2017-11-15 PROCEDURE — A9270 NON-COVERED ITEM OR SERVICE: HCPCS | Performed by: FAMILY MEDICINE

## 2017-11-15 PROCEDURE — 99231 SBSQ HOSP IP/OBS SF/LOW 25: CPT | Performed by: HOSPITALIST

## 2017-11-15 PROCEDURE — 700111 HCHG RX REV CODE 636 W/ 250 OVERRIDE (IP): Performed by: HOSPITALIST

## 2017-11-15 RX ADMIN — ASPIRIN 81 MG: 81 TABLET, COATED ORAL at 09:15

## 2017-11-15 RX ADMIN — OMEPRAZOLE 20 MG: 20 CAPSULE, DELAYED RELEASE ORAL at 09:14

## 2017-11-15 RX ADMIN — HYDRALAZINE HYDROCHLORIDE 100 MG: 50 TABLET ORAL at 06:16

## 2017-11-15 RX ADMIN — HEPARIN SODIUM 5000 UNITS: 5000 INJECTION, SOLUTION INTRAVENOUS; SUBCUTANEOUS at 19:33

## 2017-11-15 RX ADMIN — GABAPENTIN 300 MG: 300 CAPSULE ORAL at 19:32

## 2017-11-15 RX ADMIN — DULOXETINE HYDROCHLORIDE 20 MG: 20 CAPSULE, DELAYED RELEASE ORAL at 09:14

## 2017-11-15 RX ADMIN — METOPROLOL TARTRATE 12.5 MG: 25 TABLET, FILM COATED ORAL at 09:15

## 2017-11-15 RX ADMIN — OXYBUTYNIN CHLORIDE 5 MG: 5 TABLET, FILM COATED, EXTENDED RELEASE ORAL at 19:33

## 2017-11-15 RX ADMIN — ATORVASTATIN CALCIUM 80 MG: 40 TABLET, FILM COATED ORAL at 19:32

## 2017-11-15 RX ADMIN — BENAZEPRIL HYDROCHLORIDE 40 MG: 20 TABLET ORAL at 09:14

## 2017-11-15 RX ADMIN — HYDRALAZINE HYDROCHLORIDE 100 MG: 50 TABLET ORAL at 22:02

## 2017-11-15 RX ADMIN — ERGOCALCIFEROL 50000 UNITS: 1.25 CAPSULE, LIQUID FILLED ORAL at 09:15

## 2017-11-15 RX ADMIN — HEPARIN SODIUM 5000 UNITS: 5000 INJECTION, SOLUTION INTRAVENOUS; SUBCUTANEOUS at 09:14

## 2017-11-15 RX ADMIN — METOPROLOL TARTRATE 12.5 MG: 25 TABLET, FILM COATED ORAL at 19:32

## 2017-11-15 RX ADMIN — STANDARDIZED SENNA CONCENTRATE AND DOCUSATE SODIUM 2 TABLET: 8.6; 5 TABLET, FILM COATED ORAL at 19:31

## 2017-11-15 RX ADMIN — STANDARDIZED SENNA CONCENTRATE AND DOCUSATE SODIUM 2 TABLET: 8.6; 5 TABLET, FILM COATED ORAL at 09:14

## 2017-11-15 RX ADMIN — HYDRALAZINE HYDROCHLORIDE 100 MG: 50 TABLET ORAL at 15:10

## 2017-11-15 RX ADMIN — LIDOCAINE 1 PATCH: 50 PATCH CUTANEOUS at 09:14

## 2017-11-15 ASSESSMENT — ENCOUNTER SYMPTOMS
EYES NEGATIVE: 1
SHORTNESS OF BREATH: 0
DIZZINESS: 0
ABDOMINAL PAIN: 0
NERVOUS/ANXIOUS: 0
WEAKNESS: 1
MEMORY LOSS: 1
MYALGIAS: 0
NECK PAIN: 0
COUGH: 0

## 2017-11-15 ASSESSMENT — PAIN SCALES - GENERAL
PAINLEVEL_OUTOF10: 5
PAINLEVEL_OUTOF10: 0

## 2017-11-15 ASSESSMENT — PATIENT HEALTH QUESTIONNAIRE - PHQ9
1. LITTLE INTEREST OR PLEASURE IN DOING THINGS: NOT AT ALL
SUM OF ALL RESPONSES TO PHQ QUESTIONS 1-9: 0
SUM OF ALL RESPONSES TO PHQ9 QUESTIONS 1 AND 2: 0
2. FEELING DOWN, DEPRESSED, IRRITABLE, OR HOPELESS: NOT AT ALL

## 2017-11-15 ASSESSMENT — LIFESTYLE VARIABLES: DO YOU DRINK ALCOHOL: NO

## 2017-11-15 NOTE — THERAPY
"Occupational Therapy Treatment completed with focus on ADLs and ADL transfers.  Functional Status:  Pt initially hesitant to participate in OOB activity 2' to nausea and fatigue.  Pt was motivated to brush her teeth.  Min A supine to sit, CGA sit to stand.  Pt refusing use of FWW to walk to sink, pt required min A for balance to walk to sink.  Pt brushed her teeth with CGA, min A to brush/groom hair.  CGA to return to supine in bed.  Plan of Care: Will benefit from Occupational Therapy 3 times per week  Discharge Recommendations:  Equipment Will Continue to Assess for Equipment Needs. Post-acute therapy Discharge to a transitional care facility for continued skilled therapy services.    See \"Rehab Therapy-Acute\" Patient Summary Report for complete documentation.   "

## 2017-11-15 NOTE — PROGRESS NOTES
Simona Knight Fall Risk Assessment:     Last Known Fall: Within the last six months  Mobility: Immobilized/requires assist of one person  Medications: Cardiovascular or central nervous system meds  Mental Status/LOC/Awareness: Memory loss/confusion and requires reorienting  Toileting Needs: Incontinence  Volume/Electrolyte Status: No problems  Communication/Sensory: Visual (Glasses)/hearing deficit  Behavior: Appropriate behavior  Simona Knight Fall Risk Total: 15  Fall Risk Level: HIGH RISK     Universal Fall Precautions:  call light/belongings in reach, bed in low position and locked, siderails up x 2, use non-slip footwear, adequate lighting, clutter free and spill free environment, educate on level of risk     Fall Risk Level Interventions:    TRIAL (TELE 8, NEURO, MED JENNIE 5) Moderate Fall Risk Interventions  Place yellow fall risk ID band on patient: verified  Provide patient/family education based on risk assessment : completed  Educate patient/family to call staff for assistance when getting out of bed: completed  Place fall precaution signage outside patient door: verified  Utilize bed/chair fall alarm: verifiedTRIAL (TELE 8, NEURO, [employer] JENNIE 5) High Fall Risk Interventions  Place yellow fall risk ID band on patient: verified  Provide patient/family education based on risk assessment: completed  Educate patient/family to call staff for assistance when getting out of bed: completed  Place fall precaution signage outside patient door: verified  Place patient in room close to nursing station: currently not available/charge notified  Utilize bed/chair fall alarm: verified  Notify charge of high risk for huddle: verified     Patient Specific Interventions:      Medication: review medications with patient and family  Mental Status/LOC/Awareness: reorient patient, reinforce falls education, check on patient hourly, utilize bed/chair fall alarm, reinforce the use of call light and provide activity  Toileting:  provide frquent toileting, instruct patient/family on the use of grab bars and do not leave patient unattended in bathroom/refer to toileting scripting  Volume/Electrolyte Status: ensure patient remains hydrated  Communication/Sensory: update plan of care on whiteboard  Behavioral: engage patient in daily activities  Mobility: schedule physical activity throughout the day, utilize bed/chair fall alarm, ensure bed is locked and in lowest position and collaborate with doctor for possible PT/OT consult

## 2017-11-15 NOTE — PROGRESS NOTES
Assumed patient care at 0700. Received report from night shift. Assessment completed. A&Ox3, disoriented to time. C/o 5/10 back pain, medicated per MAR. No SOB or in any acute distress. Bed alarm on, pt wearing treaded socks. Personal possessions and call light placed within reach. Pt denies any additional needs at this time. Pt up to chair for breakfast.

## 2017-11-15 NOTE — CARE PLAN
Problem: Mobility  Goal: Risk for activity intolerance will decrease    Intervention: Encourage patient to increase activity level in collaboration with Interdisciplinary Team  Pt participating with PT/OT, not self motivated to increase mobility       Problem: Urinary Elimination:  Goal: Ability to reestablish a normal urinary elimination pattern will improve    Intervention: Encourage scheduled voiding  Pt encouraged to get up to the bathroom frequently to avoid episodes of incontinence

## 2017-11-15 NOTE — PROGRESS NOTES
[facility]ist Progress Note    Date of Service: 11/15/2017    Chief Complaint  76 y.o. female admitted 2017 with AMS and UTI.    Interval Problem Update  Patient is sitting up in chair feeling much better today not as sleepy otherwise no complaints    Consultants/Specialty  Neurology    Disposition  Pending guardianship and placement   assisting  PT/OT may need to reevaluate        Review of Systems   HENT: Negative for hearing loss.    Eyes: Negative.    Respiratory: Negative for cough and shortness of breath.    Cardiovascular: Negative for chest pain and leg swelling.   Gastrointestinal: Negative for abdominal pain.   Genitourinary: Negative for urgency.   Musculoskeletal: Negative for joint pain, myalgias and neck pain.   Skin: Negative.    Neurological: Positive for weakness. Negative for dizziness.   Endo/Heme/Allergies: Negative.    Psychiatric/Behavioral: Positive for memory loss. The patient is not nervous/anxious.       Physical Exam  Laboratory/Imaging   Hemodynamics  Temp (24hrs), Av.4 °C (97.5 °F), Min:36.2 °C (97.2 °F), Max:36.8 °C (98.2 °F)   Temperature: 36.8 °C (98.2 °F)  Pulse  Av.3  Min: 50  Max: 105    Blood Pressure : 146/66      Respiratory      Respiration: 18, Pulse Oximetry: 94 %        RUL Breath Sounds: Clear, RML Breath Sounds: Diminished, RLL Breath Sounds: Diminished, ARGELIA Breath Sounds: Clear, LLL Breath Sounds: Diminished    Fluids  No intake or output data in the 24 hours ending 11/15/17 1121    Nutrition  Orders Placed This Encounter   Procedures   • Diet Order     Standing Status:   Standing     Number of Occurrences:   1     Order Specific Question:   Diet:     Answer:   Regular [1]     Physical Exam   Constitutional: She appears well-developed.   HENT:   Head: Normocephalic and atraumatic.   Eyes: EOM are normal. Pupils are equal, round, and reactive to light. Right eye exhibits no discharge. Left eye exhibits no discharge.   Neck: Normal range of  motion.   Cardiovascular: Normal rate, regular rhythm and intact distal pulses.    Pulmonary/Chest: Effort normal. No respiratory distress.   Abdominal: Soft. She exhibits no distension.   Musculoskeletal: She exhibits no edema.   Neurological: She is alert. No cranial nerve deficit. She exhibits normal muscle tone.   AOx2   Skin: Skin is warm and dry.   Psychiatric: She has a normal mood and affect. Her behavior is normal. Judgment and thought content normal. Cognition and memory are impaired.   Pleasant   Vitals reviewed.                               Assessment/Plan     * Dementia- (present on admission)   Assessment & Plan    Orignally presented with fall at home in the setting of severe dementia and urinary tract infection back in September 2017.  MRI of her brain showed enlarged ventricles but no acute infarct. Significant concern for NPH given her dementia, incontinence, and gait instability. Psychiatry deemed patient incapacitated. Neuro evaluated and signed off as she refused any intervention. Has been pending guardianship, currently no court date yet but planned to be scheduled til late Dec to early Jan        HTN (hypertension)- (present on admission)   Assessment & Plan    Previously difficult to control, now normotensive to hypotensive.   - Continue Hydralazine, metoprolol, Lotensin   - discontinued Norvasc        History of CVA (cerebrovascular accident)- (present on admission)   Assessment & Plan    MRI on 9/23 showed no evidence of acute stroke. Prominent ventricles, as noted above.  - Continue ASA and statin  - neuro evaluated and signed off  - to chair with meals  - PT/OT, encourage activity, uses frontwheel walker, and unstable without  - encourage ambulation        T12 compression fracture (CMS-HCC)- (present on admission)   Assessment & Plan    CT spine, no acute fractures. No complaints of pain.            Reviewed items::  Labs reviewed and Medications reviewed  Pink catheter::  No Pink  DVT  prophylaxis pharmacological::  Heparin  Ulcer Prophylaxis::  Yes

## 2017-11-15 NOTE — THERAPY
Attempted PT treatment session X 2 today. Pt declined on both attempts stating first that she was too tired, later she declined stating her stomach was upset. Will complete PT treatment session as able

## 2017-11-15 NOTE — PROGRESS NOTES
Simona Knight Fall Risk Assessment:     Last Known Fall: Within the last six months  Mobility: Immobilized/requires assist of one person  Medications: Cardiovascular and central nervous system meds  Mental Status/LOC/Awareness: Memory loss/confusion and requires reorienting  Toileting Needs: Incontinence  Volume/Electrolyte Status: No problems  Communication/Sensory: Visual (Glasses)/hearing deficit  Behavior: Appropriate behavior  Simona Knight Fall Risk Total: 16  Fall Risk Level: HIGH RISK     Universal Fall Precautions:  call light/belongings in reach, bed in low position and locked, wheelchairs and assistive devices out of sight, siderails up x 2, use non-slip footwear, adequate lighting, clutter free and spill free environment, educate on level of risk, educate to call for assistance     Fall Risk Level Interventions:    TRIAL ([address] 8, NEURO, MED JENNIE 5) Moderate Fall Risk Interventions  Place yellow fall risk ID band on patient: verified  Provide patient/family education based on risk assessment : completed  Educate patient/family to call staff for assistance when getting out of bed: completed  Place fall precaution signage outside patient door: verified  Utilize bed/chair fall alarm: verifiedTRIAL (TELE 8, NEURO, [email or web address] JENNIE 5) High Fall Risk Interventions  Place yellow fall risk ID band on patient: verified  Provide patient/family education based on risk assessment: completed  Educate patient/family to call staff for assistance when getting out of bed: completed  Place fall precaution signage outside patient door: verified  Place patient in room close to nursing station: currently not available/charge notified  Utilize bed/chair fall alarm: verified  Notify charge of high risk for huddle: verified     Patient Specific Interventions:      Medication: review medications with patient and family, assess for medications that can be discontinued or dosage decreased and limit combination of prn medications  Mental  Status/LOC/Awareness: reorient patient, reinforce falls education, encourage family to stay with patient, check on patient hourly, utilize bed/chair fall alarm, reinforce the use of call light and provide activity  Toileting: consider obtaining elevated toilet seat or bedside commode (BSC), provide frquent toileting, monitor intake and output/use of appropriate interventions, instruct patient/family on the use of grab bars and instruct patient/family on the need to call for assistance when toileting  Volume/Electrolyte Status: ensure patient remains hydrated, advance diet as tolerated, administer medications as ordered for nausea and vomiting, monitor abnormal lab values and ensure IV fluids are appropriate  Communication/Sensory: update plan of care on whiteboard, provide communication alternatives/, ensure proper positioning when transferrng/ambulating, ensure patient has glasses/contacts and hearing aids/dentures and for visually impaired patients orient to their room surrounding and do not change their surroundings  Behavioral: encourage patient to voice feelings, engage patient in daily activities, administer medication as ordered and instruct/reinforce fall program rationale  Mobility: schedule physical activity throughout the day, provide comfort measures during transport, dangle prior to standing, utilize bed/chair fall alarm, ensure bed is locked and in lowest position, provide appropriate assistive device, instruct patient to exit bed on their strongest side and collaborate with doctor for possible PT/OT consult

## 2017-11-16 PROCEDURE — 770006 HCHG ROOM/CARE - MED/SURG/GYN SEMI*

## 2017-11-16 PROCEDURE — A9270 NON-COVERED ITEM OR SERVICE: HCPCS | Performed by: FAMILY MEDICINE

## 2017-11-16 PROCEDURE — 700102 HCHG RX REV CODE 250 W/ 637 OVERRIDE(OP): Performed by: FAMILY MEDICINE

## 2017-11-16 PROCEDURE — 700111 HCHG RX REV CODE 636 W/ 250 OVERRIDE (IP): Performed by: HOSPITALIST

## 2017-11-16 PROCEDURE — 700101 HCHG RX REV CODE 250: Performed by: FAMILY MEDICINE

## 2017-11-16 PROCEDURE — 99231 SBSQ HOSP IP/OBS SF/LOW 25: CPT | Performed by: HOSPITALIST

## 2017-11-16 PROCEDURE — 700102 HCHG RX REV CODE 250 W/ 637 OVERRIDE(OP): Performed by: HOSPITALIST

## 2017-11-16 PROCEDURE — A9270 NON-COVERED ITEM OR SERVICE: HCPCS | Performed by: HOSPITALIST

## 2017-11-16 RX ADMIN — ATORVASTATIN CALCIUM 80 MG: 40 TABLET, FILM COATED ORAL at 21:25

## 2017-11-16 RX ADMIN — METOPROLOL TARTRATE 12.5 MG: 25 TABLET, FILM COATED ORAL at 21:25

## 2017-11-16 RX ADMIN — HYDRALAZINE HYDROCHLORIDE 100 MG: 50 TABLET ORAL at 06:27

## 2017-11-16 RX ADMIN — HYDRALAZINE HYDROCHLORIDE 100 MG: 50 TABLET ORAL at 21:25

## 2017-11-16 RX ADMIN — ASPIRIN 81 MG: 81 TABLET, COATED ORAL at 10:18

## 2017-11-16 RX ADMIN — BENAZEPRIL HYDROCHLORIDE 40 MG: 20 TABLET ORAL at 10:18

## 2017-11-16 RX ADMIN — METOPROLOL TARTRATE 12.5 MG: 25 TABLET, FILM COATED ORAL at 10:19

## 2017-11-16 RX ADMIN — OXYBUTYNIN CHLORIDE 5 MG: 5 TABLET, FILM COATED, EXTENDED RELEASE ORAL at 21:31

## 2017-11-16 RX ADMIN — DULOXETINE HYDROCHLORIDE 20 MG: 20 CAPSULE, DELAYED RELEASE ORAL at 10:19

## 2017-11-16 RX ADMIN — OMEPRAZOLE 20 MG: 20 CAPSULE, DELAYED RELEASE ORAL at 10:18

## 2017-11-16 RX ADMIN — HYDRALAZINE HYDROCHLORIDE 100 MG: 50 TABLET ORAL at 17:33

## 2017-11-16 RX ADMIN — STANDARDIZED SENNA CONCENTRATE AND DOCUSATE SODIUM 2 TABLET: 8.6; 5 TABLET, FILM COATED ORAL at 21:26

## 2017-11-16 RX ADMIN — HEPARIN SODIUM 5000 UNITS: 5000 INJECTION, SOLUTION INTRAVENOUS; SUBCUTANEOUS at 10:18

## 2017-11-16 RX ADMIN — GABAPENTIN 300 MG: 300 CAPSULE ORAL at 21:25

## 2017-11-16 RX ADMIN — HEPARIN SODIUM 5000 UNITS: 5000 INJECTION, SOLUTION INTRAVENOUS; SUBCUTANEOUS at 21:25

## 2017-11-16 RX ADMIN — LIDOCAINE 1 PATCH: 50 PATCH CUTANEOUS at 10:18

## 2017-11-16 ASSESSMENT — COPD QUESTIONNAIRES
DO YOU EVER COUGH UP ANY MUCUS OR PHLEGM?: NO/ONLY WITH OCCASIONAL COLDS OR INFECTIONS
DURING THE PAST 4 WEEKS HOW MUCH DID YOU FEEL SHORT OF BREATH: SOME OF THE TIME
COPD SCREENING SCORE: 3
HAVE YOU SMOKED AT LEAST 100 CIGARETTES IN YOUR ENTIRE LIFE: NO/DON'T KNOW

## 2017-11-16 ASSESSMENT — ENCOUNTER SYMPTOMS
MEMORY LOSS: 1
COUGH: 0
DIZZINESS: 0
GASTROINTESTINAL NEGATIVE: 1
SHORTNESS OF BREATH: 0
NERVOUS/ANXIOUS: 0
MUSCULOSKELETAL NEGATIVE: 1
WEAKNESS: 1
EYES NEGATIVE: 1

## 2017-11-16 ASSESSMENT — PAIN SCALES - GENERAL: PAINLEVEL_OUTOF10: 0

## 2017-11-16 ASSESSMENT — LIFESTYLE VARIABLES: DO YOU DRINK ALCOHOL: NO

## 2017-11-16 ASSESSMENT — PATIENT HEALTH QUESTIONNAIRE - PHQ9
SUM OF ALL RESPONSES TO PHQ QUESTIONS 1-9: 0
SUM OF ALL RESPONSES TO PHQ9 QUESTIONS 1 AND 2: 0
2. FEELING DOWN, DEPRESSED, IRRITABLE, OR HOPELESS: NOT AT ALL
1. LITTLE INTEREST OR PLEASURE IN DOING THINGS: NOT AT ALL

## 2017-11-16 NOTE — CARE PLAN
Problem: Infection  Goal: Will remain free from infection    Intervention: Implement standard precautions and perform hand washing before and after patient contact  Pt. Educated on the importance of hand hygiene.

## 2017-11-16 NOTE — PROGRESS NOTES
Bedside report completed. Assumed patient care. Pt lying in bed comfortably. Denies pain. AOx1, oriented to self. Ambulated to bathroom with walker ad stand by assist, unsteady. Bed locked in lowest position, call light in reach, bed and chair strip alarms on. All patient needs met at this time.

## 2017-11-16 NOTE — PROGRESS NOTES
Simona Knight Fall Risk Assessment:     Last Known Fall: Within the last six months  Mobility: Immobilized/requires assist of one person  Medications: Cardiovascular or central nervous system meds  Mental Status/LOC/Awareness: Memory loss/confusion and requires reorienting  Toileting Needs: Incontinence  Volume/Electrolyte Status: No problems  Communication/Sensory: Visual (Glasses)/hearing deficit  Behavior: Appropriate behavior  Simona Knight Fall Risk Total: 15  Fall Risk Level: HIGH RISK    Universal Fall Precautions:  call light/belongings in reach, bed in low position and locked, siderails up x 2, use non-slip footwear, adequate lighting, clutter free and spill free environment, educate to call for assistance    Fall Risk Level Interventions:    TRIAL (TELE 8, NEURO, MED JENNIE 5) Moderate Fall Risk Interventions  Place yellow fall risk ID band on patient: verified  Provide patient/family education based on risk assessment : completed  Educate patient/family to call staff for assistance when getting out of bed: completed  Place fall precaution signage outside patient door: verified  Utilize bed/chair fall alarm: verifiedTRIAL (TELE 8, NEURO, [employer] JENNIE 5) High Fall Risk Interventions  Place yellow fall risk ID band on patient: verified  Provide patient/family education based on risk assessment: completed  Educate patient/family to call staff for assistance when getting out of bed: completed  Place fall precaution signage outside patient door: verified  Place patient in room close to nursing station: currently not available/charge notified  Utilize bed/chair fall alarm: verified  Notify charge of high risk for huddle: completed    Patient Specific Interventions:     Medication: review medications with patient and family and assess for medications that can be discontinued or dosage decreased  Mental Status/LOC/Awareness: reorient patient, reinforce falls education and encourage family to stay with  patient  Toileting: provide frquent toileting and monitor intake and output/use of appropriate interventions  Volume/Electrolyte Status: ensure patient remains hydrated and monitor blood sugars and maintain appropriate blood sugar levels if diabetic  Communication/Sensory: update plan of care on whiteboard and ensure proper positioning when transferrng/ambulating  Behavioral: encourage patient to voice feelings  Mobility: schedule physical activity throughout the day, dangle prior to standing, utilize bed/chair fall alarm and ensure bed is locked and in lowest position

## 2017-11-16 NOTE — CARE PLAN
Problem: Respiratory:  Goal: Respiratory status will improve    Intervention: Educate and encourage incentive spirometry usage  Pt educated on use/benefit of IS, needs consistent reminder and encouragement to use it.      Problem: Pain Management  Goal: Pain level will decrease to patient's comfort goal    Intervention: Educate and implement non-pharmacologic comfort measures. Examples: relaxation, distration, play therapy, activity therapy, massage, etc.  Pt c/o back pain, pt encouraged to get out of bed to relieve pressure on back.

## 2017-11-16 NOTE — PROGRESS NOTES
[facility]    Date of Service: 2017    Chief Complaint  76 y.o. female admitted 2017 with AMS and UTI.    Interval Problem Update  Pleasant woman sitting up in chair no complaints only that she has some memory loss    Consultants/Specialty  Neurology    Disposition  Pending guardianship and placement   assisting  PT/OT may need to reevaluate        Review of Systems   HENT: Negative for hearing loss.    Eyes: Negative.    Respiratory: Negative for cough and shortness of breath.    Cardiovascular: Negative for chest pain and leg swelling.   Gastrointestinal: Negative.    Genitourinary: Negative.    Musculoskeletal: Negative.    Skin: Negative.    Neurological: Positive for weakness. Negative for dizziness.   Endo/Heme/Allergies: Negative.    Psychiatric/Behavioral: Positive for memory loss. The patient is not nervous/anxious.       Physical Exam  Laboratory/Imaging   Hemodynamics  Temp (24hrs), Av.2 °C (97.2 °F), Min:36.1 °C (96.9 °F), Max:36.3 °C (97.4 °F)   Temperature: 36.3 °C (97.3 °F)  Pulse  Av.4  Min: 50  Max: 105    Blood Pressure : 110/67      Respiratory      Respiration: 16, Pulse Oximetry: 90 %        RUL Breath Sounds: Clear, RML Breath Sounds: Clear, RLL Breath Sounds: Clear, ARGELIA Breath Sounds: Clear, LLL Breath Sounds: Clear    Fluids    Intake/Output Summary (Last 24 hours) at 17 1130  Last data filed at 17 0900   Gross per 24 hour   Intake              240 ml   Output                0 ml   Net              240 ml       Nutrition  Orders Placed This Encounter   Procedures   • Diet Order     Standing Status:   Standing     Number of Occurrences:   1     Order Specific Question:   Diet:     Answer:   Regular [1]     Physical Exam   Constitutional: She appears well-developed.   HENT:   Head: Normocephalic and atraumatic.   Eyes: EOM are normal. Pupils are equal, round, and reactive to light. Right eye exhibits no discharge. Left eye  exhibits no discharge.   Neck: Normal range of motion.   Cardiovascular: Normal rate, regular rhythm and intact distal pulses.    Pulmonary/Chest: Effort normal. No respiratory distress.   Abdominal: Soft. She exhibits no distension.   Musculoskeletal: She exhibits no edema.   Neurological: She is alert. She exhibits normal muscle tone.   Skin: Skin is warm and dry.   Psychiatric: She has a normal mood and affect. Her behavior is normal. Judgment and thought content normal. Cognition and memory are impaired.   Pleasant   Vitals reviewed.                               Assessment/Plan     * Dementia- (present on admission)   Assessment & Plan    Orignally presented with fall at home in the setting of severe dementia and urinary tract infection back in September 2017.  MRI of her brain showed enlarged ventricles but no acute infarct. Significant concern for NPH given her dementia, incontinence, and gait instability. Psychiatry deemed patient incapacitated. Neuro evaluated and signed off as she refused any intervention. Has been pending guardianship, currently no court date yet but planned to be scheduled til late Dec to early Jan        HTN (hypertension)- (present on admission)   Assessment & Plan    Previously difficult to control, now normotensive to hypotensive.   - Continue Hydralazine, metoprolol, Lotensin   - discontinued Norvas        History of CVA (cerebrovascular accident)- (present on admission)   Assessment & Plan    MRI on 9/23 showed no evidence of acute stroke. Prominent ventricles, as noted above.  - Continue ASA and statin  - neuro evaluated and signed off  - to chair with meals  - PT/OT, encourage activity, uses frontwheel walker, and unstable without  - encourage ambulation        T12 compression fracture (CMS-HCC)- (present on admission)   Assessment & Plan    CT spine, no acute fractures. No complaints of pain.            Reviewed items::  Labs reviewed and Medications reviewed  Pink catheter::   No Pink  DVT prophylaxis pharmacological::  Heparin  Ulcer Prophylaxis::  Yes

## 2017-11-16 NOTE — CARE PLAN
Problem: Mobility  Goal: Risk for activity intolerance will decrease  Intervention: Encourage patient to increase activity level in collaboration with Interdisciplinary Team  Pt participating with PT/OT. Ambulated with both CNA and RN to bathroom twice. Sitting up in chair for each meal.        Problem: Urinary Elimination:  Goal: Ability to reestablish a normal urinary elimination pattern will improve  Intervention: Encourage scheduled voiding  Pt encouraged to get up to the bathroom frequently to avoid episodes of incontinence. Disposable pads in use to prevent skin breakdown.

## 2017-11-16 NOTE — CARE PLAN
Problem: Knowledge Deficit  Goal: Knowledge of disease process/condition, treatment plan, diagnostic tests, and medications will improve    Intervention: Explain information regarding disease process/condition, treatment plan, diagnostic tests, and medications and document in education  Pt. Encouraged to ask questions regarding the plan of care and why she is getting certain medications.

## 2017-11-17 PROCEDURE — 770006 HCHG ROOM/CARE - MED/SURG/GYN SEMI*

## 2017-11-17 PROCEDURE — 700111 HCHG RX REV CODE 636 W/ 250 OVERRIDE (IP): Performed by: HOSPITALIST

## 2017-11-17 PROCEDURE — A9270 NON-COVERED ITEM OR SERVICE: HCPCS | Performed by: INTERNAL MEDICINE

## 2017-11-17 PROCEDURE — 97116 GAIT TRAINING THERAPY: CPT

## 2017-11-17 PROCEDURE — 97530 THERAPEUTIC ACTIVITIES: CPT

## 2017-11-17 PROCEDURE — 97535 SELF CARE MNGMENT TRAINING: CPT

## 2017-11-17 PROCEDURE — A9270 NON-COVERED ITEM OR SERVICE: HCPCS | Performed by: FAMILY MEDICINE

## 2017-11-17 PROCEDURE — A9270 NON-COVERED ITEM OR SERVICE: HCPCS | Performed by: HOSPITALIST

## 2017-11-17 PROCEDURE — 700101 HCHG RX REV CODE 250: Performed by: FAMILY MEDICINE

## 2017-11-17 PROCEDURE — 700102 HCHG RX REV CODE 250 W/ 637 OVERRIDE(OP): Performed by: HOSPITALIST

## 2017-11-17 PROCEDURE — 99231 SBSQ HOSP IP/OBS SF/LOW 25: CPT | Performed by: HOSPITALIST

## 2017-11-17 PROCEDURE — 700102 HCHG RX REV CODE 250 W/ 637 OVERRIDE(OP): Performed by: FAMILY MEDICINE

## 2017-11-17 PROCEDURE — 700102 HCHG RX REV CODE 250 W/ 637 OVERRIDE(OP): Performed by: INTERNAL MEDICINE

## 2017-11-17 RX ADMIN — ATORVASTATIN CALCIUM 80 MG: 40 TABLET, FILM COATED ORAL at 22:30

## 2017-11-17 RX ADMIN — HYDRALAZINE HYDROCHLORIDE 100 MG: 50 TABLET ORAL at 06:29

## 2017-11-17 RX ADMIN — HEPARIN SODIUM 5000 UNITS: 5000 INJECTION, SOLUTION INTRAVENOUS; SUBCUTANEOUS at 09:13

## 2017-11-17 RX ADMIN — GABAPENTIN 300 MG: 300 CAPSULE ORAL at 22:30

## 2017-11-17 RX ADMIN — HEPARIN SODIUM 5000 UNITS: 5000 INJECTION, SOLUTION INTRAVENOUS; SUBCUTANEOUS at 22:30

## 2017-11-17 RX ADMIN — LIDOCAINE 1 PATCH: 50 PATCH CUTANEOUS at 09:11

## 2017-11-17 RX ADMIN — BENAZEPRIL HYDROCHLORIDE 40 MG: 20 TABLET ORAL at 09:14

## 2017-11-17 RX ADMIN — OXYBUTYNIN CHLORIDE 5 MG: 5 TABLET, FILM COATED, EXTENDED RELEASE ORAL at 22:30

## 2017-11-17 RX ADMIN — STANDARDIZED SENNA CONCENTRATE AND DOCUSATE SODIUM 2 TABLET: 8.6; 5 TABLET, FILM COATED ORAL at 22:30

## 2017-11-17 RX ADMIN — HYDRALAZINE HYDROCHLORIDE 100 MG: 50 TABLET ORAL at 22:30

## 2017-11-17 RX ADMIN — OMEPRAZOLE 20 MG: 20 CAPSULE, DELAYED RELEASE ORAL at 09:09

## 2017-11-17 RX ADMIN — HYDRALAZINE HYDROCHLORIDE 100 MG: 50 TABLET ORAL at 13:56

## 2017-11-17 RX ADMIN — TRAZODONE HYDROCHLORIDE 100 MG: 50 TABLET ORAL at 23:00

## 2017-11-17 RX ADMIN — METOPROLOL TARTRATE 12.5 MG: 25 TABLET, FILM COATED ORAL at 09:09

## 2017-11-17 RX ADMIN — DULOXETINE HYDROCHLORIDE 20 MG: 20 CAPSULE, DELAYED RELEASE ORAL at 09:09

## 2017-11-17 RX ADMIN — ASPIRIN 81 MG: 81 TABLET, COATED ORAL at 09:09

## 2017-11-17 RX ADMIN — METOPROLOL TARTRATE 12.5 MG: 25 TABLET, FILM COATED ORAL at 22:30

## 2017-11-17 ASSESSMENT — ENCOUNTER SYMPTOMS
NERVOUS/ANXIOUS: 0
GASTROINTESTINAL NEGATIVE: 1
COUGH: 0
WEAKNESS: 0
SHORTNESS OF BREATH: 0
MUSCULOSKELETAL NEGATIVE: 1
NEUROLOGICAL NEGATIVE: 1
EYES NEGATIVE: 1
CARDIOVASCULAR NEGATIVE: 1
MEMORY LOSS: 1

## 2017-11-17 ASSESSMENT — COGNITIVE AND FUNCTIONAL STATUS - GENERAL
DRESSING REGULAR LOWER BODY CLOTHING: A LOT
MOVING FROM LYING ON BACK TO SITTING ON SIDE OF FLAT BED: A LITTLE
STANDING UP FROM CHAIR USING ARMS: A LITTLE
TURNING FROM BACK TO SIDE WHILE IN FLAT BAD: A LITTLE
MOVING TO AND FROM BED TO CHAIR: A LITTLE
EATING MEALS: A LITTLE
TOILETING: A LITTLE
SUGGESTED CMS G CODE MODIFIER DAILY ACTIVITY: CK
HELP NEEDED FOR BATHING: A LITTLE
DAILY ACTIVITIY SCORE: 17
DRESSING REGULAR UPPER BODY CLOTHING: A LITTLE
SUGGESTED CMS G CODE MODIFIER MOBILITY: CK
PERSONAL GROOMING: A LITTLE
MOBILITY SCORE: 17
CLIMB 3 TO 5 STEPS WITH RAILING: A LOT
WALKING IN HOSPITAL ROOM: A LITTLE

## 2017-11-17 ASSESSMENT — GAIT ASSESSMENTS
DISTANCE (FEET): 125
GAIT LEVEL OF ASSIST: CONTACT GUARD ASSIST
ASSISTIVE DEVICE: FRONT WHEEL WALKER
DEVIATION: BRADYKINETIC;SHUFFLED GAIT

## 2017-11-17 ASSESSMENT — PAIN SCALES - GENERAL
PAINLEVEL_OUTOF10: 0

## 2017-11-17 NOTE — THERAPY
"Physical Therapy Treatment completed.   Bed Mobility:  Supine to Sit: Stand by Assist  Transfers: Sit to Stand: Stand by Assist  Gait: Level Of Assist: Contact Guard Assist with Front-Wheel Walker       Plan of Care: Will benefit from Physical Therapy 2 times per week and Plan to complete next treatment by Wednesday 11/22  Discharge Recommendations: Equipment: Will Continue to Assess for Equipment Needs. Post-acute therapy Discharge to a transitional care facility for continued skilled therapy services.     Pt much more confused today than prior sessions. This PT has worked with pt multiple times but upon entry, she states she had never seen PT before and was confused as to where she was and why she was here. Pt agreeable to get up to use restroom then go for a short walk. Pt performing majority of mobility tasks as SBA level, continues to require CG to ambulate due to poor environmental awareness and obstacle negotiation. Pt declined up in chair after session and also declined therapeutic exercises. Will continue to follow for skilled PT intervention while in the acute care setting    See \"Rehab Therapy-Acute\" Patient Summary Report for complete documentation.       "

## 2017-11-17 NOTE — PROGRESS NOTES
[facility]    Date of Service: 2017    Chief Complaint  76 y.o. female admitted 2017 with AMS and UTI.    Interval Problem Update  Woman lying in bed does not remember who I am slightly agitated today because she is having memory deficits    Consultants/Specialty  Neurology    Disposition  Pending guardianship and placement   assisting  PT/OT may need to reevaluate        Review of Systems   Eyes: Negative.    Respiratory: Negative for cough and shortness of breath.    Cardiovascular: Negative.    Gastrointestinal: Negative.    Genitourinary: Negative.    Musculoskeletal: Negative.    Skin: Negative.    Neurological: Negative.  Negative for weakness.   Endo/Heme/Allergies: Negative.    Psychiatric/Behavioral: Positive for memory loss. The patient is not nervous/anxious.       Physical Exam  Laboratory/Imaging   Hemodynamics  Temp (24hrs), Av.3 °C (97.3 °F), Min:36.2 °C (97.2 °F), Max:36.3 °C (97.4 °F)   Temperature: 36.3 °C (97.4 °F)  Pulse  Av.6  Min: 50  Max: 105    Blood Pressure : 133/62      Respiratory      Respiration: 18, Pulse Oximetry: 94 %        RUL Breath Sounds: Clear, RML Breath Sounds: Clear, RLL Breath Sounds: Diminished, ARGELIA Breath Sounds: Clear, LLL Breath Sounds: Diminished    Fluids  No intake or output data in the 24 hours ending 17 1516    Nutrition  Orders Placed This Encounter   Procedures   • Diet Order     Standing Status:   Standing     Number of Occurrences:   1     Order Specific Question:   Diet:     Answer:   Regular [1]     Physical Exam   Constitutional: She appears well-developed.   HENT:   Head: Normocephalic and atraumatic.   Eyes: EOM are normal. Pupils are equal, round, and reactive to light. Right eye exhibits no discharge. Left eye exhibits no discharge.   Neck: Normal range of motion.   Cardiovascular: Normal rate, regular rhythm and intact distal pulses.    Pulmonary/Chest: Effort normal and breath sounds normal.  She exhibits no tenderness.   Abdominal: Soft. Bowel sounds are normal. She exhibits no distension.   Musculoskeletal: She exhibits no edema.   Neurological: She is alert. She exhibits normal muscle tone.   Skin: Skin is warm and dry.   Psychiatric: She has a normal mood and affect. Her behavior is normal. Judgment and thought content normal. Cognition and memory are impaired.   Pleasant   Vitals reviewed.                               Assessment/Plan     * Dementia- (present on admission)   Assessment & Plan    Orignally presented with fall at home in the setting of severe dementia and urinary tract infection back in September 2017.  MRI of her brain showed enlarged ventricles but no acute infarct. Significant concern for NPH given her dementia, incontinence, and gait instability. Psychiatry deemed patient incapacitated. Neuro evaluated and signed off as she refused any intervention. Pending are injected        HTN (hypertension)- (present on admission)   Assessment & Plan    Previously difficult to control, now normotensive to hypotensive.   - Continue Hydralazine, metoprolol, Lotensin           History of CVA (cerebrovascular accident)- (present on admission)   Assessment & Plan    MRI on 9/23 showed no evidence of acute stroke. Prominent ventricles, as noted above.  - Continue ASA and statin  - neuro evaluated and signed off  - to chair with meals  -Continue PT OT and encourage ambulation patient          T12 compression fracture (CMS-HCC)- (present on admission)   Assessment & Plan    CT spine, no acute fractures. No complaints of pain.            Reviewed items::  Labs reviewed and Medications reviewed  Pink catheter::  No Pink  DVT prophylaxis pharmacological::  Heparin  Ulcer Prophylaxis::  Yes

## 2017-11-17 NOTE — PROGRESS NOTES
Report received, assumed pt care, assessment complete. VSS, A&O X3, not the date (hx of dementia, reorients easily), no complaints of pain. Turns self. Discussed POC, pt verbalizes understanding. No further concerns at this time. Bed in low position, bed alarm on, call light in reach.

## 2017-11-17 NOTE — PROGRESS NOTES
Simona Knight Fall Risk Assessment:     Last Known Fall: Within the last six months  Mobility: Immobilized/requires assist of one person  Medications: Cardiovascular or central nervous system meds  Mental Status/LOC/Awareness: Memory loss/confusion and requires reorienting  Toileting Needs: Incontinence  Volume/Electrolyte Status: No problems  Communication/Sensory: Visual (Glasses)/hearing deficit  Behavior: Appropriate behavior  Simona Knight Fall Risk Total: 15  Fall Risk Level: HIGH RISK    Universal Fall Precautions:  call light/belongings in reach    Fall Risk Level Interventions:    TRIAL (TELE 8, NEURO, MED JENNIE 5) Moderate Fall Risk Interventions  Place yellow fall risk ID band on patient: verified  Provide patient/family education based on risk assessment : completed  Educate patient/family to call staff for assistance when getting out of bed: completed  Place fall precaution signage outside patient door: verified  Utilize bed/chair fall alarm: verifiedTRIAL (TELE 8, NEURO, [employer] JENNIE 5) High Fall Risk Interventions  Place yellow fall risk ID band on patient: verified  Provide patient/family education based on risk assessment: completed  Educate patient/family to call staff for assistance when getting out of bed: completed  Place fall precaution signage outside patient door: verified  Place patient in room close to nursing station: completed  Utilize bed/chair fall alarm: verified  Notify charge of high risk for huddle: completed    Patient Specific Interventions:     Medication: review medications with patient and family  Mental Status/LOC/Awareness: reinforce falls education, check on patient hourly, utilize bed/chair fall alarm, reinforce the use of call light and provide activity  Toileting: provide frquent toileting, monitor intake and output/use of appropriate interventions and instruct patient/family on the need to call for assistance when toileting  Volume/Electrolyte Status: ensure patient remains  hydrated  Communication/Sensory: update plan of care on whiteboard, ensure proper positioning when transferrng/ambulating and ensure patient has glasses/contacts and hearing aids/dentures  Behavioral: engage patient in daily activities, administer medication as ordered and instruct/reinforce fall program rationale  Mobility: dangle prior to standing, utilize bed/chair fall alarm, ensure bed is locked and in lowest position, provide appropriate assistive device and instruct patient to exit bed on their strongest side

## 2017-11-17 NOTE — CARE PLAN
Problem: Venous Thromboembolism (VTW)/Deep Vein Thrombosis (DVT) Prevention:  Goal: Patient will participate in Venous Thrombosis (VTE)/Deep Vein Thrombosis (DVT)Prevention Measures    Intervention: Ensure patient wears graduated elastic stockings (GRANT hose) and/or SCDs, if ordered, when in bed or chair (Remove at least once per shift for skin check)  Pt. Encouraged to perform ankle flexion every hour

## 2017-11-17 NOTE — THERAPY
"Occupational Therapy Treatment completed with focus on ADLs, ADL transfers and patient education.  Functional Status:   Min A/S and SBA with mobility with FWW.  Plan of Care: Will benefit from Occupational Therapy 3 times per week  Discharge Recommendations:  Equipment Will Continue to Assess for Equipment Needs. Post-acute therapy Discharge to a transitional care facility for continued skilled therapy services.    Patient seen for OT treat focused on bathroom ADLs necessitating Min A/S and SBA with mobility with FWW. Patient demos gains. Patient would benefit from continued skilled OT in this setting followed by placement that can proide 24 hr s/a 2/2 cognitive deficits and physical needs as motivated, such as group home / AL.     See \"Rehab Therapy-Acute\" Patient Summary Report for complete documentation.   "

## 2017-11-17 NOTE — PROGRESS NOTES
[facility] assumed care of patient at 1845. No change in plan of care...medication administration, prevention of falls and skin breakdown, monitor VS

## 2017-11-18 PROBLEM — R00.1 BRADYCARDIA: Status: ACTIVE | Noted: 2017-11-18

## 2017-11-18 PROCEDURE — 700111 HCHG RX REV CODE 636 W/ 250 OVERRIDE (IP): Performed by: HOSPITALIST

## 2017-11-18 PROCEDURE — A9270 NON-COVERED ITEM OR SERVICE: HCPCS | Performed by: HOSPITALIST

## 2017-11-18 PROCEDURE — A9270 NON-COVERED ITEM OR SERVICE: HCPCS | Performed by: FAMILY MEDICINE

## 2017-11-18 PROCEDURE — 700102 HCHG RX REV CODE 250 W/ 637 OVERRIDE(OP): Performed by: HOSPITALIST

## 2017-11-18 PROCEDURE — 700102 HCHG RX REV CODE 250 W/ 637 OVERRIDE(OP): Performed by: INTERNAL MEDICINE

## 2017-11-18 PROCEDURE — 700102 HCHG RX REV CODE 250 W/ 637 OVERRIDE(OP): Performed by: FAMILY MEDICINE

## 2017-11-18 PROCEDURE — 99232 SBSQ HOSP IP/OBS MODERATE 35: CPT | Performed by: HOSPITALIST

## 2017-11-18 PROCEDURE — 770006 HCHG ROOM/CARE - MED/SURG/GYN SEMI*

## 2017-11-18 PROCEDURE — 700101 HCHG RX REV CODE 250: Performed by: FAMILY MEDICINE

## 2017-11-18 PROCEDURE — A9270 NON-COVERED ITEM OR SERVICE: HCPCS | Performed by: INTERNAL MEDICINE

## 2017-11-18 RX ORDER — CARVEDILOL 3.12 MG/1
3.12 TABLET ORAL 2 TIMES DAILY WITH MEALS
Status: DISCONTINUED | OUTPATIENT
Start: 2017-11-18 | End: 2017-11-18

## 2017-11-18 RX ADMIN — OXYBUTYNIN CHLORIDE 5 MG: 5 TABLET, FILM COATED, EXTENDED RELEASE ORAL at 21:36

## 2017-11-18 RX ADMIN — ATORVASTATIN CALCIUM 80 MG: 40 TABLET, FILM COATED ORAL at 21:34

## 2017-11-18 RX ADMIN — TRAZODONE HYDROCHLORIDE 100 MG: 50 TABLET ORAL at 21:35

## 2017-11-18 RX ADMIN — HYDRALAZINE HYDROCHLORIDE 100 MG: 50 TABLET ORAL at 14:52

## 2017-11-18 RX ADMIN — ASPIRIN 81 MG: 81 TABLET, COATED ORAL at 09:06

## 2017-11-18 RX ADMIN — GABAPENTIN 300 MG: 300 CAPSULE ORAL at 21:34

## 2017-11-18 RX ADMIN — LIDOCAINE 1 PATCH: 50 PATCH CUTANEOUS at 09:05

## 2017-11-18 RX ADMIN — STANDARDIZED SENNA CONCENTRATE AND DOCUSATE SODIUM 2 TABLET: 8.6; 5 TABLET, FILM COATED ORAL at 21:37

## 2017-11-18 RX ADMIN — OMEPRAZOLE 20 MG: 20 CAPSULE, DELAYED RELEASE ORAL at 09:06

## 2017-11-18 RX ADMIN — HYDRALAZINE HYDROCHLORIDE 100 MG: 50 TABLET ORAL at 05:56

## 2017-11-18 RX ADMIN — HEPARIN SODIUM 5000 UNITS: 5000 INJECTION, SOLUTION INTRAVENOUS; SUBCUTANEOUS at 09:07

## 2017-11-18 RX ADMIN — HYDRALAZINE HYDROCHLORIDE 100 MG: 50 TABLET ORAL at 21:35

## 2017-11-18 RX ADMIN — STANDARDIZED SENNA CONCENTRATE AND DOCUSATE SODIUM 2 TABLET: 8.6; 5 TABLET, FILM COATED ORAL at 09:06

## 2017-11-18 RX ADMIN — DULOXETINE HYDROCHLORIDE 20 MG: 20 CAPSULE, DELAYED RELEASE ORAL at 09:06

## 2017-11-18 RX ADMIN — BENAZEPRIL HYDROCHLORIDE 40 MG: 20 TABLET ORAL at 09:06

## 2017-11-18 RX ADMIN — HEPARIN SODIUM 5000 UNITS: 5000 INJECTION, SOLUTION INTRAVENOUS; SUBCUTANEOUS at 21:36

## 2017-11-18 ASSESSMENT — ENCOUNTER SYMPTOMS
SHORTNESS OF BREATH: 0
ORTHOPNEA: 0
PALPITATIONS: 0
MUSCULOSKELETAL NEGATIVE: 1
NEUROLOGICAL NEGATIVE: 1
MEMORY LOSS: 1
COUGH: 0
WEAKNESS: 0
GASTROINTESTINAL NEGATIVE: 1
EYES NEGATIVE: 1
NERVOUS/ANXIOUS: 0

## 2017-11-18 ASSESSMENT — PAIN SCALES - GENERAL
PAINLEVEL_OUTOF10: 0
PAINLEVEL_OUTOF10: 0

## 2017-11-18 NOTE — PROGRESS NOTES
[facility] Progress [facility]    Date of Service: 2017    Chief Complaint  76 y.o. female admitted 2017 with AMS and UTI.    Interval Problem Update  She is sitting up in bed pleasant, she does have bradycardia without any chest pain shortness of breath no palpitations no orthopnea.    Consultants/Specialty  Neurology    Disposition  Pending guardianship and placement   assisting          Review of Systems   Eyes: Negative.    Respiratory: Negative for cough and shortness of breath.    Cardiovascular: Negative for chest pain, palpitations and orthopnea.   Gastrointestinal: Negative.    Genitourinary: Negative.    Musculoskeletal: Negative.    Skin: Negative.    Neurological: Negative.  Negative for weakness.   Endo/Heme/Allergies: Negative.    Psychiatric/Behavioral: Positive for memory loss. The patient is not nervous/anxious.       Physical Exam  Laboratory/Imaging   Hemodynamics  Temp (24hrs), Av.4 °C (97.6 °F), Min:36.1 °C (96.9 °F), Max:36.9 °C (98.4 °F)   Temperature: 36.1 °C (96.9 °F)  Pulse  Av.5  Min: 50  Max: 105    Blood Pressure : 151/77      Respiratory      Respiration: 17, Pulse Oximetry: 96 %        RML Breath Sounds: Diminished, RLL Breath Sounds: Diminished, LLL Breath Sounds: Diminished    Fluids    Intake/Output Summary (Last 24 hours) at 17 1040  Last data filed at 17 0946   Gross per 24 hour   Intake              240 ml   Output                0 ml   Net              240 ml       Nutrition  Orders Placed This Encounter   Procedures   • Diet Order     Standing Status:   Standing     Number of Occurrences:   1     Order Specific Question:   Diet:     Answer:   Regular [1]     Physical Exam   Constitutional: She appears well-developed.   HENT:   Head: Normocephalic and atraumatic.   Eyes: EOM are normal. Pupils are equal, round, and reactive to light. Right eye exhibits no discharge. Left eye exhibits no discharge.   Neck: Normal range of motion.    Cardiovascular: Normal rate and intact distal pulses.    Pulmonary/Chest: Effort normal and breath sounds normal. She exhibits no tenderness.   Abdominal: Soft. Bowel sounds are normal. She exhibits no distension.   Musculoskeletal: She exhibits no edema.   Neurological: She is alert. She exhibits normal muscle tone.   Skin: Skin is warm and dry.   Psychiatric: She has a normal mood and affect. Her behavior is normal. Judgment and thought content normal. Cognition and memory are impaired.   Pleasant   Vitals reviewed.                               Assessment/Plan     * Dementia- (present on admission)   Assessment & Plan    Orignally presented with fall at home in the setting of severe dementia and urinary tract infection back in September 2017.  MRI of her brain showed enlarged ventricles but no acute infarct. Significant concern for NPH given her dementia, incontinence, and gait instability. Psychiatry deemed patient incapacitated. Neuro evaluated and signed off as she refused any intervention. Pending are injected        HTN (hypertension)- (present on admission)   Assessment & Plan      - Continue Hydralazine,  Lotensin   D/c metoprolol today          History of CVA (cerebrovascular accident)- (present on admission)   Assessment & Plan    MRI on 9/23 showed no evidence of acute stroke. Prominent ventricles, as noted above.  - Continue ASA and statin  - neuro evaluated and signed off  - to chair with meals  -Continue PT OT and encourage ambulation patient          Bradycardia   Assessment & Plan    This patient bradycardia, metoprolol will discontinue she does have a history of CHF I will trial very low dose of carvedilol        T12 compression fracture (CMS-HCC)- (present on admission)   Assessment & Plan    CT spine, no acute fractures. No complaints of pain.            Reviewed items::  Labs reviewed and Medications reviewed  Pink catheter::  No Pink  DVT prophylaxis pharmacological::  Heparin  Ulcer  Prophylaxis::  Yes      I spent a total of 26 minutes of time during this clinical encounter of which > 50% was devoted to counseling and coordinating care including review of records, pertinent lab data and studies, as well as discussing diagnostic evaluation and work up, planned therapeutic interventions and future disposition of care. Where indicated, the assessment and plan reflect discussion of patient with consultants, other healthcare providers, family members, and additional research needed to obtain further information in formulating the plan of care of this patient. This time includes no procedures or overlap with other providers.

## 2017-11-18 NOTE — PROGRESS NOTES
Received report from day RN. Assumed pt care. Discussed call light/phone system, communication board and POC. Pt is aao x to 2, disoriented to time and event. Pt is cooperative and able to follow commands. Pt denies pain. Pt is pending placement/guardianship. Pt is incontinent, RN and CNA perform bed bath and full bed change. Pt mobility assessed. Pt is a one assist up to the chair. Bed alarm on. Fall precautions in place. Bed is locked and in low position, call light within reach. Treaded slippers in place. Pt needs met at this time. Hourly rounding in place.

## 2017-11-19 PROCEDURE — 700101 HCHG RX REV CODE 250: Performed by: FAMILY MEDICINE

## 2017-11-19 PROCEDURE — 700102 HCHG RX REV CODE 250 W/ 637 OVERRIDE(OP): Performed by: HOSPITALIST

## 2017-11-19 PROCEDURE — 700102 HCHG RX REV CODE 250 W/ 637 OVERRIDE(OP): Performed by: INTERNAL MEDICINE

## 2017-11-19 PROCEDURE — A9270 NON-COVERED ITEM OR SERVICE: HCPCS | Performed by: HOSPITALIST

## 2017-11-19 PROCEDURE — 700111 HCHG RX REV CODE 636 W/ 250 OVERRIDE (IP): Performed by: HOSPITALIST

## 2017-11-19 PROCEDURE — 99231 SBSQ HOSP IP/OBS SF/LOW 25: CPT | Performed by: HOSPITALIST

## 2017-11-19 PROCEDURE — 700102 HCHG RX REV CODE 250 W/ 637 OVERRIDE(OP): Performed by: FAMILY MEDICINE

## 2017-11-19 PROCEDURE — A9270 NON-COVERED ITEM OR SERVICE: HCPCS | Performed by: FAMILY MEDICINE

## 2017-11-19 PROCEDURE — A9270 NON-COVERED ITEM OR SERVICE: HCPCS | Performed by: INTERNAL MEDICINE

## 2017-11-19 PROCEDURE — 770006 HCHG ROOM/CARE - MED/SURG/GYN SEMI*

## 2017-11-19 RX ADMIN — HEPARIN SODIUM 5000 UNITS: 5000 INJECTION, SOLUTION INTRAVENOUS; SUBCUTANEOUS at 09:18

## 2017-11-19 RX ADMIN — HYDRALAZINE HYDROCHLORIDE 100 MG: 50 TABLET ORAL at 05:50

## 2017-11-19 RX ADMIN — GABAPENTIN 300 MG: 300 CAPSULE ORAL at 21:24

## 2017-11-19 RX ADMIN — STANDARDIZED SENNA CONCENTRATE AND DOCUSATE SODIUM 2 TABLET: 8.6; 5 TABLET, FILM COATED ORAL at 21:25

## 2017-11-19 RX ADMIN — HYDRALAZINE HYDROCHLORIDE 100 MG: 50 TABLET ORAL at 16:10

## 2017-11-19 RX ADMIN — TRAZODONE HYDROCHLORIDE 100 MG: 50 TABLET ORAL at 21:23

## 2017-11-19 RX ADMIN — LIDOCAINE 1 PATCH: 50 PATCH CUTANEOUS at 09:17

## 2017-11-19 RX ADMIN — OMEPRAZOLE 20 MG: 20 CAPSULE, DELAYED RELEASE ORAL at 09:18

## 2017-11-19 RX ADMIN — ASPIRIN 81 MG: 81 TABLET, COATED ORAL at 09:18

## 2017-11-19 RX ADMIN — OXYBUTYNIN CHLORIDE 5 MG: 5 TABLET, FILM COATED, EXTENDED RELEASE ORAL at 21:21

## 2017-11-19 RX ADMIN — HEPARIN SODIUM 5000 UNITS: 5000 INJECTION, SOLUTION INTRAVENOUS; SUBCUTANEOUS at 21:24

## 2017-11-19 RX ADMIN — DULOXETINE HYDROCHLORIDE 20 MG: 20 CAPSULE, DELAYED RELEASE ORAL at 09:18

## 2017-11-19 RX ADMIN — ATORVASTATIN CALCIUM 80 MG: 40 TABLET, FILM COATED ORAL at 21:21

## 2017-11-19 RX ADMIN — HYDRALAZINE HYDROCHLORIDE 100 MG: 50 TABLET ORAL at 21:22

## 2017-11-19 RX ADMIN — BENAZEPRIL HYDROCHLORIDE 40 MG: 20 TABLET ORAL at 09:18

## 2017-11-19 ASSESSMENT — ENCOUNTER SYMPTOMS
NERVOUS/ANXIOUS: 0
COUGH: 0
EYES NEGATIVE: 1
PALPITATIONS: 0
GASTROINTESTINAL NEGATIVE: 1
MUSCULOSKELETAL NEGATIVE: 1
NEUROLOGICAL NEGATIVE: 1
WEAKNESS: 0
MEMORY LOSS: 1
ORTHOPNEA: 0
SHORTNESS OF BREATH: 0

## 2017-11-19 ASSESSMENT — PAIN SCALES - GENERAL
PAINLEVEL_OUTOF10: 0
PAINLEVEL_OUTOF10: 0

## 2017-11-19 NOTE — CARE PLAN
Problem: Discharge Barriers/Planning  Goal: Patient's continuum of care needs will be met  Outcome: PROGRESSING SLOWER THAN EXPECTED  Waiting on guardianship for discharge.    Problem: Pain Management  Goal: Pain level will decrease to patient's comfort goal  Outcome: PROGRESSING AS EXPECTED  Pt reported no pain this AM, and when asked if the lidocaine patch was helping with pain control pt stated it was helping.

## 2017-11-19 NOTE — PROGRESS NOTES
[facility] Progress Note    Date of Service: 2017    Chief Complaint  76 y.o. female admitted 2017 with AMS and UTI.    Interval Problem Update  Bradycardia improved. Resting in bed denies any shortness of breath cough does admit to some memory deficits    Consultants/Specialty  Neurology    Disposition  Pending guardianship and placement   assisting          Review of Systems   Eyes: Negative.    Respiratory: Negative for cough and shortness of breath.    Cardiovascular: Negative for chest pain, palpitations and orthopnea.   Gastrointestinal: Negative.    Genitourinary: Negative.    Musculoskeletal: Negative.    Skin: Negative.    Neurological: Negative.  Negative for weakness.   Endo/Heme/Allergies: Negative.    Psychiatric/Behavioral: Positive for memory loss. The patient is not nervous/anxious.       Physical Exam  Laboratory/Imaging   Hemodynamics  Temp (24hrs), Av.3 °C (97.4 °F), Min:36.1 °C (97 °F), Max:36.7 °C (98.1 °F)   Temperature: 36.7 °C (98.1 °F)  Pulse  Av.8  Min: 50  Max: 105    Blood Pressure : 122/78      Respiratory      Respiration: 17, Pulse Oximetry: 96 %        RML Breath Sounds: Diminished, RLL Breath Sounds: Diminished, LLL Breath Sounds: Diminished    Fluids    Intake/Output Summary (Last 24 hours) at 17 1011  Last data filed at 17 1500   Gross per 24 hour   Intake              240 ml   Output                1 ml   Net              239 ml       Nutrition  Orders Placed This Encounter   Procedures   • Diet Order     Standing Status:   Standing     Number of Occurrences:   1     Order Specific Question:   Diet:     Answer:   Regular [1]     Physical Exam   Constitutional: She appears well-developed.   HENT:   Head: Normocephalic and atraumatic.   Eyes: EOM are normal. Pupils are equal, round, and reactive to light. Right eye exhibits no discharge. Left eye exhibits no discharge.   Neck: Normal range of motion.   Cardiovascular: Normal rate  and intact distal pulses.    Pulmonary/Chest: Effort normal and breath sounds normal. She exhibits no tenderness.   Abdominal: Soft. Bowel sounds are normal. She exhibits no distension.   Musculoskeletal: She exhibits no edema.   Neurological: She is alert. She exhibits normal muscle tone.   Skin: Skin is warm and dry.   Psychiatric: She has a normal mood and affect. Her behavior is normal. Judgment and thought content normal. Cognition and memory are impaired.   Pleasant   Vitals reviewed.                               Assessment/Plan     * Dementia- (present on admission)   Assessment & Plan    Orignally presented with fall at home in the setting of severe dementia and urinary tract infection back in September 2017.  MRI of her brain showed enlarged ventricles but no acute infarct. Significant concern for NPH given her dementia, incontinence, and gait instability. Psychiatry deemed patient incapacitated. Neuro evaluated and signed off as she refused any intervention.         HTN (hypertension)- (present on admission)   Assessment & Plan      - Continue Hydralazine,  Lotensin             History of CVA (cerebrovascular accident)- (present on admission)   Assessment & Plan    MRI on 9/23 showed no evidence of acute stroke. Prominent ventricles, as noted above.  - Continue ASA and statin  - neuro evaluated and signed off  - to chair with meals  -Continue PT OT and encourage ambulation patient          Bradycardia   Assessment & Plan    Improved after removal of BB        T12 compression fracture (CMS-HCC)- (present on admission)   Assessment & Plan    CT spine, no acute fractures. No complaints of pain.            Reviewed items::  Labs reviewed and Medications reviewed  Pink catheter::  No Pink  DVT prophylaxis pharmacological::  Heparin  Ulcer Prophylaxis::  Yes

## 2017-11-19 NOTE — PROGRESS NOTES
Assumed care of patient this AM. Report received from night RN.  Assessment completed. Patient is awake, alert, and oriented x 2 (disoriented to time and event).  Denies pain.  Patient is a one person assist with FWW for ambulation and transfers.  All needs met. Bed in lowest, locked position; strip alarm and tread socks on. Call light within reach.

## 2017-11-20 PROCEDURE — A9270 NON-COVERED ITEM OR SERVICE: HCPCS | Performed by: FAMILY MEDICINE

## 2017-11-20 PROCEDURE — 770006 HCHG ROOM/CARE - MED/SURG/GYN SEMI*

## 2017-11-20 PROCEDURE — 700111 HCHG RX REV CODE 636 W/ 250 OVERRIDE (IP): Performed by: HOSPITALIST

## 2017-11-20 PROCEDURE — 99231 SBSQ HOSP IP/OBS SF/LOW 25: CPT | Performed by: HOSPITALIST

## 2017-11-20 PROCEDURE — 700102 HCHG RX REV CODE 250 W/ 637 OVERRIDE(OP): Performed by: HOSPITALIST

## 2017-11-20 PROCEDURE — 700102 HCHG RX REV CODE 250 W/ 637 OVERRIDE(OP): Performed by: FAMILY MEDICINE

## 2017-11-20 PROCEDURE — A9270 NON-COVERED ITEM OR SERVICE: HCPCS | Performed by: HOSPITALIST

## 2017-11-20 RX ADMIN — STANDARDIZED SENNA CONCENTRATE AND DOCUSATE SODIUM 2 TABLET: 8.6; 5 TABLET, FILM COATED ORAL at 08:09

## 2017-11-20 RX ADMIN — HYDRALAZINE HYDROCHLORIDE 100 MG: 50 TABLET ORAL at 06:17

## 2017-11-20 RX ADMIN — HEPARIN SODIUM 5000 UNITS: 5000 INJECTION, SOLUTION INTRAVENOUS; SUBCUTANEOUS at 08:09

## 2017-11-20 RX ADMIN — HYDRALAZINE HYDROCHLORIDE 100 MG: 50 TABLET ORAL at 22:12

## 2017-11-20 RX ADMIN — BENAZEPRIL HYDROCHLORIDE 40 MG: 20 TABLET ORAL at 08:09

## 2017-11-20 RX ADMIN — OMEPRAZOLE 20 MG: 20 CAPSULE, DELAYED RELEASE ORAL at 08:09

## 2017-11-20 RX ADMIN — OXYBUTYNIN CHLORIDE 5 MG: 5 TABLET, FILM COATED, EXTENDED RELEASE ORAL at 22:12

## 2017-11-20 RX ADMIN — STANDARDIZED SENNA CONCENTRATE AND DOCUSATE SODIUM 2 TABLET: 8.6; 5 TABLET, FILM COATED ORAL at 22:12

## 2017-11-20 RX ADMIN — HYDRALAZINE HYDROCHLORIDE 100 MG: 50 TABLET ORAL at 14:00

## 2017-11-20 RX ADMIN — OXYCODONE HYDROCHLORIDE 5 MG: 5 TABLET ORAL at 14:21

## 2017-11-20 RX ADMIN — DULOXETINE HYDROCHLORIDE 20 MG: 20 CAPSULE, DELAYED RELEASE ORAL at 08:09

## 2017-11-20 RX ADMIN — ATORVASTATIN CALCIUM 80 MG: 40 TABLET, FILM COATED ORAL at 22:12

## 2017-11-20 RX ADMIN — GABAPENTIN 300 MG: 300 CAPSULE ORAL at 22:12

## 2017-11-20 RX ADMIN — HEPARIN SODIUM 5000 UNITS: 5000 INJECTION, SOLUTION INTRAVENOUS; SUBCUTANEOUS at 22:12

## 2017-11-20 RX ADMIN — ASPIRIN 81 MG: 81 TABLET, COATED ORAL at 08:09

## 2017-11-20 ASSESSMENT — PAIN SCALES - GENERAL
PAINLEVEL_OUTOF10: 0
PAINLEVEL_OUTOF10: 6

## 2017-11-20 ASSESSMENT — ENCOUNTER SYMPTOMS
ORTHOPNEA: 0
GASTROINTESTINAL NEGATIVE: 1
NEUROLOGICAL NEGATIVE: 1
COUGH: 0
PALPITATIONS: 0
MUSCULOSKELETAL NEGATIVE: 1
MEMORY LOSS: 1
EYES NEGATIVE: 1
SHORTNESS OF BREATH: 0
NERVOUS/ANXIOUS: 0
WEAKNESS: 0

## 2017-11-20 NOTE — PROGRESS NOTES
Simona Knight Fall Risk Assessment:     Last Known Fall: Within the last six months  Mobility: Immobilized/requires assist of one person  Medications: Cardiovascular or central nervous system meds  Mental Status/LOC/Awareness: Oriented to person and place  Toileting Needs: Incontinence  Volume/Electrolyte Status: No problems  Communication/Sensory: Visual (Glasses)/hearing deficit  Behavior: Appropriate behavior  Simona Knight Fall Risk Total: 13  Fall Risk Level: MODERATE RISK    Universal Fall Precautions:  call light/belongings in reach, bed in low position and locked, wheelchairs and assistive devices out of sight, siderails up x 2, use non-slip footwear, adequate lighting, clutter free and spill free environment, educate on level of risk, educate to call for assistance    Fall Risk Level Interventions:    TRIAL ([employer] 8, NEURO, MED JENNIE 5) Moderate Fall Risk Interventions  Place yellow fall risk ID band on patient: verified  Provide patient/family education based on risk assessment : completed  Educate patient/family to call staff for assistance when getting out of bed: completed  Place fall precaution signage outside patient door: verified  Utilize bed/chair fall alarm: verifiedTRIAL (TELE 8, NEURO, [employer] JENNIE 5) High Fall Risk Interventions  Place yellow fall risk ID band on patient: verified  Provide patient/family education based on risk assessment: completed  Educate patient/family to call staff for assistance when getting out of bed: completed  Place fall precaution signage outside patient door: verified  Place patient in room close to nursing station: completed  Utilize bed/chair fall alarm: verified  Notify charge of high risk for huddle: completed    Patient Specific Interventions:     Medication: review medications with patient and family  Mental Status/LOC/Awareness: reorient patient, reinforce falls education, check on patient hourly, utilize bed/chair fall alarm and reinforce the use of call  light  Toileting: do not leave patient unattended in bathroom/refer to toileting scripting  Volume/Electrolyte Status: ensure patient remains hydrated  Communication/Sensory: update plan of care on whiteboard  Behavioral: not applicable  Mobility: utilize bed/chair fall alarm, ensure bed is locked and in lowest position and provide appropriate assistive device

## 2017-11-20 NOTE — CARE PLAN
Problem: Discharge Barriers/Planning  Goal: Patient's continuum of care needs will be met    Intervention: Assess potential discharge barriers on admission and throughout hospital stay  Patient is pending guardianship, 12/2017 tentative time.      Problem: Mobility  Goal: Risk for activity intolerance will decrease    Intervention: Assess and monitor signs of activity intolerance  Patient to ambulate QID throughout halls. Patient denies this morning d/t sleepiness. Patient ambulated per flowsheets. Patient also encouraged to get OOB for meals. Patient also refused d/t sleepiness.

## 2017-11-20 NOTE — PROGRESS NOTES
Report received from MARK Serrano. Pt is AAOx2, no family at bedside. Assessment completed. Lungs are diminished in the bases bilaterally, RA. Patient is sleepy this morning, went back to sleep immediately after AM med pass. Pt denies pain and denies lidocaine patch, wasted. Pt ambulates with assistance of 1 person and a FWW. Pt educated regarding plan of care, including OOB for meals, ambulation, and safety. Patient is pending guardianship 12/2017. All questions answered. Call light and personal belongings in reach. No additional needs at this time. Bed alarm in use.

## 2017-11-20 NOTE — PROGRESS NOTES
[facility]    Date of Service: 2017    Chief Complaint  76 y.o. female admitted 2017 with AMS and UTI.    Interval Problem Update  In bed resting, no complaints this morning.      Consultants/Specialty  Neurology    Disposition  Pending guardianship and placement   assisting          Review of Systems   Eyes: Negative.    Respiratory: Negative for cough and shortness of breath.    Cardiovascular: Negative for chest pain, palpitations and orthopnea.   Gastrointestinal: Negative.    Genitourinary: Negative.    Musculoskeletal: Negative.    Skin: Negative.    Neurological: Negative.  Negative for weakness.   Endo/Heme/Allergies: Negative.    Psychiatric/Behavioral: Positive for memory loss. The patient is not nervous/anxious.       Physical Exam  Laboratory/Imaging   Hemodynamics  Temp (24hrs), Av.2 °C (97.1 °F), Min:35.8 °C (96.5 °F), Max:36.6 °C (97.8 °F)   Temperature: 35.9 °C (96.7 °F)  Pulse  Av  Min: 50  Max: 105    Blood Pressure : 106/62      Respiratory      Respiration: 17, Pulse Oximetry: 91 %        RUL Breath Sounds: Clear, RML Breath Sounds: Diminished, RLL Breath Sounds: Diminished, ARGELIA Breath Sounds: Clear, LLL Breath Sounds: Diminished    Fluids    Intake/Output Summary (Last 24 hours) at 17 1257  Last data filed at 17 1500   Gross per 24 hour   Intake              240 ml   Output                0 ml   Net              240 ml       Nutrition  Orders Placed This Encounter   Procedures   • Diet Order     Standing Status:   Standing     Number of Occurrences:   1     Order Specific Question:   Diet:     Answer:   Regular [1]     Physical Exam   Constitutional: She appears well-developed.   HENT:   Head: Normocephalic and atraumatic.   Eyes: EOM are normal. Pupils are equal, round, and reactive to light. Right eye exhibits no discharge. Left eye exhibits no discharge.   Neck: Normal range of motion.   Cardiovascular: Normal rate and intact  distal pulses.    Pulmonary/Chest: Effort normal and breath sounds normal. She exhibits no tenderness.   Abdominal: Soft. Bowel sounds are normal. She exhibits no distension.   Musculoskeletal: She exhibits no edema.   Neurological: She is alert. She exhibits normal muscle tone.   Skin: Skin is warm and dry.   Psychiatric: She has a normal mood and affect. Her behavior is normal. Judgment and thought content normal. Cognition and memory are impaired.   Pleasant   Vitals reviewed.                               Assessment/Plan     * Dementia- (present on admission)   Assessment & Plan    Orignally presented with fall at home in the setting of severe dementia and urinary tract infection back in September 2017.  MRI of her brain showed enlarged ventricles but no acute infarct. Significant concern for NPH given her dementia, incontinence, and gait instability. Psychiatry deemed patient incapacitated. Neuro evaluated and signed off as she refused any intervention.         HTN (hypertension)- (present on admission)   Assessment & Plan      - Continue Hydralazine,  Lotensin             History of CVA (cerebrovascular accident)- (present on admission)   Assessment & Plan    MRI on 9/23 showed no evidence of acute stroke. Prominent ventricles, as noted above.  - Continue ASA and statin  - neuro evaluated and signed off  - to chair with meals  -Continue PT OT and encourage ambulation patient          Bradycardia   Assessment & Plan    Improved after removal of BB        T12 compression fracture (CMS-[facility])- (present on admission)   Assessment & Plan    CT spine, no acute fractures. No complaints of pain.            Reviewed items::  Labs reviewed and Medications reviewed  Pink catheter::  No Pink  DVT prophylaxis pharmacological::  Heparin  Ulcer Prophylaxis::  Yes

## 2017-11-21 ENCOUNTER — APPOINTMENT (OUTPATIENT)
Dept: RADIOLOGY | Facility: MEDICAL CENTER | Age: 77
DRG: 682 | End: 2017-11-21
Attending: HOSPITALIST
Payer: MEDICARE

## 2017-11-21 PROBLEM — G89.29 CHRONIC BACK PAIN: Status: ACTIVE | Noted: 2017-08-01

## 2017-11-21 PROBLEM — E66.9 OBESITY (BMI 30.0-34.9): Status: ACTIVE | Noted: 2017-11-21

## 2017-11-21 PROBLEM — R53.81 PHYSICAL DEBILITY: Status: ACTIVE | Noted: 2017-11-21

## 2017-11-21 PROBLEM — R79.89 AZOTEMIA: Status: ACTIVE | Noted: 2017-11-21

## 2017-11-21 PROBLEM — Z86.73 HISTORY OF CVA (CEREBROVASCULAR ACCIDENT): Status: RESOLVED | Noted: 2017-07-29 | Resolved: 2017-11-21

## 2017-11-21 PROBLEM — E66.811 OBESITY (BMI 30.0-34.9): Status: ACTIVE | Noted: 2017-11-21

## 2017-11-21 LAB
ANION GAP SERPL CALC-SCNC: 8 MMOL/L (ref 0–11.9)
BASOPHILS # BLD AUTO: 0.4 % (ref 0–1.8)
BASOPHILS # BLD: 0.03 K/UL (ref 0–0.12)
BUN SERPL-MCNC: 28 MG/DL (ref 8–22)
CALCIUM SERPL-MCNC: 9.6 MG/DL (ref 8.5–10.5)
CHLORIDE SERPL-SCNC: 109 MMOL/L (ref 96–112)
CO2 SERPL-SCNC: 24 MMOL/L (ref 20–33)
CREAT SERPL-MCNC: 1.13 MG/DL (ref 0.5–1.4)
EOSINOPHIL # BLD AUTO: 0.05 K/UL (ref 0–0.51)
EOSINOPHIL NFR BLD: 0.6 % (ref 0–6.9)
ERYTHROCYTE [DISTWIDTH] IN BLOOD BY AUTOMATED COUNT: 51 FL (ref 35.9–50)
GFR SERPL CREATININE-BSD FRML MDRD: 47 ML/MIN/1.73 M 2
GLUCOSE SERPL-MCNC: 123 MG/DL (ref 65–99)
HCT VFR BLD AUTO: 36.4 % (ref 37–47)
HGB BLD-MCNC: 12 G/DL (ref 12–16)
IMM GRANULOCYTES # BLD AUTO: 0.04 K/UL (ref 0–0.11)
IMM GRANULOCYTES NFR BLD AUTO: 0.5 % (ref 0–0.9)
LYMPHOCYTES # BLD AUTO: 0.41 K/UL (ref 1–4.8)
LYMPHOCYTES NFR BLD: 5.2 % (ref 22–41)
MCH RBC QN AUTO: 29.1 PG (ref 27–33)
MCHC RBC AUTO-ENTMCNC: 33 G/DL (ref 33.6–35)
MCV RBC AUTO: 88.1 FL (ref 81.4–97.8)
MONOCYTES # BLD AUTO: 0.44 K/UL (ref 0–0.85)
MONOCYTES NFR BLD AUTO: 5.6 % (ref 0–13.4)
NEUTROPHILS # BLD AUTO: 6.88 K/UL (ref 2–7.15)
NEUTROPHILS NFR BLD: 87.7 % (ref 44–72)
NRBC # BLD AUTO: 0 K/UL
NRBC BLD AUTO-RTO: 0 /100 WBC
PLATELET # BLD AUTO: 171 K/UL (ref 164–446)
PMV BLD AUTO: 10.7 FL (ref 9–12.9)
POTASSIUM SERPL-SCNC: 4 MMOL/L (ref 3.6–5.5)
RBC # BLD AUTO: 4.13 M/UL (ref 4.2–5.4)
SODIUM SERPL-SCNC: 141 MMOL/L (ref 135–145)
WBC # BLD AUTO: 7.9 K/UL (ref 4.8–10.8)

## 2017-11-21 PROCEDURE — A9270 NON-COVERED ITEM OR SERVICE: HCPCS | Performed by: FAMILY MEDICINE

## 2017-11-21 PROCEDURE — 700102 HCHG RX REV CODE 250 W/ 637 OVERRIDE(OP): Performed by: FAMILY MEDICINE

## 2017-11-21 PROCEDURE — 80048 BASIC METABOLIC PNL TOTAL CA: CPT

## 2017-11-21 PROCEDURE — 99233 SBSQ HOSP IP/OBS HIGH 50: CPT | Performed by: HOSPITALIST

## 2017-11-21 PROCEDURE — 700111 HCHG RX REV CODE 636 W/ 250 OVERRIDE (IP): Performed by: HOSPITALIST

## 2017-11-21 PROCEDURE — 36415 COLL VENOUS BLD VENIPUNCTURE: CPT

## 2017-11-21 PROCEDURE — 85025 COMPLETE CBC W/AUTO DIFF WBC: CPT

## 2017-11-21 PROCEDURE — A9270 NON-COVERED ITEM OR SERVICE: HCPCS | Performed by: HOSPITALIST

## 2017-11-21 PROCEDURE — 700102 HCHG RX REV CODE 250 W/ 637 OVERRIDE(OP): Performed by: HOSPITALIST

## 2017-11-21 PROCEDURE — 71010 DX-CHEST-PORTABLE (1 VIEW): CPT

## 2017-11-21 PROCEDURE — 770006 HCHG ROOM/CARE - MED/SURG/GYN SEMI*

## 2017-11-21 PROCEDURE — 700101 HCHG RX REV CODE 250: Performed by: FAMILY MEDICINE

## 2017-11-21 RX ORDER — ACETAMINOPHEN 325 MG/1
650 TABLET ORAL EVERY 6 HOURS PRN
Status: DISCONTINUED | OUTPATIENT
Start: 2017-11-21 | End: 2018-01-15 | Stop reason: HOSPADM

## 2017-11-21 RX ORDER — ALENDRONATE SODIUM 10 MG/1
10 TABLET ORAL
Status: DISCONTINUED | OUTPATIENT
Start: 2017-11-22 | End: 2018-01-15 | Stop reason: HOSPADM

## 2017-11-21 RX ORDER — L. ACIDOPHILUS/L.BULGARICUS 100MM CELL
1 GRANULES IN PACKET (EA) ORAL
Status: DISCONTINUED | OUTPATIENT
Start: 2017-11-21 | End: 2017-12-26

## 2017-11-21 RX ORDER — CALCIUM CARBONATE 500(1250)
500 TABLET ORAL 2 TIMES DAILY WITH MEALS
Status: DISCONTINUED | OUTPATIENT
Start: 2017-11-21 | End: 2017-12-26

## 2017-11-21 RX ADMIN — ASPIRIN 81 MG: 81 TABLET, COATED ORAL at 09:08

## 2017-11-21 RX ADMIN — ACETAMINOPHEN 650 MG: 325 TABLET, FILM COATED ORAL at 05:05

## 2017-11-21 RX ADMIN — OXYBUTYNIN CHLORIDE 5 MG: 5 TABLET, FILM COATED, EXTENDED RELEASE ORAL at 21:22

## 2017-11-21 RX ADMIN — LIDOCAINE 1 PATCH: 50 PATCH CUTANEOUS at 09:09

## 2017-11-21 RX ADMIN — GABAPENTIN 300 MG: 300 CAPSULE ORAL at 21:22

## 2017-11-21 RX ADMIN — ATORVASTATIN CALCIUM 80 MG: 40 TABLET, FILM COATED ORAL at 21:22

## 2017-11-21 RX ADMIN — HEPARIN SODIUM 5000 UNITS: 5000 INJECTION, SOLUTION INTRAVENOUS; SUBCUTANEOUS at 09:08

## 2017-11-21 RX ADMIN — HYDRALAZINE HYDROCHLORIDE 100 MG: 50 TABLET ORAL at 21:23

## 2017-11-21 RX ADMIN — OMEPRAZOLE 20 MG: 20 CAPSULE, DELAYED RELEASE ORAL at 09:08

## 2017-11-21 RX ADMIN — HYDRALAZINE HYDROCHLORIDE 100 MG: 50 TABLET ORAL at 05:05

## 2017-11-21 RX ADMIN — DULOXETINE HYDROCHLORIDE 20 MG: 20 CAPSULE, DELAYED RELEASE ORAL at 09:08

## 2017-11-21 RX ADMIN — HEPARIN SODIUM 5000 UNITS: 5000 INJECTION, SOLUTION INTRAVENOUS; SUBCUTANEOUS at 21:23

## 2017-11-21 RX ADMIN — HYDRALAZINE HYDROCHLORIDE 100 MG: 50 TABLET ORAL at 14:32

## 2017-11-21 ASSESSMENT — PAIN SCALES - GENERAL
PAINLEVEL_OUTOF10: 0
PAINLEVEL_OUTOF10: 0

## 2017-11-21 ASSESSMENT — ENCOUNTER SYMPTOMS
FEVER: 0
NAUSEA: 0
COUGH: 1
VOMITING: 0
SHORTNESS OF BREATH: 1
CHILLS: 0

## 2017-11-21 NOTE — THERAPY
"Attempted PT treatment session this pm. Pt asleep upon arrival but roused easily to voice. When pt asked if she wanted to participate with PT she replied, \"I am cold and tired.\" Pt educated on importance of OOB mobility to help regain functional strength and endurance. Pt continues to refuse. Will complete PT treatment session as able.   "

## 2017-11-21 NOTE — PROGRESS NOTES
Simona Knight Fall Risk Assessment:     Last Known Fall: Within the last six months  Mobility: Immobilized/requires assist of one person  Medications: Cardiovascular or central nervous system meds  Mental Status/LOC/Awareness: Oriented to person and place  Toileting Needs: Incontinence  Volume/Electrolyte Status: No problems  Communication/Sensory: Visual (Glasses)/hearing deficit  Behavior: Appropriate behavior  Jarvismark Knight Fall Risk Total: 13  Fall Risk Level: MODERATE RISK    Universal Fall Precautions:  call light/belongings in reach, bed in low position and locked, use non-slip footwear, adequate lighting, clutter free and spill free environment, educate on level of risk, educate to call for assistance    Fall Risk Level Interventions:    TRIAL (TELE 8, NEURO, MED JENNIE 5) Moderate Fall Risk Interventions  Place yellow fall risk ID band on patient: verified  Provide patient/family education based on risk assessment : completed  Educate patient/family to call staff for assistance when getting out of bed: completed  Place fall precaution signage outside patient door: verified  Utilize bed/chair fall alarm: completedTRIAL (TELE 8, NEURO, MED JENNIE 5) High Fall Risk Interventions  Place yellow fall risk ID band on patient: verified  Provide patient/family education based on risk assessment: completed  Educate patient/family to call staff for assistance when getting out of bed: completed  Place fall precaution signage outside patient door: verified  Place patient in room close to nursing station: completed  Utilize bed/chair fall alarm: verified  Notify charge of high risk for huddle: completed    Patient Specific Interventions:     Medication: review medications with patient and family  Mental Status/LOC/Awareness: reinforce the use of call light  Toileting: consider obtaining elevated toilet seat or bedside commode (BSC)  Volume/Electrolyte Status: ensure patient remains hydrated  Communication/Sensory: update  plan of care on whiteboard  Behavioral: administer medication as ordered  Mobility: ensure bed is locked and in lowest position

## 2017-11-21 NOTE — PROGRESS NOTES
CNA informs this RN that pt's temporal temp. is elevated to 99.9 degrees F, oral temp. is 101.7 degrees F, , O2 sat 85%. RN auscultates Rhonchi to bilat upper lungs. RN puts pt on 4 L per NC.  in place. RN pages Hospitalist on call for update and orders. MD orders chest xray, CBC, and BMP. Pending results. Will pass on to day shift RN. Pt has increased weakness and is unable to bear weight to BLE.

## 2017-11-22 PROCEDURE — A9270 NON-COVERED ITEM OR SERVICE: HCPCS | Performed by: HOSPITALIST

## 2017-11-22 PROCEDURE — 700111 HCHG RX REV CODE 636 W/ 250 OVERRIDE (IP): Performed by: HOSPITALIST

## 2017-11-22 PROCEDURE — 97116 GAIT TRAINING THERAPY: CPT

## 2017-11-22 PROCEDURE — 700102 HCHG RX REV CODE 250 W/ 637 OVERRIDE(OP): Performed by: FAMILY MEDICINE

## 2017-11-22 PROCEDURE — 99232 SBSQ HOSP IP/OBS MODERATE 35: CPT | Performed by: HOSPITALIST

## 2017-11-22 PROCEDURE — A9270 NON-COVERED ITEM OR SERVICE: HCPCS | Performed by: FAMILY MEDICINE

## 2017-11-22 PROCEDURE — 700102 HCHG RX REV CODE 250 W/ 637 OVERRIDE(OP): Performed by: HOSPITALIST

## 2017-11-22 PROCEDURE — 700101 HCHG RX REV CODE 250: Performed by: FAMILY MEDICINE

## 2017-11-22 PROCEDURE — 97110 THERAPEUTIC EXERCISES: CPT

## 2017-11-22 PROCEDURE — 97530 THERAPEUTIC ACTIVITIES: CPT

## 2017-11-22 PROCEDURE — 770006 HCHG ROOM/CARE - MED/SURG/GYN SEMI*

## 2017-11-22 RX ADMIN — HEPARIN SODIUM 5000 UNITS: 5000 INJECTION, SOLUTION INTRAVENOUS; SUBCUTANEOUS at 09:15

## 2017-11-22 RX ADMIN — ALENDRONATE SODIUM 10 MG: 10 TABLET ORAL at 05:08

## 2017-11-22 RX ADMIN — HYDRALAZINE HYDROCHLORIDE 100 MG: 50 TABLET ORAL at 05:08

## 2017-11-22 RX ADMIN — HYDRALAZINE HYDROCHLORIDE 100 MG: 50 TABLET ORAL at 21:49

## 2017-11-22 RX ADMIN — OXYBUTYNIN CHLORIDE 5 MG: 5 TABLET, FILM COATED, EXTENDED RELEASE ORAL at 21:49

## 2017-11-22 RX ADMIN — OMEPRAZOLE 20 MG: 20 CAPSULE, DELAYED RELEASE ORAL at 09:15

## 2017-11-22 RX ADMIN — HYDRALAZINE HYDROCHLORIDE 100 MG: 50 TABLET ORAL at 13:56

## 2017-11-22 RX ADMIN — LIDOCAINE 1 PATCH: 50 PATCH CUTANEOUS at 10:07

## 2017-11-22 RX ADMIN — GABAPENTIN 300 MG: 300 CAPSULE ORAL at 21:49

## 2017-11-22 RX ADMIN — LACTOBACILLUS ACIDOPHILUS / LACTOBACILLUS BULGARICUS 1 PACKET: 100 MILLION CFU STRENGTH GRANULES at 13:47

## 2017-11-22 RX ADMIN — STANDARDIZED SENNA CONCENTRATE AND DOCUSATE SODIUM 2 TABLET: 8.6; 5 TABLET, FILM COATED ORAL at 09:15

## 2017-11-22 RX ADMIN — ERGOCALCIFEROL 50000 UNITS: 1.25 CAPSULE, LIQUID FILLED ORAL at 09:14

## 2017-11-22 RX ADMIN — DULOXETINE HYDROCHLORIDE 20 MG: 20 CAPSULE, DELAYED RELEASE ORAL at 09:14

## 2017-11-22 RX ADMIN — Medication 500 MG: at 09:14

## 2017-11-22 RX ADMIN — LACTOBACILLUS ACIDOPHILUS / LACTOBACILLUS BULGARICUS 1 PACKET: 100 MILLION CFU STRENGTH GRANULES at 09:14

## 2017-11-22 RX ADMIN — LACTOBACILLUS ACIDOPHILUS / LACTOBACILLUS BULGARICUS 1 PACKET: 100 MILLION CFU STRENGTH GRANULES at 17:16

## 2017-11-22 RX ADMIN — HEPARIN SODIUM 5000 UNITS: 5000 INJECTION, SOLUTION INTRAVENOUS; SUBCUTANEOUS at 21:49

## 2017-11-22 RX ADMIN — ATORVASTATIN CALCIUM 80 MG: 40 TABLET, FILM COATED ORAL at 22:58

## 2017-11-22 RX ADMIN — ASPIRIN 81 MG: 81 TABLET, COATED ORAL at 09:15

## 2017-11-22 RX ADMIN — BENAZEPRIL HYDROCHLORIDE 40 MG: 20 TABLET ORAL at 09:15

## 2017-11-22 RX ADMIN — Medication 500 MG: at 17:16

## 2017-11-22 ASSESSMENT — GAIT ASSESSMENTS
GAIT LEVEL OF ASSIST: MINIMAL ASSIST
DISTANCE (FEET): 10
ASSISTIVE DEVICE: FRONT WHEEL WALKER
DEVIATION: BRADYKINETIC;SHUFFLED GAIT;OTHER (COMMENT)

## 2017-11-22 ASSESSMENT — COGNITIVE AND FUNCTIONAL STATUS - GENERAL
SUGGESTED CMS G CODE MODIFIER MOBILITY: CK
MOBILITY SCORE: 15
MOVING TO AND FROM BED TO CHAIR: A LITTLE
CLIMB 3 TO 5 STEPS WITH RAILING: A LOT
STANDING UP FROM CHAIR USING ARMS: A LITTLE
WALKING IN HOSPITAL ROOM: A LITTLE
MOVING FROM LYING ON BACK TO SITTING ON SIDE OF FLAT BED: UNABLE
TURNING FROM BACK TO SIDE WHILE IN FLAT BAD: A LITTLE

## 2017-11-22 ASSESSMENT — ENCOUNTER SYMPTOMS
COUGH: 1
SHORTNESS OF BREATH: 1
VOMITING: 0
NAUSEA: 0

## 2017-11-22 ASSESSMENT — PAIN SCALES - GENERAL
PAINLEVEL_OUTOF10: 0
PAINLEVEL_OUTOF10: 0

## 2017-11-22 NOTE — THERAPY
"Physical Therapy Treatment completed.   Bed Mobility:  Supine to Sit: Contact Guard Assist  Transfers: Sit to Stand: Minimal Assist  Gait: Level Of Assist: Minimal Assist with Front-Wheel Walker       Plan of Care: Will benefit from Physical Therapy 2 times per week  Discharge Recommendations: Equipment: Will Continue to Assess for Equipment Needs.     See \"Rehab Therapy-Acute\" Patient Summary Report for complete documentation.     Pt presenting weaker today than previous txs. Pt requiring a lot of cues to sequence and fatiguing quickly. Pt having difficulty getting an upright posture in standing until 5 stand secondary to quad and glute weakness. Pt given HEP to work on as well as education on increasing activity throughout the day.   "

## 2017-11-22 NOTE — CARE PLAN
Problem: Safety  Goal: Will remain free from injury  Outcome: PROGRESSING AS EXPECTED  Bed on lowest position, call light within reach, patient educated about use of call light and safety precautions.     Problem: Respiratory:  Goal: Respiratory status will improve  Outcome: PROGRESSING AS EXPECTED  Pt on 2L O2 NC.

## 2017-11-22 NOTE — PROGRESS NOTES
"Assumed care at 1900. Received report from RN. Patient is AOx4 can be forgetful at times. Assessment complete. Labs reviewed. Patient and RN discussed plan of care. Patient questions answered. Patient needs are met at this time. Bed in lowest and locked position. Call light is within reach. Hourly rounding in place. /57   Pulse 94   Temp 36.4 °C (97.6 °F)   Resp 18   Ht 1.6 m (5' 2.99\")   Wt 85 kg (187 lb 6.3 oz)   SpO2 90%   Breastfeeding? No   BMI 33.20 kg/m²       "

## 2017-11-22 NOTE — PROGRESS NOTES
Simona Knight Fall Risk Assessment:     Last Known Fall: Within the last six months  Mobility: Immobilized/requires assist of one person  Medications: Cardiovascular or central nervous system meds  Mental Status/LOC/Awareness: Oriented to person and place  Toileting Needs: Incontinence  Volume/Electrolyte Status: No problems  Communication/Sensory: Visual (Glasses)/hearing deficit  Behavior: Appropriate behavior  Simona Knight Fall Risk Total: 13  Fall Risk Level: MODERATE RISK    Universal Fall Precautions:  call light/belongings in reach, bed in low position and locked, wheelchairs and assistive devices out of sight, siderails up x 2, use non-slip footwear, adequate lighting, clutter free and spill free environment, educate on level of risk, educate to call for assistance    Fall Risk Level Interventions:    TRIAL (TELE 8, NEURO, MED JENNIE 5) Moderate Fall Risk Interventions  Place yellow fall risk ID band on patient: verified  Provide patient/family education based on risk assessment : completed  Educate patient/family to call staff for assistance when getting out of bed: completed  Place fall precaution signage outside patient door: verified  Utilize bed/chair fall alarm: completedTRIAL (TELE 8, NEURO, [place] JENNIE 5) High Fall Risk Interventions  Place yellow fall risk ID band on patient: verified  Provide patient/family education based on risk assessment: completed  Educate patient/family to call staff for assistance when getting out of bed: completed  Place fall precaution signage outside patient door: verified  Place patient in room close to nursing station: completed  Utilize bed/chair fall alarm: verified  Notify charge of high risk for huddle: completed    Patient Specific Interventions:     Medication: review medications with patient and family  Mental Status/LOC/Awareness: reorient patient, reinforce falls education and check on patient hourly  Toileting: provide frquent toileting  Volume/Electrolyte  Status: ensure patient remains hydrated and monitor abnormal lab values  Communication/Sensory: update plan of care on whiteboard  Behavioral: encourage patient to voice feelings  Mobility: provide comfort measures during transport, dangle prior to standing, utilize bed/chair fall alarm and ensure bed is locked and in lowest position

## 2017-11-22 NOTE — PROGRESS NOTES
[facility]ist Progress Note    Date of Service: 2017    Chief Complaint  77 y.o. female admitted 2017 with GLF, pyuria and ATN.    Interval Problem Update  Feeling Ok.  Denies N/V/D.  Has some SOB.    Consultants/Specialty  Neurology  Psych      Review of Systems   Constitutional: Negative for chills and fever.   Respiratory: Positive for cough and shortness of breath.    Cardiovascular: Negative for chest pain.   Gastrointestinal: Negative for nausea and vomiting.   All other systems reviewed and are negative.     Physical Exam  Laboratory/Imaging   Hemodynamics  Temp (24hrs), Av.2 °C (98.9 °F), Min:35.9 °C (96.7 °F), Max:38.7 °C (101.7 °F)   Temperature: 36.1 °C (97 °F)  Pulse  Av.7  Min: 50  Max: 106    Blood Pressure : 148/56      Respiratory      Respiration: 18, Pulse Oximetry: 92 %     Work Of Breathing / Effort: Mild  RUL Breath Sounds: Diminished, RML Breath Sounds: Diminished, RLL Breath Sounds: Diminished, ARGELIA Breath Sounds: Diminished, LLL Breath Sounds: Diminished    Fluids  No intake or output data in the 24 hours ending 17 1806    Nutrition  Orders Placed This Encounter   Procedures   • Diet Order     Standing Status:   Standing     Number of Occurrences:   1     Order Specific Question:   Diet:     Answer:   Regular [1]     Physical Exam  Nursing note and vitals reviewed.  Constitutional: She is oriented to person, place, and time. She appears well-developed,  well-nourished and overweight.   HENT:   Head: Normocephalic and atraumatic.   Right Ear: External ear normal.   Left Ear: External ear normal.   Nose: Nose normal.   Mouth/Throat: Oropharynx is small with Mallanpati score of 3.  Mucosa is clear and moist.   Eyes: Conjunctivae and extraocular motions are normal. Pupils are equal, round, and reactive to light.   Neck: Normal range of motion. Neck supple.   Cardiovascular: Normal rate, regular rhythm, normal heart sounds and intact distal pulses.    Pulmonary/Chest:  Effort normal and bilateral exp rhonchi.   Abdominal: Soft. Bowel sounds are normal.   Musculoskeletal: Normal range of motion.   Neurological: She is alert and oriented to person, place, and time.   Skin: Skin is warm and dry.       Recent Labs      11/21/17   0802   WBC  7.9   RBC  4.13*   HEMOGLOBIN  12.0   HEMATOCRIT  36.4*   MCV  88.1   MCH  29.1   MCHC  33.0*   RDW  51.0*   PLATELETCT  171   MPV  10.7     Recent Labs      11/21/17   0802   SODIUM  141   POTASSIUM  4.0   CHLORIDE  109   CO2  24   GLUCOSE  123*   BUN  28*   CREATININE  1.13   CALCIUM  9.6                      Assessment/Plan     * Dementia- (present on admission)   Assessment & Plan    Min narc/sed when possible.   Await guardianship.        HTN (hypertension)- (present on admission)   Assessment & Plan    Labile.  Follow for now.            Physical debility   Assessment & Plan    PT/OT and increase activity.        Obesity (BMI 30.0-34.9)   Assessment & Plan    Encourage Kcal restriction        Azotemia   Assessment & Plan    Follow c IVF and BP management.        Bradycardia   Assessment & Plan    Improved after removal of BB but now tachycardic.  Follow for now.        T12 compression fracture (CMS-[facility])- (present on admission)   Assessment & Plan    CT spine, no acute fractures. Start fosamax and calcium        Chronic back pain- (present on admission)   Assessment & Plan    As above and min narc/sed when possible.        Stable issues - med hx (GIB, CAD/ICM, CVA, DLD),    Preventives - IS, Vax, stool soft, DVTP.    Dispo - complex/guarded.      Reviewed items::  EKG reviewed, Radiology images reviewed, Labs reviewed and Medications reviewed  Pink catheter::  No Pink  DVT prophylaxis pharmacological::  Heparin  Ulcer Prophylaxis::  Yes

## 2017-11-22 NOTE — PROGRESS NOTES
Pharmacy Pharmacotherapy Consult for LOS >30 days    Admit Date: 9/21/2017      Medications were reviewed for appropriateness and ongoing need.     Current Facility-Administered Medications   Medication Dose Route Frequency Provider Last Rate Last Dose   • acetaminophen (TYLENOL) tablet 650 mg  650 mg Oral Q6HRS PRN Joe Russo M.D.       • lactobacillus granules (LACTINEX/FLORANEX) packet 1 Packet  1 Packet Oral TID WITH MEALS Joe Russo M.D.       • calcium carbonate (OS-JOON 500) tablet 500 mg  500 mg Oral BID WITH MEALS Joe Russo M.D.       • alendronate (FOSAMAX) tablet 10 mg  10 mg Oral QAM AC Joe Russo M.D.   10 mg at 11/22/17 0508   • heparin injection 5,000 Units  5,000 Units Subcutaneous Q12HRS Nanette Waller M.D.   5,000 Units at 11/21/17 2123   • trazodone (DESYREL) tablet 100 mg  100 mg Oral QHS PRN Amy Vieyra D.[place]ug[place]   100 mg at 11/19/17 2123   • benazepril (LOTENSIN) tablet 40 mg  40 mg Oral DAILY Denia Fenton M.D.   Stopped at 11/21/17 0900   • hydrALAZINE (APRESOLINE) tablet 100 mg  100 mg Oral Q8HRS Denia Fenton M.D.   100 mg at 11/22/17 0508   • omeprazole (PRILOSEC) capsule 20 mg  20 mg Oral DAILY Denia Fenton M.D.   20 mg at 11/21/17 0908   • aspirin EC (ECOTRIN) tablet 81 mg  81 mg Oral DAILY Edward Metcalf M.D.   81 mg at 11/21/17 0908   • atorvastatin (LIPITOR) tablet 80 mg  80 mg Oral QHS Edward Metcalf M.D.   80 mg at 11/21/17 2122   • duloxetine (CYMBALTA) capsule 20 mg  20 mg Oral DAILY Edward Metcalf M.D.   20 mg at 11/21/17 0908   • gabapentin (NEURONTIN) capsule 300 mg  300 mg Oral Q EVENING Edward Metcalf M.D.   300 mg at 11/21/17 2122   • lidocaine (LIDODERM) 5 % 1 Patch  1 Patch Transdermal Q24HRS Edward Metcalf M.D.   1 Patch at 11/21/17 0909   • oxybutynin SR (DITROPAN-XL) tablet 5 mg  5 mg Oral Q EVENING Edward Metcalf M.D.   5 mg at 11/21/17 2122   • vitamin D (Ergocalciferol) (DRISDOL) capsule 50,000 Units  50,000 Units Oral  Q7 DAYS Edward Metcalf M.D.   50,000 Units at 11/15/17 0915   • senna-docusate (PERICOLACE or SENOKOT S) 8.6-50 MG per tablet 2 Tab  2 Tab Oral BID Edward Metcalf M.D.   2 Tab at 11/20/17 2212    And   • polyethylene glycol/lytes (MIRALAX) PACKET 1 Packet  1 Packet Oral QDAY PRN Edward Metcalf M.D.   1 Packet at 11/12/17 0636    And   • magnesium hydroxide (MILK OF MAGNESIA) suspension 30 mL  30 mL Oral QDAY PRN Edward Metcalf M.D.        And   • bisacodyl (DULCOLAX) suppository 10 mg  10 mg Rectal QDAY BRIDGETN Edward Metcalf M.D.       • ondansetron (ZOFRAN) syringe/vial injection 4 mg  4 mg Intravenous Q4HRS PRN Edward Metcalf M.D.   4 mg at 09/26/17 2047   • ondansetron (ZOFRAN ODT) dispertab 4 mg  4 mg Oral Q4HRS PRN Edward Metcalf M.D.   4 mg at 10/06/17 1940   • oxycodone immediate-release (ROXICODONE) tablet 2.5 mg  2.5 mg Oral Q3HRS PRLINNETTE Metcalf M.D.   2.5 mg at 09/29/17 0610    And   • oxycodone immediate-release (ROXICODONE) tablet 5 mg  5 mg Oral Q3HRS PRN Edward Metcalf M.D.   5 mg at 11/20/17 1421       Recommendations:    Ondansetron has not been used since 10/6, recommend Dc of both ODT and injection.     Re-evaluate the use of atorvastatin 80 mg daily, recommend lipid panel and possible decrease to atorvastatin 40 mg.    Signed,    Hedy Garcia, PharmD Candidate 2018    Discussed with MD - MD wants to keep prn zofran on MAR. Will check lipid panel.    Krystal Rawls, Pharm.D, BCPS

## 2017-11-22 NOTE — PROGRESS NOTES
[facility] Progress [facility]    Date of Service: 2017    Chief Complaint  77 y.o. female admitted 2017 with GLF, pyuria and ATN.    Interval Problem Update  Feeling Ok.  Denies N/V/D.  No overnight events.    Consultants/Specialty  Neurology  Psych      Review of Systems   Respiratory: Positive for cough and shortness of breath.    Cardiovascular: Negative for chest pain.   Gastrointestinal: Negative for nausea and vomiting.   All other systems reviewed and are negative.     Physical Exam  Laboratory/Imaging   Hemodynamics  Temp (24hrs), Av.4 °C (97.5 °F), Min:36.1 °C (97 °F), Max:36.9 °C (98.4 °F)   Temperature: 36.9 °C (98.4 °F)  Pulse  Av.9  Min: 50  Max: 106    Blood Pressure : 148/61      Respiratory      Respiration: 18, Pulse Oximetry: 91 %     Work Of Breathing / Effort: Mild  RUL Breath Sounds: Diminished, RML Breath Sounds: Diminished, RLL Breath Sounds: Diminished, ARGELIA Breath Sounds: Diminished, LLL Breath Sounds: Diminished    Fluids    Intake/Output Summary (Last 24 hours) at 17 1520  Last data filed at 17 1013   Gross per 24 hour   Intake              118 ml   Output                0 ml   Net              118 ml       Nutrition  Orders Placed This Encounter   Procedures   • Diet Order     Standing Status:   Standing     Number of Occurrences:   1     Order Specific Question:   Diet:     Answer:   Regular [1]     Physical Exam  Nursing note and vitals reviewed.  Constitutional: She is oriented to person, place, and time. She appears well-developed,  well-nourished and overweight.   HENT:   Head: Normocephalic and atraumatic.   Right Ear: External ear normal.   Left Ear: External ear normal.   Nose: Nose normal.   Eyes: Conjunctivae and extraocular motions are normal.    Neck: Normal range of motion. Neck supple.   Cardiovascular: Normal rate.    Pulmonary/Chest: Effort normal.   Abdominal: Soft. Bowel sounds are normal.   Musculoskeletal: Normal range of motion.    Neurological: She is alert and oriented to person, place, and time.   Skin: Skin is warm and dry.               Recent Labs      11/21/17   0802   WBC  7.9   RBC  4.13*   HEMOGLOBIN  12.0   HEMATOCRIT  36.4*   MCV  88.1   MCH  29.1   MCHC  33.0*   RDW  51.0*   PLATELETCT  171   MPV  10.7     Recent Labs      11/21/17   0802   SODIUM  141   POTASSIUM  4.0   CHLORIDE  109   CO2  24   GLUCOSE  123*   BUN  28*   CREATININE  1.13   CALCIUM  9.6                      Assessment/Plan     * Dementia- (present on admission)   Assessment & Plan    Min narc/sed when possible.   Await guardianship.        HTN (hypertension)- (present on admission)   Assessment & Plan    Better.  Labile.  Follow for now.            Physical debility   Assessment & Plan    PT/OT and increase activity.        Obesity (BMI 30.0-34.9)   Assessment & Plan    Encourage Kcal restriction        Azotemia   Assessment & Plan    Follow c IVF and BP management.        Bradycardia   Assessment & Plan    Better.   Follow for now.        T12 compression fracture (CMS-HCC)- (present on admission)   Assessment & Plan    CT spine, no acute fractures. Cont fosamax and calcium        Chronic back pain- (present on admission)   Assessment & Plan    As above and min narc/sed when possible.        Stable issues - med hx (GIB, CAD/ICM, CVA, DLD), preventives.    Dispo - complex/fair.      Reviewed items::  EKG reviewed, Radiology images reviewed, Labs reviewed and Medications reviewed  Pink catheter::  No Pink  DVT prophylaxis pharmacological::  Heparin  Ulcer Prophylaxis::  Yes

## 2017-11-22 NOTE — CARE PLAN
Problem: Safety  Goal: Will remain free from injury  Outcome: PROGRESSING AS EXPECTED  Treaded socks in place, bed in the lowest position, bed alarm on, call light and belongings within reach, pt call for assistance appropriately    Problem: Venous Thromboembolism (VTW)/Deep Vein Thrombosis (DVT) Prevention:  Goal: Patient will participate in Venous Thrombosis (VTE)/Deep Vein Thrombosis (DVT)Prevention Measures  Outcome: PROGRESSING AS EXPECTED  Receiving unfractionated heparin per MAR    Problem: Pain Management  Goal: Pain level will decrease to patient's comfort goal  Outcome: PROGRESSING AS EXPECTED  denies pain at this time, hourly rounding in progress    Problem: Skin Integrity  Goal: Risk for impaired skin integrity will decrease  Outcome: PROGRESSING AS EXPECTED  savage risk assessment, pt turns self from side to side

## 2017-11-22 NOTE — PROGRESS NOTES
Report received. Assumed care. Pt in bed awake. A/O x3, VSS. Responds appropriately. Denies pain, SOB.  in use Assessment complete. Noted sacrum red but blanching. Discussed POC, pain control, mobility, safety, DC planning, pt verbalizes understanding. Explained importance of calling before getting OOB. Call light and belongings within reach. Bed alarm on. Bed in the lowest position. Treaded socks in place. Hourly rounding in progress. Will continue to monitor .

## 2017-11-23 PROBLEM — R15.9 STOOL INCONTINENCE: Status: ACTIVE | Noted: 2017-11-23

## 2017-11-23 PROBLEM — R00.1 BRADYCARDIA: Status: RESOLVED | Noted: 2017-11-18 | Resolved: 2017-11-23

## 2017-11-23 PROBLEM — I10 HTN (HYPERTENSION): Status: RESOLVED | Noted: 2017-08-01 | Resolved: 2017-11-23

## 2017-11-23 LAB
CHOLEST SERPL-MCNC: 99 MG/DL (ref 100–199)
HDLC SERPL-MCNC: 44 MG/DL
LDLC SERPL CALC-MCNC: 40 MG/DL
TRIGL SERPL-MCNC: 73 MG/DL (ref 0–149)

## 2017-11-23 PROCEDURE — 80061 LIPID PANEL: CPT

## 2017-11-23 PROCEDURE — A9270 NON-COVERED ITEM OR SERVICE: HCPCS | Performed by: HOSPITALIST

## 2017-11-23 PROCEDURE — 36415 COLL VENOUS BLD VENIPUNCTURE: CPT

## 2017-11-23 PROCEDURE — 700102 HCHG RX REV CODE 250 W/ 637 OVERRIDE(OP): Performed by: HOSPITALIST

## 2017-11-23 PROCEDURE — 302146: Performed by: HOSPITALIST

## 2017-11-23 PROCEDURE — 700111 HCHG RX REV CODE 636 W/ 250 OVERRIDE (IP): Performed by: HOSPITALIST

## 2017-11-23 PROCEDURE — 700102 HCHG RX REV CODE 250 W/ 637 OVERRIDE(OP): Performed by: FAMILY MEDICINE

## 2017-11-23 PROCEDURE — 770006 HCHG ROOM/CARE - MED/SURG/GYN SEMI*

## 2017-11-23 PROCEDURE — 99232 SBSQ HOSP IP/OBS MODERATE 35: CPT | Performed by: HOSPITALIST

## 2017-11-23 PROCEDURE — A9270 NON-COVERED ITEM OR SERVICE: HCPCS | Performed by: FAMILY MEDICINE

## 2017-11-23 PROCEDURE — 700101 HCHG RX REV CODE 250: Performed by: FAMILY MEDICINE

## 2017-11-23 RX ADMIN — ASPIRIN 81 MG: 81 TABLET, COATED ORAL at 10:31

## 2017-11-23 RX ADMIN — Medication 500 MG: at 16:31

## 2017-11-23 RX ADMIN — HYDRALAZINE HYDROCHLORIDE 100 MG: 50 TABLET ORAL at 21:23

## 2017-11-23 RX ADMIN — DULOXETINE HYDROCHLORIDE 20 MG: 20 CAPSULE, DELAYED RELEASE ORAL at 10:30

## 2017-11-23 RX ADMIN — ALENDRONATE SODIUM 10 MG: 10 TABLET ORAL at 10:30

## 2017-11-23 RX ADMIN — HYDRALAZINE HYDROCHLORIDE 100 MG: 50 TABLET ORAL at 06:22

## 2017-11-23 RX ADMIN — ATORVASTATIN CALCIUM 80 MG: 40 TABLET, FILM COATED ORAL at 21:23

## 2017-11-23 RX ADMIN — HEPARIN SODIUM 5000 UNITS: 5000 INJECTION, SOLUTION INTRAVENOUS; SUBCUTANEOUS at 21:23

## 2017-11-23 RX ADMIN — HEPARIN SODIUM 5000 UNITS: 5000 INJECTION, SOLUTION INTRAVENOUS; SUBCUTANEOUS at 10:30

## 2017-11-23 RX ADMIN — LACTOBACILLUS ACIDOPHILUS / LACTOBACILLUS BULGARICUS 1 PACKET: 100 MILLION CFU STRENGTH GRANULES at 11:30

## 2017-11-23 RX ADMIN — LACTOBACILLUS ACIDOPHILUS / LACTOBACILLUS BULGARICUS 1 PACKET: 100 MILLION CFU STRENGTH GRANULES at 10:30

## 2017-11-23 RX ADMIN — OMEPRAZOLE 20 MG: 20 CAPSULE, DELAYED RELEASE ORAL at 10:31

## 2017-11-23 RX ADMIN — OXYBUTYNIN CHLORIDE 5 MG: 5 TABLET, FILM COATED, EXTENDED RELEASE ORAL at 21:23

## 2017-11-23 RX ADMIN — Medication 500 MG: at 10:30

## 2017-11-23 RX ADMIN — GABAPENTIN 300 MG: 300 CAPSULE ORAL at 21:23

## 2017-11-23 RX ADMIN — HYDRALAZINE HYDROCHLORIDE 100 MG: 50 TABLET ORAL at 16:31

## 2017-11-23 RX ADMIN — LIDOCAINE 1 PATCH: 50 PATCH CUTANEOUS at 10:31

## 2017-11-23 RX ADMIN — BENAZEPRIL HYDROCHLORIDE 40 MG: 20 TABLET ORAL at 10:31

## 2017-11-23 ASSESSMENT — ENCOUNTER SYMPTOMS
NAUSEA: 0
VOMITING: 0
COUGH: 0
SHORTNESS OF BREATH: 0

## 2017-11-23 ASSESSMENT — COPD QUESTIONNAIRES
DURING THE PAST 4 WEEKS HOW MUCH DID YOU FEEL SHORT OF BREATH: SOME OF THE TIME
COPD SCREENING SCORE: 3
HAVE YOU SMOKED AT LEAST 100 CIGARETTES IN YOUR ENTIRE LIFE: NO/DON'T KNOW
DO YOU EVER COUGH UP ANY MUCUS OR PHLEGM?: NO/ONLY WITH OCCASIONAL COLDS OR INFECTIONS

## 2017-11-23 ASSESSMENT — PAIN SCALES - GENERAL
PAINLEVEL_OUTOF10: 0
PAINLEVEL_OUTOF10: 7
PAINLEVEL_OUTOF10: 7
PAINLEVEL_OUTOF10: 0
PAINLEVEL_OUTOF10: 7

## 2017-11-23 ASSESSMENT — LIFESTYLE VARIABLES: DO YOU DRINK ALCOHOL: NO

## 2017-11-23 NOTE — PROGRESS NOTES
Received report from day shift RN, assumed care of patient at 1900. AOx4. x2 assist. Denies pain or nausea at this time. No IV access. Pt on 2L O2 via nasal cannula. Call light within reach, bed in low and locked position. No other needs at this time.

## 2017-11-23 NOTE — CARE PLAN
Problem: Safety  Goal: Will remain free from falls  Outcome: PROGRESSING AS EXPECTED  HD risk assessment complete; interventions in place.     Problem: Respiratory:  Goal: Respiratory status will improve  Outcome: PROGRESSING AS EXPECTED  Pt has expiratory wheezes in upper lobes bilat, pt is on 2LPM.  Baseline O2 use is 0LPM.  Will titrate to keep SpO2 >92%

## 2017-11-23 NOTE — PROGRESS NOTES
[facility]    Date of Service: 2017    Chief Complaint  77 y.o. female admitted 2017 with GLF, pyuria and ATN.    Interval Problem Update  Feeling Ok.  Denies N/V/D.  No complaints.    Consultants/Specialty  Neurology  Psych      Review of Systems   Respiratory: Negative for cough and shortness of breath.    Cardiovascular: Negative for chest pain.   Gastrointestinal: Negative for nausea and vomiting.   All other systems reviewed and are negative.     Physical Exam  Laboratory/Imaging   Hemodynamics  Temp (24hrs), Av.3 °C (97.3 °F), Min:36.1 °C (97 °F), Max:36.3 °C (97.4 °F)   Temperature: 36.1 °C (97 °F)  Pulse  Av.1  Min: 50  Max: 106    Blood Pressure : 125/57      Respiratory      Respiration: 16, Pulse Oximetry: 93 %     Work Of Breathing / Effort: Mild  RUL Breath Sounds: Diminished;Expiratory Wheezes, RML Breath Sounds: Diminished, RLL Breath Sounds: Diminished, ARGELIA Breath Sounds: Diminished;Expiratory Wheezes, LLL Breath Sounds: Diminished    Fluids    Intake/Output Summary (Last 24 hours) at 17 1137  Last data filed at 17 1622   Gross per 24 hour   Intake              118 ml   Output                0 ml   Net              118 ml       Nutrition  Orders Placed This Encounter   Procedures   • Diet Order     Standing Status:   Standing     Number of Occurrences:   1     Order Specific Question:   Diet:     Answer:   Regular [1]     Physical Exam  Nursing note and vitals reviewed.  Constitutional: She is oriented to person, place, and time. She appears well-developed,  well-nourished and overweight.   HENT:   Head: Normocephalic and atraumatic.   Right Ear: External ear normal.   Left Ear: External ear normal.   Nose: Nose normal.   Eyes: Conjunctivae and extraocular motions are normal.    Neck: Normal range of motion. Neck supple.   Pulmonary/Chest: Effort normal.   Abdominal: Soft. Bowel sounds are normal.   Musculoskeletal: Normal range of motion.    Neurological: She is alert and oriented to person, place, and time.   Skin: Skin is warm and dry.               Recent Labs      11/21/17   0802   WBC  7.9   RBC  4.13*   HEMOGLOBIN  12.0   HEMATOCRIT  36.4*   MCV  88.1   MCH  29.1   MCHC  33.0*   RDW  51.0*   PLATELETCT  171   MPV  10.7     Recent Labs      11/21/17   0802   SODIUM  141   POTASSIUM  4.0   CHLORIDE  109   CO2  24   GLUCOSE  123*   BUN  28*   CREATININE  1.13   CALCIUM  9.6             Recent Labs      11/23/17   0228   TRIGLYCERIDE  73   HDL  44   LDL  40          Assessment/Plan     * Dementia- (present on admission)   Assessment & Plan    Min narc/sed when possible.   Await guardianship.        Stool incontinence   Assessment & Plan    Change laxatives to PRN.        Physical debility   Assessment & Plan    PT/OT and increase activity.        Obesity (BMI 30.0-34.9)   Assessment & Plan    Encourage Kcal restriction        Azotemia   Assessment & Plan    Follow c IVF and BP management.        T12 compression fracture (CMS-HCC)- (present on admission)   Assessment & Plan    CT spine, no acute fractures. Cont fosamax and calcium        Chronic back pain- (present on admission)   Assessment & Plan    As above and min narc/sed when possible.        Stable issues - med hx (GIB, CAD/ICM, CVA, DLD), preventives.    Dispo - complex/fair.        Reviewed items::  EKG reviewed, Radiology images reviewed, Labs reviewed and Medications reviewed  Pink catheter::  No Pink  DVT prophylaxis pharmacological::  Heparin  Ulcer Prophylaxis::  Yes

## 2017-11-23 NOTE — PROGRESS NOTES
Assumed care of patient @ 0700, report received at bedside,  assessment done, labs and orders noted.  Pt A & Ox1, only oriented to self, No PIV . Pt O2 sat on 2LPM is >92%.  Pt has expiratory cough.   Pt is resting comfortably in bed, no signs or symptoms of distress.  Pt reports pain is a 7/10, declines intervention, lidocaine patch placed.  Pt has been updated on the plan of care.    Bed alarm is on, bed is in lowest position, fall risk socks in place, call light within reach. Pt verbalized all needs are met at this time.

## 2017-11-23 NOTE — PROGRESS NOTES
Simona Knight Fall Risk Assessment:     Last Known Fall: Within the last six months  Mobility: Immobilized/requires assist of one person  Medications: Cardiovascular or central nervous system meds  Mental Status/LOC/Awareness: Oriented to person and place  Toileting Needs: Incontinence  Volume/Electrolyte Status: No problems  Communication/Sensory: Visual (Glasses)/hearing deficit  Behavior: Appropriate behavior  Simona Knight Fall Risk Total: 13  Fall Risk Level: MODERATE RISK    Universal Fall Precautions:  call light/belongings in reach, bed in low position and locked, wheelchairs and assistive devices out of sight, siderails up x 2, use non-slip footwear, adequate lighting, clutter free and spill free environment, educate on level of risk, educate to call for assistance    Fall Risk Level Interventions:    TRIAL (TELE 8, NEURO, MED JENNIE 5) Moderate Fall Risk Interventions  Place yellow fall risk ID band on patient: verified  Provide patient/family education based on risk assessment : completed  Educate patient/family to call staff for assistance when getting out of bed: completed  Place fall precaution signage outside patient door: verified  Utilize bed/chair fall alarm: completedTRIAL (TELE 8, NEURO, [employer] JENNIE 5) High Fall Risk Interventions  Place yellow fall risk ID band on patient: verified  Provide patient/family education based on risk assessment: completed  Educate patient/family to call staff for assistance when getting out of bed: completed  Place fall precaution signage outside patient door: verified  Place patient in room close to nursing station: completed  Utilize bed/chair fall alarm: verified  Notify charge of high risk for huddle: completed    Patient Specific Interventions:     Medication: review medications with patient and family, assess for medications that can be discontinued or dosage decreased and limit combination of prn medications  Mental Status/LOC/Awareness: utilize bed/chair fall  alarm, reinforce the use of call light and provide activity  Toileting: provide frquent toileting and monitor intake and output/use of appropriate interventions  Volume/Electrolyte Status: ensure patient remains hydrated and monitor abnormal lab values  Communication/Sensory: update plan of care on whiteboard and ensure proper positioning when transferrng/ambulating  Behavioral: engage patient in daily activities and administer medication as ordered  Mobility: utilize bed/chair fall alarm, ensure bed is locked and in lowest position, provide appropriate assistive device, instruct patient to exit bed on their strongest side and collaborate with doctor for possible PT/OT consult

## 2017-11-24 PROBLEM — R79.89 AZOTEMIA: Status: RESOLVED | Noted: 2017-11-21 | Resolved: 2017-11-24

## 2017-11-24 PROBLEM — R05.9 COUGH IN ADULT PATIENT: Status: ACTIVE | Noted: 2017-11-24

## 2017-11-24 LAB
ANION GAP SERPL CALC-SCNC: 11 MMOL/L (ref 0–11.9)
BUN SERPL-MCNC: 19 MG/DL (ref 8–22)
CALCIUM SERPL-MCNC: 9.2 MG/DL (ref 8.5–10.5)
CHLORIDE SERPL-SCNC: 101 MMOL/L (ref 96–112)
CO2 SERPL-SCNC: 27 MMOL/L (ref 20–33)
CREAT SERPL-MCNC: 0.91 MG/DL (ref 0.5–1.4)
GFR SERPL CREATININE-BSD FRML MDRD: 60 ML/MIN/1.73 M 2
GLUCOSE SERPL-MCNC: 93 MG/DL (ref 65–99)
POTASSIUM SERPL-SCNC: 3.6 MMOL/L (ref 3.6–5.5)
SODIUM SERPL-SCNC: 139 MMOL/L (ref 135–145)

## 2017-11-24 PROCEDURE — A9270 NON-COVERED ITEM OR SERVICE: HCPCS | Performed by: FAMILY MEDICINE

## 2017-11-24 PROCEDURE — 80048 BASIC METABOLIC PNL TOTAL CA: CPT

## 2017-11-24 PROCEDURE — 700102 HCHG RX REV CODE 250 W/ 637 OVERRIDE(OP): Performed by: HOSPITALIST

## 2017-11-24 PROCEDURE — 700102 HCHG RX REV CODE 250 W/ 637 OVERRIDE(OP): Performed by: FAMILY MEDICINE

## 2017-11-24 PROCEDURE — 99232 SBSQ HOSP IP/OBS MODERATE 35: CPT | Performed by: HOSPITALIST

## 2017-11-24 PROCEDURE — A9270 NON-COVERED ITEM OR SERVICE: HCPCS | Performed by: HOSPITALIST

## 2017-11-24 PROCEDURE — 700111 HCHG RX REV CODE 636 W/ 250 OVERRIDE (IP): Performed by: HOSPITALIST

## 2017-11-24 PROCEDURE — 700101 HCHG RX REV CODE 250: Performed by: FAMILY MEDICINE

## 2017-11-24 PROCEDURE — 770006 HCHG ROOM/CARE - MED/SURG/GYN SEMI*

## 2017-11-24 PROCEDURE — 36415 COLL VENOUS BLD VENIPUNCTURE: CPT

## 2017-11-24 RX ADMIN — OXYBUTYNIN CHLORIDE 5 MG: 5 TABLET, FILM COATED, EXTENDED RELEASE ORAL at 21:02

## 2017-11-24 RX ADMIN — HEPARIN SODIUM 5000 UNITS: 5000 INJECTION, SOLUTION INTRAVENOUS; SUBCUTANEOUS at 09:00

## 2017-11-24 RX ADMIN — OMEPRAZOLE 20 MG: 20 CAPSULE, DELAYED RELEASE ORAL at 08:56

## 2017-11-24 RX ADMIN — HYDRALAZINE HYDROCHLORIDE 100 MG: 50 TABLET ORAL at 12:40

## 2017-11-24 RX ADMIN — LACTOBACILLUS ACIDOPHILUS / LACTOBACILLUS BULGARICUS 1 PACKET: 100 MILLION CFU STRENGTH GRANULES at 17:30

## 2017-11-24 RX ADMIN — Medication 500 MG: at 17:57

## 2017-11-24 RX ADMIN — Medication 500 MG: at 08:56

## 2017-11-24 RX ADMIN — HEPARIN SODIUM 5000 UNITS: 5000 INJECTION, SOLUTION INTRAVENOUS; SUBCUTANEOUS at 20:53

## 2017-11-24 RX ADMIN — HYDRALAZINE HYDROCHLORIDE 100 MG: 50 TABLET ORAL at 20:54

## 2017-11-24 RX ADMIN — HYDRALAZINE HYDROCHLORIDE 100 MG: 50 TABLET ORAL at 05:49

## 2017-11-24 RX ADMIN — DULOXETINE HYDROCHLORIDE 20 MG: 20 CAPSULE, DELAYED RELEASE ORAL at 08:56

## 2017-11-24 RX ADMIN — GABAPENTIN 300 MG: 300 CAPSULE ORAL at 20:54

## 2017-11-24 RX ADMIN — BENAZEPRIL HYDROCHLORIDE 40 MG: 20 TABLET ORAL at 08:56

## 2017-11-24 RX ADMIN — ASPIRIN 81 MG: 81 TABLET, COATED ORAL at 08:56

## 2017-11-24 RX ADMIN — ATORVASTATIN CALCIUM 80 MG: 40 TABLET, FILM COATED ORAL at 20:54

## 2017-11-24 RX ADMIN — LACTOBACILLUS ACIDOPHILUS / LACTOBACILLUS BULGARICUS 1 PACKET: 100 MILLION CFU STRENGTH GRANULES at 08:56

## 2017-11-24 RX ADMIN — ALENDRONATE SODIUM 10 MG: 10 TABLET ORAL at 08:56

## 2017-11-24 RX ADMIN — LIDOCAINE 1 PATCH: 50 PATCH CUTANEOUS at 09:00

## 2017-11-24 ASSESSMENT — ENCOUNTER SYMPTOMS
VOMITING: 0
NAUSEA: 0
SHORTNESS OF BREATH: 0
COUGH: 1

## 2017-11-24 ASSESSMENT — PAIN SCALES - GENERAL
PAINLEVEL_OUTOF10: 4
PAINLEVEL_OUTOF10: 7
PAINLEVEL_OUTOF10: 4

## 2017-11-24 ASSESSMENT — LIFESTYLE VARIABLES: DO YOU DRINK ALCOHOL: NO

## 2017-11-24 NOTE — CARE PLAN
Problem: Mobility  Goal: Risk for activity intolerance will decrease    Intervention: Assess and monitor signs of activity intolerance  Pt showing signs of activity intolerance, pt encouraged to ambulate halls       Problem: Skin Integrity  Goal: Risk for impaired skin integrity will decrease    Intervention: Assess and monitor skin integrity, appearance and/or temperature  Pt developing rash on back and legs, will notify MD

## 2017-11-24 NOTE — PROGRESS NOTES
Simona Knight Fall Risk Assessment:     Last Known Fall: Within the last six months  Mobility: Immobilized/requires assist of one person  Medications: Cardiovascular or central nervous system meds  Mental Status/LOC/Awareness: Oriented to person and place  Toileting Needs: Incontinence  Volume/Electrolyte Status: No problems  Communication/Sensory: Visual (Glasses)/hearing deficit  Behavior: Depression/anxiety  Simona Knight Fall Risk Total: 14  Fall Risk Level: MODERATE RISK    Universal Fall Precautions:  call light/belongings in reach, bed in low position and locked, wheelchairs and assistive devices out of sight, siderails up x 2, use non-slip footwear, adequate lighting, clutter free and spill free environment, educate on level of risk, educate to call for assistance    Fall Risk Level Interventions:    TRIAL (TELE 8, NEURO, MED JENNIE 5) Moderate Fall Risk Interventions  Place yellow fall risk ID band on patient: verified  Provide patient/family education based on risk assessment : completed  Educate patient/family to call staff for assistance when getting out of bed: completed  Place fall precaution signage outside patient door: verified  Utilize bed/chair fall alarm: completedTRIAL (TELE 8, NEURO, [employer] JENNIE 5) High Fall Risk Interventions  Place yellow fall risk ID band on patient: verified  Provide patient/family education based on risk assessment: completed  Educate patient/family to call staff for assistance when getting out of bed: completed  Place fall precaution signage outside patient door: verified  Place patient in room close to nursing station: completed  Utilize bed/chair fall alarm: verified  Notify charge of high risk for huddle: completed    Patient Specific Interventions:     Medication: review medications with patient and family  Mental Status/LOC/Awareness: reorient patient, reinforce falls education, check on patient hourly, utilize bed/chair fall alarm, reinforce the use of call light and  provide activity  Toileting: provide frquent toileting  Volume/Electrolyte Status: ensure patient remains hydrated  Communication/Sensory: update plan of care on whiteboard  Behavioral: engage patient in daily activities  Mobility: utilize bed/chair fall alarm, ensure bed is locked and in lowest position and collaborate with doctor for possible PT/OT consult

## 2017-11-24 NOTE — PROGRESS NOTES
[facility]    Date of Service: 2017    Chief Complaint  77 y.o. female admitted 2017 with GLF, pyuria and ATN.    Interval Problem Update  Feeling Ok.  Denies N/V/D.  Having some coughs - non productive.    Consultants/Specialty  Neurology  Psych      Review of Systems   Respiratory: Positive for cough. Negative for shortness of breath.    Gastrointestinal: Negative for nausea and vomiting.   All other systems reviewed and are negative.     Physical Exam  Laboratory/Imaging   Hemodynamics  Temp (24hrs), Av.6 °C (97.8 °F), Min:36.1 °C (96.9 °F), Max:37 °C (98.6 °F)   Temperature: 36.1 °C (96.9 °F)  Pulse  Av.3  Min: 50  Max: 106    Blood Pressure : 127/55      Respiratory      Respiration: 18, Pulse Oximetry: 91 %     Work Of Breathing / Effort: Mild  RUL Breath Sounds: Diminished, RML Breath Sounds: Diminished, RLL Breath Sounds: Diminished, ARGELIA Breath Sounds: Diminished, LLL Breath Sounds: Diminished    Fluids    Intake/Output Summary (Last 24 hours) at 17 1431  Last data filed at 17 0900   Gross per 24 hour   Intake              118 ml   Output                0 ml   Net              118 ml       Nutrition  Orders Placed This Encounter   Procedures   • Diet Order     Standing Status:   Standing     Number of Occurrences:   1     Order Specific Question:   Diet:     Answer:   Regular [1]     Physical Exam  Nursing note and vitals reviewed.  Constitutional: She is oriented to person, place, and time. She appears well-developed,  well-nourished and overweight.   HENT:   Head: Normocephalic and atraumatic.   Right Ear: External ear normal.   Left Ear: External ear normal.   Nose: Nose normal.   Eyes: Conjunctivae and extraocular motions are normal.    Neck: Normal range of motion. Neck supple.   Pulmonary/Chest: Effort normal.   Abdominal: Bowel sounds are normal.   Musculoskeletal: Normal range of motion.   Neurological: She is alert and oriented to person, place,  and time.   Skin: Skin is warm and dry.               Recent Labs      11/24/17   0141   SODIUM  139   POTASSIUM  3.6   CHLORIDE  101   CO2  27   GLUCOSE  93   BUN  19   CREATININE  0.91   CALCIUM  9.2             Recent Labs      11/23/17   0228   TRIGLYCERIDE  73   HDL  44   LDL  40          Assessment/Plan     * Dementia- (present on admission)   Assessment & Plan    Min narc/sed when possible.   Await guardianship.        Cough in adult patient   Assessment & Plan    Follow for now.        Stool incontinence   Assessment & Plan    Better.        Physical debility   Assessment & Plan    PT/OT and increase activity.        Obesity (BMI 30.0-34.9)   Assessment & Plan    Encourage Kcal restriction        T12 compression fracture (CMS-HCC)- (present on admission)   Assessment & Plan    CT spine, no acute fractures. Cont fosamax and calcium        Chronic back pain- (present on admission)   Assessment & Plan    As above and min narc/sed when possible.        Stable issues - med hx (GIB, CAD/ICM, CVA, DLD), preventives.    Dispo - complex/fair.  Await guardianship.      Reviewed items::  Labs reviewed and Medications reviewed  Pink catheter::  No Pink  DVT prophylaxis pharmacological::  Heparin  Ulcer Prophylaxis::  Yes

## 2017-11-24 NOTE — PROGRESS NOTES
Simona Knight Fall Risk Assessment:     Last Known Fall: Within the last six months  Mobility: Immobilized/requires assist of one person  Medications: Cardiovascular or central nervous system meds  Mental Status/LOC/Awareness: Oriented to person and place  Toileting Needs: Incontinence  Volume/Electrolyte Status: No problems  Communication/Sensory: Visual (Glasses)/hearing deficit  Behavior: Depression/anxiety  Simona Knight Fall Risk Total: 14  Fall Risk Level: MODERATE RISK    Universal Fall Precautions:  call light/belongings in reach, bed in low position and locked, wheelchairs and assistive devices out of sight, siderails up x 2, use non-slip footwear, adequate lighting, clutter free and spill free environment, educate on level of risk, educate to call for assistance    Fall Risk Level Interventions:    TRIAL (TELE 8, NEURO, MED JENNIE 5) Moderate Fall Risk Interventions  Place yellow fall risk ID band on patient: verified  Provide patient/family education based on risk assessment : completed  Educate patient/family to call staff for assistance when getting out of bed: completed  Place fall precaution signage outside patient door: verified  Utilize bed/chair fall alarm: completedTRIAL (TELE 8, NEURO, [employer] JENNIE 5) High Fall Risk Interventions  Place yellow fall risk ID band on patient: verified  Provide patient/family education based on risk assessment: completed  Educate patient/family to call staff for assistance when getting out of bed: completed  Place fall precaution signage outside patient door: verified  Place patient in room close to nursing station: completed  Utilize bed/chair fall alarm: verified  Notify charge of high risk for huddle: completed    Patient Specific Interventions:     Medication: review medications with patient and family, assess for medications that can be discontinued or dosage decreased and limit combination of prn medications  Mental Status/LOC/Awareness: reorient patient, reinforce  falls education, check on patient hourly, utilize bed/chair fall alarm, reinforce the use of call light and provide activity  Toileting: provide frquent toileting and monitor intake and output/use of appropriate interventions  Volume/Electrolyte Status: ensure patient remains hydrated and monitor abnormal lab values  Communication/Sensory: update plan of care on whiteboard  Behavioral: engage patient in daily activities, administer medication as ordered and instruct/reinforce fall program rationale  Mobility: schedule physical activity throughout the day, utilize bed/chair fall alarm, ensure bed is locked and in lowest position, provide appropriate assistive device, instruct patient to exit bed on their strongest side and collaborate with doctor for possible PT/OT consult

## 2017-11-25 PROBLEM — R15.9 STOOL INCONTINENCE: Status: RESOLVED | Noted: 2017-11-23 | Resolved: 2017-11-25

## 2017-11-25 PROBLEM — R05.9 COUGH IN ADULT PATIENT: Status: RESOLVED | Noted: 2017-11-24 | Resolved: 2017-11-25

## 2017-11-25 PROCEDURE — 700101 HCHG RX REV CODE 250: Performed by: FAMILY MEDICINE

## 2017-11-25 PROCEDURE — A9270 NON-COVERED ITEM OR SERVICE: HCPCS | Performed by: HOSPITALIST

## 2017-11-25 PROCEDURE — A9270 NON-COVERED ITEM OR SERVICE: HCPCS | Performed by: FAMILY MEDICINE

## 2017-11-25 PROCEDURE — 700102 HCHG RX REV CODE 250 W/ 637 OVERRIDE(OP): Performed by: FAMILY MEDICINE

## 2017-11-25 PROCEDURE — 99231 SBSQ HOSP IP/OBS SF/LOW 25: CPT | Performed by: HOSPITALIST

## 2017-11-25 PROCEDURE — 700111 HCHG RX REV CODE 636 W/ 250 OVERRIDE (IP): Performed by: HOSPITALIST

## 2017-11-25 PROCEDURE — 700102 HCHG RX REV CODE 250 W/ 637 OVERRIDE(OP): Performed by: HOSPITALIST

## 2017-11-25 PROCEDURE — 770006 HCHG ROOM/CARE - MED/SURG/GYN SEMI*

## 2017-11-25 RX ADMIN — ATORVASTATIN CALCIUM 80 MG: 40 TABLET, FILM COATED ORAL at 20:50

## 2017-11-25 RX ADMIN — HYDRALAZINE HYDROCHLORIDE 100 MG: 50 TABLET ORAL at 20:49

## 2017-11-25 RX ADMIN — OXYBUTYNIN CHLORIDE 5 MG: 5 TABLET, FILM COATED, EXTENDED RELEASE ORAL at 20:49

## 2017-11-25 RX ADMIN — ALENDRONATE SODIUM 10 MG: 10 TABLET ORAL at 06:13

## 2017-11-25 RX ADMIN — ASPIRIN 81 MG: 81 TABLET, COATED ORAL at 10:31

## 2017-11-25 RX ADMIN — GABAPENTIN 300 MG: 300 CAPSULE ORAL at 20:50

## 2017-11-25 RX ADMIN — HYDRALAZINE HYDROCHLORIDE 100 MG: 50 TABLET ORAL at 06:13

## 2017-11-25 RX ADMIN — Medication 500 MG: at 17:49

## 2017-11-25 RX ADMIN — LIDOCAINE 1 PATCH: 50 PATCH CUTANEOUS at 10:31

## 2017-11-25 RX ADMIN — BENAZEPRIL HYDROCHLORIDE 40 MG: 20 TABLET ORAL at 10:31

## 2017-11-25 RX ADMIN — HEPARIN SODIUM 5000 UNITS: 5000 INJECTION, SOLUTION INTRAVENOUS; SUBCUTANEOUS at 20:51

## 2017-11-25 RX ADMIN — HEPARIN SODIUM 5000 UNITS: 5000 INJECTION, SOLUTION INTRAVENOUS; SUBCUTANEOUS at 10:31

## 2017-11-25 RX ADMIN — OMEPRAZOLE 20 MG: 20 CAPSULE, DELAYED RELEASE ORAL at 10:31

## 2017-11-25 RX ADMIN — Medication 500 MG: at 10:31

## 2017-11-25 RX ADMIN — HYDRALAZINE HYDROCHLORIDE 100 MG: 50 TABLET ORAL at 14:43

## 2017-11-25 RX ADMIN — DULOXETINE HYDROCHLORIDE 20 MG: 20 CAPSULE, DELAYED RELEASE ORAL at 10:31

## 2017-11-25 ASSESSMENT — LIFESTYLE VARIABLES: DO YOU DRINK ALCOHOL: NO

## 2017-11-25 ASSESSMENT — ENCOUNTER SYMPTOMS
COUGH: 0
SHORTNESS OF BREATH: 0
NAUSEA: 0

## 2017-11-25 ASSESSMENT — PAIN SCALES - GENERAL
PAINLEVEL_OUTOF10: 0
PAINLEVEL_OUTOF10: 4

## 2017-11-25 NOTE — CARE PLAN
Problem: Respiratory:  Goal: Respiratory status will improve  Outcome: PROGRESSING SLOWER THAN EXPECTED  Pt on 2LPM, no oxygen needs at baseline.  Will titrate to >92%.  Pt has wet, productive, strong cough.        Problem: Urinary Elimination:  Goal: Ability to reestablish a normal urinary elimination pattern will improve  Outcome: PROGRESSING AS EXPECTED  Toileting needs addressed with hourly rounding.

## 2017-11-25 NOTE — CARE PLAN
Problem: Pain Management  Goal: Pain level will decrease to patient's comfort goal    Intervention: Follow pain managment plan developed in collaboration with patient and Interdisciplinary Team  Patient will ask for pain med when she is in pain

## 2017-11-25 NOTE — PROGRESS NOTES
Patient resting comfortably with eyes closed resp. Even and non labored.  Will continue to monitor   Bed alarm on and call light within reach

## 2017-11-25 NOTE — PROGRESS NOTES
Received bedside report from day-shift RN and assumed care of patient. Labs and orders noted. Assessment performed. Patient is alert and oriented to self and place only. No signs of labored breathing or distress present on 2L O2 via silicone nasal cannula;  in place. Patient aware of current plan of care; verbalizes understanding and has no questions/concerns at this time. Patient states all of her needs are currently being met. Safety precautions in place including patient call light within reach, personal possessions nearby, bed in low position and locked, patient rounding in practice, and non-skid socks in place.

## 2017-11-25 NOTE — CARE PLAN
Problem: Respiratory:  Goal: Respiratory status will improve  Outcome: PROGRESSING SLOWER THAN EXPECTED  Encouraging BID oral care so pt does not develop PNA.  Pt has a cough and increased O2 needs already.      Problem: Mobility  Goal: Risk for activity intolerance will decrease  Outcome: PROGRESSING AS EXPECTED  Educated pt regarding deconditioning.  Encouraging her to retain ability to perform her ADL's at baseline.

## 2017-11-25 NOTE — PROGRESS NOTES
Assumed care of patient @ 0700, report received at bedside,  assessment done, labs and orders noted.  Pt A & Ox1, No PIV ok per MD .  Pt is resting comfortably in bed, no signs or symptoms of distress.  Pt reports pain is a 7/10, declines intervention.  Pt has been updated on the plan of care, awaiting guardianship, no date scheduled at this time. SW following.  Pt is worse overnight with more incontinence, confusion and resistance to mobilize.  Will encourage pt to mobilize, with regular toileting.  Pt seems to be deconditioning.  Rash present yesterday on back and front of thighs has resolved.    Bed alarm is on, bed is in lowest position, fall risk socks in place, call light within reach. Pt verbalized all needs are met at this time.

## 2017-11-25 NOTE — PROGRESS NOTES
[facility]ist Progress Note    Date of Service: 2017    Chief Complaint  77 y.o. female admitted 2017 with GLF, pyuria and ATN.    Interval Problem Update  Feeling better.  Denies N/V/D.     Consultants/Specialty  Neurology  Psych      Review of Systems   Respiratory: Negative for cough and shortness of breath.    Gastrointestinal: Negative for nausea.   All other systems reviewed and are negative.     Physical Exam  Laboratory/Imaging   Hemodynamics  Temp (24hrs), Av.5 °C (97.7 °F), Min:36.3 °C (97.4 °F), Max:36.7 °C (98.1 °F)   Temperature: 36.7 °C (98.1 °F)  Pulse  Av.5  Min: 50  Max: 106    Blood Pressure : 119/58      Respiratory      Respiration: (!) 22 (rn notified), Pulse Oximetry: 92 %     Work Of Breathing / Effort: Mild  RUL Breath Sounds: Diminished, RML Breath Sounds: Diminished, RLL Breath Sounds: Diminished, ARGELIA Breath Sounds: Diminished, LLL Breath Sounds: Diminished    Fluids  No intake or output data in the 24 hours ending 17 1236    Nutrition  Orders Placed This Encounter   Procedures   • Diet Order     Standing Status:   Standing     Number of Occurrences:   1     Order Specific Question:   Diet:     Answer:   Regular [1]     Physical Exam  Nursing note and vitals reviewed.  Constitutional: She is oriented to person, place, and time. She appears well-developed,  well-nourished and overweight.   HENT:   Head: Normocephalic and atraumatic.   Right Ear: External ear normal.   Left Ear: External ear normal.   Nose: Nose normal.   Eyes: Conjunctivae and extraocular motions are normal.    Neck: Normal range of motion. Neck supple.   Pulmonary/Chest: Effort normal.   Abdominal: Bowel sounds are normal.   Musculoskeletal: Normal range of motion.   Neurological: She is alert and oriented to person, place, and time.   Skin: Skin is warm and dry.  No rash.              Recent Labs      17   0141   SODIUM  139   POTASSIUM  3.6   CHLORIDE  101   CO2  27   GLUCOSE  93   BUN   19   CREATININE  0.91   CALCIUM  9.2             Recent Labs      11/23/17   0228   TRIGLYCERIDE  73   HDL  44   LDL  40          Assessment/Plan     * Dementia- (present on admission)   Assessment & Plan    Min narc/sed when possible.   Await guardianship.        Physical debility   Assessment & Plan    PT/OT and increase activity.        Obesity (BMI 30.0-34.9)   Assessment & Plan    Encourage Kcal restriction        T12 compression fracture (CMS-HCC)- (present on admission)   Assessment & Plan    CT spine, no acute fractures. Cont fosamax and calcium        Chronic back pain- (present on admission)   Assessment & Plan    As above and min narc/sed when possible.        Stable issues - med hx (GIB, CAD/ICM, CVA, DLD), preventives.    Dispo - complex/fair.  Await guardianship.      Reviewed items::  Labs reviewed and Medications reviewed  Pink catheter::  No Pink  DVT prophylaxis pharmacological::  Heparin  Ulcer Prophylaxis::  Yes

## 2017-11-25 NOTE — PROGRESS NOTES
Simona Knight Fall Risk Assessment:     Last Known Fall: Within the last six months  Mobility: Immobilized/requires assist of one person  Medications: Cardiovascular or central nervous system meds  Mental Status/LOC/Awareness: Oriented to person and place  Toileting Needs: Incontinence  Volume/Electrolyte Status: No problems  Communication/Sensory: Visual (Glasses)/hearing deficit  Behavior: Depression/anxiety  Simona Knight Fall Risk Total: 14  Fall Risk Level: MODERATE RISK    Universal Fall Precautions:  call light/belongings in reach, bed in low position and locked, wheelchairs and assistive devices out of sight, siderails up x 2, use non-slip footwear, adequate lighting, clutter free and spill free environment, educate on level of risk, educate to call for assistance    Fall Risk Level Interventions:    TRIAL (TELE 8, NEURO, MED JENNIE 5) Moderate Fall Risk Interventions  Place yellow fall risk ID band on patient: verified  Provide patient/family education based on risk assessment : completed  Educate patient/family to call staff for assistance when getting out of bed: completed  Place fall precaution signage outside patient door: verified  Utilize bed/chair fall alarm: completedTRIAL (TELE 8, NEURO, [employer] JENNIE 5) High Fall Risk Interventions  Place yellow fall risk ID band on patient: verified  Provide patient/family education based on risk assessment: completed  Educate patient/family to call staff for assistance when getting out of bed: completed  Place fall precaution signage outside patient door: verified  Place patient in room close to nursing station: completed  Utilize bed/chair fall alarm: verified  Notify charge of high risk for huddle: completed    Patient Specific Interventions:     Medication: review medications with patient and family, assess for medications that can be discontinued or dosage decreased and limit combination of prn medications  Mental Status/LOC/Awareness: reinforce falls education,  utilize bed/chair fall alarm, reinforce the use of call light and provide activity  Toileting: monitor intake and output/use of appropriate interventions  Volume/Electrolyte Status: ensure patient remains hydrated, monitor blood sugars and maintain appropriate blood sugar levels if diabetic, monitor abnormal lab values and ensure IV fluids are appropriate  Communication/Sensory: update plan of care on whiteboard and ensure proper positioning when transferrng/ambulating  Behavioral: engage patient in daily activities and administer medication as ordered  Mobility: utilize bed/chair fall alarm, ensure bed is locked and in lowest position, provide appropriate assistive device, instruct patient to exit bed on their strongest side and collaborate with doctor for possible PT/OT consult

## 2017-11-26 PROBLEM — J98.8 CONGESTION OF UPPER AIRWAY: Status: ACTIVE | Noted: 2017-11-26

## 2017-11-26 LAB
ANION GAP SERPL CALC-SCNC: 9 MMOL/L (ref 0–11.9)
ANISOCYTOSIS BLD QL SMEAR: ABNORMAL
BASOPHILS # BLD AUTO: 0 % (ref 0–1.8)
BASOPHILS # BLD: 0 K/UL (ref 0–0.12)
BUN SERPL-MCNC: 15 MG/DL (ref 8–22)
CALCIUM SERPL-MCNC: 9.1 MG/DL (ref 8.5–10.5)
CHLORIDE SERPL-SCNC: 104 MMOL/L (ref 96–112)
CO2 SERPL-SCNC: 27 MMOL/L (ref 20–33)
CREAT SERPL-MCNC: 0.75 MG/DL (ref 0.5–1.4)
EOSINOPHIL # BLD AUTO: 0.25 K/UL (ref 0–0.51)
EOSINOPHIL NFR BLD: 5.2 % (ref 0–6.9)
ERYTHROCYTE [DISTWIDTH] IN BLOOD BY AUTOMATED COUNT: 47.8 FL (ref 35.9–50)
GFR SERPL CREATININE-BSD FRML MDRD: >60 ML/MIN/1.73 M 2
GLUCOSE SERPL-MCNC: 104 MG/DL (ref 65–99)
HCT VFR BLD AUTO: 35.6 % (ref 37–47)
HGB BLD-MCNC: 11.7 G/DL (ref 12–16)
LYMPHOCYTES # BLD AUTO: 0.89 K/UL (ref 1–4.8)
LYMPHOCYTES NFR BLD: 18.1 % (ref 22–41)
MANUAL DIFF BLD: NORMAL
MCH RBC QN AUTO: 28.5 PG (ref 27–33)
MCHC RBC AUTO-ENTMCNC: 32.9 G/DL (ref 33.6–35)
MCV RBC AUTO: 86.6 FL (ref 81.4–97.8)
MICROCYTES BLD QL SMEAR: ABNORMAL
MONOCYTES # BLD AUTO: 0.21 K/UL (ref 0–0.85)
MONOCYTES NFR BLD AUTO: 4.3 % (ref 0–13.4)
MORPHOLOGY BLD-IMP: NORMAL
NEUTROPHILS # BLD AUTO: 3.5 K/UL (ref 2–7.15)
NEUTROPHILS NFR BLD: 70.7 % (ref 44–72)
NEUTS BAND NFR BLD MANUAL: 0.8 % (ref 0–10)
NRBC # BLD AUTO: 0 K/UL
NRBC BLD AUTO-RTO: 0 /100 WBC
PLATELET # BLD AUTO: 177 K/UL (ref 164–446)
PLATELET BLD QL SMEAR: NORMAL
PMV BLD AUTO: 11 FL (ref 9–12.9)
POTASSIUM SERPL-SCNC: 3.7 MMOL/L (ref 3.6–5.5)
PROMYELOCYTES NFR BLD MANUAL: 0.9 %
RBC # BLD AUTO: 4.11 M/UL (ref 4.2–5.4)
RBC BLD AUTO: PRESENT
SODIUM SERPL-SCNC: 140 MMOL/L (ref 135–145)
WBC # BLD AUTO: 4.9 K/UL (ref 4.8–10.8)

## 2017-11-26 PROCEDURE — 700101 HCHG RX REV CODE 250: Performed by: FAMILY MEDICINE

## 2017-11-26 PROCEDURE — 85007 BL SMEAR W/DIFF WBC COUNT: CPT

## 2017-11-26 PROCEDURE — 700111 HCHG RX REV CODE 636 W/ 250 OVERRIDE (IP): Performed by: HOSPITALIST

## 2017-11-26 PROCEDURE — A9270 NON-COVERED ITEM OR SERVICE: HCPCS | Performed by: HOSPITALIST

## 2017-11-26 PROCEDURE — A9270 NON-COVERED ITEM OR SERVICE: HCPCS | Performed by: FAMILY MEDICINE

## 2017-11-26 PROCEDURE — 36415 COLL VENOUS BLD VENIPUNCTURE: CPT

## 2017-11-26 PROCEDURE — 85027 COMPLETE CBC AUTOMATED: CPT

## 2017-11-26 PROCEDURE — 700102 HCHG RX REV CODE 250 W/ 637 OVERRIDE(OP): Performed by: HOSPITALIST

## 2017-11-26 PROCEDURE — 80048 BASIC METABOLIC PNL TOTAL CA: CPT

## 2017-11-26 PROCEDURE — 700102 HCHG RX REV CODE 250 W/ 637 OVERRIDE(OP): Performed by: FAMILY MEDICINE

## 2017-11-26 PROCEDURE — 99232 SBSQ HOSP IP/OBS MODERATE 35: CPT | Performed by: HOSPITALIST

## 2017-11-26 PROCEDURE — 770006 HCHG ROOM/CARE - MED/SURG/GYN SEMI*

## 2017-11-26 RX ORDER — SODIUM CHLORIDE 9 MG/ML
INJECTION, SOLUTION INTRAVENOUS CONTINUOUS
Status: DISCONTINUED | OUTPATIENT
Start: 2017-11-26 | End: 2017-11-29

## 2017-11-26 RX ORDER — GUAIFENESIN 600 MG/1
600 TABLET, EXTENDED RELEASE ORAL EVERY 12 HOURS
Status: DISCONTINUED | OUTPATIENT
Start: 2017-11-26 | End: 2017-12-26

## 2017-11-26 RX ADMIN — HYDRALAZINE HYDROCHLORIDE 100 MG: 50 TABLET ORAL at 06:05

## 2017-11-26 RX ADMIN — Medication 500 MG: at 17:44

## 2017-11-26 RX ADMIN — HYDRALAZINE HYDROCHLORIDE 100 MG: 50 TABLET ORAL at 20:34

## 2017-11-26 RX ADMIN — ATORVASTATIN CALCIUM 80 MG: 40 TABLET, FILM COATED ORAL at 20:34

## 2017-11-26 RX ADMIN — LACTOBACILLUS ACIDOPHILUS / LACTOBACILLUS BULGARICUS 1 PACKET: 100 MILLION CFU STRENGTH GRANULES at 08:59

## 2017-11-26 RX ADMIN — DULOXETINE HYDROCHLORIDE 20 MG: 20 CAPSULE, DELAYED RELEASE ORAL at 08:59

## 2017-11-26 RX ADMIN — HYDRALAZINE HYDROCHLORIDE 50 MG: 50 TABLET ORAL at 14:51

## 2017-11-26 RX ADMIN — ALENDRONATE SODIUM 10 MG: 10 TABLET ORAL at 06:05

## 2017-11-26 RX ADMIN — GUAIFENESIN 600 MG: 600 TABLET, EXTENDED RELEASE ORAL at 13:02

## 2017-11-26 RX ADMIN — ASPIRIN 81 MG: 81 TABLET, COATED ORAL at 08:59

## 2017-11-26 RX ADMIN — Medication 500 MG: at 08:59

## 2017-11-26 RX ADMIN — OXYBUTYNIN CHLORIDE 5 MG: 5 TABLET, FILM COATED, EXTENDED RELEASE ORAL at 20:33

## 2017-11-26 RX ADMIN — LIDOCAINE 1 PATCH: 50 PATCH CUTANEOUS at 08:59

## 2017-11-26 RX ADMIN — GABAPENTIN 300 MG: 300 CAPSULE ORAL at 20:33

## 2017-11-26 RX ADMIN — GUAIFENESIN 600 MG: 600 TABLET, EXTENDED RELEASE ORAL at 20:34

## 2017-11-26 RX ADMIN — HEPARIN SODIUM 5000 UNITS: 5000 INJECTION, SOLUTION INTRAVENOUS; SUBCUTANEOUS at 20:34

## 2017-11-26 RX ADMIN — HEPARIN SODIUM 5000 UNITS: 5000 INJECTION, SOLUTION INTRAVENOUS; SUBCUTANEOUS at 08:59

## 2017-11-26 RX ADMIN — BENAZEPRIL HYDROCHLORIDE 40 MG: 20 TABLET ORAL at 08:59

## 2017-11-26 RX ADMIN — OMEPRAZOLE 20 MG: 20 CAPSULE, DELAYED RELEASE ORAL at 08:59

## 2017-11-26 RX ADMIN — LACTOBACILLUS ACIDOPHILUS / LACTOBACILLUS BULGARICUS 1 PACKET: 100 MILLION CFU STRENGTH GRANULES at 13:02

## 2017-11-26 ASSESSMENT — ENCOUNTER SYMPTOMS
COUGH: 0
SHORTNESS OF BREATH: 0
NAUSEA: 0

## 2017-11-26 ASSESSMENT — PAIN SCALES - GENERAL
PAINLEVEL_OUTOF10: 0
PAINLEVEL_OUTOF10: 0

## 2017-11-26 ASSESSMENT — LIFESTYLE VARIABLES: DO YOU DRINK ALCOHOL: NO

## 2017-11-26 NOTE — CARE PLAN
Problem: Safety  Goal: Will remain free from injury  Outcome: PROGRESSING AS EXPECTED  All fall precautions in place, bed alarm in place, hourly rounding complete, call light and personal belongings kept within reach at all times. Education done with pt on importance of calling before getting OOB, pt verbalizes an understanding. Pt in bed close to nurse's station. Will continue to monitor.     Problem: Respiratory:  Goal: Respiratory status will improve  Outcome: PROGRESSING SLOWER THAN EXPECTED  Pt continues to require 3L supplemental oxygen via nasal canula, strong, productive cough present, IS encouraged, nursing attempting to titrate down oxygen   Frequent ambulation encouraged.

## 2017-11-26 NOTE — PROGRESS NOTES
Received report from day RN. Assumed pt care. Discussed call light/phone system, communication board and POC. Pt is aao x to 2, disoriented to time and event. Pt is cooperative and able to follow commands. Pt has a moist cough, RN places suction at bedside. Pt denies pain. Pt is pending placement/guardianship. Pt is incontinent, RN and CNA perform bed bath and full bed change. Pt mobility assessed. Pt is a one assist in bed, pt refusing mobility today. Bed alarm on. Fall precautions in place. Bed is locked and in low position, call light within reach. Treaded slippers in place. Pt needs met at this time. Hourly rounding in place.

## 2017-11-26 NOTE — PROGRESS NOTES
Pt's /48  1400 Hydralazine 100mg PO dose due  Discussed with , Hydralazine 50mg PO dose verbal order received, administered per order

## 2017-11-26 NOTE — PROGRESS NOTES
Simona Knight Fall Risk Assessment:     Last Known Fall: Within the last six months  Mobility: Immobilized/requires assist of one person  Medications: Cardiovascular or central nervous system meds  Mental Status/LOC/Awareness: Oriented to person and place  Toileting Needs: Incontinence  Volume/Electrolyte Status: No problems  Communication/Sensory: Visual (Glasses)/hearing deficit  Behavior: Appropriate behavior  Simona Knight Fall Risk Total: 13  Fall Risk Level: MODERATE RISK    Universal Fall Precautions:  call light/belongings in reach, bed in low position and locked, wheelchairs and assistive devices out of sight, siderails up x 2, use non-slip footwear, adequate lighting, clutter free and spill free environment, educate on level of risk, educate to call for assistance    Fall Risk Level Interventions:    TRIAL ([employer] 8, NEURO, MED JENNIE 5) Moderate Fall Risk Interventions  Place yellow fall risk ID band on patient: verified  Provide patient/family education based on risk assessment : completed  Educate patient/family to call staff for assistance when getting out of bed: completed  Place fall precaution signage outside patient door: verified  Utilize bed/chair fall alarm: verifiedTRIAL ([employer] 8, NEURO, [employer] JENNIE 5) High Fall Risk Interventions  Place yellow fall risk ID band on patient: verified  Provide patient/family education based on risk assessment: completed  Educate patient/family to call staff for assistance when getting out of bed: completed  Place fall precaution signage outside patient door: verified  Place patient in room close to nursing station: completed  Utilize bed/chair fall alarm: verified  Notify charge of high risk for huddle: completed    Patient Specific Interventions:     Medication: limit combination of prn medications  Mental Status/LOC/Awareness: reorient patient  Toileting: provide frquent toileting  Volume/Electrolyte Status: monitor abnormal lab values  Communication/Sensory: update  plan of care on whiteboard  Behavioral: encourage patient to voice feelings and engage patient in daily activities  Mobility: utilize bed/chair fall alarm

## 2017-11-26 NOTE — PROGRESS NOTES
Remains disoriented to time and event. Strong, productive cough noted as well as fine crackles in the upper airways heard upon auscultation, pt also requiring 3L supplemental oxygen via nasal canula, discussed with , IV fluids ordered along with labs. IS encouraged, performs correctly, needs prompting. POC discussed, including need to acquire IV access, labs and pending guardianship once medically stable, all questions and concerns were addressed.

## 2017-11-26 NOTE — PROGRESS NOTES
[facility]ist Progress Note    Date of Service: 2017    Chief Complaint  77 y.o. female admitted 2017 with GLF, pyuria and ATN.    Interval Problem Update  Asleep but easily waked.  No complaints.    Consultants/Specialty  Neurology  Psych      Review of Systems   Respiratory: Negative for cough and shortness of breath.    Gastrointestinal: Negative for nausea.   All other systems reviewed and are negative.     Physical Exam  Laboratory/Imaging   Hemodynamics  Temp (24hrs), Av.8 °C (98.2 °F), Min:36.6 °C (97.9 °F), Max:37.1 °C (98.8 °F)   Temperature: 36.7 °C (98.1 °F)  Pulse  Av.6  Min: 50  Max: 106    Blood Pressure : 120/59      Respiratory      Respiration: 19, Pulse Oximetry: 94 %     Work Of Breathing / Effort: Mild  RUL Breath Sounds: Fine Crackles, RML Breath Sounds: Diminished, RLL Breath Sounds: Diminished, ARGELIA Breath Sounds: Fine Crackles, LLL Breath Sounds: Diminished    Fluids  No intake or output data in the 24 hours ending 17 1351    Nutrition  Orders Placed This Encounter   Procedures   • Diet Order     Standing Status:   Standing     Number of Occurrences:   1     Order Specific Question:   Diet:     Answer:   Regular [1]     Physical Exam  Nursing note and vitals reviewed.  Constitutional: She is oriented to person, place, and time. She appears well-developed,  well-nourished and overweight.   HENT:   Head: Normocephalic and atraumatic.   Right Ear: External ear normal.   Left Ear: External ear normal.   Nose: Nose normal.   Eyes: Conjunctivae and extraocular motions are normal.    Neck: Normal range of motion. Neck supple.   Pulmonary/Chest: Effort normal. Bilateral exp rhonchi.  Abdominal: Bowel sounds are normal.   Musculoskeletal: Normal range of motion.   Neurological: She is alert and oriented to person, place, and time.   Skin: Skin is warm and dry.  No rash.          Recent Labs      17   0920   WBC  4.9   RBC  4.11*   HEMOGLOBIN  11.7*   HEMATOCRIT   35.6*   MCV  86.6   MCH  28.5   MCHC  32.9*   RDW  47.8   PLATELETCT  177   MPV  11.0     Recent Labs      11/24/17   0141  11/26/17   0920   SODIUM  139  140   POTASSIUM  3.6  3.7   CHLORIDE  101  104   CO2  27  27   GLUCOSE  93  104*   BUN  19  15   CREATININE  0.91  0.75   CALCIUM  9.2  9.1                      Assessment/Plan     * Dementia- (present on admission)   Assessment & Plan    Min narc/sed when possible.   Await guardianship.        Congestion of upper airway   Assessment & Plan    O2, RT, IS, Vax, CXR and IVF.        Physical debility   Assessment & Plan    PT/OT and increase activity.        Obesity (BMI 30.0-34.9)   Assessment & Plan    Encourage Kcal restriction        T12 compression fracture (CMS-HCC)- (present on admission)   Assessment & Plan    CT spine, no acute fractures. Cont fosamax and calcium        Chronic back pain- (present on admission)   Assessment & Plan    As above and min narc/sed when possible.        Stable issues - med hx (GIB, CAD/ICM, CVA, DLD), preventives.    Dispo - complex/fair.  Await guardianship.      Reviewed items::  Labs reviewed and Medications reviewed  Pink catheter::  No Pink  DVT prophylaxis pharmacological::  Heparin  Ulcer Prophylaxis::  Yes

## 2017-11-27 PROBLEM — K59.00 CONSTIPATION: Status: ACTIVE | Noted: 2017-11-27

## 2017-11-27 PROCEDURE — 99232 SBSQ HOSP IP/OBS MODERATE 35: CPT | Performed by: HOSPITALIST

## 2017-11-27 PROCEDURE — A9270 NON-COVERED ITEM OR SERVICE: HCPCS | Performed by: HOSPITALIST

## 2017-11-27 PROCEDURE — 700102 HCHG RX REV CODE 250 W/ 637 OVERRIDE(OP): Performed by: HOSPITALIST

## 2017-11-27 PROCEDURE — 700102 HCHG RX REV CODE 250 W/ 637 OVERRIDE(OP): Performed by: FAMILY MEDICINE

## 2017-11-27 PROCEDURE — A9270 NON-COVERED ITEM OR SERVICE: HCPCS | Performed by: FAMILY MEDICINE

## 2017-11-27 PROCEDURE — 770006 HCHG ROOM/CARE - MED/SURG/GYN SEMI*

## 2017-11-27 PROCEDURE — 700111 HCHG RX REV CODE 636 W/ 250 OVERRIDE (IP): Performed by: HOSPITALIST

## 2017-11-27 PROCEDURE — 700101 HCHG RX REV CODE 250: Performed by: FAMILY MEDICINE

## 2017-11-27 RX ORDER — AMOXICILLIN 250 MG
1 CAPSULE ORAL EVERY EVENING
Status: DISCONTINUED | OUTPATIENT
Start: 2017-11-27 | End: 2017-12-05

## 2017-11-27 RX ADMIN — BENAZEPRIL HYDROCHLORIDE 40 MG: 20 TABLET ORAL at 10:09

## 2017-11-27 RX ADMIN — OXYBUTYNIN CHLORIDE 5 MG: 5 TABLET, FILM COATED, EXTENDED RELEASE ORAL at 20:37

## 2017-11-27 RX ADMIN — Medication 500 MG: at 18:31

## 2017-11-27 RX ADMIN — LIDOCAINE 1 PATCH: 50 PATCH CUTANEOUS at 10:10

## 2017-11-27 RX ADMIN — ATORVASTATIN CALCIUM 80 MG: 40 TABLET, FILM COATED ORAL at 20:36

## 2017-11-27 RX ADMIN — DULOXETINE HYDROCHLORIDE 20 MG: 20 CAPSULE, DELAYED RELEASE ORAL at 10:10

## 2017-11-27 RX ADMIN — ALENDRONATE SODIUM 10 MG: 10 TABLET ORAL at 06:44

## 2017-11-27 RX ADMIN — HYDRALAZINE HYDROCHLORIDE 100 MG: 50 TABLET ORAL at 06:44

## 2017-11-27 RX ADMIN — HYDRALAZINE HYDROCHLORIDE 100 MG: 50 TABLET ORAL at 14:00

## 2017-11-27 RX ADMIN — OMEPRAZOLE 20 MG: 20 CAPSULE, DELAYED RELEASE ORAL at 10:10

## 2017-11-27 RX ADMIN — ASPIRIN 81 MG: 81 TABLET, COATED ORAL at 10:10

## 2017-11-27 RX ADMIN — HEPARIN SODIUM 5000 UNITS: 5000 INJECTION, SOLUTION INTRAVENOUS; SUBCUTANEOUS at 10:09

## 2017-11-27 RX ADMIN — GUAIFENESIN 600 MG: 600 TABLET, EXTENDED RELEASE ORAL at 10:09

## 2017-11-27 RX ADMIN — Medication 500 MG: at 10:09

## 2017-11-27 RX ADMIN — GUAIFENESIN 600 MG: 600 TABLET, EXTENDED RELEASE ORAL at 20:36

## 2017-11-27 RX ADMIN — HYDRALAZINE HYDROCHLORIDE 100 MG: 50 TABLET ORAL at 20:37

## 2017-11-27 RX ADMIN — HEPARIN SODIUM 5000 UNITS: 5000 INJECTION, SOLUTION INTRAVENOUS; SUBCUTANEOUS at 20:37

## 2017-11-27 RX ADMIN — STANDARDIZED SENNA CONCENTRATE AND DOCUSATE SODIUM 1 TABLET: 8.6; 5 TABLET, FILM COATED ORAL at 20:37

## 2017-11-27 RX ADMIN — GABAPENTIN 300 MG: 300 CAPSULE ORAL at 20:36

## 2017-11-27 ASSESSMENT — ENCOUNTER SYMPTOMS
COUGH: 0
SPUTUM PRODUCTION: 1
SHORTNESS OF BREATH: 0
NAUSEA: 0

## 2017-11-27 ASSESSMENT — PAIN SCALES - GENERAL
PAINLEVEL_OUTOF10: 0
PAINLEVEL_OUTOF10: 3
PAINLEVEL_OUTOF10: 4

## 2017-11-27 ASSESSMENT — LIFESTYLE VARIABLES: DO YOU DRINK ALCOHOL: NO

## 2017-11-27 NOTE — PROGRESS NOTES
Simona Knight Fall Risk Assessment:     Last Known Fall: Within the last six months  Mobility: Immobilized/requires assist of one person  Medications: Cardiovascular or central nervous system meds  Mental Status/LOC/Awareness: Oriented to person and place  Toileting Needs: Incontinence  Volume/Electrolyte Status: No problems  Communication/Sensory: Visual (Glasses)/hearing deficit  Behavior: Appropriate behavior  Simona Knight Fall Risk Total: 13  Fall Risk Level: MODERATE RISK    Universal Fall Precautions:  call light/belongings in reach, bed in low position and locked, wheelchairs and assistive devices out of sight, siderails up x 2, use non-slip footwear, adequate lighting, clutter free and spill free environment, educate on level of risk, educate to call for assistance    Fall Risk Level Interventions:    TRIAL ([employer] 8, NEURO, MED JENNIE 5) Moderate Fall Risk Interventions  Place yellow fall risk ID band on patient: verified  Provide patient/family education based on risk assessment : completed  Educate patient/family to call staff for assistance when getting out of bed: completed  Place fall precaution signage outside patient door: verified  Utilize bed/chair fall alarm: verifiedTRIAL ([employer] 8, NEURO, [employer] JENNIE 5) High Fall Risk Interventions  Place yellow fall risk ID band on patient: verified  Provide patient/family education based on risk assessment: completed  Educate patient/family to call staff for assistance when getting out of bed: completed  Place fall precaution signage outside patient door: verified  Place patient in room close to nursing station: completed  Utilize bed/chair fall alarm: verified  Notify charge of high risk for huddle: completed    Patient Specific Interventions:     Medication: limit combination of prn medications  Mental Status/LOC/Awareness: reorient patient  Toileting: provide frquent toileting  Volume/Electrolyte Status: monitor abnormal lab values  Communication/Sensory: update  plan of care on whiteboard  Behavioral: encourage patient to voice feelings and engage patient in daily activities  Mobility: utilize bed/chair fall alarm

## 2017-11-27 NOTE — PROGRESS NOTES
Assumed care of pt after receiving report from dayshift RN. Bedside rounding completed w/ dayshift RN. Pt resting in bed A&OX3, disoriented to time w/ no complaints of pain or discomfort. Fall measures in place, call light within reach, personal belongings nearby, bed in lowest position, and hourly rounding in place. Will continue to monitor pt.

## 2017-11-27 NOTE — PROGRESS NOTES
[facility]    Date of Service: 2017    Chief Complaint  77 y.o. female admitted 2017 with GLF, pyuria and ATN.    Interval Problem Update  Asleep but easily waked.  No complaints.    Consultants/Specialty  Neurology  Psych      Review of Systems   Respiratory: Positive for sputum production. Negative for cough and shortness of breath.    Gastrointestinal: Negative for nausea.   All other systems reviewed and are negative.     Physical Exam  Laboratory/Imaging   Hemodynamics  Temp (24hrs), Av.3 °C (97.3 °F), Min:36.2 °C (97.1 °F), Max:36.3 °C (97.4 °F)   Temperature: 36.3 °C (97.4 °F)  Pulse  Av.7  Min: 50  Max: 106    Blood Pressure : 153/56      Respiratory      Respiration: 17, Pulse Oximetry: 91 %     Work Of Breathing / Effort: Mild  RUL Breath Sounds: Fine Crackles, RML Breath Sounds: Diminished, RLL Breath Sounds: Diminished, ARGELIA Breath Sounds: Fine Crackles, LLL Breath Sounds: Diminished    Fluids  No intake or output data in the 24 hours ending 17 1031    Nutrition  Orders Placed This Encounter   Procedures   • Diet Order     Standing Status:   Standing     Number of Occurrences:   1     Order Specific Question:   Diet:     Answer:   Regular [1]     Physical Exam  Nursing note and vitals reviewed.  Constitutional: She is mildly confused. She appears well-developed,  well-nourished and overweight.   HENT:   Head: Normocephalic and atraumatic.   Right Ear: External ear normal.   Left Ear: External ear normal.   Nose: Nose normal.   Eyes: Conjunctivae and extraocular motions are normal.    Neck: Normal range of motion. Neck supple.   Pulmonary/Chest: Effort normal. Bilateral exp rhonchi - w/o sig change.  Abdominal: Bowel sounds are normal.   Musculoskeletal: Normal range of motion.   Neurological: She is alert and oriented to person, place, and time.   Skin: Skin is warm and dry.  No rash.          Recent Labs      17   0920   WBC  4.9   RBC  4.11*    HEMOGLOBIN  11.7*   HEMATOCRIT  35.6*   MCV  86.6   MCH  28.5   MCHC  32.9*   RDW  47.8   PLATELETCT  177   MPV  11.0     Recent Labs      11/26/17   0920   SODIUM  140   POTASSIUM  3.7   CHLORIDE  104   CO2  27   GLUCOSE  104*   BUN  15   CREATININE  0.75   CALCIUM  9.1                      Assessment/Plan     * Dementia- (present on admission)   Assessment & Plan    Min narc/sed when possible.   Await guardianship.        Constipation   Assessment & Plan    No BM since 11/25.  PO mag citrate and NM Dulcolax.        Congestion of upper airway   Assessment & Plan    Saturating 90% on RA.  O2, RT, IS, Vax, CXR and encouraging taking of PO fluids.          Physical debility   Assessment & Plan    PT/OT and increase activity.        Obesity (BMI 30.0-34.9)   Assessment & Plan    Encourage Kcal restriction        T12 compression fracture (CMS-HCC)- (present on admission)   Assessment & Plan    CT spine, no acute fractures. Cont fosamax and calcium        Chronic back pain- (present on admission)   Assessment & Plan    As above and min narc/sed when possible.        Stable issues - med hx (GIB, CAD/ICM, CVA, DLD), preventives.    Dispo - complex/fair.  Await guardianship.      Reviewed items::  Labs reviewed and Medications reviewed  Pink catheter::  No Pink  DVT prophylaxis pharmacological::  Heparin  Ulcer Prophylaxis::  Yes

## 2017-11-28 PROCEDURE — 700101 HCHG RX REV CODE 250: Performed by: FAMILY MEDICINE

## 2017-11-28 PROCEDURE — 770006 HCHG ROOM/CARE - MED/SURG/GYN SEMI*

## 2017-11-28 PROCEDURE — 99232 SBSQ HOSP IP/OBS MODERATE 35: CPT | Performed by: INTERNAL MEDICINE

## 2017-11-28 PROCEDURE — A9270 NON-COVERED ITEM OR SERVICE: HCPCS | Performed by: HOSPITALIST

## 2017-11-28 PROCEDURE — 700102 HCHG RX REV CODE 250 W/ 637 OVERRIDE(OP): Performed by: HOSPITALIST

## 2017-11-28 PROCEDURE — A9270 NON-COVERED ITEM OR SERVICE: HCPCS | Performed by: FAMILY MEDICINE

## 2017-11-28 PROCEDURE — 700102 HCHG RX REV CODE 250 W/ 637 OVERRIDE(OP): Performed by: FAMILY MEDICINE

## 2017-11-28 PROCEDURE — 700111 HCHG RX REV CODE 636 W/ 250 OVERRIDE (IP): Performed by: HOSPITALIST

## 2017-11-28 RX ADMIN — LIDOCAINE 1 PATCH: 50 PATCH CUTANEOUS at 08:14

## 2017-11-28 RX ADMIN — GUAIFENESIN 600 MG: 600 TABLET, EXTENDED RELEASE ORAL at 21:41

## 2017-11-28 RX ADMIN — BENAZEPRIL HYDROCHLORIDE 40 MG: 20 TABLET ORAL at 08:12

## 2017-11-28 RX ADMIN — STANDARDIZED SENNA CONCENTRATE AND DOCUSATE SODIUM 1 TABLET: 8.6; 5 TABLET, FILM COATED ORAL at 21:41

## 2017-11-28 RX ADMIN — ASPIRIN 81 MG: 81 TABLET, COATED ORAL at 08:13

## 2017-11-28 RX ADMIN — HYDRALAZINE HYDROCHLORIDE 100 MG: 50 TABLET ORAL at 14:39

## 2017-11-28 RX ADMIN — DULOXETINE HYDROCHLORIDE 20 MG: 20 CAPSULE, DELAYED RELEASE ORAL at 08:13

## 2017-11-28 RX ADMIN — HYDRALAZINE HYDROCHLORIDE 100 MG: 50 TABLET ORAL at 21:41

## 2017-11-28 RX ADMIN — ALENDRONATE SODIUM 10 MG: 10 TABLET ORAL at 06:25

## 2017-11-28 RX ADMIN — HYDRALAZINE HYDROCHLORIDE 100 MG: 50 TABLET ORAL at 06:25

## 2017-11-28 RX ADMIN — LACTOBACILLUS ACIDOPHILUS / LACTOBACILLUS BULGARICUS 1 PACKET: 100 MILLION CFU STRENGTH GRANULES at 11:55

## 2017-11-28 RX ADMIN — OMEPRAZOLE 20 MG: 20 CAPSULE, DELAYED RELEASE ORAL at 08:12

## 2017-11-28 RX ADMIN — ATORVASTATIN CALCIUM 80 MG: 40 TABLET, FILM COATED ORAL at 21:41

## 2017-11-28 RX ADMIN — Medication 500 MG: at 19:12

## 2017-11-28 RX ADMIN — Medication 500 MG: at 08:12

## 2017-11-28 RX ADMIN — LACTOBACILLUS ACIDOPHILUS / LACTOBACILLUS BULGARICUS 1 PACKET: 100 MILLION CFU STRENGTH GRANULES at 17:54

## 2017-11-28 RX ADMIN — LACTOBACILLUS ACIDOPHILUS / LACTOBACILLUS BULGARICUS 1 PACKET: 100 MILLION CFU STRENGTH GRANULES at 08:13

## 2017-11-28 RX ADMIN — GUAIFENESIN 600 MG: 600 TABLET, EXTENDED RELEASE ORAL at 08:13

## 2017-11-28 RX ADMIN — GABAPENTIN 300 MG: 300 CAPSULE ORAL at 21:41

## 2017-11-28 RX ADMIN — OXYBUTYNIN CHLORIDE 5 MG: 5 TABLET, FILM COATED, EXTENDED RELEASE ORAL at 21:41

## 2017-11-28 RX ADMIN — HEPARIN SODIUM 5000 UNITS: 5000 INJECTION, SOLUTION INTRAVENOUS; SUBCUTANEOUS at 21:40

## 2017-11-28 RX ADMIN — HEPARIN SODIUM 5000 UNITS: 5000 INJECTION, SOLUTION INTRAVENOUS; SUBCUTANEOUS at 08:13

## 2017-11-28 ASSESSMENT — ENCOUNTER SYMPTOMS
EYE PAIN: 0
BLURRED VISION: 0
WEIGHT LOSS: 0
WEAKNESS: 0
TREMORS: 0
VOMITING: 0
SEIZURES: 0
BACK PAIN: 0
NAUSEA: 0
HEARTBURN: 0
HEADACHES: 0
FALLS: 0
CONSTIPATION: 0
PND: 0
DIZZINESS: 0
NECK PAIN: 0
SHORTNESS OF BREATH: 0
DIARRHEA: 0
SPEECH CHANGE: 0
DEPRESSION: 0
WHEEZING: 0
CHILLS: 0
FEVER: 0
COUGH: 0
PALPITATIONS: 0
SPUTUM PRODUCTION: 1

## 2017-11-28 ASSESSMENT — LIFESTYLE VARIABLES
SUBSTANCE_ABUSE: 0
DO YOU DRINK ALCOHOL: NO

## 2017-11-28 ASSESSMENT — PAIN SCALES - GENERAL
PAINLEVEL_OUTOF10: 4
PAINLEVEL_OUTOF10: 2
PAINLEVEL_OUTOF10: 2
PAINLEVEL_OUTOF10: 0

## 2017-11-28 NOTE — PROGRESS NOTES
Assumed patient care at 1900. POC discussed w/ day nurse and pt, pt in agreement w/ goals. Pt AOx2, reoriented to time and event. Pt states pain in lower back, denies intervention. Pt denies nausea, poor appetite. Pt up one assist, refusing to ambulate at this time despite education and benefits of mobilization. Congestive cough, instructed on IS, weak effort. Patient educated on use of call light, hourly rounding, and pain scale. Personal possession and call light within reach. Bed alarm on, room near nursing station.

## 2017-11-28 NOTE — PROGRESS NOTES
[facility] Hospitalist Progress Note    Date of Service: 2017    Chief Complaint  77 y.o. female admitted 2017 with GLF, pyuria and ATN.    Interval Problem Update   stable overnight and no CC this morning. Asleep but easily waked.  Patient otherwise denies fever, chills, nausea, vomiting, adb pain, SOB, CP, headache, diarrhea, cough, or sputum.      Consultants/Specialty  Neurology  Psych      Review of Systems   Constitutional: Negative for chills, fever and weight loss.   HENT: Negative for congestion, ear discharge, ear pain, hearing loss and nosebleeds.    Eyes: Negative for blurred vision and pain.   Respiratory: Positive for sputum production. Negative for cough, shortness of breath and wheezing.    Cardiovascular: Negative for chest pain, palpitations, leg swelling and PND.   Gastrointestinal: Negative for constipation, diarrhea, heartburn, nausea and vomiting.   Genitourinary: Negative for dysuria, frequency and hematuria.   Musculoskeletal: Negative for back pain, falls, joint pain and neck pain.   Skin: Negative for rash.   Neurological: Negative for dizziness, tremors, speech change, seizures, weakness and headaches.   Psychiatric/Behavioral: Negative for depression, substance abuse and suicidal ideas.   All other systems reviewed and are negative.     Physical Exam  Laboratory/Imaging   Hemodynamics  Temp (24hrs), Av.7 °C (98.1 °F), Min:36.3 °C (97.3 °F), Max:37.1 °C (98.7 °F)   Temperature: 37.1 °C (98.7 °F)  Pulse  Av.9  Min: 50  Max: 106    Blood Pressure : 156/71      Respiratory      Respiration: 16, Pulse Oximetry: 92 %     Work Of Breathing / Effort: Mild  RUL Breath Sounds: Diminished, RML Breath Sounds: Diminished, RLL Breath Sounds: Diminished, ARGELIA Breath Sounds: Diminished, LLL Breath Sounds: Diminished    Fluids    Intake/Output Summary (Last 24 hours) at 17 0850  Last data filed at 17 1300   Gross per 24 hour   Intake              480 ml   Output                 0 ml   Net              480 ml       Nutrition  Orders Placed This Encounter   Procedures   • Diet Order     Standing Status:   Standing     Number of Occurrences:   1     Order Specific Question:   Diet:     Answer:   Regular [1]     Physical Exam   Constitutional: She is oriented to person, place, and time. She appears well-developed and well-nourished.   HENT:   Head: Normocephalic.   Nose: Nose normal.   Mouth/Throat: No oropharyngeal exudate.   Eyes: EOM are normal. Pupils are equal, round, and reactive to light.   Neck: Normal range of motion. Neck supple. No JVD present. No thyromegaly present.   Cardiovascular: Normal rate, regular rhythm and normal heart sounds.  Exam reveals no gallop and no friction rub.    No murmur heard.  Pulmonary/Chest: Effort normal. No respiratory distress. She has no wheezes. She has rales.   Abdominal: Soft. Bowel sounds are normal. She exhibits no distension and no mass. There is no tenderness. There is no rebound and no guarding.   Musculoskeletal: Normal range of motion. She exhibits no edema or tenderness.   Lymphadenopathy:     She has no cervical adenopathy.   Neurological: She is alert and oriented to person, place, and time. No cranial nerve deficit.   Skin: Skin is warm. No rash noted.   Psychiatric: Her behavior is normal.   Nursing note and vitals reviewed.          Recent Labs      11/26/17   0920   WBC  4.9   RBC  4.11*   HEMOGLOBIN  11.7*   HEMATOCRIT  35.6*   MCV  86.6   MCH  28.5   MCHC  32.9*   RDW  47.8   PLATELETCT  177   MPV  11.0     Recent Labs      11/26/17   0920   SODIUM  140   POTASSIUM  3.7   CHLORIDE  104   CO2  27   GLUCOSE  104*   BUN  15   CREATININE  0.75   CALCIUM  9.1                      Assessment/Plan     * Dementia- (present on admission)   Assessment & Plan    Min narc/sed when possible.     Stable on floor  Await guardianship.        Constipation   Assessment & Plan    No BM since 11/25.  PO mag citrate and [place] Dulcolax.  Close monitor         Congestion of upper airway   Assessment & Plan    Saturating 90% on RA.  O2, RT, IS, Vax, CXR and encouraging taking of PO fluids.          Physical debility   Assessment & Plan    PT/OT and increase activity.        Obesity (BMI 30.0-34.9)   Assessment & Plan    Encourage Kcal restriction        T12 compression fracture (CMS-HCC)- (present on admission)   Assessment & Plan    CT spine, no acute fractures. Cont fosamax and calcium        Chronic back pain- (present on admission)   Assessment & Plan    As above and min narc/sed when possible.        Stable issues - med hx (GIB, CAD/ICM, CVA, DLD), preventives.    Dispo - complex/fair.  Await guardianship.      Reviewed items::  Labs reviewed and Medications reviewed  Pink catheter::  No Pink  DVT prophylaxis pharmacological::  Heparin  Ulcer Prophylaxis::  Yes   For complexity-based billing, please refer to the history, exam, and decison making above. In addition, I spent >35 minutes caring for the patient today. More than 50% of the time was spent counseling and coordinating care.    I have discussed with RN and GLENYS and SW and other consultants about patient's plan.

## 2017-11-28 NOTE — CARE PLAN
Problem: Safety  Goal: Will remain free from injury  Bed locked and in lowest position, call light and personal belongings within reach, pt educated to call for assistance. Bed alarm on, room next to nurses station. Hourly rounding in effect.        Problem: Mobility  Goal: Risk for activity intolerance will decrease  Patient refusing to get out of bed at this time despite education. Provided patient with positive outcomes of ambulation: improved mood, appetite, and bowel function. Continued to refuse.

## 2017-11-28 NOTE — CARE PLAN
Problem: Bowel/Gastric:  Goal: Will not experience complications related to bowel motility  Outcome: PROGRESSING AS EXPECTED  Pt has not had a bowel movement in 2 days.  Pt refusing bowel cristina.  Will offer miralax at another time today.     Problem: Respiratory:  Goal: Respiratory status will improve  Outcome: PROGRESSING AS EXPECTED  Pt is back to baseline O2 needs (0 LPM).  Cough still productive, but decreasing in frequency and intensity.  Pt reports improvement in breathing and energy level.

## 2017-11-28 NOTE — PROGRESS NOTES
Simona Knight Fall Risk Assessment:     Last Known Fall: Within the last six months  Mobility: Immobilized/requires assist of one person  Medications: Cardiovascular or central nervous system meds  Mental Status/LOC/Awareness: Oriented to person and place  Toileting Needs: Incontinence  Volume/Electrolyte Status: No problems  Communication/Sensory: Visual (Glasses)/hearing deficit  Behavior: Appropriate behavior  Simona Knight Fall Risk Total: 13  Fall Risk Level: MODERATE RISK    Universal Fall Precautions:  call light/belongings in reach, bed in low position and locked, wheelchairs and assistive devices out of sight, siderails up x 2, use non-slip footwear, adequate lighting, clutter free and spill free environment, educate on level of risk, educate to call for assistance    Fall Risk Level Interventions:    TRIAL ([employer] 8, NEURO, MED JENNIE 5) Moderate Fall Risk Interventions  Place yellow fall risk ID band on patient: verified  Provide patient/family education based on risk assessment : completed  Educate patient/family to call staff for assistance when getting out of bed: completed  Place fall precaution signage outside patient door: verified  Utilize bed/chair fall alarm: verifiedTRIAL ([employer] 8, NEURO, [employer] JENNIE 5) High Fall Risk Interventions  Place yellow fall risk ID band on patient: verified  Provide patient/family education based on risk assessment: verified  Educate patient/family to call staff for assistance when getting out of bed: verified  Place fall precaution signage outside patient door: verified  Place patient in room close to nursing station: verified  Utilize bed/chair fall alarm: verified  Notify charge of high risk for huddle: verified    Patient Specific Interventions:     Medication: review medications with patient and family, assess for medications that can be discontinued or dosage decreased and limit combination of prn medications  Mental Status/LOC/Awareness: check on patient hourly,  utilize bed/chair fall alarm, reinforce the use of call light and provide activity  Toileting: provide frquent toileting, monitor intake and output/use of appropriate interventions and instruct patient/family on the use of grab bars  Volume/Electrolyte Status: ensure patient remains hydrated, advance diet as tolerated and monitor abnormal lab values  Communication/Sensory: update plan of care on whiteboard and ensure proper positioning when transferrng/ambulating  Behavioral: engage patient in daily activities and administer medication as ordered  Mobility: dangle prior to standing

## 2017-11-28 NOTE — PROGRESS NOTES
Simona Knight Fall Risk Assessment:     Last Known Fall: Within the last six months  Mobility: Immobilized/requires assist of one person  Medications: Cardiovascular or central nervous system meds  Mental Status/LOC/Awareness: Oriented to person and place  Toileting Needs: Incontinence  Volume/Electrolyte Status: No problems  Communication/Sensory: Visual (Glasses)/hearing deficit  Behavior: Appropriate behavior  Simona Knight Fall Risk Total: 13  Fall Risk Level: MODERATE RISK    Universal Fall Precautions:  call light/belongings in reach, bed in low position and locked, wheelchairs and assistive devices out of sight, siderails up x 2, use non-slip footwear, adequate lighting, clutter free and spill free environment, educate on level of risk, educate to call for assistance    Fall Risk Level Interventions:    TRIAL ([employer] 8, NEURO, MED JENNIE 5) Moderate Fall Risk Interventions  Place yellow fall risk ID band on patient: verified  Provide patient/family education based on risk assessment : completed  Educate patient/family to call staff for assistance when getting out of bed: completed  Place fall precaution signage outside patient door: verified  Utilize bed/chair fall alarm: verifiedTRIAL (TELE 8, NEURO, [employer] JENNIE 5) High Fall Risk Interventions  Place yellow fall risk ID band on patient: verified  Provide patient/family education based on risk assessment: verified  Educate patient/family to call staff for assistance when getting out of bed: verified  Place fall precaution signage outside patient door: verified  Place patient in room close to nursing station: verified  Utilize bed/chair fall alarm: verified  Notify charge of high risk for huddle: verified    Patient Specific Interventions:     Medication: review medications with patient and family  Mental Status/LOC/Awareness: reorient patient, reinforce falls education, check on patient hourly, utilize bed/chair fall alarm and reinforce the use of call  light  Toileting: provide frquent toileting, instruct patient/family on the need to call for assistance when toileting and do not leave patient unattended in bathroom/refer to toileting scripting  Volume/Electrolyte Status: ensure patient remains hydrated and monitor abnormal lab values  Communication/Sensory: update plan of care on whiteboard and ensure patient has glasses/contacts and hearing aids/dentures  Behavioral: not applicable  Mobility: utilize bed/chair fall alarm, ensure bed is locked and in lowest position and instruct patient to exit bed on their strongest side

## 2017-11-28 NOTE — PROGRESS NOTES
Assumed care of patient @ 0700, report received at bedside,  assessment done, labs and orders noted.  Pt A & Ox2, disoriented to time and situation, No PIV, MD aware .  Pt is resting comfortably in bed, no signs or symptoms of distress. Easily arousable when asleep.  Lung sounds improving, less rhonchi.  Productive cough, but improving.  No O2 needs at this time Pt reports pain is a 4/10, but declines intervention.  Pt has been updated on the plan of care, no guardianship date set.    Bed alarm is on, bed is in lowest position, fall risk socks in place, call light within reach. Pt verbalized all needs are met at this time.

## 2017-11-28 NOTE — PROGRESS NOTES
Simona Knight Fall Risk Assessment:     Last Known Fall: Within the last six months  Mobility: Immobilized/requires assist of one person  Medications: Cardiovascular or central nervous system meds  Mental Status/LOC/Awareness: Oriented to person and place  Toileting Needs: Incontinence  Volume/Electrolyte Status: No problems  Communication/Sensory: Visual (Glasses)/hearing deficit  Behavior: Appropriate behavior  Simona Knight Fall Risk Total: 13  Fall Risk Level: MODERATE RISK    Universal Fall Precautions:  call light/belongings in reach, bed in low position and locked, wheelchairs and assistive devices out of sight, siderails up x 2, use non-slip footwear, adequate lighting, clutter free and spill free environment, educate on level of risk, educate to call for assistance    Fall Risk Level Interventions:    TRIAL ([employer] 8, NEURO, MED JENNIE 5) Moderate Fall Risk Interventions  Place yellow fall risk ID band on patient: verified  Provide patient/family education based on risk assessment : completed  Educate patient/family to call staff for assistance when getting out of bed: completed  Place fall precaution signage outside patient door: verified  Utilize bed/chair fall alarm: verifiedTRIAL (TELE 8, NEURO, [employer] JENNIE 5) High Fall Risk Interventions  Place yellow fall risk ID band on patient: verified  Provide patient/family education based on risk assessment: verified  Educate patient/family to call staff for assistance when getting out of bed: verified  Place fall precaution signage outside patient door: verified  Place patient in room close to nursing station: verified  Utilize bed/chair fall alarm: verified  Notify charge of high risk for huddle: verified    Patient Specific Interventions:     Medication: review medications with patient and family  Mental Status/LOC/Awareness: reorient patient, check on patient hourly, utilize bed/chair fall alarm and reinforce the use of call light  Toileting: provide frquent  toileting  Volume/Electrolyte Status: ensure patient remains hydrated and monitor abnormal lab values  Communication/Sensory: update plan of care on whiteboard  Behavioral: not applicable  Mobility: utilize bed/chair fall alarm, ensure bed is locked and in lowest position and provide appropriate assistive device

## 2017-11-29 PROCEDURE — A9270 NON-COVERED ITEM OR SERVICE: HCPCS | Performed by: HOSPITALIST

## 2017-11-29 PROCEDURE — G8979 MOBILITY GOAL STATUS: HCPCS | Mod: CI

## 2017-11-29 PROCEDURE — 700105 HCHG RX REV CODE 258: Performed by: HOSPITALIST

## 2017-11-29 PROCEDURE — 700102 HCHG RX REV CODE 250 W/ 637 OVERRIDE(OP): Performed by: HOSPITALIST

## 2017-11-29 PROCEDURE — 700102 HCHG RX REV CODE 250 W/ 637 OVERRIDE(OP): Performed by: FAMILY MEDICINE

## 2017-11-29 PROCEDURE — 700111 HCHG RX REV CODE 636 W/ 250 OVERRIDE (IP): Performed by: HOSPITALIST

## 2017-11-29 PROCEDURE — 770006 HCHG ROOM/CARE - MED/SURG/GYN SEMI*

## 2017-11-29 PROCEDURE — 97112 NEUROMUSCULAR REEDUCATION: CPT

## 2017-11-29 PROCEDURE — 99232 SBSQ HOSP IP/OBS MODERATE 35: CPT | Performed by: INTERNAL MEDICINE

## 2017-11-29 PROCEDURE — G8978 MOBILITY CURRENT STATUS: HCPCS | Mod: CJ

## 2017-11-29 PROCEDURE — 97535 SELF CARE MNGMENT TRAINING: CPT

## 2017-11-29 PROCEDURE — A9270 NON-COVERED ITEM OR SERVICE: HCPCS | Performed by: FAMILY MEDICINE

## 2017-11-29 PROCEDURE — 97116 GAIT TRAINING THERAPY: CPT

## 2017-11-29 PROCEDURE — 97530 THERAPEUTIC ACTIVITIES: CPT

## 2017-11-29 PROCEDURE — 700101 HCHG RX REV CODE 250: Performed by: FAMILY MEDICINE

## 2017-11-29 RX ADMIN — HYDRALAZINE HYDROCHLORIDE 100 MG: 50 TABLET ORAL at 21:33

## 2017-11-29 RX ADMIN — HEPARIN SODIUM 5000 UNITS: 5000 INJECTION, SOLUTION INTRAVENOUS; SUBCUTANEOUS at 10:18

## 2017-11-29 RX ADMIN — GABAPENTIN 300 MG: 300 CAPSULE ORAL at 21:33

## 2017-11-29 RX ADMIN — BENAZEPRIL HYDROCHLORIDE 40 MG: 20 TABLET ORAL at 09:00

## 2017-11-29 RX ADMIN — GUAIFENESIN 600 MG: 600 TABLET, EXTENDED RELEASE ORAL at 10:17

## 2017-11-29 RX ADMIN — HYDRALAZINE HYDROCHLORIDE 100 MG: 50 TABLET ORAL at 06:02

## 2017-11-29 RX ADMIN — ALENDRONATE SODIUM 10 MG: 10 TABLET ORAL at 06:02

## 2017-11-29 RX ADMIN — ASPIRIN 81 MG: 81 TABLET, COATED ORAL at 09:00

## 2017-11-29 RX ADMIN — DULOXETINE HYDROCHLORIDE 20 MG: 20 CAPSULE, DELAYED RELEASE ORAL at 10:17

## 2017-11-29 RX ADMIN — LACTOBACILLUS ACIDOPHILUS / LACTOBACILLUS BULGARICUS 1 PACKET: 100 MILLION CFU STRENGTH GRANULES at 17:59

## 2017-11-29 RX ADMIN — OXYBUTYNIN CHLORIDE 5 MG: 5 TABLET, FILM COATED, EXTENDED RELEASE ORAL at 21:34

## 2017-11-29 RX ADMIN — OMEPRAZOLE 20 MG: 20 CAPSULE, DELAYED RELEASE ORAL at 10:17

## 2017-11-29 RX ADMIN — SODIUM CHLORIDE 1000 ML: 9 INJECTION, SOLUTION INTRAVENOUS at 07:47

## 2017-11-29 RX ADMIN — HYDRALAZINE HYDROCHLORIDE 50 MG: 50 TABLET ORAL at 16:19

## 2017-11-29 RX ADMIN — ERGOCALCIFEROL 50000 UNITS: 1.25 CAPSULE, LIQUID FILLED ORAL at 11:08

## 2017-11-29 RX ADMIN — ATORVASTATIN CALCIUM 80 MG: 40 TABLET, FILM COATED ORAL at 21:33

## 2017-11-29 RX ADMIN — LACTOBACILLUS ACIDOPHILUS / LACTOBACILLUS BULGARICUS 1 PACKET: 100 MILLION CFU STRENGTH GRANULES at 12:59

## 2017-11-29 RX ADMIN — LIDOCAINE 1 PATCH: 50 PATCH CUTANEOUS at 09:00

## 2017-11-29 RX ADMIN — Medication 500 MG: at 17:59

## 2017-11-29 RX ADMIN — HEPARIN SODIUM 5000 UNITS: 5000 INJECTION, SOLUTION INTRAVENOUS; SUBCUTANEOUS at 21:33

## 2017-11-29 RX ADMIN — Medication 500 MG: at 10:17

## 2017-11-29 RX ADMIN — GUAIFENESIN 600 MG: 600 TABLET, EXTENDED RELEASE ORAL at 21:33

## 2017-11-29 RX ADMIN — HYDRALAZINE HYDROCHLORIDE 50 MG: 50 TABLET ORAL at 16:37

## 2017-11-29 RX ADMIN — LACTOBACILLUS ACIDOPHILUS / LACTOBACILLUS BULGARICUS 1 PACKET: 100 MILLION CFU STRENGTH GRANULES at 07:30

## 2017-11-29 ASSESSMENT — ENCOUNTER SYMPTOMS
FALLS: 0
SHORTNESS OF BREATH: 0
TREMORS: 0
SPEECH CHANGE: 0
PND: 0
COUGH: 0
SPUTUM PRODUCTION: 1
DIARRHEA: 0
CONSTIPATION: 0
DEPRESSION: 0
WHEEZING: 0
BLURRED VISION: 0
DIZZINESS: 0
PALPITATIONS: 0
WEIGHT LOSS: 0
HEARTBURN: 0
EYE PAIN: 0
SEIZURES: 0
HEADACHES: 0
NECK PAIN: 0
WEAKNESS: 0
FEVER: 0
VOMITING: 0

## 2017-11-29 ASSESSMENT — COGNITIVE AND FUNCTIONAL STATUS - GENERAL
TURNING FROM BACK TO SIDE WHILE IN FLAT BAD: A LITTLE
CLIMB 3 TO 5 STEPS WITH RAILING: TOTAL
MOBILITY SCORE: 15
MOVING FROM LYING ON BACK TO SITTING ON SIDE OF FLAT BED: A LOT
SUGGESTED CMS G CODE MODIFIER MOBILITY: CK
WALKING IN HOSPITAL ROOM: A LITTLE
MOVING TO AND FROM BED TO CHAIR: A LITTLE
EATING MEALS: A LITTLE
DRESSING REGULAR LOWER BODY CLOTHING: A LITTLE
DAILY ACTIVITIY SCORE: 18
STANDING UP FROM CHAIR USING ARMS: A LITTLE
DRESSING REGULAR UPPER BODY CLOTHING: A LITTLE
PERSONAL GROOMING: A LITTLE
HELP NEEDED FOR BATHING: A LITTLE
TOILETING: A LITTLE
SUGGESTED CMS G CODE MODIFIER DAILY ACTIVITY: CK

## 2017-11-29 ASSESSMENT — GAIT ASSESSMENTS
DEVIATION: BRADYKINETIC;SHUFFLED GAIT
DISTANCE (FEET): 100
GAIT LEVEL OF ASSIST: CONTACT GUARD ASSIST
ASSISTIVE DEVICE: FRONT WHEEL WALKER

## 2017-11-29 ASSESSMENT — LIFESTYLE VARIABLES
DO YOU DRINK ALCOHOL: NO
SUBSTANCE_ABUSE: 0

## 2017-11-29 ASSESSMENT — PAIN SCALES - GENERAL
PAINLEVEL_OUTOF10: 2
PAINLEVEL_OUTOF10: 0

## 2017-11-29 NOTE — PROGRESS NOTES
Assumed care of pt after receiving report at 0700. Physical assessment completed, pt c/o cough, no pain. Educated about IS use, encouraged use 10 times per hour. Pt agreed. Continue to monitor pt comfort during the shift.

## 2017-11-29 NOTE — THERAPY
"Physical Therapy Treatment completed.   Bed Mobility:  Supine to Sit: Stand by Assist  Transfers: Sit to Stand: Contact Guard Assist  Gait: Level Of Assist: Contact Guard Assist with Front-Wheel Walker       Plan of Care: Will benefit from Physical Therapy 2 times per week and Plan to complete next treatment by Monday 12/4  Discharge Recommendations: Equipment: Will Continue to Assess for Equipment Needs. Post-acute therapy Discharge to a transitional care facility for continued skilled therapy services.    Pt is demonstrating increased functional weakness today compared to prior treatment sessions. Pt required extra time and efforts for supine to sit. CGA for sit to stand at EOB however, moderate assist for transfer off of toilet. Close CGA for ambulation as pt had 1 major LOB requiring assist to recover. In addition, increased R lateral lean with ambulation today. Pt would benefit from ongoing PT intervention while in the acute care setting. PT will need post acute transitional care in the SNF setting    See \"Rehab Therapy-Acute\" Patient Summary Report for complete documentation.       "

## 2017-11-29 NOTE — PROGRESS NOTES
[facility] Hospitalist Progress Note    Date of Service: 2017    Chief Complaint  77 y.o. female admitted 2017 with GLF, pyuria and ATN.    Interval Problem Update   stable overnight and no CC this morning. Asleep but easily waked.  Patient otherwise denies fever, chills, nausea, vomiting, adb pain, SOB, CP, headache, diarrhea, cough, or sputum.   patient is pleasant and is stable no significant chief complain the morning overnight. Still very sleepy but easily arousable.      Consultants/Specialty  Neurology  Psych      Review of Systems   Constitutional: Negative for fever and weight loss.   HENT: Negative for congestion, ear discharge, ear pain, hearing loss and nosebleeds.    Eyes: Negative for blurred vision and pain.   Respiratory: Positive for sputum production. Negative for cough, shortness of breath and wheezing.    Cardiovascular: Negative for palpitations, leg swelling and PND.   Gastrointestinal: Negative for constipation, diarrhea, heartburn and vomiting.   Genitourinary: Negative for dysuria, frequency and hematuria.   Musculoskeletal: Negative for falls, joint pain and neck pain.   Skin: Negative for rash.   Neurological: Negative for dizziness, tremors, speech change, seizures, weakness and headaches.   Psychiatric/Behavioral: Negative for depression, substance abuse and suicidal ideas.   All other systems reviewed and are negative.     Physical Exam  Laboratory/Imaging   Hemodynamics  Temp (24hrs), Av.3 °C (97.3 °F), Min:36.1 °C (97 °F), Max:36.4 °C (97.5 °F)   Temperature: 36.1 °C (97 °F)  Pulse  Av.9  Min: 50  Max: 106    Blood Pressure : 115/72      Respiratory      Respiration: 20, Pulse Oximetry: 91 %        RUL Breath Sounds: Diminished, RML Breath Sounds: Diminished, RLL Breath Sounds: Diminished, ARGELIA Breath Sounds: Diminished, LLL Breath Sounds: Diminished    Fluids  No intake or output data in the 24 hours ending 17 0906    Nutrition  Orders Placed This  Encounter   Procedures   • Diet Order     Standing Status:   Standing     Number of Occurrences:   1     Order Specific Question:   Diet:     Answer:   Regular [1]     Physical Exam   Constitutional: She is oriented to person, place, and time. She appears well-developed. No distress.   HENT:   Head: Normocephalic.   Nose: Nose normal.   Mouth/Throat: No oropharyngeal exudate.   Eyes: EOM are normal. Pupils are equal, round, and reactive to light.   Neck: Normal range of motion. Neck supple. No JVD present. No thyromegaly present.   Cardiovascular: Normal rate and normal heart sounds.  Exam reveals no gallop and no friction rub.    No murmur heard.  Pulmonary/Chest: Effort normal. She has no wheezes. She has rales. She exhibits no tenderness.   Abdominal: Soft. Bowel sounds are normal. She exhibits no distension and no mass. There is no rebound and no guarding.   Musculoskeletal: Normal range of motion. She exhibits no edema or tenderness.   Lymphadenopathy:     She has no cervical adenopathy.   Neurological: She is alert and oriented to person, place, and time. No cranial nerve deficit.   Skin: Skin is warm. No rash noted. She is not diaphoretic.   Psychiatric: Her behavior is normal.   Nursing note and vitals reviewed.          Recent Labs      11/26/17   0920   WBC  4.9   RBC  4.11*   HEMOGLOBIN  11.7*   HEMATOCRIT  35.6*   MCV  86.6   MCH  28.5   MCHC  32.9*   RDW  47.8   PLATELETCT  177   MPV  11.0     Recent Labs      11/26/17   0920   SODIUM  140   POTASSIUM  3.7   CHLORIDE  104   CO2  27   GLUCOSE  104*   BUN  15   CREATININE  0.75   CALCIUM  9.1                      Assessment/Plan     * Dementia- (present on admission)   Assessment & Plan    Min narc/sed when possible.     Stable on floor  Await guardianship.  Continue to monitor        Constipation   Assessment & Plan    No BM since 11/25.  PO mag citrate and [place] Dulcolax.  Close monitor        Congestion of upper airway   Assessment & Plan    Saturating  90% on RA.  O2, RT, IS, Vax, CXR and encouraging taking of PO fluids.          Physical debility   Assessment & Plan    PT/OT and increase activity.        Obesity (BMI 30.0-34.9)   Assessment & Plan    Encourage Kcal restriction        T12 compression fracture (CMS-HCC)- (present on admission)   Assessment & Plan    CT spine, no acute fractures. Cont fosamax and calcium        Chronic back pain- (present on admission)   Assessment & Plan    As above and min narc/sed when possible.        Stable issues - med hx (GIB, CAD/ICM, CVA, DLD), preventives.    Dispo - complex/fair.  Await guardianship.      Reviewed items::  Labs reviewed and Medications reviewed  Pink catheter::  No Pink  DVT prophylaxis pharmacological::  Heparin  Ulcer Prophylaxis::  Yes   For complexity-based billing, please refer to the history, exam, and decison making above. In addition, I spent >35 minutes caring for the patient today. More than 50% of the time was spent counseling and coordinating care.    I have discussed with RN and GLENYS and SW and other consultants about patient's plan.

## 2017-11-29 NOTE — PROGRESS NOTES
Received bedside report from day RN and assumed care of patient at 1900.  Patient is alert and oriented x2 with no signs of labored breathing or distress. Safety precautions in place including patient call light within reach, bed alarm on, personal possessions nearby, bed in low position and locked, hourly rounding in practice, and non-skid socks in place.

## 2017-11-29 NOTE — PROGRESS NOTES
Simona Knight Fall Risk Assessment:     Last Known Fall: Within the last six months  Mobility: Immobilized/requires assist of one person  Medications: Cardiovascular and central nervous system meds  Mental Status/LOC/Awareness: Oriented to person and place  Toileting Needs: Incontinence  Volume/Electrolyte Status: No problems  Communication/Sensory: Visual (Glasses)/hearing deficit  Behavior: Appropriate behavior  Simona Knight Fall Risk Total: 14  Fall Risk Level: MODERATE RISK    Universal Fall Precautions:  call light/belongings in reach, bed in low position and locked, wheelchairs and assistive devices out of sight, use non-slip footwear, siderails up x 2, adequate lighting, clutter free and spill free environment, educate on level of risk, educate to call for assistance    Fall Risk Level Interventions:    TRIAL ([employer] 8, NEURO, MED JENNIE 5) Moderate Fall Risk Interventions  Place yellow fall risk ID band on patient: verified  Provide patient/family education based on risk assessment : verified  Educate patient/family to call staff for assistance when getting out of bed: verified  Place fall precaution signage outside patient door: verified  Utilize bed/chair fall alarm: verifiedTRIAL ([employer] 8, NEURO, [employer] JENNIE 5) High Fall Risk Interventions  Place yellow fall risk ID band on patient: verified  Provide patient/family education based on risk assessment: verified  Educate patient/family to call staff for assistance when getting out of bed: verified  Place fall precaution signage outside patient door: verified  Place patient in room close to nursing station: verified  Utilize bed/chair fall alarm: verified  Notify charge of high risk for huddle: verified    Patient Specific Interventions:     Medication: review medications with patient and family  Mental Status/LOC/Awareness: reorient patient  Toileting: provide frquent toileting  Volume/Electrolyte Status: ensure patient remains hydrated  Communication/Sensory:  update plan of care on whiteboard  Behavioral: encourage patient to voice feelings  Mobility: utilize bed/chair fall alarm, ensure bed is locked and in lowest position, provide appropriate assistive device and instruct patient to exit bed on their strongest side

## 2017-11-29 NOTE — THERAPY
"Occupational Therapy Treatment completed with focus on ADLs and ADL transfers.  Functional Status:  Pt very pleasant & co-operative.  Pt was SBA for supine to sit EOB.  Pt amb to bathroom with HHA.  Pt was SBA for toilet transfer using grab bar. Pt amb to sink with CGA.  Pt able to groom standing at sink with SBA.  Pt was SBA for sit to supine.   Plan of Care: Will benefit from Occupational Therapy 2 times per week  Discharge Recommendations:  Equipment Will Continue to Assess for Equipment Needs. Post-acute therapy Discharge to a transitional care facility for continued skilled therapy services.    Pt will need to D/C to an environment that can provide 24/7 supervision given her memory impairments.    See \"Rehab Therapy-Acute\" Patient Summary Report for complete documentation.   "

## 2017-11-29 NOTE — DISCHARGE PLANNING
We now have a court date for this patient for 12/19/17.  We will hopefully be able to have SNF placement arranged by then so that we could possibly get the patient to SNF the day after she is appointed a guardian.

## 2017-11-29 NOTE — PROGRESS NOTES
"Assumed care of patient at 0700 . Received report from RN. Patient is AOX3 . Assessment complete. Labs reviewed.Patient and RN discussed plan of care. Patient questions answered. Patient needs are met at this time. Bed in lowest and locked position. Upper bed rails up.  Call light is within reach. Hourly rounding in place.  /57   Pulse 77   Temp 36.6 °C (97.9 °F)   Resp 18   Ht 1.6 m (5' 2.99\")   Wt 85 kg (187 lb 6.3 oz)   SpO2 92%   Breastfeeding? No   BMI 33.20 kg/m²     "

## 2017-11-30 PROCEDURE — 700111 HCHG RX REV CODE 636 W/ 250 OVERRIDE (IP): Performed by: HOSPITALIST

## 2017-11-30 PROCEDURE — 700102 HCHG RX REV CODE 250 W/ 637 OVERRIDE(OP): Performed by: FAMILY MEDICINE

## 2017-11-30 PROCEDURE — A9270 NON-COVERED ITEM OR SERVICE: HCPCS | Performed by: HOSPITALIST

## 2017-11-30 PROCEDURE — 99232 SBSQ HOSP IP/OBS MODERATE 35: CPT | Performed by: INTERNAL MEDICINE

## 2017-11-30 PROCEDURE — 700102 HCHG RX REV CODE 250 W/ 637 OVERRIDE(OP): Performed by: HOSPITALIST

## 2017-11-30 PROCEDURE — A9270 NON-COVERED ITEM OR SERVICE: HCPCS | Performed by: FAMILY MEDICINE

## 2017-11-30 PROCEDURE — 700111 HCHG RX REV CODE 636 W/ 250 OVERRIDE (IP): Performed by: FAMILY MEDICINE

## 2017-11-30 PROCEDURE — 700101 HCHG RX REV CODE 250: Performed by: FAMILY MEDICINE

## 2017-11-30 PROCEDURE — 770006 HCHG ROOM/CARE - MED/SURG/GYN SEMI*

## 2017-11-30 RX ADMIN — STANDARDIZED SENNA CONCENTRATE AND DOCUSATE SODIUM 1 TABLET: 8.6; 5 TABLET, FILM COATED ORAL at 21:55

## 2017-11-30 RX ADMIN — Medication 500 MG: at 09:33

## 2017-11-30 RX ADMIN — LIDOCAINE 1 PATCH: 50 PATCH CUTANEOUS at 09:00

## 2017-11-30 RX ADMIN — ATORVASTATIN CALCIUM 80 MG: 40 TABLET, FILM COATED ORAL at 21:55

## 2017-11-30 RX ADMIN — BENAZEPRIL HYDROCHLORIDE 40 MG: 20 TABLET ORAL at 10:31

## 2017-11-30 RX ADMIN — OMEPRAZOLE 20 MG: 20 CAPSULE, DELAYED RELEASE ORAL at 09:33

## 2017-11-30 RX ADMIN — GUAIFENESIN 600 MG: 600 TABLET, EXTENDED RELEASE ORAL at 21:55

## 2017-11-30 RX ADMIN — GUAIFENESIN 600 MG: 600 TABLET, EXTENDED RELEASE ORAL at 09:33

## 2017-11-30 RX ADMIN — HYDRALAZINE HYDROCHLORIDE 100 MG: 50 TABLET ORAL at 21:55

## 2017-11-30 RX ADMIN — ASPIRIN 81 MG: 81 TABLET, COATED ORAL at 09:33

## 2017-11-30 RX ADMIN — DULOXETINE HYDROCHLORIDE 20 MG: 20 CAPSULE, DELAYED RELEASE ORAL at 09:33

## 2017-11-30 RX ADMIN — GABAPENTIN 300 MG: 300 CAPSULE ORAL at 21:55

## 2017-11-30 RX ADMIN — LACTOBACILLUS ACIDOPHILUS / LACTOBACILLUS BULGARICUS 1 PACKET: 100 MILLION CFU STRENGTH GRANULES at 09:34

## 2017-11-30 RX ADMIN — HEPARIN SODIUM 5000 UNITS: 5000 INJECTION, SOLUTION INTRAVENOUS; SUBCUTANEOUS at 21:57

## 2017-11-30 RX ADMIN — HEPARIN SODIUM 5000 UNITS: 5000 INJECTION, SOLUTION INTRAVENOUS; SUBCUTANEOUS at 09:34

## 2017-11-30 RX ADMIN — HYDRALAZINE HYDROCHLORIDE 100 MG: 50 TABLET ORAL at 05:24

## 2017-11-30 RX ADMIN — ALENDRONATE SODIUM 10 MG: 10 TABLET ORAL at 05:24

## 2017-11-30 RX ADMIN — Medication 500 MG: at 17:22

## 2017-11-30 RX ADMIN — LACTOBACILLUS ACIDOPHILUS / LACTOBACILLUS BULGARICUS 1 PACKET: 100 MILLION CFU STRENGTH GRANULES at 17:21

## 2017-11-30 RX ADMIN — ONDANSETRON 4 MG: 4 TABLET, ORALLY DISINTEGRATING ORAL at 17:26

## 2017-11-30 RX ADMIN — HYDRALAZINE HYDROCHLORIDE 100 MG: 50 TABLET ORAL at 15:26

## 2017-11-30 RX ADMIN — OXYBUTYNIN CHLORIDE 5 MG: 5 TABLET, FILM COATED, EXTENDED RELEASE ORAL at 21:56

## 2017-11-30 ASSESSMENT — ENCOUNTER SYMPTOMS
BLURRED VISION: 0
COUGH: 0
PALPITATIONS: 0
NECK PAIN: 0
SEIZURES: 0
DIZZINESS: 0
HEADACHES: 0
PND: 0
SPEECH CHANGE: 0
TREMORS: 0
WEAKNESS: 0
SHORTNESS OF BREATH: 0
WHEEZING: 0
WEIGHT LOSS: 0
HEARTBURN: 0
FALLS: 0
CHILLS: 0
DEPRESSION: 0
EYE PAIN: 0
SPUTUM PRODUCTION: 1
VOMITING: 0

## 2017-11-30 ASSESSMENT — PAIN SCALES - GENERAL
PAINLEVEL_OUTOF10: 2
PAINLEVEL_OUTOF10: 4

## 2017-11-30 ASSESSMENT — LIFESTYLE VARIABLES
DO YOU DRINK ALCOHOL: NO
SUBSTANCE_ABUSE: 0

## 2017-11-30 NOTE — PROGRESS NOTES
Simona Knight Fall Risk Assessment:     Last Known Fall: Within the last six months  Mobility: Use of assistive device/requires assist of two people  Medications: Cardiovascular and central nervous system meds  Mental Status/LOC/Awareness: Oriented to person and place  Toileting Needs: Incontinence  Volume/Electrolyte Status: No problems  Communication/Sensory: Visual (Glasses)/hearing deficit  Behavior: Appropriate behavior  Simona Knight Fall Risk Total: 15  Fall Risk Level: HIGH RISK    Universal Fall Precautions:  call light/belongings in reach, bed in low position and locked, siderails up x 2    Fall Risk Level Interventions:    TRIAL (TELE 8, NEURO, MED JENNIE 5) Moderate Fall Risk Interventions  Place yellow fall risk ID band on patient: verified  Provide patient/family education based on risk assessment : verified  Educate patient/family to call staff for assistance when getting out of bed: verified  Place fall precaution signage outside patient door: verified  Utilize bed/chair fall alarm: verifiedTRIAL (TELE 8, NEURO, [employer] JENNIE 5) High Fall Risk Interventions  Place yellow fall risk ID band on patient: verified  Provide patient/family education based on risk assessment: verified  Educate patient/family to call staff for assistance when getting out of bed: verified  Place fall precaution signage outside patient door: verified  Place patient in room close to nursing station: verified  Utilize bed/chair fall alarm: verified  Notify charge of high risk for huddle: verified    Patient Specific Interventions:     Medication: review medications with patient and family and assess need for orthostatic hypotension evaluation  Mental Status/LOC/Awareness: reinforce falls education, check on patient hourly, utilize bed/chair fall alarm and reinforce the use of call light  Toileting: provide frquent toileting and toilet prior to giving pain medications  Volume/Electrolyte Status: ensure patient remains hydrated and  monitor abnormal lab values  Communication/Sensory: update plan of care on whiteboard  Behavioral: encourage patient to voice feelings and engage patient in daily activities  Mobility: utilize bed/chair fall alarm and ensure bed is locked and in lowest position

## 2017-11-30 NOTE — PROGRESS NOTES
Simona Knight Fall Risk Assessment:     Last Known Fall: Within the last six months  Mobility: Immobilized/requires assist of one person  Medications: Cardiovascular and central nervous system meds  Mental Status/LOC/Awareness: Oriented to person and place  Toileting Needs: Incontinence  Volume/Electrolyte Status: No problems  Communication/Sensory: Visual (Glasses)/hearing deficit  Behavior: Appropriate behavior  Simona Knight Fall Risk Total: 14  Fall Risk Level: MODERATE RISK     Universal Fall Precautions:  call light/belongings in reach, bed in low position and locked, wheelchairs and assistive devices out of sight, use non-slip footwear, siderails up x 2, adequate lighting, clutter free and spill free environment, educate on level of risk, educate to call for assistance     Fall Risk Level Interventions:    TRIAL ([employer] 8, NEURO, MED JENNIE 5) Moderate Fall Risk Interventions  Place yellow fall risk ID band on patient: verified  Provide patient/family education based on risk assessment : verified  Educate patient/family to call staff for assistance when getting out of bed: verified  Place fall precaution signage outside patient door: verified  Utilize bed/chair fall alarm: verifiedTRIAL ([employer] 8, NEURO, [employer] JENNIE 5) High Fall Risk Interventions  Place yellow fall risk ID band on patient: verified  Provide patient/family education based on risk assessment: verified  Educate patient/family to call staff for assistance when getting out of bed: verified  Place fall precaution signage outside patient door: verified  Place patient in room close to nursing station: verified  Utilize bed/chair fall alarm: verified  Notify charge of high risk for huddle: verified     Patient Specific Interventions:      Medication: review medications with patient and family  Mental Status/LOC/Awareness: reorient patient  Toileting: provide frquent toileting  Volume/Electrolyte Status: ensure patient remains  hydrated  Communication/Sensory: update plan of care on whiteboard  Behavioral: encourage patient to voice feelings  Mobility: utilize bed/chair fall alarm, ensure bed is locked and in lowest position, provide appropriate assistive device and instruct patient to exit bed on their strongest side

## 2017-11-30 NOTE — PROGRESS NOTES
"Assumed care of patient at 0700 . Received report from RN. Patient is AOX2-3 . Assessment complete. Labs reviewed.Patient and RN discussed plan of care. Patient questions answered. Patient needs are met at this time. Bed in lowest and locked position. Upper bed rails up.  Call light is within reach. Hourly rounding in place.  /65   Pulse 75   Temp 36.6 °C (97.8 °F)   Resp 16   Ht 1.6 m (5' 2.99\")   Wt 85 kg (187 lb 6.3 oz)   SpO2 91%   Breastfeeding? No   BMI 33.20 kg/m²     "

## 2017-11-30 NOTE — CARE PLAN
"Problem: Knowledge Deficit  Goal: Knowledge of disease process/condition, treatment plan, diagnostic tests, and medications will improve  Outcome: PROGRESSING AS EXPECTED  Pt educated on IS use and importance for hyperinflation and optimizing airway clearance. Pt demonstrated back effective use of IS. Pt verbalized understanding of teaching, \"Oh this helps me cough. Thank goodness\".     Problem: Respiratory:  Goal: Respiratory status will improve  Outcome: PROGRESSING SLOWER THAN EXPECTED  Fine crackles auscultated in upper lobes. Pt expresses SOB with exertion. Pt SaO2 remaining >90% on room air. IS used effectively; pt hit 1250cc tonight compared to 850cc on 11/28. Pt able to cough after IS use but no sputum produced.       "

## 2017-12-01 PROCEDURE — 770006 HCHG ROOM/CARE - MED/SURG/GYN SEMI*

## 2017-12-01 PROCEDURE — 99232 SBSQ HOSP IP/OBS MODERATE 35: CPT | Performed by: INTERNAL MEDICINE

## 2017-12-01 PROCEDURE — 700101 HCHG RX REV CODE 250: Performed by: FAMILY MEDICINE

## 2017-12-01 PROCEDURE — 700102 HCHG RX REV CODE 250 W/ 637 OVERRIDE(OP): Performed by: HOSPITALIST

## 2017-12-01 PROCEDURE — A9270 NON-COVERED ITEM OR SERVICE: HCPCS | Performed by: HOSPITALIST

## 2017-12-01 PROCEDURE — 700111 HCHG RX REV CODE 636 W/ 250 OVERRIDE (IP): Performed by: HOSPITALIST

## 2017-12-01 PROCEDURE — 700102 HCHG RX REV CODE 250 W/ 637 OVERRIDE(OP): Performed by: FAMILY MEDICINE

## 2017-12-01 PROCEDURE — 97535 SELF CARE MNGMENT TRAINING: CPT

## 2017-12-01 PROCEDURE — 97112 NEUROMUSCULAR REEDUCATION: CPT

## 2017-12-01 PROCEDURE — A9270 NON-COVERED ITEM OR SERVICE: HCPCS | Performed by: FAMILY MEDICINE

## 2017-12-01 RX ADMIN — STANDARDIZED SENNA CONCENTRATE AND DOCUSATE SODIUM 1 TABLET: 8.6; 5 TABLET, FILM COATED ORAL at 21:00

## 2017-12-01 RX ADMIN — OXYBUTYNIN CHLORIDE 5 MG: 5 TABLET, FILM COATED, EXTENDED RELEASE ORAL at 21:00

## 2017-12-01 RX ADMIN — ATORVASTATIN CALCIUM 80 MG: 40 TABLET, FILM COATED ORAL at 21:00

## 2017-12-01 RX ADMIN — HYDRALAZINE HYDROCHLORIDE 100 MG: 50 TABLET ORAL at 13:56

## 2017-12-01 RX ADMIN — GABAPENTIN 300 MG: 300 CAPSULE ORAL at 21:00

## 2017-12-01 RX ADMIN — GUAIFENESIN 600 MG: 600 TABLET, EXTENDED RELEASE ORAL at 21:00

## 2017-12-01 RX ADMIN — HEPARIN SODIUM 5000 UNITS: 5000 INJECTION, SOLUTION INTRAVENOUS; SUBCUTANEOUS at 21:00

## 2017-12-01 RX ADMIN — HEPARIN SODIUM 5000 UNITS: 5000 INJECTION, SOLUTION INTRAVENOUS; SUBCUTANEOUS at 09:00

## 2017-12-01 RX ADMIN — Medication 500 MG: at 17:46

## 2017-12-01 RX ADMIN — ASPIRIN 81 MG: 81 TABLET, COATED ORAL at 09:00

## 2017-12-01 RX ADMIN — LACTOBACILLUS ACIDOPHILUS / LACTOBACILLUS BULGARICUS 1 PACKET: 100 MILLION CFU STRENGTH GRANULES at 17:46

## 2017-12-01 RX ADMIN — BENAZEPRIL HYDROCHLORIDE 40 MG: 20 TABLET ORAL at 09:00

## 2017-12-01 RX ADMIN — HYDRALAZINE HYDROCHLORIDE 100 MG: 50 TABLET ORAL at 21:00

## 2017-12-01 RX ADMIN — OXYCODONE HYDROCHLORIDE 5 MG: 5 TABLET ORAL at 09:09

## 2017-12-01 RX ADMIN — Medication 500 MG: at 09:00

## 2017-12-01 RX ADMIN — GUAIFENESIN 600 MG: 600 TABLET, EXTENDED RELEASE ORAL at 09:00

## 2017-12-01 RX ADMIN — HYDRALAZINE HYDROCHLORIDE 100 MG: 50 TABLET ORAL at 05:45

## 2017-12-01 RX ADMIN — OMEPRAZOLE 20 MG: 20 CAPSULE, DELAYED RELEASE ORAL at 09:00

## 2017-12-01 RX ADMIN — DULOXETINE HYDROCHLORIDE 20 MG: 20 CAPSULE, DELAYED RELEASE ORAL at 09:00

## 2017-12-01 RX ADMIN — LACTOBACILLUS ACIDOPHILUS / LACTOBACILLUS BULGARICUS 1 PACKET: 100 MILLION CFU STRENGTH GRANULES at 09:00

## 2017-12-01 RX ADMIN — LACTOBACILLUS ACIDOPHILUS / LACTOBACILLUS BULGARICUS 1 PACKET: 100 MILLION CFU STRENGTH GRANULES at 13:56

## 2017-12-01 RX ADMIN — ALENDRONATE SODIUM 10 MG: 10 TABLET ORAL at 05:45

## 2017-12-01 RX ADMIN — LIDOCAINE 1 PATCH: 50 PATCH CUTANEOUS at 09:01

## 2017-12-01 ASSESSMENT — COGNITIVE AND FUNCTIONAL STATUS - GENERAL
EATING MEALS: A LITTLE
TOILETING: A LITTLE
DAILY ACTIVITIY SCORE: 17
DRESSING REGULAR LOWER BODY CLOTHING: A LOT
HELP NEEDED FOR BATHING: A LITTLE
DRESSING REGULAR UPPER BODY CLOTHING: A LITTLE
PERSONAL GROOMING: A LITTLE
SUGGESTED CMS G CODE MODIFIER DAILY ACTIVITY: CK

## 2017-12-01 ASSESSMENT — ENCOUNTER SYMPTOMS
SPUTUM PRODUCTION: 1
DIZZINESS: 0
ABDOMINAL PAIN: 0
FEVER: 0
SPEECH CHANGE: 0
SEIZURES: 0
EYE PAIN: 0
WEAKNESS: 0
DEPRESSION: 0
DOUBLE VISION: 0
WHEEZING: 0
WEIGHT LOSS: 0
COUGH: 0
PALPITATIONS: 0
PND: 0
SHORTNESS OF BREATH: 0
TREMORS: 0
FALLS: 0
HEARTBURN: 0
NECK PAIN: 0

## 2017-12-01 ASSESSMENT — LIFESTYLE VARIABLES: SUBSTANCE_ABUSE: 0

## 2017-12-01 ASSESSMENT — PAIN SCALES - GENERAL
PAINLEVEL_OUTOF10: 8
PAINLEVEL_OUTOF10: 4

## 2017-12-01 NOTE — THERAPY
"Occupational Therapy Treatment completed with focus on ADLS.   Functional Status: Pt is CGA sit to stand, min a sit to supine, SUA with verbal cues for seated grooming, max a to remove socks, CGA for xfers with cues for safety when sitting.   Plan of Care: Continue POC.   Discharge Recommendations:  Equipment Will Continue to Assess for Equipment Needs. Post-acute therapy Discharge to a transitional care facility for continued skilled therapy services.    See \"Rehab Therapy-Acute\" Patient Summary Report for complete documentation.   "

## 2017-12-01 NOTE — CARE PLAN
Problem: Safety  Goal: Will remain free from falls  Outcome: PROGRESSING AS EXPECTED  Pt moderate fall risk, pt wearing treaded socks, bed locked and in lowest position, bed alarm is on.  Pt call light and phone within reach.    Problem: Venous Thromboembolism (VTW)/Deep Vein Thrombosis (DVT) Prevention:  Goal: Patient will participate in Venous Thrombosis (VTE)/Deep Vein Thrombosis (DVT)Prevention Measures  Outcome: PROGRESSING AS EXPECTED  Pt on heparin for pharmacologic prophylaxis.

## 2017-12-01 NOTE — PROGRESS NOTES
[facility] Hospitalist Progress Note    Date of Service: 2017    Chief Complaint  77 y.o. female admitted 2017 with GLF, pyuria and ATN.    Interval Problem Update   stable overnight and no CC this morning. Asleep but easily waked.  Patient otherwise denies fever, chills, nausea, vomiting, adb pain, SOB, CP, headache, diarrhea, cough, or sputum.   patient is pleasant and is stable no significant chief complain the morning overnight. Still very sleepy but easily arousable.   stable and no CC overnight. Patient otherwise denies fever, chills, nausea, vomiting, adb pain, SOB, CP, headache, constipation, diarrhea, cough, or sputum.   pleasant in AM and stable and no CC overnight. Patient otherwise denies fever, chills, nausea, vomiting, adb pain, SOB, CP, headache, constipation, diarrhea, cough, or sputum.      Consultants/Specialty  Neurology  Psych      Review of Systems   Constitutional: Negative for fever and weight loss.   HENT: Negative for congestion, ear discharge, ear pain, hearing loss and nosebleeds.    Eyes: Negative for double vision and pain.   Respiratory: Positive for sputum production. Negative for cough, shortness of breath and wheezing.    Cardiovascular: Negative for palpitations, leg swelling and PND.   Gastrointestinal: Negative for abdominal pain and heartburn.   Genitourinary: Negative for dysuria, frequency and hematuria.   Musculoskeletal: Negative for falls, joint pain and neck pain.   Skin: Negative for rash.   Neurological: Negative for dizziness, tremors, speech change, seizures and weakness.   Psychiatric/Behavioral: Negative for depression, substance abuse and suicidal ideas.   All other systems reviewed and are negative.     Physical Exam  Laboratory/Imaging   Hemodynamics  Temp (24hrs), Av.2 °C (97.2 °F), Min:36.2 °C (97.2 °F), Max:36.2 °C (97.2 °F)   Temperature: 36.2 °C (97.2 °F)  Pulse  Av.1  Min: 50  Max: 106    Blood Pressure : 131/53       Respiratory      Respiration: 18, Pulse Oximetry: 90 %     Work Of Breathing / Effort: Mild  RUL Breath Sounds: Fine Crackles, RML Breath Sounds: Fine Crackles, RLL Breath Sounds: Diminished, ARGELIA Breath Sounds: Fine Crackles, LLL Breath Sounds: Diminished    Fluids    Intake/Output Summary (Last 24 hours) at 12/01/17 0841  Last data filed at 11/30/17 1300   Gross per 24 hour   Intake              480 ml   Output                0 ml   Net              480 ml       Nutrition  Orders Placed This Encounter   Procedures   • Diet Order     Standing Status:   Standing     Number of Occurrences:   1     Order Specific Question:   Diet:     Answer:   Regular [1]     Physical Exam   Constitutional: She is oriented to person, place, and time. She appears well-developed and well-nourished.   HENT:   Head: Normocephalic.   Right Ear: External ear normal.   Left Ear: External ear normal.   Nose: Nose normal.   Mouth/Throat: No oropharyngeal exudate.   Eyes: EOM are normal. Pupils are equal, round, and reactive to light.   Neck: Normal range of motion. Neck supple. No JVD present. No thyromegaly present.   Cardiovascular: Normal heart sounds.  Exam reveals no gallop and no friction rub.    No murmur heard.  Pulmonary/Chest: Effort normal. No respiratory distress. She has no wheezes.   Abdominal: Soft. Bowel sounds are normal. She exhibits no distension and no mass. There is no tenderness. There is no rebound.   Musculoskeletal: Normal range of motion. She exhibits no edema or tenderness.   Neurological: She is alert and oriented to person, place, and time. No cranial nerve deficit.   Skin: Skin is warm. No rash noted.   Psychiatric: Her behavior is normal.   Nursing note and vitals reviewed.                                   Assessment/Plan     * Dementia- (present on admission)   Assessment & Plan    Min narc/sed when possible.     Stable on floor  Await guardianship.  Continue to monitor        Constipation   Assessment &  Plan    No BM since 11/25.  PO mag citrate and RI Dulcolax.  Close monitor        Congestion of upper airway   Assessment & Plan    Saturating 90% on RA.  O2, RT, IS, Vax, CXR and encouraging taking of PO fluids.          Physical debility   Assessment & Plan    PT/OT and increase activity.        Obesity (BMI 30.0-34.9)   Assessment & Plan    Encourage Kcal restriction        T12 compression fracture (CMS-HCC)- (present on admission)   Assessment & Plan    CT spine, no acute fractures. Cont fosamax and calcium        Chronic back pain- (present on admission)   Assessment & Plan    As above and min narc/sed when possible.        Stable issues - med hx (GIB, CAD/ICM, CVA, DLD), preventives.    Dispo - complex/fair.  Await guardianship.      Reviewed items::  Labs reviewed and Medications reviewed  Pink catheter::  No Pink  DVT prophylaxis pharmacological::  Heparin  Ulcer Prophylaxis::  Yes   For complexity-based billing, please refer to the history, exam, and decison making above. In addition, I spent >35 minutes caring for the patient today. More than 50% of the time was spent counseling and coordinating care.    I have discussed with RN and CM and SW and other consultants about patient's plan.

## 2017-12-01 NOTE — PROGRESS NOTES
Simona Knight Fall Risk Assessment:     Last Known Fall: Within the last six months  Mobility: Use of assistive device/requires assist of two people  Medications: Cardiovascular and central nervous system meds  Mental Status/LOC/Awareness: Memory loss/confusion and requires reorienting  Toileting Needs: Incontinence  Volume/Electrolyte Status: No problems  Communication/Sensory: Visual (Glasses)/hearing deficit  Behavior: Appropriate behavior  Simona Knight Fall Risk Total: 17  Fall Risk Level: HIGH RISK    Universal Fall Precautions:  call light/belongings in reach, bed in low position and locked, wheelchairs and assistive devices out of sight, siderails up x 2, use non-slip footwear, adequate lighting, clutter free and spill free environment, educate on level of risk, educate to call for assistance    Fall Risk Level Interventions:    TRIAL ([employer], NEURO, MED JENNIE 5) Moderate Fall Risk Interventions  Place yellow fall risk ID band on patient: verified  Provide patient/family education based on risk assessment : verified  Educate patient/family to call staff for assistance when getting out of bed: verified  Place fall precaution signage outside patient door: verified  Utilize bed/chair fall alarm: verifiedTRIAL ("[employer]" 8, NEURO, MED JENNIE 5) High Fall Risk Interventions  Place yellow fall risk ID band on patient: verified  Provide patient/family education based on risk assessment: completed  Educate patient/family to call staff for assistance when getting out of bed: completed  Place fall precaution signage outside patient door: verified  Place patient in room close to nursing station: verified  Utilize bed/chair fall alarm: verified  Notify charge of high risk for huddle: verified    Patient Specific Interventions:     Medication: review medications with patient and family, assess for medications that can be discontinued or dosage decreased and limit combination of prn medications  Mental Status/LOC/Awareness:  reorient patient, reinforce falls education, check on patient hourly, utilize bed/chair fall alarm and reinforce the use of call light  Toileting: provide frquent toileting, instruct patient/family on the need to call for assistance when toileting and do not leave patient unattended in bathroom/refer to toileting scripting  Volume/Electrolyte Status: advance diet as tolerated and monitor abnormal lab values  Communication/Sensory: update plan of care on whiteboard and ensure patient has glasses/contacts and hearing aids/dentures  Behavioral: encourage patient to voice feelings, administer medication as ordered and instruct/reinforce fall program rationale  Mobility: utilize bed/chair fall alarm, ensure bed is locked and in lowest position and provide appropriate assistive device

## 2017-12-01 NOTE — PROGRESS NOTES
Assumed care of pt, received report from day shift RN, pt assessed.  Pt complaining of 4/10 lower back pain, pt declines pain medication at this time.  Pt is A&O x2, disoriented to time and events.  Pt moderate fall risk, wearing treaded socks, bed locked and in lowest position, bed alarm is on.  Pt instructed to call for assistance prior to getting OOB, pt verbalized understanding.  Call light, phone, and personal belongings within reach.

## 2017-12-02 PROCEDURE — A9270 NON-COVERED ITEM OR SERVICE: HCPCS | Performed by: HOSPITALIST

## 2017-12-02 PROCEDURE — 700102 HCHG RX REV CODE 250 W/ 637 OVERRIDE(OP): Performed by: HOSPITALIST

## 2017-12-02 PROCEDURE — A9270 NON-COVERED ITEM OR SERVICE: HCPCS | Performed by: FAMILY MEDICINE

## 2017-12-02 PROCEDURE — 700111 HCHG RX REV CODE 636 W/ 250 OVERRIDE (IP): Performed by: HOSPITALIST

## 2017-12-02 PROCEDURE — 99232 SBSQ HOSP IP/OBS MODERATE 35: CPT | Performed by: INTERNAL MEDICINE

## 2017-12-02 PROCEDURE — 770006 HCHG ROOM/CARE - MED/SURG/GYN SEMI*

## 2017-12-02 PROCEDURE — 700101 HCHG RX REV CODE 250: Performed by: FAMILY MEDICINE

## 2017-12-02 PROCEDURE — 700102 HCHG RX REV CODE 250 W/ 637 OVERRIDE(OP): Performed by: FAMILY MEDICINE

## 2017-12-02 RX ADMIN — BENAZEPRIL HYDROCHLORIDE 40 MG: 20 TABLET ORAL at 08:21

## 2017-12-02 RX ADMIN — STANDARDIZED SENNA CONCENTRATE AND DOCUSATE SODIUM 1 TABLET: 8.6; 5 TABLET, FILM COATED ORAL at 21:12

## 2017-12-02 RX ADMIN — ATORVASTATIN CALCIUM 80 MG: 40 TABLET, FILM COATED ORAL at 21:12

## 2017-12-02 RX ADMIN — OXYBUTYNIN CHLORIDE 5 MG: 5 TABLET, FILM COATED, EXTENDED RELEASE ORAL at 21:12

## 2017-12-02 RX ADMIN — GABAPENTIN 300 MG: 300 CAPSULE ORAL at 21:12

## 2017-12-02 RX ADMIN — HYDRALAZINE HYDROCHLORIDE 100 MG: 50 TABLET ORAL at 06:01

## 2017-12-02 RX ADMIN — OMEPRAZOLE 20 MG: 20 CAPSULE, DELAYED RELEASE ORAL at 08:21

## 2017-12-02 RX ADMIN — GUAIFENESIN 600 MG: 600 TABLET, EXTENDED RELEASE ORAL at 21:12

## 2017-12-02 RX ADMIN — DULOXETINE HYDROCHLORIDE 20 MG: 20 CAPSULE, DELAYED RELEASE ORAL at 08:22

## 2017-12-02 RX ADMIN — HEPARIN SODIUM 5000 UNITS: 5000 INJECTION, SOLUTION INTRAVENOUS; SUBCUTANEOUS at 08:22

## 2017-12-02 RX ADMIN — LACTOBACILLUS ACIDOPHILUS / LACTOBACILLUS BULGARICUS 1 PACKET: 100 MILLION CFU STRENGTH GRANULES at 17:15

## 2017-12-02 RX ADMIN — HYDRALAZINE HYDROCHLORIDE 100 MG: 50 TABLET ORAL at 21:12

## 2017-12-02 RX ADMIN — LACTOBACILLUS ACIDOPHILUS / LACTOBACILLUS BULGARICUS 1 PACKET: 100 MILLION CFU STRENGTH GRANULES at 08:21

## 2017-12-02 RX ADMIN — ASPIRIN 81 MG: 81 TABLET, COATED ORAL at 08:21

## 2017-12-02 RX ADMIN — GUAIFENESIN 600 MG: 600 TABLET, EXTENDED RELEASE ORAL at 08:21

## 2017-12-02 RX ADMIN — Medication 500 MG: at 17:15

## 2017-12-02 RX ADMIN — ALENDRONATE SODIUM 10 MG: 10 TABLET ORAL at 06:01

## 2017-12-02 RX ADMIN — Medication 500 MG: at 08:22

## 2017-12-02 RX ADMIN — LIDOCAINE 1 PATCH: 50 PATCH CUTANEOUS at 08:22

## 2017-12-02 RX ADMIN — HEPARIN SODIUM 5000 UNITS: 5000 INJECTION, SOLUTION INTRAVENOUS; SUBCUTANEOUS at 21:11

## 2017-12-02 ASSESSMENT — ENCOUNTER SYMPTOMS
PND: 0
SHORTNESS OF BREATH: 0
COUGH: 0
SEIZURES: 0
DIZZINESS: 0
NECK PAIN: 0
CHILLS: 0
HEARTBURN: 0
FALLS: 0
DOUBLE VISION: 0
WHEEZING: 0
EYE PAIN: 0
DEPRESSION: 0
SPUTUM PRODUCTION: 1
PALPITATIONS: 0
ABDOMINAL PAIN: 0
WEIGHT LOSS: 0
TREMORS: 0
WEAKNESS: 0
SPEECH CHANGE: 0

## 2017-12-02 ASSESSMENT — LIFESTYLE VARIABLES: SUBSTANCE_ABUSE: 0

## 2017-12-02 ASSESSMENT — PAIN SCALES - GENERAL
PAINLEVEL_OUTOF10: 2
PAINLEVEL_OUTOF10: 4

## 2017-12-02 NOTE — CARE PLAN
Problem: Infection  Goal: Will remain free from infection  Pt reinforced on infection prevention, hand washing and use of I.s. To prevent lung infections. Reinforced education as needed.

## 2017-12-02 NOTE — CARE PLAN
Problem: Infection  Goal: Will remain free from infection  Outcome: PROGRESSING AS EXPECTED  Pt showing no signs of new or worsening infection    Problem: Urinary Elimination:  Goal: Ability to reestablish a normal urinary elimination pattern will improve  Outcome: PROGRESSING AS EXPECTED  Pt incontinent of urine, hourly rounding in place to insure pt is clean and dry.

## 2017-12-02 NOTE — PROGRESS NOTES
Simona Knight Fall Risk Assessment:     Last Known Fall: Within the last six months  Mobility: Use of assistive device/requires assist of two people  Medications: Cardiovascular and central nervous system meds  Mental Status/LOC/Awareness: Memory loss/confusion and requires reorienting  Toileting Needs: Incontinence  Volume/Electrolyte Status: No problems  Communication/Sensory: Visual (Glasses)/hearing deficit  Behavior: Appropriate behavior  Simona Knight Fall Risk Total: 17  Fall Risk Level: HIGH RISK    Universal Fall Precautions:  call light/belongings in reach, bed in low position and locked, wheelchairs and assistive devices out of sight, siderails up x 2, use non-slip footwear, adequate lighting, clutter free and spill free environment, educate on level of risk, educate to call for assistance    Fall Risk Level Interventions:    TRIAL ([employer], NEURO, MED JENNIE 5) Moderate Fall Risk Interventions  Place yellow fall risk ID band on patient: verified  Provide patient/family education based on risk assessment : verified  Educate patient/family to call staff for assistance when getting out of bed: verified  Place fall precaution signage outside patient door: verified  Utilize bed/chair fall alarm: verifiedTRIAL ([employer] 8, NEURO, MED JENNIE 5) High Fall Risk Interventions  Place yellow fall risk ID band on patient: verified  Provide patient/family education based on risk assessment: completed  Educate patient/family to call staff for assistance when getting out of bed: completed  Place fall precaution signage outside patient door: verified  Place patient in room close to nursing station: verified  Utilize bed/chair fall alarm: verified  Notify charge of high risk for huddle: verified    Patient Specific Interventions:     Medication: review medications with patient and family, assess for medications that can be discontinued or dosage decreased and limit combination of prn medications  Mental Status/LOC/Awareness:  reorient patient, reinforce falls education, check on patient hourly, utilize bed/chair fall alarm and reinforce the use of call light  Toileting: provide frquent toileting, instruct patient/family on the need to call for assistance when toileting and do not leave patient unattended in bathroom/refer to toileting scripting  Volume/Electrolyte Status: advance diet as tolerated and monitor abnormal lab values  Communication/Sensory: update plan of care on whiteboard and ensure patient has glasses/contacts and hearing aids/dentures  Behavioral: encourage patient to voice feelings, administer medication as ordered and instruct/reinforce fall program rationale  Mobility: utilize bed/chair fall alarm, ensure bed is locked and in lowest position and provide appropriate assistive device

## 2017-12-02 NOTE — PROGRESS NOTES
Hourly rounding, call bell within reach. Patient educated about plan of care, pain management, call bell use, and mobility. Patient oriented to self and place, disoriented to time and situation. Easily reoriented. Patient able to get up to chair for meals with one assist and walker. Had issues with knees buckling. Worked with OT today. Continues to have congestion in her chest, dry cough. Instructed on how to deep breath and cough. Needs reinforcement but demonstrates good technique. Otherwise no new or worsening complaints.

## 2017-12-02 NOTE — PROGRESS NOTES
[facility]ist Progress Note    Date of Service: 2017    Chief Complaint  77 y.o. female admitted 2017 with GLF, pyuria and ATN.    Interval Problem Update   stable overnight and no CC this morning. Asleep but easily waked.  Patient otherwise denies fever, chills, nausea, vomiting, adb pain, SOB, CP, headache, diarrhea, cough, or sputum.   patient is pleasant and is stable no significant chief complain the morning overnight. Still very sleepy but easily arousable.   stable and no CC overnight. Patient otherwise denies fever, chills, nausea, vomiting, adb pain, SOB, CP, headache, constipation, diarrhea, cough, or sputum.   pleasant in AM and stable and no CC overnight. Patient otherwise denies fever, chills, nausea, vomiting, adb pain, SOB, CP, headache, constipation, diarrhea, cough, or sputum.   stable and no CC overnight. Patient is pleasant and like the food this morning..    Consultants/Specialty  Neurology  Psych      Review of Systems   Constitutional: Negative for chills and weight loss.   HENT: Negative for congestion, ear pain and hearing loss.    Eyes: Negative for double vision and pain.   Respiratory: Positive for sputum production. Negative for cough, shortness of breath and wheezing.    Cardiovascular: Negative for palpitations, leg swelling and PND.   Gastrointestinal: Negative for abdominal pain and heartburn.   Genitourinary: Negative for dysuria, frequency and hematuria.   Musculoskeletal: Negative for falls, joint pain and neck pain.   Skin: Negative for rash.   Neurological: Negative for dizziness, tremors, speech change, seizures and weakness.   Psychiatric/Behavioral: Negative for depression, substance abuse and suicidal ideas.      Physical Exam  Laboratory/Imaging   Hemodynamics  Temp (24hrs), Av.4 °C (97.6 °F), Min:36.3 °C (97.4 °F), Max:36.6 °C (97.8 °F)   Temperature: 36.5 °C (97.7 °F)  Pulse  Av.3  Min: 50  Max: 106    Blood Pressure : 142/60       Respiratory      Respiration: 16, Pulse Oximetry: 97 %     Work Of Breathing / Effort: Mild  RUL Breath Sounds: Clear, RML Breath Sounds: Coarse Crackles, RLL Breath Sounds: Diminished;Coarse Crackles, ARGELIA Breath Sounds: Clear, LLL Breath Sounds: Diminished;Coarse Crackles    Fluids    Intake/Output Summary (Last 24 hours) at 12/02/17 0940  Last data filed at 12/01/17 1315   Gross per 24 hour   Intake              480 ml   Output                0 ml   Net              480 ml       Nutrition  Orders Placed This Encounter   Procedures   • Diet Order     Standing Status:   Standing     Number of Occurrences:   1     Order Specific Question:   Diet:     Answer:   Regular [1]     Physical Exam   Constitutional: She is oriented to person, place, and time. She appears well-nourished.   HENT:   Head: Normocephalic.   Nose: Nose normal.   Mouth/Throat: No oropharyngeal exudate.   Eyes: EOM are normal. Pupils are equal, round, and reactive to light.   Neck: Normal range of motion. Neck supple. No JVD present. No thyromegaly present.   Cardiovascular: Normal heart sounds.  Exam reveals no gallop and no friction rub.    No murmur heard.  Pulmonary/Chest: Effort normal. No respiratory distress. She has no wheezes. She has no rales.   Abdominal: Soft. Bowel sounds are normal. She exhibits no distension and no mass. There is no rebound and no guarding.   Musculoskeletal: Normal range of motion. She exhibits no edema or tenderness.   Neurological: She is alert and oriented to person, place, and time. No cranial nerve deficit.   Skin: Skin is warm. No rash noted. She is not diaphoretic.   Psychiatric: Her behavior is normal.   Nursing note and vitals reviewed.                                   Assessment/Plan     * Dementia- (present on admission)   Assessment & Plan    Min narc/sed when possible.     Stable on floor  Await guardianship.        Constipation   Assessment & Plan    No BM since 11/25.  PO mag citrate and [place]  Dulcolax.  Close monitor  stable      Congestion of upper airway   Assessment & Plan    Saturating 90% on RA.  O2, RT, IS, Vax, CXR and encouraging taking of PO fluids.          Physical debility   Assessment & Plan    PT/OT and increase activity.        Obesity (BMI 30.0-34.9)   Assessment & Plan    Encourage Kcal restriction        T12 compression fracture (CMS-HCC)- (present on admission)   Assessment & Plan    CT spine, no acute fractures. Cont fosamax and calcium        Chronic back pain- (present on admission)   Assessment & Plan    As above and min narc/sed when possible.        Stable issues - med hx (GIB, CAD/ICM, CVA, DLD), preventives.    Dispo - complex/fair.  Await guardianship.      Reviewed items::  Labs reviewed and Medications reviewed  Pink catheter::  No Pink  DVT prophylaxis pharmacological::  Heparin  Ulcer Prophylaxis::  Yes   For complexity-based billing, please refer to the history, exam, and decison making above. In addition, I spent >35 minutes caring for the patient today. More than 50% of the time was spent counseling and coordinating care.    I have discussed with RN and GLENYS and SW and other consultants about patient's plan.

## 2017-12-03 PROCEDURE — 700102 HCHG RX REV CODE 250 W/ 637 OVERRIDE(OP): Performed by: HOSPITALIST

## 2017-12-03 PROCEDURE — A9270 NON-COVERED ITEM OR SERVICE: HCPCS | Performed by: FAMILY MEDICINE

## 2017-12-03 PROCEDURE — 770006 HCHG ROOM/CARE - MED/SURG/GYN SEMI*

## 2017-12-03 PROCEDURE — A9270 NON-COVERED ITEM OR SERVICE: HCPCS | Performed by: HOSPITALIST

## 2017-12-03 PROCEDURE — 700111 HCHG RX REV CODE 636 W/ 250 OVERRIDE (IP): Performed by: HOSPITALIST

## 2017-12-03 PROCEDURE — 99232 SBSQ HOSP IP/OBS MODERATE 35: CPT | Performed by: INTERNAL MEDICINE

## 2017-12-03 PROCEDURE — 700101 HCHG RX REV CODE 250: Performed by: FAMILY MEDICINE

## 2017-12-03 PROCEDURE — 700102 HCHG RX REV CODE 250 W/ 637 OVERRIDE(OP): Performed by: FAMILY MEDICINE

## 2017-12-03 RX ADMIN — LIDOCAINE 1 PATCH: 50 PATCH CUTANEOUS at 09:39

## 2017-12-03 RX ADMIN — HYDRALAZINE HYDROCHLORIDE 100 MG: 50 TABLET ORAL at 06:20

## 2017-12-03 RX ADMIN — Medication 500 MG: at 09:40

## 2017-12-03 RX ADMIN — HEPARIN SODIUM 5000 UNITS: 5000 INJECTION, SOLUTION INTRAVENOUS; SUBCUTANEOUS at 21:26

## 2017-12-03 RX ADMIN — OMEPRAZOLE 20 MG: 20 CAPSULE, DELAYED RELEASE ORAL at 09:40

## 2017-12-03 RX ADMIN — LACTOBACILLUS ACIDOPHILUS / LACTOBACILLUS BULGARICUS 1 PACKET: 100 MILLION CFU STRENGTH GRANULES at 09:44

## 2017-12-03 RX ADMIN — LACTOBACILLUS ACIDOPHILUS / LACTOBACILLUS BULGARICUS 1 PACKET: 100 MILLION CFU STRENGTH GRANULES at 18:23

## 2017-12-03 RX ADMIN — DULOXETINE HYDROCHLORIDE 20 MG: 20 CAPSULE, DELAYED RELEASE ORAL at 09:41

## 2017-12-03 RX ADMIN — GUAIFENESIN 600 MG: 600 TABLET, EXTENDED RELEASE ORAL at 09:41

## 2017-12-03 RX ADMIN — STANDARDIZED SENNA CONCENTRATE AND DOCUSATE SODIUM 1 TABLET: 8.6; 5 TABLET, FILM COATED ORAL at 21:25

## 2017-12-03 RX ADMIN — HYDRALAZINE HYDROCHLORIDE 100 MG: 50 TABLET ORAL at 12:40

## 2017-12-03 RX ADMIN — OXYBUTYNIN CHLORIDE 5 MG: 5 TABLET, FILM COATED, EXTENDED RELEASE ORAL at 21:30

## 2017-12-03 RX ADMIN — LACTOBACILLUS ACIDOPHILUS / LACTOBACILLUS BULGARICUS 1 PACKET: 100 MILLION CFU STRENGTH GRANULES at 12:40

## 2017-12-03 RX ADMIN — Medication 500 MG: at 18:29

## 2017-12-03 RX ADMIN — HEPARIN SODIUM 5000 UNITS: 5000 INJECTION, SOLUTION INTRAVENOUS; SUBCUTANEOUS at 09:42

## 2017-12-03 RX ADMIN — BENAZEPRIL HYDROCHLORIDE 40 MG: 20 TABLET ORAL at 09:39

## 2017-12-03 RX ADMIN — GABAPENTIN 300 MG: 300 CAPSULE ORAL at 21:25

## 2017-12-03 RX ADMIN — GUAIFENESIN 600 MG: 600 TABLET, EXTENDED RELEASE ORAL at 21:26

## 2017-12-03 RX ADMIN — ATORVASTATIN CALCIUM 80 MG: 40 TABLET, FILM COATED ORAL at 21:25

## 2017-12-03 RX ADMIN — HYDRALAZINE HYDROCHLORIDE 100 MG: 50 TABLET ORAL at 21:25

## 2017-12-03 RX ADMIN — ALENDRONATE SODIUM 10 MG: 10 TABLET ORAL at 06:20

## 2017-12-03 RX ADMIN — POLYETHYLENE GLYCOL (3350) 1 PACKET: 17 POWDER, FOR SOLUTION ORAL at 18:23

## 2017-12-03 RX ADMIN — ASPIRIN 81 MG: 81 TABLET, COATED ORAL at 09:41

## 2017-12-03 ASSESSMENT — ENCOUNTER SYMPTOMS
DEPRESSION: 0
SHORTNESS OF BREATH: 0
DIZZINESS: 0
WEAKNESS: 0
PHOTOPHOBIA: 0
ABDOMINAL PAIN: 0
NECK PAIN: 0
TREMORS: 0
SPUTUM PRODUCTION: 1
DOUBLE VISION: 0
FALLS: 0
WHEEZING: 0
SEIZURES: 0
PND: 0
SPEECH CHANGE: 0
PALPITATIONS: 0
COUGH: 0
HEARTBURN: 0
WEIGHT LOSS: 0
BLOOD IN STOOL: 0
FEVER: 0

## 2017-12-03 ASSESSMENT — PAIN SCALES - GENERAL
PAINLEVEL_OUTOF10: 4
PAINLEVEL_OUTOF10: 4

## 2017-12-03 ASSESSMENT — LIFESTYLE VARIABLES: SUBSTANCE_ABUSE: 0

## 2017-12-03 NOTE — PROGRESS NOTES
Assumed care of pt, received report from day shift RN, pt assessed.  Pt complaining of 4/10 lower back pain, pt declines pain medication at this time.  Pt is A&O x1, disoriented to time, place and events.  Pt high fall risk, wearing treaded socks, bed locked and in lowest position, bed alarm is on.  Pt instructed to call for assistance prior to getting OOB, pt verbalized understanding.  Call light, phone, and personal belongings within reach.

## 2017-12-03 NOTE — CARE PLAN
Problem: Bowel/Gastric:  Goal: Normal bowel function is maintained or improved  Pt educated on bowel care/protocol. Pt encouraged to drink fluids and ambulate.

## 2017-12-03 NOTE — CARE PLAN
Problem: Bowel/Gastric:  Goal: Normal bowel function is maintained or improved  Outcome: PROGRESSING AS EXPECTED  LBM 11/30/2017.  Pt taking evening stool softener, has no complaints of loose stools or constipation.    Problem: Respiratory:  Goal: Respiratory status will improve  Outcome: PROGRESSING AS EXPECTED  Pt SPO2 92% on room air and has no complaints of shortness of breath or difficulty breathing.

## 2017-12-03 NOTE — PROGRESS NOTES
Simona Knight Fall Risk Assessment:     Last Known Fall: Within the last six months  Mobility: Use of assistive device/requires assist of two people  Medications: Cardiovascular and central nervous system meds  Mental Status/LOC/Awareness: Memory loss/confusion and requires reorienting  Toileting Needs: Incontinence  Volume/Electrolyte Status: No problems  Communication/Sensory: Visual (Glasses)/hearing deficit  Behavior: Appropriate behavior  Simona Knight Fall Risk Total: 17  Fall Risk Level: HIGH RISK    Universal Fall Precautions:  call light/belongings in reach, bed in low position and locked, wheelchairs and assistive devices out of sight, siderails up x 2, use non-slip footwear, adequate lighting, clutter free and spill free environment, educate on level of risk, educate to call for assistance    Fall Risk Level Interventions:    TRIAL ([employer], NEURO, MED JENNIE 5) Moderate Fall Risk Interventions  Place yellow fall risk ID band on patient: verified  Provide patient/family education based on risk assessment : verified  Educate patient/family to call staff for assistance when getting out of bed: verified  Place fall precaution signage outside patient door: verified  Utilize bed/chair fall alarm: verifiedTRIAL ([employer], NEURO, MED JENNIE 5) High Fall Risk Interventions  Place yellow fall risk ID band on patient: verified  Provide patient/family education based on risk assessment: completed  Educate patient/family to call staff for assistance when getting out of bed: completed  Place fall precaution signage outside patient door: verified  Place patient in room close to nursing station: verified  Utilize bed/chair fall alarm: verified  Notify charge of high risk for huddle: verified    Patient Specific Interventions:     Medication: review medications with patient and family, assess for medications that can be discontinued or dosage decreased and limit combination of prn medications  Mental Status/LOC/Awareness:  reorient patient, reinforce falls education, check on patient hourly, utilize bed/chair fall alarm and reinforce the use of call light  Toileting: provide frquent toileting, instruct patient/family on the need to call for assistance when toileting and do not leave patient unattended in bathroom/refer to toileting scripting  Volume/Electrolyte Status: advance diet as tolerated and monitor abnormal lab values  Communication/Sensory: update plan of care on whiteboard and ensure patient has glasses/contacts and hearing aids/dentures  Behavioral: encourage patient to voice feelings, administer medication as ordered and instruct/reinforce fall program rationale  Mobility: utilize bed/chair fall alarm, ensure bed is locked and in lowest position and provide appropriate assistive device

## 2017-12-03 NOTE — PROGRESS NOTES
Simona Knight Fall Risk Assessment:     Last Known Fall: Within the last six months  Mobility: Use of assistive device/requires assist of two people  Medications: Cardiovascular and central nervous system meds  Mental Status/LOC/Awareness: Memory loss/confusion and requires reorienting  Toileting Needs: Incontinence  Volume/Electrolyte Status: No problems  Communication/Sensory: Visual (Glasses)/hearing deficit  Behavior: Appropriate behavior  Simona Knight Fall Risk Total: 15  Fall Risk Level: HIGH RISK     Universal Fall Precautions:  call light/belongings in reach, bed in low position and locked, wheelchairs and assistive devices out of sight, siderails up x 2, use non-slip footwear, adequate lighting, clutter free and spill free environment, educate on level of risk, educate to call for assistance     Fall Risk Level Interventions:    TRIAL ([employer], NEURO, MED JENNIE 5) Moderate Fall Risk Interventions  Place yellow fall risk ID band on patient: verified  Provide patient/family education based on risk assessment : verified  Educate patient/family to call staff for assistance when getting out of bed: verified  Place fall precaution signage outside patient door: verified  Utilize bed/chair fall alarm: verifiedTRIAL ([employer] 8, NEURO, MED JENNIE 5) High Fall Risk Interventions  Place yellow fall risk ID band on patient: verified  Provide patient/family education based on risk assessment: completed  Educate patient/family to call staff for assistance when getting out of bed: completed  Place fall precaution signage outside patient door: verified  Place patient in room close to nursing station: verified  Utilize bed/chair fall alarm: verified  Notify charge of high risk for huddle: verified     Patient Specific Interventions:      Medication: review medications with patient and family, assess for medications that can be discontinued or dosage decreased and limit combination of prn medications  Mental Status/LOC/Awareness:  reorient patient, reinforce falls education, check on patient hourly, utilize bed/chair fall alarm and reinforce the use of call light  Toileting: provide frquent toileting, instruct patient/family on the need to call for assistance when toileting and do not leave patient unattended in bathroom/refer to toileting scripting  Volume/Electrolyte Status: advance diet as tolerated and monitor abnormal lab values  Communication/Sensory: update plan of care on whiteboard and ensure patient has glasses/contacts and hearing aids/dentures  Behavioral: encourage patient to voice feelings, administer medication as ordered and instruct/reinforce fall program rationale  Mobility: utilize bed/chair fall alarm, ensure bed is locked and in lowest position and provide appropriate assistive device

## 2017-12-03 NOTE — PROGRESS NOTES
[facility] Hospitalist Progress Note    Date of Service: 12/3/2017    Chief Complaint  77 y.o. female admitted 2017 with GLF, pyuria and ATN.    Interval Problem Update   stable overnight and no CC this morning. Asleep but easily waked.  Patient otherwise denies fever, chills, nausea, vomiting, adb pain, SOB, CP, headache, diarrhea, cough, or sputum.   patient is pleasant and is stable no significant chief complain the morning overnight. Still very sleepy but easily arousable.   stable and no CC overnight. Patient otherwise denies fever, chills, nausea, vomiting, adb pain, SOB, CP, headache, constipation, diarrhea, cough, or sputum.   pleasant in AM and stable and no CC overnight. Patient otherwise denies fever, chills, nausea, vomiting, adb pain, SOB, CP, headache, constipation, diarrhea, cough, or sputum.   stable and no CC overnight. Patient is pleasant and like the food this morning.  12/3 still waiting for guardianship. Stable on the floor. Denies fever, chills, nausea vomiting diarrhea..    Consultants/Specialty  Neurology  Psych      Review of Systems   Constitutional: Negative for fever and weight loss.   HENT: Negative for congestion and hearing loss.    Eyes: Negative for double vision and photophobia.   Respiratory: Positive for sputum production. Negative for cough, shortness of breath and wheezing.    Cardiovascular: Negative for palpitations, leg swelling and PND.   Gastrointestinal: Negative for abdominal pain, blood in stool and heartburn.   Genitourinary: Negative for frequency and hematuria.   Musculoskeletal: Negative for falls, joint pain and neck pain.   Skin: Negative for rash.   Neurological: Negative for dizziness, tremors, speech change, seizures and weakness.   Psychiatric/Behavioral: Negative for depression, substance abuse and suicidal ideas.      Physical Exam  Laboratory/Imaging   Hemodynamics  Temp (24hrs), Av.7 °C (98 °F), Min:36.3 °C (97.3 °F), Max:36.9 °C  (98.4 °F)   Temperature: 36.3 °C (97.3 °F)  Pulse  Av.4  Min: 50  Max: 106    Blood Pressure : 152/60      Respiratory      Respiration: 16, Pulse Oximetry: 93 %     Work Of Breathing / Effort: Mild  RUL Breath Sounds: Clear, RML Breath Sounds: Diminished;Coarse Crackles, RLL Breath Sounds: Diminished;Coarse Crackles, ARGELIA Breath Sounds: Clear, LLL Breath Sounds: Diminished;Coarse Crackles    Fluids  No intake or output data in the 24 hours ending 17 0915    Nutrition  Orders Placed This Encounter   Procedures   • Diet Order     Standing Status:   Standing     Number of Occurrences:   1     Order Specific Question:   Diet:     Answer:   Regular [1]     Physical Exam   Constitutional: She is oriented to person, place, and time.   HENT:   Head: Normocephalic.   Nose: Nose normal.   Mouth/Throat: No oropharyngeal exudate.   Eyes: EOM are normal. Pupils are equal, round, and reactive to light.   Neck: Normal range of motion. Neck supple. No JVD present. No thyromegaly present.   Cardiovascular: Normal rate and normal heart sounds.  Exam reveals no gallop.    No murmur heard.  Pulmonary/Chest: Effort normal. No respiratory distress. She has no wheezes. She has no rales. She exhibits no tenderness.   Abdominal: Soft. Bowel sounds are normal. She exhibits no distension and no mass. There is no tenderness. There is no rebound.   Musculoskeletal: Normal range of motion. She exhibits no edema or tenderness.   Lymphadenopathy:     She has no cervical adenopathy.   Neurological: She is alert and oriented to person, place, and time. No cranial nerve deficit.   Skin: Skin is warm. No rash noted. She is not diaphoretic.   Psychiatric: Her behavior is normal.   Nursing note and vitals reviewed.                                   Assessment/Plan     * Dementia- (present on admission)   Assessment & Plan    Min narc/sed when possible.     Stable on floor and no CC  Await guardianship.        Constipation   Assessment & Plan     No BM since 11/25.  PO mag citrate and [place] Dulcolax.  Close monitor  stable      Congestion of upper airway   Assessment & Plan    Saturating 90% on RA.  O2, RT, IS, Vax, CXR and encouraging taking of PO fluids.          Physical debility   Assessment & Plan    PT/OT and increase activity.        Obesity (BMI 30.0-34.9)   Assessment & Plan    Encourage Kcal restriction        T12 compression fracture (CMS-HCC)- (present on admission)   Assessment & Plan    CT spine, no acute fractures. Cont fosamax and calcium        Chronic back pain- (present on admission)   Assessment & Plan    As above and min narc/sed when possible.        Stable issues - med hx (GIB, CAD/ICM, CVA, DLD), preventives.    Dispo - complex/fair.  Await guardianship.      Reviewed items::  Labs reviewed and Medications reviewed  Pink catheter::  No Pink  DVT prophylaxis pharmacological::  Heparin  Ulcer Prophylaxis::  Yes   For complexity-based billing, please refer to the history, exam, and decison making above. In addition, I spent >35 minutes caring for the patient today. More than 50% of the time was spent counseling and coordinating care.    I have discussed with RN and CM and SW and other consultants about patient's plan.

## 2017-12-04 PROCEDURE — A9270 NON-COVERED ITEM OR SERVICE: HCPCS | Performed by: HOSPITALIST

## 2017-12-04 PROCEDURE — 700111 HCHG RX REV CODE 636 W/ 250 OVERRIDE (IP): Performed by: HOSPITALIST

## 2017-12-04 PROCEDURE — 99232 SBSQ HOSP IP/OBS MODERATE 35: CPT | Performed by: INTERNAL MEDICINE

## 2017-12-04 PROCEDURE — A9270 NON-COVERED ITEM OR SERVICE: HCPCS | Performed by: FAMILY MEDICINE

## 2017-12-04 PROCEDURE — 700101 HCHG RX REV CODE 250: Performed by: FAMILY MEDICINE

## 2017-12-04 PROCEDURE — 700102 HCHG RX REV CODE 250 W/ 637 OVERRIDE(OP): Performed by: FAMILY MEDICINE

## 2017-12-04 PROCEDURE — 770006 HCHG ROOM/CARE - MED/SURG/GYN SEMI*

## 2017-12-04 PROCEDURE — 700102 HCHG RX REV CODE 250 W/ 637 OVERRIDE(OP): Performed by: HOSPITALIST

## 2017-12-04 RX ADMIN — Medication 500 MG: at 08:45

## 2017-12-04 RX ADMIN — DULOXETINE HYDROCHLORIDE 20 MG: 20 CAPSULE, DELAYED RELEASE ORAL at 08:45

## 2017-12-04 RX ADMIN — Medication 500 MG: at 17:10

## 2017-12-04 RX ADMIN — LACTOBACILLUS ACIDOPHILUS / LACTOBACILLUS BULGARICUS 1 PACKET: 100 MILLION CFU STRENGTH GRANULES at 11:45

## 2017-12-04 RX ADMIN — ALENDRONATE SODIUM 10 MG: 10 TABLET ORAL at 08:45

## 2017-12-04 RX ADMIN — MAGNESIUM HYDROXIDE 30 ML: 400 SUSPENSION ORAL at 10:39

## 2017-12-04 RX ADMIN — STANDARDIZED SENNA CONCENTRATE AND DOCUSATE SODIUM 1 TABLET: 8.6; 5 TABLET, FILM COATED ORAL at 22:28

## 2017-12-04 RX ADMIN — LACTOBACILLUS ACIDOPHILUS / LACTOBACILLUS BULGARICUS 1 PACKET: 100 MILLION CFU STRENGTH GRANULES at 08:45

## 2017-12-04 RX ADMIN — LACTOBACILLUS ACIDOPHILUS / LACTOBACILLUS BULGARICUS 1 PACKET: 100 MILLION CFU STRENGTH GRANULES at 17:08

## 2017-12-04 RX ADMIN — OMEPRAZOLE 20 MG: 20 CAPSULE, DELAYED RELEASE ORAL at 08:46

## 2017-12-04 RX ADMIN — GABAPENTIN 300 MG: 300 CAPSULE ORAL at 22:28

## 2017-12-04 RX ADMIN — LIDOCAINE 1 PATCH: 50 PATCH CUTANEOUS at 08:45

## 2017-12-04 RX ADMIN — GUAIFENESIN 600 MG: 600 TABLET, EXTENDED RELEASE ORAL at 08:45

## 2017-12-04 RX ADMIN — ASPIRIN 81 MG: 81 TABLET, COATED ORAL at 08:45

## 2017-12-04 RX ADMIN — BENAZEPRIL HYDROCHLORIDE 40 MG: 20 TABLET ORAL at 08:45

## 2017-12-04 RX ADMIN — GUAIFENESIN 600 MG: 600 TABLET, EXTENDED RELEASE ORAL at 22:28

## 2017-12-04 RX ADMIN — HEPARIN SODIUM 5000 UNITS: 5000 INJECTION, SOLUTION INTRAVENOUS; SUBCUTANEOUS at 08:45

## 2017-12-04 RX ADMIN — ATORVASTATIN CALCIUM 80 MG: 40 TABLET, FILM COATED ORAL at 22:28

## 2017-12-04 RX ADMIN — HYDRALAZINE HYDROCHLORIDE 100 MG: 50 TABLET ORAL at 14:15

## 2017-12-04 RX ADMIN — HYDRALAZINE HYDROCHLORIDE 100 MG: 50 TABLET ORAL at 05:11

## 2017-12-04 RX ADMIN — OXYBUTYNIN CHLORIDE 5 MG: 5 TABLET, FILM COATED, EXTENDED RELEASE ORAL at 22:28

## 2017-12-04 RX ADMIN — HYDRALAZINE HYDROCHLORIDE 100 MG: 50 TABLET ORAL at 22:28

## 2017-12-04 ASSESSMENT — ENCOUNTER SYMPTOMS
FALLS: 0
SEIZURES: 0
PHOTOPHOBIA: 0
DOUBLE VISION: 0
DIZZINESS: 0
SPEECH CHANGE: 0
BLOOD IN STOOL: 0
SHORTNESS OF BREATH: 0
PND: 0
COUGH: 0
CHILLS: 0
DEPRESSION: 0
SPUTUM PRODUCTION: 1
TREMORS: 0
BLURRED VISION: 0
NAUSEA: 0
NECK PAIN: 0
VOMITING: 0
WHEEZING: 0
PALPITATIONS: 0
WEAKNESS: 0

## 2017-12-04 ASSESSMENT — PAIN SCALES - GENERAL
PAINLEVEL_OUTOF10: 4
PAINLEVEL_OUTOF10: 0
PAINLEVEL_OUTOF10: 0
PAINLEVEL_OUTOF10: 4

## 2017-12-04 ASSESSMENT — LIFESTYLE VARIABLES: SUBSTANCE_ABUSE: 0

## 2017-12-04 NOTE — PROGRESS NOTES
Simona Knight Fall Risk Assessment:     Last Known Fall: Within the last six months  Mobility: Immobilized/requires assist of one person  Medications: Cardiovascular or central nervous system meds  Mental Status/LOC/Awareness: Oriented to person and place  Toileting Needs: Incontinence  Volume/Electrolyte Status: No problems  Communication/Sensory: Visual (Glasses)/hearing deficit  Behavior: Appropriate behavior  Simona Knight Fall Risk Total: 13  Fall Risk Level: MODERATE RISK     Universal Fall Precautions:  call light/belongings in reach, bed in low position and locked, wheelchairs and assistive devices out of sight, siderails up x 2, use non-slip footwear, adequate lighting, clutter free and spill free environment, educate on level of risk, educate to call for assistance     Fall Risk Level Interventions:    TRIAL ([employer] 8, NEURO, MED JENNIE 5) Moderate Fall Risk Interventions  Place yellow fall risk ID band on patient: verified  Provide patient/family education based on risk assessment : completed  Educate patient/family to call staff for assistance when getting out of bed: completed  Place fall precaution signage outside patient door: verified  Utilize bed/chair fall alarm: verifiedTRIAL ([employer] 8, NEURO, [employer] JENNIE 5) High Fall Risk Interventions  Place yellow fall risk ID band on patient: verified  Provide patient/family education based on risk assessment: verified  Educate patient/family to call staff for assistance when getting out of bed: verified  Place fall precaution signage outside patient door: verified  Place patient in room close to nursing station: verified  Utilize bed/chair fall alarm: verified  Notify charge of high risk for huddle: verified     Patient Specific Interventions:      Medication: review medications with patient and family  Mental Status/LOC/Awareness: reorient patient, reinforce falls education, check on patient hourly, utilize bed/chair fall alarm and reinforce the use of call  light  Toileting: provide frquent toileting, instruct patient/family on the need to call for assistance when toileting and do not leave patient unattended in bathroom/refer to toileting scripting  Volume/Electrolyte Status: ensure patient remains hydrated and monitor abnormal lab values  Communication/Sensory: update plan of care on whiteboard and ensure patient has glasses/contacts and hearing aids/dentures  Behavioral: not applicable  Mobility: utilize bed/chair fall alarm, ensure bed is locked and in lowest position and instruct patient to exit bed on their strongest side

## 2017-12-04 NOTE — PROGRESS NOTES
[facility]    Date of Service: 12/4/2017    Chief Complaint  77 y.o. female admitted 9/21/2017 with GLF, pyuria and ATN.    Interval Problem Update  11/28 stable overnight and no CC this morning. Asleep but easily waked.  Patient otherwise denies fever, chills, nausea, vomiting, adb pain, SOB, CP, headache, diarrhea, cough, or sputum.  11/29 patient is pleasant and is stable no significant chief complain the morning overnight. Still very sleepy but easily arousable.  11/30 stable and no CC overnight. Patient otherwise denies fever, chills, nausea, vomiting, adb pain, SOB, CP, headache, constipation, diarrhea, cough, or sputum.  12/1 pleasant in AM and stable and no CC overnight. Patient otherwise denies fever, chills, nausea, vomiting, adb pain, SOB, CP, headache, constipation, diarrhea, cough, or sputum.  12/2 stable and no CC overnight. Patient is pleasant and like the food this morning.  12/3 still waiting for guardianship. Stable on the floor. Denies fever, chills, nausea vomiting diarrhea.  12/4 patient is pleasant and no significant chief complaint overnight. Waiting for guardianship. Otherwise remained stable.    Consultants/Specialty  Neurology  Psych      Review of Systems   Constitutional: Negative for chills and malaise/fatigue.   HENT: Negative for congestion and hearing loss.    Eyes: Negative for blurred vision, double vision and photophobia.   Respiratory: Positive for sputum production. Negative for cough, shortness of breath and wheezing.    Cardiovascular: Negative for palpitations, leg swelling and PND.   Gastrointestinal: Negative for blood in stool, nausea and vomiting.   Genitourinary: Negative for frequency and hematuria.   Musculoskeletal: Negative for falls, joint pain and neck pain.   Skin: Negative for rash.   Neurological: Negative for dizziness, tremors, speech change, seizures and weakness.   Psychiatric/Behavioral: Negative for depression, substance abuse and  suicidal ideas.      Physical Exam  Laboratory/Imaging   Hemodynamics  Temp (24hrs), Av.6 °C (97.8 °F), Min:35.9 °C (96.6 °F), Max:37 °C (98.6 °F)   Temperature: 35.9 °C (96.6 °F)  Pulse  Av.5  Min: 50  Max: 106    Blood Pressure : 143/58      Respiratory      Respiration: 18, Pulse Oximetry: 92 %     Work Of Breathing / Effort: Mild  RUL Breath Sounds: Clear, RML Breath Sounds: Diminished;Coarse Crackles, RLL Breath Sounds: Diminished;Coarse Crackles, ARGELIA Breath Sounds: Clear, LLL Breath Sounds: Diminished;Coarse Crackles    Fluids    Intake/Output Summary (Last 24 hours) at 17 0814  Last data filed at 17 0900   Gross per 24 hour   Intake              240 ml   Output                0 ml   Net              240 ml       Nutrition  Orders Placed This Encounter   Procedures   • Diet Order     Standing Status:   Standing     Number of Occurrences:   1     Order Specific Question:   Diet:     Answer:   Regular [1]     Physical Exam   Constitutional: She is oriented to person, place, and time. She appears well-nourished.   HENT:   Head: Normocephalic.   Nose: Nose normal.   Mouth/Throat: No oropharyngeal exudate.   Eyes: EOM are normal. Pupils are equal, round, and reactive to light.   Neck: Normal range of motion. Neck supple. No JVD present. No thyromegaly present.   Cardiovascular: Normal rate, regular rhythm and normal heart sounds.  Exam reveals no gallop.    No murmur heard.  Pulmonary/Chest: Effort normal. She has no wheezes. She exhibits no tenderness.   Abdominal: Soft. Bowel sounds are normal. She exhibits no distension and no mass. There is no rebound.   Musculoskeletal: Normal range of motion. She exhibits no edema or tenderness.   Lymphadenopathy:     She has no cervical adenopathy.   Neurological: She is alert and oriented to person, place, and time. No cranial nerve deficit.   Skin: Skin is warm. No rash noted. She is not diaphoretic.   Psychiatric: Her behavior is normal.   Nursing  note and vitals reviewed.                                   Assessment/Plan     * Dementia- (present on admission)   Assessment & Plan    Min narc/sed when possible.   Await guardianship.        Constipation- (present on admission)   Assessment & Plan    Resolved currently continue bowel care        Congestion of upper airway- (present on admission)   Assessment & Plan    Saturating 90% on RA.  O2, RT, IS, Vax, CXR and encouraging taking of PO fluids.          Physical debility- (present on admission)   Assessment & Plan    PT/OT and increase activity.  Encourage mobilization. Pending guardianship        Obesity (BMI 30.0-34.9)- (present on admission)   Assessment & Plan    Encourage Kcal restriction        T12 compression fracture (CMS-HCC)- (present on admission)   Assessment & Plan    CT spine, no acute fractures. Cont fosamax and calcium  Stable and pending guardianship        Chronic back pain- (present on admission)   Assessment & Plan    As above and min narc/sed when possible.          Stable issues - med hx (GIB, CAD/ICM, CVA, DLD), preventives.    Dispo - complex/fair.  Await guardianship.      Reviewed items::  Labs reviewed and Medications reviewed  Pink catheter::  No Pink  DVT prophylaxis pharmacological::  Heparin  Ulcer Prophylaxis::  Yes   For complexity-based billing, please refer to the history, exam, and decison making above. In addition, I spent >35 minutes caring for the patient today. More than 50% of the time was spent counseling and coordinating care.    I have discussed with RN and CM and SW and other consultants about patient's plan.

## 2017-12-04 NOTE — PROGRESS NOTES
Assumed patient care at 0700. Received report from night shift. Assessment completed. A&Ox1, disoriented to time, event, and situation. Denies pain at this time. No SOB or in any acute distress. Bed alarm on, pt wearing treaded socks. Personal possessions and call light placed within reach. Pt denies any additional needs at this time. Pt up to chair for breakfast.

## 2017-12-04 NOTE — PROGRESS NOTES
Received alert and oriented x 2, sitting in the chair for meal, due med given as ordered, incontinence of b/b, call light within reach, bed alarm in placed, needs attended, will continue to monitor.

## 2017-12-04 NOTE — PROGRESS NOTES
Simona Knight Fall Risk Assessment:     Last Known Fall: Within the last month  Mobility: Immobilized/requires assist of one person  Medications: Cardiovascular or central nervous system meds  Mental Status/LOC/Awareness: Oriented to person and place  Toileting Needs: Incontinence  Volume/Electrolyte Status: No problems  Communication/Sensory: Visual (Glasses)/hearing deficit  Behavior: Appropriate behavior  Simona Knight Fall Risk Total: 14  Fall Risk Level: MODERATE RISK    Universal Fall Precautions:  call light/belongings in reach, bed in low position and locked, wheelchairs and assistive devices out of sight, siderails up x 2, use non-slip footwear, adequate lighting, clutter free and spill free environment    Fall Risk Level Interventions:    TRIAL (TELE 8, NEURO, MED JENNIE 5) Moderate Fall Risk Interventions  Place yellow fall risk ID band on patient: verified  Provide patient/family education based on risk assessment : verified  Educate patient/family to call staff for assistance when getting out of bed: verified  Place fall precaution signage outside patient door: verified  Utilize bed/chair fall alarm: verifiedTRIAL (TELE 8, NEURO, [employer] JENNIE 5) High Fall Risk Interventions  Place yellow fall risk ID band on patient: verified  Provide patient/family education based on risk assessment: completed  Educate patient/family to call staff for assistance when getting out of bed: completed  Place fall precaution signage outside patient door: verified  Place patient in room close to nursing station: verified  Utilize bed/chair fall alarm: verified  Notify charge of high risk for huddle: verified    Patient Specific Interventions:     Medication: limit combination of prn medications  Mental Status/LOC/Awareness: reorient patient, reinforce falls education, check on patient hourly, utilize bed/chair fall alarm, reinforce the use of call light and provide activity  Toileting: provide frquent toileting and instruct  patient/family on the need to call for assistance when toileting  Volume/Electrolyte Status: monitor abnormal lab values  Communication/Sensory: update plan of care on whiteboard and ensure patient has glasses/contacts and hearing aids/dentures  Behavioral: administer medication as ordered  Mobility: utilize bed/chair fall alarm

## 2017-12-04 NOTE — CARE PLAN
Problem: Bowel/Gastric:  Goal: Will not experience complications related to bowel motility    Intervention: Assess baseline bowel pattern  Pt has not had BM since 11/30. Pt complaining of constipation. Miralax given 12/3, still no MB. Milk of mag given.       Problem: Mobility  Goal: Risk for activity intolerance will decrease    Intervention: Encourage patient to increase activity level in collaboration with Interdisciplinary Team  Pt up to chair for meals today. Initially declined to mobilize. RN educated pt on importance of increasing mobility. Pt verbalized understanding.

## 2017-12-05 PROCEDURE — 700111 HCHG RX REV CODE 636 W/ 250 OVERRIDE (IP): Performed by: HOSPITALIST

## 2017-12-05 PROCEDURE — A9270 NON-COVERED ITEM OR SERVICE: HCPCS | Performed by: HOSPITALIST

## 2017-12-05 PROCEDURE — 700102 HCHG RX REV CODE 250 W/ 637 OVERRIDE(OP): Performed by: INTERNAL MEDICINE

## 2017-12-05 PROCEDURE — 770006 HCHG ROOM/CARE - MED/SURG/GYN SEMI*

## 2017-12-05 PROCEDURE — 700102 HCHG RX REV CODE 250 W/ 637 OVERRIDE(OP): Performed by: HOSPITALIST

## 2017-12-05 PROCEDURE — A9270 NON-COVERED ITEM OR SERVICE: HCPCS | Performed by: FAMILY MEDICINE

## 2017-12-05 PROCEDURE — 700101 HCHG RX REV CODE 250: Performed by: FAMILY MEDICINE

## 2017-12-05 PROCEDURE — 97530 THERAPEUTIC ACTIVITIES: CPT

## 2017-12-05 PROCEDURE — A9270 NON-COVERED ITEM OR SERVICE: HCPCS | Performed by: INTERNAL MEDICINE

## 2017-12-05 PROCEDURE — 99232 SBSQ HOSP IP/OBS MODERATE 35: CPT | Performed by: INTERNAL MEDICINE

## 2017-12-05 PROCEDURE — 97116 GAIT TRAINING THERAPY: CPT

## 2017-12-05 PROCEDURE — 700102 HCHG RX REV CODE 250 W/ 637 OVERRIDE(OP): Performed by: FAMILY MEDICINE

## 2017-12-05 RX ORDER — AMOXICILLIN 250 MG
2 CAPSULE ORAL EVERY EVENING
Status: DISCONTINUED | OUTPATIENT
Start: 2017-12-05 | End: 2018-01-15 | Stop reason: HOSPADM

## 2017-12-05 RX ORDER — POLYETHYLENE GLYCOL 3350 17 G/17G
1 POWDER, FOR SOLUTION ORAL DAILY
Status: DISCONTINUED | OUTPATIENT
Start: 2017-12-05 | End: 2018-01-15 | Stop reason: HOSPADM

## 2017-12-05 RX ADMIN — STANDARDIZED SENNA CONCENTRATE AND DOCUSATE SODIUM 2 TABLET: 8.6; 5 TABLET, FILM COATED ORAL at 20:08

## 2017-12-05 RX ADMIN — Medication 500 MG: at 08:14

## 2017-12-05 RX ADMIN — HYDRALAZINE HYDROCHLORIDE 100 MG: 50 TABLET ORAL at 20:07

## 2017-12-05 RX ADMIN — OMEPRAZOLE 20 MG: 20 CAPSULE, DELAYED RELEASE ORAL at 08:14

## 2017-12-05 RX ADMIN — GUAIFENESIN 600 MG: 600 TABLET, EXTENDED RELEASE ORAL at 08:14

## 2017-12-05 RX ADMIN — BENAZEPRIL HYDROCHLORIDE 40 MG: 20 TABLET ORAL at 08:14

## 2017-12-05 RX ADMIN — LACTOBACILLUS ACIDOPHILUS / LACTOBACILLUS BULGARICUS 1 PACKET: 100 MILLION CFU STRENGTH GRANULES at 08:14

## 2017-12-05 RX ADMIN — LIDOCAINE 1 PATCH: 50 PATCH CUTANEOUS at 08:15

## 2017-12-05 RX ADMIN — HEPARIN SODIUM 5000 UNITS: 5000 INJECTION, SOLUTION INTRAVENOUS; SUBCUTANEOUS at 20:08

## 2017-12-05 RX ADMIN — GUAIFENESIN 600 MG: 600 TABLET, EXTENDED RELEASE ORAL at 20:08

## 2017-12-05 RX ADMIN — ASPIRIN 81 MG: 81 TABLET, COATED ORAL at 08:15

## 2017-12-05 RX ADMIN — HYDRALAZINE HYDROCHLORIDE 100 MG: 50 TABLET ORAL at 15:04

## 2017-12-05 RX ADMIN — LACTOBACILLUS ACIDOPHILUS / LACTOBACILLUS BULGARICUS 1 PACKET: 100 MILLION CFU STRENGTH GRANULES at 11:47

## 2017-12-05 RX ADMIN — ATORVASTATIN CALCIUM 80 MG: 40 TABLET, FILM COATED ORAL at 20:07

## 2017-12-05 RX ADMIN — POLYETHYLENE GLYCOL (3350) 1 PACKET: 17 POWDER, FOR SOLUTION ORAL at 15:48

## 2017-12-05 RX ADMIN — Medication 500 MG: at 18:00

## 2017-12-05 RX ADMIN — GABAPENTIN 300 MG: 300 CAPSULE ORAL at 20:08

## 2017-12-05 RX ADMIN — DULOXETINE HYDROCHLORIDE 20 MG: 20 CAPSULE, DELAYED RELEASE ORAL at 08:14

## 2017-12-05 RX ADMIN — HEPARIN SODIUM 5000 UNITS: 5000 INJECTION, SOLUTION INTRAVENOUS; SUBCUTANEOUS at 08:14

## 2017-12-05 RX ADMIN — OXYBUTYNIN CHLORIDE 5 MG: 5 TABLET, FILM COATED, EXTENDED RELEASE ORAL at 20:07

## 2017-12-05 RX ADMIN — ALENDRONATE SODIUM 10 MG: 10 TABLET ORAL at 06:17

## 2017-12-05 RX ADMIN — LACTOBACILLUS ACIDOPHILUS / LACTOBACILLUS BULGARICUS 1 PACKET: 100 MILLION CFU STRENGTH GRANULES at 18:00

## 2017-12-05 ASSESSMENT — COGNITIVE AND FUNCTIONAL STATUS - GENERAL
MOVING FROM LYING ON BACK TO SITTING ON SIDE OF FLAT BED: A LITTLE
CLIMB 3 TO 5 STEPS WITH RAILING: A LOT
WALKING IN HOSPITAL ROOM: A LITTLE
MOVING TO AND FROM BED TO CHAIR: A LITTLE
STANDING UP FROM CHAIR USING ARMS: A LITTLE
SUGGESTED CMS G CODE MODIFIER MOBILITY: CK
MOBILITY SCORE: 17
TURNING FROM BACK TO SIDE WHILE IN FLAT BAD: A LITTLE

## 2017-12-05 ASSESSMENT — ENCOUNTER SYMPTOMS
PND: 0
DOUBLE VISION: 0
SHORTNESS OF BREATH: 0
BLOOD IN STOOL: 0
WHEEZING: 0
WEAKNESS: 0
SPEECH CHANGE: 0
BLURRED VISION: 0
VOMITING: 0
NAUSEA: 0
TREMORS: 0
CONSTIPATION: 1
DEPRESSION: 0
COUGH: 0
SEIZURES: 0
PHOTOPHOBIA: 0
PALPITATIONS: 0
NECK PAIN: 0
DIZZINESS: 0
CHILLS: 0
FALLS: 0

## 2017-12-05 ASSESSMENT — GAIT ASSESSMENTS
DEVIATION: BRADYKINETIC;SHUFFLED GAIT
DISTANCE (FEET): 200
GAIT LEVEL OF ASSIST: CONTACT GUARD ASSIST
ASSISTIVE DEVICE: FRONT WHEEL WALKER

## 2017-12-05 ASSESSMENT — PAIN SCALES - GENERAL
PAINLEVEL_OUTOF10: 0
PAINLEVEL_OUTOF10: 0
PAINLEVEL_OUTOF10: 2

## 2017-12-05 ASSESSMENT — LIFESTYLE VARIABLES: SUBSTANCE_ABUSE: 0

## 2017-12-05 NOTE — PROGRESS NOTES
Simona Knight Fall Risk Assessment:     Last Known Fall: Within the last year  Mobility: Immobilized/requires assist of one person  Medications: Cardiovascular or central nervous system meds  Mental Status/LOC/Awareness: Memory loss/confusion and requires reorienting  Toileting Needs: Incontinence, Use of catheters or diversion devices  Volume/Electrolyte Status: No problems  Communication/Sensory: Visual (Glasses)/hearing deficit  Behavior: Appropriate behavior  Simona Knight Fall Risk Total: 15  Fall Risk Level: HIGH RISK    Universal Fall Precautions:  call light/belongings in reach, bed in low position and locked, wheelchairs and assistive devices out of sight, siderails up x 2, use non-slip footwear, adequate lighting, clutter free and spill free environment, educate on level of risk, educate to call for assistance    Fall Risk Level Interventions:    TRIAL ([employer], NEURO, MED JENNIE 5) Moderate Fall Risk Interventions  Place yellow fall risk ID band on patient: verified  Provide patient/family education based on risk assessment : verified  Educate patient/family to call staff for assistance when getting out of bed: verified  Place fall precaution signage outside patient door: verified  Utilize bed/chair fall alarm: verifiedTRIAL ([employer] 8, NEURO, MED JENNIE 5) High Fall Risk Interventions  Place yellow fall risk ID band on patient: verified  Provide patient/family education based on risk assessment: completed  Educate patient/family to call staff for assistance when getting out of bed: completed  Place fall precaution signage outside patient door: verified  Place patient in room close to nursing station: verified  Utilize bed/chair fall alarm: verified  Notify charge of high risk for huddle: completed    Patient Specific Interventions:     Medication: review medications with patient and family  Mental Status/LOC/Awareness: utilize bed/chair fall alarm, reinforce the use of call light and provide  activity  Toileting: instruct patient/family on the use of grab bars, instruct patient/family on the need to call for assistance when toileting and do not leave patient unattended in bathroom/refer to toileting scripting  Volume/Electrolyte Status: ensure patient remains hydrated and monitor abnormal lab values  Communication/Sensory: update plan of care on whiteboard  Behavioral: not applicable  Mobility: utilize bed/chair fall alarm and ensure bed is locked and in lowest position

## 2017-12-05 NOTE — CARE PLAN
Problem: Bowel/Gastric:  Goal: Normal bowel function is maintained or improved  Patient feeling very constipated at beginning of shift. Given Milk of Mag during day shift. Pt able to have large BM during night shift. Pt states he feels relief.     Problem: Mobility  Goal: Risk for activity intolerance will decrease  Patient encouraged to increase ambulation. Pt up one assist FWW to the bathroom.

## 2017-12-05 NOTE — PROGRESS NOTES
Simona Knight Fall Risk Assessment:     Last Known Fall: Within the last year  Mobility: Immobilized/requires assist of one person  Medications: Cardiovascular or central nervous system meds  Mental Status/LOC/Awareness: Memory loss/confusion and requires reorienting  Toileting Needs: Incontinence, Use of catheters or diversion devices  Volume/Electrolyte Status: No problems  Communication/Sensory: Visual (Glasses)/hearing deficit  Behavior: Appropriate behavior  Simona Knight Fall Risk Total: 15  Fall Risk Level: HIGH RISK    Universal Fall Precautions:  call light/belongings in reach, bed in low position and locked, wheelchairs and assistive devices out of sight, siderails up x 2, use non-slip footwear, adequate lighting, clutter free and spill free environment, educate on level of risk, educate to call for assistance    Fall Risk Level Interventions:    TRIAL ([employer], NEURO, MED JENNIE 5) Moderate Fall Risk Interventions  Place yellow fall risk ID band on patient: verified  Provide patient/family education based on risk assessment : verified  Educate patient/family to call staff for assistance when getting out of bed: verified  Place fall precaution signage outside patient door: verified  Utilize bed/chair fall alarm: verifiedTRIAL ([employer] 8, NEURO, MED JENNIE 5) High Fall Risk Interventions  Place yellow fall risk ID band on patient: verified  Provide patient/family education based on risk assessment: completed  Educate patient/family to call staff for assistance when getting out of bed: completed  Place fall precaution signage outside patient door: verified  Place patient in room close to nursing station: verified  Utilize bed/chair fall alarm: verified  Notify charge of high risk for huddle: completed    Patient Specific Interventions:     Medication: review medications with patient and family  Mental Status/LOC/Awareness: utilize bed/chair fall alarm  Toileting: monitor intake and output/use of appropriate  interventions  Volume/Electrolyte Status: monitor abnormal lab values  Communication/Sensory: update plan of care on whiteboard  Behavioral: not applicable  Mobility: utilize bed/chair fall alarm

## 2017-12-05 NOTE — THERAPY
"Physical Therapy Treatment completed.   Bed Mobility:  Supine to Sit: Stand by Assist (HOB elevated)  Transfers: Sit to Stand: Contact Guard Assist  Gait: Level Of Assist: Contact Guard Assist with Front-Wheel Walker       Plan of Care: Will benefit from Physical Therapy 2 times per week and Plan to complete next treatment by Friday 12/8  Discharge Recommendations: Equipment: Will Continue to Assess for Equipment Needs. Post-acute therapy Discharge to a transitional care facility for continued skilled therapy services.    Pt is demonstrating improvements with functional strength today compared to last week. Pt performing majority of mobility at CGA level. Pt still requires assist for sit to stand transitions, especially from low surfaces such as the toilet. Level of assistance for mobility required varies from day to day. Pt would benefit from ongoing PT intervention while in the acute care setting. Pt will need post acute transitional care upon DC and will eventually require 24/7 care due to cognitive deficits    See \"Rehab Therapy-Acute\" Patient Summary Report for complete documentation.       "

## 2017-12-05 NOTE — CARE PLAN
Problem: Safety  Goal: Will remain free from injury  Outcome: PROGRESSING AS EXPECTED  Bed locked and in lowest position, non skid socks in place, bed alarm on, call light in reach    Problem: Knowledge Deficit  Goal: Knowledge of disease process/condition, treatment plan, diagnostic tests, and medications will improve  Outcome: PROGRESSING AS EXPECTED      Problem: Discharge Barriers/Planning  Goal: Patient's continuum of care needs will be met  Outcome: PROGRESSING SLOWER THAN EXPECTED  Guardianship hearing 12/19

## 2017-12-05 NOTE — PROGRESS NOTES
Pt ambulated in [place] with PT this morning and tolerated well.  Pt denies pain and no distress noted.  Updated with plan of care, call light in reach and encourage to call for assistance.

## 2017-12-05 NOTE — PROGRESS NOTES
[facility] Progress Note    Date of Service: 2017    Chief Complaint  77 y.o. female admitted 2017 with GLF, pyuria and ATN.    Interval Problem Update  : Had BM this AM, says had to strain    Consultants/Specialty  Neurology  Psych    Review of Systems   Constitutional: Negative for chills and malaise/fatigue.   HENT: Negative for congestion and hearing loss.    Eyes: Negative for blurred vision, double vision and photophobia.   Respiratory: Negative for cough, shortness of breath and wheezing.    Cardiovascular: Negative for palpitations, leg swelling and PND.   Gastrointestinal: Positive for constipation. Negative for blood in stool, nausea and vomiting.   Genitourinary: Negative for frequency and hematuria.   Musculoskeletal: Negative for falls, joint pain and neck pain.   Skin: Negative for rash.   Neurological: Negative for dizziness, tremors, speech change, seizures and weakness.   Psychiatric/Behavioral: Negative for depression, substance abuse and suicidal ideas.      Physical Exam  Laboratory/Imaging   Hemodynamics  Temp (24hrs), Av.6 °C (97.9 °F), Min:36.3 °C (97.3 °F), Max:37.1 °C (98.7 °F)   Temperature: 36.4 °C (97.6 °F)  Pulse  Av.8  Min: 50  Max: 106    Blood Pressure : 109/84      Respiratory      Respiration: 20, Pulse Oximetry: 92 %     Work Of Breathing / Effort: Mild  RUL Breath Sounds: Clear, RML Breath Sounds: Diminished, RLL Breath Sounds: Diminished, ARGELIA Breath Sounds: Clear, LLL Breath Sounds: Diminished    Fluids  No intake or output data in the 24 hours ending 17 1500    Nutrition  Orders Placed This Encounter   Procedures   • Diet Order     Standing Status:   Standing     Number of Occurrences:   1     Order Specific Question:   Diet:     Answer:   Regular [1]     Physical Exam   Constitutional: She is oriented to person, place, and time. She appears well-nourished.   HENT:   Head: Normocephalic.   Nose: Nose normal.   Mouth/Throat: No oropharyngeal  exudate.   Eyes: EOM are normal. Pupils are equal, round, and reactive to light.   Neck: Normal range of motion. Neck supple. No JVD present. No thyromegaly present.   Cardiovascular: Normal rate, regular rhythm and normal heart sounds.  Exam reveals no gallop.    No murmur heard.  Pulmonary/Chest: Effort normal. She has no wheezes. She exhibits no tenderness.   Abdominal: Soft. Bowel sounds are normal. She exhibits no distension and no mass. There is no rebound.   Musculoskeletal: Normal range of motion. She exhibits no edema or tenderness.   Lymphadenopathy:     She has no cervical adenopathy.   Neurological: She is alert and oriented to person, place, and time. No cranial nerve deficit.   Skin: Skin is warm. No rash noted. She is not diaphoretic.   Psychiatric: Her behavior is normal.   Nursing note and vitals reviewed.                                   Assessment/Plan     * Dementia- (present on admission)   Assessment & Plan    Min narc/sed when possible.   Await guardianship.        Constipation- (present on admission)   Assessment & Plan    Resolved currently continue bowel care        Congestion of upper airway- (present on admission)   Assessment & Plan    Saturating 90% on RA.  O2, RT, IS, Vax, CXR and encouraging taking of PO fluids.          Physical debility- (present on admission)   Assessment & Plan    PT/OT and increase activity.  Encourage mobilization. Pending guardianship        Obesity (BMI 30.0-34.9)- (present on admission)   Assessment & Plan    Encourage Kcal restriction        T12 compression fracture (CMS-HCC)- (present on admission)   Assessment & Plan    CT spine, no acute fractures. Cont fosamax and calcium  Stable and pending guardianship        Chronic back pain- (present on admission)   Assessment & Plan    As above and min narc/sed when possible.  Increase stool softeners, says having to strain for BMs          Stable issues - med hx (GIB, CAD/ICM, CVA, DLD), preventives.    Dispo -  complex/fair.  Await guardianship.      Reviewed items::  Labs reviewed and Medications reviewed  Pink catheter::  No Pink  DVT prophylaxis pharmacological::  Heparin  Ulcer Prophylaxis::  Yes

## 2017-12-05 NOTE — PROGRESS NOTES
Assumed patient care at 1900. POC discussed w/ day nurse and pt, pt in agreement w/ goals. Pt AOx3, reoriented to time. Pt denies pain or nausea. Pt states feeling of constipation and discussed suppository. Pt able to have very large BM without suppository. Abdomen semi firm and rounded, pt states relief after BM. Pt up one assist, FWW. Patient placed on 2L, desats while sleeping. Patient educated on use of call light, hourly rounding, and pain scale. Personal possession and call light within reach. Bed alarm on, room next to nurses station.

## 2017-12-06 ENCOUNTER — APPOINTMENT (OUTPATIENT)
Dept: RADIOLOGY | Facility: MEDICAL CENTER | Age: 77
DRG: 682 | End: 2017-12-06
Attending: INTERNAL MEDICINE
Payer: MEDICARE

## 2017-12-06 LAB
ALBUMIN SERPL BCP-MCNC: 3.2 G/DL (ref 3.2–4.9)
BASOPHILS # BLD AUTO: 0.3 % (ref 0–1.8)
BASOPHILS # BLD: 0.04 K/UL (ref 0–0.12)
BUN SERPL-MCNC: 23 MG/DL (ref 8–22)
CALCIUM SERPL-MCNC: 9.1 MG/DL (ref 8.5–10.5)
CHLORIDE SERPL-SCNC: 106 MMOL/L (ref 96–112)
CO2 SERPL-SCNC: 23 MMOL/L (ref 20–33)
CREAT SERPL-MCNC: 1.02 MG/DL (ref 0.5–1.4)
EOSINOPHIL # BLD AUTO: 0.03 K/UL (ref 0–0.51)
EOSINOPHIL NFR BLD: 0.2 % (ref 0–6.9)
ERYTHROCYTE [DISTWIDTH] IN BLOOD BY AUTOMATED COUNT: 51.9 FL (ref 35.9–50)
GFR SERPL CREATININE-BSD FRML MDRD: 52 ML/MIN/1.73 M 2
GLUCOSE SERPL-MCNC: 129 MG/DL (ref 65–99)
HCT VFR BLD AUTO: 34 % (ref 37–47)
HGB BLD-MCNC: 11.3 G/DL (ref 12–16)
IMM GRANULOCYTES # BLD AUTO: 0.06 K/UL (ref 0–0.11)
IMM GRANULOCYTES NFR BLD AUTO: 0.4 % (ref 0–0.9)
LYMPHOCYTES # BLD AUTO: 0.76 K/UL (ref 1–4.8)
LYMPHOCYTES NFR BLD: 5.7 % (ref 22–41)
MCH RBC QN AUTO: 29.4 PG (ref 27–33)
MCHC RBC AUTO-ENTMCNC: 33.2 G/DL (ref 33.6–35)
MCV RBC AUTO: 88.3 FL (ref 81.4–97.8)
MONOCYTES # BLD AUTO: 0.91 K/UL (ref 0–0.85)
MONOCYTES NFR BLD AUTO: 6.8 % (ref 0–13.4)
NEUTROPHILS # BLD AUTO: 11.65 K/UL (ref 2–7.15)
NEUTROPHILS NFR BLD: 86.6 % (ref 44–72)
NRBC # BLD AUTO: 0 K/UL
NRBC BLD AUTO-RTO: 0 /100 WBC
PHOSPHATE SERPL-MCNC: 2.4 MG/DL (ref 2.5–4.5)
PLATELET # BLD AUTO: 278 K/UL (ref 164–446)
PMV BLD AUTO: 10.8 FL (ref 9–12.9)
POTASSIUM SERPL-SCNC: 3.7 MMOL/L (ref 3.6–5.5)
RBC # BLD AUTO: 3.85 M/UL (ref 4.2–5.4)
SODIUM SERPL-SCNC: 140 MMOL/L (ref 135–145)
WBC # BLD AUTO: 13.5 K/UL (ref 4.8–10.8)

## 2017-12-06 PROCEDURE — 700102 HCHG RX REV CODE 250 W/ 637 OVERRIDE(OP): Performed by: HOSPITALIST

## 2017-12-06 PROCEDURE — 770006 HCHG ROOM/CARE - MED/SURG/GYN SEMI*

## 2017-12-06 PROCEDURE — A9270 NON-COVERED ITEM OR SERVICE: HCPCS | Performed by: INTERNAL MEDICINE

## 2017-12-06 PROCEDURE — A9270 NON-COVERED ITEM OR SERVICE: HCPCS | Performed by: HOSPITALIST

## 2017-12-06 PROCEDURE — A9270 NON-COVERED ITEM OR SERVICE: HCPCS | Performed by: FAMILY MEDICINE

## 2017-12-06 PROCEDURE — 700111 HCHG RX REV CODE 636 W/ 250 OVERRIDE (IP): Performed by: HOSPITALIST

## 2017-12-06 PROCEDURE — 700101 HCHG RX REV CODE 250: Performed by: FAMILY MEDICINE

## 2017-12-06 PROCEDURE — 99232 SBSQ HOSP IP/OBS MODERATE 35: CPT | Performed by: INTERNAL MEDICINE

## 2017-12-06 PROCEDURE — 700102 HCHG RX REV CODE 250 W/ 637 OVERRIDE(OP): Performed by: INTERNAL MEDICINE

## 2017-12-06 PROCEDURE — 700102 HCHG RX REV CODE 250 W/ 637 OVERRIDE(OP): Performed by: FAMILY MEDICINE

## 2017-12-06 PROCEDURE — 36415 COLL VENOUS BLD VENIPUNCTURE: CPT

## 2017-12-06 PROCEDURE — 87040 BLOOD CULTURE FOR BACTERIA: CPT

## 2017-12-06 PROCEDURE — 71010 DX-CHEST-PORTABLE (1 VIEW): CPT

## 2017-12-06 PROCEDURE — 80069 RENAL FUNCTION PANEL: CPT

## 2017-12-06 PROCEDURE — 85025 COMPLETE CBC W/AUTO DIFF WBC: CPT

## 2017-12-06 PROCEDURE — 97535 SELF CARE MNGMENT TRAINING: CPT

## 2017-12-06 RX ADMIN — GABAPENTIN 300 MG: 300 CAPSULE ORAL at 20:40

## 2017-12-06 RX ADMIN — GUAIFENESIN 600 MG: 600 TABLET, EXTENDED RELEASE ORAL at 08:08

## 2017-12-06 RX ADMIN — OXYBUTYNIN CHLORIDE 5 MG: 5 TABLET, FILM COATED, EXTENDED RELEASE ORAL at 20:40

## 2017-12-06 RX ADMIN — ERGOCALCIFEROL 50000 UNITS: 1.25 CAPSULE, LIQUID FILLED ORAL at 14:36

## 2017-12-06 RX ADMIN — HYDRALAZINE HYDROCHLORIDE 100 MG: 50 TABLET ORAL at 05:15

## 2017-12-06 RX ADMIN — Medication 500 MG: at 16:58

## 2017-12-06 RX ADMIN — HEPARIN SODIUM 5000 UNITS: 5000 INJECTION, SOLUTION INTRAVENOUS; SUBCUTANEOUS at 20:41

## 2017-12-06 RX ADMIN — ASPIRIN 81 MG: 81 TABLET, COATED ORAL at 08:08

## 2017-12-06 RX ADMIN — OMEPRAZOLE 20 MG: 20 CAPSULE, DELAYED RELEASE ORAL at 08:08

## 2017-12-06 RX ADMIN — ACETAMINOPHEN 650 MG: 325 TABLET, FILM COATED ORAL at 16:58

## 2017-12-06 RX ADMIN — LACTOBACILLUS ACIDOPHILUS / LACTOBACILLUS BULGARICUS 1 PACKET: 100 MILLION CFU STRENGTH GRANULES at 11:28

## 2017-12-06 RX ADMIN — POLYETHYLENE GLYCOL (3350) 1 PACKET: 17 POWDER, FOR SOLUTION ORAL at 08:10

## 2017-12-06 RX ADMIN — STANDARDIZED SENNA CONCENTRATE AND DOCUSATE SODIUM 2 TABLET: 8.6; 5 TABLET, FILM COATED ORAL at 20:40

## 2017-12-06 RX ADMIN — ATORVASTATIN CALCIUM 80 MG: 40 TABLET, FILM COATED ORAL at 20:41

## 2017-12-06 RX ADMIN — HYDRALAZINE HYDROCHLORIDE 100 MG: 50 TABLET ORAL at 14:35

## 2017-12-06 RX ADMIN — ALENDRONATE SODIUM 10 MG: 10 TABLET ORAL at 05:15

## 2017-12-06 RX ADMIN — BENAZEPRIL HYDROCHLORIDE 40 MG: 20 TABLET ORAL at 08:08

## 2017-12-06 RX ADMIN — HYDRALAZINE HYDROCHLORIDE 100 MG: 50 TABLET ORAL at 20:40

## 2017-12-06 RX ADMIN — DULOXETINE HYDROCHLORIDE 20 MG: 20 CAPSULE, DELAYED RELEASE ORAL at 08:08

## 2017-12-06 RX ADMIN — LACTOBACILLUS ACIDOPHILUS / LACTOBACILLUS BULGARICUS 1 PACKET: 100 MILLION CFU STRENGTH GRANULES at 08:09

## 2017-12-06 RX ADMIN — HEPARIN SODIUM 5000 UNITS: 5000 INJECTION, SOLUTION INTRAVENOUS; SUBCUTANEOUS at 08:09

## 2017-12-06 RX ADMIN — GUAIFENESIN 600 MG: 600 TABLET, EXTENDED RELEASE ORAL at 20:40

## 2017-12-06 RX ADMIN — Medication 500 MG: at 08:08

## 2017-12-06 RX ADMIN — LIDOCAINE 1 PATCH: 50 PATCH CUTANEOUS at 08:10

## 2017-12-06 ASSESSMENT — PAIN SCALES - GENERAL
PAINLEVEL_OUTOF10: 0
PAINLEVEL_OUTOF10: 2
PAINLEVEL_OUTOF10: 0

## 2017-12-06 ASSESSMENT — COGNITIVE AND FUNCTIONAL STATUS - GENERAL
DAILY ACTIVITIY SCORE: 17
DRESSING REGULAR UPPER BODY CLOTHING: A LITTLE
SUGGESTED CMS G CODE MODIFIER DAILY ACTIVITY: CK
PERSONAL GROOMING: A LITTLE
EATING MEALS: A LITTLE
DRESSING REGULAR LOWER BODY CLOTHING: A LOT
HELP NEEDED FOR BATHING: A LITTLE
TOILETING: A LITTLE

## 2017-12-06 ASSESSMENT — ENCOUNTER SYMPTOMS
PND: 0
BLURRED VISION: 0
FALLS: 0
VOMITING: 0
SHORTNESS OF BREATH: 0
CHILLS: 0
WEAKNESS: 0
DIZZINESS: 0
SEIZURES: 0
TREMORS: 0
WHEEZING: 0
PHOTOPHOBIA: 0
DEPRESSION: 0
BLOOD IN STOOL: 0
NECK PAIN: 0
DOUBLE VISION: 0
SPEECH CHANGE: 0
COUGH: 0
NAUSEA: 0
PALPITATIONS: 0

## 2017-12-06 ASSESSMENT — LIFESTYLE VARIABLES
DO YOU DRINK ALCOHOL: NO
SUBSTANCE_ABUSE: 0

## 2017-12-06 ASSESSMENT — PATIENT HEALTH QUESTIONNAIRE - PHQ9
SUM OF ALL RESPONSES TO PHQ9 QUESTIONS 1 AND 2: 0
1. LITTLE INTEREST OR PLEASURE IN DOING THINGS: NOT AT ALL
SUM OF ALL RESPONSES TO PHQ QUESTIONS 1-9: 0
2. FEELING DOWN, DEPRESSED, IRRITABLE, OR HOPELESS: NOT AT ALL

## 2017-12-06 NOTE — PROGRESS NOTES
"Assumed care at 1900. Received report from RN. Patient is AOx3 disoriented to event. Patient is on 2L O2 NC for the night time. Assessment complete. Labs reviewed. Patient and RN discussed plan of care. Patient questions answered. Patient needs are met at this time. Bed in lowest and locked position. Call light is within reach. Hourly rounding in place. /53   Pulse 86   Temp 36.6 °C (97.8 °F)   Resp 18   Ht 1.6 m (5' 2.99\")   Wt 84.4 kg (186 lb 1.1 oz)   SpO2 93%   Breastfeeding? No   BMI 32.97 kg/m²       "

## 2017-12-06 NOTE — PROGRESS NOTES
Simona Knight Fall Risk Assessment:     Last Known Fall: Within the last year  Mobility: Immobilized/requires assist of one person  Medications: Cardiovascular or central nervous system meds  Mental Status/LOC/Awareness: Memory loss/confusion and requires reorienting  Toileting Needs: Incontinence  Volume/Electrolyte Status: No problems  Communication/Sensory: Visual (Glasses)/hearing deficit  Behavior: Appropriate behavior  Simona Knight Fall Risk Total: 14  Fall Risk Level: MODERATE RISK     Universal Fall Precautions:  call light/belongings in reach, bed in low position and locked, wheelchairs and assistive devices out of sight, siderails up x 2, use non-slip footwear, adequate lighting, clutter free and spill free environment, educate on level of risk, educate to call for assistance     Fall Risk Level Interventions:    TRIAL ([employer] 8, NEURO, MED JENNIE 5) Moderate Fall Risk Interventions  Place yellow fall risk ID band on patient: verified  Provide patient/family education based on risk assessment : verified  Educate patient/family to call staff for assistance when getting out of bed: verified  Place fall precaution signage outside patient door: verified  Utilize bed/chair fall alarm: verifiedTRIAL ([employer] 8, NEURO, [employer] JENNIE 5) High Fall Risk Interventions  Place yellow fall risk ID band on patient: verified  Provide patient/family education based on risk assessment: completed  Educate patient/family to call staff for assistance when getting out of bed: completed  Place fall precaution signage outside patient door: verified  Place patient in room close to nursing station: verified  Utilize bed/chair fall alarm: verified  Notify charge of high risk for huddle: completed     Patient Specific Interventions:      Medication: review medications with patient and family  Mental Status/LOC/Awareness: reinforce falls education, check on patient hourly and utilize bed/chair fall alarm  Toileting: instruct patient/family on  the need to call for assistance when toileting  Volume/Electrolyte Status: ensure patient remains hydrated and monitor abnormal lab values  Communication/Sensory: update plan of care on whiteboard and ensure proper positioning when transferrng/ambulating  Behavioral: encourage patient to voice feelings  Mobility: dangle prior to standing, utilize bed/chair fall alarm, ensure bed is locked and in lowest position and provide appropriate assistive device

## 2017-12-06 NOTE — PROGRESS NOTES
Assumed care of pt after report given at 0700. Pt in bed, supine, physical assessment completed, no complaints at this time. Pt encouraged to use IS 10 times every hour, get up to chair for meals, ask for assistance if needed.

## 2017-12-06 NOTE — PROGRESS NOTES
Assumed care of pt at 0730am. Report received and bedside rounding completed with noc RN.Encouraging pt to get OOB for all meals. Pt able to ambulate to and from restroom. No SOB, or in any acute distress. Pt on RA during day. Fall precautions in place,  bed alarm. - Treaded non slip socks. Call light and pt belongings within reach - pt makes needs known - hourly rounding in place. See flowsheets for further assessment.

## 2017-12-06 NOTE — PROGRESS NOTES
[facility] Hospitalist Progress Note    Date of Service: 2017    Chief Complaint  77 y.o. female admitted 2017 with GLF, pyuria and ATN.    Interval Problem Update  : Had BM this AM, says had to strain  : Feeling tired/sleepy.  Otherwise no complaints    Consultants/Specialty  Neurology  Psych    Review of Systems   Constitutional: Positive for malaise/fatigue. Negative for chills.   HENT: Negative for congestion and hearing loss.    Eyes: Negative for blurred vision, double vision and photophobia.   Respiratory: Negative for cough, shortness of breath and wheezing.    Cardiovascular: Negative for palpitations, leg swelling and PND.   Gastrointestinal: Negative for blood in stool, nausea and vomiting.   Genitourinary: Negative for frequency and hematuria.   Musculoskeletal: Negative for falls, joint pain and neck pain.   Skin: Negative for rash.   Neurological: Negative for dizziness, tremors, speech change, seizures and weakness.   Psychiatric/Behavioral: Negative for depression, substance abuse and suicidal ideas.      Physical Exam  Laboratory/Imaging   Hemodynamics  Temp (24hrs), Av.7 °C (98 °F), Min:36.2 °C (97.2 °F), Max:37 °C (98.6 °F)   Temperature: 36.8 °C (98.2 °F)  Pulse  Av.1  Min: 50  Max: 106    Blood Pressure : 142/52      Respiratory      Respiration: 16, Pulse Oximetry: 92 %     Work Of Breathing / Effort: Mild  RUL Breath Sounds: Clear, RML Breath Sounds: Diminished, RLL Breath Sounds: Diminished, ARGELIA Breath Sounds: Clear, LLL Breath Sounds: Diminished    Fluids  No intake or output data in the 24 hours ending 17 1543    Nutrition  Orders Placed This Encounter   Procedures   • Diet Order     Standing Status:   Standing     Number of Occurrences:   1     Order Specific Question:   Diet:     Answer:   Regular [1]     Physical Exam   Constitutional: She is oriented to person, place, and time. She appears well-nourished.   HENT:   Head: Normocephalic.   Nose: Nose normal.    Mouth/Throat: No oropharyngeal exudate.   Eyes: EOM are normal. Pupils are equal, round, and reactive to light.   Neck: Normal range of motion. Neck supple. No JVD present. No thyromegaly present.   Cardiovascular: Normal rate, regular rhythm and normal heart sounds.  Exam reveals no gallop.    No murmur heard.  Pulmonary/Chest: Effort normal. She has no wheezes. She exhibits no tenderness.   Abdominal: Soft. Bowel sounds are normal. She exhibits no distension and no mass. There is no rebound.   Musculoskeletal: Normal range of motion. She exhibits no edema or tenderness.   Lymphadenopathy:     She has no cervical adenopathy.   Neurological: She is alert and oriented to person, place, and time. No cranial nerve deficit.   Skin: Skin is warm. No rash noted. She is not diaphoretic.   Psychiatric: Her behavior is normal.   Nursing note and vitals reviewed.                                   Assessment/Plan     * Dementia- (present on admission)   Assessment & Plan    Min narc/sed when possible.   Await guardianship.        Constipation- (present on admission)   Assessment & Plan    Resolved currently continue bowel care        Congestion of upper airway- (present on admission)   Assessment & Plan    Saturating 90% on RA.  O2, RT, IS, Vax, CXR and encouraging taking of PO fluids.          Physical debility- (present on admission)   Assessment & Plan    PT/OT and increase activity.  Encourage mobilization. Pending guardianship        Obesity (BMI 30.0-34.9)- (present on admission)   Assessment & Plan    Encourage Kcal restriction        T12 compression fracture (CMS-HCC)- (present on admission)   Assessment & Plan    CT spine, no acute fractures. Cont fosamax and calcium  Stable and pending guardianship        Chronic back pain- (present on admission)   Assessment & Plan    As above and min narc/sed when possible.  Bowel Regimen          Stable issues - med hx (GIB, CAD/ICM, CVA, DLD), preventives.    Dispo -  complex/fair.  Await guardianship.      Reviewed items::  Labs reviewed and Medications reviewed  Pink catheter::  No Pink  DVT prophylaxis pharmacological::  Heparin  Ulcer Prophylaxis::  Yes

## 2017-12-06 NOTE — CARE PLAN
Problem: Safety  Goal: Will remain free from injury  Strip bed alarm on call light with in reach. Hourly rounding in place.     Problem: Mobility  Goal: Risk for activity intolerance will decrease  Pt getting OOB with one assistance to and from restroom. Encouraging getting OOB for all meals.

## 2017-12-06 NOTE — CARE PLAN
Problem: Bowel/Gastric:  Goal: Normal bowel function is maintained or improved  Outcome: PROGRESSING AS EXPECTED  Pt had BMx1 at the beginning of shift. Educated patient about taking stool softeners to prevent constipation. Pt stated she understood and doesn't want constipation to occur again.     Problem: Mobility  Goal: Risk for activity intolerance will decrease  Outcome: PROGRESSING AS EXPECTED  Pt up to bathroom with front wheel walker. Pt shuffles and is steady with 1 person assistance.

## 2017-12-06 NOTE — PROGRESS NOTES
Simona Knight Fall Risk Assessment:     Last Known Fall: Within the last year  Mobility: Immobilized/requires assist of one person  Medications: Cardiovascular or central nervous system meds  Mental Status/LOC/Awareness: Memory loss/confusion and requires reorienting  Toileting Needs: Incontinence  Volume/Electrolyte Status: No problems  Communication/Sensory: Visual (Glasses)/hearing deficit  Behavior: Appropriate behavior  Simona Knight Fall Risk Total: 14  Fall Risk Level: MODERATE RISK    Universal Fall Precautions:  call light/belongings in reach, bed in low position and locked, wheelchairs and assistive devices out of sight, siderails up x 2, use non-slip footwear, adequate lighting, clutter free and spill free environment, educate on level of risk, educate to call for assistance    Fall Risk Level Interventions:    TRIAL ([employer] 8, NEURO, MED JENNIE 5) Moderate Fall Risk Interventions  Place yellow fall risk ID band on patient: verified  Provide patient/family education based on risk assessment : verified  Educate patient/family to call staff for assistance when getting out of bed: verified  Place fall precaution signage outside patient door: verified  Utilize bed/chair fall alarm: verifiedTRIAL ([employer] 8, NEURO, [employer] JENNIE 5) High Fall Risk Interventions  Place yellow fall risk ID band on patient: verified  Provide patient/family education based on risk assessment: completed  Educate patient/family to call staff for assistance when getting out of bed: completed  Place fall precaution signage outside patient door: verified  Place patient in room close to nursing station: verified  Utilize bed/chair fall alarm: verified  Notify charge of high risk for huddle: completed    Patient Specific Interventions:     Medication: review medications with patient and family  Mental Status/LOC/Awareness: reinforce falls education, check on patient hourly and utilize bed/chair fall alarm  Toileting: instruct patient/family on the  need to call for assistance when toileting  Volume/Electrolyte Status: ensure patient remains hydrated and monitor abnormal lab values  Communication/Sensory: update plan of care on whiteboard and ensure proper positioning when transferrng/ambulating  Behavioral: encourage patient to voice feelings  Mobility: dangle prior to standing, utilize bed/chair fall alarm, ensure bed is locked and in lowest position and provide appropriate assistive device

## 2017-12-07 PROBLEM — R50.9 FEVER: Status: ACTIVE | Noted: 2017-12-07

## 2017-12-07 LAB
ALBUMIN SERPL BCP-MCNC: 3 G/DL (ref 3.2–4.9)
APPEARANCE UR: CLEAR
BACTERIA #/AREA URNS HPF: ABNORMAL /HPF
BASOPHILS # BLD AUTO: 0.3 % (ref 0–1.8)
BASOPHILS # BLD: 0.03 K/UL (ref 0–0.12)
BILIRUB UR QL STRIP.AUTO: NEGATIVE
BNP SERPL-MCNC: 60 PG/ML (ref 0–100)
BUN SERPL-MCNC: 21 MG/DL (ref 8–22)
CALCIUM SERPL-MCNC: 9 MG/DL (ref 8.5–10.5)
CHLORIDE SERPL-SCNC: 107 MMOL/L (ref 96–112)
CO2 SERPL-SCNC: 24 MMOL/L (ref 20–33)
COLOR UR: YELLOW
CREAT SERPL-MCNC: 0.97 MG/DL (ref 0.5–1.4)
EOSINOPHIL # BLD AUTO: 0.06 K/UL (ref 0–0.51)
EOSINOPHIL NFR BLD: 0.6 % (ref 0–6.9)
EPI CELLS #/AREA URNS HPF: ABNORMAL /HPF
ERYTHROCYTE [DISTWIDTH] IN BLOOD BY AUTOMATED COUNT: 52.9 FL (ref 35.9–50)
GFR SERPL CREATININE-BSD FRML MDRD: 56 ML/MIN/1.73 M 2
GLUCOSE SERPL-MCNC: 151 MG/DL (ref 65–99)
GLUCOSE UR STRIP.AUTO-MCNC: NEGATIVE MG/DL
HCT VFR BLD AUTO: 34.1 % (ref 37–47)
HGB BLD-MCNC: 11.2 G/DL (ref 12–16)
HYALINE CASTS #/AREA URNS LPF: ABNORMAL /LPF
IMM GRANULOCYTES # BLD AUTO: 0.05 K/UL (ref 0–0.11)
IMM GRANULOCYTES NFR BLD AUTO: 0.5 % (ref 0–0.9)
KETONES UR STRIP.AUTO-MCNC: NEGATIVE MG/DL
LEUKOCYTE ESTERASE UR QL STRIP.AUTO: ABNORMAL
LYMPHOCYTES # BLD AUTO: 0.9 K/UL (ref 1–4.8)
LYMPHOCYTES NFR BLD: 8.6 % (ref 22–41)
MCH RBC QN AUTO: 29.2 PG (ref 27–33)
MCHC RBC AUTO-ENTMCNC: 32.8 G/DL (ref 33.6–35)
MCV RBC AUTO: 89 FL (ref 81.4–97.8)
MICRO URNS: ABNORMAL
MONOCYTES # BLD AUTO: 0.89 K/UL (ref 0–0.85)
MONOCYTES NFR BLD AUTO: 8.5 % (ref 0–13.4)
NEUTROPHILS # BLD AUTO: 8.51 K/UL (ref 2–7.15)
NEUTROPHILS NFR BLD: 81.5 % (ref 44–72)
NITRITE UR QL STRIP.AUTO: NEGATIVE
NRBC # BLD AUTO: 0 K/UL
NRBC BLD AUTO-RTO: 0 /100 WBC
PH UR STRIP.AUTO: 5 [PH]
PHOSPHATE SERPL-MCNC: 2.9 MG/DL (ref 2.5–4.5)
PLATELET # BLD AUTO: 261 K/UL (ref 164–446)
PMV BLD AUTO: 10.7 FL (ref 9–12.9)
POTASSIUM SERPL-SCNC: 3.6 MMOL/L (ref 3.6–5.5)
PROT UR QL STRIP: NEGATIVE MG/DL
RBC # BLD AUTO: 3.83 M/UL (ref 4.2–5.4)
RBC # URNS HPF: ABNORMAL /HPF
RBC UR QL AUTO: NEGATIVE
SODIUM SERPL-SCNC: 139 MMOL/L (ref 135–145)
SP GR UR STRIP.AUTO: 1.02
UROBILINOGEN UR STRIP.AUTO-MCNC: 0.2 MG/DL
WBC # BLD AUTO: 10.4 K/UL (ref 4.8–10.8)
WBC #/AREA URNS HPF: ABNORMAL /HPF

## 2017-12-07 PROCEDURE — 85025 COMPLETE CBC W/AUTO DIFF WBC: CPT

## 2017-12-07 PROCEDURE — A9270 NON-COVERED ITEM OR SERVICE: HCPCS | Performed by: INTERNAL MEDICINE

## 2017-12-07 PROCEDURE — A9270 NON-COVERED ITEM OR SERVICE: HCPCS | Performed by: FAMILY MEDICINE

## 2017-12-07 PROCEDURE — 700101 HCHG RX REV CODE 250: Performed by: FAMILY MEDICINE

## 2017-12-07 PROCEDURE — 700102 HCHG RX REV CODE 250 W/ 637 OVERRIDE(OP): Performed by: FAMILY MEDICINE

## 2017-12-07 PROCEDURE — 700102 HCHG RX REV CODE 250 W/ 637 OVERRIDE(OP): Performed by: INTERNAL MEDICINE

## 2017-12-07 PROCEDURE — 700111 HCHG RX REV CODE 636 W/ 250 OVERRIDE (IP): Performed by: HOSPITALIST

## 2017-12-07 PROCEDURE — 700102 HCHG RX REV CODE 250 W/ 637 OVERRIDE(OP): Performed by: HOSPITALIST

## 2017-12-07 PROCEDURE — 80069 RENAL FUNCTION PANEL: CPT

## 2017-12-07 PROCEDURE — 99232 SBSQ HOSP IP/OBS MODERATE 35: CPT | Performed by: INTERNAL MEDICINE

## 2017-12-07 PROCEDURE — 83880 ASSAY OF NATRIURETIC PEPTIDE: CPT

## 2017-12-07 PROCEDURE — A9270 NON-COVERED ITEM OR SERVICE: HCPCS | Performed by: HOSPITALIST

## 2017-12-07 PROCEDURE — 81001 URINALYSIS AUTO W/SCOPE: CPT

## 2017-12-07 PROCEDURE — 770006 HCHG ROOM/CARE - MED/SURG/GYN SEMI*

## 2017-12-07 PROCEDURE — 36415 COLL VENOUS BLD VENIPUNCTURE: CPT

## 2017-12-07 PROCEDURE — 700111 HCHG RX REV CODE 636 W/ 250 OVERRIDE (IP): Performed by: INTERNAL MEDICINE

## 2017-12-07 RX ORDER — FUROSEMIDE 10 MG/ML
20 INJECTION INTRAMUSCULAR; INTRAVENOUS ONCE
Status: COMPLETED | OUTPATIENT
Start: 2017-12-07 | End: 2017-12-07

## 2017-12-07 RX ADMIN — STANDARDIZED SENNA CONCENTRATE AND DOCUSATE SODIUM 2 TABLET: 8.6; 5 TABLET, FILM COATED ORAL at 20:13

## 2017-12-07 RX ADMIN — GUAIFENESIN 600 MG: 600 TABLET, EXTENDED RELEASE ORAL at 07:44

## 2017-12-07 RX ADMIN — HYDRALAZINE HYDROCHLORIDE 100 MG: 50 TABLET ORAL at 15:04

## 2017-12-07 RX ADMIN — Medication 500 MG: at 07:56

## 2017-12-07 RX ADMIN — ATORVASTATIN CALCIUM 80 MG: 40 TABLET, FILM COATED ORAL at 20:12

## 2017-12-07 RX ADMIN — GABAPENTIN 300 MG: 300 CAPSULE ORAL at 20:13

## 2017-12-07 RX ADMIN — ALENDRONATE SODIUM 10 MG: 10 TABLET ORAL at 07:56

## 2017-12-07 RX ADMIN — FUROSEMIDE 20 MG: 10 INJECTION, SOLUTION INTRAVENOUS at 12:22

## 2017-12-07 RX ADMIN — HYDRALAZINE HYDROCHLORIDE 100 MG: 50 TABLET ORAL at 05:07

## 2017-12-07 RX ADMIN — BENAZEPRIL HYDROCHLORIDE 40 MG: 20 TABLET ORAL at 08:29

## 2017-12-07 RX ADMIN — POLYETHYLENE GLYCOL (3350) 1 PACKET: 17 POWDER, FOR SOLUTION ORAL at 07:43

## 2017-12-07 RX ADMIN — LACTOBACILLUS ACIDOPHILUS / LACTOBACILLUS BULGARICUS 1 PACKET: 100 MILLION CFU STRENGTH GRANULES at 12:22

## 2017-12-07 RX ADMIN — HEPARIN SODIUM 5000 UNITS: 5000 INJECTION, SOLUTION INTRAVENOUS; SUBCUTANEOUS at 20:14

## 2017-12-07 RX ADMIN — OXYBUTYNIN CHLORIDE 5 MG: 5 TABLET, FILM COATED, EXTENDED RELEASE ORAL at 20:13

## 2017-12-07 RX ADMIN — LIDOCAINE 1 PATCH: 50 PATCH CUTANEOUS at 07:43

## 2017-12-07 RX ADMIN — DULOXETINE HYDROCHLORIDE 20 MG: 20 CAPSULE, DELAYED RELEASE ORAL at 07:44

## 2017-12-07 RX ADMIN — ASPIRIN 81 MG: 81 TABLET, COATED ORAL at 07:44

## 2017-12-07 RX ADMIN — LACTOBACILLUS ACIDOPHILUS / LACTOBACILLUS BULGARICUS 1 PACKET: 100 MILLION CFU STRENGTH GRANULES at 17:30

## 2017-12-07 RX ADMIN — GUAIFENESIN 600 MG: 600 TABLET, EXTENDED RELEASE ORAL at 20:13

## 2017-12-07 RX ADMIN — HEPARIN SODIUM 5000 UNITS: 5000 INJECTION, SOLUTION INTRAVENOUS; SUBCUTANEOUS at 07:43

## 2017-12-07 RX ADMIN — Medication 500 MG: at 17:17

## 2017-12-07 RX ADMIN — LACTOBACILLUS ACIDOPHILUS / LACTOBACILLUS BULGARICUS 1 PACKET: 100 MILLION CFU STRENGTH GRANULES at 07:43

## 2017-12-07 RX ADMIN — OXYCODONE HYDROCHLORIDE 5 MG: 5 TABLET ORAL at 20:14

## 2017-12-07 RX ADMIN — HYDRALAZINE HYDROCHLORIDE 100 MG: 50 TABLET ORAL at 20:13

## 2017-12-07 RX ADMIN — OMEPRAZOLE 20 MG: 20 CAPSULE, DELAYED RELEASE ORAL at 07:44

## 2017-12-07 ASSESSMENT — ENCOUNTER SYMPTOMS
SPEECH CHANGE: 0
CHILLS: 0
DIZZINESS: 0
SHORTNESS OF BREATH: 0
SEIZURES: 0
FALLS: 0
WHEEZING: 0
VOMITING: 0
TREMORS: 0
DOUBLE VISION: 0
WEAKNESS: 0
NAUSEA: 0
NECK PAIN: 0
BLOOD IN STOOL: 0
DEPRESSION: 0
PHOTOPHOBIA: 0
PALPITATIONS: 0
PND: 0
COUGH: 0
BLURRED VISION: 0

## 2017-12-07 ASSESSMENT — PAIN SCALES - GENERAL
PAINLEVEL_OUTOF10: 0
PAINLEVEL_OUTOF10: 7
PAINLEVEL_OUTOF10: 0

## 2017-12-07 ASSESSMENT — LIFESTYLE VARIABLES: SUBSTANCE_ABUSE: 0

## 2017-12-07 NOTE — CARE PLAN
Problem: Respiratory:  Goal: Respiratory status will improve  Outcome: PROGRESSING SLOWER THAN EXPECTED  Pt on 2L NC but baseline is zero oxygen. Pt desats to 86% when off of oxygen. Wet, nonproductive cough present. CXR shows interstitial edema. Pt needs encouragement to use IS at bedside. Pt reaches 1,000cc on IS. Lungs diminished to auscultation.     Problem: Urinary Elimination:  Goal: Ability to reestablish a normal urinary elimination pattern will improve  Outcome: PROGRESSING SLOWER THAN EXPECTED  Pt incontinent and unable to feel need to void. Unable to obtain UA at this time.

## 2017-12-07 NOTE — PROGRESS NOTES
Simona Knight Fall Risk Assessment:     Last Known Fall: Within the last month  Mobility: Immobilized/requires assist of one person  Medications: Cardiovascular and central nervous system meds  Mental Status/LOC/Awareness: Oriented to person and place  Toileting Needs: Incontinence  Volume/Electrolyte Status: No problems  Communication/Sensory: Visual (Glasses)/hearing deficit  Behavior: Appropriate behavior  Simona Knight Fall Risk Total: 15  Fall Risk Level: HIGH RISK    Universal Fall Precautions:  call light/belongings in reach, bed in low position and locked, wheelchairs and assistive devices out of sight, siderails up x 2, use non-slip footwear, adequate lighting, clutter free and spill free environment, educate on level of risk, educate to call for assistance    Fall Risk Level Interventions:    TRIAL ([employer] 8, NEURO, MED JENNIE 5) Moderate Fall Risk Interventions  Place yellow fall risk ID band on patient: verified  Provide patient/family education based on risk assessment : verified  Educate patient/family to call staff for assistance when getting out of bed: verified  Place fall precaution signage outside patient door: verified  Utilize bed/chair fall alarm: verifiedTRIAL (TELE 8, NEURO, [employer] JENNIE 5) High Fall Risk Interventions  Place yellow fall risk ID band on patient: verified  Provide patient/family education based on risk assessment: verified  Educate patient/family to call staff for assistance when getting out of bed: verified  Place fall precaution signage outside patient door: verified  Place patient in room close to nursing station: verified  Utilize bed/chair fall alarm: verified  Notify charge of high risk for huddle: verified    Patient Specific Interventions:     Medication: review medications with patient and family  Mental Status/LOC/Awareness: reorient patient  Toileting: provide frquent toileting, instruct patient/family on the use of grab bars, instruct patient/family on the need to call  for assistance when toileting and do not leave patient unattended in bathroom/refer to toileting scripting  Volume/Electrolyte Status: ensure patient remains hydrated  Communication/Sensory: update plan of care on whiteboard  Behavioral: encourage patient to voice feelings  Mobility: utilize bed/chair fall alarm, ensure bed is locked and in lowest position, provide appropriate assistive device and instruct patient to exit bed on their strongest side

## 2017-12-07 NOTE — THERAPY
"Occupational Therapy Treatment completed with focus on ADLs and ADL transfers.  Functional Status:  Pt pleasant, forgetful.  Pt required Min A for supine to sit EOB.  Pt reported feeling fatigued & cold today.  Pt stood with Min A but declined to sit up in chair.  Pt requested to return to bed as she \"wasn't feeling up to it today\"  Plan of Care: Will benefit from Occupational Therapy 3 times per week  Discharge Recommendations:  Equipment Will Continue to Assess for Equipment Needs. Post-acute therapy Discharge to a transitional care facility for continued skilled therapy services.    Pt will need to be in a 24/7 supervised environment upon D/C due to her cognitive impairments.    See \"Rehab Therapy-Acute\" Patient Summary Report for complete documentation.   "

## 2017-12-07 NOTE — PROGRESS NOTES
Received bedside report from day RN and assumed care of patient at 1900. Patient is alert and oriented x2 with no signs of acute distress. Safety precautions in place including patient call light within reach, bed alarm on, personal possessions nearby, bed in low position and locked, hourly rounding in practice, and non-skid socks in place.

## 2017-12-07 NOTE — ASSESSMENT & PLAN NOTE
One fever 12/6, CXR w mild edema no e/o consolidation.  UA negative  -WBC was elevated but has improved today  -Repeat in AM  -Hold off on abx

## 2017-12-07 NOTE — PROGRESS NOTES
[facility] Hospitalist Progress Note    Date of Service: 2017    Chief Complaint  77 y.o. female admitted 2017 with GLF, pyuria and ATN.    Interval Problem Update  : Had BM this AM, says had to strain  : Feeling tired/sleepy.  Otherwise no complaints  : Fever last night, cxr negative, blood cx pending.  WBC in AM    Consultants/Specialty  Neurology  Psych    Review of Systems   Constitutional: Positive for malaise/fatigue. Negative for chills.   HENT: Negative for congestion and hearing loss.    Eyes: Negative for blurred vision, double vision and photophobia.   Respiratory: Negative for cough, shortness of breath and wheezing.    Cardiovascular: Negative for palpitations, leg swelling and PND.   Gastrointestinal: Negative for blood in stool, nausea and vomiting.   Genitourinary: Negative for frequency and hematuria.   Musculoskeletal: Negative for falls, joint pain and neck pain.   Skin: Negative for rash.   Neurological: Negative for dizziness, tremors, speech change, seizures and weakness.   Psychiatric/Behavioral: Negative for depression, substance abuse and suicidal ideas.      Physical Exam  Laboratory/Imaging   Hemodynamics  Temp (24hrs), Av.1 °C (98.7 °F), Min:36.7 °C (98.1 °F), Max:37.8 °C (100.1 °F)   Temperature: 36.9 °C (98.4 °F)  Pulse  Av.3  Min: 50  Max: 106    Blood Pressure : 126/48      Respiratory      Respiration: 20, Pulse Oximetry: 92 %     Work Of Breathing / Effort: Mild  RUL Breath Sounds: Clear, RML Breath Sounds: Diminished, RLL Breath Sounds: Diminished, ARGELIA Breath Sounds: Clear, LLL Breath Sounds: Diminished    Fluids    Intake/Output Summary (Last 24 hours) at 17 1554  Last data filed at 17 0500   Gross per 24 hour   Intake              120 ml   Output                0 ml   Net              120 ml       Nutrition  Orders Placed This Encounter   Procedures   • Diet Order     Standing Status:   Standing     Number of Occurrences:   1     Order  Specific Question:   Diet:     Answer:   Regular [1]     Physical Exam   Constitutional: She is oriented to person, place, and time. She appears well-nourished.   HENT:   Head: Normocephalic.   Nose: Nose normal.   Mouth/Throat: No oropharyngeal exudate.   Eyes: EOM are normal. Pupils are equal, round, and reactive to light.   Neck: Normal range of motion. Neck supple. No JVD present. No thyromegaly present.   Cardiovascular: Normal rate, regular rhythm and normal heart sounds.  Exam reveals no gallop.    No murmur heard.  Pulmonary/Chest: Effort normal. She has no wheezes. She exhibits no tenderness.   Abdominal: Soft. Bowel sounds are normal. She exhibits no distension and no mass. There is no rebound.   Musculoskeletal: Normal range of motion. She exhibits no edema or tenderness.   Lymphadenopathy:     She has no cervical adenopathy.   Neurological: She is alert and oriented to person, place, and time. No cranial nerve deficit.   Skin: Skin is warm. No rash noted. She is not diaphoretic.   Psychiatric: Her behavior is normal.   Nursing note and vitals reviewed.          Recent Labs      12/06/17 1817 12/07/17   0259   WBC  13.5*  10.4   RBC  3.85*  3.83*   HEMOGLOBIN  11.3*  11.2*   HEMATOCRIT  34.0*  34.1*   MCV  88.3  89.0   MCH  29.4  29.2   MCHC  33.2*  32.8*   RDW  51.9*  52.9*   PLATELETCT  278  261   MPV  10.8  10.7     Recent Labs      12/06/17   1817  12/07/17   0259   SODIUM  140  139   POTASSIUM  3.7  3.6   CHLORIDE  106  107   CO2  23  24   GLUCOSE  129*  151*   BUN  23*  21   CREATININE  1.02  0.97   CALCIUM  9.1  9.0         Recent Labs      12/07/17   0259   BNPBTYPENAT  60              Assessment/Plan     * Dementia- (present on admission)   Assessment & Plan    Min narc/sed when possible.   Await guardianship.        Fever   Assessment & Plan    One fever 12/6, CXR w mild edema no e/o consolidation.  UA negative  -WBC was elevated but has improved today  -Repeat in AM  -Hold off on abx         Constipation- (present on admission)   Assessment & Plan    Resolved currently continue bowel care        Congestion of upper airway- (present on admission)   Assessment & Plan    Saturating 90% on RA.  O2, RT, IS, Vax, CXR and encouraging taking of PO fluids.          Physical debility- (present on admission)   Assessment & Plan    PT/OT and increase activity.  Encourage mobilization. Pending guardianship        Obesity (BMI 30.0-34.9)- (present on admission)   Assessment & Plan    Encourage Kcal restriction        T12 compression fracture (CMS-HCC)- (present on admission)   Assessment & Plan    CT spine, no acute fractures. Cont fosamax and calcium  Stable and pending guardianship        Chronic back pain- (present on admission)   Assessment & Plan    As above and min narc/sed when possible.  Bowel Regimen          Stable issues - med hx (GIB, CAD/ICM, CVA, DLD), preventives.    Dispo - complex/fair.  Await guardianship.      Reviewed items::  Labs reviewed and Medications reviewed  Pink catheter::  No Pink  DVT prophylaxis pharmacological::  Heparin  Ulcer Prophylaxis::  Yes

## 2017-12-08 PROBLEM — R50.9 FEVER: Status: RESOLVED | Noted: 2017-12-07 | Resolved: 2017-12-08

## 2017-12-08 LAB
ALBUMIN SERPL BCP-MCNC: 3.2 G/DL (ref 3.2–4.9)
BASOPHILS # BLD AUTO: 0.6 % (ref 0–1.8)
BASOPHILS # BLD: 0.06 K/UL (ref 0–0.12)
BUN SERPL-MCNC: 20 MG/DL (ref 8–22)
CALCIUM SERPL-MCNC: 9.1 MG/DL (ref 8.5–10.5)
CHLORIDE SERPL-SCNC: 105 MMOL/L (ref 96–112)
CO2 SERPL-SCNC: 26 MMOL/L (ref 20–33)
CREAT SERPL-MCNC: 0.92 MG/DL (ref 0.5–1.4)
EOSINOPHIL # BLD AUTO: 0.17 K/UL (ref 0–0.51)
EOSINOPHIL NFR BLD: 1.6 % (ref 0–6.9)
ERYTHROCYTE [DISTWIDTH] IN BLOOD BY AUTOMATED COUNT: 50.9 FL (ref 35.9–50)
GFR SERPL CREATININE-BSD FRML MDRD: 59 ML/MIN/1.73 M 2
GLUCOSE SERPL-MCNC: 108 MG/DL (ref 65–99)
HCT VFR BLD AUTO: 35.4 % (ref 37–47)
HGB BLD-MCNC: 11.3 G/DL (ref 12–16)
IMM GRANULOCYTES # BLD AUTO: 0.06 K/UL (ref 0–0.11)
IMM GRANULOCYTES NFR BLD AUTO: 0.6 % (ref 0–0.9)
LYMPHOCYTES # BLD AUTO: 1.11 K/UL (ref 1–4.8)
LYMPHOCYTES NFR BLD: 10.5 % (ref 22–41)
MCH RBC QN AUTO: 27.9 PG (ref 27–33)
MCHC RBC AUTO-ENTMCNC: 31.9 G/DL (ref 33.6–35)
MCV RBC AUTO: 87.4 FL (ref 81.4–97.8)
MONOCYTES # BLD AUTO: 0.87 K/UL (ref 0–0.85)
MONOCYTES NFR BLD AUTO: 8.2 % (ref 0–13.4)
NEUTROPHILS # BLD AUTO: 8.29 K/UL (ref 2–7.15)
NEUTROPHILS NFR BLD: 78.5 % (ref 44–72)
NRBC # BLD AUTO: 0 K/UL
NRBC BLD AUTO-RTO: 0 /100 WBC
PHOSPHATE SERPL-MCNC: 3.2 MG/DL (ref 2.5–4.5)
PLATELET # BLD AUTO: 283 K/UL (ref 164–446)
PMV BLD AUTO: 11 FL (ref 9–12.9)
POTASSIUM SERPL-SCNC: 3.5 MMOL/L (ref 3.6–5.5)
RBC # BLD AUTO: 4.05 M/UL (ref 4.2–5.4)
SODIUM SERPL-SCNC: 141 MMOL/L (ref 135–145)
WBC # BLD AUTO: 10.6 K/UL (ref 4.8–10.8)

## 2017-12-08 PROCEDURE — 700101 HCHG RX REV CODE 250: Performed by: FAMILY MEDICINE

## 2017-12-08 PROCEDURE — 700111 HCHG RX REV CODE 636 W/ 250 OVERRIDE (IP): Performed by: HOSPITALIST

## 2017-12-08 PROCEDURE — 85025 COMPLETE CBC W/AUTO DIFF WBC: CPT

## 2017-12-08 PROCEDURE — A9270 NON-COVERED ITEM OR SERVICE: HCPCS | Performed by: INTERNAL MEDICINE

## 2017-12-08 PROCEDURE — A9270 NON-COVERED ITEM OR SERVICE: HCPCS | Performed by: HOSPITALIST

## 2017-12-08 PROCEDURE — 770006 HCHG ROOM/CARE - MED/SURG/GYN SEMI*

## 2017-12-08 PROCEDURE — 700102 HCHG RX REV CODE 250 W/ 637 OVERRIDE(OP): Performed by: HOSPITALIST

## 2017-12-08 PROCEDURE — 80069 RENAL FUNCTION PANEL: CPT

## 2017-12-08 PROCEDURE — 700102 HCHG RX REV CODE 250 W/ 637 OVERRIDE(OP): Performed by: INTERNAL MEDICINE

## 2017-12-08 PROCEDURE — 36415 COLL VENOUS BLD VENIPUNCTURE: CPT

## 2017-12-08 PROCEDURE — A9270 NON-COVERED ITEM OR SERVICE: HCPCS | Performed by: FAMILY MEDICINE

## 2017-12-08 PROCEDURE — 700102 HCHG RX REV CODE 250 W/ 637 OVERRIDE(OP): Performed by: FAMILY MEDICINE

## 2017-12-08 PROCEDURE — 99232 SBSQ HOSP IP/OBS MODERATE 35: CPT | Performed by: INTERNAL MEDICINE

## 2017-12-08 RX ORDER — POTASSIUM CHLORIDE 20 MEQ/1
40 TABLET, EXTENDED RELEASE ORAL ONCE
Status: COMPLETED | OUTPATIENT
Start: 2017-12-08 | End: 2017-12-08

## 2017-12-08 RX ADMIN — GABAPENTIN 300 MG: 300 CAPSULE ORAL at 22:02

## 2017-12-08 RX ADMIN — LACTOBACILLUS ACIDOPHILUS / LACTOBACILLUS BULGARICUS 1 PACKET: 100 MILLION CFU STRENGTH GRANULES at 13:21

## 2017-12-08 RX ADMIN — POTASSIUM CHLORIDE 40 MEQ: 1500 TABLET, EXTENDED RELEASE ORAL at 10:21

## 2017-12-08 RX ADMIN — OXYBUTYNIN CHLORIDE 5 MG: 5 TABLET, FILM COATED, EXTENDED RELEASE ORAL at 22:03

## 2017-12-08 RX ADMIN — LACTOBACILLUS ACIDOPHILUS / LACTOBACILLUS BULGARICUS 1 PACKET: 100 MILLION CFU STRENGTH GRANULES at 19:13

## 2017-12-08 RX ADMIN — HYDRALAZINE HYDROCHLORIDE 100 MG: 50 TABLET ORAL at 13:21

## 2017-12-08 RX ADMIN — HYDRALAZINE HYDROCHLORIDE 100 MG: 50 TABLET ORAL at 22:03

## 2017-12-08 RX ADMIN — GUAIFENESIN 600 MG: 600 TABLET, EXTENDED RELEASE ORAL at 09:15

## 2017-12-08 RX ADMIN — OMEPRAZOLE 20 MG: 20 CAPSULE, DELAYED RELEASE ORAL at 09:15

## 2017-12-08 RX ADMIN — ATORVASTATIN CALCIUM 80 MG: 40 TABLET, FILM COATED ORAL at 22:02

## 2017-12-08 RX ADMIN — ALENDRONATE SODIUM 10 MG: 10 TABLET ORAL at 05:38

## 2017-12-08 RX ADMIN — BENAZEPRIL HYDROCHLORIDE 40 MG: 20 TABLET ORAL at 09:15

## 2017-12-08 RX ADMIN — Medication 500 MG: at 19:12

## 2017-12-08 RX ADMIN — HEPARIN SODIUM 5000 UNITS: 5000 INJECTION, SOLUTION INTRAVENOUS; SUBCUTANEOUS at 09:15

## 2017-12-08 RX ADMIN — STANDARDIZED SENNA CONCENTRATE AND DOCUSATE SODIUM 2 TABLET: 8.6; 5 TABLET, FILM COATED ORAL at 22:03

## 2017-12-08 RX ADMIN — HEPARIN SODIUM 5000 UNITS: 5000 INJECTION, SOLUTION INTRAVENOUS; SUBCUTANEOUS at 22:02

## 2017-12-08 RX ADMIN — ASPIRIN 81 MG: 81 TABLET, COATED ORAL at 09:14

## 2017-12-08 RX ADMIN — GUAIFENESIN 600 MG: 600 TABLET, EXTENDED RELEASE ORAL at 22:02

## 2017-12-08 RX ADMIN — DULOXETINE HYDROCHLORIDE 20 MG: 20 CAPSULE, DELAYED RELEASE ORAL at 09:15

## 2017-12-08 RX ADMIN — LIDOCAINE 1 PATCH: 50 PATCH CUTANEOUS at 09:15

## 2017-12-08 RX ADMIN — LACTOBACILLUS ACIDOPHILUS / LACTOBACILLUS BULGARICUS 1 PACKET: 100 MILLION CFU STRENGTH GRANULES at 09:15

## 2017-12-08 RX ADMIN — Medication 500 MG: at 09:15

## 2017-12-08 RX ADMIN — HYDRALAZINE HYDROCHLORIDE 100 MG: 50 TABLET ORAL at 05:37

## 2017-12-08 RX ADMIN — POLYETHYLENE GLYCOL (3350) 1 PACKET: 17 POWDER, FOR SOLUTION ORAL at 09:15

## 2017-12-08 ASSESSMENT — ENCOUNTER SYMPTOMS
WHEEZING: 0
SHORTNESS OF BREATH: 0
CHILLS: 0
PALPITATIONS: 0
BLOOD IN STOOL: 0
PHOTOPHOBIA: 0
FALLS: 0
DIZZINESS: 0
DOUBLE VISION: 0
VOMITING: 0
TREMORS: 0
WEAKNESS: 0
DEPRESSION: 0
NECK PAIN: 0
SEIZURES: 0
COUGH: 0
PND: 0
SPEECH CHANGE: 0
NAUSEA: 0
BLURRED VISION: 0

## 2017-12-08 ASSESSMENT — PAIN SCALES - GENERAL
PAINLEVEL_OUTOF10: 0
PAINLEVEL_OUTOF10: 0

## 2017-12-08 ASSESSMENT — LIFESTYLE VARIABLES: SUBSTANCE_ABUSE: 0

## 2017-12-08 NOTE — PROGRESS NOTES
Simona Knight Fall Risk Assessment:     Last Known Fall: Within the last six months  Mobility: Immobilized/requires assist of one person  Medications: Cardiovascular or central nervous system meds  Mental Status/LOC/Awareness: Oriented to person and place  Toileting Needs: Incontinence  Volume/Electrolyte Status: No problems  Communication/Sensory: Visual (Glasses)/hearing deficit  Behavior: Appropriate behavior  Simona Knight Fall Risk Total: 13  Fall Risk Level: MODERATE RISK    Universal Fall Precautions:  call light/belongings in reach, bed in low position and locked, wheelchairs and assistive devices out of sight, siderails up x 2, use non-slip footwear, adequate lighting, clutter free and spill free environment, educate on level of risk, educate to call for assistance    Fall Risk Level Interventions:    TRIAL ([employer] 8, NEURO, MED JENNIE 5) Moderate Fall Risk Interventions  Place yellow fall risk ID band on patient: verified  Provide patient/family education based on risk assessment : verified  Educate patient/family to call staff for assistance when getting out of bed: verified  Place fall precaution signage outside patient door: verified  Utilize bed/chair fall alarm: verifiedTRIAL (TELE 8, NEURO, [employer] JENNIE 5) High Fall Risk Interventions  Place yellow fall risk ID band on patient: verified  Provide patient/family education based on risk assessment: verified  Educate patient/family to call staff for assistance when getting out of bed: verified  Place fall precaution signage outside patient door: verified  Place patient in room close to nursing station: verified  Utilize bed/chair fall alarm: verified  Notify charge of high risk for huddle: verified    Patient Specific Interventions:     Medication: review medications with patient and family  Mental Status/LOC/Awareness: reorient patient, reinforce falls education, check on patient hourly and utilize bed/chair fall alarm  Toileting: instruct patient/family on  the need to call for assistance when toileting  Volume/Electrolyte Status: ensure patient remains hydrated and monitor abnormal lab values  Communication/Sensory: update plan of care on whiteboard, ensure proper positioning when transferrng/ambulating and ensure patient has glasses/contacts and hearing aids/dentures  Behavioral: encourage patient to voice feelings  Mobility: dangle prior to standing, utilize bed/chair fall alarm, ensure bed is locked and in lowest position, provide appropriate assistive device and instruct patient to exit bed on their strongest side

## 2017-12-08 NOTE — PROGRESS NOTES
Call light, belongings in reach. Bed in low position. Side rails x2, non slip footwear. No complaints or concerns at this time.

## 2017-12-08 NOTE — PROGRESS NOTES
"Assumed care at 1900. Received report from RN. Patient is AOx3 disoriented to event. Patient is sitting up in bed and states she is comfortable. Assessment complete. Labs reviewed. Patient and RN discussed plan of care. Patient questions answered. Patient needs are met at this time. Bed in lowest and locked position. Call light is within reach. Hourly rounding in place. /60   Pulse 77   Temp 36.5 °C (97.7 °F)   Resp 18   Ht 1.6 m (5' 2.99\")   Wt 84.4 kg (186 lb 1.1 oz)   SpO2 91%   Breastfeeding? No   BMI 32.97 kg/m²       "

## 2017-12-08 NOTE — PROGRESS NOTES
Simona Knight Fall Risk Assessment:     Last Known Fall: Within the last six months  Mobility: Immobilized/requires assist of one person  Medications: Cardiovascular and central nervous system meds  Mental Status/LOC/Awareness: Oriented to person and place  Toileting Needs: Incontinence  Volume/Electrolyte Status: No problems  Communication/Sensory: Visual (Glasses)/hearing deficit  Behavior: Appropriate behavior  Jarv[place] Knight Fall Risk Total: 14  Fall Risk Level: MODERATE RISK    Universal Fall Precautions:  call light/belongings in reach, bed in low position and locked, siderails up x 2, use non-slip footwear, adequate lighting    Fall Risk Level Interventions:    TRIAL ([email or web address] 8, NEURO, MED JENNIE 5) Moderate Fall Risk Interventions  Place yellow fall risk ID band on patient: verified  Provide patient/family education based on risk assessment : verified  Educate patient/family to call staff for assistance when getting out of bed: verified  Place fall precaution signage outside patient door: verified  Utilize bed/chair fall alarm: verifiedTRIAL ([email or web address] 8, NEURO, MED JENNIE 5) High Fall Risk Interventions  Place yellow fall risk ID band on patient: verified  Provide patient/family education based on risk assessment: verified  Educate patient/family to call staff for assistance when getting out of bed: verified  Place fall precaution signage outside patient door: verified  Place patient in room close to nursing station: verified  Utilize bed/chair fall alarm: verified  Notify charge of high risk for huddle: verified    Patient Specific Interventions:     Medication: review medications with patient and family  Mental Status/LOC/Awareness: reorient patient, reinforce falls education, check on patient hourly, utilize bed/chair fall alarm and reinforce the use of call light  Toileting: instruct patient/family on the need to call for assistance when toileting  Volume/Electrolyte Status: ensure patient remains hydrated and  advance diet as tolerated  Communication/Sensory: update plan of care on whiteboard and provide communication alternatives/  Behavioral: encourage patient to voice feelings and engage patient in daily activities  Mobility: ensure bed is locked and in lowest position

## 2017-12-08 NOTE — DISCHARGE PLANNING
Updated Clinical Note: The patient has been here for 78 days now and probably no longer needs skilled nursing.  PT is recommending further PT twice a week and OT is recommending OT 3 times a week, although she only requires min assist from supine to sit.  She has dementia.    Updated Discharge Planning: She would probably do well in a group home with home health with PT and OT.  Her guardianship court date is on 12/19. Her monthly income is $1536 and she has a rep payee through [employer], [place] ([phone]).  We need to start looking at group homes that she can afford.

## 2017-12-08 NOTE — PROGRESS NOTES
Simona Knight Fall Risk Assessment:     Last Known Fall: Within the last six months  Mobility: Immobilized/requires assist of one person  Medications: Cardiovascular or central nervous system meds  Mental Status/LOC/Awareness: Oriented to person and place  Toileting Needs: Incontinence  Volume/Electrolyte Status: No problems  Communication/Sensory: Visual (Glasses)/hearing deficit  Behavior: Appropriate behavior  Jarvismark Knight Fall Risk Total: 13  Fall Risk Level: MODERATE RISK    Universal Fall Precautions:  call light/belongings in reach, bed in low position and locked, siderails up x 2, use non-slip footwear, adequate lighting, clutter free and spill free environment, educate on level of risk, educate to call for assistance    Fall Risk Level Interventions:    TRIAL (TELE 8, NEURO, MED JENNIE 5) Moderate Fall Risk Interventions  Place yellow fall risk ID band on patient: verified  Provide patient/family education based on risk assessment : verified  Educate patient/family to call staff for assistance when getting out of bed: verified  Place fall precaution signage outside patient door: verified  Utilize bed/chair fall alarm: verifiedTRIAL (TELE 8, NEURO, [employer] JENNIE 5) High Fall Risk Interventions  Place yellow fall risk ID band on patient: verified  Provide patient/family education based on risk assessment: verified  Educate patient/family to call staff for assistance when getting out of bed: verified  Place fall precaution signage outside patient door: verified  Place patient in room close to nursing station: verified  Utilize bed/chair fall alarm: verified  Notify charge of high risk for huddle: verified    Patient Specific Interventions:     Medication: review medications with patient and family and limit combination of prn medications  Mental Status/LOC/Awareness: reorient patient, reinforce falls education, check on patient hourly, utilize bed/chair fall alarm and reinforce the use of call light  Toileting:  monitor intake and output/use of appropriate interventions and instruct patient/family on the need to call for assistance when toileting  Volume/Electrolyte Status: ensure patient remains hydrated and monitor abnormal lab values  Communication/Sensory: update plan of care on whiteboard and ensure patient has glasses/contacts and hearing aids/dentures  Behavioral: engage patient in daily activities and administer medication as ordered  Mobility: utilize bed/chair fall alarm and ensure bed is locked and in lowest position

## 2017-12-08 NOTE — PROGRESS NOTES
[facility] Hospitalist Progress Note    Date of Service: 2017    Chief Complaint  77 y.o. female admitted 2017 with GLF, pyuria and ATN.    Interval Problem Update  : Had BM this AM, says had to strain  : Feeling tired/sleepy.  Otherwise no complaints  : Fever last night, cxr negative, blood cx pending.  WBC in AM  : No physical complaints.  No fever    Consultants/Specialty  Neurology  Psych    Review of Systems   Constitutional: Positive for malaise/fatigue. Negative for chills.   HENT: Negative for congestion and hearing loss.    Eyes: Negative for blurred vision, double vision and photophobia.   Respiratory: Negative for cough, shortness of breath and wheezing.    Cardiovascular: Negative for palpitations, leg swelling and PND.   Gastrointestinal: Negative for blood in stool, nausea and vomiting.   Genitourinary: Negative for frequency and hematuria.   Musculoskeletal: Negative for falls, joint pain and neck pain.   Skin: Negative for rash.   Neurological: Negative for dizziness, tremors, speech change, seizures and weakness.   Psychiatric/Behavioral: Negative for depression, substance abuse and suicidal ideas.      Physical Exam  Laboratory/Imaging   Hemodynamics  Temp (24hrs), Av.6 °C (97.8 °F), Min:36.3 °C (97.3 °F), Max:36.8 °C (98.2 °F)   Temperature: 36.8 °C (98.2 °F)  Pulse  Av.4  Min: 50  Max: 106    Blood Pressure : 116/44      Respiratory      Respiration: 18, Pulse Oximetry: 93 %        RUL Breath Sounds: Clear, RML Breath Sounds: Diminished, RLL Breath Sounds: Diminished, ARGELIA Breath Sounds: Clear, LLL Breath Sounds: Diminished    Fluids    Intake/Output Summary (Last 24 hours) at 17 1521  Last data filed at 17 1300   Gross per 24 hour   Intake              250 ml   Output                0 ml   Net              250 ml       Nutrition  Orders Placed This Encounter   Procedures   • Diet Order     Standing Status:   Standing     Number of Occurrences:   1      Order Specific Question:   Diet:     Answer:   Regular [1]     Physical Exam   Constitutional: She is oriented to person, place, and time. She appears well-nourished.   HENT:   Head: Normocephalic.   Nose: Nose normal.   Mouth/Throat: No oropharyngeal exudate.   Eyes: EOM are normal. Pupils are equal, round, and reactive to light.   Neck: Normal range of motion. Neck supple. No JVD present. No thyromegaly present.   Cardiovascular: Normal rate, regular rhythm and normal heart sounds.  Exam reveals no gallop.    No murmur heard.  Pulmonary/Chest: Effort normal. She has no wheezes. She exhibits no tenderness.   Abdominal: Soft. Bowel sounds are normal. She exhibits no distension and no mass. There is no rebound.   Musculoskeletal: Normal range of motion. She exhibits no edema or tenderness.   Lymphadenopathy:     She has no cervical adenopathy.   Neurological: She is alert and oriented to person, place, and time. No cranial nerve deficit.   Skin: Skin is warm. No rash noted. She is not diaphoretic.   Psychiatric: Her behavior is normal.   Nursing note and vitals reviewed.          Recent Labs      12/06/17 1817 12/07/17 0259  12/08/17   0243   WBC  13.5*  10.4  10.6   RBC  3.85*  3.83*  4.05*   HEMOGLOBIN  11.3*  11.2*  11.3*   HEMATOCRIT  34.0*  34.1*  35.4*   MCV  88.3  89.0  87.4   MCH  29.4  29.2  27.9   MCHC  33.2*  32.8*  31.9*   RDW  51.9*  52.9*  50.9*   PLATELETCT  278  261  283   MPV  10.8  10.7  11.0     Recent Labs      12/06/17 1817 12/07/17   0259  12/08/17   0243   SODIUM  140  139  141   POTASSIUM  3.7  3.6  3.5*   CHLORIDE  106  107  105   CO2  23  24  26   GLUCOSE  129*  151*  108*   BUN  23*  21  20   CREATININE  1.02  0.97  0.92   CALCIUM  9.1  9.0  9.1         Recent Labs      12/07/17   0259   BNPBTYPENAT  60              Assessment/Plan     * Dementia- (present on admission)   Assessment & Plan    Min narc/sed when possible.   Await guardianship.        Constipation- (present on  admission)   Assessment & Plan    Resolved currently continue bowel care        Congestion of upper airway- (present on admission)   Assessment & Plan    Saturating 90% on RA.  O2, RT, IS, Vax, CXR and encouraging taking of PO fluids.          Physical debility- (present on admission)   Assessment & Plan    PT/OT and increase activity.  Encourage mobilization. Pending guardianship        Obesity (BMI 30.0-34.9)- (present on admission)   Assessment & Plan    Encourage Kcal restriction        T12 compression fracture (CMS-HCC)- (present on admission)   Assessment & Plan    CT spine, no acute fractures. Cont fosamax and calcium  Stable and pending guardianship        Chronic back pain- (present on admission)   Assessment & Plan    As above and min narc/sed when possible.  Bowel Regimen          Stable issues - med hx (GIB, CAD/ICM, CVA, DLD), preventives.    Dispo - complex/fair.  Await guardianship.      Reviewed items::  Labs reviewed and Medications reviewed  Pink catheter::  No Pink  DVT prophylaxis pharmacological::  Heparin  Ulcer Prophylaxis::  Yes

## 2017-12-08 NOTE — CARE PLAN
Problem: Pain Management  Goal: Pain level will decrease to patient's comfort goal  Outcome: PROGRESSING AS EXPECTED  PRN pain medications given for pt's c/o back pain. Pt is sleeping comfortably after given medications.     Problem: Mobility  Goal: Risk for activity intolerance will decrease  Outcome: PROGRESSING AS EXPECTED  Pt gets up with one person assist with FWW.

## 2017-12-08 NOTE — CARE PLAN
Problem: Safety  Goal: Will remain free from injury  Outcome: PROGRESSING AS EXPECTED  Bed in low position, non tread socks on, bed alarm on. Call light in reach.

## 2017-12-08 NOTE — PROGRESS NOTES
Assumed pt care after report received from night Rn. Pt is resting comfortably at this time. No c/o pain or distress noted. Call light and belongings in reach. Bed in low position. IVF flushes without difficulty. Will continue to monitor.

## 2017-12-09 PROCEDURE — A9270 NON-COVERED ITEM OR SERVICE: HCPCS | Performed by: FAMILY MEDICINE

## 2017-12-09 PROCEDURE — 700102 HCHG RX REV CODE 250 W/ 637 OVERRIDE(OP): Performed by: HOSPITALIST

## 2017-12-09 PROCEDURE — 700111 HCHG RX REV CODE 636 W/ 250 OVERRIDE (IP): Performed by: HOSPITALIST

## 2017-12-09 PROCEDURE — 700102 HCHG RX REV CODE 250 W/ 637 OVERRIDE(OP): Performed by: FAMILY MEDICINE

## 2017-12-09 PROCEDURE — A9270 NON-COVERED ITEM OR SERVICE: HCPCS | Performed by: HOSPITALIST

## 2017-12-09 PROCEDURE — A9270 NON-COVERED ITEM OR SERVICE: HCPCS | Performed by: INTERNAL MEDICINE

## 2017-12-09 PROCEDURE — 700101 HCHG RX REV CODE 250: Performed by: FAMILY MEDICINE

## 2017-12-09 PROCEDURE — 700102 HCHG RX REV CODE 250 W/ 637 OVERRIDE(OP): Performed by: INTERNAL MEDICINE

## 2017-12-09 PROCEDURE — 770006 HCHG ROOM/CARE - MED/SURG/GYN SEMI*

## 2017-12-09 PROCEDURE — 99231 SBSQ HOSP IP/OBS SF/LOW 25: CPT | Performed by: INTERNAL MEDICINE

## 2017-12-09 RX ADMIN — HYDRALAZINE HYDROCHLORIDE 100 MG: 50 TABLET ORAL at 05:20

## 2017-12-09 RX ADMIN — POLYETHYLENE GLYCOL (3350) 1 PACKET: 17 POWDER, FOR SOLUTION ORAL at 08:06

## 2017-12-09 RX ADMIN — HYDRALAZINE HYDROCHLORIDE 100 MG: 50 TABLET ORAL at 20:54

## 2017-12-09 RX ADMIN — HYDRALAZINE HYDROCHLORIDE 100 MG: 50 TABLET ORAL at 13:06

## 2017-12-09 RX ADMIN — LIDOCAINE 1 PATCH: 50 PATCH CUTANEOUS at 08:06

## 2017-12-09 RX ADMIN — GUAIFENESIN 600 MG: 600 TABLET, EXTENDED RELEASE ORAL at 20:54

## 2017-12-09 RX ADMIN — GUAIFENESIN 600 MG: 600 TABLET, EXTENDED RELEASE ORAL at 08:05

## 2017-12-09 RX ADMIN — ALENDRONATE SODIUM 10 MG: 10 TABLET ORAL at 08:05

## 2017-12-09 RX ADMIN — LACTOBACILLUS ACIDOPHILUS / LACTOBACILLUS BULGARICUS 1 PACKET: 100 MILLION CFU STRENGTH GRANULES at 13:06

## 2017-12-09 RX ADMIN — BENAZEPRIL HYDROCHLORIDE 40 MG: 20 TABLET ORAL at 08:06

## 2017-12-09 RX ADMIN — ATORVASTATIN CALCIUM 80 MG: 40 TABLET, FILM COATED ORAL at 20:54

## 2017-12-09 RX ADMIN — Medication 500 MG: at 08:05

## 2017-12-09 RX ADMIN — GABAPENTIN 300 MG: 300 CAPSULE ORAL at 20:54

## 2017-12-09 RX ADMIN — STANDARDIZED SENNA CONCENTRATE AND DOCUSATE SODIUM 2 TABLET: 8.6; 5 TABLET, FILM COATED ORAL at 20:54

## 2017-12-09 RX ADMIN — Medication 500 MG: at 17:45

## 2017-12-09 RX ADMIN — HEPARIN SODIUM 5000 UNITS: 5000 INJECTION, SOLUTION INTRAVENOUS; SUBCUTANEOUS at 20:54

## 2017-12-09 RX ADMIN — OMEPRAZOLE 20 MG: 20 CAPSULE, DELAYED RELEASE ORAL at 08:05

## 2017-12-09 RX ADMIN — DULOXETINE HYDROCHLORIDE 20 MG: 20 CAPSULE, DELAYED RELEASE ORAL at 08:05

## 2017-12-09 RX ADMIN — LACTOBACILLUS ACIDOPHILUS / LACTOBACILLUS BULGARICUS 1 PACKET: 100 MILLION CFU STRENGTH GRANULES at 17:46

## 2017-12-09 RX ADMIN — HEPARIN SODIUM 5000 UNITS: 5000 INJECTION, SOLUTION INTRAVENOUS; SUBCUTANEOUS at 08:05

## 2017-12-09 RX ADMIN — LACTOBACILLUS ACIDOPHILUS / LACTOBACILLUS BULGARICUS 1 PACKET: 100 MILLION CFU STRENGTH GRANULES at 08:05

## 2017-12-09 RX ADMIN — ASPIRIN 81 MG: 81 TABLET, COATED ORAL at 08:05

## 2017-12-09 RX ADMIN — OXYBUTYNIN CHLORIDE 5 MG: 5 TABLET, FILM COATED, EXTENDED RELEASE ORAL at 20:54

## 2017-12-09 ASSESSMENT — ENCOUNTER SYMPTOMS
NAUSEA: 0
NECK PAIN: 0
PND: 0
BLOOD IN STOOL: 0
TREMORS: 0
SHORTNESS OF BREATH: 0
DOUBLE VISION: 0
VOMITING: 0
PALPITATIONS: 0
PHOTOPHOBIA: 0
FALLS: 0
CHILLS: 0
DIZZINESS: 0
WHEEZING: 0
SPEECH CHANGE: 0
SEIZURES: 0
BLURRED VISION: 0
DEPRESSION: 0
COUGH: 0
WEAKNESS: 0

## 2017-12-09 ASSESSMENT — PAIN SCALES - GENERAL
PAINLEVEL_OUTOF10: 0

## 2017-12-09 ASSESSMENT — LIFESTYLE VARIABLES: SUBSTANCE_ABUSE: 0

## 2017-12-09 NOTE — PROGRESS NOTES
[facility] Hospitalist Progress Note    Date of Service: 2017    Chief Complaint  77 y.o. female admitted 2017 with GLF, pyuria and ATN.    Interval Problem Update  : Had BM this AM, says had to strain  : Feeling tired/sleepy.  Otherwise no complaints  : Fever last night, cxr negative, blood cx pending.  WBC in AM  : No physical complaints.  No fever  : Drowsy, no physical complaints.  No fever.  Eating and drinking.  Stooling    Consultants/Specialty  Neurology  Psych    Review of Systems   Constitutional: Positive for malaise/fatigue. Negative for chills.   HENT: Negative for congestion and hearing loss.    Eyes: Negative for blurred vision, double vision and photophobia.   Respiratory: Negative for cough, shortness of breath and wheezing.    Cardiovascular: Negative for palpitations, leg swelling and PND.   Gastrointestinal: Negative for blood in stool, nausea and vomiting.   Genitourinary: Negative for frequency and hematuria.   Musculoskeletal: Negative for falls, joint pain and neck pain.   Skin: Negative for rash.   Neurological: Negative for dizziness, tremors, speech change, seizures and weakness.   Psychiatric/Behavioral: Negative for depression, substance abuse and suicidal ideas.      Physical Exam  Laboratory/Imaging   Hemodynamics  Temp (24hrs), Av.4 °C (97.5 °F), Min:36.2 °C (97.1 °F), Max:36.8 °C (98.3 °F)   Temperature: 36.2 °C (97.1 °F)  Pulse  Av.5  Min: 50  Max: 106    Blood Pressure : 122/54      Respiratory      Respiration: 18, Pulse Oximetry: 92 %        RUL Breath Sounds: Clear, RML Breath Sounds: Diminished, RLL Breath Sounds: Diminished, ARGELIA Breath Sounds: Clear, LLL Breath Sounds: Diminished    Fluids    Intake/Output Summary (Last 24 hours) at 17 1546  Last data filed at 17 1800   Gross per 24 hour   Intake              200 ml   Output                0 ml   Net              200 ml       Nutrition  Orders Placed This Encounter   Procedures    • Diet Order     Standing Status:   Standing     Number of Occurrences:   1     Order Specific Question:   Diet:     Answer:   Regular [1]     Physical Exam   Constitutional: She is oriented to person, place, and time. She appears well-nourished.   HENT:   Head: Normocephalic.   Nose: Nose normal.   Mouth/Throat: No oropharyngeal exudate.   Eyes: EOM are normal. Pupils are equal, round, and reactive to light.   Neck: Normal range of motion. Neck supple. No JVD present. No thyromegaly present.   Cardiovascular: Normal rate, regular rhythm and normal heart sounds.  Exam reveals no gallop.    No murmur heard.  Pulmonary/Chest: Effort normal. She has no wheezes. She exhibits no tenderness.   Abdominal: Soft. Bowel sounds are normal. She exhibits no distension and no mass. There is no rebound.   Musculoskeletal: Normal range of motion. She exhibits no edema or tenderness.   Lymphadenopathy:     She has no cervical adenopathy.   Neurological: She is alert and oriented to person, place, and time. No cranial nerve deficit.   Skin: Skin is warm. No rash noted. She is not diaphoretic.   Psychiatric: Her behavior is normal.   Nursing note and vitals reviewed.          Recent Labs      12/06/17 1817 12/07/17 0259 12/08/17   0243   WBC  13.5*  10.4  10.6   RBC  3.85*  3.83*  4.05*   HEMOGLOBIN  11.3*  11.2*  11.3*   HEMATOCRIT  34.0*  34.1*  35.4*   MCV  88.3  89.0  87.4   MCH  29.4  29.2  27.9   MCHC  33.2*  32.8*  31.9*   RDW  51.9*  52.9*  50.9*   PLATELETCT  278  261  283   MPV  10.8  10.7  11.0     Recent Labs      12/06/17   1817 12/07/17   0259  12/08/17   0243   SODIUM  140  139  141   POTASSIUM  3.7  3.6  3.5*   CHLORIDE  106  107  105   CO2  23  24  26   GLUCOSE  129*  151*  108*   BUN  23*  21  20   CREATININE  1.02  0.97  0.92   CALCIUM  9.1  9.0  9.1         Recent Labs      12/07/17   0259   BNPBTYPENAT  60              Assessment/Plan     * Dementia- (present on admission)   Assessment & Plan    Min  narc/sed when possible.   Await guardianship.        Constipation- (present on admission)   Assessment & Plan    Resolved currently continue bowel care        Congestion of upper airway- (present on admission)   Assessment & Plan    Saturating 90% on RA.  O2, RT, IS, Vax, CXR and encouraging taking of PO fluids.          Physical debility- (present on admission)   Assessment & Plan    PT/OT and increase activity.  Encourage mobilization. Pending guardianship        Obesity (BMI 30.0-34.9)- (present on admission)   Assessment & Plan    Encourage Kcal restriction        T12 compression fracture (CMS-HCC)- (present on admission)   Assessment & Plan    CT spine, no acute fractures. Cont fosamax and calcium  Stable and pending guardianship        Chronic back pain- (present on admission)   Assessment & Plan    As above and min narc/sed when possible.  Bowel Regimen          Stable issues - med hx (GIB, CAD/ICM, CVA, DLD), preventives.    Dispo - complex/fair.  Await guardianship.      Reviewed items::  Labs reviewed and Medications reviewed  Pink catheter::  No Pink  DVT prophylaxis pharmacological::  Heparin  Ulcer Prophylaxis::  Yes

## 2017-12-09 NOTE — CARE PLAN
Problem: Safety  Goal: Will remain free from falls    Intervention: Assess risk factors for falls  Environment is clutter free. Personal possession and bedside table near patient. Patient instructed to call when needing assistance. Call light within reach. Fall precaution in place. Bed locked and in the lowest position. Non-skid socks in place. Hourly rounding in place.       Problem: Venous Thromboembolism (VTW)/Deep Vein Thrombosis (DVT) Prevention:  Goal: Patient will participate in Venous Thrombosis (VTE)/Deep Vein Thrombosis (DVT)Prevention Measures    Intervention: Assess and monitor for anticoagulation complications  Patient receiving unfractionated heparin, refer to MAR.

## 2017-12-09 NOTE — PROGRESS NOTES
Assumed pt care after report received from night Rn. Pt is resting comfortably at this time. No c/o pain or distress noted. Call light and belongings in reach. Bed in low position. IV flushes without difficulty. Will continue to monitor.

## 2017-12-09 NOTE — PROGRESS NOTES
Simona Knight Fall Risk Assessment:     Last Known Fall: Within the last month  Mobility: Immobilized/requires assist of one person  Medications: Cardiovascular or central nervous system meds  Mental Status/LOC/Awareness: Oriented to person and place  Toileting Needs: Incontinence  Volume/Electrolyte Status: No problems  Communication/Sensory: Visual (Glasses)/hearing deficit  Behavior: Appropriate behavior  Simona Knight Fall Risk Total: 14  Fall Risk Level: MODERATE RISK    Universal Fall Precautions:  call light/belongings in reach, bed in low position and locked, siderails up x 2, use non-slip footwear, adequate lighting, clutter free and spill free environment, educate on level of risk, educate to call for assistance    Fall Risk Level Interventions:    TRIAL (TELE 8, NEURO, MED JENNIE 5) Moderate Fall Risk Interventions  Place yellow fall risk ID band on patient: verified  Provide patient/family education based on risk assessment : completed  Educate patient/family to call staff for assistance when getting out of bed: completed  Place fall precaution signage outside patient door: verified  Utilize bed/chair fall alarm: verifiedTRIAL (TELE 8, NEURO, [email or web address] JENNIE 5) High Fall Risk Interventions  Place yellow fall risk ID band on patient: verified  Provide patient/family education based on risk assessment: verified  Educate patient/family to call staff for assistance when getting out of bed: verified  Place fall precaution signage outside patient door: verified  Place patient in room close to nursing station: verified  Utilize bed/chair fall alarm: verified  Notify charge of high risk for huddle: verified    Patient Specific Interventions:     Medication: review medications with patient and family  Mental Status/LOC/Awareness: reinforce falls education, check on patient hourly, utilize bed/chair fall alarm and reinforce the use of call light  Toileting: provide frquent toileting and instruct patient/family on the  need to call for assistance when toileting  Volume/Electrolyte Status: ensure patient remains hydrated and monitor abnormal lab values  Communication/Sensory: update plan of care on whiteboard, ensure proper positioning when transferrng/ambulating and ensure patient has glasses/contacts and hearing aids/dentures  Behavioral: engage patient in daily activities and administer medication as ordered  Mobility: utilize bed/chair fall alarm and ensure bed is locked and in lowest position

## 2017-12-09 NOTE — PROGRESS NOTES
Simona Knight Fall Risk Assessment:     Last Known Fall: Within the last month  Mobility: Immobilized/requires assist of one person  Medications: Cardiovascular or central nervous system meds  Mental Status/LOC/Awareness: Oriented to person and place  Toileting Needs: Incontinence  Volume/Electrolyte Status: No problems  Communication/Sensory: Visual (Glasses)/hearing deficit  Behavior: Appropriate behavior  Simona Knight Fall Risk Total: 14  Fall Risk Level: MODERATE RISK    Universal Fall Precautions:  call light/belongings in reach, bed in low position and locked, siderails up x 2, use non-slip footwear, adequate lighting, clutter free and spill free environment, educate on level of risk, educate to call for assistance    Fall Risk Level Interventions:    TRIAL (TELE 8, NEURO, MED JENNIE 5) Moderate Fall Risk Interventions  Place yellow fall risk ID band on patient: verified  Provide patient/family education based on risk assessment : completed  Educate patient/family to call staff for assistance when getting out of bed: completed  Place fall precaution signage outside patient door: verified  Utilize bed/chair fall alarm: verifiedTRIAL (TELE 8, NEURO, [employer] JENNIE 5) High Fall Risk Interventions  Place yellow fall risk ID band on patient: verified  Provide patient/family education based on risk assessment: verified  Educate patient/family to call staff for assistance when getting out of bed: verified  Place fall precaution signage outside patient door: verified  Place patient in room close to nursing station: verified  Utilize bed/chair fall alarm: verified  Notify charge of high risk for huddle: verified    Patient Specific Interventions:     Medication: review medications with patient and family  Mental Status/LOC/Awareness: reorient patient, reinforce falls education, check on patient hourly and reinforce the use of call light  Toileting: provide frquent toileting and instruct patient/family on the need to call  for assistance when toileting  Volume/Electrolyte Status: ensure patient remains hydrated and monitor abnormal lab values  Communication/Sensory: update plan of care on whiteboard, ensure proper positioning when transferrng/ambulating and ensure patient has glasses/contacts and hearing aids/dentures  Behavioral: encourage patient to voice feelings and administer medication as ordered  Mobility: provide comfort measures during transport, utilize bed/chair fall alarm, ensure bed is locked and in lowest position, provide appropriate assistive device and instruct patient to exit bed on their strongest side

## 2017-12-09 NOTE — CARE PLAN
Problem: Infection  Goal: Will remain free from infection  Outcome: PROGRESSING AS EXPECTED  WBC WNL, afebrile, no active s/s of infection at this time.

## 2017-12-09 NOTE — PROGRESS NOTES
"Report received from day RN. Assumed patient care at 1900. Patient is AAOx2 reoriented to time and event. Patient appears calm and in no acute distress. Labs and orders reviewed. Assessment completed. Patient denies any pain at this time. Patient provided with scheduled medication, refer to MAR. Plan of care discussed with patient; questions have been answered at this time. Patient needs have been met at this time. Patient instructed to call when needing assistance. Call light within reach. Non-Skid socks in place. Bed locked and in the lowest position. Hourly rounding in place. /45   Pulse 69   Temp 36.8 °C (98.3 °F)   Resp 18   Ht 1.6 m (5' 2.99\")   Wt 84.4 kg (186 lb 1.1 oz)   SpO2 95%   Breastfeeding? No   BMI 32.97 kg/m² .   "

## 2017-12-10 PROCEDURE — 700101 HCHG RX REV CODE 250: Performed by: FAMILY MEDICINE

## 2017-12-10 PROCEDURE — A9270 NON-COVERED ITEM OR SERVICE: HCPCS | Performed by: HOSPITALIST

## 2017-12-10 PROCEDURE — 700102 HCHG RX REV CODE 250 W/ 637 OVERRIDE(OP): Performed by: HOSPITALIST

## 2017-12-10 PROCEDURE — 700102 HCHG RX REV CODE 250 W/ 637 OVERRIDE(OP): Performed by: FAMILY MEDICINE

## 2017-12-10 PROCEDURE — A9270 NON-COVERED ITEM OR SERVICE: HCPCS | Performed by: INTERNAL MEDICINE

## 2017-12-10 PROCEDURE — 700102 HCHG RX REV CODE 250 W/ 637 OVERRIDE(OP): Performed by: INTERNAL MEDICINE

## 2017-12-10 PROCEDURE — 700111 HCHG RX REV CODE 636 W/ 250 OVERRIDE (IP): Performed by: HOSPITALIST

## 2017-12-10 PROCEDURE — 770006 HCHG ROOM/CARE - MED/SURG/GYN SEMI*

## 2017-12-10 PROCEDURE — A9270 NON-COVERED ITEM OR SERVICE: HCPCS | Performed by: FAMILY MEDICINE

## 2017-12-10 PROCEDURE — 99231 SBSQ HOSP IP/OBS SF/LOW 25: CPT | Performed by: INTERNAL MEDICINE

## 2017-12-10 RX ADMIN — GUAIFENESIN 600 MG: 600 TABLET, EXTENDED RELEASE ORAL at 08:01

## 2017-12-10 RX ADMIN — LACTOBACILLUS ACIDOPHILUS / LACTOBACILLUS BULGARICUS 1 PACKET: 100 MILLION CFU STRENGTH GRANULES at 18:25

## 2017-12-10 RX ADMIN — HYDRALAZINE HYDROCHLORIDE 100 MG: 50 TABLET ORAL at 20:14

## 2017-12-10 RX ADMIN — ATORVASTATIN CALCIUM 80 MG: 40 TABLET, FILM COATED ORAL at 20:14

## 2017-12-10 RX ADMIN — Medication 500 MG: at 08:01

## 2017-12-10 RX ADMIN — GUAIFENESIN 600 MG: 600 TABLET, EXTENDED RELEASE ORAL at 20:14

## 2017-12-10 RX ADMIN — POLYETHYLENE GLYCOL (3350) 1 PACKET: 17 POWDER, FOR SOLUTION ORAL at 08:01

## 2017-12-10 RX ADMIN — ALENDRONATE SODIUM 10 MG: 10 TABLET ORAL at 05:50

## 2017-12-10 RX ADMIN — HEPARIN SODIUM 5000 UNITS: 5000 INJECTION, SOLUTION INTRAVENOUS; SUBCUTANEOUS at 20:15

## 2017-12-10 RX ADMIN — LACTOBACILLUS ACIDOPHILUS / LACTOBACILLUS BULGARICUS 1 PACKET: 100 MILLION CFU STRENGTH GRANULES at 13:14

## 2017-12-10 RX ADMIN — STANDARDIZED SENNA CONCENTRATE AND DOCUSATE SODIUM 2 TABLET: 8.6; 5 TABLET, FILM COATED ORAL at 20:14

## 2017-12-10 RX ADMIN — ASPIRIN 81 MG: 81 TABLET, COATED ORAL at 08:01

## 2017-12-10 RX ADMIN — HYDRALAZINE HYDROCHLORIDE 100 MG: 50 TABLET ORAL at 06:33

## 2017-12-10 RX ADMIN — HYDRALAZINE HYDROCHLORIDE 100 MG: 50 TABLET ORAL at 13:14

## 2017-12-10 RX ADMIN — GABAPENTIN 300 MG: 300 CAPSULE ORAL at 20:14

## 2017-12-10 RX ADMIN — OMEPRAZOLE 20 MG: 20 CAPSULE, DELAYED RELEASE ORAL at 08:01

## 2017-12-10 RX ADMIN — LACTOBACILLUS ACIDOPHILUS / LACTOBACILLUS BULGARICUS 1 PACKET: 100 MILLION CFU STRENGTH GRANULES at 08:01

## 2017-12-10 RX ADMIN — Medication 500 MG: at 18:25

## 2017-12-10 RX ADMIN — OXYBUTYNIN CHLORIDE 5 MG: 5 TABLET, FILM COATED, EXTENDED RELEASE ORAL at 21:21

## 2017-12-10 RX ADMIN — LIDOCAINE 1 PATCH: 50 PATCH CUTANEOUS at 08:02

## 2017-12-10 RX ADMIN — DULOXETINE HYDROCHLORIDE 20 MG: 20 CAPSULE, DELAYED RELEASE ORAL at 08:01

## 2017-12-10 RX ADMIN — HEPARIN SODIUM 5000 UNITS: 5000 INJECTION, SOLUTION INTRAVENOUS; SUBCUTANEOUS at 08:01

## 2017-12-10 RX ADMIN — BENAZEPRIL HYDROCHLORIDE 40 MG: 20 TABLET ORAL at 08:01

## 2017-12-10 ASSESSMENT — ENCOUNTER SYMPTOMS
FALLS: 0
VOMITING: 0
COUGH: 0
SHORTNESS OF BREATH: 0
SEIZURES: 0
DOUBLE VISION: 0
BLURRED VISION: 0
PND: 0
PALPITATIONS: 0
SPEECH CHANGE: 0
WHEEZING: 0
BLOOD IN STOOL: 0
CHILLS: 0
NAUSEA: 0
DIZZINESS: 0
DEPRESSION: 0
TREMORS: 0
WEAKNESS: 0
PHOTOPHOBIA: 0
NECK PAIN: 0

## 2017-12-10 ASSESSMENT — LIFESTYLE VARIABLES: SUBSTANCE_ABUSE: 0

## 2017-12-10 ASSESSMENT — PAIN SCALES - GENERAL
PAINLEVEL_OUTOF10: 0
PAINLEVEL_OUTOF10: 0

## 2017-12-10 NOTE — PROGRESS NOTES
[facility] Hospitalist Progress Note    Date of Service: 12/10/2017    Chief Complaint  77 y.o. female admitted 2017 with GLF, pyuria and ATN.    Interval Problem Update  : Had BM this AM, says had to strain  : Feeling tired/sleepy.  Otherwise no complaints  : Fever last night, cxr negative, blood cx pending.  WBC in AM  : No physical complaints.  No fever  : Drowsy, no physical complaints.  No fever.  Eating and drinking.  Stooling  12/10: No complaints, drowsy, napping    Consultants/Specialty  Neurology  Psych      Review of Systems   Constitutional: Positive for malaise/fatigue. Negative for chills.   HENT: Negative for congestion and hearing loss.    Eyes: Negative for blurred vision, double vision and photophobia.   Respiratory: Negative for cough, shortness of breath and wheezing.    Cardiovascular: Negative for palpitations, leg swelling and PND.   Gastrointestinal: Negative for blood in stool, nausea and vomiting.   Genitourinary: Negative for frequency and hematuria.   Musculoskeletal: Negative for falls, joint pain and neck pain.   Skin: Negative for rash.   Neurological: Negative for dizziness, tremors, speech change, seizures and weakness.   Psychiatric/Behavioral: Negative for depression, substance abuse and suicidal ideas.      Physical Exam  Laboratory/Imaging   Hemodynamics  Temp (24hrs), Av.6 °C (97.9 °F), Min:36.3 °C (97.4 °F), Max:36.9 °C (98.4 °F)   Temperature: 36.3 °C (97.4 °F)  Pulse  Av.6  Min: 50  Max: 106    Blood Pressure : 119/57      Respiratory      Respiration: 20, Pulse Oximetry: 94 %     Work Of Breathing / Effort: Mild  RLL Breath Sounds: Diminished, LLL Breath Sounds: Diminished    Fluids    Intake/Output Summary (Last 24 hours) at 12/10/17 1352  Last data filed at 17 1800   Gross per 24 hour   Intake              300 ml   Output                0 ml   Net              300 ml       Nutrition  Orders Placed This Encounter   Procedures   • Diet  Order     Standing Status:   Standing     Number of Occurrences:   1     Order Specific Question:   Diet:     Answer:   Regular [1]     Physical Exam   Constitutional: She is oriented to person, place, and time. She appears well-nourished.   HENT:   Head: Normocephalic.   Nose: Nose normal.   Mouth/Throat: No oropharyngeal exudate.   Eyes: EOM are normal. Pupils are equal, round, and reactive to light.   Neck: Normal range of motion. Neck supple. No JVD present. No thyromegaly present.   Cardiovascular: Normal rate, regular rhythm and normal heart sounds.  Exam reveals no gallop.    No murmur heard.  Pulmonary/Chest: Effort normal. She has no wheezes. She exhibits no tenderness.   Abdominal: Soft. Bowel sounds are normal. She exhibits no distension and no mass. There is no rebound.   Musculoskeletal: Normal range of motion. She exhibits no edema or tenderness.   Lymphadenopathy:     She has no cervical adenopathy.   Neurological: She is alert and oriented to person, place, and time. No cranial nerve deficit.   Skin: Skin is warm. No rash noted. She is not diaphoretic.   Psychiatric: Her behavior is normal.   Nursing note and vitals reviewed.          Recent Labs      12/08/17   0243   WBC  10.6   RBC  4.05*   HEMOGLOBIN  11.3*   HEMATOCRIT  35.4*   MCV  87.4   MCH  27.9   MCHC  31.9*   RDW  50.9*   PLATELETCT  283   MPV  11.0     Recent Labs      12/08/17   0243   SODIUM  141   POTASSIUM  3.5*   CHLORIDE  105   CO2  26   GLUCOSE  108*   BUN  20   CREATININE  0.92   CALCIUM  9.1                      Assessment/Plan     * Dementia- (present on admission)   Assessment & Plan    Min narc/sed when possible.   Await guardianship.        Constipation- (present on admission)   Assessment & Plan    Resolved currently continue bowel care        Congestion of upper airway- (present on admission)   Assessment & Plan    Saturating 90% on RA.  O2, RT, IS, Vax, CXR and encouraging taking of PO fluids.          Physical debility-  (present on admission)   Assessment & Plan    PT/OT and increase activity.  Encourage mobilization. Pending guardianship        Obesity (BMI 30.0-34.9)- (present on admission)   Assessment & Plan    Encourage Kcal restriction        T12 compression fracture (CMS-HCC)- (present on admission)   Assessment & Plan    CT spine, no acute fractures. Cont fosamax and calcium  Stable and pending guardianship        Chronic back pain- (present on admission)   Assessment & Plan    As above and min narc/sed when possible.  Bowel Regimen          Stable issues - med hx (GIB, CAD/ICM, CVA, DLD), preventives.    Dispo - complex/fair.  Await guardianship.      Reviewed items::  Labs reviewed and Medications reviewed  Pink catheter::  No Pink  DVT prophylaxis pharmacological::  Heparin  Ulcer Prophylaxis::  Yes

## 2017-12-10 NOTE — PROGRESS NOTES
Simona Knight Fall Risk Assessment:     Last Known Fall: Within the last month  Mobility: Immobilized/requires assist of one person  Medications: Cardiovascular or central nervous system meds  Mental Status/LOC/Awareness: Oriented to person and place  Toileting Needs: Incontinence  Volume/Electrolyte Status: No problems  Communication/Sensory: Visual (Glasses)/hearing deficit  Behavior: Appropriate behavior  Simona Knight Fall Risk Total: 14  Fall Risk Level: MODERATE RISK    Universal Fall Precautions:  call light/belongings in reach, wheelchairs and assistive devices out of sight, bed in low position and locked, siderails up x 2, use non-slip footwear, adequate lighting, clutter free and spill free environment, educate on level of risk, educate to call for assistance    Fall Risk Level Interventions:    TRIAL (Ensequence 8, NEURO, MED JENNIE 5) Moderate Fall Risk Interventions  Place yellow fall risk ID band on patient: verified  Provide patient/family education based on risk assessment : completed  Educate patient/family to call staff for assistance when getting out of bed: completed  Place fall precaution signage outside patient door: verified  Utilize bed/chair fall alarm: verifiedTRIAL (Ensequence 8, NEURO, [place] JENNIE 5) High Fall Risk Interventions  Place yellow fall risk ID band on patient: verified  Provide patient/family education based on risk assessment: verified  Educate patient/family to call staff for assistance when getting out of bed: verified  Place fall precaution signage outside patient door: verified  Place patient in room close to nursing station: verified  Utilize bed/chair fall alarm: verified  Notify charge of high risk for huddle: verified    Patient Specific Interventions:     Medication: review medications with patient and family  Mental Status/LOC/Awareness: check on patient hourly, utilize bed/chair fall alarm and reinforce the use of call light  Toileting: monitor intake and output/use of  appropriate interventions and instruct patient/family on the need to call for assistance when toileting  Volume/Electrolyte Status: ensure patient remains hydrated and monitor abnormal lab values  Communication/Sensory: update plan of care on whiteboard and ensure patient has glasses/contacts and hearing aids/dentures  Behavioral: administer medication as ordered  Mobility: utilize bed/chair fall alarm and ensure bed is locked and in lowest position

## 2017-12-10 NOTE — CARE PLAN
Problem: Safety  Goal: Will remain free from injury  Outcome: PROGRESSING AS EXPECTED  Bed in low position, bed alarm on, non skid socks on, hourly rounds performed, call bell in reach.

## 2017-12-10 NOTE — PROGRESS NOTES
Pt alert, orient x2, no new complaints, denies pain at this time, tolerating diet, incontinent urine, and caro care complete

## 2017-12-11 LAB
BACTERIA BLD CULT: NORMAL
BACTERIA BLD CULT: NORMAL
SIGNIFICANT IND 70042: NORMAL
SIGNIFICANT IND 70042: NORMAL
SITE SITE: NORMAL
SITE SITE: NORMAL
SOURCE SOURCE: NORMAL
SOURCE SOURCE: NORMAL

## 2017-12-11 PROCEDURE — A9270 NON-COVERED ITEM OR SERVICE: HCPCS | Performed by: FAMILY MEDICINE

## 2017-12-11 PROCEDURE — A9270 NON-COVERED ITEM OR SERVICE: HCPCS | Performed by: HOSPITALIST

## 2017-12-11 PROCEDURE — 700102 HCHG RX REV CODE 250 W/ 637 OVERRIDE(OP): Performed by: INTERNAL MEDICINE

## 2017-12-11 PROCEDURE — 97116 GAIT TRAINING THERAPY: CPT

## 2017-12-11 PROCEDURE — 700101 HCHG RX REV CODE 250: Performed by: FAMILY MEDICINE

## 2017-12-11 PROCEDURE — 700102 HCHG RX REV CODE 250 W/ 637 OVERRIDE(OP): Performed by: HOSPITALIST

## 2017-12-11 PROCEDURE — 700111 HCHG RX REV CODE 636 W/ 250 OVERRIDE (IP): Performed by: HOSPITALIST

## 2017-12-11 PROCEDURE — 99231 SBSQ HOSP IP/OBS SF/LOW 25: CPT | Performed by: INTERNAL MEDICINE

## 2017-12-11 PROCEDURE — 770006 HCHG ROOM/CARE - MED/SURG/GYN SEMI*

## 2017-12-11 PROCEDURE — A9270 NON-COVERED ITEM OR SERVICE: HCPCS | Performed by: INTERNAL MEDICINE

## 2017-12-11 PROCEDURE — 700102 HCHG RX REV CODE 250 W/ 637 OVERRIDE(OP): Performed by: FAMILY MEDICINE

## 2017-12-11 RX ADMIN — OMEPRAZOLE 20 MG: 20 CAPSULE, DELAYED RELEASE ORAL at 09:13

## 2017-12-11 RX ADMIN — GABAPENTIN 300 MG: 300 CAPSULE ORAL at 20:30

## 2017-12-11 RX ADMIN — Medication 500 MG: at 09:14

## 2017-12-11 RX ADMIN — BENAZEPRIL HYDROCHLORIDE 40 MG: 20 TABLET ORAL at 09:13

## 2017-12-11 RX ADMIN — HYDRALAZINE HYDROCHLORIDE 100 MG: 50 TABLET ORAL at 16:37

## 2017-12-11 RX ADMIN — LIDOCAINE 1 PATCH: 50 PATCH CUTANEOUS at 09:13

## 2017-12-11 RX ADMIN — GUAIFENESIN 600 MG: 600 TABLET, EXTENDED RELEASE ORAL at 20:30

## 2017-12-11 RX ADMIN — DULOXETINE HYDROCHLORIDE 20 MG: 20 CAPSULE, DELAYED RELEASE ORAL at 09:15

## 2017-12-11 RX ADMIN — Medication 500 MG: at 18:29

## 2017-12-11 RX ADMIN — ATORVASTATIN CALCIUM 80 MG: 40 TABLET, FILM COATED ORAL at 20:30

## 2017-12-11 RX ADMIN — LACTOBACILLUS ACIDOPHILUS / LACTOBACILLUS BULGARICUS 1 PACKET: 100 MILLION CFU STRENGTH GRANULES at 09:14

## 2017-12-11 RX ADMIN — ACETAMINOPHEN 650 MG: 325 TABLET, FILM COATED ORAL at 20:36

## 2017-12-11 RX ADMIN — HYDRALAZINE HYDROCHLORIDE 100 MG: 50 TABLET ORAL at 20:31

## 2017-12-11 RX ADMIN — ASPIRIN 81 MG: 81 TABLET, COATED ORAL at 09:14

## 2017-12-11 RX ADMIN — HEPARIN SODIUM 5000 UNITS: 5000 INJECTION, SOLUTION INTRAVENOUS; SUBCUTANEOUS at 20:32

## 2017-12-11 RX ADMIN — STANDARDIZED SENNA CONCENTRATE AND DOCUSATE SODIUM 2 TABLET: 8.6; 5 TABLET, FILM COATED ORAL at 20:30

## 2017-12-11 RX ADMIN — HEPARIN SODIUM 5000 UNITS: 5000 INJECTION, SOLUTION INTRAVENOUS; SUBCUTANEOUS at 09:14

## 2017-12-11 RX ADMIN — HYDRALAZINE HYDROCHLORIDE 100 MG: 50 TABLET ORAL at 06:29

## 2017-12-11 RX ADMIN — GUAIFENESIN 600 MG: 600 TABLET, EXTENDED RELEASE ORAL at 09:14

## 2017-12-11 RX ADMIN — LACTOBACILLUS ACIDOPHILUS / LACTOBACILLUS BULGARICUS 1 PACKET: 100 MILLION CFU STRENGTH GRANULES at 13:09

## 2017-12-11 RX ADMIN — ALENDRONATE SODIUM 10 MG: 10 TABLET ORAL at 06:29

## 2017-12-11 RX ADMIN — POLYETHYLENE GLYCOL (3350) 1 PACKET: 17 POWDER, FOR SOLUTION ORAL at 09:14

## 2017-12-11 RX ADMIN — LACTOBACILLUS ACIDOPHILUS / LACTOBACILLUS BULGARICUS 1 PACKET: 100 MILLION CFU STRENGTH GRANULES at 18:29

## 2017-12-11 RX ADMIN — OXYBUTYNIN CHLORIDE 5 MG: 5 TABLET, FILM COATED, EXTENDED RELEASE ORAL at 20:30

## 2017-12-11 ASSESSMENT — GAIT ASSESSMENTS
GAIT LEVEL OF ASSIST: STAND BY ASSIST
ASSISTIVE DEVICE: FRONT WHEEL WALKER
DEVIATION: BRADYKINETIC;SHUFFLED GAIT;OTHER (COMMENT)
DISTANCE (FEET): 50

## 2017-12-11 ASSESSMENT — ENCOUNTER SYMPTOMS
DOUBLE VISION: 0
SPEECH CHANGE: 0
CHILLS: 0
COUGH: 0
VOMITING: 0
SHORTNESS OF BREATH: 0
FALLS: 0
TREMORS: 0
BLOOD IN STOOL: 0
PND: 0
NAUSEA: 0
NECK PAIN: 0
WEAKNESS: 0
BLURRED VISION: 0
SEIZURES: 0
DEPRESSION: 0
PHOTOPHOBIA: 0
PALPITATIONS: 0
DIZZINESS: 0
WHEEZING: 0

## 2017-12-11 ASSESSMENT — LIFESTYLE VARIABLES: SUBSTANCE_ABUSE: 0

## 2017-12-11 ASSESSMENT — COGNITIVE AND FUNCTIONAL STATUS - GENERAL
MOBILITY SCORE: 18
STANDING UP FROM CHAIR USING ARMS: A LITTLE
SUGGESTED CMS G CODE MODIFIER MOBILITY: CK
CLIMB 3 TO 5 STEPS WITH RAILING: A LITTLE
MOVING FROM LYING ON BACK TO SITTING ON SIDE OF FLAT BED: A LITTLE
WALKING IN HOSPITAL ROOM: A LITTLE
MOVING TO AND FROM BED TO CHAIR: A LITTLE
TURNING FROM BACK TO SIDE WHILE IN FLAT BAD: A LITTLE

## 2017-12-11 ASSESSMENT — PAIN SCALES - GENERAL
PAINLEVEL_OUTOF10: 0
PAINLEVEL_OUTOF10: 5
PAINLEVEL_OUTOF10: 2

## 2017-12-11 NOTE — PROGRESS NOTES
Simona Knight Fall Risk Assessment:     Last Known Fall: Within the last month  Mobility: Immobilized/requires assist of one person  Medications: Cardiovascular or central nervous system meds  Mental Status/LOC/Awareness: Oriented to person and place  Toileting Needs: Incontinence  Volume/Electrolyte Status: No problems  Communication/Sensory: Visual (Glasses)/hearing deficit  Behavior: Appropriate behavior  Simona Knight Fall Risk Total: 14  Fall Risk Level: MODERATE RISK    Universal Fall Precautions:  call light/belongings in reach, bed in low position and locked, wheelchairs and assistive devices out of sight, siderails up x 2, adequate lighting, use non-slip footwear, clutter free and spill free environment, educate on level of risk, educate to call for assistance    Fall Risk Level Interventions:    TRIAL ([employer] 8, NEURO, MED JENNIE 5) Moderate Fall Risk Interventions  Place yellow fall risk ID band on patient: verified  Provide patient/family education based on risk assessment : completed  Educate patient/family to call staff for assistance when getting out of bed: completed  Place fall precaution signage outside patient door: verified  Utilize bed/chair fall alarm: verifiedTRIAL ([employer] 8, NEURO, MED JENNIE 5) High Fall Risk Interventions  Place yellow fall risk ID band on patient: verified  Provide patient/family education based on risk assessment: verified  Educate patient/family to call staff for assistance when getting out of bed: verified  Place fall precaution signage outside patient door: verified  Place patient in room close to nursing station: verified  Utilize bed/chair fall alarm: verified  Notify charge of high risk for huddle: verified    Patient Specific Interventions:     Medication: review medications with patient and family and limit combination of prn medications  Mental Status/LOC/Awareness: reorient patient, reinforce falls education, check on patient hourly, utilize bed/chair fall alarm,  reinforce the use of call light and provide activity  Toileting: provide frquent toileting, instruct patient/family on the use of grab bars, instruct patient/family on the need to call for assistance when toileting and do not leave patient unattended in bathroom/refer to toileting scripting  Volume/Electrolyte Status: ensure patient remains hydrated and advance diet as tolerated  Communication/Sensory: update plan of care on whiteboard and ensure proper positioning when transferrng/ambulating  Behavioral: encourage patient to voice feelings, engage patient in daily activities, administer medication as ordered and instruct/reinforce fall program rationale  Mobility: provide comfort measures during transport, utilize bed/chair fall alarm, ensure bed is locked and in lowest position, provide appropriate assistive device and instruct patient to exit bed on their strongest side

## 2017-12-11 NOTE — CARE PLAN
Problem: Safety  Goal: Will remain free from injury  Outcome: PROGRESSING AS EXPECTED  Bed alarm on, treaded socks on, room near nurses station, call light in reach, rounding implemented. Patient educated on calling for assistance.     Problem: Discharge Barriers/Planning  Goal: Patient's continuum of care needs will be met  Outcome: PROGRESSING SLOWER THAN EXPECTED  Patient awaiting guardian. Court date on 12/19. Possible planning for group home at discharge. SW/ CM involved.

## 2017-12-11 NOTE — DISCHARGE PLANNING
Update Discharge Planning:  Discussed with IDT :  Guardianship court hearing on 12/19/17 per RONALDO.

## 2017-12-11 NOTE — PROGRESS NOTES
Patient oriented to self and place. Reorientation provided to time and event. Room near nurses station. Tolerated evening medications, on room air, IV SL. Educated on IS, reinforcement needed. Incontinent of urine. Linens changed by staff. Bed alarm on, room near nurses station. Reminded to call for assistance when needed. Call light in reach, rounding implemented.

## 2017-12-11 NOTE — THERAPY
"Physical Therapy Treatment completed.   Bed Mobility:  Supine to Sit: Stand by Assist  Transfers: Sit to Stand: Contact Guard Assist  Gait: Level Of Assist: Stand by Assist with Front-Wheel Walker       Plan of Care: Will benefit from Physical Therapy 2 times per week  Discharge Recommendations: Equipment: Will Continue to Assess for Equipment Needs.     Pt is demonstrating improved functional mobility with ambulation and toilet transfers but still reqs vc and tc with set-up of FWW for completion of task. Pt will continue to benefit from skilled therapy service. PT anticipates that pending guardianship, may benefit from transitional care (group home ideally) with 24/7 SPV. Will follow.      See \"Rehab Therapy-Acute\" Patient Summary Report for complete documentation.       "

## 2017-12-12 PROCEDURE — A9270 NON-COVERED ITEM OR SERVICE: HCPCS | Performed by: FAMILY MEDICINE

## 2017-12-12 PROCEDURE — A9270 NON-COVERED ITEM OR SERVICE: HCPCS | Performed by: HOSPITALIST

## 2017-12-12 PROCEDURE — 700102 HCHG RX REV CODE 250 W/ 637 OVERRIDE(OP): Performed by: HOSPITALIST

## 2017-12-12 PROCEDURE — 700111 HCHG RX REV CODE 636 W/ 250 OVERRIDE (IP): Performed by: HOSPITALIST

## 2017-12-12 PROCEDURE — 700101 HCHG RX REV CODE 250: Performed by: FAMILY MEDICINE

## 2017-12-12 PROCEDURE — 770006 HCHG ROOM/CARE - MED/SURG/GYN SEMI*

## 2017-12-12 PROCEDURE — 700102 HCHG RX REV CODE 250 W/ 637 OVERRIDE(OP): Performed by: FAMILY MEDICINE

## 2017-12-12 PROCEDURE — A9270 NON-COVERED ITEM OR SERVICE: HCPCS | Performed by: INTERNAL MEDICINE

## 2017-12-12 PROCEDURE — 700102 HCHG RX REV CODE 250 W/ 637 OVERRIDE(OP): Performed by: INTERNAL MEDICINE

## 2017-12-12 PROCEDURE — 99231 SBSQ HOSP IP/OBS SF/LOW 25: CPT | Performed by: HOSPITALIST

## 2017-12-12 RX ADMIN — ASPIRIN 81 MG: 81 TABLET, COATED ORAL at 08:56

## 2017-12-12 RX ADMIN — GABAPENTIN 300 MG: 300 CAPSULE ORAL at 21:16

## 2017-12-12 RX ADMIN — HYDRALAZINE HYDROCHLORIDE 100 MG: 50 TABLET ORAL at 05:53

## 2017-12-12 RX ADMIN — LACTOBACILLUS ACIDOPHILUS / LACTOBACILLUS BULGARICUS 1 PACKET: 100 MILLION CFU STRENGTH GRANULES at 17:10

## 2017-12-12 RX ADMIN — HYDRALAZINE HYDROCHLORIDE 100 MG: 50 TABLET ORAL at 14:19

## 2017-12-12 RX ADMIN — LACTOBACILLUS ACIDOPHILUS / LACTOBACILLUS BULGARICUS 1 PACKET: 100 MILLION CFU STRENGTH GRANULES at 07:30

## 2017-12-12 RX ADMIN — DULOXETINE HYDROCHLORIDE 20 MG: 20 CAPSULE, DELAYED RELEASE ORAL at 08:56

## 2017-12-12 RX ADMIN — HEPARIN SODIUM 5000 UNITS: 5000 INJECTION, SOLUTION INTRAVENOUS; SUBCUTANEOUS at 08:43

## 2017-12-12 RX ADMIN — Medication 500 MG: at 08:57

## 2017-12-12 RX ADMIN — STANDARDIZED SENNA CONCENTRATE AND DOCUSATE SODIUM 2 TABLET: 8.6; 5 TABLET, FILM COATED ORAL at 21:17

## 2017-12-12 RX ADMIN — GUAIFENESIN 600 MG: 600 TABLET, EXTENDED RELEASE ORAL at 08:57

## 2017-12-12 RX ADMIN — BENAZEPRIL HYDROCHLORIDE 40 MG: 20 TABLET ORAL at 08:56

## 2017-12-12 RX ADMIN — OMEPRAZOLE 20 MG: 20 CAPSULE, DELAYED RELEASE ORAL at 08:56

## 2017-12-12 RX ADMIN — LIDOCAINE 1 PATCH: 50 PATCH CUTANEOUS at 11:17

## 2017-12-12 RX ADMIN — POLYETHYLENE GLYCOL (3350) 1 PACKET: 17 POWDER, FOR SOLUTION ORAL at 08:56

## 2017-12-12 RX ADMIN — HEPARIN SODIUM 5000 UNITS: 5000 INJECTION, SOLUTION INTRAVENOUS; SUBCUTANEOUS at 21:16

## 2017-12-12 RX ADMIN — ALENDRONATE SODIUM 10 MG: 10 TABLET ORAL at 05:53

## 2017-12-12 RX ADMIN — Medication 500 MG: at 19:13

## 2017-12-12 RX ADMIN — GUAIFENESIN 600 MG: 600 TABLET, EXTENDED RELEASE ORAL at 21:16

## 2017-12-12 RX ADMIN — HYDRALAZINE HYDROCHLORIDE 100 MG: 50 TABLET ORAL at 21:16

## 2017-12-12 RX ADMIN — OXYBUTYNIN CHLORIDE 5 MG: 5 TABLET, FILM COATED, EXTENDED RELEASE ORAL at 21:16

## 2017-12-12 RX ADMIN — ATORVASTATIN CALCIUM 80 MG: 40 TABLET, FILM COATED ORAL at 21:16

## 2017-12-12 ASSESSMENT — ENCOUNTER SYMPTOMS
DIZZINESS: 0
NAUSEA: 0
WHEEZING: 0
PALPITATIONS: 0
VOMITING: 0
TREMORS: 0
CHILLS: 0
WEAKNESS: 0
DEPRESSION: 0
SEIZURES: 0
SHORTNESS OF BREATH: 0
SPEECH CHANGE: 0
MYALGIAS: 0
DOUBLE VISION: 0
PHOTOPHOBIA: 0
COUGH: 0

## 2017-12-12 ASSESSMENT — LIFESTYLE VARIABLES
SUBSTANCE_ABUSE: 0
DO YOU DRINK ALCOHOL: NO
DO YOU DRINK ALCOHOL: NO

## 2017-12-12 ASSESSMENT — PAIN SCALES - GENERAL
PAINLEVEL_OUTOF10: 0

## 2017-12-12 NOTE — PROGRESS NOTES
Patient was up to chair for meals. P/T ambulated patient 50 feet today. Urinary incontinence. No complaints of pain today. Lidocaine patch placed on lower back.

## 2017-12-12 NOTE — PROGRESS NOTES
Simona Knight Fall Risk Assessment:     Last Known Fall: Within the last month  Mobility: Immobilized/requires assist of one person, Dizziness/generalized weakness  Medications: Cardiovascular or central nervous system meds  Mental Status/LOC/Awareness: Oriented to person and place  Toileting Needs: Incontinence  Volume/Electrolyte Status: No problems  Communication/Sensory: Visual (Glasses)/hearing deficit  Behavior: Appropriate behavior  Simona Knight Fall Risk Total: 15  Fall Risk Level: HIGH RISK     Universal Fall Precautions:  call light/belongings in reach, bed in low position and locked, wheelchairs and assistive devices out of sight, siderails up x 2, use non-slip footwear     Fall Risk Level Interventions:    TRIAL (TELE 8, NEURO, MED JENNIE 5) Moderate Fall Risk Interventions  Place yellow fall risk ID band on patient: verified  Provide patient/family education based on risk assessment : completed  Educate patient/family to call staff for assistance when getting out of bed: completed  Place fall precaution signage outside patient door: verified  Utilize bed/chair fall alarm: verifiedTRIAL (TELE 8, NEURO, MED JENNIE 5) High Fall Risk Interventions  Place yellow fall risk ID band on patient: verified  Provide patient/family education based on risk assessment: verified  Educate patient/family to call staff for assistance when getting out of bed: verified  Place fall precaution signage outside patient door: verified  Place patient in room close to nursing station: verified  Utilize bed/chair fall alarm: verified  Notify charge of high risk for huddle: verified     Patient Specific Interventions:      Medication: review medications with patient and family  Mental Status/LOC/Awareness: reorient patient, reinforce falls education, check on patient hourly, utilize bed/chair fall alarm and reinforce the use of call light  Toileting: provide frquent toileting and instruct patient/family on the need to call for  assistance when toileting  Volume/Electrolyte Status: ensure patient remains hydrated  Communication/Sensory: update plan of care on whiteboard and ensure patient has glasses/contacts and hearing aids/dentures  Behavioral: encourage patient to voice feelings, engage patient in daily activities and administer medication as ordered  Mobility: utilize bed/chair fall alarm and ensure bed is locked and in lowest position

## 2017-12-12 NOTE — CARE PLAN
Problem: Discharge Barriers/Planning  Goal: Patient's continuum of care needs will be met  Patient has court date for guardianship on 12-19.    Problem: Pain Management  Goal: Pain level will decrease to patient's comfort goal  Pt c/o pain in back 5/10. Patient given tylenol, reports decrease in pain to tolerable level.

## 2017-12-12 NOTE — PROGRESS NOTES
Assumed care of pt at 0700. Received report from RN. Pt A&Ox3-4. Assessment complete. Pt denies pain at this time. Pt to be OOB for all meals. Bed/chair alarms in use.  Labs reviewed. Pt and RN discussed plan of care. Pt questions answered. Pt needs are met at this time. Bed in lowest and locked position. Call light within reach. Upper bed rails up. Hourly rounding in place.

## 2017-12-12 NOTE — PROGRESS NOTES
[facility] Hospitalist Progress Note    Date of Service: 2017    Chief Complaint  77 y.o. female admitted 2017 with GLF, pyuria and ATN.    Interval Problem Update  : Had BM this AM, says had to strain  : Feeling tired/sleepy.  Otherwise no complaints  : Fever last night, cxr negative, blood cx pending.  WBC in AM  : No physical complaints.  No fever  : Drowsy, no physical complaints.  No fever.  Eating and drinking.  Stooling  12/10: No complaints, drowsy, napping  : No physical complaints, encouraged ambulation TID and chair for meals.  Pending placement  : No issues overnight; remains confused at times    Consultants/Specialty  Neurology  Psych      Review of Systems   Constitutional: Positive for malaise/fatigue. Negative for chills.   HENT: Negative for congestion and hearing loss.    Eyes: Negative for double vision and photophobia.   Respiratory: Negative for cough, shortness of breath and wheezing.    Cardiovascular: Negative for chest pain and palpitations.   Gastrointestinal: Negative for nausea and vomiting.   Genitourinary: Negative for dysuria and frequency.   Musculoskeletal: Negative for joint pain and myalgias.   Skin: Negative for rash.   Neurological: Negative for dizziness, tremors, speech change, seizures and weakness.   Psychiatric/Behavioral: Negative for depression and substance abuse.      Physical Exam  Laboratory/Imaging   Hemodynamics  Temp (24hrs), Av.3 °C (97.3 °F), Min:36 °C (96.8 °F), Max:36.6 °C (97.8 °F)   Temperature: 36.6 °C (97.8 °F)  Pulse  Av.8  Min: 50  Max: 106    Blood Pressure : 136/65      Respiratory      Respiration: 18, Pulse Oximetry: 94 %        RUL Breath Sounds: Clear, RML Breath Sounds: Clear, RLL Breath Sounds: Diminished, ARGELIA Breath Sounds: Clear, LLL Breath Sounds: Diminished    Fluids    Intake/Output Summary (Last 24 hours) at 17 1355  Last data filed at 17 1200   Gross per 24 hour   Intake               480 ml   Output                0 ml   Net              480 ml       Nutrition  Orders Placed This Encounter   Procedures   • Diet Order     Standing Status:   Standing     Number of Occurrences:   1     Order Specific Question:   Diet:     Answer:   Regular [1]     Physical Exam   Constitutional: She appears well-nourished. No distress.   HENT:   Head: Normocephalic and atraumatic.   Mouth/Throat: No oropharyngeal exudate.   Eyes: Conjunctivae are normal. Pupils are equal, round, and reactive to light.   Neck: Normal range of motion. Neck supple. No thyromegaly present.   Cardiovascular: Normal rate and regular rhythm.    No murmur heard.  Pulmonary/Chest: Effort normal and breath sounds normal. No respiratory distress.   Abdominal: Soft. Bowel sounds are normal. She exhibits no distension.   Musculoskeletal: Normal range of motion. She exhibits no edema.   Lymphadenopathy:     She has no cervical adenopathy.   Neurological: She is alert.   Skin: Skin is warm and dry. She is not diaphoretic.   Psychiatric: Her behavior is normal.   Nursing note and vitals reviewed.                                   Assessment/Plan     * Dementia- (present on admission)   Assessment & Plan    Min narc/sed when possible.   Await guardianship.        Constipation- (present on admission)   Assessment & Plan    Resolved currently continue bowel care        Congestion of upper airway- (present on admission)   Assessment & Plan    Saturating 90% on RA.  O2, RT, IS, Vax, CXR and encouraging taking of PO fluids.          Physical debility- (present on admission)   Assessment & Plan    PT/OT and increase activity.  Encourage mobilization. Pending guardianship        Obesity (BMI 30.0-34.9)- (present on admission)   Assessment & Plan    Encourage Kcal restriction        T12 compression fracture (CMS-HCC)- (present on admission)   Assessment & Plan    CT spine, no acute fractures. Cont fosamax and calcium  Stable and pending guardianship         Chronic back pain- (present on admission)   Assessment & Plan    As above and min narc/sed when possible.  Bowel Regimen          Stable issues - med hx (GIB, CAD/ICM, CVA, DLD), preventives.    Dispo - complex/fair.  Await guardianship.      Reviewed items::  Labs reviewed and Medications reviewed  Pink catheter::  No Pink  DVT prophylaxis pharmacological::  Heparin  Ulcer Prophylaxis::  Yes

## 2017-12-12 NOTE — CARE PLAN
Problem: Safety  Goal: Will remain free from falls  Outcome: PROGRESSING AS EXPECTED  Pt bed alarm in use. Pt room close to nursing station. Pt educated on use of call light before getting up, but refuses to call. Pt bed in lowest and locked position, call light within reach.     Problem: Mobility  Goal: Risk for activity intolerance will decrease  Outcome: PROGRESSING AS EXPECTED  Pt educated on getting out of bed and ambulating as tolerated. Orders for pt to be OOB for all meals implemented.

## 2017-12-12 NOTE — PROGRESS NOTES
[facility]ist Progress Note    Date of Service: 2017    Chief Complaint  77 y.o. female admitted 2017 with GLF, pyuria and ATN.    Interval Problem Update  : Had BM this AM, says had to strain  : Feeling tired/sleepy.  Otherwise no complaints  : Fever last night, cxr negative, blood cx pending.  WBC in AM  : No physical complaints.  No fever  : Drowsy, no physical complaints.  No fever.  Eating and drinking.  Stooling  12/10: No complaints, drowsy, napping  : No physical complaints, encouraged ambulation TID and chair for meals.  Pending placement    Consultants/Specialty  Neurology  Psych      Review of Systems   Constitutional: Positive for malaise/fatigue. Negative for chills.   HENT: Negative for congestion and hearing loss.    Eyes: Negative for blurred vision, double vision and photophobia.   Respiratory: Negative for cough, shortness of breath and wheezing.    Cardiovascular: Negative for palpitations, leg swelling and PND.   Gastrointestinal: Negative for blood in stool, nausea and vomiting.   Genitourinary: Negative for frequency and hematuria.   Musculoskeletal: Negative for falls, joint pain and neck pain.   Skin: Negative for rash.   Neurological: Negative for dizziness, tremors, speech change, seizures and weakness.   Psychiatric/Behavioral: Negative for depression, substance abuse and suicidal ideas.      Physical Exam  Laboratory/Imaging   Hemodynamics  Temp (24hrs), Av.2 °C (97.2 °F), Min:36.1 °C (97 °F), Max:36.4 °C (97.6 °F)   Temperature: 36.4 °C (97.6 °F)  Pulse  Av.7  Min: 50  Max: 106    Blood Pressure : 107/47      Respiratory      Respiration: 18, Pulse Oximetry: 93 %     Work Of Breathing / Effort: Mild  RUL Breath Sounds: Clear, RML Breath Sounds: Clear, RLL Breath Sounds: Diminished, ARGELIA Breath Sounds: Clear, LLL Breath Sounds: Diminished    Fluids    Intake/Output Summary (Last 24 hours) at 17 1920  Last data filed at 17 1530    Gross per 24 hour   Intake              600 ml   Output                0 ml   Net              600 ml       Nutrition  Orders Placed This Encounter   Procedures   • Diet Order     Standing Status:   Standing     Number of Occurrences:   1     Order Specific Question:   Diet:     Answer:   Regular [1]     Physical Exam   Constitutional: She is oriented to person, place, and time. She appears well-nourished.   HENT:   Head: Normocephalic.   Nose: Nose normal.   Mouth/Throat: No oropharyngeal exudate.   Eyes: EOM are normal. Pupils are equal, round, and reactive to light.   Neck: Normal range of motion. Neck supple. No JVD present. No thyromegaly present.   Cardiovascular: Normal rate, regular rhythm and normal heart sounds.  Exam reveals no gallop.    No murmur heard.  Pulmonary/Chest: Effort normal. She has no wheezes. She exhibits no tenderness.   Abdominal: Soft. Bowel sounds are normal. She exhibits no distension and no mass. There is no rebound.   Musculoskeletal: Normal range of motion. She exhibits no edema or tenderness.   Lymphadenopathy:     She has no cervical adenopathy.   Neurological: She is alert and oriented to person, place, and time. No cranial nerve deficit.   Skin: Skin is warm. No rash noted. She is not diaphoretic.   Psychiatric: Her behavior is normal.   Nursing note and vitals reviewed.                                   Assessment/Plan     * Dementia- (present on admission)   Assessment & Plan    Min narc/sed when possible.   Await guardianship.        Constipation- (present on admission)   Assessment & Plan    Resolved currently continue bowel care        Congestion of upper airway- (present on admission)   Assessment & Plan    Saturating 90% on RA.  O2, RT, IS, Vax, CXR and encouraging taking of PO fluids.          Physical debility- (present on admission)   Assessment & Plan    PT/OT and increase activity.  Encourage mobilization. Pending guardianship        Obesity (BMI 30.0-34.9)- (present  on admission)   Assessment & Plan    Encourage Kcal restriction        T12 compression fracture (CMS-HCC)- (present on admission)   Assessment & Plan    CT spine, no acute fractures. Cont fosamax and calcium  Stable and pending guardianship        Chronic back pain- (present on admission)   Assessment & Plan    As above and min narc/sed when possible.  Bowel Regimen          Stable issues - med hx (GIB, CAD/ICM, CVA, DLD), preventives.    Dispo - complex/fair.  Await guardianship.      Reviewed items::  Labs reviewed and Medications reviewed  Pink catheter::  No Pink  DVT prophylaxis pharmacological::  Heparin  Ulcer Prophylaxis::  Yes

## 2017-12-12 NOTE — PROGRESS NOTES
Simona Knight Fall Risk Assessment:     Last Known Fall: Within the last month  Mobility: Immobilized/requires assist of one person  Medications: Cardiovascular or central nervous system meds  Mental Status/LOC/Awareness: Oriented to person and place  Toileting Needs: Incontinence  Volume/Electrolyte Status: No problems  Communication/Sensory: Visual (Glasses)/hearing deficit  Behavior: Appropriate behavior  Simona Knight Fall Risk Total: 14  Fall Risk Level: MODERATE RISK    Universal Fall Precautions:  call light/belongings in reach, bed in low position and locked, siderails up x 2, wheelchairs and assistive devices out of sight, use non-slip footwear, adequate lighting, clutter free and spill free environment, educate on level of risk, educate to call for assistance    Fall Risk Level Interventions:    TRIAL ([place]le[place] 8, NEURO, MED JENNIE 5) Moderate Fall Risk Interventions  Place yellow fall risk ID band on patient: verified  Provide patient/family education based on risk assessment : completed  Educate patient/family to call staff for assistance when getting out of bed: completed  Place fall precaution signage outside patient door: completed  Utilize bed/chair fall alarm: completedTRIAL (TELE 8, NEURO, [employer] Active JENNIE 5) High Fall Risk Interventions  Place yellow fall risk ID band on patient: verified  Provide patient/family education based on risk assessment: verified  Educate patient/family to call staff for assistance when getting out of bed: verified  Place fall precaution signage outside patient door: verified  Place patient in room close to nursing station: verified  Utilize bed/chair fall alarm: verified  Notify charge of high risk for huddle: verified    Patient Specific Interventions:     Medication: review medications with patient and family, assess for medications that can be discontinued or dosage decreased and limit combination of prn medications  Mental Status/LOC/Awareness: reorient patient, reinforce falls  education, encourage family to stay with patient, check on patient hourly, utilize bed/chair fall alarm, reinforce the use of call light and provide activity  Toileting: consider obtaining elevated toilet seat or bedside commode (BSC), provide frquent toileting, monitor intake and output/use of appropriate interventions, instruct patient/family on the use of grab bars and instruct patient/family on the need to call for assistance when toileting  Volume/Electrolyte Status: ensure patient remains hydrated, monitor blood sugars and maintain appropriate blood sugar levels if diabetic, advance diet as tolerated, administer medications as ordered for nausea and vomiting, monitor abnormal lab values and ensure IV fluids are appropriate  Communication/Sensory: update plan of care on whiteboard, ensure proper positioning when transferrng/ambulating and for visually impaired patients orient to their room surrounding and do not change their surroundings  Behavioral: collaborate with doctor for possible psych consult, encourage patient to voice feelings, engage patient in daily activities, administer medication as ordered, instruct/reinforce fall program rationale and encourage family to stay with impulsive patients  Mobility: schedule physical activity throughout the day, provide comfort measures during transport, dangle prior to standing, utilize bed/chair fall alarm, ensure bed is locked and in lowest position, provide appropriate assistive device, instruct patient to exit bed on their strongest side and collaborate with doctor for possible PT/OT consult

## 2017-12-13 PROCEDURE — A9270 NON-COVERED ITEM OR SERVICE: HCPCS | Performed by: HOSPITALIST

## 2017-12-13 PROCEDURE — A9270 NON-COVERED ITEM OR SERVICE: HCPCS | Performed by: INTERNAL MEDICINE

## 2017-12-13 PROCEDURE — 700102 HCHG RX REV CODE 250 W/ 637 OVERRIDE(OP): Performed by: FAMILY MEDICINE

## 2017-12-13 PROCEDURE — 700102 HCHG RX REV CODE 250 W/ 637 OVERRIDE(OP): Performed by: INTERNAL MEDICINE

## 2017-12-13 PROCEDURE — 700102 HCHG RX REV CODE 250 W/ 637 OVERRIDE(OP): Performed by: HOSPITALIST

## 2017-12-13 PROCEDURE — A9270 NON-COVERED ITEM OR SERVICE: HCPCS | Performed by: FAMILY MEDICINE

## 2017-12-13 PROCEDURE — 700101 HCHG RX REV CODE 250: Performed by: FAMILY MEDICINE

## 2017-12-13 PROCEDURE — 770006 HCHG ROOM/CARE - MED/SURG/GYN SEMI*

## 2017-12-13 PROCEDURE — 99231 SBSQ HOSP IP/OBS SF/LOW 25: CPT | Performed by: HOSPITALIST

## 2017-12-13 PROCEDURE — 700111 HCHG RX REV CODE 636 W/ 250 OVERRIDE (IP): Performed by: HOSPITALIST

## 2017-12-13 RX ADMIN — HEPARIN SODIUM 5000 UNITS: 5000 INJECTION, SOLUTION INTRAVENOUS; SUBCUTANEOUS at 22:49

## 2017-12-13 RX ADMIN — Medication 500 MG: at 07:58

## 2017-12-13 RX ADMIN — ALENDRONATE SODIUM 10 MG: 10 TABLET ORAL at 06:30

## 2017-12-13 RX ADMIN — HYDRALAZINE HYDROCHLORIDE 100 MG: 50 TABLET ORAL at 22:49

## 2017-12-13 RX ADMIN — Medication 500 MG: at 17:37

## 2017-12-13 RX ADMIN — LIDOCAINE 1 PATCH: 50 PATCH CUTANEOUS at 07:57

## 2017-12-13 RX ADMIN — ERGOCALCIFEROL 50000 UNITS: 1.25 CAPSULE, LIQUID FILLED ORAL at 11:23

## 2017-12-13 RX ADMIN — ATORVASTATIN CALCIUM 80 MG: 40 TABLET, FILM COATED ORAL at 22:49

## 2017-12-13 RX ADMIN — BENAZEPRIL HYDROCHLORIDE 40 MG: 20 TABLET ORAL at 07:58

## 2017-12-13 RX ADMIN — LACTOBACILLUS ACIDOPHILUS / LACTOBACILLUS BULGARICUS 1 PACKET: 100 MILLION CFU STRENGTH GRANULES at 17:32

## 2017-12-13 RX ADMIN — HYDRALAZINE HYDROCHLORIDE 100 MG: 50 TABLET ORAL at 13:31

## 2017-12-13 RX ADMIN — LACTOBACILLUS ACIDOPHILUS / LACTOBACILLUS BULGARICUS 1 PACKET: 100 MILLION CFU STRENGTH GRANULES at 07:57

## 2017-12-13 RX ADMIN — OMEPRAZOLE 20 MG: 20 CAPSULE, DELAYED RELEASE ORAL at 07:57

## 2017-12-13 RX ADMIN — HYDRALAZINE HYDROCHLORIDE 100 MG: 50 TABLET ORAL at 06:30

## 2017-12-13 RX ADMIN — HEPARIN SODIUM 5000 UNITS: 5000 INJECTION, SOLUTION INTRAVENOUS; SUBCUTANEOUS at 07:58

## 2017-12-13 RX ADMIN — POLYETHYLENE GLYCOL (3350) 1 PACKET: 17 POWDER, FOR SOLUTION ORAL at 07:57

## 2017-12-13 RX ADMIN — GABAPENTIN 300 MG: 300 CAPSULE ORAL at 22:49

## 2017-12-13 RX ADMIN — DULOXETINE HYDROCHLORIDE 20 MG: 20 CAPSULE, DELAYED RELEASE ORAL at 07:57

## 2017-12-13 RX ADMIN — GUAIFENESIN 600 MG: 600 TABLET, EXTENDED RELEASE ORAL at 07:57

## 2017-12-13 RX ADMIN — GUAIFENESIN 600 MG: 600 TABLET, EXTENDED RELEASE ORAL at 22:49

## 2017-12-13 RX ADMIN — ASPIRIN 81 MG: 81 TABLET, COATED ORAL at 07:56

## 2017-12-13 RX ADMIN — OXYBUTYNIN CHLORIDE 5 MG: 5 TABLET, FILM COATED, EXTENDED RELEASE ORAL at 22:49

## 2017-12-13 RX ADMIN — LACTOBACILLUS ACIDOPHILUS / LACTOBACILLUS BULGARICUS 1 PACKET: 100 MILLION CFU STRENGTH GRANULES at 13:31

## 2017-12-13 ASSESSMENT — PAIN SCALES - GENERAL
PAINLEVEL_OUTOF10: 0

## 2017-12-13 ASSESSMENT — ENCOUNTER SYMPTOMS
DOUBLE VISION: 0
COUGH: 0
SORE THROAT: 0
DEPRESSION: 0
MYALGIAS: 0
CHILLS: 0
TREMORS: 0
DIZZINESS: 0
WEAKNESS: 0
PALPITATIONS: 0
CONSTIPATION: 0
SHORTNESS OF BREATH: 0
PHOTOPHOBIA: 0
DIARRHEA: 0

## 2017-12-13 ASSESSMENT — LIFESTYLE VARIABLES: SUBSTANCE_ABUSE: 0

## 2017-12-13 NOTE — PROGRESS NOTES
Assumed care @ 0715. Bedside report from MARK Howard. Pt awake, resting in bed and in no distress. Bed in low position, call light w/in reach. Strip bed alarm on.

## 2017-12-13 NOTE — PROGRESS NOTES
[facility]ist Progress Note    Date of Service: 2017    Chief Complaint  77 y.o. female admitted 2017 with GLF, pyuria and ATN.    Interval Problem Update  : Had BM this AM, says had to strain  : Feeling tired/sleepy.  Otherwise no complaints  : Fever last night, cxr negative, blood cx pending.  WBC in AM  : No physical complaints.  No fever  : Drowsy, no physical complaints.  No fever.  Eating and drinking.  Stooling  12/10: No complaints, drowsy, napping  : No physical complaints, encouraged ambulation TID and chair for meals.  Pending placement  : No issues overnight; remains confused at times  : No changes, remains confused, awaiting guardianship    Consultants/Specialty  Neurology  Psych      Review of Systems   Constitutional: Positive for malaise/fatigue. Negative for chills.   HENT: Negative for hearing loss and sore throat.    Eyes: Negative for double vision and photophobia.   Respiratory: Negative for cough and shortness of breath.    Cardiovascular: Negative for chest pain and palpitations.   Gastrointestinal: Negative for constipation and diarrhea.   Genitourinary: Negative for dysuria and frequency.   Musculoskeletal: Negative for joint pain and myalgias.   Skin: Negative for rash.   Neurological: Negative for dizziness, tremors and weakness.   Psychiatric/Behavioral: Negative for depression and substance abuse.      Physical Exam  Laboratory/Imaging   Hemodynamics  Temp (24hrs), Av.2 °C (97.1 °F), Min:36.1 °C (96.9 °F), Max:36.2 °C (97.2 °F)   Temperature: 36.2 °C (97.1 °F)  Pulse  Av.8  Min: 50  Max: 106    Blood Pressure : 117/80      Respiratory      Respiration: 18, Pulse Oximetry: 91 %     Work Of Breathing / Effort: Mild  RUL Breath Sounds: Clear, RML Breath Sounds: Clear, RLL Breath Sounds: Diminished, ARGELIA Breath Sounds: Clear, LLL Breath Sounds: Diminished    Fluids    Intake/Output Summary (Last 24 hours) at 17 1427  Last data  filed at 12/13/17 1128   Gross per 24 hour   Intake              240 ml   Output                0 ml   Net              240 ml       Nutrition  Orders Placed This Encounter   Procedures   • Diet Order     Standing Status:   Standing     Number of Occurrences:   1     Order Specific Question:   Diet:     Answer:   Regular [1]     Physical Exam   Constitutional: No distress.   HENT:   Head: Normocephalic and atraumatic.   Mouth/Throat: No oropharyngeal exudate.   Eyes: Conjunctivae are normal. Pupils are equal, round, and reactive to light.   Neck: Normal range of motion. Neck supple. No thyromegaly present.   Cardiovascular: Normal rate and regular rhythm.    No murmur heard.  Pulmonary/Chest: Effort normal and breath sounds normal. No respiratory distress.   Abdominal: Soft. Bowel sounds are normal. She exhibits no distension.   Musculoskeletal: Normal range of motion. She exhibits no edema.   Lymphadenopathy:     She has no cervical adenopathy.   Neurological: She is alert.   Skin: Skin is warm and dry. She is not diaphoretic.   Psychiatric: Her behavior is normal.   Nursing note and vitals reviewed.                                   Assessment/Plan     * Dementia- (present on admission)   Assessment & Plan    Min narc/sed when possible.   Await guardianship.        Constipation- (present on admission)   Assessment & Plan    Resolved currently continue bowel care        Congestion of upper airway- (present on admission)   Assessment & Plan    Saturating 90% on RA.  O2, RT, IS, Vax, CXR and encouraging taking of PO fluids.          Physical debility- (present on admission)   Assessment & Plan    PT/OT and increase activity.  Encourage mobilization. Pending guardianship        Obesity (BMI 30.0-34.9)- (present on admission)   Assessment & Plan    Encourage Kcal restriction        T12 compression fracture (CMS-HCC)- (present on admission)   Assessment & Plan    CT spine, no acute fractures. Cont fosamax and  calcium  Stable and pending guardianship        Chronic back pain- (present on admission)   Assessment & Plan    As above and min narc/sed when possible.  Bowel Regimen          Stable issues - med hx (GIB, CAD/ICM, CVA, DLD), preventives.    Dispo - complex/fair.  Await guardianship.      Reviewed items::  Labs reviewed and Medications reviewed  Pink catheter::  No Pink  DVT prophylaxis pharmacological::  Heparin  Ulcer Prophylaxis::  Yes

## 2017-12-13 NOTE — CARE PLAN
Problem: Safety  Goal: Will remain free from injury  Outcome: PROGRESSING AS EXPECTED    Intervention: Provide assistance with mobility  Safety maintained      Problem: Infection  Goal: Will remain free from infection  Outcome: PROGRESSING AS EXPECTED    Intervention: Assess signs and symptoms of infection  afebrile      Problem: Pain Management  Goal: Pain level will decrease to patient's comfort goal  Outcome: PROGRESSING AS EXPECTED    Intervention: Follow pain managment plan developed in collaboration with patient and Interdisciplinary Team  Denies pain

## 2017-12-14 PROCEDURE — 700102 HCHG RX REV CODE 250 W/ 637 OVERRIDE(OP): Performed by: HOSPITALIST

## 2017-12-14 PROCEDURE — 770006 HCHG ROOM/CARE - MED/SURG/GYN SEMI*

## 2017-12-14 PROCEDURE — A9270 NON-COVERED ITEM OR SERVICE: HCPCS | Performed by: HOSPITALIST

## 2017-12-14 PROCEDURE — A9270 NON-COVERED ITEM OR SERVICE: HCPCS | Performed by: INTERNAL MEDICINE

## 2017-12-14 PROCEDURE — A9270 NON-COVERED ITEM OR SERVICE: HCPCS | Performed by: FAMILY MEDICINE

## 2017-12-14 PROCEDURE — 700101 HCHG RX REV CODE 250: Performed by: FAMILY MEDICINE

## 2017-12-14 PROCEDURE — 700102 HCHG RX REV CODE 250 W/ 637 OVERRIDE(OP): Performed by: FAMILY MEDICINE

## 2017-12-14 PROCEDURE — 700111 HCHG RX REV CODE 636 W/ 250 OVERRIDE (IP): Performed by: HOSPITALIST

## 2017-12-14 PROCEDURE — 700102 HCHG RX REV CODE 250 W/ 637 OVERRIDE(OP): Performed by: INTERNAL MEDICINE

## 2017-12-14 PROCEDURE — 99231 SBSQ HOSP IP/OBS SF/LOW 25: CPT | Performed by: HOSPITALIST

## 2017-12-14 RX ADMIN — GUAIFENESIN 600 MG: 600 TABLET, EXTENDED RELEASE ORAL at 21:37

## 2017-12-14 RX ADMIN — Medication 500 MG: at 18:20

## 2017-12-14 RX ADMIN — ASPIRIN 81 MG: 81 TABLET, COATED ORAL at 09:36

## 2017-12-14 RX ADMIN — LACTOBACILLUS ACIDOPHILUS / LACTOBACILLUS BULGARICUS 1 PACKET: 100 MILLION CFU STRENGTH GRANULES at 13:41

## 2017-12-14 RX ADMIN — HEPARIN SODIUM 5000 UNITS: 5000 INJECTION, SOLUTION INTRAVENOUS; SUBCUTANEOUS at 21:36

## 2017-12-14 RX ADMIN — LACTOBACILLUS ACIDOPHILUS / LACTOBACILLUS BULGARICUS 1 PACKET: 100 MILLION CFU STRENGTH GRANULES at 09:35

## 2017-12-14 RX ADMIN — HEPARIN SODIUM 5000 UNITS: 5000 INJECTION, SOLUTION INTRAVENOUS; SUBCUTANEOUS at 09:34

## 2017-12-14 RX ADMIN — POLYETHYLENE GLYCOL (3350) 1 PACKET: 17 POWDER, FOR SOLUTION ORAL at 09:34

## 2017-12-14 RX ADMIN — LIDOCAINE 1 PATCH: 50 PATCH CUTANEOUS at 09:34

## 2017-12-14 RX ADMIN — GUAIFENESIN 600 MG: 600 TABLET, EXTENDED RELEASE ORAL at 09:36

## 2017-12-14 RX ADMIN — OXYBUTYNIN CHLORIDE 5 MG: 5 TABLET, FILM COATED, EXTENDED RELEASE ORAL at 21:37

## 2017-12-14 RX ADMIN — Medication 500 MG: at 06:32

## 2017-12-14 RX ADMIN — OMEPRAZOLE 20 MG: 20 CAPSULE, DELAYED RELEASE ORAL at 09:36

## 2017-12-14 RX ADMIN — HYDRALAZINE HYDROCHLORIDE 100 MG: 50 TABLET ORAL at 21:37

## 2017-12-14 RX ADMIN — GABAPENTIN 300 MG: 300 CAPSULE ORAL at 21:37

## 2017-12-14 RX ADMIN — HYDRALAZINE HYDROCHLORIDE 100 MG: 50 TABLET ORAL at 13:41

## 2017-12-14 RX ADMIN — ATORVASTATIN CALCIUM 80 MG: 40 TABLET, FILM COATED ORAL at 21:37

## 2017-12-14 RX ADMIN — HYDRALAZINE HYDROCHLORIDE 100 MG: 50 TABLET ORAL at 06:33

## 2017-12-14 RX ADMIN — LACTOBACILLUS ACIDOPHILUS / LACTOBACILLUS BULGARICUS 1 PACKET: 100 MILLION CFU STRENGTH GRANULES at 18:20

## 2017-12-14 RX ADMIN — ALENDRONATE SODIUM 10 MG: 10 TABLET ORAL at 06:32

## 2017-12-14 RX ADMIN — DULOXETINE HYDROCHLORIDE 20 MG: 20 CAPSULE, DELAYED RELEASE ORAL at 09:36

## 2017-12-14 RX ADMIN — BENAZEPRIL HYDROCHLORIDE 40 MG: 20 TABLET ORAL at 09:36

## 2017-12-14 ASSESSMENT — ENCOUNTER SYMPTOMS
DIZZINESS: 0
PHOTOPHOBIA: 0
DOUBLE VISION: 0
VOMITING: 0
MYALGIAS: 0
DEPRESSION: 0
CHILLS: 0
NAUSEA: 0
SORE THROAT: 0
HEADACHES: 0
COUGH: 0
SHORTNESS OF BREATH: 0

## 2017-12-14 ASSESSMENT — PAIN SCALES - GENERAL
PAINLEVEL_OUTOF10: 4
PAINLEVEL_OUTOF10: 6

## 2017-12-14 ASSESSMENT — LIFESTYLE VARIABLES: SUBSTANCE_ABUSE: 0

## 2017-12-14 NOTE — PROGRESS NOTES
[facility]ist Progress Note    Date of Service: 2017    Chief Complaint  77 y.o. female admitted 2017 with GLF, pyuria and ATN.    Interval Problem Update  : Had BM this AM, says had to strain  : Feeling tired/sleepy.  Otherwise no complaints  : Fever last night, cxr negative, blood cx pending.  WBC in AM  : No physical complaints.  No fever  : Drowsy, no physical complaints.  No fever.  Eating and drinking.  Stooling  12/10: No complaints, drowsy, napping  : No physical complaints, encouraged ambulation TID and chair for meals.  Pending placement  : No issues overnight; remains confused at times  : No changes, remains confused, awaiting guardianship  : No changes and no acute events overnight; tolerating diet    Consultants/Specialty  Neurology  Psych      Review of Systems   Constitutional: Positive for malaise/fatigue. Negative for chills.   HENT: Negative for hearing loss and sore throat.    Eyes: Negative for double vision and photophobia.   Respiratory: Negative for cough and shortness of breath.    Cardiovascular: Negative for chest pain and leg swelling.   Gastrointestinal: Negative for nausea and vomiting.   Genitourinary: Negative for dysuria and frequency.   Musculoskeletal: Negative for joint pain and myalgias.   Skin: Negative for itching and rash.   Neurological: Negative for dizziness and headaches.   Psychiatric/Behavioral: Negative for depression and substance abuse.      Physical Exam  Laboratory/Imaging   Hemodynamics  Temp (24hrs), Av.3 °C (97.3 °F), Min:36.1 °C (97 °F), Max:36.4 °C (97.6 °F)   Temperature: 36.4 °C (97.6 °F)  Pulse  Av  Min: 50  Max: 106    Blood Pressure : 125/53      Respiratory      Respiration: 19, Pulse Oximetry: 94 %     Work Of Breathing / Effort: Mild  RUL Breath Sounds: Clear, RML Breath Sounds: Clear, RLL Breath Sounds: Diminished, ARGELIA Breath Sounds: Clear, LLL Breath Sounds:  Diminished    Fluids    Intake/Output Summary (Last 24 hours) at 12/14/17 1014  Last data filed at 12/13/17 1500   Gross per 24 hour   Intake              480 ml   Output                0 ml   Net              480 ml       Nutrition  Orders Placed This Encounter   Procedures   • Diet Order     Standing Status:   Standing     Number of Occurrences:   1     Order Specific Question:   Diet:     Answer:   Regular [1]     Physical Exam   Constitutional: She appears well-developed and well-nourished.   HENT:   Head: Normocephalic and atraumatic.   Mouth/Throat: No oropharyngeal exudate.   Eyes: EOM are normal. Pupils are equal, round, and reactive to light.   Neck: Normal range of motion. Neck supple. No thyromegaly present.   Cardiovascular: Normal rate and regular rhythm.    No murmur heard.  Pulmonary/Chest: Effort normal and breath sounds normal. No respiratory distress.   Abdominal: Soft. Bowel sounds are normal. She exhibits no distension.   Musculoskeletal: Normal range of motion. She exhibits no edema.   Neurological: She is alert.   Skin: Skin is warm and dry. No erythema.   Psychiatric: Her behavior is normal.   Nursing note and vitals reviewed.                                   Assessment/Plan     * Dementia- (present on admission)   Assessment & Plan    Min narc/sed when possible.   Await guardianship.        Constipation- (present on admission)   Assessment & Plan    Resolved currently continue bowel care        Congestion of upper airway- (present on admission)   Assessment & Plan    Saturating 90% on RA.  O2, RT, IS, Vax, CXR and encouraging taking of PO fluids.          Physical debility- (present on admission)   Assessment & Plan    PT/OT and increase activity.  Encourage mobilization. Pending guardianship        Obesity (BMI 30.0-34.9)- (present on admission)   Assessment & Plan    Encourage Kcal restriction        T12 compression fracture (CMS-HCC)- (present on admission)   Assessment & Plan    CT  spine, no acute fractures. Cont fosamax and calcium  Stable and pending guardianship        Chronic back pain- (present on admission)   Assessment & Plan    As above and min narc/sed when possible.  Bowel Regimen          Stable issues - med hx (GIB, CAD/ICM, CVA, DLD), preventives.    Dispo - complex/fair.  Await guardianship.      Reviewed items::  Labs reviewed and Medications reviewed  Pink catheter::  No Pink  DVT prophylaxis pharmacological::  Heparin  Ulcer Prophylaxis::  Yes

## 2017-12-14 NOTE — CARE PLAN
Problem: Mobility  Goal: Risk for activity intolerance will decrease    Intervention: Assess and monitor signs of activity intolerance  Pt. Ambulated to the bathroom with walker.  Pt. Is steady on her feet, non labored breathing on room air.  Will continue to monitor

## 2017-12-14 NOTE — PROGRESS NOTES
No new complaints. OOB w/ hand held assist. Strip bed/chair alarm in place. Pt forgets to call for assistance. Ambulated to bathroom sba, gnuyen well, gait unsteady.+bm, med/soft stool. Denies pain.

## 2017-12-14 NOTE — CARE PLAN
Problem: Mobility  Goal: Risk for activity intolerance will decrease    Intervention: Assess and monitor signs of activity intolerance  Pt encouraged to mobilize more to decrease the chance of skin breakdown. Pt verbalizes understanding.

## 2017-12-14 NOTE — PROGRESS NOTES
Simona Knight Fall Risk Assessment:     Last Known Fall: Within the last month  Mobility: Immobilized/requires assist of one person  Medications: Cardiovascular or central nervous system meds  Mental Status/LOC/Awareness: Oriented to person and place  Toileting Needs: Incontinence  Volume/Electrolyte Status: No problems  Communication/Sensory: Visual (Glasses)/hearing deficit  Behavior: Appropriate behavior  Simona Knight Fall Risk Total: 14  Fall Risk Level: MODERATE RISK    Universal Fall Precautions:  call light/belongings in reach, siderails up x 2, educate on level of risk, educate to call for assistance    Fall Risk Level Interventions:    TRIAL (TELE 8, NEURO, MED JENNIE 5) Moderate Fall Risk Interventions  Place yellow fall risk ID band on patient: verified  Provide patient/family education based on risk assessment : completed  Educate patient/family to call staff for assistance when getting out of bed: completed  Place fall precaution signage outside patient door: completed  Utilize bed/chair fall alarm: verifiedTRIAL (TELE 8, NEURO, picsell JENNIE 5) High Fall Risk Interventions  Place yellow fall risk ID band on patient: verified  Provide patient/family education based on risk assessment: verified  Educate patient/family to call staff for assistance when getting out of bed: verified  Place fall precaution signage outside patient door: verified  Place patient in room close to nursing station: verified  Utilize bed/chair fall alarm: verified  Notify charge of high risk for huddle: verified    Patient Specific Interventions:     Medication: review medications with patient and family  Mental Status/LOC/Awareness: reinforce falls education, check on patient hourly, utilize bed/chair fall alarm and reinforce the use of call light  Toileting: provide frquent toileting, instruct patient/family on the use of grab bars, instruct patient/family on the need to call for assistance when toileting and do not leave patient  unattended in bathroom/refer to toileting scripting  Volume/Electrolyte Status: ensure patient remains hydrated and monitor abnormal lab values  Communication/Sensory: update plan of care on whiteboard  Behavioral: instruct/reinforce fall program rationale  Mobility: utilize bed/chair fall alarm, ensure bed is locked and in lowest position, provide appropriate assistive device and instruct patient to exit bed on their strongest side

## 2017-12-14 NOTE — PROGRESS NOTES
Received report from Amanda Rich.  Pt. Is alert, awake, and oriented to self, time, date, and place.  Pt. Is lying in semi-chi's position, non labored breathing on room air, no signs of acute distress noted.  Pt. Complaining of lower back pain, states she is tolerating pain at this time.  Fall precautions in place.  Bed in lowest position.  Will continue to monitor

## 2017-12-15 PROCEDURE — A9270 NON-COVERED ITEM OR SERVICE: HCPCS | Performed by: INTERNAL MEDICINE

## 2017-12-15 PROCEDURE — 700102 HCHG RX REV CODE 250 W/ 637 OVERRIDE(OP): Performed by: INTERNAL MEDICINE

## 2017-12-15 PROCEDURE — 700102 HCHG RX REV CODE 250 W/ 637 OVERRIDE(OP): Performed by: HOSPITALIST

## 2017-12-15 PROCEDURE — 700111 HCHG RX REV CODE 636 W/ 250 OVERRIDE (IP): Performed by: HOSPITALIST

## 2017-12-15 PROCEDURE — A9270 NON-COVERED ITEM OR SERVICE: HCPCS | Performed by: FAMILY MEDICINE

## 2017-12-15 PROCEDURE — A9270 NON-COVERED ITEM OR SERVICE: HCPCS | Performed by: HOSPITALIST

## 2017-12-15 PROCEDURE — 700102 HCHG RX REV CODE 250 W/ 637 OVERRIDE(OP): Performed by: FAMILY MEDICINE

## 2017-12-15 PROCEDURE — 700101 HCHG RX REV CODE 250: Performed by: FAMILY MEDICINE

## 2017-12-15 PROCEDURE — 99231 SBSQ HOSP IP/OBS SF/LOW 25: CPT | Performed by: INTERNAL MEDICINE

## 2017-12-15 PROCEDURE — 770006 HCHG ROOM/CARE - MED/SURG/GYN SEMI*

## 2017-12-15 RX ADMIN — LACTOBACILLUS ACIDOPHILUS / LACTOBACILLUS BULGARICUS 1 PACKET: 100 MILLION CFU STRENGTH GRANULES at 13:49

## 2017-12-15 RX ADMIN — DULOXETINE HYDROCHLORIDE 20 MG: 20 CAPSULE, DELAYED RELEASE ORAL at 08:43

## 2017-12-15 RX ADMIN — Medication 500 MG: at 20:01

## 2017-12-15 RX ADMIN — HYDRALAZINE HYDROCHLORIDE 100 MG: 50 TABLET ORAL at 06:12

## 2017-12-15 RX ADMIN — BENAZEPRIL HYDROCHLORIDE 40 MG: 20 TABLET ORAL at 08:44

## 2017-12-15 RX ADMIN — HYDRALAZINE HYDROCHLORIDE 100 MG: 50 TABLET ORAL at 13:48

## 2017-12-15 RX ADMIN — POLYETHYLENE GLYCOL (3350) 1 PACKET: 17 POWDER, FOR SOLUTION ORAL at 08:44

## 2017-12-15 RX ADMIN — OXYBUTYNIN CHLORIDE 5 MG: 5 TABLET, FILM COATED, EXTENDED RELEASE ORAL at 20:01

## 2017-12-15 RX ADMIN — GUAIFENESIN 600 MG: 600 TABLET, EXTENDED RELEASE ORAL at 20:02

## 2017-12-15 RX ADMIN — LACTOBACILLUS ACIDOPHILUS / LACTOBACILLUS BULGARICUS 1 PACKET: 100 MILLION CFU STRENGTH GRANULES at 08:43

## 2017-12-15 RX ADMIN — OMEPRAZOLE 20 MG: 20 CAPSULE, DELAYED RELEASE ORAL at 08:43

## 2017-12-15 RX ADMIN — ATORVASTATIN CALCIUM 80 MG: 40 TABLET, FILM COATED ORAL at 20:02

## 2017-12-15 RX ADMIN — ASPIRIN 81 MG: 81 TABLET, COATED ORAL at 08:43

## 2017-12-15 RX ADMIN — HEPARIN SODIUM 5000 UNITS: 5000 INJECTION, SOLUTION INTRAVENOUS; SUBCUTANEOUS at 08:44

## 2017-12-15 RX ADMIN — HEPARIN SODIUM 5000 UNITS: 5000 INJECTION, SOLUTION INTRAVENOUS; SUBCUTANEOUS at 20:01

## 2017-12-15 RX ADMIN — ALENDRONATE SODIUM 10 MG: 10 TABLET ORAL at 06:12

## 2017-12-15 RX ADMIN — HYDRALAZINE HYDROCHLORIDE 100 MG: 50 TABLET ORAL at 20:01

## 2017-12-15 RX ADMIN — Medication 500 MG: at 06:12

## 2017-12-15 RX ADMIN — LACTOBACILLUS ACIDOPHILUS / LACTOBACILLUS BULGARICUS 1 PACKET: 100 MILLION CFU STRENGTH GRANULES at 18:27

## 2017-12-15 RX ADMIN — GUAIFENESIN 600 MG: 600 TABLET, EXTENDED RELEASE ORAL at 08:43

## 2017-12-15 RX ADMIN — LIDOCAINE 1 PATCH: 50 PATCH CUTANEOUS at 08:43

## 2017-12-15 RX ADMIN — GABAPENTIN 300 MG: 300 CAPSULE ORAL at 20:02

## 2017-12-15 ASSESSMENT — PAIN SCALES - GENERAL
PAINLEVEL_OUTOF10: 5
PAINLEVEL_OUTOF10: 0
PAINLEVEL_OUTOF10: 0
PAINLEVEL_OUTOF10: 6
PAINLEVEL_OUTOF10: 5
PAINLEVEL_OUTOF10: 0
PAINLEVEL_OUTOF10: 4

## 2017-12-15 ASSESSMENT — ENCOUNTER SYMPTOMS
FOCAL WEAKNESS: 0
SHORTNESS OF BREATH: 0
MYALGIAS: 0
VOMITING: 0
NAUSEA: 0
COUGH: 0
DIZZINESS: 0
SORE THROAT: 0
FEVER: 1
PHOTOPHOBIA: 0
ABDOMINAL PAIN: 0
HEADACHES: 0
CHILLS: 0

## 2017-12-15 ASSESSMENT — LIFESTYLE VARIABLES: SUBSTANCE_ABUSE: 0

## 2017-12-15 NOTE — CARE PLAN
Problem: Pain Management  Goal: Pain level will decrease to patient's comfort goal    Intervention: Follow pain managment plan developed in collaboration with patient and Interdisciplinary Team  Pt. Complaining of 6/10 lower back pain.  Lidocaine patch administered.  Pt. Stated that she is tolerating pain at this time.  Will continue to monitor

## 2017-12-15 NOTE — CARE PLAN
Problem: Respiratory:  Goal: Respiratory status will improve    Intervention: Educate and encourage incentive spirometry usage  Pt encouraged to use incentive spirometer. Pt taught how to properly use spirometer. Pt does not understand well and needs reinforcement.

## 2017-12-15 NOTE — PROGRESS NOTES
Simona Knight Fall Risk Assessment:     Last Known Fall: Within the last month  Mobility: Immobilized/requires assist of one person  Medications: Cardiovascular or central nervous system meds  Mental Status/LOC/Awareness: Awake, alert, and oriented to date, place, and person  Toileting Needs: Incontinence  Volume/Electrolyte Status: No problems  Communication/Sensory: Visual (Glasses)/hearing deficit  Behavior: Appropriate behavior  Simona Knight Fall Risk Total: 13  Fall Risk Level: MODERATE RISK    Universal Fall Precautions:  call light/belongings in reach, siderails up x 2, adequate lighting, educate on level of risk, educate to call for assistance    Fall Risk Level Interventions:    TRIAL (TELE 8, NEURO, MED JENNIE 5) Moderate Fall Risk Interventions  Place yellow fall risk ID band on patient: verified  Provide patient/family education based on risk assessment : completed  Educate patient/family to call staff for assistance when getting out of bed: completed  Place fall precaution signage outside patient door: completed  Utilize bed/chair fall alarm: verifiedTRIAL (TELE 8, NEURO, [employer] JENNIE 5) High Fall Risk Interventions  Place yellow fall risk ID band on patient: verified  Provide patient/family education based on risk assessment: verified  Educate patient/family to call staff for assistance when getting out of bed: verified  Place fall precaution signage outside patient door: verified  Place patient in room close to nursing station: verified  Utilize bed/chair fall alarm: verified  Notify charge of high risk for huddle: verified    Patient Specific Interventions:     Medication: review medications with patient and family  Mental Status/LOC/Awareness: reorient patient and utilize bed/chair fall alarm  Toileting: instruct patient/family on the need to call for assistance when toileting  Volume/Electrolyte Status: teach patients to dangle before rising if hypotensive  Communication/Sensory: update plan of care  on whiteboard  Behavioral: engage patient in daily activities and administer medication as ordered  Mobility: utilize bed/chair fall alarm, ensure bed is locked and in lowest position and provide appropriate assistive device

## 2017-12-15 NOTE — CARE PLAN
Problem: Mobility  Goal: Risk for activity intolerance will decrease    Intervention: Assess and monitor signs of activity intolerance   Pt encouraged to turn self every 2 hours. Will continue to monitor pt's ability to move herself

## 2017-12-15 NOTE — PROGRESS NOTES
Assumed care of pt at 1845; patient alert and oriented x 4; pt in bed with 2 side rails up, quietly watching television. Pt denies discomfort or need; upcoming medicines discussed.

## 2017-12-16 PROCEDURE — 700111 HCHG RX REV CODE 636 W/ 250 OVERRIDE (IP): Performed by: HOSPITALIST

## 2017-12-16 PROCEDURE — 99232 SBSQ HOSP IP/OBS MODERATE 35: CPT | Performed by: INTERNAL MEDICINE

## 2017-12-16 PROCEDURE — A9270 NON-COVERED ITEM OR SERVICE: HCPCS | Performed by: INTERNAL MEDICINE

## 2017-12-16 PROCEDURE — A9270 NON-COVERED ITEM OR SERVICE: HCPCS | Performed by: FAMILY MEDICINE

## 2017-12-16 PROCEDURE — A9270 NON-COVERED ITEM OR SERVICE: HCPCS | Performed by: HOSPITALIST

## 2017-12-16 PROCEDURE — 700101 HCHG RX REV CODE 250: Performed by: FAMILY MEDICINE

## 2017-12-16 PROCEDURE — 770006 HCHG ROOM/CARE - MED/SURG/GYN SEMI*

## 2017-12-16 PROCEDURE — 700102 HCHG RX REV CODE 250 W/ 637 OVERRIDE(OP): Performed by: INTERNAL MEDICINE

## 2017-12-16 PROCEDURE — 700102 HCHG RX REV CODE 250 W/ 637 OVERRIDE(OP): Performed by: HOSPITALIST

## 2017-12-16 PROCEDURE — 700102 HCHG RX REV CODE 250 W/ 637 OVERRIDE(OP): Performed by: FAMILY MEDICINE

## 2017-12-16 RX ADMIN — HEPARIN SODIUM 5000 UNITS: 5000 INJECTION, SOLUTION INTRAVENOUS; SUBCUTANEOUS at 21:02

## 2017-12-16 RX ADMIN — Medication 500 MG: at 09:07

## 2017-12-16 RX ADMIN — OXYBUTYNIN CHLORIDE 5 MG: 5 TABLET, FILM COATED, EXTENDED RELEASE ORAL at 21:02

## 2017-12-16 RX ADMIN — STANDARDIZED SENNA CONCENTRATE AND DOCUSATE SODIUM 2 TABLET: 8.6; 5 TABLET, FILM COATED ORAL at 21:01

## 2017-12-16 RX ADMIN — HYDRALAZINE HYDROCHLORIDE 100 MG: 50 TABLET ORAL at 06:33

## 2017-12-16 RX ADMIN — ASPIRIN 81 MG: 81 TABLET, COATED ORAL at 09:06

## 2017-12-16 RX ADMIN — LACTOBACILLUS ACIDOPHILUS / LACTOBACILLUS BULGARICUS 1 PACKET: 100 MILLION CFU STRENGTH GRANULES at 17:42

## 2017-12-16 RX ADMIN — LIDOCAINE 1 PATCH: 50 PATCH CUTANEOUS at 09:04

## 2017-12-16 RX ADMIN — GUAIFENESIN 600 MG: 600 TABLET, EXTENDED RELEASE ORAL at 09:05

## 2017-12-16 RX ADMIN — ALENDRONATE SODIUM 10 MG: 10 TABLET ORAL at 06:33

## 2017-12-16 RX ADMIN — OMEPRAZOLE 20 MG: 20 CAPSULE, DELAYED RELEASE ORAL at 09:06

## 2017-12-16 RX ADMIN — DULOXETINE HYDROCHLORIDE 20 MG: 20 CAPSULE, DELAYED RELEASE ORAL at 09:05

## 2017-12-16 RX ADMIN — LACTOBACILLUS ACIDOPHILUS / LACTOBACILLUS BULGARICUS 1 PACKET: 100 MILLION CFU STRENGTH GRANULES at 11:30

## 2017-12-16 RX ADMIN — Medication 500 MG: at 17:55

## 2017-12-16 RX ADMIN — HEPARIN SODIUM 5000 UNITS: 5000 INJECTION, SOLUTION INTRAVENOUS; SUBCUTANEOUS at 09:04

## 2017-12-16 RX ADMIN — HYDRALAZINE HYDROCHLORIDE 100 MG: 50 TABLET ORAL at 21:02

## 2017-12-16 RX ADMIN — GABAPENTIN 300 MG: 300 CAPSULE ORAL at 21:01

## 2017-12-16 RX ADMIN — GUAIFENESIN 600 MG: 600 TABLET, EXTENDED RELEASE ORAL at 21:02

## 2017-12-16 RX ADMIN — ATORVASTATIN CALCIUM 80 MG: 40 TABLET, FILM COATED ORAL at 21:01

## 2017-12-16 RX ADMIN — BENAZEPRIL HYDROCHLORIDE 40 MG: 20 TABLET ORAL at 09:05

## 2017-12-16 RX ADMIN — HYDRALAZINE HYDROCHLORIDE 100 MG: 50 TABLET ORAL at 14:47

## 2017-12-16 RX ADMIN — POLYETHYLENE GLYCOL (3350) 1 PACKET: 17 POWDER, FOR SOLUTION ORAL at 09:04

## 2017-12-16 ASSESSMENT — ENCOUNTER SYMPTOMS
NAUSEA: 0
WEAKNESS: 1
ABDOMINAL PAIN: 0
FOCAL WEAKNESS: 0
MEMORY LOSS: 1
HEADACHES: 0
SHORTNESS OF BREATH: 0
SORE THROAT: 0
VOMITING: 0

## 2017-12-16 ASSESSMENT — LIFESTYLE VARIABLES
SUBSTANCE_ABUSE: 0
DO YOU DRINK ALCOHOL: NO

## 2017-12-16 ASSESSMENT — PAIN SCALES - GENERAL
PAINLEVEL_OUTOF10: 0
PAINLEVEL_OUTOF10: 0

## 2017-12-16 NOTE — PROGRESS NOTES
Simona Knight Fall Risk Assessment:     Last Known Fall: Within the last month  Mobility: Immobilized/requires assist of one person  Medications: Cardiovascular or central nervous system meds  Mental Status/LOC/Awareness: Awake, alert, and oriented to date, place, and person  Toileting Needs: Incontinence  Volume/Electrolyte Status: No problems  Communication/Sensory: Visual (Glasses)/hearing deficit  Behavior: Appropriate behavior  Simona Knight Fall Risk Total: 13  Fall Risk Level: MODERATE RISK    Universal Fall Precautions:  call light/belongings in reach, bed in low position and locked, wheelchairs and assistive devices out of sight, siderails up x 2, use non-slip footwear, adequate lighting, clutter free and spill free environment, educate on level of risk, educate to call for assistance    Fall Risk Level Interventions:    TRIAL ([employer], NEURO, MED JENNIE 5) Moderate Fall Risk Interventions  Place yellow fall risk ID band on patient: verified  Provide patient/family education based on risk assessment : completed  Educate patient/family to call staff for assistance when getting out of bed: completed  Place fall precaution signage outside patient door: verified  Utilize bed/chair fall alarm: verifiedTRIAL ([employer] 8, NEURO, MED JENNIE 5) High Fall Risk Interventions  Place yellow fall risk ID band on patient: verified  Provide patient/family education based on risk assessment: verified  Educate patient/family to call staff for assistance when getting out of bed: verified  Place fall precaution signage outside patient door: verified  Place patient in room close to nursing station: verified  Utilize bed/chair fall alarm: verified  Notify charge of high risk for huddle: verified    Patient Specific Interventions:     Medication: review medications with patient and family, assess for medications that can be discontinued or dosage decreased and limit combination of prn medications  Mental Status/LOC/Awareness: reinforce  falls education, check on patient hourly, utilize bed/chair fall alarm, reinforce the use of call light and provide activity  Toileting: provide frquent toileting, monitor intake and output/use of appropriate interventions and do not leave patient unattended in bathroom/refer to toileting scripting  Volume/Electrolyte Status: ensure patient remains hydrated, monitor abnormal lab values and ensure IV fluids are appropriate  Communication/Sensory: update plan of care on whiteboard and ensure proper positioning when transferrng/ambulating  Behavioral: encourage patient to voice feelings, engage patient in daily activities and instruct/reinforce fall program rationale  Mobility: utilize bed/chair fall alarm, ensure bed is locked and in lowest position and provide appropriate assistive device

## 2017-12-16 NOTE — CARE PLAN
Problem: Mobility  Goal: Risk for activity intolerance will decrease    Intervention: Assess and monitor signs of activity intolerance  Pt. Is up to the chair with meals,  Tolerating movement.  No signs of acute distress noted at this time.

## 2017-12-16 NOTE — PROGRESS NOTES
Received report from Amanda Rich.  Pt. Is alert, awake, and oriented to self, time, date, and place.  Pt. Is lying in semi-chi's position, non labored breathing on room air, no signs of acute distress noted at this time.  Will continue to monitor

## 2017-12-16 NOTE — PROGRESS NOTES
[facility] Hospitalist Progress Note    Date of Service: 12/15/2017    Chief Complaint  77 y.o. female admitted 2017 with GLF, pyuria and ATN.    Interval Problem Update  : Had BM this AM, says had to strain  : Feeling tired/sleepy.  Otherwise no complaints  : Fever last night, cxr negative, blood cx pending.  WBC in AM  : No physical complaints.  No fever  : Drowsy, no physical complaints.  No fever.  Eating and drinking.  Stooling  12/10: No complaints, drowsy, napping  : No physical complaints, encouraged ambulation TID and chair for meals.  Pending placement  : No issues overnight; remains confused at times  : No changes, remains confused, awaiting guardianship  : No changes and no acute events overnight; tolerating diet  12/15:  No new events, resting/arousable    Consultants/Specialty  Neurology  Psych      Review of Systems   Constitutional: Positive for fever and malaise/fatigue. Negative for chills.   HENT: Negative for hearing loss and sore throat.    Eyes: Negative for photophobia.   Respiratory: Negative for cough and shortness of breath.    Cardiovascular: Negative for chest pain and leg swelling.   Gastrointestinal: Negative for abdominal pain, nausea and vomiting.   Genitourinary: Negative for frequency.   Musculoskeletal: Negative for joint pain and myalgias.   Neurological: Negative for dizziness, focal weakness and headaches.   Psychiatric/Behavioral: Negative for substance abuse.      Physical Exam  Laboratory/Imaging   Hemodynamics  Temp (24hrs), Av.5 °C (97.7 °F), Min:36.3 °C (97.4 °F), Max:36.7 °C (98.1 °F)   Temperature: 36.7 °C (98.1 °F)  Pulse  Av.1  Min: 50  Max: 106    Blood Pressure : 117/47      Respiratory      Respiration: 16, Pulse Oximetry: 90 %     Work Of Breathing / Effort: Mild  RUL Breath Sounds: Clear, RML Breath Sounds: Clear, RLL Breath Sounds: Clear, ARGELIA Breath Sounds: Clear, LLL Breath Sounds:  Clear    Fluids    Intake/Output Summary (Last 24 hours) at 12/15/17 2138  Last data filed at 12/15/17 1300   Gross per 24 hour   Intake              480 ml   Output                0 ml   Net              480 ml       Nutrition  Orders Placed This Encounter   Procedures   • Diet Order     Standing Status:   Standing     Number of Occurrences:   1     Order Specific Question:   Diet:     Answer:   Regular [1]     Physical Exam   Constitutional: She appears well-developed and well-nourished. No distress.   HENT:   Head: Normocephalic and atraumatic.   Mouth/Throat: No oropharyngeal exudate.   Eyes: EOM are normal. Pupils are equal, round, and reactive to light.   Neck: Normal range of motion. Neck supple.   Cardiovascular: Normal rate and regular rhythm.    Pulmonary/Chest: Effort normal and breath sounds normal. No respiratory distress.   Abdominal: Soft. Bowel sounds are normal. She exhibits no distension.   Musculoskeletal: Normal range of motion. She exhibits no edema or tenderness.   Neurological: She is alert. No cranial nerve deficit.   Skin: Skin is warm and dry. No erythema.   Psychiatric: Her behavior is normal.   Nursing note and vitals reviewed.                                   Assessment/Plan     * Dementia- (present on admission)   Assessment & Plan    Min narc/sed when possible.   Await guardianship.  Court 12/19        Constipation- (present on admission)   Assessment & Plan    Resolved currently continue bowel care        Congestion of upper airway- (present on admission)   Assessment & Plan    Saturating 90% on RA.  O2, RT, IS, Vax, CXR and encouraging taking of PO fluids.          Physical debility- (present on admission)   Assessment & Plan    PT/OT and increase activity.  Encourage mobilization. Pending guardianship        Obesity (BMI 30.0-34.9)- (present on admission)   Assessment & Plan    Encourage Kcal restriction        T12 compression fracture (CMS-HCC)- (present on admission)   Assessment  & Plan    CT spine, no acute fractures. Cont fosamax and calcium  Stable and pending guardianship        Chronic back pain- (present on admission)   Assessment & Plan    As above and min narc/sed when possible.  Bowel Regimen          Stable issues - med hx (GIB, CAD/ICM, CVA, DLD), preventives.    Dispo - complex/fair.  Await guardianship. Court 12/19      Reviewed items::  Labs reviewed and Medications reviewed  Pink catheter::  No Pink  DVT prophylaxis pharmacological::  Heparin  Ulcer Prophylaxis::  Yes

## 2017-12-16 NOTE — PROGRESS NOTES
Assumed care of pt at 1845; pt alert and oriented x 4 laying in bed; patient denies complaint at this time; night time medication teaching refused; strong urine odor on bedclothes-bedclothes changed

## 2017-12-16 NOTE — PROGRESS NOTES
"Bedside report received. Assumed care of pt.  Pt able to make needs known.  Pt shows no s/s of distress.  Resting comfortably in chair for meal.  Pt is not sure when her last BM was, per chart 12/14.  Pt has scheduled miralax and it was provided to her.  Pt also states that her glasses are very blurry, may be wrong prescription- will need op f/u.  Pt also states that she has broken multiple teeth during her hospital stay, some from decay.  Pt states she just broke her \"eye-tooth\" right side, most recently, down to the gum.     Fall precautions in place, call light and belongings within reach.  Hourly rounding in place.    "

## 2017-12-16 NOTE — PROGRESS NOTES
Simona Knight Fall Risk Assessment:     Last Known Fall: Within the last month  Mobility: Immobilized/requires assist of one person  Medications: Cardiovascular or central nervous system meds  Mental Status/LOC/Awareness: Awake, alert, and oriented to date, place, and person  Toileting Needs: Incontinence  Volume/Electrolyte Status: No problems  Communication/Sensory: Visual (Glasses)/hearing deficit  Behavior: Appropriate behavior  Simona Knight Fall Risk Total: 13  Fall Risk Level: MODERATE RISK    Universal Fall Precautions:  call light/belongings in reach, siderails up x 2, adequate lighting, educate on level of risk, educate to call for assistance    Fall Risk Level Interventions:    TRIAL (TELE 8, NEURO, MED JENNIE 5) Moderate Fall Risk Interventions  Place yellow fall risk ID band on patient: completed  Provide patient/family education based on risk assessment : completed  Educate patient/family to call staff for assistance when getting out of bed: completed  Place fall precaution signage outside patient door: verified  Utilize bed/chair fall alarm: verifiedTRIAL (TELE 8, NEURO, [employer] JENNIE 5) High Fall Risk Interventions  Place yellow fall risk ID band on patient: verified  Provide patient/family education based on risk assessment: verified  Educate patient/family to call staff for assistance when getting out of bed: verified  Place fall precaution signage outside patient door: verified  Place patient in room close to nursing station: verified  Utilize bed/chair fall alarm: verified  Notify charge of high risk for huddle: verified    Patient Specific Interventions:     Medication: review medications with patient and family  Mental Status/LOC/Awareness: reorient patient, reinforce falls education, check on patient hourly, utilize bed/chair fall alarm and reinforce the use of call light  Toileting: provide frquent toileting, instruct patient/family on the use of grab bars and instruct patient/family on the need  to call for assistance when toileting  Volume/Electrolyte Status: ensure patient remains hydrated  Communication/Sensory: update plan of care on whiteboard  Behavioral: not applicable  Mobility: schedule physical activity throughout the day

## 2017-12-16 NOTE — PROGRESS NOTES
[facility] Hospitalist Progress Note    Date of Service: 2017    Chief Complaint  77 y.o. female admitted 2017 with GLF, pyuria and ATN.    Interval Problem Update  : Had BM this AM, says had to strain  : Feeling tired/sleepy.  Otherwise no complaints  : Fever last night, cxr negative, blood cx pending.  WBC in AM  : No physical complaints.  No fever  : Drowsy, no physical complaints.  No fever.  Eating and drinking.  Stooling  12/10: No complaints, drowsy, napping  : No physical complaints, encouraged ambulation TID and chair for meals.  Pending placement  : No issues overnight; remains confused at times  : No changes, remains confused, awaiting guardianship  : No changes and no acute events overnight; tolerating diet  12/15:  No new events, resting/arousable  :  Pleasant, cooperative, ate breakfast this am, and ambulated    Consultants/Specialty  Neurology  Psych      Review of Systems   Constitutional: Negative for malaise/fatigue.   HENT: Negative for hearing loss and sore throat.    Respiratory: Negative for shortness of breath.    Cardiovascular: Negative for chest pain and leg swelling.   Gastrointestinal: Negative for abdominal pain, nausea and vomiting.   Genitourinary: Negative for frequency.   Musculoskeletal: Negative for joint pain.   Neurological: Positive for weakness. Negative for focal weakness and headaches.   Psychiatric/Behavioral: Positive for memory loss. Negative for substance abuse.      Physical Exam  Laboratory/Imaging   Hemodynamics  Temp (24hrs), Av.4 °C (97.5 °F), Min:36.1 °C (97 °F), Max:36.7 °C (98.1 °F)   Temperature: 36.1 °C (97 °F)  Pulse  Av.1  Min: 50  Max: 106    Blood Pressure : 110/59      Respiratory      Respiration: 17, Pulse Oximetry: 95 %     Work Of Breathing / Effort: Mild  RUL Breath Sounds: Clear, RML Breath Sounds: Clear, RLL Breath Sounds: Clear, ARGELIA Breath Sounds: Clear, LLL Breath Sounds:  Clear    Fluids    Intake/Output Summary (Last 24 hours) at 12/16/17 1013  Last data filed at 12/15/17 1300   Gross per 24 hour   Intake              240 ml   Output                0 ml   Net              240 ml       Nutrition  Orders Placed This Encounter   Procedures   • Diet Order     Standing Status:   Standing     Number of Occurrences:   1     Order Specific Question:   Diet:     Answer:   Regular [1]     Physical Exam   Constitutional: She appears well-developed and well-nourished.   HENT:   Head: Normocephalic and atraumatic.   Poor dentition   Eyes: EOM are normal. Pupils are equal, round, and reactive to light.   Neck: Normal range of motion.   Cardiovascular: Normal rate and intact distal pulses.    Pulmonary/Chest: Effort normal. No respiratory distress.   Abdominal: Soft. Bowel sounds are normal. She exhibits no distension.   Musculoskeletal: Normal range of motion.   Neurological: She is alert. No cranial nerve deficit. She exhibits normal muscle tone.   Skin: Skin is warm and dry.   Psychiatric: Her behavior is normal. Judgment and thought content normal.   Nursing note and vitals reviewed.                                   Assessment/Plan     * Dementia- (present on admission)   Assessment & Plan    Min narc/sed when possible.   Await guardianship.  Court 12/19        Constipation- (present on admission)   Assessment & Plan    Resolved currently continue bowel care        Congestion of upper airway- (present on admission)   Assessment & Plan    Saturating 90% on RA.  O2, RT, IS, Vax, CXR and encouraging taking of PO fluids.          Physical debility- (present on admission)   Assessment & Plan    PT/OT and increase activity.  Encourage mobilization. Pending guardianship        Obesity (BMI 30.0-34.9)- (present on admission)   Assessment & Plan    Encourage Kcal restriction        T12 compression fracture (CMS-HCC)- (present on admission)   Assessment & Plan    CT spine, no acute fractures. Cont  fosamax and calcium  Stable and pending guardianship        Chronic back pain- (present on admission)   Assessment & Plan    As above and min narc/sed when possible.  Bowel Regimen          Stable issues - med hx (GIB, CAD/ICM, CVA, DLD), preventives.    Dispo - complex/fair.  Await guardianship. Court 12/19      Reviewed items::  Labs reviewed and Medications reviewed  Pink catheter::  No Pink  DVT prophylaxis pharmacological::  Heparin  Ulcer Prophylaxis::  Yes

## 2017-12-16 NOTE — CARE PLAN
Problem: Venous Thromboembolism (VTW)/Deep Vein Thrombosis (DVT) Prevention:  Goal: Patient will participate in Venous Thrombosis (VTE)/Deep Vein Thrombosis (DVT)Prevention Measures    Intervention: Assess and monitor for anticoagulation complications  Pt. On heparin.  Educated pt. On medication use and side effects pt. Verbalized understanding

## 2017-12-16 NOTE — CARE PLAN
Problem: Mobility  Goal: Risk for activity intolerance will decrease  Outcome: PROGRESSING AS EXPECTED  Balancing activity with rest to minimize deconditioning.  Mobilizing pt at minimum to chair for meals.  Ambulating as tolerated.  PT/OT on board.      Problem: Urinary Elimination:  Goal: Ability to reestablish a normal urinary elimination pattern will improve    Intervention: Assist patient to sit on commode or toilet for voiding  Pts incontinence dissipates with scheduled toileting.  Addressing toileting needs with hourly rounding.

## 2017-12-16 NOTE — CARE PLAN
Problem: Mobility  Goal: Risk for activity intolerance will decrease    Intervention: Assess and monitor signs of activity intolerance  Pt instructed on the importance of mobilizing side to side every couple hours to prevent skin breakdown; pt verbalizes understanding but her level of understanding is questionable.

## 2017-12-16 NOTE — PROGRESS NOTES
Simona Knight Fall Risk Assessment:     Last Known Fall: Within the last month  Mobility: Immobilized/requires assist of one person  Medications: Cardiovascular or central nervous system meds  Mental Status/LOC/Awareness: Awake, alert, and oriented to date, place, and person  Toileting Needs: Incontinence  Volume/Electrolyte Status: No problems  Communication/Sensory: Visual (Glasses)/hearing deficit  Behavior: Appropriate behavior  Simona Knight Fall Risk Total: 13  Fall Risk Level: MODERATE RISK    Universal Fall Precautions:  call light/belongings in reach, siderails up x 2, use non-slip footwear, adequate lighting, educate on level of risk, educate to call for assistance    Fall Risk Level Interventions:    TRIAL (TELE 8, NEURO, MED JENNIE 5) Moderate Fall Risk Interventions  Place yellow fall risk ID band on patient: verified  Provide patient/family education based on risk assessment : completed  Educate patient/family to call staff for assistance when getting out of bed: completed  Place fall precaution signage outside patient door: verified  Utilize bed/chair fall alarm: verifiedTRIAL (TELE 8, NEURO, [employer] [employer] [facility] JENNIE 5) High Fall Risk Interventions  Place yellow fall risk ID band on patient: verified  Provide patient/family education based on risk assessment: verified  Educate patient/family to call staff for assistance when getting out of bed: verified  Place fall precaution signage outside patient door: verified  Place patient in room close to nursing station: verified  Utilize bed/chair fall alarm: verified  Notify charge of high risk for huddle: verified    Patient Specific Interventions:     Medication: review medications with patient and family  Mental Status/LOC/Awareness: reinforce falls education, utilize bed/chair fall alarm and reinforce the use of call light  Toileting: instruct patient/family on the use of grab bars  Volume/Electrolyte Status: ensure patient remains hydrated  Communication/Sensory:  update plan of care on whiteboard  Behavioral: engage patient in daily activities and administer medication as ordered  Mobility: utilize bed/chair fall alarm and ensure bed is locked and in lowest position

## 2017-12-16 NOTE — CARE PLAN
Problem: Respiratory:  Goal: Respiratory status will improve    Intervention: Educate and encourage coughing and deep breathing  Pt encouraged to perform deep breathing exercises; pt verbalizes understanding. Reinforcement necessary

## 2017-12-17 PROCEDURE — A9270 NON-COVERED ITEM OR SERVICE: HCPCS | Performed by: HOSPITALIST

## 2017-12-17 PROCEDURE — 700102 HCHG RX REV CODE 250 W/ 637 OVERRIDE(OP): Performed by: HOSPITALIST

## 2017-12-17 PROCEDURE — 770006 HCHG ROOM/CARE - MED/SURG/GYN SEMI*

## 2017-12-17 PROCEDURE — 700102 HCHG RX REV CODE 250 W/ 637 OVERRIDE(OP): Performed by: INTERNAL MEDICINE

## 2017-12-17 PROCEDURE — 700101 HCHG RX REV CODE 250: Performed by: FAMILY MEDICINE

## 2017-12-17 PROCEDURE — 99231 SBSQ HOSP IP/OBS SF/LOW 25: CPT | Performed by: INTERNAL MEDICINE

## 2017-12-17 PROCEDURE — 700102 HCHG RX REV CODE 250 W/ 637 OVERRIDE(OP): Performed by: FAMILY MEDICINE

## 2017-12-17 PROCEDURE — 700111 HCHG RX REV CODE 636 W/ 250 OVERRIDE (IP): Performed by: HOSPITALIST

## 2017-12-17 PROCEDURE — A9270 NON-COVERED ITEM OR SERVICE: HCPCS | Performed by: INTERNAL MEDICINE

## 2017-12-17 PROCEDURE — 700111 HCHG RX REV CODE 636 W/ 250 OVERRIDE (IP): Performed by: FAMILY MEDICINE

## 2017-12-17 PROCEDURE — A9270 NON-COVERED ITEM OR SERVICE: HCPCS | Performed by: FAMILY MEDICINE

## 2017-12-17 RX ADMIN — OMEPRAZOLE 20 MG: 20 CAPSULE, DELAYED RELEASE ORAL at 08:54

## 2017-12-17 RX ADMIN — Medication 500 MG: at 08:53

## 2017-12-17 RX ADMIN — POLYETHYLENE GLYCOL (3350) 1 PACKET: 17 POWDER, FOR SOLUTION ORAL at 08:53

## 2017-12-17 RX ADMIN — GUAIFENESIN 600 MG: 600 TABLET, EXTENDED RELEASE ORAL at 21:52

## 2017-12-17 RX ADMIN — HYDRALAZINE HYDROCHLORIDE 100 MG: 50 TABLET ORAL at 15:14

## 2017-12-17 RX ADMIN — DULOXETINE HYDROCHLORIDE 20 MG: 20 CAPSULE, DELAYED RELEASE ORAL at 08:54

## 2017-12-17 RX ADMIN — Medication 500 MG: at 16:41

## 2017-12-17 RX ADMIN — HEPARIN SODIUM 5000 UNITS: 5000 INJECTION, SOLUTION INTRAVENOUS; SUBCUTANEOUS at 21:53

## 2017-12-17 RX ADMIN — HYDRALAZINE HYDROCHLORIDE 100 MG: 50 TABLET ORAL at 05:51

## 2017-12-17 RX ADMIN — ONDANSETRON 4 MG: 4 TABLET, ORALLY DISINTEGRATING ORAL at 22:04

## 2017-12-17 RX ADMIN — LIDOCAINE 1 PATCH: 50 PATCH CUTANEOUS at 08:54

## 2017-12-17 RX ADMIN — BENAZEPRIL HYDROCHLORIDE 40 MG: 20 TABLET ORAL at 08:53

## 2017-12-17 RX ADMIN — HYDRALAZINE HYDROCHLORIDE 100 MG: 50 TABLET ORAL at 21:52

## 2017-12-17 RX ADMIN — OXYBUTYNIN CHLORIDE 5 MG: 5 TABLET, FILM COATED, EXTENDED RELEASE ORAL at 21:53

## 2017-12-17 RX ADMIN — LACTOBACILLUS ACIDOPHILUS / LACTOBACILLUS BULGARICUS 1 PACKET: 100 MILLION CFU STRENGTH GRANULES at 08:52

## 2017-12-17 RX ADMIN — ALENDRONATE SODIUM 10 MG: 10 TABLET ORAL at 08:54

## 2017-12-17 RX ADMIN — GUAIFENESIN 600 MG: 600 TABLET, EXTENDED RELEASE ORAL at 08:53

## 2017-12-17 RX ADMIN — LACTOBACILLUS ACIDOPHILUS / LACTOBACILLUS BULGARICUS 1 PACKET: 100 MILLION CFU STRENGTH GRANULES at 16:41

## 2017-12-17 RX ADMIN — STANDARDIZED SENNA CONCENTRATE AND DOCUSATE SODIUM 2 TABLET: 8.6; 5 TABLET, FILM COATED ORAL at 21:53

## 2017-12-17 RX ADMIN — HEPARIN SODIUM 5000 UNITS: 5000 INJECTION, SOLUTION INTRAVENOUS; SUBCUTANEOUS at 08:53

## 2017-12-17 RX ADMIN — ASPIRIN 81 MG: 81 TABLET, COATED ORAL at 08:54

## 2017-12-17 RX ADMIN — ATORVASTATIN CALCIUM 80 MG: 40 TABLET, FILM COATED ORAL at 21:53

## 2017-12-17 RX ADMIN — GABAPENTIN 300 MG: 300 CAPSULE ORAL at 21:52

## 2017-12-17 ASSESSMENT — ENCOUNTER SYMPTOMS
ABDOMINAL PAIN: 0
FOCAL WEAKNESS: 0
COUGH: 0
SHORTNESS OF BREATH: 0
WEAKNESS: 1
NAUSEA: 0
DEPRESSION: 0
FEVER: 0
MYALGIAS: 0
MEMORY LOSS: 1

## 2017-12-17 ASSESSMENT — PAIN SCALES - GENERAL
PAINLEVEL_OUTOF10: 0
PAINLEVEL_OUTOF10: 2
PAINLEVEL_OUTOF10: 1

## 2017-12-17 NOTE — PROGRESS NOTES
[facility] Hospitalist Progress Note    Date of Service: 2017    Chief Complaint  77 y.o. female admitted 2017 with GLF, pyuria and ATN.    Interval Problem Update  : Had BM this AM, says had to strain  : Feeling tired/sleepy.  Otherwise no complaints  : Fever last night, cxr negative, blood cx pending.  WBC in AM  : No physical complaints.  No fever  : Drowsy, no physical complaints.  No fever.  Eating and drinking.  Stooling  12/10: No complaints, drowsy, napping  : No physical complaints, encouraged ambulation TID and chair for meals.  Pending placement  : No issues overnight; remains confused at times  : No changes, remains confused, awaiting guardianship  : No changes and no acute events overnight; tolerating diet  12/15:  No new events, resting/arousable  :  Pleasant, cooperative, ate breakfast this am, and ambulated  :  Awake, alert, no new complaints    Consultants/Specialty  Neurology  Psych      Review of Systems   Constitutional: Negative for fever.   HENT: Negative for hearing loss.    Respiratory: Negative for cough and shortness of breath.    Cardiovascular: Negative for leg swelling.   Gastrointestinal: Negative for abdominal pain and nausea.   Genitourinary: Negative for dysuria and frequency.   Musculoskeletal: Negative for joint pain and myalgias.   Neurological: Positive for weakness. Negative for focal weakness.   Psychiatric/Behavioral: Positive for memory loss. Negative for depression.      Physical Exam  Laboratory/Imaging   Hemodynamics  Temp (24hrs), Av.8 °C (98.2 °F), Min:36.5 °C (97.7 °F), Max:37.3 °C (99.2 °F)   Temperature: 36.5 °C (97.7 °F)  Pulse  Av.2  Min: 50  Max: 106    Blood Pressure : 128/62      Respiratory      Respiration: 16, Pulse Oximetry: 94 %        RML Breath Sounds: Diminished, RLL Breath Sounds: Diminished, ARGELIA Breath Sounds: Diminished, LLL Breath Sounds: Diminished    Fluids    Intake/Output Summary  (Last 24 hours) at 12/17/17 1251  Last data filed at 12/17/17 0900   Gross per 24 hour   Intake              240 ml   Output                0 ml   Net              240 ml       Nutrition  Orders Placed This Encounter   Procedures   • Diet Order     Standing Status:   Standing     Number of Occurrences:   1     Order Specific Question:   Diet:     Answer:   Regular [1]     Physical Exam   Constitutional: She appears well-developed and well-nourished. No distress.   HENT:   Head: Normocephalic and atraumatic.   Poor dentition   Eyes: EOM are normal. Pupils are equal, round, and reactive to light. No scleral icterus.   Neck: Normal range of motion.   Cardiovascular: Normal rate and intact distal pulses.    Pulmonary/Chest: Effort normal. No respiratory distress.   Abdominal: Soft. Bowel sounds are normal. She exhibits no distension. There is no tenderness.   Musculoskeletal: Normal range of motion.   Neurological: She is alert. No cranial nerve deficit. Coordination normal.   Skin: Skin is warm and dry.   Psychiatric: Her behavior is normal. Thought content normal.   Nursing note and vitals reviewed.                                   Assessment/Plan     * Dementia- (present on admission)   Assessment & Plan    Min narc/sed when possible.   Await guardianship.  Court 12/19        Constipation- (present on admission)   Assessment & Plan    Resolved currently continue bowel care        Congestion of upper airway- (present on admission)   Assessment & Plan    Saturating 90% on RA.  O2, RT, IS, Vax, CXR and encouraging taking of PO fluids.          Physical debility- (present on admission)   Assessment & Plan    PT/OT and increase activity.  Encourage mobilization. Pending guardianship        Obesity (BMI 30.0-34.9)- (present on admission)   Assessment & Plan    Encourage Kcal restriction        T12 compression fracture (CMS-HCC)- (present on admission)   Assessment & Plan    CT spine, no acute fractures. Cont fosamax and  calcium  Stable and pending guardianship        Chronic back pain- (present on admission)   Assessment & Plan    As above and min narc/sed when possible.  Bowel Regimen          Stable issues - med hx (GIB, CAD/ICM, CVA, DLD), preventives.    Dispo - complex/fair.  Await guardianship. Court 12/19      Reviewed items::  Labs reviewed and Medications reviewed  Pink catheter::  No Pink  DVT prophylaxis pharmacological::  Heparin  Ulcer Prophylaxis::  Yes

## 2017-12-17 NOTE — PROGRESS NOTES
Simona Knight Fall Risk Assessment:     Last Known Fall: Within the last six months  Mobility: Immobilized/requires assist of one person  Medications: Cardiovascular or central nervous system meds  Mental Status/LOC/Awareness: Oriented to person and place  Toileting Needs: Incontinence  Volume/Electrolyte Status: No problems  Communication/Sensory: Visual (Glasses)/hearing deficit  Behavior: Appropriate behavior  Simona Knight Fall Risk Total: 13  Fall Risk Level: MODERATE RISK    Universal Fall Precautions:  call light/belongings in reach, bed in low position and locked, wheelchairs and assistive devices out of sight, siderails up x 2, use non-slip footwear, adequate lighting, educate on level of risk, clutter free and spill free environment, educate to call for assistance    Fall Risk Level Interventions:    TRIAL ([employer] 8, NEURO, MED JENNIE 5) Moderate Fall Risk Interventions  Place yellow fall risk ID band on patient: verified  Provide patient/family education based on risk assessment : completed  Educate patient/family to call staff for assistance when getting out of bed: completed  Place fall precaution signage outside patient door: verified  Utilize bed/chair fall alarm: verifiedTRIAL ([employer] 8, NEURO, [employer] JENNIE 5) High Fall Risk Interventions  Place yellow fall risk ID band on patient: verified  Provide patient/family education based on risk assessment: completed  Educate patient/family to call staff for assistance when getting out of bed: completed  Place fall precaution signage outside patient door: verified  Place patient in room close to nursing station: verified  Utilize bed/chair fall alarm: verified  Notify charge of high risk for huddle: verified    Patient Specific Interventions:     Medication: review medications with patient and family  Mental Status/LOC/Awareness: reorient patient, reinforce falls education, check on patient hourly, utilize bed/chair fall alarm, reinforce the use of call light and  provide activity  Toileting: provide frquent toileting  Volume/Electrolyte Status: ensure patient remains hydrated  Communication/Sensory: update plan of care on whiteboard  Behavioral: engage patient in daily activities  Mobility: schedule physical activity throughout the day, utilize bed/chair fall alarm and ensure bed is locked and in lowest position

## 2017-12-17 NOTE — PROGRESS NOTES
Simona Knight Fall Risk Assessment:     Last Known Fall: Within the last six months  Mobility: Immobilized/requires assist of one person  Medications: Cardiovascular or central nervous system meds  Mental Status/LOC/Awareness: Oriented to person and place  Toileting Needs: Incontinence  Volume/Electrolyte Status: No problems  Communication/Sensory: Visual (Glasses)/hearing deficit  Behavior: Appropriate behavior  Jarvis Eugene Fall Risk Total: 13  Fall Risk Level: MODERATE RISK    Universal Fall Precautions:  call light/belongings in reach, bed in low position and locked    Fall Risk Level Interventions:    TRIAL ([employer] 8, NEURO, MED JENNIE 5) Moderate Fall Risk Interventions  Place yellow fall risk ID band on patient: verified  Provide patient/family education based on risk assessment : completed  Educate patient/family to call staff for assistance when getting out of bed: completed  Place fall precaution signage outside patient door: verified  Utilize bed/chair fall alarm: verifiedTRIAL (TELE 8, NEURO, [employer] JENNIE 5) High Fall Risk Interventions  Place yellow fall risk ID band on patient: verified  Provide patient/family education based on risk assessment: completed  Educate patient/family to call staff for assistance when getting out of bed: completed  Place fall precaution signage outside patient door: verified  Place patient in room close to nursing station: verified  Utilize bed/chair fall alarm: verified  Notify charge of high risk for huddle: verified    Patient Specific Interventions:     Medication: review medications with patient and family  Mental Status/LOC/Awareness: check on patient hourly  Toileting: monitor intake and output/use of appropriate interventions  Volume/Electrolyte Status: ensure patient remains hydrated  Communication/Sensory: update plan of care on whiteboard  Behavioral: administer medication as ordered  Mobility: ensure bed is locked and in lowest position

## 2017-12-17 NOTE — CARE PLAN
Problem: Pain Management  Goal: Pain level will decrease to patient's comfort goal    Intervention: Follow pain managment plan developed in collaboration with patient and Interdisciplinary Team  Back pain managed with lidocaine patch       Problem: Mobility  Goal: Risk for activity intolerance will decrease    Intervention: Encourage patient to increase activity level in collaboration with Interdisciplinary Team  Pt encouraged to increase mobility

## 2017-12-17 NOTE — PROGRESS NOTES
Report received. Assumed care, assessment complete. Pt is A & O x 4.  Pt denies having any pain at this time. Fall precautions and appropriate signs in place. Pt oriented to unit routine, call light/phone system and RN extension number provided. Pt educated regarding fall precautions. Bed alarm in use. Pt denies any additional needs at this time. Call light within reach.

## 2017-12-17 NOTE — PROGRESS NOTES
"Pt refused offer to get out of bed for lunch.  Pt states that she cant see well (she feels that her glasses Rx is not sufficient, same glasses present on admission) and does not \"feel well\".    "

## 2017-12-18 PROCEDURE — 770006 HCHG ROOM/CARE - MED/SURG/GYN SEMI*

## 2017-12-18 PROCEDURE — 700102 HCHG RX REV CODE 250 W/ 637 OVERRIDE(OP): Performed by: HOSPITALIST

## 2017-12-18 PROCEDURE — A9270 NON-COVERED ITEM OR SERVICE: HCPCS | Performed by: FAMILY MEDICINE

## 2017-12-18 PROCEDURE — A9270 NON-COVERED ITEM OR SERVICE: HCPCS | Performed by: INTERNAL MEDICINE

## 2017-12-18 PROCEDURE — 700101 HCHG RX REV CODE 250: Performed by: FAMILY MEDICINE

## 2017-12-18 PROCEDURE — 700102 HCHG RX REV CODE 250 W/ 637 OVERRIDE(OP): Performed by: INTERNAL MEDICINE

## 2017-12-18 PROCEDURE — A9270 NON-COVERED ITEM OR SERVICE: HCPCS | Performed by: HOSPITALIST

## 2017-12-18 PROCEDURE — 700111 HCHG RX REV CODE 636 W/ 250 OVERRIDE (IP): Performed by: HOSPITALIST

## 2017-12-18 PROCEDURE — 700102 HCHG RX REV CODE 250 W/ 637 OVERRIDE(OP): Performed by: FAMILY MEDICINE

## 2017-12-18 PROCEDURE — 99231 SBSQ HOSP IP/OBS SF/LOW 25: CPT | Performed by: INTERNAL MEDICINE

## 2017-12-18 RX ADMIN — HYDRALAZINE HYDROCHLORIDE 100 MG: 50 TABLET ORAL at 20:16

## 2017-12-18 RX ADMIN — Medication 500 MG: at 07:52

## 2017-12-18 RX ADMIN — Medication 500 MG: at 17:34

## 2017-12-18 RX ADMIN — OMEPRAZOLE 20 MG: 20 CAPSULE, DELAYED RELEASE ORAL at 07:51

## 2017-12-18 RX ADMIN — ATORVASTATIN CALCIUM 80 MG: 40 TABLET, FILM COATED ORAL at 20:15

## 2017-12-18 RX ADMIN — OXYBUTYNIN CHLORIDE 5 MG: 5 TABLET, FILM COATED, EXTENDED RELEASE ORAL at 22:12

## 2017-12-18 RX ADMIN — HYDRALAZINE HYDROCHLORIDE 100 MG: 50 TABLET ORAL at 14:19

## 2017-12-18 RX ADMIN — LACTOBACILLUS ACIDOPHILUS / LACTOBACILLUS BULGARICUS 1 PACKET: 100 MILLION CFU STRENGTH GRANULES at 17:34

## 2017-12-18 RX ADMIN — ASPIRIN 81 MG: 81 TABLET, COATED ORAL at 07:52

## 2017-12-18 RX ADMIN — ALENDRONATE SODIUM 10 MG: 10 TABLET ORAL at 05:40

## 2017-12-18 RX ADMIN — LIDOCAINE 1 PATCH: 50 PATCH CUTANEOUS at 07:53

## 2017-12-18 RX ADMIN — GUAIFENESIN 600 MG: 600 TABLET, EXTENDED RELEASE ORAL at 07:51

## 2017-12-18 RX ADMIN — BENAZEPRIL HYDROCHLORIDE 40 MG: 20 TABLET ORAL at 07:52

## 2017-12-18 RX ADMIN — ACETAMINOPHEN 650 MG: 325 TABLET, FILM COATED ORAL at 22:12

## 2017-12-18 RX ADMIN — STANDARDIZED SENNA CONCENTRATE AND DOCUSATE SODIUM 2 TABLET: 8.6; 5 TABLET, FILM COATED ORAL at 20:16

## 2017-12-18 RX ADMIN — GABAPENTIN 300 MG: 300 CAPSULE ORAL at 20:16

## 2017-12-18 RX ADMIN — LACTOBACILLUS ACIDOPHILUS / LACTOBACILLUS BULGARICUS 1 PACKET: 100 MILLION CFU STRENGTH GRANULES at 07:52

## 2017-12-18 RX ADMIN — HYDRALAZINE HYDROCHLORIDE 100 MG: 50 TABLET ORAL at 05:40

## 2017-12-18 RX ADMIN — POLYETHYLENE GLYCOL (3350) 1 PACKET: 17 POWDER, FOR SOLUTION ORAL at 07:52

## 2017-12-18 RX ADMIN — GUAIFENESIN 600 MG: 600 TABLET, EXTENDED RELEASE ORAL at 20:16

## 2017-12-18 RX ADMIN — LACTOBACILLUS ACIDOPHILUS / LACTOBACILLUS BULGARICUS 1 PACKET: 100 MILLION CFU STRENGTH GRANULES at 11:31

## 2017-12-18 RX ADMIN — HEPARIN SODIUM 5000 UNITS: 5000 INJECTION, SOLUTION INTRAVENOUS; SUBCUTANEOUS at 07:51

## 2017-12-18 RX ADMIN — HEPARIN SODIUM 5000 UNITS: 5000 INJECTION, SOLUTION INTRAVENOUS; SUBCUTANEOUS at 21:00

## 2017-12-18 RX ADMIN — DULOXETINE HYDROCHLORIDE 20 MG: 20 CAPSULE, DELAYED RELEASE ORAL at 07:52

## 2017-12-18 ASSESSMENT — ENCOUNTER SYMPTOMS
MYALGIAS: 0
ABDOMINAL PAIN: 0
NERVOUS/ANXIOUS: 0
FOCAL WEAKNESS: 0
FEVER: 0
COUGH: 0
SHORTNESS OF BREATH: 0
WEAKNESS: 1
MEMORY LOSS: 1
DIZZINESS: 0
DEPRESSION: 0

## 2017-12-18 ASSESSMENT — PAIN SCALES - GENERAL
PAINLEVEL_OUTOF10: 0

## 2017-12-18 NOTE — PROGRESS NOTES
Simona Knight Fall Risk Assessment:     Last Known Fall: Within the last six months  Mobility: Immobilized/requires assist of one person  Medications: Cardiovascular or central nervous system meds  Mental Status/LOC/Awareness: Oriented to person and place  Toileting Needs: Incontinence  Volume/Electrolyte Status: No problems  Communication/Sensory: Visual (Glasses)/hearing deficit  Behavior: Appropriate behavior  Jarvismark Knight Fall Risk Total: 13  Fall Risk Level: MODERATE RISK    Universal Fall Precautions:  call light/belongings in reach, bed in low position and locked, wheelchairs and assistive devices out of sight, siderails up x 2, use non-slip footwear, adequate lighting, educate on level of risk, clutter free and spill free environment, educate to call for assistance    Fall Risk Level Interventions:    TRIAL ([employer] 8, NEURO, MED JENNIE 5) Moderate Fall Risk Interventions  Place yellow fall risk ID band on patient: verified  Provide patient/family education based on risk assessment : completed  Educate patient/family to call staff for assistance when getting out of bed: completed  Place fall precaution signage outside patient door: verified  Utilize bed/chair fall alarm: verifiedTRIAL (TELE 8, NEURO, [employer] JENNIE 5) High Fall Risk Interventions  Place yellow fall risk ID band on patient: verified  Provide patient/family education based on risk assessment: completed  Educate patient/family to call staff for assistance when getting out of bed: completed  Place fall precaution signage outside patient door: verified  Place patient in room close to nursing station: verified  Utilize bed/chair fall alarm: verified  Notify charge of high risk for huddle: verified    Patient Specific Interventions:     Medication: review medications with patient and family  Mental Status/LOC/Awareness: reorient patient and utilize bed/chair fall alarm  Toileting: provide frquent toileting, instruct patient/family on the use of grab bars,  instruct patient/family on the need to call for assistance when toileting and do not leave patient unattended in bathroom/refer to toileting scripting  Volume/Electrolyte Status: ensure patient remains hydrated and administer medications as ordered for nausea and vomiting  Communication/Sensory: update plan of care on whiteboard, ensure proper positioning when transferrng/ambulating and ensure patient has glasses/contacts and hearing aids/dentures  Behavioral: encourage patient to voice feelings and instruct/reinforce fall program rationale  Mobility: utilize bed/chair fall alarm, ensure bed is locked and in lowest position, provide appropriate assistive device and instruct patient to exit bed on their strongest side

## 2017-12-18 NOTE — CARE PLAN
Problem: Bowel/Gastric:  Goal: Normal bowel function is maintained or improved  Outcome: PROGRESSING AS EXPECTED  Pt medicated per MAR for nausea. Pt rounded on 30 mins after Zofran administration and pt stated nausea was gone.     Problem: Knowledge Deficit  Goal: Knowledge of disease process/condition, treatment plan, diagnostic tests, and medications will improve  Outcome: PROGRESSING AS EXPECTED  Pt updated on POC and court date on Tuesday. Pt expressed some anxiety about going to court. RN informed pt what to expect the day of court. Pt verbalized understanding.

## 2017-12-18 NOTE — DISCHARGE PLANNING
Received transportation request from RONALDO Keita for patient to attend court on 12/19. Call placed to [facility], spoke to [facility] and 0745 transport time arranged for patient on 12/19.     RONALDO Keita has been notified.

## 2017-12-18 NOTE — PROGRESS NOTES
Received report, assumed pt care. Pt a&o 3, VSS, assessment completed. Resting comfortably in bed with call light, bedside table in reach. No c/o at this time. Side rails up 2. Instructed to use call light when needing to get OOB verbalized understanding. Bed alarm on, bed in low position. Will continue to monitor.

## 2017-12-18 NOTE — PROGRESS NOTES
[facility] Hospitalist Progress Note    Date of Service: 2017    Chief Complaint  77 y.o. female admitted 2017 with GLF, pyuria and ATN.    Interval Problem Update  : Had BM this AM, says had to strain  : Feeling tired/sleepy.  Otherwise no complaints  : Fever last night, cxr negative, blood cx pending.  WBC in AM  : No physical complaints.  No fever  : Drowsy, no physical complaints.  No fever.  Eating and drinking.  Stooling  12/10: No complaints, drowsy, napping  : No physical complaints, encouraged ambulation TID and chair for meals.  Pending placement  : No issues overnight; remains confused at times  : No changes, remains confused, awaiting guardianship  : No changes and no acute events overnight; tolerating diet  12/15:  No new events, resting/arousable  :  Pleasant, cooperative, ate breakfast this am, and ambulated  :  Awake, alert, no new complaints  : ate breakfast, pleasant, no new complaints    Consultants/Specialty  Neurology  Psych      Review of Systems   Constitutional: Negative for fever.   Respiratory: Negative for cough and shortness of breath.    Cardiovascular: Negative for chest pain and leg swelling.   Gastrointestinal: Negative for abdominal pain.   Genitourinary: Negative for dysuria and frequency.   Musculoskeletal: Negative for joint pain and myalgias.   Neurological: Positive for weakness. Negative for dizziness and focal weakness.   Psychiatric/Behavioral: Positive for memory loss. Negative for depression. The patient is not nervous/anxious.       Physical Exam  Laboratory/Imaging   Hemodynamics  Temp (24hrs), Av.6 °C (97.9 °F), Min:36.4 °C (97.6 °F), Max:36.8 °C (98.2 °F)   Temperature: 36.4 °C (97.6 °F)  Pulse  Av.2  Min: 50  Max: 106    Blood Pressure : 115/48      Respiratory      Respiration: 16, Pulse Oximetry: 93 %        RUL Breath Sounds: Clear, RML Breath Sounds: Clear, RLL Breath Sounds: Diminished, ARGELIA  Breath Sounds: Clear, LLL Breath Sounds: Diminished    Fluids    Intake/Output Summary (Last 24 hours) at 12/18/17 1121  Last data filed at 12/18/17 0541   Gross per 24 hour   Intake              420 ml   Output                0 ml   Net              420 ml       Nutrition  Orders Placed This Encounter   Procedures   • Diet Order     Standing Status:   Standing     Number of Occurrences:   1     Order Specific Question:   Diet:     Answer:   Regular [1]     Physical Exam   Constitutional: She appears well-developed. No distress.   HENT:   Head: Normocephalic and atraumatic.   Poor dentition   Eyes: EOM are normal. Pupils are equal, round, and reactive to light.   Neck: Normal range of motion.   Cardiovascular: Normal rate and intact distal pulses.    Pulmonary/Chest: No respiratory distress. She has no rales.   Abdominal: Soft. Bowel sounds are normal. She exhibits no distension.   Musculoskeletal: Normal range of motion.   Neurological: She is alert. No cranial nerve deficit. She exhibits normal muscle tone.   Skin: Skin is warm. She is not diaphoretic.   Psychiatric: Thought content normal.   Nursing note and vitals reviewed.                                   Assessment/Plan     * Dementia- (present on admission)   Assessment & Plan    Min narc/sed when possible.   Await guardianship.  Court 12/19        Constipation- (present on admission)   Assessment & Plan    Resolved currently continue bowel care        Congestion of upper airway- (present on admission)   Assessment & Plan    Saturating 90% on RA.  O2, RT, IS, Vax, CXR and encouraging taking of PO fluids.          Physical debility- (present on admission)   Assessment & Plan    PT/OT and increase activity.  Encourage mobilization. Pending guardianship        Obesity (BMI 30.0-34.9)- (present on admission)   Assessment & Plan    Encourage Kcal restriction        T12 compression fracture (CMS-HCC)- (present on admission)   Assessment & Plan    CT spine, no  acute fractures. Cont fosamax and calcium  Stable and pending guardianship        Chronic back pain- (present on admission)   Assessment & Plan    As above and min narc/sed when possible.  Bowel Regimen          Stable issues - med hx (GIB, CAD/ICM, CVA, DLD), preventives.    Dispo - complex/fair.  Await guardianship. Court 12/19  Encourage activity      Reviewed items::  Labs reviewed and Medications reviewed  Pink catheter::  No Pink  DVT prophylaxis pharmacological::  Heparin  Ulcer Prophylaxis::  Yes

## 2017-12-19 PROCEDURE — 770006 HCHG ROOM/CARE - MED/SURG/GYN SEMI*

## 2017-12-19 PROCEDURE — A9270 NON-COVERED ITEM OR SERVICE: HCPCS | Performed by: FAMILY MEDICINE

## 2017-12-19 PROCEDURE — A9270 NON-COVERED ITEM OR SERVICE: HCPCS | Performed by: HOSPITALIST

## 2017-12-19 PROCEDURE — 700102 HCHG RX REV CODE 250 W/ 637 OVERRIDE(OP): Performed by: INTERNAL MEDICINE

## 2017-12-19 PROCEDURE — 700101 HCHG RX REV CODE 250: Performed by: FAMILY MEDICINE

## 2017-12-19 PROCEDURE — 700111 HCHG RX REV CODE 636 W/ 250 OVERRIDE (IP): Performed by: HOSPITALIST

## 2017-12-19 PROCEDURE — 700102 HCHG RX REV CODE 250 W/ 637 OVERRIDE(OP): Performed by: HOSPITALIST

## 2017-12-19 PROCEDURE — 99231 SBSQ HOSP IP/OBS SF/LOW 25: CPT | Performed by: INTERNAL MEDICINE

## 2017-12-19 PROCEDURE — A9270 NON-COVERED ITEM OR SERVICE: HCPCS | Performed by: INTERNAL MEDICINE

## 2017-12-19 PROCEDURE — 700102 HCHG RX REV CODE 250 W/ 637 OVERRIDE(OP): Performed by: FAMILY MEDICINE

## 2017-12-19 RX ADMIN — ASPIRIN 81 MG: 81 TABLET, COATED ORAL at 07:50

## 2017-12-19 RX ADMIN — LIDOCAINE 1 PATCH: 50 PATCH CUTANEOUS at 12:09

## 2017-12-19 RX ADMIN — HYDRALAZINE HYDROCHLORIDE 100 MG: 50 TABLET ORAL at 22:11

## 2017-12-19 RX ADMIN — HYDRALAZINE HYDROCHLORIDE 100 MG: 50 TABLET ORAL at 05:19

## 2017-12-19 RX ADMIN — GUAIFENESIN 600 MG: 600 TABLET, EXTENDED RELEASE ORAL at 20:29

## 2017-12-19 RX ADMIN — ATORVASTATIN CALCIUM 80 MG: 40 TABLET, FILM COATED ORAL at 20:29

## 2017-12-19 RX ADMIN — HEPARIN SODIUM 5000 UNITS: 5000 INJECTION, SOLUTION INTRAVENOUS; SUBCUTANEOUS at 20:29

## 2017-12-19 RX ADMIN — Medication 500 MG: at 17:08

## 2017-12-19 RX ADMIN — OXYBUTYNIN CHLORIDE 5 MG: 5 TABLET, FILM COATED, EXTENDED RELEASE ORAL at 20:30

## 2017-12-19 RX ADMIN — BENAZEPRIL HYDROCHLORIDE 40 MG: 20 TABLET ORAL at 07:50

## 2017-12-19 RX ADMIN — STANDARDIZED SENNA CONCENTRATE AND DOCUSATE SODIUM 2 TABLET: 8.6; 5 TABLET, FILM COATED ORAL at 20:29

## 2017-12-19 RX ADMIN — HEPARIN SODIUM 5000 UNITS: 5000 INJECTION, SOLUTION INTRAVENOUS; SUBCUTANEOUS at 12:09

## 2017-12-19 RX ADMIN — GUAIFENESIN 600 MG: 600 TABLET, EXTENDED RELEASE ORAL at 07:51

## 2017-12-19 RX ADMIN — OMEPRAZOLE 20 MG: 20 CAPSULE, DELAYED RELEASE ORAL at 07:50

## 2017-12-19 RX ADMIN — DULOXETINE HYDROCHLORIDE 20 MG: 20 CAPSULE, DELAYED RELEASE ORAL at 07:51

## 2017-12-19 RX ADMIN — GABAPENTIN 300 MG: 300 CAPSULE ORAL at 20:29

## 2017-12-19 RX ADMIN — HYDRALAZINE HYDROCHLORIDE 100 MG: 50 TABLET ORAL at 14:27

## 2017-12-19 RX ADMIN — Medication 500 MG: at 07:50

## 2017-12-19 RX ADMIN — ALENDRONATE SODIUM 10 MG: 10 TABLET ORAL at 05:19

## 2017-12-19 ASSESSMENT — LIFESTYLE VARIABLES: DO YOU DRINK ALCOHOL: NO

## 2017-12-19 ASSESSMENT — PAIN SCALES - GENERAL
PAINLEVEL_OUTOF10: 0

## 2017-12-19 NOTE — PROGRESS NOTES
Simona Knight Fall Risk Assessment:     Last Known Fall: Within the last six months  Mobility: Immobilized/requires assist of one person  Medications: Cardiovascular or central nervous system meds  Mental Status/LOC/Awareness: Oriented to person and place  Toileting Needs: Incontinence  Volume/Electrolyte Status: No problems  Communication/Sensory: Visual (Glasses)/hearing deficit  Behavior: Appropriate behavior  Jarvismark Knight Fall Risk Total: 13  Fall Risk Level: MODERATE RISK    Universal Fall Precautions:  call light/belongings in reach, bed in low position and locked, wheelchairs and assistive devices out of sight, siderails up x 2, use non-slip footwear, adequate lighting, educate on level of risk, clutter free and spill free environment, educate to call for assistance    Fall Risk Level Interventions:    TRIAL ([employer] 8, NEURO, MED JENNIE 5) Moderate Fall Risk Interventions  Place yellow fall risk ID band on patient: verified  Provide patient/family education based on risk assessment : completed  Educate patient/family to call staff for assistance when getting out of bed: completed  Place fall precaution signage outside patient door: verified  Utilize bed/chair fall alarm: verifiedTRIAL (TELE 8, NEURO, [employer] JENNIE 5) High Fall Risk Interventions  Place yellow fall risk ID band on patient: verified  Provide patient/family education based on risk assessment: completed  Educate patient/family to call staff for assistance when getting out of bed: completed  Place fall precaution signage outside patient door: verified  Place patient in room close to nursing station: verified  Utilize bed/chair fall alarm: verified  Notify charge of high risk for huddle: verified    Patient Specific Interventions:     Medication: review medications with patient and family  Mental Status/LOC/Awareness: reorient patient and utilize bed/chair fall alarm  Toileting: provide frquent toileting, instruct patient/family on the use of grab bars,  instruct patient/family on the need to call for assistance when toileting and do not leave patient unattended in bathroom/refer to toileting scripting  Volume/Electrolyte Status: ensure patient remains hydrated and administer medications as ordered for nausea and vomiting  Communication/Sensory: update plan of care on whiteboard, ensure proper positioning when transferrng/ambulating and ensure patient has glasses/contacts and hearing aids/dentures  Behavioral: encourage patient to voice feelings and instruct/reinforce fall program rationale  Mobility: utilize bed/chair fall alarm, ensure bed is locked and in lowest position, provide appropriate assistive device and instruct patient to exit bed on their strongest side

## 2017-12-19 NOTE — PROGRESS NOTES
Received awake and oriented x 3, follows simple command, due med given as ordered, incontinence of urine, pad changed and perineal care done, call light within reach, bed alarm in placed, kept warm and comfortable in bed, needs attended, will continue to monitor.

## 2017-12-19 NOTE — PROGRESS NOTES
Assumed care of pt at 0700. Received report from RN. Pt A&Ox3-4 (confused at times). Assessment complete. Pt left with [facility] staff for guardianship court date scheduled today. Labs reviewed. Pt and RN discussed plan of care. Pt questions answered. Pt needs are met at this time. Bed in lowest and locked position. Call light within reach. Upper bed rails up. Hourly rounding in place.

## 2017-12-19 NOTE — CARE PLAN
Problem: Discharge Barriers/Planning  Goal: Patient's continuum of care needs will be met    Intervention: Collaborate with Transitional Care Team and Interdisciplinary Team to meet discharge needs  For court hearing today at 0930.      Problem: Pain Management  Goal: Pain level will decrease to patient's comfort goal    Intervention: Follow pain managment plan developed in collaboration with patient and Interdisciplinary Team  Due med given for back pain, able to sleep and relaxed.

## 2017-12-19 NOTE — PROGRESS NOTES
Simona Knight Fall Risk Assessment:     Last Known Fall: Within the last six months  Mobility: Immobilized/requires assist of one person  Medications: Cardiovascular or central nervous system meds  Mental Status/LOC/Awareness: Oriented to person and place  Toileting Needs: Incontinence  Volume/Electrolyte Status: No problems  Communication/Sensory: Visual (Glasses)/hearing deficit  Behavior: Appropriate behavior  Simona Knight Fall Risk Total: 13  Fall Risk Level: MODERATE RISK    Universal Fall Precautions:  call light/belongings in reach, bed in low position and locked, siderails up x 2, wheelchairs and assistive devices out of sight, use non-slip footwear, adequate lighting, clutter free and spill free environment, educate on level of risk, educate to call for assistance    Fall Risk Level Interventions:    TRIAL ([employer]era[employer] 8, NEURO, MED JENNIE 5) Moderate Fall Risk Interventions  Place yellow fall risk ID band on patient: verified  Provide patient/family education based on risk assessment : verified  Educate patient/family to call staff for assistance when getting out of bed: verified  Place fall precaution signage outside patient door: verified  Utilize bed/chair fall alarm: verifiedTRIAL (TELE 8, NEURO, [employer] JENNIE 5) High Fall Risk Interventions  Place yellow fall risk ID band on patient: verified  Provide patient/family education based on risk assessment: completed  Educate patient/family to call staff for assistance when getting out of bed: completed  Place fall precaution signage outside patient door: verified  Place patient in room close to nursing station: verified  Utilize bed/chair fall alarm: verified  Notify charge of high risk for huddle: verified    Patient Specific Interventions:     Medication: review medications with patient and family, assess for medications that can be discontinued or dosage decreased and limit combination of prn medications  Mental Status/LOC/Awareness: reorient patient, reinforce  falls education, encourage family to stay with patient, check on patient hourly, utilize bed/chair fall alarm and reinforce the use of call light  Toileting: consider obtaining elevated toilet seat or bedside commode (BSC), provide frquent toileting, monitor intake and output/use of appropriate interventions, instruct patient/family on the use of grab bars and instruct patient/family on the need to call for assistance when toileting  Volume/Electrolyte Status: ensure patient remains hydrated, monitor blood sugars and maintain appropriate blood sugar levels if diabetic, advance diet as tolerated, administer medications as ordered for nausea and vomiting, monitor abnormal lab values and ensure IV fluids are appropriate  Communication/Sensory: update plan of care on whiteboard, ensure proper positioning when transferrng/ambulating, ensure patient has glasses/contacts and hearing aids/dentures and for visually impaired patients orient to their room surrounding and do not change their surroundings  Behavioral: collaborate with doctor for possible psych consult, encourage patient to voice feelings, engage patient in daily activities, administer medication as ordered, instruct/reinforce fall program rationale and encourage family to stay with impulsive patients  Mobility: schedule physical activity throughout the day, provide comfort measures during transport, dangle prior to standing, utilize bed/chair fall alarm, ensure bed is locked and in lowest position, provide appropriate assistive device, instruct patient to exit bed on their strongest side and collaborate with doctor for possible PT/OT consult

## 2017-12-19 NOTE — CARE PLAN
Problem: Safety  Goal: Will remain free from falls  Outcome: PROGRESSING AS EXPECTED  Pt bed alarm in use. Bed in lowest locked position. Pt educated on use of call light.    Problem: Mobility  Goal: Risk for activity intolerance will decrease  Outcome: PROGRESSING AS EXPECTED  Pt to be OOB TID for all meals. Pt encouraged to get out of bed and walk as tolerated. Pt can be non compliant at times.

## 2017-12-20 PROCEDURE — 99232 SBSQ HOSP IP/OBS MODERATE 35: CPT | Performed by: INTERNAL MEDICINE

## 2017-12-20 PROCEDURE — 700102 HCHG RX REV CODE 250 W/ 637 OVERRIDE(OP): Performed by: HOSPITALIST

## 2017-12-20 PROCEDURE — A9270 NON-COVERED ITEM OR SERVICE: HCPCS | Performed by: FAMILY MEDICINE

## 2017-12-20 PROCEDURE — 770006 HCHG ROOM/CARE - MED/SURG/GYN SEMI*

## 2017-12-20 PROCEDURE — 700102 HCHG RX REV CODE 250 W/ 637 OVERRIDE(OP): Performed by: FAMILY MEDICINE

## 2017-12-20 PROCEDURE — A9270 NON-COVERED ITEM OR SERVICE: HCPCS | Performed by: HOSPITALIST

## 2017-12-20 PROCEDURE — 97116 GAIT TRAINING THERAPY: CPT

## 2017-12-20 PROCEDURE — 700102 HCHG RX REV CODE 250 W/ 637 OVERRIDE(OP): Performed by: INTERNAL MEDICINE

## 2017-12-20 PROCEDURE — 700111 HCHG RX REV CODE 636 W/ 250 OVERRIDE (IP): Performed by: HOSPITALIST

## 2017-12-20 PROCEDURE — A9270 NON-COVERED ITEM OR SERVICE: HCPCS | Performed by: INTERNAL MEDICINE

## 2017-12-20 PROCEDURE — 700101 HCHG RX REV CODE 250: Performed by: FAMILY MEDICINE

## 2017-12-20 PROCEDURE — 97110 THERAPEUTIC EXERCISES: CPT

## 2017-12-20 RX ADMIN — OXYBUTYNIN CHLORIDE 5 MG: 5 TABLET, FILM COATED, EXTENDED RELEASE ORAL at 21:18

## 2017-12-20 RX ADMIN — MAGNESIUM HYDROXIDE 30 ML: 400 SUSPENSION ORAL at 17:56

## 2017-12-20 RX ADMIN — DULOXETINE HYDROCHLORIDE 20 MG: 20 CAPSULE, DELAYED RELEASE ORAL at 09:36

## 2017-12-20 RX ADMIN — GUAIFENESIN 600 MG: 600 TABLET, EXTENDED RELEASE ORAL at 21:18

## 2017-12-20 RX ADMIN — STANDARDIZED SENNA CONCENTRATE AND DOCUSATE SODIUM 2 TABLET: 8.6; 5 TABLET, FILM COATED ORAL at 21:18

## 2017-12-20 RX ADMIN — Medication 500 MG: at 17:49

## 2017-12-20 RX ADMIN — ATORVASTATIN CALCIUM 80 MG: 40 TABLET, FILM COATED ORAL at 21:18

## 2017-12-20 RX ADMIN — Medication 500 MG: at 09:36

## 2017-12-20 RX ADMIN — HEPARIN SODIUM 5000 UNITS: 5000 INJECTION, SOLUTION INTRAVENOUS; SUBCUTANEOUS at 09:37

## 2017-12-20 RX ADMIN — GUAIFENESIN 600 MG: 600 TABLET, EXTENDED RELEASE ORAL at 09:36

## 2017-12-20 RX ADMIN — HEPARIN SODIUM 5000 UNITS: 5000 INJECTION, SOLUTION INTRAVENOUS; SUBCUTANEOUS at 21:18

## 2017-12-20 RX ADMIN — ASPIRIN 81 MG: 81 TABLET, COATED ORAL at 09:36

## 2017-12-20 RX ADMIN — POLYETHYLENE GLYCOL (3350) 1 PACKET: 17 POWDER, FOR SOLUTION ORAL at 09:37

## 2017-12-20 RX ADMIN — LACTOBACILLUS ACIDOPHILUS / LACTOBACILLUS BULGARICUS 1 PACKET: 100 MILLION CFU STRENGTH GRANULES at 09:36

## 2017-12-20 RX ADMIN — LACTOBACILLUS ACIDOPHILUS / LACTOBACILLUS BULGARICUS 1 PACKET: 100 MILLION CFU STRENGTH GRANULES at 17:49

## 2017-12-20 RX ADMIN — HYDRALAZINE HYDROCHLORIDE 100 MG: 50 TABLET ORAL at 21:18

## 2017-12-20 RX ADMIN — ERGOCALCIFEROL 50000 UNITS: 1.25 CAPSULE, LIQUID FILLED ORAL at 10:27

## 2017-12-20 RX ADMIN — HYDRALAZINE HYDROCHLORIDE 100 MG: 50 TABLET ORAL at 05:50

## 2017-12-20 RX ADMIN — BENAZEPRIL HYDROCHLORIDE 40 MG: 20 TABLET ORAL at 09:36

## 2017-12-20 RX ADMIN — LIDOCAINE 1 PATCH: 50 PATCH CUTANEOUS at 09:35

## 2017-12-20 RX ADMIN — OMEPRAZOLE 20 MG: 20 CAPSULE, DELAYED RELEASE ORAL at 09:35

## 2017-12-20 RX ADMIN — HYDRALAZINE HYDROCHLORIDE 100 MG: 50 TABLET ORAL at 14:37

## 2017-12-20 RX ADMIN — ALENDRONATE SODIUM 10 MG: 10 TABLET ORAL at 09:36

## 2017-12-20 RX ADMIN — GABAPENTIN 300 MG: 300 CAPSULE ORAL at 21:18

## 2017-12-20 RX ADMIN — POLYETHYLENE GLYCOL (3350) 1 PACKET: 17 POWDER, FOR SOLUTION ORAL at 21:17

## 2017-12-20 ASSESSMENT — COGNITIVE AND FUNCTIONAL STATUS - GENERAL
MOVING TO AND FROM BED TO CHAIR: A LITTLE
STANDING UP FROM CHAIR USING ARMS: A LITTLE
WALKING IN HOSPITAL ROOM: A LITTLE
CLIMB 3 TO 5 STEPS WITH RAILING: A LITTLE
MOBILITY SCORE: 18
TURNING FROM BACK TO SIDE WHILE IN FLAT BAD: A LITTLE
MOVING FROM LYING ON BACK TO SITTING ON SIDE OF FLAT BED: A LITTLE
SUGGESTED CMS G CODE MODIFIER MOBILITY: CK

## 2017-12-20 ASSESSMENT — GAIT ASSESSMENTS
DISTANCE (FEET): 150
DEVIATION: BRADYKINETIC;SHUFFLED GAIT
ASSISTIVE DEVICE: FRONT WHEEL WALKER
GAIT LEVEL OF ASSIST: STAND BY ASSIST

## 2017-12-20 ASSESSMENT — PAIN SCALES - GENERAL
PAINLEVEL_OUTOF10: 0
PAINLEVEL_OUTOF10: 3
PAINLEVEL_OUTOF10: 0
PAINLEVEL_OUTOF10: 0

## 2017-12-20 ASSESSMENT — LIFESTYLE VARIABLES: DO YOU DRINK ALCOHOL: NO

## 2017-12-20 NOTE — CARE PLAN
Problem: Safety  Goal: Will remain free from falls    Intervention: Assess risk factors for falls  Environment is clutter free. Personal possession and bedside table near patient. Bed locked and in the lowest position. Patient educated to call when needing assistance. Call light within reach. Non-skid socks in place. Hourly rounding in place.       Problem: Discharge Barriers/Planning  Goal: Patient's continuum of care needs will be met    Intervention: Assess potential discharge barriers on admission and throughout hospital stay  Plan of care discussed with patient, question have been answered at this time. Patient encouraged to voice and discuss concerns. Patient verbalized understanding.

## 2017-12-20 NOTE — PROGRESS NOTES
[facility] Hospitalist Progress Note    Date of Service: 2017    Chief Complaint  77 y.o. female admitted 2017 with GLF, pyuria and ATN.    Interval Problem Update  : Had BM this AM, says had to strain  : Feeling tired/sleepy.  Otherwise no complaints  : Fever last night, cxr negative, blood cx pending.  WBC in AM  : No physical complaints.  No fever  : Drowsy, no physical complaints.  No fever.  Eating and drinking.  Stooling  12/10: No complaints, drowsy, napping  : No physical complaints, encouraged ambulation TID and chair for meals.  Pending placement  : No issues overnight; remains confused at times  : No changes, remains confused, awaiting guardianship  : No changes and no acute events overnight; tolerating diet  12/15:  No new events, resting/arousable  :  Pleasant, cooperative, ate breakfast this am, and ambulated  :  Awake, alert, no new complaints  : ate breakfast, pleasant, no new complaints  : Smiling pleasant, court today    Consultants/Specialty  Neurology  Psych      Review of Systems   Unable to perform ROS: Mental acuity      Physical Exam  Laboratory/Imaging   Hemodynamics  Temp (24hrs), Av.5 °C (97.7 °F), Min:36.3 °C (97.3 °F), Max:36.7 °C (98 °F)   Temperature: 36.3 °C (97.3 °F)  Pulse  Av.2  Min: 50  Max: 106    Blood Pressure : 127/57      Respiratory      Respiration: 18, Pulse Oximetry: 91 %     Work Of Breathing / Effort: Mild  RUL Breath Sounds: Clear, RML Breath Sounds: Clear, RLL Breath Sounds: Diminished, ARGELIA Breath Sounds: Clear, LLL Breath Sounds: Diminished    Fluids    Intake/Output Summary (Last 24 hours) at 17 1812  Last data filed at 17 1500   Gross per 24 hour   Intake              300 ml   Output                0 ml   Net              300 ml       Nutrition  Orders Placed This Encounter   Procedures   • Diet Order     Standing Status:   Standing     Number of Occurrences:   1     Order  Specific Question:   Diet:     Answer:   Regular [1]     Physical Exam   Constitutional: She appears well-developed. No distress.   HENT:   Head: Normocephalic and atraumatic.   Poor dentition   Eyes: EOM are normal. Pupils are equal, round, and reactive to light.   Neck: Normal range of motion.   Cardiovascular: Normal rate and intact distal pulses.    Pulmonary/Chest: No respiratory distress. She has no rales.   Abdominal: Soft. Bowel sounds are normal. She exhibits no distension.   Musculoskeletal: Normal range of motion.   Neurological: She is alert. No cranial nerve deficit. She exhibits normal muscle tone.   Skin: Skin is warm. She is not diaphoretic.   Psychiatric: Thought content normal.   Nursing note and vitals reviewed.                                   Assessment/Plan     * Dementia- (present on admission)   Assessment & Plan    Min narc/sed when possible.   Await guardianship.  Underwent court 12/19        Constipation- (present on admission)   Assessment & Plan    Resolved currently continue bowel care        Congestion of upper airway- (present on admission)   Assessment & Plan    Saturating 90% on RA.  O2, RT, IS, Vax, CXR and encouraging taking of PO fluids.          Physical debility- (present on admission)   Assessment & Plan    PT/OT and increase activity.  Encourage mobilization. Pending guardianship        Obesity (BMI 30.0-34.9)- (present on admission)   Assessment & Plan    Encourage Kcal restriction        T12 compression fracture (CMS-HCC)- (present on admission)   Assessment & Plan    CT spine, no acute fractures. Cont fosamax and calcium  Stable and pending guardianship        Chronic back pain- (present on admission)   Assessment & Plan    As above and min narc/sed when possible.  Bowel Regimen                Reviewed items::  Labs reviewed and Medications reviewed  Pink catheter::  No Pink  DVT prophylaxis pharmacological::  Heparin  Ulcer Prophylaxis::  Yes

## 2017-12-20 NOTE — PROGRESS NOTES
"Assumed care of patient at 0700 . Received report from RN. Patient is AOX3 . Assessment complete. Labs reviewed.Patient and RN discussed plan of care. Patient questions answered. Patient needs are met at this time. Bed in lowest and locked position. Upper bed rails up.  Call light is within reach. Hourly rounding in place.  /58   Pulse 68   Temp 36.8 °C (98.3 °F)   Resp 18   Ht 1.6 m (5' 2.99\")   Wt 86.8 kg (191 lb 5.8 oz)   SpO2 98%   Breastfeeding? No   BMI 33.91 kg/m²     "

## 2017-12-20 NOTE — PROGRESS NOTES
Simona Knight Fall Risk Assessment:     Last Known Fall: Within the last six months  Mobility: Immobilized/requires assist of one person  Medications: Cardiovascular or central nervous system meds  Mental Status/LOC/Awareness: Oriented to person and place  Toileting Needs: Incontinence  Volume/Electrolyte Status: No problems  Communication/Sensory: Visual (Glasses)/hearing deficit  Behavior: Depression/anxiety  Simona Knight Fall Risk Total: 14  Fall Risk Level: MODERATE RISK    Universal Fall Precautions:  call light/belongings in reach, bed in low position and locked, siderails up x 2, use non-slip footwear, adequate lighting, clutter free and spill free environment, educate on level of risk, educate to call for assistance    Fall Risk Level Interventions:    TRIAL ([employer] 8, NEURO, MED JENNIE 5) Moderate Fall Risk Interventions  Place yellow fall risk ID band on patient: verified  Provide patient/family education based on risk assessment : completed  Educate patient/family to call staff for assistance when getting out of bed: completed  Place fall precaution signage outside patient door: verified  Utilize bed/chair fall alarm: verifiedTRIAL (TELE 8, NEURO, [employer] JENNIE 5) High Fall Risk Interventions  Place yellow fall risk ID band on patient: verified  Provide patient/family education based on risk assessment: completed  Educate patient/family to call staff for assistance when getting out of bed: completed  Place fall precaution signage outside patient door: verified  Place patient in room close to nursing station: verified  Utilize bed/chair fall alarm: verified  Notify charge of high risk for huddle: verified    Patient Specific Interventions:     Medication: review medications with patient and family  Mental Status/LOC/Awareness: reorient patient, reinforce falls education, check on patient hourly and reinforce the use of call light  Toileting: provide frquent toileting and instruct patient/family on the need to  call for assistance when toileting  Volume/Electrolyte Status: ensure patient remains hydrated and monitor abnormal lab values  Communication/Sensory: update plan of care on whiteboard, ensure proper positioning when transferrng/ambulating and ensure patient has glasses/contacts and hearing aids/dentures  Behavioral: encourage patient to voice feelings and administer medication as ordered  Mobility: provide comfort measures during transport, ensure bed is locked and in lowest position, provide appropriate assistive device and instruct patient to exit bed on their strongest side

## 2017-12-20 NOTE — THERAPY
"Physical Therapy Treatment completed.   Bed Mobility:  Supine to Sit: Stand by Assist  Transfers: Sit to Stand: Contact Guard Assist  Gait: Level Of Assist: Stand by Assist with Front-Wheel Walker       Plan of Care: Will benefit from Physical Therapy 2 times per week and Plan to complete next treatment by Tuesday 12/26  Discharge Recommendations: Equipment: Will Continue to Assess for Equipment Needs. Post-acute therapy Discharge to a transitional care facility for continued skilled therapy services as pt requires 24/7 supervision due to cognitive deficits    Pt doing fairly well with functional mobility. Continues to require minimal assist for sit to stand transfers from low surface, otherwise, CGA to SBA to mobilize. PT require frequent encouragement to participate with ambulation and HEP. Encourage pt to be up in bedside chair or at RN station for meals and ambulate 3x/daily. Will continue to follow for skilled PT intervention while in the acute care setting         See \"Rehab Therapy-Acute\" Patient Summary Report for complete documentation.       "

## 2017-12-20 NOTE — PROGRESS NOTES
"Report received from day RN. Assumed patient care at 1900. Patient is AAOx2 reoriented to time and situation. Patient appears upset and restless. Patient states \"I want to know why I'm still here\". One-on-One discussion and reassurance provided. Plan of care discussed with juan ,question have been answered at this time. Patient verbalize understanding. Patient encouraged to voice and discuss concern. Patient verbalizes understanding. Labs and orders reviewed. Assessment completed. Patient denies any pain at this time. Patient provided with scheduled medication, refer to MAR. Patient needs have been met at this time. Patient educated to call when needing assistance. Call light within reach. Non-Skid socks in place. Patient resting comfortably in bed at this time. Bed locked and in the lowest position. Hourly rounding in place. /51   Pulse 71   Temp 36.2 °C (97.1 °F)   Resp 18   Ht 1.6 m (5' 2.99\")   Wt 86.8 kg (191 lb 5.8 oz)   SpO2 90%   Breastfeeding? No   BMI 33.91 kg/m² .   "

## 2017-12-21 PROCEDURE — 700102 HCHG RX REV CODE 250 W/ 637 OVERRIDE(OP): Performed by: HOSPITALIST

## 2017-12-21 PROCEDURE — 99231 SBSQ HOSP IP/OBS SF/LOW 25: CPT | Performed by: INTERNAL MEDICINE

## 2017-12-21 PROCEDURE — 700102 HCHG RX REV CODE 250 W/ 637 OVERRIDE(OP): Performed by: INTERNAL MEDICINE

## 2017-12-21 PROCEDURE — A9270 NON-COVERED ITEM OR SERVICE: HCPCS | Performed by: HOSPITALIST

## 2017-12-21 PROCEDURE — 700111 HCHG RX REV CODE 636 W/ 250 OVERRIDE (IP): Performed by: FAMILY MEDICINE

## 2017-12-21 PROCEDURE — A9270 NON-COVERED ITEM OR SERVICE: HCPCS | Performed by: FAMILY MEDICINE

## 2017-12-21 PROCEDURE — A9270 NON-COVERED ITEM OR SERVICE: HCPCS | Performed by: INTERNAL MEDICINE

## 2017-12-21 PROCEDURE — 700102 HCHG RX REV CODE 250 W/ 637 OVERRIDE(OP): Performed by: FAMILY MEDICINE

## 2017-12-21 PROCEDURE — 770006 HCHG ROOM/CARE - MED/SURG/GYN SEMI*

## 2017-12-21 PROCEDURE — 700111 HCHG RX REV CODE 636 W/ 250 OVERRIDE (IP): Performed by: HOSPITALIST

## 2017-12-21 PROCEDURE — 700101 HCHG RX REV CODE 250: Performed by: FAMILY MEDICINE

## 2017-12-21 RX ADMIN — GUAIFENESIN 600 MG: 600 TABLET, EXTENDED RELEASE ORAL at 09:59

## 2017-12-21 RX ADMIN — GABAPENTIN 300 MG: 300 CAPSULE ORAL at 21:36

## 2017-12-21 RX ADMIN — ATORVASTATIN CALCIUM 80 MG: 40 TABLET, FILM COATED ORAL at 21:36

## 2017-12-21 RX ADMIN — ONDANSETRON 4 MG: 4 TABLET, ORALLY DISINTEGRATING ORAL at 03:01

## 2017-12-21 RX ADMIN — DULOXETINE HYDROCHLORIDE 20 MG: 20 CAPSULE, DELAYED RELEASE ORAL at 10:01

## 2017-12-21 RX ADMIN — ALENDRONATE SODIUM 10 MG: 10 TABLET ORAL at 10:01

## 2017-12-21 RX ADMIN — BENAZEPRIL HYDROCHLORIDE 40 MG: 20 TABLET ORAL at 10:00

## 2017-12-21 RX ADMIN — HYDRALAZINE HYDROCHLORIDE 100 MG: 50 TABLET ORAL at 15:44

## 2017-12-21 RX ADMIN — HEPARIN SODIUM 5000 UNITS: 5000 INJECTION, SOLUTION INTRAVENOUS; SUBCUTANEOUS at 10:01

## 2017-12-21 RX ADMIN — GUAIFENESIN 600 MG: 600 TABLET, EXTENDED RELEASE ORAL at 21:36

## 2017-12-21 RX ADMIN — HEPARIN SODIUM 5000 UNITS: 5000 INJECTION, SOLUTION INTRAVENOUS; SUBCUTANEOUS at 21:36

## 2017-12-21 RX ADMIN — Medication 500 MG: at 16:44

## 2017-12-21 RX ADMIN — POLYETHYLENE GLYCOL (3350) 1 PACKET: 17 POWDER, FOR SOLUTION ORAL at 09:59

## 2017-12-21 RX ADMIN — HYDRALAZINE HYDROCHLORIDE 100 MG: 50 TABLET ORAL at 07:25

## 2017-12-21 RX ADMIN — OMEPRAZOLE 20 MG: 20 CAPSULE, DELAYED RELEASE ORAL at 09:59

## 2017-12-21 RX ADMIN — ASPIRIN 81 MG: 81 TABLET, COATED ORAL at 10:01

## 2017-12-21 RX ADMIN — Medication 500 MG: at 10:00

## 2017-12-21 RX ADMIN — HYDRALAZINE HYDROCHLORIDE 100 MG: 50 TABLET ORAL at 21:36

## 2017-12-21 RX ADMIN — STANDARDIZED SENNA CONCENTRATE AND DOCUSATE SODIUM 2 TABLET: 8.6; 5 TABLET, FILM COATED ORAL at 21:36

## 2017-12-21 RX ADMIN — OXYBUTYNIN CHLORIDE 5 MG: 5 TABLET, FILM COATED, EXTENDED RELEASE ORAL at 21:39

## 2017-12-21 ASSESSMENT — PAIN SCALES - GENERAL
PAINLEVEL_OUTOF10: 2
PAINLEVEL_OUTOF10: 3
PAINLEVEL_OUTOF10: 0
PAINLEVEL_OUTOF10: 3
PAINLEVEL_OUTOF10: 3

## 2017-12-21 ASSESSMENT — LIFESTYLE VARIABLES: DO YOU DRINK ALCOHOL: NO

## 2017-12-21 NOTE — PROGRESS NOTES
Assumed patient care at 1900. POC discussed w/ day nurse and pt, pt in agreement w/ goals. Pt AOx2, reoriented to event and situation, pleasant. Pt denies pain or nausea. Pt up one assist, ambulates to the bathroom. Incontinent at times. Patient educated on use of call light, hourly rounding, and pain scale. Personal possession and call light within reach. Bed alarm on.

## 2017-12-21 NOTE — CARE PLAN
Problem: Safety  Goal: Will remain free from injury  Bed locked and in lowest position, call light and personal belongings within reach, pt educated to call for assistance. Bed alarm turned on. Hourly rounding in effect.       Problem: Mobility  Goal: Risk for activity intolerance will decrease  Patient encouraged to increase ambulation. Pt got out of bed without call, patient assisted to the bathroom with a steady gait. PT/OT working with patient.

## 2017-12-21 NOTE — PROGRESS NOTES
[facility] Hospitalist Progress Note    Date of Service: 2017    Chief Complaint  77 y.o. female admitted 2017 with GLF, pyuria and ATN.    Interval Problem Update  : Had BM this AM, says had to strain  : Feeling tired/sleepy.  Otherwise no complaints  : Fever last night, cxr negative, blood cx pending.  WBC in AM  : No physical complaints.  No fever  : Drowsy, no physical complaints.  No fever.  Eating and drinking.  Stooling  12/10: No complaints, drowsy, napping  : No physical complaints, encouraged ambulation TID and chair for meals.  Pending placement  : No issues overnight; remains confused at times  : No changes, remains confused, awaiting guardianship  : No changes and no acute events overnight; tolerating diet  12/15:  No new events, resting/arousable  :  Pleasant, cooperative, ate breakfast this am, and ambulated  :  Awake, alert, no new complaints  : ate breakfast, pleasant, no new complaints  : Smiling pleasant, court today  . No new issues, tolerated food, ambulating.    Consultants/Specialty  Neurology  Psych      Review of Systems   Unable to perform ROS: Mental acuity      Physical Exam  Laboratory/Imaging   Hemodynamics  Temp (24hrs), Av.5 °C (97.7 °F), Min:36.2 °C (97.1 °F), Max:36.8 °C (98.3 °F)   Temperature: 36.7 °C (98.1 °F)  Pulse  Av.3  Min: 50  Max: 106    Blood Pressure : 113/48      Respiratory      Respiration: 20, Pulse Oximetry: 94 %     Work Of Breathing / Effort: Mild  RUL Breath Sounds: Clear, RML Breath Sounds: Clear, RLL Breath Sounds: Diminished, ARGELIA Breath Sounds: Clear, LLL Breath Sounds: Diminished    Fluids    Intake/Output Summary (Last 24 hours) at 17 5276  Last data filed at 17 0600   Gross per 24 hour   Intake              240 ml   Output                0 ml   Net              240 ml       Nutrition  Orders Placed This Encounter   Procedures   • Diet Order     Standing Status:    Standing     Number of Occurrences:   1     Order Specific Question:   Diet:     Answer:   Regular [1]     Physical Exam   Constitutional: She appears well-developed. No distress.   HENT:   Head: Normocephalic and atraumatic.   Poor dentition   Eyes: EOM are normal. Pupils are equal, round, and reactive to light.   Neck: Normal range of motion.   Cardiovascular: Normal rate and intact distal pulses.    Pulmonary/Chest: No respiratory distress. She has no rales.   Abdominal: Soft. Bowel sounds are normal. She exhibits no distension.   Musculoskeletal: Normal range of motion.   Neurological: She is alert. No cranial nerve deficit. She exhibits normal muscle tone.   Skin: Skin is warm. She is not diaphoretic.   Psychiatric: Thought content normal.   Nursing note and vitals reviewed.                                   Assessment/Plan     * Dementia- (present on admission)   Assessment & Plan    Min narc/sed when possible.   Await guardianship.  Underwent court 12/19        Constipation- (present on admission)   Assessment & Plan    Resolved currently continue bowel care        Congestion of upper airway- (present on admission)   Assessment & Plan    Saturating 90% on RA.  O2, RT, IS, Vax, CXR and encouraging taking of PO fluids.          Physical debility- (present on admission)   Assessment & Plan    PT/OT and increase activity.  Encourage mobilization. Pending guardianship        Obesity (BMI 30.0-34.9)- (present on admission)   Assessment & Plan    Encourage Kcal restriction        T12 compression fracture (CMS-HCC)- (present on admission)   Assessment & Plan    CT spine, no acute fractures. Cont fosamax and calcium  Stable and pending guardianship        Chronic back pain- (present on admission)   Assessment & Plan    As above and min narc/sed when possible.  Bowel Regimen                Reviewed items::  Labs reviewed and Medications reviewed  Pink catheter::  No Pink  DVT prophylaxis pharmacological::   Heparin  Ulcer Prophylaxis::  Yes

## 2017-12-21 NOTE — PROGRESS NOTES
Simona Knight Fall Risk Assessment:     Last Known Fall: Within the last six months  Mobility: Immobilized/requires assist of one person  Medications: Cardiovascular or central nervous system meds  Mental Status/LOC/Awareness: Oriented to person and place  Toileting Needs: Incontinence  Volume/Electrolyte Status: No problems  Communication/Sensory: Visual (Glasses)/hearing deficit  Behavior: Depression/anxiety  Simona Knight Fall Risk Total: 14  Fall Risk Level: MODERATE RISK    Universal Fall Precautions:  call light/belongings in reach, bed in low position and locked, siderails up x 2    Fall Risk Level Interventions:    TRIAL (TELE 8, NEURO, MED JENNIE 5) Moderate Fall Risk Interventions  Place yellow fall risk ID band on patient: verified  Provide patient/family education based on risk assessment : completed  Educate patient/family to call staff for assistance when getting out of bed: completed  Place fall precaution signage outside patient door: verified  Utilize bed/chair fall alarm: verifiedTRIAL (TELE 8, NEURO, [employer] JENNIE 5) High Fall Risk Interventions  Place yellow fall risk ID band on patient: verified  Provide patient/family education based on risk assessment: completed  Educate patient/family to call staff for assistance when getting out of bed: completed  Place fall precaution signage outside patient door: verified  Place patient in room close to nursing station: verified  Utilize bed/chair fall alarm: verified  Notify charge of high risk for huddle: verified    Patient Specific Interventions:     Medication: review medications with patient and family and limit combination of prn medications  Mental Status/LOC/Awareness: reinforce falls education, encourage family to stay with patient, check on patient hourly, utilize bed/chair fall alarm and reinforce the use of call light  Toileting: instruct patient/family on the use of grab bars, instruct patient/family on the need to call for assistance when  toileting and toilet prior to giving pain medications  Volume/Electrolyte Status: ensure patient remains hydrated and teach patients to dangle before rising if hypotensive  Communication/Sensory: update plan of care on whiteboard and ensure patient has glasses/contacts and hearing aids/dentures  Behavioral: engage patient in daily activities and administer medication as ordered  Mobility: utilize bed/chair fall alarm and ensure bed is locked and in lowest position

## 2017-12-21 NOTE — PROGRESS NOTES
Simona Knight Fall Risk Assessment:     Last Known Fall: Within the last six months  Mobility: Immobilized/requires assist of one person  Medications: Cardiovascular or central nervous system meds  Mental Status/LOC/Awareness: Oriented to person and place  Toileting Needs: Incontinence  Volume/Electrolyte Status: No problems  Communication/Sensory: Visual (Glasses)/hearing deficit  Behavior: Appropriate behavior  Simona Knight Fall Risk Total: 13  Fall Risk Level: MODERATE RISK    Universal Fall Precautions:  call light/belongings in reach, bed in low position and locked, wheelchairs and assistive devices out of sight, siderails up x 2, use non-slip footwear, adequate lighting, clutter free and spill free environment, educate on level of risk, educate to call for assistance    Fall Risk Level Interventions:    TRIAL (TELE 8, NEURO, MED JENNIE 5) Moderate Fall Risk Interventions  Place yellow fall risk ID band on patient: verified  Provide patient/family education based on risk assessment : completed  Educate patient/family to call staff for assistance when getting out of bed: completed  Place fall precaution signage outside patient door: verified  Utilize bed/chair fall alarm: completedTRIAL (TELE 8, NEURO, [employer] JENNIE 5) High Fall Risk Interventions  Place yellow fall risk ID band on patient: verified  Provide patient/family education based on risk assessment: completed  Educate patient/family to call staff for assistance when getting out of bed: completed  Place fall precaution signage outside patient door: verified  Place patient in room close to nursing station: verified  Utilize bed/chair fall alarm: verified  Notify charge of high risk for huddle: verified    Patient Specific Interventions:     Medication: review medications with patient and family  Mental Status/LOC/Awareness: reorient patient, utilize bed/chair fall alarm, reinforce the use of call light and provide activity  Toileting: provide frquent  toileting, instruct patient/family on the use of grab bars, instruct patient/family on the need to call for assistance when toileting and do not leave patient unattended in bathroom/refer to toileting scripting  Volume/Electrolyte Status: ensure patient remains hydrated and monitor abnormal lab values  Communication/Sensory: update plan of care on whiteboard and ensure patient has glasses/contacts and hearing aids/dentures  Behavioral: not applicable  Mobility: schedule physical activity throughout the day, utilize bed/chair fall alarm, ensure bed is locked and in lowest position and provide appropriate assistive device

## 2017-12-21 NOTE — PROGRESS NOTES
Assumed care of pt at 0700. Received report from RN. Pt A&Ox3-4 (disoriented to time). Assessment complete. Labs reviewed. Pt and RN discussed plan of care. Pt questions answered. Pt needs are met at this time. Bed in lowest and locked position. Call light within reach. Upper bed rails up. Hourly rounding in place.

## 2017-12-22 ENCOUNTER — APPOINTMENT (OUTPATIENT)
Dept: RADIOLOGY | Facility: MEDICAL CENTER | Age: 77
DRG: 682 | End: 2017-12-22
Attending: INTERNAL MEDICINE
Payer: MEDICARE

## 2017-12-22 PROBLEM — I95.9 HYPOTENSION: Status: ACTIVE | Noted: 2017-12-22

## 2017-12-22 LAB
ANION GAP SERPL CALC-SCNC: 5 MMOL/L (ref 0–11.9)
APPEARANCE UR: CLEAR
BILIRUB UR QL STRIP.AUTO: NEGATIVE
BUN SERPL-MCNC: 30 MG/DL (ref 8–22)
CALCIUM SERPL-MCNC: 8.8 MG/DL (ref 8.5–10.5)
CHLORIDE SERPL-SCNC: 109 MMOL/L (ref 96–112)
CO2 SERPL-SCNC: 25 MMOL/L (ref 20–33)
COLOR UR: YELLOW
CREAT SERPL-MCNC: 1.1 MG/DL (ref 0.5–1.4)
ERYTHROCYTE [DISTWIDTH] IN BLOOD BY AUTOMATED COUNT: 56.3 FL (ref 35.9–50)
GFR SERPL CREATININE-BSD FRML MDRD: 48 ML/MIN/1.73 M 2
GLUCOSE SERPL-MCNC: 92 MG/DL (ref 65–99)
GLUCOSE UR STRIP.AUTO-MCNC: NEGATIVE MG/DL
HCT VFR BLD AUTO: 32.6 % (ref 37–47)
HGB BLD-MCNC: 10.5 G/DL (ref 12–16)
KETONES UR STRIP.AUTO-MCNC: NEGATIVE MG/DL
LACTATE BLD-SCNC: 2.6 MMOL/L (ref 0.5–2)
LEUKOCYTE ESTERASE UR QL STRIP.AUTO: NEGATIVE
MCH RBC QN AUTO: 29.6 PG (ref 27–33)
MCHC RBC AUTO-ENTMCNC: 32.2 G/DL (ref 33.6–35)
MCV RBC AUTO: 91.8 FL (ref 81.4–97.8)
MICRO URNS: NORMAL
NITRITE UR QL STRIP.AUTO: NEGATIVE
PH UR STRIP.AUTO: 5 [PH]
PLATELET # BLD AUTO: 233 K/UL (ref 164–446)
PMV BLD AUTO: 10.5 FL (ref 9–12.9)
POTASSIUM SERPL-SCNC: 4.2 MMOL/L (ref 3.6–5.5)
PROT UR QL STRIP: NEGATIVE MG/DL
RBC # BLD AUTO: 3.55 M/UL (ref 4.2–5.4)
RBC UR QL AUTO: NEGATIVE
SODIUM SERPL-SCNC: 139 MMOL/L (ref 135–145)
SP GR UR STRIP.AUTO: 1.02
UROBILINOGEN UR STRIP.AUTO-MCNC: 0.2 MG/DL
WBC # BLD AUTO: 7.1 K/UL (ref 4.8–10.8)

## 2017-12-22 PROCEDURE — A9270 NON-COVERED ITEM OR SERVICE: HCPCS | Performed by: INTERNAL MEDICINE

## 2017-12-22 PROCEDURE — 80048 BASIC METABOLIC PNL TOTAL CA: CPT

## 2017-12-22 PROCEDURE — A9270 NON-COVERED ITEM OR SERVICE: HCPCS | Performed by: FAMILY MEDICINE

## 2017-12-22 PROCEDURE — 36415 COLL VENOUS BLD VENIPUNCTURE: CPT

## 2017-12-22 PROCEDURE — 87040 BLOOD CULTURE FOR BACTERIA: CPT | Mod: 91

## 2017-12-22 PROCEDURE — 99233 SBSQ HOSP IP/OBS HIGH 50: CPT | Performed by: INTERNAL MEDICINE

## 2017-12-22 PROCEDURE — 700111 HCHG RX REV CODE 636 W/ 250 OVERRIDE (IP): Performed by: HOSPITALIST

## 2017-12-22 PROCEDURE — 700102 HCHG RX REV CODE 250 W/ 637 OVERRIDE(OP): Performed by: HOSPITALIST

## 2017-12-22 PROCEDURE — A9270 NON-COVERED ITEM OR SERVICE: HCPCS | Performed by: HOSPITALIST

## 2017-12-22 PROCEDURE — 700102 HCHG RX REV CODE 250 W/ 637 OVERRIDE(OP): Performed by: INTERNAL MEDICINE

## 2017-12-22 PROCEDURE — 700105 HCHG RX REV CODE 258: Performed by: INTERNAL MEDICINE

## 2017-12-22 PROCEDURE — 770006 HCHG ROOM/CARE - MED/SURG/GYN SEMI*

## 2017-12-22 PROCEDURE — 700102 HCHG RX REV CODE 250 W/ 637 OVERRIDE(OP): Performed by: FAMILY MEDICINE

## 2017-12-22 PROCEDURE — 700101 HCHG RX REV CODE 250: Performed by: FAMILY MEDICINE

## 2017-12-22 PROCEDURE — 81003 URINALYSIS AUTO W/O SCOPE: CPT

## 2017-12-22 PROCEDURE — 302146: Performed by: INTERNAL MEDICINE

## 2017-12-22 PROCEDURE — 83605 ASSAY OF LACTIC ACID: CPT

## 2017-12-22 PROCEDURE — 85027 COMPLETE CBC AUTOMATED: CPT

## 2017-12-22 PROCEDURE — 71010 DX-CHEST-PORTABLE (1 VIEW): CPT

## 2017-12-22 RX ORDER — SODIUM CHLORIDE 9 MG/ML
INJECTION, SOLUTION INTRAVENOUS CONTINUOUS
Status: DISCONTINUED | OUTPATIENT
Start: 2017-12-22 | End: 2017-12-26

## 2017-12-22 RX ORDER — ECHINACEA PURPUREA EXTRACT 125 MG
2 TABLET ORAL PRN
Status: DISCONTINUED | OUTPATIENT
Start: 2017-12-22 | End: 2018-01-15 | Stop reason: HOSPADM

## 2017-12-22 RX ORDER — SODIUM CHLORIDE 9 MG/ML
500 INJECTION, SOLUTION INTRAVENOUS
Status: COMPLETED | OUTPATIENT
Start: 2017-12-22 | End: 2017-12-22

## 2017-12-22 RX ADMIN — Medication 500 MG: at 08:49

## 2017-12-22 RX ADMIN — LIDOCAINE 1 PATCH: 50 PATCH CUTANEOUS at 08:50

## 2017-12-22 RX ADMIN — GUAIFENESIN 600 MG: 600 TABLET, EXTENDED RELEASE ORAL at 08:48

## 2017-12-22 RX ADMIN — ALENDRONATE SODIUM 10 MG: 10 TABLET ORAL at 08:48

## 2017-12-22 RX ADMIN — Medication 500 MG: at 18:35

## 2017-12-22 RX ADMIN — SODIUM CHLORIDE: 9 INJECTION, SOLUTION INTRAVENOUS at 13:00

## 2017-12-22 RX ADMIN — OXYBUTYNIN CHLORIDE 5 MG: 5 TABLET, FILM COATED, EXTENDED RELEASE ORAL at 21:27

## 2017-12-22 RX ADMIN — POLYETHYLENE GLYCOL (3350) 1 PACKET: 17 POWDER, FOR SOLUTION ORAL at 08:49

## 2017-12-22 RX ADMIN — HEPARIN SODIUM 5000 UNITS: 5000 INJECTION, SOLUTION INTRAVENOUS; SUBCUTANEOUS at 08:49

## 2017-12-22 RX ADMIN — ATORVASTATIN CALCIUM 80 MG: 40 TABLET, FILM COATED ORAL at 21:22

## 2017-12-22 RX ADMIN — LACTOBACILLUS ACIDOPHILUS / LACTOBACILLUS BULGARICUS 1 PACKET: 100 MILLION CFU STRENGTH GRANULES at 08:48

## 2017-12-22 RX ADMIN — SODIUM CHLORIDE 500 ML: 9 INJECTION, SOLUTION INTRAVENOUS at 11:00

## 2017-12-22 RX ADMIN — STANDARDIZED SENNA CONCENTRATE AND DOCUSATE SODIUM 2 TABLET: 8.6; 5 TABLET, FILM COATED ORAL at 21:22

## 2017-12-22 RX ADMIN — HYDRALAZINE HYDROCHLORIDE 100 MG: 50 TABLET ORAL at 05:37

## 2017-12-22 RX ADMIN — GUAIFENESIN 600 MG: 600 TABLET, EXTENDED RELEASE ORAL at 21:22

## 2017-12-22 RX ADMIN — ACETAMINOPHEN 650 MG: 325 TABLET, FILM COATED ORAL at 21:22

## 2017-12-22 RX ADMIN — SALINE NASAL SPRAY 2 SPRAY: 1.5 SOLUTION NASAL at 22:04

## 2017-12-22 RX ADMIN — OMEPRAZOLE 20 MG: 20 CAPSULE, DELAYED RELEASE ORAL at 08:48

## 2017-12-22 RX ADMIN — ASPIRIN 81 MG: 81 TABLET, COATED ORAL at 08:49

## 2017-12-22 RX ADMIN — GABAPENTIN 300 MG: 300 CAPSULE ORAL at 21:22

## 2017-12-22 RX ADMIN — BENAZEPRIL HYDROCHLORIDE 40 MG: 20 TABLET ORAL at 08:48

## 2017-12-22 RX ADMIN — DULOXETINE HYDROCHLORIDE 20 MG: 20 CAPSULE, DELAYED RELEASE ORAL at 08:48

## 2017-12-22 RX ADMIN — HEPARIN SODIUM 5000 UNITS: 5000 INJECTION, SOLUTION INTRAVENOUS; SUBCUTANEOUS at 21:22

## 2017-12-22 ASSESSMENT — PATIENT HEALTH QUESTIONNAIRE - PHQ9
6. FEELING BAD ABOUT YOURSELF - OR THAT YOU ARE A FAILURE OR HAVE LET YOURSELF OR YOUR FAMILY DOWN: NOT AL ALL
9. THOUGHTS THAT YOU WOULD BE BETTER OFF DEAD, OR OF HURTING YOURSELF: NOT AT ALL
4. FEELING TIRED OR HAVING LITTLE ENERGY: SEVERAL DAYS
SUM OF ALL RESPONSES TO PHQ QUESTIONS 1-9: 4
1. LITTLE INTEREST OR PLEASURE IN DOING THINGS: NOT AT ALL
2. FEELING DOWN, DEPRESSED, IRRITABLE, OR HOPELESS: SEVERAL DAYS
7. TROUBLE CONCENTRATING ON THINGS, SUCH AS READING THE NEWSPAPER OR WATCHING TELEVISION: SEVERAL DAYS
3. TROUBLE FALLING OR STAYING ASLEEP OR SLEEPING TOO MUCH: SEVERAL DAYS
SUM OF ALL RESPONSES TO PHQ9 QUESTIONS 1 AND 2: 1
5. POOR APPETITE OR OVEREATING: NOT AT ALL
8. MOVING OR SPEAKING SO SLOWLY THAT OTHER PEOPLE COULD HAVE NOTICED. OR THE OPPOSITE, BEING SO FIGETY OR RESTLESS THAT YOU HAVE BEEN MOVING AROUND A LOT MORE THAN USUAL: NOT AT ALL

## 2017-12-22 ASSESSMENT — PAIN SCALES - GENERAL
PAINLEVEL_OUTOF10: 0
PAINLEVEL_OUTOF10: 0

## 2017-12-22 ASSESSMENT — LIFESTYLE VARIABLES: DO YOU DRINK ALCOHOL: NO

## 2017-12-22 NOTE — PROGRESS NOTES
Simona Knight Fall Risk Assessment:     Last Known Fall: Within the last six months  Mobility: Immobilized/requires assist of one person  Medications: Cardiovascular or central nervous system meds  Mental Status/LOC/Awareness: Oriented to person and place  Toileting Needs: Incontinence  Volume/Electrolyte Status: No problems  Communication/Sensory: Visual (Glasses)/hearing deficit  Behavior: Appropriate behavior  Simona Knight Fall Risk Total: 13  Fall Risk Level: MODERATE RISK    Universal Fall Precautions:  call light/belongings in reach, bed in low position and locked, wheelchairs and assistive devices out of sight, siderails up x 2, use non-slip footwear, adequate lighting, clutter free and spill free environment, educate on level of risk, educate to call for assistance    Fall Risk Level Interventions:    TRIAL (TELE 8, NEURO, MED JENNIE 5) Moderate Fall Risk Interventions  Place yellow fall risk ID band on patient: completed  Provide patient/family education based on risk assessment : completed  Educate patient/family to call staff for assistance when getting out of bed: completed  Place fall precaution signage outside patient door: completed  Utilize bed/chair fall alarm: completedTRIAL (TELE 8, NEURO, [employer] JENNIE 5) High Fall Risk Interventions  Place yellow fall risk ID band on patient: verified  Provide patient/family education based on risk assessment: completed  Educate patient/family to call staff for assistance when getting out of bed: completed  Place fall precaution signage outside patient door: verified  Place patient in room close to nursing station: verified  Utilize bed/chair fall alarm: verified  Notify charge of high risk for huddle: verified    Patient Specific Interventions:     Medication: review medications with patient and family, assess for medications that can be discontinued or dosage decreased and limit combination of prn medications  Mental Status/LOC/Awareness: reorient patient,  reinforce falls education, encourage family to stay with patient, check on patient hourly, reinforce the use of call light and provide activity  Toileting: consider obtaining elevated toilet seat or bedside commode (BSC), provide frquent toileting, monitor intake and output/use of appropriate interventions and instruct patient/family on the use of grab bars  Volume/Electrolyte Status: ensure patient remains hydrated, monitor blood sugars and maintain appropriate blood sugar levels if diabetic, advance diet as tolerated, administer medications as ordered for nausea and vomiting, monitor abnormal lab values and ensure IV fluids are appropriate  Communication/Sensory: update plan of care on whiteboard, provide communication alternatives/, collaborate with doctor for possible speech therapy consult, ensure proper positioning when transferrng/ambulating and ensure patient has glasses/contacts and hearing aids/dentures  Behavioral: collaborate with doctor for possible psych consult, encourage patient to voice feelings, engage patient in daily activities, administer medication as ordered, instruct/reinforce fall program rationale and encourage family to stay with impulsive patients  Mobility: schedule physical activity throughout the day, provide comfort measures during transport, dangle prior to standing, utilize bed/chair fall alarm, ensure bed is locked and in lowest position, provide appropriate assistive device, instruct patient to exit bed on their strongest side and collaborate with doctor for possible PT/OT consult

## 2017-12-22 NOTE — PROGRESS NOTES
Simona Knight Fall Risk Assessment:     Last Known Fall: Within the last six months  Mobility: Immobilized/requires assist of one person  Medications: Cardiovascular or central nervous system meds  Mental Status/LOC/Awareness: Oriented to person and place  Toileting Needs: Incontinence  Volume/Electrolyte Status: No problems  Communication/Sensory: Visual (Glasses)/hearing deficit  Behavior: Appropriate behavior  Simona Knight Fall Risk Total: 13  Fall Risk Level: MODERATE RISK    Universal Fall Precautions:  call light/belongings in reach, bed in low position and locked, wheelchairs and assistive devices out of sight, siderails up x 2, use non-slip footwear, adequate lighting, clutter free and spill free environment, educate on level of risk, educate to call for assistance    Fall Risk Level Interventions:    TRIAL (TELE 8, NEURO, MED JENNIE 5) Moderate Fall Risk Interventions  Place yellow fall risk ID band on patient: completed  Provide patient/family education based on risk assessment : completed  Educate patient/family to call staff for assistance when getting out of bed: completed  Place fall precaution signage outside patient door: completed  Utilize bed/chair fall alarm: completedTRIAL (TELE 8, NEURO, [facility] JENNIE 5) High Fall Risk Interventions  Place yellow fall risk ID band on patient: verified  Provide patient/family education based on risk assessment: completed  Educate patient/family to call staff for assistance when getting out of bed: completed  Place fall precaution signage outside patient door: verified  Place patient in room close to nursing station: verified  Utilize bed/chair fall alarm: verified  Notify charge of high risk for huddle: verified    Patient Specific Interventions:     Medication: review medications with patient and family, assess for medications that can be discontinued or dosage decreased and limit combination of prn medications  Mental Status/LOC/Awareness: reorient patient,  reinforce falls education, encourage family to stay with patient, check on patient hourly, reinforce the use of call light and provide activity  Toileting: consider obtaining elevated toilet seat or bedside commode (BSC), provide frquent toileting, monitor intake and output/use of appropriate interventions and instruct patient/family on the use of grab bars  Volume/Electrolyte Status: ensure patient remains hydrated, monitor blood sugars and maintain appropriate blood sugar levels if diabetic, advance diet as tolerated, administer medications as ordered for nausea and vomiting, monitor abnormal lab values and ensure IV fluids are appropriate  Communication/Sensory: update plan of care on whiteboard, provide communication alternatives/, collaborate with doctor for possible speech therapy consult, ensure proper positioning when transferrng/ambulating and ensure patient has glasses/contacts and hearing aids/dentures  Behavioral: collaborate with doctor for possible psych consult, encourage patient to voice feelings, engage patient in daily activities, administer medication as ordered, instruct/reinforce fall program rationale and encourage family to stay with impulsive patients  Mobility: schedule physical activity throughout the day, provide comfort measures during transport, dangle prior to standing, utilize bed/chair fall alarm, ensure bed is locked and in lowest position, provide appropriate assistive device, instruct patient to exit bed on their strongest side and collaborate with doctor for possible PT/OT consult

## 2017-12-22 NOTE — PROGRESS NOTES
"Assumed care of patient at 0700 . Received report from RN. Patient is AOX3 . Assessment complete. Labs reviewed.Patient and RN discussed plan of care. Patient questions answered. Patient needs are met at this time. Bed in lowest and locked position. Upper bed rails up.  Call light is within reach. Hourly rounding in place.  /68   Pulse 72   Temp 36.4 °C (97.6 °F)   Resp 18   Ht 1.6 m (5' 2.99\")   Wt 86.8 kg (191 lb 5.8 oz)   SpO2 91%   Breastfeeding? No   BMI 33.91 kg/m²     "

## 2017-12-22 NOTE — CARE PLAN
Problem: Safety  Goal: Will remain free from falls  Outcome: PROGRESSING AS EXPECTED  Pt educated on fall risks, bed alarm in use, bed in lowest and locked position.     Problem: Mobility  Goal: Risk for activity intolerance will decrease  Outcome: PROGRESSING AS EXPECTED  Pt encouraged to ambulate, pt refuses. Pt to be OOB for all meals.

## 2017-12-22 NOTE — PROGRESS NOTES
[facility] Hospitalist Progress Note    Date of Service: 2017    Chief Complaint  77 y.o. female admitted 2017 with GLF, pyuria and ATN.    Interval Problem Update  : Had BM this AM, says had to strain  : Feeling tired/sleepy.  Otherwise no complaints  : Fever last night, cxr negative, blood cx pending.  WBC in AM  : No physical complaints.  No fever  : Drowsy, no physical complaints.  No fever.  Eating and drinking.  Stooling  12/10: No complaints, drowsy, napping  : No physical complaints, encouraged ambulation TID and chair for meals.  Pending placement  : No issues overnight; remains confused at times  : No changes, remains confused, awaiting guardianship  : No changes and no acute events overnight; tolerating diet  12/15:  No new events, resting/arousable  :  Pleasant, cooperative, ate breakfast this am, and ambulated  :  Awake, alert, no new complaints  : ate breakfast, pleasant, no new complaints  : Smiling pleasant, court today  . No new issues, tolerated food, ambulating.  . Sleeping but arousable. No new complaints to me.    Consultants/Specialty  Neurology  Psych      Review of Systems   Unable to perform ROS: Mental acuity      Physical Exam  Laboratory/Imaging   Hemodynamics  Temp (24hrs), Av.7 °C (98.1 °F), Min:36.2 °C (97.1 °F), Max:37.1 °C (98.7 °F)   Temperature: 37 °C (98.6 °F)  Pulse  Av.3  Min: 50  Max: 106    Blood Pressure : 106/55      Respiratory      Respiration: 20, Pulse Oximetry: 90 %     Work Of Breathing / Effort: Mild  RUL Breath Sounds: Clear, RML Breath Sounds: Clear, RLL Breath Sounds: Diminished, ARGELIA Breath Sounds: Clear, LLL Breath Sounds: Diminished    Fluids    Intake/Output Summary (Last 24 hours) at 17 1724  Last data filed at 17 1600   Gross per 24 hour   Intake              560 ml   Output                0 ml   Net              560 ml       Nutrition  Orders Placed This  Encounter   Procedures   • Diet Order     Standing Status:   Standing     Number of Occurrences:   1     Order Specific Question:   Diet:     Answer:   Regular [1]     Physical Exam   Constitutional: She appears well-developed. No distress.   HENT:   Head: Normocephalic and atraumatic.   Poor dentition   Eyes: EOM are normal. Pupils are equal, round, and reactive to light.   Neck: Normal range of motion.   Cardiovascular: Normal rate and intact distal pulses.    Pulmonary/Chest: No respiratory distress. She has no rales.   Abdominal: Soft. Bowel sounds are normal. She exhibits no distension.   Musculoskeletal: Normal range of motion.   Neurological: She is alert. No cranial nerve deficit. She exhibits normal muscle tone.   Cognitive deficits   Skin: Skin is warm. She is not diaphoretic.   Psychiatric: Thought content normal.   Nursing note and vitals reviewed.                                   Assessment/Plan     * Dementia- (present on admission)   Assessment & Plan    Min narc/sed when possible.   Await guardianship.  Underwent court 12/19        Constipation- (present on admission)   Assessment & Plan    Resolved currently continue bowel care        Congestion of upper airway- (present on admission)   Assessment & Plan    Saturating 90% on RA.  O2, RT, IS, Vax, CXR and encouraging taking of PO fluids.          Physical debility- (present on admission)   Assessment & Plan    PT/OT and increase activity.  Encourage mobilization. Pending guardianship        Obesity (BMI 30.0-34.9)- (present on admission)   Assessment & Plan    Encourage Kcal restriction        T12 compression fracture (CMS-HCC)- (present on admission)   Assessment & Plan    CT spine, no acute fractures. Cont fosamax and calcium  Stable and pending guardianship        Chronic back pain- (present on admission)   Assessment & Plan    As above and min narc/sed when possible.  Bowel Regimen                Reviewed items::  Labs reviewed and Medications  reviewed  Pink catheter::  No Pink  DVT prophylaxis pharmacological::  Heparin  Ulcer Prophylaxis::  Yes

## 2017-12-22 NOTE — PROGRESS NOTES
Simona Knight Fall Risk Assessment:     Last Known Fall: Within the last six months  Mobility: Dizziness/generalized weakness  Medications: Cardiovascular or central nervous system meds  Mental Status/LOC/Awareness: Oriented to person and place  Toileting Needs: Incontinence  Volume/Electrolyte Status: Use of IV fluids/tube feeds  Communication/Sensory: Visual (Glasses)/hearing deficit  Behavior: Appropriate behavior  Simona Knight Fall Risk Total: 14  Fall Risk Level: MODERATE RISK    Universal Fall Precautions:  call light/belongings in reach, bed in low position and locked, siderails up x 2    Fall Risk Level Interventions:    TRIAL (TELE 8, NEURO, MED JENNIE 5) Moderate Fall Risk Interventions  Place yellow fall risk ID band on patient: verified  Provide patient/family education based on risk assessment : completed  Educate patient/family to call staff for assistance when getting out of bed: completed  Place fall precaution signage outside patient door: verified  Utilize bed/chair fall alarm: verifiedTRIAL (TELE 8, NEURO, [employer] JENNIE 5) High Fall Risk Interventions  Place yellow fall risk ID band on patient: verified  Provide patient/family education based on risk assessment: completed  Educate patient/family to call staff for assistance when getting out of bed: completed  Place fall precaution signage outside patient door: verified  Place patient in room close to nursing station: verified  Utilize bed/chair fall alarm: verified  Notify charge of high risk for huddle: verified    Patient Specific Interventions:     Medication: review medications with patient and family and assess need for orthostatic hypotension evaluation  Mental Status/LOC/Awareness: reinforce falls education, encourage family to stay with patient, check on patient hourly, utilize bed/chair fall alarm and reinforce the use of call light  Toileting: provide frquent toileting  Volume/Electrolyte Status: ensure patient remains hydrated, monitor  abnormal lab values and ensure IV fluids are appropriate  Communication/Sensory: for visually impaired patients orient to their room surrounding and do not change their surroundings  Behavioral: encourage patient to voice feelings and administer medication as ordered  Mobility: utilize bed/chair fall alarm and ensure bed is locked and in lowest position

## 2017-12-22 NOTE — PROGRESS NOTES
Assumed care of pt, received report from day shift RN, pt assessed.  Pt has no complaints of pain at this time.  Pt is A&O x2, disoriented to time, and events.  Pt moderate fall risk, wearing treaded socks, bed locked and in lowest position, bed alarm is on.  Pt instructed to call for assistance prior to getting OOB, pt verbalized understanding.  Call light, phone, and personal belongings within reach.

## 2017-12-22 NOTE — PROGRESS NOTES
Patient having an episode of leg weakness. BP 78/44 HR 91. Dr. Sanchez put in sepsis protocol orders. Bolus 500ml started.  Patient states she has a HA and feels dizzy.

## 2017-12-23 LAB
ERYTHROCYTE [DISTWIDTH] IN BLOOD BY AUTOMATED COUNT: 55.5 FL (ref 35.9–50)
HCT VFR BLD AUTO: 33.6 % (ref 37–47)
HGB BLD-MCNC: 10.3 G/DL (ref 12–16)
LACTATE BLD-SCNC: 1.3 MMOL/L (ref 0.5–2)
MCH RBC QN AUTO: 28.7 PG (ref 27–33)
MCHC RBC AUTO-ENTMCNC: 30.7 G/DL (ref 33.6–35)
MCV RBC AUTO: 93.6 FL (ref 81.4–97.8)
PLATELET # BLD AUTO: 205 K/UL (ref 164–446)
PMV BLD AUTO: 10.8 FL (ref 9–12.9)
RBC # BLD AUTO: 3.59 M/UL (ref 4.2–5.4)
WBC # BLD AUTO: 5.4 K/UL (ref 4.8–10.8)

## 2017-12-23 PROCEDURE — 700102 HCHG RX REV CODE 250 W/ 637 OVERRIDE(OP): Performed by: INTERNAL MEDICINE

## 2017-12-23 PROCEDURE — 83605 ASSAY OF LACTIC ACID: CPT

## 2017-12-23 PROCEDURE — 85027 COMPLETE CBC AUTOMATED: CPT

## 2017-12-23 PROCEDURE — 700102 HCHG RX REV CODE 250 W/ 637 OVERRIDE(OP): Performed by: HOSPITALIST

## 2017-12-23 PROCEDURE — 36415 COLL VENOUS BLD VENIPUNCTURE: CPT

## 2017-12-23 PROCEDURE — 700102 HCHG RX REV CODE 250 W/ 637 OVERRIDE(OP): Performed by: FAMILY MEDICINE

## 2017-12-23 PROCEDURE — A9270 NON-COVERED ITEM OR SERVICE: HCPCS | Performed by: INTERNAL MEDICINE

## 2017-12-23 PROCEDURE — A9270 NON-COVERED ITEM OR SERVICE: HCPCS | Performed by: HOSPITALIST

## 2017-12-23 PROCEDURE — 700111 HCHG RX REV CODE 636 W/ 250 OVERRIDE (IP): Performed by: HOSPITALIST

## 2017-12-23 PROCEDURE — 770006 HCHG ROOM/CARE - MED/SURG/GYN SEMI*

## 2017-12-23 PROCEDURE — A9270 NON-COVERED ITEM OR SERVICE: HCPCS | Performed by: FAMILY MEDICINE

## 2017-12-23 PROCEDURE — 99231 SBSQ HOSP IP/OBS SF/LOW 25: CPT | Performed by: INTERNAL MEDICINE

## 2017-12-23 RX ADMIN — Medication 500 MG: at 09:06

## 2017-12-23 RX ADMIN — ATORVASTATIN CALCIUM 40 MG: 40 TABLET, FILM COATED ORAL at 20:37

## 2017-12-23 RX ADMIN — ALENDRONATE SODIUM 10 MG: 10 TABLET ORAL at 09:07

## 2017-12-23 RX ADMIN — POLYETHYLENE GLYCOL (3350) 1 PACKET: 17 POWDER, FOR SOLUTION ORAL at 09:09

## 2017-12-23 RX ADMIN — LACTOBACILLUS ACIDOPHILUS / LACTOBACILLUS BULGARICUS 1 PACKET: 100 MILLION CFU STRENGTH GRANULES at 12:39

## 2017-12-23 RX ADMIN — GUAIFENESIN 600 MG: 600 TABLET, EXTENDED RELEASE ORAL at 20:36

## 2017-12-23 RX ADMIN — OXYBUTYNIN CHLORIDE 5 MG: 5 TABLET, FILM COATED, EXTENDED RELEASE ORAL at 20:36

## 2017-12-23 RX ADMIN — GUAIFENESIN 600 MG: 600 TABLET, EXTENDED RELEASE ORAL at 09:07

## 2017-12-23 RX ADMIN — DULOXETINE HYDROCHLORIDE 20 MG: 20 CAPSULE, DELAYED RELEASE ORAL at 09:07

## 2017-12-23 RX ADMIN — LACTOBACILLUS ACIDOPHILUS / LACTOBACILLUS BULGARICUS 1 PACKET: 100 MILLION CFU STRENGTH GRANULES at 17:58

## 2017-12-23 RX ADMIN — Medication 500 MG: at 17:58

## 2017-12-23 RX ADMIN — OMEPRAZOLE 20 MG: 20 CAPSULE, DELAYED RELEASE ORAL at 09:07

## 2017-12-23 RX ADMIN — HEPARIN SODIUM 5000 UNITS: 5000 INJECTION, SOLUTION INTRAVENOUS; SUBCUTANEOUS at 09:07

## 2017-12-23 RX ADMIN — ASPIRIN 81 MG: 81 TABLET, COATED ORAL at 09:07

## 2017-12-23 RX ADMIN — LACTOBACILLUS ACIDOPHILUS / LACTOBACILLUS BULGARICUS 1 PACKET: 100 MILLION CFU STRENGTH GRANULES at 09:07

## 2017-12-23 RX ADMIN — HEPARIN SODIUM 5000 UNITS: 5000 INJECTION, SOLUTION INTRAVENOUS; SUBCUTANEOUS at 20:37

## 2017-12-23 RX ADMIN — GABAPENTIN 300 MG: 300 CAPSULE ORAL at 20:36

## 2017-12-23 RX ADMIN — ATORVASTATIN CALCIUM 40 MG: 40 TABLET, FILM COATED ORAL at 21:27

## 2017-12-23 ASSESSMENT — PAIN SCALES - GENERAL
PAINLEVEL_OUTOF10: 0

## 2017-12-23 NOTE — PROGRESS NOTES
Received bedside report from day-shift RN and assumed care of patient. Labs and orders noted. Assessment performed. Patient is alert with no signs of labored breathing or distress. Patient denies any dizziness, chest pain, nausea, numbness, or tingling at this time. Patient updated on plan of care; verbalizes understanding and states all of her questions/concerns have been addressed and answered appropriately. Patient states all of her needs are being met at this time. Safety precautions in place including patient call light within reach, personal possessions nearby, bed in low position and locked, patient rounding in practice, bed strip alarm on and working properly, and non-skid socks in place.

## 2017-12-23 NOTE — PROGRESS NOTES
Simona Knight Fall Risk Assessment:     Last Known Fall: Within the last six months  Mobility: Dizziness/generalized weakness  Medications: Cardiovascular or central nervous system meds  Mental Status/LOC/Awareness: Oriented to person and place  Toileting Needs: Incontinence  Volume/Electrolyte Status: Use of IV fluids/tube feeds  Communication/Sensory: Visual (Glasses)/hearing deficit  Behavior: Appropriate behavior  Simona Knight Fall Risk Total: 15  Fall Risk Level: HIGH RISK     Universal Fall Precautions:  call light/belongings in reach, bed in low position and locked, wheelchairs and assistive devices out of sight, siderails up x 2, use non-slip footwear, adequate lighting, clutter free and spill free environment, educate on level of risk, educate to call for assistance     Fall Risk Level Interventions:    TRIAL ([employer] 8, NEURO, MED JENNIE 5) Moderate Fall Risk Interventions  Place yellow fall risk ID band on patient: completed  Provide patient/family education based on risk assessment : completed  Educate patient/family to call staff for assistance when getting out of bed: completed  Place fall precaution signage outside patient door: verified  Utilize bed/chair fall alarm: verifiedTRIAL (Medpriv[employer] 8, NEURO, [employer] JENNIE 5) High Fall Risk Interventions  Place yellow fall risk ID band on patient: verified  Provide patient/family education based on risk assessment: completed  Educate patient/family to call staff for assistance when getting out of bed: completed  Place fall precaution signage outside patient door: verified  Place patient in room close to nursing station: verified  Utilize bed/chair fall alarm: verified  Notify charge of high risk for huddle: verified     Patient Specific Interventions:      Medication: review medications with patient and family  Mental Status/LOC/Awareness: reorient patient, reinforce falls education, check on patient hourly, utilize bed/chair fall alarm and reinforce the use of call  light  Toileting: provide frquent toileting  Volume/Electrolyte Status: ensure IV fluids are appropriate  Communication/Sensory: update plan of care on whiteboard and ensure proper positioning when transferrng/ambulating  Behavioral: encourage patient to voice feelings, administer medication as ordered and instruct/reinforce fall program rationale  Mobility: utilize bed/chair fall alarm and ensure bed is locked and in lowest position

## 2017-12-23 NOTE — CARE PLAN
"Problem: Respiratory:  Goal: Respiratory status will improve  Outcome: PROGRESSING SLOWER THAN EXPECTED  Patient is alert with O2 sats remaining above 90%. However, patient complaining of a \"stuffy nose all day.\" Ocean spray being administered per new MD order. IS also being encouraged.    Problem: Pain Management  Goal: Pain level will decrease to patient's comfort goal  Outcome: PROGRESSING AS EXPECTED  Patient educated on 0-10 pain scale, PRN pain medications per MAR as well as non-pharmacological forms of pain management. Patient verbalizes understanding and declines any pain interventions since the start of shift for her \"just sore\" back.      "

## 2017-12-23 NOTE — PROGRESS NOTES
Simona Knight Fall Risk Assessment:     Last Known Fall: Within the last six months  Mobility: Dizziness/generalized weakness  Medications: Cardiovascular or central nervous system meds  Mental Status/LOC/Awareness: Oriented to person and place  Toileting Needs: Incontinence  Volume/Electrolyte Status: Use of IV fluids/tube feeds  Communication/Sensory: Visual (Glasses)/hearing deficit  Behavior: Appropriate behavior  Simona Knight Fall Risk Total: 14  Fall Risk Level: MODERATE RISK    Universal Fall Precautions:  call light/belongings in reach, bed in low position and locked, wheelchairs and assistive devices out of sight, siderails up x 2, use non-slip footwear, adequate lighting, clutter free and spill free environment, educate on level of risk, educate to call for assistance    Fall Risk Level Interventions:    TRIAL (Definition 6 8, NEURO, MED JENNIE 5) Moderate Fall Risk Interventions  Place yellow fall risk ID band on patient: completed  Provide patient/family education based on risk assessment : completed  Educate patient/family to call staff for assistance when getting out of bed: completed  Place fall precaution signage outside patient door: verified  Utilize bed/chair fall alarm: verifiedTRIAL (Definition 6 8, NEURO, [employer] JENNIE 5) High Fall Risk Interventions  Place yellow fall risk ID band on patient: verified  Provide patient/family education based on risk assessment: completed  Educate patient/family to call staff for assistance when getting out of bed: completed  Place fall precaution signage outside patient door: verified  Place patient in room close to nursing station: verified  Utilize bed/chair fall alarm: verified  Notify charge of high risk for huddle: verified    Patient Specific Interventions:     Medication: review medications with patient and family  Mental Status/LOC/Awareness: reorient patient, reinforce falls education, check on patient hourly, utilize bed/chair fall alarm and reinforce the use of call  light  Toileting: provide frquent toileting  Volume/Electrolyte Status: ensure IV fluids are appropriate  Communication/Sensory: update plan of care on whiteboard and ensure proper positioning when transferrng/ambulating  Behavioral: encourage patient to voice feelings, administer medication as ordered and instruct/reinforce fall program rationale  Mobility: utilize bed/chair fall alarm and ensure bed is locked and in lowest position

## 2017-12-23 NOTE — PROGRESS NOTES
"Patient complaining of a \"stuffy nose\" despite interventions implemented. Phone call from MD on call transferred to this RN. MD updated on congestion of upper airway as well as CXR results from earlier today.    New order received for ocean spray. Patient updated and agreeable; thanked this RN afterwards. Currently waiting for spray to be sent up from pharmacy; patient aware.  "

## 2017-12-23 NOTE — ASSESSMENT & PLAN NOTE
On 12/22, hypotensive. She was somewhat lightheaded but mentation intact. IVF given, antihypertensives d/luis daniel. Resolved.  On 12/24 hypertensive. Restarted antihypertensives.

## 2017-12-23 NOTE — PROGRESS NOTES
[facility] Hospitalist Progress Note    Date of Service: 2017    Chief Complaint  77 y.o. female admitted 2017 with GLF, pyuria and ATN.    Interval Problem Update  : Had BM this AM, says had to strain  : Feeling tired/sleepy.  Otherwise no complaints  : Fever last night, cxr negative, blood cx pending.  WBC in AM  : No physical complaints.  No fever  : Drowsy, no physical complaints.  No fever.  Eating and drinking.  Stooling  12/10: No complaints, drowsy, napping  : No physical complaints, encouraged ambulation TID and chair for meals.  Pending placement  : No issues overnight; remains confused at times  : No changes, remains confused, awaiting guardianship  : No changes and no acute events overnight; tolerating diet  12/15:  No new events, resting/arousable  :  Pleasant, cooperative, ate breakfast this am, and ambulated  :  Awake, alert, no new complaints  : ate breakfast, pleasant, no new complaints  : Smiling pleasant, court today  . No new issues, tolerated food, ambulating.  . Sleeping but arousable. No new complaints to me.  . Was hypotensive this morning. Improved with IVF. No fever.     Consultants/Specialty  Neurology  Psych      Review of Systems   Unable to perform ROS: Mental acuity      Physical Exam  Laboratory/Imaging   Hemodynamics  Temp (24hrs), Av.5 °C (97.7 °F), Min:36.1 °C (97 °F), Max:36.8 °C (98.3 °F)   Temperature: 36.8 °C (98.3 °F)  Pulse  Av.4  Min: 50  Max: 106    Blood Pressure : 114/66      Respiratory      Respiration: 20, Pulse Oximetry: 95 %     Work Of Breathing / Effort: Mild  RUL Breath Sounds: Clear, RML Breath Sounds: Clear, RLL Breath Sounds: Diminished, ARGELIA Breath Sounds: Clear, LLL Breath Sounds: Diminished    Fluids  No intake or output data in the 24 hours ending 17 1700    Nutrition  Orders Placed This Encounter   Procedures   • Diet Order     Standing Status:   Standing      Number of Occurrences:   1     Order Specific Question:   Diet:     Answer:   Regular [1]     Physical Exam   Constitutional: She appears well-developed. No distress.   HENT:   Head: Normocephalic and atraumatic.   Poor dentition   Eyes: EOM are normal. Pupils are equal, round, and reactive to light.   Neck: Normal range of motion.   Cardiovascular: Normal rate and intact distal pulses.    Pulmonary/Chest: No respiratory distress. She has no rales.   Abdominal: Soft. Bowel sounds are normal. She exhibits no distension.   Musculoskeletal: Normal range of motion.   Neurological: She is alert. No cranial nerve deficit. She exhibits normal muscle tone.   Cognitive deficits   Skin: Skin is warm. She is not diaphoretic.   Psychiatric: Thought content normal.   Nursing note and vitals reviewed.                                   Assessment/Plan     * Dementia- (present on admission)   Assessment & Plan    Min narc/sed when possible.   Await guardianship.  Underwent court 12/19        Hypotension   Assessment & Plan    On 12/22, hypotensive. She was somewhat lightheaded but mentation intact. IVF given, antihypertensives d/luis daniel. Resolved.        Constipation- (present on admission)   Assessment & Plan    Resolved currently continue bowel care        Congestion of upper airway- (present on admission)   Assessment & Plan    Saturating 90% on RA.  O2, RT, IS, Vax, CXR and encouraging taking of PO fluids.          Physical debility- (present on admission)   Assessment & Plan    PT/OT and increase activity.  Encourage mobilization. Pending guardianship        Obesity (BMI 30.0-34.9)- (present on admission)   Assessment & Plan    Encourage Kcal restriction        T12 compression fracture (CMS-HCC)- (present on admission)   Assessment & Plan    CT spine, no acute fractures. Cont fosamax and calcium  Stable and pending guardianship        Chronic back pain- (present on admission)   Assessment & Plan    As above and min narc/sed when  possible.  Bowel Regimen        I spent 40 minutes, reviewing the chart, notes, vitals, labs, imaging, ordering labs, evaluating Maria Esther Valencia for assessment, enacting the plan above.  Medical decision making is therefore complex.           Reviewed items::  Labs reviewed and Medications reviewed  Pink catheter::  No Pink  DVT prophylaxis pharmacological::  Heparin  Ulcer Prophylaxis::  Yes

## 2017-12-23 NOTE — PROGRESS NOTES
Pharmacy Pharmacotherapy Consult for LOS >30 days    Admit Date: 9/21/2017      Medications were reviewed for appropriateness and ongoing need.     Current Facility-Administered Medications   Medication Dose Route Frequency Provider Last Rate Last Dose   • NS infusion   Intravenous Continuous Santana Sanchez M.D. 125 mL/hr at 12/22/17 1300     • polyethylene glycol/lytes (MIRALAX) PACKET 1 Packet  1 Packet Oral DAILY Kim Salazar D.O.   1 Packet at 12/22/17 0849   • senna-docusate (PERICOLACE or SENOKOT S) 8.6-50 MG per tablet 2 Tab  2 Tab Oral Q EVENING TERE Nj.O.   2 Tab at 12/21/17 2136   • guaifenesin LA (MUCINEX) tablet 600 mg  600 mg Oral Q12HRS Joe Russo M.D.   600 mg at 12/22/17 0848   • acetaminophen (TYLENOL) tablet 650 mg  650 mg Oral Q6HRS PRN Joe Russo M.D.   650 mg at 12/18/17 2212   • lactobacillus granules (LACTINEX/FLORANEX) packet 1 Packet  1 Packet Oral TID WITH MEALS oJe Russo M.D.   1 Packet at 12/22/17 0848   • calcium carbonate (OS-JOON 500) tablet 500 mg  500 mg Oral BID WITH MEALS Joe Russo M.D.   500 mg at 12/22/17 0849   • alendronate (FOSAMAX) tablet 10 mg  10 mg Oral QAM AC Joe Russo M.D.   10 mg at 12/22/17 0848   • heparin injection 5,000 Units  5,000 Units Subcutaneous Q12HRS Joe Russo M.D.   5,000 Units at 12/22/17 0849   • trazodone (DESYREL) tablet 100 mg  100 mg Oral QHS PRN Joe Russo M.D.   100 mg at 11/19/17 2123   • omeprazole (PRILOSEC) capsule 20 mg  20 mg Oral DAILY Denia Fenton M.D.   20 mg at 12/22/17 0848   • aspirin EC (ECOTRIN) tablet 81 mg  81 mg Oral DAILY Edward Metcalf M.D.   81 mg at 12/22/17 0849   • atorvastatin (LIPITOR) tablet 80 mg  80 mg Oral QHS Leon Liwanag, M.D.   80 mg at 12/21/17 2136   • duloxetine (CYMBALTA) capsule 20 mg  20 mg Oral DAILY Edward Metcalf M.D.   20 mg at 12/22/17 0848   • gabapentin (NEURONTIN) capsule 300 mg  300 mg Oral Q EVENING Edward Metcalf M.D.   300 mg at 12/21/17  2136   • lidocaine (LIDODERM) 5 % 1 Patch  1 Patch Transdermal Q24HRS Edward Metcalf M.D.   1 Patch at 12/22/17 0850   • oxybutynin SR (DITROPAN-XL) tablet 5 mg  5 mg Oral Q EVENING Edward Metcalf M.D.   5 mg at 12/21/17 2139   • vitamin D (Ergocalciferol) (DRISDOL) capsule 50,000 Units  50,000 Units Oral Q7 DAYS Joe Russo M.D.   50,000 Units at 12/20/17 1027   • polyethylene glycol/lytes (MIRALAX) PACKET 1 Packet  1 Packet Oral QDAY PRN Edward Mtecalf M.D.   1 Packet at 12/20/17 2117    And   • magnesium hydroxide (MILK OF MAGNESIA) suspension 30 mL  30 mL Oral QDAY PRN Edward Metcalf M.D.   30 mL at 12/20/17 1756    And   • bisacodyl (DULCOLAX) suppository 10 mg  10 mg Rectal QDAY BRIDGETN Edward Metcalf M.D.       • ondansetron (ZOFRAN) syringe/vial injection 4 mg  4 mg Intravenous Q4HRS BRIDGETN Edward Metcalf M.D.   4 mg at 09/26/17 2047   • ondansetron (ZOFRAN ODT) dispertab 4 mg  4 mg Oral Q4HRS PRN Edward Metcalf M.D.   4 mg at 12/21/17 0301   • oxycodone immediate-release (ROXICODONE) tablet 2.5 mg  2.5 mg Oral Q3HRS PRN Edward Metcalf M.D.   2.5 mg at 09/29/17 0610    And   • oxycodone immediate-release (ROXICODONE) tablet 5 mg  5 mg Oral Q3HRS PRN Edward Metcalf M.D.   5 mg at 12/07/17 2014       Recommendations:  -No documented indication for Prilosec. Recommended duration <8 weeks in elderly. Recommend discontinue.  -Recommend repeat Vitamin D level to determine if ergocalciferol 50,000 units weekly still necessary (last lab 8/2/17)  -Zofran IV not given in >60 days. Recommend discontinue.  -Miralax ordered scheduled and PRN. Recommend discontinue PRN order.  -Trazodone not given in >30 days. Recommend discontinue.    Signed: Laron Poe, CarleneD

## 2017-12-24 LAB
ERYTHROCYTE [DISTWIDTH] IN BLOOD BY AUTOMATED COUNT: 53.9 FL (ref 35.9–50)
HCT VFR BLD AUTO: 31.5 % (ref 37–47)
HGB BLD-MCNC: 9.9 G/DL (ref 12–16)
MCH RBC QN AUTO: 28.4 PG (ref 27–33)
MCHC RBC AUTO-ENTMCNC: 31.4 G/DL (ref 33.6–35)
MCV RBC AUTO: 90.3 FL (ref 81.4–97.8)
PLATELET # BLD AUTO: 224 K/UL (ref 164–446)
PMV BLD AUTO: 10.7 FL (ref 9–12.9)
RBC # BLD AUTO: 3.49 M/UL (ref 4.2–5.4)
WBC # BLD AUTO: 4.5 K/UL (ref 4.8–10.8)

## 2017-12-24 PROCEDURE — 700111 HCHG RX REV CODE 636 W/ 250 OVERRIDE (IP): Performed by: HOSPITALIST

## 2017-12-24 PROCEDURE — 700102 HCHG RX REV CODE 250 W/ 637 OVERRIDE(OP): Performed by: HOSPITALIST

## 2017-12-24 PROCEDURE — 700101 HCHG RX REV CODE 250: Performed by: FAMILY MEDICINE

## 2017-12-24 PROCEDURE — 700102 HCHG RX REV CODE 250 W/ 637 OVERRIDE(OP): Performed by: FAMILY MEDICINE

## 2017-12-24 PROCEDURE — 85027 COMPLETE CBC AUTOMATED: CPT

## 2017-12-24 PROCEDURE — A9270 NON-COVERED ITEM OR SERVICE: HCPCS | Performed by: INTERNAL MEDICINE

## 2017-12-24 PROCEDURE — 99232 SBSQ HOSP IP/OBS MODERATE 35: CPT | Performed by: INTERNAL MEDICINE

## 2017-12-24 PROCEDURE — A9270 NON-COVERED ITEM OR SERVICE: HCPCS | Performed by: HOSPITALIST

## 2017-12-24 PROCEDURE — 770006 HCHG ROOM/CARE - MED/SURG/GYN SEMI*

## 2017-12-24 PROCEDURE — 36415 COLL VENOUS BLD VENIPUNCTURE: CPT

## 2017-12-24 PROCEDURE — 700105 HCHG RX REV CODE 258: Performed by: INTERNAL MEDICINE

## 2017-12-24 PROCEDURE — 700102 HCHG RX REV CODE 250 W/ 637 OVERRIDE(OP): Performed by: INTERNAL MEDICINE

## 2017-12-24 PROCEDURE — A9270 NON-COVERED ITEM OR SERVICE: HCPCS | Performed by: FAMILY MEDICINE

## 2017-12-24 RX ORDER — HYDRALAZINE HYDROCHLORIDE 50 MG/1
50 TABLET, FILM COATED ORAL EVERY 8 HOURS
Status: DISCONTINUED | OUTPATIENT
Start: 2017-12-24 | End: 2018-01-15 | Stop reason: HOSPADM

## 2017-12-24 RX ORDER — FUROSEMIDE 20 MG/1
20 TABLET ORAL DAILY
Status: DISCONTINUED | OUTPATIENT
Start: 2017-12-24 | End: 2017-12-26

## 2017-12-24 RX ORDER — BENAZEPRIL HYDROCHLORIDE 20 MG/1
40 TABLET ORAL DAILY
Status: DISCONTINUED | OUTPATIENT
Start: 2017-12-24 | End: 2018-01-15 | Stop reason: HOSPADM

## 2017-12-24 RX ADMIN — HEPARIN SODIUM 5000 UNITS: 5000 INJECTION, SOLUTION INTRAVENOUS; SUBCUTANEOUS at 20:40

## 2017-12-24 RX ADMIN — HYDRALAZINE HYDROCHLORIDE 50 MG: 50 TABLET ORAL at 15:45

## 2017-12-24 RX ADMIN — Medication 500 MG: at 10:28

## 2017-12-24 RX ADMIN — GUAIFENESIN 600 MG: 600 TABLET, EXTENDED RELEASE ORAL at 08:21

## 2017-12-24 RX ADMIN — HYDRALAZINE HYDROCHLORIDE 50 MG: 50 TABLET ORAL at 09:09

## 2017-12-24 RX ADMIN — METOPROLOL TARTRATE 12.5 MG: 25 TABLET, FILM COATED ORAL at 09:09

## 2017-12-24 RX ADMIN — OMEPRAZOLE 20 MG: 20 CAPSULE, DELAYED RELEASE ORAL at 08:22

## 2017-12-24 RX ADMIN — STANDARDIZED SENNA CONCENTRATE AND DOCUSATE SODIUM 2 TABLET: 8.6; 5 TABLET, FILM COATED ORAL at 20:40

## 2017-12-24 RX ADMIN — DULOXETINE HYDROCHLORIDE 20 MG: 20 CAPSULE, DELAYED RELEASE ORAL at 08:21

## 2017-12-24 RX ADMIN — BENAZEPRIL HYDROCHLORIDE 40 MG: 20 TABLET, COATED ORAL at 10:28

## 2017-12-24 RX ADMIN — HYDRALAZINE HYDROCHLORIDE 50 MG: 50 TABLET ORAL at 20:40

## 2017-12-24 RX ADMIN — SODIUM CHLORIDE 1000 ML: 9 INJECTION, SOLUTION INTRAVENOUS at 08:33

## 2017-12-24 RX ADMIN — LACTOBACILLUS ACIDOPHILUS / LACTOBACILLUS BULGARICUS 1 PACKET: 100 MILLION CFU STRENGTH GRANULES at 08:21

## 2017-12-24 RX ADMIN — METOPROLOL TARTRATE 12.5 MG: 25 TABLET, FILM COATED ORAL at 20:39

## 2017-12-24 RX ADMIN — ALENDRONATE SODIUM 10 MG: 10 TABLET ORAL at 08:31

## 2017-12-24 RX ADMIN — POLYETHYLENE GLYCOL (3350) 1 PACKET: 17 POWDER, FOR SOLUTION ORAL at 08:21

## 2017-12-24 RX ADMIN — ASPIRIN 81 MG: 81 TABLET, COATED ORAL at 08:21

## 2017-12-24 RX ADMIN — HEPARIN SODIUM 5000 UNITS: 5000 INJECTION, SOLUTION INTRAVENOUS; SUBCUTANEOUS at 08:21

## 2017-12-24 RX ADMIN — GABAPENTIN 300 MG: 300 CAPSULE ORAL at 20:38

## 2017-12-24 RX ADMIN — FUROSEMIDE 20 MG: 20 TABLET ORAL at 09:09

## 2017-12-24 RX ADMIN — ATORVASTATIN CALCIUM 80 MG: 40 TABLET, FILM COATED ORAL at 20:38

## 2017-12-24 RX ADMIN — Medication 500 MG: at 17:54

## 2017-12-24 RX ADMIN — OXYBUTYNIN CHLORIDE 5 MG: 5 TABLET, FILM COATED, EXTENDED RELEASE ORAL at 22:54

## 2017-12-24 RX ADMIN — LIDOCAINE 1 PATCH: 50 PATCH CUTANEOUS at 08:21

## 2017-12-24 RX ADMIN — GUAIFENESIN 600 MG: 600 TABLET, EXTENDED RELEASE ORAL at 20:39

## 2017-12-24 RX ADMIN — LACTOBACILLUS ACIDOPHILUS / LACTOBACILLUS BULGARICUS 1 PACKET: 100 MILLION CFU STRENGTH GRANULES at 17:54

## 2017-12-24 ASSESSMENT — PAIN SCALES - GENERAL
PAINLEVEL_OUTOF10: 0
PAINLEVEL_OUTOF10: 0

## 2017-12-24 ASSESSMENT — LIFESTYLE VARIABLES: DO YOU DRINK ALCOHOL: NO

## 2017-12-24 NOTE — PROGRESS NOTES
[facility] Hospitalist Progress Note    Date of Service: 2017    Chief Complaint  77 y.o. female admitted 2017 with GLF, pyuria and ATN.    Interval Problem Update  Now hypertensive.    Consultants/Specialty  Neurology  Psych      Review of Systems   Unable to perform ROS: Mental acuity      Physical Exam  Laboratory/Imaging   Hemodynamics  Temp (24hrs), Av.2 °C (97.1 °F), Min:36.1 °C (97 °F), Max:36.3 °C (97.3 °F)   Temperature: 36.1 °C (97 °F)  Pulse  Av.5  Min: 50  Max: 106    Blood Pressure : (!) 175/74 (RN notified)      Respiratory      Respiration: 17, Pulse Oximetry: 94 %     Work Of Breathing / Effort: Mild  RUL Breath Sounds: Clear, RML Breath Sounds: Diminished, RLL Breath Sounds: Diminished, ARGELIA Breath Sounds: Clear, LLL Breath Sounds: Diminished    Fluids    Intake/Output Summary (Last 24 hours) at 17 1346  Last data filed at 17 0845   Gross per 24 hour   Intake             1240 ml   Output                1 ml   Net             1239 ml       Nutrition  Orders Placed This Encounter   Procedures   • Diet Order     Standing Status:   Standing     Number of Occurrences:   1     Order Specific Question:   Diet:     Answer:   Regular [1]     Physical Exam   Constitutional: She appears well-developed. No distress.   HENT:   Head: Normocephalic and atraumatic.   Poor dentition   Eyes: EOM are normal. Pupils are equal, round, and reactive to light.   Neck: Normal range of motion.   Cardiovascular: Normal rate and intact distal pulses.    Murmur (old) heard.  Pulmonary/Chest: No respiratory distress. She has no rales.   Abdominal: Soft. Bowel sounds are normal. She exhibits no distension.   Musculoskeletal: Normal range of motion.   Neurological: She is alert. No cranial nerve deficit. She exhibits normal muscle tone.   Cognitive deficits   Skin: Skin is warm. She is not diaphoretic.   Psychiatric: Thought content normal.   Nursing note and vitals reviewed.          Recent Labs       12/22/17   1716  12/23/17   0158  12/24/17   0021   WBC  7.1  5.4  4.5*   RBC  3.55*  3.59*  3.49*   HEMOGLOBIN  10.5*  10.3*  9.9*   HEMATOCRIT  32.6*  33.6*  31.5*   MCV  91.8  93.6  90.3   MCH  29.6  28.7  28.4   MCHC  32.2*  30.7*  31.4*   RDW  56.3*  55.5*  53.9*   PLATELETCT  233  205  224   MPV  10.5  10.8  10.7     Recent Labs      12/22/17   1716   SODIUM  139   POTASSIUM  4.2   CHLORIDE  109   CO2  25   GLUCOSE  92   BUN  30*   CREATININE  1.10   CALCIUM  8.8                      Assessment/Plan     * Dementia- (present on admission)   Assessment & Plan    Min narc/sed when possible.   Await guardianship.  Underwent court 12/19        Hypertension- (present on admission)   Assessment & Plan    On 12/22 hypotensive so held BP meds  On 12/24 hypertensive. Restarted BP meds.            Hypotension   Assessment & Plan    On 12/22, hypotensive. She was somewhat lightheaded but mentation intact. IVF given, antihypertensives d/luis daniel. Resolved.  On 12/24 hypertensive. Restarted antihypertensives.        Constipation- (present on admission)   Assessment & Plan    Resolved currently continue bowel care        Congestion of upper airway- (present on admission)   Assessment & Plan    Saturating 90% on RA.  O2, RT, IS, Vax, CXR and encouraging taking of PO fluids.          Physical debility- (present on admission)   Assessment & Plan    PT/OT and increase activity.  Encourage mobilization. Pending guardianship        Obesity (BMI 30.0-34.9)- (present on admission)   Assessment & Plan    Encourage Kcal restriction        T12 compression fracture (CMS-HCC)- (present on admission)   Assessment & Plan    CT spine, no acute fractures. Cont fosamax and calcium  Stable and pending guardianship        Chronic back pain- (present on admission)   Assessment & Plan    As above and min narc/sed when possible.  Bowel Regimen            I spent 38 minutes, reviewing the chart, notes, vitals, labs, imaging, ordering labs, evaluating  Maria Esther Valencia for assessment, enacting the plan above. 50% of the time was spent in counseling Maria Esther Valencia and answering questions. Discussed with mursing staff Medical decision making is therefore complex. Time was devoted to counseling and coordinating care including review of records, pertinent lab data and studies, as well as discussing diagnostic evaluation and work up, planned therapeutic interventions and future disposition of care. Where indicated, the assessment and plan reflect discussion of patient with consultants, other healthcare providers, family members, and additional research needed to obtain further information in formulating the plan of care for Maria Esther Valencia.       Reviewed items::  Labs reviewed and Medications reviewed  Pink catheter::  No Pink  DVT prophylaxis pharmacological::  Heparin  Ulcer Prophylaxis::  Yes

## 2017-12-24 NOTE — PROGRESS NOTES
"Pt up to BR, incont BM, while sitting on toilet pt states she \"feels like I'm going to pass out\", states she is dizzy and nauseated. Pt assisted back to bed, checked VS, /103, HR 69, O2 sat 94% on RA. After a few minutes resting back in bed, recheck VS, /61, HR 73, O2 sat 94% on RA. Pt states nausea and dizziness resolving and no longer feels like she is going to pass out. Bed alarms on. Call light in reach, will continue to monitor.   "

## 2017-12-24 NOTE — PROGRESS NOTES
Rec'd report from day shift RN. Assumed pt care. Assessment completed. AA&O to self, reoriented to time, place, event. Denies pain at this time. No s/s of discomfort or distress. Pt ambulates with 1 assist, can be unsteady. Pt denies dizziness or headache at this time. Bed in lowest position, bed locked, bed alarm on for safety, treaded socks in place, RN and CNA numbers provided, call light within reach.

## 2017-12-24 NOTE — PROGRESS NOTES
Simona Knight Fall Risk Assessment:     Last Known Fall: Within the last six months  Mobility: Immobilized/requires assist of one person  Medications: Cardiovascular or central nervous system meds  Mental Status/LOC/Awareness: Lethargic/oriented to person only  Toileting Needs: Incontinence  Volume/Electrolyte Status: No problems  Communication/Sensory: Visual (Glasses)/hearing deficit  Behavior: Appropriate behavior  Simona Knight Fall Risk Total: 14  Fall Risk Level: MODERATE RISK    Universal Fall Precautions:  call light/belongings in reach, bed in low position and locked, siderails up x 2    Fall Risk Level Interventions:    TRIAL (TELE 8, NEURO, MED JENNIE 5) Moderate Fall Risk Interventions  Place yellow fall risk ID band on patient: verified  Provide patient/family education based on risk assessment : verified  Educate patient/family to call staff for assistance when getting out of bed: verified  Place fall precaution signage outside patient door: verified  Utilize bed/chair fall alarm: verifiedTRIAL (TELE 8, NEURO, [employer] JENNIE 5) High Fall Risk Interventions  Place yellow fall risk ID band on patient: verified  Provide patient/family education based on risk assessment: completed  Educate patient/family to call staff for assistance when getting out of bed: completed  Place fall precaution signage outside patient door: verified  Place patient in room close to nursing station: currently not available/charge notified  Utilize bed/chair fall alarm: verified  Notify charge of high risk for huddle: verified    Patient Specific Interventions:     Medication: limit combination of prn medications and assess need for orthostatic hypotension evaluation  Mental Status/LOC/Awareness: reinforce falls education, check on patient hourly, utilize bed/chair fall alarm and reinforce the use of call light  Toileting: provide frquent toileting and instruct patient/family on the need to call for assistance when  toileting  Volume/Electrolyte Status: ensure patient remains hydrated and ensure IV fluids are appropriate  Communication/Sensory: update plan of care on whiteboard and ensure patient has glasses/contacts and hearing aids/dentures  Behavioral: encourage patient to voice feelings, engage patient in daily activities and administer medication as ordered  Mobility: utilize bed/chair fall alarm and ensure bed is locked and in lowest position

## 2017-12-24 NOTE — CARE PLAN
Problem: Safety  Goal: Will remain free from injury    Intervention: Provide assistance with mobility  Pt moved to a room closer to the nurses station for safety as pt does not call appropriately and is a high risk to fall.       Problem: Psychosocial Needs:  Goal: Level of anxiety will decrease  Provided therapeutic talk to pt and reoriented pt for comfort.

## 2017-12-24 NOTE — PROGRESS NOTES
[facility] Hospitalist Progress Note    Date of Service: 2017    Chief Complaint  77 y.o. female admitted 2017 with GLF, pyuria and ATN.    Interval Problem Update  Not hypotensive. Not septic.    Consultants/Specialty  Neurology  Psych      Review of Systems   Unable to perform ROS: Mental acuity      Physical Exam  Laboratory/Imaging   Hemodynamics  Temp (24hrs), Av.4 °C (97.6 °F), Min:36.1 °C (97 °F), Max:36.8 °C (98.3 °F)   Temperature: 36.3 °C (97.3 °F)  Pulse  Av.5  Min: 50  Max: 106    Blood Pressure : 151/61      Respiratory      Respiration: 18, Pulse Oximetry: 94 %     Work Of Breathing / Effort: Mild  RUL Breath Sounds: Diminished, RML Breath Sounds: Diminished, RLL Breath Sounds: Diminished, ARGELIA Breath Sounds: Diminished, LLL Breath Sounds: Diminished    Fluids    Intake/Output Summary (Last 24 hours) at 17 1632  Last data filed at 17 1610   Gross per 24 hour   Intake              600 ml   Output                1 ml   Net              599 ml       Nutrition  Orders Placed This Encounter   Procedures   • Diet Order     Standing Status:   Standing     Number of Occurrences:   1     Order Specific Question:   Diet:     Answer:   Regular [1]     Physical Exam   Constitutional: She appears well-developed. No distress.   HENT:   Head: Normocephalic and atraumatic.   Poor dentition   Eyes: EOM are normal. Pupils are equal, round, and reactive to light.   Neck: Normal range of motion.   Cardiovascular: Normal rate and intact distal pulses.    Pulmonary/Chest: No respiratory distress. She has no rales.   Abdominal: Soft. Bowel sounds are normal. She exhibits no distension.   Musculoskeletal: Normal range of motion.   Neurological: She is alert. No cranial nerve deficit. She exhibits normal muscle tone.   Cognitive deficits   Skin: Skin is warm. She is not diaphoretic.   Psychiatric: Thought content normal.   Nursing note and vitals reviewed.          Recent Labs      17   4803   12/23/17   0158   WBC  7.1  5.4   RBC  3.55*  3.59*   HEMOGLOBIN  10.5*  10.3*   HEMATOCRIT  32.6*  33.6*   MCV  91.8  93.6   MCH  29.6  28.7   MCHC  32.2*  30.7*   RDW  56.3*  55.5*   PLATELETCT  233  205   MPV  10.5  10.8     Recent Labs      12/22/17   1716   SODIUM  139   POTASSIUM  4.2   CHLORIDE  109   CO2  25   GLUCOSE  92   BUN  30*   CREATININE  1.10   CALCIUM  8.8                      Assessment/Plan     * Dementia- (present on admission)   Assessment & Plan    Min narc/sed when possible.   Await guardianship.  Underwent court 12/19        Hypotension   Assessment & Plan    On 12/22, hypotensive. She was somewhat lightheaded but mentation intact. IVF given, antihypertensives d/luis daniel. Resolved.        Constipation- (present on admission)   Assessment & Plan    Resolved currently continue bowel care        Congestion of upper airway- (present on admission)   Assessment & Plan    Saturating 90% on RA.  O2, RT, IS, Vax, CXR and encouraging taking of PO fluids.          Physical debility- (present on admission)   Assessment & Plan    PT/OT and increase activity.  Encourage mobilization. Pending guardianship        Obesity (BMI 30.0-34.9)- (present on admission)   Assessment & Plan    Encourage Kcal restriction        T12 compression fracture (CMS-HCC)- (present on admission)   Assessment & Plan    CT spine, no acute fractures. Cont fosamax and calcium  Stable and pending guardianship        Chronic back pain- (present on admission)   Assessment & Plan    As above and min narc/sed when possible.  Bowel Regimen                Reviewed items::  Labs reviewed and Medications reviewed  Pink catheter::  No Pink  DVT prophylaxis pharmacological::  Heparin  Ulcer Prophylaxis::  Yes

## 2017-12-24 NOTE — PROGRESS NOTES
"Assumed care of patient at 0700 . Received report from RN. Patient is AOX3 . Assessment complete. Labs reviewed.Patient and RN discussed plan of care. Patient questions answered. Patient needs are met at this time. Bed in lowest and locked position. Upper bed rails up.  Call light is within reach. Hourly rounding in place.  BP (!) 175/74 Comment: RN notified  Pulse 68   Temp 36.1 °C (97 °F)   Resp 17   Ht 1.6 m (5' 2.99\")   Wt 86.8 kg (191 lb 5.8 oz)   SpO2 94%   Breastfeeding? No   BMI 33.91 kg/m²     "

## 2017-12-24 NOTE — PROGRESS NOTES
Simona Knight Fall Risk Assessment:     Last Known Fall: Within the last six months  Mobility: Immobilized/requires assist of one person  Medications: Cardiovascular or central nervous system meds  Mental Status/LOC/Awareness: Lethargic/oriented to person only  Toileting Needs: Incontinence  Volume/Electrolyte Status: Use of IV fluids/tube feeds  Communication/Sensory: Visual (Glasses)/hearing deficit  Behavior: Impulsiveness  Simona Knight Fall Risk Total: 20  Fall Risk Level: HIGH RISK    Universal Fall Precautions:  call light/belongings in reach, bed in low position and locked, wheelchairs and assistive devices out of sight, siderails up x 2, use non-slip footwear, adequate lighting, clutter free and spill free environment, educate on level of risk, educate to call for assistance    Fall Risk Level Interventions:    TRIAL ([place], NEURO, MED JENNIE 5) Moderate Fall Risk Interventions  Place yellow fall risk ID band on patient: completed  Provide patient/family education based on risk assessment : completed  Educate patient/family to call staff for assistance when getting out of bed: completed  Place fall precaution signage outside patient door: verified  Utilize bed/chair fall alarm: verifiedTRIAL ([employer] 8, NEURO, MED JENNIE 5) High Fall Risk Interventions  Place yellow fall risk ID band on patient: verified  Provide patient/family education based on risk assessment: completed  Educate patient/family to call staff for assistance when getting out of bed: completed  Place fall precaution signage outside patient door: verified  Place patient in room close to nursing station: currently not available/charge notified  Utilize bed/chair fall alarm: verified  Notify charge of high risk for huddle: verified    Patient Specific Interventions:     Medication: review medications with patient and family  Mental Status/LOC/Awareness: check on patient hourly, utilize bed/chair fall alarm and reinforce the use of call  light  Toileting: instruct patient/family on the use of grab bars  Volume/Electrolyte Status: ensure patient remains hydrated and ensure IV fluids are appropriate  Communication/Sensory: update plan of care on whiteboard  Behavioral: administer medication as ordered  Mobility: utilize bed/chair fall alarm and ensure bed is locked and in lowest position
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
## 2017-12-25 LAB
ERYTHROCYTE [DISTWIDTH] IN BLOOD BY AUTOMATED COUNT: 54.1 FL (ref 35.9–50)
HCT VFR BLD AUTO: 34 % (ref 37–47)
HGB BLD-MCNC: 10.7 G/DL (ref 12–16)
MCH RBC QN AUTO: 28.4 PG (ref 27–33)
MCHC RBC AUTO-ENTMCNC: 31.5 G/DL (ref 33.6–35)
MCV RBC AUTO: 90.2 FL (ref 81.4–97.8)
PLATELET # BLD AUTO: 246 K/UL (ref 164–446)
PMV BLD AUTO: 10.9 FL (ref 9–12.9)
RBC # BLD AUTO: 3.77 M/UL (ref 4.2–5.4)
WBC # BLD AUTO: 5.7 K/UL (ref 4.8–10.8)

## 2017-12-25 PROCEDURE — A9270 NON-COVERED ITEM OR SERVICE: HCPCS | Performed by: INTERNAL MEDICINE

## 2017-12-25 PROCEDURE — 85027 COMPLETE CBC AUTOMATED: CPT

## 2017-12-25 PROCEDURE — 700102 HCHG RX REV CODE 250 W/ 637 OVERRIDE(OP): Performed by: INTERNAL MEDICINE

## 2017-12-25 PROCEDURE — 36415 COLL VENOUS BLD VENIPUNCTURE: CPT

## 2017-12-25 PROCEDURE — 99231 SBSQ HOSP IP/OBS SF/LOW 25: CPT | Performed by: INTERNAL MEDICINE

## 2017-12-25 PROCEDURE — 700102 HCHG RX REV CODE 250 W/ 637 OVERRIDE(OP): Performed by: FAMILY MEDICINE

## 2017-12-25 PROCEDURE — 770006 HCHG ROOM/CARE - MED/SURG/GYN SEMI*

## 2017-12-25 PROCEDURE — 700102 HCHG RX REV CODE 250 W/ 637 OVERRIDE(OP): Performed by: HOSPITALIST

## 2017-12-25 PROCEDURE — A9270 NON-COVERED ITEM OR SERVICE: HCPCS | Performed by: FAMILY MEDICINE

## 2017-12-25 PROCEDURE — A9270 NON-COVERED ITEM OR SERVICE: HCPCS | Performed by: HOSPITALIST

## 2017-12-25 PROCEDURE — 700111 HCHG RX REV CODE 636 W/ 250 OVERRIDE (IP): Performed by: HOSPITALIST

## 2017-12-25 RX ADMIN — OXYBUTYNIN CHLORIDE 5 MG: 5 TABLET, FILM COATED, EXTENDED RELEASE ORAL at 21:00

## 2017-12-25 RX ADMIN — LACTOBACILLUS ACIDOPHILUS / LACTOBACILLUS BULGARICUS 1 PACKET: 100 MILLION CFU STRENGTH GRANULES at 11:31

## 2017-12-25 RX ADMIN — HYDRALAZINE HYDROCHLORIDE 50 MG: 50 TABLET ORAL at 05:25

## 2017-12-25 RX ADMIN — FUROSEMIDE 20 MG: 20 TABLET ORAL at 09:20

## 2017-12-25 RX ADMIN — BENAZEPRIL HYDROCHLORIDE 40 MG: 20 TABLET, COATED ORAL at 09:21

## 2017-12-25 RX ADMIN — GUAIFENESIN 600 MG: 600 TABLET, EXTENDED RELEASE ORAL at 20:05

## 2017-12-25 RX ADMIN — OMEPRAZOLE 20 MG: 20 CAPSULE, DELAYED RELEASE ORAL at 09:21

## 2017-12-25 RX ADMIN — ALENDRONATE SODIUM 10 MG: 10 TABLET ORAL at 09:21

## 2017-12-25 RX ADMIN — HYDRALAZINE HYDROCHLORIDE 50 MG: 50 TABLET ORAL at 14:06

## 2017-12-25 RX ADMIN — GABAPENTIN 300 MG: 300 CAPSULE ORAL at 20:06

## 2017-12-25 RX ADMIN — GUAIFENESIN 600 MG: 600 TABLET, EXTENDED RELEASE ORAL at 09:21

## 2017-12-25 RX ADMIN — DULOXETINE HYDROCHLORIDE 20 MG: 20 CAPSULE, DELAYED RELEASE ORAL at 09:21

## 2017-12-25 RX ADMIN — HYDRALAZINE HYDROCHLORIDE 50 MG: 50 TABLET ORAL at 22:00

## 2017-12-25 RX ADMIN — ATORVASTATIN CALCIUM 80 MG: 40 TABLET, FILM COATED ORAL at 20:05

## 2017-12-25 RX ADMIN — HEPARIN SODIUM 5000 UNITS: 5000 INJECTION, SOLUTION INTRAVENOUS; SUBCUTANEOUS at 09:22

## 2017-12-25 RX ADMIN — STANDARDIZED SENNA CONCENTRATE AND DOCUSATE SODIUM 2 TABLET: 8.6; 5 TABLET, FILM COATED ORAL at 20:05

## 2017-12-25 RX ADMIN — METOPROLOL TARTRATE 12.5 MG: 25 TABLET, FILM COATED ORAL at 09:21

## 2017-12-25 RX ADMIN — METOPROLOL TARTRATE 12.5 MG: 25 TABLET, FILM COATED ORAL at 20:06

## 2017-12-25 RX ADMIN — LACTOBACILLUS ACIDOPHILUS / LACTOBACILLUS BULGARICUS 1 PACKET: 100 MILLION CFU STRENGTH GRANULES at 09:22

## 2017-12-25 RX ADMIN — Medication 500 MG: at 18:04

## 2017-12-25 RX ADMIN — ASPIRIN 81 MG: 81 TABLET, COATED ORAL at 09:22

## 2017-12-25 RX ADMIN — Medication 500 MG: at 09:21

## 2017-12-25 RX ADMIN — HEPARIN SODIUM 5000 UNITS: 5000 INJECTION, SOLUTION INTRAVENOUS; SUBCUTANEOUS at 20:06

## 2017-12-25 RX ADMIN — LACTOBACILLUS ACIDOPHILUS / LACTOBACILLUS BULGARICUS 1 PACKET: 100 MILLION CFU STRENGTH GRANULES at 18:04

## 2017-12-25 ASSESSMENT — PAIN SCALES - GENERAL
PAINLEVEL_OUTOF10: 0
PAINLEVEL_OUTOF10: 0

## 2017-12-25 NOTE — PROGRESS NOTES
Assumed care of pt at 0730am. Report received and bedside rounding completed with noc RN. Pt in bed resting comfortably denies any pain at this time and is calm. No SOB, or in any acute distress. Fall precautions in place,bed alarm. - Treaded non slip socks. Call light and pt belongings within reach - pt makes needs known - hourly rounding in place. See flowsheets for further assessment.

## 2017-12-25 NOTE — PROGRESS NOTES
[facility]    Date of Service: 2017    Chief Complaint  77 y.o. female admitted 2017 with GLF, pyuria and ATN.    Interval Problem Update  Blood pressures more stable, higher side.    Consultants/Specialty  Neurology  Psych      Review of Systems   Unable to perform ROS: Mental acuity      Physical Exam  Laboratory/Imaging   Hemodynamics  Temp (24hrs), Av.3 °C (97.3 °F), Min:36.2 °C (97.1 °F), Max:36.4 °C (97.6 °F)   Temperature: 36.2 °C (97.1 °F)  Pulse  Av.4  Min: 50  Max: 106    Blood Pressure : 159/66      Respiratory      Respiration: 18, Pulse Oximetry: 95 %        RML Breath Sounds: Diminished, RLL Breath Sounds: Diminished, LLL Breath Sounds: Diminished    Fluids    Intake/Output Summary (Last 24 hours) at 17 1214  Last data filed at 17 1007   Gross per 24 hour   Intake              480 ml   Output                0 ml   Net              480 ml       Nutrition  Orders Placed This Encounter   Procedures   • Diet Order     Standing Status:   Standing     Number of Occurrences:   1     Order Specific Question:   Diet:     Answer:   Regular [1]     Physical Exam   Constitutional: She appears well-developed. No distress.   HENT:   Head: Normocephalic and atraumatic.   Poor dentition   Eyes: EOM are normal. Pupils are equal, round, and reactive to light.   Neck: Normal range of motion.   Cardiovascular: Normal rate and intact distal pulses.    Murmur (old) heard.  Pulmonary/Chest: No respiratory distress. She has no rales.   Abdominal: Soft. Bowel sounds are normal. She exhibits no distension.   Musculoskeletal: Normal range of motion.   Neurological: She is alert. No cranial nerve deficit. She exhibits normal muscle tone.   Cognitive deficits   Skin: Skin is warm. She is not diaphoretic.   Psychiatric: Thought content normal.   Nursing note and vitals reviewed.          Recent Labs      17   0158  17   0021  17   0258   WBC  5.4  4.5*  5.7    RBC  3.59*  3.49*  3.77*   HEMOGLOBIN  10.3*  9.9*  10.7*   HEMATOCRIT  33.6*  31.5*  34.0*   MCV  93.6  90.3  90.2   MCH  28.7  28.4  28.4   MCHC  30.7*  31.4*  31.5*   RDW  55.5*  53.9*  54.1*   PLATELETCT  205  224  246   MPV  10.8  10.7  10.9     Recent Labs      12/22/17   1716   SODIUM  139   POTASSIUM  4.2   CHLORIDE  109   CO2  25   GLUCOSE  92   BUN  30*   CREATININE  1.10   CALCIUM  8.8                      Assessment/Plan     * Fall   Assessment & Plan    Reviewed chart. Was found down in the ground from fall back in September 2017. Admitted for failure to thrive, dehydration with acute kidney injury, compression fracture and dementia.  Hospital course complicated by labile blood pressures and dementia requiring placement.        Hypertension- (present on admission)   Assessment & Plan    On 12/22 hypotensive so held BP meds  On 12/24 hypertensive. Restarted BP meds.            Dementia- (present on admission)   Assessment & Plan    Min narc/sed when possible.   Await guardianship.  Underwent court 12/19        Hypotension   Assessment & Plan    On 12/22, hypotensive. She was somewhat lightheaded but mentation intact. IVF given, antihypertensives d/luis daniel. Resolved.  On 12/24 hypertensive. Restarted antihypertensives.        Constipation- (present on admission)   Assessment & Plan    Resolved currently continue bowel care        Congestion of upper airway- (present on admission)   Assessment & Plan    Saturating 90% on RA.  O2, RT, IS, Vax, CXR and encouraging taking of PO fluids.          Physical debility- (present on admission)   Assessment & Plan    PT/OT and increase activity.  Encourage mobilization. Pending guardianship        Obesity (BMI 30.0-34.9)- (present on admission)   Assessment & Plan    Encourage Kcal restriction        T12 compression fracture (CMS-HCC)- (present on admission)   Assessment & Plan    CT spine, no acute fractures. Cont fosamax and calcium  Stable and pending guardianship         Chronic back pain- (present on admission)   Assessment & Plan    As above and min narc/sed when possible.  Bowel Regimen              Reviewed items::  Labs reviewed and Medications reviewed  Pink catheter::  No Pink  DVT prophylaxis pharmacological::  Heparin  Ulcer Prophylaxis::  Yes

## 2017-12-25 NOTE — ASSESSMENT & PLAN NOTE
MRI LS spine with mild to mod central stensosis L2-3 and L3-4, old T12 compression fracture.  Fall precautions

## 2017-12-25 NOTE — PROGRESS NOTES
Simona Knight Fall Risk Assessment:     Last Known Fall: Within the last six months  Mobility: Immobilized/requires assist of one person  Medications: Cardiovascular or central nervous system meds  Mental Status/LOC/Awareness: Memory loss/confusion and requires reorienting  Toileting Needs: Incontinence  Volume/Electrolyte Status: No problems  Communication/Sensory: Visual (Glasses)/hearing deficit  Behavior: Appropriate behavior  Simona Knight Fall Risk Total: 15  Fall Risk Level: HIGH RISK    Universal Fall Precautions:  call light/belongings in reach, bed in low position and locked, siderails up x 2, clutter free and spill free environment, educate on level of risk, educate to call for assistance    Fall Risk Level Interventions:    TRIAL (TELE 8, NEURO, MED JENNIE 5) Moderate Fall Risk Interventions  Place yellow fall risk ID band on patient: verified  Provide patient/family education based on risk assessment : verified  Educate patient/family to call staff for assistance when getting out of bed: verified  Place fall precaution signage outside patient door: verified  Utilize bed/chair fall alarm: verifiedTRIAL (TELE 8, NEURO, MED JENNIE 5) High Fall Risk Interventions  Place yellow fall risk ID band on patient: verified  Provide patient/family education based on risk assessment: verified  Educate patient/family to call staff for assistance when getting out of bed: verified  Place fall precaution signage outside patient door: verified  Place patient in room close to nursing station: verified  Utilize bed/chair fall alarm: verified  Notify charge of high risk for huddle: verified    Patient Specific Interventions:     Medication: review medications with patient and family  Mental Status/LOC/Awareness: reorient patient, reinforce falls education, check on patient hourly, utilize bed/chair fall alarm, reinforce the use of call light and consider lap belt  Toileting: consider obtaining elevated toilet seat or bedside  brooklynn (BSC) and instruct patient/family on the use of grab bars  Volume/Electrolyte Status: ensure patient remains hydrated  Communication/Sensory: update plan of care on whiteboard, provide communication alternatives/, collaborate with doctor for possible speech therapy consult and ensure proper positioning when transferrng/ambulating  Behavioral: not applicable  Mobility: not applicable

## 2017-12-25 NOTE — CARE PLAN
Problem: Safety  Goal: Will remain free from injury  Strip bed alarm on. Call light with in reach, hourly rounding in place.     Problem: Mobility  Goal: Risk for activity intolerance will decrease  Pt to get OOB for all meals. Pt out of bed for breakfast. Ambulating to and from restroom.

## 2017-12-26 LAB
ALBUMIN SERPL BCP-MCNC: 3.5 G/DL (ref 3.2–4.9)
ALBUMIN/GLOB SERPL: 1.3 G/DL
ALP SERPL-CCNC: 61 U/L (ref 30–99)
ALT SERPL-CCNC: 16 U/L (ref 2–50)
ANION GAP SERPL CALC-SCNC: 8 MMOL/L (ref 0–11.9)
AST SERPL-CCNC: 21 U/L (ref 12–45)
BILIRUB SERPL-MCNC: 0.4 MG/DL (ref 0.1–1.5)
BUN SERPL-MCNC: 24 MG/DL (ref 8–22)
CALCIUM SERPL-MCNC: 9.3 MG/DL (ref 8.5–10.5)
CHLORIDE SERPL-SCNC: 110 MMOL/L (ref 96–112)
CO2 SERPL-SCNC: 23 MMOL/L (ref 20–33)
CREAT SERPL-MCNC: 0.9 MG/DL (ref 0.5–1.4)
ERYTHROCYTE [DISTWIDTH] IN BLOOD BY AUTOMATED COUNT: 54.3 FL (ref 35.9–50)
GFR SERPL CREATININE-BSD FRML MDRD: >60 ML/MIN/1.73 M 2
GLOBULIN SER CALC-MCNC: 2.6 G/DL (ref 1.9–3.5)
GLUCOSE SERPL-MCNC: 134 MG/DL (ref 65–99)
HCT VFR BLD AUTO: 34.2 % (ref 37–47)
HGB BLD-MCNC: 11 G/DL (ref 12–16)
MCH RBC QN AUTO: 28.9 PG (ref 27–33)
MCHC RBC AUTO-ENTMCNC: 32.2 G/DL (ref 33.6–35)
MCV RBC AUTO: 90 FL (ref 81.4–97.8)
PLATELET # BLD AUTO: 247 K/UL (ref 164–446)
PMV BLD AUTO: 10.8 FL (ref 9–12.9)
POTASSIUM SERPL-SCNC: 3.5 MMOL/L (ref 3.6–5.5)
PROT SERPL-MCNC: 6.1 G/DL (ref 6–8.2)
RBC # BLD AUTO: 3.8 M/UL (ref 4.2–5.4)
SODIUM SERPL-SCNC: 141 MMOL/L (ref 135–145)
WBC # BLD AUTO: 6.1 K/UL (ref 4.8–10.8)

## 2017-12-26 PROCEDURE — 99233 SBSQ HOSP IP/OBS HIGH 50: CPT | Performed by: HOSPITALIST

## 2017-12-26 PROCEDURE — 700102 HCHG RX REV CODE 250 W/ 637 OVERRIDE(OP): Performed by: INTERNAL MEDICINE

## 2017-12-26 PROCEDURE — 700111 HCHG RX REV CODE 636 W/ 250 OVERRIDE (IP): Performed by: HOSPITALIST

## 2017-12-26 PROCEDURE — 700102 HCHG RX REV CODE 250 W/ 637 OVERRIDE(OP): Performed by: FAMILY MEDICINE

## 2017-12-26 PROCEDURE — 700102 HCHG RX REV CODE 250 W/ 637 OVERRIDE(OP): Performed by: HOSPITALIST

## 2017-12-26 PROCEDURE — 770006 HCHG ROOM/CARE - MED/SURG/GYN SEMI*

## 2017-12-26 PROCEDURE — A9270 NON-COVERED ITEM OR SERVICE: HCPCS | Performed by: INTERNAL MEDICINE

## 2017-12-26 PROCEDURE — A9270 NON-COVERED ITEM OR SERVICE: HCPCS | Performed by: HOSPITALIST

## 2017-12-26 PROCEDURE — A9270 NON-COVERED ITEM OR SERVICE: HCPCS | Performed by: FAMILY MEDICINE

## 2017-12-26 PROCEDURE — 85027 COMPLETE CBC AUTOMATED: CPT

## 2017-12-26 PROCEDURE — 700101 HCHG RX REV CODE 250: Performed by: FAMILY MEDICINE

## 2017-12-26 PROCEDURE — 80053 COMPREHEN METABOLIC PANEL: CPT

## 2017-12-26 PROCEDURE — 36415 COLL VENOUS BLD VENIPUNCTURE: CPT

## 2017-12-26 RX ORDER — GABAPENTIN 300 MG/1
300 CAPSULE ORAL 3 TIMES DAILY
Status: DISCONTINUED | OUTPATIENT
Start: 2017-12-26 | End: 2017-12-31

## 2017-12-26 RX ORDER — OXYCODONE HYDROCHLORIDE 5 MG/1
2.5 TABLET ORAL EVERY 4 HOURS PRN
Status: DISCONTINUED | OUTPATIENT
Start: 2017-12-26 | End: 2017-12-31

## 2017-12-26 RX ADMIN — GUAIFENESIN 600 MG: 600 TABLET, EXTENDED RELEASE ORAL at 09:11

## 2017-12-26 RX ADMIN — Medication 500 MG: at 09:11

## 2017-12-26 RX ADMIN — DULOXETINE HYDROCHLORIDE 20 MG: 20 CAPSULE, DELAYED RELEASE ORAL at 09:10

## 2017-12-26 RX ADMIN — HEPARIN SODIUM 5000 UNITS: 5000 INJECTION, SOLUTION INTRAVENOUS; SUBCUTANEOUS at 09:11

## 2017-12-26 RX ADMIN — ALENDRONATE SODIUM 10 MG: 10 TABLET ORAL at 09:10

## 2017-12-26 RX ADMIN — METOPROLOL TARTRATE 12.5 MG: 25 TABLET, FILM COATED ORAL at 09:11

## 2017-12-26 RX ADMIN — HYDRALAZINE HYDROCHLORIDE 50 MG: 50 TABLET ORAL at 12:56

## 2017-12-26 RX ADMIN — ASPIRIN 81 MG: 81 TABLET, COATED ORAL at 09:11

## 2017-12-26 RX ADMIN — BENAZEPRIL HYDROCHLORIDE 40 MG: 20 TABLET, COATED ORAL at 09:10

## 2017-12-26 RX ADMIN — GABAPENTIN 300 MG: 300 CAPSULE ORAL at 21:46

## 2017-12-26 RX ADMIN — STANDARDIZED SENNA CONCENTRATE AND DOCUSATE SODIUM 2 TABLET: 8.6; 5 TABLET, FILM COATED ORAL at 21:46

## 2017-12-26 RX ADMIN — POLYETHYLENE GLYCOL (3350) 1 PACKET: 17 POWDER, FOR SOLUTION ORAL at 09:00

## 2017-12-26 RX ADMIN — ACETAMINOPHEN 650 MG: 325 TABLET, FILM COATED ORAL at 12:56

## 2017-12-26 RX ADMIN — OXYBUTYNIN CHLORIDE 5 MG: 5 TABLET, FILM COATED, EXTENDED RELEASE ORAL at 21:46

## 2017-12-26 RX ADMIN — CHOLECALCIFEROL TAB 25 MCG (1000 UNIT) 2000 UNITS: 25 TAB at 18:10

## 2017-12-26 RX ADMIN — HYDRALAZINE HYDROCHLORIDE 50 MG: 50 TABLET ORAL at 04:46

## 2017-12-26 RX ADMIN — HYDRALAZINE HYDROCHLORIDE 50 MG: 50 TABLET ORAL at 21:46

## 2017-12-26 RX ADMIN — FUROSEMIDE 20 MG: 20 TABLET ORAL at 09:10

## 2017-12-26 RX ADMIN — LIDOCAINE 1 PATCH: 50 PATCH CUTANEOUS at 09:11

## 2017-12-26 RX ADMIN — OMEPRAZOLE 20 MG: 20 CAPSULE, DELAYED RELEASE ORAL at 09:10

## 2017-12-26 RX ADMIN — ATORVASTATIN CALCIUM 80 MG: 40 TABLET, FILM COATED ORAL at 21:46

## 2017-12-26 RX ADMIN — LACTOBACILLUS ACIDOPHILUS / LACTOBACILLUS BULGARICUS 1 PACKET: 100 MILLION CFU STRENGTH GRANULES at 09:11

## 2017-12-26 RX ADMIN — METOPROLOL TARTRATE 12.5 MG: 25 TABLET, FILM COATED ORAL at 21:46

## 2017-12-26 RX ADMIN — HEPARIN SODIUM 5000 UNITS: 5000 INJECTION, SOLUTION INTRAVENOUS; SUBCUTANEOUS at 21:46

## 2017-12-26 ASSESSMENT — PAIN SCALES - GENERAL
PAINLEVEL_OUTOF10: 0
PAINLEVEL_OUTOF10: 6
PAINLEVEL_OUTOF10: 6

## 2017-12-26 ASSESSMENT — LIFESTYLE VARIABLES: DO YOU DRINK ALCOHOL: NO

## 2017-12-26 NOTE — CARE PLAN
Problem: Bowel/Gastric:  Goal: Will not experience complications related to bowel motility    Intervention: Implement interventions to promote bowel evacuation if inadequate bowel movements in past 48 hours  Bowel protocol on board, pt receives scheduled miralax.       Problem: Urinary Elimination:  Goal: Ability to reestablish a normal urinary elimination pattern will improve    Intervention: Encourage scheduled voiding  Encouraging toileting with hourly rounding, pt still incontinent at times.   Not always aware when she has need to void.

## 2017-12-26 NOTE — CARE PLAN
Problem: Safety  Goal: Will remain free from falls  Outcome: PROGRESSING AS EXPECTED  Patient educated to use call light whenever needing to get out of bed.

## 2017-12-26 NOTE — PROGRESS NOTES
Pleasant patient is AOX2-3. Patient is aware to self and place. Time is intermittent while situation is not. Patient awaiting placement with Guardianship. No acute distresses noted at this time. Bed in lowest and locked position. Door open. Will continue to monitor.

## 2017-12-26 NOTE — PROGRESS NOTES
Bedside report received. Assumed care of pt.  Pt able to make needs known.  Pt shows no s/s of distress.  Resting comfortably in bed.  Pt pending placement with guardianship.  Pt disoriented to situation only, discussed reason for admission, and plan of care to date with pt.  Pt demonstrated acceptance.  Pt has complaints of Low back upper buttock pain radiating to feet, will be provided with tylenol when pharmacy sends dose.  Fall precautions in place, call light and belongings within reach.  Hourly rounding in place.

## 2017-12-26 NOTE — PROGRESS NOTES
Simona Knight Fall Risk Assessment:     Last Known Fall: Within the last six months  Mobility: Immobilized/requires assist of one person  Medications: Cardiovascular or central nervous system meds  Mental Status/LOC/Awareness: Memory loss/confusion and requires reorienting  Toileting Needs: Incontinence  Volume/Electrolyte Status: No problems  Communication/Sensory: Visual (Glasses)/hearing deficit  Behavior: Appropriate behavior  Simona Knight Fall Risk Total: 15  Fall Risk Level: HIGH RISK    Universal Fall Precautions:  call light/belongings in reach, bed in low position and locked, wheelchairs and assistive devices out of sight, siderails up x 2, use non-slip footwear, adequate lighting, clutter free and spill free environment, educate on level of risk, educate to call for assistance    Fall Risk Level Interventions:    TRIAL ([employer] 8, NEURO, MED JENNIE 5) Moderate Fall Risk Interventions  Place yellow fall risk ID band on patient: verified  Provide patient/family education based on risk assessment : verified  Educate patient/family to call staff for assistance when getting out of bed: verified  Place fall precaution signage outside patient door: verified  Utilize bed/chair fall alarm: verifiedTRIAL (TELE 8, NEURO, [employer] JENNIE 5) High Fall Risk Interventions  Place yellow fall risk ID band on patient: verified  Provide patient/family education based on risk assessment: completed  Educate patient/family to call staff for assistance when getting out of bed: completed  Place fall precaution signage outside patient door: verified  Place patient in room close to nursing station: currently not available/charge notified  Utilize bed/chair fall alarm: verified  Notify charge of high risk for huddle: completed    Patient Specific Interventions:     Medication: review medications with patient and family, assess for medications that can be discontinued or dosage decreased and limit combination of prn medications  Mental  Status/LOC/Awareness: reorient patient, reinforce falls education, check on patient hourly, utilize bed/chair fall alarm and reinforce the use of call light  Toileting: provide frquent toileting and monitor intake and output/use of appropriate interventions  Volume/Electrolyte Status: ensure patient remains hydrated, monitor abnormal lab values and ensure IV fluids are appropriate  Communication/Sensory: update plan of care on whiteboard and ensure proper positioning when transferrng/ambulating  Behavioral: encourage patient to voice feelings, engage patient in daily activities, administer medication as ordered and instruct/reinforce fall program rationale  Mobility: schedule physical activity throughout the day, utilize bed/chair fall alarm, ensure bed is locked and in lowest position, provide appropriate assistive device, instruct patient to exit bed on their strongest side and collaborate with doctor for possible PT/OT consult

## 2017-12-27 ENCOUNTER — APPOINTMENT (OUTPATIENT)
Dept: RADIOLOGY | Facility: MEDICAL CENTER | Age: 77
DRG: 682 | End: 2017-12-27
Attending: HOSPITALIST
Payer: MEDICARE

## 2017-12-27 PROBLEM — D50.9 IRON DEFICIENCY ANEMIA: Status: ACTIVE | Noted: 2017-12-27

## 2017-12-27 LAB
BACTERIA BLD CULT: NORMAL
BACTERIA BLD CULT: NORMAL
IRON SATN MFR SERPL: 5 % (ref 15–55)
IRON SERPL-MCNC: 13 UG/DL (ref 40–170)
SIGNIFICANT IND 70042: NORMAL
SIGNIFICANT IND 70042: NORMAL
SITE SITE: NORMAL
SITE SITE: NORMAL
SOURCE SOURCE: NORMAL
SOURCE SOURCE: NORMAL
TIBC SERPL-MCNC: 267 UG/DL (ref 250–450)

## 2017-12-27 PROCEDURE — 700102 HCHG RX REV CODE 250 W/ 637 OVERRIDE(OP): Performed by: HOSPITALIST

## 2017-12-27 PROCEDURE — 302146: Performed by: HOSPITALIST

## 2017-12-27 PROCEDURE — 770006 HCHG ROOM/CARE - MED/SURG/GYN SEMI*

## 2017-12-27 PROCEDURE — A9270 NON-COVERED ITEM OR SERVICE: HCPCS | Performed by: INTERNAL MEDICINE

## 2017-12-27 PROCEDURE — 700111 HCHG RX REV CODE 636 W/ 250 OVERRIDE (IP): Performed by: HOSPITALIST

## 2017-12-27 PROCEDURE — 36415 COLL VENOUS BLD VENIPUNCTURE: CPT

## 2017-12-27 PROCEDURE — 700102 HCHG RX REV CODE 250 W/ 637 OVERRIDE(OP): Performed by: INTERNAL MEDICINE

## 2017-12-27 PROCEDURE — 83550 IRON BINDING TEST: CPT

## 2017-12-27 PROCEDURE — 74000 DX-ABDOMEN-1 VIEW: CPT

## 2017-12-27 PROCEDURE — 72148 MRI LUMBAR SPINE W/O DYE: CPT

## 2017-12-27 PROCEDURE — 70250 X-RAY EXAM OF SKULL: CPT

## 2017-12-27 PROCEDURE — 700102 HCHG RX REV CODE 250 W/ 637 OVERRIDE(OP): Performed by: FAMILY MEDICINE

## 2017-12-27 PROCEDURE — A9270 NON-COVERED ITEM OR SERVICE: HCPCS | Performed by: HOSPITALIST

## 2017-12-27 PROCEDURE — A9270 NON-COVERED ITEM OR SERVICE: HCPCS | Performed by: FAMILY MEDICINE

## 2017-12-27 PROCEDURE — 83540 ASSAY OF IRON: CPT

## 2017-12-27 PROCEDURE — 99233 SBSQ HOSP IP/OBS HIGH 50: CPT | Performed by: HOSPITALIST

## 2017-12-27 PROCEDURE — 700101 HCHG RX REV CODE 250: Performed by: FAMILY MEDICINE

## 2017-12-27 PROCEDURE — 84207 ASSAY OF VITAMIN B-6: CPT

## 2017-12-27 RX ORDER — FERROUS SULFATE 325(65) MG
325 TABLET ORAL
Status: DISCONTINUED | OUTPATIENT
Start: 2017-12-27 | End: 2018-01-15 | Stop reason: HOSPADM

## 2017-12-27 RX ORDER — ASCORBIC ACID 500 MG
500 TABLET ORAL 2 TIMES DAILY
Status: DISCONTINUED | OUTPATIENT
Start: 2017-12-27 | End: 2018-01-15 | Stop reason: HOSPADM

## 2017-12-27 RX ADMIN — DULOXETINE HYDROCHLORIDE 20 MG: 20 CAPSULE, DELAYED RELEASE ORAL at 09:58

## 2017-12-27 RX ADMIN — OXYBUTYNIN CHLORIDE 5 MG: 5 TABLET, FILM COATED, EXTENDED RELEASE ORAL at 20:09

## 2017-12-27 RX ADMIN — METOPROLOL TARTRATE 12.5 MG: 25 TABLET, FILM COATED ORAL at 09:59

## 2017-12-27 RX ADMIN — HEPARIN SODIUM 5000 UNITS: 5000 INJECTION, SOLUTION INTRAVENOUS; SUBCUTANEOUS at 20:09

## 2017-12-27 RX ADMIN — HEPARIN SODIUM 5000 UNITS: 5000 INJECTION, SOLUTION INTRAVENOUS; SUBCUTANEOUS at 09:56

## 2017-12-27 RX ADMIN — OXYCODONE HYDROCHLORIDE AND ACETAMINOPHEN 500 MG: 500 TABLET ORAL at 09:59

## 2017-12-27 RX ADMIN — OXYCODONE HYDROCHLORIDE AND ACETAMINOPHEN 500 MG: 500 TABLET ORAL at 20:09

## 2017-12-27 RX ADMIN — ALENDRONATE SODIUM 10 MG: 10 TABLET ORAL at 09:58

## 2017-12-27 RX ADMIN — GABAPENTIN 300 MG: 300 CAPSULE ORAL at 20:09

## 2017-12-27 RX ADMIN — CHOLECALCIFEROL TAB 25 MCG (1000 UNIT) 2000 UNITS: 25 TAB at 09:58

## 2017-12-27 RX ADMIN — Medication 325 MG: at 18:43

## 2017-12-27 RX ADMIN — LIDOCAINE 1 PATCH: 50 PATCH CUTANEOUS at 09:57

## 2017-12-27 RX ADMIN — HYDRALAZINE HYDROCHLORIDE 50 MG: 50 TABLET ORAL at 20:53

## 2017-12-27 RX ADMIN — GABAPENTIN 300 MG: 300 CAPSULE ORAL at 09:58

## 2017-12-27 RX ADMIN — ATORVASTATIN CALCIUM 80 MG: 40 TABLET, FILM COATED ORAL at 20:09

## 2017-12-27 RX ADMIN — HYDRALAZINE HYDROCHLORIDE 50 MG: 50 TABLET ORAL at 14:16

## 2017-12-27 RX ADMIN — Medication 325 MG: at 14:16

## 2017-12-27 RX ADMIN — GABAPENTIN 300 MG: 300 CAPSULE ORAL at 14:16

## 2017-12-27 RX ADMIN — HYDRALAZINE HYDROCHLORIDE 50 MG: 50 TABLET ORAL at 06:05

## 2017-12-27 RX ADMIN — BENAZEPRIL HYDROCHLORIDE 40 MG: 20 TABLET, COATED ORAL at 09:58

## 2017-12-27 RX ADMIN — OMEPRAZOLE 20 MG: 20 CAPSULE, DELAYED RELEASE ORAL at 09:57

## 2017-12-27 RX ADMIN — ASPIRIN 81 MG: 81 TABLET, COATED ORAL at 09:58

## 2017-12-27 RX ADMIN — STANDARDIZED SENNA CONCENTRATE AND DOCUSATE SODIUM 2 TABLET: 8.6; 5 TABLET, FILM COATED ORAL at 20:09

## 2017-12-27 RX ADMIN — POLYETHYLENE GLYCOL (3350) 1 PACKET: 17 POWDER, FOR SOLUTION ORAL at 09:56

## 2017-12-27 ASSESSMENT — LIFESTYLE VARIABLES: DO YOU DRINK ALCOHOL: NO

## 2017-12-27 ASSESSMENT — PAIN SCALES - GENERAL
PAINLEVEL_OUTOF10: 0
PAINLEVEL_OUTOF10: 0
PAINLEVEL_OUTOF10: 1

## 2017-12-27 NOTE — PROGRESS NOTES
Simona Knight Fall Risk Assessment:     Last Known Fall: Within the last month  Mobility: Immobilized/requires assist of one person  Medications: Cardiovascular or central nervous system meds  Mental Status/LOC/Awareness: Memory loss/confusion and requires reorienting  Toileting Needs: Incontinence  Volume/Electrolyte Status: No problems  Communication/Sensory: Visual (Glasses)/hearing deficit  Behavior: Appropriate behavior  Simona Knight Fall Risk Total: 16  Fall Risk Level: HIGH RISK    Universal Fall Precautions:  call light/belongings in reach, bed in low position and locked, wheelchairs and assistive devices out of sight, siderails up x 2, use non-slip footwear, adequate lighting, clutter free and spill free environment, educate on level of risk, educate to call for assistance    Fall Risk Level Interventions:    TRIAL ([employer] 8, NEURO, MED JENNIE 5) Moderate Fall Risk Interventions  Place yellow fall risk ID band on patient: verified  Provide patient/family education based on risk assessment : verified  Educate patient/family to call staff for assistance when getting out of bed: verified  Place fall precaution signage outside patient door: verified  Utilize bed/chair fall alarm: verifiedTRIAL ([employer] 8, NEURO, [employer] JENNIE 5) High Fall Risk Interventions  Place yellow fall risk ID band on patient: verified  Provide patient/family education based on risk assessment: completed  Educate patient/family to call staff for assistance when getting out of bed: completed  Place fall precaution signage outside patient door: verified  Place patient in room close to nursing station: currently not available/charge notified  Utilize bed/chair fall alarm: verified  Notify charge of high risk for huddle: completed    Patient Specific Interventions:     Medication: review medications with patient and family  Mental Status/LOC/Awareness: reinforce falls education, check on patient hourly, utilize bed/chair fall alarm and reinforce the  use of call light  Toileting: instruct patient/family on the need to call for assistance when toileting  Volume/Electrolyte Status: ensure patient remains hydrated and monitor abnormal lab values  Communication/Sensory: update plan of care on whiteboard and ensure proper positioning when transferrng/ambulating  Behavioral: encourage patient to voice feelings and administer medication as ordered  Mobility: utilize bed/chair fall alarm, ensure bed is locked and in lowest position and provide appropriate assistive device

## 2017-12-27 NOTE — PROGRESS NOTES
[facility] Progress Note    Date of Service: 2017    Chief Complaint  77 y.o. female admitted 2017 with GLF, pyuria and ATN.    Interval Problem Update  Blood pressures more stable, higher side.  :  States doesn't want to use bedpan.  Urinates on self while in bed.  Ordered for up tid with meals, MRI lumbar spine.  dc'd IVFs and lasix 20 daily.  Normal CMP.  Old T12 compression fracture.  Added neurontin 300 tid, cut back oxycodone to 2.5 q 4 hours prn.  States she has pain from the waist to b/l knees.    Consultants/Specialty  Neurology  Psych    DISPO:  Awaiting guardianship.        Review of Systems   Unable to perform ROS: Mental acuity      Physical Exam  Laboratory/Imaging   Hemodynamics  Temp (24hrs), Av.4 °C (97.6 °F), Min:36.1 °C (97 °F), Max:36.8 °C (98.2 °F)   Temperature: 36.8 °C (98.2 °F)  Pulse  Av.4  Min: 50  Max: 106    Blood Pressure : 120/53      Respiratory      Respiration: 17, Pulse Oximetry: 91 %     Work Of Breathing / Effort: Mild  RUL Breath Sounds: Clear, RML Breath Sounds: Diminished, RLL Breath Sounds: Diminished, ARGELIA Breath Sounds: Clear, LLL Breath Sounds: Diminished    Fluids    Intake/Output Summary (Last 24 hours) at 17 1728  Last data filed at 17 2000   Gross per 24 hour   Intake              240 ml   Output                0 ml   Net              240 ml       Nutrition  Orders Placed This Encounter   Procedures   • Diet Order     Standing Status:   Standing     Number of Occurrences:   1     Order Specific Question:   Diet:     Answer:   Regular [1]     Physical Exam   Constitutional: She appears well-developed. No distress.   HENT:   Head: Normocephalic and atraumatic.   Poor dentition   Eyes: EOM are normal. Pupils are equal, round, and reactive to light.   Neck: Normal range of motion.   Cardiovascular: Normal rate and intact distal pulses.    Murmur (old) heard.  Pulmonary/Chest: No respiratory distress. She has no rales.   Abdominal:  Soft. Bowel sounds are normal. She exhibits no distension.   Musculoskeletal: Normal range of motion.   Neurological: She is alert. No cranial nerve deficit. She exhibits normal muscle tone.   Cognitive deficits   Skin: Skin is warm. She is not diaphoretic.   Psychiatric: Thought content normal.   Nursing note and vitals reviewed.          Recent Labs      12/24/17   0021  12/25/17   0258  12/26/17   0027   WBC  4.5*  5.7  6.1   RBC  3.49*  3.77*  3.80*   HEMOGLOBIN  9.9*  10.7*  11.0*   HEMATOCRIT  31.5*  34.0*  34.2*   MCV  90.3  90.2  90.0   MCH  28.4  28.4  28.9   MCHC  31.4*  31.5*  32.2*   RDW  53.9*  54.1*  54.3*   PLATELETCT  224  246  247   MPV  10.7  10.9  10.8     Recent Labs      12/26/17   0931   SODIUM  141   POTASSIUM  3.5*   CHLORIDE  110   CO2  23   GLUCOSE  134*   BUN  24*   CREATININE  0.90   CALCIUM  9.3                      Assessment/Plan     * Fall- (present on admission)   Assessment & Plan    Reviewed chart. Was found down in the ground from fall back in September 2017. Admitted for failure to thrive, dehydration with acute kidney injury, compression fracture and dementia.  Hospital course complicated by labile blood pressures and dementia requiring placement.        Hypertension- (present on admission)   Assessment & Plan    On 12/22 hypotensive so held BP meds  On 12/24 hypertensive. Restarted BP meds.  12/26:  Stable, dc'd IVFs /hr and lasix 20mg daily.             Dementia- (present on admission)   Assessment & Plan    Min narc/sed when possible.   Await guardianship.  Underwent court 12/19        Hypotension   Assessment & Plan    On 12/22, hypotensive. She was somewhat lightheaded but mentation intact. IVF given, antihypertensives d/luis daniel. Resolved.  On 12/24 hypertensive. Restarted antihypertensives.        Constipation- (present on admission)   Assessment & Plan    Resolved currently continue bowel care        Congestion of upper airway- (present on admission)   Assessment & Plan     Saturating 90% on RA.  O2, RT, IS, Vax, CXR and encouraging taking of PO fluids.    Resolved 12/26.  Dc IVFs and lasix.        Physical debility- (present on admission)   Assessment & Plan    PT/OT and increase activity.  Encourage mobilization. Pending guardianship        Obesity (BMI 30.0-34.9)- (present on admission)   Assessment & Plan    Encourage Kcal restriction        T12 compression fracture (CMS-HCC)- (present on admission)   Assessment & Plan    CT spine, no acute fractures. Cont fosamax.  Ordered vitamin D 2000 units daily, dc calcium and vit D 50K weekly since no outdoor exposure for sunlight conversion to active vit D.  Stable and pending guardianship        Chronic back pain- (present on admission)   Assessment & Plan    MRI lumbar spine w/o since worsening generalized weakness and incontinent of urine.  Increased neurontin  Cut back oxycodone 5 to 2.5 po q 4 hours instead of q 3.  Bowel Regimen  Up to chair tid  Encourage ambulation daily.              Reviewed items::  Labs reviewed and Medications reviewed  Pink catheter::  No Pink  DVT prophylaxis pharmacological::  Heparin  Ulcer Prophylaxis::  Yes

## 2017-12-27 NOTE — PROGRESS NOTES
Assumed care of pt at 0700. Received report from RN. Pt A&Ox2 (disoriented to time and event). Assessment complete. Labs reviewed. Pt denies pain at this time. Pt incontinent x 2, pt educated on use of call light when needing to use bathroom. Pt up one assist, pt up to bedside chair for all meals. PT to work with patient today. Pt and RN discussed plan of care. Pt questions answered. Pt needs are met at this time. Bed in lowest and locked position. Call light within reach. Upper bed rails up. Hourly rounding in place.

## 2017-12-27 NOTE — PROGRESS NOTES
Simona Knight Fall Risk Assessment:     Last Known Fall: Within the last month  Mobility: Immobilized/requires assist of one person  Medications: Cardiovascular or central nervous system meds  Mental Status/LOC/Awareness: Memory loss/confusion and requires reorienting  Toileting Needs: Incontinence  Volume/Electrolyte Status: No problems  Communication/Sensory: Visual (Glasses)/hearing deficit  Behavior: Appropriate behavior  Simona Knight Fall Risk Total: 16  Fall Risk Level: HIGH RISK    Universal Fall Precautions:  call light/belongings in reach, bed in low position and locked, wheelchairs and assistive devices out of sight, siderails up x 2, use non-slip footwear, adequate lighting, clutter free and spill free environment, educate on level of risk, educate to call for assistance    Fall Risk Level Interventions:    TRIAL ([employer] 8, NEURO, MED JENNIE 5) Moderate Fall Risk Interventions  Place yellow fall risk ID band on patient: verified  Provide patient/family education based on risk assessment : verified  Educate patient/family to call staff for assistance when getting out of bed: verified  Place fall precaution signage outside patient door: verified  Utilize bed/chair fall alarm: verifiedTRIAL ([employer] 8, NEURO, [employer] JENNIE 5) High Fall Risk Interventions  Place yellow fall risk ID band on patient: verified  Provide patient/family education based on risk assessment: completed  Educate patient/family to call staff for assistance when getting out of bed: completed  Place fall precaution signage outside patient door: verified  Place patient in room close to nursing station: currently not available/charge notified  Utilize bed/chair fall alarm: verified  Notify charge of high risk for huddle: completed    Patient Specific Interventions:     Medication: review medications with patient and family, assess for medications that can be discontinued or dosage decreased and limit combination of prn medications  Mental  Status/LOC/Awareness: reorient patient, reinforce falls education, encourage family to stay with patient, check on patient hourly, utilize bed/chair fall alarm, reinforce the use of call light and provide activity  Toileting: provide frquent toileting, monitor intake and output/use of appropriate interventions, instruct male patients prone to dizziness to void while sitting and instruct patient/family on the need to call for assistance when toileting  Volume/Electrolyte Status: ensure patient remains hydrated, monitor blood sugars and maintain appropriate blood sugar levels if diabetic, administer medications as ordered for nausea and vomiting, monitor abnormal lab values and ensure IV fluids are appropriate  Communication/Sensory: update plan of care on whiteboard, ensure proper positioning when transferrng/ambulating, ensure patient has glasses/contacts and hearing aids/dentures and for visually impaired patients orient to their room surrounding and do not change their surroundings  Behavioral: collaborate with doctor for possible psych consult, encourage patient to voice feelings, engage patient in daily activities, administer medication as ordered, instruct/reinforce fall program rationale and encourage family to stay with impulsive patients  Mobility: schedule physical activity throughout the day, provide comfort measures during transport, dangle prior to standing, utilize bed/chair fall alarm, ensure bed is locked and in lowest position, provide appropriate assistive device, instruct patient to exit bed on their strongest side and collaborate with doctor for possible PT/OT consult

## 2017-12-27 NOTE — PROGRESS NOTES
Received report from day RN. Assumed pt care. Discussed call light/phone system, communication board and POC. Pt is aao x to 2, disoriented to time and event. Pt is cooperative and able to follow commands. Pt has a hx of HTN. RN monitors BP closely and administers scheduled BP meds. Pt denies pain. Pt is pending placement. Pt is incontinent, RN and CNA perform bed bath and full bed change. Pt mobility assessed. Pt is a one assist in bed, pt refusing mobility today. Bed alarm on. Fall precautions in place. Bed is locked and in low position, call light within reach. Treaded slippers in place. Pt needs met at this time. Hourly rounding in place.

## 2017-12-27 NOTE — CARE PLAN
Problem: Safety  Goal: Will remain free from falls  Outcome: PROGRESSING AS EXPECTED  Pt up one person assist. Bed alarm in use. Pt does not use call light, pt educated on use of call light. Bed in lowest and locked position. Call light within reach.     Problem: Mobility  Goal: Risk for activity intolerance will decrease  Outcome: PROGRESSING AS EXPECTED  Pt continues to work with PT/OT. Pt encouraged to ambulate with RN staff, but refuses at times. Pt up for all meals to beside chair.

## 2017-12-28 LAB — TSH SERPL DL<=0.005 MIU/L-ACNC: 2.04 UIU/ML (ref 0.38–5.33)

## 2017-12-28 PROCEDURE — 700101 HCHG RX REV CODE 250: Performed by: FAMILY MEDICINE

## 2017-12-28 PROCEDURE — 700102 HCHG RX REV CODE 250 W/ 637 OVERRIDE(OP): Performed by: FAMILY MEDICINE

## 2017-12-28 PROCEDURE — 700102 HCHG RX REV CODE 250 W/ 637 OVERRIDE(OP): Performed by: HOSPITALIST

## 2017-12-28 PROCEDURE — 99232 SBSQ HOSP IP/OBS MODERATE 35: CPT | Performed by: HOSPITALIST

## 2017-12-28 PROCEDURE — 36415 COLL VENOUS BLD VENIPUNCTURE: CPT

## 2017-12-28 PROCEDURE — A9270 NON-COVERED ITEM OR SERVICE: HCPCS | Performed by: FAMILY MEDICINE

## 2017-12-28 PROCEDURE — 770006 HCHG ROOM/CARE - MED/SURG/GYN SEMI*

## 2017-12-28 PROCEDURE — 700111 HCHG RX REV CODE 636 W/ 250 OVERRIDE (IP): Performed by: HOSPITALIST

## 2017-12-28 PROCEDURE — A9270 NON-COVERED ITEM OR SERVICE: HCPCS | Performed by: HOSPITALIST

## 2017-12-28 PROCEDURE — 86480 TB TEST CELL IMMUN MEASURE: CPT

## 2017-12-28 PROCEDURE — A9270 NON-COVERED ITEM OR SERVICE: HCPCS | Performed by: INTERNAL MEDICINE

## 2017-12-28 PROCEDURE — 84443 ASSAY THYROID STIM HORMONE: CPT

## 2017-12-28 PROCEDURE — 700102 HCHG RX REV CODE 250 W/ 637 OVERRIDE(OP): Performed by: INTERNAL MEDICINE

## 2017-12-28 RX ADMIN — GABAPENTIN 300 MG: 300 CAPSULE ORAL at 15:56

## 2017-12-28 RX ADMIN — LIDOCAINE 1 PATCH: 50 PATCH CUTANEOUS at 08:27

## 2017-12-28 RX ADMIN — OXYCODONE HYDROCHLORIDE AND ACETAMINOPHEN 500 MG: 500 TABLET ORAL at 21:31

## 2017-12-28 RX ADMIN — ASPIRIN 81 MG: 81 TABLET, COATED ORAL at 08:28

## 2017-12-28 RX ADMIN — HYDRALAZINE HYDROCHLORIDE 50 MG: 50 TABLET ORAL at 21:31

## 2017-12-28 RX ADMIN — METOPROLOL TARTRATE 12.5 MG: 25 TABLET, FILM COATED ORAL at 21:31

## 2017-12-28 RX ADMIN — CHOLECALCIFEROL TAB 25 MCG (1000 UNIT) 2000 UNITS: 25 TAB at 08:27

## 2017-12-28 RX ADMIN — METOPROLOL TARTRATE 12.5 MG: 25 TABLET, FILM COATED ORAL at 08:27

## 2017-12-28 RX ADMIN — OXYBUTYNIN CHLORIDE 5 MG: 5 TABLET, FILM COATED, EXTENDED RELEASE ORAL at 21:30

## 2017-12-28 RX ADMIN — HYDRALAZINE HYDROCHLORIDE 50 MG: 50 TABLET ORAL at 06:24

## 2017-12-28 RX ADMIN — Medication 325 MG: at 12:05

## 2017-12-28 RX ADMIN — OXYCODONE HYDROCHLORIDE AND ACETAMINOPHEN 500 MG: 500 TABLET ORAL at 08:28

## 2017-12-28 RX ADMIN — HEPARIN SODIUM 5000 UNITS: 5000 INJECTION, SOLUTION INTRAVENOUS; SUBCUTANEOUS at 21:30

## 2017-12-28 RX ADMIN — ALENDRONATE SODIUM 10 MG: 10 TABLET ORAL at 08:32

## 2017-12-28 RX ADMIN — GABAPENTIN 300 MG: 300 CAPSULE ORAL at 21:31

## 2017-12-28 RX ADMIN — DULOXETINE HYDROCHLORIDE 20 MG: 20 CAPSULE, DELAYED RELEASE ORAL at 08:27

## 2017-12-28 RX ADMIN — ACETAMINOPHEN 650 MG: 325 TABLET, FILM COATED ORAL at 21:43

## 2017-12-28 RX ADMIN — GABAPENTIN 300 MG: 300 CAPSULE ORAL at 08:28

## 2017-12-28 RX ADMIN — HEPARIN SODIUM 5000 UNITS: 5000 INJECTION, SOLUTION INTRAVENOUS; SUBCUTANEOUS at 08:28

## 2017-12-28 RX ADMIN — Medication 325 MG: at 17:09

## 2017-12-28 RX ADMIN — HYDRALAZINE HYDROCHLORIDE 50 MG: 50 TABLET ORAL at 15:56

## 2017-12-28 RX ADMIN — BENAZEPRIL HYDROCHLORIDE 40 MG: 20 TABLET, COATED ORAL at 08:32

## 2017-12-28 RX ADMIN — ATORVASTATIN CALCIUM 80 MG: 40 TABLET, FILM COATED ORAL at 21:31

## 2017-12-28 RX ADMIN — Medication 325 MG: at 08:28

## 2017-12-28 ASSESSMENT — PAIN SCALES - GENERAL
PAINLEVEL_OUTOF10: 0
PAINLEVEL_OUTOF10: 0
PAINLEVEL_OUTOF10: 6

## 2017-12-28 NOTE — PROGRESS NOTES
Assumed care of pt at 0730am. Report received and bedside rounding completed with noc RN. Pt sitting up in chair for breakfast. No SOB, or in any acute distress. Fall precautions in place,bed alarm. - Treaded non slip socks. Call light and pt belongings within reach  - hourly rounding in place. See flowsheets for further assessment.

## 2017-12-28 NOTE — PROGRESS NOTES
Simona Knight Fall Risk Assessment:     Last Known Fall: Within the last six months  Mobility: Immobilized/requires assist of one person  Medications: Cardiovascular or central nervous system meds  Mental Status/LOC/Awareness: Memory loss/confusion and requires reorienting  Toileting Needs: Incontinence  Volume/Electrolyte Status: No problems  Communication/Sensory: Visual (Glasses)/hearing deficit  Behavior: Appropriate behavior  Simona Knight Fall Risk Total: 15  Fall Risk Level: HIGH RISK    Universal Fall Precautions:  call light/belongings in reach, bed in low position and locked, siderails up x 2, educate to call for assistance, educate on level of risk    Fall Risk Level Interventions:    TRIAL (TELE 8, NEURO, MED JENNIE 5) Moderate Fall Risk Interventions  Place yellow fall risk ID band on patient: verified  Provide patient/family education based on risk assessment : verified  Educate patient/family to call staff for assistance when getting out of bed: verified  Place fall precaution signage outside patient door: verified  Utilize bed/chair fall alarm: verifiedTRIAL (TELE 8, NEURO, [employer] JENNIE 5) High Fall Risk Interventions  Place yellow fall risk ID band on patient: verified  Provide patient/family education based on risk assessment: verified  Educate patient/family to call staff for assistance when getting out of bed: verified  Place fall precaution signage outside patient door: verified  Place patient in room close to nursing station: verified  Utilize bed/chair fall alarm: verified  Notify charge of high risk for huddle: verified    Patient Specific Interventions:     Medication: assess for medications that can be discontinued or dosage decreased  Mental Status/LOC/Awareness: reorient patient, reinforce falls education, check on patient hourly, utilize bed/chair fall alarm and reinforce the use of call light  Toileting: consider obtaining elevated toilet seat or bedside commode (BSC), provide frquent  toileting, monitor intake and output/use of appropriate interventions, instruct patient/family on the use of grab bars and do not leave patient unattended in bathroom/refer to toileting scripting  Volume/Electrolyte Status: ensure patient remains hydrated and teach patients to dangle before rising if hypotensive  Communication/Sensory: update plan of care on whiteboard, provide communication alternatives/, collaborate with doctor for possible speech therapy consult, for visually impaired patients orient to their room surrounding and do not change their surroundings and have patients with hearing deficit repeat information back to you to ensure proper understanding  Behavioral: not applicable  Mobility: schedule physical activity throughout the day, utilize bed/chair fall alarm, ensure bed is locked and in lowest position, provide appropriate assistive device, instruct patient to exit bed on their strongest side and collaborate with doctor for possible PT/OT consult

## 2017-12-28 NOTE — PROGRESS NOTES
[facility] Hospitalist Progress Note    Date of Service: 2017    Chief Complaint  77 y.o. female admitted 2017 with GLF, pyuria and ATN.    Interval Problem Update  Blood pressures more stable, higher side.  :  States doesn't want to use bedpan.  Urinates on self while in bed.  Ordered for up tid with meals, MRI lumbar spine.  dc'd IVFs and lasix 20 daily.  Normal CMP.  Old T12 compression fracture.  Added neurontin 300 tid, cut back oxycodone to 2.5 q 4 hours prn.  States she has pain from the waist to b/l knees.  :  More alert and energetic today.  MRI lumbar spine after screening xrays performed.  Iron low at 13, started replacement with vit C. Check TSH tomorrow.  dc'd PPI.  dc'd oxycodone this a.m., continue neurontin instead.  :  MRI L/S spine w/o significant stenosis.  Old T12 compression fracture.    Consultants/Specialty  Neurology  Psych    DISPO:  Awaiting guardianship.        Review of Systems   Unable to perform ROS: Mental acuity      Physical Exam  Laboratory/Imaging   Hemodynamics  Temp (24hrs), Av.2 °C (97.1 °F), Min:35.9 °C (96.7 °F), Max:36.6 °C (97.9 °F)   Temperature: 36.1 °C (96.9 °F)  Pulse  Av.3  Min: 50  Max: 106    Blood Pressure : 125/50      Respiratory      Respiration: 18, Pulse Oximetry: 91 %     Work Of Breathing / Effort: Mild  RUL Breath Sounds: Clear, RML Breath Sounds: Diminished, RLL Breath Sounds: Diminished, ARGELIA Breath Sounds: Clear, LLL Breath Sounds: Diminished    Fluids    Intake/Output Summary (Last 24 hours) at 17 1238  Last data filed at 17 0900   Gross per 24 hour   Intake              718 ml   Output                0 ml   Net              718 ml       Nutrition  Orders Placed This Encounter   Procedures   • Diet Order     Standing Status:   Standing     Number of Occurrences:   1     Order Specific Question:   Diet:     Answer:   Regular [1]     Physical Exam   Constitutional: She appears well-developed. No distress.   HENT:    Head: Normocephalic and atraumatic.   Poor dentition   Eyes: EOM are normal. Pupils are equal, round, and reactive to light.   Neck: Normal range of motion.   Cardiovascular: Normal rate and intact distal pulses.    Murmur (old) heard.  Pulmonary/Chest: No respiratory distress. She has no rales.   Abdominal: Soft. Bowel sounds are normal. She exhibits no distension.   Musculoskeletal: Normal range of motion.   Neurological: She is alert. No cranial nerve deficit. She exhibits normal muscle tone.   Cognitive deficits   Skin: Skin is warm. She is not diaphoretic.   Psychiatric: Thought content normal.   Nursing note and vitals reviewed.          Recent Labs      12/26/17   0027   WBC  6.1   RBC  3.80*   HEMOGLOBIN  11.0*   HEMATOCRIT  34.2*   MCV  90.0   MCH  28.9   MCHC  32.2*   RDW  54.3*   PLATELETCT  247   MPV  10.8     Recent Labs      12/26/17   0931   SODIUM  141   POTASSIUM  3.5*   CHLORIDE  110   CO2  23   GLUCOSE  134*   BUN  24*   CREATININE  0.90   CALCIUM  9.3                      Assessment/Plan     * Fall- (present on admission)   Assessment & Plan    Reviewed chart. Was found down in the ground from fall back in September 2017. Admitted for failure to thrive, dehydration with acute kidney injury, compression fracture and dementia.  Hospital course complicated by labile blood pressures and dementia requiring placement.        Hypertension- (present on admission)   Assessment & Plan    On 12/22 hypotensive so held BP meds  On 12/24 hypertensive. Restarted BP meds.  12/26:  Stable, dc'd IVFs /hr and lasix 20mg daily.             Dementia- (present on admission)   Assessment & Plan    Min narc/sed when possible.   Await guardianship.  Underwent court 12/19        Iron deficiency anemia- (present on admission)   Assessment & Plan    Start iron bid with vit C bid.        Constipation- (present on admission)   Assessment & Plan    Resolved currently continue bowel care        Congestion of upper  airway- (present on admission)   Assessment & Plan    Saturating 90% on RA.  O2, RT, IS, Vax, CXR and encouraging taking of PO fluids.    Resolved 12/26.  Dc IVFs and lasix.        Physical debility- (present on admission)   Assessment & Plan    PT/OT and increase activity.  Encourage mobilization. Pending guardianship        Obesity (BMI 30.0-34.9)- (present on admission)   Assessment & Plan    Encourage Kcal restriction        T12 compression fracture (CMS-HCC)- (present on admission)   Assessment & Plan    CT spine, no acute fractures. Cont fosamax.  Ordered vitamin D 2000 units daily, dc calcium and vit D 50K weekly since no outdoor exposure for sunlight conversion to active vit D.  Stable and pending guardianship        History of stroke- (present on admission)   Assessment & Plan    Likely cause of cognitive decline.  Review of MRI brain 9/2017 with evidence of multiple strokes.  Asa 81 daily  lipitor 80 daily.  BP control        Chronic back pain- (present on admission)   Assessment & Plan    MRI lumbar spine w/o since worsening generalized weakness and incontinent of urine.  Increased neurontin 300 tid.  12/27 dc'd oxycodone 2.5 po q 4 hours.  Bowel Regimen  Up to chair tid  Encourage ambulation daily.              Reviewed items::  Labs reviewed and Medications reviewed  Pikn catheter::  No Pink  DVT prophylaxis pharmacological::  Heparin  Ulcer Prophylaxis::  Yes

## 2017-12-28 NOTE — CARE PLAN
Problem: Safety  Goal: Will remain free from injury  Call light with in reach, hourly rounding in place. Pt's room near nurses station.     Problem: Mobility  Goal: Risk for activity intolerance will decrease  Pt to get OOB for all meals. Pt to ambulate to and from the restroom. Will ambulate pt to hallway.

## 2017-12-28 NOTE — DISCHARGE PLANNING
Per Cecy Blancas, pt is accepted to the  if arrangement can be fulfilled by guardian. Request TB test. Dr. Dunbar notified.     D/c date 1/02 due to holiday and waiting for TB test.

## 2017-12-28 NOTE — PROGRESS NOTES
Patient down to MRI on [facility] with transport at 2115. Patient back from MRI at 2130.

## 2017-12-28 NOTE — DISCHARGE PLANNING
Updated Clinical note: via chart review  Called MD and requested for [employer] order    Update Discharge Planning:  Pt was accepted by caregiver per RONALDO and [facility]

## 2017-12-28 NOTE — DISCHARGE PLANNING
Spoke with Cecy Blancas- [facility] owner. Cecy Blacnas will be by this afternoon to assess pt for [facility].     RONALDO called Kayy with [employer]. Pt has $5701.05 saved and will be receiving her regular income of $1500 on the first. Pt has enough to private pay for [facility] until the waiver begins. RONALDO will contact Machelle. Anticipate pt may be able to d/c by the 1st.

## 2017-12-28 NOTE — PROGRESS NOTES
Simona Knight Fall Risk Assessment:     Last Known Fall: Within the last six months  Mobility: Immobilized/requires assist of one person  Medications: Cardiovascular or central nervous system meds  Mental Status/LOC/Awareness: Memory loss/confusion and requires reorienting  Toileting Needs: Incontinence  Volume/Electrolyte Status: No problems  Communication/Sensory: Visual (Glasses)/hearing deficit  Behavior: Appropriate behavior  Simona Knight Fall Risk Total: 15  Fall Risk Level: HIGH RISK    Universal Fall Precautions:  call light/belongings in reach, bed in low position and locked, wheelchairs and assistive devices out of sight, siderails up x 2, use non-slip footwear, adequate lighting, clutter free and spill free environment, educate on level of risk, educate to call for assistance    Fall Risk Level Interventions:    TRIAL ([employer] 8, NEURO, MED JENNIE 5) Moderate Fall Risk Interventions  Place yellow fall risk ID band on patient: verified  Provide patient/family education based on risk assessment : verified  Educate patient/family to call staff for assistance when getting out of bed: verified  Place fall precaution signage outside patient door: verified  Utilize bed/chair fall alarm: verifiedTRIAL ([employer] 8, NEURO, [employer] JENNIE 5) High Fall Risk Interventions  Place yellow fall risk ID band on patient: verified  Provide patient/family education based on risk assessment: completed  Educate patient/family to call staff for assistance when getting out of bed: completed  Place fall precaution signage outside patient door: verified  Place patient in room close to nursing station: verified  Utilize bed/chair fall alarm: verified  Notify charge of high risk for huddle: completed    Patient Specific Interventions:     Medication: review medications with patient and family and limit combination of prn medications  Mental Status/LOC/Awareness: reorient patient, check on patient hourly, utilize bed/chair fall alarm and  reinforce the use of call light  Toileting: provide frquent toileting  Volume/Electrolyte Status: ensure patient remains hydrated and monitor abnormal lab values  Communication/Sensory: update plan of care on whiteboard and ensure proper positioning when transferrng/ambulating  Behavioral: not applicable  Mobility: utilize bed/chair fall alarm and ensure bed is locked and in lowest position

## 2017-12-28 NOTE — ASSESSMENT & PLAN NOTE
MRI brain 9/2017 with evidence of multiple strokes.  Continue aspirin and statin for neuro protective measures

## 2017-12-28 NOTE — PROGRESS NOTES
Assumed patient care at 1900. POC discussed w/ day nurse and pt, pt in agreement w/ goals. Pt AOx3, reoriented to time. Pt states slight pain in lower back, denies intervention. MRI completed, pt tolerated well. Pt denies nausea. Pt up one assist, refusing to get out of bed at this time. Incontinent of urine. Patient educated on use of call light, hourly rounding, and pain scale. Personal possession and call light within reach. Bed alarm on. Room next to nurses station.

## 2017-12-28 NOTE — CARE PLAN
Problem: Safety  Goal: Will remain free from injury  Bed locked and in lowest position, call light and personal belongings within reach, pt educated to call for assistance. Bed alarm on. Room next to nurses station. Hourly rounding in effect.       Problem: Mobility  Goal: Risk for activity intolerance will decrease  Patient encouraged to get out of bed and ambulate to this bathroom with this RN. Pt refused at this time. Will re educate patient in the morning.

## 2017-12-28 NOTE — DISCHARGE PLANNING
Pt's appointed guardian is Machelle @ [phone]. Machelle had been talking with Cecy Blancas  owner of [facility] ([phone]). RONALDO l/m to see if bed is still available. RONALDO will f/u with pt's payee to determine if pt has funds saved for .

## 2017-12-29 LAB
M TB TUBERC IFN-G BLD QL: NEGATIVE
M TB TUBERC IFN-G/MITOGEN IGNF BLD: -0.01
M TB TUBERC IGNF/MITOGEN IGNF CONTROL: 64.71 [IU]/ML
MITOGEN IGNF BCKGRD COR BLD-ACNC: 0.06 [IU]/ML
VIT B6 SERPL-MCNC: 11.4 NMOL/L (ref 20–125)

## 2017-12-29 PROCEDURE — A9270 NON-COVERED ITEM OR SERVICE: HCPCS | Performed by: HOSPITALIST

## 2017-12-29 PROCEDURE — 700101 HCHG RX REV CODE 250: Performed by: FAMILY MEDICINE

## 2017-12-29 PROCEDURE — A9270 NON-COVERED ITEM OR SERVICE: HCPCS | Performed by: INTERNAL MEDICINE

## 2017-12-29 PROCEDURE — 700102 HCHG RX REV CODE 250 W/ 637 OVERRIDE(OP): Performed by: HOSPITALIST

## 2017-12-29 PROCEDURE — 700102 HCHG RX REV CODE 250 W/ 637 OVERRIDE(OP): Performed by: INTERNAL MEDICINE

## 2017-12-29 PROCEDURE — A9270 NON-COVERED ITEM OR SERVICE: HCPCS | Performed by: FAMILY MEDICINE

## 2017-12-29 PROCEDURE — 97116 GAIT TRAINING THERAPY: CPT

## 2017-12-29 PROCEDURE — 770006 HCHG ROOM/CARE - MED/SURG/GYN SEMI*

## 2017-12-29 PROCEDURE — 97530 THERAPEUTIC ACTIVITIES: CPT

## 2017-12-29 PROCEDURE — 700111 HCHG RX REV CODE 636 W/ 250 OVERRIDE (IP): Performed by: HOSPITALIST

## 2017-12-29 PROCEDURE — 700102 HCHG RX REV CODE 250 W/ 637 OVERRIDE(OP): Performed by: FAMILY MEDICINE

## 2017-12-29 PROCEDURE — 99231 SBSQ HOSP IP/OBS SF/LOW 25: CPT | Performed by: HOSPITALIST

## 2017-12-29 RX ADMIN — HEPARIN SODIUM 5000 UNITS: 5000 INJECTION, SOLUTION INTRAVENOUS; SUBCUTANEOUS at 09:58

## 2017-12-29 RX ADMIN — OXYBUTYNIN CHLORIDE 5 MG: 5 TABLET, FILM COATED, EXTENDED RELEASE ORAL at 20:38

## 2017-12-29 RX ADMIN — DULOXETINE HYDROCHLORIDE 20 MG: 20 CAPSULE, DELAYED RELEASE ORAL at 09:58

## 2017-12-29 RX ADMIN — STANDARDIZED SENNA CONCENTRATE AND DOCUSATE SODIUM 2 TABLET: 8.6; 5 TABLET, FILM COATED ORAL at 20:39

## 2017-12-29 RX ADMIN — POLYETHYLENE GLYCOL (3350) 1 PACKET: 17 POWDER, FOR SOLUTION ORAL at 09:58

## 2017-12-29 RX ADMIN — METOPROLOL TARTRATE 12.5 MG: 25 TABLET, FILM COATED ORAL at 10:00

## 2017-12-29 RX ADMIN — Medication 325 MG: at 10:01

## 2017-12-29 RX ADMIN — OXYCODONE HYDROCHLORIDE AND ACETAMINOPHEN 500 MG: 500 TABLET ORAL at 20:39

## 2017-12-29 RX ADMIN — METOPROLOL TARTRATE 12.5 MG: 25 TABLET, FILM COATED ORAL at 20:38

## 2017-12-29 RX ADMIN — LIDOCAINE 1 PATCH: 50 PATCH CUTANEOUS at 10:01

## 2017-12-29 RX ADMIN — ATORVASTATIN CALCIUM 80 MG: 40 TABLET, FILM COATED ORAL at 20:39

## 2017-12-29 RX ADMIN — GABAPENTIN 300 MG: 300 CAPSULE ORAL at 20:37

## 2017-12-29 RX ADMIN — BENAZEPRIL HYDROCHLORIDE 40 MG: 20 TABLET, COATED ORAL at 09:59

## 2017-12-29 RX ADMIN — Medication 325 MG: at 12:50

## 2017-12-29 RX ADMIN — GABAPENTIN 300 MG: 300 CAPSULE ORAL at 10:01

## 2017-12-29 RX ADMIN — ASPIRIN 81 MG: 81 TABLET, COATED ORAL at 10:00

## 2017-12-29 RX ADMIN — OXYCODONE HYDROCHLORIDE AND ACETAMINOPHEN 500 MG: 500 TABLET ORAL at 10:01

## 2017-12-29 RX ADMIN — Medication 325 MG: at 17:53

## 2017-12-29 RX ADMIN — HYDRALAZINE HYDROCHLORIDE 50 MG: 50 TABLET ORAL at 20:39

## 2017-12-29 RX ADMIN — HEPARIN SODIUM 5000 UNITS: 5000 INJECTION, SOLUTION INTRAVENOUS; SUBCUTANEOUS at 20:40

## 2017-12-29 RX ADMIN — GABAPENTIN 300 MG: 300 CAPSULE ORAL at 15:07

## 2017-12-29 RX ADMIN — HYDRALAZINE HYDROCHLORIDE 50 MG: 50 TABLET ORAL at 15:07

## 2017-12-29 RX ADMIN — ALENDRONATE SODIUM 10 MG: 10 TABLET ORAL at 10:00

## 2017-12-29 RX ADMIN — HYDRALAZINE HYDROCHLORIDE 50 MG: 50 TABLET ORAL at 09:59

## 2017-12-29 RX ADMIN — CHOLECALCIFEROL TAB 25 MCG (1000 UNIT) 2000 UNITS: 25 TAB at 10:00

## 2017-12-29 ASSESSMENT — COGNITIVE AND FUNCTIONAL STATUS - GENERAL
MOVING FROM LYING ON BACK TO SITTING ON SIDE OF FLAT BED: A LITTLE
MOVING TO AND FROM BED TO CHAIR: A LITTLE
TURNING FROM BACK TO SIDE WHILE IN FLAT BAD: A LITTLE
MOBILITY SCORE: 18
WALKING IN HOSPITAL ROOM: A LITTLE
SUGGESTED CMS G CODE MODIFIER MOBILITY: CK
STANDING UP FROM CHAIR USING ARMS: A LITTLE
CLIMB 3 TO 5 STEPS WITH RAILING: A LITTLE

## 2017-12-29 ASSESSMENT — PAIN SCALES - GENERAL
PAINLEVEL_OUTOF10: 4
PAINLEVEL_OUTOF10: 5
PAINLEVEL_OUTOF10: 4
PAINLEVEL_OUTOF10: 5

## 2017-12-29 ASSESSMENT — GAIT ASSESSMENTS
GAIT LEVEL OF ASSIST: STAND BY ASSIST
DISTANCE (FEET): 150
DEVIATION: BRADYKINETIC;SHUFFLED GAIT
ASSISTIVE DEVICE: FRONT WHEEL WALKER

## 2017-12-29 ASSESSMENT — LIFESTYLE VARIABLES: DO YOU DRINK ALCOHOL: NO

## 2017-12-29 NOTE — PROGRESS NOTES
Assumed care of pt at 0700. Received report from RN. Pt A&Ox3 (disoriented to event). Assessment complete. Labs reviewed. Pt states lower back pain 5/10, pt medicated with Lidocaine patch per MAR. Pt incontinent x 2, pt calls occasionally, pt encouraged to use call light. Pt up with one person assist, pt up to bedside chair for all meals.  Pt and RN discussed plan of care. Pt questions answered. Pt needs are met at this time. Bed in lowest and locked position. Call light within reach. Upper bed rails up. Hourly rounding in place.

## 2017-12-29 NOTE — CARE PLAN
Problem: Safety  Goal: Will remain free from falls  Outcome: PROGRESSING AS EXPECTED  Pt one person assist to the bathroom. Bed alarm in use. Pt calls for assistance. Pt bed in lowest and locked position, call light within reach.     Problem: Mobility  Goal: Risk for activity intolerance will decrease  Outcome: PROGRESSING AS EXPECTED  Pt working with PT. Pt encouraged to ambulate with RN staff, pt refuses at times. Pt to be OOB for all meals to bedside chair.

## 2017-12-29 NOTE — PROGRESS NOTES
Simona Knight Fall Risk Assessment:     Last Known Fall: During the current hospitalization  Mobility: Immobilized/requires assist of one person  Medications: Cardiovascular or central nervous system meds  Mental Status/LOC/Awareness: Memory loss/confusion and requires reorienting  Toileting Needs: Incontinence  Volume/Electrolyte Status: No problems  Communication/Sensory: Visual (Glasses)/hearing deficit  Behavior: Appropriate behavior  Simona Knight Fall Risk Total: 17  Fall Risk Level: HIGH RISK    Universal Fall Precautions:  call light/belongings in reach, bed in low position and locked, siderails up x 2, use non-slip footwear, adequate lighting, clutter free and spill free environment, educate on level of risk, educate to call for assistance    Fall Risk Level Interventions:    TRIAL (TELE 8, NEURO, MED JENNIE 5) Moderate Fall Risk Interventions  Place yellow fall risk ID band on patient: verified  Provide patient/family education based on risk assessment : verified  Educate patient/family to call staff for assistance when getting out of bed: verified  Place fall precaution signage outside patient door: verified  Utilize bed/chair fall alarm: verifiedTRIAL (TELE 8, NEURO, SEAT 4a JENNIE 5) High Fall Risk Interventions  Place yellow fall risk ID band on patient: verified  Provide patient/family education based on risk assessment: verified  Educate patient/family to call staff for assistance when getting out of bed: verified  Place fall precaution signage outside patient door: verified  Place patient in room close to nursing station: verified  Utilize bed/chair fall alarm: verified  Notify charge of high risk for huddle: verified    Patient Specific Interventions:     Medication: review medications with patient and family  Mental Status/LOC/Awareness: reorient patient, reinforce falls education, utilize bed/chair fall alarm and reinforce the use of call light  Toileting: not applicable  Volume/Electrolyte Status: not  applicable  Communication/Sensory: update plan of care on whiteboard  Behavioral: administer medication as ordered  Mobility: utilize bed/chair fall alarm and ensure bed is locked and in lowest position

## 2017-12-29 NOTE — PROGRESS NOTES
Patient resting quietly in bed, no S/S of distress, oriented to place and self, but believes to be in the hospital due to leg pain. Denies SOB, chest pain, dizziness. Bed alarm on, call light within reach,  pt calls appropriately and does not get out of bed. Bed in lowest position, bed locked, RN and CNA numbers provided, no further needs at this time. No changes from EPIC. Hourly rounding in place.

## 2017-12-29 NOTE — DISCHARGE PLANNING
RONALDO emailed contract and approved services to  Owner Y[employer]. Planned d/c is 1/02 and Yoly requests d/c time of [phone]. Please write prescriptions for 90 days.

## 2017-12-29 NOTE — CARE PLAN
Problem: Safety  Goal: Will remain free from injury  Outcome: PROGRESSING AS EXPECTED  Patient remains free from injury. Patient uses call light when necessary. Nursing staff assists with ambulation. Patient educated on injury prevention.     Problem: Skin Integrity  Goal: Risk for impaired skin integrity will decrease  Outcome: PROGRESSING AS EXPECTED  Patient's sacrum is red, but blanching. Patient turns self from side to side. Patient frequently checked for incontinence.

## 2017-12-29 NOTE — PROGRESS NOTES
Simona Knight Fall Risk Assessment:     Last Known Fall: During the current hospitalization  Mobility: Immobilized/requires assist of one person  Medications: Cardiovascular or central nervous system meds  Mental Status/LOC/Awareness: Oriented to person and place  Toileting Needs: Incontinence  Volume/Electrolyte Status: No problems  Communication/Sensory: Visual (Glasses)/hearing deficit  Behavior: Appropriate behavior  Simona Knight Fall Risk Total: 15  Fall Risk Level: HIGH RISK    Universal Fall Precautions:  call light/belongings in reach, bed in low position and locked, wheelchairs and assistive devices out of sight, siderails up x 2, use non-slip footwear, adequate lighting, clutter free and spill free environment, educate on level of risk, educate to call for assistance    Fall Risk Level Interventions:    TRIAL ([employer], NEURO, MED JENNIE 5) Moderate Fall Risk Interventions  Place yellow fall risk ID band on patient: verified  Provide patient/family education based on risk assessment : verified  Educate patient/family to call staff for assistance when getting out of bed: verified  Place fall precaution signage outside patient door: verified  Utilize bed/chair fall alarm: verifiedTRIAL ([employer] 8, NEURO, MED JENNIE 5) High Fall Risk Interventions  Place yellow fall risk ID band on patient: completed  Provide patient/family education based on risk assessment: completed  Educate patient/family to call staff for assistance when getting out of bed: completed  Place fall precaution signage outside patient door: completed  Place patient in room close to nursing station: completed  Utilize bed/chair fall alarm: completed  Notify charge of high risk for huddle: completed    Patient Specific Interventions:     Medication: review medications with patient and family, assess for medications that can be discontinued or dosage decreased and limit combination of prn medications  Mental Status/LOC/Awareness: reorient patient,  reinforce falls education, encourage family to stay with patient, check on patient hourly, utilize bed/chair fall alarm, reinforce the use of call light and provide activity  Toileting: provide frquent toileting, monitor intake and output/use of appropriate interventions and instruct male patients prone to dizziness to void while sitting  Volume/Electrolyte Status: ensure patient remains hydrated, monitor blood sugars and maintain appropriate blood sugar levels if diabetic, advance diet as tolerated, administer medications as ordered for nausea and vomiting, monitor abnormal lab values and ensure IV fluids are appropriate  Communication/Sensory: update plan of care on whiteboard, provide communication alternatives/, collaborate with doctor for possible speech therapy consult, ensure proper positioning when transferrng/ambulating and ensure patient has glasses/contacts and hearing aids/dentures  Behavioral: collaborate with doctor for possible psych consult, encourage patient to voice feelings, engage patient in daily activities, administer medication as ordered, instruct/reinforce fall program rationale and encourage family to stay with impulsive patients  Mobility: schedule physical activity throughout the day, provide comfort measures during transport, dangle prior to standing, utilize bed/chair fall alarm, ensure bed is locked and in lowest position, provide appropriate assistive device, instruct patient to exit bed on their strongest side and collaborate with doctor for possible PT/OT consult

## 2017-12-29 NOTE — THERAPY
"Physical Therapy Treatment completed.   Bed Mobility:  Supine to Sit: Contact Guard Assist  Transfers: Sit to Stand: Contact Guard Assist  Gait: Level Of Assist: Stand by Assist with Front-Wheel Walker       Plan of Care: Will benefit from Physical Therapy 2 times per week and Plan to complete next treatment by Thursday 1/4  Discharge Recommendations: Equipment: Will Continue to Assess for Equipment Needs. Post-acute therapy Discharge to a transitional care facility for continued skilled therapy services. and Discharge to home with outpatient or home health for additional skilled therapy services.     Pt overall doing well with functional mobility. Pt continues to require CGA To SBA for all mobility. Will continue to work on funcitonal strength and transfers. Pt in general with low motivation to participate with therapy. RN staff, please encourage pt to be up in chair for all meals and ambulate at least 3x/day    See \"Rehab Therapy-Acute\" Patient Summary Report for complete documentation.       "

## 2017-12-29 NOTE — DISCHARGE PLANNING
RONALDO spoke with Guardian Machelle. Machelle states [facility] needs to officiate a contract with the  and states she will spend down pt's money with a burial fund. RONALDO escalated to supervisors.     Pending d/c Tuesday 1/02.

## 2017-12-30 LAB
ANION GAP SERPL CALC-SCNC: 6 MMOL/L (ref 0–11.9)
BASOPHILS # BLD AUTO: 1 % (ref 0–1.8)
BASOPHILS # BLD: 0.07 K/UL (ref 0–0.12)
BUN SERPL-MCNC: 31 MG/DL (ref 8–22)
CALCIUM SERPL-MCNC: 9.3 MG/DL (ref 8.5–10.5)
CHLORIDE SERPL-SCNC: 112 MMOL/L (ref 96–112)
CO2 SERPL-SCNC: 25 MMOL/L (ref 20–33)
CREAT SERPL-MCNC: 1.03 MG/DL (ref 0.5–1.4)
EOSINOPHIL # BLD AUTO: 0.38 K/UL (ref 0–0.51)
EOSINOPHIL NFR BLD: 5.3 % (ref 0–6.9)
ERYTHROCYTE [DISTWIDTH] IN BLOOD BY AUTOMATED COUNT: 55.4 FL (ref 35.9–50)
GFR SERPL CREATININE-BSD FRML MDRD: 52 ML/MIN/1.73 M 2
GLUCOSE SERPL-MCNC: 92 MG/DL (ref 65–99)
HCT VFR BLD AUTO: 35 % (ref 37–47)
HGB BLD-MCNC: 11 G/DL (ref 12–16)
IMM GRANULOCYTES # BLD AUTO: 0.03 K/UL (ref 0–0.11)
IMM GRANULOCYTES NFR BLD AUTO: 0.4 % (ref 0–0.9)
LYMPHOCYTES # BLD AUTO: 1.78 K/UL (ref 1–4.8)
LYMPHOCYTES NFR BLD: 25 % (ref 22–41)
MCH RBC QN AUTO: 28.5 PG (ref 27–33)
MCHC RBC AUTO-ENTMCNC: 31.4 G/DL (ref 33.6–35)
MCV RBC AUTO: 90.7 FL (ref 81.4–97.8)
MONOCYTES # BLD AUTO: 0.7 K/UL (ref 0–0.85)
MONOCYTES NFR BLD AUTO: 9.8 % (ref 0–13.4)
NEUTROPHILS # BLD AUTO: 4.16 K/UL (ref 2–7.15)
NEUTROPHILS NFR BLD: 58.5 % (ref 44–72)
NRBC # BLD AUTO: 0 K/UL
NRBC BLD-RTO: 0 /100 WBC
PLATELET # BLD AUTO: 232 K/UL (ref 164–446)
PMV BLD AUTO: 11.1 FL (ref 9–12.9)
POTASSIUM SERPL-SCNC: 4.1 MMOL/L (ref 3.6–5.5)
RBC # BLD AUTO: 3.86 M/UL (ref 4.2–5.4)
SODIUM SERPL-SCNC: 143 MMOL/L (ref 135–145)
WBC # BLD AUTO: 7.1 K/UL (ref 4.8–10.8)

## 2017-12-30 PROCEDURE — A9270 NON-COVERED ITEM OR SERVICE: HCPCS | Performed by: HOSPITALIST

## 2017-12-30 PROCEDURE — 700102 HCHG RX REV CODE 250 W/ 637 OVERRIDE(OP): Performed by: HOSPITALIST

## 2017-12-30 PROCEDURE — 36415 COLL VENOUS BLD VENIPUNCTURE: CPT

## 2017-12-30 PROCEDURE — 80048 BASIC METABOLIC PNL TOTAL CA: CPT

## 2017-12-30 PROCEDURE — 99231 SBSQ HOSP IP/OBS SF/LOW 25: CPT | Performed by: HOSPITALIST

## 2017-12-30 PROCEDURE — 700102 HCHG RX REV CODE 250 W/ 637 OVERRIDE(OP): Performed by: INTERNAL MEDICINE

## 2017-12-30 PROCEDURE — 85025 COMPLETE CBC W/AUTO DIFF WBC: CPT

## 2017-12-30 PROCEDURE — A9270 NON-COVERED ITEM OR SERVICE: HCPCS | Performed by: INTERNAL MEDICINE

## 2017-12-30 PROCEDURE — 770006 HCHG ROOM/CARE - MED/SURG/GYN SEMI*

## 2017-12-30 PROCEDURE — A9270 NON-COVERED ITEM OR SERVICE: HCPCS | Performed by: FAMILY MEDICINE

## 2017-12-30 PROCEDURE — 700111 HCHG RX REV CODE 636 W/ 250 OVERRIDE (IP): Performed by: HOSPITALIST

## 2017-12-30 PROCEDURE — 700102 HCHG RX REV CODE 250 W/ 637 OVERRIDE(OP): Performed by: FAMILY MEDICINE

## 2017-12-30 RX ADMIN — HEPARIN SODIUM 5000 UNITS: 5000 INJECTION, SOLUTION INTRAVENOUS; SUBCUTANEOUS at 10:57

## 2017-12-30 RX ADMIN — OXYCODONE HYDROCHLORIDE AND ACETAMINOPHEN 500 MG: 500 TABLET ORAL at 10:55

## 2017-12-30 RX ADMIN — BENAZEPRIL HYDROCHLORIDE 40 MG: 20 TABLET, COATED ORAL at 10:55

## 2017-12-30 RX ADMIN — GABAPENTIN 300 MG: 300 CAPSULE ORAL at 10:55

## 2017-12-30 RX ADMIN — ATORVASTATIN CALCIUM 80 MG: 40 TABLET, FILM COATED ORAL at 21:38

## 2017-12-30 RX ADMIN — Medication 325 MG: at 18:34

## 2017-12-30 RX ADMIN — HEPARIN SODIUM 5000 UNITS: 5000 INJECTION, SOLUTION INTRAVENOUS; SUBCUTANEOUS at 21:38

## 2017-12-30 RX ADMIN — METOPROLOL TARTRATE 12.5 MG: 25 TABLET, FILM COATED ORAL at 21:38

## 2017-12-30 RX ADMIN — GABAPENTIN 300 MG: 300 CAPSULE ORAL at 21:38

## 2017-12-30 RX ADMIN — DULOXETINE HYDROCHLORIDE 20 MG: 20 CAPSULE, DELAYED RELEASE ORAL at 10:57

## 2017-12-30 RX ADMIN — HYDRALAZINE HYDROCHLORIDE 50 MG: 50 TABLET ORAL at 15:51

## 2017-12-30 RX ADMIN — Medication 325 MG: at 10:57

## 2017-12-30 RX ADMIN — ALENDRONATE SODIUM 10 MG: 10 TABLET ORAL at 11:28

## 2017-12-30 RX ADMIN — ASPIRIN 81 MG: 81 TABLET, COATED ORAL at 10:57

## 2017-12-30 RX ADMIN — OXYBUTYNIN CHLORIDE 5 MG: 5 TABLET, FILM COATED, EXTENDED RELEASE ORAL at 21:40

## 2017-12-30 RX ADMIN — OXYCODONE HYDROCHLORIDE AND ACETAMINOPHEN 500 MG: 500 TABLET ORAL at 21:38

## 2017-12-30 RX ADMIN — VITAMIN D, TAB 1000IU (100/BT) 1000 UNITS: 25 TAB at 11:28

## 2017-12-30 RX ADMIN — STANDARDIZED SENNA CONCENTRATE AND DOCUSATE SODIUM 2 TABLET: 8.6; 5 TABLET, FILM COATED ORAL at 21:38

## 2017-12-30 RX ADMIN — HYDRALAZINE HYDROCHLORIDE 50 MG: 50 TABLET ORAL at 21:37

## 2017-12-30 RX ADMIN — METOPROLOL TARTRATE 12.5 MG: 25 TABLET, FILM COATED ORAL at 10:56

## 2017-12-30 RX ADMIN — HYDRALAZINE HYDROCHLORIDE 50 MG: 50 TABLET ORAL at 05:24

## 2017-12-30 RX ADMIN — GABAPENTIN 300 MG: 300 CAPSULE ORAL at 15:51

## 2017-12-30 ASSESSMENT — PAIN SCALES - GENERAL
PAINLEVEL_OUTOF10: 3
PAINLEVEL_OUTOF10: 4
PAINLEVEL_OUTOF10: 4
PAINLEVEL_OUTOF10: 2
PAINLEVEL_OUTOF10: 2

## 2017-12-30 NOTE — CARE PLAN
Problem: Safety  Goal: Will remain free from injury  Outcome: PROGRESSING AS EXPECTED  Bed on lowest position, call light within reach, patient educated about use of call light and safety precautions.     Problem: Urinary Elimination:  Goal: Ability to reestablish a normal urinary elimination pattern will improve  Outcome: PROGRESSING SLOWER THAN EXPECTED  Pt is incontinent of stool and urine.

## 2017-12-30 NOTE — PROGRESS NOTES
[facility] Hospitalist Progress Note    Date of Service: 2017    Chief Complaint  77 y.o. female admitted 2017 with GLF, pyuria and ATN.    Interval Problem Update  Blood pressures more stable, higher side.  :  States doesn't want to use bedpan.  Urinates on self while in bed.  Ordered for up tid with meals, MRI lumbar spine.  dc'd IVFs and lasix 20 daily.  Normal CMP.  Old T12 compression fracture.  Added neurontin 300 tid, cut back oxycodone to 2.5 q 4 hours prn.  States she has pain from the waist to b/l knees.  :  More alert and energetic today.  MRI lumbar spine after screening xrays performed.  Iron low at 13, started replacement with vit C. Check TSH tomorrow.  dc'd PPI.  dc'd oxycodone this a.m., continue neurontin instead.  :  MRI L/S spine w/o significant stenosis.  Old T12 compression fracture.  :  Explained results of MRI, no surgical intervention needed, but chronic DJD, scoliosis and compression fracture.  Patient states she doesn't want people thinking she is lying about back pain.  Assured her that she has significant back deformities, just does not need surgical correction.      Consultants/Specialty  Neurology  Psych    DISPO:  Awaiting guardianship placement, group home.  quantiferon gold drawn .      Review of Systems   Unable to perform ROS: Mental acuity      Physical Exam  Laboratory/Imaging   Hemodynamics  Temp (24hrs), Av.5 °C (97.7 °F), Min:36.2 °C (97.2 °F), Max:36.7 °C (98.1 °F)   Temperature: 36.7 °C (98.1 °F)  Pulse  Av.3  Min: 50  Max: 106    Blood Pressure : 121/65      Respiratory      Respiration: 16, Pulse Oximetry: 98 %     Work Of Breathing / Effort: Mild  RUL Breath Sounds: Clear, RML Breath Sounds: Diminished, RLL Breath Sounds: Diminished, ARGELIA Breath Sounds: Clear, LLL Breath Sounds: Diminished    Fluids  No intake or output data in the 24 hours ending 17 1809    Nutrition  Orders Placed This Encounter   Procedures   • Diet Order      Standing Status:   Standing     Number of Occurrences:   1     Order Specific Question:   Diet:     Answer:   Regular [1]     Physical Exam   Constitutional: She appears well-developed. No distress.   HENT:   Head: Normocephalic and atraumatic.   Poor dentition   Eyes: EOM are normal. Pupils are equal, round, and reactive to light.   Neck: Normal range of motion.   Cardiovascular: Normal rate and intact distal pulses.    Murmur (old) heard.  Pulmonary/Chest: No respiratory distress. She has no rales.   Abdominal: Soft. Bowel sounds are normal. She exhibits no distension.   Musculoskeletal: Normal range of motion.   Neurological: She is alert. No cranial nerve deficit. She exhibits normal muscle tone.   Cognitive deficits   Skin: Skin is warm. She is not diaphoretic.   Psychiatric: Thought content normal.   Nursing note and vitals reviewed.                                   Assessment/Plan     * Fall- (present on admission)   Assessment & Plan    Reviewed chart. Was found down in the ground from fall back in September 2017. Admitted for failure to thrive, dehydration with acute kidney injury, compression fracture and dementia.  Hospital course complicated by labile blood pressures and dementia requiring placement.  12/27:  MRI LS spine with mild to mod central stensosis L2-3 and L3-4, old T12 compression fracture.        Hypertension- (present on admission)   Assessment & Plan    On 12/22 hypotensive so held BP meds  On 12/24 hypertensive. Restarted BP meds.  12/26:  Stable, dc'd IVFs /hr and lasix 20mg daily.             Dementia- (present on admission)   Assessment & Plan    Min narc/sed when possible.   Await guardianship.  Underwent court 12/19        Iron deficiency anemia- (present on admission)   Assessment & Plan    Start iron bid with vit C bid.        Constipation- (present on admission)   Assessment & Plan    Resolved currently continue bowel care        Congestion of upper airway- (present on  admission)   Assessment & Plan    Saturating 90% on RA.  O2, RT, IS, Vax, CXR and encouraging taking of PO fluids.    Resolved 12/26.  Dc IVFs and lasix.        Physical debility- (present on admission)   Assessment & Plan    PT/OT and increase activity.  Encourage mobilization. Pending guardianship        Obesity (BMI 30.0-34.9)- (present on admission)   Assessment & Plan    Encourage Kcal restriction        T12 compression fracture (CMS-HCC)- (present on admission)   Assessment & Plan    CT spine, no acute fractures. Cont fosamax.  Ordered vitamin D 2000 units daily, dc calcium and vit D 50K weekly since no outdoor exposure for sunlight conversion to active vit D.  Stable and pending guardianship        History of stroke- (present on admission)   Assessment & Plan    Likely cause of cognitive decline.  Review of MRI brain 9/2017 with evidence of multiple strokes.  Asa 81 daily  lipitor 80 daily.  BP control        Chronic back pain- (present on admission)   Assessment & Plan    MRI lumbar spine w/o:  Mild to mod stenosis seen  Increased neurontin 300 tid.  12/27 dc'd oxycodone 2.5 po q 4 hours.  Bowel Regimen  Up to chair tid  Encourage ambulation daily.              Reviewed items::  Labs reviewed and Medications reviewed  Pink catheter::  No Pink  DVT prophylaxis pharmacological::  Heparin  Ulcer Prophylaxis::  Yes

## 2017-12-30 NOTE — PROGRESS NOTES
Simona nKight Fall Risk Assessment:     Last Known Fall: During the current hospitalization  Mobility: Immobilized/requires assist of one person  Medications: Cardiovascular or central nervous system meds  Mental Status/LOC/Awareness: Oriented to person and place  Toileting Needs: Incontinence  Volume/Electrolyte Status: No problems  Communication/Sensory: Visual (Glasses)/hearing deficit  Behavior: Appropriate behavior  Simona Knight Fall Risk Total: 15  Fall Risk Level: HIGH RISK    Universal Fall Precautions:  call light/belongings in reach, bed in low position and locked, wheelchairs and assistive devices out of sight, siderails up x 2, use non-slip footwear, adequate lighting, clutter free and spill free environment, educate on level of risk, educate to call for assistance    Fall Risk Level Interventions:    TRIAL (allyD[employer] 8, NEURO, MED JENNIE 5) Moderate Fall Risk Interventions  Place yellow fall risk ID band on patient: verified  Provide patient/family education based on risk assessment : verified  Educate patient/family to call staff for assistance when getting out of bed: verified  Place fall precaution signage outside patient door: verified  Utilize bed/chair fall alarm: verifiedTRIAL (allyDVM 8, NEURO, [employer] JENNIE 5) High Fall Risk Interventions  Place yellow fall risk ID band on patient: verified  Provide patient/family education based on risk assessment: completed  Educate patient/family to call staff for assistance when getting out of bed: verified  Place fall precaution signage outside patient door: verified  Place patient in room close to nursing station: verified  Utilize bed/chair fall alarm: verified  Notify charge of high risk for huddle: completed    Patient Specific Interventions:     Medication: review medications with patient and family  Mental Status/LOC/Awareness: reorient patient, reinforce falls education, check on patient hourly, utilize bed/chair fall alarm and reinforce the use of call  light  Toileting: provide frquent toileting and instruct patient/family on the need to call for assistance when toileting  Volume/Electrolyte Status: ensure patient remains hydrated and monitor abnormal lab values  Communication/Sensory: update plan of care on whiteboard, ensure proper positioning when transferrng/ambulating and for visually impaired patients orient to their room surrounding and do not change their surroundings  Behavioral: encourage patient to voice feelings  Mobility: provide comfort measures during transport, dangle prior to standing, utilize bed/chair fall alarm, ensure bed is locked and in lowest position, provide appropriate assistive device and instruct patient to exit bed on their strongest side

## 2017-12-30 NOTE — PROGRESS NOTES
"Assumed care at 1900. Received report from RN. Patient is AOx2 disoriented to time and event. Patient is sitting up in bed and appears calm and in no distress. Assessment complete. Labs reviewed. Patient and RN discussed plan of care. Patient questions answered. Patient needs are met at this time. Bed in lowest and locked position. Call light is within reach. Hourly rounding in place. /54   Pulse 64   Temp 36.4 °C (97.6 °F)   Resp 18   Ht 1.6 m (5' 2.99\")   Wt 86.8 kg (191 lb 5.8 oz)   SpO2 93%   Breastfeeding? No   BMI 33.91 kg/m²       "

## 2017-12-31 PROCEDURE — 700102 HCHG RX REV CODE 250 W/ 637 OVERRIDE(OP): Performed by: HOSPITALIST

## 2017-12-31 PROCEDURE — A9270 NON-COVERED ITEM OR SERVICE: HCPCS | Performed by: HOSPITALIST

## 2017-12-31 PROCEDURE — A9270 NON-COVERED ITEM OR SERVICE: HCPCS | Performed by: FAMILY MEDICINE

## 2017-12-31 PROCEDURE — 700102 HCHG RX REV CODE 250 W/ 637 OVERRIDE(OP): Performed by: INTERNAL MEDICINE

## 2017-12-31 PROCEDURE — 700102 HCHG RX REV CODE 250 W/ 637 OVERRIDE(OP): Performed by: FAMILY MEDICINE

## 2017-12-31 PROCEDURE — A9270 NON-COVERED ITEM OR SERVICE: HCPCS | Performed by: INTERNAL MEDICINE

## 2017-12-31 PROCEDURE — 770006 HCHG ROOM/CARE - MED/SURG/GYN SEMI*

## 2017-12-31 PROCEDURE — 99232 SBSQ HOSP IP/OBS MODERATE 35: CPT | Performed by: HOSPITALIST

## 2017-12-31 PROCEDURE — 700111 HCHG RX REV CODE 636 W/ 250 OVERRIDE (IP): Performed by: HOSPITALIST

## 2017-12-31 RX ORDER — GABAPENTIN 300 MG/1
600 CAPSULE ORAL 3 TIMES DAILY
Status: DISCONTINUED | OUTPATIENT
Start: 2017-12-31 | End: 2018-01-15 | Stop reason: HOSPADM

## 2017-12-31 RX ADMIN — ALENDRONATE SODIUM 10 MG: 10 TABLET ORAL at 10:11

## 2017-12-31 RX ADMIN — GABAPENTIN 600 MG: 300 CAPSULE ORAL at 15:59

## 2017-12-31 RX ADMIN — ASPIRIN 81 MG: 81 TABLET, COATED ORAL at 10:11

## 2017-12-31 RX ADMIN — HYDRALAZINE HYDROCHLORIDE 50 MG: 50 TABLET ORAL at 06:16

## 2017-12-31 RX ADMIN — OXYCODONE HYDROCHLORIDE AND ACETAMINOPHEN 500 MG: 500 TABLET ORAL at 21:17

## 2017-12-31 RX ADMIN — GABAPENTIN 600 MG: 300 CAPSULE ORAL at 10:10

## 2017-12-31 RX ADMIN — ATORVASTATIN CALCIUM 80 MG: 40 TABLET, FILM COATED ORAL at 21:17

## 2017-12-31 RX ADMIN — Medication 325 MG: at 18:34

## 2017-12-31 RX ADMIN — OXYCODONE HYDROCHLORIDE AND ACETAMINOPHEN 500 MG: 500 TABLET ORAL at 10:11

## 2017-12-31 RX ADMIN — Medication 325 MG: at 10:11

## 2017-12-31 RX ADMIN — DULOXETINE HYDROCHLORIDE 20 MG: 20 CAPSULE, DELAYED RELEASE ORAL at 10:11

## 2017-12-31 RX ADMIN — OXYBUTYNIN CHLORIDE 5 MG: 5 TABLET, FILM COATED, EXTENDED RELEASE ORAL at 21:22

## 2017-12-31 RX ADMIN — ACETAMINOPHEN 650 MG: 325 TABLET, FILM COATED ORAL at 21:22

## 2017-12-31 RX ADMIN — HYDRALAZINE HYDROCHLORIDE 50 MG: 50 TABLET ORAL at 15:59

## 2017-12-31 RX ADMIN — BENAZEPRIL HYDROCHLORIDE 40 MG: 20 TABLET, COATED ORAL at 10:10

## 2017-12-31 RX ADMIN — CHOLECALCIFEROL TAB 25 MCG (1000 UNIT) 2000 UNITS: 25 TAB at 10:10

## 2017-12-31 RX ADMIN — GABAPENTIN 600 MG: 300 CAPSULE ORAL at 21:17

## 2017-12-31 RX ADMIN — HEPARIN SODIUM 5000 UNITS: 5000 INJECTION, SOLUTION INTRAVENOUS; SUBCUTANEOUS at 10:11

## 2017-12-31 RX ADMIN — HEPARIN SODIUM 5000 UNITS: 5000 INJECTION, SOLUTION INTRAVENOUS; SUBCUTANEOUS at 21:17

## 2017-12-31 ASSESSMENT — PAIN SCALES - GENERAL
PAINLEVEL_OUTOF10: 5
PAINLEVEL_OUTOF10: 4
PAINLEVEL_OUTOF10: 3

## 2017-12-31 NOTE — PROGRESS NOTES
Rec'd report from day shift RN. Assumed pt care. Assessment completed. AA&OX2, reoriented to time and event. Denies pain at this time. No s/s of discomfort or distress. Q2 hour turns enforced. Pt is incontinent of urine, will clean up. Bed in lowest position, bed locked, bed alarm on for safety, treaded socks in place, RN and CNA numbers provided, call light within reach.

## 2017-12-31 NOTE — PROGRESS NOTES
Simona Knight Fall Risk Assessment:     Last Known Fall: During the current hospitalization  Mobility: Dizziness/generalized weakness, Immobilized/requires assist of one person  Medications: Cardiovascular or central nervous system meds  Mental Status/LOC/Awareness: Oriented to person and place  Toileting Needs: Incontinence  Volume/Electrolyte Status: No problems  Communication/Sensory: Visual (Glasses)/hearing deficit  Behavior: Appropriate behavior  Simona Knight Fall Risk Total: 16  Fall Risk Level: HIGH RISK    Universal Fall Precautions:  call light/belongings in reach, bed in low position and locked, wheelchairs and assistive devices out of sight, siderails up x 2, use non-slip footwear, adequate lighting, clutter free and spill free environment, educate on level of risk, educate to call for assistance    Fall Risk Level Interventions:    TRIAL ([employer] 8, NEURO, MED JENNIE 5) Moderate Fall Risk Interventions  Place yellow fall risk ID band on patient: verified  Provide patient/family education based on risk assessment : verified  Educate patient/family to call staff for assistance when getting out of bed: verified  Place fall precaution signage outside patient door: verified  Utilize bed/chair fall alarm: verifiedTRIAL ([employer] 8, NEURO, [place] JENNIE 5) High Fall Risk Interventions  Place yellow fall risk ID band on patient: verified  Provide patient/family education based on risk assessment: completed  Educate patient/family to call staff for assistance when getting out of bed: completed  Place fall precaution signage outside patient door: completed  Place patient in room close to nursing station: verified  Utilize bed/chair fall alarm: verified  Notify charge of high risk for huddle: verified    Patient Specific Interventions:     Medication: review medications with patient and family  Mental Status/LOC/Awareness: reinforce falls education, check on patient hourly, utilize bed/chair fall alarm and reinforce the use  of call light  Toileting: instruct patient/family on the use of grab bars, instruct patient/family on the need to call for assistance when toileting and do not leave patient unattended in bathroom/refer to toileting scripting  Volume/Electrolyte Status: ensure patient remains hydrated and monitor abnormal lab values  Communication/Sensory: update plan of care on whiteboard, ensure proper positioning when transferrng/ambulating and ensure patient has glasses/contacts and hearing aids/dentures  Behavioral: not applicable  Mobility: utilize bed/chair fall alarm, ensure bed is locked and in lowest position, provide appropriate assistive device and instruct patient to exit bed on their strongest side

## 2017-12-31 NOTE — PROGRESS NOTES
Simona Knight Fall Risk Assessment:     Last Known Fall: During the current hospitalization  Mobility: Use of assistive device/requires assist of two people  Medications: Cardiovascular or central nervous system meds  Mental Status/LOC/Awareness: Oriented to person and place  Toileting Needs: Incontinence  Volume/Electrolyte Status: No problems  Communication/Sensory: Visual (Glasses)/hearing deficit  Behavior: Appropriate behavior  Simona Kngiht Fall Risk Total: 16  Fall Risk Level: HIGH RISK    Universal Fall Precautions:  call light/belongings in reach, bed in low position and locked, wheelchairs and assistive devices out of sight, siderails up x 2, use non-slip footwear, adequate lighting, clutter free and spill free environment, educate on level of risk, educate to call for assistance    Fall Risk Level Interventions:    TRIAL ([employer] 8, NEURO, MED JENNIE 5) Moderate Fall Risk Interventions  Place yellow fall risk ID band on patient: verified  Provide patient/family education based on risk assessment : verified  Educate patient/family to call staff for assistance when getting out of bed: verified  Place fall precaution signage outside patient door: verified  Utilize bed/chair fall alarm: verifiedTRIAL ([employer] 8, NEURO, [employer] JENNIE 5) High Fall Risk Interventions  Place yellow fall risk ID band on patient: verified  Provide patient/family education based on risk assessment: completed  Educate patient/family to call staff for assistance when getting out of bed: completed  Place fall precaution signage outside patient door: completed  Place patient in room close to nursing station: verified  Utilize bed/chair fall alarm: verified  Notify charge of high risk for huddle: verified    Patient Specific Interventions:     Medication: review medications with patient and family  Mental Status/LOC/Awareness: check on patient hourly, utilize bed/chair fall alarm and reinforce the use of call light  Toileting: not  applicable  Volume/Electrolyte Status: ensure patient remains hydrated and monitor abnormal lab values  Communication/Sensory: update plan of care on whiteboard  Behavioral: instruct/reinforce fall program rationale  Mobility: utilize bed/chair fall alarm and ensure bed is locked and in lowest position

## 2017-12-31 NOTE — CARE PLAN
Problem: Safety  Goal: Will remain free from injury    Intervention: Provide assistance with mobility  Pt. Is up to chair for meals, unsteady on her feet, pt. Appropriately calls for assistance.

## 2017-12-31 NOTE — PROGRESS NOTES
Received report from Amanda Garcia.  Pt. Is alert, awake, and oriented x2, non labored breathing on room air, complains of lower back pain --states that no medication has helped, but is tolerating pain at this time.  No complaints of shortness of breath at this time.  Fall precautions in place.  Bed alarm in place.  Call light and belongings within reach.  Will continue to monitor

## 2017-12-31 NOTE — CARE PLAN
Problem: Bowel/Gastric:  Goal: Normal bowel function is maintained or improved    Intervention: Educate patient and significant other/support system about diet, fluid intake, medications and activity to promote bowel function  Last bowel movement 12/30/2017.  Encouraged hydration.  Laxatives ordered in MAR

## 2017-12-31 NOTE — CARE PLAN
Problem: Mobility  Goal: Risk for activity intolerance will decrease    Intervention: Assess and monitor signs of activity intolerance  Pt is up for meals, unsteady gait.  Pt needs motivation to get out of bed.   Bed alarm in place.  Will continue to monitor

## 2017-12-31 NOTE — PROGRESS NOTES
Simona Knight Fall Risk Assessment:     Last Known Fall: During the current hospitalization  Mobility: Use of assistive device/requires assist of two people  Medications: Cardiovascular or central nervous system meds  Mental Status/LOC/Awareness: Oriented to person and place  Toileting Needs: Incontinence  Volume/Electrolyte Status: No problems  Communication/Sensory: Visual (Glasses)/hearing deficit  Behavior: Appropriate behavior  Simona Knight Fall Risk Total: 16  Fall Risk Level: HIGH RISK    Universal Fall Precautions:  call light/belongings in reach, bed in low position and locked, wheelchairs and assistive devices out of sight, siderails up x 2, use non-slip footwear, adequate lighting, clutter free and spill free environment, educate on level of risk, educate to call for assistance    Fall Risk Level Interventions:    TRIAL ([employer] 8, NEURO, MED JENNIE 5) Moderate Fall Risk Interventions  Place yellow fall risk ID band on patient: verified  Provide patient/family education based on risk assessment : verified  Educate patient/family to call staff for assistance when getting out of bed: verified  Place fall precaution signage outside patient door: verified  Utilize bed/chair fall alarm: verifiedTRIAL ([place] 8, NEURO, [employer] JENNIE 5) High Fall Risk Interventions  Place yellow fall risk ID band on patient: verified  Provide patient/family education based on risk assessment: completed  Educate patient/family to call staff for assistance when getting out of bed: completed  Place fall precaution signage outside patient door: completed  Place patient in room close to nursing station: verified  Utilize bed/chair fall alarm: verified  Notify charge of high risk for huddle: verified    Patient Specific Interventions:     Medication: review medications with patient and family  Mental Status/LOC/Awareness: check on patient hourly  Toileting: monitor intake and output/use of appropriate interventions  Volume/Electrolyte Status:  monitor abnormal lab values  Communication/Sensory: update plan of care on whiteboard  Behavioral: encourage patient to voice feelings  Mobility: utilize bed/chair fall alarm, ensure bed is locked and in lowest position and provide appropriate assistive device

## 2017-12-31 NOTE — CARE PLAN
Problem: Infection  Goal: Will remain free from infection    Intervention: Assess signs and symptoms of infection  Blanching redness noted on coccyx.  No drainage noted.  Pt. Is afebrile.

## 2017-12-31 NOTE — PROGRESS NOTES
[facility] Hospitalist Progress Note    Date of Service: 2017    Chief Complaint  77 y.o. female admitted 2017 with GLF, pyuria and ATN.    Interval Problem Update  Blood pressures more stable, higher side.  :  States doesn't want to use bedpan.  Urinates on self while in bed.  Ordered for up tid with meals, MRI lumbar spine.  dc'd IVFs and lasix 20 daily.  Normal CMP.  Old T12 compression fracture.  Added neurontin 300 tid, cut back oxycodone to 2.5 q 4 hours prn.  States she has pain from the waist to b/l knees.  :  More alert and energetic today.  MRI lumbar spine after screening xrays performed.  Iron low at 13, started replacement with vit C. Check TSH tomorrow.  dc'd PPI.  dc'd oxycodone this a.m., continue neurontin instead.  :  MRI L/S spine w/o significant stenosis.  Old T12 compression fracture.  :  Explained results of MRI, no surgical intervention needed, but chronic DJD, scoliosis and compression fracture.  Patient states she doesn't want people thinking she is lying about back pain.  Assured her that she has significant back deformities, just does not need surgical correction.    :  Stable VS, awaiting placement.    Consultants/Specialty  Neurology  Psych    DISPO:  Awaiting guardianship placement, group home.  quantiferon gold negative .      Review of Systems   Unable to perform ROS: Mental acuity      Physical Exam  Laboratory/Imaging   Hemodynamics  Temp (24hrs), Av.3 °C (97.4 °F), Min:36.1 °C (97 °F), Max:36.6 °C (97.9 °F)   Temperature: 36.1 °C (97 °F)  Pulse  Av.2  Min: 50  Max: 106    Blood Pressure : 111/49      Respiratory      Respiration: 18, Pulse Oximetry: 92 %        RUL Breath Sounds: Clear, RML Breath Sounds: Diminished, RLL Breath Sounds: Diminished, ARGELIA Breath Sounds: Clear, LLL Breath Sounds: Diminished    Fluids  No intake or output data in the 24 hours ending 17 1640    Nutrition  Orders Placed This Encounter   Procedures   •  Diet Order     Standing Status:   Standing     Number of Occurrences:   1     Order Specific Question:   Diet:     Answer:   Regular [1]     Physical Exam   Constitutional: She appears well-developed. No distress.   HENT:   Head: Normocephalic and atraumatic.   Poor dentition   Eyes: EOM are normal. Pupils are equal, round, and reactive to light.   Neck: Normal range of motion.   Cardiovascular: Normal rate and intact distal pulses.    Murmur (old) heard.  Pulmonary/Chest: No respiratory distress. She has no rales.   Abdominal: Soft. Bowel sounds are normal. She exhibits no distension.   Musculoskeletal: Normal range of motion.   Neurological: She is alert. No cranial nerve deficit. She exhibits normal muscle tone.   Cognitive deficits   Skin: Skin is warm. She is not diaphoretic.   Psychiatric: Thought content normal.   Nursing note and vitals reviewed.          Recent Labs      12/30/17   0158   WBC  7.1   RBC  3.86*   HEMOGLOBIN  11.0*   HEMATOCRIT  35.0*   MCV  90.7   MCH  28.5   MCHC  31.4*   RDW  55.4*   PLATELETCT  232   MPV  11.1     Recent Labs      12/30/17   0158   SODIUM  143   POTASSIUM  4.1   CHLORIDE  112   CO2  25   GLUCOSE  92   BUN  31*   CREATININE  1.03   CALCIUM  9.3                      Assessment/Plan     * Fall- (present on admission)   Assessment & Plan    Reviewed chart. Was found down in the ground from fall back in September 2017. Admitted for failure to thrive, dehydration with acute kidney injury, compression fracture and dementia.  Hospital course complicated by labile blood pressures and dementia requiring placement.  12/27:  MRI LS spine with mild to mod central stensosis L2-3 and L3-4, old T12 compression fracture.        Hypertension- (present on admission)   Assessment & Plan    On 12/22 hypotensive so held BP meds  On 12/24 hypertensive. Restarted BP meds.  12/26:  Stable, dc'd IVFs /hr and lasix 20mg daily.             Dementia- (present on admission)   Assessment & Plan     Min narc/sed when possible.   Await guardianship.  Underwent court 12/19        Iron deficiency anemia- (present on admission)   Assessment & Plan    Start iron bid with vit C bid.        Constipation- (present on admission)   Assessment & Plan    Resolved currently continue bowel care        Congestion of upper airway- (present on admission)   Assessment & Plan    Saturating 90% on RA.  O2, RT, IS, Vax, CXR and encouraging taking of PO fluids.    Resolved 12/26.  Dc IVFs and lasix.        Physical debility- (present on admission)   Assessment & Plan    PT/OT and increase activity.  Encourage mobilization. Pending guardianship        Obesity (BMI 30.0-34.9)- (present on admission)   Assessment & Plan    Encourage Kcal restriction        T12 compression fracture (CMS-HCC)- (present on admission)   Assessment & Plan    CT spine, no acute fractures. Cont fosamax.  Ordered vitamin D 2000 units daily, dc calcium and vit D 50K weekly since no outdoor exposure for sunlight conversion to active vit D.  Stable and pending guardianship        History of stroke- (present on admission)   Assessment & Plan    Likely cause of cognitive decline.  Review of MRI brain 9/2017 with evidence of multiple strokes.  Asa 81 daily  lipitor 80 daily.  BP control        Chronic back pain- (present on admission)   Assessment & Plan    MRI lumbar spine w/o:  Mild to mod stenosis seen  Increased neurontin 300 tid.  12/27 dc'd oxycodone 2.5 po q 4 hours.  Bowel Regimen  Up to chair tid  Encourage ambulation daily.              Reviewed items::  Labs reviewed and Medications reviewed  Pink catheter::  No Pink  DVT prophylaxis pharmacological::  Heparin  Ulcer Prophylaxis::  Yes

## 2017-12-31 NOTE — PROGRESS NOTES
Patient pulled out Iv, stated it felt too itchy.  No active bleeding noted.  Per Md, no need for IV reinsertion.

## 2017-12-31 NOTE — CARE PLAN
Problem: Discharge Barriers/Planning  Goal: Patient's continuum of care needs will be met    Intervention: Assess potential discharge barriers on admission and throughout hospital stay  Pt has been accepted to a facility per  note.       Problem: Skin Integrity  Goal: Risk for impaired skin integrity will decrease  Incontinent episode cleaned up, linens changed, barrier cream applied to pt.

## 2017-12-31 NOTE — PROGRESS NOTES
Received report from Amanda Hernadez.  Pt. Is alert, awake, and oriented to self, time, date, and place. Pt. Is lying in semi-chi's position, non labored breathing on room air, no signs of acute distress noted at this time.  No complaints of shortness of breath or chest pain noted.  Fall precautions in place.  Bed alarm functional.  Will continue to monitor

## 2018-01-01 PROCEDURE — 700111 HCHG RX REV CODE 636 W/ 250 OVERRIDE (IP): Performed by: HOSPITALIST

## 2018-01-01 PROCEDURE — G8988 SELF CARE GOAL STATUS: HCPCS | Mod: CI

## 2018-01-01 PROCEDURE — 97535 SELF CARE MNGMENT TRAINING: CPT

## 2018-01-01 PROCEDURE — 700102 HCHG RX REV CODE 250 W/ 637 OVERRIDE(OP): Performed by: INTERNAL MEDICINE

## 2018-01-01 PROCEDURE — A9270 NON-COVERED ITEM OR SERVICE: HCPCS | Performed by: INTERNAL MEDICINE

## 2018-01-01 PROCEDURE — 700102 HCHG RX REV CODE 250 W/ 637 OVERRIDE(OP): Performed by: HOSPITALIST

## 2018-01-01 PROCEDURE — A9270 NON-COVERED ITEM OR SERVICE: HCPCS | Performed by: HOSPITALIST

## 2018-01-01 PROCEDURE — A9270 NON-COVERED ITEM OR SERVICE: HCPCS | Performed by: FAMILY MEDICINE

## 2018-01-01 PROCEDURE — 770006 HCHG ROOM/CARE - MED/SURG/GYN SEMI*

## 2018-01-01 PROCEDURE — G8987 SELF CARE CURRENT STATUS: HCPCS | Mod: CK

## 2018-01-01 PROCEDURE — 700102 HCHG RX REV CODE 250 W/ 637 OVERRIDE(OP): Performed by: FAMILY MEDICINE

## 2018-01-01 PROCEDURE — 99231 SBSQ HOSP IP/OBS SF/LOW 25: CPT | Performed by: HOSPITALIST

## 2018-01-01 RX ADMIN — ALENDRONATE SODIUM 10 MG: 10 TABLET ORAL at 09:13

## 2018-01-01 RX ADMIN — Medication 325 MG: at 09:13

## 2018-01-01 RX ADMIN — ATORVASTATIN CALCIUM 80 MG: 40 TABLET, FILM COATED ORAL at 20:24

## 2018-01-01 RX ADMIN — GABAPENTIN 600 MG: 300 CAPSULE ORAL at 09:13

## 2018-01-01 RX ADMIN — HYDRALAZINE HYDROCHLORIDE 50 MG: 50 TABLET ORAL at 15:07

## 2018-01-01 RX ADMIN — OXYCODONE HYDROCHLORIDE AND ACETAMINOPHEN 500 MG: 500 TABLET ORAL at 09:12

## 2018-01-01 RX ADMIN — POLYETHYLENE GLYCOL (3350) 1 PACKET: 17 POWDER, FOR SOLUTION ORAL at 09:15

## 2018-01-01 RX ADMIN — CHOLECALCIFEROL TAB 25 MCG (1000 UNIT) 2000 UNITS: 25 TAB at 09:12

## 2018-01-01 RX ADMIN — GABAPENTIN 600 MG: 300 CAPSULE ORAL at 20:24

## 2018-01-01 RX ADMIN — Medication 325 MG: at 18:15

## 2018-01-01 RX ADMIN — Medication 325 MG: at 12:58

## 2018-01-01 RX ADMIN — HEPARIN SODIUM 5000 UNITS: 5000 INJECTION, SOLUTION INTRAVENOUS; SUBCUTANEOUS at 09:13

## 2018-01-01 RX ADMIN — GABAPENTIN 600 MG: 300 CAPSULE ORAL at 15:07

## 2018-01-01 RX ADMIN — BENAZEPRIL HYDROCHLORIDE 40 MG: 20 TABLET, COATED ORAL at 09:15

## 2018-01-01 RX ADMIN — HEPARIN SODIUM 5000 UNITS: 5000 INJECTION, SOLUTION INTRAVENOUS; SUBCUTANEOUS at 20:23

## 2018-01-01 RX ADMIN — OXYCODONE HYDROCHLORIDE AND ACETAMINOPHEN 500 MG: 500 TABLET ORAL at 20:24

## 2018-01-01 RX ADMIN — DULOXETINE HYDROCHLORIDE 20 MG: 20 CAPSULE, DELAYED RELEASE ORAL at 09:12

## 2018-01-01 RX ADMIN — OXYBUTYNIN CHLORIDE 5 MG: 5 TABLET, FILM COATED, EXTENDED RELEASE ORAL at 20:27

## 2018-01-01 RX ADMIN — ASPIRIN 81 MG: 81 TABLET, COATED ORAL at 09:13

## 2018-01-01 ASSESSMENT — COGNITIVE AND FUNCTIONAL STATUS - GENERAL
DRESSING REGULAR UPPER BODY CLOTHING: A LITTLE
SUGGESTED CMS G CODE MODIFIER DAILY ACTIVITY: CK
DRESSING REGULAR LOWER BODY CLOTHING: A LITTLE
TOILETING: A LITTLE
DAILY ACTIVITIY SCORE: 19
PERSONAL GROOMING: A LITTLE
HELP NEEDED FOR BATHING: A LITTLE

## 2018-01-01 ASSESSMENT — PAIN SCALES - GENERAL
PAINLEVEL_OUTOF10: 4
PAINLEVEL_OUTOF10: 0
PAINLEVEL_OUTOF10: 0
PAINLEVEL_OUTOF10: 4

## 2018-01-01 NOTE — THERAPY
"Occupational Therapy Treatment completed with focus on ADLs, ADL transfers and patient education.  Functional Status:  SBA supine>sit EOB, SBA w/sit>stand walking in room and hallway w/fww, SBA w/grooming seated, requested BTB post ambulation. Educated on continued OOB activity and ADL participation, would likely benefit from use of AE for LB dressing and shower chair for home safety if d/c to group home   Plan of Care: Will benefit from Occupational Therapy 2 times per week  Discharge Recommendations:  Equipment Will Continue to Assess for Equipment Needs. Post-acute therapy Discharge to home with outpatient or home health for additional skilled therapy services.    Pt seen for OT tx appears to have steadily improved w/strength and activity tolerance, remains w/decreased ADL's however would likely benefit from more long term assistance and use of adaptive strategies and equipment in a home environment. Recommend HH upon d/c to group home     See \"Rehab Therapy-Acute\" Patient Summary Report for complete documentation.   "

## 2018-01-01 NOTE — CARE PLAN
Problem: Safety  Goal: Will remain free from falls  Outcome: PROGRESSING AS EXPECTED  Patient educated about the importance of calling for assistance. Patient verbalizes understanding and calls for assistance appropriately; does not attempt to get OOB by herself. Safety precautions in place including patient call light within reach, bed in low position and locked, personal possessions close by, patient rounding in practice, bed strip alarm on and working properly, frequent toileting offered, and non-skid socks in place. Patient remains free of injury since start of shift.    Problem: Knowledge Deficit  Goal: Knowledge of disease process/condition, treatment plan, diagnostic tests, and medications will improve  Outcome: PROGRESSING AS EXPECTED  Patient informed on plan of care and educated on medications ordered per MAR. Patient verbalizes understanding and states all of her questions/concerns have been answered appropriately.

## 2018-01-01 NOTE — PROGRESS NOTES
Simona Knight Fall Risk Assessment:     Last Known Fall: During the current hospitalization  Mobility: Dizziness/generalized weakness, Immobilized/requires assist of one person  Medications: Cardiovascular or central nervous system meds  Mental Status/LOC/Awareness: Memory loss/confusion and requires reorienting  Toileting Needs: Incontinence  Volume/Electrolyte Status: No problems  Communication/Sensory: Visual (Glasses)/hearing deficit  Behavior: Appropriate behavior  Simona Knight Fall Risk Total: 18  Fall Risk Level: HIGH RISK    Universal Fall Precautions:  call light/belongings in reach, bed in low position and locked, wheelchairs and assistive devices out of sight, siderails up x 2, use non-slip footwear, adequate lighting, clutter free and spill free environment, educate on level of risk, educate to call for assistance    Fall Risk Level Interventions:    TRIAL ([employer], NEURO, MED JENNIE 5) Moderate Fall Risk Interventions  Place yellow fall risk ID band on patient: verified  Provide patient/family education based on risk assessment : verified  Educate patient/family to call staff for assistance when getting out of bed: verified  Place fall precaution signage outside patient door: verified  Utilize bed/chair fall alarm: verifiedTRIAL ([employer] 8, NEURO, MED JENNIE 5) High Fall Risk Interventions  Place yellow fall risk ID band on patient: verified  Provide patient/family education based on risk assessment: completed  Educate patient/family to call staff for assistance when getting out of bed: completed  Place fall precaution signage outside patient door: verified  Place patient in room close to nursing station: verified  Utilize bed/chair fall alarm: verified  Notify charge of high risk for huddle: verified    Patient Specific Interventions:     Medication: review medications with patient and family  Mental Status/LOC/Awareness: reorient patient, reinforce falls education, check on patient hourly, utilize  bed/chair fall alarm and reinforce the use of call light  Toileting: provide frquent toileting  Volume/Electrolyte Status: ensure patient remains hydrated  Communication/Sensory: update plan of care on whiteboard  Behavioral: encourage patient to voice feelings  Mobility: utilize bed/chair fall alarm and ensure bed is locked and in lowest position

## 2018-01-01 NOTE — PROGRESS NOTES
Rounding completed on pt. Report given at bedside between night shift RN and day shift RN. Pt sleeping, call light in reach. Assumed care of pt.

## 2018-01-01 NOTE — PROGRESS NOTES
Simona Knight Fall Risk Assessment:     Last Known Fall: During the current hospitalization  Mobility: Dizziness/generalized weakness, Immobilized/requires assist of one person  Medications: Cardiovascular or central nervous system meds  Mental Status/LOC/Awareness: Memory loss/confusion and requires reorienting  Toileting Needs: Incontinence  Volume/Electrolyte Status: No problems  Communication/Sensory: Visual (Glasses)/hearing deficit  Behavior: Appropriate behavior  Simona Knight Fall Risk Total: 18  Fall Risk Level: HIGH RISK     Universal Fall Precautions:  call light/belongings in reach, bed in low position and locked, wheelchairs and assistive devices out of sight, siderails up x 2, use non-slip footwear, adequate lighting, clutter free and spill free environment, educate on level of risk, educate to call for assistance     Fall Risk Level Interventions:    TRIAL ([email or web address], NEURO, MED JENNIE 5) Moderate Fall Risk Interventions  Place yellow fall risk ID band on patient: verified  Provide patient/family education based on risk assessment : verified  Educate patient/family to call staff for assistance when getting out of bed: verified  Place fall precaution signage outside patient door: verified  Utilize bed/chair fall alarm: verifiedTRIAL ([employer] 8, NEURO, MED JENNIE 5) High Fall Risk Interventions  Place yellow fall risk ID band on patient: verified  Provide patient/family education based on risk assessment: completed  Educate patient/family to call staff for assistance when getting out of bed: completed  Place fall precaution signage outside patient door: verified  Place patient in room close to nursing station: verified  Utilize bed/chair fall alarm: verified  Notify charge of high risk for huddle: verified     Patient Specific Interventions:      Medication: review medications with patient and family  Mental Status/LOC/Awareness: reorient patient, reinforce falls education, check on patient hourly, utilize  bed/chair fall alarm and reinforce the use of call light  Toileting: provide frquent toileting  Volume/Electrolyte Status: ensure patient remains hydrated  Communication/Sensory: update plan of care on whiteboard  Behavioral: encourage patient to voice feelings  Mobility: utilize bed/chair fall alarm and ensure bed is locked and in lowest position

## 2018-01-01 NOTE — CARE PLAN
Problem: Venous Thromboembolism (VTW)/Deep Vein Thrombosis (DVT) Prevention:  Goal: Patient will participate in Venous Thrombosis (VTE)/Deep Vein Thrombosis (DVT)Prevention Measures  Pt reinforced on dvt prevention, medications, scds, increased mobility. Pt needs encouragement to ambulate and getting oob. Education reinforced prn.

## 2018-01-01 NOTE — PSYCHIATRY
"Full note to follow. Maria Esther is quite cognitively impaired but has some ability to understand her memory loss and has a general understanding of the need for more assistance with things. She is easily led in an interview to agree with the provider. She had some trouble deciding if she wanted Arsenio or \"a professional\" to help her with decision making. Will revisit on Monday and attempt to present \"Arsenio\" and \"a professional\" (public guardian) as neutrally as possible and see if she can differentiate.   " 53

## 2018-01-01 NOTE — PROGRESS NOTES
Received bedside report from day-shift RN and assumed care of patient. Labs and orders noted. Assessment performed. Patient is alert with no signs of labored breathing or distress. Patient updated on plan of care; verbalizes understanding and states all of her questions/concerns have been addressed and answered appropriately. Patient states all of her needs are being met at this time. Safety precautions in place including patient call light within reach, personal possessions nearby, bed in low position and locked, patient rounding in practice, and non-skid socks in place.

## 2018-01-01 NOTE — PROGRESS NOTES
[facility] Hospitalist Progress Note    Date of Service: 2017    Chief Complaint  77 y.o. female admitted 2017 with GLF, pyuria and ATN.    Interval Problem Update  Blood pressures more stable, higher side.  :  States doesn't want to use bedpan.  Urinates on self while in bed.  Ordered for up tid with meals, MRI lumbar spine.  dc'd IVFs and lasix 20 daily.  Normal CMP.  Old T12 compression fracture.  Added neurontin 300 tid, cut back oxycodone to 2.5 q 4 hours prn.  States she has pain from the waist to b/l knees.  :  More alert and energetic today.  MRI lumbar spine after screening xrays performed.  Iron low at 13, started replacement with vit C. Check TSH tomorrow.  dc'd PPI.  dc'd oxycodone this a.m., continue neurontin instead.  :  MRI L/S spine w/o significant stenosis.  Old T12 compression fracture.  :  Explained results of MRI, no surgical intervention needed, but chronic DJD, scoliosis and compression fracture.  Patient states she doesn't want people thinking she is lying about back pain.  Assured her that she has significant back deformities, just does not need surgical correction.    :  Stable VS, awaiting placement.  :  Bradycardia 55, dc'd metoprolol 12.5 bid, increased neurontin 300 to 600 bid for chronic back pain, dc oxycodone 2.5 q 6 hours.    Consultants/Specialty  Neurology  Psych    DISPO:  Awaiting guardianship placement, group home.  quantiferon gold negative .      Review of Systems   Unable to perform ROS: Mental acuity      Physical Exam  Laboratory/Imaging   Hemodynamics  Temp (24hrs), Av.1 °C (96.9 °F), Min:35.9 °C (96.6 °F), Max:36.3 °C (97.4 °F)   Temperature: 35.9 °C (96.6 °F)  Pulse  Av.1  Min: 50  Max: 106    Blood Pressure : 126/51      Respiratory      Respiration: 18, Pulse Oximetry: 98 %     Work Of Breathing / Effort: Mild  RUL Breath Sounds: Clear;Diminished, RML Breath Sounds: Clear;Diminished, RLL Breath Sounds:  Clear;Diminished, ARGELIA Breath Sounds: Clear;Diminished, LLL Breath Sounds: Clear;Diminished    Fluids    Intake/Output Summary (Last 24 hours) at 12/31/17 1652  Last data filed at 12/31/17 1330   Gross per 24 hour   Intake              480 ml   Output                0 ml   Net              480 ml       Nutrition  Orders Placed This Encounter   Procedures   • Diet Order     Standing Status:   Standing     Number of Occurrences:   1     Order Specific Question:   Diet:     Answer:   Regular [1]     Physical Exam   Constitutional: She appears well-developed. No distress.   HENT:   Head: Normocephalic and atraumatic.   Poor dentition   Eyes: EOM are normal. Pupils are equal, round, and reactive to light.   Neck: Normal range of motion.   Cardiovascular: Normal rate and intact distal pulses.    Murmur (old) heard.  Pulmonary/Chest: No respiratory distress. She has no rales.   Abdominal: Soft. Bowel sounds are normal. She exhibits no distension.   Musculoskeletal: Normal range of motion.   Neurological: She is alert. No cranial nerve deficit. She exhibits normal muscle tone.   Cognitive deficits   Skin: Skin is warm. She is not diaphoretic.   Psychiatric: Thought content normal.   Nursing note and vitals reviewed.          Recent Labs      12/30/17   0158   WBC  7.1   RBC  3.86*   HEMOGLOBIN  11.0*   HEMATOCRIT  35.0*   MCV  90.7   MCH  28.5   MCHC  31.4*   RDW  55.4*   PLATELETCT  232   MPV  11.1     Recent Labs      12/30/17   0158   SODIUM  143   POTASSIUM  4.1   CHLORIDE  112   CO2  25   GLUCOSE  92   BUN  31*   CREATININE  1.03   CALCIUM  9.3                      Assessment/Plan     * Fall- (present on admission)   Assessment & Plan    Reviewed chart. Was found down in the ground from fall back in September 2017. Admitted for failure to thrive, dehydration with acute kidney injury, compression fracture and dementia.  Hospital course complicated by labile blood pressures and dementia requiring placement.  12/27:  MRI  LS spine with mild to mod central stensosis L2-3 and L3-4, old T12 compression fracture.        Hypertension- (present on admission)   Assessment & Plan    On 12/22 hypotensive so held BP meds  On 12/24 hypertensive. Restarted BP meds.  12/26:  Stable, dc'd IVFs /hr and lasix 20mg daily.             Dementia- (present on admission)   Assessment & Plan    Min narc/sed when possible.   Await guardianship.  Underwent court 12/19        Iron deficiency anemia- (present on admission)   Assessment & Plan    Start iron bid with vit C bid.        Constipation- (present on admission)   Assessment & Plan    Resolved currently continue bowel care        Congestion of upper airway- (present on admission)   Assessment & Plan    Saturating 90% on RA.  O2, RT, IS, Vax, CXR and encouraging taking of PO fluids.    Resolved 12/26.  Dc IVFs and lasix.        Physical debility- (present on admission)   Assessment & Plan    PT/OT and increase activity.  Encourage mobilization. Pending guardianship        Obesity (BMI 30.0-34.9)- (present on admission)   Assessment & Plan    Encourage Kcal restriction        Bradycardia- (present on admission)   Assessment & Plan    12/31:  HR 55  dc'd metoprolol 12.5 bid.        T12 compression fracture (CMS-HCC)- (present on admission)   Assessment & Plan    CT spine, no acute fractures. Cont fosamax.  Ordered vitamin D 2000 units daily, dc calcium and vit D 50K weekly since no outdoor exposure for sunlight conversion to active vit D.  Stable and pending guardianship  12/31:  Increased neurontin 300 to 600 tid, dc'd oxycodone 2.5 q 6.        History of stroke- (present on admission)   Assessment & Plan    Likely cause of cognitive decline.  Review of MRI brain 9/2017 with evidence of multiple strokes.  Asa 81 daily  lipitor 80 daily.  BP control        Chronic back pain- (present on admission)   Assessment & Plan    MRI lumbar spine w/o:  Mild to mod stenosis seen  12/31:  increased neurontin 300  tid to 600 tid  12/31 dc'd oxycodone 2.5 po q 4 hours.  Bowel Regimen  Up to chair tid  Encourage ambulation daily.              Reviewed items::  Labs reviewed and Medications reviewed  Pink catheter::  No Pink  DVT prophylaxis pharmacological::  Heparin  Ulcer Prophylaxis::  Yes

## 2018-01-02 PROCEDURE — 97530 THERAPEUTIC ACTIVITIES: CPT

## 2018-01-02 PROCEDURE — 99232 SBSQ HOSP IP/OBS MODERATE 35: CPT | Performed by: INTERNAL MEDICINE

## 2018-01-02 PROCEDURE — 700102 HCHG RX REV CODE 250 W/ 637 OVERRIDE(OP): Performed by: FAMILY MEDICINE

## 2018-01-02 PROCEDURE — 700102 HCHG RX REV CODE 250 W/ 637 OVERRIDE(OP): Performed by: INTERNAL MEDICINE

## 2018-01-02 PROCEDURE — A9270 NON-COVERED ITEM OR SERVICE: HCPCS | Performed by: HOSPITALIST

## 2018-01-02 PROCEDURE — A9270 NON-COVERED ITEM OR SERVICE: HCPCS | Performed by: INTERNAL MEDICINE

## 2018-01-02 PROCEDURE — 770006 HCHG ROOM/CARE - MED/SURG/GYN SEMI*

## 2018-01-02 PROCEDURE — 700102 HCHG RX REV CODE 250 W/ 637 OVERRIDE(OP): Performed by: HOSPITALIST

## 2018-01-02 PROCEDURE — 700101 HCHG RX REV CODE 250: Performed by: FAMILY MEDICINE

## 2018-01-02 PROCEDURE — A9270 NON-COVERED ITEM OR SERVICE: HCPCS | Performed by: FAMILY MEDICINE

## 2018-01-02 PROCEDURE — 700111 HCHG RX REV CODE 636 W/ 250 OVERRIDE (IP): Performed by: HOSPITALIST

## 2018-01-02 RX ORDER — GABAPENTIN 300 MG/1
300 CAPSULE ORAL EVERY EVENING
Qty: 90 CAP | Refills: 0 | Status: SHIPPED | OUTPATIENT
Start: 2018-01-02 | End: 2021-07-22 | Stop reason: CLARIF

## 2018-01-02 RX ORDER — ACETAMINOPHEN 325 MG/1
650 TABLET ORAL EVERY 4 HOURS PRN
Qty: 90 TAB | Refills: 0 | Status: SHIPPED | OUTPATIENT
Start: 2018-01-02 | End: 2018-04-02

## 2018-01-02 RX ORDER — DULOXETIN HYDROCHLORIDE 20 MG/1
20 CAPSULE, DELAYED RELEASE ORAL DAILY
Qty: 90 CAP | Refills: 0 | Status: SHIPPED | OUTPATIENT
Start: 2018-01-02 | End: 2021-07-22 | Stop reason: CLARIF

## 2018-01-02 RX ORDER — OMEPRAZOLE 20 MG/1
20 CAPSULE, DELAYED RELEASE ORAL 2 TIMES DAILY
Qty: 90 CAP | Refills: 0 | Status: SHIPPED | OUTPATIENT
Start: 2018-01-02 | End: 2021-07-22 | Stop reason: CLARIF

## 2018-01-02 RX ORDER — ASPIRIN 81 MG/1
81 TABLET ORAL DAILY
Qty: 90 TAB | Refills: 0 | Status: SHIPPED | OUTPATIENT
Start: 2018-01-02 | End: 2021-07-22 | Stop reason: CLARIF

## 2018-01-02 RX ORDER — HYDRALAZINE HYDROCHLORIDE 50 MG/1
50 TABLET, FILM COATED ORAL EVERY 8 HOURS
Qty: 270 TAB | Refills: 0 | Status: SHIPPED | OUTPATIENT
Start: 2018-01-02 | End: 2018-01-31

## 2018-01-02 RX ORDER — FERROUS SULFATE 325(65) MG
325 TABLET ORAL
Qty: 90 TAB | Refills: 0 | Status: SHIPPED | OUTPATIENT
Start: 2018-01-02 | End: 2021-07-22 | Stop reason: CLARIF

## 2018-01-02 RX ORDER — AMOXICILLIN 250 MG
2 CAPSULE ORAL 2 TIMES DAILY
Qty: 60 TAB | Refills: 0 | Status: SHIPPED | OUTPATIENT
Start: 2018-01-02 | End: 2021-07-22 | Stop reason: CLARIF

## 2018-01-02 RX ORDER — BENAZEPRIL HYDROCHLORIDE 40 MG/1
40 TABLET ORAL DAILY
Qty: 90 TAB | Refills: 0 | Status: SHIPPED | OUTPATIENT
Start: 2018-01-02 | End: 2022-03-11

## 2018-01-02 RX ORDER — LIDOCAINE 50 MG/G
1 PATCH TOPICAL EVERY 24 HOURS
Qty: 30 PATCH | Refills: 0 | Status: SHIPPED | OUTPATIENT
Start: 2018-01-02 | End: 2018-01-30

## 2018-01-02 RX ORDER — ALENDRONATE SODIUM 10 MG/1
10 TABLET ORAL
Qty: 90 TAB | Refills: 0 | Status: SHIPPED | OUTPATIENT
Start: 2018-01-03 | End: 2021-07-22

## 2018-01-02 RX ORDER — NICOTINE 21 MG/24HR
1 PATCH, TRANSDERMAL 24 HOURS TRANSDERMAL EVERY 24 HOURS
Qty: 90 PATCH | Refills: 0 | Status: SHIPPED | OUTPATIENT
Start: 2018-01-02 | End: 2018-01-31

## 2018-01-02 RX ORDER — OXYBUTYNIN CHLORIDE 5 MG/1
5 TABLET, EXTENDED RELEASE ORAL EVERY EVENING
Qty: 90 TAB | Refills: 0 | Status: SHIPPED | OUTPATIENT
Start: 2018-01-02 | End: 2021-06-21

## 2018-01-02 RX ORDER — TRAZODONE HYDROCHLORIDE 50 MG/1
50 TABLET ORAL
Qty: 90 TAB | Refills: 0 | Status: SHIPPED | OUTPATIENT
Start: 2018-01-02 | End: 2021-07-22 | Stop reason: CLARIF

## 2018-01-02 RX ORDER — ATORVASTATIN CALCIUM 80 MG/1
80 TABLET, FILM COATED ORAL
Qty: 90 TAB | Refills: 0 | Status: SHIPPED | OUTPATIENT
Start: 2018-01-02 | End: 2022-03-11

## 2018-01-02 RX ORDER — ALUMINA, MAGNESIA, AND SIMETHICONE 2400; 2400; 240 MG/30ML; MG/30ML; MG/30ML
20 SUSPENSION ORAL
Qty: 560 ML | Refills: 0 | Status: SHIPPED | OUTPATIENT
Start: 2018-01-02 | End: 2018-01-31

## 2018-01-02 RX ADMIN — STANDARDIZED SENNA CONCENTRATE AND DOCUSATE SODIUM 2 TABLET: 8.6; 5 TABLET, FILM COATED ORAL at 21:43

## 2018-01-02 RX ADMIN — OXYCODONE HYDROCHLORIDE AND ACETAMINOPHEN 500 MG: 500 TABLET ORAL at 08:28

## 2018-01-02 RX ADMIN — ASPIRIN 81 MG: 81 TABLET, COATED ORAL at 08:27

## 2018-01-02 RX ADMIN — OXYCODONE HYDROCHLORIDE AND ACETAMINOPHEN 500 MG: 500 TABLET ORAL at 21:43

## 2018-01-02 RX ADMIN — DULOXETINE HYDROCHLORIDE 20 MG: 20 CAPSULE, DELAYED RELEASE ORAL at 08:30

## 2018-01-02 RX ADMIN — Medication 325 MG: at 18:02

## 2018-01-02 RX ADMIN — OXYBUTYNIN CHLORIDE 5 MG: 5 TABLET, FILM COATED, EXTENDED RELEASE ORAL at 21:48

## 2018-01-02 RX ADMIN — BENAZEPRIL HYDROCHLORIDE 40 MG: 20 TABLET, COATED ORAL at 08:37

## 2018-01-02 RX ADMIN — HYDRALAZINE HYDROCHLORIDE 50 MG: 50 TABLET ORAL at 08:26

## 2018-01-02 RX ADMIN — HEPARIN SODIUM 5000 UNITS: 5000 INJECTION, SOLUTION INTRAVENOUS; SUBCUTANEOUS at 21:43

## 2018-01-02 RX ADMIN — GABAPENTIN 600 MG: 300 CAPSULE ORAL at 08:26

## 2018-01-02 RX ADMIN — CHOLECALCIFEROL TAB 25 MCG (1000 UNIT) 2000 UNITS: 25 TAB at 08:27

## 2018-01-02 RX ADMIN — Medication 325 MG: at 08:27

## 2018-01-02 RX ADMIN — HYDRALAZINE HYDROCHLORIDE 50 MG: 50 TABLET ORAL at 15:26

## 2018-01-02 RX ADMIN — GABAPENTIN 600 MG: 300 CAPSULE ORAL at 21:43

## 2018-01-02 RX ADMIN — HEPARIN SODIUM 5000 UNITS: 5000 INJECTION, SOLUTION INTRAVENOUS; SUBCUTANEOUS at 08:26

## 2018-01-02 RX ADMIN — ATORVASTATIN CALCIUM 80 MG: 40 TABLET, FILM COATED ORAL at 21:43

## 2018-01-02 RX ADMIN — ALENDRONATE SODIUM 10 MG: 10 TABLET ORAL at 08:37

## 2018-01-02 RX ADMIN — Medication 325 MG: at 12:03

## 2018-01-02 RX ADMIN — POLYETHYLENE GLYCOL (3350) 1 PACKET: 17 POWDER, FOR SOLUTION ORAL at 08:27

## 2018-01-02 RX ADMIN — GABAPENTIN 600 MG: 300 CAPSULE ORAL at 15:27

## 2018-01-02 ASSESSMENT — COGNITIVE AND FUNCTIONAL STATUS - GENERAL
WALKING IN HOSPITAL ROOM: A LITTLE
CLIMB 3 TO 5 STEPS WITH RAILING: A LITTLE
MOVING TO AND FROM BED TO CHAIR: A LITTLE
MOVING FROM LYING ON BACK TO SITTING ON SIDE OF FLAT BED: A LITTLE
MOBILITY SCORE: 19
TURNING FROM BACK TO SIDE WHILE IN FLAT BAD: A LITTLE
SUGGESTED CMS G CODE MODIFIER MOBILITY: CK

## 2018-01-02 ASSESSMENT — PAIN SCALES - GENERAL
PAINLEVEL_OUTOF10: 0

## 2018-01-02 ASSESSMENT — GAIT ASSESSMENTS
GAIT LEVEL OF ASSIST: STAND BY ASSIST
DISTANCE (FEET): 160
ASSISTIVE DEVICE: FRONT WHEEL WALKER
DEVIATION: OTHER (COMMENT)

## 2018-01-02 NOTE — DISCHARGE PLANNING
Pt's d/c on hold due to new statues with legislature. Pt will not d/c until court approves which can take up to 10 days.

## 2018-01-02 NOTE — PROGRESS NOTES
Simona Knight Fall Risk Assessment:     Last Known Fall: During the current hospitalization  Mobility: Dizziness/generalized weakness, Immobilized/requires assist of one person  Medications: Cardiovascular or central nervous system meds  Mental Status/LOC/Awareness: Memory loss/confusion and requires reorienting  Toileting Needs: Incontinence  Volume/Electrolyte Status: No problems  Communication/Sensory: Visual (Glasses)/hearing deficit  Behavior: Appropriate behavior  Simona Knight Fall Risk Total: 18  Fall Risk Level: HIGH RISK    Universal Fall Precautions:  call light/belongings in reach, bed in low position and locked, siderails up x 2, use non-slip footwear    Fall Risk Level Interventions:    TRIAL ([employer], NEURO, MED JENNIE 5) Moderate Fall Risk Interventions  Place yellow fall risk ID band on patient: verified  Provide patient/family education based on risk assessment : verified  Educate patient/family to call staff for assistance when getting out of bed: verified  Place fall precaution signage outside patient door: verified  Utilize bed/chair fall alarm: verifiedTRIAL ([employer], NEURO, [employer] JENNIE 5) High Fall Risk Interventions  Place yellow fall risk ID band on patient: verified  Provide patient/family education based on risk assessment: completed  Educate patient/family to call staff for assistance when getting out of bed: completed  Place fall precaution signage outside patient door: verified  Place patient in room close to nursing station: currently not available/charge notified  Utilize bed/chair fall alarm: verified  Notify charge of high risk for huddle: completed    Patient Specific Interventions:     Medication: review medications with patient and family  Mental Status/LOC/Awareness: check on patient hourly  Toileting: provide frquent toileting  Volume/Electrolyte Status: ensure patient remains hydrated  Communication/Sensory: update plan of care on whiteboard  Behavioral: administer medication as  ordered  Mobility: ensure bed is locked and in lowest position

## 2018-01-02 NOTE — PROGRESS NOTES
[facility] Hospitalist Progress Note    Date of Service: 2018    Chief Complaint  77 y.o. female admitted 2017 with GLF, pyuria and ATN.    Interval Problem Update  Blood pressures more stable, higher side.  :  States doesn't want to use bedpan.  Urinates on self while in bed.  Ordered for up tid with meals, MRI lumbar spine.  dc'd IVFs and lasix 20 daily.  Normal CMP.  Old T12 compression fracture.  Added neurontin 300 tid, cut back oxycodone to 2.5 q 4 hours prn.  States she has pain from the waist to b/l knees.  :  More alert and energetic today.  MRI lumbar spine after screening xrays performed.  Iron low at 13, started replacement with vit C. Check TSH tomorrow.  dc'd PPI.  dc'd oxycodone this a.m., continue neurontin instead.  :  MRI L/S spine w/o significant stenosis.  Old T12 compression fracture.  :  Explained results of MRI, no surgical intervention needed, but chronic DJD, scoliosis and compression fracture.  Patient states she doesn't want people thinking she is lying about back pain.  Assured her that she has significant back deformities, just does not need surgical correction.    :  Stable VS, awaiting placement.  :  Bradycardia 55, dc'd metoprolol 12.5 bid, increased neurontin 300 to 600 bid for chronic back pain, dc oxycodone 2.5 q 6 hours.  :  Resting, doing well off narcotics.      Consultants/Specialty  Neurology  Psych    DISPO:  Awaiting guardianship placement, group home.  quantiferon gold negative .      Review of Systems   Unable to perform ROS: Mental acuity      Physical Exam  Laboratory/Imaging   Hemodynamics  Temp (24hrs), Av.6 °C (97.8 °F), Min:36.3 °C (97.3 °F), Max:37.2 °C (98.9 °F)   Temperature: 36.3 °C (97.3 °F)  Pulse  Av.2  Min: 50  Max: 106    Blood Pressure : 128/47      Respiratory      Respiration: 16, Pulse Oximetry: 94 %     Work Of Breathing / Effort: Mild  RUL Breath Sounds: Clear;Diminished, RML Breath Sounds:  Clear;Diminished, RLL Breath Sounds: Clear;Diminished, ARGELIA Breath Sounds: Clear;Diminished, LLL Breath Sounds: Clear;Diminished    Fluids    Intake/Output Summary (Last 24 hours) at 01/01/18 1740  Last data filed at 01/01/18 1733   Gross per 24 hour   Intake              480 ml   Output                0 ml   Net              480 ml       Nutrition  Orders Placed This Encounter   Procedures   • Diet Order     Standing Status:   Standing     Number of Occurrences:   1     Order Specific Question:   Diet:     Answer:   Regular [1]     Physical Exam   Constitutional: She appears well-developed. No distress.   HENT:   Head: Normocephalic and atraumatic.   Poor dentition   Eyes: EOM are normal. Pupils are equal, round, and reactive to light.   Neck: Normal range of motion.   Cardiovascular: Normal rate and intact distal pulses.    Murmur (old) heard.  Pulmonary/Chest: No respiratory distress. She has no rales.   Abdominal: Soft. Bowel sounds are normal. She exhibits no distension.   Musculoskeletal: Normal range of motion.   Neurological: She is alert. No cranial nerve deficit. She exhibits normal muscle tone.   Cognitive deficits   Skin: Skin is warm. She is not diaphoretic.   Psychiatric: Thought content normal.   Nursing note and vitals reviewed.          Recent Labs      12/30/17   0158   WBC  7.1   RBC  3.86*   HEMOGLOBIN  11.0*   HEMATOCRIT  35.0*   MCV  90.7   MCH  28.5   MCHC  31.4*   RDW  55.4*   PLATELETCT  232   MPV  11.1     Recent Labs      12/30/17   0158   SODIUM  143   POTASSIUM  4.1   CHLORIDE  112   CO2  25   GLUCOSE  92   BUN  31*   CREATININE  1.03   CALCIUM  9.3                      Assessment/Plan     * Fall- (present on admission)   Assessment & Plan    Reviewed chart. Was found down in the ground from fall back in September 2017. Admitted for failure to thrive, dehydration with acute kidney injury, compression fracture and dementia.  Hospital course complicated by labile blood pressures and  dementia requiring placement.  12/27:  MRI LS spine with mild to mod central stensosis L2-3 and L3-4, old T12 compression fracture.        Hypertension- (present on admission)   Assessment & Plan    On 12/22 hypotensive so held BP meds  On 12/24 hypertensive. Restarted BP meds.  12/26:  Stable, dc'd IVFs /hr and lasix 20mg daily.             Dementia- (present on admission)   Assessment & Plan    Min narc/sed when possible.   Await guardianship.  Underwent court 12/19        Iron deficiency anemia- (present on admission)   Assessment & Plan    Start iron bid with vit C bid.        Constipation- (present on admission)   Assessment & Plan    Resolved currently continue bowel care        Congestion of upper airway- (present on admission)   Assessment & Plan    Saturating 90% on RA.  O2, RT, IS, Vax, CXR and encouraging taking of PO fluids.    Resolved 12/26.  Dc IVFs and lasix.        Physical debility- (present on admission)   Assessment & Plan    PT/OT and increase activity.  Encourage mobilization. Pending guardianship        Obesity (BMI 30.0-34.9)- (present on admission)   Assessment & Plan    Encourage Kcal restriction        Bradycardia- (present on admission)   Assessment & Plan    12/31:  HR 55  dc'd metoprolol 12.5 bid.        T12 compression fracture (CMS-HCC)- (present on admission)   Assessment & Plan    CT spine, no acute fractures. Cont fosamax.  Ordered vitamin D 2000 units daily, dc calcium and vit D 50K weekly since no outdoor exposure for sunlight conversion to active vit D.  Stable and pending guardianship  12/31:  Increased neurontin 300 to 600 tid, dc'd oxycodone 2.5 q 6.        History of stroke- (present on admission)   Assessment & Plan    Likely cause of cognitive decline.  Review of MRI brain 9/2017 with evidence of multiple strokes.  Asa 81 daily  lipitor 80 daily.  BP control        Chronic back pain- (present on admission)   Assessment & Plan    MRI lumbar spine w/o:  Mild to mod  stenosis seen  12/31:  increased neurontin 300 tid to 600 tid  12/31 dc'd oxycodone 2.5 po q 4 hours.  Bowel Regimen  Up to chair tid  Encourage ambulation daily.              Reviewed items::  Labs reviewed and Medications reviewed  Pink catheter::  No Pnik  DVT prophylaxis pharmacological::  Heparin  Ulcer Prophylaxis::  Yes

## 2018-01-02 NOTE — PROGRESS NOTES
Received bedside report from day-shift RN and assumed care of patient. Labs and orders noted. Assessment performed. Patient is alert with no signs of labored breathing or distress. Patient agreeable to plan of care, including potential transfer to group home tomorrow. Patient states all of her needs are being met at this time. Safety precautions in place including patient call light within reach, personal possessions nearby, bed in low position and locked, patient rounding in practice, bed strip alarm on and working properly, and non-skid socks in place.

## 2018-01-02 NOTE — CARE PLAN
Problem: Bowel/Gastric:  Goal: Normal bowel function is maintained or improved  Outcome: PROGRESSING AS EXPECTED  Patient incontinent of urine and stool. Draw sheet/pad being checked during patient rounding and changed as needed. Patient cleaned and dried after incontinent episodes. Barrier cream applied to sacral area.    Last BM 12/31.    Problem: Discharge Barriers/Planning  Goal: Patient's continuum of care needs will be met  Outcome: PROGRESSING AS EXPECTED  Discussed transfer plans to group home with patient; verbalizes agreement and understanding. All questions/concerns addressed and answered appropriately.

## 2018-01-02 NOTE — PROGRESS NOTES
Simona Knight Fall Risk Assessment:     Last Known Fall: During the current hospitalization  Mobility: Immobilized/requires assist of one person  Medications: Cardiovascular or central nervous system meds  Mental Status/LOC/Awareness: Memory loss/confusion and requires reorienting  Toileting Needs: Incontinence  Volume/Electrolyte Status: No problems  Communication/Sensory: Visual (Glasses)/hearing deficit  Behavior: Appropriate behavior  Simona Knight Fall Risk Total: 17  Fall Risk Level: HIGH RISK    Universal Fall Precautions:  call light/belongings in reach, bed in low position and locked, wheelchairs and assistive devices out of sight, siderails up x 2, use non-slip footwear, adequate lighting, clutter free and spill free environment, educate on level of risk, educate to call for assistance    Fall Risk Level Interventions:    TRIAL ([place] 8, NEURO, MED JENNIE 5) Moderate Fall Risk Interventions  Place yellow fall risk ID band on patient: verified  Provide patient/family education based on risk assessment : verified  Educate patient/family to call staff for assistance when getting out of bed: verified  Place fall precaution signage outside patient door: verified  Utilize bed/chair fall alarm: verifiedTRIAL ([place] 8, NEURO, [employer] JENNIE 5) High Fall Risk Interventions  Place yellow fall risk ID band on patient: verified  Provide patient/family education based on risk assessment: completed  Educate patient/family to call staff for assistance when getting out of bed: completed  Place fall precaution signage outside patient door: verified  Place patient in room close to nursing station: currently not available/charge notified  Utilize bed/chair fall alarm: verified  Notify charge of high risk for huddle: completed    Patient Specific Interventions:     Medication: review medications with patient and family  Mental Status/LOC/Awareness: reinforce the use of call light  Toileting: provide frquent  toileting  Volume/Electrolyte Status: ensure patient remains hydrated  Communication/Sensory: update plan of care on whiteboard  Behavioral: instruct/reinforce fall program rationale  Mobility: utilize bed/chair fall alarm and ensure bed is locked and in lowest position

## 2018-01-02 NOTE — DISCHARGE PLANNING
Spoke with  owner Lilly. Plan is for pt to discharge today to . Cecy Blancas needs to get in touch with pt's guardian Machelle and then Cecy Blancas will be at the hospital to  pt. RONALDO paged Dr. ALMAZAN for updates.

## 2018-01-02 NOTE — THERAPY
"Physical Therapy Treatment completed.   Bed Mobility:  Supine to Sit: Supervised  Transfers: Sit to Stand: Stand by Assist  Gait: Level Of Assist: Stand by Assist with Front-Wheel Walker       Plan of Care: Will benefit from Physical Therapy 2 times per week  Discharge Recommendations: Equipment: Will Continue to Assess for Equipment Needs. See below    Progressing well. Ambulating with FWW with SBA. SBA/CGA 2' to cognition and wayfinding more than functional deficits though would recommend use of FWW during OOB Activity. Pleasant and cooperative and appears generally more motivated for activity if progresses towards d/c. Per pt, anticipating group eventual group home placement.     See \"Rehab Therapy-Acute\" Patient Summary Report for complete documentation.       "

## 2018-01-02 NOTE — PROGRESS NOTES
Assumed care of this patient @ 0700. Patient ambulated w/ P/T using a walker. No complaints or concerns at this time. Patient is not discharging today as expected.

## 2018-01-02 NOTE — DISCHARGE PLANNING
Update Discharge Planning:  Per MD,Ok to discharge  Per Anahi pt was accepted by .  [facility] for Machelle ( guardian) @ [phone]    Addendum:  Received a call back from Celio QUIROGA who informed me that Machelle is in court today. He agreed that I should fax the [facility] to [phone].  Received faxe confirmation.

## 2018-01-03 PROCEDURE — 700111 HCHG RX REV CODE 636 W/ 250 OVERRIDE (IP): Performed by: HOSPITALIST

## 2018-01-03 PROCEDURE — 700102 HCHG RX REV CODE 250 W/ 637 OVERRIDE(OP): Performed by: INTERNAL MEDICINE

## 2018-01-03 PROCEDURE — 770006 HCHG ROOM/CARE - MED/SURG/GYN SEMI*

## 2018-01-03 PROCEDURE — 700101 HCHG RX REV CODE 250: Performed by: FAMILY MEDICINE

## 2018-01-03 PROCEDURE — 700102 HCHG RX REV CODE 250 W/ 637 OVERRIDE(OP): Performed by: HOSPITALIST

## 2018-01-03 PROCEDURE — A9270 NON-COVERED ITEM OR SERVICE: HCPCS | Performed by: INTERNAL MEDICINE

## 2018-01-03 PROCEDURE — 99232 SBSQ HOSP IP/OBS MODERATE 35: CPT | Performed by: INTERNAL MEDICINE

## 2018-01-03 PROCEDURE — A9270 NON-COVERED ITEM OR SERVICE: HCPCS | Performed by: HOSPITALIST

## 2018-01-03 PROCEDURE — A9270 NON-COVERED ITEM OR SERVICE: HCPCS | Performed by: FAMILY MEDICINE

## 2018-01-03 PROCEDURE — 700102 HCHG RX REV CODE 250 W/ 637 OVERRIDE(OP): Performed by: FAMILY MEDICINE

## 2018-01-03 RX ADMIN — ALENDRONATE SODIUM 10 MG: 10 TABLET ORAL at 08:29

## 2018-01-03 RX ADMIN — DULOXETINE HYDROCHLORIDE 20 MG: 20 CAPSULE, DELAYED RELEASE ORAL at 08:26

## 2018-01-03 RX ADMIN — Medication 325 MG: at 13:00

## 2018-01-03 RX ADMIN — CHOLECALCIFEROL TAB 25 MCG (1000 UNIT) 2000 UNITS: 25 TAB at 08:26

## 2018-01-03 RX ADMIN — GABAPENTIN 600 MG: 300 CAPSULE ORAL at 21:09

## 2018-01-03 RX ADMIN — OXYCODONE HYDROCHLORIDE AND ACETAMINOPHEN 500 MG: 500 TABLET ORAL at 21:09

## 2018-01-03 RX ADMIN — POLYETHYLENE GLYCOL (3350) 1 PACKET: 17 POWDER, FOR SOLUTION ORAL at 08:27

## 2018-01-03 RX ADMIN — HEPARIN SODIUM 5000 UNITS: 5000 INJECTION, SOLUTION INTRAVENOUS; SUBCUTANEOUS at 21:09

## 2018-01-03 RX ADMIN — ATORVASTATIN CALCIUM 80 MG: 40 TABLET, FILM COATED ORAL at 21:09

## 2018-01-03 RX ADMIN — Medication 325 MG: at 08:26

## 2018-01-03 RX ADMIN — HYDRALAZINE HYDROCHLORIDE 50 MG: 50 TABLET ORAL at 15:45

## 2018-01-03 RX ADMIN — LIDOCAINE 1 PATCH: 50 PATCH CUTANEOUS at 08:27

## 2018-01-03 RX ADMIN — ASPIRIN 81 MG: 81 TABLET, COATED ORAL at 08:25

## 2018-01-03 RX ADMIN — MAGNESIUM HYDROXIDE 30 ML: 400 SUSPENSION ORAL at 06:34

## 2018-01-03 RX ADMIN — Medication 325 MG: at 18:37

## 2018-01-03 RX ADMIN — GABAPENTIN 600 MG: 300 CAPSULE ORAL at 15:45

## 2018-01-03 RX ADMIN — GABAPENTIN 600 MG: 300 CAPSULE ORAL at 08:26

## 2018-01-03 RX ADMIN — BENAZEPRIL HYDROCHLORIDE 40 MG: 20 TABLET, COATED ORAL at 08:29

## 2018-01-03 RX ADMIN — HYDRALAZINE HYDROCHLORIDE 50 MG: 50 TABLET ORAL at 21:09

## 2018-01-03 RX ADMIN — HEPARIN SODIUM 5000 UNITS: 5000 INJECTION, SOLUTION INTRAVENOUS; SUBCUTANEOUS at 08:25

## 2018-01-03 RX ADMIN — STANDARDIZED SENNA CONCENTRATE AND DOCUSATE SODIUM 2 TABLET: 8.6; 5 TABLET, FILM COATED ORAL at 21:09

## 2018-01-03 RX ADMIN — OXYBUTYNIN CHLORIDE 5 MG: 5 TABLET, FILM COATED, EXTENDED RELEASE ORAL at 21:11

## 2018-01-03 RX ADMIN — OXYCODONE HYDROCHLORIDE AND ACETAMINOPHEN 500 MG: 500 TABLET ORAL at 08:25

## 2018-01-03 RX ADMIN — ACETAMINOPHEN 650 MG: 325 TABLET, FILM COATED ORAL at 21:09

## 2018-01-03 ASSESSMENT — PAIN SCALES - GENERAL
PAINLEVEL_OUTOF10: 0
PAINLEVEL_OUTOF10: 5

## 2018-01-03 NOTE — CARE PLAN
Problem: Safety  Goal: Will remain free from injury  Bed locked and in lowest position. Hourly rounding in place. Pt strip bed alarm on. Call lightwith in reach.     Problem: Discharge Barriers/Planning  Goal: Patient's continuum of care needs will be met  Pt to dc to a group home.

## 2018-01-03 NOTE — PROGRESS NOTES
Received bedside report from day-shift RN and assumed care of patient. Labs and orders noted. Assessment performed. Patient is alert with no signs of labored breathing or distress. All needs being met at this time. Safety precautions in place including patient call light within reach, personal possessions nearby, bed in low position and locked, patient rounding in practice, bed strip alarm on and working properly, and non-skid socks in place.

## 2018-01-03 NOTE — PROGRESS NOTES
Simona Knight Fall Risk Assessment:     Last Known Fall: During the current hospitalization  Mobility: Immobilized/requires assist of one person  Medications: Cardiovascular or central nervous system meds  Mental Status/LOC/Awareness: Oriented to person and place  Toileting Needs: Incontinence  Volume/Electrolyte Status: No problems  Communication/Sensory: Visual (Glasses)/hearing deficit  Behavior: Appropriate behavior  Simona Knight Fall Risk Total: 15  Fall Risk Level: HIGH RISK    Universal Fall Precautions:  call light/belongings in reach, bed in low position and locked, wheelchairs and assistive devices out of sight, siderails up x 2, use non-slip footwear, adequate lighting, clutter free and spill free environment, educate on level of risk, educate to call for assistance    Fall Risk Level Interventions:    TRIAL ([employer] 8, NEURO, MED JENNIE 5) Moderate Fall Risk Interventions  Place yellow fall risk ID band on patient: verified  Provide patient/family education based on risk assessment : verified  Educate patient/family to call staff for assistance when getting out of bed: verified  Place fall precaution signage outside patient door: verified  Utilize bed/chair fall alarm: verifiedTRIAL ([employer] 8, NEURO, MED JENNIE 5) High Fall Risk Interventions  Place yellow fall risk ID band on patient: verified  Provide patient/family education based on risk assessment: completed  Educate patient/family to call staff for assistance when getting out of bed: completed  Place fall precaution signage outside patient door: verified  Place patient in room close to nursing station: currently not available/charge notified  Utilize bed/chair fall alarm: verified  Notify charge of high risk for huddle: completed    Patient Specific Interventions:     Medication: review medications with patient and family  Mental Status/LOC/Awareness: reorient patient, reinforce falls education, check on patient hourly, utilize bed/chair fall alarm and  reinforce the use of call light  Toileting: provide frquent toileting  Volume/Electrolyte Status: ensure patient remains hydrated  Communication/Sensory: update plan of care on whiteboard  Behavioral: administer medication as ordered  Mobility: utilize bed/chair fall alarm and ensure bed is locked and in lowest position

## 2018-01-03 NOTE — PROGRESS NOTES
Assumed care of pt at 0730am. Report received and bedside rounding completed with michael RN. Pt in bed resting comfortably denies any pain at this time and is calm. No SOB, or in any acute distress. Fall precautions in place, bed alarm. - Treaded non slip socks. Call light and pt belongings within reach - pt makes needs known - hourly rounding in place. See flowsheets for further assessment. Pt was able to sit up in chair for a couple mints. Verbalizing she is scared and wanting to go back to bed. encouraging pt to stay in chair for breakfast but pt wanting to go back to bed despite edu.

## 2018-01-03 NOTE — PROGRESS NOTES
[facility] Hospitalist Progress Note    Date of Service: 2018    Chief Complaint  77 y.o. female admitted 2017 with GLF, pyuria and ATN.    Interval Problem Update  Blood pressures more stable, higher side.  :  States doesn't want to use bedpan.  Urinates on self while in bed.  Ordered for up tid with meals, MRI lumbar spine.  dc'd IVFs and lasix 20 daily.  Normal CMP.  Old T12 compression fracture.  Added neurontin 300 tid, cut back oxycodone to 2.5 q 4 hours prn.  States she has pain from the waist to b/l knees.  :  More alert and energetic today.  MRI lumbar spine after screening xrays performed.  Iron low at 13, started replacement with vit C. Check TSH tomorrow.  dc'd PPI.  dc'd oxycodone this a.m., continue neurontin instead.  :  MRI L/S spine w/o significant stenosis.  Old T12 compression fracture.  :  Explained results of MRI, no surgical intervention needed, but chronic DJD, scoliosis and compression fracture.  Patient states she doesn't want people thinking she is lying about back pain.  Assured her that she has significant back deformities, just does not need surgical correction.    :  Stable VS, awaiting placement.  :  Bradycardia 55, dc'd metoprolol 12.5 bid, increased neurontin 300 to 600 bid for chronic back pain, dc oxycodone 2.5 q 6 hours.  :  Resting, doing well off narcotics.     patient's discharge and hold due to new statutes with legislature, subject to court approval, which can take up to 10 days, then possible group home placement  Vital stable, no overnight events, denies new issues.    Consultants/Specialty  Neurology  Psych    DISPO:  Awaiting guardianship placement, group home.  quantiferon gold negative .      Review of Systems   Unable to perform ROS: Mental acuity      Physical Exam  Laboratory/Imaging   Hemodynamics  Temp (24hrs), Av.5 °C (97.7 °F), Min:36.2 °C (97.1 °F), Max:36.7 °C (98 °F)   Temperature: 36.2 °C (97.1 °F)  Pulse   Av.2  Min: 50  Max: 106    Blood Pressure : 147/58      Respiratory      Respiration: 17, Pulse Oximetry: 96 %     Work Of Breathing / Effort: Mild  RUL Breath Sounds: Clear, RML Breath Sounds: Diminished, RLL Breath Sounds: Diminished, ARGELIA Breath Sounds: Clear, LLL Breath Sounds: Diminished    Fluids  No intake or output data in the 24 hours ending 18 1807    Nutrition  Orders Placed This Encounter   Procedures   • Diet Order     Standing Status:   Standing     Number of Occurrences:   1     Order Specific Question:   Diet:     Answer:   Regular [1]     Physical Exam   Constitutional: She appears well-developed. No distress.   HENT:   Head: Normocephalic and atraumatic.   Poor dentition   Eyes: EOM are normal. Pupils are equal, round, and reactive to light.   Neck: Normal range of motion.   Cardiovascular: Normal rate and intact distal pulses.    Murmur (old) heard.  Pulmonary/Chest: No respiratory distress. She has no rales.   Abdominal: Soft. Bowel sounds are normal. She exhibits no distension.   Musculoskeletal: Normal range of motion.   Neurological: She is alert. No cranial nerve deficit. She exhibits normal muscle tone.   Cognitive deficits   Skin: Skin is warm. She is not diaphoretic.   Psychiatric: Thought content normal.   Nursing note and vitals reviewed.                                   Assessment/Plan     * Fall- (present on admission)   Assessment & Plan     Was found down in the ground from fall back in 2017. Admitted for failure to thrive, dehydration with acute kidney injury, compression fracture and dementia.  Hospital course complicated by labile blood pressures and dementia requiring placement.  :  MRI LS spine with mild to mod central stensosis L2-3 and L3-4, old T12 compression fracture.  Fall precautions, PT OT evaluation        Hypertension- (present on admission)   Assessment & Plan    On  hypotensive so held BP meds  On  hypertensive. Restarted BP  meds.  12/26:  Stable, dc'd IVFs /hr and lasix 20mg daily.   Continue to monitor            Dementia- (present on admission)   Assessment & Plan    Minimize sedatives when possible.   Await guardianship.  Underwent court 12/19.        Iron deficiency anemia- (present on admission)   Assessment & Plan    iron bid with vit C bid.        Constipation- (present on admission)   Assessment & Plan    Resolved. Continue to monitor        Congestion of upper airway- (present on admission)   Assessment & Plan    Saturating 90% on RA.  O2, RT, IS, Vax, CXR and encouraging taking of PO fluids.    Resolved 12/26.          Physical debility- (present on admission)   Assessment & Plan    PT/OT and increase activity.  Encourage mobilization. Pending guardianship        Obesity (BMI 30.0-34.9)- (present on admission)   Assessment & Plan    PCP follow-up        Bradycardia- (present on admission)   Assessment & Plan    12/31:  HR 55  dc'd metoprolol 12.5 bid. Heart rate is stable, no bradycardia        T12 compression fracture (CMS-HCC)- (present on admission)   Assessment & Plan    CT spine, no acute fractures. Cont fosamax.  Ordered vitamin D 2000 units daily, dc calcium and vit D 50K weekly since no outdoor exposure for sunlight conversion to active vit D.  Stable and pending guardianship  12/31:  Increased neurontin 300 to 600 tid, dc'd oxycodone 2.5 q 6.        History of stroke- (present on admission)   Assessment & Plan     MRI brain 9/2017 with evidence of multiple strokes.  Asa 81 daily  lipitor 80 daily.  BP control        Chronic back pain- (present on admission)   Assessment & Plan    MRI lumbar spine w/o:  Mild to mod stenosis seen  12/31:  increased neurontin 300 tid to 600 tid  12/31 dc'd oxycodone 2.5 po q 4 hours.  Bowel Regimen  Up to chair tid  Encourage ambulation daily              Reviewed items::  Labs reviewed and Medications reviewed  Pink catheter::  No Pink  DVT prophylaxis pharmacological::   Heparin  Ulcer Prophylaxis::  Yes

## 2018-01-03 NOTE — DISCHARGE PLANNING
Received a call from Machelle ([email or web address]) who said that she will make arrangements to pay for a bedhold for the group home. She has already contacted the rep payee.

## 2018-01-03 NOTE — CARE PLAN
Problem: Pain Management  Goal: Pain level will decrease to patient's comfort goal  Outcome: PROGRESSING AS EXPECTED  Patient educated on 0-10 pain scale, PRN pain medications per MAR as well as non-pharmacological forms of pain management. Patient verbalizes understanding and denies any pain or discomfort since the start of shift.    Problem: Urinary Elimination:  Goal: Ability to reestablish a normal urinary elimination pattern will improve  Outcome: PROGRESSING SLOWER THAN EXPECTED  Patient incontinent of urine and stool. Draw sheet/pad being checked during patient rounding and changed as needed. Patient cleaned and dried after incontinent episodes. Barrier cream applied to sacral area.

## 2018-01-03 NOTE — PROGRESS NOTES
Simona Knight Fall Risk Assessment:     Last Known Fall: During the current hospitalization  Mobility: Immobilized/requires assist of one person  Medications: Cardiovascular or central nervous system meds  Mental Status/LOC/Awareness: Oriented to person and place  Toileting Needs: Incontinence  Volume/Electrolyte Status: No problems  Communication/Sensory: Visual (Glasses)/hearing deficit  Behavior: Appropriate behavior  Simona Knight Fall Risk Total: 15  Fall Risk Level: HIGH RISK    Universal Fall Precautions:  call light/belongings in reach, bed in low position and locked, wheelchairs and assistive devices out of sight, siderails up x 2, use non-slip footwear, educate on level of risk, educate to call for assistance, adequate lighting, clutter free and spill free environment    Fall Risk Level Interventions:    TRIAL ([employer] 8, NEURO, MED JENNIE 5) Moderate Fall Risk Interventions  Place yellow fall risk ID band on patient: verified  Provide patient/family education based on risk assessment : verified  Educate patient/family to call staff for assistance when getting out of bed: verified  Place fall precaution signage outside patient door: verified  Utilize bed/chair fall alarm: verifiedTRIAL ([employer] 8, NEURO, MED JENNIE 5) High Fall Risk Interventions  Place yellow fall risk ID band on patient: verified  Provide patient/family education based on risk assessment: verified  Educate patient/family to call staff for assistance when getting out of bed: verified  Place fall precaution signage outside patient door: verified  Place patient in room close to nursing station: verified  Utilize bed/chair fall alarm: verified  Notify charge of high risk for huddle: verified    Patient Specific Interventions:     Medication: not applicable  Mental Status/LOC/Awareness: reorient patient, reinforce falls education, check on patient hourly, utilize bed/chair fall alarm, reinforce the use of call light and provide activity  Toileting:  consider obtaining elevated toilet seat or bedside commode (BSC), provide frquent toileting, monitor intake and output/use of appropriate interventions and do not leave patient unattended in bathroom/refer to toileting scripting  Volume/Electrolyte Status: ensure patient remains hydrated and advance diet as tolerated  Communication/Sensory: update plan of care on whiteboard, provide communication alternatives/, ensure proper positioning when transferrng/ambulating and for visually impaired patients orient to their room surrounding and do not change their surroundings  Behavioral: not applicable  Mobility: schedule physical activity throughout the day, provide comfort measures during transport, dangle prior to standing, utilize bed/chair fall alarm, ensure bed is locked and in lowest position, provide appropriate assistive device, instruct patient to exit bed on their strongest side and collaborate with doctor for possible PT/OT consult

## 2018-01-04 PROCEDURE — 700102 HCHG RX REV CODE 250 W/ 637 OVERRIDE(OP): Performed by: INTERNAL MEDICINE

## 2018-01-04 PROCEDURE — A9270 NON-COVERED ITEM OR SERVICE: HCPCS | Performed by: HOSPITALIST

## 2018-01-04 PROCEDURE — 700102 HCHG RX REV CODE 250 W/ 637 OVERRIDE(OP): Performed by: HOSPITALIST

## 2018-01-04 PROCEDURE — 700111 HCHG RX REV CODE 636 W/ 250 OVERRIDE (IP): Performed by: HOSPITALIST

## 2018-01-04 PROCEDURE — 770006 HCHG ROOM/CARE - MED/SURG/GYN SEMI*

## 2018-01-04 PROCEDURE — A9270 NON-COVERED ITEM OR SERVICE: HCPCS | Performed by: INTERNAL MEDICINE

## 2018-01-04 PROCEDURE — 700101 HCHG RX REV CODE 250: Performed by: FAMILY MEDICINE

## 2018-01-04 PROCEDURE — 700102 HCHG RX REV CODE 250 W/ 637 OVERRIDE(OP): Performed by: FAMILY MEDICINE

## 2018-01-04 PROCEDURE — A9270 NON-COVERED ITEM OR SERVICE: HCPCS | Performed by: FAMILY MEDICINE

## 2018-01-04 PROCEDURE — 99232 SBSQ HOSP IP/OBS MODERATE 35: CPT | Performed by: INTERNAL MEDICINE

## 2018-01-04 RX ADMIN — HEPARIN SODIUM 5000 UNITS: 5000 INJECTION, SOLUTION INTRAVENOUS; SUBCUTANEOUS at 10:28

## 2018-01-04 RX ADMIN — DULOXETINE HYDROCHLORIDE 20 MG: 20 CAPSULE, DELAYED RELEASE ORAL at 10:27

## 2018-01-04 RX ADMIN — GABAPENTIN 600 MG: 300 CAPSULE ORAL at 15:17

## 2018-01-04 RX ADMIN — HYDRALAZINE HYDROCHLORIDE 50 MG: 50 TABLET ORAL at 05:58

## 2018-01-04 RX ADMIN — OXYCODONE HYDROCHLORIDE AND ACETAMINOPHEN 500 MG: 500 TABLET ORAL at 21:00

## 2018-01-04 RX ADMIN — HYDRALAZINE HYDROCHLORIDE 50 MG: 50 TABLET ORAL at 21:00

## 2018-01-04 RX ADMIN — CHOLECALCIFEROL TAB 25 MCG (1000 UNIT) 2000 UNITS: 25 TAB at 10:26

## 2018-01-04 RX ADMIN — BENAZEPRIL HYDROCHLORIDE 40 MG: 20 TABLET, COATED ORAL at 10:27

## 2018-01-04 RX ADMIN — ALENDRONATE SODIUM 10 MG: 10 TABLET ORAL at 10:27

## 2018-01-04 RX ADMIN — GABAPENTIN 600 MG: 300 CAPSULE ORAL at 21:00

## 2018-01-04 RX ADMIN — GABAPENTIN 600 MG: 300 CAPSULE ORAL at 10:26

## 2018-01-04 RX ADMIN — Medication 325 MG: at 18:38

## 2018-01-04 RX ADMIN — OXYBUTYNIN CHLORIDE 5 MG: 5 TABLET, FILM COATED, EXTENDED RELEASE ORAL at 21:00

## 2018-01-04 RX ADMIN — OXYCODONE HYDROCHLORIDE AND ACETAMINOPHEN 500 MG: 500 TABLET ORAL at 10:27

## 2018-01-04 RX ADMIN — HEPARIN SODIUM 5000 UNITS: 5000 INJECTION, SOLUTION INTRAVENOUS; SUBCUTANEOUS at 21:00

## 2018-01-04 RX ADMIN — Medication 325 MG: at 10:28

## 2018-01-04 RX ADMIN — HYDRALAZINE HYDROCHLORIDE 50 MG: 50 TABLET ORAL at 15:17

## 2018-01-04 RX ADMIN — LIDOCAINE 1 PATCH: 50 PATCH CUTANEOUS at 10:34

## 2018-01-04 RX ADMIN — POLYETHYLENE GLYCOL (3350) 1 PACKET: 17 POWDER, FOR SOLUTION ORAL at 10:28

## 2018-01-04 RX ADMIN — ASPIRIN 81 MG: 81 TABLET, COATED ORAL at 10:27

## 2018-01-04 RX ADMIN — ATORVASTATIN CALCIUM 80 MG: 40 TABLET, FILM COATED ORAL at 21:00

## 2018-01-04 ASSESSMENT — PAIN SCALES - GENERAL
PAINLEVEL_OUTOF10: 0
PAINLEVEL_OUTOF10: 0

## 2018-01-04 NOTE — PROGRESS NOTES
Simona Knight Fall Risk Assessment:     Last Known Fall: Within the last six months  Mobility: Immobilized/requires assist of one person  Medications: Cardiovascular or central nervous system meds  Mental Status/LOC/Awareness: Oriented to person and place  Toileting Needs: Incontinence  Volume/Electrolyte Status: No problems  Communication/Sensory: Visual (Glasses)/hearing deficit  Behavior: Appropriate behavior  Simona Knight Fall Risk Total: 13  Fall Risk Level: MODERATE RISK    Universal Fall Precautions:  call light/belongings in reach, bed in low position and locked, wheelchairs and assistive devices out of sight, siderails up x 2, use non-slip footwear, adequate lighting, clutter free and spill free environment, educate on level of risk, educate to call for assistance    Fall Risk Level Interventions:    TRIAL ([employer] 8, NEURO, MED JENNIE 5) Moderate Fall Risk Interventions  Place yellow fall risk ID band on patient: verified  Provide patient/family education based on risk assessment : completed  Educate patient/family to call staff for assistance when getting out of bed: completed  Place fall precaution signage outside patient door: verified  Utilize bed/chair fall alarm: verifiedTRIAL ([employer] 8, NEURO, [employer] JENNIE 5) High Fall Risk Interventions  Place yellow fall risk ID band on patient: verified  Provide patient/family education based on risk assessment: verified  Educate patient/family to call staff for assistance when getting out of bed: verified  Place fall precaution signage outside patient door: verified  Place patient in room close to nursing station: verified  Utilize bed/chair fall alarm: verified  Notify charge of high risk for huddle: verified    Patient Specific Interventions:     Medication: review medications with patient and family  Mental Status/LOC/Awareness: reorient patient and reinforce falls education  Toileting: provide frquent toileting  Volume/Electrolyte Status: ensure patient remains  hydrated  Communication/Sensory: update plan of care on whiteboard  Behavioral: engage patient in daily activities  Mobility: schedule physical activity throughout the day

## 2018-01-04 NOTE — PROGRESS NOTES
Report received. Assumed care, assessment complete. Pt is A & O x 3.  Pt medicated for pain per MAR. Fall precautions and appropriate signs in place.  RN extension number provided. Pt educated regarding fall precautions. Bed alarm in use. Pt denies any additional needs at this time. Call light within reach.

## 2018-01-04 NOTE — CARE PLAN
Problem: Safety  Goal: Will remain free from injury  Outcome: PROGRESSING AS EXPECTED  Bed locked and in low position, call bell in reach, walker provided during ambulation, hourly rounds done.

## 2018-01-04 NOTE — CARE PLAN
Problem: Discharge Barriers/Planning  Goal: Patient's continuum of care needs will be met    Intervention: Collaborate with Transitional Care Team and Interdisciplinary Team to meet discharge needs  Pt expected to DC to group home when cleared by court       Problem: Pain Management  Goal: Pain level will decrease to patient's comfort goal    Intervention: Follow pain managment plan developed in collaboration with patient and Interdisciplinary Team  Head pain managed with prn Tylenol

## 2018-01-04 NOTE — PROGRESS NOTES
[facility] Hospitalist Progress Note    Date of Service: 1/3/2018    Chief Complaint  77 y.o. female admitted 2017 with GLF, pyuria and ATN.    Interval Problem Update  Blood pressures more stable, higher side.  :  States doesn't want to use bedpan.  Urinates on self while in bed.  Ordered for up tid with meals, MRI lumbar spine.  dc'd IVFs and lasix 20 daily.  Normal CMP.  Old T12 compression fracture.  Added neurontin 300 tid, cut back oxycodone to 2.5 q 4 hours prn.  States she has pain from the waist to b/l knees.  :  More alert and energetic today.  MRI lumbar spine after screening xrays performed.  Iron low at 13, started replacement with vit C. Check TSH tomorrow.  dc'd PPI.  dc'd oxycodone this a.m., continue neurontin instead.  :  MRI L/S spine w/o significant stenosis.  Old T12 compression fracture.  :  Explained results of MRI, no surgical intervention needed, but chronic DJD, scoliosis and compression fracture.  Patient states she doesn't want people thinking she is lying about back pain.  Assured her that she has significant back deformities, just does not need surgical correction.    :  Stable VS, awaiting placement.  :  Bradycardia 55, dc'd metoprolol 12.5 bid, increased neurontin 300 to 600 bid for chronic back pain, dc oxycodone 2.5 q 6 hours.  :  Resting, doing well off narcotics.     patient's discharge on hold due to new statutes with legislature, subject to court approval, which can take up to 10 days, then possible group home placement  Vital stable, no overnight events, denies new issues.  3 denies new issues. Vital stable, no overnight events    Consultants/Specialty  Neurology  Psych    DISPO:  Awaiting guardianship placement, group home.  quantiferon gold negative .      Review of Systems   Unable to perform ROS: Mental acuity      Physical Exam  Laboratory/Imaging   Hemodynamics  Temp (24hrs), Av.2 °C (97.1 °F), Min:36.1 °C (97 °F), Max:36.2  °C (97.1 °F)   Temperature: 36.2 °C (97.1 °F)  Pulse  Av.3  Min: 50  Max: 106    Blood Pressure : 113/60      Respiratory      Respiration: 17, Pulse Oximetry: 94 %     Work Of Breathing / Effort: Mild  RUL Breath Sounds: Clear, RML Breath Sounds: Diminished, RLL Breath Sounds: Diminished, ARGELIA Breath Sounds: Clear, LLL Breath Sounds: Diminished    Fluids  No intake or output data in the 24 hours ending 18 1624    Nutrition  Orders Placed This Encounter   Procedures   • Diet Order     Standing Status:   Standing     Number of Occurrences:   1     Order Specific Question:   Diet:     Answer:   Regular [1]     Physical Exam   Constitutional: She appears well-developed. No distress.   HENT:   Head: Normocephalic and atraumatic.   Poor dentition   Eyes: EOM are normal. Pupils are equal, round, and reactive to light.   Neck: Normal range of motion.   Cardiovascular: Normal rate and intact distal pulses.    Murmur (old) heard.  Pulmonary/Chest: No respiratory distress. She has no rales.   Abdominal: Soft. Bowel sounds are normal. She exhibits no distension.   Musculoskeletal: Normal range of motion.   Neurological: She is alert. No cranial nerve deficit. She exhibits normal muscle tone.   Cognitive deficits   Skin: Skin is warm. She is not diaphoretic.   Psychiatric: Thought content normal.   Nursing note and vitals reviewed.                                   Assessment/Plan     * Fall- (present on admission)   Assessment & Plan     Was found down in the ground from fall back in 2017. Admitted for failure to thrive, dehydration with acute kidney injury, compression fracture and dementia.  Hospital course complicated by labile blood pressures and dementia requiring placement.  :  MRI LS spine with mild to mod central stensosis L2-3 and L3-4, old T12 compression fracture.  Fall precautions, PT OT evaluation        Hypertension- (present on admission)   Assessment & Plan    On  hypotensive so  held BP meds  On 12/24 hypertensive. Restarted BP meds.  12/26:  Stable, dc'd IVFs /hr and lasix 20mg daily.   Continue to monitor            Dementia- (present on admission)   Assessment & Plan    Minimize sedatives when possible.   Await guardianship.  Underwent court 12/19.        Iron deficiency anemia- (present on admission)   Assessment & Plan    iron bid with vit C bid.        Constipation- (present on admission)   Assessment & Plan    Resolved. Continue to monitor        Congestion of upper airway- (present on admission)   Assessment & Plan    Saturating 90% on RA.  O2, RT, IS, Vax, CXR and encouraging taking of PO fluids.    Resolved 12/26.          Physical debility- (present on admission)   Assessment & Plan    PT/OT and increase activity.  Encourage mobilization. Pending guardianship        Obesity (BMI 30.0-34.9)- (present on admission)   Assessment & Plan    PCP follow-up        Bradycardia- (present on admission)   Assessment & Plan    12/31:  HR 55  dc'd metoprolol 12.5 bid. Heart rate is stable, no bradycardia        T12 compression fracture (CMS-HCC)- (present on admission)   Assessment & Plan    CT spine, no acute fractures. Cont fosamax.  Ordered vitamin D 2000 units daily, dc calcium and vit D 50K weekly since no outdoor exposure for sunlight conversion to active vit D.  Stable and pending guardianship  12/31:  Increased neurontin 300 to 600 tid, dc'd oxycodone 2.5 q 6.        History of stroke- (present on admission)   Assessment & Plan     MRI brain 9/2017 with evidence of multiple strokes.  Asa 81 daily  lipitor 80 daily.  BP control        Chronic back pain- (present on admission)   Assessment & Plan    MRI lumbar spine w/o:  Mild to mod stenosis seen  12/31:  increased neurontin 300 tid to 600 tid  12/31 dc'd oxycodone 2.5 po q 4 hours.  Bowel Regimen  Up to chair tid  Encourage ambulation daily              Reviewed items::  Labs reviewed and Medications reviewed  Pink catheter::   No Pink  DVT prophylaxis pharmacological::  Heparin  Ulcer Prophylaxis::  Yes

## 2018-01-04 NOTE — PROGRESS NOTES
Simona Knight Fall Risk Assessment:     Last Known Fall: Within the last month  Mobility: Immobilized/requires assist of one person  Medications: Cardiovascular or central nervous system meds  Mental Status/LOC/Awareness: Oriented to person and place  Toileting Needs: Incontinence  Volume/Electrolyte Status: No problems  Communication/Sensory: Visual (Glasses)/hearing deficit  Behavior: Appropriate behavior  Simona Knight Fall Risk Total: 14  Fall Risk Level: MODERATE RISK    Universal Fall Precautions:  call light/belongings in reach, bed in low position and locked, siderails up x 2, use non-slip footwear, wheelchairs and assistive devices out of sight, adequate lighting, educate on level of risk, clutter free and spill free environment, educate to call for assistance    Fall Risk Level Interventions:    TRIAL ([employer] 8, NEURO, MED JENNIE 5) Moderate Fall Risk Interventions  Place yellow fall risk ID band on patient: verified  Provide patient/family education based on risk assessment : completed  Educate patient/family to call staff for assistance when getting out of bed: completed  Place fall precaution signage outside patient door: verified  Utilize bed/chair fall alarm: verifiedTRIAL ([employer] 8, NEURO, [employer] JENNIE 5) High Fall Risk Interventions  Place yellow fall risk ID band on patient: verified  Provide patient/family education based on risk assessment: verified  Educate patient/family to call staff for assistance when getting out of bed: verified  Place fall precaution signage outside patient door: verified  Place patient in room close to nursing station: verified  Utilize bed/chair fall alarm: verified  Notify charge of high risk for huddle: verified    Patient Specific Interventions:     Medication: review medications with patient and family  Mental Status/LOC/Awareness: reorient patient, utilize bed/chair fall alarm and reinforce the use of call light  Toileting: monitor intake and output/use of appropriate  interventions  Volume/Electrolyte Status: ensure patient remains hydrated and monitor abnormal lab values  Communication/Sensory: update plan of care on whiteboard and ensure proper positioning when transferrng/ambulating  Behavioral: administer medication as ordered  Mobility: utilize bed/chair fall alarm, ensure bed is locked and in lowest position and provide appropriate assistive device

## 2018-01-05 PROCEDURE — 700102 HCHG RX REV CODE 250 W/ 637 OVERRIDE(OP): Performed by: HOSPITALIST

## 2018-01-05 PROCEDURE — G8978 MOBILITY CURRENT STATUS: HCPCS | Mod: CJ

## 2018-01-05 PROCEDURE — A9270 NON-COVERED ITEM OR SERVICE: HCPCS | Performed by: HOSPITALIST

## 2018-01-05 PROCEDURE — 700101 HCHG RX REV CODE 250: Performed by: FAMILY MEDICINE

## 2018-01-05 PROCEDURE — 99232 SBSQ HOSP IP/OBS MODERATE 35: CPT | Performed by: INTERNAL MEDICINE

## 2018-01-05 PROCEDURE — 700102 HCHG RX REV CODE 250 W/ 637 OVERRIDE(OP): Performed by: INTERNAL MEDICINE

## 2018-01-05 PROCEDURE — A9270 NON-COVERED ITEM OR SERVICE: HCPCS | Performed by: INTERNAL MEDICINE

## 2018-01-05 PROCEDURE — A9270 NON-COVERED ITEM OR SERVICE: HCPCS | Performed by: FAMILY MEDICINE

## 2018-01-05 PROCEDURE — 97530 THERAPEUTIC ACTIVITIES: CPT

## 2018-01-05 PROCEDURE — 700111 HCHG RX REV CODE 636 W/ 250 OVERRIDE (IP): Performed by: HOSPITALIST

## 2018-01-05 PROCEDURE — 97116 GAIT TRAINING THERAPY: CPT

## 2018-01-05 PROCEDURE — G8979 MOBILITY GOAL STATUS: HCPCS | Mod: CI

## 2018-01-05 PROCEDURE — 700102 HCHG RX REV CODE 250 W/ 637 OVERRIDE(OP): Performed by: FAMILY MEDICINE

## 2018-01-05 PROCEDURE — 770006 HCHG ROOM/CARE - MED/SURG/GYN SEMI*

## 2018-01-05 RX ADMIN — GABAPENTIN 600 MG: 300 CAPSULE ORAL at 22:03

## 2018-01-05 RX ADMIN — Medication 325 MG: at 17:35

## 2018-01-05 RX ADMIN — HEPARIN SODIUM 5000 UNITS: 5000 INJECTION, SOLUTION INTRAVENOUS; SUBCUTANEOUS at 08:31

## 2018-01-05 RX ADMIN — LIDOCAINE 1 PATCH: 50 PATCH CUTANEOUS at 08:31

## 2018-01-05 RX ADMIN — GABAPENTIN 600 MG: 300 CAPSULE ORAL at 13:58

## 2018-01-05 RX ADMIN — HYDRALAZINE HYDROCHLORIDE 50 MG: 50 TABLET ORAL at 13:58

## 2018-01-05 RX ADMIN — Medication 325 MG: at 12:45

## 2018-01-05 RX ADMIN — ALENDRONATE SODIUM 10 MG: 10 TABLET ORAL at 08:30

## 2018-01-05 RX ADMIN — OXYCODONE HYDROCHLORIDE AND ACETAMINOPHEN 500 MG: 500 TABLET ORAL at 22:05

## 2018-01-05 RX ADMIN — Medication 325 MG: at 06:22

## 2018-01-05 RX ADMIN — BENAZEPRIL HYDROCHLORIDE 40 MG: 20 TABLET, COATED ORAL at 08:30

## 2018-01-05 RX ADMIN — GABAPENTIN 600 MG: 300 CAPSULE ORAL at 08:31

## 2018-01-05 RX ADMIN — DULOXETINE HYDROCHLORIDE 20 MG: 20 CAPSULE, DELAYED RELEASE ORAL at 08:31

## 2018-01-05 RX ADMIN — OXYCODONE HYDROCHLORIDE AND ACETAMINOPHEN 500 MG: 500 TABLET ORAL at 08:30

## 2018-01-05 RX ADMIN — CHOLECALCIFEROL TAB 25 MCG (1000 UNIT) 2000 UNITS: 25 TAB at 08:31

## 2018-01-05 RX ADMIN — ATORVASTATIN CALCIUM 80 MG: 40 TABLET, FILM COATED ORAL at 22:03

## 2018-01-05 RX ADMIN — HEPARIN SODIUM 5000 UNITS: 5000 INJECTION, SOLUTION INTRAVENOUS; SUBCUTANEOUS at 22:05

## 2018-01-05 RX ADMIN — HYDRALAZINE HYDROCHLORIDE 50 MG: 50 TABLET ORAL at 06:23

## 2018-01-05 RX ADMIN — STANDARDIZED SENNA CONCENTRATE AND DOCUSATE SODIUM 2 TABLET: 8.6; 5 TABLET, FILM COATED ORAL at 22:04

## 2018-01-05 RX ADMIN — POLYETHYLENE GLYCOL (3350) 1 PACKET: 17 POWDER, FOR SOLUTION ORAL at 08:31

## 2018-01-05 RX ADMIN — OXYBUTYNIN CHLORIDE 5 MG: 5 TABLET, FILM COATED, EXTENDED RELEASE ORAL at 22:05

## 2018-01-05 RX ADMIN — ASPIRIN 81 MG: 81 TABLET, COATED ORAL at 08:30

## 2018-01-05 RX ADMIN — HYDRALAZINE HYDROCHLORIDE 50 MG: 50 TABLET ORAL at 22:02

## 2018-01-05 ASSESSMENT — COGNITIVE AND FUNCTIONAL STATUS - GENERAL
TURNING FROM BACK TO SIDE WHILE IN FLAT BAD: A LITTLE
MOVING TO AND FROM BED TO CHAIR: A LITTLE
SUGGESTED CMS G CODE MODIFIER MOBILITY: CK
MOBILITY SCORE: 18
STANDING UP FROM CHAIR USING ARMS: A LITTLE
WALKING IN HOSPITAL ROOM: A LITTLE
CLIMB 3 TO 5 STEPS WITH RAILING: A LITTLE
MOVING FROM LYING ON BACK TO SITTING ON SIDE OF FLAT BED: A LITTLE

## 2018-01-05 ASSESSMENT — GAIT ASSESSMENTS
ASSISTIVE DEVICE: FRONT WHEEL WALKER
GAIT LEVEL OF ASSIST: STAND BY ASSIST
DEVIATION: DECREASED BASE OF SUPPORT
DISTANCE (FEET): 100

## 2018-01-05 ASSESSMENT — PAIN SCALES - GENERAL
PAINLEVEL_OUTOF10: 0
PAINLEVEL_OUTOF10: 0

## 2018-01-05 NOTE — PROGRESS NOTES
"Assumed care of pt at 1845; pt denies need or complaint. Mild coughing, non productive. Pt states she \"is getting sick.\"  "

## 2018-01-05 NOTE — PROGRESS NOTES
[facility] Hospitalist Progress Note    Date of Service: 1/4/2018    Chief Complaint  77 y.o. female admitted 9/21/2017 with GLF, pyuria and ATN.    Interval Problem Update  Blood pressures more stable, higher side.  12/26:  States doesn't want to use bedpan.  Urinates on self while in bed.  Ordered for up tid with meals, MRI lumbar spine.  dc'd IVFs and lasix 20 daily.  Normal CMP.  Old T12 compression fracture.  Added neurontin 300 tid, cut back oxycodone to 2.5 q 4 hours prn.  States she has pain from the waist to b/l knees.  12/27:  More alert and energetic today.  MRI lumbar spine after screening xrays performed.  Iron low at 13, started replacement with vit C. Check TSH tomorrow.  dc'd PPI.  dc'd oxycodone this a.m., continue neurontin instead.  12/28:  MRI L/S spine w/o significant stenosis.  Old T12 compression fracture.  12/29:  Explained results of MRI, no surgical intervention needed, but chronic DJD, scoliosis and compression fracture.  Patient states she doesn't want people thinking she is lying about back pain.  Assured her that she has significant back deformities, just does not need surgical correction.    12/30:  Stable VS, awaiting placement.  12/31:  Bradycardia 55, dc'd metoprolol 12.5 bid, increased neurontin 300 to 600 bid for chronic back pain, dc oxycodone 2.5 q 6 hours.  1/1:  Resting, doing well off narcotics.    1/2 patient's discharge on hold due to new statutes with legislature, subject to court approval, which can take up to 10 days, then possible group home placement  Vital stable, no overnight events, denies new issues.  1/3 denies new issues. Vital stable, no overnight events  1/4 patient feels pretty good. Denies new issues. Vital stable.    Consultants/Specialty  Neurology  Psych    DISPO:  Awaiting guardianship placement, group home.  quantiferon gold negative 12/27.      Review of Systems   Unable to perform ROS: Mental acuity      Physical Exam  Laboratory/Imaging    Hemodynamics  Temp (24hrs), Av.6 °C (97.9 °F), Min:36.2 °C (97.2 °F), Max:36.9 °C (98.5 °F)   Temperature: 36.6 °C (97.9 °F)  Pulse  Av.3  Min: 50  Max: 106    Blood Pressure : 112/63      Respiratory      Respiration: 18, Pulse Oximetry: 90 %        RML Breath Sounds: Diminished, RLL Breath Sounds: Diminished, LLL Breath Sounds: Diminished    Fluids  No intake or output data in the 24 hours ending 18 1738    Nutrition  Orders Placed This Encounter   Procedures   • Diet Order     Standing Status:   Standing     Number of Occurrences:   1     Order Specific Question:   Diet:     Answer:   Regular [1]     Physical Exam   Constitutional: She appears well-developed. No distress.   HENT:   Head: Normocephalic and atraumatic.   Poor dentition   Eyes: EOM are normal. Pupils are equal, round, and reactive to light.   Neck: Normal range of motion.   Cardiovascular: Normal rate and intact distal pulses.    Murmur (old) heard.  Pulmonary/Chest: No respiratory distress. She has no rales.   Abdominal: Soft. Bowel sounds are normal. She exhibits no distension.   Musculoskeletal: Normal range of motion.   Neurological: She is alert. No cranial nerve deficit. She exhibits normal muscle tone.   Cognitive deficits   Skin: Skin is warm. She is not diaphoretic.   Psychiatric: Thought content normal.   Nursing note and vitals reviewed.                                   Assessment/Plan     * Fall- (present on admission)   Assessment & Plan     Was found down in the ground from fall back in 2017. Admitted for failure to thrive, dehydration with acute kidney injury, compression fracture and dementia.  Hospital course complicated by labile blood pressures and dementia requiring placement.  :  MRI LS spine with mild to mod central stensosis L2-3 and L3-4, old T12 compression fracture.  Fall precautions, PT OT evaluation        Hypertension- (present on admission)   Assessment & Plan    On  hypotensive so  held BP meds  On 12/24 hypertensive. Restarted BP meds.  12/26:  Stable, dc'd IVFs /hr and lasix 20mg daily.   Continue to monitor            Dementia- (present on admission)   Assessment & Plan    Minimize sedatives when possible.   Await guardianship.  Underwent court 12/19.        Iron deficiency anemia- (present on admission)   Assessment & Plan    iron bid with vit C bid.        Constipation- (present on admission)   Assessment & Plan    Resolved. Continue to monitor        Congestion of upper airway- (present on admission)   Assessment & Plan    Saturating 90% on RA.  O2, RT, IS, Vax, CXR and encouraging taking of PO fluids.    Resolved 12/26.          Physical debility- (present on admission)   Assessment & Plan    PT/OT and increase activity.  Encourage mobilization. Pending guardianship        Obesity (BMI 30.0-34.9)- (present on admission)   Assessment & Plan    PCP follow-up        Bradycardia- (present on admission)   Assessment & Plan    12/31:  HR 55  dc'd metoprolol 12.5 bid. Heart rate is stable, no bradycardia        T12 compression fracture (CMS-HCC)- (present on admission)   Assessment & Plan    CT spine, no acute fractures. Cont fosamax.  Ordered vitamin D 2000 units daily, dc calcium and vit D 50K weekly since no outdoor exposure for sunlight conversion to active vit D.  Stable and pending guardianship  12/31:  Increased neurontin 300 to 600 tid, dc'd oxycodone 2.5 q 6.        History of stroke- (present on admission)   Assessment & Plan     MRI brain 9/2017 with evidence of multiple strokes.  Asa 81 daily  lipitor 80 daily.  BP control        Chronic back pain- (present on admission)   Assessment & Plan    MRI lumbar spine w/o:  Mild to mod stenosis seen  12/31:  increased neurontin 300 tid to 600 tid  12/31 dc'd oxycodone 2.5 po q 4 hours.  Bowel Regimen  Up to chair tid  Encourage ambulation daily              Reviewed items::  Labs reviewed and Medications reviewed  Pink catheter::   No Pink  DVT prophylaxis pharmacological::  Heparin  Ulcer Prophylaxis::  Yes

## 2018-01-05 NOTE — PROGRESS NOTES
Assumed pt care after report received from night Rn. Pt is resting comfortably at this time. No c/o pain or distress noted. Call light and belongings in reach. Bed in low position.No IV access, ok per MD. Will continue to monitor.

## 2018-01-05 NOTE — CARE PLAN
Problem: Safety  Goal: Will remain free from injury    Intervention: Provide assistance with mobility  Pt encouraged to call when in need of assistance. Pt verbalizes understanding

## 2018-01-05 NOTE — PROGRESS NOTES
Simona Knight Fall Risk Assessment:     Last Known Fall: Within the last month  Mobility: Immobilized/requires assist of one person  Medications: Cardiovascular or central nervous system meds  Mental Status/LOC/Awareness: Oriented to person and place  Toileting Needs: Incontinence  Volume/Electrolyte Status: No problems  Communication/Sensory: Visual (Glasses)/hearing deficit  Behavior: Appropriate behavior  Simona Knight Fall Risk Total: 14  Fall Risk Level: MODERATE RISK    Universal Fall Precautions:  call light/belongings in reach, bed in low position and locked, siderails up x 2, use non-slip footwear, adequate lighting, educate to call for assistance    Fall Risk Level Interventions:    TRIAL ([employer] 8, NEURO, MED JENNIE 5) Moderate Fall Risk Interventions  Place yellow fall risk ID band on patient: verified  Provide patient/family education based on risk assessment : completed  Educate patient/family to call staff for assistance when getting out of bed: completed  Place fall precaution signage outside patient door: verified  Utilize bed/chair fall alarm: verifiedTRIAL ([employer] 8, NEURO, [employer] JENNIE 5) High Fall Risk Interventions  Place yellow fall risk ID band on patient: verified  Provide patient/family education based on risk assessment: verified  Educate patient/family to call staff for assistance when getting out of bed: verified  Place fall precaution signage outside patient door: verified  Place patient in room close to nursing station: verified  Utilize bed/chair fall alarm: verified  Notify charge of high risk for huddle: verified    Patient Specific Interventions:     Medication: review medications with patient and family and assess for medications that can be discontinued or dosage decreased  Mental Status/LOC/Awareness: reorient patient  Toileting: not applicable  Volume/Electrolyte Status: ensure patient remains hydrated  Communication/Sensory: update plan of care on whiteboard  Behavioral: engage  patient in daily activities  Mobility: schedule physical activity throughout the day, utilize bed/chair fall alarm and ensure bed is locked and in lowest position

## 2018-01-05 NOTE — CARE PLAN
Problem: Infection  Goal: Will remain free from infection  Outcome: PROGRESSING AS EXPECTED  WBC WNL and afebrile. No s/s of infection at this time.

## 2018-01-05 NOTE — PROGRESS NOTES
Simona Knight Fall Risk Assessment:     Last Known Fall: Within the last month  Mobility: Immobilized/requires assist of one person  Medications: Cardiovascular or central nervous system meds  Mental Status/LOC/Awareness: Oriented to person and place  Toileting Needs: Incontinence  Volume/Electrolyte Status: No problems  Communication/Sensory: Visual (Glasses)/hearing deficit  Behavior: Appropriate behavior  Simona Knight Fall Risk Total: 14  Fall Risk Level: MODERATE RISK    Universal Fall Precautions:  call light/belongings in reach, bed in low position and locked, siderails up x 2, use non-slip footwear, adequate lighting, clutter free and spill free environment, educate on level of risk, educate to call for assistance, wheelchairs and assistive devices out of sight    Fall Risk Level Interventions:    TRIAL (deltamethod 8, NEURO, MED JENNIE 5) Moderate Fall Risk Interventions  Place yellow fall risk ID band on patient: verified  Provide patient/family education based on risk assessment : completed  Educate patient/family to call staff for assistance when getting out of bed: completed  Place fall precaution signage outside patient door: verified  Utilize bed/chair fall alarm: verifiedTRIAL (deltamethod 8, NEURO, MED JENNIE 5) High Fall Risk Interventions  Place yellow fall risk ID band on patient: verified  Provide patient/family education based on risk assessment: verified  Educate patient/family to call staff for assistance when getting out of bed: verified  Place fall precaution signage outside patient door: verified  Place patient in room close to nursing station: verified  Utilize bed/chair fall alarm: verified  Notify charge of high risk for huddle: verified    Patient Specific Interventions:     Medication: review medications with patient and family  Mental Status/LOC/Awareness: reorient patient, check on patient hourly, utilize bed/chair fall alarm and reinforce the use of call light  Toileting: monitor intake and  output/use of appropriate interventions  Volume/Electrolyte Status: ensure patient remains hydrated and monitor abnormal lab values  Communication/Sensory: update plan of care on whiteboard  Behavioral: administer medication as ordered  Mobility: utilize bed/chair fall alarm, ensure bed is locked and in lowest position and provide appropriate assistive device

## 2018-01-05 NOTE — THERAPY
"Physical Therapy Treatment completed.   Bed Mobility:  Supine to Sit: Supervised  Transfers: Sit to Stand: Stand by Assist  Gait: Level Of Assist: Stand by Assist with Front-Wheel Walker       Plan of Care: Will benefit from Physical Therapy 2 times per week  Discharge Recommendations: Equipment: Will Continue to Assess for Equipment Needs. Post-acute therapy Discharge to home with outpatient or home health for additional skilled therapy services.     See \"Rehab Therapy-Acute\" Patient Summary Report for complete documentation.     Pt. is progressing as expected, demonstrating improved balance and activity tolerance. Pt. continues to be primarly limited by decereased LE endurance, as pt. reports of fatigue after 120 ft of ambulation and needs to sit. Reports of LLE weakness after total of 200 ft ambulation, no hunter LOB. Needs VC's for saftey awareness when transfering with FWW. Will continue to follow with previous recommendations in place. Awaiting group home placement.   "

## 2018-01-05 NOTE — CARE PLAN
Problem: Venous Thromboembolism (VTW)/Deep Vein Thrombosis (DVT) Prevention:  Goal: Patient will participate in Venous Thrombosis (VTE)/Deep Vein Thrombosis (DVT)Prevention Measures    Intervention: Encourage patient to perform ankle flex, foot rotation, and knee flex exercises in addition to other prophylatic measures every hour while awake  Pt encouraged to perform knee and ankle flexion exercises while laying in bed. Pt verbalizes understanding

## 2018-01-06 PROCEDURE — 700102 HCHG RX REV CODE 250 W/ 637 OVERRIDE(OP): Performed by: INTERNAL MEDICINE

## 2018-01-06 PROCEDURE — 700102 HCHG RX REV CODE 250 W/ 637 OVERRIDE(OP): Performed by: HOSPITALIST

## 2018-01-06 PROCEDURE — A9270 NON-COVERED ITEM OR SERVICE: HCPCS | Performed by: HOSPITALIST

## 2018-01-06 PROCEDURE — 700101 HCHG RX REV CODE 250: Performed by: FAMILY MEDICINE

## 2018-01-06 PROCEDURE — A9270 NON-COVERED ITEM OR SERVICE: HCPCS | Performed by: INTERNAL MEDICINE

## 2018-01-06 PROCEDURE — A9270 NON-COVERED ITEM OR SERVICE: HCPCS | Performed by: FAMILY MEDICINE

## 2018-01-06 PROCEDURE — 770006 HCHG ROOM/CARE - MED/SURG/GYN SEMI*

## 2018-01-06 PROCEDURE — 700102 HCHG RX REV CODE 250 W/ 637 OVERRIDE(OP): Performed by: FAMILY MEDICINE

## 2018-01-06 PROCEDURE — 700111 HCHG RX REV CODE 636 W/ 250 OVERRIDE (IP): Performed by: HOSPITALIST

## 2018-01-06 PROCEDURE — 99232 SBSQ HOSP IP/OBS MODERATE 35: CPT | Performed by: INTERNAL MEDICINE

## 2018-01-06 RX ORDER — GUAIFENESIN 600 MG/1
600 TABLET, EXTENDED RELEASE ORAL EVERY 12 HOURS
Status: DISCONTINUED | OUTPATIENT
Start: 2018-01-06 | End: 2018-01-15 | Stop reason: HOSPADM

## 2018-01-06 RX ADMIN — DULOXETINE HYDROCHLORIDE 20 MG: 20 CAPSULE, DELAYED RELEASE ORAL at 08:18

## 2018-01-06 RX ADMIN — HYDRALAZINE HYDROCHLORIDE 50 MG: 50 TABLET ORAL at 22:11

## 2018-01-06 RX ADMIN — ATORVASTATIN CALCIUM 80 MG: 40 TABLET, FILM COATED ORAL at 22:11

## 2018-01-06 RX ADMIN — Medication 325 MG: at 17:27

## 2018-01-06 RX ADMIN — POLYETHYLENE GLYCOL (3350) 1 PACKET: 17 POWDER, FOR SOLUTION ORAL at 08:20

## 2018-01-06 RX ADMIN — CHOLECALCIFEROL TAB 25 MCG (1000 UNIT) 2000 UNITS: 25 TAB at 08:17

## 2018-01-06 RX ADMIN — HEPARIN SODIUM 5000 UNITS: 5000 INJECTION, SOLUTION INTRAVENOUS; SUBCUTANEOUS at 08:17

## 2018-01-06 RX ADMIN — GUAIFENESIN 600 MG: 600 TABLET, EXTENDED RELEASE ORAL at 10:13

## 2018-01-06 RX ADMIN — GUAIFENESIN 600 MG: 600 TABLET, EXTENDED RELEASE ORAL at 22:10

## 2018-01-06 RX ADMIN — GABAPENTIN 600 MG: 300 CAPSULE ORAL at 14:05

## 2018-01-06 RX ADMIN — HYDRALAZINE HYDROCHLORIDE 50 MG: 50 TABLET ORAL at 06:20

## 2018-01-06 RX ADMIN — HYDRALAZINE HYDROCHLORIDE 50 MG: 50 TABLET ORAL at 14:05

## 2018-01-06 RX ADMIN — STANDARDIZED SENNA CONCENTRATE AND DOCUSATE SODIUM 2 TABLET: 8.6; 5 TABLET, FILM COATED ORAL at 22:11

## 2018-01-06 RX ADMIN — OXYCODONE HYDROCHLORIDE AND ACETAMINOPHEN 500 MG: 500 TABLET ORAL at 22:11

## 2018-01-06 RX ADMIN — ASPIRIN 81 MG: 81 TABLET, COATED ORAL at 08:18

## 2018-01-06 RX ADMIN — Medication 325 MG: at 08:18

## 2018-01-06 RX ADMIN — LIDOCAINE 1 PATCH: 50 PATCH CUTANEOUS at 08:20

## 2018-01-06 RX ADMIN — OXYCODONE HYDROCHLORIDE AND ACETAMINOPHEN 500 MG: 500 TABLET ORAL at 08:18

## 2018-01-06 RX ADMIN — Medication 325 MG: at 11:45

## 2018-01-06 RX ADMIN — ALENDRONATE SODIUM 10 MG: 10 TABLET ORAL at 08:18

## 2018-01-06 RX ADMIN — GABAPENTIN 600 MG: 300 CAPSULE ORAL at 22:10

## 2018-01-06 RX ADMIN — BENAZEPRIL HYDROCHLORIDE 40 MG: 20 TABLET, COATED ORAL at 09:00

## 2018-01-06 RX ADMIN — OXYBUTYNIN CHLORIDE 5 MG: 5 TABLET, FILM COATED, EXTENDED RELEASE ORAL at 22:10

## 2018-01-06 RX ADMIN — HEPARIN SODIUM 5000 UNITS: 5000 INJECTION, SOLUTION INTRAVENOUS; SUBCUTANEOUS at 22:12

## 2018-01-06 RX ADMIN — GABAPENTIN 600 MG: 300 CAPSULE ORAL at 08:18

## 2018-01-06 ASSESSMENT — PAIN SCALES - GENERAL
PAINLEVEL_OUTOF10: 0
PAINLEVEL_OUTOF10: 0

## 2018-01-06 NOTE — PROGRESS NOTES
Received report from day RN. Assumed pt care. Discussed call light/phone system, communication board and POC. Pt is aao to self only. Pt is cooperative and able to follow commands. Pt has a hx of HTN. RN monitors BP closely and administers scheduled BP meds. Pt denies pain. Pt is pending placement. Pt is incontinent, RN and CNA perform bed bath and full bed change. Pt mobility assessed. Pt is a one assist up to the chair. Bed alarm on. Fall precautions in place. Bed is locked and in low position, call light within reach. Treaded slippers in place. Pt needs met at this time. Hourly rounding in place.

## 2018-01-06 NOTE — PROGRESS NOTES
Assumed pt care after report received from night Rn. Pt is resting comfortably at this time. No c/o pain or distress noted. Call light and belongings in reach. Bed in low position. No IV access, ok per MD. Will continue to monitor.

## 2018-01-06 NOTE — PROGRESS NOTES
Simona Knight Fall Risk Assessment:     Last Known Fall: Within the last month  Mobility: Immobilized/requires assist of one person  Medications: Cardiovascular or central nervous system meds  Mental Status/LOC/Awareness: Memory loss/confusion and requires reorienting  Toileting Needs: Incontinence  Volume/Electrolyte Status: No problems  Communication/Sensory: Visual (Glasses)/hearing deficit  Behavior: Appropriate behavior  Simona Knight Fall Risk Total: 16  Fall Risk Level: HIGH RISK    Universal Fall Precautions:  call light/belongings in reach, bed in low position and locked, wheelchairs and assistive devices out of sight, siderails up x 2, use non-slip footwear, adequate lighting, clutter free and spill free environment, educate on level of risk, educate to call for assistance    Fall Risk Level Interventions:    TRIAL ([employer] 8, NEURO, MED JENNIE 5) Moderate Fall Risk Interventions  Place yellow fall risk ID band on patient: verified  Provide patient/family education based on risk assessment : completed  Educate patient/family to call staff for assistance when getting out of bed: completed  Place fall precaution signage outside patient door: verified  Utilize bed/chair fall alarm: verifiedTRIAL ([employer] 8, NEURO, [employer] JENNIE 5) High Fall Risk Interventions  Place yellow fall risk ID band on patient: verified  Provide patient/family education based on risk assessment: completed  Educate patient/family to call staff for assistance when getting out of bed: completed  Place fall precaution signage outside patient door: verified  Place patient in room close to nursing station: currently not available/charge notified  Utilize bed/chair fall alarm: completed  Notify charge of high risk for huddle: completed    Patient Specific Interventions:     Medication: review medications with patient and family  Mental Status/LOC/Awareness: check on patient hourly, utilize bed/chair fall alarm and reinforce the use of call  light  Toileting: monitor intake and output/use of appropriate interventions  Volume/Electrolyte Status: ensure patient remains hydrated and monitor abnormal lab values  Communication/Sensory: update plan of care on whiteboard  Behavioral: administer medication as ordered  Mobility: utilize bed/chair fall alarm, ensure bed is locked and in lowest position and provide appropriate assistive device

## 2018-01-06 NOTE — PROGRESS NOTES
Simona Knight Fall Risk Assessment:     Last Known Fall: Within the last month  Mobility: Immobilized/requires assist of one person  Medications: Cardiovascular or central nervous system meds  Mental Status/LOC/Awareness: Memory loss/confusion and requires reorienting  Toileting Needs: Incontinence  Volume/Electrolyte Status: No problems  Communication/Sensory: Visual (Glasses)/hearing deficit  Behavior: Appropriate behavior  Simona Knight Fall Risk Total: 16  Fall Risk Level: HIGH RISK    Universal Fall Precautions:  call light/belongings in reach, bed in low position and locked, wheelchairs and assistive devices out of sight, siderails up x 2, use non-slip footwear, adequate lighting, clutter free and spill free environment, educate on level of risk, educate to call for assistance    Fall Risk Level Interventions:    TRIAL ([employer] 8, NEURO, MED JENNIE 5) Moderate Fall Risk Interventions  Place yellow fall risk ID band on patient: verified  Provide patient/family education based on risk assessment : completed  Educate patient/family to call staff for assistance when getting out of bed: completed  Place fall precaution signage outside patient door: verified  Utilize bed/chair fall alarm: verifiedTRIAL ([employer] 8, NEURO, [employer] JENNIE 5) High Fall Risk Interventions  Place yellow fall risk ID band on patient: verified  Provide patient/family education based on risk assessment: completed  Educate patient/family to call staff for assistance when getting out of bed: completed  Place fall precaution signage outside patient door: verified  Place patient in room close to nursing station: currently not available/charge notified  Utilize bed/chair fall alarm: verified  Notify charge of high risk for huddle: completed    Patient Specific Interventions:     Medication: review medications with patient and family  Mental Status/LOC/Awareness: reorient patient, reinforce falls education and check on patient hourly  Toileting: do not leave  patient unattended in bathroom/refer to toileting scripting  Volume/Electrolyte Status: ensure patient remains hydrated and monitor abnormal lab values  Communication/Sensory: update plan of care on whiteboard, ensure proper positioning when transferrng/ambulating and ensure patient has glasses/contacts and hearing aids/dentures  Behavioral: encourage patient to voice feelings and administer medication as ordered  Mobility: utilize bed/chair fall alarm, ensure bed is locked and in lowest position and provide appropriate assistive device

## 2018-01-06 NOTE — PROGRESS NOTES
[facility] Hospitalist Progress Note    Date of Service: 1/6/2018    Chief Complaint  77 y.o. female admitted 9/21/2017 with GLF, pyuria and ATN.    Interval Problem Update  Blood pressures more stable, higher side.  12/26:  States doesn't want to use bedpan.  Urinates on self while in bed.  Ordered for up tid with meals, MRI lumbar spine.  dc'd IVFs and lasix 20 daily.  Normal CMP.  Old T12 compression fracture.  Added neurontin 300 tid, cut back oxycodone to 2.5 q 4 hours prn.  States she has pain from the waist to b/l knees.  12/27:  More alert and energetic today.  MRI lumbar spine after screening xrays performed.  Iron low at 13, started replacement with vit C. Check TSH tomorrow.  dc'd PPI.  dc'd oxycodone this a.m., continue neurontin instead.  12/28:  MRI L/S spine w/o significant stenosis.  Old T12 compression fracture.  12/29:  Explained results of MRI, no surgical intervention needed, but chronic DJD, scoliosis and compression fracture.  Patient states she doesn't want people thinking she is lying about back pain.  Assured her that she has significant back deformities, just does not need surgical correction.    12/30:  Stable VS, awaiting placement.  12/31:  Bradycardia 55, dc'd metoprolol 12.5 bid, increased neurontin 300 to 600 bid for chronic back pain, dc oxycodone 2.5 q 6 hours.  1/1:  Resting, doing well off narcotics.    1/2 patient's discharge on hold due to new statutes with legislature, subject to court approval, which can take up to 10 days, then possible group home placement  Vital stable, no overnight events, denies new issues.  1/3 denies new issues. Vital stable, no overnight events  1/4 patient feels pretty good. Denies new issues. Vital stable.  1/5 patient denies any subjective or objective changes in her well-being. Vital stable. No overnight events.  1/6 patient complains of dry cough and generalized fatigue. Started cough syrup. We'll continue to monitor. Vitals  stable.    Consultants/Specialty  Neurology  Psych    DISPO:  Awaiting guardianship placement, group home.  quantiferon gold negative .      Review of Systems   Unable to perform ROS: Mental acuity      Physical Exam  Laboratory/Imaging   Hemodynamics  Temp (24hrs), Av.3 °C (97.3 °F), Min:36.1 °C (97 °F), Max:36.6 °C (97.9 °F)   Temperature: 36.2 °C (97.1 °F)  Pulse  Av.3  Min: 50  Max: 106    Blood Pressure : 133/56      Respiratory      Respiration: 18, Pulse Oximetry: 96 %        RLL Breath Sounds: Diminished, LLL Breath Sounds: Diminished    Fluids    Intake/Output Summary (Last 24 hours) at 18 1332  Last data filed at 18 1800   Gross per 24 hour   Intake              500 ml   Output                0 ml   Net              500 ml       Nutrition  Orders Placed This Encounter   Procedures   • Diet Order     Standing Status:   Standing     Number of Occurrences:   1     Order Specific Question:   Diet:     Answer:   Regular [1]     Physical Exam   Constitutional: She appears well-developed. No distress.   HENT:   Head: Normocephalic and atraumatic.   Poor dentition   Eyes: EOM are normal. Pupils are equal, round, and reactive to light.   Neck: Normal range of motion.   Cardiovascular: Normal rate and intact distal pulses.    Murmur (old) heard.  Pulmonary/Chest: No respiratory distress. She has no rales.   Abdominal: Soft. Bowel sounds are normal. She exhibits no distension.   Musculoskeletal: Normal range of motion.   Neurological: She is alert. No cranial nerve deficit. She exhibits normal muscle tone.   Cognitive deficits   Skin: Skin is warm. She is not diaphoretic.   Psychiatric: Thought content normal.   Nursing note and vitals reviewed.                                   Assessment/Plan     * Fall- (present on admission)   Assessment & Plan     Was found down in the ground from fall back in 2017. Admitted for failure to thrive, dehydration with acute kidney injury,  compression fracture and dementia.  Hospital course complicated by labile blood pressures and dementia requiring placement.  12/27:  MRI LS spine with mild to mod central stensosis L2-3 and L3-4, old T12 compression fracture.  Fall precautions, PT OT evaluation        Hypertension- (present on admission)   Assessment & Plan    On 12/22 hypotensive so held BP meds  On 12/24 hypertensive. Restarted BP meds.  12/26:  Stable, dc'd IVFs /hr and lasix 20mg daily.   Continue to monitor            Dementia- (present on admission)   Assessment & Plan    Minimize sedatives when possible.   Await guardianship.  Underwent court 12/19.        Iron deficiency anemia- (present on admission)   Assessment & Plan    iron bid with vit C bid.        Constipation- (present on admission)   Assessment & Plan    Resolved. Continue to monitor        Congestion of upper airway- (present on admission)   Assessment & Plan    Saturating 90% on RA.  O2, RT, IS, Vax, CXR and encouraging taking of PO fluids.    Resolved 12/26.          Cough   Assessment & Plan    Guaifenesin  For cough started  Continue to monitor        Physical debility- (present on admission)   Assessment & Plan    PT/OT and increase activity.  Encourage mobilization. Pending guardianship        Obesity (BMI 30.0-34.9)- (present on admission)   Assessment & Plan    PCP follow-up        Bradycardia- (present on admission)   Assessment & Plan    12/31:  HR 55  dc'd metoprolol 12.5 bid. Heart rate is stable, no bradycardia        T12 compression fracture (CMS-[facility])- (present on admission)   Assessment & Plan    CT spine, no acute fractures. Cont fosamax.  Ordered vitamin D 2000 units daily, dc calcium and vit D 50K weekly since no outdoor exposure for sunlight conversion to active vit D.  Stable and pending guardianship  12/31:  Increased neurontin 300 to 600 tid, dc'd oxycodone 2.5 q 6.        History of stroke- (present on admission)   Assessment & Plan     MRI brain  9/2017 with evidence of multiple strokes.  Asa 81 daily  lipitor 80 daily.  BP control        Chronic back pain- (present on admission)   Assessment & Plan    MRI lumbar spine w/o:  Mild to mod stenosis seen  12/31:  increased neurontin 300 tid to 600 tid  12/31 dc'd oxycodone 2.5 po q 4 hours.  Bowel Regimen  Up to chair tid  Encourage ambulation daily              Reviewed items::  Labs reviewed and Medications reviewed  Pink catheter::  No Pink  DVT prophylaxis pharmacological::  Heparin  Ulcer Prophylaxis::  Yes

## 2018-01-06 NOTE — PROGRESS NOTES
[facility] Hospitalist Progress Note    Date of Service: 1/5/2018    Chief Complaint  77 y.o. female admitted 9/21/2017 with GLF, pyuria and ATN.    Interval Problem Update  Blood pressures more stable, higher side.  12/26:  States doesn't want to use bedpan.  Urinates on self while in bed.  Ordered for up tid with meals, MRI lumbar spine.  dc'd IVFs and lasix 20 daily.  Normal CMP.  Old T12 compression fracture.  Added neurontin 300 tid, cut back oxycodone to 2.5 q 4 hours prn.  States she has pain from the waist to b/l knees.  12/27:  More alert and energetic today.  MRI lumbar spine after screening xrays performed.  Iron low at 13, started replacement with vit C. Check TSH tomorrow.  dc'd PPI.  dc'd oxycodone this a.m., continue neurontin instead.  12/28:  MRI L/S spine w/o significant stenosis.  Old T12 compression fracture.  12/29:  Explained results of MRI, no surgical intervention needed, but chronic DJD, scoliosis and compression fracture.  Patient states she doesn't want people thinking she is lying about back pain.  Assured her that she has significant back deformities, just does not need surgical correction.    12/30:  Stable VS, awaiting placement.  12/31:  Bradycardia 55, dc'd metoprolol 12.5 bid, increased neurontin 300 to 600 bid for chronic back pain, dc oxycodone 2.5 q 6 hours.  1/1:  Resting, doing well off narcotics.    1/2 patient's discharge on hold due to new statutes with legislature, subject to court approval, which can take up to 10 days, then possible group home placement  Vital stable, no overnight events, denies new issues.  1/3 denies new issues. Vital stable, no overnight events  1/4 patient feels pretty good. Denies new issues. Vital stable.  1/5 patient denies any subjective or objective changes in her well-being. Vital stable. No overnight events.    Consultants/Specialty  Neurology  Psych    DISPO:  Awaiting guardianship placement, group home.  quantiferon gold negative 12/27.       Review of Systems   Unable to perform ROS: Mental acuity      Physical Exam  Laboratory/Imaging   Hemodynamics  Temp (24hrs), Av.1 °C (97 °F), Min:36 °C (96.8 °F), Max:36.2 °C (97.1 °F)   Temperature: 36.1 °C (97 °F)  Pulse  Av.3  Min: 50  Max: 106    Blood Pressure : 109/62      Respiratory      Respiration: 18, Pulse Oximetry: 94 %        RLL Breath Sounds: Diminished, LLL Breath Sounds: Diminished    Fluids    Intake/Output Summary (Last 24 hours) at 18 1755  Last data filed at 18 2100   Gross per 24 hour   Intake              650 ml   Output                0 ml   Net              650 ml       Nutrition  Orders Placed This Encounter   Procedures   • Diet Order     Standing Status:   Standing     Number of Occurrences:   1     Order Specific Question:   Diet:     Answer:   Regular [1]     Physical Exam   Constitutional: She appears well-developed. No distress.   HENT:   Head: Normocephalic and atraumatic.   Poor dentition   Eyes: EOM are normal. Pupils are equal, round, and reactive to light.   Neck: Normal range of motion.   Cardiovascular: Normal rate and intact distal pulses.    Murmur (old) heard.  Pulmonary/Chest: No respiratory distress. She has no rales.   Abdominal: Soft. Bowel sounds are normal. She exhibits no distension.   Musculoskeletal: Normal range of motion.   Neurological: She is alert. No cranial nerve deficit. She exhibits normal muscle tone.   Cognitive deficits   Skin: Skin is warm. She is not diaphoretic.   Psychiatric: Thought content normal.   Nursing note and vitals reviewed.                                   Assessment/Plan     * Fall- (present on admission)   Assessment & Plan     Was found down in the ground from fall back in 2017. Admitted for failure to thrive, dehydration with acute kidney injury, compression fracture and dementia.  Hospital course complicated by labile blood pressures and dementia requiring placement.  :  MRI LS spine with mild  to mod central stensosis L2-3 and L3-4, old T12 compression fracture.  Fall precautions, PT OT evaluation        Hypertension- (present on admission)   Assessment & Plan    On 12/22 hypotensive so held BP meds  On 12/24 hypertensive. Restarted BP meds.  12/26:  Stable, dc'd IVFs /hr and lasix 20mg daily.   Continue to monitor            Dementia- (present on admission)   Assessment & Plan    Minimize sedatives when possible.   Await guardianship.  Underwent court 12/19.        Iron deficiency anemia- (present on admission)   Assessment & Plan    iron bid with vit C bid.        Constipation- (present on admission)   Assessment & Plan    Resolved. Continue to monitor        Congestion of upper airway- (present on admission)   Assessment & Plan    Saturating 90% on RA.  O2, RT, IS, Vax, CXR and encouraging taking of PO fluids.    Resolved 12/26.          Physical debility- (present on admission)   Assessment & Plan    PT/OT and increase activity.  Encourage mobilization. Pending guardianship        Obesity (BMI 30.0-34.9)- (present on admission)   Assessment & Plan    PCP follow-up        Bradycardia- (present on admission)   Assessment & Plan    12/31:  HR 55  dc'd metoprolol 12.5 bid. Heart rate is stable, no bradycardia        T12 compression fracture (CMS-[facility])- (present on admission)   Assessment & Plan    CT spine, no acute fractures. Cont fosamax.  Ordered vitamin D 2000 units daily, dc calcium and vit D 50K weekly since no outdoor exposure for sunlight conversion to active vit D.  Stable and pending guardianship  12/31:  Increased neurontin 300 to 600 tid, dc'd oxycodone 2.5 q 6.        History of stroke- (present on admission)   Assessment & Plan     MRI brain 9/2017 with evidence of multiple strokes.  Asa 81 daily  lipitor 80 daily.  BP control        Chronic back pain- (present on admission)   Assessment & Plan    MRI lumbar spine w/o:  Mild to mod stenosis seen  12/31:  increased neurontin 300 tid to  600 tid  12/31 dc'd oxycodone 2.5 po q 4 hours.  Bowel Regimen  Up to chair tid  Encourage ambulation daily              Reviewed items::  Labs reviewed and Medications reviewed  Pink catheter::  No Pink  DVT prophylaxis pharmacological::  Heparin  Ulcer Prophylaxis::  Yes

## 2018-01-06 NOTE — CARE PLAN
Problem: Mobility  Goal: Risk for activity intolerance will decrease  Outcome: PROGRESSING AS EXPECTED  Pt requires one assist with walker, able to ambulate short distances and tires easily.

## 2018-01-07 PROBLEM — J40 BRONCHITIS: Status: ACTIVE | Noted: 2018-01-07

## 2018-01-07 PROCEDURE — A9270 NON-COVERED ITEM OR SERVICE: HCPCS | Performed by: FAMILY MEDICINE

## 2018-01-07 PROCEDURE — 700101 HCHG RX REV CODE 250: Performed by: FAMILY MEDICINE

## 2018-01-07 PROCEDURE — 700101 HCHG RX REV CODE 250: Performed by: INTERNAL MEDICINE

## 2018-01-07 PROCEDURE — 700102 HCHG RX REV CODE 250 W/ 637 OVERRIDE(OP): Performed by: INTERNAL MEDICINE

## 2018-01-07 PROCEDURE — 700102 HCHG RX REV CODE 250 W/ 637 OVERRIDE(OP): Performed by: HOSPITALIST

## 2018-01-07 PROCEDURE — 99232 SBSQ HOSP IP/OBS MODERATE 35: CPT | Performed by: INTERNAL MEDICINE

## 2018-01-07 PROCEDURE — 94760 N-INVAS EAR/PLS OXIMETRY 1: CPT

## 2018-01-07 PROCEDURE — A9270 NON-COVERED ITEM OR SERVICE: HCPCS | Performed by: HOSPITALIST

## 2018-01-07 PROCEDURE — A9270 NON-COVERED ITEM OR SERVICE: HCPCS | Performed by: INTERNAL MEDICINE

## 2018-01-07 PROCEDURE — 770006 HCHG ROOM/CARE - MED/SURG/GYN SEMI*

## 2018-01-07 PROCEDURE — 94640 AIRWAY INHALATION TREATMENT: CPT

## 2018-01-07 PROCEDURE — 700102 HCHG RX REV CODE 250 W/ 637 OVERRIDE(OP): Performed by: FAMILY MEDICINE

## 2018-01-07 PROCEDURE — 700111 HCHG RX REV CODE 636 W/ 250 OVERRIDE (IP): Performed by: HOSPITALIST

## 2018-01-07 RX ORDER — IPRATROPIUM BROMIDE AND ALBUTEROL SULFATE 2.5; .5 MG/3ML; MG/3ML
3 SOLUTION RESPIRATORY (INHALATION)
Status: COMPLETED | OUTPATIENT
Start: 2018-01-07 | End: 2018-01-07

## 2018-01-07 RX ADMIN — GUAIFENESIN 600 MG: 600 TABLET, EXTENDED RELEASE ORAL at 08:17

## 2018-01-07 RX ADMIN — HYDRALAZINE HYDROCHLORIDE 50 MG: 50 TABLET ORAL at 06:07

## 2018-01-07 RX ADMIN — IPRATROPIUM BROMIDE AND ALBUTEROL SULFATE 3 ML: .5; 3 SOLUTION RESPIRATORY (INHALATION) at 15:31

## 2018-01-07 RX ADMIN — OXYCODONE HYDROCHLORIDE AND ACETAMINOPHEN 500 MG: 500 TABLET ORAL at 08:17

## 2018-01-07 RX ADMIN — Medication 325 MG: at 12:18

## 2018-01-07 RX ADMIN — LIDOCAINE 1 PATCH: 50 PATCH CUTANEOUS at 08:24

## 2018-01-07 RX ADMIN — Medication 325 MG: at 08:17

## 2018-01-07 RX ADMIN — STANDARDIZED SENNA CONCENTRATE AND DOCUSATE SODIUM 2 TABLET: 8.6; 5 TABLET, FILM COATED ORAL at 21:20

## 2018-01-07 RX ADMIN — POLYETHYLENE GLYCOL (3350) 1 PACKET: 17 POWDER, FOR SOLUTION ORAL at 08:16

## 2018-01-07 RX ADMIN — IPRATROPIUM BROMIDE AND ALBUTEROL SULFATE 3 ML: .5; 3 SOLUTION RESPIRATORY (INHALATION) at 11:02

## 2018-01-07 RX ADMIN — ALENDRONATE SODIUM 10 MG: 10 TABLET ORAL at 08:18

## 2018-01-07 RX ADMIN — IPRATROPIUM BROMIDE AND ALBUTEROL SULFATE 3 ML: .5; 3 SOLUTION RESPIRATORY (INHALATION) at 23:57

## 2018-01-07 RX ADMIN — GABAPENTIN 600 MG: 300 CAPSULE ORAL at 08:17

## 2018-01-07 RX ADMIN — OXYCODONE HYDROCHLORIDE AND ACETAMINOPHEN 500 MG: 500 TABLET ORAL at 21:21

## 2018-01-07 RX ADMIN — GUAIFENESIN 600 MG: 600 TABLET, EXTENDED RELEASE ORAL at 21:00

## 2018-01-07 RX ADMIN — ATORVASTATIN CALCIUM 80 MG: 40 TABLET, FILM COATED ORAL at 21:20

## 2018-01-07 RX ADMIN — BENAZEPRIL HYDROCHLORIDE 40 MG: 20 TABLET, COATED ORAL at 09:00

## 2018-01-07 RX ADMIN — DULOXETINE HYDROCHLORIDE 20 MG: 20 CAPSULE, DELAYED RELEASE ORAL at 08:17

## 2018-01-07 RX ADMIN — OXYBUTYNIN CHLORIDE 5 MG: 5 TABLET, FILM COATED, EXTENDED RELEASE ORAL at 21:20

## 2018-01-07 RX ADMIN — ASPIRIN 81 MG: 81 TABLET, COATED ORAL at 08:17

## 2018-01-07 RX ADMIN — GABAPENTIN 600 MG: 300 CAPSULE ORAL at 21:21

## 2018-01-07 RX ADMIN — HEPARIN SODIUM 5000 UNITS: 5000 INJECTION, SOLUTION INTRAVENOUS; SUBCUTANEOUS at 08:16

## 2018-01-07 RX ADMIN — CHOLECALCIFEROL TAB 25 MCG (1000 UNIT) 2000 UNITS: 25 TAB at 09:00

## 2018-01-07 RX ADMIN — HEPARIN SODIUM 5000 UNITS: 5000 INJECTION, SOLUTION INTRAVENOUS; SUBCUTANEOUS at 21:21

## 2018-01-07 RX ADMIN — Medication 325 MG: at 17:21

## 2018-01-07 RX ADMIN — GABAPENTIN 600 MG: 300 CAPSULE ORAL at 17:20

## 2018-01-07 ASSESSMENT — COPD QUESTIONNAIRES
COPD SCREENING SCORE: 4
HAVE YOU SMOKED AT LEAST 100 CIGARETTES IN YOUR ENTIRE LIFE: YES
DURING THE PAST 4 WEEKS HOW MUCH DID YOU FEEL SHORT OF BREATH: NONE/LITTLE OF THE TIME
DO YOU EVER COUGH UP ANY MUCUS OR PHLEGM?: NO/ONLY WITH OCCASIONAL COLDS OR INFECTIONS

## 2018-01-07 ASSESSMENT — LIFESTYLE VARIABLES: EVER_SMOKED: YES

## 2018-01-07 ASSESSMENT — PAIN SCALES - GENERAL
PAINLEVEL_OUTOF10: 0
PAINLEVEL_OUTOF10: 0

## 2018-01-07 NOTE — CARE PLAN
Problem: Skin Integrity  Goal: Risk for impaired skin integrity will decrease    Intervention: Implement precautions to protect skin integrity in collaboration with the interdisciplinary team  Monitor skin, encourage proper diet and encourage activity

## 2018-01-07 NOTE — PROGRESS NOTES
Simona Knight Fall Risk Assessment:     Last Known Fall: Within the last month  Mobility: Immobilized/requires assist of one person  Medications: Cardiovascular or central nervous system meds  Mental Status/LOC/Awareness: Memory loss/confusion and requires reorienting  Toileting Needs: Incontinence  Volume/Electrolyte Status: No problems  Communication/Sensory: Visual (Glasses)/hearing deficit  Behavior: Appropriate behavior  Simona Knight Fall Risk Total: 16  Fall Risk Level: HIGH RISK    Universal Fall Precautions:  call light/belongings in reach, bed in low position and locked, wheelchairs and assistive devices out of sight, siderails up x 2, use non-slip footwear, adequate lighting, clutter free and spill free environment, educate on level of risk, educate to call for assistance    Fall Risk Level Interventions:    TRIAL (iCIMS 8, NEURO, MED JENNIE 5) Moderate Fall Risk Interventions  Place yellow fall risk ID band on patient: verified  Provide patient/family education based on risk assessment : completed  Educate patient/family to call staff for assistance when getting out of bed: completed  Place fall precaution signage outside patient door: verified  Utilize bed/chair fall alarm: verifiedTRIAL (iCIMS 8, NEURO, [employer] JENNIE 5) High Fall Risk Interventions  Place yellow fall risk ID band on patient: verified  Provide patient/family education based on risk assessment: completed  Educate patient/family to call staff for assistance when getting out of bed: completed  Place fall precaution signage outside patient door: verified  Place patient in room close to nursing station: currently not available/charge notified  Utilize bed/chair fall alarm: verified  Notify charge of high risk for huddle: completed    Patient Specific Interventions:     Medication: review medications with patient and family  Mental Status/LOC/Awareness: reorient patient, reinforce falls education and check on patient hourly  Toileting: do not leave  patient unattended in bathroom/refer to toileting scripting  Volume/Electrolyte Status: ensure patient remains hydrated and monitor abnormal lab values  Communication/Sensory: update plan of care on whiteboard, ensure proper positioning when transferrng/ambulating and ensure patient has glasses/contacts and hearing aids/dentures  Behavioral: encourage patient to voice feelings and administer medication as ordered  Mobility: utilize bed/chair fall alarm, ensure bed is locked and in lowest position and provide appropriate assistive device

## 2018-01-07 NOTE — PROGRESS NOTES
[facility]ist Progress Note    Date of Service: 1/7/2018    Chief Complaint  77 y.o. female admitted 9/21/2017 with GLF, pyuria and ATN.    Interval Problem Update  Blood pressures more stable, higher side.  12/26:  States doesn't want to use bedpan.  Urinates on self while in bed.  Ordered for up tid with meals, MRI lumbar spine.  dc'd IVFs and lasix 20 daily.  Normal CMP.  Old T12 compression fracture.  Added neurontin 300 tid, cut back oxycodone to 2.5 q 4 hours prn.  States she has pain from the waist to b/l knees.  12/27:  More alert and energetic today.  MRI lumbar spine after screening xrays performed.  Iron low at 13, started replacement with vit C. Check TSH tomorrow.  dc'd PPI.  dc'd oxycodone this a.m., continue neurontin instead.  12/28:  MRI L/S spine w/o significant stenosis.  Old T12 compression fracture.  12/29:  Explained results of MRI, no surgical intervention needed, but chronic DJD, scoliosis and compression fracture.  Patient states she doesn't want people thinking she is lying about back pain.  Assured her that she has significant back deformities, just does not need surgical correction.    12/30:  Stable VS, awaiting placement.  12/31:  Bradycardia 55, dc'd metoprolol 12.5 bid, increased neurontin 300 to 600 bid for chronic back pain, dc oxycodone 2.5 q 6 hours.  1/1:  Resting, doing well off narcotics.    1/2 patient's discharge on hold due to new statutes with legislature, subject to court approval, which can take up to 10 days, then possible group home placement  Vital stable, no overnight events, denies new issues.  1/3 denies new issues. Vital stable, no overnight events  1/4 patient feels pretty good. Denies new issues. Vital stable.  1/5 patient denies any subjective or objective changes in her well-being. Vital stable. No overnight events.  1/6 patient complains of dry cough and generalized fatigue. Started cough syrup. We'll continue to monitor. Vitals stable.  1/7 cough  improved. On auscultation there are mild bilateral wheezes on expiration.  Ordered RT&bronchodilators. Vital stable.    Consultants/Specialty  Neurology  Psych    DISPO:  Awaiting guardianship placement, group home.  quantiferon gold negative .      Review of Systems   Unable to perform ROS: Mental acuity      Physical Exam  Laboratory/Imaging   Hemodynamics  Temp (24hrs), Av.4 °C (97.6 °F), Min:36.2 °C (97.2 °F), Max:36.8 °C (98.2 °F)   Temperature: 36.3 °C (97.3 °F)  Pulse  Av.4  Min: 50  Max: 106    Blood Pressure : 145/62      Respiratory      Respiration: 18, Pulse Oximetry: 93 %, O2 Daily Delivery Respiratory : Room Air with O2 Available     Work Of Breathing / Effort: Mild  RUL Breath Sounds: Diminished, RML Breath Sounds: Diminished, RLL Breath Sounds: Diminished, ARGELIA Breath Sounds: Diminished, LLL Breath Sounds: Diminished    Fluids    Intake/Output Summary (Last 24 hours) at 18 1247  Last data filed at 18 0900   Gross per 24 hour   Intake              740 ml   Output                0 ml   Net              740 ml       Nutrition  Orders Placed This Encounter   Procedures   • Diet Order     Standing Status:   Standing     Number of Occurrences:   1     Order Specific Question:   Diet:     Answer:   Regular [1]     Physical Exam   Constitutional: She appears well-developed. No distress.   HENT:   Head: Normocephalic and atraumatic.   Poor dentition   Eyes: EOM are normal. Pupils are equal, round, and reactive to light.   Neck: Normal range of motion.   Cardiovascular: Normal rate and intact distal pulses.    Murmur (old) heard.  Pulmonary/Chest: No respiratory distress. She has wheezes. She has no rales.   Abdominal: Soft. Bowel sounds are normal. She exhibits no distension.   Musculoskeletal: Normal range of motion.   Neurological: She is alert. No cranial nerve deficit. She exhibits normal muscle tone.   Cognitive deficits   Skin: Skin is warm. She is not diaphoretic.    Psychiatric: Thought content normal.   Nursing note and vitals reviewed.                                   Assessment/Plan     * Fall- (present on admission)   Assessment & Plan     Was found down in the ground from fall back in September 2017. Admitted for failure to thrive, dehydration with acute kidney injury, compression fracture and dementia.  Hospital course complicated by labile blood pressures and dementia requiring placement.  12/27:  MRI LS spine with mild to mod central stensosis L2-3 and L3-4, old T12 compression fracture.  Fall precautions, PT OT evaluation        Hypertension- (present on admission)   Assessment & Plan    On 12/22 hypotensive so held BP meds  On 12/24 hypertensive. Restarted BP meds.  12/26:  Stable, dc'd IVFs /hr and lasix 20mg daily.   Continue to monitor            Dementia- (present on admission)   Assessment & Plan    Minimize sedatives when possible.   Await guardianship.  Underwent court 12/19.        Bronchitis   Overview    RTC with bronchodilators  Mucinex     Iron deficiency anemia- (present on admission)   Assessment & Plan    iron bid with vit C bid.        Constipation- (present on admission)   Assessment & Plan    Resolved. Continue to monitor        Congestion of upper airway- (present on admission)   Assessment & Plan    Saturating 90% on RA.  O2, RT, IS, Vax, CXR and encouraging taking of PO fluids.    Resolved 12/26.          Cough   Assessment & Plan    Guaifenesin  For cough started  Continue to monitor        Physical debility- (present on admission)   Assessment & Plan    PT/OT and increase activity.  Encourage mobilization. Pending guardianship        Obesity (BMI 30.0-34.9)- (present on admission)   Assessment & Plan    PCP follow-up        Bradycardia- (present on admission)   Assessment & Plan    12/31:  HR 55  dc'd metoprolol 12.5 bid. Heart rate is stable, no bradycardia        T12 compression fracture (CMS-[facility])- (present on admission)   Assessment  & Plan    CT spine, no acute fractures. Cont fosamax.  Ordered vitamin D 2000 units daily, dc calcium and vit D 50K weekly since no outdoor exposure for sunlight conversion to active vit D.  Stable and pending guardianship  12/31:  Increased neurontin 300 to 600 tid, dc'd oxycodone 2.5 q 6.        History of stroke- (present on admission)   Assessment & Plan     MRI brain 9/2017 with evidence of multiple strokes.  Asa 81 daily  lipitor 80 daily.  BP control        Chronic back pain- (present on admission)   Assessment & Plan    MRI lumbar spine w/o:  Mild to mod stenosis seen  12/31:  increased neurontin 300 tid to 600 tid  12/31 dc'd oxycodone 2.5 po q 4 hours.  Bowel Regimen  Up to chair tid  Encourage ambulation daily              Reviewed items::  Labs reviewed and Medications reviewed  Pink catheter::  No Pink  DVT prophylaxis pharmacological::  Heparin  Ulcer Prophylaxis::  Yes

## 2018-01-07 NOTE — PROGRESS NOTES
Pt resting in bed. Assessment completed. Plan of care discussed. Pt states all needs are met. AM medications administered, see MAR. Bed in the lowest, position, call light in reach. Hourly rounding in place

## 2018-01-07 NOTE — PROGRESS NOTES
Simona Knight Fall Risk Assessment:     Last Known Fall: Within the last month  Mobility: Immobilized/requires assist of one person  Medications: Cardiovascular or central nervous system meds  Mental Status/LOC/Awareness: Memory loss/confusion and requires reorienting  Toileting Needs: Incontinence  Volume/Electrolyte Status: No problems  Communication/Sensory: Visual (Glasses)/hearing deficit  Behavior: Appropriate behavior  Simona Knight Fall Risk Total: 16  Fall Risk Level: HIGH RISK    Universal Fall Precautions:  call light/belongings in reach, bed in low position and locked, wheelchairs and assistive devices out of sight, siderails up x 2, use non-slip footwear, adequate lighting, clutter free and spill free environment, educate on level of risk, educate to call for assistance    Fall Risk Level Interventions:    TRIAL ([employer] 8, NEURO, MED JENNIE 5) Moderate Fall Risk Interventions  Place yellow fall risk ID band on patient: verified  Provide patient/family education based on risk assessment : completed  Educate patient/family to call staff for assistance when getting out of bed: completed  Place fall precaution signage outside patient door: verified  Utilize bed/chair fall alarm: verifiedTRIAL ([employer] 8, NEURO, [employer] JENNIE 5) High Fall Risk Interventions  Place yellow fall risk ID band on patient: verified  Provide patient/family education based on risk assessment: completed  Educate patient/family to call staff for assistance when getting out of bed: completed  Place fall precaution signage outside patient door: verified  Place patient in room close to nursing station: currently not available/charge notified  Utilize bed/chair fall alarm: verified  Notify charge of high risk for huddle: completed    Patient Specific Interventions:     Medication: review medications with patient and family  Mental Status/LOC/Awareness: reinforce the use of call light  Toileting: monitor intake and output/use of appropriate  interventions  Volume/Electrolyte Status: ensure patient remains hydrated  Communication/Sensory: update plan of care on whiteboard  Behavioral: instruct/reinforce fall program rationale  Mobility: ensure bed is locked and in lowest position

## 2018-01-08 PROCEDURE — 700102 HCHG RX REV CODE 250 W/ 637 OVERRIDE(OP): Performed by: INTERNAL MEDICINE

## 2018-01-08 PROCEDURE — 700101 HCHG RX REV CODE 250: Performed by: FAMILY MEDICINE

## 2018-01-08 PROCEDURE — A9270 NON-COVERED ITEM OR SERVICE: HCPCS | Performed by: INTERNAL MEDICINE

## 2018-01-08 PROCEDURE — 700102 HCHG RX REV CODE 250 W/ 637 OVERRIDE(OP): Performed by: HOSPITALIST

## 2018-01-08 PROCEDURE — 99231 SBSQ HOSP IP/OBS SF/LOW 25: CPT | Performed by: INTERNAL MEDICINE

## 2018-01-08 PROCEDURE — 770006 HCHG ROOM/CARE - MED/SURG/GYN SEMI*

## 2018-01-08 PROCEDURE — 700102 HCHG RX REV CODE 250 W/ 637 OVERRIDE(OP): Performed by: FAMILY MEDICINE

## 2018-01-08 PROCEDURE — 700111 HCHG RX REV CODE 636 W/ 250 OVERRIDE (IP): Performed by: HOSPITALIST

## 2018-01-08 PROCEDURE — A9270 NON-COVERED ITEM OR SERVICE: HCPCS | Performed by: HOSPITALIST

## 2018-01-08 PROCEDURE — A9270 NON-COVERED ITEM OR SERVICE: HCPCS | Performed by: FAMILY MEDICINE

## 2018-01-08 RX ADMIN — STANDARDIZED SENNA CONCENTRATE AND DOCUSATE SODIUM 2 TABLET: 8.6; 5 TABLET, FILM COATED ORAL at 20:49

## 2018-01-08 RX ADMIN — CHOLECALCIFEROL TAB 25 MCG (1000 UNIT) 2000 UNITS: 25 TAB at 09:43

## 2018-01-08 RX ADMIN — ASPIRIN 81 MG: 81 TABLET, COATED ORAL at 09:43

## 2018-01-08 RX ADMIN — GABAPENTIN 600 MG: 300 CAPSULE ORAL at 14:24

## 2018-01-08 RX ADMIN — LIDOCAINE 1 PATCH: 50 PATCH CUTANEOUS at 09:40

## 2018-01-08 RX ADMIN — HEPARIN SODIUM 5000 UNITS: 5000 INJECTION, SOLUTION INTRAVENOUS; SUBCUTANEOUS at 20:50

## 2018-01-08 RX ADMIN — DULOXETINE HYDROCHLORIDE 20 MG: 20 CAPSULE, DELAYED RELEASE ORAL at 09:43

## 2018-01-08 RX ADMIN — Medication 325 MG: at 12:37

## 2018-01-08 RX ADMIN — GUAIFENESIN 600 MG: 600 TABLET, EXTENDED RELEASE ORAL at 09:43

## 2018-01-08 RX ADMIN — ALENDRONATE SODIUM 10 MG: 10 TABLET ORAL at 09:44

## 2018-01-08 RX ADMIN — GUAIFENESIN 600 MG: 600 TABLET, EXTENDED RELEASE ORAL at 20:49

## 2018-01-08 RX ADMIN — HYDRALAZINE HYDROCHLORIDE 50 MG: 50 TABLET ORAL at 05:58

## 2018-01-08 RX ADMIN — ATORVASTATIN CALCIUM 80 MG: 40 TABLET, FILM COATED ORAL at 20:49

## 2018-01-08 RX ADMIN — HYDRALAZINE HYDROCHLORIDE 50 MG: 50 TABLET ORAL at 20:49

## 2018-01-08 RX ADMIN — OXYBUTYNIN CHLORIDE 5 MG: 5 TABLET, FILM COATED, EXTENDED RELEASE ORAL at 20:49

## 2018-01-08 RX ADMIN — Medication 325 MG: at 09:43

## 2018-01-08 RX ADMIN — POLYETHYLENE GLYCOL (3350) 1 PACKET: 17 POWDER, FOR SOLUTION ORAL at 09:41

## 2018-01-08 RX ADMIN — Medication 325 MG: at 18:37

## 2018-01-08 RX ADMIN — GABAPENTIN 600 MG: 300 CAPSULE ORAL at 09:45

## 2018-01-08 RX ADMIN — OXYCODONE HYDROCHLORIDE AND ACETAMINOPHEN 500 MG: 500 TABLET ORAL at 20:49

## 2018-01-08 RX ADMIN — GABAPENTIN 600 MG: 300 CAPSULE ORAL at 20:49

## 2018-01-08 RX ADMIN — OXYCODONE HYDROCHLORIDE AND ACETAMINOPHEN 500 MG: 500 TABLET ORAL at 09:45

## 2018-01-08 RX ADMIN — HEPARIN SODIUM 5000 UNITS: 5000 INJECTION, SOLUTION INTRAVENOUS; SUBCUTANEOUS at 09:40

## 2018-01-08 RX ADMIN — BENAZEPRIL HYDROCHLORIDE 40 MG: 20 TABLET, COATED ORAL at 09:44

## 2018-01-08 ASSESSMENT — PAIN SCALES - GENERAL
PAINLEVEL_OUTOF10: 0
PAINLEVEL_OUTOF10: 6

## 2018-01-09 PROCEDURE — A9270 NON-COVERED ITEM OR SERVICE: HCPCS | Performed by: INTERNAL MEDICINE

## 2018-01-09 PROCEDURE — G8980 MOBILITY D/C STATUS: HCPCS | Mod: CI

## 2018-01-09 PROCEDURE — A9270 NON-COVERED ITEM OR SERVICE: HCPCS | Performed by: HOSPITALIST

## 2018-01-09 PROCEDURE — A9270 NON-COVERED ITEM OR SERVICE: HCPCS | Performed by: FAMILY MEDICINE

## 2018-01-09 PROCEDURE — 700102 HCHG RX REV CODE 250 W/ 637 OVERRIDE(OP): Performed by: INTERNAL MEDICINE

## 2018-01-09 PROCEDURE — 700102 HCHG RX REV CODE 250 W/ 637 OVERRIDE(OP): Performed by: FAMILY MEDICINE

## 2018-01-09 PROCEDURE — G8979 MOBILITY GOAL STATUS: HCPCS | Mod: CI

## 2018-01-09 PROCEDURE — 97530 THERAPEUTIC ACTIVITIES: CPT

## 2018-01-09 PROCEDURE — 700102 HCHG RX REV CODE 250 W/ 637 OVERRIDE(OP): Performed by: HOSPITALIST

## 2018-01-09 PROCEDURE — 770006 HCHG ROOM/CARE - MED/SURG/GYN SEMI*

## 2018-01-09 PROCEDURE — 97535 SELF CARE MNGMENT TRAINING: CPT

## 2018-01-09 PROCEDURE — 700101 HCHG RX REV CODE 250: Performed by: FAMILY MEDICINE

## 2018-01-09 PROCEDURE — 99231 SBSQ HOSP IP/OBS SF/LOW 25: CPT | Performed by: HOSPITALIST

## 2018-01-09 PROCEDURE — 700111 HCHG RX REV CODE 636 W/ 250 OVERRIDE (IP): Performed by: HOSPITALIST

## 2018-01-09 RX ADMIN — ASPIRIN 81 MG: 81 TABLET, COATED ORAL at 10:11

## 2018-01-09 RX ADMIN — GABAPENTIN 600 MG: 300 CAPSULE ORAL at 22:09

## 2018-01-09 RX ADMIN — OXYCODONE HYDROCHLORIDE AND ACETAMINOPHEN 500 MG: 500 TABLET ORAL at 10:11

## 2018-01-09 RX ADMIN — GUAIFENESIN 600 MG: 600 TABLET, EXTENDED RELEASE ORAL at 09:00

## 2018-01-09 RX ADMIN — STANDARDIZED SENNA CONCENTRATE AND DOCUSATE SODIUM 2 TABLET: 8.6; 5 TABLET, FILM COATED ORAL at 22:09

## 2018-01-09 RX ADMIN — HYDRALAZINE HYDROCHLORIDE 50 MG: 50 TABLET ORAL at 22:09

## 2018-01-09 RX ADMIN — OXYCODONE HYDROCHLORIDE AND ACETAMINOPHEN 500 MG: 500 TABLET ORAL at 22:09

## 2018-01-09 RX ADMIN — DULOXETINE HYDROCHLORIDE 20 MG: 20 CAPSULE, DELAYED RELEASE ORAL at 10:11

## 2018-01-09 RX ADMIN — Medication 325 MG: at 12:41

## 2018-01-09 RX ADMIN — GUAIFENESIN 600 MG: 600 TABLET, EXTENDED RELEASE ORAL at 22:09

## 2018-01-09 RX ADMIN — Medication 325 MG: at 10:11

## 2018-01-09 RX ADMIN — POLYETHYLENE GLYCOL (3350) 1 PACKET: 17 POWDER, FOR SOLUTION ORAL at 10:10

## 2018-01-09 RX ADMIN — CHOLECALCIFEROL TAB 25 MCG (1000 UNIT) 2000 UNITS: 25 TAB at 10:10

## 2018-01-09 RX ADMIN — OXYBUTYNIN CHLORIDE 5 MG: 5 TABLET, FILM COATED, EXTENDED RELEASE ORAL at 22:09

## 2018-01-09 RX ADMIN — BENAZEPRIL HYDROCHLORIDE 40 MG: 20 TABLET, COATED ORAL at 10:10

## 2018-01-09 RX ADMIN — HYDRALAZINE HYDROCHLORIDE 50 MG: 50 TABLET ORAL at 15:07

## 2018-01-09 RX ADMIN — ALENDRONATE SODIUM 10 MG: 10 TABLET ORAL at 10:10

## 2018-01-09 RX ADMIN — GABAPENTIN 600 MG: 300 CAPSULE ORAL at 15:07

## 2018-01-09 RX ADMIN — HEPARIN SODIUM 5000 UNITS: 5000 INJECTION, SOLUTION INTRAVENOUS; SUBCUTANEOUS at 22:09

## 2018-01-09 RX ADMIN — HEPARIN SODIUM 5000 UNITS: 5000 INJECTION, SOLUTION INTRAVENOUS; SUBCUTANEOUS at 10:13

## 2018-01-09 RX ADMIN — GABAPENTIN 600 MG: 300 CAPSULE ORAL at 10:12

## 2018-01-09 RX ADMIN — ATORVASTATIN CALCIUM 80 MG: 40 TABLET, FILM COATED ORAL at 22:09

## 2018-01-09 RX ADMIN — Medication 325 MG: at 18:48

## 2018-01-09 RX ADMIN — HYDRALAZINE HYDROCHLORIDE 50 MG: 50 TABLET ORAL at 06:35

## 2018-01-09 ASSESSMENT — COGNITIVE AND FUNCTIONAL STATUS - GENERAL
TURNING FROM BACK TO SIDE WHILE IN FLAT BAD: A LITTLE
CLIMB 3 TO 5 STEPS WITH RAILING: A LITTLE
STANDING UP FROM CHAIR USING ARMS: A LITTLE
MOVING TO AND FROM BED TO CHAIR: A LITTLE
MOBILITY SCORE: 18
SUGGESTED CMS G CODE MODIFIER MOBILITY: CK
WALKING IN HOSPITAL ROOM: A LITTLE
MOVING FROM LYING ON BACK TO SITTING ON SIDE OF FLAT BED: A LITTLE

## 2018-01-09 ASSESSMENT — PAIN SCALES - GENERAL: PAINLEVEL_OUTOF10: 6

## 2018-01-09 ASSESSMENT — GAIT ASSESSMENTS
ASSISTIVE DEVICE: FRONT WHEEL WALKER
GAIT LEVEL OF ASSIST: STAND BY ASSIST
DISTANCE (FEET): 200
DEVIATION: OTHER (COMMENT)

## 2018-01-09 NOTE — THERAPY
"Physical Therapy Treatment completed.   Bed Mobility:  Supine to Sit: Supervised  Transfers: Sit to Stand: Stand by Assist  Gait: Level Of Assist: Stand by Assist with Front-Wheel Walker       Plan of Care: Patient with no further skilled PT needs in the acute care setting at this time  Discharge Recommendations: Equipment: needs FWW for ambulation though recs dependent on d/c environment. Will be available for d/c recs though no skilled acute PT needs unless pt as change in functional status    Pt progressing well as expected. Antcipate at this time pt is close to functional baseline. She reports ambulating in hallways with staff and appears aware of need for FWW with ambulation. Her primary deficits at this time appear to be related to her cognitive impairements > functional deficits and would defer d/c recs to OT re: IADL/ADL management and SLP for cognitive recommendations as needed. Anticipate pt would be best served in group home environment given her current/baseline cognition. She is functionally capable of d/c, though needs constant SBA/Spv for wayfinding/environmental awareness and likely ADL/IADL management. She has no further skilled acute PT needs and should be mobilizing with medical staff as able.     See \"Rehab Therapy-Acute\" Patient Summary Report for complete documentation.       "

## 2018-01-09 NOTE — PROGRESS NOTES
Report received. Assumed care at 0700, assessment complete. Patient is resting.Plan of care for the day updated on whiteboard. Bed in lowest position. Call light within reach. Patient provided RN and CNA extension.

## 2018-01-09 NOTE — PROGRESS NOTES
Assumed patient care at 1900. POC discussed w/ day nurse and pt, pt in agreement w/ goals. Pt AOx3, reoriented to time. Pt states chronic pain in lower back, denies intervention, states Lidocaine patch does not work. Pt denies nausea. Pt up one assist, FWW. Incontinent at times, linens kept clean and dry. Redness noted to bilateral heels, blanching, floated on pillows. Waffle overlay in use. Continues to have congested cough, clear lung sounds, demonstrated IS correctly for this RN.  Patient educated on use of call light, hourly rounding, and pain scale. Personal possession and call light within reach. Bed alarm on.

## 2018-01-09 NOTE — PROGRESS NOTES
[facility]    Date of Service: 2018    Chief Complaint  77 y.o. female admitted 2017 with GLF, pyuria and ATN.     Interval Problem Update  No acute overnight events  Pending placement    Consultants/Specialty  Neurology  Psychiatry    Disposition  Awaiting for guardianship      Review of Systems   Unable to perform ROS: Mental acuity      Physical Exam  Laboratory/Imaging   Hemodynamics  Temp (24hrs), Av.3 °C (97.3 °F), Min:36.1 °C (97 °F), Max:36.5 °C (97.7 °F)   Temperature: 36.1 °C (97 °F)  Pulse  Av.6  Min: 50  Max: 106    Blood Pressure : 145/67      Respiratory      Respiration: 20, Pulse Oximetry: 94 %     Work Of Breathing / Effort: Mild  RUL Breath Sounds: Diminished, RML Breath Sounds: Diminished, RLL Breath Sounds: Diminished, ARGELIA Breath Sounds: Diminished, LLL Breath Sounds: Diminished    Fluids    Intake/Output Summary (Last 24 hours) at 18 0827  Last data filed at 18 1300   Gross per 24 hour   Intake              480 ml   Output                0 ml   Net              480 ml       Nutrition  Orders Placed This Encounter   Procedures   • Diet Order     Standing Status:   Standing     Number of Occurrences:   1     Order Specific Question:   Diet:     Answer:   Regular [1]     Physical Exam   Constitutional: She appears well-developed and well-nourished. No distress.   HENT:   Head: Normocephalic and atraumatic.   Poor dentition   Eyes: EOM are normal. Pupils are equal, round, and reactive to light.   Neck: Normal range of motion.   Cardiovascular: Normal rate and intact distal pulses.    Murmur (old) heard.  Pulmonary/Chest: No respiratory distress. She has no wheezes. She has no rales.   Abdominal: Soft. Bowel sounds are normal. She exhibits no distension.   Musculoskeletal: Normal range of motion.   Neurological: She is alert. No cranial nerve deficit. She exhibits normal muscle tone.   Skin: Skin is warm. She is not diaphoretic.   Psychiatric:  Thought content normal.   Nursing note and vitals reviewed.                                   Assessment/Plan     * Fall- (present on admission)   Assessment & Plan    MRI LS spine with mild to mod central stensosis L2-3 and L3-4, old T12 compression fracture.  Fall precautions        Hypertension- (present on admission)   Assessment & Plan    Controlled continue Lotensin and hydralazine.            Dementia- (present on admission)   Assessment & Plan    Minimize sedatives   Awaiting for guardianship        Bronchitis   Overview    RTC with bronchodilators  Mucinex     Assessment & Plan    No issues  Continue RT protocol, duo nebs, Pep therapy if warranted, and incentive spirometry.           Iron deficiency anemia- (present on admission)   Assessment & Plan    Continue with by mouth iron        Constipation- (present on admission)   Assessment & Plan    Resolved continue bowel protocol        Congestion of upper airway- (present on admission)   Assessment & Plan    Resolved no issues          Cough   Assessment & Plan    Guaifenesin when necessary         Physical debility- (present on admission)   Assessment & Plan    Encourage ambulation with the nursing assistants        Obesity (BMI 30.0-34.9)- (present on admission)   Assessment & Plan    Continue with dietary restriction        Bradycardia- (present on admission)   Assessment & Plan    12/31:  HR 55  Metoprolol was discontinued heart rate stable        T12 compression fracture (CMS-HCC)- (present on admission)   Assessment & Plan    CT spine, no acute fractures.  Continue Neurontin and continue vitamin D supplementation.        History of stroke- (present on admission)   Assessment & Plan    MRI brain 9/2017 with evidence of multiple strokes.  Continue aspirin and statin for neuro protective measures          Chronic back pain- (present on admission)   Assessment & Plan    MRI lumbar spine shows mild to moderate stenosis   Continue Neurontin             Patient plan of care discussed at multidisplinary team rounds and with patient and R.N at beside.      Reviewed items::  Labs reviewed and Medications reviewed  Pink catheter::  No Pink  DVT prophylaxis pharmacological::  Heparin  Ulcer Prophylaxis::  Yes

## 2018-01-09 NOTE — CARE PLAN
Problem: Safety  Goal: Will remain free from injury  Bed locked and in lowest position, call light and personal belongings within reach, pt educated to call for assistance. Bed alarm on. Hourly rounding in effect.       Problem: Skin Integrity  Goal: Risk for impaired skin integrity will decrease  Redness noted to bilateral heels, heels floated on pillows. Sacral redness, babak cream applied.

## 2018-01-09 NOTE — PROGRESS NOTES
[facility] Hospitalist Progress Note    Date of Service: 1/8/2018    Chief Complaint  77 y.o. female admitted 9/21/2017 with GLF, pyuria and ATN.    Interval Problem Update  Blood pressures more stable, higher side.  12/26:  States doesn't want to use bedpan.  Urinates on self while in bed.  Ordered for up tid with meals, MRI lumbar spine.  dc'd IVFs and lasix 20 daily.  Normal CMP.  Old T12 compression fracture.  Added neurontin 300 tid, cut back oxycodone to 2.5 q 4 hours prn.  States she has pain from the waist to b/l knees.  12/27:  More alert and energetic today.  MRI lumbar spine after screening xrays performed.  Iron low at 13, started replacement with vit C. Check TSH tomorrow.  dc'd PPI.  dc'd oxycodone this a.m., continue neurontin instead.  12/28:  MRI L/S spine w/o significant stenosis.  Old T12 compression fracture.  12/29:  Explained results of MRI, no surgical intervention needed, but chronic DJD, scoliosis and compression fracture.  Patient states she doesn't want people thinking she is lying about back pain.  Assured her that she has significant back deformities, just does not need surgical correction.    12/30:  Stable VS, awaiting placement.  12/31:  Bradycardia 55, dc'd metoprolol 12.5 bid, increased neurontin 300 to 600 bid for chronic back pain, dc oxycodone 2.5 q 6 hours.  1/1:  Resting, doing well off narcotics.    1/2 patient's discharge on hold due to new statutes with legislature, subject to court approval, which can take up to 10 days, then possible group home placement  Vital stable, no overnight events, denies new issues.  1/3 denies new issues. Vital stable, no overnight events  1/4 patient feels pretty good. Denies new issues. Vital stable.  1/5 patient denies any subjective or objective changes in her well-being. Vital stable. No overnight events.  1/6 patient complains of dry cough and generalized fatigue. Started cough syrup. We'll continue to monitor. Vitals stable.  1/7 cough  improved. On auscultation there are mild bilateral wheezes on expiration.  Ordered RT&bronchodilators. Vital stable.   patient states she feels okay. Cough improved. Vital stable. No overnight events.    Consultants/Specialty  Neurology  Psych    DISPO:  Awaiting guardianship placement, group home.  quantiferon gold negative .      Review of Systems   Unable to perform ROS: Mental acuity      Physical Exam  Laboratory/Imaging   Hemodynamics  Temp (24hrs), Av.5 °C (97.7 °F), Min:36.3 °C (97.3 °F), Max:36.7 °C (98.1 °F)   Temperature: 36.3 °C (97.4 °F)  Pulse  Av.5  Min: 50  Max: 106    Blood Pressure : 123/50      Respiratory      Respiration: 17, Pulse Oximetry: 94 %, O2 Daily Delivery Respiratory : Room Air with O2 Available     Given By:: Mouthpiece, Work Of Breathing / Effort: Mild  RUL Breath Sounds: Diminished, RML Breath Sounds: Diminished, RLL Breath Sounds: Diminished, ARGELIA Breath Sounds: Diminished, LLL Breath Sounds: Diminished    Fluids    Intake/Output Summary (Last 24 hours) at 18 1749  Last data filed at 18 1300   Gross per 24 hour   Intake              480 ml   Output                0 ml   Net              480 ml       Nutrition  Orders Placed This Encounter   Procedures   • Diet Order     Standing Status:   Standing     Number of Occurrences:   1     Order Specific Question:   Diet:     Answer:   Regular [1]     Physical Exam   Constitutional: She appears well-developed. No distress.   HENT:   Head: Normocephalic and atraumatic.   Poor dentition   Eyes: EOM are normal. Pupils are equal, round, and reactive to light.   Neck: Normal range of motion.   Cardiovascular: Normal rate and intact distal pulses.    Murmur (old) heard.  Pulmonary/Chest: No respiratory distress. She has wheezes. She has no rales.   Abdominal: Soft. Bowel sounds are normal. She exhibits no distension.   Musculoskeletal: Normal range of motion.   Neurological: She is alert. No cranial nerve deficit. She  exhibits normal muscle tone.   Cognitive deficits   Skin: Skin is warm. She is not diaphoretic.   Psychiatric: Thought content normal.   Nursing note and vitals reviewed.                                   Assessment/Plan     * Fall- (present on admission)   Assessment & Plan     Was found down in the ground from fall back in September 2017. Admitted for failure to thrive, dehydration with acute kidney injury, compression fracture and dementia.  Hospital course complicated by labile blood pressures and dementia requiring placement.  12/27:  MRI LS spine with mild to mod central stensosis L2-3 and L3-4, old T12 compression fracture.  Fall precautions, PT OT evaluation        Hypertension- (present on admission)   Assessment & Plan    On 12/22 hypotensive so held BP meds  On 12/24 hypertensive. Restarted BP meds.  12/26:  Stable, dc'd IVFs /hr and lasix 20mg daily.   Continue to monitor            Dementia- (present on admission)   Assessment & Plan    Minimize sedatives when possible.   Await guardianship.  Underwent court 12/19.        Bronchitis   Overview    RTC with bronchodilators  Mucinex     Iron deficiency anemia- (present on admission)   Assessment & Plan    iron bid with vit C bid.        Constipation- (present on admission)   Assessment & Plan    Resolved. Continue to monitor        Congestion of upper airway- (present on admission)   Assessment & Plan    Saturating 90% on RA.  O2, RT, IS, Vax, CXR and encouraging taking of PO fluids.    Resolved 12/26.          Cough   Assessment & Plan    Guaifenesin  For cough started  Continue to monitor        Physical debility- (present on admission)   Assessment & Plan    PT/OT and increase activity.  Encourage mobilization. Pending guardianship        Obesity (BMI 30.0-34.9)- (present on admission)   Assessment & Plan    PCP follow-up        Bradycardia- (present on admission)   Assessment & Plan    12/31:  HR 55  dc'd metoprolol 12.5 bid. Heart rate is  stable, no bradycardia        T12 compression fracture (CMS-HCC)- (present on admission)   Assessment & Plan    CT spine, no acute fractures. Cont fosamax.  Ordered vitamin D 2000 units daily, dc calcium and vit D 50K weekly since no outdoor exposure for sunlight conversion to active vit D.  Stable and pending guardianship  12/31:  Increased neurontin 300 to 600 tid, dc'd oxycodone 2.5 q 6.        History of stroke- (present on admission)   Assessment & Plan     MRI brain 9/2017 with evidence of multiple strokes.  Asa 81 daily  lipitor 80 daily.  BP control        Chronic back pain- (present on admission)   Assessment & Plan    MRI lumbar spine w/o:  Mild to mod stenosis seen  12/31:  increased neurontin 300 tid to 600 tid  12/31 dc'd oxycodone 2.5 po q 4 hours.  Bowel Regimen  Up to chair tid  Encourage ambulation daily              Reviewed items::  Labs reviewed and Medications reviewed  Pink catheter::  No Pink  DVT prophylaxis pharmacological::  Heparin  Ulcer Prophylaxis::  Yes

## 2018-01-09 NOTE — PROGRESS NOTES
Simona Knight Fall Risk Assessment:     Last Known Fall: Within the last month  Mobility: Immobilized/requires assist of one person  Medications: Cardiovascular or central nervous system meds  Mental Status/LOC/Awareness: Memory loss/confusion and requires reorienting  Toileting Needs: Incontinence  Volume/Electrolyte Status: No problems  Communication/Sensory: Visual (Glasses)/hearing deficit  Behavior: Appropriate behavior  Simona Knight Fall Risk Total: 16  Fall Risk Level: HIGH RISK    Universal Fall Precautions:  call light/belongings in reach, bed in low position and locked, wheelchairs and assistive devices out of sight, siderails up x 2, use non-slip footwear, adequate lighting, clutter free and spill free environment, educate on level of risk, educate to call for assistance    Fall Risk Level Interventions:    TRIAL ([employer] 8, NEURO, MED JENNIE 5) Moderate Fall Risk Interventions  Place yellow fall risk ID band on patient: verified  Provide patient/family education based on risk assessment : completed  Educate patient/family to call staff for assistance when getting out of bed: completed  Place fall precaution signage outside patient door: verified  Utilize bed/chair fall alarm: verifiedTRIAL ([place] 8, NEURO, [employer] JENNIE 5) High Fall Risk Interventions  Place yellow fall risk ID band on patient: verified  Provide patient/family education based on risk assessment: completed  Educate patient/family to call staff for assistance when getting out of bed: completed  Place fall precaution signage outside patient door: verified  Place patient in room close to nursing station: currently not available/charge notified  Utilize bed/chair fall alarm: verified  Notify charge of high risk for huddle: completed    Patient Specific Interventions:     Medication: review medications with patient and family and limit combination of prn medications  Mental Status/LOC/Awareness: check on patient hourly, utilize bed/chair fall alarm,  reinforce the use of call light and provide activity  Toileting: provide frquent toileting  Volume/Electrolyte Status: ensure patient remains hydrated and advance diet as tolerated  Communication/Sensory: update plan of care on whiteboard  Behavioral: not applicable  Mobility: utilize bed/chair fall alarm and ensure bed is locked and in lowest position

## 2018-01-10 PROCEDURE — 700102 HCHG RX REV CODE 250 W/ 637 OVERRIDE(OP): Performed by: INTERNAL MEDICINE

## 2018-01-10 PROCEDURE — A9270 NON-COVERED ITEM OR SERVICE: HCPCS | Performed by: INTERNAL MEDICINE

## 2018-01-10 PROCEDURE — 700111 HCHG RX REV CODE 636 W/ 250 OVERRIDE (IP): Performed by: HOSPITALIST

## 2018-01-10 PROCEDURE — 700102 HCHG RX REV CODE 250 W/ 637 OVERRIDE(OP): Performed by: FAMILY MEDICINE

## 2018-01-10 PROCEDURE — 770006 HCHG ROOM/CARE - MED/SURG/GYN SEMI*

## 2018-01-10 PROCEDURE — 700101 HCHG RX REV CODE 250: Performed by: FAMILY MEDICINE

## 2018-01-10 PROCEDURE — A9270 NON-COVERED ITEM OR SERVICE: HCPCS | Performed by: FAMILY MEDICINE

## 2018-01-10 PROCEDURE — 97535 SELF CARE MNGMENT TRAINING: CPT

## 2018-01-10 PROCEDURE — 700102 HCHG RX REV CODE 250 W/ 637 OVERRIDE(OP): Performed by: HOSPITALIST

## 2018-01-10 PROCEDURE — 99231 SBSQ HOSP IP/OBS SF/LOW 25: CPT | Performed by: HOSPITALIST

## 2018-01-10 PROCEDURE — 97530 THERAPEUTIC ACTIVITIES: CPT

## 2018-01-10 PROCEDURE — A9270 NON-COVERED ITEM OR SERVICE: HCPCS | Performed by: HOSPITALIST

## 2018-01-10 RX ADMIN — HEPARIN SODIUM 5000 UNITS: 5000 INJECTION, SOLUTION INTRAVENOUS; SUBCUTANEOUS at 21:00

## 2018-01-10 RX ADMIN — OXYCODONE HYDROCHLORIDE AND ACETAMINOPHEN 500 MG: 500 TABLET ORAL at 09:49

## 2018-01-10 RX ADMIN — HYDRALAZINE HYDROCHLORIDE 50 MG: 50 TABLET ORAL at 15:33

## 2018-01-10 RX ADMIN — HEPARIN SODIUM 5000 UNITS: 5000 INJECTION, SOLUTION INTRAVENOUS; SUBCUTANEOUS at 09:50

## 2018-01-10 RX ADMIN — GUAIFENESIN 600 MG: 600 TABLET, EXTENDED RELEASE ORAL at 20:45

## 2018-01-10 RX ADMIN — ALENDRONATE SODIUM 10 MG: 10 TABLET ORAL at 09:48

## 2018-01-10 RX ADMIN — HYDRALAZINE HYDROCHLORIDE 50 MG: 50 TABLET ORAL at 06:25

## 2018-01-10 RX ADMIN — OXYCODONE HYDROCHLORIDE AND ACETAMINOPHEN 500 MG: 500 TABLET ORAL at 20:46

## 2018-01-10 RX ADMIN — ASPIRIN 81 MG: 81 TABLET, COATED ORAL at 09:50

## 2018-01-10 RX ADMIN — DULOXETINE HYDROCHLORIDE 20 MG: 20 CAPSULE, DELAYED RELEASE ORAL at 09:49

## 2018-01-10 RX ADMIN — BENAZEPRIL HYDROCHLORIDE 40 MG: 20 TABLET, COATED ORAL at 09:51

## 2018-01-10 RX ADMIN — ATORVASTATIN CALCIUM 80 MG: 40 TABLET, FILM COATED ORAL at 20:46

## 2018-01-10 RX ADMIN — OXYBUTYNIN CHLORIDE 5 MG: 5 TABLET, FILM COATED, EXTENDED RELEASE ORAL at 21:00

## 2018-01-10 RX ADMIN — CHOLECALCIFEROL TAB 25 MCG (1000 UNIT) 2000 UNITS: 25 TAB at 09:48

## 2018-01-10 RX ADMIN — GABAPENTIN 600 MG: 300 CAPSULE ORAL at 09:48

## 2018-01-10 RX ADMIN — Medication 325 MG: at 18:28

## 2018-01-10 RX ADMIN — LIDOCAINE 1 PATCH: 50 PATCH CUTANEOUS at 12:15

## 2018-01-10 RX ADMIN — HYDRALAZINE HYDROCHLORIDE 50 MG: 50 TABLET ORAL at 20:46

## 2018-01-10 RX ADMIN — STANDARDIZED SENNA CONCENTRATE AND DOCUSATE SODIUM 2 TABLET: 8.6; 5 TABLET, FILM COATED ORAL at 20:45

## 2018-01-10 RX ADMIN — GABAPENTIN 600 MG: 300 CAPSULE ORAL at 21:00

## 2018-01-10 RX ADMIN — GUAIFENESIN 600 MG: 600 TABLET, EXTENDED RELEASE ORAL at 09:49

## 2018-01-10 RX ADMIN — Medication 325 MG: at 09:50

## 2018-01-10 RX ADMIN — Medication 325 MG: at 12:15

## 2018-01-10 RX ADMIN — GABAPENTIN 600 MG: 300 CAPSULE ORAL at 15:33

## 2018-01-10 ASSESSMENT — COGNITIVE AND FUNCTIONAL STATUS - GENERAL
HELP NEEDED FOR BATHING: A LITTLE
DAILY ACTIVITIY SCORE: 21
SUGGESTED CMS G CODE MODIFIER DAILY ACTIVITY: CJ
DRESSING REGULAR LOWER BODY CLOTHING: A LITTLE
TOILETING: A LITTLE

## 2018-01-10 ASSESSMENT — PAIN SCALES - GENERAL
PAINLEVEL_OUTOF10: 4
PAINLEVEL_OUTOF10: 4

## 2018-01-10 NOTE — PROGRESS NOTES
Patient displayed concern about the length of stay in the hospital. She was educated on the plan of care for the rest of her stay and verbalizes a better understanding of the plan for guardianship.

## 2018-01-10 NOTE — THERAPY
"Occupational Therapy Treatment completed with focus on ADLs, ADL transfers and patient education.  Functional Status: Min A supine > EOB, SBA transfers with FWW, supv LB dressing without AE, supv standing grooming, supv BM hygiene   Plan of Care: Will benefit from Occupational Therapy 2 times per week  Discharge Recommendations:  Equipment Will Continue to Assess for Equipment Needs (depending on DC dispo). Post-acute therapy Discharge to home with outpatient or home health for additional skilled therapy services.    See \"Rehab Therapy-Acute\" Patient Summary Report for complete documentation.     Pt seen for OT tx. Initially requested that she be seen \"later\" due to \"needing more rest\". Educated pt that she is likely resting too much and needs to increase time OOB to promote conditioning, activity tolerance. Pt agreeable. Completed bed mobility (logroll for neutral spine), amb to bathroom, BM on toilet, standing oral care, LB dressing. Pt c/o increasing back and chest pain with standing ADL necessitating return to chair. Completed doffing and donning B socks without AE in tailor sit (improved from previous OT sessions). Pt then declared she was nauseous and proceded to have emesis into cup. OT retrieved basin and pt had further emesis. RN informed. Pt up to chair at end of session. Pt is limited by pain, balance impairment, activity intolerance. Appears to present with variable function as previous notes reflect better functional mobility than this session. Acute OT to continue following to faciliate OOB ADL, assist with DC planning.   "

## 2018-01-10 NOTE — PROGRESS NOTES
[facility] Hospitalist Progress Note    Date of Service: 1/10/2018    Chief Complaint  77 y.o. female admitted 2017 with GLF, pyuria and ATN.     Interval Problem Update  Patient clinically stable overnight, no nursing issues, patient comfortable.  Pending placement      Consultants/Specialty  Neurology  Psychiatry    Disposition  Awaiting for guardianship      Review of Systems   Unable to perform ROS: Mental acuity      Physical Exam  Laboratory/Imaging   Hemodynamics  Temp (24hrs), Av.3 °C (97.4 °F), Min:36.1 °C (97 °F), Max:36.6 °C (97.9 °F)   Temperature: 36.5 °C (97.7 °F)  Pulse  Av.6  Min: 50  Max: 106    Blood Pressure : 124/50      Respiratory      Respiration: 18, Pulse Oximetry: 94 %     Work Of Breathing / Effort: Mild  RUL Breath Sounds: Diminished, RML Breath Sounds: Diminished, RLL Breath Sounds: Diminished, ARGELIA Breath Sounds: Diminished, LLL Breath Sounds: Diminished    Fluids  No intake or output data in the 24 hours ending 01/10/18 0814    Nutrition  Orders Placed This Encounter   Procedures   • Diet Order     Standing Status:   Standing     Number of Occurrences:   1     Order Specific Question:   Diet:     Answer:   Regular [1]     Physical Exam   Constitutional: She appears well-developed and well-nourished. No distress.   HENT:   Head: Normocephalic and atraumatic.   Poor dentition   Eyes: EOM are normal. Pupils are equal, round, and reactive to light.   Neck: Normal range of motion.   Cardiovascular: Normal rate and intact distal pulses.    Murmur (old) heard.  Pulmonary/Chest: No respiratory distress. She has no wheezes. She has no rales.   Abdominal: Soft. Bowel sounds are normal. She exhibits no distension.   Musculoskeletal: Normal range of motion.   Neurological: She is alert. No cranial nerve deficit. She exhibits normal muscle tone.   Skin: Skin is warm. She is not diaphoretic.   Psychiatric: Thought content normal.   Nursing note and vitals reviewed.                                    Assessment/Plan     * Fall- (present on admission)   Assessment & Plan    MRI LS spine with mild to mod central stensosis L2-3 and L3-4, old T12 compression fracture.  Fall precautions        Hypertension- (present on admission)   Assessment & Plan    Controlled continue Lotensin and hydralazine.            Dementia- (present on admission)   Assessment & Plan    Minimize sedatives   Awaiting for guardianship        Bronchitis   Overview    Continue RT protocol, duo nebs, Pep therapy if warranted, and incentive spirometry.      Assessment & Plan    No issues  Continue RT protocol, duo nebs, Pep therapy if warranted, and incentive spirometry.           Iron deficiency anemia- (present on admission)   Assessment & Plan    Continue with by mouth iron        Constipation- (present on admission)   Assessment & Plan    Resolved continue bowel protocol        Congestion of upper airway- (present on admission)   Assessment & Plan    Resolved no issues        Cough   Assessment & Plan    Guaifenesin when necessary         Physical debility- (present on admission)   Assessment & Plan    Encourage ambulation with the nursing assistants        Obesity (BMI 30.0-34.9)- (present on admission)   Assessment & Plan    Continue with dietary restriction        Bradycardia- (present on admission)   Assessment & Plan    Metoprolol was discontinued heart rate stable        T12 compression fracture (CMS-HCC)- (present on admission)   Assessment & Plan    CT spine, no acute fractures.  Continue Neurontin and continue vitamin D supplementation.        History of stroke- (present on admission)   Assessment & Plan    MRI brain 9/2017 with evidence of multiple strokes.  Continue aspirin and statin for neuro protective measures          Chronic back pain- (present on admission)   Assessment & Plan    MRI lumbar spine shows mild to moderate stenosis   Continue Neurontin            Patient plan of care discussed at multidisplinary team  rounds and with patient and R.N at beside.      Reviewed items::  Labs reviewed and Medications reviewed  Pink catheter::  No Pink  DVT prophylaxis pharmacological::  Heparin  Ulcer Prophylaxis::  Yes

## 2018-01-10 NOTE — DISCHARGE PLANNING
Pt's petition filed between 1/02- 1/04. Pt should be able to transfer Friday 1/12 or Monday 1/15.     Addendum:  SW received call from pt's public guardian stating pt can d/c to  Monday 1/15.

## 2018-01-10 NOTE — PROGRESS NOTES
Simona Knight Fall Risk Assessment:     Last Known Fall: Within the last month  Mobility: Immobilized/requires assist of one person  Medications: Cardiovascular or central nervous system meds  Mental Status/LOC/Awareness: Memory loss/confusion and requires reorienting  Toileting Needs: Incontinence  Volume/Electrolyte Status: No problems  Communication/Sensory: Visual (Glasses)/hearing deficit  Behavior: Appropriate behavior  Simona Knight Fall Risk Total: 16  Fall Risk Level: HIGH RISK    Universal Fall Precautions:  call light/belongings in reach, bed in low position and locked, wheelchairs and assistive devices out of sight, siderails up x 2, use non-slip footwear, adequate lighting, clutter free and spill free environment, educate on level of risk, educate to call for assistance    Fall Risk Level Interventions:    TRIAL ([employer], NEURO, MED JENNIE 5) Moderate Fall Risk Interventions  Place yellow fall risk ID band on patient: verified  Provide patient/family education based on risk assessment : completed  Educate patient/family to call staff for assistance when getting out of bed: completed  Place fall precaution signage outside patient door: verified  Utilize bed/chair fall alarm: verifiedTRIAL ([employer], NEURO, [employer] JENNIE 5) High Fall Risk Interventions  Place yellow fall risk ID band on patient: verified  Provide patient/family education based on risk assessment: completed  Educate patient/family to call staff for assistance when getting out of bed: completed  Place fall precaution signage outside patient door: verified  Place patient in room close to nursing station: currently not available/charge notified  Utilize bed/chair fall alarm: verified  Notify charge of high risk for huddle: completed    Patient Specific Interventions:     Medication: review medications with patient and family  Mental Status/LOC/Awareness: reorient patient, reinforce falls education and check on patient hourly  Toileting: do not leave  patient unattended in bathroom/refer to toileting scripting  Volume/Electrolyte Status: ensure patient remains hydrated and monitor abnormal lab values  Communication/Sensory: update plan of care on whiteboard, ensure proper positioning when transferrng/ambulating and ensure patient has glasses/contacts and hearing aids/dentures  Behavioral: encourage patient to voice feelings and administer medication as ordered  Mobility: utilize bed/chair fall alarm, ensure bed is locked and in lowest position and provide appropriate assistive device

## 2018-01-10 NOTE — PROGRESS NOTES
Simona Knight Fall Risk Assessment:     Last Known Fall: Within the last month  Mobility: Immobilized/requires assist of one person  Medications: Cardiovascular or central nervous system meds  Mental Status/LOC/Awareness: Memory loss/confusion and requires reorienting  Toileting Needs: Incontinence  Volume/Electrolyte Status: No problems  Communication/Sensory: Visual (Glasses)/hearing deficit  Behavior: Appropriate behavior  Simona Knight Fall Risk Total: 16  Fall Risk Level: HIGH RISK    Universal Fall Precautions:  call light/belongings in reach, bed in low position and locked, wheelchairs and assistive devices out of sight, use non-slip footwear, adequate lighting, clutter free and spill free environment, educate on level of risk, educate to call for assistance    Fall Risk Level Interventions:    TRIAL (TELE 8, NEURO, MED JENNIE 5) Moderate Fall Risk Interventions  Place yellow fall risk ID band on patient: verified  Provide patient/family education based on risk assessment : completed  Educate patient/family to call staff for assistance when getting out of bed: completed  Place fall precaution signage outside patient door: verified  Utilize bed/chair fall alarm: verifiedTRIAL (TELE 8, NEURO, [employer] JENNIE 5) High Fall Risk Interventions  Place yellow fall risk ID band on patient: verified  Provide patient/family education based on risk assessment: completed  Educate patient/family to call staff for assistance when getting out of bed: completed  Place fall precaution signage outside patient door: verified  Place patient in room close to nursing station: currently not available/charge notified  Utilize bed/chair fall alarm: verified  Notify charge of high risk for huddle: completed    Patient Specific Interventions:     Medication: review medications with patient and family, assess for medications that can be discontinued or dosage decreased and limit combination of prn medications  Mental Status/LOC/Awareness:  reorient patient, reinforce falls education, check on patient hourly, utilize bed/chair fall alarm, reinforce the use of call light and provide activity  Toileting: monitor intake and output/use of appropriate interventions, instruct patient/family on the use of grab bars, instruct patient/family on the need to call for assistance when toileting and do not leave patient unattended in bathroom/refer to toileting scripting  Volume/Electrolyte Status: ensure patient remains hydrated, administer medications as ordered for nausea and vomiting, monitor abnormal lab values and ensure IV fluids are appropriate  Communication/Sensory: update plan of care on whiteboard, ensure proper positioning when transferrng/ambulating and ensure patient has glasses/contacts and hearing aids/dentures  Behavioral: encourage patient to voice feelings, engage patient in daily activities, administer medication as ordered and instruct/reinforce fall program rationale  Mobility: schedule physical activity throughout the day, provide comfort measures during transport, utilize bed/chair fall alarm, ensure bed is locked and in lowest position, instruct patient to exit bed on their strongest side and collaborate with doctor for possible PT/OT consult

## 2018-01-10 NOTE — PROGRESS NOTES
Received report from day RN. Assumed pt care. Discussed call light/phone system, communication board and POC. Pt is aao x 2, disoriented to time and event. Pt is cooperative and able to follow commands. Pt has a hx of HTN. RN monitors BP closely and administers scheduled BP meds. Pt denies pain. Pt is pending placement. Pt is incontinent, RN and CNA perform bed bath and full bed change. Pt mobility assessed. Pt is a one assist up to the chair. Bed alarm on. Fall precautions in place. Bed is locked and in low position, call light within reach. Treaded slippers in place. Pt needs met at this time. Hourly rounding in place.

## 2018-01-11 PROCEDURE — A9270 NON-COVERED ITEM OR SERVICE: HCPCS | Performed by: INTERNAL MEDICINE

## 2018-01-11 PROCEDURE — 700101 HCHG RX REV CODE 250: Performed by: FAMILY MEDICINE

## 2018-01-11 PROCEDURE — 700102 HCHG RX REV CODE 250 W/ 637 OVERRIDE(OP): Performed by: INTERNAL MEDICINE

## 2018-01-11 PROCEDURE — 700102 HCHG RX REV CODE 250 W/ 637 OVERRIDE(OP): Performed by: FAMILY MEDICINE

## 2018-01-11 PROCEDURE — 770006 HCHG ROOM/CARE - MED/SURG/GYN SEMI*

## 2018-01-11 PROCEDURE — A9270 NON-COVERED ITEM OR SERVICE: HCPCS | Performed by: HOSPITALIST

## 2018-01-11 PROCEDURE — 700102 HCHG RX REV CODE 250 W/ 637 OVERRIDE(OP): Performed by: HOSPITALIST

## 2018-01-11 PROCEDURE — 99231 SBSQ HOSP IP/OBS SF/LOW 25: CPT | Performed by: HOSPITALIST

## 2018-01-11 PROCEDURE — A9270 NON-COVERED ITEM OR SERVICE: HCPCS | Performed by: FAMILY MEDICINE

## 2018-01-11 PROCEDURE — 700111 HCHG RX REV CODE 636 W/ 250 OVERRIDE (IP): Performed by: HOSPITALIST

## 2018-01-11 RX ADMIN — HYDRALAZINE HYDROCHLORIDE 50 MG: 50 TABLET ORAL at 20:19

## 2018-01-11 RX ADMIN — OXYCODONE HYDROCHLORIDE AND ACETAMINOPHEN 500 MG: 500 TABLET ORAL at 20:19

## 2018-01-11 RX ADMIN — CHOLECALCIFEROL TAB 25 MCG (1000 UNIT) 2000 UNITS: 25 TAB at 08:50

## 2018-01-11 RX ADMIN — HEPARIN SODIUM 5000 UNITS: 5000 INJECTION, SOLUTION INTRAVENOUS; SUBCUTANEOUS at 08:52

## 2018-01-11 RX ADMIN — HYDRALAZINE HYDROCHLORIDE 50 MG: 50 TABLET ORAL at 05:20

## 2018-01-11 RX ADMIN — GABAPENTIN 600 MG: 300 CAPSULE ORAL at 15:05

## 2018-01-11 RX ADMIN — Medication 325 MG: at 13:21

## 2018-01-11 RX ADMIN — HEPARIN SODIUM 5000 UNITS: 5000 INJECTION, SOLUTION INTRAVENOUS; SUBCUTANEOUS at 20:19

## 2018-01-11 RX ADMIN — STANDARDIZED SENNA CONCENTRATE AND DOCUSATE SODIUM 2 TABLET: 8.6; 5 TABLET, FILM COATED ORAL at 20:19

## 2018-01-11 RX ADMIN — GABAPENTIN 600 MG: 300 CAPSULE ORAL at 20:18

## 2018-01-11 RX ADMIN — GUAIFENESIN 600 MG: 600 TABLET, EXTENDED RELEASE ORAL at 20:18

## 2018-01-11 RX ADMIN — ATORVASTATIN CALCIUM 80 MG: 40 TABLET, FILM COATED ORAL at 20:19

## 2018-01-11 RX ADMIN — OXYBUTYNIN CHLORIDE 5 MG: 5 TABLET, FILM COATED, EXTENDED RELEASE ORAL at 20:19

## 2018-01-11 RX ADMIN — LIDOCAINE 1 PATCH: 50 PATCH CUTANEOUS at 08:50

## 2018-01-11 RX ADMIN — POLYETHYLENE GLYCOL (3350) 1 PACKET: 17 POWDER, FOR SOLUTION ORAL at 08:51

## 2018-01-11 RX ADMIN — OXYCODONE HYDROCHLORIDE AND ACETAMINOPHEN 500 MG: 500 TABLET ORAL at 08:51

## 2018-01-11 RX ADMIN — ASPIRIN 81 MG: 81 TABLET, COATED ORAL at 08:50

## 2018-01-11 RX ADMIN — GUAIFENESIN 600 MG: 600 TABLET, EXTENDED RELEASE ORAL at 08:50

## 2018-01-11 RX ADMIN — DULOXETINE HYDROCHLORIDE 20 MG: 20 CAPSULE, DELAYED RELEASE ORAL at 08:51

## 2018-01-11 RX ADMIN — BENAZEPRIL HYDROCHLORIDE 40 MG: 20 TABLET, COATED ORAL at 08:51

## 2018-01-11 RX ADMIN — GABAPENTIN 600 MG: 300 CAPSULE ORAL at 08:50

## 2018-01-11 RX ADMIN — ALENDRONATE SODIUM 10 MG: 10 TABLET ORAL at 11:37

## 2018-01-11 RX ADMIN — HYDRALAZINE HYDROCHLORIDE 50 MG: 50 TABLET ORAL at 15:05

## 2018-01-11 RX ADMIN — Medication 325 MG: at 08:50

## 2018-01-11 RX ADMIN — Medication 325 MG: at 18:20

## 2018-01-11 ASSESSMENT — PAIN SCALES - GENERAL
PAINLEVEL_OUTOF10: 2
PAINLEVEL_OUTOF10: 2

## 2018-01-11 NOTE — PROGRESS NOTES
Rounding completed on pt. Report given at bedside between night shift RN and day shift RN. Pt sleeping quietly, call light in reach. Assumed care of pt.

## 2018-01-11 NOTE — PROGRESS NOTES
Pt pleasant, cooperative today, pt states pain to back when sitting up in chair for a long time, pt did sit up for lunch. Pt worked with therapy. Pt tolerated well, pt had small amount emesis, denies nausea, c/o post nasal drip, emesis looks like thick yellow sputum. Pt eating/drinking without difficulty, minimal appetite, sleeping frequently. Pt denies other needs at this time, encouraged pt to get oob for meals and ambulate halls. Pt using I.S. with reinforcement from RN.

## 2018-01-11 NOTE — PROGRESS NOTES
Simona Knight Fall Risk Assessment:     Last Known Fall: Within the last month  Mobility: Immobilized/requires assist of one person  Medications: Cardiovascular or central nervous system meds  Mental Status/LOC/Awareness: Memory loss/confusion and requires reorienting  Toileting Needs: Incontinence  Volume/Electrolyte Status: No problems  Communication/Sensory: Visual (Glasses)/hearing deficit  Behavior: Appropriate behavior  Simona Knight Fall Risk Total: 16  Fall Risk Level: HIGH RISK    Universal Fall Precautions:  call light/belongings in reach, bed in low position and locked, wheelchairs and assistive devices out of sight, siderails up x 2, use non-slip footwear, adequate lighting, clutter free and spill free environment, educate on level of risk, educate to call for assistance    Fall Risk Level Interventions:    TRIAL ([facility] 8, NEURO, MED JENNIE 5) Moderate Fall Risk Interventions  Place yellow fall risk ID band on patient: verified  Provide patient/family education based on risk assessment : completed  Educate patient/family to call staff for assistance when getting out of bed: completed  Place fall precaution signage outside patient door: verified  Utilize bed/chair fall alarm: verifiedTRIAL ([facility], NEURO, [employer] JENNIE 5) High Fall Risk Interventions  Place yellow fall risk ID band on patient: verified  Provide patient/family education based on risk assessment: completed  Educate patient/family to call staff for assistance when getting out of bed: completed  Place fall precaution signage outside patient door: verified  Place patient in room close to nursing station: currently not available/charge notified  Utilize bed/chair fall alarm: verified  Notify charge of high risk for huddle: completed    Patient Specific Interventions:     Medication: review medications with patient and family  Mental Status/LOC/Awareness: reorient patient, reinforce falls education and check on patient hourly  Toileting: do not leave  patient unattended in bathroom/refer to toileting scripting  Volume/Electrolyte Status: ensure patient remains hydrated and monitor abnormal lab values  Communication/Sensory: update plan of care on whiteboard, ensure proper positioning when transferrng/ambulating and ensure patient has glasses/contacts and hearing aids/dentures  Behavioral: encourage patient to voice feelings and administer medication as ordered  Mobility: utilize bed/chair fall alarm, ensure bed is locked and in lowest position and provide appropriate assistive device

## 2018-01-11 NOTE — CARE PLAN
Problem: Bowel/Gastric:  Goal: Normal bowel function is maintained or improved  Pt educated on bowl care/protocol. Pt encouraged to take po intake, and ambulate halls.

## 2018-01-11 NOTE — PROGRESS NOTES
Received awake and oriented x 2, due med given as ordered, follows simple command, incontinence of b/b, call light within reach, bed alarm in placed, kept skin dry and clean, needs attended, will continue to monitor.

## 2018-01-11 NOTE — PROGRESS NOTES
[facility] Hospitalist Progress Note    Date of Service: 2018    Chief Complaint  77 y.o. female admitted 2017 with GLF, pyuria and ATN.     Interval Problem Update  Patient clinically stable overnight, no nursing issues, patient comfortable.  Pending placement  Clinically unchanged.    Consultants/Specialty  Neurology  Psychiatry    Disposition  Awaiting for guardianship      Review of Systems   Unable to perform ROS: Mental acuity      Physical Exam  Laboratory/Imaging   Hemodynamics  Temp (24hrs), Av.4 °C (97.6 °F), Min:36.3 °C (97.3 °F), Max:36.7 °C (98 °F)   Temperature: 36.7 °C (98 °F)  Pulse  Av.7  Min: 50  Max: 106    Blood Pressure : 128/67      Respiratory      Respiration: 18, Pulse Oximetry: 92 %     Work Of Breathing / Effort: Mild  RUL Breath Sounds: Diminished, RML Breath Sounds: Coarse Crackles;Diminished, RLL Breath Sounds: Coarse Crackles;Diminished, ARGELIA Breath Sounds: Diminished, LLL Breath Sounds: Diminished    Fluids    Intake/Output Summary (Last 24 hours) at 18 0847  Last data filed at 01/10/18 1613   Gross per 24 hour   Intake              480 ml   Output                0 ml   Net              480 ml       Nutrition  Orders Placed This Encounter   Procedures   • Diet Order     Standing Status:   Standing     Number of Occurrences:   1     Order Specific Question:   Diet:     Answer:   Regular [1]   grossly unchanged   Physical Exam   Constitutional: She appears well-developed and well-nourished. No distress.   HENT:   Head: Normocephalic and atraumatic.   Poor dentition   Eyes: EOM are normal. Pupils are equal, round, and reactive to light.   Neck: Normal range of motion.   Cardiovascular: Normal rate and intact distal pulses.    Murmur (old) heard.  Pulmonary/Chest: No respiratory distress. She has no wheezes. She has no rales.   Abdominal: Soft. Bowel sounds are normal. She exhibits no distension.   Musculoskeletal: Normal range of motion.   Neurological: She is alert.  No cranial nerve deficit. She exhibits normal muscle tone.   Skin: Skin is warm. She is not diaphoretic.   Psychiatric: Thought content normal.   Nursing note and vitals reviewed.                                   Assessment/Plan     * Fall- (present on admission)   Assessment & Plan    MRI LS spine with mild to mod central stensosis L2-3 and L3-4, old T12 compression fracture.  Fall precautions        Hypertension- (present on admission)   Assessment & Plan    Controlled continue Lotensin and hydralazine.            Dementia- (present on admission)   Assessment & Plan    Minimize sedatives   Awaiting for guardianship        Bronchitis   Overview    Continue RT protocol, duo nebs, Pep therapy if warranted, and incentive spirometry.      Assessment & Plan    No issues  Continue RT protocol, duo nebs, Pep therapy if warranted, and incentive spirometry.           Iron deficiency anemia- (present on admission)   Assessment & Plan    Continue with by mouth iron        Constipation- (present on admission)   Assessment & Plan    Resolved continue bowel protocol        Congestion of upper airway- (present on admission)   Assessment & Plan    Resolved no issues        Cough   Assessment & Plan    Guaifenesin when necessary         Physical debility- (present on admission)   Assessment & Plan    Encourage ambulation with the nursing assistants        Obesity (BMI 30.0-34.9)- (present on admission)   Assessment & Plan    Continue with dietary restriction        Bradycardia- (present on admission)   Assessment & Plan    Metoprolol was discontinued heart rate stable        T12 compression fracture (CMS-HCC)- (present on admission)   Assessment & Plan    CT spine, no acute fractures.  Continue Neurontin and continue vitamin D supplementation.        History of stroke- (present on admission)   Assessment & Plan    MRI brain 9/2017 with evidence of multiple strokes.  Continue aspirin and statin for neuro protective measures           Chronic back pain- (present on admission)   Assessment & Plan    MRI lumbar spine shows mild to moderate stenosis   Continue Neurontin          Patient seen and examined at bedside assessment and plan essentially unchanged from yesterday.  Pending for guardianship and placement.    Patient plan of care discussed at multidisplinary team rounds and with patient and R.N at beside.      Reviewed items::  Labs reviewed and Medications reviewed  Pink catheter::  No Pink  DVT prophylaxis pharmacological::  Heparin  Ulcer Prophylaxis::  Yes

## 2018-01-11 NOTE — CARE PLAN
Problem: Discharge Barriers/Planning  Goal: Patient's continuum of care needs will be met    Intervention: Collaborate with Transitional Care Team and Interdisciplinary Team to meet discharge needs  Awaiting for new court hearing on 2/27 and DC to .

## 2018-01-12 PROCEDURE — 700102 HCHG RX REV CODE 250 W/ 637 OVERRIDE(OP): Performed by: HOSPITALIST

## 2018-01-12 PROCEDURE — 700101 HCHG RX REV CODE 250: Performed by: FAMILY MEDICINE

## 2018-01-12 PROCEDURE — A9270 NON-COVERED ITEM OR SERVICE: HCPCS | Performed by: FAMILY MEDICINE

## 2018-01-12 PROCEDURE — A9270 NON-COVERED ITEM OR SERVICE: HCPCS | Performed by: HOSPITALIST

## 2018-01-12 PROCEDURE — A9270 NON-COVERED ITEM OR SERVICE: HCPCS | Performed by: INTERNAL MEDICINE

## 2018-01-12 PROCEDURE — 770006 HCHG ROOM/CARE - MED/SURG/GYN SEMI*

## 2018-01-12 PROCEDURE — 700102 HCHG RX REV CODE 250 W/ 637 OVERRIDE(OP): Performed by: INTERNAL MEDICINE

## 2018-01-12 PROCEDURE — 700111 HCHG RX REV CODE 636 W/ 250 OVERRIDE (IP): Performed by: HOSPITALIST

## 2018-01-12 PROCEDURE — 99231 SBSQ HOSP IP/OBS SF/LOW 25: CPT | Performed by: HOSPITALIST

## 2018-01-12 PROCEDURE — 700102 HCHG RX REV CODE 250 W/ 637 OVERRIDE(OP): Performed by: FAMILY MEDICINE

## 2018-01-12 RX ADMIN — OXYCODONE HYDROCHLORIDE AND ACETAMINOPHEN 500 MG: 500 TABLET ORAL at 20:36

## 2018-01-12 RX ADMIN — GUAIFENESIN 600 MG: 600 TABLET, EXTENDED RELEASE ORAL at 21:00

## 2018-01-12 RX ADMIN — GABAPENTIN 600 MG: 300 CAPSULE ORAL at 20:36

## 2018-01-12 RX ADMIN — ATORVASTATIN CALCIUM 80 MG: 40 TABLET, FILM COATED ORAL at 20:36

## 2018-01-12 RX ADMIN — LIDOCAINE 1 PATCH: 50 PATCH CUTANEOUS at 08:34

## 2018-01-12 RX ADMIN — BENAZEPRIL HYDROCHLORIDE 40 MG: 20 TABLET, COATED ORAL at 08:31

## 2018-01-12 RX ADMIN — OXYBUTYNIN CHLORIDE 5 MG: 5 TABLET, FILM COATED, EXTENDED RELEASE ORAL at 20:37

## 2018-01-12 RX ADMIN — ASPIRIN 81 MG: 81 TABLET, COATED ORAL at 08:33

## 2018-01-12 RX ADMIN — HYDRALAZINE HYDROCHLORIDE 50 MG: 50 TABLET ORAL at 20:37

## 2018-01-12 RX ADMIN — HEPARIN SODIUM 5000 UNITS: 5000 INJECTION, SOLUTION INTRAVENOUS; SUBCUTANEOUS at 20:37

## 2018-01-12 RX ADMIN — HEPARIN SODIUM 5000 UNITS: 5000 INJECTION, SOLUTION INTRAVENOUS; SUBCUTANEOUS at 08:31

## 2018-01-12 RX ADMIN — GABAPENTIN 600 MG: 300 CAPSULE ORAL at 14:34

## 2018-01-12 RX ADMIN — GABAPENTIN 600 MG: 300 CAPSULE ORAL at 08:32

## 2018-01-12 RX ADMIN — CHOLECALCIFEROL TAB 25 MCG (1000 UNIT) 2000 UNITS: 25 TAB at 08:32

## 2018-01-12 RX ADMIN — ALENDRONATE SODIUM 10 MG: 10 TABLET ORAL at 08:34

## 2018-01-12 RX ADMIN — OXYCODONE HYDROCHLORIDE AND ACETAMINOPHEN 500 MG: 500 TABLET ORAL at 08:33

## 2018-01-12 RX ADMIN — Medication 325 MG: at 12:13

## 2018-01-12 RX ADMIN — Medication 325 MG: at 17:51

## 2018-01-12 RX ADMIN — GUAIFENESIN 600 MG: 600 TABLET, EXTENDED RELEASE ORAL at 08:32

## 2018-01-12 RX ADMIN — DULOXETINE HYDROCHLORIDE 20 MG: 20 CAPSULE, DELAYED RELEASE ORAL at 08:33

## 2018-01-12 RX ADMIN — Medication 325 MG: at 08:32

## 2018-01-12 RX ADMIN — POLYETHYLENE GLYCOL (3350) 1 PACKET: 17 POWDER, FOR SOLUTION ORAL at 08:34

## 2018-01-12 RX ADMIN — HYDRALAZINE HYDROCHLORIDE 50 MG: 50 TABLET ORAL at 05:41

## 2018-01-12 RX ADMIN — HYDRALAZINE HYDROCHLORIDE 50 MG: 50 TABLET ORAL at 14:34

## 2018-01-12 ASSESSMENT — LIFESTYLE VARIABLES: DO YOU DRINK ALCOHOL: NO

## 2018-01-12 ASSESSMENT — PAIN SCALES - GENERAL
PAINLEVEL_OUTOF10: 2
PAINLEVEL_OUTOF10: 1

## 2018-01-12 NOTE — CARE PLAN
"Problem: Safety  Goal: Will remain free from falls  Outcome: PROGRESSING AS EXPECTED  Pt educated on use of call light but refuses. Pt bed/chair alarm in use. Pt is one person assist. Pt bed in lowest and locked position, call light within reach.     Problem: Mobility  Goal: Risk for activity intolerance will decrease  Outcome: PROGRESSING AS EXPECTED  Pt encouraged to get OOB for all meals, patient refuses at times, says \"I am too tired\". Pt also educated on importance of ambulation, pt refuses at times, but works with PT/OT when provided.       "

## 2018-01-12 NOTE — PROGRESS NOTES
[facility] Progress Note    Date of Service: 2018    Chief Complaint  77 y.o. female admitted 2017 with GLF, pyuria and ATN.     Interval Problem Update  Patient clinically stable overnight, no nursing issues, patient comfortable.  Pending placement  Clinically unchanged.    Consultants/Specialty  Neurology  Psychiatry    Disposition  Awaiting for guardianship      Review of Systems   Unable to perform ROS: Mental acuity      Physical Exam  Laboratory/Imaging   Hemodynamics  Temp (24hrs), Av.4 °C (97.5 °F), Min:36.2 °C (97.2 °F), Max:36.5 °C (97.7 °F)   Temperature: 36.5 °C (97.7 °F)  Pulse  Av.7  Min: 50  Max: 106    Blood Pressure : 140/56      Respiratory      Respiration: 17, Pulse Oximetry: 91 %     Work Of Breathing / Effort: Mild  RUL Breath Sounds: Diminished, RML Breath Sounds: Diminished, RLL Breath Sounds: Diminished, ARGELIA Breath Sounds: Diminished, LLL Breath Sounds: Diminished    Fluids    Intake/Output Summary (Last 24 hours) at 18 1520  Last data filed at 18 1200   Gross per 24 hour   Intake              360 ml   Output                0 ml   Net              360 ml       Nutrition  Orders Placed This Encounter   Procedures   • Diet Order     Standing Status:   Standing     Number of Occurrences:   1     Order Specific Question:   Diet:     Answer:   Regular [1]   grossly unchanged   Physical Exam   Constitutional: She appears well-developed and well-nourished. No distress.   HENT:   Head: Normocephalic and atraumatic.   Poor dentition   Eyes: EOM are normal. Pupils are equal, round, and reactive to light.   Neck: Normal range of motion.   Cardiovascular: Normal rate and intact distal pulses.    Murmur (old) heard.  Pulmonary/Chest: No respiratory distress. She has no wheezes. She has no rales.   Abdominal: Soft. Bowel sounds are normal. She exhibits no distension.   Musculoskeletal: Normal range of motion.   Neurological: She is alert. No cranial nerve deficit.  She exhibits normal muscle tone.   Skin: Skin is warm. She is not diaphoretic.   Psychiatric: Thought content normal.   Nursing note and vitals reviewed.                                   Assessment/Plan     * Fall- (present on admission)   Assessment & Plan    MRI LS spine with mild to mod central stensosis L2-3 and L3-4, old T12 compression fracture.  Fall precautions        Hypertension- (present on admission)   Assessment & Plan    Controlled continue Lotensin and hydralazine.            Dementia- (present on admission)   Assessment & Plan    Minimize sedatives   Awaiting for guardianship        Bronchitis   Overview    Continue RT protocol, duo nebs, Pep therapy if warranted, and incentive spirometry.      Assessment & Plan    No issues  Continue RT protocol, duo nebs, Pep therapy if warranted, and incentive spirometry.           Iron deficiency anemia- (present on admission)   Assessment & Plan    Continue with by mouth iron        Constipation- (present on admission)   Assessment & Plan    Resolved continue bowel protocol        Congestion of upper airway- (present on admission)   Assessment & Plan    Resolved no issues        Cough   Assessment & Plan    Guaifenesin when necessary         Physical debility- (present on admission)   Assessment & Plan    Encourage ambulation with the nursing assistants        Obesity (BMI 30.0-34.9)- (present on admission)   Assessment & Plan    Continue with dietary restriction        Bradycardia- (present on admission)   Assessment & Plan    Metoprolol was discontinued heart rate stable        T12 compression fracture (CMS-HCC)- (present on admission)   Assessment & Plan    CT spine, no acute fractures.  Continue Neurontin and continue vitamin D supplementation.        History of stroke- (present on admission)   Assessment & Plan    MRI brain 9/2017 with evidence of multiple strokes.  Continue aspirin and statin for neuro protective measures          Chronic back pain-  (present on admission)   Assessment & Plan    MRI lumbar spine shows mild to moderate stenosis   Continue Neurontin          Patient seen and examined at bedside assessment and plan essentially unchanged , she is resting in her bed.  Pending for guardianship and placement.    Patient plan of care discussed at multidisplinary team rounds and with patient and R.N at beside.      Reviewed items::  Labs reviewed and Medications reviewed  Pink catheter::  No Pink  DVT prophylaxis pharmacological::  Heparin  Ulcer Prophylaxis::  Yes

## 2018-01-12 NOTE — CARE PLAN
Problem: Discharge Barriers/Planning  Goal: Patient's continuum of care needs will be met    Intervention: Collaborate with Transitional Care Team and Interdisciplinary Team to meet discharge needs  Pt expected to DC to group home Monday       Problem: Pain Management  Goal: Pain level will decrease to patient's comfort goal    Intervention: Educate and implement non-pharmacologic comfort measures. Examples: relaxation, distration, play therapy, activity therapy, massage, etc.  Pt repositioned for comfort. PT states lidocaine patch only works for 10 minutes

## 2018-01-12 NOTE — PROGRESS NOTES
Report received. Assumed care, assessment complete. Pt is A & O x 2.  Pt repositioned for comfort. Fall precautions and appropriate signs in place. Pt oriented to unit routine, call light/phone system and RN extension number provided. Pt educated regarding fall precautions. Bed alarm in use. Pt denies any additional needs at this time. Call light within reach.

## 2018-01-12 NOTE — PROGRESS NOTES
Simona Knight Fall Risk Assessment:     Last Known Fall: Within the last six months  Mobility: Immobilized/requires assist of one person  Medications: Cardiovascular or central nervous system meds  Mental Status/LOC/Awareness: Oriented to person and place  Toileting Needs: Incontinence  Volume/Electrolyte Status: No problems  Communication/Sensory: Visual (Glasses)/hearing deficit  Behavior: Appropriate behavior  Simona Knight Fall Risk Total: 13  Fall Risk Level: MODERATE RISK    Universal Fall Precautions:  call light/belongings in reach, bed in low position and locked, wheelchairs and assistive devices out of sight, siderails up x 2, use non-slip footwear, adequate lighting, clutter free and spill free environment, educate on level of risk, educate to call for assistance    Fall Risk Level Interventions:    TRIAL ([employer] 8, NEURO, MED JENNIE 5) Moderate Fall Risk Interventions  Place yellow fall risk ID band on patient: verified  Provide patient/family education based on risk assessment : completed  Educate patient/family to call staff for assistance when getting out of bed: completed  Place fall precaution signage outside patient door: verified  Utilize bed/chair fall alarm: verifiedTRIAL ([employer] 8, NEURO, [employer] JENNIE 5) High Fall Risk Interventions  Place yellow fall risk ID band on patient: verified  Provide patient/family education based on risk assessment: completed  Educate patient/family to call staff for assistance when getting out of bed: completed  Place fall precaution signage outside patient door: verified  Place patient in room close to nursing station: currently not available/charge notified  Utilize bed/chair fall alarm: verified  Notify charge of high risk for huddle: verified    Patient Specific Interventions:     Medication: review medications with patient and family, assess for medications that can be discontinued or dosage decreased and limit combination of prn medications  Mental  Status/LOC/Awareness: reorient patient, reinforce falls education, encourage family to stay with patient, check on patient hourly, utilize bed/chair fall alarm, reinforce the use of call light and provide activity  Toileting: provide frquent toileting, monitor intake and output/use of appropriate interventions, instruct patient/family on the use of grab bars and instruct patient/family on the need to call for assistance when toileting  Volume/Electrolyte Status: ensure patient remains hydrated, advance diet as tolerated, administer medications as ordered for nausea and vomiting and monitor abnormal lab values  Communication/Sensory: update plan of care on whiteboard, ensure proper positioning when transferrng/ambulating and ensure patient has glasses/contacts and hearing aids/dentures  Behavioral: encourage patient to voice feelings, engage patient in daily activities, administer medication as ordered and instruct/reinforce fall program rationale  Mobility: schedule physical activity throughout the day, provide comfort measures during transport, dangle prior to standing, utilize bed/chair fall alarm, ensure bed is locked and in lowest position, provide appropriate assistive device, instruct patient to exit bed on their strongest side and collaborate with doctor for possible PT/OT consult

## 2018-01-12 NOTE — PROGRESS NOTES
Simona Knight Fall Risk Assessment:     Last Known Fall: Within the last six months  Mobility: Immobilized/requires assist of one person  Medications: Cardiovascular or central nervous system meds  Mental Status/LOC/Awareness: Oriented to person and place  Toileting Needs: Incontinence  Volume/Electrolyte Status: No problems  Communication/Sensory: Neuropathy  Behavior: Depression/anxiety  Simona Knight Fall Risk Total: 16  Fall Risk Level: HIGH RISK    Universal Fall Precautions:  call light/belongings in reach, bed in low position and locked, wheelchairs and assistive devices out of sight, siderails up x 2, use non-slip footwear, adequate lighting, clutter free and spill free environment, educate on level of risk, educate to call for assistance    Fall Risk Level Interventions:    TRIAL ([facility] 8, NEURO, MED JENNIE 5) Moderate Fall Risk Interventions  Place yellow fall risk ID band on patient: verified  Provide patient/family education based on risk assessment : completed  Educate patient/family to call staff for assistance when getting out of bed: completed  Place fall precaution signage outside patient door: verified  Utilize bed/chair fall alarm: verifiedTRIAL (TELE 8, NEURO, [employer] JENNIE 5) High Fall Risk Interventions  Place yellow fall risk ID band on patient: verified  Provide patient/family education based on risk assessment: completed  Educate patient/family to call staff for assistance when getting out of bed: completed  Place fall precaution signage outside patient door: verified  Place patient in room close to nursing station: currently not available/charge notified  Utilize bed/chair fall alarm: verified  Notify charge of high risk for huddle: verified    Patient Specific Interventions:     Medication: review medications with patient and family  Mental Status/LOC/Awareness: reorient patient, reinforce falls education, check on patient hourly, utilize bed/chair fall alarm, reinforce the use of call light and  provide activity  Toileting: provide frquent toileting and instruct patient/family on the need to call for assistance when toileting  Volume/Electrolyte Status: ensure patient remains hydrated  Communication/Sensory: update plan of care on whiteboard and ensure patient has glasses/contacts and hearing aids/dentures  Behavioral: encourage patient to voice feelings and engage patient in daily activities  Mobility: schedule physical activity throughout the day, utilize bed/chair fall alarm and ensure bed is locked and in lowest position

## 2018-01-12 NOTE — PROGRESS NOTES
Assumed care of pt at 0700. Received report from RN. Pt A&Ox2-3 (Disoriented to time and event at times). Assessment complete. Labs reviewed. Pt denies pain at this time. Pt to be OOB for all meals and ambulate with RN or CNA today. Pt and RN discussed plan of care. Pt questions answered. Pt needs are met at this time. Bed in lowest and locked position. Call light within reach. Upper bed rails up. Hourly rounding in place.

## 2018-01-13 LAB
BASOPHILS # BLD AUTO: 0.9 % (ref 0–1.8)
BASOPHILS # BLD: 0.05 K/UL (ref 0–0.12)
EOSINOPHIL # BLD AUTO: 0.26 K/UL (ref 0–0.51)
EOSINOPHIL NFR BLD: 4.4 % (ref 0–6.9)
ERYTHROCYTE [DISTWIDTH] IN BLOOD BY AUTOMATED COUNT: 55.6 FL (ref 35.9–50)
HCT VFR BLD AUTO: 37.5 % (ref 37–47)
HGB BLD-MCNC: 11.5 G/DL (ref 12–16)
IMM GRANULOCYTES # BLD AUTO: 0.05 K/UL (ref 0–0.11)
IMM GRANULOCYTES NFR BLD AUTO: 0.9 % (ref 0–0.9)
LYMPHOCYTES # BLD AUTO: 0.88 K/UL (ref 1–4.8)
LYMPHOCYTES NFR BLD: 15 % (ref 22–41)
MCH RBC QN AUTO: 28.4 PG (ref 27–33)
MCHC RBC AUTO-ENTMCNC: 30.7 G/DL (ref 33.6–35)
MCV RBC AUTO: 92.6 FL (ref 81.4–97.8)
MONOCYTES # BLD AUTO: 0.42 K/UL (ref 0–0.85)
MONOCYTES NFR BLD AUTO: 7.2 % (ref 0–13.4)
NEUTROPHILS # BLD AUTO: 4.19 K/UL (ref 2–7.15)
NEUTROPHILS NFR BLD: 71.6 % (ref 44–72)
NRBC # BLD AUTO: 0 K/UL
NRBC BLD-RTO: 0 /100 WBC
PLATELET # BLD AUTO: 257 K/UL (ref 164–446)
PMV BLD AUTO: 10.9 FL (ref 9–12.9)
RBC # BLD AUTO: 4.05 M/UL (ref 4.2–5.4)
WBC # BLD AUTO: 5.9 K/UL (ref 4.8–10.8)

## 2018-01-13 PROCEDURE — 700102 HCHG RX REV CODE 250 W/ 637 OVERRIDE(OP): Performed by: INTERNAL MEDICINE

## 2018-01-13 PROCEDURE — 700101 HCHG RX REV CODE 250: Performed by: FAMILY MEDICINE

## 2018-01-13 PROCEDURE — 700102 HCHG RX REV CODE 250 W/ 637 OVERRIDE(OP): Performed by: FAMILY MEDICINE

## 2018-01-13 PROCEDURE — A9270 NON-COVERED ITEM OR SERVICE: HCPCS | Performed by: INTERNAL MEDICINE

## 2018-01-13 PROCEDURE — 85025 COMPLETE CBC W/AUTO DIFF WBC: CPT

## 2018-01-13 PROCEDURE — 700111 HCHG RX REV CODE 636 W/ 250 OVERRIDE (IP): Performed by: HOSPITALIST

## 2018-01-13 PROCEDURE — A9270 NON-COVERED ITEM OR SERVICE: HCPCS | Performed by: HOSPITALIST

## 2018-01-13 PROCEDURE — 700102 HCHG RX REV CODE 250 W/ 637 OVERRIDE(OP): Performed by: HOSPITALIST

## 2018-01-13 PROCEDURE — A9270 NON-COVERED ITEM OR SERVICE: HCPCS | Performed by: FAMILY MEDICINE

## 2018-01-13 PROCEDURE — 36415 COLL VENOUS BLD VENIPUNCTURE: CPT

## 2018-01-13 PROCEDURE — 770006 HCHG ROOM/CARE - MED/SURG/GYN SEMI*

## 2018-01-13 PROCEDURE — 99231 SBSQ HOSP IP/OBS SF/LOW 25: CPT | Performed by: HOSPITALIST

## 2018-01-13 RX ADMIN — CHOLECALCIFEROL TAB 25 MCG (1000 UNIT) 2000 UNITS: 25 TAB at 09:25

## 2018-01-13 RX ADMIN — HEPARIN SODIUM 5000 UNITS: 5000 INJECTION, SOLUTION INTRAVENOUS; SUBCUTANEOUS at 20:07

## 2018-01-13 RX ADMIN — HEPARIN SODIUM 5000 UNITS: 5000 INJECTION, SOLUTION INTRAVENOUS; SUBCUTANEOUS at 09:26

## 2018-01-13 RX ADMIN — ASPIRIN 81 MG: 81 TABLET, COATED ORAL at 09:27

## 2018-01-13 RX ADMIN — Medication 325 MG: at 12:42

## 2018-01-13 RX ADMIN — HYDRALAZINE HYDROCHLORIDE 50 MG: 50 TABLET ORAL at 15:14

## 2018-01-13 RX ADMIN — LIDOCAINE 1 PATCH: 50 PATCH CUTANEOUS at 09:27

## 2018-01-13 RX ADMIN — GUAIFENESIN 600 MG: 600 TABLET, EXTENDED RELEASE ORAL at 09:00

## 2018-01-13 RX ADMIN — GABAPENTIN 600 MG: 300 CAPSULE ORAL at 16:15

## 2018-01-13 RX ADMIN — Medication 325 MG: at 09:27

## 2018-01-13 RX ADMIN — Medication 325 MG: at 17:36

## 2018-01-13 RX ADMIN — HYDRALAZINE HYDROCHLORIDE 50 MG: 50 TABLET ORAL at 05:28

## 2018-01-13 RX ADMIN — HYDRALAZINE HYDROCHLORIDE 50 MG: 50 TABLET ORAL at 20:07

## 2018-01-13 RX ADMIN — ATORVASTATIN CALCIUM 80 MG: 40 TABLET, FILM COATED ORAL at 20:06

## 2018-01-13 RX ADMIN — DULOXETINE HYDROCHLORIDE 20 MG: 20 CAPSULE, DELAYED RELEASE ORAL at 09:27

## 2018-01-13 RX ADMIN — BENAZEPRIL HYDROCHLORIDE 40 MG: 20 TABLET, COATED ORAL at 09:25

## 2018-01-13 RX ADMIN — ALENDRONATE SODIUM 10 MG: 10 TABLET ORAL at 09:25

## 2018-01-13 RX ADMIN — OXYCODONE HYDROCHLORIDE AND ACETAMINOPHEN 500 MG: 500 TABLET ORAL at 20:07

## 2018-01-13 RX ADMIN — OXYCODONE HYDROCHLORIDE AND ACETAMINOPHEN 500 MG: 500 TABLET ORAL at 09:27

## 2018-01-13 RX ADMIN — GUAIFENESIN 600 MG: 600 TABLET, EXTENDED RELEASE ORAL at 20:07

## 2018-01-13 RX ADMIN — OXYBUTYNIN CHLORIDE 5 MG: 5 TABLET, FILM COATED, EXTENDED RELEASE ORAL at 20:07

## 2018-01-13 RX ADMIN — GABAPENTIN 600 MG: 300 CAPSULE ORAL at 20:07

## 2018-01-13 ASSESSMENT — PAIN SCALES - GENERAL
PAINLEVEL_OUTOF10: 1
PAINLEVEL_OUTOF10: 6
PAINLEVEL_OUTOF10: 2
PAINLEVEL_OUTOF10: 1

## 2018-01-13 ASSESSMENT — LIFESTYLE VARIABLES: DO YOU DRINK ALCOHOL: NO

## 2018-01-13 NOTE — PROGRESS NOTES
Simona Knight Fall Risk Assessment:     Last Known Fall: Within the last six months  Mobility: Immobilized/requires assist of one person  Medications: Cardiovascular or central nervous system meds  Mental Status/LOC/Awareness: Oriented to person and place  Toileting Needs: Incontinence  Volume/Electrolyte Status: No problems  Communication/Sensory: Visual (Glasses)/hearing deficit  Behavior: Appropriate behavior  Simona Knight Fall Risk Total: 13  Fall Risk Level: MODERATE RISK    Universal Fall Precautions:  call light/belongings in reach, bed in low position and locked, wheelchairs and assistive devices out of sight, siderails up x 2, use non-slip footwear, adequate lighting, educate on level of risk, clutter free and spill free environment, educate to call for assistance    Fall Risk Level Interventions:    TRIAL ([employer] 8, NEURO, MED JENNIE 5) Moderate Fall Risk Interventions  Place yellow fall risk ID band on patient: verified  Provide patient/family education based on risk assessment : completed  Educate patient/family to call staff for assistance when getting out of bed: completed  Place fall precaution signage outside patient door: verified  Utilize bed/chair fall alarm: verifiedTRIAL ([employer] 8, NEURO, [employer] JENNIE 5) High Fall Risk Interventions  Place yellow fall risk ID band on patient: verified  Provide patient/family education based on risk assessment: completed  Educate patient/family to call staff for assistance when getting out of bed: completed  Place fall precaution signage outside patient door: verified  Place patient in room close to nursing station: currently not available/charge notified  Utilize bed/chair fall alarm: verified  Notify charge of high risk for huddle: verified    Patient Specific Interventions:     Medication: review medications with patient and family  Mental Status/LOC/Awareness: reorient patient, reinforce falls education, check on patient hourly, utilize bed/chair fall alarm and  reinforce the use of call light  Toileting: provide frquent toileting and instruct patient/family on the need to call for assistance when toileting  Volume/Electrolyte Status: ensure patient remains hydrated and monitor abnormal lab values  Communication/Sensory: update plan of care on whiteboard, ensure proper positioning when transferrng/ambulating and ensure patient has glasses/contacts and hearing aids/dentures  Behavioral: encourage patient to voice feelings  Mobility: provide comfort measures during transport, dangle prior to standing, utilize bed/chair fall alarm, ensure bed is locked and in lowest position, provide appropriate assistive device and instruct patient to exit bed on their strongest side

## 2018-01-13 NOTE — PROGRESS NOTES
"Assumed care at 1900. Received report from RN. Patient is AOx2-3 disoriented to time and event. Patient is sitting up in bed and appears calm and in no distress. Assessment complete. Labs reviewed. Patient and RN discussed plan of care. Patient questions answered. Patient needs are met at this time. Bed in lowest and locked position. Call light is within reach. Hourly rounding in place. /62   Pulse 77   Temp 36.4 °C (97.6 °F)   Resp 17   Ht 1.6 m (5' 2.99\")   Wt 80.8 kg (178 lb 2.1 oz)   SpO2 92%   Breastfeeding? No   BMI 31.56 kg/m²       "

## 2018-01-13 NOTE — CARE PLAN
Problem: Safety  Goal: Will remain free from injury  Outcome: PROGRESSING AS EXPECTED  Bed on lowest position, call light within reach, patient educated about use of call light and safety precautions.     Problem: Skin Integrity  Goal: Risk for impaired skin integrity will decrease  Outcome: PROGRESSING AS EXPECTED  Waffle cushion in place. Barrier cream used for incontinence. Switching sites for heparin due to bruising on abdomen.

## 2018-01-13 NOTE — PROGRESS NOTES
[facility] Hospitalist Progress Note    Date of Service: 2018    Chief Complaint  77 y.o. female admitted 2017 with GLF, pyuria and ATN.     Interval Problem Update  Patient clinically stable overnight, no nursing issues.   Nursing reported patient having black stools, but she is also on PO iron.  Cbc check showed normal Hgb/hct  Pending placement  Clinically unchanged.    Consultants/Specialty  Neurology  Psychiatry    Disposition  Awaiting for guardianship      Review of Systems   Unable to perform ROS: Mental acuity      Physical Exam  Laboratory/Imaging   Hemodynamics  Temp (24hrs), Av.4 °C (97.5 °F), Min:36.1 °C (96.9 °F), Max:36.7 °C (98 °F)   Temperature: 36.1 °C (96.9 °F)  Pulse  Av.7  Min: 50  Max: 106    Blood Pressure : 131/57      Respiratory      Respiration: 17, Pulse Oximetry: 95 %     Work Of Breathing / Effort: Mild  RUL Breath Sounds: Diminished, RML Breath Sounds: Diminished, RLL Breath Sounds: Diminished, ARGELIA Breath Sounds: Diminished, LLL Breath Sounds: Diminished    Fluids  No intake or output data in the 24 hours ending 18 1419    Nutrition  Orders Placed This Encounter   Procedures   • Diet Order     Standing Status:   Standing     Number of Occurrences:   1     Order Specific Question:   Diet:     Answer:   Regular [1]   grossly unchanged   Physical Exam   Constitutional: She appears well-developed and well-nourished. No distress.   HENT:   Head: Normocephalic and atraumatic.   Poor dentition   Eyes: EOM are normal. Pupils are equal, round, and reactive to light.   Neck: Normal range of motion.   Cardiovascular: Normal rate and intact distal pulses.    Murmur (old) heard.  Pulmonary/Chest: No respiratory distress. She has no wheezes. She has no rales.   Abdominal: Soft. Bowel sounds are normal. She exhibits no distension.   Musculoskeletal: Normal range of motion.   Neurological: She is alert. No cranial nerve deficit. She exhibits normal muscle tone.   Skin: Skin is  warm. She is not diaphoretic.   Psychiatric: Thought content normal.   Nursing note and vitals reviewed.          Recent Labs      01/13/18   1151   WBC  5.9   RBC  4.05*   HEMOGLOBIN  11.5*   HEMATOCRIT  37.5   MCV  92.6   MCH  28.4   MCHC  30.7*   RDW  55.6*   PLATELETCT  257   MPV  10.9                          Assessment/Plan     * Fall- (present on admission)   Assessment & Plan    MRI LS spine with mild to mod central stensosis L2-3 and L3-4, old T12 compression fracture.  Fall precautions        Hypertension- (present on admission)   Assessment & Plan    Controlled continue Lotensin and hydralazine.            Dementia- (present on admission)   Assessment & Plan    Minimize sedatives   Awaiting for guardianship        Bronchitis   Overview    Continue RT protocol, duo nebs, Pep therapy if warranted, and incentive spirometry.      Assessment & Plan    No issues  Continue RT protocol, duo nebs, Pep therapy if warranted, and incentive spirometry.           Iron deficiency anemia- (present on admission)   Assessment & Plan    Continue with by mouth iron        Constipation- (present on admission)   Assessment & Plan    Resolved continue bowel protocol        Congestion of upper airway- (present on admission)   Assessment & Plan    Resolved no issues        Cough   Assessment & Plan    Guaifenesin when necessary         Physical debility- (present on admission)   Assessment & Plan    Encourage ambulation with the nursing assistants        Obesity (BMI 30.0-34.9)- (present on admission)   Assessment & Plan    Continue with dietary restriction        Bradycardia- (present on admission)   Assessment & Plan    Metoprolol was discontinued heart rate stable        T12 compression fracture (CMS-HCC)- (present on admission)   Assessment & Plan    CT spine, no acute fractures.  Continue Neurontin and continue vitamin D supplementation.        History of stroke- (present on admission)   Assessment & Plan    MRI brain 9/2017  with evidence of multiple strokes.  Continue aspirin and statin for neuro protective measures          Chronic back pain- (present on admission)   Assessment & Plan    MRI lumbar spine shows mild to moderate stenosis   Continue Neurontin          Patient seen and examined at bedside assessment and plan essentially unchanged , she is resting in her bed.  Pending for guardianship and placement.    Patient plan of care discussed at multidisplinary team rounds and with patient and R.N at beside.      Reviewed items::  Labs reviewed and Medications reviewed  Pink catheter::  No Pink  DVT prophylaxis pharmacological::  Heparin  Ulcer Prophylaxis::  Yes

## 2018-01-14 PROCEDURE — 700102 HCHG RX REV CODE 250 W/ 637 OVERRIDE(OP): Performed by: INTERNAL MEDICINE

## 2018-01-14 PROCEDURE — 770006 HCHG ROOM/CARE - MED/SURG/GYN SEMI*

## 2018-01-14 PROCEDURE — 700102 HCHG RX REV CODE 250 W/ 637 OVERRIDE(OP): Performed by: HOSPITALIST

## 2018-01-14 PROCEDURE — A9270 NON-COVERED ITEM OR SERVICE: HCPCS | Performed by: HOSPITALIST

## 2018-01-14 PROCEDURE — 99231 SBSQ HOSP IP/OBS SF/LOW 25: CPT | Performed by: HOSPITALIST

## 2018-01-14 PROCEDURE — 700102 HCHG RX REV CODE 250 W/ 637 OVERRIDE(OP): Performed by: FAMILY MEDICINE

## 2018-01-14 PROCEDURE — A9270 NON-COVERED ITEM OR SERVICE: HCPCS | Performed by: INTERNAL MEDICINE

## 2018-01-14 PROCEDURE — 700111 HCHG RX REV CODE 636 W/ 250 OVERRIDE (IP): Performed by: HOSPITALIST

## 2018-01-14 PROCEDURE — 700101 HCHG RX REV CODE 250: Performed by: FAMILY MEDICINE

## 2018-01-14 PROCEDURE — A9270 NON-COVERED ITEM OR SERVICE: HCPCS | Performed by: FAMILY MEDICINE

## 2018-01-14 RX ADMIN — OXYCODONE HYDROCHLORIDE AND ACETAMINOPHEN 500 MG: 500 TABLET ORAL at 20:47

## 2018-01-14 RX ADMIN — CHOLECALCIFEROL TAB 25 MCG (1000 UNIT) 2000 UNITS: 25 TAB at 08:46

## 2018-01-14 RX ADMIN — GABAPENTIN 600 MG: 300 CAPSULE ORAL at 16:16

## 2018-01-14 RX ADMIN — OXYCODONE HYDROCHLORIDE AND ACETAMINOPHEN 500 MG: 500 TABLET ORAL at 08:46

## 2018-01-14 RX ADMIN — LIDOCAINE 1 PATCH: 50 PATCH CUTANEOUS at 08:45

## 2018-01-14 RX ADMIN — GUAIFENESIN 600 MG: 600 TABLET, EXTENDED RELEASE ORAL at 08:46

## 2018-01-14 RX ADMIN — OXYBUTYNIN CHLORIDE 5 MG: 5 TABLET, FILM COATED, EXTENDED RELEASE ORAL at 20:47

## 2018-01-14 RX ADMIN — BENAZEPRIL HYDROCHLORIDE 40 MG: 20 TABLET, COATED ORAL at 08:47

## 2018-01-14 RX ADMIN — Medication 325 MG: at 13:16

## 2018-01-14 RX ADMIN — ATORVASTATIN CALCIUM 80 MG: 40 TABLET, FILM COATED ORAL at 20:47

## 2018-01-14 RX ADMIN — HYDRALAZINE HYDROCHLORIDE 50 MG: 50 TABLET ORAL at 20:47

## 2018-01-14 RX ADMIN — ALENDRONATE SODIUM 10 MG: 10 TABLET ORAL at 08:47

## 2018-01-14 RX ADMIN — GABAPENTIN 600 MG: 300 CAPSULE ORAL at 20:47

## 2018-01-14 RX ADMIN — HEPARIN SODIUM 5000 UNITS: 5000 INJECTION, SOLUTION INTRAVENOUS; SUBCUTANEOUS at 08:45

## 2018-01-14 RX ADMIN — GUAIFENESIN 600 MG: 600 TABLET, EXTENDED RELEASE ORAL at 20:47

## 2018-01-14 RX ADMIN — Medication 325 MG: at 08:49

## 2018-01-14 RX ADMIN — DULOXETINE HYDROCHLORIDE 20 MG: 20 CAPSULE, DELAYED RELEASE ORAL at 08:45

## 2018-01-14 RX ADMIN — HYDRALAZINE HYDROCHLORIDE 50 MG: 50 TABLET ORAL at 13:15

## 2018-01-14 RX ADMIN — Medication 325 MG: at 18:03

## 2018-01-14 RX ADMIN — ASPIRIN 81 MG: 81 TABLET, COATED ORAL at 08:46

## 2018-01-14 RX ADMIN — GABAPENTIN 600 MG: 300 CAPSULE ORAL at 08:46

## 2018-01-14 RX ADMIN — HEPARIN SODIUM 5000 UNITS: 5000 INJECTION, SOLUTION INTRAVENOUS; SUBCUTANEOUS at 20:47

## 2018-01-14 RX ADMIN — HYDRALAZINE HYDROCHLORIDE 50 MG: 50 TABLET ORAL at 05:41

## 2018-01-14 ASSESSMENT — PAIN SCALES - GENERAL
PAINLEVEL_OUTOF10: 0
PAINLEVEL_OUTOF10: 6

## 2018-01-14 NOTE — CARE PLAN
Problem: Safety  Goal: Will remain free from falls  Outcome: PROGRESSING AS EXPECTED  Pt bed alarm in use. Pt educated on use of call light when getting up from bed. Pt verbalized understanding. Pt ambulated today around unit with use of FWW.     Problem: Pain Management  Goal: Pain level will decrease to patient's comfort goal  Outcome: PROGRESSING AS EXPECTED  Pt educated on non pharmacological pain management such as heat and rest. Pt given lidocaine patch for back pain.

## 2018-01-14 NOTE — CARE PLAN
Problem: Safety  Goal: Will remain free from injury  Outcome: PROGRESSING AS EXPECTED  Bed on lowest position, call light within reach, patient educated about use of call light and safety precautions. Bed alarms on.      Problem: Urinary Elimination:  Goal: Ability to reestablish a normal urinary elimination pattern will improve  Outcome: PROGRESSING SLOWER THAN EXPECTED  Pt is incontinent. Peripads changed every few hours.

## 2018-01-14 NOTE — PROGRESS NOTES
Simona Knight Fall Risk Assessment:     Last Known Fall: Within the last six months  Mobility: Immobilized/requires assist of one person  Medications: Cardiovascular or central nervous system meds  Mental Status/LOC/Awareness: Oriented to person and place  Toileting Needs: Incontinence  Volume/Electrolyte Status: No problems  Communication/Sensory: Visual (Glasses)/hearing deficit  Behavior: Appropriate behavior  Simona Knight Fall Risk Total: 13  Fall Risk Level: MODERATE RISK    Universal Fall Precautions:  call light/belongings in reach, bed in low position and locked, wheelchairs and assistive devices out of sight, siderails up x 2, use non-slip footwear, adequate lighting, clutter free and spill free environment, educate on level of risk, educate to call for assistance    Fall Risk Level Interventions:    TRIAL ([employer] 8, NEURO, MED JENNIE 5) Moderate Fall Risk Interventions  Place yellow fall risk ID band on patient: verified  Provide patient/family education based on risk assessment : completed  Educate patient/family to call staff for assistance when getting out of bed: completed  Place fall precaution signage outside patient door: verified  Utilize bed/chair fall alarm: verifiedTRIAL (TELE 8, NEURO, [employer] JENNIE 5) High Fall Risk Interventions  Place yellow fall risk ID band on patient: verified  Provide patient/family education based on risk assessment: completed  Educate patient/family to call staff for assistance when getting out of bed: completed  Place fall precaution signage outside patient door: verified  Place patient in room close to nursing station: currently not available/charge notified  Utilize bed/chair fall alarm: verified  Notify charge of high risk for huddle: verified    Patient Specific Interventions:     Medication: review medications with patient and family, assess for medications that can be discontinued or dosage decreased and limit combination of prn medications  Mental  Status/LOC/Awareness: reorient patient, reinforce falls education, encourage family to stay with patient, check on patient hourly, utilize bed/chair fall alarm, reinforce the use of call light and provide activity  Toileting: consider obtaining elevated toilet seat or bedside commode (BSC), provide frquent toileting, monitor intake and output/use of appropriate interventions, instruct patient/family on the use of grab bars and instruct patient/family on the need to call for assistance when toileting  Volume/Electrolyte Status: ensure patient remains hydrated, monitor blood sugars and maintain appropriate blood sugar levels if diabetic, advance diet as tolerated, administer medications as ordered for nausea and vomiting, monitor abnormal lab values and ensure IV fluids are appropriate  Communication/Sensory: update plan of care on whiteboard, provide communication alternatives/, collaborate with doctor for possible speech therapy consult, ensure proper positioning when transferrng/ambulating, ensure patient has glasses/contacts and hearing aids/dentures and for visually impaired patients orient to their room surrounding and do not change their surroundings  Behavioral: collaborate with doctor for possible psych consult, encourage patient to voice feelings, engage patient in daily activities, administer medication as ordered, instruct/reinforce fall program rationale and encourage family to stay with impulsive patients  Mobility: schedule physical activity throughout the day, provide comfort measures during transport, dangle prior to standing, utilize bed/chair fall alarm, ensure bed is locked and in lowest position, provide appropriate assistive device, instruct patient to exit bed on their strongest side and collaborate with doctor for possible PT/OT consult

## 2018-01-14 NOTE — PROGRESS NOTES
Simona Knight Fall Risk Assessment:     Last Known Fall: Within the last six months  Mobility: Immobilized/requires assist of one person  Medications: Cardiovascular or central nervous system meds  Mental Status/LOC/Awareness: Oriented to person and place  Toileting Needs: Incontinence  Volume/Electrolyte Status: No problems  Communication/Sensory: Visual (Glasses)/hearing deficit  Behavior: Appropriate behavior  Simona Knight Fall Risk Total: 13  Fall Risk Level: MODERATE RISK    Universal Fall Precautions:  call light/belongings in reach, bed in low position and locked, wheelchairs and assistive devices out of sight, siderails up x 2, adequate lighting, use non-slip footwear, clutter free and spill free environment, educate on level of risk, educate to call for assistance    Fall Risk Level Interventions:    TRIAL ([employer] 8, NEURO, MED JENNIE 5) Moderate Fall Risk Interventions  Place yellow fall risk ID band on patient: verified  Provide patient/family education based on risk assessment : completed  Educate patient/family to call staff for assistance when getting out of bed: completed  Place fall precaution signage outside patient door: verified  Utilize bed/chair fall alarm: verifiedTRIAL (TELE 8, NEURO, [employer] JENNIE 5) High Fall Risk Interventions  Place yellow fall risk ID band on patient: verified  Provide patient/family education based on risk assessment: completed  Educate patient/family to call staff for assistance when getting out of bed: completed  Place fall precaution signage outside patient door: verified  Place patient in room close to nursing station: currently not available/charge notified  Utilize bed/chair fall alarm: verified  Notify charge of high risk for huddle: verified    Patient Specific Interventions:     Medication: review medications with patient and family  Mental Status/LOC/Awareness: reorient patient, check on patient hourly and utilize bed/chair fall alarm  Toileting: provide frquent  toileting and instruct patient/family on the need to call for assistance when toileting  Volume/Electrolyte Status: ensure patient remains hydrated and monitor abnormal lab values  Communication/Sensory: update plan of care on whiteboard, ensure proper positioning when transferrng/ambulating and ensure patient has glasses/contacts and hearing aids/dentures  Behavioral: encourage patient to voice feelings  Mobility: provide comfort measures during transport, dangle prior to standing, utilize bed/chair fall alarm, ensure bed is locked and in lowest position, provide appropriate assistive device and instruct patient to exit bed on their strongest side

## 2018-01-14 NOTE — PROGRESS NOTES
"Assumed care at 1900. Received report from RN. Patient is AOx2 disoriented to time and event. Patient is sitting up in bed and appears calm and in no distress. Assessment complete. Labs reviewed. Patient and RN discussed plan of care. Patient questions answered. Patient needs are met at this time. Bed in lowest and locked position. Call light is within reach. Hourly rounding in place. /65   Pulse 75   Temp 36.9 °C (98.4 °F)   Resp 16   Ht 1.6 m (5' 2.99\")   Wt 80.8 kg (178 lb 2.1 oz)   SpO2 93%   Breastfeeding? No   BMI 31.56 kg/m²       "

## 2018-01-14 NOTE — PROGRESS NOTES
[facility] Hospitalist Progress Note    Date of Service: 2018    Chief Complaint  77 y.o. female admitted 2017 with GLF, pyuria and ATN.     Interval Problem Update  Patient clinically stable overnight, no nursing issues.   Nursing reported patient having black stools, but she is also on PO iron.  Cbc check showed normal Hgb/hct  Pending placement  Clinically unchanged.      No acute issues, patient comfortable     Consultants/Specialty  Neurology  Psychiatry    Disposition  Awaiting for guardianship      Review of Systems   Unable to perform ROS: Mental acuity      Physical Exam  Laboratory/Imaging   Hemodynamics  Temp (24hrs), Av.8 °C (98.3 °F), Min:36.8 °C (98.2 °F), Max:36.9 °C (98.4 °F)   Temperature: 36.8 °C (98.2 °F)  Pulse  Av.7  Min: 50  Max: 106    Blood Pressure : 136/49      Respiratory      Respiration: 15, Pulse Oximetry: 95 %     Work Of Breathing / Effort: Mild  RUL Breath Sounds: Diminished, RML Breath Sounds: Diminished, RLL Breath Sounds: Diminished, ARGELIA Breath Sounds: Diminished, LLL Breath Sounds: Diminished    Fluids    Intake/Output Summary (Last 24 hours) at 18 0820  Last data filed at 18 1600   Gross per 24 hour   Intake              360 ml   Output                0 ml   Net              360 ml       Nutrition  Orders Placed This Encounter   Procedures   • Diet Order     Standing Status:   Standing     Number of Occurrences:   1     Order Specific Question:   Diet:     Answer:   Regular [1]   grossly unchanged   Physical Exam   Constitutional: She appears well-developed and well-nourished. No distress.   HENT:   Head: Normocephalic and atraumatic.   Poor dentition   Eyes: EOM are normal. Pupils are equal, round, and reactive to light.   Neck: Normal range of motion.   Cardiovascular: Normal rate and intact distal pulses.    Murmur (old) heard.  Pulmonary/Chest: No respiratory distress. She has no wheezes. She has no rales.   Abdominal: Soft. Bowel sounds are  normal. She exhibits no distension.   Musculoskeletal: Normal range of motion.   Neurological: She is alert. No cranial nerve deficit. She exhibits normal muscle tone.   Skin: Skin is warm. She is not diaphoretic.   Psychiatric: Thought content normal.   Nursing note and vitals reviewed.          Recent Labs      01/13/18   1151   WBC  5.9   RBC  4.05*   HEMOGLOBIN  11.5*   HEMATOCRIT  37.5   MCV  92.6   MCH  28.4   MCHC  30.7*   RDW  55.6*   PLATELETCT  257   MPV  10.9                          Assessment/Plan     * Fall- (present on admission)   Assessment & Plan    MRI LS spine with mild to mod central stensosis L2-3 and L3-4, old T12 compression fracture.  Fall precautions        Hypertension- (present on admission)   Assessment & Plan    Controlled continue Lotensin and hydralazine.        Dementia- (present on admission)   Assessment & Plan    Minimize sedatives   Awaiting for guardianship        Bronchitis   Overview    Continue RT protocol, duo nebs, Pep therapy if warranted, and incentive spirometry.      Assessment & Plan    No issues  Continue RT protocol, duo nebs, Pep therapy if warranted, and incentive spirometry.           Iron deficiency anemia- (present on admission)   Assessment & Plan    Continue with by mouth iron        Constipation- (present on admission)   Assessment & Plan    Resolved continue bowel protocol        Congestion of upper airway- (present on admission)   Assessment & Plan    Resolved no issues        Cough   Assessment & Plan    Guaifenesin when necessary         Physical debility- (present on admission)   Assessment & Plan    Encourage ambulation with the nursing assistants        Obesity (BMI 30.0-34.9)- (present on admission)   Assessment & Plan    Continue with dietary restriction        Bradycardia- (present on admission)   Assessment & Plan    Metoprolol was discontinued heart rate stable        T12 compression fracture (CMS-[facility])- (present on admission)   Assessment & Plan     CT spine, no acute fractures.  Continue Neurontin and continue vitamin D supplementation.        History of stroke- (present on admission)   Assessment & Plan    MRI brain 9/2017 with evidence of multiple strokes.  Continue aspirin and statin for neuro protective measures          Chronic back pain- (present on admission)   Assessment & Plan    MRI lumbar spine shows mild to moderate stenosis   Continue Neurontin          Patient seen and examined at bedside assessment and plan essentially unchanged   Pending for guardianship     Patient plan of care discussed at multidisplinary team rounds and with patient and R.N at [facility].      Reviewed items::  Labs reviewed and Medications reviewed  Pink catheter::  No Pink  DVT prophylaxis pharmacological::  Heparin  Ulcer Prophylaxis::  Yes

## 2018-01-15 VITALS
SYSTOLIC BLOOD PRESSURE: 126 MMHG | DIASTOLIC BLOOD PRESSURE: 83 MMHG | OXYGEN SATURATION: 92 % | RESPIRATION RATE: 18 BRPM | HEART RATE: 72 BPM | BODY MASS INDEX: 31.56 KG/M2 | HEIGHT: 63 IN | WEIGHT: 178.13 LBS | TEMPERATURE: 96.6 F
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
## 2018-01-15 PROCEDURE — A9270 NON-COVERED ITEM OR SERVICE: HCPCS | Performed by: HOSPITALIST

## 2018-01-15 PROCEDURE — A9270 NON-COVERED ITEM OR SERVICE: HCPCS | Performed by: INTERNAL MEDICINE

## 2018-01-15 PROCEDURE — 700102 HCHG RX REV CODE 250 W/ 637 OVERRIDE(OP): Performed by: HOSPITALIST

## 2018-01-15 PROCEDURE — 700102 HCHG RX REV CODE 250 W/ 637 OVERRIDE(OP): Performed by: INTERNAL MEDICINE

## 2018-01-15 PROCEDURE — A9270 NON-COVERED ITEM OR SERVICE: HCPCS | Performed by: FAMILY MEDICINE

## 2018-01-15 PROCEDURE — 700111 HCHG RX REV CODE 636 W/ 250 OVERRIDE (IP): Performed by: HOSPITALIST

## 2018-01-15 PROCEDURE — 700102 HCHG RX REV CODE 250 W/ 637 OVERRIDE(OP): Performed by: FAMILY MEDICINE

## 2018-01-15 PROCEDURE — 99239 HOSP IP/OBS DSCHRG MGMT >30: CPT | Performed by: HOSPITALIST

## 2018-01-15 PROCEDURE — 700101 HCHG RX REV CODE 250: Performed by: FAMILY MEDICINE

## 2018-01-15 RX ADMIN — HYDRALAZINE HYDROCHLORIDE 50 MG: 50 TABLET ORAL at 13:05

## 2018-01-15 RX ADMIN — CHOLECALCIFEROL TAB 25 MCG (1000 UNIT) 2000 UNITS: 25 TAB at 08:32

## 2018-01-15 RX ADMIN — DULOXETINE HYDROCHLORIDE 20 MG: 20 CAPSULE, DELAYED RELEASE ORAL at 08:31

## 2018-01-15 RX ADMIN — HYDRALAZINE HYDROCHLORIDE 50 MG: 50 TABLET ORAL at 05:32

## 2018-01-15 RX ADMIN — GUAIFENESIN 600 MG: 600 TABLET, EXTENDED RELEASE ORAL at 08:30

## 2018-01-15 RX ADMIN — ACETAMINOPHEN 650 MG: 325 TABLET, FILM COATED ORAL at 14:01

## 2018-01-15 RX ADMIN — POLYETHYLENE GLYCOL (3350) 1 PACKET: 17 POWDER, FOR SOLUTION ORAL at 08:32

## 2018-01-15 RX ADMIN — BENAZEPRIL HYDROCHLORIDE 40 MG: 20 TABLET, COATED ORAL at 08:31

## 2018-01-15 RX ADMIN — OXYCODONE HYDROCHLORIDE AND ACETAMINOPHEN 500 MG: 500 TABLET ORAL at 08:31

## 2018-01-15 RX ADMIN — ASPIRIN 81 MG: 81 TABLET, COATED ORAL at 08:31

## 2018-01-15 RX ADMIN — Medication 325 MG: at 13:05

## 2018-01-15 RX ADMIN — Medication 325 MG: at 08:31

## 2018-01-15 RX ADMIN — GABAPENTIN 600 MG: 300 CAPSULE ORAL at 08:30

## 2018-01-15 RX ADMIN — ALENDRONATE SODIUM 10 MG: 10 TABLET ORAL at 08:31

## 2018-01-15 RX ADMIN — LIDOCAINE 1 PATCH: 50 PATCH CUTANEOUS at 08:30

## 2018-01-15 RX ADMIN — GABAPENTIN 600 MG: 300 CAPSULE ORAL at 14:01

## 2018-01-15 RX ADMIN — HEPARIN SODIUM 5000 UNITS: 5000 INJECTION, SOLUTION INTRAVENOUS; SUBCUTANEOUS at 08:30

## 2018-01-15 ASSESSMENT — PAIN SCALES - GENERAL: PAINLEVEL_OUTOF10: 2

## 2018-01-15 NOTE — PROGRESS NOTES
Pt resting in bed. Getting into the chair for breakfast. Assessment completed. Pt appears comfortable, denies pain or discomfort. Vitals stable. Hourly rounding in place. Bed alarm on, bed in the lowest position. Call light in reach.

## 2018-01-15 NOTE — PROGRESS NOTES
Simona Knight Fall Risk Assessment:     Last Known Fall: Within the last six months  Mobility: Immobilized/requires assist of one person  Medications: Cardiovascular or central nervous system meds  Mental Status/LOC/Awareness: Oriented to person and place  Toileting Needs: Incontinence  Volume/Electrolyte Status: No problems  Communication/Sensory: Visual (Glasses)/hearing deficit  Behavior: Appropriate behavior  Simona Knight Fall Risk Total: 13  Fall Risk Level: MODERATE RISK    Universal Fall Precautions:  call light/belongings in reach, bed in low position and locked, wheelchairs and assistive devices out of sight, siderails up x 2, use non-slip footwear, adequate lighting, clutter free and spill free environment, educate on level of risk, educate to call for assistance    Fall Risk Level Interventions:    TRIAL ([employer] 8, NEURO, MED JENNIE 5) Moderate Fall Risk Interventions  Place yellow fall risk ID band on patient: verified  Provide patient/family education based on risk assessment : completed  Educate patient/family to call staff for assistance when getting out of bed: completed  Place fall precaution signage outside patient door: verified  Utilize bed/chair fall alarm: verifiedTRIAL ([employer] 8, NEURO, [employer] JENNIE 5) High Fall Risk Interventions  Place yellow fall risk ID band on patient: verified  Provide patient/family education based on risk assessment: completed  Educate patient/family to call staff for assistance when getting out of bed: completed  Place fall precaution signage outside patient door: verified  Place patient in room close to nursing station: currently not available/charge notified  Utilize bed/chair fall alarm: verified  Notify charge of high risk for huddle: verified    Patient Specific Interventions:     Medication: review medications with patient and family  Mental Status/LOC/Awareness: reinforce falls education and check on patient hourly  Toileting: provide frquent toileting and do not  leave patient unattended in bathroom/refer to toileting scripting  Volume/Electrolyte Status: ensure patient remains hydrated  Communication/Sensory: update plan of care on whiteboard  Behavioral: engage patient in daily activities and administer medication as ordered  Mobility: schedule physical activity throughout the day and dangle prior to standing

## 2018-01-15 NOTE — CARE PLAN
Problem: Mobility  Goal: Risk for activity intolerance will decrease    Intervention: Encourage patient to increase activity level in collaboration with Interdisciplinary Team  Pt encouraged to be OOB for meals and ambulate in the halls daily

## 2018-01-15 NOTE — PROGRESS NOTES
Rec'd report from day shift RN. Assumed pt care. Assessment completed. AA&OX2, reoriented to time and event. Denies pain at this time. No s/s of discomfort or distress. Pt self turns. Ambulates to the bathroom with SBA and FWW. Pt is currently incontinent, cleaned up and linens changed. Bed in lowest position, bed locked, bed alarm on for safety, treaded socks in place, RN and CNA numbers provided, call light within reach.

## 2018-01-15 NOTE — PROGRESS NOTES
Pt sitting up in the chair for breakfast. Assessment completed. Plan of care discussed and AM medications administered, see MAR. Pt appears comfortable and denies pain or discomfort. Chair alarm on, call light in reach. Will continue to monitor.

## 2018-01-15 NOTE — DISCHARGE PLANNING
[facility] are not in office today due to [place] holiday. Plan for d/c tomorrow.     Addendum:  Received call back from [facility] on call Ktaina. Per Katina, pt is able to d/c today to [facility]. Katina requests that Yoly ( owner) calls her at [phone] when pt arrives at . SW updated Yoly and provided phone number.   Yoly states she will be here at 1400 to p/u pt.

## 2018-01-15 NOTE — CARE PLAN
Problem: Bowel/Gastric:  Goal: Will not experience complications related to bowel motility    Intervention: Assess baseline bowel pattern  Pt had a BM in the afternoon. Declined stool softener for the evening.       Problem: Discharge Barriers/Planning  Goal: Patient's continuum of care needs will be met  Pt has D/C orders for the AM.

## 2018-01-15 NOTE — CARE PLAN
Problem: Pain Management  Goal: Pain level will decrease to patient's comfort goal    Intervention: Follow pain managment plan developed in collaboration with patient and Interdisciplinary Team  Pt pain level in controlled

## 2018-01-15 NOTE — PROGRESS NOTES
Simona Knight Fall Risk Assessment:     Last Known Fall: Within the last six months  Mobility: Immobilized/requires assist of one person  Medications: Cardiovascular or central nervous system meds  Mental Status/LOC/Awareness: Oriented to person and place  Toileting Needs: Incontinence  Volume/Electrolyte Status: No problems  Communication/Sensory: Visual (Glasses)/hearing deficit  Behavior: Appropriate behavior  Simona Knight Fall Risk Total: 13  Fall Risk Level: MODERATE RISK    Universal Fall Precautions:  call light/belongings in reach, bed in low position and locked, wheelchairs and assistive devices out of sight, siderails up x 2, adequate lighting, use non-slip footwear, clutter free and spill free environment, educate on level of risk, educate to call for assistance    Fall Risk Level Interventions:    TRIAL ([employer] 8, NEURO, MED JENNIE 5) Moderate Fall Risk Interventions  Place yellow fall risk ID band on patient: verified  Provide patient/family education based on risk assessment : completed  Educate patient/family to call staff for assistance when getting out of bed: completed  Place fall precaution signage outside patient door: verified  Utilize bed/chair fall alarm: verifiedTRIAL (TELE 8, NEURO, [employer] JENNIE 5) High Fall Risk Interventions  Place yellow fall risk ID band on patient: verified  Provide patient/family education based on risk assessment: completed  Educate patient/family to call staff for assistance when getting out of bed: completed  Place fall precaution signage outside patient door: verified  Place patient in room close to nursing station: currently not available/charge notified  Utilize bed/chair fall alarm: verified  Notify charge of high risk for huddle: verified    Patient Specific Interventions:     Medication: review medications with patient and family  Mental Status/LOC/Awareness: check on patient hourly  Toileting: provide frquent toileting  Volume/Electrolyte Status: ensure patient  remains hydrated  Communication/Sensory: ensure proper positioning when transferrng/ambulating  Behavioral: not applicable  Mobility: dangle prior to standing

## 2018-01-15 NOTE — PROGRESS NOTES
Simona Knight Fall Risk Assessment:     Last Known Fall: Within the last six months  Mobility: Immobilized/requires assist of one person  Medications: Cardiovascular or central nervous system meds  Mental Status/LOC/Awareness: Oriented to person and place  Toileting Needs: Incontinence  Volume/Electrolyte Status: No problems  Communication/Sensory: Visual (Glasses)/hearing deficit  Behavior: Appropriate behavior  Simona Knight Fall Risk Total: 13  Fall Risk Level: MODERATE RISK    Universal Fall Precautions:  call light/belongings in reach, bed in low position and locked, wheelchairs and assistive devices out of sight, siderails up x 2, use non-slip footwear, adequate lighting, clutter free and spill free environment, educate on level of risk, educate to call for assistance    Fall Risk Level Interventions:    TRIAL ([employer] 8, NEURO, MED JENNIE 5) Moderate Fall Risk Interventions  Place yellow fall risk ID band on patient: verified  Provide patient/family education based on risk assessment : completed  Educate patient/family to call staff for assistance when getting out of bed: completed  Place fall precaution signage outside patient door: verified  Utilize bed/chair fall alarm: verifiedTRIAL ([employer] 8, NEURO, [employer] JENNIE 5) High Fall Risk Interventions  Place yellow fall risk ID band on patient: verified  Provide patient/family education based on risk assessment: completed  Educate patient/family to call staff for assistance when getting out of bed: completed  Place fall precaution signage outside patient door: verified  Place patient in room close to nursing station: currently not available/charge notified  Utilize bed/chair fall alarm: verified  Notify charge of high risk for huddle: verified    Patient Specific Interventions:     Medication: review medications with patient and family  Mental Status/LOC/Awareness: reorient patient, utilize bed/chair fall alarm and reinforce the use of call light  Toileting: provide  frquent toileting  Volume/Electrolyte Status: ensure patient remains hydrated and monitor abnormal lab values  Communication/Sensory: update plan of care on whiteboard  Behavioral: administer medication as ordered  Mobility: ensure bed is locked and in lowest position

## 2018-01-15 NOTE — PROGRESS NOTES
[facility] Hospitalist Progress Note    Date of Service: 1/15/2018    Chief Complaint  77 y.o. female admitted 2017 with GLF, pyuria and ATN.     Interval Problem Update  Patient clinically stable overnight, no nursing issues.   Nursing reported patient having black stools, but she is also on PO iron.  Cbc check showed normal Hgb/hct  Pending placement  Clinically unchanged.    1/15  No acute issues, patient comfortable, watching tv  Pending guardianship    Consultants/Specialty  Neurology  Psychiatry    Disposition  Awaiting for guardianship      Review of Systems   Unable to perform ROS: Mental acuity      Physical Exam  Laboratory/Imaging   Hemodynamics  Temp (24hrs), Av.2 °C (97.2 °F), Min:35.8 °C (96.5 °F), Max:36.6 °C (97.8 °F)   Temperature: 36.6 °C (97.8 °F)  Pulse  Av.8  Min: 50  Max: 106    Blood Pressure : 132/57      Respiratory      Respiration: 16, Pulse Oximetry: 96 %     Work Of Breathing / Effort: Mild  RUL Breath Sounds: Expiratory Wheezes, RML Breath Sounds: Diminished, RLL Breath Sounds: Diminished, ARGELIA Breath Sounds: Expiratory Wheezes, LLL Breath Sounds: Diminished    Fluids  No intake or output data in the 24 hours ending 01/15/18 0749    Nutrition  Orders Placed This Encounter   Procedures   • Diet Order     Standing Status:   Standing     Number of Occurrences:   1     Order Specific Question:   Diet:     Answer:   Regular [1]   grossly unchanged   Physical Exam   Constitutional: She appears well-developed and well-nourished. No distress.   HENT:   Head: Normocephalic and atraumatic.   Poor dentition   Eyes: EOM are normal. Pupils are equal, round, and reactive to light.   Neck: Normal range of motion.   Cardiovascular: Normal rate and intact distal pulses.    Murmur (old) heard.  Pulmonary/Chest: No respiratory distress. She has no wheezes. She has no rales.   Abdominal: Soft. Bowel sounds are normal. She exhibits no distension.   Musculoskeletal: Normal range of motion.    Neurological: She is alert. No cranial nerve deficit. She exhibits normal muscle tone.   Skin: Skin is warm. She is not diaphoretic.   Psychiatric: Thought content normal.   Nursing note and vitals reviewed.          Recent Labs      01/13/18   1151   WBC  5.9   RBC  4.05*   HEMOGLOBIN  11.5*   HEMATOCRIT  37.5   MCV  92.6   MCH  28.4   MCHC  30.7*   RDW  55.6*   PLATELETCT  257   MPV  10.9                          Assessment/Plan     Dementia  Minimize sedatives   Awaiting for guardianship    History of CVA (cerebrovascular accident)  MRI on 9/23 showed no evidence of acute stroke. Prominent ventricles, as noted above.  - Continue ASA and statin  - neuro evaluated and signed off  - to chair with meals  -Continue PT OT and encourage ambulation patient      Hypertension  Controlled continue Lotensin and hydralazine.        T12 compression fracture (CMS-HCC)  CT spine, no acute fractures.  Continue Neurontin and continue vitamin D supplementation.    Hypokalemia  K 3.5 on 10/28 responds appropriately to KDur  cont    Bradycardia  Metoprolol was discontinued heart rate stable  No issues    Azotemia  Follow c IVF and BP management.    Obesity (BMI 30.0-34.9)  Continue with dietary restriction    Chronic back pain  MRI lumbar spine shows mild to moderate stenosis   Continue Neurontin      Physical debility  Encourage ambulation with the nursing assistants    Stool incontinence  Better.    Cough  Guaifenesin when necessary     Congestion of upper airway  Resolved no issues      Constipation  Resolved continue bowel protocol    Fever  One fever 12/6, CXR w mild edema no e/o consolidation.  UA negative  -WBC was elevated but has improved today  -Repeat in AM  -Hold off on abx    Hypotension  On 12/22, hypotensive. She was somewhat lightheaded but mentation intact. IVF given, antihypertensives d/luis daniel. Resolved.  On 12/24 hypertensive. Restarted antihypertensives.    Fall  MRI LS spine with mild to mod central stensosis L2-3  and L3-4, old T12 compression fracture.  Fall precautions    Iron deficiency anemia  Continue with by mouth iron    History of stroke  MRI brain 9/2017 with evidence of multiple strokes.  Continue aspirin and statin for neuro protective measures      Bronchitis  No issues  Continue RT protocol, duo nebs, Pep therapy if warranted, and incentive spirometry.       Patient plan of care discussed at multidisplinary team rounds and with patient and R.N at beside.      Reviewed items::  Labs reviewed and Medications reviewed  Pink catheter::  No Pink  DVT prophylaxis pharmacological::  Heparin  Ulcer Prophylaxis::  Yes

## 2018-01-15 NOTE — DISCHARGE SUMMARY
CHIEF COMPLAINT ON ADMISSION  Chief Complaint   Patient presents with   • Failure to Thrive       CODE STATUS  Full Code    HPI & HOSPITAL COURSE  This is a 77 y.o. female was admitted on 9/21/2017, after patient was living at home and had multiple falls as a result sustaining a compression fracture of her spine. On her last admission she was then discharged to a rehab facility and then to a skilled facility however after being discharged for a week patient was having difficulty moving around due to pain patient was admitted for failure to thrive. On admission patient was also noted to have acute renal failure for which her Lasix were held and IV hydration were provided initial thought was that her Q renal failure may be secondary to either prerenal azotemia or acute tubular necrosis. Nevertheless patient's renal function did recover. In addition given to her multiple falls possible normal pressure hydrocephalus was placed as a differential. Since neurology was consulted, MRI was performed, which revealed moderate-to-severe atrophy, hence neurology offered patient a large volume spinal tap however patient declined given her age. Since it was decided to proceed with physical and occupational therapy instead. Over the past several months with multiple physical and occupational therapy involvement patient was recommended to be discharged home with outpatient home health or physical therapy but no acute rehab was recommended. Other clinical findings including bradycardia to which her metoprolol was discontinued and her heart rate has been stable since as well as a urinary tract infection which was treated successfully with IV Rocephin. Since September guardianship was pending. Thus we were able to obtain guardianship for which at this point will discharge patient to a group home. At this point patient has been clinically stable the past several weeks, although she is a poor historian denies having any acute  complaints.      Therefore, she is discharged in fair and stable condition with close outpatient follow-up.    SPECIFIC OUTPATIENT FOLLOW-UP  PCP 1-2 weeks      DISCHARGE PROBLEM LIST  Principal Problem (Resolved):    Fall POA: Yes  Active Problems:    Dementia POA: Yes    Hypertension POA: Yes    Chronic back pain POA: Yes    History of stroke POA: Yes    T12 compression fracture (CMS-HCC) POA: Yes    Bradycardia POA: Yes    Obesity (BMI 30.0-34.9) POA: Yes    Physical debility POA: Yes    Cough POA: Unknown    Congestion of upper airway POA: Yes    Constipation POA: Yes    Iron deficiency anemia POA: Yes    Bronchitis POA: Unknown      Overview: RTC with bronchodilators      Mucinex  Resolved Problems:    History of CVA (cerebrovascular accident) POA: Yes    Hypokalemia POA: Yes    Azotemia POA: Unknown    Stool incontinence POA: Unknown    Fever POA: Unknown      FOLLOW UP  No future appointments.  Cole Esparza M.D.  [address] [address]  [phone]    Schedule an appointment as soon as possible for a visit in 1 week  Hospital follow-up appointment with PCP      MEDICATIONS ON DISCHARGE   Maria Esther Valencia   Home Medication Instructions [id number]    Printed on:01/15/18 1030   Medication Information                      acetaminophen (TYLENOL) 325 MG Tab  Take 2 Tabs by mouth every four hours as needed for up to 90 days.             alendronate (FOSAMAX) 10 MG Tab  Take 1 Tab by mouth every morning before breakfast.             aspirin 81 MG EC tablet  Take 1 Tab by mouth every day.             atorvastatin (LIPITOR) 80 MG tablet  Take 1 Tab by mouth every bedtime.             benazepril (LOTENSIN) 40 MG tablet  Take 1 Tab by mouth every day.             duloxetine (CYMBALTA) 20 MG Cap DR Particles  Take 1 Cap by mouth every day.             ferrous sulfate 325 (65 Fe) MG tablet  Take 1 Tab by mouth 3 times a day, with meals.             gabapentin (NEURONTIN) 300 MG Cap  Take 1 Cap  by mouth every evening.             hydrALAZINE (APRESOLINE) 50 MG Tab  Take 1 Tab by mouth every 8 hours.             lidocaine (LIDODERM) 5 % Patch  Apply 1 Patch to skin as directed every 24 hours.             mag hydrox-al hydrox-simeth (MAALOX PLUS ES OR MYLANTA DS) 400-400-40 MG/5ML Suspension  Take 20 mL by mouth every 2 hours as needed (dyspepsia).             metoprolol (LOPRESSOR) 25 MG Tab  Take 0.5 Tabs by mouth 2 Times a Day.             nicotine (NICODERM) 14 MG/24HR PATCH 24 HR  Apply 1 Patch to skin as directed every 24 hours.             omeprazole (PRILOSEC) 20 MG delayed-release capsule  Take 1 Cap by mouth 2 Times a Day.             oxybutynin SR (DITROPAN-XL) 5 MG TABLET SR 24 HR  Take 1 Tab by mouth every evening.             senna-docusate (PERICOLACE OR SENOKOT S) 8.6-50 MG Tab  Take 2 Tabs by mouth 2 Times a Day.             trazodone (DESYREL) 50 MG Tab  Take 1 Tab by mouth at bedtime as needed.             vitamin D 2000 UNIT Tab  Take 1 Tab by mouth every day.                 DIET  Orders Placed This Encounter   Procedures   • Diet Order     Standing Status:   Standing     Number of Occurrences:   1     Order Specific Question:   Diet:     Answer:   Regular [1]       ACTIVITY  As tolerated.  Weight bearing as tolerated      CONSULTATIONS  Neurology  PT/OT    PROCEDURES  None  LABORATORY  Lab Results   Component Value Date/Time    SODIUM 143 12/30/2017 01:58 AM    POTASSIUM 4.1 12/30/2017 01:58 AM    CHLORIDE 112 12/30/2017 01:58 AM    CO2 25 12/30/2017 01:58 AM    GLUCOSE 92 12/30/2017 01:58 AM    BUN 31 (H) 12/30/2017 01:58 AM    CREATININE 1.03 12/30/2017 01:58 AM    CREATININE 1.0 03/11/2009 02:04 PM        Lab Results   Component Value Date/Time    WBC 5.9 01/13/2018 11:51 AM    HEMOGLOBIN 11.5 (L) 01/13/2018 11:51 AM    HEMATOCRIT 37.5 01/13/2018 11:51 AM    PLATELETCT 257 01/13/2018 11:51 AM        Total time of the discharge process exceeds 45 minutes

## 2018-01-15 NOTE — PROGRESS NOTES
Pt transported to group home by [facility] via private car. Patient belongings packed and taken. Pt has voided prior to discharge. Discharge instructions sent with [facility]. Prescriptions sent to patient's pharmacy. Follow up appointment discussed.

## 2018-01-15 NOTE — DISCHARGE INSTRUCTIONS
Discharge Instructions    Discharged to group home by car with escort. Discharged via wheelchair, hospital escort: Yes.  Special equipment needed: Not Applicable    Be sure to schedule a follow-up appointment with your primary care doctor or any specialists as instructed.     Discharge Plan:   Diet Plan: Discussed  Activity Level: Discussed  Smoking Cessation Offered: Patient Refused  Confirmed Follow up Appointment: Appointment Scheduled  Confirmed Symptoms Management: Discussed  Medication Reconciliation Updated: Yes  Influenza Vaccine Indication: Indicated: 65 years and older  Influenza Vaccine Given - only chart on this line when given: Influenza Vaccine Given (See MAR)    I understand that a diet low in cholesterol, fat, and sodium is recommended for good health. Unless I have been given specific instructions below for another diet, I accept this instruction as my diet prescription.   Other diet: regular     Special Instructions: None    · Is patient discharged on Warfarin / Coumadin?   No     · Is patient Post Blood Transfusion?  No    Fall Prevention in the Home   Falls can cause injuries and can affect people from all age groups. There are many simple things that you can do to make your home safe and to help prevent falls.  WHAT CAN I DO ON THE OUTSIDE OF MY HOME?  · Regularly repair the edges of walkways and driveways and fix any cracks.  · Remove high doorway thresholds.  · Trim any shrubbery on the main path into your home.  · Use bright outdoor lighting.  · Clear walkways of debris and clutter, including tools and rocks.  · Regularly check that handrails are securely fastened and in good repair. Both sides of any steps should have handrails.  · Install guardrails along the edges of any raised decks or porches.  · Have leaves, snow, and ice cleared regularly.  · Use sand or salt on walkways during winter months.  · In the garage, clean up any spills right away, including grease or oil spills.  WHAT CAN I  DO IN THE BATHROOM?  · Use night lights.  · Install grab bars by the toilet and in the tub and shower. Do not use towel bars as grab bars.  · Use non-skid mats or decals on the floor of the tub or shower.  · If you need to sit down while you are in the shower, use a plastic, non-slip stool..  · Keep the floor dry. Immediately clean up any water that spills on the floor.  · Remove soap buildup in the tub or shower on a regular basis.  · Attach bath mats securely with double-sided non-slip rug tape.  · Remove throw rugs and other tripping hazards from the floor.  WHAT CAN I DO IN THE BEDROOM?  · Use night lights.  · Make sure that a bedside light is easy to reach.  · Do not use oversized bedding that drapes onto the floor.  · Have a firm chair that has side arms to use for getting dressed.  · Remove throw rugs and other tripping hazards from the floor.  WHAT CAN I DO IN THE KITCHEN?   · Clean up any spills right away.  · Avoid walking on wet floors.  · Place frequently used items in easy-to-reach places.  · If you need to reach for something above you, use a sturdy step stool that has a grab bar.  · Keep electrical cables out of the way.  · Do not use floor polish or wax that makes floors slippery. If you have to use wax, make sure that it is non-skid floor wax.  · Remove throw rugs and other tripping hazards from the floor.  WHAT CAN I DO IN THE STAIRWAYS?  · Do not leave any items on the stairs.  · Make sure that there are handrails on both sides of the stairs. Fix handrails that are broken or loose. Make sure that handrails are as long as the stairways.  · Check any carpeting to make sure that it is firmly attached to the stairs. Fix any carpet that is loose or worn.  · Avoid having throw rugs at the top or bottom of stairways, or secure the rugs with carpet tape to prevent them from moving.  · Make sure that you have a light switch at the top of the stairs and the bottom of the stairs. If you do not have them,  have them installed.  WHAT ARE SOME OTHER FALL PREVENTION TIPS?  · Wear closed-toe shoes that fit well and support your feet. Wear shoes that have rubber soles or low heels.  · When you use a stepladder, make sure that it is completely opened and that the sides are firmly locked. Have someone hold the ladder while you are using it. Do not climb a closed stepladder.  · Add color or contrast paint or tape to grab bars and handrails in your home. Place contrasting color strips on the first and last steps.  · Use mobility aids as needed, such as canes, walkers, scooters, and crutches.  · Turn on lights if it is dark. Replace any light bulbs that burn out.  · Set up furniture so that there are clear paths. Keep the furniture in the same spot.  · Fix any uneven floor surfaces.  · Choose a carpet design that does not hide the edge of steps of a stairway.  · Be aware of any and all pets.  · Review your medicines with your healthcare provider. Some medicines can cause dizziness or changes in blood pressure, which increase your risk of falling.  Talk with your health care provider about other ways that you can decrease your risk of falls. This may include working with a physical therapist or  to improve your strength, balance, and endurance.     This information is not intended to replace advice given to you by your health care provider. Make sure you discuss any questions you have with your health care provider.     Document Released: 12/08/2003 Document Revised: 05/03/2016 Document Reviewed: 01/22/2016  [employer] Interactive Patient Education ©2016 [employer]      Depression / Suicide Risk    As you are discharged from this formerly [facility] facility, it is important to learn how to keep safe from harming yourself.    Recognize the warning signs:  · Abrupt changes in personality, positive or negative- including increase in energy   · Giving away possessions  · Change in eating patterns- significant weight changes-   positive or negative  · Change in sleeping patterns- unable to sleep or sleeping all the time   · Unwillingness or inability to communicate  · Depression  · Unusual sadness, discouragement and loneliness  · Talk of wanting to die  · Neglect of personal appearance   · Rebelliousness- reckless behavior  · Withdrawal from people/activities they love  · Confusion- inability to concentrate     If you or a loved one observes any of these behaviors or has concerns about self-harm, here's what you can do:  · Talk about it- your feelings and reasons for harming yourself  · Remove any means that you might use to hurt yourself (examples: pills, rope, extension cords, firearm)  · Get professional help from the community (Mental Health, Substance Abuse, psychological counseling)  · Do not be alone:Call your Safe Contact- someone whom you trust who will be there for you.  · Call your local CRISIS HOTLINE 210-5815 or 345-002-9436  · Call your local Children's Mobile Crisis Response Team [place] (778) 993-7294 or www.[employer]  · Call the toll free National Suicide Prevention Hotlines   · National Suicide Prevention Lifeline 037-578-APKQ (8851)  · National Hope Line Network 800-SUICIDE (533-8422)

## 2018-01-18 ENCOUNTER — HOME HEALTH ADMISSION (OUTPATIENT)
Dept: HOME HEALTH SERVICES | Facility: HOME HEALTHCARE | Age: 78
End: 2018-01-18
Payer: MEDICARE

## 2018-01-20 ENCOUNTER — HOME CARE VISIT (OUTPATIENT)
Dept: HOME HEALTH SERVICES | Facility: HOME HEALTHCARE | Age: 78
End: 2018-01-20

## 2018-01-20 ENCOUNTER — OFFICE VISIT (OUTPATIENT)
Dept: URGENT CARE | Facility: PHYSICIAN GROUP | Age: 78
End: 2018-01-20
Payer: MEDICARE

## 2018-01-20 ENCOUNTER — HOSPITAL ENCOUNTER (OUTPATIENT)
Dept: RADIOLOGY | Facility: MEDICAL CENTER | Age: 78
End: 2018-01-20
Attending: EMERGENCY MEDICINE
Payer: MEDICARE

## 2018-01-20 VITALS
HEART RATE: 56 BPM | BODY MASS INDEX: 32.07 KG/M2 | TEMPERATURE: 97.7 F | WEIGHT: 181 LBS | SYSTOLIC BLOOD PRESSURE: 130 MMHG | OXYGEN SATURATION: 97 % | HEIGHT: 63 IN | DIASTOLIC BLOOD PRESSURE: 70 MMHG

## 2018-01-20 DIAGNOSIS — R07.89 CHEST WALL PAIN: ICD-10-CM

## 2018-01-20 PROCEDURE — 90471 IMMUNIZATION ADMIN: CPT | Performed by: EMERGENCY MEDICINE

## 2018-01-20 PROCEDURE — 71046 X-RAY EXAM CHEST 2 VIEWS: CPT

## 2018-01-20 PROCEDURE — 99203 OFFICE O/P NEW LOW 30 MIN: CPT | Mod: 25 | Performed by: EMERGENCY MEDICINE

## 2018-01-20 PROCEDURE — 90714 TD VACC NO PRESV 7 YRS+ IM: CPT | Performed by: EMERGENCY MEDICINE

## 2018-01-20 ASSESSMENT — ENCOUNTER SYMPTOMS
NAUSEA: 0
BACK PAIN: 1
NECK PAIN: 0
FALLS: 1
ABDOMINAL PAIN: 0
CHILLS: 0
VOMITING: 0
COUGH: 0
BRUISES/BLEEDS EASILY: 0
NERVOUS/ANXIOUS: 0
FEVER: 0

## 2018-01-20 NOTE — PROGRESS NOTES
Subjective:      Maria Esther Valencia is a 77 y.o. female who presents with No chief complaint on file.            HPI  Patient is a 77-year-old female who recently fell. This morning this is her second falls since moving to the new residence. Prior this according to her caregiver she was in the hospital for 2 months. She recently had fallen and was seen at the clinic with negative hip and left hand x-rays.  Allergies   Allergen Reactions   • Pcn [Penicillins] Unspecified     Per historical. Pt cannot verify what reaction she had     Social History     Social History   • Marital status:      Spouse name: N/A   • Number of children: N/A   • Years of education: N/A     Occupational History   • Not on file.     Social History Main Topics   • Smoking status: Current Every Day Smoker   • Smokeless tobacco: Never Used   • Alcohol use Yes   • Drug use: No   • Sexual activity: Not on file     Other Topics Concern   • Not on file     Social History Narrative   • No narrative on file     Past Medical History:   Diagnosis Date   • CAD (coronary artery disease)    • Compression fracture of spine (CMS-HCC)    • Congestive heart failure (CMS-HCC)    • Dementia    • GI bleed    • Hypertension    • Psychiatric disorder    • Stroke (CMS-HCC)      Current Outpatient Prescriptions on File Prior to Visit   Medication Sig Dispense Refill   • acetaminophen (TYLENOL) 325 MG Tab Take 2 Tabs by mouth every four hours as needed for up to 90 days. 90 Tab 0   • mag hydrox-al hydrox-simeth (MAALOX PLUS ES OR MYLANTA DS) 400-400-40 MG/5ML Suspension Take 20 mL by mouth every 2 hours as needed (dyspepsia). 560 mL 0   • aspirin 81 MG EC tablet Take 1 Tab by mouth every day. 90 Tab 0   • gabapentin (NEURONTIN) 300 MG Cap Take 1 Cap by mouth every evening. 90 Cap 0   • duloxetine (CYMBALTA) 20 MG Cap DR Particles Take 1 Cap by mouth every day. 90 Cap 0   • trazodone (DESYREL) 50 MG Tab Take 1 Tab by mouth at bedtime as needed. 90 Tab 0   •  atorvastatin (LIPITOR) 80 MG tablet Take 1 Tab by mouth every bedtime. 90 Tab 0   • benazepril (LOTENSIN) 40 MG tablet Take 1 Tab by mouth every day. 90 Tab 0   • hydrALAZINE (APRESOLINE) 50 MG Tab Take 1 Tab by mouth every 8 hours. 270 Tab 0   • metoprolol (LOPRESSOR) 25 MG Tab Take 0.5 Tabs by mouth 2 Times a Day. 180 Tab 0   • lidocaine (LIDODERM) 5 % Patch Apply 1 Patch to skin as directed every 24 hours. 30 Patch 0   • ferrous sulfate 325 (65 Fe) MG tablet Take 1 Tab by mouth 3 times a day, with meals. 90 Tab 0   • senna-docusate (PERICOLACE OR SENOKOT S) 8.6-50 MG Tab Take 2 Tabs by mouth 2 Times a Day. 60 Tab 0   • alendronate (FOSAMAX) 10 MG Tab Take 1 Tab by mouth every morning before breakfast. 90 Tab 0   • nicotine (NICODERM) 14 MG/24HR PATCH 24 HR Apply 1 Patch to skin as directed every 24 hours. 90 Patch 0   • omeprazole (PRILOSEC) 20 MG delayed-release capsule Take 1 Cap by mouth 2 Times a Day. 90 Cap 0   • oxybutynin SR (DITROPAN-XL) 5 MG TABLET SR 24 HR Take 1 Tab by mouth every evening. 90 Tab 0   • vitamin D 2000 UNIT Tab Take 1 Tab by mouth every day. 90 Tab 0     No current facility-administered medications on file prior to visit.      Family History   Problem Relation Age of Onset   • Heart Attack Mother    • Heart Attack Father      Review of Systems   Constitutional: Negative for chills and fever.   Respiratory: Negative for cough.    Cardiovascular: Positive for chest pain.   Gastrointestinal: Negative for abdominal pain, nausea and vomiting.   Genitourinary: Positive for dysuria.   Musculoskeletal: Positive for back pain and falls. Negative for joint pain and neck pain.   Skin: Positive for rash.        Abrasion crossed mid back over the mid thoracic vertebrae into the right chest. Posteriorly   Neurological:        Patient admits to memory loss.   Endo/Heme/Allergies: Does not bruise/bleed easily.   Psychiatric/Behavioral: The patient is not nervous/anxious.           Objective:          Physical Exam   Constitutional: She appears well-developed and well-nourished. No distress.       HENT:   Head: Atraumatic.   Right Ear: External ear normal.   Cardiovascular: Normal rate and regular rhythm.    Pulmonary/Chest: She is in respiratory distress.   Abdominal: She exhibits no distension. There is no tenderness.   Skin: Rash noted. No erythema.   Pt has a large 2 x 20 cm superfifial abrasion to her back see the diagram, no signs of infection.   Psychiatric: She has a normal mood and affect. Her behavior is normal.   Vitals reviewed.              Assessment/Plan:     Diagnosis: Acute fall                      Abrasion to back.    Patient will be given a note to keep the railing up on her bed until he can contact her primary care provider. If symptoms continue patient may need to be evaluated for our level of care. Pt given Td immunization.      Pt was getting out of bed because of Sencote causing diarrhea, will d/c Senocote and use prunes instead. Rails up with bell to call for help.      In reviewing the chart I see that home catre is aborad to eval living safety.

## 2018-01-27 ENCOUNTER — HOME CARE VISIT (OUTPATIENT)
Dept: HOME HEALTH SERVICES | Facility: HOME HEALTHCARE | Age: 78
End: 2018-01-27
Payer: MEDICARE

## 2018-01-27 PROCEDURE — 665999 HH PPS REVENUE DEBIT

## 2018-01-27 PROCEDURE — G0493 RN CARE EA 15 MIN HH/HOSPICE: HCPCS

## 2018-01-27 PROCEDURE — 665001 SOC-HOME HEALTH

## 2018-01-27 PROCEDURE — 665998 HH PPS REVENUE CREDIT

## 2018-01-28 PROCEDURE — 665998 HH PPS REVENUE CREDIT

## 2018-01-28 PROCEDURE — 665999 HH PPS REVENUE DEBIT

## 2018-01-29 VITALS
DIASTOLIC BLOOD PRESSURE: 90 MMHG | WEIGHT: 181.2 LBS | HEART RATE: 50 BPM | BODY MASS INDEX: 32.11 KG/M2 | SYSTOLIC BLOOD PRESSURE: 160 MMHG | HEIGHT: 63 IN | TEMPERATURE: 97.9 F | RESPIRATION RATE: 18 BRPM | OXYGEN SATURATION: 96 %

## 2018-01-29 PROCEDURE — 665998 HH PPS REVENUE CREDIT

## 2018-01-29 PROCEDURE — 665999 HH PPS REVENUE DEBIT

## 2018-01-29 SDOH — ECONOMIC STABILITY: HOUSING INSECURITY: UNSAFE COOKING RANGE AREA: 0

## 2018-01-29 SDOH — ECONOMIC STABILITY: HOUSING INSECURITY: UNSAFE APPLIANCES: 0

## 2018-01-29 ASSESSMENT — ENCOUNTER SYMPTOMS
DIFFICULTY THINKING: 1
DEPRESSED MOOD: 1

## 2018-01-30 ENCOUNTER — TELEPHONE (OUTPATIENT)
Dept: VASCULAR LAB | Facility: MEDICAL CENTER | Age: 78
End: 2018-01-30

## 2018-01-30 ENCOUNTER — HOME CARE VISIT (OUTPATIENT)
Dept: HOME HEALTH SERVICES | Facility: HOME HEALTHCARE | Age: 78
End: 2018-01-30
Payer: MEDICARE

## 2018-01-30 PROCEDURE — 665999 HH PPS REVENUE DEBIT

## 2018-01-30 PROCEDURE — G0299 HHS/HOSPICE OF RN EA 15 MIN: HCPCS

## 2018-01-30 PROCEDURE — 665998 HH PPS REVENUE CREDIT

## 2018-01-30 PROCEDURE — 665997 HH PPS REVENUE ADJ

## 2018-01-30 ASSESSMENT — ACTIVITIES OF DAILY LIVING (ADL)
HOME_HEALTH_OASIS: 02
OASIS_M1830: 03

## 2018-01-30 ASSESSMENT — PATIENT HEALTH QUESTIONNAIRE - PHQ9
1. LITTLE INTEREST OR PLEASURE IN DOING THINGS: 01
2. FEELING DOWN, DEPRESSED, IRRITABLE, OR HOPELESS: 01

## 2018-01-30 ASSESSMENT — ENCOUNTER SYMPTOMS
VOMITING: DENIES
NAUSEA: DENIES

## 2018-01-31 ENCOUNTER — HOME CARE VISIT (OUTPATIENT)
Dept: HOME HEALTH SERVICES | Facility: HOME HEALTHCARE | Age: 78
End: 2018-01-31
Payer: MEDICARE

## 2018-01-31 ENCOUNTER — HOSPITAL ENCOUNTER (OUTPATIENT)
Facility: MEDICAL CENTER | Age: 78
End: 2018-02-02
Attending: EMERGENCY MEDICINE | Admitting: HOSPITALIST
Payer: MEDICARE

## 2018-01-31 ENCOUNTER — RESOLUTE PROFESSIONAL BILLING HOSPITAL PROF FEE (OUTPATIENT)
Dept: HOSPITALIST | Facility: MEDICAL CENTER | Age: 78
End: 2018-01-31
Payer: MEDICARE

## 2018-01-31 VITALS
SYSTOLIC BLOOD PRESSURE: 150 MMHG | TEMPERATURE: 97.9 F | HEART RATE: 60 BPM | RESPIRATION RATE: 18 BRPM | OXYGEN SATURATION: 95 % | DIASTOLIC BLOOD PRESSURE: 80 MMHG

## 2018-01-31 DIAGNOSIS — I10 ESSENTIAL HYPERTENSION: ICD-10-CM

## 2018-01-31 DIAGNOSIS — Z91.81 HISTORY OF FALLING: ICD-10-CM

## 2018-01-31 DIAGNOSIS — R53.1 GENERALIZED WEAKNESS: Primary | ICD-10-CM

## 2018-01-31 PROBLEM — R29.6 FALLS: Status: ACTIVE | Noted: 2018-01-31
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
## 2018-01-31 LAB
ALBUMIN SERPL BCP-MCNC: 3.6 G/DL (ref 3.2–4.9)
ALBUMIN/GLOB SERPL: 1.3 G/DL
ALP SERPL-CCNC: 86 U/L (ref 30–99)
ALT SERPL-CCNC: 10 U/L (ref 2–50)
ANION GAP SERPL CALC-SCNC: 7 MMOL/L (ref 0–11.9)
APPEARANCE UR: CLEAR
AST SERPL-CCNC: 21 U/L (ref 12–45)
BASOPHILS # BLD AUTO: 0.6 % (ref 0–1.8)
BASOPHILS # BLD: 0.05 K/UL (ref 0–0.12)
BILIRUB SERPL-MCNC: 0.5 MG/DL (ref 0.1–1.5)
BILIRUB UR QL STRIP.AUTO: NEGATIVE
BUN SERPL-MCNC: 14 MG/DL (ref 8–22)
CALCIUM SERPL-MCNC: 8.9 MG/DL (ref 8.5–10.5)
CHLORIDE SERPL-SCNC: 108 MMOL/L (ref 96–112)
CO2 SERPL-SCNC: 28 MMOL/L (ref 20–33)
COLOR UR: YELLOW
CREAT SERPL-MCNC: 0.72 MG/DL (ref 0.5–1.4)
CULTURE IF INDICATED INDCX: NO UA CULTURE
EKG IMPRESSION: NORMAL
EOSINOPHIL # BLD AUTO: 0.1 K/UL (ref 0–0.51)
EOSINOPHIL NFR BLD: 1.2 % (ref 0–6.9)
ERYTHROCYTE [DISTWIDTH] IN BLOOD BY AUTOMATED COUNT: 49.2 FL (ref 35.9–50)
GLOBULIN SER CALC-MCNC: 2.8 G/DL (ref 1.9–3.5)
GLUCOSE SERPL-MCNC: 93 MG/DL (ref 65–99)
GLUCOSE UR STRIP.AUTO-MCNC: NEGATIVE MG/DL
HCT VFR BLD AUTO: 42.4 % (ref 37–47)
HGB BLD-MCNC: 13.3 G/DL (ref 12–16)
IMM GRANULOCYTES # BLD AUTO: 0.02 K/UL (ref 0–0.11)
IMM GRANULOCYTES NFR BLD AUTO: 0.2 % (ref 0–0.9)
KETONES UR STRIP.AUTO-MCNC: NEGATIVE MG/DL
LEUKOCYTE ESTERASE UR QL STRIP.AUTO: NEGATIVE
LYMPHOCYTES # BLD AUTO: 0.71 K/UL (ref 1–4.8)
LYMPHOCYTES NFR BLD: 8.5 % (ref 22–41)
MCH RBC QN AUTO: 28.1 PG (ref 27–33)
MCHC RBC AUTO-ENTMCNC: 31.4 G/DL (ref 33.6–35)
MCV RBC AUTO: 89.6 FL (ref 81.4–97.8)
MICRO URNS: NORMAL
MONOCYTES # BLD AUTO: 0.53 K/UL (ref 0–0.85)
MONOCYTES NFR BLD AUTO: 6.3 % (ref 0–13.4)
NEUTROPHILS # BLD AUTO: 6.99 K/UL (ref 2–7.15)
NEUTROPHILS NFR BLD: 83.2 % (ref 44–72)
NITRITE UR QL STRIP.AUTO: NEGATIVE
NRBC # BLD AUTO: 0 K/UL
NRBC BLD-RTO: 0 /100 WBC
PH UR STRIP.AUTO: 6 [PH]
PLATELET # BLD AUTO: 184 K/UL (ref 164–446)
PMV BLD AUTO: 12.1 FL (ref 9–12.9)
POTASSIUM SERPL-SCNC: 4.1 MMOL/L (ref 3.6–5.5)
PROT SERPL-MCNC: 6.4 G/DL (ref 6–8.2)
PROT UR QL STRIP: NEGATIVE MG/DL
RBC # BLD AUTO: 4.73 M/UL (ref 4.2–5.4)
RBC UR QL AUTO: NEGATIVE
SODIUM SERPL-SCNC: 143 MMOL/L (ref 135–145)
SP GR UR STRIP.AUTO: 1.01
TROPONIN I SERPL-MCNC: 0.02 NG/ML (ref 0–0.04)
UROBILINOGEN UR STRIP.AUTO-MCNC: 0.2 MG/DL
WBC # BLD AUTO: 8.4 K/UL (ref 4.8–10.8)

## 2018-01-31 PROCEDURE — 99285 EMERGENCY DEPT VISIT HI MDM: CPT

## 2018-01-31 PROCEDURE — 700111 HCHG RX REV CODE 636 W/ 250 OVERRIDE (IP): Performed by: HOSPITALIST

## 2018-01-31 PROCEDURE — 81003 URINALYSIS AUTO W/O SCOPE: CPT

## 2018-01-31 PROCEDURE — 96374 THER/PROPH/DIAG INJ IV PUSH: CPT

## 2018-01-31 PROCEDURE — 80053 COMPREHEN METABOLIC PANEL: CPT

## 2018-01-31 PROCEDURE — A9270 NON-COVERED ITEM OR SERVICE: HCPCS | Performed by: HOSPITALIST

## 2018-01-31 PROCEDURE — 96372 THER/PROPH/DIAG INJ SC/IM: CPT | Mod: XU

## 2018-01-31 PROCEDURE — 665999 HH PPS REVENUE DEBIT

## 2018-01-31 PROCEDURE — G0378 HOSPITAL OBSERVATION PER HR: HCPCS

## 2018-01-31 PROCEDURE — 84484 ASSAY OF TROPONIN QUANT: CPT

## 2018-01-31 PROCEDURE — 93005 ELECTROCARDIOGRAM TRACING: CPT | Performed by: EMERGENCY MEDICINE

## 2018-01-31 PROCEDURE — 700102 HCHG RX REV CODE 250 W/ 637 OVERRIDE(OP): Performed by: HOSPITALIST

## 2018-01-31 PROCEDURE — 85025 COMPLETE CBC W/AUTO DIFF WBC: CPT

## 2018-01-31 PROCEDURE — 665998 HH PPS REVENUE CREDIT

## 2018-01-31 PROCEDURE — 99219 PR INITIAL OBSERVATION CARE,LEVL II: CPT | Performed by: HOSPITALIST

## 2018-01-31 RX ORDER — ACETAMINOPHEN 325 MG/1
650 TABLET ORAL EVERY 6 HOURS PRN
Status: DISCONTINUED | OUTPATIENT
Start: 2018-01-31 | End: 2018-02-02 | Stop reason: HOSPADM

## 2018-01-31 RX ORDER — ONDANSETRON 2 MG/ML
4 INJECTION INTRAMUSCULAR; INTRAVENOUS EVERY 4 HOURS PRN
Status: DISCONTINUED | OUTPATIENT
Start: 2018-01-31 | End: 2018-02-02 | Stop reason: HOSPADM

## 2018-01-31 RX ORDER — ONDANSETRON 4 MG/1
4 TABLET, ORALLY DISINTEGRATING ORAL EVERY 4 HOURS PRN
Status: DISCONTINUED | OUTPATIENT
Start: 2018-01-31 | End: 2018-02-02 | Stop reason: HOSPADM

## 2018-01-31 RX ORDER — NITROFURANTOIN 25; 75 MG/1; MG/1
100 CAPSULE ORAL 2 TIMES DAILY
Status: DISCONTINUED | OUTPATIENT
Start: 2018-01-31 | End: 2018-02-02 | Stop reason: HOSPADM

## 2018-01-31 RX ORDER — BENAZEPRIL HYDROCHLORIDE 20 MG/1
40 TABLET ORAL DAILY
Status: DISCONTINUED | OUTPATIENT
Start: 2018-02-01 | End: 2018-02-02 | Stop reason: HOSPADM

## 2018-01-31 RX ORDER — FERROUS SULFATE 325(65) MG
325 TABLET ORAL
Status: DISCONTINUED | OUTPATIENT
Start: 2018-01-31 | End: 2018-02-02 | Stop reason: HOSPADM

## 2018-01-31 RX ORDER — GABAPENTIN 300 MG/1
300 CAPSULE ORAL EVERY EVENING
Status: DISCONTINUED | OUTPATIENT
Start: 2018-01-31 | End: 2018-02-02 | Stop reason: HOSPADM

## 2018-01-31 RX ORDER — OMEPRAZOLE 20 MG/1
20 CAPSULE, DELAYED RELEASE ORAL 2 TIMES DAILY
Status: DISCONTINUED | OUTPATIENT
Start: 2018-01-31 | End: 2018-02-02 | Stop reason: HOSPADM

## 2018-01-31 RX ORDER — TRAZODONE HYDROCHLORIDE 50 MG/1
50 TABLET ORAL
Status: DISCONTINUED | OUTPATIENT
Start: 2018-01-31 | End: 2018-02-02 | Stop reason: HOSPADM

## 2018-01-31 RX ORDER — ATORVASTATIN CALCIUM 40 MG/1
80 TABLET, FILM COATED ORAL
Status: DISCONTINUED | OUTPATIENT
Start: 2018-01-31 | End: 2018-02-02 | Stop reason: HOSPADM

## 2018-01-31 RX ORDER — DULOXETIN HYDROCHLORIDE 20 MG/1
20 CAPSULE, DELAYED RELEASE ORAL DAILY
Status: DISCONTINUED | OUTPATIENT
Start: 2018-02-01 | End: 2018-02-02 | Stop reason: HOSPADM

## 2018-01-31 RX ORDER — ALENDRONATE SODIUM 10 MG/1
10 TABLET ORAL
Status: DISCONTINUED | OUTPATIENT
Start: 2018-02-01 | End: 2018-02-02 | Stop reason: HOSPADM

## 2018-01-31 RX ORDER — OXYBUTYNIN CHLORIDE 5 MG/1
5 TABLET, EXTENDED RELEASE ORAL EVERY EVENING
Status: DISCONTINUED | OUTPATIENT
Start: 2018-01-31 | End: 2018-02-02 | Stop reason: HOSPADM

## 2018-01-31 RX ADMIN — OMEPRAZOLE 20 MG: 20 CAPSULE, DELAYED RELEASE ORAL at 22:13

## 2018-01-31 RX ADMIN — ENOXAPARIN SODIUM 40 MG: 100 INJECTION SUBCUTANEOUS at 22:13

## 2018-01-31 RX ADMIN — Medication 325 MG: at 22:13

## 2018-01-31 RX ADMIN — NITROFURANTOIN (MONOHYDRATE/MACROCRYSTALS) 100 MG: 75; 25 CAPSULE ORAL at 22:13

## 2018-01-31 RX ADMIN — ONDANSETRON 4 MG: 2 INJECTION INTRAMUSCULAR; INTRAVENOUS at 18:41

## 2018-01-31 RX ADMIN — TRAZODONE HYDROCHLORIDE 50 MG: 50 TABLET ORAL at 22:13

## 2018-01-31 RX ADMIN — GABAPENTIN 300 MG: 300 CAPSULE ORAL at 22:13

## 2018-01-31 RX ADMIN — METOPROLOL TARTRATE 12.5 MG: 25 TABLET, FILM COATED ORAL at 22:14

## 2018-01-31 SDOH — ECONOMIC STABILITY: HOUSING INSECURITY: UNSAFE COOKING RANGE AREA: 0

## 2018-01-31 SDOH — ECONOMIC STABILITY: HOUSING INSECURITY: UNSAFE APPLIANCES: 0

## 2018-01-31 ASSESSMENT — PAIN SCALES - GENERAL: PAINLEVEL_OUTOF10: 0

## 2018-01-31 ASSESSMENT — ENCOUNTER SYMPTOMS
VOMITING: DENIES
NAUSEA: DENIES

## 2018-01-31 ASSESSMENT — LIFESTYLE VARIABLES: DO YOU DRINK ALCOHOL: NO

## 2018-01-31 NOTE — DISCHARGE PLANNING
CCS spoke to Robb Somers. Robb stated that unfortunately the are not able to use the previous admissions notes for therapies. Current therapy notes are necessary to obtain authorization. SW on floor Alena has been notified.

## 2018-01-31 NOTE — TELEPHONE ENCOUNTER
Reviewed medication from  admission. One major DI duloxetine may increase serum concentration of metoprolol. Recommend monitoring heart rate.     Lenore Mullen, Carlene D

## 2018-01-31 NOTE — ED PROVIDER NOTES
ED Provider Note    CHIEF COMPLAINT  Chief Complaint   Patient presents with   • Weakness       HPI  Maria Esther Valencia is a 77 y.o. female who presents feeling generally weak.  She states she went to the bathroom and came back, was too weak to get into bed.  According to staff from her assisted living area/group home, this is happened 3 times this week.  They're also concerned about elevated blood pressure.  Patient denies chest pain.  No headache.  She denies urinary symptoms.  The home where she lives that has indicated they would like her at a facility with higher level of care.  Patient denies headache.  No chest pain.  No abdominal pain.  No dysuria.    REVIEW OF SYSTEMS    Constitutional: Generalized weakness, patient states she feels well at this time.  No fever  Respiratory: No shortness of breath  Cardiac: No chest pain or syncope  Gastrointestinal: No abdominal pain, no vomiting  Musculoskeletal: No acute neck or back pain.  Neurologic: No headache       All other systems are negative.       PAST MEDICAL HISTORY  Past Medical History:   Diagnosis Date   • CAD (coronary artery disease)    • Compression fracture of spine (CMS-HCC)    • Congestive heart failure (CMS-HCC)    • Dementia    • GI bleed    • Hypertension    • Psychiatric disorder    • Stroke ([facility])        FAMILY HISTORY  Family History   Problem Relation Age of Onset   • Heart Attack Mother    • Heart Attack Father        SOCIAL HISTORY  Social History     Social History   • Marital status:      Spouse name: N/A   • Number of children: N/A   • Years of education: N/A     Social History Main Topics   • Smoking status: Former Smoker     Quit date: 1/20/2016   • Smokeless tobacco: Never Used   • Alcohol use No   • Drug use: No   • Sexual activity: Not on file     Other Topics Concern   • Not on file     Social History Narrative   • No narrative on file       SURGICAL HISTORY  Past Surgical History:   Procedure Laterality Date   • OTHER CARDIAC  "SURGERY      stents       CURRENT MEDICATIONS  Home Medications    **Home medications have not yet been reviewed for this encounter**         ALLERGIES  Allergies   Allergen Reactions   • Pcn [Penicillins] Unspecified     Per historical. Pt cannot verify what reaction she had       PHYSICAL EXAM  VITAL SIGNS: BP (!) 195/68   Pulse 65   Temp 36.8 °C (98.2 °F)   Resp 18   Ht 1.575 m (5' 2\")   Wt 81.6 kg (180 lb)   SpO2 92%   BMI 32.92 kg/m²   Constitutional:  Non-toxic appearance.   HENT: No facial swelling  Eyes: Anicteric, no conjunctivitis.     Cardiovascular: Normal heart rate, Normal rhythm  Pulmonary:  No wheezing, No rales.   Gastrointestinal: Soft, No tenderness, No masses  Skin: Warm, Dry, No cyanosis.   Neurologic: Speech is clear, follows commands, facial expression is symmetrical.   strength equal.  Proximal left leg strength is slightly weak compared to the right, patient states this is her bedside following her previous stroke.  Psychiatric: Affect normal,  Mood normal.  Patient is calm and cooperative  Musculoskeletal: Chest wall nontender.  Neck and back nontender    EKG/Labs  Results for orders placed or performed during the hospital encounter of 01/31/18   CBC WITH DIFFERENTIAL   Result Value Ref Range    WBC 8.4 4.8 - 10.8 K/uL    RBC 4.73 4.20 - 5.40 M/uL    Hemoglobin 13.3 12.0 - 16.0 g/dL    Hematocrit 42.4 37.0 - 47.0 %    MCV 89.6 81.4 - 97.8 fL    MCH 28.1 27.0 - 33.0 pg    MCHC 31.4 (L) 33.6 - 35.0 g/dL    RDW 49.2 35.9 - 50.0 fL    Platelet Count 184 164 - 446 K/uL    MPV 12.1 9.0 - 12.9 fL    Neutrophils-Polys 83.20 (H) 44.00 - 72.00 %    Lymphocytes 8.50 (L) 22.00 - 41.00 %    Monocytes 6.30 0.00 - 13.40 %    Eosinophils 1.20 0.00 - 6.90 %    Basophils 0.60 0.00 - 1.80 %    Immature Granulocytes 0.20 0.00 - 0.90 %    Nucleated RBC 0.00 /100 WBC    Neutrophils (Absolute) 6.99 2.00 - 7.15 K/uL    Lymphs (Absolute) 0.71 (L) 1.00 - 4.80 K/uL    Monos (Absolute) 0.53 0.00 - 0.85 K/uL "    Eos (Absolute) 0.10 0.00 - 0.51 K/uL    Baso (Absolute) 0.05 0.00 - 0.12 K/uL    Immature Granulocytes (abs) 0.02 0.00 - 0.11 K/uL    NRBC (Absolute) 0.00 K/uL   COMP METABOLIC PANEL   Result Value Ref Range    Sodium 143 135 - 145 mmol/L    Potassium 4.1 3.6 - 5.5 mmol/L    Chloride 108 96 - 112 mmol/L    Co2 28 20 - 33 mmol/L    Anion Gap 7.0 0.0 - 11.9    Glucose 93 65 - 99 mg/dL    Bun 14 8 - 22 mg/dL    Creatinine 0.72 0.50 - 1.40 mg/dL    Calcium 8.9 8.5 - 10.5 mg/dL    AST(SGOT) 21 12 - 45 U/L    ALT(SGPT) 10 2 - 50 U/L    Alkaline Phosphatase 86 30 - 99 U/L    Total Bilirubin 0.5 0.1 - 1.5 mg/dL    Albumin 3.6 3.2 - 4.9 g/dL    Total Protein 6.4 6.0 - 8.2 g/dL    Globulin 2.8 1.9 - 3.5 g/dL    A-G Ratio 1.3 g/dL   TROPONIN   Result Value Ref Range    Troponin I 0.02 0.00 - 0.04 ng/mL   URINALYSIS,CULTURE IF INDICATED   Result Value Ref Range    Color Yellow     Character Clear     Specific Gravity 1.007 <1.035    Ph 6.0 5.0 - 8.0    Glucose Negative Negative mg/dL    Ketones Negative Negative mg/dL    Protein Negative Negative mg/dL    Bilirubin Negative Negative    Urobilinogen, Urine 0.2 Negative    Nitrite Negative Negative    Leukocyte Esterase Negative Negative    Occult Blood Negative Negative    Micro Urine Req see below     Culture Indicated No UA Culture   ESTIMATED GFR   Result Value Ref Range    GFR If African American >60 >60 mL/min/1.73 m 2    GFR If Non African American >60 >60 mL/min/1.73 m 2   EKG (ER)   Result Value Ref Range    Report       [facility] Emergency Dept.    Test Date:  2018  Pt Name:    KRUNAL LOPEZ                 Department: ER  MRN:        [id number]                      Room:        21  Gender:     Female                       Technician: [id number]  :        [id number]                   Requested By:CIERA ALVARENGA  Order #:    [id number]                    Reading MD: CIERA ALVARENGA MD    Measurements  Intervals                                 Axis  Rate:       59                           P:          58  IN:         172                          QRS:        -23  QRSD:       102                          T:          22  QT:         460  QTc:        456    Interpretive Statements  SINUS RHYTHM  BORDERLINE LEFT AXIS DEVIATION  EARLY PRECORDIAL R/S TRANSITION  Compared to ECG 09/21/2017 16:07:37  No acute changes    Electronically Signed On 1- 14:51:00 PST by LUC ALVARENGA MD             COURSE & MEDICAL DECISION MAKING  Pertinent Labs & Imaging studies reviewed. (See chart for details)  Patient with generalized weakness today, now feeling improved.  Her assisted living home has stated they would like her to go to a higher level of care in a nursing facility secondary to the patient's falling and labile blood pressure.  This was according to the nursing staff who spoke to the facility.  Here the patient's blood pressure has been 158/73, initially had been 195/68.  Plan is outpatient continue her medications and follow-up with her primary doctor for blood pressure recheck as soon as possible.   is working on referral to a skilled nursing facility at this time, if unavailable, patient to go back to her group home where they can continue working on getting her transferred to this type of facility    FINAL IMPRESSION     1. Generalized weakness    2. Essential hypertension    3. History of falling                    Electronically signed by: Luc Alvarenga, 1/31/2018 2:50 PM

## 2018-01-31 NOTE — DISCHARGE PLANNING
CCS received a SNF choice form per the choice form the referral has been sent to [facility]. CCS also called [facility] and notified Luisa in admissions that the patient is ready to transfer today.    CCS also notified SW on floor that a SNF order is needed for this patient.

## 2018-01-31 NOTE — DISCHARGE PLANNING
TEREZA received a message from the  on floor. Yonis did not receive the SNF referral CCS resent the referral

## 2018-01-31 NOTE — ED TRIAGE NOTES
.  Chief Complaint   Patient presents with   • Weakness   Pt BIB EMS from an assisted living facility. Pt found down on floor.  No trauma.  Pt was going to use the restroom earlier this morning.  Pt felt generalized weakness, then sat on the floor.  EMS reports third episode this week.  No fall.  Pt states no dizziness, chest pain or headache.  Pt alert and oriented x 2.  FSBS 98.

## 2018-01-31 NOTE — DISCHARGE PLANNING
Medical Social Work    MSW received call from bedside RN stating pt's Public guardian Dee Rivera ([phone] office:[phone]) is concerned with pt going back her assisted living as she is high fall risk. MSW called and spoke with Dee. Dee stated that she needs follow-up care for her blood pressure medication which the [facility] is unwilling to do at this time. Dee stated she has already spoken to [facility] who are willing to accept pt today but need a referral. MSW faxed choice to [facility] Tammy.    MSW called Tammy for update. Tammy is awaiting call back from [facility]. MSW updated bedside RN.

## 2018-01-31 NOTE — ED NOTES
Case Coordinator for pt guardianship: Dee: [phone],  [phone] (dial [phone]) if need immediate contact.     [facility] home health sees pt 3 x week for pt.

## 2018-02-01 ENCOUNTER — HOME CARE VISIT (OUTPATIENT)
Dept: HOME HEALTH SERVICES | Facility: HOME HEALTHCARE | Age: 78
End: 2018-02-01
Payer: MEDICARE

## 2018-02-01 PROBLEM — D50.9 MICROCYTIC ANEMIA: Status: RESOLVED | Noted: 2017-07-29 | Resolved: 2018-02-01

## 2018-02-01 PROCEDURE — 700102 HCHG RX REV CODE 250 W/ 637 OVERRIDE(OP): Performed by: HOSPITALIST

## 2018-02-01 PROCEDURE — A9270 NON-COVERED ITEM OR SERVICE: HCPCS | Performed by: INTERNAL MEDICINE

## 2018-02-01 PROCEDURE — 665998 HH PPS REVENUE CREDIT

## 2018-02-01 PROCEDURE — 665999 HH PPS REVENUE DEBIT

## 2018-02-01 PROCEDURE — 99225 PR SUBSEQUENT OBSERVATION CARE,LEVEL II: CPT | Performed by: INTERNAL MEDICINE

## 2018-02-01 PROCEDURE — G8978 MOBILITY CURRENT STATUS: HCPCS | Mod: CJ

## 2018-02-01 PROCEDURE — A9270 NON-COVERED ITEM OR SERVICE: HCPCS | Performed by: HOSPITALIST

## 2018-02-01 PROCEDURE — G8979 MOBILITY GOAL STATUS: HCPCS | Mod: CI

## 2018-02-01 PROCEDURE — 700102 HCHG RX REV CODE 250 W/ 637 OVERRIDE(OP): Performed by: INTERNAL MEDICINE

## 2018-02-01 PROCEDURE — 97166 OT EVAL MOD COMPLEX 45 MIN: CPT

## 2018-02-01 PROCEDURE — 700111 HCHG RX REV CODE 636 W/ 250 OVERRIDE (IP): Performed by: HOSPITALIST

## 2018-02-01 PROCEDURE — G8988 SELF CARE GOAL STATUS: HCPCS | Mod: CJ

## 2018-02-01 PROCEDURE — 97161 PT EVAL LOW COMPLEX 20 MIN: CPT

## 2018-02-01 PROCEDURE — G8987 SELF CARE CURRENT STATUS: HCPCS | Mod: CK

## 2018-02-01 PROCEDURE — G0378 HOSPITAL OBSERVATION PER HR: HCPCS

## 2018-02-01 RX ORDER — AMLODIPINE BESYLATE 10 MG/1
10 TABLET ORAL
Status: DISCONTINUED | OUTPATIENT
Start: 2018-02-01 | End: 2018-02-02 | Stop reason: HOSPADM

## 2018-02-01 RX ADMIN — ATORVASTATIN CALCIUM 80 MG: 40 TABLET, FILM COATED ORAL at 20:35

## 2018-02-01 RX ADMIN — ALENDRONATE SODIUM 10 MG: 10 TABLET ORAL at 06:15

## 2018-02-01 RX ADMIN — BENAZEPRIL HYDROCHLORIDE 40 MG: 20 TABLET, COATED ORAL at 10:48

## 2018-02-01 RX ADMIN — ENOXAPARIN SODIUM 40 MG: 100 INJECTION SUBCUTANEOUS at 20:36

## 2018-02-01 RX ADMIN — AMLODIPINE BESYLATE 10 MG: 10 TABLET ORAL at 10:45

## 2018-02-01 RX ADMIN — OXYBUTYNIN CHLORIDE 5 MG: 5 TABLET, EXTENDED RELEASE ORAL at 00:04

## 2018-02-01 RX ADMIN — Medication 325 MG: at 17:40

## 2018-02-01 RX ADMIN — VITAMIN D, TAB 1000IU (100/BT) 2000 UNITS: 25 TAB at 10:45

## 2018-02-01 RX ADMIN — Medication 325 MG: at 13:23

## 2018-02-01 RX ADMIN — ATORVASTATIN CALCIUM 80 MG: 40 TABLET, FILM COATED ORAL at 00:04

## 2018-02-01 RX ADMIN — DULOXETINE HYDROCHLORIDE 20 MG: 20 CAPSULE, DELAYED RELEASE ORAL at 10:46

## 2018-02-01 RX ADMIN — OMEPRAZOLE 20 MG: 20 CAPSULE, DELAYED RELEASE ORAL at 10:46

## 2018-02-01 RX ADMIN — OXYBUTYNIN CHLORIDE 5 MG: 5 TABLET, EXTENDED RELEASE ORAL at 20:36

## 2018-02-01 RX ADMIN — NITROFURANTOIN (MONOHYDRATE/MACROCRYSTALS) 100 MG: 75; 25 CAPSULE ORAL at 10:46

## 2018-02-01 RX ADMIN — NITROFURANTOIN (MONOHYDRATE/MACROCRYSTALS) 100 MG: 75; 25 CAPSULE ORAL at 20:36

## 2018-02-01 RX ADMIN — OMEPRAZOLE 20 MG: 20 CAPSULE, DELAYED RELEASE ORAL at 20:36

## 2018-02-01 RX ADMIN — Medication 325 MG: at 10:46

## 2018-02-01 RX ADMIN — GABAPENTIN 300 MG: 300 CAPSULE ORAL at 20:35

## 2018-02-01 ASSESSMENT — COGNITIVE AND FUNCTIONAL STATUS - GENERAL
SUGGESTED CMS G CODE MODIFIER DAILY ACTIVITY: CK
MOVING FROM LYING ON BACK TO SITTING ON SIDE OF FLAT BED: A LITTLE
STANDING UP FROM CHAIR USING ARMS: A LITTLE
DRESSING REGULAR LOWER BODY CLOTHING: A LOT
PERSONAL GROOMING: A LITTLE
HELP NEEDED FOR BATHING: A LOT
WALKING IN HOSPITAL ROOM: A LITTLE
TURNING FROM BACK TO SIDE WHILE IN FLAT BAD: A LITTLE
MOVING TO AND FROM BED TO CHAIR: A LOT
SUGGESTED CMS G CODE MODIFIER MOBILITY: CK
EATING MEALS: A LITTLE
CLIMB 3 TO 5 STEPS WITH RAILING: A LITTLE
DAILY ACTIVITIY SCORE: 16
DRESSING REGULAR UPPER BODY CLOTHING: A LITTLE
MOBILITY SCORE: 17
TOILETING: A LITTLE

## 2018-02-01 ASSESSMENT — GAIT ASSESSMENTS
ASSISTIVE DEVICE: FRONT WHEEL WALKER
DEVIATION: OTHER (COMMENT)
GAIT LEVEL OF ASSIST: STAND BY ASSIST
DISTANCE (FEET): 40

## 2018-02-01 ASSESSMENT — PAIN SCALES - GENERAL
PAINLEVEL_OUTOF10: 0

## 2018-02-01 ASSESSMENT — PATIENT HEALTH QUESTIONNAIRE - PHQ9
8. MOVING OR SPEAKING SO SLOWLY THAT OTHER PEOPLE COULD HAVE NOTICED. OR THE OPPOSITE, BEING SO FIGETY OR RESTLESS THAT YOU HAVE BEEN MOVING AROUND A LOT MORE THAN USUAL: NOT AT ALL
2. FEELING DOWN, DEPRESSED, IRRITABLE, OR HOPELESS: SEVERAL DAYS
3. TROUBLE FALLING OR STAYING ASLEEP OR SLEEPING TOO MUCH: MORE THAN HALF THE DAYS
4. FEELING TIRED OR HAVING LITTLE ENERGY: SEVERAL DAYS
5. POOR APPETITE OR OVEREATING: NOT AT ALL
SUM OF ALL RESPONSES TO PHQ9 QUESTIONS 1 AND 2: 2
9. THOUGHTS THAT YOU WOULD BE BETTER OFF DEAD, OR OF HURTING YOURSELF: NOT AT ALL
SUM OF ALL RESPONSES TO PHQ QUESTIONS 1-9: 6
6. FEELING BAD ABOUT YOURSELF - OR THAT YOU ARE A FAILURE OR HAVE LET YOURSELF OR YOUR FAMILY DOWN: NOT AL ALL
1. LITTLE INTEREST OR PLEASURE IN DOING THINGS: SEVERAL DAYS
7. TROUBLE CONCENTRATING ON THINGS, SUCH AS READING THE NEWSPAPER OR WATCHING TELEVISION: SEVERAL DAYS

## 2018-02-01 ASSESSMENT — ACTIVITIES OF DAILY LIVING (ADL): TOILETING: INDEPENDENT

## 2018-02-01 ASSESSMENT — LIFESTYLE VARIABLES
DO YOU DRINK ALCOHOL: NO
EVER_SMOKED: NEVER

## 2018-02-01 NOTE — DISCHARGE PLANNING
Medical Social Work     RONALDO called [facility] to follow up on the referral sent about the pt. RONALDO spoke with Silvano in admission, Silvano stated that he is currently reviewing the referral and requested RONALDO to fax over the [facility] & P. Fax confirmation obtained.     Plan: RONALDO will follow up with referral to [facility].

## 2018-02-01 NOTE — DISCHARGE PLANNING
Medical Social Work    MSW received call from [facility] Tammy stating that [facility] needs more recent PT/OT evaluation. MSW called and alerted pt's public guardian of [facility] decision. Dee stated she will call and see if she can get Home Health notes sent over. Dee called back and stated that the the home health notes will not suffice. They need a PT/OT eval. MSW updated ERP and alerted him that at this time because pt is high fall risk and unable to safely return to her group home.     Pt to be admitted. MSW to update Unit SW of case. Dee faxed MSW public guardianship paperwork. Paperwork has already been scanned into [employer].

## 2018-02-01 NOTE — THERAPY
"Physical Therapy Evaluation completed.   Bed Mobility:  Supine to Sit: Minimal Assist  Transfers: Sit to Stand: Contact Guard Assist  Gait: Level Of Assist: Stand by Assist (close SBA 2' to impulsivity/cog issues) with Front-Wheel Walker       Plan of Care: Will benefit from Physical Therapy 2 times per week  Discharge Recommendations: Equipment: Will Continue to Assess for Equipment Needs. See below    Pt presents to PT for risk reduction for LOB and falling. She is able to demonstrate short distance ambulation with FWW with close SBA with no hunter LOB. However, anticipate her baseline cognition is exacerbating her risk for falls and balance impairements as she is generally delayed with motor control and has decreased insight into appropriate compensatory strategies. Anticipate pt is at higher risk for falls in more dynamic enviornment or with dual tasking/higher level cognitive activities and ambulation. Pt is known to this PT from prior admission and she appears to be at baseline cognition/behavior currently. She will benefit from continued skilled PT for optimal functional recovery and training with compensatory strategies/balance though progression may plateau based on ability for new learning. As prior, she would likely be best served in environment with constant supervision with OOB activities as her deficits are more cognitive than functional in nature.     See \"Rehab Therapy-Acute\" Patient Summary Report for complete documentation.     "

## 2018-02-01 NOTE — DISCHARGE PLANNING
Medical Social Work     SW spoke with Dee Kwon the pt public [phone]. Dee advised RONALDO that if the pt gets accepted to a SNF we have her verbal agreement to transfer the pt. Dee advises SW to just call her at [phone] and leave a message if the pt transfers to [facility].     Plan: RONALDO will follow up with the CCS on the pt referral to SNF.

## 2018-02-01 NOTE — H&P
DATE OF ADMISSION:  01/31/2018.    PRIMARY:  Dr. Cole Esparza.    CHIEF COMPLAINT:  Frequent falls.    HISTORY OF PRESENT ILLNESS:  Patient is a 69-year-old female with history of   hypertension, dementia, chronic low back pain, evaluated here at [facility] with   symptoms of weakness and falls.  Patient initially was to be referred to   [facility].  With help of ,   unfortunately unable to transfer and they are requesting updated PT notes.    Patient has recent UTI, has been on Macrobid.  She reports no dysuria.    Reports of fall 2-3 times recently went to the bathroom with use of walker   where she deliberately went down to prevent from falling.  Reports no nausea,   vomiting.  Good intake.  Does have chronic low back pain.  No numbness or   localized weakness.  No chest pain, fevers, chills, or cough.  No dysuria.    REVIEW OF SYSTEMS:  As above, otherwise negative according to AMA and CMS   criteria.    PAST MEDICAL HISTORY:  Include hypertension, chronic low back pain, CVA, T12   compression fracture, dementia, Coronary artery disease, CHF, hypertension and   psychiatric disorder, history of GI bleed per record.    PAST SURGICAL HISTORY:  Includes cardiac stents per records.    HOME MEDICATIONS:  Include Tylenol 650 every 4 hours p.r.n., Fosamax 10 mg   every morning, aspirin 81 daily, Lipitor 80 daily, Lotensin 40 mg daily,   Cymbalta 20 mg daily, iron sulfate 325 t.i.d., Neurontin 300 mg every evening,   Lopressor 12.5 b.i.d. recently started taking on 01/26/2018, Prilosec 20   b.i.d., oxybutynin 5 mg every evening, Trixie-Colace daily, trazodone 50 mg at   bedtime as needed, vitamin D 2000 units daily.    ALLERGIES:  PENICILLIN.    SOCIAL HISTORY:  No tobacco, quit 2 years ago.  No alcohol.  Was previously at   assisted living.    FAMILY HISTORY:  None reported.    PHYSICAL EXAMINATION:  VITAL SIGNS:  Currently revealed a temperature of 36.4, pulse 60s-50s,    respiratory rate of 12, 96% on room air, blood pressure of 195/   systolic.  GENERAL:  Patient was alert, oriented to person, place and year, demented.  No   apparent distress.  HEENT:  Anicteric.  Extraocular movements are intact.  Mucous membranes were   moist.  NECK:  No cervical or supraclavicular adenopathy.  Trachea midline.  CARDIOVASCULAR:  Regular rate and rhythm.  No murmurs.  LUNGS:  Clear to auscultation bilaterally.  Normal chest wall excursion effort   without chest wall tenderness.  ABDOMEN:  Bowel sounds present, soft, nontender, nondistended.  No   hepatosplenomegaly or pulsatile masses, mildly obese.  BACK:  No CVA or paraspinal tenderness.  EXTREMITIES:  Generalized 4/5 strength lower extremities with 1+ lower   extremity edema.  NEUROLOGIC:  Cranial nerves grossly intact.  No focal weakness.  Signs of   dementia.  SKIN:  Warm, dry without pallor.  PSYCHIATRIC:  Calm, cooperative without depressed affect.    LABORATORY DATA:  Patient's labs reveal white count of 8.4, hemoglobin 13.3,   platelet count 184,000.  BUN and creatinine 14 and 0.78, troponin 0.02.  UA   negative for signs of infection.    EKG, my interpretation revealed sinus rhythm, rate of 59, Q-wave in inferior   lead III, aVF and 2, nonspecific ST-T findings inferior lead II, III, aVF.    IMPRESSION AND PLAN:  1.  Frequent falls with patient was to be arranged for transfer to [facility] for rehabilitation.  They are requesting updated PT   notes.  We will order therapy following admission.  Social service assist with   discharge planning.  2.  Recent diagnosis of urinary tract infection.  We will continue with   Macrobid.  3.  History of hypertension, currently uncontrolled.  Resume outpatient   medications including benazepril, metoprolol and p.r.n. IV hydralazine.  4.  History of cerebrovascular accident per records.  Continue aspirin,   statin.  5.  Chronic low back pain.  Resume Neurontin.  6.   History of gastroesophageal reflux disease.  Continue PPI therapy.    Observation admission less than 2 midnights anticipated stay.       ____________________________________     MD ROSAMARIA CABV / NTS    DD:  02/01/2018 00:51:16  DT:  02/01/2018 02:09:03    D#:  [id number]  Job#:  [id number]

## 2018-02-01 NOTE — RESPIRATORY CARE
COPD EDUCATION by COPD CLINICAL EDUCATOR  2/1/2018 at 5:54 AM by Quita Pressley     Patient reviewed by COPD education team. Patient does not qualify for COPD program.

## 2018-02-01 NOTE — CARE PLAN
Problem: Safety  Goal: Will remain free from falls  Outcome: PROGRESSING AS EXPECTED  Bed alarm on. Call light within reach. Pt demonstrated back use of call light. Pt calls appropriately for assistance. Pt ambulated x1 assist with FWW. Fall risk sign placed outside of room.     Problem: Mobility  Goal: Risk for activity intolerance will decrease  Outcome: PROGRESSING AS EXPECTED  PT/OT evaluation ordered per MD. Pt ambulated to bathroom x1 assist with FWW. Pt denied SOB. Pt tolerated ambulation well and follows directions well.

## 2018-02-01 NOTE — THERAPY
"Occupational Therapy Evaluation completed.   Functional Status:  CGA required for toileting and functional mobility. Mod A for LB dressing, Min A for UB dressing.   Plan of Care: Will benefit from Occupational Therapy 3 times per week  Discharge Recommendations: Post-acute therapy Discharge to a transitional care facility for continued skilled therapy services.    See \"Rehab Therapy-Acute\" Patient Summary Report for complete documentation.     77 y.o. female admitted secondary to increased rate of falls and general weakness from a group home.  Pt. required CGA for functional mobility with FWW, Mod A for lower body dressing. Increased assisance directly related to pt's limited attention span secondary to dementia. Recommend D/C to SNF to increase patients strength, blanance, and functional independence. Pt. will benefit from Acute OT services to increase functional independence.     "

## 2018-02-01 NOTE — DISCHARGE PLANNING
note:  Choice made for [facility].   Faxed to [facility]. ANAHI aware.    Addendum:  PT/OT concerned that pt may not qualify for [facility] since pt is only observation loc. Per Anahi, pt has a qualifying stay within past 30 days. Apparently,   PG wants pt to go to [facility].

## 2018-02-01 NOTE — DISCHARGE PLANNING
Medical Social Work     SW advised the CCS that the PT and OT notes are in and that the pt should have a qualifying stay from her last admission. The CCS advised SW that she is going to follow up with the referral to [facility].

## 2018-02-01 NOTE — PROGRESS NOTES
Received report from MARK Tamayo. Pt arrived to unit via gurney. Pt transferred to bed via slide board. Skin check performed. Linen changed d/t incontinent episode. Pt AAOx3, reoriented to time. Pt denies pain or discomfort at this time. Snack provided. Pt swallowed medications with no difficulty. Bed alarm on. Call light within reach.

## 2018-02-01 NOTE — ED PROVIDER NOTES
ED Provider Note    CHIEF COMPLAINT  Chief Complaint   Patient presents with   • Weakness       HPI  Maria Esther Valencia is a 77 y.o. female who presents to emergency room today brought in by evidence for generalized weakness and frequent falls. Apparently she is been going to the restroom and fell today to weak to get back into bed. This is happened several times this week and at her assisted care. Unable to care for her at the group home. She denies any fever, chills or changes to bowel or bladder chest pain or shortness of breath. Please refer to note from Dr. Martins.    REVIEW OF SYSTEMS  See HPI for further details. All other systems are negative.     PAST MEDICAL HISTORY  Past Medical History:   Diagnosis Date   • CAD (coronary artery disease)    • Compression fracture of spine (CMS-[facility])    • Congestive heart failure (CMS-[facility])    • Dementia    • GI bleed    • Hypertension    • Psychiatric disorder    • Stroke (CMS-[facility])        FAMILY HISTORY  [unfilled]    SOCIAL HISTORY  Social History     Social History   • Marital status:      Spouse name: N/A   • Number of children: N/A   • Years of education: N/A     Social History Main Topics   • Smoking status: Former Smoker     Quit date: 1/20/2016   • Smokeless tobacco: Never Used   • Alcohol use No   • Drug use: No   • Sexual activity: Not on file     Other Topics Concern   • Not on file     Social History Narrative   • No narrative on file       SURGICAL HISTORY  Past Surgical History:   Procedure Laterality Date   • OTHER CARDIAC SURGERY      stents       CURRENT MEDICATIONS  Home Medications     Reviewed by Odilon Suarez (Pharmacy Tech) on 01/31/18 at 1737  Med List Status: Complete   Medication Last Dose Status   acetaminophen (TYLENOL) 325 MG Tab 1/29/2018 Active   alendronate (FOSAMAX) 10 MG Tab 1/30/2018 Active   aspirin 81 MG EC tablet 1/30/2018 Active   atorvastatin (LIPITOR) 80 MG tablet 1/30/2018 Active   benazepril (LOTENSIN) 40 MG tablet  "1/30/2018 Active   duloxetine (CYMBALTA) 20 MG Cap DR Particles 1/30/2018 Active   ferrous sulfate 325 (65 Fe) MG tablet 1/30/2018 Active   gabapentin (NEURONTIN) 300 MG Cap 1/30/2018 Active   metoprolol (LOPRESSOR) 25 MG Tab 1/30/2018 Active   nitrofurantoin monohydr macro (MACROBID) 100 MG Cap 1/30/2018 Active   omeprazole (PRILOSEC) 20 MG delayed-release capsule 1/30/2018 Active   oxybutynin SR (DITROPAN-XL) 5 MG TABLET SR 24 HR 1/30/2018 Active   senna-docusate (PERICOLACE OR SENOKOT S) 8.6-50 MG Tab 1/30/2018 Active   trazodone (DESYREL) 50 MG Tab 1/30/2018 Active   vitamin D 2000 UNIT Tab 1/30/2018 Active                ALLERGIES  Allergies   Allergen Reactions   • Pcn [Penicillins] Unspecified     Per historical. Pt cannot verify what reaction she had       PHYSICAL EXAM  VITAL SIGNS: BP (!) 195/68   Pulse 66   Temp 36.8 °C (98.2 °F)   Resp (!) 0   Ht 1.575 m (5' 2\")   Wt 81.6 kg (180 lb)   SpO2 89%   BMI 32.92 kg/m²  Room air O2: 95    Constitutional: Well developed, Well nourished, No acute distress, Non-toxic appearance.   HENT: Normocephalic, Atraumatic, Bilateral external ears normal, Oropharynx moist, No oral exudates, Nose normal.   Eyes: PERRLA, EOMI, Conjunctiva normal, No discharge.   Neck: Normal range of motion, No tenderness, Supple, No stridor.   Lymphatic: No lymphadenopathy noted.   Cardiovascular: Normal heart rate, Normal rhythm, No murmurs, No rubs, No gallops.   Thorax & Lungs: Normal breath sounds, No respiratory distress, No wheezing, No chest tenderness.   Abdomen: Bowel sounds normal, Soft, No tenderness, No masses, No pulsatile masses.   Skin: Warm, Dry, No erythema, No rash.   Back: No tenderness, No CVA tenderness.    Extremities: Intact distal pulses, No edema, No tenderness, No cyanosis, No clubbing.   Musculoskeletal: Patient can ambulate but sometimes has difficulty needs assistance.  Neurologic: Alert & oriented x 3, Normal motor function, Normal sensory function, No " focal deficits noted.   Psychiatric: Affect normal, Judgment normal, Mood normal.    COURSE & MEDICAL DECISION MAKING  Pertinent Labs & Imaging studies reviewed. (See chart for details)  Patient unable to stay at her group home and needs a higher level care discussed with patient and they will guardian. Discussed with . Patient will need to be admitted to the hospital discussed with hospitalist for admission for placement.    FINAL IMPRESSION  1. Generalized weakness  2. Multiple falls  3. Failure to thrive         Electronically signed by: Jeremías Campos, 1/31/2018 8:40 PM

## 2018-02-01 NOTE — DISCHARGE PLANNING
Medical Social Work     The CCS advised SW that Petra Somers will be stopping by to evaluate the pt.

## 2018-02-02 VITALS
HEIGHT: 62 IN | WEIGHT: 183.42 LBS | HEART RATE: 57 BPM | DIASTOLIC BLOOD PRESSURE: 59 MMHG | SYSTOLIC BLOOD PRESSURE: 143 MMHG | RESPIRATION RATE: 17 BRPM | OXYGEN SATURATION: 94 % | BODY MASS INDEX: 33.75 KG/M2 | TEMPERATURE: 97.5 F

## 2018-02-02 PROCEDURE — A9270 NON-COVERED ITEM OR SERVICE: HCPCS | Performed by: HOSPITALIST

## 2018-02-02 PROCEDURE — G0378 HOSPITAL OBSERVATION PER HR: HCPCS

## 2018-02-02 PROCEDURE — 99217 PR OBSERVATION CARE DISCHARGE: CPT | Performed by: INTERNAL MEDICINE

## 2018-02-02 PROCEDURE — 665998 HH PPS REVENUE CREDIT

## 2018-02-02 PROCEDURE — 665999 HH PPS REVENUE DEBIT

## 2018-02-02 PROCEDURE — 700102 HCHG RX REV CODE 250 W/ 637 OVERRIDE(OP): Performed by: HOSPITALIST

## 2018-02-02 PROCEDURE — 700102 HCHG RX REV CODE 250 W/ 637 OVERRIDE(OP): Performed by: INTERNAL MEDICINE

## 2018-02-02 PROCEDURE — A9270 NON-COVERED ITEM OR SERVICE: HCPCS | Performed by: INTERNAL MEDICINE

## 2018-02-02 RX ORDER — NITROFURANTOIN 25; 75 MG/1; MG/1
100 CAPSULE ORAL 2 TIMES DAILY
Qty: 12 CAP | Refills: 0
Start: 2018-02-02 | End: 2018-02-08

## 2018-02-02 RX ORDER — AMLODIPINE BESYLATE 10 MG/1
10 TABLET ORAL DAILY
Qty: 30 TAB | Refills: 2
Start: 2018-02-03 | End: 2022-04-12

## 2018-02-02 RX ADMIN — METOPROLOL TARTRATE 12.5 MG: 25 TABLET, FILM COATED ORAL at 08:51

## 2018-02-02 RX ADMIN — BENAZEPRIL HYDROCHLORIDE 40 MG: 20 TABLET, COATED ORAL at 08:51

## 2018-02-02 RX ADMIN — AMLODIPINE BESYLATE 10 MG: 10 TABLET ORAL at 08:51

## 2018-02-02 RX ADMIN — Medication 325 MG: at 12:15

## 2018-02-02 RX ADMIN — VITAMIN D, TAB 1000IU (100/BT) 2000 UNITS: 25 TAB at 08:50

## 2018-02-02 RX ADMIN — NITROFURANTOIN (MONOHYDRATE/MACROCRYSTALS) 100 MG: 75; 25 CAPSULE ORAL at 08:50

## 2018-02-02 RX ADMIN — OMEPRAZOLE 20 MG: 20 CAPSULE, DELAYED RELEASE ORAL at 08:50

## 2018-02-02 RX ADMIN — ALENDRONATE SODIUM 10 MG: 10 TABLET ORAL at 06:02

## 2018-02-02 RX ADMIN — DULOXETINE HYDROCHLORIDE 20 MG: 20 CAPSULE, DELAYED RELEASE ORAL at 08:50

## 2018-02-02 RX ADMIN — Medication 325 MG: at 08:51

## 2018-02-02 ASSESSMENT — PAIN SCALES - GENERAL
PAINLEVEL_OUTOF10: 0
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
## 2018-02-02 NOTE — CARE PLAN
Problem: Discharge Barriers/Planning  Goal: Patient's continuum of care needs will be met    Intervention: Involve patient and significant other/support system in setting and prioritizing goals for hospital stay and discharge  SW involved with comminucating with guardian to find pt post acute care.      Problem: Mobility  Goal: Risk for activity intolerance will decrease    Intervention: Encourage patient to increase activity level in collaboration with Interdisciplinary Team  Pt encouraged to get OOB, pt refusing sometimes, importance reinforced throughout shift.
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
## 2018-02-02 NOTE — DISCHARGE SUMMARY
CHIEF COMPLAINT ON ADMISSION  Chief Complaint   Patient presents with   • Weakness       CODE STATUS  Full Code    HPI & HOSPITAL COURSE  This is a 77 y.o. female w hx of dementia and recent prolonged hospital stay, discharged to group home admitted 1/31/2018 with increased falls.  The staff at the group home felt she was not doing well there and would benefit from SNF placement.  Upon her obs admission she had pt/ot who did recommend snf.  Her chronic medical problems remained stable aside from BP which was slightly high so amlodipine was added.  She was not combative and was cooperative with all staff members.    The patient met 2-midnight criteria for an inpatient stay at the time of discharge.    Therefore, she is discharged in fair and stable condition with close outpatient follow-up.    SPECIFIC OUTPATIENT FOLLOW-UP  F/U with PCP within two weeks    DISCHARGE PROBLEM LIST  Principal Problem:    Falls POA: Unknown  Active Problems:    Dementia POA: Yes    Hypertension POA: Yes    Depression POA: Yes    Dyslipidemia POA: Yes    History of stroke POA: Yes    Bradycardia POA: Yes    Physical debility POA: Yes  Resolved Problems:    * No resolved hospital problems. *      FOLLOW UP  Future Appointments  Date Time Provider Department Center   2/5/2018 To Be Determined [facility] TEAM [facility]ATOL[facility] None   2/8/2018 To Be Determined Brando Sánchez R.N. [facility] None   2/13/2018 To Be Determined Brando S[facility]chez R.N. [facility] None   2/20/2018 To Be Determined [facility]ando S[facility]chez R.N. [facility] None   2/27/2018 To Be Determined [facility]ando S[facility]chez R.N[facility] None   3/6/2018 To Be Determined [facility]ando S[facility]chez R.[facility] None   3/13/2018 To Be Determined [facility]ando S[facility]chez R.N[facility] None   3/20/2018 To Be Determined [facility]chez R.[facility] None   3/27/2018 To Be Determined [facility] R.[facility] None     Cole Esparza M.D.  [address]  [address]  [place] [address]  [phone]      follow-up with your  primary doctor. Return if worse or for any concerns      MEDICATIONS ON DISCHARGE   Maria Esther Valencia   Home Medication Instructions NOE:[id number]    Printed on:02/02/18 0437   Medication Information                      acetaminophen (TYLENOL) 325 MG Tab  Take 2 Tabs by mouth every four hours as needed for up to 90 days.             alendronate (FOSAMAX) 10 MG Tab  Take 1 Tab by mouth every morning before breakfast.             amLODIPine (NORVASC) 10 MG Tab  Take 1 Tab by mouth every day.             aspirin 81 MG EC tablet  Take 1 Tab by mouth every day.             atorvastatin (LIPITOR) 80 MG tablet  Take 1 Tab by mouth every bedtime.             benazepril (LOTENSIN) 40 MG tablet  Take 1 Tab by mouth every day.             duloxetine (CYMBALTA) 20 MG Cap DR Particles  Take 1 Cap by mouth every day.             ferrous sulfate 325 (65 Fe) MG tablet  Take 1 Tab by mouth 3 times a day, with meals.             gabapentin (NEURONTIN) 300 MG Cap  Take 1 Cap by mouth every evening.             metoprolol (LOPRESSOR) 25 MG Tab  Take 0.5 Tabs by mouth 2 Times a Day.             nitrofurantoin monohydr macro (MACROBID) 100 MG Cap  Take 1 Cap by mouth 2 times a day for 6 days. 14 day course started 1/26/18             omeprazole (PRILOSEC) 20 MG delayed-release capsule  Take 1 Cap by mouth 2 Times a Day.             oxybutynin SR (DITROPAN-XL) 5 MG TABLET SR 24 HR  Take 1 Tab by mouth every evening.             senna-docusate (PERICOLACE OR SENOKOT S) 8.6-50 MG Tab  Take 2 Tabs by mouth 2 Times a Day.             trazodone (DESYREL) 50 MG Tab  Take 1 Tab by mouth at bedtime as needed.             vitamin D 2000 UNIT Tab  Take 1 Tab by mouth every day.                 DIET  Orders Placed This Encounter   Procedures   • Diet Order     Standing Status:   Standing     Number of Occurrences:   1     Order Specific Question:   Diet:     Answer:   Regular [1]       ACTIVITY  As tolerated.  Weight bearing as  tolerated      CONSULTATIONS  None    PROCEDURES  None    LABORATORY  Lab Results   Component Value Date/Time    SODIUM 143 01/31/2018 07:25 AM    POTASSIUM 4.1 01/31/2018 07:25 AM    CHLORIDE 108 01/31/2018 07:25 AM    CO2 28 01/31/2018 07:25 AM    GLUCOSE 93 01/31/2018 07:25 AM    BUN 14 01/31/2018 07:25 AM    CREATININE 0.72 01/31/2018 07:25 AM    CREATININE 1.0 03/11/2009 02:04 PM        Lab Results   Component Value Date/Time    WBC 8.4 01/31/2018 07:25 AM    HEMOGLOBIN 13.3 01/31/2018 07:25 AM    HEMATOCRIT 42.4 01/31/2018 07:25 AM    PLATELETCT 184 01/31/2018 07:25 AM        Total time of the discharge process exceeds 38 minutes

## 2018-02-02 NOTE — DISCHARGE INSTRUCTIONS
Discharge Instructions    Discharged to group home by medical transportation with escort. Discharged via wheelchair, hospital escort: Yes.  Special equipment needed: Not Applicable    Be sure to schedule a follow-up appointment with your primary care doctor or any specialists as instructed.     Discharge Plan:   Diet Plan: Discussed  Activity Level: Discussed  Confirmed Follow up Appointment: Appointment Scheduled  Confirmed Symptoms Management: Discussed  Medication Reconciliation Updated: Yes  Influenza Vaccine Indication: Not indicated: Previously immunized this influenza season and > 8 years of age    I understand that a diet low in cholesterol, fat, and sodium is recommended for good health. Unless I have been given specific instructions below for another diet, I accept this instruction as my diet prescription.   Other diet: Regular Diet    Special Instructions: None    · Is patient discharged on Warfarin / Coumadin?   No     Depression / Suicide Risk    As you are discharged from this [facility] facility, it is important to learn how to keep safe from harming yourself.    Recognize the warning signs:  · Abrupt changes in personality, positive or negative- including increase in energy   · Giving away possessions  · Change in eating patterns- significant weight changes-  positive or negative  · Change in sleeping patterns- unable to sleep or sleeping all the time   · Unwillingness or inability to communicate  · Depression  · Unusual sadness, discouragement and loneliness  · Talk of wanting to die  · Neglect of personal appearance   · Rebelliousness- reckless behavior  · Withdrawal from people/activities they love  · Confusion- inability to concentrate     If you or a loved one observes any of these behaviors or has concerns about self-harm, here's what you can do:  · Talk about it- your feelings and reasons for harming yourself  · Remove any means that you might use to hurt yourself (examples: pills, rope,  extension cords, firearm)  · Get professional help from the community (Mental Health, Substance Abuse, psychological counseling)  · Do not be alone:Call your Safe Contact- someone whom you trust who will be there for you.  · Call your local CRISIS HOTLINE 616-5225 or 123-377-7904  · Call your local Children's Mobile Crisis Response Team [place] (429) 382-6029 or www.[employer]  · Call the toll free National Suicide Prevention Hotlines   · National Suicide Prevention Lifeline 110-463-OLPK (3314)  · [employer] Line Network 800-SUICIDE (854-2431)    Weakness  Weakness is a lack of strength. It may be felt all over the body (generalized) or in one specific part of the body (focal). Some causes of weakness can be serious. You may need further medical evaluation, especially if you are elderly or you have a history of immunosuppression (such as chemotherapy or HIV), kidney disease, heart disease, or diabetes.  CAUSES   Weakness can be caused by many different things, including:  · Infection.  · Physical exhaustion.  · Internal bleeding or other blood loss that results in a lack of red blood cells (anemia).  · Dehydration. This cause is more common in elderly people.  · Side effects or electrolyte abnormalities from medicines, such as pain medicines or sedatives.  · Emotional distress, anxiety, or depression.  · Circulation problems, especially severe peripheral arterial disease.  · Heart disease, such as rapid atrial fibrillation, bradycardia, or heart failure.  · Nervous system disorders, such as Guillain-Barré syndrome, multiple sclerosis, or stroke.  DIAGNOSIS   To find the cause of your weakness, your caregiver will take your history and perform a physical exam. Lab tests or X-rays may also be ordered, if needed.  TREATMENT   Treatment of weakness depends on the cause of your symptoms and can vary greatly.  HOME CARE INSTRUCTIONS   · Rest as needed.  · Eat a well-balanced diet.  · Try to get some  exercise every day.  · Only take over-the-counter or prescription medicines as directed by your caregiver.  SEEK MEDICAL CARE IF:   · Your weakness seems to be getting worse or spreads to other parts of your body.  · You develop new aches or pains.  SEEK IMMEDIATE MEDICAL CARE IF:   · You cannot perform your normal daily activities, such as getting dressed and feeding yourself.  · You cannot walk up and down stairs, or you feel exhausted when you do so.  · You have shortness of breath or chest pain.  · You have difficulty moving parts of your body.  · You have weakness in only one area of the body or on only one side of the body.  · You have a fever.  · You have trouble speaking or swallowing.  · You cannot control your bladder or bowel movements.  · You have black or bloody vomit or stools.  MAKE SURE YOU:  · Understand these instructions.  · Will watch your condition.  · Will get help right away if you are not doing well or get worse.     This information is not intended to replace advice given to you by your health care provider. Make sure you discuss any questions you have with your health care provider.     Document Released: 12/18/2006 Document Revised: 06/18/2013 Document Reviewed: 02/15/2013  Elsevier Interactive Patient Education ©2016 [employer] Inc[employer]

## 2018-02-02 NOTE — DISCHARGE PLANNING
Transport arranged with [facility]. Patient will be leaving today @1630 via [facility] going to [facility]. RONALDO Sultana notified of time, and patient needs a PASRR.

## 2018-02-02 NOTE — PROGRESS NOTES
Called [phone] regarding nursing communication to set up f/u appt, pt does not need f/u due to SNF placement according to scheduling.

## 2018-02-02 NOTE — DISCHARGE PLANNING
Bedside RN, Charge RN, MD, and pt's guardian are all aware of 4:30 transfer to [facility]. Transfer packet placed in pt's chart.

## 2018-02-02 NOTE — DISCHARGE PLANNING
Medical Social Work     Petra with [facility] advised SW that they will let SW know if they are accepting the pt. They would like to review the chart and speak with the the pt Guardian about discharge planning after the SNF stay.     Plan: RONALDO will follow up with [facility] tomorrow, 2/2/18.

## 2018-02-02 NOTE — PROGRESS NOTES
"Report received from day RN. Assumed patient care at 1900. Patient is AAOx3, reoriented to time. Patient appears calm and in no acute distress. Labs and orders reviewed. Assessment completed. Patient denies any pain at this time. Patient provided with scheduled medication, refer to MAR. Plan of care discussed with patient; questions have been answered at this time. Patient needs have been met at this time. Patient educated to call when needing assistance. Call light within reach. Bed alarm on. Non-Skid socks in place. Bed locked and in the lowest position. Hourly rounding in place. /67   Pulse (!) 56   Temp 36.4 °C (97.6 °F)   Resp 18   Ht 1.575 m (5' 2\")   Wt 83.2 kg (183 lb 6.8 oz)   SpO2 93%   Breastfeeding? No   BMI 33.55 kg/m² .   "

## 2018-02-02 NOTE — CARE PLAN
Problem: Safety  Goal: Will remain free from falls    Intervention: Assess risk factors for falls  Environment is clutter free. Personal possession and bedside table near patient. Patient room close to nurses station. Patient educated to call when needing assistance. Call light within reach. Bed locked and in the lowest position. Bed alarm on. Non-skid socks in place. Hourly rounding in place.       Problem: Venous Thromboembolism (VTW)/Deep Vein Thrombosis (DVT) Prevention:  Goal: Patient will participate in Venous Thrombosis (VTE)/Deep Vein Thrombosis (DVT)Prevention Measures    Intervention: Assess and monitor for anticoagulation complications  Patient receiving scheduled Lovenox medication, refer to MAR.
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
## 2018-02-02 NOTE — DISCHARGE PLANNING
RONALDO received call from grayson with [facility]. They have accepted pt and can take her today. RONALDO notified RN, per RN pt is medically cleared to transfer today. RONALDO faxed transport form to [facility].

## 2018-02-02 NOTE — PROGRESS NOTES
[facility] Hospitalist Progress Note    Date of Service: 2018    Chief Complaint  77 y.o. female w hx of dementia and recent prolonged hospital stay, discharged to group home admitted 2018 with falls, needs SNF    Interval Problem Update  : Pending group home    Consultants/Specialty  None    Disposition  F/U SNF placement        Review of Systems   Unable to perform ROS: Dementia      Physical Exam  Laboratory/Imaging   Hemodynamics  Temp (24hrs), Av.3 °C (97.3 °F), Min:36 °C (96.8 °F), Max:36.4 °C (97.6 °F)   Temperature: 36.4 °C (97.6 °F)  Pulse  Av.5  Min: 52  Max: 69 Heart Rate (Monitored): 62  Blood Pressure : 138/80, NIBP: 145/61      Respiratory      Respiration: 18, Pulse Oximetry: 92 %        RUL Breath Sounds: Diminished, RML Breath Sounds: Diminished, RLL Breath Sounds: Diminished, ARGELIA Breath Sounds: Diminished, LLL Breath Sounds: Diminished    Fluids    Intake/Output Summary (Last 24 hours) at 18 1636  Last data filed at 18 1300   Gross per 24 hour   Intake              360 ml   Output                0 ml   Net              360 ml       Nutrition  Orders Placed This Encounter   Procedures   • Diet Order     Standing Status:   Standing     Number of Occurrences:   1     Order Specific Question:   Diet:     Answer:   Regular [1]     Physical Exam   Constitutional: She is oriented to person, place, and time. She appears well-developed and well-nourished. No distress.   HENT:   Head: Normocephalic.   Mouth/Throat: No oropharyngeal exudate.   Eyes: Pupils are equal, round, and reactive to light. No scleral icterus.   Neck: Normal range of motion. No JVD present. No thyromegaly present.   Cardiovascular: Normal rate and regular rhythm.    Pulmonary/Chest: Effort normal and breath sounds normal. No respiratory distress. She has no wheezes.   Abdominal: Soft. Bowel sounds are normal. She exhibits no distension. There is no tenderness.   Musculoskeletal: Normal range of motion. She  exhibits no edema.   Neurological: She is alert and oriented to person, place, and time. No cranial nerve deficit. Coordination normal.   Skin: Skin is warm and dry. No rash noted. No erythema.   Psychiatric: She has a normal mood and affect. Her behavior is normal.       Recent Labs      01/31/18   0725   WBC  8.4   RBC  4.73   HEMOGLOBIN  13.3   HEMATOCRIT  42.4   MCV  89.6   MCH  28.1   MCHC  31.4*   RDW  49.2   PLATELETCT  184   MPV  12.1     Recent Labs      01/31/18   0725   SODIUM  143   POTASSIUM  4.1   CHLORIDE  108   CO2  28   GLUCOSE  93   BUN  14   CREATININE  0.72   CALCIUM  8.9                      Assessment/Plan     * Falls   Assessment & Plan    Physical deconditioning, waiting for SNF to continue pt/ot  Didn't do well at group home        Hypertension- (present on admission)   Assessment & Plan    Controlled  Continue amlodipine, benazepril, metop        Dementia- (present on admission)   Assessment & Plan    Recent prolonged hospital stay.  Didn't do well at group home  Cymbalta          Physical debility- (present on admission)   Assessment & Plan    Pending SNF        Bradycardia- (present on admission)   Assessment & Plan    Asymptomatic, Decrease metop if worsens, or symptoms        History of stroke- (present on admission)   Assessment & Plan    Complicated by dementia  Statin, ASA, BP control        Depression- (present on admission)   Assessment & Plan    Cymbalta  Placement          Quality-Core Measures

## 2018-02-03 PROCEDURE — 665999 HH PPS REVENUE DEBIT

## 2018-02-03 PROCEDURE — 665998 HH PPS REVENUE CREDIT

## 2018-02-03 NOTE — PROGRESS NOTES
Pt discharged to [facility] via [facility] (confused pt paperwork completed and filed in chart). PIV DC'ed. Prescriptions given. Discharge instructions given specifically involving weakness, patient verbalized understanding of instructions, 2nd copy of AVS signed and placed on chart.

## 2018-02-04 PROCEDURE — 665998 HH PPS REVENUE CREDIT

## 2018-02-04 PROCEDURE — 665999 HH PPS REVENUE DEBIT

## 2018-02-05 PROCEDURE — 665998 HH PPS REVENUE CREDIT

## 2018-02-05 PROCEDURE — 665999 HH PPS REVENUE DEBIT

## 2018-02-06 PROCEDURE — 665999 HH PPS REVENUE DEBIT

## 2018-02-06 PROCEDURE — 665998 HH PPS REVENUE CREDIT

## 2018-02-06 ASSESSMENT — ENCOUNTER SYMPTOMS: RESPIRATORY SYMPTOMS COMMENTS: NO APPARENT RESPIRATORY DISTRESS AT THIS TIME

## 2018-02-07 PROCEDURE — 665998 HH PPS REVENUE CREDIT

## 2018-02-07 PROCEDURE — 665999 HH PPS REVENUE DEBIT

## 2018-02-08 ENCOUNTER — HOME CARE VISIT (OUTPATIENT)
Dept: HOME HEALTH SERVICES | Facility: HOME HEALTHCARE | Age: 78
End: 2018-02-08
Payer: MEDICARE

## 2018-02-08 PROCEDURE — 665999 HH PPS REVENUE DEBIT

## 2018-02-08 PROCEDURE — 665998 HH PPS REVENUE CREDIT

## 2018-02-09 PROCEDURE — 665999 HH PPS REVENUE DEBIT

## 2018-02-09 PROCEDURE — 665998 HH PPS REVENUE CREDIT

## 2018-02-10 PROCEDURE — 665999 HH PPS REVENUE DEBIT

## 2018-02-10 PROCEDURE — 665998 HH PPS REVENUE CREDIT

## 2018-02-11 PROCEDURE — 665999 HH PPS REVENUE DEBIT

## 2018-02-11 PROCEDURE — 665998 HH PPS REVENUE CREDIT

## 2018-02-12 PROCEDURE — 665999 HH PPS REVENUE DEBIT

## 2018-02-12 PROCEDURE — 665998 HH PPS REVENUE CREDIT

## 2018-02-13 PROCEDURE — 665998 HH PPS REVENUE CREDIT

## 2018-02-13 PROCEDURE — 665999 HH PPS REVENUE DEBIT

## 2018-02-14 PROCEDURE — 665999 HH PPS REVENUE DEBIT

## 2018-02-14 PROCEDURE — 665998 HH PPS REVENUE CREDIT

## 2018-02-15 PROCEDURE — 665998 HH PPS REVENUE CREDIT

## 2018-02-15 PROCEDURE — 665999 HH PPS REVENUE DEBIT

## 2018-02-16 PROCEDURE — 665998 HH PPS REVENUE CREDIT

## 2018-02-16 PROCEDURE — 665999 HH PPS REVENUE DEBIT

## 2018-02-17 PROCEDURE — 665998 HH PPS REVENUE CREDIT

## 2018-02-17 PROCEDURE — 665999 HH PPS REVENUE DEBIT

## 2018-02-18 PROCEDURE — 665998 HH PPS REVENUE CREDIT

## 2018-02-18 PROCEDURE — 665999 HH PPS REVENUE DEBIT

## 2018-02-19 PROCEDURE — 665998 HH PPS REVENUE CREDIT

## 2018-02-19 PROCEDURE — 665999 HH PPS REVENUE DEBIT

## 2018-02-20 PROCEDURE — 665999 HH PPS REVENUE DEBIT

## 2018-02-20 PROCEDURE — 665998 HH PPS REVENUE CREDIT

## 2018-02-21 PROCEDURE — 665998 HH PPS REVENUE CREDIT

## 2018-02-21 PROCEDURE — 665999 HH PPS REVENUE DEBIT

## 2018-02-22 PROCEDURE — 665998 HH PPS REVENUE CREDIT

## 2018-02-22 PROCEDURE — 665999 HH PPS REVENUE DEBIT

## 2018-02-23 PROCEDURE — 665999 HH PPS REVENUE DEBIT

## 2018-02-23 PROCEDURE — 665998 HH PPS REVENUE CREDIT

## 2018-02-24 PROCEDURE — 665999 HH PPS REVENUE DEBIT

## 2018-02-24 PROCEDURE — 665998 HH PPS REVENUE CREDIT

## 2018-02-25 PROCEDURE — 665999 HH PPS REVENUE DEBIT

## 2018-02-25 PROCEDURE — 665998 HH PPS REVENUE CREDIT

## 2018-02-26 PROCEDURE — 665999 HH PPS REVENUE DEBIT

## 2018-02-26 PROCEDURE — 665998 HH PPS REVENUE CREDIT

## 2018-02-27 PROCEDURE — 665998 HH PPS REVENUE CREDIT

## 2018-02-27 PROCEDURE — 665999 HH PPS REVENUE DEBIT

## 2018-02-28 PROCEDURE — 665998 HH PPS REVENUE CREDIT

## 2018-02-28 PROCEDURE — 665999 HH PPS REVENUE DEBIT

## 2018-03-01 PROCEDURE — 665998 HH PPS REVENUE CREDIT

## 2018-03-01 PROCEDURE — 665999 HH PPS REVENUE DEBIT

## 2018-03-02 PROCEDURE — 665999 HH PPS REVENUE DEBIT

## 2018-03-02 PROCEDURE — 665998 HH PPS REVENUE CREDIT

## 2018-03-03 PROCEDURE — 665999 HH PPS REVENUE DEBIT

## 2018-03-03 PROCEDURE — 665998 HH PPS REVENUE CREDIT

## 2018-03-04 PROCEDURE — 665999 HH PPS REVENUE DEBIT

## 2018-03-04 PROCEDURE — 665998 HH PPS REVENUE CREDIT

## 2018-03-05 PROCEDURE — 665998 HH PPS REVENUE CREDIT

## 2018-03-05 PROCEDURE — 665999 HH PPS REVENUE DEBIT

## 2018-03-06 PROCEDURE — 665998 HH PPS REVENUE CREDIT

## 2018-03-06 PROCEDURE — 665999 HH PPS REVENUE DEBIT

## 2018-03-07 PROCEDURE — 665998 HH PPS REVENUE CREDIT

## 2018-03-07 PROCEDURE — 665999 HH PPS REVENUE DEBIT

## 2018-03-08 PROCEDURE — 665999 HH PPS REVENUE DEBIT

## 2018-03-08 PROCEDURE — 665998 HH PPS REVENUE CREDIT

## 2018-03-09 PROCEDURE — 665999 HH PPS REVENUE DEBIT

## 2018-03-09 PROCEDURE — 665998 HH PPS REVENUE CREDIT

## 2018-03-10 PROCEDURE — 665999 HH PPS REVENUE DEBIT

## 2018-03-10 PROCEDURE — 665998 HH PPS REVENUE CREDIT

## 2018-03-11 PROCEDURE — 665999 HH PPS REVENUE DEBIT

## 2018-03-11 PROCEDURE — 665998 HH PPS REVENUE CREDIT

## 2018-03-12 PROCEDURE — 665998 HH PPS REVENUE CREDIT

## 2018-03-12 PROCEDURE — 665999 HH PPS REVENUE DEBIT

## 2018-03-13 PROCEDURE — 665999 HH PPS REVENUE DEBIT

## 2018-03-13 PROCEDURE — 665998 HH PPS REVENUE CREDIT

## 2018-03-14 PROCEDURE — 665999 HH PPS REVENUE DEBIT

## 2018-03-14 PROCEDURE — 665998 HH PPS REVENUE CREDIT

## 2018-03-15 PROCEDURE — 665999 HH PPS REVENUE DEBIT

## 2018-03-15 PROCEDURE — 665998 HH PPS REVENUE CREDIT

## 2018-03-16 PROCEDURE — 665998 HH PPS REVENUE CREDIT

## 2018-03-16 PROCEDURE — 665999 HH PPS REVENUE DEBIT

## 2018-03-17 PROCEDURE — 665998 HH PPS REVENUE CREDIT

## 2018-03-17 PROCEDURE — 665999 HH PPS REVENUE DEBIT

## 2018-03-18 PROCEDURE — 665999 HH PPS REVENUE DEBIT

## 2018-03-18 PROCEDURE — 665998 HH PPS REVENUE CREDIT

## 2018-03-19 PROCEDURE — 665999 HH PPS REVENUE DEBIT

## 2018-03-19 PROCEDURE — 665998 HH PPS REVENUE CREDIT

## 2018-03-20 PROCEDURE — 665998 HH PPS REVENUE CREDIT

## 2018-03-20 PROCEDURE — 665999 HH PPS REVENUE DEBIT

## 2018-03-21 ENCOUNTER — HOME CARE VISIT (OUTPATIENT)
Dept: HOME HEALTH SERVICES | Facility: HOME HEALTHCARE | Age: 78
End: 2018-03-21
Payer: MEDICARE

## 2018-03-21 PROCEDURE — G0493 RN CARE EA 15 MIN HH/HOSPICE: HCPCS

## 2018-03-21 PROCEDURE — 665999 HH PPS REVENUE DEBIT

## 2018-03-21 PROCEDURE — 665998 HH PPS REVENUE CREDIT

## 2018-03-22 VITALS
HEART RATE: 56 BPM | BODY MASS INDEX: 31.86 KG/M2 | HEIGHT: 63 IN | TEMPERATURE: 97.8 F | WEIGHT: 179.8 LBS | OXYGEN SATURATION: 95 % | DIASTOLIC BLOOD PRESSURE: 80 MMHG | SYSTOLIC BLOOD PRESSURE: 125 MMHG | RESPIRATION RATE: 18 BRPM

## 2018-03-22 PROCEDURE — 665998 HH PPS REVENUE CREDIT

## 2018-03-22 PROCEDURE — 665999 HH PPS REVENUE DEBIT

## 2018-03-22 SDOH — ECONOMIC STABILITY: HOUSING INSECURITY: UNSAFE APPLIANCES: 0

## 2018-03-22 SDOH — ECONOMIC STABILITY: HOUSING INSECURITY: UNSAFE COOKING RANGE AREA: 0

## 2018-03-22 ASSESSMENT — PATIENT HEALTH QUESTIONNAIRE - PHQ9
2. FEELING DOWN, DEPRESSED, IRRITABLE, OR HOPELESS: 00
1. LITTLE INTEREST OR PLEASURE IN DOING THINGS: 00

## 2018-03-22 ASSESSMENT — ENCOUNTER SYMPTOMS
NAUSEA: DENIES
VOMITING: DENIES

## 2018-03-23 ENCOUNTER — HOME CARE VISIT (OUTPATIENT)
Dept: HOME HEALTH SERVICES | Facility: HOME HEALTHCARE | Age: 78
End: 2018-03-23
Payer: MEDICARE

## 2018-03-23 ENCOUNTER — TELEPHONE (OUTPATIENT)
Dept: VASCULAR LAB | Facility: MEDICAL CENTER | Age: 78
End: 2018-03-23

## 2018-03-23 PROCEDURE — 665998 HH PPS REVENUE CREDIT

## 2018-03-23 PROCEDURE — 665999 HH PPS REVENUE DEBIT

## 2018-03-23 ASSESSMENT — ACTIVITIES OF DAILY LIVING (ADL)
HOME_HEALTH_OASIS: 02
OASIS_M1830: 03

## 2018-03-24 PROCEDURE — 665999 HH PPS REVENUE DEBIT

## 2018-03-24 PROCEDURE — 665998 HH PPS REVENUE CREDIT

## 2018-03-25 PROCEDURE — 665999 HH PPS REVENUE DEBIT

## 2018-03-25 PROCEDURE — 665998 HH PPS REVENUE CREDIT

## 2018-03-26 PROCEDURE — 665999 HH PPS REVENUE DEBIT

## 2018-03-26 PROCEDURE — 665998 HH PPS REVENUE CREDIT

## 2018-03-27 ENCOUNTER — HOME CARE VISIT (OUTPATIENT)
Dept: HOME HEALTH SERVICES | Facility: HOME HEALTHCARE | Age: 78
End: 2018-03-27
Payer: MEDICARE

## 2018-03-27 ENCOUNTER — HOSPITAL ENCOUNTER (OUTPATIENT)
Facility: MEDICAL CENTER | Age: 78
End: 2018-03-27
Attending: NURSE PRACTITIONER
Payer: MEDICARE

## 2018-03-27 LAB
APPEARANCE UR: ABNORMAL
BACTERIA #/AREA URNS HPF: NEGATIVE /HPF
BILIRUB UR QL STRIP.AUTO: NEGATIVE
COLOR UR: YELLOW
EPI CELLS #/AREA URNS HPF: ABNORMAL /HPF
GLUCOSE UR STRIP.AUTO-MCNC: NEGATIVE MG/DL
HYALINE CASTS #/AREA URNS LPF: ABNORMAL /LPF
KETONES UR STRIP.AUTO-MCNC: ABNORMAL MG/DL
LEUKOCYTE ESTERASE UR QL STRIP.AUTO: ABNORMAL
MICRO URNS: ABNORMAL
NITRITE UR QL STRIP.AUTO: NEGATIVE
PH UR STRIP.AUTO: 6 [PH]
PROT UR QL STRIP: NEGATIVE MG/DL
RBC # URNS HPF: ABNORMAL /HPF
RBC UR QL AUTO: NEGATIVE
SP GR UR STRIP.AUTO: 1.02
UROBILINOGEN UR STRIP.AUTO-MCNC: 0.2 MG/DL
WBC #/AREA URNS HPF: ABNORMAL /HPF
YEAST BUDDING URNS QL: PRESENT /HPF
YEAST HYPHAE #/AREA URNS HPF: PRESENT /HPF

## 2018-03-27 PROCEDURE — 87086 URINE CULTURE/COLONY COUNT: CPT

## 2018-03-27 PROCEDURE — G0493 RN CARE EA 15 MIN HH/HOSPICE: HCPCS

## 2018-03-27 PROCEDURE — 81001 URINALYSIS AUTO W/SCOPE: CPT

## 2018-03-27 PROCEDURE — 665998 HH PPS REVENUE CREDIT

## 2018-03-27 PROCEDURE — G0152 HHCP-SERV OF OT,EA 15 MIN: HCPCS

## 2018-03-27 PROCEDURE — 87077 CULTURE AEROBIC IDENTIFY: CPT

## 2018-03-27 PROCEDURE — 665999 HH PPS REVENUE DEBIT

## 2018-03-27 PROCEDURE — 87186 SC STD MICRODIL/AGAR DIL: CPT

## 2018-03-27 PROCEDURE — 665997 HH PPS REVENUE ADJ

## 2018-03-28 VITALS
OXYGEN SATURATION: 91 % | TEMPERATURE: 98.7 F | DIASTOLIC BLOOD PRESSURE: 80 MMHG | SYSTOLIC BLOOD PRESSURE: 140 MMHG | RESPIRATION RATE: 20 BRPM | HEART RATE: 56 BPM

## 2018-03-28 PROCEDURE — 665998 HH PPS REVENUE CREDIT

## 2018-03-28 PROCEDURE — 665999 HH PPS REVENUE DEBIT

## 2018-03-28 ASSESSMENT — ACTIVITIES OF DAILY LIVING (ADL)
TOILETING_EQUIPMENT_USED: 3:1 COMMODE
TOILETING_ASSISTANCE: 1
BATHING_ASSISTIVE_EQUIPMENT_USED: SHOWER CHAIR, GRAB BARS, HANDHELD SHOWER
DRESSING_UB_ASSISTANCE: 1
ORAL_CARE_ASSISTANCE: 1
IADLS_COMMENTS: <!--EPICS-->TOTAL DEP WITH ALL IADLS<!--EPICE-->
BATHING_ASSISTANCE: 4
GROOMING_ASSISTANCE: 1
EATING_ASSISTANCE: 1
DRESSING_LB_ASSISTANCE: 4

## 2018-03-29 VITALS
BODY MASS INDEX: 30.65 KG/M2 | RESPIRATION RATE: 20 BRPM | OXYGEN SATURATION: 98 % | HEART RATE: 58 BPM | SYSTOLIC BLOOD PRESSURE: 130 MMHG | DIASTOLIC BLOOD PRESSURE: 80 MMHG | WEIGHT: 173 LBS | TEMPERATURE: 97.8 F | HEIGHT: 63 IN

## 2018-03-29 LAB
BACTERIA UR CULT: ABNORMAL
BACTERIA UR CULT: ABNORMAL
SIGNIFICANT IND 70042: ABNORMAL
SITE SITE: ABNORMAL
SOURCE SOURCE: ABNORMAL

## 2018-03-29 PROCEDURE — 665999 HH PPS REVENUE DEBIT

## 2018-03-29 PROCEDURE — 665998 HH PPS REVENUE CREDIT

## 2018-03-29 SDOH — ECONOMIC STABILITY: HOUSING INSECURITY: UNSAFE COOKING RANGE AREA: 0

## 2018-03-29 SDOH — ECONOMIC STABILITY: HOUSING INSECURITY: UNSAFE APPLIANCES: 0

## 2018-03-29 ASSESSMENT — ENCOUNTER SYMPTOMS
NAUSEA: DENIES
VOMITING: DENIES
DEPRESSED MOOD: 1

## 2018-03-29 ASSESSMENT — ACTIVITIES OF DAILY LIVING (ADL): OASIS_M1830: 03

## 2018-03-30 PROCEDURE — 665999 HH PPS REVENUE DEBIT

## 2018-03-30 PROCEDURE — 665998 HH PPS REVENUE CREDIT

## 2018-03-31 PROCEDURE — 665999 HH PPS REVENUE DEBIT

## 2018-03-31 PROCEDURE — 665998 HH PPS REVENUE CREDIT

## 2018-04-01 PROCEDURE — 665999 HH PPS REVENUE DEBIT

## 2018-04-01 PROCEDURE — 665998 HH PPS REVENUE CREDIT

## 2018-04-02 ENCOUNTER — HOME CARE VISIT (OUTPATIENT)
Dept: HOME HEALTH SERVICES | Facility: HOME HEALTHCARE | Age: 78
End: 2018-04-02
Payer: MEDICARE

## 2018-04-02 VITALS — OXYGEN SATURATION: 92 % | TEMPERATURE: 97.7 F | HEART RATE: 56 BPM | RESPIRATION RATE: 18 BRPM

## 2018-04-02 PROCEDURE — 665003 FOLLOW UP-HOME HEALTH

## 2018-04-02 PROCEDURE — 665998 HH PPS REVENUE CREDIT

## 2018-04-02 PROCEDURE — G0151 HHCP-SERV OF PT,EA 15 MIN: HCPCS

## 2018-04-02 PROCEDURE — 665999 HH PPS REVENUE DEBIT

## 2018-04-02 ASSESSMENT — ACTIVITIES OF DAILY LIVING (ADL)
IADLS_COMMENTS: <!--EPICS-->SEE OT EVALUATION<!--EPICE-->
ADLS_COMMENTS: <!--EPICS-->SEE OT EVALUATION<!--EPICE-->

## 2018-04-03 PROCEDURE — 665999 HH PPS REVENUE DEBIT

## 2018-04-03 PROCEDURE — 665998 HH PPS REVENUE CREDIT

## 2018-04-04 ENCOUNTER — HOME CARE VISIT (OUTPATIENT)
Dept: HOME HEALTH SERVICES | Facility: HOME HEALTHCARE | Age: 78
End: 2018-04-04
Payer: MEDICARE

## 2018-04-04 VITALS
DIASTOLIC BLOOD PRESSURE: 70 MMHG | RESPIRATION RATE: 18 BRPM | HEART RATE: 55 BPM | TEMPERATURE: 97.5 F | OXYGEN SATURATION: 95 % | SYSTOLIC BLOOD PRESSURE: 135 MMHG

## 2018-04-04 PROCEDURE — G0299 HHS/HOSPICE OF RN EA 15 MIN: HCPCS

## 2018-04-04 PROCEDURE — 665999 HH PPS REVENUE DEBIT

## 2018-04-04 PROCEDURE — 665998 HH PPS REVENUE CREDIT

## 2018-04-04 SDOH — ECONOMIC STABILITY: HOUSING INSECURITY: UNSAFE APPLIANCES: 0

## 2018-04-04 SDOH — ECONOMIC STABILITY: HOUSING INSECURITY: UNSAFE COOKING RANGE AREA: 0

## 2018-04-05 ENCOUNTER — HOME CARE VISIT (OUTPATIENT)
Dept: HOME HEALTH SERVICES | Facility: HOME HEALTHCARE | Age: 78
End: 2018-04-05
Payer: MEDICARE

## 2018-04-05 ENCOUNTER — HOSPITAL ENCOUNTER (OUTPATIENT)
Facility: MEDICAL CENTER | Age: 78
End: 2018-04-05
Attending: FAMILY MEDICINE
Payer: MEDICARE

## 2018-04-05 LAB
ALBUMIN SERPL BCP-MCNC: 3.6 G/DL (ref 3.2–4.9)
ALBUMIN/GLOB SERPL: 1.3 G/DL
ALP SERPL-CCNC: 68 U/L (ref 30–99)
ALT SERPL-CCNC: 16 U/L (ref 2–50)
ANION GAP SERPL CALC-SCNC: 7 MMOL/L (ref 0–11.9)
AST SERPL-CCNC: 16 U/L (ref 12–45)
BILIRUB SERPL-MCNC: 0.5 MG/DL (ref 0.1–1.5)
BUN SERPL-MCNC: 12 MG/DL (ref 8–22)
CALCIUM SERPL-MCNC: 9.5 MG/DL (ref 8.5–10.5)
CHLORIDE SERPL-SCNC: 106 MMOL/L (ref 96–112)
CHOLEST SERPL-MCNC: 113 MG/DL (ref 100–199)
CO2 SERPL-SCNC: 29 MMOL/L (ref 20–33)
CREAT SERPL-MCNC: 0.81 MG/DL (ref 0.5–1.4)
GLOBULIN SER CALC-MCNC: 2.8 G/DL (ref 1.9–3.5)
GLUCOSE SERPL-MCNC: 93 MG/DL (ref 65–99)
HDLC SERPL-MCNC: 40 MG/DL
LDLC SERPL CALC-MCNC: 56 MG/DL
POTASSIUM SERPL-SCNC: 3.4 MMOL/L (ref 3.6–5.5)
PROT SERPL-MCNC: 6.4 G/DL (ref 6–8.2)
SODIUM SERPL-SCNC: 142 MMOL/L (ref 135–145)
TRIGL SERPL-MCNC: 87 MG/DL (ref 0–149)
TSH SERPL DL<=0.005 MIU/L-ACNC: 3.53 UIU/ML (ref 0.38–5.33)

## 2018-04-05 PROCEDURE — 80061 LIPID PANEL: CPT

## 2018-04-05 PROCEDURE — G0299 HHS/HOSPICE OF RN EA 15 MIN: HCPCS

## 2018-04-05 PROCEDURE — A6212 FOAM DRG <=16 SQ IN W/BORDER: HCPCS

## 2018-04-05 PROCEDURE — 84443 ASSAY THYROID STIM HORMONE: CPT

## 2018-04-05 PROCEDURE — A4216 STERILE WATER/SALINE, 10 ML: HCPCS

## 2018-04-05 PROCEDURE — 80053 COMPREHEN METABOLIC PANEL: CPT

## 2018-04-05 PROCEDURE — 665999 HH PPS REVENUE DEBIT

## 2018-04-05 PROCEDURE — 665998 HH PPS REVENUE CREDIT

## 2018-04-05 PROCEDURE — A6214 FOAM DRG > 48 SQ IN W/BORDER: HCPCS

## 2018-04-06 VITALS
OXYGEN SATURATION: 97 % | TEMPERATURE: 98 F | HEART RATE: 54 BPM | SYSTOLIC BLOOD PRESSURE: 140 MMHG | RESPIRATION RATE: 18 BRPM | DIASTOLIC BLOOD PRESSURE: 80 MMHG

## 2018-04-06 PROCEDURE — 665999 HH PPS REVENUE DEBIT

## 2018-04-06 PROCEDURE — 665998 HH PPS REVENUE CREDIT

## 2018-04-06 SDOH — ECONOMIC STABILITY: HOUSING INSECURITY: UNSAFE APPLIANCES: 0

## 2018-04-06 SDOH — ECONOMIC STABILITY: HOUSING INSECURITY: UNSAFE COOKING RANGE AREA: 0

## 2018-04-06 ASSESSMENT — ENCOUNTER SYMPTOMS: POOR JUDGMENT: 1

## 2018-04-07 PROCEDURE — 665999 HH PPS REVENUE DEBIT

## 2018-04-07 PROCEDURE — 665998 HH PPS REVENUE CREDIT

## 2018-04-08 PROCEDURE — 665999 HH PPS REVENUE DEBIT

## 2018-04-08 PROCEDURE — 665998 HH PPS REVENUE CREDIT

## 2018-04-09 ENCOUNTER — HOME CARE VISIT (OUTPATIENT)
Dept: HOME HEALTH SERVICES | Facility: HOME HEALTHCARE | Age: 78
End: 2018-04-09
Payer: MEDICARE

## 2018-04-09 PROCEDURE — 665998 HH PPS REVENUE CREDIT

## 2018-04-09 PROCEDURE — G0300 HHS/HOSPICE OF LPN EA 15 MIN: HCPCS

## 2018-04-09 PROCEDURE — 665999 HH PPS REVENUE DEBIT

## 2018-04-09 SDOH — ECONOMIC STABILITY: HOUSING INSECURITY: UNSAFE APPLIANCES: 0

## 2018-04-09 SDOH — ECONOMIC STABILITY: HOUSING INSECURITY: UNSAFE COOKING RANGE AREA: 0

## 2018-04-09 ASSESSMENT — ENCOUNTER SYMPTOMS
RESPIRATORY SYMPTOMS COMMENTS: PT LUNGS CLEAR IN ALL FIELDS, NO ADVANTAGEOUS SOUND NOTED.
NAUSEA: DENIES
VOMITING: DENIES

## 2018-04-10 VITALS
HEART RATE: 60 BPM | DIASTOLIC BLOOD PRESSURE: 80 MMHG | OXYGEN SATURATION: 92 % | RESPIRATION RATE: 16 BRPM | SYSTOLIC BLOOD PRESSURE: 138 MMHG | TEMPERATURE: 97.4 F

## 2018-04-10 PROCEDURE — 665999 HH PPS REVENUE DEBIT

## 2018-04-10 PROCEDURE — 665998 HH PPS REVENUE CREDIT

## 2018-04-10 ASSESSMENT — ENCOUNTER SYMPTOMS
RESPIRATORY SYMPTOMS COMMENTS: NO APPARENT RESPIRATORY DISTRESS
VOMITING: DENIES
NAUSEA: DENIES

## 2018-04-11 PROCEDURE — 665998 HH PPS REVENUE CREDIT

## 2018-04-11 PROCEDURE — 665999 HH PPS REVENUE DEBIT

## 2018-04-12 PROCEDURE — 665998 HH PPS REVENUE CREDIT

## 2018-04-12 PROCEDURE — 665999 HH PPS REVENUE DEBIT

## 2018-04-13 PROCEDURE — 665999 HH PPS REVENUE DEBIT

## 2018-04-13 PROCEDURE — 665998 HH PPS REVENUE CREDIT

## 2018-04-14 PROCEDURE — 665999 HH PPS REVENUE DEBIT

## 2018-04-14 PROCEDURE — 665998 HH PPS REVENUE CREDIT

## 2018-04-15 PROCEDURE — 665998 HH PPS REVENUE CREDIT

## 2018-04-15 PROCEDURE — 665999 HH PPS REVENUE DEBIT

## 2018-04-16 ENCOUNTER — HOME CARE VISIT (OUTPATIENT)
Dept: HOME HEALTH SERVICES | Facility: HOME HEALTHCARE | Age: 78
End: 2018-04-16
Payer: MEDICARE

## 2018-04-16 PROCEDURE — 665999 HH PPS REVENUE DEBIT

## 2018-04-16 PROCEDURE — 665998 HH PPS REVENUE CREDIT

## 2018-04-16 PROCEDURE — G0300 HHS/HOSPICE OF LPN EA 15 MIN: HCPCS

## 2018-04-17 VITALS
RESPIRATION RATE: 16 BRPM | SYSTOLIC BLOOD PRESSURE: 120 MMHG | TEMPERATURE: 98.5 F | HEART RATE: 68 BPM | DIASTOLIC BLOOD PRESSURE: 68 MMHG | OXYGEN SATURATION: 94 %

## 2018-04-17 PROCEDURE — 665999 HH PPS REVENUE DEBIT

## 2018-04-17 PROCEDURE — 665998 HH PPS REVENUE CREDIT

## 2018-04-17 SDOH — ECONOMIC STABILITY: HOUSING INSECURITY: UNSAFE APPLIANCES: 0

## 2018-04-17 SDOH — ECONOMIC STABILITY: HOUSING INSECURITY: UNSAFE COOKING RANGE AREA: 0

## 2018-04-17 ASSESSMENT — ENCOUNTER SYMPTOMS: RESPIRATORY SYMPTOMS COMMENTS: PT LUNGS CLEAR IN ALL FIELDS, NO ADVANTAGEOUS SOUND NOTED.

## 2018-04-18 PROCEDURE — 665998 HH PPS REVENUE CREDIT

## 2018-04-18 PROCEDURE — 665999 HH PPS REVENUE DEBIT

## 2018-04-19 PROCEDURE — 665998 HH PPS REVENUE CREDIT

## 2018-04-19 PROCEDURE — 665999 HH PPS REVENUE DEBIT

## 2018-04-20 PROCEDURE — 665999 HH PPS REVENUE DEBIT

## 2018-04-20 PROCEDURE — 665998 HH PPS REVENUE CREDIT

## 2018-04-21 PROCEDURE — 665999 HH PPS REVENUE DEBIT

## 2018-04-21 PROCEDURE — 665998 HH PPS REVENUE CREDIT

## 2018-04-22 PROCEDURE — 665998 HH PPS REVENUE CREDIT

## 2018-04-22 PROCEDURE — 665999 HH PPS REVENUE DEBIT

## 2018-04-23 ENCOUNTER — HOME CARE VISIT (OUTPATIENT)
Dept: HOME HEALTH SERVICES | Facility: HOME HEALTHCARE | Age: 78
End: 2018-04-23
Payer: MEDICARE

## 2018-04-23 VITALS
DIASTOLIC BLOOD PRESSURE: 80 MMHG | SYSTOLIC BLOOD PRESSURE: 140 MMHG | HEART RATE: 55 BPM | TEMPERATURE: 97.4 F | RESPIRATION RATE: 18 BRPM | OXYGEN SATURATION: 95 %

## 2018-04-23 PROCEDURE — 665999 HH PPS REVENUE DEBIT

## 2018-04-23 PROCEDURE — 665998 HH PPS REVENUE CREDIT

## 2018-04-23 PROCEDURE — G0162 HHC RN E&M PLAN SVS, 15 MIN: HCPCS

## 2018-04-23 SDOH — ECONOMIC STABILITY: HOUSING INSECURITY: UNSAFE COOKING RANGE AREA: 0

## 2018-04-23 SDOH — ECONOMIC STABILITY: HOUSING INSECURITY: UNSAFE APPLIANCES: 0

## 2018-04-24 PROCEDURE — 665999 HH PPS REVENUE DEBIT

## 2018-04-24 PROCEDURE — 665998 HH PPS REVENUE CREDIT

## 2018-04-25 PROCEDURE — 665998 HH PPS REVENUE CREDIT

## 2018-04-25 PROCEDURE — 665999 HH PPS REVENUE DEBIT

## 2018-04-26 PROCEDURE — 665998 HH PPS REVENUE CREDIT

## 2018-04-26 PROCEDURE — 665999 HH PPS REVENUE DEBIT

## 2018-04-26 ASSESSMENT — ACTIVITIES OF DAILY LIVING (ADL)
OASIS_M1830: 02
HOME_HEALTH_OASIS: 01

## 2018-04-26 ASSESSMENT — ENCOUNTER SYMPTOMS
DIFFICULTY THINKING: 1
POOR JUDGMENT: 1

## 2018-04-27 PROCEDURE — 665998 HH PPS REVENUE CREDIT

## 2018-04-27 PROCEDURE — 665999 HH PPS REVENUE DEBIT

## 2018-04-28 PROCEDURE — 665998 HH PPS REVENUE CREDIT

## 2018-04-28 PROCEDURE — 665999 HH PPS REVENUE DEBIT

## 2018-04-29 PROCEDURE — 665998 HH PPS REVENUE CREDIT

## 2018-04-29 PROCEDURE — 665999 HH PPS REVENUE DEBIT

## 2018-04-30 PROCEDURE — 665999 HH PPS REVENUE DEBIT

## 2018-04-30 PROCEDURE — 665998 HH PPS REVENUE CREDIT

## 2018-07-03 ENCOUNTER — HOSPITAL ENCOUNTER (OUTPATIENT)
Dept: RADIOLOGY | Facility: MEDICAL CENTER | Age: 78
End: 2018-07-03
Attending: PHYSICIAN ASSISTANT
Payer: MEDICARE

## 2018-07-03 ENCOUNTER — OFFICE VISIT (OUTPATIENT)
Dept: URGENT CARE | Facility: PHYSICIAN GROUP | Age: 78
End: 2018-07-03
Payer: MEDICARE

## 2018-07-03 VITALS
HEIGHT: 63 IN | HEART RATE: 65 BPM | RESPIRATION RATE: 16 BRPM | BODY MASS INDEX: 32.07 KG/M2 | TEMPERATURE: 97.3 F | DIASTOLIC BLOOD PRESSURE: 72 MMHG | SYSTOLIC BLOOD PRESSURE: 130 MMHG | OXYGEN SATURATION: 94 % | WEIGHT: 181 LBS
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
## 2018-07-03 DIAGNOSIS — S39.92XA INJURY OF COCCYX, INITIAL ENCOUNTER: ICD-10-CM

## 2018-07-03 PROCEDURE — 99214 OFFICE O/P EST MOD 30 MIN: CPT | Performed by: PHYSICIAN ASSISTANT

## 2018-07-03 PROCEDURE — 72220 X-RAY EXAM SACRUM TAILBONE: CPT

## 2018-07-03 RX ORDER — ACETAMINOPHEN 325 MG/1
650 TABLET ORAL EVERY 4 HOURS PRN
COMMUNITY
End: 2021-07-22 | Stop reason: DRUGHIGH

## 2018-07-03 ASSESSMENT — ENCOUNTER SYMPTOMS
FALLS: 1
VOMITING: 0
FEVER: 0
VISUAL CHANGE: 0
LOSS OF CONSCIOUSNESS: 0
WHEEZING: 0
HEADACHES: 0
ABDOMINAL PAIN: 0
BACK PAIN: 1
NAUSEA: 0
SHORTNESS OF BREATH: 0
TINGLING: 0
NECK PAIN: 0
PHOTOPHOBIA: 0
EYE REDNESS: 0
DIZZINESS: 0
BOWEL INCONTINENCE: 0
EYE DISCHARGE: 0

## 2018-07-03 ASSESSMENT — PAIN SCALES - GENERAL: PAINLEVEL: 4=SLIGHT-MODERATE PAIN

## 2018-07-04 NOTE — PROGRESS NOTES
"Subjective:      Maria Esther Valencia is a 77 y.o. female who presents with Fall (backside [id number] )            Patient is a 77-year-old female who presents with tailbone pain after a fall last night.  Patient is today here with her caregiver who also provides history.  Patient lives in a group home at this time of which he has dementia and is slightly confused about the fall.  Patient reports that she fell in the kitchen landing on her buttock and lower back however her caregiver reports that she slid off of her bed last night.  They both deny any evidence of loss of consciousness, recent vomiting or nausea.  Patient currently denies any headache or change to her vision.  She only reports pain to her tailbone at this time.  Patient did have relief with Tylenol today.  According to patient's caregiver patient is acting baseline for her today.      Fall   The accident occurred 12 to 24 hours ago. She fell from a height of 3 to 5 ft. She landed on carpet. There was no blood loss. Point of impact: Tailbone. Pain location: Tailbone. The pain is at a severity of 3/10. The pain is mild. The symptoms are aggravated by pressure on injury. Pertinent negatives include no abdominal pain, bowel incontinence, fever, headaches, loss of consciousness, nausea, tingling, visual change or vomiting. She has tried acetaminophen for the symptoms. The treatment provided moderate relief.       Review of Systems   Constitutional: Negative for fever.   Eyes: Negative for photophobia, discharge and redness.   Respiratory: Negative for shortness of breath and wheezing.    Gastrointestinal: Negative for abdominal pain, bowel incontinence, nausea and vomiting.   Musculoskeletal: Positive for back pain and falls. Negative for joint pain and neck pain.   Neurological: Negative for dizziness, tingling, loss of consciousness and headaches.          Objective:     /72   Pulse 65   Temp 36.3 °C (97.3 °F)   Resp 16   Ht 1.6 m (5' 3\")   Wt 82.1 kg " (181 lb)   SpO2 94%   BMI 32.06 kg/m²    PMH:  has a past medical history of CAD (coronary artery disease); Compression fracture of spine (HCC); Congestive heart failure (HCC); Dementia; GI bleed; Hypertension; Psychiatric disorder; and Stroke (HCC).  MEDS:   Current Outpatient Prescriptions:   •  acetaminophen (TYLENOL) 325 MG Tab, Take 650 mg by mouth every four hours as needed., Disp: , Rfl:   •  amLODIPine (NORVASC) 10 MG Tab, Take 1 Tab by mouth every day., Disp: 30 Tab, Rfl: 2  •  gabapentin (NEURONTIN) 300 MG Cap, Take 1 Cap by mouth every evening., Disp: 90 Cap, Rfl: 0  •  duloxetine (CYMBALTA) 20 MG Cap DR Particles, Take 1 Cap by mouth every day., Disp: 90 Cap, Rfl: 0  •  atorvastatin (LIPITOR) 80 MG tablet, Take 1 Tab by mouth every bedtime., Disp: 90 Tab, Rfl: 0  •  benazepril (LOTENSIN) 40 MG tablet, Take 1 Tab by mouth every day., Disp: 90 Tab, Rfl: 0  •  metoprolol (LOPRESSOR) 25 MG Tab, Take 0.5 Tabs by mouth 2 Times a Day., Disp: 180 Tab, Rfl: 0  •  ferrous sulfate 325 (65 Fe) MG tablet, Take 1 Tab by mouth 3 times a day, with meals., Disp: 90 Tab, Rfl: 0  •  senna-docusate (PERICOLACE OR SENOKOT S) 8.6-50 MG Tab, Take 2 Tabs by mouth 2 Times a Day., Disp: 60 Tab, Rfl: 0  •  alendronate (FOSAMAX) 10 MG Tab, Take 1 Tab by mouth every morning before breakfast., Disp: 90 Tab, Rfl: 0  •  oxybutynin SR (DITROPAN-XL) 5 MG TABLET SR 24 HR, Take 1 Tab by mouth every evening., Disp: 90 Tab, Rfl: 0  •  vitamin D 2000 UNIT Tab, Take 1 Tab by mouth every day., Disp: 90 Tab, Rfl: 0  •  ciprofloxacin (CIPRO) 500 MG Tab, Take 500 mg by mouth 2 times a day., Disp: , Rfl:   •  aspirin 81 MG EC tablet, Take 1 Tab by mouth every day., Disp: 90 Tab, Rfl: 0  •  trazodone (DESYREL) 50 MG Tab, Take 1 Tab by mouth at bedtime as needed., Disp: 90 Tab, Rfl: 0  •  omeprazole (PRILOSEC) 20 MG delayed-release capsule, Take 1 Cap by mouth 2 Times a Day., Disp: 90 Cap, Rfl: 0  ALLERGIES:   Allergies   Allergen Reactions   • Pcn  [Penicillins] Unspecified     Per historical. Pt cannot verify what reaction she had     SURGHX:   Past Surgical History:   Procedure Laterality Date   • OTHER CARDIAC SURGERY      stents     SOCHX:  reports that she quit smoking about 2 years ago. She has never used smokeless tobacco. She reports that she does not drink alcohol or use drugs.  FH: Family history was reviewed, no pertinent findings to report    Physical Exam   Constitutional: She is oriented to person, place, and time. She appears well-developed and well-nourished. No distress.   HENT:   Head: Normocephalic and atraumatic.   Right Ear: External ear normal.   Left Ear: External ear normal.   Mouth/Throat: Oropharynx is clear and moist. No oropharyngeal exudate.   Eyes: Conjunctivae and EOM are normal. Pupils are equal, round, and reactive to light.   Neck: Normal range of motion. Neck supple. No tracheal deviation present.   Cardiovascular: Normal rate and regular rhythm.    No murmur heard.  Pulmonary/Chest: Effort normal and breath sounds normal. No respiratory distress.   Musculoskeletal:        Lumbar back: She exhibits tenderness and bony tenderness. She exhibits no swelling.        Back:    Without any midline tenderness except over the sacrum and coccyx-without step-off or deformity.  Without surrounding ecchymosis or abrasion.  Patient is walking appropriately with walker today.   Neurological: She is alert and oriented to person, place, and time. Coordination normal.   Skin: Skin is warm. No rash noted.   Psychiatric: Cognition and memory are impaired. She exhibits abnormal recent memory and abnormal remote memory.   Vitals reviewed.           XR:  There are no fractures or dislocations.  No blastic or lytic lesions. The sacral neural arches are intact. There is degenerative change in the visualized lower lumbar spine.     Assessment/Plan:     1. Fall, initial encounter  - DX-SACRUM AND COCCYX 2+; Future    2. Injury of coccyx, initial  encounter  - DX-SACRUM AND COCCYX 2+; Future    Spoke with Marilou Abbott-noted that patient's power of  and medical decision making paperwork to be faxed over shortly to our facility.  This is the supervisor patient's group home.    After consent and appropriate paperwork for is obtained and completed the rest of my assessment of this patient and imaging was conducted today.  Without acute injury noted on xray- encouraged tylenol this evening, ice, and soft pillow.   Patient caregiver was instructed on further care in terms of Tylenol, ice and soft pillow use.  She was also warned on worrisome signs and symptoms that would require emergent follow-up.    The patient's caregiver demonstrated a good understanding and agreed with the treatment plan.  Please note that this dictation was created using voice recognition software. I have made every reasonable attempt to correct obvious errors, but I expect that there are errors of grammar and possibly content that I did not discover before finalizing the note.

## 2018-08-07 NOTE — PROGRESS NOTES
Patient admitted with acute diverticulitis. Simona Knight Fall Risk Assessment:     Last Known Fall: Within the last month  Mobility: Immobilized/requires assist of one person  Medications: Cardiovascular or central nervous system meds  Mental Status/LOC/Awareness: Oriented to person and place  Toileting Needs: Incontinence  Volume/Electrolyte Status: No problems  Communication/Sensory: Visual (Glasses)/hearing deficit  Behavior: Appropriate behavior  Simona Knight Fall Risk Total: 14  Fall Risk Level: MODERATE RISK    Universal Fall Precautions:  call light/belongings in reach, bed in low position and locked, wheelchairs and assistive devices out of sight, siderails up x 2, use non-slip footwear, adequate lighting, clutter free and spill free environment, educate on level of risk, educate to call for assistance    Fall Risk Level Interventions:    TRIAL (TELE 8, NEURO, MED JENNIE 5) Moderate Fall Risk Interventions  Place yellow fall risk ID band on patient: verified  Provide patient/family education based on risk assessment : completed  Educate patient/family to call staff for assistance when getting out of bed: completed  Place fall precaution signage outside patient door: completed  Utilize bed/chair fall alarm: completedTRIAL (TELE 8, NEURO, [employer] JENNIE 5) High Fall Risk Interventions  Place yellow fall risk ID band on patient: verified  Provide patient/family education based on risk assessment: completed  Educate patient/family to call staff for assistance when getting out of bed: completed  Place fall precaution signage outside patient door: verified  Place patient in room close to nursing station: currently not available/charge notified  Utilize bed/chair fall alarm: completed  Notify charge of high risk for huddle: verified    Patient Specific Interventions:     Medication: review medications with patient and family, assess for medications that can be discontinued or dosage decreased and limit combination of prn medications  Mental Status/LOC/Awareness:  reorient patient, reinforce falls education, check on patient hourly, utilize bed/chair fall alarm and reinforce the use of call light  Toileting: provide frquent toileting, monitor intake and output/use of appropriate interventions, instruct patient/family on the use of grab bars, instruct patient/family on the need to call for assistance when toileting and do not leave patient unattended in bathroom/refer to toileting scripting  Volume/Electrolyte Status: ensure patient remains hydrated, advance diet as tolerated, administer medications as ordered for nausea and vomiting, monitor abnormal lab values and ensure IV fluids are appropriate  Communication/Sensory: update plan of care on whiteboard, ensure proper positioning when transferrng/ambulating, ensure patient has glasses/contacts and hearing aids/dentures and for visually impaired patients orient to their room surrounding and do not change their surroundings  Behavioral: encourage patient to voice feelings, engage patient in daily activities and instruct/reinforce fall program rationale  Mobility: dangle prior to standing, utilize bed/chair fall alarm, ensure bed is locked and in lowest position, provide appropriate assistive device, instruct patient to exit bed on their strongest side and collaborate with doctor for possible PT/OT consult

## 2018-09-18 ENCOUNTER — HOSPITAL ENCOUNTER (OUTPATIENT)
Facility: MEDICAL CENTER | Age: 78
End: 2018-09-18
Attending: NURSE PRACTITIONER
Payer: MEDICARE

## 2018-09-18 LAB
APPEARANCE UR: CLEAR
BILIRUB UR QL STRIP.AUTO: NEGATIVE
COLOR UR: YELLOW
GLUCOSE UR STRIP.AUTO-MCNC: NEGATIVE MG/DL
KETONES UR STRIP.AUTO-MCNC: NEGATIVE MG/DL
LEUKOCYTE ESTERASE UR QL STRIP.AUTO: NEGATIVE
MICRO URNS: NORMAL
NITRITE UR QL STRIP.AUTO: NEGATIVE
PH UR STRIP.AUTO: 6.5 [PH]
PROT UR QL STRIP: NEGATIVE MG/DL
RBC UR QL AUTO: NEGATIVE
SP GR UR STRIP.AUTO: 1.02
UROBILINOGEN UR STRIP.AUTO-MCNC: 0.2 MG/DL

## 2018-09-18 PROCEDURE — 81003 URINALYSIS AUTO W/O SCOPE: CPT

## 2018-11-26 NOTE — PROGRESS NOTES
Simona Knight Fall Risk Assessment:     Last Known Fall: Within the last six months  Mobility: Immobilized/requires assist of one person  Medications: Cardiovascular and central nervous system meds  Mental Status/LOC/Awareness: Oriented to person and place  Toileting Needs: Incontinence  Volume/Electrolyte Status: No problems  Communication/Sensory: Visual (Glasses)/hearing deficit  Behavior: Appropriate behavior  Simona Knight Fall Risk Total: 14  Fall Risk Level: MODERATE RISK    Universal Fall Precautions:  call light/belongings in reach, bed in low position and locked, wheelchairs and assistive devices out of sight, siderails up x 2, use non-slip footwear, adequate lighting, clutter free and spill free environment, educate on level of risk, educate to call for assistance    Fall Risk Level Interventions:    TRIAL ([employer] 8, NEURO, MED JENNIE 5) Moderate Fall Risk Interventions  Place yellow fall risk ID band on patient: verified  Provide patient/family education based on risk assessment : completed  Educate patient/family to call staff for assistance when getting out of bed: completed  Place fall precaution signage outside patient door: verified  Utilize bed/chair fall alarm: verifiedTRIAL (TELE 8, NEURO, [employer] JENNIE 5) High Fall Risk Interventions  Place yellow fall risk ID band on patient: verified  Provide patient/family education based on risk assessment: completed  Educate patient/family to call staff for assistance when getting out of bed: completed  Place fall precaution signage outside patient door: completed  Place patient in room close to nursing station: currently not available/charge notified  Utilize bed/chair fall alarm: verified  Notify charge of high risk for huddle: completed    Patient Specific Interventions:     Medication: review medications with patient and family, assess for medications that can be discontinued or dosage decreased and limit combination of prn medications  Mental  Status/LOC/Awareness: reinforce falls education, check on patient hourly, utilize bed/chair fall alarm, reinforce the use of call light and provide activity  Toileting: provide frquent toileting, monitor intake and output/use of appropriate interventions, instruct patient/family on the use of grab bars, instruct patient/family on the need to call for assistance when toileting and do not leave patient unattended in bathroom/refer to toileting scripting  Volume/Electrolyte Status: ensure patient remains hydrated and monitor abnormal lab values  Communication/Sensory: update plan of care on whiteboard, ensure proper positioning when transferrng/ambulating, ensure patient has glasses/contacts and hearing aids/dentures and for visually impaired patients orient to their room surrounding and do not change their surroundings  Behavioral: encourage patient to voice feelings, administer medication as ordered and instruct/reinforce fall program rationale  Mobility: schedule physical activity throughout the day, utilize bed/chair fall alarm, ensure bed is locked and in lowest position, provide appropriate assistive device, instruct patient to exit bed on their strongest side and collaborate with doctor for possible PT/OT consult   Statement Selected

## 2019-06-12 ENCOUNTER — HOSPITAL ENCOUNTER (OUTPATIENT)
Facility: MEDICAL CENTER | Age: 79
End: 2019-06-12
Attending: NURSE PRACTITIONER
Payer: MEDICARE

## 2019-06-12 LAB
APPEARANCE UR: CLEAR
BILIRUB UR QL STRIP.AUTO: NEGATIVE
COLOR UR: YELLOW
GLUCOSE UR STRIP.AUTO-MCNC: NEGATIVE MG/DL
KETONES UR STRIP.AUTO-MCNC: NEGATIVE MG/DL
LEUKOCYTE ESTERASE UR QL STRIP.AUTO: NEGATIVE
MICRO URNS: NORMAL
NITRITE UR QL STRIP.AUTO: NEGATIVE
PH UR STRIP.AUTO: 5.5 [PH]
PROT UR QL STRIP: NEGATIVE MG/DL
RBC UR QL AUTO: NEGATIVE
SP GR UR STRIP.AUTO: 1.02
UROBILINOGEN UR STRIP.AUTO-MCNC: 0.2 MG/DL

## 2019-06-12 PROCEDURE — 81003 URINALYSIS AUTO W/O SCOPE: CPT

## 2019-06-21 NOTE — CARE PLAN
Problem: Discharge Barriers/Planning  Goal: Patient's continuum of care needs will be met    Intervention: Assess potential discharge barriers on admission and throughout hospital stay  SW and CM on board.       Problem: Skin Integrity  Goal: Risk for impaired skin integrity will decrease  Outcome: PROGRESSING AS EXPECTED  Pt on a q2 hr turning regimen. Floating all heels. Barrier cream.        EENMT

## 2019-07-10 NOTE — PROGRESS NOTES
Received report from day RN. Assumed pt care. Discussed call light/phone system, communication board and POC. Pt is aao x to 2, disoriented to time and event. Pt is cooperative and able to follow commands. Pt denies pain. Pt is pending placement. Pt is incontinent, RN and CNA perform bed bath and full bed change. Pt mobility assessed. Pt is a one assist in bed, pt refusing mobility today. Bed alarm on. Fall precautions in place. Bed is locked and in low position, call light within reach. Treaded slippers in place. Pt needs met at this time. Hourly rounding in place.   Area H Indication Text: Tumors in this location are included in Area H (eyelids, eyebrows, nose, lips, chin, ear, pre-auricular, post-auricular, temple, genitalia, hands, feet, ankles and areola).  Tissue conservation is critical in these anatomic locations.

## 2019-08-12 NOTE — PROGRESS NOTES
Simona Knight Fall Risk Assessment:     Last Known Fall: Within the last month  Mobility: Immobilized/requires assist of one person  Medications: Cardiovascular or central nervous system meds  Mental Status/LOC/Awareness: Memory loss/confusion and requires reorienting  Toileting Needs: Incontinence  Volume/Electrolyte Status: No problems  Communication/Sensory: Visual (Glasses)/hearing deficit  Behavior: Appropriate behavior  Simona Knight Fall Risk Total: 17  Fall Risk Level: HIGH RISK     Universal Fall Precautions:  call light/belongings in reach, bed in low position and locked, wheelchairs and assistive devices out of sight, siderails up x 2, use non-slip footwear, adequate lighting, clutter free and spill free environment, educate on level of risk, educate to call for assistance     Fall Risk Level Interventions:    TRIAL ([employer] 8, NEURO, MED JENNIE 5) Moderate Fall Risk Interventions  Place yellow fall risk ID band on patient: verified  Provide patient/family education based on risk assessment : completed  Educate patient/family to call staff for assistance when getting out of bed: completed  Place fall precaution signage outside patient door: verified  Utilize bed/chair fall alarm: verifiedTRIAL ([employer] 8, NEURO, [place] JENNIE 5) High Fall Risk Interventions  Place yellow fall risk ID band on patient: verified  Provide patient/family education based on risk assessment: completed  Educate patient/family to call staff for assistance when getting out of bed: completed  Place fall precaution signage outside patient door: verified  Place patient in room close to nursing station: currently not available/charge notified  Utilize bed/chair fall alarm: verified  Notify charge of high risk for huddle: completed     Patient Specific Interventions:      Medication: review medications with patient and family  Mental Status/LOC/Awareness: reorient patient, reinforce falls education and check on patient hourly  Toileting: do not  [phone] leave patient unattended in bathroom/refer to toileting scripting  Volume/Electrolyte Status: ensure patient remains hydrated and monitor abnormal lab values  Communication/Sensory: update plan of care on whiteboard, ensure proper positioning when transferrng/ambulating and ensure patient has glasses/contacts and hearing aids/dentures  Behavioral: encourage patient to voice feelings and administer medication as ordered  Mobility: utilize bed/chair fall alarm, ensure bed is locked and in lowest position and provide appropriate assistive device

## 2020-12-30 ENCOUNTER — HOSPITAL ENCOUNTER (OUTPATIENT)
Dept: LAB | Facility: MEDICAL CENTER | Age: 80
End: 2020-12-30
Attending: NURSE PRACTITIONER
Payer: MEDICARE

## 2020-12-30 LAB
25(OH)D3 SERPL-MCNC: 58 NG/ML (ref 30–100)
ALBUMIN SERPL BCP-MCNC: 4.2 G/DL (ref 3.2–4.9)
ALBUMIN/GLOB SERPL: 1.4 G/DL
ALP SERPL-CCNC: 122 U/L (ref 30–99)
ALT SERPL-CCNC: 26 U/L (ref 2–50)
ANION GAP SERPL CALC-SCNC: 14 MMOL/L (ref 7–16)
AST SERPL-CCNC: 18 U/L (ref 12–45)
BASOPHILS # BLD AUTO: 0.4 % (ref 0–1.8)
BASOPHILS # BLD: 0.03 K/UL (ref 0–0.12)
BILIRUB SERPL-MCNC: 0.5 MG/DL (ref 0.1–1.5)
BUN SERPL-MCNC: 14 MG/DL (ref 8–22)
CALCIUM SERPL-MCNC: 9.6 MG/DL (ref 8.5–10.5)
CHLORIDE SERPL-SCNC: 103 MMOL/L (ref 96–112)
CHOLEST SERPL-MCNC: 134 MG/DL (ref 100–199)
CO2 SERPL-SCNC: 26 MMOL/L (ref 20–33)
CREAT SERPL-MCNC: 0.97 MG/DL (ref 0.5–1.4)
EOSINOPHIL # BLD AUTO: 0.16 K/UL (ref 0–0.51)
EOSINOPHIL NFR BLD: 2.2 % (ref 0–6.9)
ERYTHROCYTE [DISTWIDTH] IN BLOOD BY AUTOMATED COUNT: 49.2 FL (ref 35.9–50)
EST. AVERAGE GLUCOSE BLD GHB EST-MCNC: 137 MG/DL
FASTING STATUS PATIENT QL REPORTED: NORMAL
FOLATE SERPL-MCNC: 9.6 NG/ML
GLOBULIN SER CALC-MCNC: 2.9 G/DL (ref 1.9–3.5)
GLUCOSE SERPL-MCNC: 139 MG/DL (ref 65–99)
HBA1C MFR BLD: 6.4 % (ref 0–5.6)
HCT VFR BLD AUTO: 49.9 % (ref 37–47)
HDLC SERPL-MCNC: 62 MG/DL
HGB BLD-MCNC: 15.6 G/DL (ref 12–16)
IMM GRANULOCYTES # BLD AUTO: 0.03 K/UL (ref 0–0.11)
IMM GRANULOCYTES NFR BLD AUTO: 0.4 % (ref 0–0.9)
LDLC SERPL CALC-MCNC: 55 MG/DL
LYMPHOCYTES # BLD AUTO: 1.15 K/UL (ref 1–4.8)
LYMPHOCYTES NFR BLD: 15.5 % (ref 22–41)
MCH RBC QN AUTO: 26.6 PG (ref 27–33)
MCHC RBC AUTO-ENTMCNC: 31.3 G/DL (ref 33.6–35)
MCV RBC AUTO: 85.2 FL (ref 81.4–97.8)
MONOCYTES # BLD AUTO: 0.52 K/UL (ref 0–0.85)
MONOCYTES NFR BLD AUTO: 7 % (ref 0–13.4)
NEUTROPHILS # BLD AUTO: 5.52 K/UL (ref 2–7.15)
NEUTROPHILS NFR BLD: 74.5 % (ref 44–72)
NRBC # BLD AUTO: 0 K/UL
NRBC BLD-RTO: 0 /100 WBC
NT-PROBNP SERPL IA-MCNC: 80 PG/ML (ref 0–125)
PLATELET # BLD AUTO: 242 K/UL (ref 164–446)
PMV BLD AUTO: 12.2 FL (ref 9–12.9)
POTASSIUM SERPL-SCNC: 4.1 MMOL/L (ref 3.6–5.5)
PROT SERPL-MCNC: 7.1 G/DL (ref 6–8.2)
RBC # BLD AUTO: 5.86 M/UL (ref 4.2–5.4)
SODIUM SERPL-SCNC: 143 MMOL/L (ref 135–145)
T4 FREE SERPL-MCNC: 0.9 NG/DL (ref 0.93–1.7)
TRIGL SERPL-MCNC: 87 MG/DL (ref 0–149)
TSH SERPL DL<=0.005 MIU/L-ACNC: 5.44 UIU/ML (ref 0.38–5.33)
VIT B12 SERPL-MCNC: 425 PG/ML (ref 211–911)
WBC # BLD AUTO: 7.4 K/UL (ref 4.8–10.8)

## 2020-12-30 PROCEDURE — 82607 VITAMIN B-12: CPT

## 2020-12-30 PROCEDURE — 83036 HEMOGLOBIN GLYCOSYLATED A1C: CPT | Mod: GA

## 2020-12-30 PROCEDURE — 82306 VITAMIN D 25 HYDROXY: CPT

## 2020-12-30 PROCEDURE — 80053 COMPREHEN METABOLIC PANEL: CPT

## 2020-12-30 PROCEDURE — 84439 ASSAY OF FREE THYROXINE: CPT

## 2020-12-30 PROCEDURE — 36415 COLL VENOUS BLD VENIPUNCTURE: CPT

## 2020-12-30 PROCEDURE — 83880 ASSAY OF NATRIURETIC PEPTIDE: CPT

## 2020-12-30 PROCEDURE — 85025 COMPLETE CBC W/AUTO DIFF WBC: CPT

## 2020-12-30 PROCEDURE — 80061 LIPID PANEL: CPT

## 2020-12-30 PROCEDURE — 84443 ASSAY THYROID STIM HORMONE: CPT

## 2020-12-30 PROCEDURE — 82746 ASSAY OF FOLIC ACID SERUM: CPT

## 2021-01-06 NOTE — CARE PLAN
Problem: Mobility  Goal: Risk for activity intolerance will decrease  Pt encouraged to ambulate, increase strength, up with assist to prevent falls, pt reinforced on fall precautions. Pt needs encouragement, and reinforcement.        Resume PPI

## 2021-01-14 DIAGNOSIS — Z23 NEED FOR VACCINATION: ICD-10-CM

## 2021-02-15 NOTE — PROGRESS NOTES
Assumed care of pt, received report from day shift RN, pt assessed.  Pt complaining of 8/10 lower back pain, pt declines pain medication at this time.  Pt is A&O x2, disoriented to time and events.  Pt high fall risk, wearing treaded socks, bed locked and in lowest position, bed alarm is on.  Pt instructed to call for assistance prior to getting OOB, pt verbalized understanding.  Call light, phone, and personal belongings within reach.   The patient is a 40y Female complaining of knee pain/injury.

## 2021-05-20 ENCOUNTER — HOSPITAL ENCOUNTER (OUTPATIENT)
Facility: MEDICAL CENTER | Age: 81
End: 2021-05-20
Attending: NURSE PRACTITIONER
Payer: MEDICARE

## 2021-05-20 LAB
AMORPH CRY #/AREA URNS HPF: PRESENT /HPF
APPEARANCE UR: ABNORMAL
BACTERIA #/AREA URNS HPF: ABNORMAL /HPF
BILIRUB UR QL STRIP.AUTO: NEGATIVE
CAOX CRY #/AREA URNS HPF: ABNORMAL /HPF
COLOR UR: YELLOW
EPI CELLS #/AREA URNS HPF: ABNORMAL /HPF
GLUCOSE UR STRIP.AUTO-MCNC: NEGATIVE MG/DL
KETONES UR STRIP.AUTO-MCNC: NEGATIVE MG/DL
LEUKOCYTE ESTERASE UR QL STRIP.AUTO: NEGATIVE
MICRO URNS: ABNORMAL
NITRITE UR QL STRIP.AUTO: NEGATIVE
PH UR STRIP.AUTO: 6 [PH] (ref 5–8)
PROT UR QL STRIP: NEGATIVE MG/DL
RBC # URNS HPF: ABNORMAL /HPF
RBC UR QL AUTO: NEGATIVE
SP GR UR STRIP.AUTO: 1.02
UROBILINOGEN UR STRIP.AUTO-MCNC: 0.2 MG/DL
WBC #/AREA URNS HPF: ABNORMAL /HPF

## 2021-05-20 PROCEDURE — 81001 URINALYSIS AUTO W/SCOPE: CPT

## 2021-07-26 PROBLEM — R73.03 PREDIABETES: Status: ACTIVE | Noted: 2021-07-22

## 2021-07-26 PROBLEM — R19.5 HEME POSITIVE STOOL: Status: RESOLVED | Noted: 2017-07-29 | Resolved: 2021-07-26

## 2021-07-26 PROBLEM — E66.01 SEVERE OBESITY WITH BODY MASS INDEX (BMI) OF 35.0 TO 39.9 WITH COMORBIDITY (HCC): Status: ACTIVE | Noted: 2021-07-22

## 2021-07-26 PROBLEM — I50.9 CHRONIC CONGESTIVE HEART FAILURE (HCC): Chronic | Status: ACTIVE | Noted: 2021-07-22

## 2021-07-26 PROBLEM — Z74.09 IMPAIRED FUNCTIONAL MOBILITY, BALANCE, GAIT, AND ENDURANCE: Status: ACTIVE | Noted: 2021-07-22

## 2021-07-26 PROBLEM — D50.9 IRON DEFICIENCY ANEMIA: Status: RESOLVED | Noted: 2017-12-27 | Resolved: 2021-07-26

## 2021-07-26 PROBLEM — M81.0 AGE-RELATED OSTEOPOROSIS WITHOUT CURRENT PATHOLOGICAL FRACTURE: Chronic | Status: ACTIVE | Noted: 2021-07-22

## 2021-07-26 PROBLEM — R05.9 COUGH: Status: RESOLVED | Noted: 2017-11-24 | Resolved: 2021-07-26

## 2021-07-26 PROBLEM — J44.9 COPD WITHOUT EXACERBATION (HCC): Status: ACTIVE | Noted: 2021-07-22

## 2021-07-26 PROBLEM — R53.81 PHYSICAL DEBILITY: Status: RESOLVED | Noted: 2017-11-21 | Resolved: 2021-07-26

## 2021-07-26 PROBLEM — J98.8 CONGESTION OF UPPER AIRWAY: Status: RESOLVED | Noted: 2017-11-26 | Resolved: 2021-07-26

## 2021-07-26 PROBLEM — N39.0 UTI (URINARY TRACT INFECTION): Status: RESOLVED | Noted: 2017-09-22 | Resolved: 2021-07-26

## 2021-07-26 PROBLEM — N32.81 OVERACTIVE BLADDER: Chronic | Status: ACTIVE | Noted: 2021-07-22

## 2021-07-26 PROBLEM — D58.2 ELEVATED HEMOGLOBIN (HCC): Chronic | Status: ACTIVE | Noted: 2021-07-22

## 2021-07-26 PROBLEM — M54.9 INTRACTABLE BACK PAIN: Status: RESOLVED | Noted: 2017-07-29 | Resolved: 2021-07-26

## 2021-07-26 PROBLEM — M81.0 AGE-RELATED OSTEOPOROSIS WITHOUT CURRENT PATHOLOGICAL FRACTURE: Status: ACTIVE | Noted: 2021-07-22

## 2021-07-26 PROBLEM — F03.90 DEMENTIA WITHOUT BEHAVIORAL DISTURBANCE (HCC): Chronic | Status: ACTIVE | Noted: 2017-07-29

## 2021-07-26 PROBLEM — M54.9 CHRONIC BACK PAIN: Chronic | Status: ACTIVE | Noted: 2017-08-01

## 2021-07-26 PROBLEM — J40 BRONCHITIS: Status: RESOLVED | Noted: 2018-01-07 | Resolved: 2021-07-26

## 2021-07-26 PROBLEM — E66.01 SEVERE OBESITY WITH BODY MASS INDEX (BMI) OF 35.0 TO 39.9 WITH COMORBIDITY (HCC): Chronic | Status: ACTIVE | Noted: 2021-07-22

## 2021-07-26 PROBLEM — N32.81 OVERACTIVE BLADDER: Status: ACTIVE | Noted: 2021-07-22

## 2021-07-26 PROBLEM — F32.A DEPRESSION: Status: RESOLVED | Noted: 2017-08-01 | Resolved: 2021-07-26

## 2021-07-26 PROBLEM — I10 HYPERTENSION: Chronic | Status: ACTIVE | Noted: 2017-08-01

## 2021-07-26 PROBLEM — N28.9 ACUTE RENAL INSUFFICIENCY: Status: RESOLVED | Noted: 2017-09-21 | Resolved: 2021-07-26

## 2021-07-26 PROBLEM — I50.9 CHRONIC CONGESTIVE HEART FAILURE (HCC): Status: ACTIVE | Noted: 2021-07-22

## 2021-07-26 PROBLEM — G47.00 INSOMNIA: Status: RESOLVED | Noted: 2017-10-10 | Resolved: 2021-07-26

## 2021-07-26 PROBLEM — R00.1 BRADYCARDIA: Status: RESOLVED | Noted: 2017-11-18 | Resolved: 2021-07-26

## 2021-07-26 PROBLEM — D58.2 ELEVATED HEMOGLOBIN (HCC): Status: ACTIVE | Noted: 2021-07-22

## 2021-07-26 PROBLEM — F32.4 MAJOR DEPRESSION SINGLE EPISODE, IN PARTIAL REMISSION (HCC): Status: ACTIVE | Noted: 2021-07-22

## 2021-07-26 PROBLEM — E03.8 SUBCLINICAL HYPOTHYROIDISM: Status: ACTIVE | Noted: 2021-07-22

## 2021-07-26 PROBLEM — E66.811 OBESITY (BMI 30.0-34.9): Status: RESOLVED | Noted: 2017-11-21 | Resolved: 2021-07-26

## 2021-07-26 PROBLEM — G89.29 CHRONIC BACK PAIN: Chronic | Status: ACTIVE | Noted: 2017-08-01

## 2021-07-26 PROBLEM — E66.9 OBESITY (BMI 30.0-34.9): Status: RESOLVED | Noted: 2017-11-21 | Resolved: 2021-07-26

## 2021-08-29 PROBLEM — E55.9 VITAMIN D DEFICIENCY: Status: ACTIVE | Noted: 2021-08-25

## 2021-08-29 PROBLEM — R13.10 DIFFICULTY SWALLOWING SOLIDS: Status: ACTIVE | Noted: 2021-08-25

## 2021-08-29 PROBLEM — R13.10 DIFFICULTY SWALLOWING SOLIDS: Chronic | Status: ACTIVE | Noted: 2021-08-25

## 2021-08-29 PROBLEM — R29.6 REPEATED FALLS: Status: ACTIVE | Noted: 2021-08-25

## 2021-10-15 PROBLEM — Z86.73 HISTORY OF STROKE: Chronic | Status: ACTIVE | Noted: 2017-08-01

## 2021-10-15 PROBLEM — J44.9 COPD WITHOUT EXACERBATION (HCC): Chronic | Status: ACTIVE | Noted: 2021-07-22

## 2021-10-15 PROBLEM — E55.9 VITAMIN D DEFICIENCY: Chronic | Status: ACTIVE | Noted: 2021-08-25

## 2021-10-15 PROBLEM — Z74.09 IMPAIRED FUNCTIONAL MOBILITY, BALANCE, GAIT, AND ENDURANCE: Chronic | Status: ACTIVE | Noted: 2021-07-22

## 2021-10-15 PROBLEM — F32.4 MAJOR DEPRESSION SINGLE EPISODE, IN PARTIAL REMISSION (HCC): Chronic | Status: ACTIVE | Noted: 2021-07-22

## 2021-11-05 PROBLEM — R09.89 CHOKING EPISODE: Chronic | Status: ACTIVE | Noted: 2021-11-04

## 2021-11-05 PROBLEM — R29.6 REPEATED FALLS: Chronic | Status: ACTIVE | Noted: 2021-08-25

## 2021-11-05 PROBLEM — R09.89 CHOKING EPISODE: Status: ACTIVE | Noted: 2021-11-04

## 2022-01-09 PROBLEM — H61.23 BILATERAL IMPACTED CERUMEN: Status: ACTIVE | Noted: 2022-01-06

## 2022-01-09 PROBLEM — H61.23 BILATERAL IMPACTED CERUMEN: Chronic | Status: ACTIVE | Noted: 2022-01-06

## 2022-01-10 ENCOUNTER — HOSPITAL ENCOUNTER (OUTPATIENT)
Facility: MEDICAL CENTER | Age: 82
End: 2022-01-10
Attending: NURSE PRACTITIONER
Payer: MEDICARE

## 2022-01-10 LAB
25(OH)D3 SERPL-MCNC: 56 NG/ML (ref 30–100)
ALBUMIN SERPL BCP-MCNC: 4 G/DL (ref 3.2–4.9)
ALBUMIN/GLOB SERPL: 1.5 G/DL
ALP SERPL-CCNC: 109 U/L (ref 30–99)
ALT SERPL-CCNC: 16 U/L (ref 2–50)
ANION GAP SERPL CALC-SCNC: 11 MMOL/L (ref 7–16)
APPEARANCE UR: CLEAR
AST SERPL-CCNC: 17 U/L (ref 12–45)
BACTERIA #/AREA URNS HPF: ABNORMAL /HPF
BASOPHILS # BLD AUTO: 0.7 % (ref 0–1.8)
BASOPHILS # BLD: 0.04 K/UL (ref 0–0.12)
BILIRUB SERPL-MCNC: 0.5 MG/DL (ref 0.1–1.5)
BILIRUB UR QL STRIP.AUTO: NEGATIVE
BUN SERPL-MCNC: 15 MG/DL (ref 8–22)
CALCIUM SERPL-MCNC: 9.1 MG/DL (ref 8.5–10.5)
CHLORIDE SERPL-SCNC: 106 MMOL/L (ref 96–112)
CHOLEST SERPL-MCNC: 124 MG/DL (ref 100–199)
CO2 SERPL-SCNC: 27 MMOL/L (ref 20–33)
COLOR UR: YELLOW
CREAT SERPL-MCNC: 0.86 MG/DL (ref 0.5–1.4)
EOSINOPHIL # BLD AUTO: 0.16 K/UL (ref 0–0.51)
EOSINOPHIL NFR BLD: 2.7 % (ref 0–6.9)
EPI CELLS #/AREA URNS HPF: NEGATIVE /HPF
ERYTHROCYTE [DISTWIDTH] IN BLOOD BY AUTOMATED COUNT: 49.8 FL (ref 35.9–50)
EST. AVERAGE GLUCOSE BLD GHB EST-MCNC: 126 MG/DL
FASTING STATUS PATIENT QL REPORTED: NORMAL
FOLATE SERPL-MCNC: 8.2 NG/ML
GLOBULIN SER CALC-MCNC: 2.6 G/DL (ref 1.9–3.5)
GLUCOSE SERPL-MCNC: 109 MG/DL (ref 65–99)
GLUCOSE UR STRIP.AUTO-MCNC: NEGATIVE MG/DL
HBA1C MFR BLD: 6 % (ref 4–5.6)
HCT VFR BLD AUTO: 42 % (ref 37–47)
HDLC SERPL-MCNC: 52 MG/DL
HGB BLD-MCNC: 12.9 G/DL (ref 12–16)
HYALINE CASTS #/AREA URNS LPF: ABNORMAL /LPF
IMM GRANULOCYTES # BLD AUTO: 0.02 K/UL (ref 0–0.11)
IMM GRANULOCYTES NFR BLD AUTO: 0.3 % (ref 0–0.9)
KETONES UR STRIP.AUTO-MCNC: NEGATIVE MG/DL
LDLC SERPL CALC-MCNC: 57 MG/DL
LEUKOCYTE ESTERASE UR QL STRIP.AUTO: ABNORMAL
LYMPHOCYTES # BLD AUTO: 1.05 K/UL (ref 1–4.8)
LYMPHOCYTES NFR BLD: 17.5 % (ref 22–41)
MCH RBC QN AUTO: 24.7 PG (ref 27–33)
MCHC RBC AUTO-ENTMCNC: 30.7 G/DL (ref 33.6–35)
MCV RBC AUTO: 80.5 FL (ref 81.4–97.8)
MICRO URNS: ABNORMAL
MONOCYTES # BLD AUTO: 0.51 K/UL (ref 0–0.85)
MONOCYTES NFR BLD AUTO: 8.5 % (ref 0–13.4)
NEUTROPHILS # BLD AUTO: 4.23 K/UL (ref 2–7.15)
NEUTROPHILS NFR BLD: 70.3 % (ref 44–72)
NITRITE UR QL STRIP.AUTO: POSITIVE
NRBC # BLD AUTO: 0 K/UL
NRBC BLD-RTO: 0 /100 WBC
NT-PROBNP SERPL IA-MCNC: 72 PG/ML (ref 0–125)
PH UR STRIP.AUTO: 7 [PH] (ref 5–8)
PLATELET # BLD AUTO: 227 K/UL (ref 164–446)
PMV BLD AUTO: 12 FL (ref 9–12.9)
POTASSIUM SERPL-SCNC: 4 MMOL/L (ref 3.6–5.5)
PROT SERPL-MCNC: 6.6 G/DL (ref 6–8.2)
PROT UR QL STRIP: NEGATIVE MG/DL
RBC # BLD AUTO: 5.22 M/UL (ref 4.2–5.4)
RBC # URNS HPF: ABNORMAL /HPF
RBC UR QL AUTO: NEGATIVE
SODIUM SERPL-SCNC: 144 MMOL/L (ref 135–145)
SP GR UR STRIP.AUTO: 1.01
T4 FREE SERPL-MCNC: 0.83 NG/DL (ref 0.93–1.7)
TRIGL SERPL-MCNC: 75 MG/DL (ref 0–149)
TSH SERPL DL<=0.005 MIU/L-ACNC: 3.53 UIU/ML (ref 0.38–5.33)
UROBILINOGEN UR STRIP.AUTO-MCNC: 0.2 MG/DL
VIT B12 SERPL-MCNC: 344 PG/ML (ref 211–911)
WBC # BLD AUTO: 6 K/UL (ref 4.8–10.8)
WBC #/AREA URNS HPF: ABNORMAL /HPF

## 2022-01-10 PROCEDURE — 83036 HEMOGLOBIN GLYCOSYLATED A1C: CPT

## 2022-01-10 PROCEDURE — 83880 ASSAY OF NATRIURETIC PEPTIDE: CPT

## 2022-01-10 PROCEDURE — 84439 ASSAY OF FREE THYROXINE: CPT

## 2022-01-10 PROCEDURE — 84443 ASSAY THYROID STIM HORMONE: CPT

## 2022-01-10 PROCEDURE — 82607 VITAMIN B-12: CPT

## 2022-01-10 PROCEDURE — 80061 LIPID PANEL: CPT

## 2022-01-10 PROCEDURE — 85025 COMPLETE CBC W/AUTO DIFF WBC: CPT

## 2022-01-10 PROCEDURE — 87077 CULTURE AEROBIC IDENTIFY: CPT

## 2022-01-10 PROCEDURE — 82746 ASSAY OF FOLIC ACID SERUM: CPT

## 2022-01-10 PROCEDURE — 87186 SC STD MICRODIL/AGAR DIL: CPT

## 2022-01-10 PROCEDURE — 81001 URINALYSIS AUTO W/SCOPE: CPT

## 2022-01-10 PROCEDURE — 80053 COMPREHEN METABOLIC PANEL: CPT

## 2022-01-10 PROCEDURE — 82306 VITAMIN D 25 HYDROXY: CPT

## 2022-01-10 PROCEDURE — 87086 URINE CULTURE/COLONY COUNT: CPT

## 2022-02-04 NOTE — PROGRESS NOTES
[facility] MEDICINE PROGRESS NOTE    Patient: Trevin Gasca Today's Date: 2/4/2022   YOB: 1985 Admission Date: 2/3/2022  2:44 PM   MRN: [id number] Inpatient LOS: 0 day(s)   [facility] Hospital Day:  Hospital Day: 2       History and Subjective complaints     Chief Complaint: N, V    Interval history and Overnight events:    Doing better this am, no further N, V, wants to eat, some R-sided abd pain, no fever or chills today    Hospital Course  Patients interval history reviewed/EHR notes reviewed.   Trevin Gasca is a 36 year old male who presented on 2/3/2022 with complaints of Vomiting         Reviewed Pertinent Histories: Medical History, Surgical History, Social History, Family History,     ROS: 12-point review of systems negative except as above.    Medications: Reviewed     Scheduled Medications:    [Held by provider] torsemide, 80 mg, Daily  Potassium Standard Replacement Protocol, , See Admin Instructions  Magnesium Standard Replacement Protocol, , See Admin Instructions  sodium chloride (PF), 2 mL, 2 times per day  allopurinol, 200 mg, Daily  cholecalciferol, 4,000 Units, Daily  colchicine, 0.6 mg, BID  potassium CHLORIDE, 20 mEq, TID  spironolactone, 25 mg, Daily  Vortioxetine HBr, 20 mg, Q Evening  pantoprazole, 40 mg, BID AC  predniSONE, 10 mg, Daily  enoxaparin, 40 mg, Nightly  fentaNYL, 50 mcg, Once  meropenem (MERREM) IVPB, 1 g, 3 times per day      Continuous Infusions:    PRN Medications:  ondansetron, docusate sodium-sennosides, bisacodyl, magnesium hydroxide, sodium chloride, sodium chloride, acetaminophen, sodium chloride, albumin human 25%, albumin human 25%, morphine injection      Physical Examination       Vital 24 Hour Range Most Recent Value   Temperature Temp  Min: 97.4 °F (36.3 °C)  Max: 101.4 °F (38.6 °C) 98.3 °F (36.8 °C)   Pulse Pulse  Min: 99  Max: 117 99   Respiratory Resp  Min: 14  Max: 20 20   Blood Pressure BP  Min: 112/81  Max: 146/99  Simona Knight Fall Risk Assessment:     Last Known Fall: Within the last six months  Mobility: Immobilized/requires assist of one person  Medications: Cardiovascular and central nervous system meds  Mental Status/LOC/Awareness: Memory loss/confusion and requires reorienting  Toileting Needs: Incontinence  Volume/Electrolyte Status: No problems  Communication/Sensory: Visual (Glasses)/hearing deficit  Behavior: Appropriate behavior  Simona Knight Fall Risk Total: 16  Fall Risk Level: HIGH RISK    Universal Fall Precautions:  call light/belongings in reach, bed in low position and locked, wheelchairs and assistive devices out of sight, siderails up x 2, use non-slip footwear, adequate lighting, clutter free and spill free environment, educate on level of risk, educate to call for assistance    Fall Risk Level Interventions:    TRIAL ([employer] 8, NEURO, MED JENNIE 5) Moderate Fall Risk Interventions  Place yellow fall risk ID band on patient: verified  Provide patient/family education based on risk assessment : completed  Educate patient/family to call staff for assistance when getting out of bed: completed  Place fall precaution signage outside patient door: verified  Utilize bed/chair fall alarm: verifiedTRIAL (TELE 8, NEURO, [employer] JENNIE 5) High Fall Risk Interventions  Place yellow fall risk ID band on patient: verified  Provide patient/family education based on risk assessment: completed  Educate patient/family to call staff for assistance when getting out of bed: completed  Place fall precaution signage outside patient door: verified  Place patient in room close to nursing station: currently not available/charge notified  Utilize bed/chair fall alarm: verified  Notify charge of high risk for huddle: verified    Patient Specific Interventions:     Medication: review medications with patient and family, assess for medications that can be discontinued or dosage decreased, limit combination of prn medications and provide  112/81   Pulse Oximetry SpO2  Min: 95 %  Max: 100 % 98 %   Arterial BP No data recorded     O2 O2 Flow Rate (L/min)  Av L/min  Min: 0 L/min   Min taken time: 22  Max: 0 L/min   Max taken time: 22 143       Intake and Output:      Intake/Output Summary (Last 24 hours) at 2022 1419  Last data filed at 2022 0630  Gross per 24 hour   Intake 1333.23 ml   Output 4150 ml   Net -2816.77 ml       Last Stool Occurrence:       Vital Most Recent Value First Value   Weight 101.9 kg (224 lb 11.2 oz) Weight: 108.5 kg (239 lb 3.2 oz)   Height 5' 8\" (172.7 cm) Height: 5' 8\" (172.7 cm)   BMI 34.17 N/A     General: Looks well, well developed, well nourished  CV: regular rate and rhythm  Resp: clear to auscultation bilaterally  Abd: soft, Mildly tender RUQ, nondistended and no hepatosplenomegaly  : R testicular edema  Ext: no clubbing, cyanosis, or ischemia  Skin: no induration, subcutaneous nodules, or tightening noted  Neuro: CN 2-12 grossly intact  Psych: oriented to time, place and person    Test Results     Labs: The Laboratory values listed below have been reviewed and pertinent findings discussed in the Assessment and Plan.    Laboratory values:   Recent Labs   Lab 22  2043   GLUCOSE BEDSIDE 90         Radiology: Imaging studies have been reviewed and pertinent findings discussed in the Assessment and Plan.    Results for orders placed or performed during the hospital encounter of 22 (from the past 48 hour(s))   XR Chest AP or PA    Impression    IMPRESSION:  1. There is no acute cardiopulmonary disease.     IR Paracentesis    Impression    Impression:    Ultrasound guided paracentesis yielding 4150 mL of serous fluid.       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  R testicular edema      N, V improved  Started on empiric abx,  patients who received diuretics/laxatives frequent toileting  Mental Status/LOC/Awareness: reorient patient, reinforce falls education, check on patient hourly, utilize bed/chair fall alarm, reinforce the use of call light and provide activity  Toileting: provide frquent toileting, monitor intake and output/use of appropriate interventions, instruct patient/family on the use of grab bars, instruct patient/family on the need to call for assistance when toileting, do not leave patient unattended in bathroom/refer to toileting scripting and toilet prior to giving pain medications  Volume/Electrolyte Status: ensure patient remains hydrated, monitor abnormal lab values and teach patients to dangle before rising if hypotensive  Communication/Sensory: update plan of care on whiteboard, ensure proper positioning when transferrng/ambulating and ensure patient has glasses/contacts and hearing aids/dentures  Behavioral: encourage patient to voice feelings, engage patient in daily activities, administer medication as ordered and instruct/reinforce fall program rationale  Mobility: schedule physical activity throughout the day, dangle prior to standing, utilize bed/chair fall alarm, ensure bed is locked and in lowest position, instruct patient to exit bed on their strongest side and collaborate with doctor for possible PT/OT consult   clx P  GI consulted, CT abd ordered  D/w HF team, will restart diuretics  Has been on prednisone and colchicine for pericarditis, PET CT 1/27/22 with no acute inflammation, possible taper per HF team  CV surgery consult on Mon for possible pericardectomy  R testicular edema suspected hydrocele given clinical pic, may do US, inpt or outpt urology eval            Consults:    IP CONSULT TO CARDIOLOGY  IP CONSULT TO GI  IP CONSULT TO CARDIOTHORACIC SURGERY    Diet:  Liquid Clear Diet  Therapy Orders:  No orders of the defined types were placed in this encounter.      Smoking status- {  Tobacco Use: Low Risk    • Smoking Tobacco Use: Never Smoker   • Smokeless Tobacco Use: Never Used     Advanced Directives     Code Status: Full Resuscitation           Discharge Plan     The patients treatment plans were discussed with patient.     Recommendations for Discharge   SW     PT     OT     SLP       Anticipated discharge destination: Home  Expected Discharge Date: 2-3d     Barriers to Discharge: workup for SBP        Rebeca Villafana MD   Hospitalist  2/4/2022  2:19 PM    (Contact by secure chat)

## 2022-03-16 PROBLEM — F01.50 VASCULAR DEMENTIA WITHOUT BEHAVIORAL DISTURBANCE (HCC): Status: ACTIVE | Noted: 2022-02-10

## 2022-03-16 PROBLEM — B96.20 E. COLI UTI (URINARY TRACT INFECTION): Status: ACTIVE | Noted: 2017-09-22

## 2022-03-16 PROBLEM — D58.2 ELEVATED HEMOGLOBIN (HCC): Chronic | Status: RESOLVED | Noted: 2021-07-22 | Resolved: 2022-03-16

## 2022-03-16 PROBLEM — N39.0 E. COLI UTI (URINARY TRACT INFECTION): Chronic | Status: ACTIVE | Noted: 2017-09-22

## 2022-03-16 PROBLEM — B96.20 E. COLI UTI (URINARY TRACT INFECTION): Chronic | Status: ACTIVE | Noted: 2017-09-22

## 2022-03-21 PROBLEM — M47.817 SPONDYLOSIS OF LUMBOSACRAL REGION WITHOUT MYELOPATHY OR RADICULOPATHY: Status: RESOLVED | Noted: 2022-03-11 | Resolved: 2022-03-21

## 2022-03-21 PROBLEM — M47.817 DJD (DEGENERATIVE JOINT DISEASE), LUMBOSACRAL: Status: ACTIVE | Noted: 2022-03-11

## 2022-03-21 PROBLEM — M13.0 POLYARTHRITIS OF MULTIPLE SITES: Status: ACTIVE | Noted: 2022-03-11

## 2022-03-21 PROBLEM — M47.817 SPONDYLOSIS OF LUMBOSACRAL REGION WITHOUT MYELOPATHY OR RADICULOPATHY: Status: ACTIVE | Noted: 2022-03-11

## 2022-04-05 PROBLEM — E78.5 DYSLIPIDEMIA: Chronic | Status: ACTIVE | Noted: 2017-08-01

## 2022-04-24 PROBLEM — M13.0 POLYARTHRITIS OF MULTIPLE SITES: Chronic | Status: ACTIVE | Noted: 2022-03-11

## 2022-05-19 ENCOUNTER — HOSPITAL ENCOUNTER (OUTPATIENT)
Facility: MEDICAL CENTER | Age: 82
End: 2022-05-19
Attending: NURSE PRACTITIONER
Payer: MEDICARE

## 2022-05-19 DIAGNOSIS — N39.0 E. COLI UTI (URINARY TRACT INFECTION): ICD-10-CM

## 2022-05-19 DIAGNOSIS — B96.20 E. COLI UTI (URINARY TRACT INFECTION): ICD-10-CM

## 2022-05-19 LAB
APPEARANCE UR: ABNORMAL
BACTERIA #/AREA URNS HPF: NEGATIVE /HPF
BILIRUB UR QL STRIP.AUTO: NEGATIVE
COLOR UR: YELLOW
EPI CELLS #/AREA URNS HPF: ABNORMAL /HPF
GLUCOSE UR STRIP.AUTO-MCNC: NEGATIVE MG/DL
KETONES UR STRIP.AUTO-MCNC: NEGATIVE MG/DL
LEUKOCYTE ESTERASE UR QL STRIP.AUTO: NEGATIVE
MICRO URNS: ABNORMAL
NITRITE UR QL STRIP.AUTO: NEGATIVE
PH UR STRIP.AUTO: 6 [PH] (ref 5–8)
PROT UR QL STRIP: NEGATIVE MG/DL
RBC # URNS HPF: ABNORMAL /HPF
RBC UR QL AUTO: NEGATIVE
SP GR UR STRIP.AUTO: 1.02
UROBILINOGEN UR STRIP.AUTO-MCNC: 0.2 MG/DL
WBC #/AREA URNS HPF: ABNORMAL /HPF

## 2022-05-19 PROCEDURE — 81001 URINALYSIS AUTO W/SCOPE: CPT

## 2022-06-05 PROBLEM — F01.50 VASCULAR DEMENTIA WITHOUT BEHAVIORAL DISTURBANCE (HCC): Chronic | Status: ACTIVE | Noted: 2022-02-10

## 2022-07-31 PROBLEM — S22.080A T12 COMPRESSION FRACTURE (HCC): Chronic | Status: ACTIVE | Noted: 2017-08-01

## 2022-09-14 ENCOUNTER — HOSPITAL ENCOUNTER (OUTPATIENT)
Facility: MEDICAL CENTER | Age: 82
End: 2022-09-14
Attending: NURSE PRACTITIONER
Payer: MEDICARE

## 2022-09-14 LAB
APPEARANCE UR: CLEAR
BILIRUB UR QL STRIP.AUTO: NEGATIVE
COLOR UR: YELLOW
GLUCOSE UR STRIP.AUTO-MCNC: NEGATIVE MG/DL
KETONES UR STRIP.AUTO-MCNC: NEGATIVE MG/DL
LEUKOCYTE ESTERASE UR QL STRIP.AUTO: NEGATIVE
MICRO URNS: NORMAL
NITRITE UR QL STRIP.AUTO: NEGATIVE
PH UR STRIP.AUTO: 6.5 [PH] (ref 5–8)
PROT UR QL STRIP: NEGATIVE MG/DL
RBC UR QL AUTO: NEGATIVE
SP GR UR STRIP.AUTO: 1.02
UROBILINOGEN UR STRIP.AUTO-MCNC: 0.2 MG/DL

## 2022-09-14 PROCEDURE — 81003 URINALYSIS AUTO W/O SCOPE: CPT

## 2023-02-25 PROBLEM — G31.9 CEREBRAL ATROPHY (HCC): Status: ACTIVE | Noted: 2023-02-24

## 2023-03-16 ENCOUNTER — HOSPITAL ENCOUNTER (OUTPATIENT)
Facility: MEDICAL CENTER | Age: 83
End: 2023-03-16
Attending: NURSE PRACTITIONER
Payer: MEDICARE

## 2023-03-16 DIAGNOSIS — I10 PRIMARY HYPERTENSION: Chronic | ICD-10-CM

## 2023-03-16 DIAGNOSIS — E78.5 DYSLIPIDEMIA: Chronic | ICD-10-CM

## 2023-03-16 DIAGNOSIS — N32.81 OVERACTIVE BLADDER: Chronic | ICD-10-CM

## 2023-03-16 DIAGNOSIS — E55.9 VITAMIN D DEFICIENCY: Chronic | ICD-10-CM

## 2023-03-16 DIAGNOSIS — R73.03 PREDIABETES: ICD-10-CM

## 2023-03-16 DIAGNOSIS — I50.9 CHRONIC CONGESTIVE HEART FAILURE, UNSPECIFIED HEART FAILURE TYPE (HCC): Chronic | ICD-10-CM

## 2023-03-16 DIAGNOSIS — E03.8 SUBCLINICAL HYPOTHYROIDISM: ICD-10-CM

## 2023-03-16 LAB
25(OH)D3 SERPL-MCNC: 57 NG/ML (ref 30–100)
ALBUMIN SERPL BCP-MCNC: 4 G/DL (ref 3.2–4.9)
ALBUMIN/GLOB SERPL: 1.3 G/DL
ALP SERPL-CCNC: 116 U/L (ref 30–99)
ALT SERPL-CCNC: 24 U/L (ref 2–50)
AMORPH CRY #/AREA URNS HPF: PRESENT /HPF
ANION GAP SERPL CALC-SCNC: 15 MMOL/L (ref 7–16)
APPEARANCE UR: ABNORMAL
AST SERPL-CCNC: 22 U/L (ref 12–45)
BASOPHILS # BLD AUTO: 0.6 % (ref 0–1.8)
BASOPHILS # BLD: 0.05 K/UL (ref 0–0.12)
BILIRUB SERPL-MCNC: 0.3 MG/DL (ref 0.1–1.5)
BILIRUB UR QL STRIP.AUTO: NEGATIVE
BUN SERPL-MCNC: 18 MG/DL (ref 8–22)
CALCIUM ALBUM COR SERPL-MCNC: 9.7 MG/DL (ref 8.5–10.5)
CALCIUM SERPL-MCNC: 9.7 MG/DL (ref 8.5–10.5)
CAOX CRY #/AREA URNS HPF: NORMAL /HPF
CHLORIDE SERPL-SCNC: 108 MMOL/L (ref 96–112)
CHOLEST SERPL-MCNC: 131 MG/DL (ref 100–199)
CO2 SERPL-SCNC: 24 MMOL/L (ref 20–33)
COLOR UR: YELLOW
CREAT SERPL-MCNC: 0.89 MG/DL (ref 0.5–1.4)
EOSINOPHIL # BLD AUTO: 0.15 K/UL (ref 0–0.51)
EOSINOPHIL NFR BLD: 1.8 % (ref 0–6.9)
ERYTHROCYTE [DISTWIDTH] IN BLOOD BY AUTOMATED COUNT: 52.7 FL (ref 35.9–50)
FOLATE SERPL-MCNC: 6.2 NG/ML
GFR SERPLBLD CREATININE-BSD FMLA CKD-EPI: 64 ML/MIN/1.73 M 2
GLOBULIN SER CALC-MCNC: 3 G/DL (ref 1.9–3.5)
GLUCOSE SERPL-MCNC: 94 MG/DL (ref 65–99)
GLUCOSE UR STRIP.AUTO-MCNC: NEGATIVE MG/DL
HCT VFR BLD AUTO: 45.7 % (ref 37–47)
HDLC SERPL-MCNC: 53 MG/DL
HGB BLD-MCNC: 13.7 G/DL (ref 12–16)
HYALINE CASTS #/AREA URNS LPF: NORMAL /LPF
IMM GRANULOCYTES # BLD AUTO: 0.03 K/UL (ref 0–0.11)
IMM GRANULOCYTES NFR BLD AUTO: 0.4 % (ref 0–0.9)
KETONES UR STRIP.AUTO-MCNC: NEGATIVE MG/DL
LDLC SERPL CALC-MCNC: 60 MG/DL
LEUKOCYTE ESTERASE UR QL STRIP.AUTO: ABNORMAL
LYMPHOCYTES # BLD AUTO: 1.48 K/UL (ref 1–4.8)
LYMPHOCYTES NFR BLD: 17.7 % (ref 22–41)
MCH RBC QN AUTO: 23.9 PG (ref 27–33)
MCHC RBC AUTO-ENTMCNC: 30 G/DL (ref 33.6–35)
MCV RBC AUTO: 79.8 FL (ref 81.4–97.8)
MICRO URNS: ABNORMAL
MONOCYTES # BLD AUTO: 0.78 K/UL (ref 0–0.85)
MONOCYTES NFR BLD AUTO: 9.3 % (ref 0–13.4)
NEUTROPHILS # BLD AUTO: 5.87 K/UL (ref 2–7.15)
NEUTROPHILS NFR BLD: 70.2 % (ref 44–72)
NITRITE UR QL STRIP.AUTO: POSITIVE
NRBC # BLD AUTO: 0 K/UL
NRBC BLD-RTO: 0 /100 WBC
NT-PROBNP SERPL IA-MCNC: 85 PG/ML (ref 0–125)
PH UR STRIP.AUTO: 5.5 [PH] (ref 5–8)
PLATELET # BLD AUTO: 233 K/UL (ref 164–446)
POTASSIUM SERPL-SCNC: 4.4 MMOL/L (ref 3.6–5.5)
PROT SERPL-MCNC: 7 G/DL (ref 6–8.2)
PROT UR QL STRIP: NEGATIVE MG/DL
RBC # BLD AUTO: 5.73 M/UL (ref 4.2–5.4)
RBC # URNS HPF: NORMAL /HPF
RBC UR QL AUTO: NEGATIVE
SODIUM SERPL-SCNC: 147 MMOL/L (ref 135–145)
SP GR UR STRIP.AUTO: 1.02
TRIGL SERPL-MCNC: 90 MG/DL (ref 0–149)
TSH SERPL DL<=0.005 MIU/L-ACNC: 4.61 UIU/ML (ref 0.38–5.33)
UROBILINOGEN UR STRIP.AUTO-MCNC: 0.2 MG/DL
VIT B12 SERPL-MCNC: 349 PG/ML (ref 211–911)
WBC # BLD AUTO: 8.4 K/UL (ref 4.8–10.8)
WBC #/AREA URNS HPF: NORMAL /HPF

## 2023-03-16 PROCEDURE — 84443 ASSAY THYROID STIM HORMONE: CPT

## 2023-03-16 PROCEDURE — 85025 COMPLETE CBC W/AUTO DIFF WBC: CPT

## 2023-03-16 PROCEDURE — 80053 COMPREHEN METABOLIC PANEL: CPT

## 2023-03-16 PROCEDURE — 82607 VITAMIN B-12: CPT

## 2023-03-16 PROCEDURE — 81001 URINALYSIS AUTO W/SCOPE: CPT

## 2023-03-16 PROCEDURE — 80061 LIPID PANEL: CPT

## 2023-03-16 PROCEDURE — 83036 HEMOGLOBIN GLYCOSYLATED A1C: CPT | Mod: GA

## 2023-03-16 PROCEDURE — 82746 ASSAY OF FOLIC ACID SERUM: CPT

## 2023-03-16 PROCEDURE — 83880 ASSAY OF NATRIURETIC PEPTIDE: CPT

## 2023-03-16 PROCEDURE — 82306 VITAMIN D 25 HYDROXY: CPT

## 2023-03-17 LAB
EST. AVERAGE GLUCOSE BLD GHB EST-MCNC: 137 MG/DL
HBA1C MFR BLD: 6.4 % (ref 4–5.6)

## 2023-04-05 ENCOUNTER — HOSPITAL ENCOUNTER (EMERGENCY)
Facility: MEDICAL CENTER | Age: 83
End: 2023-04-05
Attending: EMERGENCY MEDICINE
Payer: MEDICARE

## 2023-04-05 VITALS
SYSTOLIC BLOOD PRESSURE: 131 MMHG | OXYGEN SATURATION: 93 % | TEMPERATURE: 97.6 F | RESPIRATION RATE: 16 BRPM | DIASTOLIC BLOOD PRESSURE: 77 MMHG | BODY MASS INDEX: 35.44 KG/M2 | HEART RATE: 77 BPM | HEIGHT: 63 IN | WEIGHT: 200 LBS

## 2023-04-05 DIAGNOSIS — R05.1 ACUTE COUGH: ICD-10-CM

## 2023-04-05 LAB — EKG IMPRESSION: NORMAL

## 2023-04-05 PROCEDURE — 93005 ELECTROCARDIOGRAM TRACING: CPT

## 2023-04-05 PROCEDURE — 99284 EMERGENCY DEPT VISIT MOD MDM: CPT

## 2023-04-05 PROCEDURE — 93005 ELECTROCARDIOGRAM TRACING: CPT | Performed by: EMERGENCY MEDICINE

## 2023-04-05 ASSESSMENT — FIBROSIS 4 INDEX: FIB4 SCORE: 1.6

## 2023-04-05 NOTE — ED PROVIDER NOTES
"ED Provider Note    CHIEF COMPLAINT  Chief Complaint   Patient presents with    Cough     Pt reports that she ate some seasoned potatoes today and thinks she inhaled some of the spices and reports a coughing fit x 2 hours. \"The nursing home made me come in.\" Pt's only complaint now is a sore throat. Pt comes from [facility]. She does not wear oxygen at baseline.        EXTERNAL RECORDS REVIEWED  EMS run sheet reviewed and signed off to the nursing staff    HPI/ROS      Maria Esther Valencia is a 82 y.o. female who presents with a cough.  The patient states she is having some Fritos when she aspirated and had a coughing spell that lasted a couple of hours.  She states she continues have a slight cough as well as some rhinorrhea.  She has not had any associated fevers.  She does have a slight sore throat.  She is unaware of any sick contacts.  She is not oxygen dependent at home.  She has not had any pain or swelling to her lower extremities.    PAST MEDICAL HISTORY   has a past medical history of Acute renal insufficiency (2017), Bradycardia (2017), Bronchitis (2018), CAD (coronary artery disease), Compression fracture of spine ([facility]), Congestion of upper airway (2017), Congestive heart failure ([facility]), Cough (2017), Dementia ([facility]), Elevated hemoglobin ([facility]) (2021), GI bleed, Heme positive stool (2017), Hypertension, Insomnia (10/10/2017), Intractable back pain (2017), Iron deficiency anemia (2017), Psychiatric disorder, Stroke ([facility]), and UTI (urinary tract infection) (2017).    SURGICAL HISTORY   has a past surgical history that includes other cardiac surgery.    FAMILY HISTORY  Family History   Problem Relation Age of Onset    Heart Attack Mother     Heart Attack Father        SOCIAL HISTORY  Social History     Tobacco Use    Smoking status: Former     Types: Cigarettes     Quit date: 2016     Years since quittin.2    Smokeless tobacco: Never " "  Substance and Sexual Activity    Alcohol use: No    Drug use: No    Sexual activity: Not on file       CURRENT MEDICATIONS  Home Medications       Reviewed by Liliya Hu R.N. (Registered Nurse) on 04/05/23 at 1538  Med List Status: Not Addressed     Medication Last Dose Status   acetaminophen (TYLENOL) 325 MG Tab  Active   alendronate (FOSAMAX) 70 MG Tab  Active   amLODIPine (NORVASC) 10 MG Tab  Active   ANBESOL MAXIMUM STRENGTH 20 % Gel topical gel  Active   atorvastatin (LIPITOR) 80 MG tablet  Active   benazepril (LOTENSIN) 40 MG tablet  Active   carboxymethylcellulose (REFRESH TEARS) 0.5 % Solution  Active   Carboxymethylcellulose Sod PF 0.5 % Solution  Active   Cholecalciferol (VITAMIN D3) 50 MCG (2000 UT) Tab  Active   COUGH/CHEST CONGESTION DM  MG/5ML Syrup  Active   diclofenac sodium (VOLTAREN) 1 % Gel  Active   DULoxetine (CYMBALTA) 30 MG Cap DR Particles  Active   Incontinence Supply Disposable ([employer] W[employer]ENS UNDERWEAR XX-LARGE) Misc  Active   ondansetron (ZOFRAN) 4 MG Tab tablet  Active   oxybutynin SR (DITROPAN-XL) 5 MG TABLET SR 24 HR  Active   senna-docusate (STIMULANT LAXATIVE) 8.6-50 MG Tab  Active                    ALLERGIES  Allergies   Allergen Reactions    Pcn [Penicillins] Unspecified     Per historical. Pt cannot verify what reaction she had       PHYSICAL EXAM  VITAL SIGNS: /70   Pulse 79   Temp 36.4 °C (97.5 °F) (Temporal)   Resp 18   Ht 1.6 m (5' 3\")   Wt 90.7 kg (200 lb)   SpO2 96%   BMI 35.43 kg/m²    In general the patient does not appear toxic    Ears tympanic membranes intact and nonerythematous, nares rhinorrhea, oropharynx nonerythematous without exudates    Pulmonary chest is slightly diminished throughout    Cardiovascular S1-S2 with a regular rate and rhythm    GI abdomen soft    Skin no pallor nor cyanosis    Extremities no edema    Neurologic examination is grossly intact      COURSE & MEDICAL DECISION MAKING  This an 82-year-old female who presents with " a cough and some difficulty with breathing.  My suspicion is this is from a mild aspiration.  Her lungs are clear overall and her oxygen saturation is 90% on room air.  In speaking with the patient agreed to hold off on imaging as it is unlikely it would show any evidence of pneumonitis from aspiration this early on after the incident.  This was not from a large bolus of aspiration.  This could also be from a viral source that she does have some rhinorrhea and a sore throat.  We discussed COVID testing however the patient would like to hold off at this time as will not change care she is not interested in Paxlovid.  The patient will be discharged home with instructions to return for increased work of breathing.    FINAL DIAGNOSIS  1.  Cough  2.  Pharyngitis    Disposition  Patient will be discharged in stable condition       Electronically signed by: Sammy Marquez M.D., 2023 4:36 PM    Of note EKG was performed in triage via protocol.  She has not had any chest pain.    Results for orders placed or performed during the hospital encounter of 23   EKG (NOW)   Result Value Ref Range    Report       [facility]    Test Date:  2023  Pt Name:    KRUNAL LOPEZ                 Department: ER  MRN:        [id number]                      Room:  Gender:     Female                       Technician: [id number]  :        [id number]                   Requested By:ER TRIAGE PROTOCOL  Order #:    [id number]                    Reading MD: SAMMY MARQUEZ MD    Measurements  Intervals                                Axis  Rate:       76                           P:  OR:                                      QRS:        -41  QRSD:       95                           T:          49  QT:         388  QTc:        437    Interpretive Statements  Twelve-lead EKG shows a normal sinus rhythm with a ventricular rate of 76,  normal QRS, normal intervals, no ST segment elevation or depression,  normal T  waves.  Electronically Signed On 4-5-2023 16:51:42 PDT by MARY RAE MD

## 2023-04-05 NOTE — ED TRIAGE NOTES
"Chief Complaint   Patient presents with    Cough     Pt reports that she ate some seasoned potatoes today and thinks she inhaled some of the spices and reports a coughing fit x 2 hours. \"The nursing home made me come in.\" Pt's only complaint now is a sore throat. Pt comes from [facility]. She does not wear oxygen at baseline.      /70   Pulse 79   Temp 36.4 °C (97.5 °F) (Temporal)   Resp 18   Ht 1.6 m (5' 3\")   Wt 90.7 kg (200 lb)   SpO2 96%   BMI 35.43 kg/m²     Pt bib EMS. Appropriate PPE worn throughout entire encounter. Pt placed back in the lobby and educated about triage process.    " VASCULAR SURGERY PROGRESS NOTE    Subjective: Patient examined at bedside this AM. Reports he is feeling slightly better than at time of admission. No new concerns. No acute events overnight.    Objective:  Vital Signs  T(C): 36.6 (02-19 @ 20:30), Max: 36.8 (02-19 @ 10:42)  HR: 95 (02-19 @ 20:30) (90 - 98)  BP: 138/75 (02-19 @ 20:30) (106/68 - 138/75)  RR: 20 (02-19 @ 20:30) (18 - 20)  SpO2: 95% (02-19 @ 20:30) (95% - 96%)  02-19-23 @ 07:01  -  02-20-23 @ 01:50  --------------------------------------------------------  IN:  Total IN: 0 mL    OUT:    Voided (mL): 600 mL  Total OUT: 600 mL    Total NET: -600 mL      PHYSICAL EXAM:   Constitutional: NAD, resting comfortably in stretcher  Respiratory: respirations are moderately labored, conversational dyspnea present  Gastrointestinal: Abdomen soft, non-tender, non-distended, no palpable masses  Extremities: Warm, well perfused, edema in BLE  Vascular: palpable B/L DP/PT, b/l popliteal, and b/l femoral pulses    Labs:                        11.1   7.10  )-----------( 211      ( 19 Feb 2023 09:05 )             36.7   02-19    140  |  106  |  23.7<H>  ----------------------------<  104<H>  4.0   |  23.0  |  1.26    Ca    9.0      19 Feb 2023 09:05  Mg     2.0     02-19    TPro  6.3<L>  /  Alb  3.4  /  TBili  0.8  /  DBili  x   /  AST  12  /  ALT  7   /  AlkPhos  73  02-19    CAPILLARY BLOOD GLUCOSE      POCT Blood Glucose.: 104 mg/dL (19 Feb 2023 08:34)

## 2023-04-06 NOTE — DISCHARGE PLANNING
RONALDO asked to assist with transportation of Pt back to her Group Home.  Pt noted to live at [facility] ([id number]  RONALDO called [facility]   ([phone]) and confirmed that Pt lives their and updated [facility] that the Pt has been cleared and is ready for discharge.  Cecy Blanc[facility] stated that she is unable to provided transportation. RONALDO arranged for [facility] to transport Pt home.  RONALDO updated ER RN via voalte and updated Cecy Blancas with follow up phone call.     [facility]  RES #[id number]  Quote $142.66

## 2023-04-06 NOTE — ED NOTES
"Pt discharged home with [facility] transport back to her group [facility]. Pt is alert and ambulatory with a x 1 assist. Patient WC out vehicle with discharge paperwork. IV discontinued and gauze placed, pt in possession of belongings. Pt provided discharge education and information pertaining to medications and follow up appointments. Pt received copy of discharge instructions and verbalized understanding.Discussed signs and symptoms to return to the ER, patient verbalized understanding.      /77   Pulse 77   Temp 36.4 °C (97.6 °F) (Temporal)   Resp 16   Ht 1.6 m (5' 3\")   Wt 90.7 kg (200 lb)   SpO2 93%   BMI 35.43 kg/m²    "

## 2023-04-06 NOTE — ED NOTES
Patient resting comfortably. Respirations even and unlabored. Patient aware she is awaiting ride by [facility] shortly.

## 2023-04-09 PROBLEM — E03.8 SUBCLINICAL HYPOTHYROIDISM: Status: RESOLVED | Noted: 2021-07-22 | Resolved: 2023-04-09

## 2023-04-12 NOTE — PROGRESS NOTES
Assumed patient care at 0700. Received report from night shift. Assessment completed. A&Ox1, self only. Denies pain at this time. No SOB or in any acute distress. Bed alarm on, pt wearing treaded socks. Personal possessions and call light placed within reach. Pt up to chair for breakfast.    Yes

## 2023-05-03 NOTE — PROGRESS NOTES
Dr Osei notified of patient blood pressure, new orders received for clonidine 0.1 q 6 hours PRN for systolic blood pressure greater than 170   room air

## 2023-06-03 PROBLEM — G47.33 OBSTRUCTIVE SLEEP APNEA: Status: ACTIVE | Noted: 2023-05-26

## 2023-06-03 PROBLEM — R05.1 ACUTE COUGH: Chronic | Status: ACTIVE | Noted: 2017-11-24

## 2023-06-03 PROBLEM — R05.1 ACUTE COUGH: Status: ACTIVE | Noted: 2017-11-24

## 2023-06-03 PROBLEM — G47.33 OBSTRUCTIVE SLEEP APNEA: Chronic | Status: ACTIVE | Noted: 2023-05-26

## 2023-07-23 PROBLEM — E66.812 CLASS 2 OBESITY WITH ALVEOLAR HYPOVENTILATION, SERIOUS COMORBIDITY, AND BODY MASS INDEX (BMI) OF 36.0 TO 36.9 IN ADULT (HCC): Status: ACTIVE | Noted: 2021-07-22

## 2023-07-23 PROBLEM — E66.812 CLASS 2 OBESITY WITH ALVEOLAR HYPOVENTILATION, SERIOUS COMORBIDITY, AND BODY MASS INDEX (BMI) OF 36.0 TO 36.9 IN ADULT (HCC): Chronic | Status: ACTIVE | Noted: 2021-07-22

## 2023-07-23 PROBLEM — E66.2 CLASS 2 OBESITY WITH ALVEOLAR HYPOVENTILATION, SERIOUS COMORBIDITY, AND BODY MASS INDEX (BMI) OF 36.0 TO 36.9 IN ADULT (HCC): Chronic | Status: ACTIVE | Noted: 2021-07-22

## 2023-07-23 PROBLEM — E66.2 CLASS 2 OBESITY WITH ALVEOLAR HYPOVENTILATION, SERIOUS COMORBIDITY, AND BODY MASS INDEX (BMI) OF 36.0 TO 36.9 IN ADULT (HCC): Status: ACTIVE | Noted: 2021-07-22
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
## 2023-10-15 PROBLEM — G31.9 CEREBRAL ATROPHY (HCC): Chronic | Status: ACTIVE | Noted: 2023-02-24

## 2023-12-02 NOTE — CARE PLAN
Problem: Venous Thromboembolism (VTW)/Deep Vein Thrombosis (DVT) Prevention:  Goal: Patient will participate in Venous Thrombosis (VTE)/Deep Vein Thrombosis (DVT)Prevention Measures    Intervention: Encourage ambulation/mobilization at level directed by Physical Therapy in collaboration with Interdisciplinary Team  Pt refusing heparin subcutaneous despite education. Ambulating in room. Cace[place].       Problem: Respiratory:  Goal: Respiratory status will improve    Intervention: Assess and monitor pulmonary status  Pt on ra. Respirations equal and unlabored. No sob.          Private car

## 2023-12-03 PROBLEM — H04.123 DRY EYE SYNDROME OF BOTH EYES: Chronic | Status: ACTIVE | Noted: 2023-12-01

## 2023-12-03 PROBLEM — H04.123 DRY EYE SYNDROME OF BOTH EYES: Status: ACTIVE | Noted: 2023-12-01

## 2024-02-06 PROBLEM — R54 FRAILTY: Status: ACTIVE | Noted: 2024-02-06

## 2024-02-06 PROBLEM — I70.0 ATHEROSCLEROSIS OF AORTA (HCC): Status: ACTIVE | Noted: 2024-02-06

## 2024-05-10 ENCOUNTER — HOSPITAL ENCOUNTER (OUTPATIENT)
Facility: MEDICAL CENTER | Age: 84
End: 2024-05-10
Attending: NURSE PRACTITIONER
Payer: MEDICARE

## 2024-05-10 DIAGNOSIS — E78.5 DYSLIPIDEMIA: Chronic | ICD-10-CM

## 2024-05-10 DIAGNOSIS — I50.42 CHRONIC COMBINED SYSTOLIC AND DIASTOLIC CONGESTIVE HEART FAILURE (HCC): Chronic | ICD-10-CM

## 2024-05-10 DIAGNOSIS — N39.0 E. COLI UTI (URINARY TRACT INFECTION): Chronic | ICD-10-CM

## 2024-05-10 DIAGNOSIS — Z79.899 HIGH RISK MEDICATION USE: ICD-10-CM

## 2024-05-10 DIAGNOSIS — B96.20 E. COLI UTI (URINARY TRACT INFECTION): Chronic | ICD-10-CM

## 2024-05-10 DIAGNOSIS — E55.9 VITAMIN D DEFICIENCY: Chronic | ICD-10-CM

## 2024-05-10 DIAGNOSIS — N32.81 OVERACTIVE BLADDER: Chronic | ICD-10-CM

## 2024-05-10 DIAGNOSIS — R73.03 PREDIABETES: ICD-10-CM

## 2024-05-10 DIAGNOSIS — I10 ESSENTIAL HYPERTENSION: Chronic | ICD-10-CM

## 2024-05-10 LAB
25(OH)D3 SERPL-MCNC: 54 NG/ML (ref 30–100)
ALBUMIN SERPL BCP-MCNC: 4.1 G/DL (ref 3.2–4.9)
ALBUMIN/GLOB SERPL: 2.1 G/DL
ALP SERPL-CCNC: 107 U/L (ref 30–99)
ALT SERPL-CCNC: 24 U/L (ref 2–50)
ANION GAP SERPL CALC-SCNC: 13 MMOL/L (ref 7–16)
APPEARANCE UR: ABNORMAL
AST SERPL-CCNC: 20 U/L (ref 12–45)
BACTERIA #/AREA URNS HPF: ABNORMAL /HPF
BASOPHILS # BLD AUTO: 0.8 % (ref 0–1.8)
BASOPHILS # BLD: 0.05 K/UL (ref 0–0.12)
BILIRUB SERPL-MCNC: 0.3 MG/DL (ref 0.1–1.5)
BILIRUB UR QL STRIP.AUTO: NEGATIVE
BUN SERPL-MCNC: 15 MG/DL (ref 8–22)
CALCIUM ALBUM COR SERPL-MCNC: 9.3 MG/DL (ref 8.5–10.5)
CALCIUM SERPL-MCNC: 9.4 MG/DL (ref 8.5–10.5)
CHLORIDE SERPL-SCNC: 106 MMOL/L (ref 96–112)
CHOLEST SERPL-MCNC: 130 MG/DL (ref 100–199)
CO2 SERPL-SCNC: 27 MMOL/L (ref 20–33)
COLOR UR: YELLOW
CREAT SERPL-MCNC: 0.89 MG/DL (ref 0.5–1.4)
EOSINOPHIL # BLD AUTO: 0.17 K/UL (ref 0–0.51)
EOSINOPHIL NFR BLD: 2.9 % (ref 0–6.9)
EPI CELLS #/AREA URNS HPF: ABNORMAL /HPF
ERYTHROCYTE [DISTWIDTH] IN BLOOD BY AUTOMATED COUNT: 49.6 FL (ref 35.9–50)
EST. AVERAGE GLUCOSE BLD GHB EST-MCNC: 128 MG/DL
FOLATE SERPL-MCNC: 5.1 NG/ML
GFR SERPLBLD CREATININE-BSD FMLA CKD-EPI: 64 ML/MIN/1.73 M 2
GLOBULIN SER CALC-MCNC: 2 G/DL (ref 1.9–3.5)
GLUCOSE SERPL-MCNC: 136 MG/DL (ref 65–99)
GLUCOSE UR STRIP.AUTO-MCNC: NEGATIVE MG/DL
HBA1C MFR BLD: 6.1 % (ref 4–5.6)
HCT VFR BLD AUTO: 47.1 % (ref 37–47)
HDLC SERPL-MCNC: 53 MG/DL
HGB BLD-MCNC: 14.7 G/DL (ref 12–16)
HYALINE CASTS #/AREA URNS LPF: ABNORMAL /LPF
IMM GRANULOCYTES # BLD AUTO: 0.02 K/UL (ref 0–0.11)
IMM GRANULOCYTES NFR BLD AUTO: 0.3 % (ref 0–0.9)
KETONES UR STRIP.AUTO-MCNC: ABNORMAL MG/DL
LDLC SERPL CALC-MCNC: 46 MG/DL
LEUKOCYTE ESTERASE UR QL STRIP.AUTO: ABNORMAL
LYMPHOCYTES # BLD AUTO: 1.22 K/UL (ref 1–4.8)
LYMPHOCYTES NFR BLD: 20.5 % (ref 22–41)
MCH RBC QN AUTO: 26.9 PG (ref 27–33)
MCHC RBC AUTO-ENTMCNC: 31.2 G/DL (ref 32.2–35.5)
MCV RBC AUTO: 86.3 FL (ref 81.4–97.8)
MICRO URNS: ABNORMAL
MONOCYTES # BLD AUTO: 0.51 K/UL (ref 0–0.85)
MONOCYTES NFR BLD AUTO: 8.6 % (ref 0–13.4)
NEUTROPHILS # BLD AUTO: 3.99 K/UL (ref 1.82–7.42)
NEUTROPHILS NFR BLD: 66.9 % (ref 44–72)
NITRITE UR QL STRIP.AUTO: POSITIVE
NRBC # BLD AUTO: 0 K/UL
NRBC BLD-RTO: 0 /100 WBC (ref 0–0.2)
PH UR STRIP.AUTO: 5.5 [PH] (ref 5–8)
PLATELET # BLD AUTO: 217 K/UL (ref 164–446)
PMV BLD AUTO: 12.1 FL (ref 9–12.9)
POTASSIUM SERPL-SCNC: 3.9 MMOL/L (ref 3.6–5.5)
PROT SERPL-MCNC: 6.1 G/DL (ref 6–8.2)
PROT UR QL STRIP: NEGATIVE MG/DL
RBC # BLD AUTO: 5.46 M/UL (ref 4.2–5.4)
RBC # URNS HPF: ABNORMAL /HPF
RBC UR QL AUTO: NEGATIVE
SODIUM SERPL-SCNC: 146 MMOL/L (ref 135–145)
SP GR UR STRIP.AUTO: 1.02
TRIGL SERPL-MCNC: 155 MG/DL (ref 0–149)
TSH SERPL DL<=0.005 MIU/L-ACNC: 4.29 UIU/ML (ref 0.38–5.33)
UROBILINOGEN UR STRIP.AUTO-MCNC: 0.2 MG/DL
VIT B12 SERPL-MCNC: 362 PG/ML (ref 211–911)
WBC # BLD AUTO: 6 K/UL (ref 4.8–10.8)
WBC #/AREA URNS HPF: ABNORMAL /HPF

## 2024-05-13 LAB
BACTERIA UR CULT: ABNORMAL
SIGNIFICANT IND 70042: ABNORMAL
SITE SITE: ABNORMAL
SOURCE SOURCE: ABNORMAL

## 2024-06-02 PROBLEM — I70.0 ATHEROSCLEROSIS OF AORTA (HCC): Chronic | Status: ACTIVE | Noted: 2024-02-06

## 2024-06-02 PROBLEM — I50.42 CHRONIC COMBINED SYSTOLIC AND DIASTOLIC CONGESTIVE HEART FAILURE (HCC): Status: ACTIVE | Noted: 2021-07-22

## 2024-06-02 PROBLEM — J43.9 EMPHYSEMA, UNSPECIFIED (HCC): Status: ACTIVE | Noted: 2021-07-22

## 2024-06-02 PROBLEM — R73.03 PREDIABETES: Chronic | Status: ACTIVE | Noted: 2021-07-22

## 2024-06-02 PROBLEM — J43.9 EMPHYSEMA, UNSPECIFIED (HCC): Chronic | Status: ACTIVE | Noted: 2021-07-22

## 2024-06-02 PROBLEM — I50.42 CHRONIC COMBINED SYSTOLIC AND DIASTOLIC CONGESTIVE HEART FAILURE (HCC): Chronic | Status: ACTIVE | Noted: 2021-07-22

## 2024-08-07 NOTE — PROGRESS NOTES
Report received. Assumed care. Pt in bed awake. A/O x3 VSS. Responds appropriately. Denies pain, SOB. Assessment complete. Discussed POC, pain control, mobility, safety, DC planning, pt verbalizes understanding. Explained importance of calling before getting OOB. Call light and belongings within reach. Bed alarm on. Bed in the lowest position. Treaded socks in place. Hourly rounding in progress. Will continue to monitor .   Seen by neuropsych on 6/27 and was deemed NOT to have medical decision making capacity

## 2024-08-24 PROBLEM — K21.9 GERD WITHOUT ESOPHAGITIS: Chronic | Status: ACTIVE | Noted: 2024-08-15

## 2024-08-24 PROBLEM — Z91.81 AT MAXIMUM RISK FOR FALL: Status: ACTIVE | Noted: 2024-08-15

## 2024-08-24 PROBLEM — Z91.81 AT MAXIMUM RISK FOR FALL: Chronic | Status: ACTIVE | Noted: 2024-08-15

## 2024-08-24 PROBLEM — K21.9 GERD WITHOUT ESOPHAGITIS: Status: ACTIVE | Noted: 2024-08-15

## 2024-10-08 ENCOUNTER — HOSPITAL ENCOUNTER (OUTPATIENT)
Facility: MEDICAL CENTER | Age: 84
End: 2024-10-08
Payer: MEDICARE

## 2024-10-08 DIAGNOSIS — R30.0 DYSURIA: ICD-10-CM

## 2024-10-08 LAB
APPEARANCE UR: CLEAR
BACTERIA #/AREA URNS HPF: ABNORMAL /HPF
BILIRUB UR QL STRIP.AUTO: NEGATIVE
COLOR UR: YELLOW
EPI CELLS #/AREA URNS HPF: ABNORMAL /HPF
GLUCOSE UR STRIP.AUTO-MCNC: NEGATIVE MG/DL
HYALINE CASTS #/AREA URNS LPF: ABNORMAL /LPF
KETONES UR STRIP.AUTO-MCNC: NEGATIVE MG/DL
LEUKOCYTE ESTERASE UR QL STRIP.AUTO: ABNORMAL
MICRO URNS: ABNORMAL
NITRITE UR QL STRIP.AUTO: POSITIVE
PH UR STRIP.AUTO: 7 [PH] (ref 5–8)
PROT UR QL STRIP: NEGATIVE MG/DL
RBC # URNS HPF: ABNORMAL /HPF
RBC UR QL AUTO: NEGATIVE
SP GR UR STRIP.AUTO: 1.01
UROBILINOGEN UR STRIP.AUTO-MCNC: 1 MG/DL
WBC #/AREA URNS HPF: ABNORMAL /HPF

## 2024-10-08 PROCEDURE — 87077 CULTURE AEROBIC IDENTIFY: CPT | Mod: 91

## 2024-10-08 PROCEDURE — 81001 URINALYSIS AUTO W/SCOPE: CPT

## 2024-10-08 PROCEDURE — 87186 SC STD MICRODIL/AGAR DIL: CPT

## 2024-10-08 PROCEDURE — 87086 URINE CULTURE/COLONY COUNT: CPT

## 2024-10-21 PROBLEM — M15.0 PRIMARY OSTEOARTHRITIS INVOLVING MULTIPLE JOINTS: Status: ACTIVE | Noted: 2024-10-17

## 2024-10-21 PROBLEM — M15.0 PRIMARY OSTEOARTHRITIS INVOLVING MULTIPLE JOINTS: Chronic | Status: ACTIVE | Noted: 2024-10-17

## 2024-12-28 ENCOUNTER — HOSPITAL ENCOUNTER (INPATIENT)
Facility: MEDICAL CENTER | Age: 84
LOS: 3 days | End: 2024-12-31
Attending: EMERGENCY MEDICINE | Admitting: SURGERY
Payer: MEDICARE

## 2024-12-28 ENCOUNTER — APPOINTMENT (OUTPATIENT)
Dept: RADIOLOGY | Facility: MEDICAL CENTER | Age: 84
DRG: 184 | End: 2024-12-28
Attending: EMERGENCY MEDICINE
Payer: MEDICARE

## 2024-12-28 DIAGNOSIS — S22.42XA CLOSED FRACTURE OF MULTIPLE RIBS OF LEFT SIDE, INITIAL ENCOUNTER: ICD-10-CM

## 2024-12-28 DIAGNOSIS — S22.41XA CLOSED FRACTURE OF MULTIPLE RIBS OF RIGHT SIDE, INITIAL ENCOUNTER: ICD-10-CM

## 2024-12-28 DIAGNOSIS — T14.90XA TRAUMA: ICD-10-CM

## 2024-12-28 PROBLEM — Z78.9 LIVES IN GROUP HOME: Status: ACTIVE | Noted: 2024-12-28

## 2024-12-28 PROBLEM — S30.1XXA: Status: ACTIVE | Noted: 2024-12-28

## 2024-12-28 PROBLEM — Z53.09 CONTRAINDICATION TO DEEP VEIN THROMBOSIS (DVT) PROPHYLAXIS: Status: ACTIVE | Noted: 2024-12-28

## 2024-12-28 PROBLEM — F03.90 DEMENTIA (HCC): Status: ACTIVE | Noted: 2024-12-28

## 2024-12-28 PROBLEM — Z99.81 DEPENDENCE ON NOCTURNAL OXYGEN THERAPY: Status: ACTIVE | Noted: 2024-12-28

## 2024-12-28 PROBLEM — Z01.89 ENCOUNTER FOR GERIATRIC ASSESSMENT: Status: ACTIVE | Noted: 2024-12-28

## 2024-12-28 LAB
ALBUMIN SERPL BCP-MCNC: 4 G/DL (ref 3.2–4.9)
ALBUMIN/GLOB SERPL: 1.4 G/DL
ALP SERPL-CCNC: 95 U/L (ref 30–99)
ALT SERPL-CCNC: 18 U/L (ref 2–50)
ANION GAP SERPL CALC-SCNC: 10 MMOL/L (ref 7–16)
AST SERPL-CCNC: 20 U/L (ref 12–45)
BASOPHILS # BLD AUTO: 0.3 % (ref 0–1.8)
BASOPHILS # BLD: 0.03 K/UL (ref 0–0.12)
BILIRUB SERPL-MCNC: 0.3 MG/DL (ref 0.1–1.5)
BUN SERPL-MCNC: 19 MG/DL (ref 8–22)
CALCIUM ALBUM COR SERPL-MCNC: 9.7 MG/DL (ref 8.5–10.5)
CALCIUM SERPL-MCNC: 9.7 MG/DL (ref 8.5–10.5)
CHLORIDE SERPL-SCNC: 107 MMOL/L (ref 96–112)
CO2 SERPL-SCNC: 28 MMOL/L (ref 20–33)
CREAT SERPL-MCNC: 1.02 MG/DL (ref 0.5–1.4)
EKG IMPRESSION: NORMAL
EKG IMPRESSION: NORMAL
EOSINOPHIL # BLD AUTO: 0.19 K/UL (ref 0–0.51)
EOSINOPHIL NFR BLD: 2.1 % (ref 0–6.9)
ERYTHROCYTE [DISTWIDTH] IN BLOOD BY AUTOMATED COUNT: 46.6 FL (ref 35.9–50)
GFR SERPLBLD CREATININE-BSD FMLA CKD-EPI: 54 ML/MIN/1.73 M 2
GLOBULIN SER CALC-MCNC: 2.8 G/DL (ref 1.9–3.5)
GLUCOSE BLD STRIP.AUTO-MCNC: 100 MG/DL (ref 65–99)
GLUCOSE BLD STRIP.AUTO-MCNC: 100 MG/DL (ref 65–99)
GLUCOSE BLD STRIP.AUTO-MCNC: 112 MG/DL (ref 65–99)
GLUCOSE SERPL-MCNC: 124 MG/DL (ref 65–99)
HCT VFR BLD AUTO: 40.2 % (ref 37–47)
HGB BLD-MCNC: 12.9 G/DL (ref 12–16)
IMM GRANULOCYTES # BLD AUTO: 0.03 K/UL (ref 0–0.11)
IMM GRANULOCYTES NFR BLD AUTO: 0.3 % (ref 0–0.9)
LYMPHOCYTES # BLD AUTO: 1.07 K/UL (ref 1–4.8)
LYMPHOCYTES NFR BLD: 11.6 % (ref 22–41)
MCH RBC QN AUTO: 25.9 PG (ref 27–33)
MCHC RBC AUTO-ENTMCNC: 32.1 G/DL (ref 32.2–35.5)
MCV RBC AUTO: 80.6 FL (ref 81.4–97.8)
MONOCYTES # BLD AUTO: 0.65 K/UL (ref 0–0.85)
MONOCYTES NFR BLD AUTO: 7 % (ref 0–13.4)
NEUTROPHILS # BLD AUTO: 7.28 K/UL (ref 1.82–7.42)
NEUTROPHILS NFR BLD: 78.7 % (ref 44–72)
NRBC # BLD AUTO: 0 K/UL
NRBC BLD-RTO: 0 /100 WBC (ref 0–0.2)
PLATELET # BLD AUTO: 226 K/UL (ref 164–446)
PMV BLD AUTO: 10.9 FL (ref 9–12.9)
POTASSIUM SERPL-SCNC: 4.3 MMOL/L (ref 3.6–5.5)
PROT SERPL-MCNC: 6.8 G/DL (ref 6–8.2)
RBC # BLD AUTO: 4.99 M/UL (ref 4.2–5.4)
SODIUM SERPL-SCNC: 145 MMOL/L (ref 135–145)
TROPONIN T SERPL-MCNC: 22 NG/L (ref 6–19)
WBC # BLD AUTO: 9.3 K/UL (ref 4.8–10.8)

## 2024-12-28 PROCEDURE — A9270 NON-COVERED ITEM OR SERVICE: HCPCS | Mod: UD | Performed by: EMERGENCY MEDICINE

## 2024-12-28 PROCEDURE — 770022 HCHG ROOM/CARE - ICU (200)

## 2024-12-28 PROCEDURE — 700101 HCHG RX REV CODE 250: Performed by: EMERGENCY MEDICINE

## 2024-12-28 PROCEDURE — A9270 NON-COVERED ITEM OR SERVICE: HCPCS | Performed by: NURSE PRACTITIONER

## 2024-12-28 PROCEDURE — 99232 SBSQ HOSP IP/OBS MODERATE 35: CPT | Performed by: SURGERY

## 2024-12-28 PROCEDURE — 84484 ASSAY OF TROPONIN QUANT: CPT

## 2024-12-28 PROCEDURE — 700111 HCHG RX REV CODE 636 W/ 250 OVERRIDE (IP): Mod: JG | Performed by: NURSE PRACTITIONER

## 2024-12-28 PROCEDURE — 700102 HCHG RX REV CODE 250 W/ 637 OVERRIDE(OP): Performed by: NURSE PRACTITIONER

## 2024-12-28 PROCEDURE — 85025 COMPLETE CBC W/AUTO DIFF WBC: CPT

## 2024-12-28 PROCEDURE — 93005 ELECTROCARDIOGRAM TRACING: CPT | Mod: TC | Performed by: EMERGENCY MEDICINE

## 2024-12-28 PROCEDURE — 99291 CRITICAL CARE FIRST HOUR: CPT

## 2024-12-28 PROCEDURE — 80053 COMPREHEN METABOLIC PANEL: CPT

## 2024-12-28 PROCEDURE — 93005 ELECTROCARDIOGRAM TRACING: CPT | Mod: TC

## 2024-12-28 PROCEDURE — 99222 1ST HOSP IP/OBS MODERATE 55: CPT | Performed by: PSYCHIATRY & NEUROLOGY

## 2024-12-28 PROCEDURE — 71260 CT THORAX DX C+: CPT

## 2024-12-28 PROCEDURE — 82962 GLUCOSE BLOOD TEST: CPT | Mod: 91

## 2024-12-28 PROCEDURE — 93010 ELECTROCARDIOGRAM REPORT: CPT | Performed by: INTERNAL MEDICINE

## 2024-12-28 PROCEDURE — 93005 ELECTROCARDIOGRAM TRACING: CPT | Mod: TC | Performed by: INTERNAL MEDICINE

## 2024-12-28 PROCEDURE — 700117 HCHG RX CONTRAST REV CODE 255: Mod: UD | Performed by: EMERGENCY MEDICINE

## 2024-12-28 PROCEDURE — 99222 1ST HOSP IP/OBS MODERATE 55: CPT | Performed by: INTERNAL MEDICINE

## 2024-12-28 PROCEDURE — 36415 COLL VENOUS BLD VENIPUNCTURE: CPT

## 2024-12-28 PROCEDURE — 700102 HCHG RX REV CODE 250 W/ 637 OVERRIDE(OP): Mod: UD | Performed by: EMERGENCY MEDICINE

## 2024-12-28 RX ORDER — POLYETHYLENE GLYCOL 3350 17 G/17G
1 POWDER, FOR SOLUTION ORAL 2 TIMES DAILY
Status: DISCONTINUED | OUTPATIENT
Start: 2024-12-28 | End: 2024-12-31 | Stop reason: HOSPADM

## 2024-12-28 RX ORDER — CARBOXYMETHYLCELLULOSE SODIUM 5 MG/ML
1 SOLUTION/ DROPS OPHTHALMIC 2 TIMES DAILY
Status: DISCONTINUED | OUTPATIENT
Start: 2024-12-28 | End: 2024-12-31 | Stop reason: HOSPADM

## 2024-12-28 RX ORDER — LIDOCAINE 4 G/G
1 PATCH TOPICAL ONCE
Status: COMPLETED | OUTPATIENT
Start: 2024-12-28 | End: 2024-12-29

## 2024-12-28 RX ORDER — HYDROMORPHONE HYDROCHLORIDE 1 MG/ML
0.25 INJECTION, SOLUTION INTRAMUSCULAR; INTRAVENOUS; SUBCUTANEOUS
Status: DISCONTINUED | OUTPATIENT
Start: 2024-12-28 | End: 2024-12-31 | Stop reason: HOSPADM

## 2024-12-28 RX ORDER — ATORVASTATIN CALCIUM 40 MG/1
80 TABLET, FILM COATED ORAL EVERY EVENING
Status: DISCONTINUED | OUTPATIENT
Start: 2024-12-28 | End: 2024-12-31 | Stop reason: HOSPADM

## 2024-12-28 RX ORDER — OXYCODONE HYDROCHLORIDE 5 MG/1
5 TABLET ORAL
Status: DISCONTINUED | OUTPATIENT
Start: 2024-12-28 | End: 2024-12-31 | Stop reason: HOSPADM

## 2024-12-28 RX ORDER — LABETALOL HYDROCHLORIDE 5 MG/ML
10 INJECTION, SOLUTION INTRAVENOUS EVERY 4 HOURS PRN
Status: DISCONTINUED | OUTPATIENT
Start: 2024-12-28 | End: 2024-12-31 | Stop reason: HOSPADM

## 2024-12-28 RX ORDER — VITAMIN B COMPLEX
2000 TABLET ORAL DAILY
Status: DISCONTINUED | OUTPATIENT
Start: 2024-12-29 | End: 2024-12-31 | Stop reason: HOSPADM

## 2024-12-28 RX ORDER — ACETAMINOPHEN 325 MG/1
650 TABLET ORAL EVERY 6 HOURS PRN
Status: DISCONTINUED | OUTPATIENT
Start: 2025-01-02 | End: 2024-12-31 | Stop reason: HOSPADM

## 2024-12-28 RX ORDER — DOCUSATE SODIUM 100 MG/1
100 CAPSULE, LIQUID FILLED ORAL 2 TIMES DAILY
Status: DISCONTINUED | OUTPATIENT
Start: 2024-12-28 | End: 2024-12-31 | Stop reason: HOSPADM

## 2024-12-28 RX ORDER — FAMOTIDINE 20 MG/1
20 TABLET, FILM COATED ORAL DAILY
Status: DISCONTINUED | OUTPATIENT
Start: 2024-12-28 | End: 2024-12-28

## 2024-12-28 RX ORDER — ONDANSETRON 4 MG/1
4 TABLET, ORALLY DISINTEGRATING ORAL EVERY 4 HOURS PRN
Status: DISCONTINUED | OUTPATIENT
Start: 2024-12-28 | End: 2024-12-31 | Stop reason: HOSPADM

## 2024-12-28 RX ORDER — ONDANSETRON 2 MG/ML
4 INJECTION INTRAMUSCULAR; INTRAVENOUS EVERY 4 HOURS PRN
Status: DISCONTINUED | OUTPATIENT
Start: 2024-12-28 | End: 2024-12-31 | Stop reason: HOSPADM

## 2024-12-28 RX ORDER — DEXTROSE MONOHYDRATE 25 G/50ML
25 INJECTION, SOLUTION INTRAVENOUS
Status: DISCONTINUED | OUTPATIENT
Start: 2024-12-28 | End: 2024-12-29

## 2024-12-28 RX ORDER — AMOXICILLIN 250 MG
1 CAPSULE ORAL NIGHTLY
Status: DISCONTINUED | OUTPATIENT
Start: 2024-12-28 | End: 2024-12-31 | Stop reason: HOSPADM

## 2024-12-28 RX ORDER — AMOXICILLIN 250 MG
1 CAPSULE ORAL
Status: DISCONTINUED | OUTPATIENT
Start: 2024-12-28 | End: 2024-12-31 | Stop reason: HOSPADM

## 2024-12-28 RX ORDER — MELOXICAM 7.5 MG/1
15 TABLET ORAL EVERY MORNING
Status: DISCONTINUED | OUTPATIENT
Start: 2024-12-29 | End: 2024-12-31 | Stop reason: HOSPADM

## 2024-12-28 RX ORDER — DULOXETIN HYDROCHLORIDE 30 MG/1
30 CAPSULE, DELAYED RELEASE ORAL DAILY
Status: DISCONTINUED | OUTPATIENT
Start: 2024-12-29 | End: 2024-12-29

## 2024-12-28 RX ORDER — LIDOCAINE 4 G/G
1-2 PATCH TOPICAL EVERY 24 HOURS
Status: DISCONTINUED | OUTPATIENT
Start: 2024-12-29 | End: 2024-12-31 | Stop reason: HOSPADM

## 2024-12-28 RX ORDER — GABAPENTIN 300 MG/1
300 CAPSULE ORAL ONCE
Status: COMPLETED | OUTPATIENT
Start: 2024-12-28 | End: 2024-12-28

## 2024-12-28 RX ORDER — INSULIN LISPRO 100 [IU]/ML
1-6 INJECTION, SOLUTION INTRAVENOUS; SUBCUTANEOUS EVERY 6 HOURS
Status: DISCONTINUED | OUTPATIENT
Start: 2024-12-28 | End: 2024-12-29

## 2024-12-28 RX ORDER — ACETAMINOPHEN 325 MG/1
650 TABLET ORAL EVERY 6 HOURS
Status: DISCONTINUED | OUTPATIENT
Start: 2024-12-28 | End: 2024-12-31 | Stop reason: HOSPADM

## 2024-12-28 RX ORDER — BISACODYL 10 MG
10 SUPPOSITORY, RECTAL RECTAL
Status: DISCONTINUED | OUTPATIENT
Start: 2024-12-28 | End: 2024-12-31 | Stop reason: HOSPADM

## 2024-12-28 RX ORDER — AMLODIPINE BESYLATE 10 MG/1
10 TABLET ORAL DAILY
Status: DISCONTINUED | OUTPATIENT
Start: 2024-12-29 | End: 2024-12-31 | Stop reason: HOSPADM

## 2024-12-28 RX ORDER — OXYBUTYNIN CHLORIDE 5 MG/1
5 TABLET, EXTENDED RELEASE ORAL DAILY
Status: DISCONTINUED | OUTPATIENT
Start: 2024-12-29 | End: 2024-12-31 | Stop reason: HOSPADM

## 2024-12-28 RX ORDER — BENAZEPRIL HYDROCHLORIDE 20 MG/1
40 TABLET ORAL DAILY
Status: DISCONTINUED | OUTPATIENT
Start: 2024-12-29 | End: 2024-12-31 | Stop reason: HOSPADM

## 2024-12-28 RX ORDER — OXYCODONE HYDROCHLORIDE 5 MG/1
2.5 TABLET ORAL
Status: DISCONTINUED | OUTPATIENT
Start: 2024-12-28 | End: 2024-12-31 | Stop reason: HOSPADM

## 2024-12-28 RX ADMIN — IOHEXOL 100 ML: 350 INJECTION, SOLUTION INTRAVENOUS at 11:38

## 2024-12-28 RX ADMIN — ATORVASTATIN CALCIUM 80 MG: 40 TABLET, FILM COATED ORAL at 17:10

## 2024-12-28 RX ADMIN — ACETAMINOPHEN 650 MG: 325 TABLET ORAL at 17:10

## 2024-12-28 RX ADMIN — ACETAMINOPHEN 650 MG: 325 TABLET ORAL at 14:21

## 2024-12-28 RX ADMIN — LIDOCAINE PAIN RELIEF 1 PATCH: 560 PATCH TOPICAL at 13:25

## 2024-12-28 RX ADMIN — ACETAMINOPHEN 650 MG: 325 TABLET ORAL at 23:29

## 2024-12-28 RX ADMIN — GABAPENTIN 300 MG: 300 CAPSULE ORAL at 13:25

## 2024-12-28 RX ADMIN — CARBOXYMETHYLCELLULOSE SODIUM 1 DROP: 5 SOLUTION/ DROPS OPHTHALMIC at 18:49

## 2024-12-28 RX ADMIN — Medication 5 MG: at 17:10

## 2024-12-28 RX ADMIN — OXYCODONE 2.5 MG: 5 TABLET ORAL at 14:22

## 2024-12-28 RX ADMIN — OXYCODONE 2.5 MG: 5 TABLET ORAL at 23:28

## 2024-12-28 RX ADMIN — LABETALOL HYDROCHLORIDE 10 MG: 5 INJECTION, SOLUTION INTRAVENOUS at 14:40

## 2024-12-28 ASSESSMENT — ENCOUNTER SYMPTOMS
DIARRHEA: 0
PHOTOPHOBIA: 0
WEIGHT LOSS: 0
NECK PAIN: 0
CONSTIPATION: 0
WEAKNESS: 0
DOUBLE VISION: 0
COUGH: 0
DIZZINESS: 0
VOMITING: 0
SPEECH CHANGE: 0
HEMOPTYSIS: 0
BLURRED VISION: 0
ORTHOPNEA: 0
CLAUDICATION: 0
PALPITATIONS: 0
MYALGIAS: 0
NAUSEA: 0
ABDOMINAL PAIN: 0
CHILLS: 0
FEVER: 0

## 2024-12-28 ASSESSMENT — PATIENT HEALTH QUESTIONNAIRE - PHQ9
SUM OF ALL RESPONSES TO PHQ9 QUESTIONS 1 AND 2: 0
2. FEELING DOWN, DEPRESSED, IRRITABLE, OR HOPELESS: NOT AT ALL
1. LITTLE INTEREST OR PLEASURE IN DOING THINGS: NOT AT ALL

## 2024-12-28 ASSESSMENT — FIBROSIS 4 INDEX
FIB4 SCORE: 1.77
FIB4 SCORE: 1.6

## 2024-12-28 ASSESSMENT — PAIN DESCRIPTION - PAIN TYPE
TYPE: ACUTE PAIN

## 2024-12-28 NOTE — H&P
TRAUMA HISTORY AND PHYSICAL    CHIEF COMPLAINT: .  Fall rib fractures hypoxia    HISTORY OF PRESENT ILLNESS: Maria Esther Valencia is a very pleasant 84-year-old female seen by the emergency department after a fall.  She received imaging demonstrating rib fractures.  Trauma surgery was consulted.    Maria Esther Valencia is awake alert friendly cooperative.  She is oriented person place situation.  She is not oriented to time.  She is friendly and cooperative.  She reports slip and fall while going to the bathroom last night.  She reports ongoing pain in her left chest.  She reports pain made worse with deep breathing or movement.  She states she did not hit her head.  She states no loss of consciousness.  She reports no neck pain no numbness tingling or weakness in her body.  She reports able to walk after the injury.  She states tolerating diet without difficulty.      The patient was triaged as a T-5000 in accordance with established pre hospital protocols. An expeditious primary and secondary survey with required adjuncts was conducted. See Trauma Narrator for full details.    PAST MEDICAL HISTORY:  has a past medical history of Acute renal insufficiency (9/21/2017), Bradycardia (11/18/2017), Bronchitis (1/7/2018), CAD (coronary artery disease), Compression fracture of spine ([facility]), Congestion of upper airway (11/26/2017), Congestive heart failure ([facility]), Cough (11/24/2017), Dementia ([facility]), Elevated hemoglobin ([facility]) (7/22/2021), GI bleed, Heme positive stool (7/29/2017), Hypertension, Insomnia (10/10/2017), Intractable back pain (7/29/2017), Iron deficiency anemia (12/27/2017), Psychiatric disorder, Stroke ([facility]), Subclinical hypothyroidism (7/22/2021), and UTI (urinary tract infection) (9/22/2017).     PAST SURGICAL HISTORY:  has a past surgical history that includes other cardiac surgery.    ALLERGIES:   Allergies   Allergen Reactions    Seroquel [Quetiapine] Unspecified     Causes increased confusion and drowsiness    Pcn  [Penicillins] Unspecified     Per historical. Pt cannot verify what reaction she had      CURRENT MEDICATIONS:    Home Medications       Reviewed by Odilon Humphrey (Pharmacy Tech) on 12/28/24 at 1344  Med List Status: Complete     Medication Last Dose Status   acetaminophen (TYLENOL) 325 MG Tab Unknown Active   alendronate (FOSAMAX) 70 MG Tab 12/22/2024 Active   amLODIPine (NORVASC) 10 MG Tab 12/28/2024 Active   ANBESOL MAXIMUM STRENGTH 20 % Gel topical gel Unknown Active   atorvastatin (LIPITOR) 80 MG tablet 12/27/2024 Active   benazepril (LOTENSIN) 40 MG tablet 12/28/2024 Active   benzonatate (TESSALON) 100 MG Cap Unknown Active   carboxymethylcellulose (REFRESH TEARS) 0.5 % Solution 12/28/2024 Active   Cholecalciferol (VITAMIN D) 2000 UNIT Tab 12/28/2024 Active   DULoxetine (CYMBALTA) 30 MG Cap DR Particles 12/28/2024 Active   melatonin 5 mg Tab 12/26/2024 Active   meloxicam (MOBIC) 15 MG tablet 12/28/2024 Active   oxybutynin SR (DITROPAN-XL) 5 MG TABLET SR 24 HR 12/28/2024 Active   pantoprazole (PROTONIX) 40 MG Tablet Delayed Response 12/28/2024 Active   phenazopyridine (PYRIDIUM) 100 MG Tab Unknown Active   senna-docusate (STIMULANT LAXATIVE) 8.6-50 MG Tab Unknown Active   umeclidinium-vilanterol (ANORO ELLIPTA) 62.5-25 MCG/ACT AEROSOL POWDER, BREATH ACTIVATED inhaler 12/28/2024 Active                  Audit from Redirected Encounters    **Home medications have not yet been reviewed for this encounter**       FAMILY HISTORY: family history includes Heart Attack in her father and mother.    SOCIAL HISTORY:  reports that she quit smoking about 8 years ago. Her smoking use included cigarettes. She has never used smokeless tobacco. She reports that she does not drink alcohol and does not use drugs.    REVIEW OF SYSTEMS:  Is negative with the exception of the aforementioned details in the history of present illness, past medical history, and past surgical history in accordance with CMS guidelines.    PHYSICAL  "EXAMINATION:     CONSTITUTIONAL:     Vital Signs: BP (!) 197/73   Pulse 66   Temp (!) 35.5 °C (95.9 °F) (Temporal)   Resp 18   Ht 1.6 m (5' 3\")   Wt 81.1 kg (178 lb 12.7 oz)   SpO2 95%    General Appearance: appears stated age.  No respiratory distress  HEENT:    No facial tenderness no bleeding ears nose or mouth  NECK:    No cervical spine tenderness. The trachea is midline. There is no jugulovenous distention or cervical crepitance.   RESPIRATORY:   No respiratory distress breathing with ease able to speak in full sentences no cough.  Supplemental oxygen nasal cannula.  Left anterior chest wall tenderness breath sounds bilateral  CARDIOVASCULAR:   Skin warm palpable pulse  ABDOMEN:   Soft no guarding no rebound  MUSCULOSKELETAL:   The pelvis is stable.  No significant angulation, deformity, or soft tissue injury involving the upper and lower extremities. Normal range of motion.     SKIN:    Appropriate color and temperature.  NEUROLOGIC:    GCS 14.  Friendly cooperative oriented to self place and situation not oriented to time.  PSYCHIATRIC:   Appropriate mood and behavior.    LABORATORY VALUES:   Recent Labs     12/28/24  1017   WBC 9.3   RBC 4.99   HEMOGLOBIN 12.9   HEMATOCRIT 40.2   MCV 80.6*   MCH 25.9*   MCHC 32.1*   RDW 46.6   PLATELETCT 226   MPV 10.9     Recent Labs     12/28/24  1017   SODIUM 145   POTASSIUM 4.3   CHLORIDE 107   CO2 28   GLUCOSE 124*   BUN 19   CREATININE 1.02   CALCIUM 9.7     Recent Labs     12/28/24  1017   ASTSGOT 20   ALTSGPT 18   TBILIRUBIN 0.3   ALKPHOSPHAT 95   GLOBULIN 2.8            IMAGING:   CT-CHEST,ABDOMEN,PELVIS WITH   Final Result         1. Acute displaced fractures of the left anterior 7th, 8th and 9th ribs.   2. Subcutaneous stranding along the left flank.          IMPRESSION AND PLAN:     Active Hospital Problems    Diagnosis     Closed fracture of multiple ribs of right side [S22.41XA]      Priority: High    Right flank hematoma, initial encounter [S30.1XXA]  " "    Priority: High    COPD without exacerbation ([facility]) [J44.9]      Priority: High     Patient has no known recent COPD exacerbation, productive cough, fever or wheezing.  Oxygen saturation 93% in room air recorded today.  Patient recently started nasal oxygen 2 L/min at night for sleep apnea/sleep-related hypoventilation.  Patient's cough symptoms resolved.  Patient's chest x-ray from 5/5/2023 showed cardiomegaly with likely mild CHF, questionable small left pleural effusion but no consolidation, or mass identified.  Previously patient was evaluated on 4/5/2023 at [facility] ER for cough \" she ate some seasoned potatoes today and thinks she inhaled some of the spices and reports a coughing fit x 2 hours. \"The nursing home made me come in.\"  Patient had only complaint of sore throat in the ER.  Patient discharged to her group home with no new medication.  No lab or chest x-ray was done in the ED.  EKG showed sinus rhythm. Patient's oxygen saturation turation 92% in room air while resting in the recliner with feet elevated.  Patient was diagnosed with COPD several years ago at [facility] and has had history of smoking. Patient/group home staff to report GSC if experiences wheezing, productive cough, respiratory distress, or other symptoms of COPD exacerbation.  Will consider addition of MDI treatment, and rescue inhaler if exhibits symptoms of COPD exacerbation.    8/15/2024:Chronic but stable with Anoro Ellipta 1 puff daily, and nasal oxygen 2 L/min at night and 2 L/min during daytime as needed.  Please note, patient recently treated with cefuroxime for polymicrobial UTI.  Patient's chest x-ray from 5/24/2024 showed mild emphysematous changes without acute abnormalities.  Patient has history of smoking, obesity with hypoventilation/sleep apnea, she was diagnosed with COPD several years ago at [facility].  Patient/group home staff to report GSC if experiences wheezing, productive cough, respiratory distress, or other symptoms of COPD " exacerbation.  Will reassess as needed.        Encounter for geriatric assessment [Z01.89]      Priority: Medium    Contraindication to deep vein thrombosis (DVT) prophylaxis [Z53.09]      Priority: Medium    BMI 36.0-36.9,adult [Z68.36]      Priority: Medium    Vascular dementia without behavioral disturbance (HCC) [F01.50]      Priority: Medium     Patient continues to be forgetful sometimes and exhibiting intermittent anxiety symptoms.  Patient has no known behavioral disturbance, wandering or self-harm behavior per caregiver report.  Patient is pleasant and conversant during assessment today.  Patient currently on Cymbalta 30 mg p.o. daily for depression.  Patient does have Seroquel 25 mg every 6 hours as needed for anxiety/agitation but does not require Seroquel treatment recently per caregiver report.  Of note, patient's brain MRI from September 2017 showed moderate to severe atrophy in a nonspecific pattern.  Prominent lateral and third ventricles without visualized obstructing lesion which could indicate normal pressure hydrocephalus in the appropriate clinical setting. Advanced white matter changes. Remote BILATERAL cerebellar cortical infarctions. Remote LEFT internal capsule lacunar infarction.  Her diagnosis of dementia was supported in cognitive screening completed on 5/30/2021. Interpretation and scoring of the mini cog/clock draw cognitive screening tool showed patient may have some degree of cognitive impairment.  This was further correlated with her inability to name more than 6 animals in the animal recognition screening tool.  Counseled to seek help if she becomes more confused.  Group home staff to continue to monitor patient's behavior and refer to higher level of care placement like memory care transfer if patient exhibits behavioral disturbance or self danger behavior.        Repeated falls [R29.6]      Priority: Medium    Difficulty swallowing solids [R13.10]      Priority: Medium     Age-related osteoporosis without current pathological fracture [M81.0]      Priority: Medium     8/15/2024: Patient is currently on alendronate 70 mg p.o. once a week and vitamin D3 2000 unit p.o. daily.  Vitamin D 25-hydroxy 54 and calcium 9.3 from 5/10/2024.  Please note, patient has history of T12 compression fracture and advanced degenerative disc disease. Patient and group home staff acknowledged that patient will be high risk for fracture due to osteoporosis.  I reinforced safety issues and fall prevention goals with patient and group home staff.      Lives in group home [Z78.9]      Priority: Low    Trauma [T14.90XA]      Priority: Low    GERD without esophagitis [K21.9]      Priority: Low    Dry eye syndrome of both eyes [H04.123]      Priority: Low    Vitamin D deficiency [E55.9]      Priority: Low     Patient is currently stable with cholecalciferol 2000 units p.o. daily.  Vitamin D 25-hydroxy 57 from 3/16/2023. Vitamin D 25-hydroxy 56 from 1/10/2022.  History of vitamin D deficiency and osteoporosis.  Vitamin D 25-hydroxy 45 from 8/10/2021.      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  Patient currently on duloxetine 30 mg p.o. daily for depression.  Patient does have Seroquel 25 mg every 6 hours as needed for anxiety. Patient/group home staff to report GSC for acute worsening depression or self-harm thoughts.        Overactive bladder [N32.81]      Priority: Low    Essential hypertension [I10]      Priority: Low     7/14/2023: BP stable with benazepril 40 mg p.o. daily and amlodipine 10 mg p.o. daily.  Lab results unremarkable from 3/16/2023 except sodium 147.  Encouraged lifestyle modifications, including weight reduction, dietary changes (decreased fat intake and increased intake of  fruits, vegetables, and low-fat dairy), sodium restriction and increase physical activity . Patient instructed to remain physically active as much as possible. Target blood pressure < 140/90 mm Hg.  Weight loss recommended.  Will reassess as needed      Chronic back pain [M54.9, G89.29]      Priority: Low     12/1/2023: Chronic issues.  Patient denies acute worsening lower back pain or other neurological symptoms.  Group home staff/caregiver to use Tylenol.  Of note, patient has history of degenerative disc disease with multiple falls in the past.  Patient's MR lumbar spine from 12/26/2017 showed  L5-S1 advanced degenerative disc space narrowing with negligible degenerative retrolisthesis, T12 old superior endplate compression fracture,  Multilevel degenerative disc disease and spondylotic changes most notable for L2-3 mild-moderate central stenosis at L3-4 mild central stenosis and Variable multilevel foraminal stenoses and additional degenerative and spondylotic changes.  Patient aware poor/unpredictable prognosis.  Reinforced safety issues and fall prevention with patient and caregiver.  Patient to use assistive device 24/7 while ambulatory.       Dyslipidemia [E78.5]      Priority: Low     Lipid profile well controlled with atorvastatin 80 mg PO QHS.  Total cholesterol 131, triglyceride 90, HDL 53 and LDL 60 from 3/16/2023.  Total cholesterol 124, triglyceride, HDL 52 and LDL 57 from 1/10/2022.  Total cholesterol 150, HDL 64, triglyceride 103 and LDL 67 from 8/10/2021.  Total cholesterol 134, triglycerides 87, HDL 62 and LDL 55 from 12/30/2020. Total cholesterol 141, HDL 60, triglyceride 85 and LDL 64 from 1/5/2020. Cholesterol 113, triglyceride 87, HDL 40 and LDL 56 from 4/05/2018.  Patient to avoid excessive transfat and polysaturated fatty food intake, weight loss recommended.  Will recheck lipid profile periodically.         DISPOSITION:  Trauma ICU.  Admission to the trauma ICU close monitoring for hypoxia  requiring supplemental oxygen l for pain control, aggressive pulmonary hygiene    Pain control  Provide encouragement and support.    Monitor neurostatus and comfort level  Adjust medications and comfort measures as needed    Card  Continue to monitor to maintain adequate HR and BP  Follow up labs    Pulm  continue aggressive pulmonary hygiene  Encourage cough deep breath, IS  scd dvt prohylaxis  Follow-up imaging    GI  Nutritional support  Bowel regime  Monitor abdominal exan    Discussed findings and plan with patient  Discussed with ED physician    Critical care time 34 minutes     ____________________________________   Damian Santos M.D.    DD: 12/28/2024  1:24 PM

## 2024-12-28 NOTE — ASSESSMENT & PLAN NOTE
12/28 The patient is 74 years old or older and a geriatrics consult is indicated.   Elliot Longoria MD, Geriatric Hospitalist.

## 2024-12-28 NOTE — ED NOTES
Assist RN: PT resting in [facility] with stable vitals. Call light within reach.

## 2024-12-28 NOTE — ED TRIAGE NOTES
"Chief Complaint   Patient presents with    T-5000 FALL     Patient BIBA from [facility] for above. Per EMS, patient had a MGLF last night and since then has been having pain to her left flank and left ribs. EMS reported no head strike and patient not on any anticoagulants. Bruising noted to left ribs. Describes pain as \"sharp\" and intermittent. Worse with taking deep breaths and movement. Patient denied any other pain, dizziness, chest pain, or shortness of breath.     Patient with a history of dementia. A&Ox2 at baseline.    BP (!) 185/80   Pulse 71   Temp 36.6 °C (97.8 °F) (Temporal)   Resp 18   Ht 1.6 m (5' 3\")   Wt 90.7 kg (200 lb)   SpO2 93%   BMI 35.43 kg/m²     "

## 2024-12-28 NOTE — ED NOTES
Med Rec completed per patient's MAR   Allergies reviewed     Patient is not taking anticoagulants

## 2024-12-28 NOTE — CONSULTS
"PSYCHIATRIC INTAKE EVALUATION(new)  Reason for admission: Trauma [T14.90XA]  Reason for consult: Assessment for depression/anxiety treatment  Requesting Provider:  Damian Santos MD  Legal Hold Status on Admission:   not on legal hold  Chart reviewed.         *HPI:       Ms. Valencia is an 85 y/o with past history of anxiety and depression and vascular dementia presents after a fall in her group home leading to multiple closed rib fractures and R flank hematoma, h/o recurrent falls. Pt notes she enjoys staying at the nursing home, but has no family.  Her son 'dropped me off 10 years ago from [place] here in [place] at the group home and I haven't seen him since.'  Pt is ,  one child.  Denies siblings or other caregivers.  Denies h/o suicidal ideation, intent, attempts or plan.  Denies psychosis.     AMB ROS BEHAVIORAL HEALTH  anxious    Medical ROS (as pertinent):     ROS    Abd pain on R s/p fall  Hypertension  COPD  Hyperlipidemia      *Psychiatric Examination:  Vitals:    12/28/24 1440   BP: (!) 197/73   Pulse:    Resp:    Temp:    SpO2:        General:   - Grooming and hygiene: Appropriate hygiene and grooming  - Apparent distress: NAD   - Behavior: Calm and appropriate  - Eye Contact: Within normal limits  - no psychomotor agitation or retardation  - Participation: Active verbal participation  Orientation: Grossly oriented to person, place and time  Mood: \"alright\"  Affect: Congruent  Thought Process: Linear logical and goal directed  Thought Content: Denies suicidal or homicidal ideations, intent or plan.  No evidence of paranoia  Perception: Denies auditory or visual hallucinations. No delusions noted   Attention span and concentration: Intact   Speech: Regular rate, volume and prosody  Language: Appropriate   Insight: Good  Judgment: Good  Recent and remote memory: Grossly intact        Past Medical History:   Past Medical History:   Diagnosis Date    Acute renal insufficiency 9/21/2017    Bradycardia " 11/18/2017    Bronchitis 1/7/2018    RTC with bronchodilators Mucinex    CAD (coronary artery disease)     Compression fracture of spine (HCC)     Congestion of upper airway 11/26/2017    Congestive heart failure (HCC)     Cough 11/24/2017    Dementia (HCC)     Elevated hemoglobin (HCC) 7/22/2021    GI bleed     Heme positive stool 7/29/2017    Hypertension     Insomnia 10/10/2017    Intractable back pain 7/29/2017    Iron deficiency anemia 12/27/2017    Psychiatric disorder     Stroke (HCC)     Subclinical hypothyroidism 7/22/2021    UTI (urinary tract infection) 9/22/2017        Past Psychiatric History:  Previous Diagnosis: Anxiety, Depression  Current meds:cymbalta 30mg qd  Previous med trials: denies  Hospitalizations:denies  Suicide attempts/SIB:denies  Outpatient services:denies  Access to guns:denies  Abuse/trauma hx:denies  Legal hx: denies    Family Hx:   Denies    Social Hx:   Drugs: none  Alcohol:  none  Nicotine:   mpme    Current Medications:  Current Facility-Administered Medications   Medication Dose Route Frequency Provider Last Rate Last Admin    lidocaine (Asperflex) 4 % patch 1 Patch  1 Patch Transdermal Once Amy Florez M.D.   1 Patch at 12/28/24 1325    Respiratory Therapy Consult   Nebulization Continuous RT JORGE LUIS Rogers.P.R.N.        Pharmacy Consult Request ...Pain Management Review 1 Each  1 Each Other PHARMACY TO DOSE JORGE LUIS Rogers.P.R.N.        ondansetron (Zofran) syringe/vial injection 4 mg  4 mg Intravenous Q4HRS PRN Beata Carballo, A.P.R.N.        ondansetron (Zofran ODT) dispertab 4 mg  4 mg Oral Q4HRS PRN Beata Carballo, A.P.R.N.        docusate sodium (Colace) capsule 100 mg  100 mg Oral BID Beata Carballo A.P.R.N.        senna-docusate (Pericolace Or Senokot S) 8.6-50 MG per tablet 1 Tablet  1 Tablet Oral Nightly Beata Carballo, A.P.R.N.        senna-docusate (Pericolace Or Senokot S) 8.6-50 MG per tablet 1 Tablet  1 Tablet Oral Q24HRS PRN Beata Carballo A.P.R.N.        polyethylene  glycol/lytes (Miralax) Packet 1 Packet  1 Packet Oral BID Beata Vizcarrain, A.P.R.N.        magnesium hydroxide (Milk Of Magnesia) suspension 30 mL  30 mL Oral DAILY AT 1800 Beata Kait, A.P.R.N.        bisacodyl (Dulcolax) suppository 10 mg  10 mg Rectal Q24HRS PRN Beata Kait, A.P.R.N.        insulin lispro (HumaLOG,AdmeLOG) subcutaneous injection  1-6 Units Subcutaneous Q6HRS Beata Kait, A.P.R.N.        And    dextrose 50% (D50W) injection 25 g  25 g Intravenous Q15 MIN PRN Beata Kait, A.P.R.N.        acetaminophen (Tylenol) tablet 650 mg  650 mg Oral Q6HRS Beata Kait, A.P.R.N.   650 mg at 12/28/24 1421    Followed by    [START ON 1/2/2025] acetaminophen (Tylenol) tablet 650 mg  650 mg Oral Q6HRS PRN Beata Kait, A.P.R.N.        oxyCODONE immediate-release (Roxicodone) tablet 2.5 mg  2.5 mg Oral Q3HRS PRN Beata Kait, A.P.R.N.   2.5 mg at 12/28/24 1422    Or    oxyCODONE immediate-release (Roxicodone) tablet 5 mg  5 mg Oral Q3HRS PRN Beata Kait, A.P.R.N.        Or    HYDROmorphone (Dilaudid) injection 0.25 mg  0.25 mg Intravenous Q3HRS PRN Beata Kait, A.P.R.N.        [START ON 12/29/2024] lidocaine (Asperflex) 4 % patch 1-2 Patch  1-2 Patch Transdermal Q24HR Beata Kait, A.P.R.N.        labetalol (Normodyne/Trandate) injection 10 mg  10 mg Intravenous Q4HRS PRN Beata Kait, A.P.R.N.   10 mg at 12/28/24 1440    [START ON 12/29/2024] amLODIPine (Norvasc) tablet 10 mg  10 mg Oral DAILY Beata Kait, A.P.R.N.        atorvastatin (Lipitor) tablet 80 mg  80 mg Oral Q EVENING JORGE LUIS Rogers.P.R.N.        [START ON 12/29/2024] benazepril (Lotensin) tablet 40 mg  40 mg Oral DAILY JORGE LUIS Rogers.P.R.N.        carboxymethylcellulose (Refresh Tears) 0.5 % ophthalmic drops 1 Drop  1 Drop Both Eyes BID JORGE LUIS Rogers.P.R.N.        [START ON 12/29/2024] vitamin D3 (Cholecalciferol) tablet 2,000 Units  2,000 Units Oral DAILY JORGE LUIS Rogers.P.R.N.        [START ON 12/29/2024] DULoxetine (Cymbalta) capsule 30 mg  30 mg Oral DAILY  Beata Carballo A.P.R.N.        melatonin tablet 5 mg  5 mg Oral Q EVENING Beata Carballo A.P.R.N.        [START ON 2024] meloxicam (Mobic) tablet 15 mg  15 mg Oral QAM Beata Carballo A.P.R.N.        [START ON 2024] oxybutynin SR (Ditropan-XL) tablet 5 mg  5 mg Oral DAILY Beata Carballo, A.P.R.N.        [START ON 2024] omeprazole (PriLOSEC) capsule 20 mg  20 mg Oral DAILY Beata Carballo A.P.R.N.        [START ON 2024] umeclidinium-vilanterol (Anoro Ellipta) inhaler 1 Puff  1 Puff Inhalation DAILY Beata Carballo A.P.R.N.            Allergies:  Seroquel [quetiapine] and Pcn [penicillins]       Labs personally reviewed:   Recent Results (from the past 72 hours)   EKG    Collection Time: 24  8:55 AM   Result Value Ref Range    Report       [facility] Emergency Dept.    Test Date:  2024  Pt Name:    KRUNAL LOPEZ                 Department: ER  MRN:        [id number]                      Room:        04  Gender:     Female                       Technician: [id number]  :        1940                   Requested By:ER TRIAGE PROTOCOL  Order #:    [id number]                    Reading MD:    Measurements  Intervals                                Axis  Rate:       66                           P:          -2  OK:         169                          QRS:        -40  QRSD:       96                           T:          56  QT:         404  QTc:        424    Interpretive Statements  Sinus rhythm  Inferior infarct, old  Consider anterior infarct  Lateral leads are also involved  Compared to ECG 2023 14:58:38  Myocardial infarct finding now present  ST (T wave) deviation no longer present     CBC WITH DIFFERENTIAL    Collection Time: 24 10:17 AM   Result Value Ref Range    WBC 9.3 4.8 - 10.8 K/uL    RBC 4.99 4.20 - 5.40 M/uL    Hemoglobin 12.9 12.0 - 16.0 g/dL    Hematocrit 40.2 37.0 - 47.0 %    MCV 80.6 (L) 81.4 - 97.8 fL    MCH 25.9 (L) 27.0 - 33.0 pg    MCHC 32.1 (L) 32.2  - 35.5 g/dL    RDW 46.6 35.9 - 50.0 fL    Platelet Count 226 164 - 446 K/uL    MPV 10.9 9.0 - 12.9 fL    Neutrophils-Polys 78.70 (H) 44.00 - 72.00 %    Lymphocytes 11.60 (L) 22.00 - 41.00 %    Monocytes 7.00 0.00 - 13.40 %    Eosinophils 2.10 0.00 - 6.90 %    Basophils 0.30 0.00 - 1.80 %    Immature Granulocytes 0.30 0.00 - 0.90 %    Nucleated RBC 0.00 0.00 - 0.20 /100 WBC    Neutrophils (Absolute) 7.28 1.82 - 7.42 K/uL    Lymphs (Absolute) 1.07 1.00 - 4.80 K/uL    Monos (Absolute) 0.65 0.00 - 0.85 K/uL    Eos (Absolute) 0.19 0.00 - 0.51 K/uL    Baso (Absolute) 0.03 0.00 - 0.12 K/uL    Immature Granulocytes (abs) 0.03 0.00 - 0.11 K/uL    NRBC (Absolute) 0.00 K/uL   COMP METABOLIC PANEL    Collection Time: 12/28/24 10:17 AM   Result Value Ref Range    Sodium 145 135 - 145 mmol/L    Potassium 4.3 3.6 - 5.5 mmol/L    Chloride 107 96 - 112 mmol/L    Co2 28 20 - 33 mmol/L    Anion Gap 10.0 7.0 - 16.0    Glucose 124 (H) 65 - 99 mg/dL    Bun 19 8 - 22 mg/dL    Creatinine 1.02 0.50 - 1.40 mg/dL    Calcium 9.7 8.5 - 10.5 mg/dL    Correct Calcium 9.7 8.5 - 10.5 mg/dL    AST(SGOT) 20 12 - 45 U/L    ALT(SGPT) 18 2 - 50 U/L    Alkaline Phosphatase 95 30 - 99 U/L    Total Bilirubin 0.3 0.1 - 1.5 mg/dL    Albumin 4.0 3.2 - 4.9 g/dL    Total Protein 6.8 6.0 - 8.2 g/dL    Globulin 2.8 1.9 - 3.5 g/dL    A-G Ratio 1.4 g/dL   ESTIMATED GFR    Collection Time: 12/28/24 10:17 AM   Result Value Ref Range    GFR (CKD-EPI) 54 (A) >60 mL/min/1.73 m 2   TROPONIN    Collection Time: 12/28/24 10:17 AM   Result Value Ref Range    Troponin T 22 (H) 6 - 19 ng/L   POCT glucose device results    Collection Time: 12/28/24  2:26 PM   Result Value Ref Range    POC Glucose, Blood 100 (H) 65 - 99 mg/dL   EKG    Collection Time: 12/28/24  3:10 PM   Result Value Ref Range    Report       [facility]    Test Date:  2024-12-28  Pt Name:    KRUNAL JOHN                 Department: [facility]  MRN:        [id number]                      Room:       [facility]  Gender:      Female                       Technician: FGJ  :        1940                   Requested By:BETH HAMMONDS  Order #:    [id number]                    Reading MD:    Measurements  Intervals                                Axis  Rate:       59                           P:          11  NE:         76                           QRS:        -33  QRSD:       98                           T:          33  QT:         426  QTc:        422    Interpretive Statements  Sinus bradycardia  Short NE interval  Inferior infarct, old  Compared to ECG 2024 08:55:59  Short NE interval now present  Sinus rhythm no longer present  Myocardial infarct finding still present             EKG:   Results for orders placed or performed during the hospital encounter of 24   EKG   Result Value Ref Range    Report       [facility] Emergency Dept.    Test Date:  2024  Pt Name:    KRUNAL LOPEZ                 Department: ER  MRN:        [id number]                      Room:        04  Gender:     Female                       Technician: [id number]  :        1940                   Requested By:ER TRIAGE PROTOCOL  Order #:    [id number]                    Reading MD:    Measurements  Intervals                                Axis  Rate:       66                           P:          -2  NE:         169                          QRS:        -40  QRSD:       96                           T:          56  QT:         404  QTc:        424    Interpretive Statements  Sinus rhythm  Inferior infarct, old  Consider anterior infarct  Lateral leads are also involved  Compared to ECG 2023 14:58:38  Myocardial infarct finding now present  ST (T wave) deviation no longer present     EKG   Result Value Ref Range    Report       [facility]    Test Date:  2024  Pt Name:    KRUNAL JOHN                 Department: TICU  MRN:        [id number]                      Room:       T4  Gender:     Female          "              Technician: MIREILLE  :        1940                   Requested By:BETH HAMMONDS  Order #:    [id number]                    Reading MD:    Measurements  Intervals                                Axis  Rate:       59                           P:          11  IA:         76                           QRS:        -33  QRSD:       98                           T:          33  QT:         426  QTc:        422    Interpretive Statements  Sinus bradycardia  Short IA interval  Inferior infarct, old  Compared to ECG 2024 08:55:59  Short IA interval now present  Sinus rhythm no longer present  Myocardial infarct finding still present           Assessment:  Ms. Valencia is an 83 y/o woman with vascular dementia, h/o anxiety and depression (both unspecified) who presents after a fall leading to rib fractures.  She is currently endorsing \"I worry about everything\" but was unaware that she is taking cymbalta, presumably for depression and anxiety.   Fall precautions in place.  Labs pending.  Reported ALLERGY TO SEROQUEL in chart, but pt does not recall any details surrounding this.    Dx:  Anxiety disorder unspecified  Depressive disorder unspecified  Vascular dementia  Rib fractures s/p fall    Medical:  Rib fx  Hematoma  Hypercholesterolemia  Hypertension        Plan:  Legal hold: n/a  Psychotropic medications: Recommend increase cymbalta from 30mg qd to 40mg qd to target ongoing anxiety and depressed mood on current dosage  Brief supportive psychotherapy provided (document time, code 96462)  Labs reviewed:  EKG reviewed  Old records reviewed  Discussed the case with: Damian Santos MD  Psychiatry will follow up  Fall precautions in place    Thank you for the consult.       Sitter: n/a  Phone: Full ACCESS  Visitors: Full ACCESS  Personal belongings: Full ACCESS         Patient is has a high medical complexity, complex decision making and is at high risk for complication and morbidity. I spent 65 minutes, " reviewing the chart, obtaining and/or reviewing separately obtained history. Performing a psychiatrically appropriate examination and evaluation.  Counseling and educating the patient. Ordering and reviewing medications, tests, or procedures.   Documenting clinical information in EPIC. Independently interpreting results and communicating results to patient. Discussing future disposition of care with patient, RN and case management.

## 2024-12-28 NOTE — ASSESSMENT & PLAN NOTE
Resides at [facility]: [address], [place] [address].  [facility] Public Guardian Vianey Calle, [phone].  Pooja advised SW that the pt is able to go back to her group home if she is cleared.

## 2024-12-28 NOTE — ASSESSMENT & PLAN NOTE
Chronic condition treated with amlodipine, benazepril.  Resumed maintenance medication on admission.

## 2024-12-28 NOTE — ASSESSMENT & PLAN NOTE
CT imaging with acute displaced fractures of the left anterior 7th, 8th and 9th ribs.  Aggressive pulmonary hygiene and multimodal pain management.

## 2024-12-28 NOTE — PROGRESS NOTES
Temp 95.9 F  Weight 81.1kg      4 Eyes Skin Assessment Completed by Lee, RN and MARK Almeida.    Face redness to bilateral checks  Head WDL  Ears WDL  Nose WDL  Mouth WDL  Neck Redness  Breast/Chest WDL  Back WDL  Shoulder Blades WDL  Spine WDL  (R) Arm/Elbow/Hand Bruising elbow  (L) Arm/Elbow/Hand Bruising hand  Abdomen Bruising RUQ  Groin Redness  Scrotum/Coccyx/Buttocks WDL  (R) Leg redness abrasion  (L) Leg Redness and Abrasion knee  (R) Heel/Foot/Toe Dry, Blanching and Boggy  (L) Heel/Foot/Toe Dry, Blanching and Boggy          Devices In Places ECG, Blood Pressure Cuff, Pulse Ox, and SCD's      Interventions In Place Sacral Mepilex, TAP System, Pillows, and Q2 Turns    Possible Skin Injury No    Pictures Uploaded Into Epic No, needs to be completed  Wound Consult Placed N/A  RN Wound Prevention Protocol Ordered No

## 2024-12-28 NOTE — ED PROVIDER NOTES
"  ER Provider Note    Scribed for Amy Florez M.d. by Suzette Lee (Scribe). 12/28/2024  9:08 AM    Primary Care Provider: MARIANELA Ramirez    CHIEF COMPLAINT  Chief Complaint   Patient presents with    T-5000 FALL     LIMITATION TO HISTORY   Select: : None    HPI/ROS  OUTSIDE HISTORIAN(S):  None    EXTERNAL RECORDS REVIEWED  Outpatient Notes primary care note reviewed.  Patient has history of congestive heart failure.    Maria Esther Valencia is a 84 y.o. female with a history of CHF who presents to the ED for T-5000 fall onset last night. She states that she \"fell again\" on the floor, adding a history of falls. Patient denies falling on anything sharp. However, does not remember how the fall occurred. She reports rib pain, abdominal pain, and left armpit pain. Patient wears nasal oxygen at night. No additional pain or symptoms noted.    PAST MEDICAL HISTORY  Past Medical History:   Diagnosis Date    Acute renal insufficiency 9/21/2017    Bradycardia 11/18/2017    Bronchitis 1/7/2018    RTC with bronchodilators Mucinex    CAD (coronary artery disease)     Compression fracture of spine (HCC)     Congestion of upper airway 11/26/2017    Congestive heart failure (HCC)     Cough 11/24/2017    Dementia (HCC)     Elevated hemoglobin (HCC) 7/22/2021    GI bleed     Heme positive stool 7/29/2017    Hypertension     Insomnia 10/10/2017    Intractable back pain 7/29/2017    Iron deficiency anemia 12/27/2017    Psychiatric disorder     Stroke (HCC)     Subclinical hypothyroidism 7/22/2021    UTI (urinary tract infection) 9/22/2017       SURGICAL HISTORY  Past Surgical History:   Procedure Laterality Date    OTHER CARDIAC SURGERY      stents       FAMILY HISTORY  Family History   Problem Relation Age of Onset    Heart Attack Mother     Heart Attack Father        SOCIAL HISTORY   reports that she quit smoking about 8 years ago. Her smoking use included cigarettes. She has never used smokeless tobacco. She reports that she " does not drink alcohol and does not use drugs.    CURRENT MEDICATIONS  Current Discharge Medication List        CONTINUE these medications which have NOT CHANGED    Details   DULoxetine (CYMBALTA) 30 MG Cap DR Particles TAKE (1) CAPSULE BY MOUTH ONCE DAILY.  Qty: 30 Capsule, Refills: 11    Associated Diagnoses: Major depression single episode, in partial remission (HCC)      meloxicam (MOBIC) 15 MG tablet TAKE 1 TABLET BY MOUTH EVERY MORNING.  Qty: 30 Tablet, Refills: 11    Associated Diagnoses: Primary osteoarthritis involving multiple joints      amLODIPine (NORVASC) 10 MG Tab TAKE 1 TABLET BY MOUTH ONCE DAILY  Qty: 30 Tablet, Refills: 11    Associated Diagnoses: Essential hypertension      atorvastatin (LIPITOR) 80 MG tablet TAKE 1 TABLET BY MOUTH ONCE DAILY  Qty: 30 Tablet, Refills: 11    Associated Diagnoses: Dyslipidemia      benazepril (LOTENSIN) 40 MG tablet TAKE 1 TABLET BY MOUTH ONCE DAILY  Qty: 30 Tablet, Refills: 11      Cholecalciferol (VITAMIN D) 2000 UNIT Tab Take 1 Tablet by mouth every day.  Qty: 30 Tablet, Refills: 11    Associated Diagnoses: Vitamin D deficiency      pantoprazole (PROTONIX) 40 MG Tablet Delayed Response Take 1 Tablet by mouth every day. Indications: Gastroesophageal Reflux Disease  Qty: 30 Tablet, Refills: 11      oxybutynin SR (DITROPAN-XL) 5 MG TABLET SR 24 HR TAKE 1 TABLET BY MOUTH ONCE DAILY  Qty: 30 Tablet, Refills: 11      umeclidinium-vilanterol (ANORO ELLIPTA) 62.5-25 MCG/ACT AEROSOL POWDER, BREATH ACTIVATED inhaler Inhale 1 Puff every day. Rinse mouth with water after use  Indications: Chronic Bronchitis, Chronic Obstructive Lung Disease  Qty: 30 Each, Refills: 11      melatonin 5 mg Tab TAKE 1 TABLET BY MOUTH IN THE EVENING.  Qty: 30 Tablet, Refills: 11      carboxymethylcellulose (REFRESH TEARS) 0.5 % Solution Administer 1 Drop into both eyes 2 times a day. For dry eyes  Indications: Irritation of the Eye  Qty: 15 mL, Refills: 11      alendronate (FOSAMAX) 70 MG Tab  "TAKE 1 TAB BY MOUTH EVERY SUNDAY BEFORE 1ST FOOD WITH 8 OZ WATER. DON'T LIE DOWN FOR 30 MIN.  Qty: 4 Tablet, Refills: 11      phenazopyridine (PYRIDIUM) 100 MG Tab Take 1 Tablet by mouth 3 times a day as needed (URINARY PAIN).  Qty: 6 Tablet, Refills: 0    Associated Diagnoses: Recurrent UTI      benzonatate (TESSALON) 100 MG Cap TAKE 1 CAPSULE BY MOUTH 3 TIMES DAILY AS NEEDED FOR COUGH  Qty: 60 Capsule, Refills: 0      senna-docusate (STIMULANT LAXATIVE) 8.6-50 MG Tab TAKE 1 TABLET BY MOUTH ONCE DAILY AS NEEDED FOR CONSTIPATION.  Qty: 30 Tablet, Refills: 11      acetaminophen (TYLENOL) 325 MG Tab TAKE 2 TABLETS BY MOUTH EVERY 6 HOURS AS NEEDED FOR MILD OR MODERATE PAIN  Qty: 120 Tablet, Refills: 11      ANBESOL MAXIMUM STRENGTH 20 % Gel topical gel APPLY A SMALL AMOUNT OF GEL TO AFFECTED AREA 4 TIMES A DAY AS NEEDED FOR PAINFUL GUMS.  Qty: 9 g, Refills: 2             ALLERGIES  Seroquel [quetiapine] and Pcn [penicillins]    PHYSICAL EXAM  BP (!) 149/62   Pulse 64   Temp 36.6 °C (97.8 °F) (Temporal)   Resp 16   Ht 1.6 m (5' 3\")   Wt 90.7 kg (200 lb)   SpO2 94%   BMI 35.43 kg/m²     Constitutional: Alert in no apparent distress.  HENT: No signs of trauma, Bilateral external ears normal, Nose normal.   Eyes: Pupils are equal and reactive, Conjunctiva normal, Non-icteric.   Neck: No stridor.   Cardiovascular: Regular rate and rhythm, no murmurs.   Thorax & Lungs: Normal breath sounds, No respiratory distress, No wheezing, No chest tenderness.   Abdomen: Bowel sounds normal, Soft, No tenderness, No masses, No peritoneal signs.  Skin: Warm, Dry, No erythema, No rash. Bruising on her left mid axillary area around the mid thoracic ribs.  Musculoskeletal:  No major deformities noted.  Neurologic: Alert, moving all extremities without difficulty, no focal deficits.      DIAGNOSTIC STUDIES & PROCEDURES    Labs:   Results for orders placed or performed during the hospital encounter of 12/28/24   CBC WITH DIFFERENTIAL    " Collection Time: 12/28/24 10:17 AM   Result Value Ref Range    WBC 9.3 4.8 - 10.8 K/uL    RBC 4.99 4.20 - 5.40 M/uL    Hemoglobin 12.9 12.0 - 16.0 g/dL    Hematocrit 40.2 37.0 - 47.0 %    MCV 80.6 (L) 81.4 - 97.8 fL    MCH 25.9 (L) 27.0 - 33.0 pg    MCHC 32.1 (L) 32.2 - 35.5 g/dL    RDW 46.6 35.9 - 50.0 fL    Platelet Count 226 164 - 446 K/uL    MPV 10.9 9.0 - 12.9 fL    Neutrophils-Polys 78.70 (H) 44.00 - 72.00 %    Lymphocytes 11.60 (L) 22.00 - 41.00 %    Monocytes 7.00 0.00 - 13.40 %    Eosinophils 2.10 0.00 - 6.90 %    Basophils 0.30 0.00 - 1.80 %    Immature Granulocytes 0.30 0.00 - 0.90 %    Nucleated RBC 0.00 0.00 - 0.20 /100 WBC    Neutrophils (Absolute) 7.28 1.82 - 7.42 K/uL    Lymphs (Absolute) 1.07 1.00 - 4.80 K/uL    Monos (Absolute) 0.65 0.00 - 0.85 K/uL    Eos (Absolute) 0.19 0.00 - 0.51 K/uL    Baso (Absolute) 0.03 0.00 - 0.12 K/uL    Immature Granulocytes (abs) 0.03 0.00 - 0.11 K/uL    NRBC (Absolute) 0.00 K/uL   COMP METABOLIC PANEL    Collection Time: 12/28/24 10:17 AM   Result Value Ref Range    Sodium 145 135 - 145 mmol/L    Potassium 4.3 3.6 - 5.5 mmol/L    Chloride 107 96 - 112 mmol/L    Co2 28 20 - 33 mmol/L    Anion Gap 10.0 7.0 - 16.0    Glucose 124 (H) 65 - 99 mg/dL    Bun 19 8 - 22 mg/dL    Creatinine 1.02 0.50 - 1.40 mg/dL    Calcium 9.7 8.5 - 10.5 mg/dL    Correct Calcium 9.7 8.5 - 10.5 mg/dL    AST(SGOT) 20 12 - 45 U/L    ALT(SGPT) 18 2 - 50 U/L    Alkaline Phosphatase 95 30 - 99 U/L    Total Bilirubin 0.3 0.1 - 1.5 mg/dL    Albumin 4.0 3.2 - 4.9 g/dL    Total Protein 6.8 6.0 - 8.2 g/dL    Globulin 2.8 1.9 - 3.5 g/dL    A-G Ratio 1.4 g/dL   ESTIMATED GFR    Collection Time: 12/28/24 10:17 AM   Result Value Ref Range    GFR (CKD-EPI) 54 (A) >60 mL/min/1.73 m 2   TROPONIN    Collection Time: 12/28/24 10:17 AM   Result Value Ref Range    Troponin T 22 (H) 6 - 19 ng/L   POCT glucose device results    Collection Time: 12/28/24  2:26 PM   Result Value Ref Range    POC Glucose, Blood 100 (H)  65 - 99 mg/dL   EKG    Collection Time: 24  3:10 PM   Result Value Ref Range    Report       [facility]    Test Date:  2024  Pt Name:    KRUNAL LOPEZ                 Department: TICU  MRN:        [id number]                      Room:       T4  Gender:     Female                       Technician: [id number]  :        [id number]                   Requested By:BETH HAMMONDS  Order #:    [id number]                    Reading MD:    Measurements  Intervals                                Axis  Rate:       59                           P:          11  AL:         76                           QRS:        -33  QRSD:       98                           T:          33  QT:         426  QTc:        422    Interpretive Statements  Sinus bradycardia  Short AL interval  Inferior infarct, old  Compared to ECG 2024 08:55:59  Short AL interval now present  Sinus rhythm no longer present  Myocardial infarct finding still present     EKG    Collection Time: 24  4:01 PM   Result Value Ref Range    Report       [facility]    Test Date:  2024  Pt Name:    KRUNAL LOPEZ                 Department: ER  MRN:        [id number]                      Room:       T4  Gender:     Female                       Technician: [id number]  :        [id number]                   Requested By:ER TRIAGE PROTOCOL  Order #:    [id number]                    Reading MD: NAILA ARREDONDO    Measurements  Intervals                                Axis  Rate:       66                           P:          -2  AL:         169                          QRS:        -40  QRSD:       96                           T:          56  QT:         404  QTc:        424    Interpretive Statements  Sinus rhythm  Inferior infarct, old  Consider anterior infarct  Lateral leads are also involved  Compared to ECG 2023 14:58:38  Myocardial infarct finding now present  ST (T wave) deviation no longer present  Impression:  Sinus rhythm no evidence of ischemia.  Electronically Signed On 12- 16:01:16 P ST by NAILA ARREDONDO         All labs reviewed by me.    EKG:   I have independently interpreted this EKG as seen above.    Radiology:   The attending Emergency Physician has independently interpreted the diagnostic imaging associated with this visit and is awaiting the final reading from the radiologist, which will be displayed below.    Preliminary interpretation is a follows: Multiple displaced left anterior rib fractures.  Radiologist interpretation:   CT-CHEST,ABDOMEN,PELVIS WITH   Final Result         1. Acute displaced fractures of the left anterior 7th, 8th and 9th ribs.   2. Subcutaneous stranding along the left flank.           COURSE & MEDICAL DECISION MAKING    ED Observation Status? No; Patient does not meet criteria for ED Observation.     9:13 AM - Patient seen and evaluated at bedside. Ordered EKG, CMP, CBC w/ diff, and CT-Chest, Abdomen, Pelvis, w/ to evaluate. She understands and agrees to the plan of care. Differential diagnoses include but are not limited to: Rib fracture, Spinal injury    12:42 PM - Patient will be treated with lidocaine 5% patch and gabapentin 300 mg PO for her symptoms.    12:44 PM - Reviewed radiology. Paged Trauma.     12:49 PM I discussed the patient's case and the above findings with Dr. Santos (Trauma) who will consult.  He will plan for hospitalization. Patient in guarded condition.      INITIAL ASSESSMENT AND PLAN  Care Narrative: This is an 84-year-old female that presents with left-sided chest wall pain after ground-level fall.  She does have some bruising in her left axillary midthoracic area.  Was concern for possible splenic injury as well as rib fracture and ultimately CT of her chest abdomen pelvis was performed.  She does have multiple displaced left anterior rib fractures.  No evidence of the splenic injury.  Given her age and multiple consecutive rib fractures I do think she  needs hospitalization for pain control.  I spoke with the trauma team who is agreeable to consult for hospitalization.  She is also needing some oxygen at this time.  Patient be hospitalized in guarded condition.                     DISPOSITION AND DISCUSSIONS  I have discussed management of the patient with the following physicians and TREVOR's:  Dr. Santos (Trauma)      FINAL IMPRESSION  1. Closed fracture of multiple ribs of left side, initial encounter          -ADMIT-        Suzette JHAVERI (Dm), am scribing for, and in the presence of, Amy Florez M.D..    Electronically signed by: Suzette Lee (Scribxin), 12/28/2024    Amy JHAVERI M.D. personally performed the services described in this documentation, as scribed by Suzette Lee in my presence, and it is both accurate and complete.    The note accurately reflects work and decisions made by me.  Amy Florez M.D.  12/28/2024  4:02 PM

## 2024-12-28 NOTE — ED NOTES
Bedside report given to TICU RNLee. Patient transported to T924 on monitor. All belongings in possession left with patient.

## 2024-12-28 NOTE — ASSESSMENT & PLAN NOTE
Chronic condition treated with Fosamax.  Holding maintenance medication during acute traumatic illness.

## 2024-12-28 NOTE — ED NOTES
Assist RN: rounded on pt. Denies want for additional needs at this time. Discussed broken ribs.   PT states all ok,

## 2024-12-28 NOTE — ASSESSMENT & PLAN NOTE
Chronic condition treated with Cymbalta.  Resumed maintenance medication on admission.  PDI score 29  12/28 Psychiatry consult completed, recommend increase Cymbalta dose to 40mg daily

## 2024-12-28 NOTE — CARE PLAN
The patient is Watcher - Medium risk of patient condition declining or worsening         Progress made toward(s) clinical / shift goals:    Problem: Pain - Standard  Goal: Alleviation of pain or a reduction in pain to the patient’s comfort goal  12/28/2024 1458 by Christina Lee, R.N.  Outcome: Progressing  12/28/2024 1458 by Christina Lee, R.N.  Outcome: Progressing     Problem: Knowledge Deficit - Standard  Goal: Patient and family/care givers will demonstrate understanding of plan of care, disease process/condition, diagnostic tests and medications  12/28/2024 1458 by Christina Lee, R.N.  Outcome: Progressing  12/28/2024 1458 by Christina Lee, R.N.  Outcome: Progressing     Problem: Fall Risk  Goal: Patient will remain free from falls  Outcome: Progressing

## 2024-12-28 NOTE — ASSESSMENT & PLAN NOTE
VTE prophylaxis initially contraindicated secondary to elevated bleeding risk.  12/30 Trauma surveillance venous duplex ultrasonography ordered.  12/29 Start DVT prophylaxis

## 2024-12-28 NOTE — ASSESSMENT & PLAN NOTE
Patient is on high risk for fall due to comorbidities, age and dementia  PT and OT  Vitamin D is pending, TSH is normal  B12 supplements   Subjective:      Patient ID: Theo Currie is a 60 y.o. female. HPI Patient presents for a wound check, had a spider bite 8/7/19 and she had an allergic reaction to the bite. Patient states her arm is still painful and it burns. Had I&D on 8/12. Feels like it is still draining and not healing no fevers. Taking abx as prescribed      Review of Systems    Objective:   Physical Exam    Body mass index is 25.97 kg/m². Vitals:    08/14/19 0931   BP: 116/72   Site: Left Upper Arm   Position: Sitting   Cuff Size: Medium Adult   Pulse: 77   Temp: 99.1 °F (37.3 °C)   TempSrc: Tympanic   SpO2: 97%   Weight: 133 lb (60.3 kg)     Wt Readings from Last 3 Encounters:   08/14/19 133 lb (60.3 kg)   08/12/19 133 lb (60.3 kg)   08/09/19 133 lb (60.3 kg)     PHQ score: PHQ-9 Total Score: 19 (1/14/2019 10:09 AM)      GENERAL:Alert and oriented x 4 NAD, normal appearing weight and anxious, well hydrated, well developed. Right arm with decreased redness from outline on arm, open wound with no drainage and pink healthy looking edges      Assessment:          ASSESSMENT AND PLAN:       Zheng Gross was seen today for wound check.     Diagnoses and all orders for this visit:    Abscess of right arm  Finish abx  Lots of reassurance that it looks like a normal I&D  Has appt to recheck, avised pt to reschedule for next week

## 2024-12-28 NOTE — ASSESSMENT & PLAN NOTE
Chronic condition treated with Anoro Ellipta and 2L oxygen via NC at [facility].  Resumed maintenance medication on admission.  Respiratory protocol.

## 2024-12-28 NOTE — DISCHARGE PLANNING
Medical Social Work     SW received a call from the on call [facility] Public Guardian Vianey Calle and she can be reached at the on call number [phone]. Pooja advised RONALDO that the pt is able to go back to her group home if she is cleared by the ERP.     The patient came from [facility]: [address] [place] [address].     Pooja also advised SW the group home pharmacy of choice is [facility] at [address] [place] [address].    The patient legal guardianship paper work has been scanned into the pt chart under the media.

## 2024-12-28 NOTE — ASSESSMENT & PLAN NOTE
Patient is in a group home, came with fall, patient using walker  History of recurrent falls  Patient is on high risk of delirium  Patient states does not want any tube inside her lungs, there is no family around, patient has guardianship  Will discussed that DNR/DNI with a guardianship and patient again

## 2024-12-28 NOTE — CONSULTS
Geriatric Medicine Consultation  Date of Service 12/28/2024    Referring Physician  Damian Santos M.D.    Consulting Physician  Elliot Longoria M.D.    Reason for Consultation  Fall and dementia    History of Presenting Illness    84-year-old female with history of dementia, coronary artery disease, congestive heart failure, psychiatric disorder and hypothyroidism who presented 12/28 with fall.  Patient lives at a group home when she fell.  Patient cannot remember all event however she felt like her legs gave up, denied any fever or chills no palpitation or chest pain, in general patient is poor historian due to dementia, she is alert and oriented x 2.  On admission labs around baseline, kidney stable, troponin was 22 and EKG did not show any ischemia, CT scan for chest and abdomen showed multiple rib fractures.  Patient was admitted to ICU by trauma team.      Review of Systems  Review of Systems   Constitutional:  Negative for chills, fever and weight loss.   HENT:  Negative for ear pain, hearing loss and tinnitus.    Eyes:  Negative for blurred vision, double vision and photophobia.   Respiratory:  Negative for cough and hemoptysis.    Cardiovascular:  Positive for chest pain. Negative for palpitations, orthopnea and claudication.   Gastrointestinal:  Negative for abdominal pain, constipation, diarrhea, nausea and vomiting.   Genitourinary:  Negative for dysuria, frequency and urgency.   Musculoskeletal:  Negative for myalgias and neck pain.   Skin:  Negative for rash.   Neurological:  Negative for dizziness, speech change and weakness.   All other systems reviewed and are negative.      Past Medical History   has a past medical history of Acute renal insufficiency (9/21/2017), Bradycardia (11/18/2017), Bronchitis (1/7/2018), CAD (coronary artery disease), Compression fracture of spine (HCC), Congestion of upper airway (11/26/2017), Congestive heart failure ([facility]), Cough (11/24/2017), Dementia ([facility]),  Elevated hemoglobin (HCC) (7/22/2021), GI bleed, Heme positive stool (7/29/2017), Hypertension, Insomnia (10/10/2017), Intractable back pain (7/29/2017), Iron deficiency anemia (12/27/2017), Psychiatric disorder, Stroke (HCC), Subclinical hypothyroidism (7/22/2021), and UTI (urinary tract infection) (9/22/2017).    Surgical History   has a past surgical history that includes other cardiac surgery.    Family History  family history includes Heart Attack in her father and mother.    Social History   reports that she quit smoking about 8 years ago. Her smoking use included cigarettes. She has never used smokeless tobacco. She reports that she does not drink alcohol and does not use drugs.    Living situation -   Marital status -   Primary caregiver -   Educational level -  Transportation -  [employer]                   No   Health care POA   Yes   Financial POA       Yes     Medications  Prior to Admission Medications   Prescriptions Last Dose Informant Patient Reported? Taking?   ANBESOL MAXIMUM STRENGTH 20 % Gel topical gel Unknown MAR from Other Facility No No   Sig: APPLY A SMALL AMOUNT OF GEL TO AFFECTED AREA 4 TIMES A DAY AS NEEDED FOR PAINFUL GUMS.   Cholecalciferol (VITAMIN D) 2000 UNIT Tab 12/28/2024 at  8:00 AM MAR from Other Facility No Yes   Sig: Take 1 Tablet by mouth every day.   DULoxetine (CYMBALTA) 30 MG Cap DR Particles 12/28/2024 at  8:00 AM MAR from Other Facility No Yes   Sig: TAKE (1) CAPSULE BY MOUTH ONCE DAILY.   Patient taking differently: Take 30 mg by mouth every day.   acetaminophen (TYLENOL) 325 MG Tab Unknown MAR from Other Facility No No   Sig: TAKE 2 TABLETS BY MOUTH EVERY 6 HOURS AS NEEDED FOR MILD OR MODERATE PAIN   Patient taking differently: Take 650 mg by mouth every 6 hours as needed. Indications: Pain   alendronate (FOSAMAX) 70 MG Tab 12/22/2024 at  8:00 AM MAR from Other Facility No No   Sig: TAKE 1 TAB BY MOUTH EVERY SUNDAY BEFORE 1ST FOOD WITH 8 OZ WATER. DON'T LIE DOWN FOR 30 MIN.    Patient taking differently: Take 70 mg by mouth every Sunday.   amLODIPine (NORVASC) 10 MG Tab 12/28/2024 at  8:00 AM MAR from Other Facility No Yes   Sig: TAKE 1 TABLET BY MOUTH ONCE DAILY   Patient taking differently: Take 10 mg by mouth every day.   atorvastatin (LIPITOR) 80 MG tablet 12/27/2024 at  8:00 PM MAR from Other Facility No Yes   Sig: TAKE 1 TABLET BY MOUTH ONCE DAILY   Patient taking differently: Take 80 mg by mouth every day.   benazepril (LOTENSIN) 40 MG tablet 12/28/2024 at  8:00 AM MAR from Other Facility No Yes   Sig: TAKE 1 TABLET BY MOUTH ONCE DAILY   Patient taking differently: Take 40 mg by mouth every day.   benzonatate (TESSALON) 100 MG Cap Unknown MAR from Other Facility No No   Sig: TAKE 1 CAPSULE BY MOUTH 3 TIMES DAILY AS NEEDED FOR COUGH   Patient taking differently: Take 100 mg by mouth 3 times a day as needed for Cough.   carboxymethylcellulose (REFRESH TEARS) 0.5 % Solution 12/28/2024 at  8:00 AM MAR from Other Facility No Yes   Sig: Administer 1 Drop into both eyes 2 times a day. For dry eyes  Indications: Irritation of the Eye   melatonin 5 mg Tab 12/26/2024 at  5:00 PM MAR from Other Facility No No   Sig: TAKE 1 TABLET BY MOUTH IN THE EVENING.   meloxicam (MOBIC) 15 MG tablet 12/28/2024 at  8:00 AM MAR from Other Facility No Yes   Sig: TAKE 1 TABLET BY MOUTH EVERY MORNING.   oxybutynin SR (DITROPAN-XL) 5 MG TABLET SR 24 HR 12/28/2024 at  8:00 AM MAR from Other Facility No Yes   Sig: TAKE 1 TABLET BY MOUTH ONCE DAILY   Patient taking differently: Take 5 mg by mouth every day.   pantoprazole (PROTONIX) 40 MG Tablet Delayed Response 12/28/2024 at  8:00 AM MAR from Other Facility No Yes   Sig: Take 1 Tablet by mouth every day. Indications: Gastroesophageal Reflux Disease   phenazopyridine (PYRIDIUM) 100 MG Tab Unknown MAR from Other Facility No No   Sig: Take 1 Tablet by mouth 3 times a day as needed (URINARY PAIN).   senna-docusate (STIMULANT LAXATIVE) 8.6-50 MG Tab Unknown MAR from  Other Facility No No   Sig: TAKE 1 TABLET BY MOUTH ONCE DAILY AS NEEDED FOR CONSTIPATION.   Patient taking differently: Take 1 Tablet by mouth 1 time a day as needed for Constipation.   umeclidinium-vilanterol (ANORO ELLIPTA) 62.5-25 MCG/ACT AEROSOL POWDER, BREATH ACTIVATED inhaler 12/28/2024 at  8:00 AM MAR from Other Facility No Yes   Sig: Inhale 1 Puff every day. Rinse mouth with water after use  Indications: Chronic Bronchitis, Chronic Obstructive Lung Disease      Facility-Administered Medications: None       Allergies  Allergies   Allergen Reactions    Seroquel [Quetiapine] Unspecified     Causes increased confusion and drowsiness    Pcn [Penicillins] Unspecified     Per historical. Pt cannot verify what reaction she had       Geriatric Screening    ADL assistance              Yes  IADL assistance             Yes  Cognitive Impairment    Yes  Mood disorder                Yes  Polypharmacy                Yes  Vision impairment         Yes  Hearing impairment      Yes  Fall                                  Yes  Assistive device            Yes  Urinary incontinence    Yes  Nutrition issues            Yes  Food Insecurity            Yes  Pressure ulcer              No     Physical Exam  Temp:  [35.5 °C (95.9 °F)-36.6 °C (97.8 °F)] 35.5 °C (95.9 °F)  Pulse:  [64-71] 66  Resp:  [12-18] 18  BP: (130-197)/(60-80) 197/73  SpO2:  [88 %-98 %] 95 %  Physical Exam  Constitutional:       General: She is not in acute distress.     Appearance: She is not ill-appearing.   HENT:      Head: Normocephalic and atraumatic.   Eyes:      General: No scleral icterus.  Cardiovascular:      Rate and Rhythm: Normal rate.      Heart sounds: No murmur heard.  Chest:      Chest wall: Tenderness present.   Abdominal:      General: There is no distension.      Tenderness: There is no abdominal tenderness.   Musculoskeletal:         General: No deformity.      Right lower leg: No edema.      Left lower leg: No edema.   Skin:     Coloration:  Skin is not jaundiced.      Findings: No bruising, lesion or rash.   Neurological:      General: No focal deficit present.      Mental Status: Mental status is at baseline. She is disoriented.      Cranial Nerves: No cranial nerve deficit.      Sensory: No sensory deficit.      Motor: No weakness.           CAM  Acute onset and fluctuating course   Yes  Inattention                                         Yes  Disorganized thinking                        Yes  Altered level of consciousness          No     MiniCOG  Word recall (0-3 points)  Clock draw (0-2 points  Total score (0-5 points)    PHQ-2    Over the past 2 weeks, how often have you been bothered by any of the following problems? Not at all Several days More than half the days Nearly every day   Little interest or pleasure in doing things? 0 1 2 3          Feeling down, depressed, or hopeless? 0  1  2  3   Total point score: ____ ____ + ____ + ____ + ____         Laboratory  Recent Labs     12/28/24  1017   WBC 9.3   RBC 4.99   HEMOGLOBIN 12.9   HEMATOCRIT 40.2   MCV 80.6*   MCH 25.9*   MCHC 32.1*   RDW 46.6   PLATELETCT 226   MPV 10.9     Recent Labs     12/28/24  1017   SODIUM 145   POTASSIUM 4.3   CHLORIDE 107   CO2 28   GLUCOSE 124*   BUN 19   CREATININE 1.02   CALCIUM 9.7                   Imaging  CT-CHEST,ABDOMEN,PELVIS WITH   Final Result         1. Acute displaced fractures of the left anterior 7th, 8th and 9th ribs.   2. Subcutaneous stranding along the left flank.          Assessment/Plan  Right flank hematoma, initial encounter- (present on admission)  Assessment & Plan  Trauma on board    Closed fracture of multiple ribs of right side- (present on admission)  Assessment & Plan  Pain control  Trauma on board    Vascular dementia without behavioral disturbance (HCC)- (present on admission)  Assessment & Plan  Patient is in a group home, came with fall, patient using walker  History of recurrent falls  Patient is on high risk of delirium  Patient  states does not want any tube inside her lungs, there is no family around, patient has guardianship  Will discussed that DNR/DNI with a guardianship and patient again    Repeated falls- (present on admission)  Assessment & Plan  Patient is on high risk for fall due to comorbidities, age and dementia  PT and OT  Check vitamin D and TSH    COPD without exacerbation (HCC)- (present on admission)  Assessment & Plan  Oxygen therapy with inhalers    Dyslipidemia- (present on admission)  Assessment & Plan  Continue home atorvastatin 80 mg daily, consider decrease the dose of possible    Essential hypertension- (present on admission)  Assessment & Plan  Continue home medication amlodipine and Benzopril  Avoid aggressive treatment    Chronic back pain- (present on admission)  Assessment & Plan  Pain control and stool softener  Chronic  PT and OT            Interventions to be considered in all patients in order to minimize the risk of delirium.   -do not disturb patient (vitals or lab draws) between the hours of 10 PM and 6 AM.  -ideally the patient should not sleep during the day and we should avoid day time naps.   -up in chair for meals  -ambulate at least three times daily, as able  -watch for constipation  -timed voiding - ask patient is she would like to go to the bathroom q 2-3 hours, except during the do not disturb hours.   -remove all necessary lines (central lines, peripheral IVs, feeding tubes, puckett catheters)  -unless patient has shown harm to self or others I would recommend against use of restraints - either chemical or physical (antipsychotics)   -minimize polypharmacy, do not dose medication during sleep hours      Patient is has a high medical complexity, complex decision making and is at high risk for complication, morbidity, and mortality.  I spent 65 minutes, reviewing the chart, obtaining and/or reviewing separately obtained history. Performing a medically appropriate examination and evaluation.   Counseling and educating the patient. Ordering and reviewing medications, tests, or procedures.   Documenting clinical information in EPIC. Independently interpreting results and communicating results to patient. Discussing future disposition of care with patient, RN and case management.

## 2024-12-28 NOTE — ED NOTES
"Patient's O2 saturation 88% room air. Patient states she wears Oxygen \"only at night, but I keep telling them I think I need more.\" Patient denied any current shortness of breath. She states taking deep breaths makes her pain worse. Patient placed on 1L NC.   "

## 2024-12-29 ENCOUNTER — APPOINTMENT (OUTPATIENT)
Dept: RADIOLOGY | Facility: MEDICAL CENTER | Age: 84
DRG: 184 | End: 2024-12-29
Attending: NURSE PRACTITIONER
Payer: MEDICARE

## 2024-12-29 LAB
ALBUMIN SERPL BCP-MCNC: 3.7 G/DL (ref 3.2–4.9)
ALBUMIN/GLOB SERPL: 1.3 G/DL
ALP SERPL-CCNC: 88 U/L (ref 30–99)
ALT SERPL-CCNC: 15 U/L (ref 2–50)
ANION GAP SERPL CALC-SCNC: 12 MMOL/L (ref 7–16)
AST SERPL-CCNC: 15 U/L (ref 12–45)
BASOPHILS # BLD AUTO: 0.3 % (ref 0–1.8)
BASOPHILS # BLD: 0.02 K/UL (ref 0–0.12)
BILIRUB SERPL-MCNC: 0.5 MG/DL (ref 0.1–1.5)
BUN SERPL-MCNC: 16 MG/DL (ref 8–22)
CALCIUM ALBUM COR SERPL-MCNC: 9.2 MG/DL (ref 8.5–10.5)
CALCIUM SERPL-MCNC: 9 MG/DL (ref 8.5–10.5)
CHLORIDE SERPL-SCNC: 104 MMOL/L (ref 96–112)
CO2 SERPL-SCNC: 24 MMOL/L (ref 20–33)
CREAT SERPL-MCNC: 0.86 MG/DL (ref 0.5–1.4)
EOSINOPHIL # BLD AUTO: 0.25 K/UL (ref 0–0.51)
EOSINOPHIL NFR BLD: 4.1 % (ref 0–6.9)
ERYTHROCYTE [DISTWIDTH] IN BLOOD BY AUTOMATED COUNT: 46.9 FL (ref 35.9–50)
GFR SERPLBLD CREATININE-BSD FMLA CKD-EPI: 66 ML/MIN/1.73 M 2
GLOBULIN SER CALC-MCNC: 2.8 G/DL (ref 1.9–3.5)
GLUCOSE BLD STRIP.AUTO-MCNC: 100 MG/DL (ref 65–99)
GLUCOSE BLD STRIP.AUTO-MCNC: 101 MG/DL (ref 65–99)
GLUCOSE SERPL-MCNC: 103 MG/DL (ref 65–99)
HCT VFR BLD AUTO: 40.5 % (ref 37–47)
HGB BLD-MCNC: 12.7 G/DL (ref 12–16)
IMM GRANULOCYTES # BLD AUTO: 0.01 K/UL (ref 0–0.11)
IMM GRANULOCYTES NFR BLD AUTO: 0.2 % (ref 0–0.9)
LYMPHOCYTES # BLD AUTO: 1.1 K/UL (ref 1–4.8)
LYMPHOCYTES NFR BLD: 18 % (ref 22–41)
MAGNESIUM SERPL-MCNC: 2.1 MG/DL (ref 1.5–2.5)
MCH RBC QN AUTO: 25.3 PG (ref 27–33)
MCHC RBC AUTO-ENTMCNC: 31.4 G/DL (ref 32.2–35.5)
MCV RBC AUTO: 80.8 FL (ref 81.4–97.8)
MONOCYTES # BLD AUTO: 0.54 K/UL (ref 0–0.85)
MONOCYTES NFR BLD AUTO: 8.8 % (ref 0–13.4)
NEUTROPHILS # BLD AUTO: 4.19 K/UL (ref 1.82–7.42)
NEUTROPHILS NFR BLD: 68.6 % (ref 44–72)
NRBC # BLD AUTO: 0 K/UL
NRBC BLD-RTO: 0 /100 WBC (ref 0–0.2)
PHOSPHATE SERPL-MCNC: 3.7 MG/DL (ref 2.5–4.5)
PLATELET # BLD AUTO: 222 K/UL (ref 164–446)
PMV BLD AUTO: 11.3 FL (ref 9–12.9)
POTASSIUM SERPL-SCNC: 4.2 MMOL/L (ref 3.6–5.5)
PROT SERPL-MCNC: 6.5 G/DL (ref 6–8.2)
RBC # BLD AUTO: 5.01 M/UL (ref 4.2–5.4)
SODIUM SERPL-SCNC: 140 MMOL/L (ref 135–145)
TSH SERPL DL<=0.005 MIU/L-ACNC: 3.53 UIU/ML (ref 0.38–5.33)
VIT B12 SERPL-MCNC: 344 PG/ML (ref 211–911)
WBC # BLD AUTO: 6.1 K/UL (ref 4.8–10.8)

## 2024-12-29 PROCEDURE — 99232 SBSQ HOSP IP/OBS MODERATE 35: CPT | Performed by: INTERNAL MEDICINE

## 2024-12-29 PROCEDURE — 85025 COMPLETE CBC W/AUTO DIFF WBC: CPT

## 2024-12-29 PROCEDURE — 770001 HCHG ROOM/CARE - MED/SURG/GYN PRIV*

## 2024-12-29 PROCEDURE — A9270 NON-COVERED ITEM OR SERVICE: HCPCS | Performed by: INTERNAL MEDICINE

## 2024-12-29 PROCEDURE — 700111 HCHG RX REV CODE 636 W/ 250 OVERRIDE (IP): Performed by: STUDENT IN AN ORGANIZED HEALTH CARE EDUCATION/TRAINING PROGRAM

## 2024-12-29 PROCEDURE — 700102 HCHG RX REV CODE 250 W/ 637 OVERRIDE(OP): Performed by: NURSE PRACTITIONER

## 2024-12-29 PROCEDURE — 82652 VIT D 1 25-DIHYDROXY: CPT

## 2024-12-29 PROCEDURE — 83735 ASSAY OF MAGNESIUM: CPT

## 2024-12-29 PROCEDURE — 82607 VITAMIN B-12: CPT

## 2024-12-29 PROCEDURE — 71045 X-RAY EXAM CHEST 1 VIEW: CPT

## 2024-12-29 PROCEDURE — 94669 MECHANICAL CHEST WALL OSCILL: CPT

## 2024-12-29 PROCEDURE — 94640 AIRWAY INHALATION TREATMENT: CPT

## 2024-12-29 PROCEDURE — A9270 NON-COVERED ITEM OR SERVICE: HCPCS | Performed by: NURSE PRACTITIONER

## 2024-12-29 PROCEDURE — 700102 HCHG RX REV CODE 250 W/ 637 OVERRIDE(OP): Performed by: INTERNAL MEDICINE

## 2024-12-29 PROCEDURE — 84443 ASSAY THYROID STIM HORMONE: CPT

## 2024-12-29 PROCEDURE — 84100 ASSAY OF PHOSPHORUS: CPT

## 2024-12-29 PROCEDURE — 80053 COMPREHEN METABOLIC PANEL: CPT

## 2024-12-29 PROCEDURE — 82962 GLUCOSE BLOOD TEST: CPT

## 2024-12-29 PROCEDURE — 99233 SBSQ HOSP IP/OBS HIGH 50: CPT | Performed by: STUDENT IN AN ORGANIZED HEALTH CARE EDUCATION/TRAINING PROGRAM

## 2024-12-29 RX ORDER — QUETIAPINE FUMARATE 25 MG/1
12.5 TABLET, FILM COATED ORAL NIGHTLY
Status: DISCONTINUED | OUTPATIENT
Start: 2024-12-29 | End: 2024-12-31 | Stop reason: HOSPADM

## 2024-12-29 RX ORDER — DULOXETIN HYDROCHLORIDE 20 MG/1
40 CAPSULE, DELAYED RELEASE ORAL DAILY
Status: DISCONTINUED | OUTPATIENT
Start: 2024-12-30 | End: 2024-12-31 | Stop reason: HOSPADM

## 2024-12-29 RX ORDER — ENOXAPARIN SODIUM 100 MG/ML
30 INJECTION SUBCUTANEOUS EVERY 12 HOURS
Status: DISCONTINUED | OUTPATIENT
Start: 2024-12-29 | End: 2024-12-31 | Stop reason: HOSPADM

## 2024-12-29 RX ADMIN — DULOXETINE HYDROCHLORIDE 30 MG: 30 CAPSULE, DELAYED RELEASE ORAL at 05:56

## 2024-12-29 RX ADMIN — ENOXAPARIN SODIUM 30 MG: 100 INJECTION SUBCUTANEOUS at 09:35

## 2024-12-29 RX ADMIN — SENNOSIDES AND DOCUSATE SODIUM 1 TABLET: 50; 8.6 TABLET ORAL at 21:01

## 2024-12-29 RX ADMIN — ACETAMINOPHEN 650 MG: 325 TABLET ORAL at 06:04

## 2024-12-29 RX ADMIN — MELOXICAM 15 MG: 7.5 TABLET ORAL at 06:01

## 2024-12-29 RX ADMIN — POLYETHYLENE GLYCOL 3350 1 PACKET: 17 POWDER, FOR SOLUTION ORAL at 06:08

## 2024-12-29 RX ADMIN — ATORVASTATIN CALCIUM 80 MG: 40 TABLET, FILM COATED ORAL at 17:08

## 2024-12-29 RX ADMIN — ACETAMINOPHEN 650 MG: 325 TABLET ORAL at 11:22

## 2024-12-29 RX ADMIN — Medication 2000 UNITS: at 06:02

## 2024-12-29 RX ADMIN — CARBOXYMETHYLCELLULOSE SODIUM 1 DROP: 5 SOLUTION/ DROPS OPHTHALMIC at 06:15

## 2024-12-29 RX ADMIN — AMLODIPINE BESYLATE 10 MG: 10 TABLET ORAL at 05:54

## 2024-12-29 RX ADMIN — OMEPRAZOLE 20 MG: 20 CAPSULE, DELAYED RELEASE ORAL at 06:01

## 2024-12-29 RX ADMIN — CARBOXYMETHYLCELLULOSE SODIUM 1 DROP: 5 SOLUTION/ DROPS OPHTHALMIC at 17:09

## 2024-12-29 RX ADMIN — Medication 5 MG: at 17:08

## 2024-12-29 RX ADMIN — BENAZEPRIL HYDROCHLORIDE 40 MG: 20 TABLET ORAL at 05:57

## 2024-12-29 RX ADMIN — QUETIAPINE FUMARATE 12.5 MG: 25 TABLET ORAL at 21:02

## 2024-12-29 RX ADMIN — UMECLIDINIUM BROMIDE AND VILANTEROL TRIFENATATE 1 PUFF: 62.5; 25 POWDER RESPIRATORY (INHALATION) at 08:53

## 2024-12-29 RX ADMIN — POLYETHYLENE GLYCOL 3350 1 PACKET: 17 POWDER, FOR SOLUTION ORAL at 17:08

## 2024-12-29 RX ADMIN — MAGNESIUM HYDROXIDE 30 ML: 1200 LIQUID ORAL at 17:08

## 2024-12-29 RX ADMIN — OXYBUTYNIN CHLORIDE 5 MG: 5 TABLET, EXTENDED RELEASE ORAL at 06:00

## 2024-12-29 RX ADMIN — ACETAMINOPHEN 650 MG: 325 TABLET ORAL at 17:08

## 2024-12-29 RX ADMIN — ENOXAPARIN SODIUM 30 MG: 100 INJECTION SUBCUTANEOUS at 17:15

## 2024-12-29 RX ADMIN — DOCUSATE SODIUM 100 MG: 100 CAPSULE, LIQUID FILLED ORAL at 06:03

## 2024-12-29 RX ADMIN — DOCUSATE SODIUM 100 MG: 100 CAPSULE, LIQUID FILLED ORAL at 17:08

## 2024-12-29 SDOH — ECONOMIC STABILITY: TRANSPORTATION INSECURITY
IN THE PAST 12 MONTHS, HAS THE LACK OF TRANSPORTATION KEPT YOU FROM MEDICAL APPOINTMENTS OR FROM GETTING MEDICATIONS?: YES

## 2024-12-29 SDOH — ECONOMIC STABILITY: TRANSPORTATION INSECURITY
IN THE PAST 12 MONTHS, HAS LACK OF RELIABLE TRANSPORTATION KEPT YOU FROM MEDICAL APPOINTMENTS, MEETINGS, WORK OR FROM GETTING THINGS NEEDED FOR DAILY LIVING?: NO

## 2024-12-29 ASSESSMENT — ENCOUNTER SYMPTOMS
PALPITATIONS: 0
BLURRED VISION: 0
HEMOPTYSIS: 0
WEAKNESS: 0
NECK PAIN: 0
NEUROLOGICAL NEGATIVE: 1
RESPIRATORY NEGATIVE: 1
MEMORY LOSS: 1
CLAUDICATION: 0
CHILLS: 0
MYALGIAS: 0
VOMITING: 0
FOCAL WEAKNESS: 0
ORTHOPNEA: 0
FEVER: 0
DOUBLE VISION: 0
COUGH: 0
WEIGHT LOSS: 0
NAUSEA: 0
ABDOMINAL PAIN: 0
SPEECH CHANGE: 0
GASTROINTESTINAL NEGATIVE: 1
MUSCULOSKELETAL NEGATIVE: 1
DIZZINESS: 0
CARDIOVASCULAR NEGATIVE: 1
CONSTITUTIONAL NEGATIVE: 1
PHOTOPHOBIA: 0
EYES NEGATIVE: 1
DIARRHEA: 0
CONSTIPATION: 0

## 2024-12-29 ASSESSMENT — LIFESTYLE VARIABLES
AVERAGE NUMBER OF DAYS PER WEEK YOU HAVE A DRINK CONTAINING ALCOHOL: 0
ON A TYPICAL DAY WHEN YOU DRINK ALCOHOL HOW MANY DRINKS DO YOU HAVE: 0
TOTAL SCORE: 0
TOTAL SCORE: 0
HAVE PEOPLE ANNOYED YOU BY CRITICIZING YOUR DRINKING: NO
CONSUMPTION TOTAL: NEGATIVE
EVER FELT BAD OR GUILTY ABOUT YOUR DRINKING: NO
HAVE YOU EVER FELT YOU SHOULD CUT DOWN ON YOUR DRINKING: NO
HOW MANY TIMES IN THE PAST YEAR HAVE YOU HAD 5 OR MORE DRINKS IN A DAY: 0
TOTAL SCORE: 0
DOES PATIENT WANT TO STOP DRINKING: NO
EVER HAD A DRINK FIRST THING IN THE MORNING TO STEADY YOUR NERVES TO GET RID OF A HANGOVER: NO
ALCOHOL_USE: NO

## 2024-12-29 ASSESSMENT — SOCIAL DETERMINANTS OF HEALTH (SDOH)
IN THE PAST 12 MONTHS, HAS THE ELECTRIC, GAS, OIL, OR WATER COMPANY THREATENED TO SHUT OFF SERVICE IN YOUR HOME?: NO
WITHIN THE LAST YEAR, HAVE YOU BEEN HUMILIATED OR EMOTIONALLY ABUSED IN OTHER WAYS BY YOUR PARTNER OR EX-PARTNER?: NO
WITHIN THE PAST 12 MONTHS, THE FOOD YOU BOUGHT JUST DIDN'T LAST AND YOU DIDN'T HAVE MONEY TO GET MORE: NEVER TRUE
WITHIN THE LAST YEAR, HAVE YOU BEEN AFRAID OF YOUR PARTNER OR EX-PARTNER?: NO
WITHIN THE PAST 12 MONTHS, YOU WORRIED THAT YOUR FOOD WOULD RUN OUT BEFORE YOU GOT THE MONEY TO BUY MORE: NEVER TRUE
WITHIN THE LAST YEAR, HAVE YOU BEEN KICKED, HIT, SLAPPED, OR OTHERWISE PHYSICALLY HURT BY YOUR PARTNER OR EX-PARTNER?: NO
WITHIN THE LAST YEAR, HAVE TO BEEN RAPED OR FORCED TO HAVE ANY KIND OF SEXUAL ACTIVITY BY YOUR PARTNER OR EX-PARTNER?: NO

## 2024-12-29 ASSESSMENT — PAIN DESCRIPTION - PAIN TYPE
TYPE: ACUTE PAIN

## 2024-12-29 NOTE — PROGRESS NOTES
"      Mental status adequate for full examination?: Yes    Spine cleared (radiologically and/or clinically): Yes    REVIEW OF SYSTEMS:  Review of Systems   Constitutional: Negative.    Eyes: Negative.  Negative for blurred vision and double vision.   Respiratory: Negative.     Cardiovascular: Negative.    Gastrointestinal: Negative.    Genitourinary: Negative.    Musculoskeletal: Negative.         Chest wall pain.    Neurological: Negative.  Negative for dizziness and focal weakness.   Psychiatric/Behavioral:  Positive for memory loss.    All other systems reviewed and are negative.      PHYSICAL EXAMINATION:  BP (!) 165/69   Pulse (!) 55   Temp 35.8 °C (96.5 °F) (Temporal)   Resp 17   Ht 1.6 m (5' 3\")   Wt 81.1 kg (178 lb 12.7 oz)   SpO2 97%   BMI 31.67 kg/m²   Physical Exam  Vitals and nursing note reviewed.   Constitutional:       General: She is not in acute distress.     Appearance: Normal appearance.      Interventions: Nasal cannula in place.      Comments: IS is 1,000 ml with prompting and coaching.    HENT:      Head: Normocephalic and atraumatic.      Nose: Nose normal.      Mouth/Throat:      Mouth: Mucous membranes are moist.      Pharynx: Oropharynx is clear.   Eyes:      Extraocular Movements: Extraocular movements intact.      Conjunctiva/sclera: Conjunctivae normal.      Pupils: Pupils are equal, round, and reactive to light.   Cardiovascular:      Rate and Rhythm: Normal rate and regular rhythm.      Pulses: Normal pulses.   Pulmonary:      Effort: Pulmonary effort is normal. No respiratory distress.      Breath sounds: Normal breath sounds.   Chest:      Chest wall: Tenderness present. No crepitus.   Abdominal:      General: Abdomen is flat.      Palpations: Abdomen is soft.      Tenderness: There is no abdominal tenderness.   Musculoskeletal:         General: Normal range of motion.      Cervical back: Full passive range of motion without pain, normal range of motion and neck supple. "   Skin:     General: Skin is warm and dry.      Capillary Refill: Capillary refill takes less than 2 seconds.   Neurological:      General: No focal deficit present.      Mental Status: She is alert and oriented to person, place, and time. Mental status is at baseline.      GCS: GCS eye subscore is 4. GCS verbal subscore is 5. GCS motor subscore is 6.      Sensory: Sensation is intact.      Motor: Motor function is intact.   Psychiatric:         Mood and Affect: Mood normal.         Cognition and Memory: Cognition is impaired.      Comments: History of vascular dementia.          LABORATORY VALUES:  Recent Labs     12/28/24  1017 12/29/24  0400   WBC 9.3 6.1   RBC 4.99 5.01   HEMOGLOBIN 12.9 12.7   HEMATOCRIT 40.2 40.5   MCV 80.6* 80.8*   MCH 25.9* 25.3*   MCHC 32.1* 31.4*   RDW 46.6 46.9   PLATELETCT 226 222   MPV 10.9 11.3     Recent Labs     12/28/24  1017 12/29/24  0400   SODIUM 145 140   POTASSIUM 4.3 4.2   CHLORIDE 107 104   CO2 28 24   GLUCOSE 124* 103*   BUN 19 16   CREATININE 1.02 0.86   CALCIUM 9.7 9.0     Recent Labs     12/28/24  1017 12/29/24  0400   ASTSGOT 20 15   ALTSGPT 18 15   TBILIRUBIN 0.3 0.5   ALKPHOSPHAT 95 88   GLOBULIN 2.8 2.8           IMAGING:  DX-CHEST-PORTABLE (1 VIEW)   Final Result      No acute process.      CT-CHEST,ABDOMEN,PELVIS WITH   Final Result         1. Acute displaced fractures of the left anterior 7th, 8th and 9th ribs.   2. Subcutaneous stranding along the left flank.          RAP Score Total: 6      CAGE Results: negative Blood Alcohol>0.08: not completed       PDI Score: 29  (Score > 23 = Psychiatry consult)    All current laboratory studies/radiology exams reviewed: Yes    Medications reconciliation has been reviewed: Yes    Completed Consultations:  None.     Pending Consultations:  Geriatric Medicine  Psychiatry consult for PDI 29    Newly identified diagnoses, injuries and/or co-morbidities:  None    Discussed patient condition with RN.

## 2024-12-29 NOTE — FLOWSHEET NOTE
12/29/24 1533   Incentive Spirometry Treatment   Incentive Spirometer Volume 1000 mL   Chest Physiotherapy Treatment   $ PEP/CPT Performed PEP / Flutter     QID

## 2024-12-29 NOTE — PROGRESS NOTES
"    INTERVAL EVENTS AND INTERVENTIONS:   Admitted yesterday for ground level fall with rib fractures  Has not mobilized yet  Weak cough  Pain controlled  Tolerating PO.       PHYSICAL EXAMINATION:      Vital Signs: BP (!) 163/69   Pulse (!) 54   Temp 36.1 °C (96.9 °F) (Temporal)   Resp 14   Ht 1.6 m (5' 3\")   Wt 81.1 kg (178 lb 12.7 oz)   SpO2 97%   Physical Exam  Vitals and nursing note reviewed.   Constitutional:       Appearance: Normal appearance. She is obese.   HENT:      Head: Normocephalic and atraumatic.      Right Ear: External ear normal.      Left Ear: External ear normal.      Nose: Nose normal.      Mouth/Throat:      Mouth: Mucous membranes are moist.      Pharynx: Oropharynx is clear.      Comments: Edentulous, dentures  Eyes:      General:         Right eye: No discharge.         Left eye: No discharge.      Conjunctiva/sclera: Conjunctivae normal.      Pupils: Pupils are equal, round, and reactive to light.   Cardiovascular:      Rate and Rhythm: Normal rate and regular rhythm.      Pulses: Normal pulses.      Heart sounds: Normal heart sounds.   Pulmonary:      Effort: Pulmonary effort is normal. No respiratory distress.      Comments: Weak cough.  Pain with inspiration  Abdominal:      General: Abdomen is flat. There is no distension.      Tenderness: There is no abdominal tenderness.   Musculoskeletal:      Cervical back: No rigidity or tenderness.   Skin:     General: Skin is warm and dry.      Capillary Refill: Capillary refill takes less than 2 seconds.   Neurological:      General: No focal deficit present.      Mental Status: She is alert and oriented to person, place, and time.   Psychiatric:         Mood and Affect: Mood normal.         Behavior: Behavior normal.       LABORATORY VALUES AND IMAGING REVIEWED        ASSESSMENT AND PLAN:   * Trauma- (present on admission)  Assessment & Plan  Mechanical GLF previous evening.  T-5000 Activation.  Damian Santos MD. Trauma " Surgery.    Right flank hematoma, initial encounter- (present on admission)  Assessment & Plan  CT imaging with subcutaneous stranding along the left flank.  Analgesia and monitor H/H.  12/29 Hgb stable, no further surveillance or treatment indicated    Closed fracture of multiple ribs of right side- (present on admission)  Assessment & Plan  CT imaging with acute displaced fractures of the left anterior 7th, 8th and 9th ribs.  Aggressive pulmonary hygiene and multimodal pain management.  12/29 Weak cough but otherwise meeting benchmarks to transfer to [facility].  Will attempt mobility and reassess today    Contraindication to deep vein thrombosis (DVT) prophylaxis- (present on admission)  Assessment & Plan  VTE prophylaxis initially contraindicated secondary to elevated bleeding risk.  12/30 Trauma surveillance venous duplex ultrasonography ordered.  12/29 Start DVT prophylaxis    Encounter for geriatric assessment- (present on admission)  Assessment & Plan  12/28 The patient is 75 years old or older and a geriatrics consult is indicated. Consult placed and pending.  Elliot Longoria MD, Geriatric Hospitalist.    BMI 36.0-36.9,adult- (present on admission)  Assessment & Plan  Admission BMI 35.43 kg/m2.    Vascular dementia without behavioral disturbance (HCC)- (present on admission)  Assessment & Plan  Monitor.    Repeated falls- (present on admission)  Assessment & Plan  Per chart review and recent fall overnight.    Difficulty swallowing solids- (present on admission)  Assessment & Plan  Baseline.  Monitor.    Age-related osteoporosis without current pathological fracture- (present on admission)  Assessment & Plan  Chronic condition treated with Fosamax.  Holding maintenance medication during acute traumatic illness.    COPD without exacerbation (HCC)- (present on admission)  Assessment & Plan  Chronic condition treated with Anoro Ellipta and 2L oxygen via NC at [facility].  Resumed maintenance medication on  admission.  Respiratory protocol.    Lives in group home- (present on admission)  Assessment & Plan  Resides at [facility]: [address], [place] [address].  [facility] Guardian Vianey Calle, [phone].  Pooja advised SW that the pt is able to go back to her group home if she is cleared.    GERD without esophagitis- (present on admission)  Assessment & Plan  Chronic condition treated with Protonix.  Resumed maintenance medication on admission.    Dry eye syndrome of both eyes- (present on admission)  Assessment & Plan  Chronic condition treated with eye gtts.  Resumed maintenance medication on admission.    Vitamin D deficiency- (present on admission)  Assessment & Plan  Chronic condition treated with Vitamin D.  Resumed maintenance medication on admission.    Overactive bladder- (present on admission)  Assessment & Plan  Chronic condition treated with Oxybutynin.  Resumed maintenance medication on admission.    Major depression single episode, in partial remission (HCC)- (present on admission)  Assessment & Plan  Chronic condition treated with Cymbalta.  Resumed maintenance medication on admission.    Dyslipidemia- (present on admission)  Assessment & Plan  Chronic condition treated with atorvastatin.  Resumed maintenance medication on admission.    Essential hypertension- (present on admission)  Assessment & Plan  Chronic condition treated with amlodipine, benazepril.  Resumed maintenance medication on admission.    Chronic back pain- (present on admission)  Assessment & Plan  Chronic condition treated with tylenol and Mobic.  Resumed maintenance medication on admission.               ____________________________________     Jeremías Nesbitt M.D.    DD: 12/29/2024  8:32 AM

## 2024-12-29 NOTE — CARE PLAN
The patient is Watcher - Medium risk of patient condition declining or worsening    Shift Goals  Clinical Goals: hemodynamic stability, pulmonary hygiene, pain management  Patient Goals: pain management, sleep  Family Goals: unable to assess, no family present at this time    Progress made toward(s) clinical / shift goals:      Problem: Pain - Standard  Goal: Alleviation of pain or a reduction in pain to the patient’s comfort goal  Outcome: Progressing     Problem: Knowledge Deficit - Standard  Goal: Patient and family/care givers will demonstrate understanding of plan of care, disease process/condition, diagnostic tests and medications  Outcome: Progressing     Problem: Fall Risk  Goal: Patient will remain free from falls  Outcome: Progressing

## 2024-12-29 NOTE — CARE PLAN
The patient is Stable - Low risk of patient condition declining or worsening    Shift Goals  Clinical Goals: SBP <180, mobilize  Patient Goals: eat, pain control  Family Goals: JOHN    Progress made toward(s) clinical / shift goals:    Problem: Pain - Standard  Goal: Alleviation of pain or a reduction in pain to the patient’s comfort goal  Outcome: Progressing     Problem: Knowledge Deficit - Standard  Goal: Patient and family/care givers will demonstrate understanding of plan of care, disease process/condition, diagnostic tests and medications  Outcome: Progressing     Problem: Fall Risk  Goal: Patient will remain free from falls  Outcome: Progressing     Problem: Skin Integrity  Goal: Skin integrity is maintained or improved  Outcome: Progressing

## 2024-12-29 NOTE — PROGRESS NOTES
Patient able to ambulate  IS 1000  Pain well tolerated  Supplemental oxygen requirement at baseline  Tolerating diet  Plan for transfer to floor

## 2024-12-29 NOTE — PROGRESS NOTES
Geriatric Medicine Daily Progress Note      Date of Service  12/29/2024    Chief Complaint  84 y.o. female admitted 12/28/2024 with fall    Hospital Course    84-year-old female with history of dementia, coronary artery disease, congestive heart failure, psychiatric disorder and hypothyroidism who presented 12/28 with fall. Patient lives at a group home when she fell. Patient cannot remember all event however she felt like her legs gave up, denied any fever or chills no palpitation or chest pain, in general patient is poor historian due to dementia, she is alert and oriented x 2. On admission labs around baseline, kidney stable, troponin was 22 and EKG did not show any ischemia, CT scan for chest and abdomen showed multiple rib fractures. Patient was admitted to ICU by trauma team       Interval Problem Update  -Evaluate and examined the patient at bedside, patient is alert and oriented x 2, likely around her baseline, she is on 1 L nasal cannula, -  --start with Seroquel at night, confirmed  with the pharmacy there is no reevaluation.  -Multivitamins orally  -Labs and chest x-ray were reviewed, around baseline, patient is medically stable for discharge, awaiting surgery clearance.      Code Status  Full code    Disposition  Home with home health versus SNF    Review of Systems  Review of Systems   Unable to perform ROS: Dementia   Constitutional:  Negative for chills, fever and weight loss.   HENT:  Negative for ear pain, hearing loss and tinnitus.    Eyes:  Negative for blurred vision, double vision and photophobia.   Respiratory:  Negative for cough and hemoptysis.    Cardiovascular:  Negative for chest pain, palpitations, orthopnea and claudication.   Gastrointestinal:  Negative for abdominal pain, constipation, diarrhea, nausea and vomiting.   Genitourinary:  Negative for dysuria, frequency and urgency.   Musculoskeletal:  Negative for myalgias and neck pain.   Skin:  Negative for rash.   Neurological:   Negative for dizziness, speech change and weakness.        Physical Exam  Temp:  [35.5 °C (95.9 °F)-36.1 °C (97 °F)] 36.1 °C (97 °F)  Pulse:  [52-69] 65  Resp:  [12-31] 25  BP: (119-198)/(58-90) 119/85  SpO2:  [88 %-99 %] 97 %    Physical Exam  Constitutional:       General: She is not in acute distress.     Appearance: She is well-developed. She is not ill-appearing.   Neck:      Vascular: No JVD.   Cardiovascular:      Rate and Rhythm: Normal rate.      Heart sounds: Normal heart sounds. No murmur heard.  Pulmonary:      Effort: Pulmonary effort is normal.      Breath sounds: Normal breath sounds. No wheezing or rales.   Abdominal:      General: There is no distension.      Palpations: Abdomen is soft.      Tenderness: There is no abdominal tenderness. There is no guarding or rebound.   Musculoskeletal:         General: Normal range of motion.      Cervical back: Normal range of motion and neck supple.   Skin:     General: Skin is warm and dry.      Findings: No erythema or rash.   Neurological:      Mental Status: She is alert. She is disoriented.      Cranial Nerves: No cranial nerve deficit.      Coordination: Coordination normal.         CAM  Acute onset and fluctuating course   Yes  Inattention                                         Yes  Disorganized thinking                        Yes  Altered level of consciousness          No     Medication Review    Yes    Laboratory  Recent Labs     12/28/24  1017 12/29/24  0400   WBC 9.3 6.1   RBC 4.99 5.01   HEMOGLOBIN 12.9 12.7   HEMATOCRIT 40.2 40.5   MCV 80.6* 80.8*   MCH 25.9* 25.3*   MCHC 32.1* 31.4*   RDW 46.6 46.9   PLATELETCT 226 222   MPV 10.9 11.3     Recent Labs     12/28/24  1017 12/29/24  0400   SODIUM 145 140   POTASSIUM 4.3 4.2   CHLORIDE 107 104   CO2 28 24   GLUCOSE 124* 103*   BUN 19 16   CREATININE 1.02 0.86   CALCIUM 9.7 9.0                   Imaging  DX-CHEST-PORTABLE (1 VIEW)   Final Result      No acute process.      CT-CHEST,ABDOMEN,PELVIS WITH    Final Result         1. Acute displaced fractures of the left anterior 7th, 8th and 9th ribs.   2. Subcutaneous stranding along the left flank.           Assessment/Plan  Right flank hematoma, initial encounter- (present on admission)  Assessment & Plan  Trauma on board    Closed fracture of multiple ribs of right side- (present on admission)  Assessment & Plan  Pain control  Trauma on board    Vascular dementia without behavioral disturbance (HCC)- (present on admission)  Assessment & Plan  Patient is in a group home, came with fall, patient using walker  History of recurrent falls  Patient is on high risk of delirium  Patient states does not want any tube inside her lungs, there is no family around, patient has guardianship  Will discussed that DNR/DNI with a guardianship and patient again    Repeated falls- (present on admission)  Assessment & Plan  Patient is on high risk for fall due to comorbidities, age and dementia  PT and OT  Check vitamin D and TSH    COPD without exacerbation (HCC)- (present on admission)  Assessment & Plan  Oxygen therapy with inhalers    Dyslipidemia- (present on admission)  Assessment & Plan  Continue home atorvastatin 80 mg daily, consider decrease the dose of possible    Essential hypertension- (present on admission)  Assessment & Plan  Continue home medication amlodipine and Benzopril  Avoid aggressive treatment    Chronic back pain- (present on admission)  Assessment & Plan  Pain control and stool softener  Chronic  PT and OT         VTE prophylaxis: SCDs and Lovenox       Interventions to be considered in all patients in order to minimize the risk of delirium.   -do not disturb patient (vitals or lab draws) between the hours of 10 PM and 6 AM.  -ideally the patient should not sleep during the day and we should avoid day time naps.   -up in chair for meals  -ambulate at least three times daily, as able  -watch for constipation  -timed voiding - ask patient is she would like to go  to the bathroom q 2-3 hours, except during the do not disturb hours.   -remove all necessary lines (central lines, peripheral IVs, feeding tubes, puckett catheters)  -unless patient has shown harm to self or others I would recommend against use of restraints - either chemical or physical (antipsychotics)   -minimize polypharmacy, do not dose medication during sleep hours

## 2024-12-30 ENCOUNTER — APPOINTMENT (OUTPATIENT)
Dept: RADIOLOGY | Facility: MEDICAL CENTER | Age: 84
DRG: 184 | End: 2024-12-30
Attending: NURSE PRACTITIONER
Payer: MEDICARE

## 2024-12-30 LAB
1,25(OH)2D3 SERPL-MCNC: 91.4 PG/ML (ref 19.9–79.3)
ALBUMIN SERPL BCP-MCNC: 3.5 G/DL (ref 3.2–4.9)
ALBUMIN/GLOB SERPL: 1.5 G/DL
ALP SERPL-CCNC: 85 U/L (ref 30–99)
ALT SERPL-CCNC: 15 U/L (ref 2–50)
ANION GAP SERPL CALC-SCNC: 10 MMOL/L (ref 7–16)
AST SERPL-CCNC: 16 U/L (ref 12–45)
BASOPHILS # BLD AUTO: 0.3 % (ref 0–1.8)
BASOPHILS # BLD: 0.02 K/UL (ref 0–0.12)
BILIRUB SERPL-MCNC: 0.2 MG/DL (ref 0.1–1.5)
BUN SERPL-MCNC: 22 MG/DL (ref 8–22)
CALCIUM ALBUM COR SERPL-MCNC: 9.4 MG/DL (ref 8.5–10.5)
CALCIUM SERPL-MCNC: 9 MG/DL (ref 8.5–10.5)
CHLORIDE SERPL-SCNC: 101 MMOL/L (ref 96–112)
CO2 SERPL-SCNC: 24 MMOL/L (ref 20–33)
CREAT SERPL-MCNC: 1.16 MG/DL (ref 0.5–1.4)
EOSINOPHIL # BLD AUTO: 0.24 K/UL (ref 0–0.51)
EOSINOPHIL NFR BLD: 3.7 % (ref 0–6.9)
ERYTHROCYTE [DISTWIDTH] IN BLOOD BY AUTOMATED COUNT: 46.6 FL (ref 35.9–50)
FERRITIN SERPL-MCNC: 25.1 NG/ML (ref 10–291)
GFR SERPLBLD CREATININE-BSD FMLA CKD-EPI: 46 ML/MIN/1.73 M 2
GLOBULIN SER CALC-MCNC: 2.4 G/DL (ref 1.9–3.5)
GLUCOSE SERPL-MCNC: 140 MG/DL (ref 65–99)
HCT VFR BLD AUTO: 36.6 % (ref 37–47)
HGB BLD-MCNC: 11.7 G/DL (ref 12–16)
IMM GRANULOCYTES # BLD AUTO: 0.01 K/UL (ref 0–0.11)
IMM GRANULOCYTES NFR BLD AUTO: 0.2 % (ref 0–0.9)
IRON SATN MFR SERPL: 8 % (ref 15–55)
IRON SERPL-MCNC: 22 UG/DL (ref 40–170)
LYMPHOCYTES # BLD AUTO: 0.99 K/UL (ref 1–4.8)
LYMPHOCYTES NFR BLD: 15.1 % (ref 22–41)
MCH RBC QN AUTO: 26 PG (ref 27–33)
MCHC RBC AUTO-ENTMCNC: 32 G/DL (ref 32.2–35.5)
MCV RBC AUTO: 81.3 FL (ref 81.4–97.8)
MONOCYTES # BLD AUTO: 0.47 K/UL (ref 0–0.85)
MONOCYTES NFR BLD AUTO: 7.2 % (ref 0–13.4)
NEUTROPHILS # BLD AUTO: 4.84 K/UL (ref 1.82–7.42)
NEUTROPHILS NFR BLD: 73.5 % (ref 44–72)
NRBC # BLD AUTO: 0 K/UL
NRBC BLD-RTO: 0 /100 WBC (ref 0–0.2)
PHOSPHATE SERPL-MCNC: 3.2 MG/DL (ref 2.5–4.5)
PLATELET # BLD AUTO: 208 K/UL (ref 164–446)
PMV BLD AUTO: 11.8 FL (ref 9–12.9)
POTASSIUM SERPL-SCNC: 4.1 MMOL/L (ref 3.6–5.5)
PROT SERPL-MCNC: 5.9 G/DL (ref 6–8.2)
RBC # BLD AUTO: 4.5 M/UL (ref 4.2–5.4)
SODIUM SERPL-SCNC: 135 MMOL/L (ref 135–145)
TIBC SERPL-MCNC: 259 UG/DL (ref 250–450)
UIBC SERPL-MCNC: 237 UG/DL (ref 110–370)
WBC # BLD AUTO: 6.6 K/UL (ref 4.8–10.8)

## 2024-12-30 PROCEDURE — 700102 HCHG RX REV CODE 250 W/ 637 OVERRIDE(OP): Performed by: NURSE PRACTITIONER

## 2024-12-30 PROCEDURE — 83540 ASSAY OF IRON: CPT

## 2024-12-30 PROCEDURE — 700111 HCHG RX REV CODE 636 W/ 250 OVERRIDE (IP): Performed by: STUDENT IN AN ORGANIZED HEALTH CARE EDUCATION/TRAINING PROGRAM

## 2024-12-30 PROCEDURE — A9270 NON-COVERED ITEM OR SERVICE: HCPCS | Performed by: SURGERY

## 2024-12-30 PROCEDURE — 71045 X-RAY EXAM CHEST 1 VIEW: CPT

## 2024-12-30 PROCEDURE — 700102 HCHG RX REV CODE 250 W/ 637 OVERRIDE(OP): Performed by: INTERNAL MEDICINE

## 2024-12-30 PROCEDURE — 99232 SBSQ HOSP IP/OBS MODERATE 35: CPT | Performed by: INTERNAL MEDICINE

## 2024-12-30 PROCEDURE — 97535 SELF CARE MNGMENT TRAINING: CPT

## 2024-12-30 PROCEDURE — 99232 SBSQ HOSP IP/OBS MODERATE 35: CPT | Performed by: NURSE PRACTITIONER

## 2024-12-30 PROCEDURE — 82728 ASSAY OF FERRITIN: CPT

## 2024-12-30 PROCEDURE — 700101 HCHG RX REV CODE 250: Performed by: NURSE PRACTITIONER

## 2024-12-30 PROCEDURE — 83550 IRON BINDING TEST: CPT

## 2024-12-30 PROCEDURE — 85025 COMPLETE CBC W/AUTO DIFF WBC: CPT

## 2024-12-30 PROCEDURE — 770001 HCHG ROOM/CARE - MED/SURG/GYN PRIV*

## 2024-12-30 PROCEDURE — 97166 OT EVAL MOD COMPLEX 45 MIN: CPT

## 2024-12-30 PROCEDURE — 80053 COMPREHEN METABOLIC PANEL: CPT

## 2024-12-30 PROCEDURE — A9270 NON-COVERED ITEM OR SERVICE: HCPCS | Performed by: INTERNAL MEDICINE

## 2024-12-30 PROCEDURE — 94669 MECHANICAL CHEST WALL OSCILL: CPT

## 2024-12-30 PROCEDURE — 36415 COLL VENOUS BLD VENIPUNCTURE: CPT

## 2024-12-30 PROCEDURE — 97162 PT EVAL MOD COMPLEX 30 MIN: CPT

## 2024-12-30 PROCEDURE — 84100 ASSAY OF PHOSPHORUS: CPT

## 2024-12-30 PROCEDURE — 700102 HCHG RX REV CODE 250 W/ 637 OVERRIDE(OP): Performed by: SURGERY

## 2024-12-30 PROCEDURE — A9270 NON-COVERED ITEM OR SERVICE: HCPCS | Performed by: NURSE PRACTITIONER

## 2024-12-30 RX ORDER — MULTIVITAMIN WITH IRON
1000 TABLET ORAL DAILY
Status: DISCONTINUED | OUTPATIENT
Start: 2024-12-30 | End: 2024-12-31 | Stop reason: HOSPADM

## 2024-12-30 RX ADMIN — CARBOXYMETHYLCELLULOSE SODIUM 1 DROP: 5 SOLUTION/ DROPS OPHTHALMIC at 05:58

## 2024-12-30 RX ADMIN — ACETAMINOPHEN 650 MG: 325 TABLET ORAL at 05:49

## 2024-12-30 RX ADMIN — Medication 5 MG: at 18:35

## 2024-12-30 RX ADMIN — OXYBUTYNIN CHLORIDE 5 MG: 5 TABLET, EXTENDED RELEASE ORAL at 05:49

## 2024-12-30 RX ADMIN — CYANOCOBALAMIN TAB 500 MCG 1000 MCG: 500 TAB at 21:45

## 2024-12-30 RX ADMIN — BENAZEPRIL HYDROCHLORIDE 40 MG: 20 TABLET ORAL at 05:49

## 2024-12-30 RX ADMIN — UMECLIDINIUM BROMIDE AND VILANTEROL TRIFENATATE 1 PUFF: 62.5; 25 POWDER RESPIRATORY (INHALATION) at 05:58

## 2024-12-30 RX ADMIN — ACETAMINOPHEN 650 MG: 325 TABLET ORAL at 12:00

## 2024-12-30 RX ADMIN — MELOXICAM 15 MG: 7.5 TABLET ORAL at 05:49

## 2024-12-30 RX ADMIN — LIDOCAINE PAIN RELIEF 1 PATCH: 560 PATCH TOPICAL at 12:00

## 2024-12-30 RX ADMIN — AMLODIPINE BESYLATE 10 MG: 10 TABLET ORAL at 05:50

## 2024-12-30 RX ADMIN — QUETIAPINE FUMARATE 12.5 MG: 25 TABLET ORAL at 21:45

## 2024-12-30 RX ADMIN — Medication 2000 UNITS: at 05:50

## 2024-12-30 RX ADMIN — OMEPRAZOLE 20 MG: 20 CAPSULE, DELAYED RELEASE ORAL at 05:50

## 2024-12-30 RX ADMIN — ATORVASTATIN CALCIUM 80 MG: 40 TABLET, FILM COATED ORAL at 18:34

## 2024-12-30 RX ADMIN — DULOXETINE HYDROCHLORIDE 40 MG: 20 CAPSULE, DELAYED RELEASE ORAL at 05:50

## 2024-12-30 RX ADMIN — SENNOSIDES AND DOCUSATE SODIUM 1 TABLET: 50; 8.6 TABLET ORAL at 21:45

## 2024-12-30 RX ADMIN — CARBOXYMETHYLCELLULOSE SODIUM 1 DROP: 5 SOLUTION/ DROPS OPHTHALMIC at 18:00

## 2024-12-30 RX ADMIN — ACETAMINOPHEN 650 MG: 325 TABLET ORAL at 18:35

## 2024-12-30 RX ADMIN — ENOXAPARIN SODIUM 30 MG: 100 INJECTION SUBCUTANEOUS at 18:34

## 2024-12-30 RX ADMIN — ENOXAPARIN SODIUM 30 MG: 100 INJECTION SUBCUTANEOUS at 05:49

## 2024-12-30 ASSESSMENT — ENCOUNTER SYMPTOMS
CLAUDICATION: 0
CONSTIPATION: 0
ORTHOPNEA: 0
DIZZINESS: 0
HEADACHES: 0
DOUBLE VISION: 0
NAUSEA: 0
CHILLS: 0
MYALGIAS: 0
SPEECH CHANGE: 0
NECK PAIN: 0
MYALGIAS: 1
COUGH: 0
HEMOPTYSIS: 0
SHORTNESS OF BREATH: 0
FEVER: 0
TINGLING: 0
BACK PAIN: 0
ABDOMINAL PAIN: 0
DIARRHEA: 0
BLURRED VISION: 0
FOCAL WEAKNESS: 0
PALPITATIONS: 0
VOMITING: 0
WEAKNESS: 0
WEIGHT LOSS: 0
PHOTOPHOBIA: 0
MEMORY LOSS: 1

## 2024-12-30 ASSESSMENT — COGNITIVE AND FUNCTIONAL STATUS - GENERAL
MOBILITY SCORE: 17
MOVING FROM LYING ON BACK TO SITTING ON SIDE OF FLAT BED: A LITTLE
STANDING UP FROM CHAIR USING ARMS: A LITTLE
SUGGESTED CMS G CODE MODIFIER MOBILITY: CK
SUGGESTED CMS G CODE MODIFIER DAILY ACTIVITY: CK
WALKING IN HOSPITAL ROOM: A LITTLE
HELP NEEDED FOR BATHING: A LOT
CLIMB 3 TO 5 STEPS WITH RAILING: A LOT
DAILY ACTIVITIY SCORE: 19
DRESSING REGULAR LOWER BODY CLOTHING: A LOT
MOVING TO AND FROM BED TO CHAIR: A LITTLE
TOILETING: A LITTLE
TURNING FROM BACK TO SIDE WHILE IN FLAT BAD: A LITTLE

## 2024-12-30 ASSESSMENT — GAIT ASSESSMENTS
DEVIATION: BRADYKINETIC
DISTANCE (FEET): 125
ASSISTIVE DEVICE: FRONT WHEEL WALKER
GAIT LEVEL OF ASSIST: MINIMAL ASSIST

## 2024-12-30 ASSESSMENT — PAIN DESCRIPTION - PAIN TYPE
TYPE: ACUTE PAIN

## 2024-12-30 ASSESSMENT — ACTIVITIES OF DAILY LIVING (ADL): TOILETING: INDEPENDENT

## 2024-12-30 NOTE — PROGRESS NOTES
Pt admitted to room T418 via transport in [facility] from [facility] at 20:44.      AA&Ox2-3. Disoriented to date. Intermittently confused in conversation. Denies pain at this time.  See 2 RN skin note  Tolerating reular diet. Denies N/V.  + void. - BM.   Pt ambulates X2 w/FWW.  High Fall risk, Bed alarm on, Care AI in place for patient safety    Plan of care discussed, all questions answered. Educated on the importance of calling before getting OOB and pt verbalizes understanding. Educated regarding importance of oral care. Oral care kit at bedside. Call light is within reach, treaded slipper socks on, bed in lowest/ locked position, hourly rounding in place, all needs met at this time

## 2024-12-30 NOTE — PROGRESS NOTES
4 Eyes Skin Assessment Completed by MARK Raymundo and MARK Yousif.    Head WDL  Ears WDL  Nose WDL  Mouth WDL  Neck WDL  Breast/Chest Redness, underneath breasts, dry flaky skin  Shoulder Blades WDL  Spine WDL  (R) Arm/Elbow/Hand Bruising to elbow, dry/flaky  (L) Arm/Elbow/Hand Redness and Blanching dry/flaky  Abdomen WDL  Groin Redness, moisture associated  Scrotum/Coccyx/Buttocks Redness and Blanching  (R) Leg WDL  (L) Leg WDL  (R) Heel/Foot/Toe Redness and Blanching  (L) Heel/Foot/Toe Redness and Blanching          Devices In Places Blood Pressure Cuff, Pulse Ox, and Nasal Cannula, purewick, PIV      Interventions In Place NC W/Ear Foams, TAP System, Pillows, Q2 Turns, Barrier Cream, Heels Loaded W/Pillows, and Pressure Redistribution Mattress    Possible Skin Injury No    Pictures Uploaded Into Epic N/A  Wound Consult Placed N/A  RN Wound Prevention Protocol Ordered Yes

## 2024-12-30 NOTE — PROGRESS NOTES
1728 - report given to Amy ROGERS on [facility]. All questions answered. Patient and all belongings ready for transport when bed is ready.

## 2024-12-30 NOTE — PROGRESS NOTES
Patient transferred to T418 via gurney, on oxygen, accompanied by transport. All patient belongings sent with patient.

## 2024-12-30 NOTE — PROGRESS NOTES
Trauma / Surgical Daily Progress Note    Date of Service  12/30/2024    Chief Complaint  84 y.o. female admitted 12/28/2024 with rib fractures after a ground level fall    Interval Events  Transfer from ICU to [facility]  Pain control adequate    - Therapy evaluations pending  - Continue aggressive pulmonary hygiene  - Disposition pending therapy recommendations     Review of Systems  Review of Systems   Constitutional:  Negative for chills and fever.   Eyes:  Negative for blurred vision and double vision.   Respiratory:  Negative for shortness of breath.    Cardiovascular:  Negative for palpitations.   Gastrointestinal:  Negative for abdominal pain, nausea and vomiting.   Musculoskeletal:  Positive for myalgias (chest wall pain). Negative for back pain, joint pain and neck pain.        Chest wall pain.    Neurological:  Negative for dizziness, tingling, focal weakness and headaches.   Psychiatric/Behavioral:  Positive for memory loss (chronic).         Vital Signs  Temp:  [36 °C (96.8 °F)-36.5 °C (97.7 °F)] 36.3 °C (97.3 °F)  Pulse:  [55-65] 62  Resp:  [12-25] 17  BP: ()/(50-90) 136/59  SpO2:  [88 %-99 %] 96 %    Physical Exam  Physical Exam  Vitals and nursing note reviewed.   Constitutional:       General: She is not in acute distress.     Appearance: She is obese. She is not toxic-appearing.   HENT:      Head: Normocephalic.      Right Ear: External ear normal.      Left Ear: External ear normal.      Nose: Nose normal.      Mouth/Throat:      Mouth: Mucous membranes are moist.      Pharynx: Oropharynx is clear.      Comments: Edentulous, dentures  Eyes:      Extraocular Movements: Extraocular movements intact.      Conjunctiva/sclera: Conjunctivae normal.   Cardiovascular:      Rate and Rhythm: Normal rate and regular rhythm.      Pulses: Normal pulses.   Pulmonary:      Effort: Pulmonary effort is normal. No respiratory distress.      Comments: IS 1000  Chest:      Chest wall: Tenderness present.   Abdominal:       General: There is no distension.      Palpations: Abdomen is soft.      Tenderness: There is no abdominal tenderness. There is no guarding.   Musculoskeletal:         General: Tenderness present.      Cervical back: No tenderness.      Comments: Moves all extremities   Skin:     General: Skin is warm and dry.      Capillary Refill: Capillary refill takes less than 2 seconds.   Neurological:      Mental Status: She is alert.      GCS: GCS eye subscore is 4. GCS verbal subscore is 5. GCS motor subscore is 6.      Comments: History of dementia         Laboratory  Recent Results (from the past 24 hours)   POCT glucose device results    Collection Time: 12/29/24 11:24 AM   Result Value Ref Range    POC Glucose, Blood 100 (H) 65 - 99 mg/dL   CBC WITH DIFFERENTIAL    Collection Time: 12/30/24  1:54 AM   Result Value Ref Range    WBC 6.6 4.8 - 10.8 K/uL    RBC 4.50 4.20 - 5.40 M/uL    Hemoglobin 11.7 (L) 12.0 - 16.0 g/dL    Hematocrit 36.6 (L) 37.0 - 47.0 %    MCV 81.3 (L) 81.4 - 97.8 fL    MCH 26.0 (L) 27.0 - 33.0 pg    MCHC 32.0 (L) 32.2 - 35.5 g/dL    RDW 46.6 35.9 - 50.0 fL    Platelet Count 208 164 - 446 K/uL    MPV 11.8 9.0 - 12.9 fL    Neutrophils-Polys 73.50 (H) 44.00 - 72.00 %    Lymphocytes 15.10 (L) 22.00 - 41.00 %    Monocytes 7.20 0.00 - 13.40 %    Eosinophils 3.70 0.00 - 6.90 %    Basophils 0.30 0.00 - 1.80 %    Immature Granulocytes 0.20 0.00 - 0.90 %    Nucleated RBC 0.00 0.00 - 0.20 /100 WBC    Neutrophils (Absolute) 4.84 1.82 - 7.42 K/uL    Lymphs (Absolute) 0.99 (L) 1.00 - 4.80 K/uL    Monos (Absolute) 0.47 0.00 - 0.85 K/uL    Eos (Absolute) 0.24 0.00 - 0.51 K/uL    Baso (Absolute) 0.02 0.00 - 0.12 K/uL    Immature Granulocytes (abs) 0.01 0.00 - 0.11 K/uL    NRBC (Absolute) 0.00 K/uL   Comp Metabolic Panel    Collection Time: 12/30/24  1:54 AM   Result Value Ref Range    Sodium 135 135 - 145 mmol/L    Potassium 4.1 3.6 - 5.5 mmol/L    Chloride 101 96 - 112 mmol/L    Co2 24 20 - 33 mmol/L    Anion Gap  10.0 7.0 - 16.0    Glucose 140 (H) 65 - 99 mg/dL    Bun 22 8 - 22 mg/dL    Creatinine 1.16 0.50 - 1.40 mg/dL    Calcium 9.0 8.5 - 10.5 mg/dL    Correct Calcium 9.4 8.5 - 10.5 mg/dL    AST(SGOT) 16 12 - 45 U/L    ALT(SGPT) 15 2 - 50 U/L    Alkaline Phosphatase 85 30 - 99 U/L    Total Bilirubin 0.2 0.1 - 1.5 mg/dL    Albumin 3.5 3.2 - 4.9 g/dL    Total Protein 5.9 (L) 6.0 - 8.2 g/dL    Globulin 2.4 1.9 - 3.5 g/dL    A-G Ratio 1.5 g/dL   PHOSPHORUS    Collection Time: 12/30/24  1:54 AM   Result Value Ref Range    Phosphorus 3.2 2.5 - 4.5 mg/dL   ESTIMATED GFR    Collection Time: 12/30/24  1:54 AM   Result Value Ref Range    GFR (CKD-EPI) 46 (A) >60 mL/min/1.73 m 2       Fluids    Intake/Output Summary (Last 24 hours) at 12/30/2024 0818  Last data filed at 12/30/2024 0602  Gross per 24 hour   Intake 740 ml   Output 700 ml   Net 40 ml       Core Measures & Quality Metrics  Labs reviewed, Medications reviewed and Radiology images reviewed  Pink catheter: No Pink      DVT Prophylaxis: Enoxaparin (Lovenox)  DVT prophylaxis - mechanical: SCDs  Ulcer prophylaxis: Yes        RAP Score Total: 6    CAGE Results: negative Blood Alcohol>0.08: not completed       Assessment/Plan  * Trauma- (present on admission)  Assessment & Plan  Mechanical GLF previous evening.  T-5000 Activation.  Damian Santos MD. Trauma Surgery.    Right flank hematoma, initial encounter- (present on admission)  Assessment & Plan  CT imaging with subcutaneous stranding along the left flank.  Analgesia and monitor H/H.    Closed fracture of multiple ribs of right side- (present on admission)  Assessment & Plan  CT imaging with acute displaced fractures of the left anterior 7th, 8th and 9th ribs.  Aggressive pulmonary hygiene and multimodal pain management.    Contraindication to deep vein thrombosis (DVT) prophylaxis- (present on admission)  Assessment & Plan  VTE prophylaxis initially contraindicated secondary to elevated bleeding risk.  12/30 Trauma  surveillance venous duplex ultrasonography ordered.  12/29 Start DVT prophylaxis    Encounter for geriatric assessment- (present on admission)  Assessment & Plan  12/28 The patient is 75 years old or older and a geriatrics consult is indicated.   Elliot Longoria MD, Geriatric Hospitalist.    BMI 36.0-36.9,adult- (present on admission)  Assessment & Plan  Admission BMI 35.43 kg/m2.    Vascular dementia without behavioral disturbance (HCC)- (present on admission)  Assessment & Plan  Monitor.    Repeated falls- (present on admission)  Assessment & Plan  Per chart review and recent fall overnight.    Difficulty swallowing solids- (present on admission)  Assessment & Plan  Baseline.  Monitor.    Age-related osteoporosis without current pathological fracture- (present on admission)  Assessment & Plan  Chronic condition treated with Fosamax.  Holding maintenance medication during acute traumatic illness.    COPD without exacerbation (HCC)- (present on admission)  Assessment & Plan  Chronic condition treated with Anoro Ellipta and 2L oxygen via NC at [facility].  Resumed maintenance medication on admission.  Respiratory protocol.     Lives in group home- (present on admission)  Assessment & Plan  Resides at [facility]: [facility], [place] [address].  [facility] Public Guardian Vianey Calle, [phone].  Pooja advised SW that the pt is able to go back to her group home if she is cleared.    GERD without esophagitis- (present on admission)  Assessment & Plan  Chronic condition treated with Protonix.  Resumed maintenance medication on admission.    Dry eye syndrome of both eyes- (present on admission)  Assessment & Plan  Chronic condition treated with eye gtts.  Resumed maintenance medication on admission.    Vitamin D deficiency- (present on admission)  Assessment & Plan  Chronic condition treated with Vitamin D.  Resumed maintenance medication on admission.    Overactive bladder- (present on  admission)  Assessment & Plan  Chronic condition treated with Oxybutynin.  Resumed maintenance medication on admission.    Major depression single episode, in partial remission (HCC)- (present on admission)  Assessment & Plan  Chronic condition treated with Cymbalta.  Resumed maintenance medication on admission.  12/28 Psychiatry consult completed, recommend increase Cymbalta dose to 40mg daily     Dyslipidemia- (present on admission)  Assessment & Plan  Chronic condition treated with atorvastatin.  Resumed maintenance medication on admission.    Essential hypertension- (present on admission)  Assessment & Plan  Chronic condition treated with amlodipine, benazepril.  Resumed maintenance medication on admission.    Chronic back pain- (present on admission)  Assessment & Plan  Chronic condition treated with tylenol and Mobic.  Resumed maintenance medication on admission.        Discussed patient condition with RN, , Patient, and trauma surgery, Dr. Santos.

## 2024-12-30 NOTE — PROGRESS NOTES
Bedside report received from night shift nurse. Assumed care at 0645.   Pt A&Ox3, DO to time.  Tolerating regular diet, denies n/v. Normoactive bowel sounds, passing flatus, LBM 12/30/24. IV access through 20g LFA that is SL.  Scattered bruising and abrasions.  Saturating >90% on 1L NC.  Pt ambulates x1 w/ FWW.  Pain is controlled through medication orders. Updated on plan of care. Safety education provided. Bed locked in low. Call light within reach. Rounding in place.

## 2024-12-30 NOTE — CARE PLAN
"  Problem: Pain - Standard  Goal: Alleviation of pain or a reduction in pain to the patient’s comfort goal  Outcome: Progressing     Problem: Knowledge Deficit - Standard  Goal: Patient and family/care givers will demonstrate understanding of plan of care, disease process/condition, diagnostic tests and medications  Outcome: Progressing     Problem: Fall Risk  Goal: Patient will remain free from falls  Outcome: Progressing     Problem: Skin Integrity  Goal: Skin integrity is maintained or improved  Outcome: Progressing   The patient is Stable - Low risk of patient condition declining or worsening    Shift Goals  Clinical Goals: pain management, patient safety, mobility  Patient Goals: comfort  Family Goals: unable to assess, no family present at this time    Progress made toward(s) clinical / shift goals:  Patient orientation fluctuated overnight from A&O 2-3 to A&O 1. Care AI in place for patient safety. Patient attempted to swing legs out of bed to \"look for RN\". Patient is unaware of limited strength and ambulation ability. Patient has no complaints of pain. Bed alarm in place, 3 side rails up and call light and belongings within reach. Patient has poor short term memory and requires reminders to use call light for needs.     Patient is not progressing towards the following goals:      "

## 2024-12-30 NOTE — THERAPY
"Physical Therapy   Initial Evaluation     Patient Name: Maria Esther Valencia  Age:  84 y.o., Sex:  female  Medical Record #: [id number]  Today's Date: 12/30/2024     Precautions  Precautions: Fall Risk  Comments: vascular dementia    Assessment    84-year-old female with history of dementia, coronary artery disease, congestive heart failure, psychiatric disorder and hypothyroidism who presented 12/28 with fall. Patient lives at a group home when she fell and broke L ribs.  She presents to PT saskiaal with pain, impaired balance and activity tolerance. Patient lives at a group home where she has assist as needed from her caregivers. She does ambulate with a FWW and was able to demo this today without desatting on RA>  She was, however, fatigued and needed cueing for safe management of FWW. Recommend HHPT. Will continue to follow while in house.     Plan    Physical Therapy Initial Treatment Plan   Treatment Plan : Bed Mobility, Equipment, Family / Caregiver Training, Gait Training, Neuro Re-Education / Balance, Self Care / Home Evaluation, Stair Training, Therapeutic Activities, Therapeutic Exercise  Treatment Frequency: 4 Times per Week  Duration: Until Therapy Goals Met    DC Equipment Recommendations: 4-Wheeled Walker  Discharge Recommendations: Recommend home health for continued physical therapy services       Subjective    \"I'm getting dumber everyday and sometimes I talk too much\"     Objective       12/30/24 1100   Precautions   Precautions Fall Risk   Comments vascular dementia   Vitals   Pulse Oximetry 95 %   O2 Delivery Device None - Room Air   Vitals Comments during ambulation   Pain 0 - 10 Group   Location Rib Cage;Flank   Location Orientation Left   Pain Rating Scale (NPRS) 8   Therapist Pain Assessment Post Activity Pain Same as Prior to Activity;8;Nurse Notified   Prior Living Situation   Prior Services Continuous (24 Hour) Care Giving Per Service   Housing / Facility Group Home   Steps Into Home 1   Steps In Home 0 " "  Equipment Owned Front-Wheel Walker   Lives with - Patient's Self Care Capacity Attendant / Paid Care Giver   Comments Patient lives in a group home and has assist as needed. She only ambulates outside with staff from the group home   Prior Level of Functional Mobility   Bed Mobility Independent   Transfer Status Required Assist   Ambulation Required Assist   Assistive Devices Used Front-Wheel Walker   Stairs Required Assist   Comments reports that she has someone with her whenever she gets up and moves   History of Falls   History of Falls Yes   Date of Last Fall   (reason for admit nad reports frequent falls when she's ambulating outside with her FWW)   Cognition    Cognition / Consciousness X   Speech/ Communication Dysarthric;Hard of Hearing   Orientation Level Not Oriented to Day   Level of Consciousness Alert   Safety Awareness Impaired   New Learning Impaired   Comments pleasantly confused and reports fear of falling   Active ROM Upper Body   Active ROM Upper Body  X   Comments L shoulder ROM limited by pain   Strength Upper Body   Upper Body Strength  X   Comments L shoulder limited at baseline   Sensation Upper Body   Upper Extremity Sensation  WDL   Strength Lower Body   Lower Body Strength  X   Lt Ankle Dorsiflexion Strength 3- (F-)   Comments otherwise LE grossly 4/5   Vision   Vision Comments reports she needs to wear glasses but doesn't have any. Reports seeing \"triple' but then is able to read close up and far away without issues   Other Treatments   Other Treatments Provided educated abotu safety with mobility, use of 4WW in the community for iproved self-pacing and safety. Educated about splinted coughing and pulmonary hygiene   Balance Assessment   Sitting Balance (Static) Fair   Sitting Balance (Dynamic) Fair   Standing Balance (Static) Fair -   Standing Balance (Dynamic) Fair -   Weight Shift Sitting Fair   Weight Shift Standing Fair   Bed Mobility    Comments found and left up in the chair "   Gait Analysis   Gait Level Of Assist Minimal Assist   Assistive Device Front Wheel Walker   Distance (Feet) 125   # of Times Distance was Traveled 1   Deviation Bradykinetic  (slight L foot drop during swing phase, needs repeated cueing to bring FWW closer to  her body when ambulating)   Functional Mobility   Sit to Stand Minimal Assist   Bed, Chair, Wheelchair Transfer Minimal Assist   Mobility chair>gait>chair   ICU Target Mobility Level   ICU Mobility - Targeted Level Level 4   6 Clicks Assessment - How much HELP from from another person do you currently need... (If the patient hasn't done an activity recently, how much help from another person do you think he/she would need if he/she tried?)   Turning from your back to your side while in a flat bed without using bedrails? 3   Moving from lying on your back to sitting on the side of a flat bed without using bedrails? 3   Moving to and from a bed to a chair (including a wheelchair)? 3   Standing up from a chair using your arms (e.g., wheelchair, or bedside chair)? 3   Walking in hospital room? 3   Climbing 3-5 steps with a railing? 2   6 clicks Mobility Score 17   Activity Tolerance   Sitting in Chair post session   Sitting Edge of Bed 10 min   Standing 6 min   Comments poor endurance, SOB with gait but satting at 95%   Patient / Family Goals    Patient / Family Goal #1 to go home   Short Term Goals    Short Term Goal # 1 in 6 visits patient will demo all bed mobiity indep for safe DC   Short Term Goal # 2 in 6 visits patient will navigate 1 stair with Ladan nd LRAD for safe DC   Short Term Goal # 3 in 6 visits patient will ambualte 200' with CGA and LRAD for safe DC   Short Term Goal # 4 in 6 visits patient will demo all functional transfers with Sup and LRAD for safe DC   Education Group   Education Provided Role of Physical Therapist;Gait Training;Use of Assistive Device   Role of Physical Therapist Patient Response  Patient;Acceptance;Explanation;Demonstration;Verbal Demonstration;Action Demonstration   Gait Training Patient Response Patient;Acceptance;Explanation;Demonstration;Verbal Demonstration;Action Demonstration   Use of Assistive Device Patient Response Patient;Acceptance;Explanation;Demonstration;Verbal Demonstration;Action Demonstration   Physical Therapy Initial Treatment Plan    Treatment Plan  Bed Mobility;Equipment;Family / Caregiver Training;Gait Training;Neuro Re-Education / Balance;Self Care / Home Evaluation;Stair Training;Therapeutic Activities;Therapeutic Exercise   Treatment Frequency 4 Times per Week   Duration Until Therapy Goals Met   Problem List    Problems Pain;Impaired Transfers;Impaired Ambulation;Functional Strength Deficit;Impaired Balance;Decreased Activity Tolerance;Safety Awareness Deficits / Cognition   Anticipated Discharge Equipment and Recommendations   DC Equipment Recommendations 4-Wheeled Walker   Discharge Recommendations Recommend home health for continued physical therapy services     Zohra Dias, PT, DPT, [facility]

## 2024-12-30 NOTE — THERAPY
"Occupational Therapy   Initial Evaluation     Patient Name: Maria Esther Valencia  Age:  84 y.o., Sex:  female  Medical Record #: [id number]  Today's Date: 12/30/2024     Precautions  Precautions: Fall Risk  Comments: vascular dementia    Assessment    Patient is a very pleasant 84 y.o. female with a PMHx significant for CAD, CHF, psychiatric disorder, vascular dementia, COPD, osteoporosis, HTN, insomnia, hypothyroidism and CVA. Presented to the hospital from [facility] with c/o rib pain s/p GLF. CT imaging with acute displaced fractures of the left anterior 7th, 8th and 9th ribs. Pt greeted and seen for OT eval. Required CGA - min A for mobility, CGA for standing g/h, CGA for toileting and max A for LB dressing. Multimodal cueing for safety and sequencing provided throughout session. Currently limited by impaired cognition/safety awareness, AROM, strength, balance, activity tolerance, functional mobility and pain which are currently affecting patients ability to complete ADL/IADLs at baseline. Will continue to follow.    Plan    Occupational Therapy Initial Treatment Plan   Treatment Interventions: Self Care / Activities of Daily Living, Adaptive Equipment, Neuro Re-Education / Balance, Therapeutic Exercises, Therapeutic Activity, Family / Caregiver Training  Treatment Frequency: 3 Times per Week  Duration: Until Therapy Goals Met    DC Equipment Recommendations: Unable to determine at this time  Discharge Recommendations: Recommend home health for continued occupational therapy services     Subjective    \"It's nice to have someone to talk to.\"     Objective     12/30/24 1034   Prior Living Situation   Prior Services Intermittent Physical Support for ADL Per Service   Housing / Facility Group Home   Steps Into Home 0   Bathroom Set up Walk In Shower;Grab Bars;Shower Chair   Equipment Owned Front-Wheel Walker;Hospital Bed;Oxygen;Tub / Shower Seat;Grab Bar(s) In Tub / Shower;Grab Bar(s) By Toilet   Lives with - " "Patient's Self Care Capacity Attendant / Paid Care Giver;Unrelated Adult   Comments Pt is a questionable historan. Initially reporting independent with ADLs but later stated \"they don't let me do anything on my own.\" Recommend clarification.   Prior Level of ADL Function   Self Feeding Independent   Grooming / Hygiene Independent   Bathing Independent   Dressing Independent   Toileting Independent   Comments Per pt report; states staff can help PRN   Prior Level of IADL Function   Medication Management Requires Assist   Laundry Dependent   Kitchen Mobility Requires Assist   Finances Requires Assist   Home Management Dependent   Shopping Dependent   Occupation (Pre-Hospital Vocational) Retired Due To Age   History of Falls   History of Falls Yes   Date of Last Fall   (Reason for admit)   Precautions   Precautions Fall Risk   Comments L rib fractures; vascular dementia   Vitals   Pulse 79   Pulse Oximetry 93 %   O2 (LPM) 1   O2 Delivery Device Silicone Nasal Cannula   Vitals Comments Maintained SpO2 >90% on RA with mobility; not c/o dizziness or light headedness.   Pain 0 - 10 Group   Location Rib Cage;Flank   Location Orientation Left   Therapist Pain Assessment During Activity;Post Activity Pain Same as Prior to Activity;Nurse Notified  (Increased pain with mobility and coughing; not quantified)   Cognition    Cognition / Consciousness X   Speech/ Communication Dysarthric;Hard of Hearing   Level of Consciousness Alert   Ability To Follow Commands 1 Step   Safety Awareness Impaired;Impulsive   New Learning Impaired   Sequencing Impaired   Comments Pleasant and participatory. Poor safety awareness as evidence by attempting to sit prior to aligning body with seat despite multimodal cueing.   Active ROM Upper Body   Active ROM Upper Body  X   Dominant Hand Right   Comments L shoulder ROM limited by pain   Strength Upper Body   Upper Body Strength  X   Comments Limited 2/2 pain but functional for light ADLs   Sensation " Upper Body   Upper Extremity Sensation  WDL   Coordination Upper Body   Coordination WDL   Balance Assessment   Sitting Balance (Static) Fair   Sitting Balance (Dynamic) Fair   Standing Balance (Static) Fair -   Standing Balance (Dynamic) Fair -   Weight Shift Sitting Fair   Weight Shift Standing Fair   Bed Mobility    Supine to Sit Minimal Assist   Scooting Contact Guard Assist   Rolling Minimal Assist to Rt.   Comments HOB slightly elevated with use of rail; up in chair post session   ADL Assessment   Eating Supervision   Grooming Contact Guard Assist;Standing  (hand hygiene and mouthwash at sink)   Lower Body Dressing Maximal Assist  (socks)   Toileting Contact Guard Assist   Functional Mobility   Sit to Stand Contact Guard Assist   Bed, Chair, Wheelchair Transfer Minimal Assist   Toilet Transfers Minimal Assist   Transfer Method Stand Step   Mobility FWW; bed > toilet > sink > chair   Comments Required min A to redirect hips during transfers 2/2 attempting to sit prior to aligning hips with desired surface despite multimodal cueing.   Visual Perception   Comments denies changes   Activity Tolerance   Comments Limited by cognition and pain   Short Term Goals   Short Term Goal # 1 Pt will be able to complete toilet/ADL transfers with SPV and no v/cs for safety   Short Term Goal # 2 Pt will be able to complete LB dressing with min A and AE PRN   Education Group   Education Provided Energy Conservation;Home Safety;Role of Occupational Therapist;Adaptive Equipment;Pathology of bedrest   Role of Occupational Therapist Patient Response Patient;Acceptance;Explanation;Verbal Demonstration;Reinforcement Needed   Energy Conservation Patient Response Patient;Acceptance;Explanation;Verbal Demonstration;Reinforcement Needed   Home Safety Patient Response Patient;Acceptance;Explanation;Verbal Demonstration;Reinforcement Needed  (Educated on importance of using shower chair for all bathing ADLs to assist with energy  conservation and fall reduction. Educated on use of night lights or BSC for night time toileting needs.)   Adaptive Equipment Patient Response Patient;Acceptance;Explanation;Verbal Demonstration;Reinforcement Needed   Pathology of Bedrest Patient Response Patient;Acceptance;Explanation;Verbal Demonstration;Action Demonstration   Additional Comments Educated on importance of oral hygiene, use of IS and use of pillow for bracing to assist with pain management during coughing/sneezing.   Occupational Therapy Initial Treatment Plan    Treatment Interventions Self Care / Activities of Daily Living;Adaptive Equipment;Neuro Re-Education / Balance;Therapeutic Exercises;Therapeutic Activity;Family / Caregiver Training   Treatment Frequency 3 Times per Week   Duration Until Therapy Goals Met   Problem List   Problem List Decreased Active Daily Living Skills;Decreased Upper Extremity Strength Right;Decreased Upper Extremity Strength Left;Decreased Functional Mobility;Decreased Activity Tolerance;Safety Awareness Deficits / Cognition   Anticipated Discharge Equipment and Recommendations   DC Equipment Recommendations Unable to determine at this time   Discharge Recommendations Recommend home health for continued occupational therapy services   Interdisciplinary Plan of Care Collaboration   IDT Collaboration with  Nursing;Physical Therapist   Patient Position at End of Therapy Seated;Chair Alarm On;Call Light within Reach;Tray Table within Reach   Collaboration Comments OT report and recs; RN updated   Session Information   Date / Session Number  12/30, #1 (1/3, 1/5)

## 2024-12-31 ENCOUNTER — APPOINTMENT (OUTPATIENT)
Dept: RADIOLOGY | Facility: MEDICAL CENTER | Age: 84
DRG: 184 | End: 2024-12-31
Attending: NURSE PRACTITIONER
Payer: MEDICARE

## 2024-12-31 VITALS
OXYGEN SATURATION: 96 % | SYSTOLIC BLOOD PRESSURE: 117 MMHG | DIASTOLIC BLOOD PRESSURE: 60 MMHG | TEMPERATURE: 97.3 F | HEART RATE: 60 BPM | WEIGHT: 178.79 LBS | RESPIRATION RATE: 18 BRPM | HEIGHT: 63 IN | BODY MASS INDEX: 31.68 KG/M2

## 2024-12-31 PROBLEM — Z78.9 NO CONTRAINDICATION TO DEEP VEIN THROMBOSIS (DVT) PROPHYLAXIS: Status: ACTIVE | Noted: 2024-12-28

## 2024-12-31 PROBLEM — S30.1XXA: Status: RESOLVED | Noted: 2024-12-28 | Resolved: 2024-12-31

## 2024-12-31 LAB
BASOPHILS # BLD AUTO: 0.5 % (ref 0–1.8)
BASOPHILS # BLD: 0.03 K/UL (ref 0–0.12)
EOSINOPHIL # BLD AUTO: 0.24 K/UL (ref 0–0.51)
EOSINOPHIL NFR BLD: 4.2 % (ref 0–6.9)
ERYTHROCYTE [DISTWIDTH] IN BLOOD BY AUTOMATED COUNT: 47.8 FL (ref 35.9–50)
HCT VFR BLD AUTO: 35.5 % (ref 37–47)
HGB BLD-MCNC: 11.2 G/DL (ref 12–16)
IMM GRANULOCYTES # BLD AUTO: 0.02 K/UL (ref 0–0.11)
IMM GRANULOCYTES NFR BLD AUTO: 0.4 % (ref 0–0.9)
LYMPHOCYTES # BLD AUTO: 1.28 K/UL (ref 1–4.8)
LYMPHOCYTES NFR BLD: 22.6 % (ref 22–41)
MCH RBC QN AUTO: 25.7 PG (ref 27–33)
MCHC RBC AUTO-ENTMCNC: 31.5 G/DL (ref 32.2–35.5)
MCV RBC AUTO: 81.4 FL (ref 81.4–97.8)
MONOCYTES # BLD AUTO: 0.57 K/UL (ref 0–0.85)
MONOCYTES NFR BLD AUTO: 10.1 % (ref 0–13.4)
NEUTROPHILS # BLD AUTO: 3.53 K/UL (ref 1.82–7.42)
NEUTROPHILS NFR BLD: 62.2 % (ref 44–72)
NRBC # BLD AUTO: 0 K/UL
NRBC BLD-RTO: 0 /100 WBC (ref 0–0.2)
PHOSPHATE SERPL-MCNC: 4 MG/DL (ref 2.5–4.5)
PLATELET # BLD AUTO: 189 K/UL (ref 164–446)
PMV BLD AUTO: 11.8 FL (ref 9–12.9)
RBC # BLD AUTO: 4.36 M/UL (ref 4.2–5.4)
WBC # BLD AUTO: 5.7 K/UL (ref 4.8–10.8)

## 2024-12-31 PROCEDURE — 700102 HCHG RX REV CODE 250 W/ 637 OVERRIDE(OP): Performed by: NURSE PRACTITIONER

## 2024-12-31 PROCEDURE — 84100 ASSAY OF PHOSPHORUS: CPT

## 2024-12-31 PROCEDURE — 99239 HOSP IP/OBS DSCHRG MGMT >30: CPT | Performed by: NURSE PRACTITIONER

## 2024-12-31 PROCEDURE — A9270 NON-COVERED ITEM OR SERVICE: HCPCS | Performed by: NURSE PRACTITIONER

## 2024-12-31 PROCEDURE — A9270 NON-COVERED ITEM OR SERVICE: HCPCS | Performed by: INTERNAL MEDICINE

## 2024-12-31 PROCEDURE — 36415 COLL VENOUS BLD VENIPUNCTURE: CPT

## 2024-12-31 PROCEDURE — A9270 NON-COVERED ITEM OR SERVICE: HCPCS | Performed by: SURGERY

## 2024-12-31 PROCEDURE — 71045 X-RAY EXAM CHEST 1 VIEW: CPT

## 2024-12-31 PROCEDURE — 85025 COMPLETE CBC W/AUTO DIFF WBC: CPT

## 2024-12-31 PROCEDURE — 94669 MECHANICAL CHEST WALL OSCILL: CPT

## 2024-12-31 PROCEDURE — 700102 HCHG RX REV CODE 250 W/ 637 OVERRIDE(OP): Performed by: INTERNAL MEDICINE

## 2024-12-31 PROCEDURE — 700111 HCHG RX REV CODE 636 W/ 250 OVERRIDE (IP): Performed by: STUDENT IN AN ORGANIZED HEALTH CARE EDUCATION/TRAINING PROGRAM

## 2024-12-31 PROCEDURE — 700102 HCHG RX REV CODE 250 W/ 637 OVERRIDE(OP): Performed by: SURGERY

## 2024-12-31 RX ORDER — LIDOCAINE 4 G/G
1-2 PATCH TOPICAL EVERY 24 HOURS
Qty: 20 PATCH | Refills: 0 | Status: SHIPPED
Start: 2025-01-01 | End: 2025-01-03

## 2024-12-31 RX ORDER — LIDOCAINE 4 G/G
1-2 PATCH TOPICAL EVERY 24 HOURS
Status: SHIPPED
Start: 2025-01-01 | End: 2024-12-31

## 2024-12-31 RX ADMIN — BENAZEPRIL HYDROCHLORIDE 40 MG: 20 TABLET ORAL at 04:56

## 2024-12-31 RX ADMIN — OMEPRAZOLE 20 MG: 20 CAPSULE, DELAYED RELEASE ORAL at 04:57

## 2024-12-31 RX ADMIN — OXYBUTYNIN CHLORIDE 5 MG: 5 TABLET, EXTENDED RELEASE ORAL at 04:58

## 2024-12-31 RX ADMIN — CYANOCOBALAMIN TAB 500 MCG 1000 MCG: 500 TAB at 04:57

## 2024-12-31 RX ADMIN — POLYETHYLENE GLYCOL 3350 1 PACKET: 17 POWDER, FOR SOLUTION ORAL at 05:08

## 2024-12-31 RX ADMIN — ENOXAPARIN SODIUM 30 MG: 100 INJECTION SUBCUTANEOUS at 04:58

## 2024-12-31 RX ADMIN — AMLODIPINE BESYLATE 10 MG: 10 TABLET ORAL at 04:56

## 2024-12-31 RX ADMIN — CARBOXYMETHYLCELLULOSE SODIUM 1 DROP: 5 SOLUTION/ DROPS OPHTHALMIC at 04:59

## 2024-12-31 RX ADMIN — MELOXICAM 15 MG: 7.5 TABLET ORAL at 04:57

## 2024-12-31 RX ADMIN — UMECLIDINIUM BROMIDE AND VILANTEROL TRIFENATATE 1 PUFF: 62.5; 25 POWDER RESPIRATORY (INHALATION) at 04:59

## 2024-12-31 RX ADMIN — ACETAMINOPHEN 650 MG: 325 TABLET ORAL at 04:56

## 2024-12-31 RX ADMIN — DOCUSATE SODIUM 100 MG: 100 CAPSULE, LIQUID FILLED ORAL at 04:56

## 2024-12-31 RX ADMIN — Medication 2000 UNITS: at 04:57

## 2024-12-31 RX ADMIN — DULOXETINE HYDROCHLORIDE 40 MG: 20 CAPSULE, DELAYED RELEASE ORAL at 04:58

## 2024-12-31 RX ADMIN — ACETAMINOPHEN 650 MG: 325 TABLET ORAL at 11:47

## 2024-12-31 ASSESSMENT — ENCOUNTER SYMPTOMS
ABDOMINAL PAIN: 0
HEADACHES: 0
SHORTNESS OF BREATH: 0
NAUSEA: 0
MEMORY LOSS: 1
TINGLING: 0
MYALGIAS: 1
CHILLS: 0
BACK PAIN: 0
FEVER: 0
DIZZINESS: 0
PALPITATIONS: 0
NECK PAIN: 0
BLURRED VISION: 0
DOUBLE VISION: 0
VOMITING: 0
FOCAL WEAKNESS: 0

## 2024-12-31 ASSESSMENT — PAIN DESCRIPTION - PAIN TYPE
TYPE: ACUTE PAIN

## 2024-12-31 NOTE — PROGRESS NOTES
"Assumed care of patient at 1845. Bedside report received. Assessment complete.  AA&Ox4. Denies CP/SOB.  Reporting 4/10 pain.Declined intervention at this time.  Educated patient regarding pharmacologic and non pharmacologic modalities for pain management.  Skin per flowsheet.  Generalized bruising  Tolerating regular  diet. Denies N/V.  + void via pure wick. + BM. Last BM 12/30  Pt ambulates x1 assist with FWW    All needs met at this time. Call light within reach. Pt calls appropriately. Bed low and locked, non skid socks in place. Hourly rounding in place.    /69   Pulse 71   Temp 36.7 °C (98.1 °F) (Temporal)   Resp 16   Ht 1.6 m (5' 2.99\")   Wt 81.1 kg (178 lb 12.7 oz)   SpO2 93%   BMI 31.68 kg/m²     "

## 2024-12-31 NOTE — DISCHARGE PLANNING
Case Management Discharge Planning    Admission Date: 12/28/2024  GMLOS: 3  ALOS: 3    6-Clicks ADL Score: 19  6-Clicks Mobility Score: 17  PT and/or OT Eval ordered: Yes  Post-acute Referrals Ordered: Yes  Post-acute Choice Obtained: NA  Has referral(s) been sent to post-acute provider:  AJITH      Anticipated Discharge Dispo: Discharge Disposition: D/T to home under [facility] care in anticipation of covered skilled care (06)    DME Needed: Yes    DME Ordered: Yes    Action(s) Taken: LMSW informed that pt will be needing 1L O2 at baseline. O2 order and face to face in. LMSW called [employer] and let them know of this O2 increase. [employer] reported that they will be able to get her more supplies on Thursday but that pt should be okay at home with her concentrator. LMSW faxed [employer] the new O2 order and face to face.     LMSW called Cecy Blancas about pt DCing today. Cecy Blancas said okay and that 1600 works for her. LMSW informed her of the new O2 needs. Cecy Blancas expressed understanding.   LMSW called pt guardian, Katina Brand, to inform her about pt DC. Katina Brand approved of new O2 order and was happy to hear that pt gets to go home today.   Katina Brand requested LMSW to fax DC summary to her at [phone]  LMSW called Rohini with [facility] and gave her Cecy Blancas's contact info for scheduling. LMSW called Cecy Blancas back and informed her that [facility] accepted and will be calling her phone for scheduling. Cecy Blancas expressed understanding.     Escalations Completed: None    Medically Clear: Yes    Next Steps: LMSW to continue to assist with DC needs/barriers. LMSW to set up transport.    Barriers to Discharge: Transportation    Is the patient up for discharge tomorrow: Yes    Is transport arranged for discharge disposition: No

## 2024-12-31 NOTE — DISCHARGE PLANNING
Received Choice form at 3950  Agency/Facility Name: [facility]  Referral sent per Choice form @ [phone]

## 2024-12-31 NOTE — FACE TO FACE
Face to Face Supporting Documentation - Home Health    The encounter with this patient was in whole or in part the primary reason for home health admission.    Date of encounter:   Patient:                    MRN:                       YOB: 2025  Maria Esther Valencia  [id number]  1940     Home health to see patient for:  Skilled Nursing care for assessment, interventions & education, Wound Care, Physical Therapy evaluation and treatment, and Occupational therapy evaluation and treatment    Skilled need for:  New Onset Medical Diagnosis blunt chest trauma    Skilled nursing interventions to include:  Comment: assessment    Homebound status evidenced by:  Need the aid of supportive devices such as crutches, canes, wheelchairs or walkers or Needs the assistance of another person in order to leave the home. Leaving home requires a considerable and taxing effort. There is a normal inability to leave the home.    Community Physician to provide follow up care: MARIANELA Ramirez     Optional Interventions? No      I certify the face to face encounter for this home health care referral meets the CMS requirements and the encounter/clinical assessment with the patient was, in whole, or in part, for the medical condition(s) listed above, which is the primary reason for home health care. Based on my clinical findings: the service(s) are medically necessary, support the need for home health care, and the homebound criteria are met.  I certify that this patient has had a face to face encounter by the nurse practitioner working collaboratively with me.  CANDACE Vasquez. - NPI: [id number]

## 2024-12-31 NOTE — RESPIRATORY CARE
"COPD EDUCATION by COPD CLINICAL EDUCATOR  12/31/2024 at 2:23 PM by Zoey Cardona, RRT     Patient interviewed by COPD education team. Patient unable to participate COPD program at this time. An Action Plan was updated in the EMR to reflect current Respiratory Medication use.     Patient with dementia from SNF                  COPD Screen  COPD Risk Screening  Do you have a history of COPD?: No  Do you have a Pulmonologist?: Yes  COPD Population Screener  COPD Coordinator Recommended: Yes (I can't find home oxygen requirements, pt doesn't remember things)    COPD Assessment  COPD Clinical Specialists ONLY  COPD Education Initiated: Yes--Short Intervention (pt with dementia from SNF, no rep distress noted, takes Anoro)  Is this a COPD exacerbation patient?: No  DME Company: [facility]- none  Physician Name: MARIANELA Ramirez  Pulmonologist Name: none prior  Referrals Initiated: Yes  Pulmonary Rehab: No  Smoking Cessation: No  Hospice: No  Home Health Care: No  Mobile Urgent Care Services: No  Geriatric Specialty Group: No  Private In-Home Care Agency: No  (OP) Pulmonary Function Testing:  (none per chart review)  Interdisciplinary Rounds: Attendance at Rounds (30 Min)    PFT Results    No results found for: \"PFT\"    Meds to Beds  [facility] provides bedside medication delivery for all eligible patients at discharge and you have been automatically enrolled in the Meds to Beds Program. Would you like to opt out of this program for any reason?: No - Stay Opted In     MY COPD ACTION PLAN     It is recommended that patients and physicians /healthcare providers complete this action plan together. This plan should be discussed at each physician visit and updated as needed.    The green, yellow and red zones show groups of symptoms of COPD. This list of symptoms is not comprehensive, and you may experience other symptoms. In the \"Actions\" column, your healthcare provider has recommended actions for you to take based on your " "symptoms.    Patient Name: Maria Esther Valencia   YOB: 1940   Last Updated on: 12/31/2024  2:23 PM   Green Zone:  I am doing well today Actions     Usual activitiy and exercise level   Take daily medications     Usual amounts of cough and phlegm/mucus   Use oxygen as prescribed     Sleep well at night   Continue regular exercise/diet plan     Appetite is good   At all times avoid cigarette smoke, inhaled irritants     Daily Medications (these medications are taken every day):   Umeclidinium and Vilanterol (Anoro) 1 Puff Once daily        Yellow Zone:  I am having a bad day or a COPD flare Actions     More breathless than usual   Continue daily medications     I have less energy for my daily activities   Use quick relief inhaler as ordered     Increased or thicker phlegm/mucus   Use oxygen as prescribed     Using quick relief inhaler/nebulizer more often   Get plenty of rest     Swelling of ankles more than usual   Use pursed lip breathing     More coughing than usual   At all times avoid cigarette smoke, inhaled irritants     I feel like I have a \"chest cold\"     Poor sleep and my symptoms woke me up     My appetite is not good     My medicine is not helping      Call provider immediately if symptoms don’t improve     Continue daily medications, add rescue medications:               Medications to be used during a flare up, (as Discussed with Provider):              Red Zone:  I need urgent medical care Actions     Severe shortness of breath even at rest   Call 911 or seek medical care immediately     Not able to do any activity because of breathing      Fever or shaking chills      Feeling confused or very drowsy       Chest pains      Coughing up blood                  "

## 2024-12-31 NOTE — DISCHARGE PLANNING
Care Transition Team Assessment    Patient is a 84 year-old female admitted for trauma. Please see patient's H&P for prior medical history.   LMSW called pt guardian, Katina Brand ([phone]), and owner of [facility], Cecy Blancas ([phone]) to verify information on the facesheet and discuss HH.   Patient is A&Ox3 and has hx of dementia. Patient lives in [facility]. Advance Directive on file. Prior to admission patient need assistance with ADL's and IADL’s. Patient has a 4WW and uses O2 at baseline when sleeping supplied by [facility]. Patient's confirmed medical coverage via Medicare and Medicaid FFS. Patient will need transportation back to the Group Home when medically cleared.    LMSW spoke to Katina about Home Health, Katina asked SW to ask Group Home Owner which agency she likes to work with since they will be going into her home.   LMSW called Cecy Blancas who reported that she would like [facility] to be first choice. LMSW sent choice form to [facility].    LMSW informed that Vianey Calle is only to be called for emergencies.    1039  [facility] accepted. Plan for pt to [place] home with C.    Information Source  Orientation Level: Oriented to place, Oriented to situation, Oriented to person, Disoriented to time  Information Given By: Legal Guardian, Other (Comments) (Katina Brand, Group Home Owner Cecy Blancas.)  Who is responsible for making decisions for patient? : Guardian  Name(s) of Primary Decision Maker: Katina Brand    Readmission Evaluation  Is this a readmission?: No    Elopement Risk  Legal Hold: No  Ambulatory or Self Mobile in Wheelchair: Yes  Disoriented: No  Psychiatric Symptoms: None  History of Wandering: No  Elopement this Admit: No  Vocalizing Wanting to Leave: No  Displays Behaviors, Body Language Wanting to Leave: No-Not at Risk for Elopement  Elopement Risk: Not at Risk for Elopement    Interdisciplinary Discharge Planning  Lives with - Patient's Self Care Capacity: Attendant / Paid Care  Giver  Patient or legal guardian wants to designate a caregiver: No  Support Systems: Home Care Staff  Housing / Facility: jail  Name of Care Facility: [facility]  Prior Services: Continuous (24 Hour) Care Giving Per Service    Discharge Preparedness  What is your plan after discharge?: Home with help  What are your discharge supports?: Guardian  Prior Functional Level: Ambulatory, Uses Wheelchair, Needs Assist with Medication Management, Needs Assist with Activities of Daily Living  Difficulity with ADLs: Bathing, Dressing, Walking  Difficulity with IADLs: Cooking, Driving, Keeping track of finances, Laundry, Managing medication, Shopping    Functional Assesment  Prior Functional Level: Ambulatory, Uses Wheelchair, Needs Assist with Medication Management, Needs Assist with Activities of Daily Living    Finances  Financial Barriers to Discharge: No  Prescription Coverage: Yes    Vision / Hearing Impairment  Vision Impairment : Yes  Right Eye Vision: Impaired, Wears Glasses  Left Eye Vision: Impaired, Wears Glasses  Hearing Impairment : No    Advance Directive  Advance Directive?: DPOA for Health Care    Domestic Abuse  Have you ever been the victim of abuse or violence?: No  Possible Abuse/Neglect Reported to:: Not Applicable    Psychological Assessment  History of Substance Abuse: None  Non-compliant with Treatment: No    Discharge Risks or Barriers  Discharge risks or barriers?: No    Anticipated Discharge Information  Discharge Disposition: D/T to home under A care in anticipation of covered skilled care (06)

## 2024-12-31 NOTE — CARE PLAN
The patient is Stable - Low risk of patient condition declining or worsening    Shift Goals  Clinical Goals: Rest, comfort with movement, ambulation  Patient Goals: Pain control, sleep  Family Goals: unable to assess, no family present at this time    Progress made toward(s) clinical / shift goals:  Patient able to manage pain with medication located on the MAR and non-pharm interventions, Patient verbalizes understanding of being here and her want and need to go back to her group home.     Patient is not progressing towards the following goals:

## 2024-12-31 NOTE — DISCHARGE SUMMARY
"  Trauma Discharge Summary    DATE OF ADMISSION: 12/28/2024    DATE OF DISCHARGE: 12/31/2024    LENGTH OF STAY: 3 days    ATTENDING PHYSICIAN: Damian Santos M.D.    CONSULTING PHYSICIAN:   1. Elliot Longoria MD.  Geriatrics  2. Tish Mendoza MD.  Psychiatry    DISCHARGE DIAGNOSIS:  Principal Problem:    Trauma  Active Problems:    Closed fracture of multiple ribs of right side    COPD without exacerbation (HCC) (Chronic)      Overview: Patient has no known recent COPD exacerbation, productive cough, fever or       wheezing.  Oxygen saturation 93% in room air recorded today.  Patient       recently started nasal oxygen 2 L/min at night for sleep       apnea/sleep-related hypoventilation.  Patient's cough symptoms resolved.        Patient's chest x-ray from 5/5/2023 showed cardiomegaly with likely mild       CHF, questionable small left pleural effusion but no consolidation, or       mass identified.  Previously patient was evaluated on 4/5/2023 at [facility]       ER for cough \" she ate some seasoned potatoes today and thinks she inhaled       some of the spices and reports a coughing fit x 2 hours. \"The nursing home       made me come in.\"  Patient had only complaint of sore throat in the ER.        Patient discharged to her group home with no new medication.  No lab or       chest x-ray was done in the ED.  EKG showed sinus rhythm. Patient's oxygen       saturation turation 92% in room air while resting in the recliner with       feet elevated.  Patient was diagnosed with COPD several years ago at [facility]       and has had history of smoking. Patient/group home staff to report GSC if       experiences wheezing, productive cough, respiratory distress, or other       symptoms of COPD exacerbation.  Will consider addition of MDI treatment,       and rescue inhaler if exhibits symptoms of COPD exacerbation.            8/15/2024:Chronic but stable with Anoro Ellipta 1 puff daily, and nasal       oxygen 2 L/min at night " and 2 L/min during daytime as needed.  Please       note, patient recently treated with cefuroxime for polymicrobial UTI.        Patient's chest x-ray from 5/24/2024 showed mild emphysematous changes       without acute abnormalities.  Patient has history of smoking, obesity with       hypoventilation/sleep apnea, she was diagnosed with COPD several years ago       at [facility].  Patient/group home staff to report GSC if experiences wheezing,       productive cough, respiratory distress, or other symptoms of COPD       exacerbation.  Will reassess as needed.          Age-related osteoporosis without current pathological fracture (Chronic)      Overview: 8/15/2024: Patient is currently on alendronate 70 mg p.o. once a week and       vitamin D3 2000 unit p.o. daily.  Vitamin D 25-hydroxy 54 and calcium 9.3       from 5/10/2024.  Please note, patient has history of T12 compression       fracture and advanced degenerative disc disease. Patient and group home       staff acknowledged that patient will be high risk for fracture due to       osteoporosis.  I reinforced safety issues and fall prevention goals with       patient and group home staff.    Difficulty swallowing solids (Chronic)    Repeated falls (Chronic)    Vascular dementia without behavioral disturbance (HCC) (Chronic)      Overview: Patient continues to be forgetful sometimes and exhibiting intermittent       anxiety symptoms.  Patient has no known behavioral disturbance, wandering       or self-harm behavior per caregiver report.  Patient is pleasant and       conversant during assessment today.  Patient currently on Cymbalta 30 mg       p.o. daily for depression.  Patient does have Seroquel 25 mg every 6 hours       as needed for anxiety/agitation but does not require Seroquel treatment       recently per caregiver report.  Of note, patient's brain MRI from       September 2017 showed moderate to severe atrophy in a nonspecific pattern.        Prominent lateral  and third ventricles without visualized obstructing       lesion which could indicate normal pressure hydrocephalus in the       appropriate clinical setting. Advanced white matter changes. 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  Her diagnosis of dementia was supported in cognitive       screening completed on 5/30/2021. Interpretation and scoring of the mini       cog/clock draw cognitive screening tool showed patient may have some       degree of cognitive impairment.  This was further correlated with her       inability to name more than 6 animals in the animal recognition screening       tool.  Counseled to seek help if she becomes more confused.  Group home       staff to continue to monitor patient's behavior and refer to higher level       of care placement like memory care transfer if patient exhibits behavioral       disturbance or self danger behavior.          BMI 36.0-36.9,adult    Encounter for geriatric assessment    No contraindication to deep vein thrombosis (DVT) prophylaxis    Chronic back pain (Chronic)      Overview: 12/1/2023: Chronic issues.  Patient denies acute worsening lower back pain       or other neurological symptoms.  Group home staff/caregiver to use       Tylenol.  Of note, patient has history of degenerative disc disease with       multiple falls in the past.  Patient's MR lumbar spine from 12/26/2017       showed  L5-S1 advanced degenerative disc space narrowing with negligible       degenerative retrolisthesis, T12 old superior endplate compression       fracture,  Multilevel degenerative disc disease and spondylotic changes       most notable for L2-3 mild-moderate central stenosis at L3-4 mild central       stenosis and Variable multilevel foraminal stenoses and additional       degenerative and spondylotic changes.  Patient aware poor/unpredictable       prognosis.  Reinforced safety issues and fall  prevention with patient and       caregiver.  Patient to use assistive device 24/7 while ambulatory.     Essential hypertension (Chronic)      Overview: 7/14/2023: BP stable with benazepril 40 mg p.o. daily and amlodipine 10 mg       p.o. daily.  Lab results unremarkable from 3/16/2023 except sodium 147.        Encouraged lifestyle modifications, including weight reduction, dietary       changes (decreased fat intake and increased intake of fruits, vegetables,       and low-fat dairy), sodium restriction and increase physical activity .       Patient instructed to remain physically active as much as possible. Target       blood pressure < 140/90 mm Hg.  Weight loss recommended.  Will reassess as       needed    Dyslipidemia (Chronic)      Overview: Lipid profile well controlled with atorvastatin 80 mg PO QHS.  Total       cholesterol 131, triglyceride 90, HDL 53 and LDL 60 from 3/16/2023.  Total       cholesterol 124, triglyceride, HDL 52 and LDL 57 from 1/10/2022.  Total       cholesterol 150, HDL 64, triglyceride 103 and LDL 67 from 8/10/2021.        Total cholesterol 134, triglycerides 87, HDL 62 and LDL 55 from       12/30/2020. Total cholesterol 141, HDL 60, triglyceride 85 and LDL 64 from       1/5/2020. Cholesterol 113, triglyceride 87, HDL 40 and LDL 56 from       4/05/2018.  Patient to avoid excessive transfat and polysaturated fatty       food intake, weight loss recommended.  Will recheck lipid profile       periodically.    Major depression single episode, in partial remission (HCC) (Chronic)      Overview: 8/25/2023: Patient has longstanding history of depression but denies acute       worsening depression or self-harm thoughts but does have intermittent       increased  forgetfulness.  Patient currently on duloxetine 30 mg p.o.       daily for depression.  Patient does have Seroquel 25 mg every 6 hours as       needed for anxiety. Patient/group home staff to report GSC for acute       worsening  depression or self-harm thoughts.          Overactive bladder (Chronic)    Vitamin D deficiency (Chronic)      Overview: Patient is currently stable with cholecalciferol 2000 units p.o. daily.        Vitamin D 25-hydroxy 57 from 3/16/2023. Vitamin D 25-hydroxy 56 from       1/10/2022.  History of vitamin D deficiency and osteoporosis.  Vitamin D       25-hydroxy 45 from 8/10/2021.    Dry eye syndrome of both eyes (Chronic)    GERD without esophagitis (Chronic)    Lives in group home  Resolved Problems:    Right flank hematoma, initial encounter      PROCEDURES:  None    HISTORY OF PRESENT ILLNESS: The patient is a 84 y.o. female who was reportedly injured in a ground level fall. She was transferred to [facility] in [place], [place].    HOSPITAL COURSE: The patient was triaged as a consult activation. The patient was transported to the trauma intensive care unit.    Imaging demonstrated fractures of the left third seventh, eight and ninth ribs.  The patient received aggressive pulmonary hygiene and multimodal pain management.  She did not require mechanical ventilation during her hospital course.    She also had a hematoma along the right flank.  This was monitored with serial hemograms and exams.  On day of transfer her hemoglobin is stable and her flank is soft.    Patient has multiple chronic medical problems, she was residing in a group home prior to admission.  She was evaluated by therapies and deemed a good candidate for return to group home with home health.  This has been arranged for the patient.    She transferred from the intensive care unit to the general surgical fowler for ongoing care.  She was evaluated by therapies and cleared for discharge.    HOSPITAL PROBLEM LIST:  * Trauma- (present on admission)  Assessment & Plan  Mechanical GLF previous evening.  T-5000 Activation.  Damian Santos MD. Trauma Surgery.    Closed fracture of multiple ribs of right side- (present on  admission)  Assessment & Plan  CT imaging with acute displaced fractures of the left anterior 7th, 8th and 9th ribs.  Aggressive pulmonary hygiene and multimodal pain management.     Right flank hematoma, initial encounter-resolved as of 12/31/2024, (present on admission)  Assessment & Plan  CT imaging with subcutaneous stranding along the left flank.  Analgesia and monitor H/H.     No contraindication to deep vein thrombosis (DVT) prophylaxis- (present on admission)  Assessment & Plan  VTE prophylaxis initially contraindicated secondary to elevated bleeding risk.  12/30 Trauma surveillance venous duplex ultrasonography ordered.  12/29 Start DVT prophylaxis    Encounter for geriatric assessment- (present on admission)  Assessment & Plan  12/28 The patient is 75 years old or older and a geriatrics consult is indicated.   Elliot Longoria MD, Geriatric Hospitalist.    BMI 36.0-36.9,adult- (present on admission)  Assessment & Plan  Admission BMI 35.43 kg/m2.    Vascular dementia without behavioral disturbance (HCC)- (present on admission)  Assessment & Plan  Monitor.    Repeated falls- (present on admission)  Assessment & Plan  Per chart review and recent fall overnight.    Difficulty swallowing solids- (present on admission)  Assessment & Plan  Baseline.  Monitor.    Age-related osteoporosis without current pathological fracture- (present on admission)  Assessment & Plan  Chronic condition treated with Fosamax.  Holding maintenance medication during acute traumatic illness.    COPD without exacerbation (HCC)- (present on admission)  Assessment & Plan  Chronic condition treated with Anoro Ellipta and 2L oxygen via NC at [facility].  Resumed maintenance medication on admission.  Respiratory protocol.     Lives in group home- (present on admission)  Assessment & Plan  Resides at [facility]: [address], [place] [address].  [facility] Public Guardian Vianey Calle, [phone].  Pooja advised SW that the pt is  able to go back to her group home if she is cleared.    GERD without esophagitis- (present on admission)  Assessment & Plan  Chronic condition treated with Protonix.  Resumed maintenance medication on admission.    Dry eye syndrome of both eyes- (present on admission)  Assessment & Plan  Chronic condition treated with eye gtts.  Resumed maintenance medication on admission.    Vitamin D deficiency- (present on admission)  Assessment & Plan  Chronic condition treated with Vitamin D.  Resumed maintenance medication on admission.    Overactive bladder- (present on admission)  Assessment & Plan  Chronic condition treated with Oxybutynin.  Resumed maintenance medication on admission.    Major depression single episode, in partial remission (HCC)- (present on admission)  Assessment & Plan  Chronic condition treated with Cymbalta.  Resumed maintenance medication on admission.  PDI score 29  12/28 Psychiatry consult completed, recommend increase Cymbalta dose to 40mg daily     Dyslipidemia- (present on admission)  Assessment & Plan  Chronic condition treated with atorvastatin.  Resumed maintenance medication on admission.    Essential hypertension- (present on admission)  Assessment & Plan  Chronic condition treated with amlodipine, benazepril.  Resumed maintenance medication on admission.    Chronic back pain- (present on admission)  Assessment & Plan  Chronic condition treated with tylenol and Mobic.  Resumed maintenance medication on admission.        DISPOSITION: Discharged back to group home, discharge has been approved by the patient's guardian, on 12/31/2024. The patient was counseled and questions were answered. Specifically, signs and symptoms of infection, respiratory decompensation and persistent or worsening pain were discussed and the patient agrees to seek medical attention if any of these develop.    DISCHARGE MEDICATIONS:  The patients controlled substance history was reviewed and a controlled substance use  informed consent (if applicable) was provided by [facility] and the patient has been prescribed.     Medication List        START taking these medications        Instructions   lidocaine 4 % Ptch  Start taking on: January 1, 2025  Commonly known as: Asperflex   Place 1-2 Patches on the skin every 24 hours.  Dose: 1-2 Patch            CONTINUE taking these medications        Instructions   acetaminophen 325 MG Tabs  Commonly known as: Tylenol   TAKE 2 TABLETS BY MOUTH EVERY 6 HOURS AS NEEDED FOR MILD OR MODERATE PAIN     alendronate 70 MG Tabs  Commonly known as: Fosamax   TAKE 1 TAB BY MOUTH EVERY SUNDAY BEFORE 1ST FOOD WITH 8 OZ WATER. DON'T LIE DOWN FOR 30 MIN.     amLODIPine 10 MG Tabs  Commonly known as: Norvasc   TAKE 1 TABLET BY MOUTH ONCE DAILY     Anbesol Maximum Strength 20 % Gel topical gel  Generic drug: benzocaine   APPLY A SMALL AMOUNT OF GEL TO AFFECTED AREA 4 TIMES A DAY AS NEEDED FOR PAINFUL GUMS.     Anoro Ellipta 62.5-25 MCG/ACT Aepb inhaler  Generic drug: umeclidinium-vilanterol   Inhale 1 Puff every day. Rinse mouth with water after use  Indications: Chronic Bronchitis, Chronic Obstructive Lung Disease  Dose: 1 Puff     atorvastatin 80 MG tablet  Commonly known as: Lipitor   TAKE 1 TABLET BY MOUTH ONCE DAILY     benazepril 40 MG tablet  Commonly known as: Lotensin   TAKE 1 TABLET BY MOUTH ONCE DAILY     benzonatate 100 MG Caps  Commonly known as: Tessalon   TAKE 1 CAPSULE BY MOUTH 3 TIMES DAILY AS NEEDED FOR COUGH     carboxymethylcellulose 0.5 % Soln  Commonly known as: Refresh Tears   Administer 1 Drop into both eyes 2 times a day. For dry eyes  Indications: Irritation of the Eye  Dose: 1 Drop     DULoxetine 30 MG Cpep  Commonly known as: Cymbalta   TAKE (1) CAPSULE BY MOUTH ONCE DAILY.     melatonin 5 mg Tabs   TAKE 1 TABLET BY MOUTH IN THE EVENING.  Dose: 5 mg     oxybutynin SR 5 MG Tb24  Commonly known as: Ditropan-XL   TAKE 1 TABLET BY MOUTH ONCE DAILY     pantoprazole  40 MG Tbec  Commonly known as: Protonix   Take 1 Tablet by mouth every day. Indications: Gastroesophageal Reflux Disease  Dose: 40 mg     phenazopyridine 100 MG Tabs  Commonly known as: Pyridium   Take 1 Tablet by mouth 3 times a day as needed (URINARY PAIN).  Dose: 100 mg     Stimulant Laxative 8.6-50 MG Tabs  Generic drug: senna-docusate   TAKE 1 TABLET BY MOUTH ONCE DAILY AS NEEDED FOR CONSTIPATION.     vitamin D 2000 UNIT Tabs   Take 1 Tablet by mouth every day.  Dose: 2,000 Units            STOP taking these medications      meloxicam 15 MG tablet  Commonly known as: Mobic              ACTIVITY:  As tolerated    DIET:  Orders Placed This Encounter   Procedures    Diet Order Diet: Regular     Standing Status:   Standing     Number of Occurrences:   1     Order Specific Question:   Diet:     Answer:   Regular [1]       FOLLOW UP:  MARIANELA Ramirez  [address] [place] [id number] [phone]    Schedule an appointment as soon as possible for a visit in 1 week(s)      [facility]  [address] [place] [address] [address] [place] [address] [id number] [phone]  Follow up  make appointment if needed, rib fracture pain      TIME SPENT ON DISCHARGE: 35 minutes      ____________________________________________  MARIANELA Vasquez    DD: 12/31/2024 1:57 PM

## 2024-12-31 NOTE — PROGRESS NOTES
Geriatric Medicine Daily Progress Note      Date of Service  12/30/2024    Chief Complaint  84 y.o. female admitted 12/28/2024 with fall    Hospital Course    84-year-old female with history of dementia, coronary artery disease, congestive heart failure, psychiatric disorder and hypothyroidism who presented 12/28 with fall. Patient lives at a group home when she fell. Patient cannot remember all event however she felt like her legs gave up, denied any fever or chills no palpitation or chest pain, in general patient is poor historian due to dementia, she is alert and oriented x 2. On admission labs around baseline, kidney stable, troponin was 22 and EKG did not show any ischemia, CT scan for chest and abdomen showed multiple rib fractures. Patient was admitted to ICU by trauma team           Interval Problem Update  -Evaluate and examined the patient at bedside, around her baseline mentation and oriented x 2, patient is on 1 L nasal cannula, no fever or chills, continue Seroquel at night, no significant agitation today.  -Check iron panel and consider iron IV supplement if needed  -Encouraged the patient to get out of bed and nonpharmacological treatment avoid delirium, avoid disturbing the patient during the night unless for emergency.  -Stable for discharge, PT and OT recommended home health..  -Start B12 supplements.        Code Status  Full code    Disposition  Home with home health versus SNF    Review of Systems  Review of Systems   Unable to perform ROS: Dementia   Constitutional:  Negative for chills, fever and weight loss.   HENT:  Negative for ear pain, hearing loss and tinnitus.    Eyes:  Negative for blurred vision, double vision and photophobia.   Respiratory:  Negative for cough and hemoptysis.    Cardiovascular:  Negative for chest pain, palpitations, orthopnea and claudication.   Gastrointestinal:  Negative for abdominal pain, constipation, diarrhea, nausea and vomiting.   Genitourinary:  Negative for  dysuria, frequency and urgency.   Musculoskeletal:  Negative for myalgias and neck pain.   Skin:  Negative for rash.   Neurological:  Negative for dizziness, speech change and weakness.        Physical Exam  Temp:  [36.1 °C (96.9 °F)-36.7 °C (98.1 °F)] 36.1 °C (96.9 °F)  Pulse:  [61-79] 64  Resp:  [17-23] 18  BP: ()/(50-75) 124/55  SpO2:  [90 %-96 %] 94 %    Physical Exam  Constitutional:       General: She is not in acute distress.     Appearance: She is well-developed. She is not ill-appearing.   Neck:      Vascular: No JVD.   Cardiovascular:      Rate and Rhythm: Normal rate.      Heart sounds: Normal heart sounds. No murmur heard.  Pulmonary:      Effort: Pulmonary effort is normal.      Breath sounds: Normal breath sounds. No wheezing or rales.   Abdominal:      General: There is no distension.      Palpations: Abdomen is soft.      Tenderness: There is no abdominal tenderness. There is no guarding or rebound.   Musculoskeletal:         General: Normal range of motion.      Cervical back: Normal range of motion and neck supple.   Skin:     General: Skin is warm and dry.      Findings: No erythema or rash.   Neurological:      Mental Status: She is alert. She is disoriented.      Cranial Nerves: No cranial nerve deficit.      Coordination: Coordination normal.         CAM  Acute onset and fluctuating course   Yes  Inattention                                         Yes  Disorganized thinking                        Yes  Altered level of consciousness          No     Medication Review    Yes    Laboratory  Recent Labs     12/28/24  1017 12/29/24  0400 12/30/24  0154   WBC 9.3 6.1 6.6   RBC 4.99 5.01 4.50   HEMOGLOBIN 12.9 12.7 11.7*   HEMATOCRIT 40.2 40.5 36.6*   MCV 80.6* 80.8* 81.3*   MCH 25.9* 25.3* 26.0*   MCHC 32.1* 31.4* 32.0*   RDW 46.6 46.9 46.6   PLATELETCT 226 222 208   MPV 10.9 11.3 11.8     Recent Labs     12/28/24  1017 12/29/24  0400 12/30/24  0154   SODIUM 145 140 135   POTASSIUM 4.3 4.2 4.1    CHLORIDE 107 104 101   CO2 28 24 24   GLUCOSE 124* 103* 140*   BUN 19 16 22   CREATININE 1.02 0.86 1.16   CALCIUM 9.7 9.0 9.0                   Imaging  DX-CHEST-PORTABLE (1 VIEW)   Final Result      Minimal left basilar atelectasis. Otherwise, negative.      DX-CHEST-PORTABLE (1 VIEW)   Final Result      No acute process.      CT-CHEST,ABDOMEN,PELVIS WITH   Final Result         1. Acute displaced fractures of the left anterior 7th, 8th and 9th ribs.   2. Subcutaneous stranding along the left flank.           Assessment/Plan  Right flank hematoma, initial encounter- (present on admission)  Assessment & Plan  Trauma on board    Closed fracture of multiple ribs of right side- (present on admission)  Assessment & Plan  Pain control  Trauma on board    Vascular dementia without behavioral disturbance (HCC)- (present on admission)  Assessment & Plan  Patient is in a group home, came with fall, patient using walker  History of recurrent falls  Patient is on high risk of delirium  Patient states does not want any tube inside her lungs, there is no family around, patient has guardianship  Will discussed that DNR/DNI with a guardianship and patient again    Repeated falls- (present on admission)  Assessment & Plan  Patient is on high risk for fall due to comorbidities, age and dementia  PT and OT  Check vitamin D and TSH    COPD without exacerbation (HCC)- (present on admission)  Assessment & Plan  Oxygen therapy with inhalers    Dyslipidemia- (present on admission)  Assessment & Plan  Continue home atorvastatin 80 mg daily, consider decrease the dose of possible    Essential hypertension- (present on admission)  Assessment & Plan  Continue home medication amlodipine and Benzopril  Avoid aggressive treatment    Chronic back pain- (present on admission)  Assessment & Plan  Pain control and stool softener  Chronic  PT and OT         VTE prophylaxis: SCDs and Lovenox       Interventions to be considered in all patients in  order to minimize the risk of delirium.   -do not disturb patient (vitals or lab draws) between the hours of 10 PM and 6 AM.  -ideally the patient should not sleep during the day and we should avoid day time naps.   -up in chair for meals  -ambulate at least three times daily, as able  -watch for constipation  -timed voiding - ask patient is she would like to go to the bathroom q 2-3 hours, except during the do not disturb hours.   -remove all necessary lines (central lines, peripheral IVs, feeding tubes, puckett catheters)  -unless patient has shown harm to self or others I would recommend against use of restraints - either chemical or physical (antipsychotics)   -minimize polypharmacy, do not dose medication during sleep hours

## 2024-12-31 NOTE — PROGRESS NOTES
Trauma / Surgical Daily Progress Note    Date of Service  12/31/2024    Chief Complaint  84 y.o. female admitted 12/28/2024 with rib fractures after a ground level fall     Interval Events  Therapies recommend  home health    - Continue aggressive pulmonary hygiene  - Home health pending     Review of Systems  Review of Systems   Constitutional:  Negative for chills and fever.   Eyes:  Negative for blurred vision and double vision.   Respiratory:  Negative for shortness of breath.    Cardiovascular:  Negative for palpitations.   Gastrointestinal:  Negative for abdominal pain, nausea and vomiting.   Musculoskeletal:  Positive for myalgias (chest wall pain). Negative for back pain, joint pain and neck pain.   Neurological:  Negative for dizziness, tingling, focal weakness and headaches.   Psychiatric/Behavioral:  Positive for memory loss (chronic).         Vital Signs  Temp:  [36.1 °C (96.9 °F)-36.7 °C (98.1 °F)] 36.3 °C (97.3 °F)  Pulse:  [62-79] 62  Resp:  [16-18] 18  BP: (117-145)/(55-77) 117/60  SpO2:  [93 %-98 %] 98 %    Physical Exam  Physical Exam  Vitals and nursing note reviewed.   Constitutional:       General: She is not in acute distress.     Appearance: She is obese. She is not toxic-appearing.   HENT:      Head: Normocephalic.      Right Ear: External ear normal.      Left Ear: External ear normal.      Nose: Nose normal.      Mouth/Throat:      Mouth: Mucous membranes are moist.      Pharynx: Oropharynx is clear.      Comments: Edentulous, dentures  Eyes:      Extraocular Movements: Extraocular movements intact.      Conjunctiva/sclera: Conjunctivae normal.   Cardiovascular:      Rate and Rhythm: Normal rate and regular rhythm.      Pulses: Normal pulses.   Pulmonary:      Effort: Pulmonary effort is normal. No respiratory distress.      Comments: IS 1000   Chest:      Chest wall: Tenderness present.   Abdominal:      General: There is no distension.      Palpations: Abdomen is soft.      Tenderness:  There is no abdominal tenderness. There is no guarding.   Musculoskeletal:      Cervical back: No tenderness.      Comments: Moves all extremities    Skin:     General: Skin is warm and dry.      Capillary Refill: Capillary refill takes less than 2 seconds.   Neurological:      Mental Status: She is alert.      GCS: GCS eye subscore is 4. GCS verbal subscore is 5. GCS motor subscore is 6.      Comments: History of dementia          Laboratory  Recent Results (from the past 24 hours)   PHOSPHORUS    Collection Time: 12/31/24  4:26 AM   Result Value Ref Range    Phosphorus 4.0 2.5 - 4.5 mg/dL       Fluids    Intake/Output Summary (Last 24 hours) at 12/31/2024 0905  Last data filed at 12/30/2024 1605  Gross per 24 hour   Intake 180 ml   Output 200 ml   Net -20 ml       Core Measures & Quality Metrics  Labs reviewed, Medications reviewed and Radiology images reviewed  Pink catheter: No Pink      DVT Prophylaxis: Enoxaparin (Lovenox)  DVT prophylaxis - mechanical: SCDs  Ulcer prophylaxis: Yes        RAP Score Total: 6    CAGE Results: negative Blood Alcohol>0.08: not completed       Assessment/Plan  * Trauma- (present on admission)  Assessment & Plan  Mechanical GLF previous evening.  T-5000 Activation.  Damian Santos MD. Trauma Surgery.    Right flank hematoma, initial encounter- (present on admission)  Assessment & Plan  CT imaging with subcutaneous stranding along the left flank.  Analgesia and monitor H/H.     Closed fracture of multiple ribs of right side- (present on admission)  Assessment & Plan  CT imaging with acute displaced fractures of the left anterior 7th, 8th and 9th ribs.  Aggressive pulmonary hygiene and multimodal pain management.     Contraindication to deep vein thrombosis (DVT) prophylaxis- (present on admission)  Assessment & Plan  VTE prophylaxis initially contraindicated secondary to elevated bleeding risk.  12/30 Trauma surveillance venous duplex ultrasonography ordered.  12/29 Start DVT  prophylaxis    Encounter for geriatric assessment- (present on admission)  Assessment & Plan  12/28 The patient is 75 years old or older and a geriatrics consult is indicated.   Elliot Longoria MD, Geriatric Hospitalist.    BMI 36.0-36.9,adult- (present on admission)  Assessment & Plan  Admission BMI 35.43 kg/m2.    Vascular dementia without behavioral disturbance (HCC)- (present on admission)  Assessment & Plan  Monitor.    Repeated falls- (present on admission)  Assessment & Plan  Per chart review and recent fall overnight.    Difficulty swallowing solids- (present on admission)  Assessment & Plan  Baseline.  Monitor.    Age-related osteoporosis without current pathological fracture- (present on admission)  Assessment & Plan  Chronic condition treated with Fosamax.  Holding maintenance medication during acute traumatic illness.    COPD without exacerbation (HCC)- (present on admission)  Assessment & Plan  Chronic condition treated with Anoro Ellipta and 2L oxygen via [facility] at [facility].  Resumed maintenance medication on admission.  Respiratory protocol.     Lives in group home- (present on admission)  Assessment & Plan  Resides at [facility]: [address], [place] [address].  [facility] Public Guardian Vianey Calle, [phone].  Pooja advised SW that the pt is able to go back to her group home if she is cleared.    GERD without esophagitis- (present on admission)  Assessment & Plan  Chronic condition treated with Protonix.  Resumed maintenance medication on admission.    Dry eye syndrome of both eyes- (present on admission)  Assessment & Plan  Chronic condition treated with eye gtts.  Resumed maintenance medication on admission.    Vitamin D deficiency- (present on admission)  Assessment & Plan  Chronic condition treated with Vitamin D.  Resumed maintenance medication on admission.    Overactive bladder- (present on admission)  Assessment & Plan  Chronic condition treated with Oxybutynin.  Resumed  maintenance medication on admission.    Major depression single episode, in partial remission (HCC)- (present on admission)  Assessment & Plan  Chronic condition treated with Cymbalta.  Resumed maintenance medication on admission.  PDI score 29  12/28 Psychiatry consult completed, recommend increase Cymbalta dose to 40mg daily     Dyslipidemia- (present on admission)  Assessment & Plan  Chronic condition treated with atorvastatin.  Resumed maintenance medication on admission.    Essential hypertension- (present on admission)  Assessment & Plan  Chronic condition treated with amlodipine, benazepril.  Resumed maintenance medication on admission.    Chronic back pain- (present on admission)  Assessment & Plan  Chronic condition treated with tylenol and Mobic.  Resumed maintenance medication on admission.        Discussed patient condition with RN, , Patient, and trauma surgery, Dr. Santos.

## 2024-12-31 NOTE — FACE TO FACE
"Face to Face Note  -  Durable Medical Equipment    MARIANELA Vasquez - NPI: [id number]  I certify that this patient is under my care and that they had a durable medical equipment(DME)face to face encounter by the nurse practitioner working collaboratively with me that meets the physician DME face-to-face encounter requirements with this patient on:    Date of encounter:   Patient:                    MRN:                       YOB: 2025  Maria Esther Valencia  [id number]  1940     The encounter with the patient was in whole, or in part, for the following medical condition, which is the primary reason for durable medical equipment:  Other - hypoxia / rib fractures    I certify that, based on my findings, the following durable medical equipment is medically necessary:    Oxygen   HOME O2 Saturation Measurements:(Values must be present for Home Oxygen orders)  Room air sat at rest: 94  Room air sat with amb: 85  With liters of O2: 1, O2 sat at rest with O2: 96  With Liters of O2: 1, O2 sat with amb with O2 : 91  Is the patient mobile?: Yes  If patient feels more short of breath, they can go up to 6 liters per minute and contact healthcare provider.    Supporting Symptoms: The patient requires supplemental oxygen, as the following interventions have been tried with limited or no improvement: \"Ambulation with oximetry and \"Incentive spirometry.    My Clinical findings support the need for the above equipment due to:  Hypoxia  "

## 2024-12-31 NOTE — CARE PLAN
The patient is Stable - Low risk of patient condition declining or worsening    Shift Goals  Clinical Goals: patient comfort, discharge back to group home  Patient Goals: comfort and sleep  Family Goals: JOHN    Progress made toward(s) clinical / shift goals:  A new walking oxygen evaluation has been completed for patient. Clearance for discharge back to group home setting has been obtained. Discharge orders are in place. Patient's guardian is aware (see  discharge planning note).     Patient is not progressing towards the following goals: Pending removal of PIV, review of discharge education, and transportation back to group home.

## 2025-01-01 NOTE — PROGRESS NOTES
PIV access was removed. Discharge education presented to patient via AVS. Patient signed two copies of AVS (one for [facility], one for [facility]), and received a third copy to keep. Patient discharged back to her group home via [facility]. All belongings were sent with patient.

## 2025-01-12 PROBLEM — S22.080D COMPRESSION FRACTURE OF T12 VERTEBRA WITH ROUTINE HEALING: Chronic | Status: ACTIVE | Noted: 2017-08-01

## 2025-01-12 PROBLEM — S22.42XD CLOSED FRACTURE OF MULTIPLE RIBS OF LEFT SIDE WITH ROUTINE HEALING: Chronic | Status: ACTIVE | Noted: 2024-12-28

## 2025-01-12 PROBLEM — S22.080D COMPRESSION FRACTURE OF T12 VERTEBRA WITH ROUTINE HEALING: Status: ACTIVE | Noted: 2017-08-01

## 2025-01-12 PROBLEM — S22.42XD CLOSED FRACTURE OF MULTIPLE RIBS OF LEFT SIDE WITH ROUTINE HEALING: Status: ACTIVE | Noted: 2024-12-28

## 2025-02-17 PROBLEM — I12.9 BENIGN HYPERTENSION WITH CHRONIC KIDNEY DISEASE, STAGE III: Status: ACTIVE | Noted: 2025-02-13

## 2025-02-17 PROBLEM — N18.30 BENIGN HYPERTENSION WITH CHRONIC KIDNEY DISEASE, STAGE III: Status: ACTIVE | Noted: 2025-02-13

## 2025-02-17 PROBLEM — I12.9 BENIGN HYPERTENSION WITH CHRONIC KIDNEY DISEASE, STAGE III: Chronic | Status: ACTIVE | Noted: 2025-02-13

## 2025-02-17 PROBLEM — N18.30 BENIGN HYPERTENSION WITH CHRONIC KIDNEY DISEASE, STAGE III: Chronic | Status: ACTIVE | Noted: 2025-02-13

## 2025-02-17 PROBLEM — Z78.9 NO CONTRAINDICATION TO DEEP VEIN THROMBOSIS (DVT) PROPHYLAXIS: Status: RESOLVED | Noted: 2024-12-28 | Resolved: 2025-02-17

## 2025-02-17 PROBLEM — T14.90XA TRAUMA: Status: RESOLVED | Noted: 2024-12-28 | Resolved: 2025-02-17

## 2025-02-17 PROBLEM — E78.2 MIXED DYSLIPIDEMIA: Status: ACTIVE | Noted: 2017-08-01

## 2025-02-17 PROBLEM — E78.2 MIXED DYSLIPIDEMIA: Chronic | Status: ACTIVE | Noted: 2017-08-01

## 2025-05-08 PROBLEM — N39.46 MIXED STRESS AND URGE URINARY INCONTINENCE: Status: ACTIVE | Noted: 2025-05-08

## 2025-05-24 ENCOUNTER — HOSPITAL ENCOUNTER (OUTPATIENT)
Facility: MEDICAL CENTER | Age: 85
End: 2025-05-24
Attending: FAMILY MEDICINE
Payer: MEDICARE

## 2025-05-24 ENCOUNTER — OFFICE VISIT (OUTPATIENT)
Dept: URGENT CARE | Facility: PHYSICIAN GROUP | Age: 85
End: 2025-05-24
Payer: MEDICARE

## 2025-05-24 VITALS
TEMPERATURE: 97 F | SYSTOLIC BLOOD PRESSURE: 122 MMHG | HEIGHT: 65 IN | WEIGHT: 194 LBS | DIASTOLIC BLOOD PRESSURE: 74 MMHG | OXYGEN SATURATION: 92 % | RESPIRATION RATE: 18 BRPM | HEART RATE: 77 BPM | BODY MASS INDEX: 32.32 KG/M2

## 2025-05-24 DIAGNOSIS — N39.0 RECURRENT UTI: ICD-10-CM

## 2025-05-24 DIAGNOSIS — R05.1 ACUTE COUGH: ICD-10-CM

## 2025-05-24 DIAGNOSIS — R30.0 DYSURIA: Primary | ICD-10-CM

## 2025-05-24 LAB
APPEARANCE UR: ABNORMAL
BILIRUB UR STRIP-MCNC: NEGATIVE MG/DL
COLOR UR AUTO: YELLOW
GLUCOSE UR STRIP.AUTO-MCNC: NEGATIVE MG/DL
KETONES UR STRIP.AUTO-MCNC: ABNORMAL MG/DL
LEUKOCYTE ESTERASE UR QL STRIP.AUTO: ABNORMAL
NITRITE UR QL STRIP.AUTO: POSITIVE
PH UR STRIP.AUTO: 5.5 [PH] (ref 5–8)
PROT UR QL STRIP: 30 MG/DL
RBC UR QL AUTO: NEGATIVE
SP GR UR STRIP.AUTO: >=1.03
UROBILINOGEN UR STRIP-MCNC: 0.2 MG/DL

## 2025-05-24 PROCEDURE — 99214 OFFICE O/P EST MOD 30 MIN: CPT | Performed by: FAMILY MEDICINE

## 2025-05-24 PROCEDURE — 87086 URINE CULTURE/COLONY COUNT: CPT

## 2025-05-24 PROCEDURE — 3078F DIAST BP <80 MM HG: CPT | Performed by: FAMILY MEDICINE

## 2025-05-24 PROCEDURE — 81002 URINALYSIS NONAUTO W/O SCOPE: CPT | Performed by: FAMILY MEDICINE

## 2025-05-24 PROCEDURE — 87077 CULTURE AEROBIC IDENTIFY: CPT

## 2025-05-24 PROCEDURE — 87186 SC STD MICRODIL/AGAR DIL: CPT

## 2025-05-24 PROCEDURE — 3074F SYST BP LT 130 MM HG: CPT | Performed by: FAMILY MEDICINE

## 2025-05-24 RX ORDER — BENZONATATE 100 MG/1
100 CAPSULE ORAL 3 TIMES DAILY PRN
Qty: 30 CAPSULE | Refills: 0 | Status: SHIPPED | OUTPATIENT
Start: 2025-05-24 | End: 2025-05-28 | Stop reason: SDUPTHER

## 2025-05-24 RX ORDER — CEFDINIR 300 MG/1
300 CAPSULE ORAL 2 TIMES DAILY
Qty: 10 CAPSULE | Refills: 0 | Status: SHIPPED | OUTPATIENT
Start: 2025-05-24 | End: 2025-05-29

## 2025-05-24 ASSESSMENT — FIBROSIS 4 INDEX: FIB4 SCORE: 1.86

## 2025-05-24 NOTE — LETTER
May 24, 2025    To Whom It May Concern:         This is confirmation that Maria Esther Valencia attended her scheduled appointment with Juan Manuel Edge M.D. on 5/24/25.         If you have any questions please do not hesitate to call me at the phone number listed below.    Sincerely,          Glarismihed R Homa Valdivia, Med Ass't  [phone]

## 2025-05-25 ASSESSMENT — ENCOUNTER SYMPTOMS
MYALGIAS: 0
EYE DISCHARGE: 0
WEIGHT LOSS: 0
NAUSEA: 0
EYE REDNESS: 0
VOMITING: 0

## 2025-05-25 NOTE — PROGRESS NOTES
"Subjective     Maria Esther Valencia is a 84 y.o. female who presents with UTI (Odor, increased confusion/X 4 days )            4 days change in urine odor.  Intermittent confusion.  PMH UTI that has presented similarly in the past.  No fever.  Caregiver also notes nonproductive cough.  PMH COPD on home O2.  No change in oxygen requirements.  No limb swelling.  No other aggravating or alleviating factors.        Review of Systems   Constitutional:  Negative for malaise/fatigue and weight loss.   Eyes:  Negative for discharge and redness.   Gastrointestinal:  Negative for nausea and vomiting.   Musculoskeletal:  Negative for joint pain and myalgias.   Skin:  Negative for itching and rash.              Objective     /74   Pulse 77   Temp 36.1 °C (97 °F) (Temporal)   Resp 18   Ht 1.651 m (5' 5\")   Wt 88 kg (194 lb 0.1 oz)   SpO2 92%   BMI 32.28 kg/m²      Physical Exam  Constitutional:       General: She is not in acute distress.     Appearance: She is well-developed.   HENT:      Head: Normocephalic and atraumatic.   Eyes:      Conjunctiva/sclera: Conjunctivae normal.   Cardiovascular:      Rate and Rhythm: Normal rate and regular rhythm.      Heart sounds: Normal heart sounds. No murmur heard.  Pulmonary:      Effort: Pulmonary effort is normal.      Breath sounds: Rhonchi present. No rales.   Abdominal:      Palpations: Abdomen is soft.      Tenderness: There is no abdominal tenderness. There is no right CVA tenderness or left CVA tenderness.   Skin:     General: Skin is warm and dry.      Findings: No rash.   Neurological:      Mental Status: She is alert.          UA reviewed      Urine cultures 10/8/2024, 5/10/2024, and 1/10/2022 reviewed    1. Dysuria  POCT Urinalysis      2. Recurrent UTI  cefdinir (OMNICEF) 300 MG Cap    URINE CULTURE(NEW)      3. Acute cough  benzonatate (TESSALON PERLES) 100 MG Cap        Differential diagnosis, natural history, supportive care, and indications for immediate follow-up " were discussed.     F/u urine culture

## 2025-05-28 ENCOUNTER — RESULTS FOLLOW-UP (OUTPATIENT)
Dept: URGENT CARE | Facility: PHYSICIAN GROUP | Age: 85
End: 2025-05-28

## 2025-06-12 PROBLEM — F43.20 ADJUSTMENT DISORDER: Status: ACTIVE | Noted: 2025-06-12

## 2025-07-23 PROBLEM — M54.50 CHRONIC MIDLINE LOW BACK PAIN WITHOUT SCIATICA: Status: ACTIVE | Noted: 2017-08-01
